# Patient Record
Sex: MALE | Race: ASIAN | NOT HISPANIC OR LATINO | Employment: OTHER | URBAN - METROPOLITAN AREA
[De-identification: names, ages, dates, MRNs, and addresses within clinical notes are randomized per-mention and may not be internally consistent; named-entity substitution may affect disease eponyms.]

---

## 2017-01-17 ENCOUNTER — COMMUNICATION - HEALTHEAST (OUTPATIENT)
Dept: LAB | Facility: CLINIC | Age: 77
End: 2017-01-17

## 2017-01-17 DIAGNOSIS — M1A.00X0 CHRONIC GOUTY ARTHROPATHY: ICD-10-CM

## 2017-02-06 ENCOUNTER — OFFICE VISIT - HEALTHEAST (OUTPATIENT)
Dept: CARDIOLOGY | Facility: CLINIC | Age: 77
End: 2017-02-06

## 2017-02-06 ENCOUNTER — AMBULATORY - HEALTHEAST (OUTPATIENT)
Dept: CARDIOLOGY | Facility: CLINIC | Age: 77
End: 2017-02-06

## 2017-02-06 DIAGNOSIS — I10 ESSENTIAL HYPERTENSION: ICD-10-CM

## 2017-02-06 DIAGNOSIS — E78.5 DYSLIPIDEMIA: ICD-10-CM

## 2017-02-06 DIAGNOSIS — I25.10 CORONARY ARTERY DISEASE INVOLVING NATIVE CORONARY ARTERY OF NATIVE HEART WITHOUT ANGINA PECTORIS: ICD-10-CM

## 2017-02-06 LAB
CHOLEST SERPL-MCNC: 245 MG/DL
FASTING STATUS PATIENT QL REPORTED: YES
HDLC SERPL-MCNC: 35 MG/DL
LDLC SERPL CALC-MCNC: ABNORMAL MG/DL
TRIGL SERPL-MCNC: 601 MG/DL

## 2017-02-06 ASSESSMENT — MIFFLIN-ST. JEOR: SCORE: 1271.24

## 2017-02-07 ENCOUNTER — COMMUNICATION - HEALTHEAST (OUTPATIENT)
Dept: LAB | Facility: CLINIC | Age: 77
End: 2017-02-07

## 2017-02-07 DIAGNOSIS — M51.379 DEGENERATION OF LUMBAR OR LUMBOSACRAL INTERVERTEBRAL DISC: ICD-10-CM

## 2017-02-07 DIAGNOSIS — M1A.00X0 CHRONIC GOUTY ARTHROPATHY: ICD-10-CM

## 2017-02-08 ENCOUNTER — COMMUNICATION - HEALTHEAST (OUTPATIENT)
Dept: CARDIOLOGY | Facility: CLINIC | Age: 77
End: 2017-02-08

## 2017-02-08 DIAGNOSIS — E78.5 DYSLIPIDEMIA: ICD-10-CM

## 2017-02-17 ENCOUNTER — AMBULATORY - HEALTHEAST (OUTPATIENT)
Dept: LAB | Facility: CLINIC | Age: 77
End: 2017-02-17

## 2017-02-17 DIAGNOSIS — M1A.00X0 CHRONIC GOUTY ARTHROPATHY: ICD-10-CM

## 2017-02-17 LAB
ALT SERPL W P-5'-P-CCNC: 54 U/L (ref 0–45)
CREAT SERPL-MCNC: 1.72 MG/DL (ref 0.7–1.3)
GFR SERPL CREATININE-BSD FRML MDRD: 39 ML/MIN/1.73M2

## 2017-02-21 ENCOUNTER — OFFICE VISIT - HEALTHEAST (OUTPATIENT)
Dept: RHEUMATOLOGY | Facility: CLINIC | Age: 77
End: 2017-02-21

## 2017-02-21 DIAGNOSIS — N18.30 CKD (CHRONIC KIDNEY DISEASE) STAGE 3, GFR 30-59 ML/MIN (H): ICD-10-CM

## 2017-02-21 DIAGNOSIS — M1A.00X0 CHRONIC GOUTY ARTHROPATHY: ICD-10-CM

## 2017-02-21 DIAGNOSIS — G89.29 CHRONIC RIGHT SHOULDER PAIN: ICD-10-CM

## 2017-02-21 DIAGNOSIS — M25.511 CHRONIC RIGHT SHOULDER PAIN: ICD-10-CM

## 2017-02-21 DIAGNOSIS — M1A.00X0 CHRONIC GOUTY ARTHROPATHY WITHOUT MENTION OF TOPHUS (TOPHI): ICD-10-CM

## 2017-02-21 ASSESSMENT — MIFFLIN-ST. JEOR: SCORE: 1288.48

## 2017-03-21 ENCOUNTER — OFFICE VISIT - HEALTHEAST (OUTPATIENT)
Dept: FAMILY MEDICINE | Facility: CLINIC | Age: 77
End: 2017-03-21

## 2017-03-21 DIAGNOSIS — L30.9 DERMATITIS: ICD-10-CM

## 2017-03-21 DIAGNOSIS — I10 ESSENTIAL HYPERTENSION WITH GOAL BLOOD PRESSURE LESS THAN 140/90: ICD-10-CM

## 2017-03-21 ASSESSMENT — MIFFLIN-ST. JEOR: SCORE: 1284.85

## 2017-04-06 ENCOUNTER — COMMUNICATION - HEALTHEAST (OUTPATIENT)
Dept: FAMILY MEDICINE | Facility: CLINIC | Age: 77
End: 2017-04-06

## 2017-04-06 DIAGNOSIS — M1A.00X0 CHRONIC GOUTY ARTHROPATHY: ICD-10-CM

## 2017-04-06 DIAGNOSIS — M51.379 DEGENERATION OF LUMBAR OR LUMBOSACRAL INTERVERTEBRAL DISC: ICD-10-CM

## 2017-04-19 ENCOUNTER — OFFICE VISIT - HEALTHEAST (OUTPATIENT)
Dept: FAMILY MEDICINE | Facility: CLINIC | Age: 77
End: 2017-04-19

## 2017-04-19 DIAGNOSIS — M51.379 DEGENERATION OF LUMBAR OR LUMBOSACRAL INTERVERTEBRAL DISC: ICD-10-CM

## 2017-04-19 DIAGNOSIS — R51.9 HEADACHE: ICD-10-CM

## 2017-04-19 DIAGNOSIS — I10 ESSENTIAL HYPERTENSION WITH GOAL BLOOD PRESSURE LESS THAN 140/90: ICD-10-CM

## 2017-04-19 ASSESSMENT — MIFFLIN-ST. JEOR: SCORE: 1287.12

## 2017-04-24 ENCOUNTER — COMMUNICATION - HEALTHEAST (OUTPATIENT)
Dept: LAB | Facility: CLINIC | Age: 77
End: 2017-04-24

## 2017-04-24 DIAGNOSIS — M1A.00X0 CHRONIC GOUTY ARTHROPATHY: ICD-10-CM

## 2017-05-19 ENCOUNTER — COMMUNICATION - HEALTHEAST (OUTPATIENT)
Dept: FAMILY MEDICINE | Facility: CLINIC | Age: 77
End: 2017-05-19

## 2017-05-19 ENCOUNTER — AMBULATORY - HEALTHEAST (OUTPATIENT)
Dept: LAB | Facility: CLINIC | Age: 77
End: 2017-05-19

## 2017-05-19 DIAGNOSIS — M1A.00X0 CHRONIC GOUTY ARTHROPATHY: ICD-10-CM

## 2017-05-19 DIAGNOSIS — M51.379 DEGENERATION OF LUMBAR OR LUMBOSACRAL INTERVERTEBRAL DISC: ICD-10-CM

## 2017-05-19 LAB
ALT SERPL W P-5'-P-CCNC: 29 U/L (ref 0–45)
CREAT SERPL-MCNC: 1.57 MG/DL (ref 0.7–1.3)
GFR SERPL CREATININE-BSD FRML MDRD: 43 ML/MIN/1.73M2

## 2017-05-23 ENCOUNTER — OFFICE VISIT - HEALTHEAST (OUTPATIENT)
Dept: RHEUMATOLOGY | Facility: CLINIC | Age: 77
End: 2017-05-23

## 2017-05-23 DIAGNOSIS — M25.562 BILATERAL CHRONIC KNEE PAIN: ICD-10-CM

## 2017-05-23 DIAGNOSIS — M25.561 BILATERAL CHRONIC KNEE PAIN: ICD-10-CM

## 2017-05-23 DIAGNOSIS — N18.30 CKD (CHRONIC KIDNEY DISEASE) STAGE 3, GFR 30-59 ML/MIN (H): ICD-10-CM

## 2017-05-23 DIAGNOSIS — G89.29 BILATERAL CHRONIC KNEE PAIN: ICD-10-CM

## 2017-05-23 DIAGNOSIS — M1A.00X0 CHRONIC GOUTY ARTHROPATHY: ICD-10-CM

## 2017-05-31 ENCOUNTER — COMMUNICATION - HEALTHEAST (OUTPATIENT)
Dept: FAMILY MEDICINE | Facility: CLINIC | Age: 77
End: 2017-05-31

## 2017-05-31 DIAGNOSIS — I10 UNSPECIFIED ESSENTIAL HYPERTENSION: ICD-10-CM

## 2017-06-14 ENCOUNTER — COMMUNICATION - HEALTHEAST (OUTPATIENT)
Dept: FAMILY MEDICINE | Facility: CLINIC | Age: 77
End: 2017-06-14

## 2017-06-21 ENCOUNTER — RECORDS - HEALTHEAST (OUTPATIENT)
Dept: ADMINISTRATIVE | Facility: OTHER | Age: 77
End: 2017-06-21

## 2017-07-09 ENCOUNTER — COMMUNICATION - HEALTHEAST (OUTPATIENT)
Dept: FAMILY MEDICINE | Facility: CLINIC | Age: 77
End: 2017-07-09

## 2017-07-09 DIAGNOSIS — M51.379 DEGENERATION OF LUMBAR OR LUMBOSACRAL INTERVERTEBRAL DISC: ICD-10-CM

## 2017-07-09 DIAGNOSIS — M1A.00X0 CHRONIC GOUTY ARTHROPATHY: ICD-10-CM

## 2017-07-11 ENCOUNTER — AMBULATORY - HEALTHEAST (OUTPATIENT)
Dept: FAMILY MEDICINE | Facility: CLINIC | Age: 77
End: 2017-07-11

## 2017-07-11 ENCOUNTER — COMMUNICATION - HEALTHEAST (OUTPATIENT)
Dept: FAMILY MEDICINE | Facility: CLINIC | Age: 77
End: 2017-07-11

## 2017-07-19 ENCOUNTER — OFFICE VISIT - HEALTHEAST (OUTPATIENT)
Dept: FAMILY MEDICINE | Facility: CLINIC | Age: 77
End: 2017-07-19

## 2017-07-19 DIAGNOSIS — Z87.438 HISTORY OF PROSTATITIS: ICD-10-CM

## 2017-07-19 DIAGNOSIS — I10 ESSENTIAL HYPERTENSION WITH GOAL BLOOD PRESSURE LESS THAN 140/90: ICD-10-CM

## 2017-07-19 DIAGNOSIS — M1A.9XX0 CHRONIC GOUT: ICD-10-CM

## 2017-07-19 DIAGNOSIS — N40.0 BPH (BENIGN PROSTATIC HYPERTROPHY): ICD-10-CM

## 2017-07-19 ASSESSMENT — MIFFLIN-ST. JEOR: SCORE: 1265.57

## 2017-08-18 ENCOUNTER — COMMUNICATION - HEALTHEAST (OUTPATIENT)
Dept: LAB | Facility: CLINIC | Age: 77
End: 2017-08-18

## 2017-08-18 DIAGNOSIS — M1A.00X0 CHRONIC GOUTY ARTHROPATHY: ICD-10-CM

## 2017-08-21 ENCOUNTER — AMBULATORY - HEALTHEAST (OUTPATIENT)
Dept: LAB | Facility: CLINIC | Age: 77
End: 2017-08-21

## 2017-08-21 DIAGNOSIS — R35.0 URINARY FREQUENCY: ICD-10-CM

## 2017-08-21 DIAGNOSIS — Z12.5 ENCOUNTER FOR SCREENING FOR MALIGNANT NEOPLASM OF PROSTATE: ICD-10-CM

## 2017-08-29 ENCOUNTER — AMBULATORY - HEALTHEAST (OUTPATIENT)
Dept: LAB | Facility: CLINIC | Age: 77
End: 2017-08-29

## 2017-08-29 DIAGNOSIS — M1A.00X0 CHRONIC GOUTY ARTHROPATHY: ICD-10-CM

## 2017-08-29 LAB
ALT SERPL W P-5'-P-CCNC: 37 U/L (ref 0–45)
CREAT SERPL-MCNC: 1.59 MG/DL (ref 0.7–1.3)
GFR SERPL CREATININE-BSD FRML MDRD: 43 ML/MIN/1.73M2

## 2017-09-01 ENCOUNTER — OFFICE VISIT - HEALTHEAST (OUTPATIENT)
Dept: RHEUMATOLOGY | Facility: CLINIC | Age: 77
End: 2017-09-01

## 2017-09-01 DIAGNOSIS — M1A.00X0 CHRONIC GOUTY ARTHROPATHY: ICD-10-CM

## 2017-09-01 DIAGNOSIS — N18.30 CKD (CHRONIC KIDNEY DISEASE) STAGE 3, GFR 30-59 ML/MIN (H): ICD-10-CM

## 2017-09-01 DIAGNOSIS — M77.8 RIGHT SHOULDER TENDINITIS: ICD-10-CM

## 2017-09-01 ASSESSMENT — MIFFLIN-ST. JEOR: SCORE: 1265.57

## 2017-09-03 ENCOUNTER — COMMUNICATION - HEALTHEAST (OUTPATIENT)
Dept: LAB | Facility: CLINIC | Age: 77
End: 2017-09-03

## 2017-09-03 DIAGNOSIS — M1A.00X0 CHRONIC GOUTY ARTHROPATHY: ICD-10-CM

## 2017-09-03 DIAGNOSIS — M51.379 DEGENERATION OF LUMBAR OR LUMBOSACRAL INTERVERTEBRAL DISC: ICD-10-CM

## 2017-09-07 ENCOUNTER — OFFICE VISIT - HEALTHEAST (OUTPATIENT)
Dept: FAMILY MEDICINE | Facility: CLINIC | Age: 77
End: 2017-09-07

## 2017-09-07 DIAGNOSIS — Z00.00 PREVENTATIVE HEALTH CARE: ICD-10-CM

## 2017-09-07 DIAGNOSIS — I10 ESSENTIAL HYPERTENSION WITH GOAL BLOOD PRESSURE LESS THAN 140/90: ICD-10-CM

## 2017-09-07 DIAGNOSIS — M1A.9XX0 CHRONIC GOUT: ICD-10-CM

## 2017-09-07 DIAGNOSIS — N18.30 CKD (CHRONIC KIDNEY DISEASE) STAGE 3, GFR 30-59 ML/MIN (H): ICD-10-CM

## 2017-09-07 DIAGNOSIS — R07.9 CHEST PAIN: ICD-10-CM

## 2017-09-07 RX ORDER — MULTIPLE VITAMINS W/ MINERALS TAB 9MG-400MCG
1 TAB ORAL DAILY
Qty: 100 TABLET | Refills: 3 | Status: SHIPPED | OUTPATIENT
Start: 2017-09-07 | End: 2021-12-06

## 2017-09-07 ASSESSMENT — MIFFLIN-ST. JEOR: SCORE: 1275.78

## 2017-09-26 ENCOUNTER — OFFICE VISIT - HEALTHEAST (OUTPATIENT)
Dept: FAMILY MEDICINE | Facility: CLINIC | Age: 77
End: 2017-09-26

## 2017-09-26 DIAGNOSIS — N18.30 CKD (CHRONIC KIDNEY DISEASE) STAGE 3, GFR 30-59 ML/MIN (H): ICD-10-CM

## 2017-09-26 DIAGNOSIS — I10 ESSENTIAL HYPERTENSION WITH GOAL BLOOD PRESSURE LESS THAN 140/90: ICD-10-CM

## 2017-09-26 ASSESSMENT — MIFFLIN-ST. JEOR: SCORE: 1282.58

## 2017-10-04 ENCOUNTER — COMMUNICATION - HEALTHEAST (OUTPATIENT)
Dept: SCHEDULING | Facility: CLINIC | Age: 77
End: 2017-10-04

## 2017-10-10 ENCOUNTER — AMBULATORY - HEALTHEAST (OUTPATIENT)
Dept: RHEUMATOLOGY | Facility: CLINIC | Age: 77
End: 2017-10-10

## 2017-10-10 DIAGNOSIS — M1A.9XX0 CHRONIC GOUT: ICD-10-CM

## 2017-10-18 ENCOUNTER — COMMUNICATION - HEALTHEAST (OUTPATIENT)
Dept: FAMILY MEDICINE | Facility: CLINIC | Age: 77
End: 2017-10-18

## 2017-10-25 ENCOUNTER — OFFICE VISIT - HEALTHEAST (OUTPATIENT)
Dept: FAMILY MEDICINE | Facility: CLINIC | Age: 77
End: 2017-10-25

## 2017-10-25 DIAGNOSIS — D64.9 ANEMIA: ICD-10-CM

## 2017-10-25 DIAGNOSIS — E11.9 TYPE 2 DIABETES MELLITUS WITHOUT COMPLICATION, WITHOUT LONG-TERM CURRENT USE OF INSULIN (H): ICD-10-CM

## 2017-10-25 DIAGNOSIS — K92.1 BLOOD IN STOOL: ICD-10-CM

## 2017-10-25 DIAGNOSIS — K59.00 CONSTIPATION: ICD-10-CM

## 2017-10-25 DIAGNOSIS — R07.9 CHEST PAIN: ICD-10-CM

## 2017-10-25 ASSESSMENT — MIFFLIN-ST. JEOR: SCORE: 1257.63

## 2017-10-29 ENCOUNTER — COMMUNICATION - HEALTHEAST (OUTPATIENT)
Dept: FAMILY MEDICINE | Facility: CLINIC | Age: 77
End: 2017-10-29

## 2017-11-17 ENCOUNTER — COMMUNICATION - HEALTHEAST (OUTPATIENT)
Dept: SCHEDULING | Facility: CLINIC | Age: 77
End: 2017-11-17

## 2017-11-19 ENCOUNTER — COMMUNICATION - HEALTHEAST (OUTPATIENT)
Dept: CARDIOLOGY | Facility: CLINIC | Age: 77
End: 2017-11-19

## 2017-11-19 DIAGNOSIS — E78.5 DYSLIPIDEMIA: ICD-10-CM

## 2017-12-01 ENCOUNTER — COMMUNICATION - HEALTHEAST (OUTPATIENT)
Dept: FAMILY MEDICINE | Facility: CLINIC | Age: 77
End: 2017-12-01

## 2017-12-05 ENCOUNTER — OFFICE VISIT - HEALTHEAST (OUTPATIENT)
Dept: FAMILY MEDICINE | Facility: CLINIC | Age: 77
End: 2017-12-05

## 2017-12-05 ENCOUNTER — COMMUNICATION - HEALTHEAST (OUTPATIENT)
Dept: FAMILY MEDICINE | Facility: CLINIC | Age: 77
End: 2017-12-05

## 2017-12-05 DIAGNOSIS — R79.89 POSITIVE D DIMER: ICD-10-CM

## 2017-12-05 DIAGNOSIS — M79.662 PAIN OF LEFT CALF: ICD-10-CM

## 2017-12-05 DIAGNOSIS — E83.42 HYPOMAGNESEMIA: ICD-10-CM

## 2017-12-05 DIAGNOSIS — R10.13 EPIGASTRIC PAIN: ICD-10-CM

## 2017-12-05 DIAGNOSIS — I95.9 HYPOTENSION: ICD-10-CM

## 2017-12-05 DIAGNOSIS — K52.9 COLITIS: ICD-10-CM

## 2017-12-05 ASSESSMENT — MIFFLIN-ST. JEOR: SCORE: 1245.17

## 2017-12-06 ENCOUNTER — AMBULATORY - HEALTHEAST (OUTPATIENT)
Dept: FAMILY MEDICINE | Facility: CLINIC | Age: 77
End: 2017-12-06

## 2017-12-06 ENCOUNTER — HOSPITAL ENCOUNTER (OUTPATIENT)
Dept: ULTRASOUND IMAGING | Facility: HOSPITAL | Age: 77
Discharge: HOME | End: 2017-12-06
Attending: FAMILY MEDICINE

## 2017-12-06 DIAGNOSIS — R79.89 POSITIVE D DIMER: ICD-10-CM

## 2017-12-06 DIAGNOSIS — M79.662 PAIN OF LEFT CALF: ICD-10-CM

## 2017-12-08 ENCOUNTER — RECORDS - HEALTHEAST (OUTPATIENT)
Dept: ADMINISTRATIVE | Facility: OTHER | Age: 77
End: 2017-12-08

## 2017-12-11 ENCOUNTER — OFFICE VISIT - HEALTHEAST (OUTPATIENT)
Dept: FAMILY MEDICINE | Facility: CLINIC | Age: 77
End: 2017-12-11

## 2017-12-11 DIAGNOSIS — M79.662 PAIN OF LEFT CALF: ICD-10-CM

## 2017-12-11 DIAGNOSIS — E83.42 HYPOMAGNESEMIA: ICD-10-CM

## 2017-12-11 DIAGNOSIS — I25.83 CORONARY ARTERY DISEASE DUE TO LIPID RICH PLAQUE: ICD-10-CM

## 2017-12-11 DIAGNOSIS — K52.9 COLITIS: ICD-10-CM

## 2017-12-11 DIAGNOSIS — I25.10 CORONARY ARTERY DISEASE DUE TO LIPID RICH PLAQUE: ICD-10-CM

## 2017-12-11 ASSESSMENT — MIFFLIN-ST. JEOR: SCORE: 1234.96

## 2017-12-19 ENCOUNTER — OFFICE VISIT - HEALTHEAST (OUTPATIENT)
Dept: FAMILY MEDICINE | Facility: CLINIC | Age: 77
End: 2017-12-19

## 2017-12-19 DIAGNOSIS — N17.9 ACUTE RENAL FAILURE SUPERIMPOSED ON STAGE 3 CHRONIC KIDNEY DISEASE (H): ICD-10-CM

## 2017-12-19 DIAGNOSIS — N18.30 ACUTE RENAL FAILURE SUPERIMPOSED ON STAGE 3 CHRONIC KIDNEY DISEASE (H): ICD-10-CM

## 2017-12-19 DIAGNOSIS — R10.9 ABDOMINAL PAIN: ICD-10-CM

## 2017-12-24 ENCOUNTER — COMMUNICATION - HEALTHEAST (OUTPATIENT)
Dept: FAMILY MEDICINE | Facility: CLINIC | Age: 77
End: 2017-12-24

## 2017-12-24 DIAGNOSIS — M1A.00X0 CHRONIC GOUTY ARTHROPATHY: ICD-10-CM

## 2017-12-28 ENCOUNTER — RECORDS - HEALTHEAST (OUTPATIENT)
Dept: ADMINISTRATIVE | Facility: OTHER | Age: 77
End: 2017-12-28

## 2017-12-28 ENCOUNTER — COMMUNICATION - HEALTHEAST (OUTPATIENT)
Dept: FAMILY MEDICINE | Facility: CLINIC | Age: 77
End: 2017-12-28

## 2017-12-29 ENCOUNTER — AMBULATORY - HEALTHEAST (OUTPATIENT)
Dept: LAB | Facility: CLINIC | Age: 77
End: 2017-12-29

## 2017-12-29 DIAGNOSIS — M1A.9XX0 CHRONIC GOUT: ICD-10-CM

## 2017-12-29 LAB
ALT SERPL W P-5'-P-CCNC: 25 U/L (ref 0–45)
CREAT SERPL-MCNC: 1.53 MG/DL (ref 0.7–1.3)
GFR SERPL CREATININE-BSD FRML MDRD: 45 ML/MIN/1.73M2

## 2018-01-03 ENCOUNTER — RECORDS - HEALTHEAST (OUTPATIENT)
Dept: ADMINISTRATIVE | Facility: OTHER | Age: 78
End: 2018-01-03

## 2018-01-11 ENCOUNTER — OFFICE VISIT - HEALTHEAST (OUTPATIENT)
Dept: RHEUMATOLOGY | Facility: CLINIC | Age: 78
End: 2018-01-11

## 2018-01-11 DIAGNOSIS — M1A.00X0 CHRONIC GOUTY ARTHROPATHY: ICD-10-CM

## 2018-01-11 DIAGNOSIS — N18.30 CKD (CHRONIC KIDNEY DISEASE) STAGE 3, GFR 30-59 ML/MIN (H): ICD-10-CM

## 2018-01-11 ASSESSMENT — MIFFLIN-ST. JEOR: SCORE: 1241.2

## 2018-01-13 ENCOUNTER — COMMUNICATION - HEALTHEAST (OUTPATIENT)
Dept: FAMILY MEDICINE | Facility: CLINIC | Age: 78
End: 2018-01-13

## 2018-01-22 ENCOUNTER — AMBULATORY - HEALTHEAST (OUTPATIENT)
Dept: FAMILY MEDICINE | Facility: CLINIC | Age: 78
End: 2018-01-22

## 2018-02-20 ENCOUNTER — COMMUNICATION - HEALTHEAST (OUTPATIENT)
Dept: FAMILY MEDICINE | Facility: CLINIC | Age: 78
End: 2018-02-20

## 2018-02-20 DIAGNOSIS — E11.9 TYPE 2 DIABETES MELLITUS WITHOUT COMPLICATION, WITHOUT LONG-TERM CURRENT USE OF INSULIN (H): ICD-10-CM

## 2018-02-26 ENCOUNTER — OFFICE VISIT - HEALTHEAST (OUTPATIENT)
Dept: FAMILY MEDICINE | Facility: CLINIC | Age: 78
End: 2018-02-26

## 2018-02-26 DIAGNOSIS — I10 ESSENTIAL HYPERTENSION WITH GOAL BLOOD PRESSURE LESS THAN 140/90: ICD-10-CM

## 2018-02-26 DIAGNOSIS — M1A.9XX0 CHRONIC GOUT: ICD-10-CM

## 2018-02-26 DIAGNOSIS — D64.9 ANEMIA: ICD-10-CM

## 2018-02-26 ASSESSMENT — MIFFLIN-ST. JEOR: SCORE: 1249.7

## 2018-03-05 ENCOUNTER — COMMUNICATION - HEALTHEAST (OUTPATIENT)
Dept: FAMILY MEDICINE | Facility: CLINIC | Age: 78
End: 2018-03-05

## 2018-04-06 ENCOUNTER — COMMUNICATION - HEALTHEAST (OUTPATIENT)
Dept: FAMILY MEDICINE | Facility: CLINIC | Age: 78
End: 2018-04-06

## 2018-04-25 ENCOUNTER — OFFICE VISIT - HEALTHEAST (OUTPATIENT)
Dept: FAMILY MEDICINE | Facility: CLINIC | Age: 78
End: 2018-04-25

## 2018-04-25 DIAGNOSIS — L08.9 SKIN INFECTION: ICD-10-CM

## 2018-04-25 ASSESSMENT — MIFFLIN-ST. JEOR: SCORE: 1290.53

## 2018-04-27 ENCOUNTER — COMMUNICATION - HEALTHEAST (OUTPATIENT)
Dept: FAMILY MEDICINE | Facility: CLINIC | Age: 78
End: 2018-04-27

## 2018-05-03 ENCOUNTER — OFFICE VISIT - HEALTHEAST (OUTPATIENT)
Dept: FAMILY MEDICINE | Facility: CLINIC | Age: 78
End: 2018-05-03

## 2018-05-03 DIAGNOSIS — N50.89 SCROTAL IRRITATION: ICD-10-CM

## 2018-05-03 DIAGNOSIS — L30.9 DERMATITIS: ICD-10-CM

## 2018-05-03 ASSESSMENT — MIFFLIN-ST. JEOR: SCORE: 1257.64

## 2018-05-08 ENCOUNTER — COMMUNICATION - HEALTHEAST (OUTPATIENT)
Dept: CARDIOLOGY | Facility: CLINIC | Age: 78
End: 2018-05-08

## 2018-05-08 DIAGNOSIS — E78.5 HYPERLIPIDEMIA: ICD-10-CM

## 2018-05-19 ENCOUNTER — COMMUNICATION - HEALTHEAST (OUTPATIENT)
Dept: FAMILY MEDICINE | Facility: CLINIC | Age: 78
End: 2018-05-19

## 2018-05-19 DIAGNOSIS — E11.9 TYPE 2 DIABETES MELLITUS WITHOUT COMPLICATION, WITHOUT LONG-TERM CURRENT USE OF INSULIN (H): ICD-10-CM

## 2018-06-20 ENCOUNTER — COMMUNICATION - HEALTHEAST (OUTPATIENT)
Dept: FAMILY MEDICINE | Facility: CLINIC | Age: 78
End: 2018-06-20

## 2018-06-22 ENCOUNTER — COMMUNICATION - HEALTHEAST (OUTPATIENT)
Dept: FAMILY MEDICINE | Facility: CLINIC | Age: 78
End: 2018-06-22

## 2018-06-22 DIAGNOSIS — E11.9 TYPE 2 DIABETES MELLITUS WITHOUT COMPLICATION, WITHOUT LONG-TERM CURRENT USE OF INSULIN (H): ICD-10-CM

## 2018-06-27 ENCOUNTER — AMBULATORY - HEALTHEAST (OUTPATIENT)
Dept: FAMILY MEDICINE | Facility: CLINIC | Age: 78
End: 2018-06-27

## 2018-06-27 ENCOUNTER — COMMUNICATION - HEALTHEAST (OUTPATIENT)
Dept: CARDIOLOGY | Facility: CLINIC | Age: 78
End: 2018-06-27

## 2018-06-27 DIAGNOSIS — E78.5 HYPERLIPIDEMIA: ICD-10-CM

## 2018-07-11 ENCOUNTER — COMMUNICATION - HEALTHEAST (OUTPATIENT)
Dept: FAMILY MEDICINE | Facility: CLINIC | Age: 78
End: 2018-07-11

## 2018-07-11 DIAGNOSIS — K21.9 GASTROESOPHAGEAL REFLUX DISEASE WITHOUT ESOPHAGITIS: ICD-10-CM

## 2018-07-22 ENCOUNTER — COMMUNICATION - HEALTHEAST (OUTPATIENT)
Dept: FAMILY MEDICINE | Facility: CLINIC | Age: 78
End: 2018-07-22

## 2018-08-05 ENCOUNTER — COMMUNICATION - HEALTHEAST (OUTPATIENT)
Dept: FAMILY MEDICINE | Facility: CLINIC | Age: 78
End: 2018-08-05

## 2018-08-07 ENCOUNTER — RECORDS - HEALTHEAST (OUTPATIENT)
Dept: ADMINISTRATIVE | Facility: OTHER | Age: 78
End: 2018-08-07

## 2018-08-13 ENCOUNTER — COMMUNICATION - HEALTHEAST (OUTPATIENT)
Dept: SCHEDULING | Facility: CLINIC | Age: 78
End: 2018-08-13

## 2018-08-21 ENCOUNTER — COMMUNICATION - HEALTHEAST (OUTPATIENT)
Dept: FAMILY MEDICINE | Facility: CLINIC | Age: 78
End: 2018-08-21

## 2018-08-28 ENCOUNTER — COMMUNICATION - HEALTHEAST (OUTPATIENT)
Dept: ADMINISTRATIVE | Facility: CLINIC | Age: 78
End: 2018-08-28

## 2018-08-28 ENCOUNTER — OFFICE VISIT - HEALTHEAST (OUTPATIENT)
Dept: FAMILY MEDICINE | Facility: CLINIC | Age: 78
End: 2018-08-28

## 2018-08-28 DIAGNOSIS — Z23 NEED FOR IMMUNIZATION AGAINST INFLUENZA: ICD-10-CM

## 2018-08-28 DIAGNOSIS — E11.9 TYPE 2 DIABETES MELLITUS WITHOUT COMPLICATION, WITHOUT LONG-TERM CURRENT USE OF INSULIN (H): ICD-10-CM

## 2018-08-28 DIAGNOSIS — L57.0 ACTINIC KERATOSIS: ICD-10-CM

## 2018-08-28 DIAGNOSIS — I10 ESSENTIAL HYPERTENSION WITH GOAL BLOOD PRESSURE LESS THAN 140/90: ICD-10-CM

## 2018-08-28 DIAGNOSIS — M1A.00X0 CHRONIC GOUTY ARTHROPATHY: ICD-10-CM

## 2018-08-28 LAB — HBA1C MFR BLD: 6.2 % (ref 3.5–6)

## 2018-08-28 ASSESSMENT — MIFFLIN-ST. JEOR: SCORE: 1267.85

## 2018-09-04 ENCOUNTER — AMBULATORY - HEALTHEAST (OUTPATIENT)
Dept: LAB | Facility: CLINIC | Age: 78
End: 2018-09-04

## 2018-09-04 DIAGNOSIS — M1A.9XX0 CHRONIC GOUT: ICD-10-CM

## 2018-09-04 LAB
ALBUMIN SERPL-MCNC: 3.8 G/DL (ref 3.5–5)
ALT SERPL W P-5'-P-CCNC: 38 U/L (ref 0–45)
CREAT SERPL-MCNC: 1.6 MG/DL (ref 0.7–1.3)
ERYTHROCYTE [DISTWIDTH] IN BLOOD BY AUTOMATED COUNT: 12.8 % (ref 11–14.5)
GFR SERPL CREATININE-BSD FRML MDRD: 42 ML/MIN/1.73M2
HCT VFR BLD AUTO: 45.3 % (ref 40–54)
HGB BLD-MCNC: 15 G/DL (ref 14–18)
MCH RBC QN AUTO: 29.7 PG (ref 27–34)
MCHC RBC AUTO-ENTMCNC: 33.2 G/DL (ref 32–36)
MCV RBC AUTO: 90 FL (ref 80–100)
PLATELET # BLD AUTO: 208 THOU/UL (ref 140–440)
PMV BLD AUTO: 7.4 FL (ref 7–10)
RBC # BLD AUTO: 5.06 MILL/UL (ref 4.4–6.2)
WBC: 8.5 THOU/UL (ref 4–11)

## 2018-09-10 ENCOUNTER — OFFICE VISIT - HEALTHEAST (OUTPATIENT)
Dept: RHEUMATOLOGY | Facility: CLINIC | Age: 78
End: 2018-09-10

## 2018-09-10 DIAGNOSIS — M1A.00X0 CHRONIC GOUTY ARTHROPATHY: ICD-10-CM

## 2018-09-10 DIAGNOSIS — M65.321 TRIGGER FINGER, RIGHT INDEX FINGER: ICD-10-CM

## 2018-09-10 DIAGNOSIS — N18.30 CKD (CHRONIC KIDNEY DISEASE) STAGE 3, GFR 30-59 ML/MIN (H): ICD-10-CM

## 2018-09-21 ENCOUNTER — COMMUNICATION - HEALTHEAST (OUTPATIENT)
Dept: FAMILY MEDICINE | Facility: CLINIC | Age: 78
End: 2018-09-21

## 2018-09-21 DIAGNOSIS — E11.9 TYPE 2 DIABETES MELLITUS WITHOUT COMPLICATION, WITHOUT LONG-TERM CURRENT USE OF INSULIN (H): ICD-10-CM

## 2018-10-25 ENCOUNTER — COMMUNICATION - HEALTHEAST (OUTPATIENT)
Dept: FAMILY MEDICINE | Facility: CLINIC | Age: 78
End: 2018-10-25

## 2018-11-29 ENCOUNTER — COMMUNICATION - HEALTHEAST (OUTPATIENT)
Dept: FAMILY MEDICINE | Facility: CLINIC | Age: 78
End: 2018-11-29

## 2018-12-04 ENCOUNTER — OFFICE VISIT - HEALTHEAST (OUTPATIENT)
Dept: FAMILY MEDICINE | Facility: CLINIC | Age: 78
End: 2018-12-04

## 2018-12-04 DIAGNOSIS — M15.9 GENERALIZED OSTEOARTHROSIS OF HAND: ICD-10-CM

## 2018-12-04 DIAGNOSIS — I10 ESSENTIAL HYPERTENSION WITH GOAL BLOOD PRESSURE LESS THAN 140/90: ICD-10-CM

## 2018-12-04 DIAGNOSIS — M1A.09X0 CHRONIC GOUT OF MULTIPLE SITES, UNSPECIFIED CAUSE: ICD-10-CM

## 2018-12-04 DIAGNOSIS — E11.9 TYPE 2 DIABETES MELLITUS WITHOUT COMPLICATION, WITHOUT LONG-TERM CURRENT USE OF INSULIN (H): ICD-10-CM

## 2018-12-04 DIAGNOSIS — L82.0 INFLAMED SEBORRHEIC KERATOSIS: ICD-10-CM

## 2018-12-04 ASSESSMENT — MIFFLIN-ST. JEOR: SCORE: 1264.45

## 2018-12-20 ENCOUNTER — OFFICE VISIT - HEALTHEAST (OUTPATIENT)
Dept: RHEUMATOLOGY | Facility: CLINIC | Age: 78
End: 2018-12-20

## 2018-12-20 DIAGNOSIS — M19.032 PRIMARY OSTEOARTHRITIS OF LEFT WRIST: ICD-10-CM

## 2018-12-20 DIAGNOSIS — M15.9 GENERALIZED OSTEOARTHROSIS OF HAND: ICD-10-CM

## 2018-12-20 DIAGNOSIS — M1A.00X0 CHRONIC GOUTY ARTHROPATHY: ICD-10-CM

## 2018-12-20 DIAGNOSIS — N18.30 CKD (CHRONIC KIDNEY DISEASE) STAGE 3, GFR 30-59 ML/MIN (H): ICD-10-CM

## 2018-12-20 LAB
ALBUMIN SERPL-MCNC: 4.1 G/DL (ref 3.5–5)
ALT SERPL W P-5'-P-CCNC: 35 U/L (ref 0–45)
CREAT SERPL-MCNC: 1.51 MG/DL (ref 0.7–1.3)
ERYTHROCYTE [DISTWIDTH] IN BLOOD BY AUTOMATED COUNT: 12.5 % (ref 11–14.5)
GFR SERPL CREATININE-BSD FRML MDRD: 45 ML/MIN/1.73M2
HCT VFR BLD AUTO: 42.7 % (ref 40–54)
HGB BLD-MCNC: 15 G/DL (ref 14–18)
MCH RBC QN AUTO: 31.7 PG (ref 27–34)
MCHC RBC AUTO-ENTMCNC: 35.2 G/DL (ref 32–36)
MCV RBC AUTO: 90 FL (ref 80–100)
PLATELET # BLD AUTO: 205 THOU/UL (ref 140–440)
PMV BLD AUTO: 8.1 FL (ref 7–10)
RBC # BLD AUTO: 4.74 MILL/UL (ref 4.4–6.2)
URATE SERPL-MCNC: 6.8 MG/DL (ref 3–8)
WBC: 7.7 THOU/UL (ref 4–11)

## 2018-12-24 ENCOUNTER — COMMUNICATION - HEALTHEAST (OUTPATIENT)
Dept: FAMILY MEDICINE | Facility: CLINIC | Age: 78
End: 2018-12-24

## 2018-12-28 ENCOUNTER — COMMUNICATION - HEALTHEAST (OUTPATIENT)
Dept: FAMILY MEDICINE | Facility: CLINIC | Age: 78
End: 2018-12-28

## 2018-12-28 ENCOUNTER — AMBULATORY - HEALTHEAST (OUTPATIENT)
Dept: FAMILY MEDICINE | Facility: CLINIC | Age: 78
End: 2018-12-28

## 2018-12-28 ENCOUNTER — COMMUNICATION - HEALTHEAST (OUTPATIENT)
Dept: SCHEDULING | Facility: CLINIC | Age: 78
End: 2018-12-28

## 2019-01-02 ENCOUNTER — OFFICE VISIT - HEALTHEAST (OUTPATIENT)
Dept: FAMILY MEDICINE | Facility: CLINIC | Age: 79
End: 2019-01-02

## 2019-01-02 DIAGNOSIS — Z28.39 IMMUNIZATION DEFICIENCY: ICD-10-CM

## 2019-01-02 DIAGNOSIS — I10 ESSENTIAL HYPERTENSION WITH GOAL BLOOD PRESSURE LESS THAN 140/90: ICD-10-CM

## 2019-01-02 DIAGNOSIS — I25.10 CORONARY ARTERY DISEASE DUE TO LIPID RICH PLAQUE: ICD-10-CM

## 2019-01-02 DIAGNOSIS — L82.0 INFLAMED SEBORRHEIC KERATOSIS: ICD-10-CM

## 2019-01-02 DIAGNOSIS — I25.83 CORONARY ARTERY DISEASE DUE TO LIPID RICH PLAQUE: ICD-10-CM

## 2019-01-02 DIAGNOSIS — R07.89 ATYPICAL CHEST PAIN: ICD-10-CM

## 2019-01-02 ASSESSMENT — MIFFLIN-ST. JEOR: SCORE: 1276.92

## 2019-01-16 ENCOUNTER — COMMUNICATION - HEALTHEAST (OUTPATIENT)
Dept: FAMILY MEDICINE | Facility: CLINIC | Age: 79
End: 2019-01-16

## 2019-01-16 ENCOUNTER — RECORDS - HEALTHEAST (OUTPATIENT)
Dept: ADMINISTRATIVE | Facility: OTHER | Age: 79
End: 2019-01-16

## 2019-01-16 DIAGNOSIS — I10 ESSENTIAL HYPERTENSION WITH GOAL BLOOD PRESSURE LESS THAN 140/90: ICD-10-CM

## 2019-01-25 ENCOUNTER — COMMUNICATION - HEALTHEAST (OUTPATIENT)
Dept: FAMILY MEDICINE | Facility: CLINIC | Age: 79
End: 2019-01-25

## 2019-01-25 DIAGNOSIS — I25.10 CORONARY ARTERY DISEASE DUE TO LIPID RICH PLAQUE: ICD-10-CM

## 2019-01-25 DIAGNOSIS — I25.83 CORONARY ARTERY DISEASE DUE TO LIPID RICH PLAQUE: ICD-10-CM

## 2019-02-23 ENCOUNTER — COMMUNICATION - HEALTHEAST (OUTPATIENT)
Dept: FAMILY MEDICINE | Facility: CLINIC | Age: 79
End: 2019-02-23

## 2019-02-23 DIAGNOSIS — M54.50 LUMBAGO: ICD-10-CM

## 2019-02-23 DIAGNOSIS — I25.83 CORONARY ARTERY DISEASE DUE TO LIPID RICH PLAQUE: ICD-10-CM

## 2019-02-23 DIAGNOSIS — I25.10 CORONARY ARTERY DISEASE DUE TO LIPID RICH PLAQUE: ICD-10-CM

## 2019-02-26 ENCOUNTER — COMMUNICATION - HEALTHEAST (OUTPATIENT)
Dept: FAMILY MEDICINE | Facility: CLINIC | Age: 79
End: 2019-02-26

## 2019-02-26 DIAGNOSIS — R07.89 ATYPICAL CHEST PAIN: ICD-10-CM

## 2019-03-04 ENCOUNTER — OFFICE VISIT - HEALTHEAST (OUTPATIENT)
Dept: FAMILY MEDICINE | Facility: CLINIC | Age: 79
End: 2019-03-04

## 2019-03-04 DIAGNOSIS — M51.379 DEGENERATION OF LUMBAR OR LUMBOSACRAL INTERVERTEBRAL DISC: ICD-10-CM

## 2019-03-04 DIAGNOSIS — I10 ESSENTIAL HYPERTENSION WITH GOAL BLOOD PRESSURE LESS THAN 140/90: ICD-10-CM

## 2019-03-04 DIAGNOSIS — I25.10 CORONARY ARTERY DISEASE DUE TO LIPID RICH PLAQUE: ICD-10-CM

## 2019-03-04 DIAGNOSIS — I25.83 CORONARY ARTERY DISEASE DUE TO LIPID RICH PLAQUE: ICD-10-CM

## 2019-03-04 ASSESSMENT — MIFFLIN-ST. JEOR: SCORE: 1295.06

## 2019-03-11 ENCOUNTER — COMMUNICATION - HEALTHEAST (OUTPATIENT)
Dept: FAMILY MEDICINE | Facility: CLINIC | Age: 79
End: 2019-03-11

## 2019-03-11 DIAGNOSIS — M1A.09X0 CHRONIC GOUT OF MULTIPLE SITES, UNSPECIFIED CAUSE: ICD-10-CM

## 2019-03-21 ENCOUNTER — COMMUNICATION - HEALTHEAST (OUTPATIENT)
Dept: RHEUMATOLOGY | Facility: CLINIC | Age: 79
End: 2019-03-21

## 2019-03-21 ENCOUNTER — OFFICE VISIT - HEALTHEAST (OUTPATIENT)
Dept: RHEUMATOLOGY | Facility: CLINIC | Age: 79
End: 2019-03-21

## 2019-03-21 DIAGNOSIS — M25.50 POLYARTHRALGIA: ICD-10-CM

## 2019-03-21 DIAGNOSIS — M15.0 PRIMARY OSTEOARTHRITIS INVOLVING MULTIPLE JOINTS: ICD-10-CM

## 2019-03-21 DIAGNOSIS — N18.30 CKD (CHRONIC KIDNEY DISEASE) STAGE 3, GFR 30-59 ML/MIN (H): ICD-10-CM

## 2019-03-27 ENCOUNTER — COMMUNICATION - HEALTHEAST (OUTPATIENT)
Dept: FAMILY MEDICINE | Facility: CLINIC | Age: 79
End: 2019-03-27

## 2019-03-27 DIAGNOSIS — R07.89 ATYPICAL CHEST PAIN: ICD-10-CM

## 2019-04-04 ENCOUNTER — OFFICE VISIT - HEALTHEAST (OUTPATIENT)
Dept: FAMILY MEDICINE | Facility: CLINIC | Age: 79
End: 2019-04-04

## 2019-04-04 DIAGNOSIS — I10 ESSENTIAL HYPERTENSION WITH GOAL BLOOD PRESSURE LESS THAN 140/90: ICD-10-CM

## 2019-04-04 DIAGNOSIS — M1A.09X0 CHRONIC GOUT OF MULTIPLE SITES, UNSPECIFIED CAUSE: ICD-10-CM

## 2019-04-04 ASSESSMENT — MIFFLIN-ST. JEOR: SCORE: 1290.53

## 2019-04-11 ENCOUNTER — OFFICE VISIT - HEALTHEAST (OUTPATIENT)
Dept: FAMILY MEDICINE | Facility: CLINIC | Age: 79
End: 2019-04-11

## 2019-04-11 ENCOUNTER — COMMUNICATION - HEALTHEAST (OUTPATIENT)
Dept: FAMILY MEDICINE | Facility: CLINIC | Age: 79
End: 2019-04-11

## 2019-04-11 DIAGNOSIS — I10 ESSENTIAL HYPERTENSION WITH GOAL BLOOD PRESSURE LESS THAN 140/90: ICD-10-CM

## 2019-04-11 DIAGNOSIS — M15.0 PRIMARY OSTEOARTHRITIS INVOLVING MULTIPLE JOINTS: ICD-10-CM

## 2019-04-11 ASSESSMENT — MIFFLIN-ST. JEOR: SCORE: 1299.6

## 2019-04-24 ENCOUNTER — COMMUNICATION - HEALTHEAST (OUTPATIENT)
Dept: FAMILY MEDICINE | Facility: CLINIC | Age: 79
End: 2019-04-24

## 2019-04-24 DIAGNOSIS — K21.9 GASTROESOPHAGEAL REFLUX DISEASE, ESOPHAGITIS PRESENCE NOT SPECIFIED: ICD-10-CM

## 2019-04-29 ENCOUNTER — COMMUNICATION - HEALTHEAST (OUTPATIENT)
Dept: FAMILY MEDICINE | Facility: CLINIC | Age: 79
End: 2019-04-29

## 2019-04-29 DIAGNOSIS — R07.89 ATYPICAL CHEST PAIN: ICD-10-CM

## 2019-05-08 ENCOUNTER — COMMUNICATION - HEALTHEAST (OUTPATIENT)
Dept: GERIATRIC MEDICINE | Facility: CLINIC | Age: 79
End: 2019-05-08

## 2019-05-10 ENCOUNTER — COMMUNICATION - HEALTHEAST (OUTPATIENT)
Dept: FAMILY MEDICINE | Facility: CLINIC | Age: 79
End: 2019-05-10

## 2019-05-10 DIAGNOSIS — R42 DIZZINESS: ICD-10-CM

## 2019-05-13 RX ORDER — MECLIZINE HCL 12.5 MG 12.5 MG/1
12.5-25 TABLET ORAL EVERY 8 HOURS PRN
Qty: 60 TABLET | Refills: 1 | Status: SHIPPED | OUTPATIENT
Start: 2019-05-13 | End: 2021-12-06

## 2019-05-14 ENCOUNTER — OFFICE VISIT - HEALTHEAST (OUTPATIENT)
Dept: FAMILY MEDICINE | Facility: CLINIC | Age: 79
End: 2019-05-14

## 2019-05-14 DIAGNOSIS — E11.9 DIABETES MELLITUS, TYPE 2 (H): ICD-10-CM

## 2019-05-14 DIAGNOSIS — I10 ESSENTIAL HYPERTENSION WITH GOAL BLOOD PRESSURE LESS THAN 140/90: ICD-10-CM

## 2019-05-14 DIAGNOSIS — M1A.09X0 CHRONIC GOUT OF MULTIPLE SITES, UNSPECIFIED CAUSE: ICD-10-CM

## 2019-05-14 LAB
CREAT UR-MCNC: 194.8 MG/DL
HBA1C MFR BLD: 6.9 % (ref 3.5–6)
MICROALBUMIN UR-MCNC: 5.33 MG/DL (ref 0–1.99)
MICROALBUMIN/CREAT UR: 27.4 MG/G

## 2019-05-14 ASSESSMENT — MIFFLIN-ST. JEOR: SCORE: 1295.06

## 2019-05-15 ENCOUNTER — COMMUNICATION - HEALTHEAST (OUTPATIENT)
Dept: GERIATRIC MEDICINE | Facility: CLINIC | Age: 79
End: 2019-05-15

## 2019-05-28 ENCOUNTER — COMMUNICATION - HEALTHEAST (OUTPATIENT)
Dept: SCHEDULING | Facility: CLINIC | Age: 79
End: 2019-05-28

## 2019-05-28 DIAGNOSIS — R07.89 ATYPICAL CHEST PAIN: ICD-10-CM

## 2019-06-06 ENCOUNTER — COMMUNICATION - HEALTHEAST (OUTPATIENT)
Dept: FAMILY MEDICINE | Facility: CLINIC | Age: 79
End: 2019-06-06

## 2019-06-19 ENCOUNTER — COMMUNICATION - HEALTHEAST (OUTPATIENT)
Dept: FAMILY MEDICINE | Facility: CLINIC | Age: 79
End: 2019-06-19

## 2019-06-19 ENCOUNTER — COMMUNICATION - HEALTHEAST (OUTPATIENT)
Dept: RHEUMATOLOGY | Facility: CLINIC | Age: 79
End: 2019-06-19

## 2019-06-19 DIAGNOSIS — E78.5 DYSLIPIDEMIA: ICD-10-CM

## 2019-06-19 DIAGNOSIS — M15.9 GENERALIZED OSTEOARTHROSIS OF HAND: ICD-10-CM

## 2019-06-25 ENCOUNTER — OFFICE VISIT - HEALTHEAST (OUTPATIENT)
Dept: RHEUMATOLOGY | Facility: CLINIC | Age: 79
End: 2019-06-25

## 2019-06-25 ENCOUNTER — COMMUNICATION - HEALTHEAST (OUTPATIENT)
Dept: RHEUMATOLOGY | Facility: CLINIC | Age: 79
End: 2019-06-25

## 2019-06-25 DIAGNOSIS — M15.9 GENERALIZED OSTEOARTHROSIS OF HAND: ICD-10-CM

## 2019-06-25 DIAGNOSIS — N18.30 CKD (CHRONIC KIDNEY DISEASE) STAGE 3, GFR 30-59 ML/MIN (H): ICD-10-CM

## 2019-06-25 DIAGNOSIS — M15.0 PRIMARY OSTEOARTHRITIS INVOLVING MULTIPLE JOINTS: ICD-10-CM

## 2019-06-25 DIAGNOSIS — M25.532 BILATERAL WRIST PAIN: ICD-10-CM

## 2019-06-25 DIAGNOSIS — M25.531 BILATERAL WRIST PAIN: ICD-10-CM

## 2019-06-28 ENCOUNTER — COMMUNICATION - HEALTHEAST (OUTPATIENT)
Dept: SCHEDULING | Facility: CLINIC | Age: 79
End: 2019-06-28

## 2019-06-28 DIAGNOSIS — R07.89 ATYPICAL CHEST PAIN: ICD-10-CM

## 2019-07-03 ENCOUNTER — HOSPITAL ENCOUNTER (OUTPATIENT)
Dept: MRI IMAGING | Facility: HOSPITAL | Age: 79
Discharge: HOME | End: 2019-07-03
Attending: INTERNAL MEDICINE

## 2019-07-03 DIAGNOSIS — M25.531 BILATERAL WRIST PAIN: ICD-10-CM

## 2019-07-03 DIAGNOSIS — M25.532 BILATERAL WRIST PAIN: ICD-10-CM

## 2019-07-03 DIAGNOSIS — M15.9 GENERALIZED OSTEOARTHROSIS OF HAND: ICD-10-CM

## 2019-07-26 ENCOUNTER — COMMUNICATION - HEALTHEAST (OUTPATIENT)
Dept: SCHEDULING | Facility: CLINIC | Age: 79
End: 2019-07-26

## 2019-07-29 ENCOUNTER — COMMUNICATION - HEALTHEAST (OUTPATIENT)
Dept: FAMILY MEDICINE | Facility: CLINIC | Age: 79
End: 2019-07-29

## 2019-07-29 DIAGNOSIS — R07.89 ATYPICAL CHEST PAIN: ICD-10-CM

## 2019-07-30 ENCOUNTER — COMMUNICATION - HEALTHEAST (OUTPATIENT)
Dept: ADMINISTRATIVE | Facility: CLINIC | Age: 79
End: 2019-07-30

## 2019-08-13 ENCOUNTER — COMMUNICATION - HEALTHEAST (OUTPATIENT)
Dept: FAMILY MEDICINE | Facility: CLINIC | Age: 79
End: 2019-08-13

## 2019-08-13 DIAGNOSIS — R07.89 ATYPICAL CHEST PAIN: ICD-10-CM

## 2019-08-13 DIAGNOSIS — E83.42 HYPOMAGNESEMIA: ICD-10-CM

## 2019-08-27 ENCOUNTER — COMMUNICATION - HEALTHEAST (OUTPATIENT)
Dept: SCHEDULING | Facility: CLINIC | Age: 79
End: 2019-08-27

## 2019-08-27 DIAGNOSIS — K59.00 CONSTIPATION: ICD-10-CM

## 2019-08-30 ENCOUNTER — COMMUNICATION - HEALTHEAST (OUTPATIENT)
Dept: GERIATRIC MEDICINE | Facility: CLINIC | Age: 79
End: 2019-08-30

## 2019-09-16 ENCOUNTER — RECORDS - HEALTHEAST (OUTPATIENT)
Dept: HEALTH INFORMATION MANAGEMENT | Facility: CLINIC | Age: 79
End: 2019-09-16

## 2019-09-16 ENCOUNTER — OFFICE VISIT - HEALTHEAST (OUTPATIENT)
Dept: FAMILY MEDICINE | Facility: CLINIC | Age: 79
End: 2019-09-16

## 2019-09-16 DIAGNOSIS — N18.30 CKD (CHRONIC KIDNEY DISEASE) STAGE 3, GFR 30-59 ML/MIN (H): ICD-10-CM

## 2019-09-16 DIAGNOSIS — E83.42 HYPOMAGNESEMIA: ICD-10-CM

## 2019-09-16 DIAGNOSIS — Z00.00 ROUTINE GENERAL MEDICAL EXAMINATION AT A HEALTH CARE FACILITY: ICD-10-CM

## 2019-09-16 DIAGNOSIS — E11.9 DIABETES MELLITUS, TYPE 2 (H): ICD-10-CM

## 2019-09-16 LAB
ALBUMIN SERPL-MCNC: 4 G/DL (ref 3.5–5)
ALP SERPL-CCNC: 122 U/L (ref 45–120)
ALT SERPL W P-5'-P-CCNC: 24 U/L (ref 0–45)
ANION GAP SERPL CALCULATED.3IONS-SCNC: 11 MMOL/L (ref 5–18)
AST SERPL W P-5'-P-CCNC: 24 U/L (ref 0–40)
BILIRUB SERPL-MCNC: 0.4 MG/DL (ref 0–1)
BUN SERPL-MCNC: 26 MG/DL (ref 8–28)
CALCIUM SERPL-MCNC: 9.8 MG/DL (ref 8.5–10.5)
CHLORIDE BLD-SCNC: 105 MMOL/L (ref 98–107)
CO2 SERPL-SCNC: 24 MMOL/L (ref 22–31)
CREAT SERPL-MCNC: 1.54 MG/DL (ref 0.7–1.3)
GFR SERPL CREATININE-BSD FRML MDRD: 44 ML/MIN/1.73M2
GLUCOSE BLD-MCNC: 123 MG/DL (ref 70–125)
HBA1C MFR BLD: 6 % (ref 3.5–6)
POTASSIUM BLD-SCNC: 4.6 MMOL/L (ref 3.5–5)
PROT SERPL-MCNC: 8 G/DL (ref 6–8)
SODIUM SERPL-SCNC: 140 MMOL/L (ref 136–145)

## 2019-09-16 ASSESSMENT — MIFFLIN-ST. JEOR: SCORE: 1279.75

## 2019-09-23 ENCOUNTER — OFFICE VISIT - HEALTHEAST (OUTPATIENT)
Dept: RHEUMATOLOGY | Facility: CLINIC | Age: 79
End: 2019-09-23

## 2019-09-23 DIAGNOSIS — M25.531 PAIN IN BOTH WRISTS: ICD-10-CM

## 2019-09-23 DIAGNOSIS — M15.0 PRIMARY OSTEOARTHRITIS INVOLVING MULTIPLE JOINTS: ICD-10-CM

## 2019-09-23 DIAGNOSIS — M25.532 PAIN IN BOTH WRISTS: ICD-10-CM

## 2019-09-23 ASSESSMENT — MIFFLIN-ST. JEOR: SCORE: 1279.75

## 2019-09-30 ENCOUNTER — COMMUNICATION - HEALTHEAST (OUTPATIENT)
Dept: SCHEDULING | Facility: CLINIC | Age: 79
End: 2019-09-30

## 2019-09-30 DIAGNOSIS — R07.89 ATYPICAL CHEST PAIN: ICD-10-CM

## 2019-10-10 ENCOUNTER — COMMUNICATION - HEALTHEAST (OUTPATIENT)
Dept: FAMILY MEDICINE | Facility: CLINIC | Age: 79
End: 2019-10-10

## 2019-10-10 DIAGNOSIS — M51.379 DEGENERATION OF LUMBAR OR LUMBOSACRAL INTERVERTEBRAL DISC: ICD-10-CM

## 2019-10-10 DIAGNOSIS — J31.0 CHRONIC RHINITIS: ICD-10-CM

## 2019-10-29 ENCOUNTER — COMMUNICATION - HEALTHEAST (OUTPATIENT)
Dept: FAMILY MEDICINE | Facility: CLINIC | Age: 79
End: 2019-10-29

## 2019-10-29 DIAGNOSIS — R07.89 ATYPICAL CHEST PAIN: ICD-10-CM

## 2019-11-21 ENCOUNTER — COMMUNICATION - HEALTHEAST (OUTPATIENT)
Dept: GERIATRIC MEDICINE | Facility: CLINIC | Age: 79
End: 2019-11-21

## 2019-11-22 ENCOUNTER — OFFICE VISIT - HEALTHEAST (OUTPATIENT)
Dept: CARDIOLOGY | Facility: CLINIC | Age: 79
End: 2019-11-22

## 2019-11-22 DIAGNOSIS — E78.5 DYSLIPIDEMIA: ICD-10-CM

## 2019-11-22 DIAGNOSIS — I10 ESSENTIAL HYPERTENSION: ICD-10-CM

## 2019-11-22 DIAGNOSIS — I25.10 CORONARY ARTERY DISEASE INVOLVING NATIVE CORONARY ARTERY OF NATIVE HEART WITHOUT ANGINA PECTORIS: ICD-10-CM

## 2019-11-22 LAB — LDLC SERPL CALC-MCNC: 84 MG/DL

## 2019-11-22 ASSESSMENT — MIFFLIN-ST. JEOR: SCORE: 1293.36

## 2019-11-25 ENCOUNTER — COMMUNICATION - HEALTHEAST (OUTPATIENT)
Dept: CARDIOLOGY | Facility: CLINIC | Age: 79
End: 2019-11-25

## 2019-11-25 DIAGNOSIS — E78.5 DYSLIPIDEMIA: ICD-10-CM

## 2019-11-27 ENCOUNTER — AMBULATORY - HEALTHEAST (OUTPATIENT)
Dept: FAMILY MEDICINE | Facility: CLINIC | Age: 79
End: 2019-11-27

## 2019-11-27 ENCOUNTER — COMMUNICATION - HEALTHEAST (OUTPATIENT)
Dept: SCHEDULING | Facility: CLINIC | Age: 79
End: 2019-11-27

## 2019-11-27 ENCOUNTER — COMMUNICATION - HEALTHEAST (OUTPATIENT)
Dept: CARDIOLOGY | Facility: CLINIC | Age: 79
End: 2019-11-27

## 2019-11-27 DIAGNOSIS — R07.89 ATYPICAL CHEST PAIN: ICD-10-CM

## 2019-11-27 DIAGNOSIS — E78.5 DYSLIPIDEMIA: ICD-10-CM

## 2019-11-29 ENCOUNTER — COMMUNICATION - HEALTHEAST (OUTPATIENT)
Dept: CARDIOLOGY | Facility: CLINIC | Age: 79
End: 2019-11-29

## 2019-11-29 DIAGNOSIS — E78.5 DYSLIPIDEMIA: ICD-10-CM

## 2019-12-02 ENCOUNTER — OFFICE VISIT - HEALTHEAST (OUTPATIENT)
Dept: FAMILY MEDICINE | Facility: CLINIC | Age: 79
End: 2019-12-02

## 2019-12-02 DIAGNOSIS — I25.10 CORONARY ARTERY DISEASE DUE TO LIPID RICH PLAQUE: ICD-10-CM

## 2019-12-02 DIAGNOSIS — I10 ESSENTIAL HYPERTENSION WITH GOAL BLOOD PRESSURE LESS THAN 140/90: ICD-10-CM

## 2019-12-02 DIAGNOSIS — I25.83 CORONARY ARTERY DISEASE DUE TO LIPID RICH PLAQUE: ICD-10-CM

## 2019-12-02 DIAGNOSIS — E78.5 HYPERLIPIDEMIA LDL GOAL <70: ICD-10-CM

## 2019-12-02 DIAGNOSIS — M1A.09X0 CHRONIC GOUT OF MULTIPLE SITES, UNSPECIFIED CAUSE: ICD-10-CM

## 2019-12-02 DIAGNOSIS — Z79.899 POLYPHARMACY: ICD-10-CM

## 2019-12-02 ASSESSMENT — MIFFLIN-ST. JEOR: SCORE: 1296.25

## 2019-12-20 ENCOUNTER — COMMUNICATION - HEALTHEAST (OUTPATIENT)
Dept: FAMILY MEDICINE | Facility: CLINIC | Age: 79
End: 2019-12-20

## 2019-12-20 DIAGNOSIS — E11.9 TYPE 2 DIABETES MELLITUS WITHOUT COMPLICATION, WITHOUT LONG-TERM CURRENT USE OF INSULIN (H): ICD-10-CM

## 2019-12-30 ENCOUNTER — COMMUNICATION - HEALTHEAST (OUTPATIENT)
Dept: SCHEDULING | Facility: CLINIC | Age: 79
End: 2019-12-30

## 2019-12-30 DIAGNOSIS — R07.89 ATYPICAL CHEST PAIN: ICD-10-CM

## 2020-01-03 ENCOUNTER — OFFICE VISIT - HEALTHEAST (OUTPATIENT)
Dept: CARDIOLOGY | Facility: CLINIC | Age: 80
End: 2020-01-03

## 2020-01-03 DIAGNOSIS — E78.5 DYSLIPIDEMIA: ICD-10-CM

## 2020-01-03 DIAGNOSIS — I25.10 CORONARY ARTERY DISEASE INVOLVING NATIVE CORONARY ARTERY OF NATIVE HEART WITHOUT ANGINA PECTORIS: ICD-10-CM

## 2020-01-03 DIAGNOSIS — I10 ESSENTIAL HYPERTENSION: ICD-10-CM

## 2020-01-03 ASSESSMENT — MIFFLIN-ST. JEOR: SCORE: 1297.57

## 2020-01-23 ENCOUNTER — OFFICE VISIT - HEALTHEAST (OUTPATIENT)
Dept: RHEUMATOLOGY | Facility: CLINIC | Age: 80
End: 2020-01-23

## 2020-01-23 DIAGNOSIS — N18.30 CKD (CHRONIC KIDNEY DISEASE) STAGE 3, GFR 30-59 ML/MIN (H): ICD-10-CM

## 2020-01-23 DIAGNOSIS — M25.50 POLYARTHRALGIA: ICD-10-CM

## 2020-01-23 DIAGNOSIS — M15.0 PRIMARY OSTEOARTHRITIS INVOLVING MULTIPLE JOINTS: ICD-10-CM

## 2020-01-23 LAB
ALBUMIN SERPL-MCNC: 4 G/DL (ref 3.5–5)
ALT SERPL W P-5'-P-CCNC: 49 U/L (ref 0–45)
CREAT SERPL-MCNC: 1.34 MG/DL (ref 0.7–1.3)
ERYTHROCYTE [DISTWIDTH] IN BLOOD BY AUTOMATED COUNT: 12.2 % (ref 11–14.5)
GFR SERPL CREATININE-BSD FRML MDRD: 52 ML/MIN/1.73M2
HCT VFR BLD AUTO: 40.5 % (ref 40–54)
HGB BLD-MCNC: 13.6 G/DL (ref 14–18)
MCH RBC QN AUTO: 31.2 PG (ref 27–34)
MCHC RBC AUTO-ENTMCNC: 33.5 G/DL (ref 32–36)
MCV RBC AUTO: 93 FL (ref 80–100)
PLATELET # BLD AUTO: 201 THOU/UL (ref 140–440)
PMV BLD AUTO: 7.2 FL (ref 7–10)
RBC # BLD AUTO: 4.34 MILL/UL (ref 4.4–6.2)
WBC: 7.5 THOU/UL (ref 4–11)

## 2020-01-23 ASSESSMENT — MIFFLIN-ST. JEOR: SCORE: 1293.04

## 2020-01-30 ENCOUNTER — COMMUNICATION - HEALTHEAST (OUTPATIENT)
Dept: FAMILY MEDICINE | Facility: CLINIC | Age: 80
End: 2020-01-30

## 2020-01-30 DIAGNOSIS — R07.89 ATYPICAL CHEST PAIN: ICD-10-CM

## 2020-02-06 ENCOUNTER — COMMUNICATION - HEALTHEAST (OUTPATIENT)
Dept: GERIATRIC MEDICINE | Facility: CLINIC | Age: 80
End: 2020-02-06

## 2020-03-01 ENCOUNTER — COMMUNICATION - HEALTHEAST (OUTPATIENT)
Dept: SCHEDULING | Facility: CLINIC | Age: 80
End: 2020-03-01

## 2020-03-01 DIAGNOSIS — R07.89 ATYPICAL CHEST PAIN: ICD-10-CM

## 2020-03-02 ENCOUNTER — COMMUNICATION - HEALTHEAST (OUTPATIENT)
Dept: FAMILY MEDICINE | Facility: CLINIC | Age: 80
End: 2020-03-02

## 2020-03-02 DIAGNOSIS — R07.89 ATYPICAL CHEST PAIN: ICD-10-CM

## 2020-03-30 ENCOUNTER — COMMUNICATION - HEALTHEAST (OUTPATIENT)
Dept: FAMILY MEDICINE | Facility: CLINIC | Age: 80
End: 2020-03-30

## 2020-03-30 DIAGNOSIS — R07.89 ATYPICAL CHEST PAIN: ICD-10-CM

## 2020-04-03 ENCOUNTER — COMMUNICATION - HEALTHEAST (OUTPATIENT)
Dept: FAMILY MEDICINE | Facility: CLINIC | Age: 80
End: 2020-04-03

## 2020-04-03 DIAGNOSIS — I25.83 CORONARY ARTERY DISEASE DUE TO LIPID RICH PLAQUE: ICD-10-CM

## 2020-04-03 DIAGNOSIS — I25.10 CORONARY ARTERY DISEASE DUE TO LIPID RICH PLAQUE: ICD-10-CM

## 2020-04-15 ENCOUNTER — COMMUNICATION - HEALTHEAST (OUTPATIENT)
Dept: GERIATRIC MEDICINE | Facility: CLINIC | Age: 80
End: 2020-04-15

## 2020-04-15 ASSESSMENT — ACTIVITIES OF DAILY LIVING (ADL)
DEPENDENT_IADLS:: CLEANING;COOKING;LAUNDRY;SHOPPING;MEAL PREPARATION;MEDICATION MANAGEMENT;MONEY MANAGEMENT;TRANSPORTATION

## 2020-04-20 ENCOUNTER — COMMUNICATION - HEALTHEAST (OUTPATIENT)
Dept: GERIATRIC MEDICINE | Facility: CLINIC | Age: 80
End: 2020-04-20

## 2020-04-23 ENCOUNTER — OFFICE VISIT - HEALTHEAST (OUTPATIENT)
Dept: RHEUMATOLOGY | Facility: CLINIC | Age: 80
End: 2020-04-23

## 2020-04-23 DIAGNOSIS — M25.50 POLYARTHRALGIA: ICD-10-CM

## 2020-04-23 DIAGNOSIS — M15.0 PRIMARY OSTEOARTHRITIS INVOLVING MULTIPLE JOINTS: ICD-10-CM

## 2020-04-28 ENCOUNTER — COMMUNICATION - HEALTHEAST (OUTPATIENT)
Dept: SCHEDULING | Facility: CLINIC | Age: 80
End: 2020-04-28

## 2020-04-28 ENCOUNTER — COMMUNICATION - HEALTHEAST (OUTPATIENT)
Dept: FAMILY MEDICINE | Facility: CLINIC | Age: 80
End: 2020-04-28

## 2020-04-28 DIAGNOSIS — M54.50 LUMBAGO: ICD-10-CM

## 2020-04-28 DIAGNOSIS — K21.9 GASTROESOPHAGEAL REFLUX DISEASE, ESOPHAGITIS PRESENCE NOT SPECIFIED: ICD-10-CM

## 2020-04-28 DIAGNOSIS — M15.0 PRIMARY OSTEOARTHRITIS INVOLVING MULTIPLE JOINTS: ICD-10-CM

## 2020-04-29 ENCOUNTER — AMBULATORY - HEALTHEAST (OUTPATIENT)
Dept: FAMILY MEDICINE | Facility: CLINIC | Age: 80
End: 2020-04-29

## 2020-04-29 DIAGNOSIS — R07.89 ATYPICAL CHEST PAIN: ICD-10-CM

## 2020-05-04 ENCOUNTER — COMMUNICATION - HEALTHEAST (OUTPATIENT)
Dept: FAMILY MEDICINE | Facility: CLINIC | Age: 80
End: 2020-05-04

## 2020-05-04 DIAGNOSIS — I10 ESSENTIAL HYPERTENSION WITH GOAL BLOOD PRESSURE LESS THAN 140/90: ICD-10-CM

## 2020-05-31 ENCOUNTER — COMMUNICATION - HEALTHEAST (OUTPATIENT)
Dept: SCHEDULING | Facility: CLINIC | Age: 80
End: 2020-05-31

## 2020-05-31 DIAGNOSIS — R07.89 ATYPICAL CHEST PAIN: ICD-10-CM

## 2020-06-01 ENCOUNTER — COMMUNICATION - HEALTHEAST (OUTPATIENT)
Dept: FAMILY MEDICINE | Facility: CLINIC | Age: 80
End: 2020-06-01

## 2020-06-01 DIAGNOSIS — R07.89 ATYPICAL CHEST PAIN: ICD-10-CM

## 2020-06-02 ENCOUNTER — AMBULATORY - HEALTHEAST (OUTPATIENT)
Dept: FAMILY MEDICINE | Facility: CLINIC | Age: 80
End: 2020-06-02

## 2020-06-02 DIAGNOSIS — R07.89 ATYPICAL CHEST PAIN: ICD-10-CM

## 2020-06-22 ENCOUNTER — COMMUNICATION - HEALTHEAST (OUTPATIENT)
Dept: SCHEDULING | Facility: CLINIC | Age: 80
End: 2020-06-22

## 2020-06-22 ENCOUNTER — COMMUNICATION - HEALTHEAST (OUTPATIENT)
Dept: FAMILY MEDICINE | Facility: CLINIC | Age: 80
End: 2020-06-22

## 2020-06-22 DIAGNOSIS — E11.9 TYPE 2 DIABETES MELLITUS WITHOUT COMPLICATION, WITHOUT LONG-TERM CURRENT USE OF INSULIN (H): ICD-10-CM

## 2020-06-24 ENCOUNTER — COMMUNICATION - HEALTHEAST (OUTPATIENT)
Dept: EMERGENCY MEDICINE | Facility: HOSPITAL | Age: 80
End: 2020-06-24

## 2020-06-30 ENCOUNTER — COMMUNICATION - HEALTHEAST (OUTPATIENT)
Dept: SCHEDULING | Facility: CLINIC | Age: 80
End: 2020-06-30

## 2020-06-30 ENCOUNTER — OFFICE VISIT - HEALTHEAST (OUTPATIENT)
Dept: RHEUMATOLOGY | Facility: CLINIC | Age: 80
End: 2020-06-30

## 2020-07-01 ENCOUNTER — COMMUNICATION - HEALTHEAST (OUTPATIENT)
Dept: FAMILY MEDICINE | Facility: CLINIC | Age: 80
End: 2020-07-01

## 2020-07-01 DIAGNOSIS — R07.89 ATYPICAL CHEST PAIN: ICD-10-CM

## 2020-07-27 ENCOUNTER — COMMUNICATION - HEALTHEAST (OUTPATIENT)
Dept: FAMILY MEDICINE | Facility: CLINIC | Age: 80
End: 2020-07-27

## 2020-07-27 DIAGNOSIS — M1A.09X0 CHRONIC GOUT OF MULTIPLE SITES, UNSPECIFIED CAUSE: ICD-10-CM

## 2020-07-27 DIAGNOSIS — I10 ESSENTIAL HYPERTENSION WITH GOAL BLOOD PRESSURE LESS THAN 140/90: ICD-10-CM

## 2020-07-29 RX ORDER — COLCHICINE 0.6 MG/1
TABLET ORAL
Qty: 45 TABLET | Refills: 1 | Status: SHIPPED | OUTPATIENT
Start: 2020-07-29 | End: 2021-07-28

## 2020-08-01 ENCOUNTER — COMMUNICATION - HEALTHEAST (OUTPATIENT)
Dept: FAMILY MEDICINE | Facility: CLINIC | Age: 80
End: 2020-08-01

## 2020-08-01 DIAGNOSIS — R07.89 ATYPICAL CHEST PAIN: ICD-10-CM

## 2020-08-13 ENCOUNTER — COMMUNICATION - HEALTHEAST (OUTPATIENT)
Dept: FAMILY MEDICINE | Facility: CLINIC | Age: 80
End: 2020-08-13

## 2020-08-13 DIAGNOSIS — M1A.09X0 CHRONIC GOUT OF MULTIPLE SITES, UNSPECIFIED CAUSE: ICD-10-CM

## 2020-08-17 ENCOUNTER — COMMUNICATION - HEALTHEAST (OUTPATIENT)
Dept: RHEUMATOLOGY | Facility: CLINIC | Age: 80
End: 2020-08-17

## 2020-08-17 DIAGNOSIS — M15.0 PRIMARY OSTEOARTHRITIS INVOLVING MULTIPLE JOINTS: ICD-10-CM

## 2020-08-17 DIAGNOSIS — M25.50 POLYARTHRALGIA: ICD-10-CM

## 2020-08-18 ENCOUNTER — OFFICE VISIT - HEALTHEAST (OUTPATIENT)
Dept: FAMILY MEDICINE | Facility: CLINIC | Age: 80
End: 2020-08-18

## 2020-08-18 DIAGNOSIS — M1A.09X0 CHRONIC GOUT OF MULTIPLE SITES, UNSPECIFIED CAUSE: ICD-10-CM

## 2020-08-18 DIAGNOSIS — F51.02 ADJUSTMENT INSOMNIA: ICD-10-CM

## 2020-08-18 DIAGNOSIS — M48.02 CERVICAL STENOSIS OF SPINAL CANAL: ICD-10-CM

## 2020-08-18 ASSESSMENT — MIFFLIN-ST. JEOR: SCORE: 1350.39

## 2020-09-01 ENCOUNTER — COMMUNICATION - HEALTHEAST (OUTPATIENT)
Dept: SCHEDULING | Facility: CLINIC | Age: 80
End: 2020-09-01

## 2020-09-01 DIAGNOSIS — K59.00 CONSTIPATION: ICD-10-CM

## 2020-09-02 ENCOUNTER — AMBULATORY - HEALTHEAST (OUTPATIENT)
Dept: FAMILY MEDICINE | Facility: CLINIC | Age: 80
End: 2020-09-02

## 2020-09-02 DIAGNOSIS — R07.89 ATYPICAL CHEST PAIN: ICD-10-CM

## 2020-09-04 RX ORDER — DOCUSATE SODIUM 100 MG/1
CAPSULE, LIQUID FILLED ORAL
Qty: 60 CAPSULE | Refills: 11 | Status: SHIPPED | OUTPATIENT
Start: 2020-09-04 | End: 2022-07-14

## 2020-09-06 ENCOUNTER — COMMUNICATION - HEALTHEAST (OUTPATIENT)
Dept: RHEUMATOLOGY | Facility: CLINIC | Age: 80
End: 2020-09-06

## 2020-09-06 DIAGNOSIS — M25.50 POLYARTHRALGIA: ICD-10-CM

## 2020-09-06 DIAGNOSIS — M15.0 PRIMARY OSTEOARTHRITIS INVOLVING MULTIPLE JOINTS: ICD-10-CM

## 2020-09-28 ENCOUNTER — OFFICE VISIT - HEALTHEAST (OUTPATIENT)
Dept: FAMILY MEDICINE | Facility: CLINIC | Age: 80
End: 2020-09-28

## 2020-09-28 DIAGNOSIS — Z23 NEED FOR IMMUNIZATION AGAINST INFLUENZA: ICD-10-CM

## 2020-09-28 DIAGNOSIS — L30.9 DERMATITIS: ICD-10-CM

## 2020-09-28 DIAGNOSIS — M25.50 POLYARTHRALGIA: ICD-10-CM

## 2020-09-28 DIAGNOSIS — E11.9 DIABETES MELLITUS, TYPE 2 (H): ICD-10-CM

## 2020-09-28 DIAGNOSIS — E11.22 TYPE 2 DIABETES MELLITUS WITH STAGE 3 CHRONIC KIDNEY DISEASE, WITHOUT LONG-TERM CURRENT USE OF INSULIN (H): ICD-10-CM

## 2020-09-28 DIAGNOSIS — N18.30 TYPE 2 DIABETES MELLITUS WITH STAGE 3 CHRONIC KIDNEY DISEASE, WITHOUT LONG-TERM CURRENT USE OF INSULIN (H): ICD-10-CM

## 2020-09-28 DIAGNOSIS — R07.89 ATYPICAL CHEST PAIN: ICD-10-CM

## 2020-09-28 LAB — HBA1C MFR BLD: 7.1 %

## 2020-09-28 RX ORDER — TRIAMCINOLONE ACETONIDE 1 MG/G
CREAM TOPICAL 2 TIMES DAILY
Qty: 80 G | Refills: 1 | Status: SHIPPED | OUTPATIENT
Start: 2020-09-28 | End: 2021-12-06

## 2020-10-05 ENCOUNTER — OFFICE VISIT - HEALTHEAST (OUTPATIENT)
Dept: RHEUMATOLOGY | Facility: CLINIC | Age: 80
End: 2020-10-05

## 2020-10-05 DIAGNOSIS — N18.31 STAGE 3A CHRONIC KIDNEY DISEASE (H): ICD-10-CM

## 2020-10-05 DIAGNOSIS — M25.50 POLYARTHRALGIA: ICD-10-CM

## 2020-10-05 DIAGNOSIS — M51.379 DEGENERATION OF LUMBAR OR LUMBOSACRAL INTERVERTEBRAL DISC: ICD-10-CM

## 2020-10-05 DIAGNOSIS — M15.0 PRIMARY OSTEOARTHRITIS INVOLVING MULTIPLE JOINTS: ICD-10-CM

## 2020-10-14 ENCOUNTER — COMMUNICATION - HEALTHEAST (OUTPATIENT)
Dept: GERIATRIC MEDICINE | Facility: CLINIC | Age: 80
End: 2020-10-14

## 2020-10-30 ENCOUNTER — COMMUNICATION - HEALTHEAST (OUTPATIENT)
Dept: SCHEDULING | Facility: CLINIC | Age: 80
End: 2020-10-30

## 2020-10-30 DIAGNOSIS — M25.50 POLYARTHRALGIA: ICD-10-CM

## 2020-11-21 ENCOUNTER — COMMUNICATION - HEALTHEAST (OUTPATIENT)
Dept: FAMILY MEDICINE | Facility: CLINIC | Age: 80
End: 2020-11-21

## 2020-11-21 DIAGNOSIS — M51.379 DEGENERATION OF LUMBAR OR LUMBOSACRAL INTERVERTEBRAL DISC: ICD-10-CM

## 2020-11-30 ENCOUNTER — COMMUNICATION - HEALTHEAST (OUTPATIENT)
Dept: SCHEDULING | Facility: CLINIC | Age: 80
End: 2020-11-30

## 2020-11-30 DIAGNOSIS — M25.50 POLYARTHRALGIA: ICD-10-CM

## 2020-12-04 ENCOUNTER — COMMUNICATION - HEALTHEAST (OUTPATIENT)
Dept: CARDIOLOGY | Facility: CLINIC | Age: 80
End: 2020-12-04

## 2020-12-04 DIAGNOSIS — I25.10 CORONARY ARTERY DISEASE INVOLVING NATIVE CORONARY ARTERY OF NATIVE HEART WITHOUT ANGINA PECTORIS: ICD-10-CM

## 2020-12-22 ENCOUNTER — COMMUNICATION - HEALTHEAST (OUTPATIENT)
Dept: FAMILY MEDICINE | Facility: CLINIC | Age: 80
End: 2020-12-22

## 2020-12-22 DIAGNOSIS — K21.9 GASTROESOPHAGEAL REFLUX DISEASE: ICD-10-CM

## 2020-12-29 ENCOUNTER — COMMUNICATION - HEALTHEAST (OUTPATIENT)
Dept: SCHEDULING | Facility: CLINIC | Age: 80
End: 2020-12-29

## 2020-12-29 DIAGNOSIS — M25.50 POLYARTHRALGIA: ICD-10-CM

## 2021-01-04 ENCOUNTER — COMMUNICATION - HEALTHEAST (OUTPATIENT)
Dept: RHEUMATOLOGY | Facility: CLINIC | Age: 81
End: 2021-01-04

## 2021-01-11 ENCOUNTER — COMMUNICATION - HEALTHEAST (OUTPATIENT)
Dept: FAMILY MEDICINE | Facility: CLINIC | Age: 81
End: 2021-01-11

## 2021-01-11 DIAGNOSIS — E83.42 HYPOMAGNESEMIA: ICD-10-CM

## 2021-01-12 ENCOUNTER — COMMUNICATION - HEALTHEAST (OUTPATIENT)
Dept: RHEUMATOLOGY | Facility: CLINIC | Age: 81
End: 2021-01-12

## 2021-01-12 DIAGNOSIS — M25.50 POLYARTHRALGIA: ICD-10-CM

## 2021-01-12 DIAGNOSIS — M15.0 PRIMARY OSTEOARTHRITIS INVOLVING MULTIPLE JOINTS: ICD-10-CM

## 2021-01-12 RX ORDER — MAGNESIUM OXIDE 400 MG/1
TABLET ORAL
Qty: 30 TABLET | Refills: 11 | Status: SHIPPED | OUTPATIENT
Start: 2021-01-12 | End: 2021-12-06

## 2021-01-23 ENCOUNTER — COMMUNICATION - HEALTHEAST (OUTPATIENT)
Dept: FAMILY MEDICINE | Facility: CLINIC | Age: 81
End: 2021-01-23

## 2021-01-23 ENCOUNTER — COMMUNICATION - HEALTHEAST (OUTPATIENT)
Dept: CARDIOLOGY | Facility: CLINIC | Age: 81
End: 2021-01-23

## 2021-01-23 DIAGNOSIS — I25.10 CORONARY ARTERY DISEASE DUE TO LIPID RICH PLAQUE: ICD-10-CM

## 2021-01-23 DIAGNOSIS — E78.5 DYSLIPIDEMIA: ICD-10-CM

## 2021-01-23 DIAGNOSIS — I25.83 CORONARY ARTERY DISEASE DUE TO LIPID RICH PLAQUE: ICD-10-CM

## 2021-01-23 RX ORDER — METOPROLOL SUCCINATE 50 MG/1
50 TABLET, EXTENDED RELEASE ORAL DAILY
Qty: 90 TABLET | Refills: 2 | Status: SHIPPED | OUTPATIENT
Start: 2021-01-23 | End: 2021-10-29

## 2021-01-28 ENCOUNTER — COMMUNICATION - HEALTHEAST (OUTPATIENT)
Dept: SCHEDULING | Facility: CLINIC | Age: 81
End: 2021-01-28

## 2021-02-01 ENCOUNTER — COMMUNICATION - HEALTHEAST (OUTPATIENT)
Dept: SCHEDULING | Facility: CLINIC | Age: 81
End: 2021-02-01

## 2021-02-01 ENCOUNTER — AMBULATORY - HEALTHEAST (OUTPATIENT)
Dept: FAMILY MEDICINE | Facility: CLINIC | Age: 81
End: 2021-02-01

## 2021-02-01 DIAGNOSIS — M25.50 POLYARTHRALGIA: ICD-10-CM

## 2021-02-04 ENCOUNTER — COMMUNICATION - HEALTHEAST (OUTPATIENT)
Dept: FAMILY MEDICINE | Facility: CLINIC | Age: 81
End: 2021-02-04

## 2021-02-04 DIAGNOSIS — M54.50 LUMBAGO: ICD-10-CM

## 2021-02-04 RX ORDER — GABAPENTIN 300 MG/1
CAPSULE ORAL
Qty: 180 CAPSULE | Refills: 2 | Status: SHIPPED | OUTPATIENT
Start: 2021-02-04 | End: 2021-10-31

## 2021-02-13 ENCOUNTER — COMMUNICATION - HEALTHEAST (OUTPATIENT)
Dept: FAMILY MEDICINE | Facility: CLINIC | Age: 81
End: 2021-02-13

## 2021-02-13 DIAGNOSIS — M1A.09X0 CHRONIC GOUT OF MULTIPLE SITES, UNSPECIFIED CAUSE: ICD-10-CM

## 2021-02-15 RX ORDER — ALLOPURINOL 100 MG/1
TABLET ORAL
Qty: 180 TABLET | Refills: 7 | Status: SHIPPED | OUTPATIENT
Start: 2021-02-15 | End: 2022-02-21

## 2021-02-16 ENCOUNTER — COMMUNICATION - HEALTHEAST (OUTPATIENT)
Dept: FAMILY MEDICINE | Facility: CLINIC | Age: 81
End: 2021-02-16

## 2021-02-16 DIAGNOSIS — J31.0 CHRONIC RHINITIS: ICD-10-CM

## 2021-02-17 RX ORDER — LORATADINE 10 MG/1
TABLET ORAL
Qty: 90 TABLET | Refills: 2 | Status: SHIPPED | OUTPATIENT
Start: 2021-02-17 | End: 2023-01-11

## 2021-03-03 ENCOUNTER — AMBULATORY - HEALTHEAST (OUTPATIENT)
Dept: FAMILY MEDICINE | Facility: CLINIC | Age: 81
End: 2021-03-03

## 2021-03-03 ENCOUNTER — COMMUNICATION - HEALTHEAST (OUTPATIENT)
Dept: SCHEDULING | Facility: CLINIC | Age: 81
End: 2021-03-03

## 2021-03-03 DIAGNOSIS — M25.50 POLYARTHRALGIA: ICD-10-CM

## 2021-03-10 ENCOUNTER — COMMUNICATION - HEALTHEAST (OUTPATIENT)
Dept: CARDIOLOGY | Facility: CLINIC | Age: 81
End: 2021-03-10

## 2021-03-10 ENCOUNTER — COMMUNICATION - HEALTHEAST (OUTPATIENT)
Dept: FAMILY MEDICINE | Facility: CLINIC | Age: 81
End: 2021-03-10

## 2021-03-10 ENCOUNTER — OFFICE VISIT - HEALTHEAST (OUTPATIENT)
Dept: RHEUMATOLOGY | Facility: CLINIC | Age: 81
End: 2021-03-10

## 2021-03-10 DIAGNOSIS — M25.50 POLYARTHRALGIA: ICD-10-CM

## 2021-03-10 DIAGNOSIS — I10 ESSENTIAL HYPERTENSION WITH GOAL BLOOD PRESSURE LESS THAN 140/90: ICD-10-CM

## 2021-03-10 DIAGNOSIS — M15.0 PRIMARY OSTEOARTHRITIS INVOLVING MULTIPLE JOINTS: ICD-10-CM

## 2021-03-10 DIAGNOSIS — M65.331 TRIGGER FINGER, RIGHT MIDDLE FINGER: ICD-10-CM

## 2021-03-10 DIAGNOSIS — I25.10 CORONARY ARTERY DISEASE INVOLVING NATIVE CORONARY ARTERY OF NATIVE HEART WITHOUT ANGINA PECTORIS: ICD-10-CM

## 2021-03-10 RX ORDER — DULOXETIN HYDROCHLORIDE 20 MG/1
20 CAPSULE, DELAYED RELEASE ORAL 2 TIMES DAILY
Qty: 180 CAPSULE | Refills: 0 | Status: SHIPPED | OUTPATIENT
Start: 2021-03-10 | End: 2021-06-16

## 2021-03-10 RX ORDER — AMLODIPINE BESYLATE 5 MG/1
TABLET ORAL
Qty: 90 TABLET | Refills: 2 | Status: SHIPPED | OUTPATIENT
Start: 2021-03-10 | End: 2021-11-29

## 2021-03-10 ASSESSMENT — MIFFLIN-ST. JEOR: SCORE: 1365.36

## 2021-03-16 ENCOUNTER — COMMUNICATION - HEALTHEAST (OUTPATIENT)
Dept: GERIATRIC MEDICINE | Facility: CLINIC | Age: 81
End: 2021-03-16

## 2021-03-16 ASSESSMENT — ACTIVITIES OF DAILY LIVING (ADL)
DEPENDENT_IADLS:: CLEANING;COOKING;LAUNDRY;SHOPPING;MEAL PREPARATION;MEDICATION MANAGEMENT;MONEY MANAGEMENT;TRANSPORTATION;INCONTINENCE

## 2021-03-22 ENCOUNTER — COMMUNICATION - HEALTHEAST (OUTPATIENT)
Dept: GERIATRIC MEDICINE | Facility: CLINIC | Age: 81
End: 2021-03-22

## 2021-03-29 ENCOUNTER — COMMUNICATION - HEALTHEAST (OUTPATIENT)
Dept: FAMILY MEDICINE | Facility: CLINIC | Age: 81
End: 2021-03-29

## 2021-03-29 ENCOUNTER — OFFICE VISIT - HEALTHEAST (OUTPATIENT)
Dept: FAMILY MEDICINE | Facility: CLINIC | Age: 81
End: 2021-03-29

## 2021-03-29 DIAGNOSIS — M25.50 POLYARTHRALGIA: ICD-10-CM

## 2021-03-29 DIAGNOSIS — M51.379 DEGENERATION OF LUMBAR OR LUMBOSACRAL INTERVERTEBRAL DISC: ICD-10-CM

## 2021-03-29 DIAGNOSIS — N18.30 TYPE 2 DIABETES MELLITUS WITH STAGE 3 CHRONIC KIDNEY DISEASE, WITHOUT LONG-TERM CURRENT USE OF INSULIN (H): ICD-10-CM

## 2021-03-29 DIAGNOSIS — R07.9 CHEST PAIN: ICD-10-CM

## 2021-03-29 DIAGNOSIS — R05.8 ACE-INHIBITOR COUGH: ICD-10-CM

## 2021-03-29 DIAGNOSIS — T46.4X5A ACE-INHIBITOR COUGH: ICD-10-CM

## 2021-03-29 DIAGNOSIS — E11.22 TYPE 2 DIABETES MELLITUS WITH STAGE 3 CHRONIC KIDNEY DISEASE, WITHOUT LONG-TERM CURRENT USE OF INSULIN (H): ICD-10-CM

## 2021-03-29 DIAGNOSIS — E11.9 TYPE 2 DIABETES MELLITUS WITHOUT COMPLICATION, WITHOUT LONG-TERM CURRENT USE OF INSULIN (H): ICD-10-CM

## 2021-03-29 LAB
CHOLEST SERPL-MCNC: 218 MG/DL
FASTING STATUS PATIENT QL REPORTED: NO
HBA1C MFR BLD: 7.8 %
HDLC SERPL-MCNC: 40 MG/DL
LDLC SERPL CALC-MCNC: 111 MG/DL
TRIGL SERPL-MCNC: 333 MG/DL

## 2021-03-29 RX ORDER — ACETAMINOPHEN 500 MG
500 TABLET ORAL EVERY 6 HOURS PRN
Qty: 120 TABLET | Refills: 6 | Status: SHIPPED | OUTPATIENT
Start: 2021-03-29 | End: 2022-04-05

## 2021-03-29 RX ORDER — LOSARTAN POTASSIUM 50 MG/1
50 TABLET ORAL DAILY
Qty: 90 TABLET | Refills: 3 | Status: SHIPPED | OUTPATIENT
Start: 2021-03-29 | End: 2022-03-16

## 2021-03-29 RX ORDER — NITROGLYCERIN 0.4 MG/1
0.4 TABLET SUBLINGUAL EVERY 5 MIN PRN
Qty: 25 TABLET | Refills: 6 | Status: SHIPPED | OUTPATIENT
Start: 2021-03-29 | End: 2021-08-02

## 2021-03-29 ASSESSMENT — MIFFLIN-ST. JEOR: SCORE: 1281.83

## 2021-03-30 ENCOUNTER — RECORDS - HEALTHEAST (OUTPATIENT)
Dept: ADMINISTRATIVE | Facility: OTHER | Age: 81
End: 2021-03-30

## 2021-03-30 LAB — RETINOPATHY: NEGATIVE

## 2021-04-02 ENCOUNTER — OFFICE VISIT - HEALTHEAST (OUTPATIENT)
Dept: CARDIOLOGY | Facility: CLINIC | Age: 81
End: 2021-04-02

## 2021-04-02 DIAGNOSIS — E78.5 DYSLIPIDEMIA: ICD-10-CM

## 2021-04-02 DIAGNOSIS — R42 LIGHTHEADEDNESS: ICD-10-CM

## 2021-04-02 DIAGNOSIS — I25.10 CORONARY ARTERY DISEASE INVOLVING NATIVE CORONARY ARTERY OF NATIVE HEART WITHOUT ANGINA PECTORIS: ICD-10-CM

## 2021-04-02 DIAGNOSIS — R07.2 PRECORDIAL PAIN: ICD-10-CM

## 2021-04-02 RX ORDER — ROSUVASTATIN CALCIUM 20 MG/1
20 TABLET, COATED ORAL AT BEDTIME
Qty: 90 TABLET | Refills: 3 | Status: SHIPPED | OUTPATIENT
Start: 2021-04-02 | End: 2022-05-27

## 2021-04-02 ASSESSMENT — MIFFLIN-ST. JEOR: SCORE: 1284.85

## 2021-04-06 ENCOUNTER — RECORDS - HEALTHEAST (OUTPATIENT)
Dept: HEALTH INFORMATION MANAGEMENT | Facility: CLINIC | Age: 81
End: 2021-04-06

## 2021-04-09 ENCOUNTER — HOSPITAL ENCOUNTER (OUTPATIENT)
Dept: NUCLEAR MEDICINE | Facility: HOSPITAL | Age: 81
Discharge: HOME | End: 2021-04-09
Attending: INTERNAL MEDICINE

## 2021-04-09 ENCOUNTER — HOSPITAL ENCOUNTER (OUTPATIENT)
Dept: CARDIOLOGY | Facility: HOSPITAL | Age: 81
Discharge: HOME | End: 2021-04-09
Attending: INTERNAL MEDICINE

## 2021-04-09 DIAGNOSIS — R07.2 PRECORDIAL PAIN: ICD-10-CM

## 2021-04-09 DIAGNOSIS — I25.10 CORONARY ARTERY DISEASE INVOLVING NATIVE CORONARY ARTERY OF NATIVE HEART WITHOUT ANGINA PECTORIS: ICD-10-CM

## 2021-04-09 DIAGNOSIS — R42 LIGHTHEADEDNESS: ICD-10-CM

## 2021-04-09 LAB
CV STRESS CURRENT BP HE: NORMAL
CV STRESS CURRENT HR HE: 110
CV STRESS CURRENT HR HE: 110
CV STRESS CURRENT HR HE: 117
CV STRESS CURRENT HR HE: 118
CV STRESS CURRENT HR HE: 119
CV STRESS CURRENT HR HE: 120
CV STRESS CURRENT HR HE: 121
CV STRESS CURRENT HR HE: 123
CV STRESS CURRENT HR HE: 123
CV STRESS CURRENT HR HE: 124
CV STRESS CURRENT HR HE: 125
CV STRESS CURRENT HR HE: 125
CV STRESS CURRENT HR HE: 128
CV STRESS CURRENT HR HE: 129
CV STRESS CURRENT HR HE: 129
CV STRESS CURRENT HR HE: 130
CV STRESS CURRENT HR HE: 135
CV STRESS DEVIATION TIME HE: NORMAL
CV STRESS ECHO PERCENT HR HE: NORMAL
CV STRESS EXERCISE STAGE HE: NORMAL
CV STRESS FINAL RESTING BP HE: NORMAL
CV STRESS FINAL RESTING HR HE: 118
CV STRESS MAX HR HE: 135
CV STRESS MAX TREADMILL GRADE HE: 0
CV STRESS MAX TREADMILL SPEED HE: 0
CV STRESS PEAK DIA BP HE: NORMAL
CV STRESS PEAK SYS BP HE: NORMAL
CV STRESS PHASE HE: NORMAL
CV STRESS PROTOCOL HE: NORMAL
CV STRESS RESTING PT POSITION HE: NORMAL
CV STRESS ST DEVIATION AMOUNT HE: NORMAL
CV STRESS ST DEVIATION ELEVATION HE: NORMAL
CV STRESS ST EVELATION AMOUNT HE: NORMAL
CV STRESS TEST TYPE HE: NORMAL
CV STRESS TOTAL STAGE TIME MIN 1 HE: NORMAL
NUC STRESS EJECTION FRACTION: 66 %
RATE PRESSURE PRODUCT: NORMAL
STRESS ECHO BASELINE DIASTOLIC HE: 100
STRESS ECHO BASELINE HR: 111 BPM
STRESS ECHO BASELINE SYSTOLIC BP: 157
STRESS ECHO CALCULATED PERCENT HR: 96 %
STRESS ECHO LAST STRESS DIASTOLIC BP: 87
STRESS ECHO LAST STRESS HR: 129
STRESS ECHO LAST STRESS SYSTOLIC BP: 129
STRESS ECHO TARGET HR: 141

## 2021-04-10 ENCOUNTER — AMBULATORY - HEALTHEAST (OUTPATIENT)
Dept: NURSING | Facility: CLINIC | Age: 81
End: 2021-04-10

## 2021-04-14 ENCOUNTER — COMMUNICATION - HEALTHEAST (OUTPATIENT)
Dept: RHEUMATOLOGY | Facility: CLINIC | Age: 81
End: 2021-04-14

## 2021-04-14 DIAGNOSIS — M15.0 PRIMARY OSTEOARTHRITIS INVOLVING MULTIPLE JOINTS: ICD-10-CM

## 2021-04-14 DIAGNOSIS — M25.50 POLYARTHRALGIA: ICD-10-CM

## 2021-04-28 ENCOUNTER — COMMUNICATION - HEALTHEAST (OUTPATIENT)
Dept: SCHEDULING | Facility: CLINIC | Age: 81
End: 2021-04-28

## 2021-04-28 ENCOUNTER — AMBULATORY - HEALTHEAST (OUTPATIENT)
Dept: FAMILY MEDICINE | Facility: CLINIC | Age: 81
End: 2021-04-28

## 2021-04-28 DIAGNOSIS — M25.50 POLYARTHRALGIA: ICD-10-CM

## 2021-04-28 DIAGNOSIS — M51.379 DEGENERATION OF LUMBAR OR LUMBOSACRAL INTERVERTEBRAL DISC: ICD-10-CM

## 2021-05-27 NOTE — TELEPHONE ENCOUNTER
Controlled Substance Refill Request  Medication:   Requested Prescriptions     Pending Prescriptions Disp Refills     HYDROcodone-acetaminophen 5-325 mg per tablet 30 tablet 0     Sig: TAKE 1-2 TABLETS BY MOUTH AT BEDTIME AS NEEDED FOR PAIN     Date Last Fill: 2/26/2019  Pharmacy: [facility] [address] and [address]  Submit electronically to pharmacy  Controlled Substance Agreement on File:   Encounter-Level CSA Scan Date:    There are no encounter-level csa scan date.       Last office visit: 3/4/2019. Last office visit pertaining to requested medication was 3/4/2019 with PCP Dr JULIEN Espino

## 2021-05-27 NOTE — PROGRESS NOTES
ASSESMENT AND PLAN:  Diagnoses and all orders for this visit:    Primary osteoarthritis involving multiple joints  Reviewed pain control options with the patient.  -     lidocaine (XYLOCAINE) 5 % ointment; Apply to painful areas three times a day as needed for pain  Dispense: 240 g; Refill: 3    Essential hypertension with goal blood pressure less than 140/90  Reviewed the recent emergency room evaluation with the patient with the help of a professional .  Previously, the patient had been on amlodipine along with his lisinopril.  However, when he had sepsis and hypotension the medication was discontinued.  His blood pressure had been well controlled thereafter and the amlodipine was never added back.  However, given the blood pressure readings in the emergency room and today in clinic, we are going to make medication changes.  Discussed options with the patient and we decided to leave his lisinopril at 10 mg twice daily as he has been taking it.  We will add amlodipine 5 mg once daily, reviewed the risks and benefits of the medication, recheck in 1 month here in the clinic, sooner if problems.  -     amLODIPine (NORVASC) 5 MG tablet; Take 1 tablet (5 mg total) by mouth daily.  Dispense: 30 tablet; Refill: 11  -     lisinopril (PRINIVIL,ZESTRIL) 10 MG tablet; Take 1 tablet (10 mg total) by mouth 2 (two) times a day.  Dispense: 60 tablet; Refill: 0          SUBJECTIVE: 79-year-old male here for follow-up on his recent emergency room visit.  He had gone in with neck pain and joint pain and high blood pressure.  His systolic blood pressure had been in the 180s.  Patient is currently taking 10 mg of lisinopril twice daily.  In the past, he had been amlodipine as well but this was discontinued after he had an acute illness with sepsis and hypotension.  His blood pressures been well controlled thereafter, including at his last clinic visit.  Since he was seen at the emergency room, his neck pain has been  improved.  He does complain of mild pain currently, its in the paraspinous and trapezius area bilaterally.  No radiation down the arms, no associated new areas of numbness or weakness.  Patient also has multi-joint arthritis and gets achy pain in the knees, hands, back, and other joints.  He is usually able to control this with acetaminophen also has a history of gout in his gout regimen recently changed, please see my previous note for details.  In the past, the patient has used a topical lidocaine ointment on some of his more painful areas with good results.  He would like to use this again.    Past Medical History:   Diagnosis Date     Acute blood loss anemia      Acute renal failure (H)      Anemia      Bacteremia      Cataract      Chronic gout      CKD (chronic kidney disease)     Stage 3     DM2 (diabetes mellitus, type 2) (H)      GERD (gastroesophageal reflux disease)      Glucose intolerance (impaired glucose tolerance)      Gout      History of nephrolithiasis      Hyperlipidemia      Hypertension      Insomnia      Latent tuberculosis      Nephrolithiasis      Neuropathy (H)      Osteoarthritis     wrist, knee, shoulder     Prostatitis      Rectal bleed      Trigger thumb      Patient Active Problem List   Diagnosis     Esophageal reflux     Dyslipidemia     Nephrolithiasis     Imaging Studies Nonspecific Abnormal Findings Skull And Head     Pseudogout     Latent Tuberculosis     Glucose Intolerance     Generalized Osteoarthritis Of The Hand     Lower Back Pain     Lumbar Disc Degeneration     Insomnia     Chronic Gout     CKD (chronic kidney disease) stage 3, GFR 30-59 ml/min (H)     Hyperglycemia     Elevated PSA     Prostate nodule     Cataracts, bilateral     Discharge planning issues     Chest pain     Constipation, unspecified constipation type     Bilateral chronic knee pain     Chronic right shoulder pain     Essential hypertension with goal blood pressure less than 140/90     History of  prostatitis     BPH (benign prostatic hyperplasia)     Chronic gout     Coronary artery disease due to lipid rich plaque     Right lower quadrant abdominal pain     Abnormal cardiovascular stress test     Type 2 diabetes mellitus without complication, without long-term current use of insulin (H)     Colitis     Primary osteoarthritis involving multiple joints     Current Outpatient Medications   Medication Sig Dispense Refill     acetaminophen (Q-PAP EXTRA STRENGTH) 500 MG tablet Take 1 tablet (500 mg total) by mouth every 6 (six) hours as needed. 120 tablet 6     allopurinol (ZYLOPRIM) 100 MG tablet Take 1 tablet (100 mg total) by mouth daily. 90 tablet 3     aspirin (ASPIR-LOW) 81 MG EC tablet Take 1 tablet (81 mg total) by mouth daily. 90 tablet 1     colchicine 0.6 mg tablet TAKE 1 TABLET BY MOUTH EVERY OTHER DAY 45 tablet 3     docusate sodium (COLACE) 100 MG capsule Take 1 capsule (100 mg total) by mouth 2 (two) times a day as needed for constipation. 60 capsule 4     EUCALYPTUS OIL/MENTHOL (MENTHOL EUCALYPTUS MM) 1 Inhalation into each nostril as needed (home medication from [facility]        gabapentin (NEURONTIN) 300 MG capsule TAKE 1 TO 2 CAPSULES BY MOUTH AT BEDTIME 180 capsule 3     HYDROcodone-acetaminophen 5-325 mg per tablet TAKE 1-2 TABLETS BY MOUTH AT BEDTIME AS NEEDED FOR PAIN 30 tablet 0     JANUVIA 25 mg tablet TAKE ONE TABLET BY MOUTH ONCE DAILY 90 tablet 1     lidocaine (XYLOCAINE) 5 % ointment Apply to painful areas three times a day as needed for pain 240 g 3     lisinopril (PRINIVIL,ZESTRIL) 10 MG tablet Take 1 tablet (10 mg total) by mouth 2 (two) times a day. 60 tablet 0     loratadine (CLARITIN) 10 mg tablet Take 10 mg by mouth daily.       magnesium oxide (MAG-OX) 400 mg (241.3 mg magnesium) tablet TAKE 1 TABLET BY MOUTH DAILY 30 tablet 6     meclizine (ANTIVERT) 12.5 mg tablet Take 12.5-25 mg by mouth every 8 (eight) hours as needed (one to two tablets).        metoprolol succinate  "(TOPROL-XL) 50 MG 24 hr tablet Take 1 tablet (50 mg total) by mouth daily. 90 tablet 3     nitroglycerin (NITROSTAT) 0.4 MG SL tablet Place 1 tablet (0.4 mg total) under the tongue every 5 (five) minutes as needed for chest pain. 90 tablet 12     omeprazole (PRILOSEC) 20 MG capsule Take 20 mg by mouth daily before breakfast.       amLODIPine (NORVASC) 5 MG tablet Take 1 tablet (5 mg total) by mouth daily. 30 tablet 11     atorvastatin (LIPITOR) 40 MG tablet Take 1 tablet (40 mg total) by mouth at bedtime. (Patient not taking: Reported on 2019      ) 90 tablet 3     multivitamin with minerals (THERA-M) 9 mg iron-400 mcg Tab tablet Take 1 tablet by mouth daily. (Patient not taking: Reported on 2019      ) 100 tablet 3     No current facility-administered medications for this visit.      Social History     Tobacco Use   Smoking Status Former Smoker     Last attempt to quit: 3/10/2000     Years since quittin.0   Smokeless Tobacco Former User       OBJECTICE: /90   Pulse 73   Temp 97.6  F (36.4  C) (Oral)   Resp 16   Ht 5' 1\" (1.549 m)   Wt 159 lb (72.1 kg)   SpO2 96%   BMI 30.04 kg/m       No results found for this or any previous visit (from the past 24 hour(s)).     GEN-alert, appropriate, in no apparent distress   Musculoskeletal-no midline spinal point tenderness, some tenderness with palpation of the trapezius muscles bilaterally, no acute joint effusion seen.   CV-regular rate and rhythm   RESP-lungs clear to auscultation   ABDOMINAL-soft and nontender to palpation   EXTREM-warm, no ankle edema   SKIN-vesicles overlying his areas of pain      Chucho Espino          "

## 2021-05-27 NOTE — TELEPHONE ENCOUNTER
Refill Request  Did you contact pharmacy: Yes  Medication name: HYDROcodone-acetaminophen 5-325 mg per tablet  Requested Prescriptions      No prescriptions requested or ordered in this encounter     Who prescribed the medication: Aranza Reynaga PA-C  Pharmacy Name and Location: [facility]  Is patient out of medication: No.  3 days left  Patient notified refills processed in 72 hours:  yes  Okay to leave a detailed message: yes

## 2021-05-27 NOTE — PROGRESS NOTES
ASSESMENT AND PLAN:  Diagnoses and all orders for this visit:    Chronic gout of multiple sites, unspecified cause  Reviewed the most recent rheumatology notes and discussed the latest black box FDA warning for uloric with the patient.  Increased risk of death for patients with cardiovascular disease.  Reviewed the recommendations from his rheumatologist.  Patient agrees with a change from the uloric, which will be discontinued and replaced with allopurinol as prescribed below.  He will continue to take the every other day colchicine as well.  He will follow-up if he gets a acute gout attack.  Patient discontinued the Cymbalta because of side effects as detailed below, he will follow-up with rheumatology to discuss other options.  -     allopurinol (ZYLOPRIM) 100 MG tablet  Dispense: 90 tablet; Refill: 3  -     colchicine 0.6 mg tablet  Dispense: 45 tablet; Refill: 3    Essential hypertension with goal blood pressure less than 140/90  Given the patient's cardiovascular risk factors, Uloric been discontinued as detailed above.  He now has excellent control of his blood pressure.  Medication review and counseling done with the patient, he will continue to take the lisinopril twice daily.  Continue metoprolol once daily.        SUBJECTIVE: 79-year-old male comes in today for follow-up on his gout.  He has not been getting any gout attacks.  However, he is concerned about his preventative gout medication because his rheumatologist told him recently that he should talk to me about discontinuing it because of new information that has come to light.  Patient also had been started on Cymbalta by his rheumatologist because of ongoing issues with control of his multiple joint pain.  Patient reports waxing and waning symptoms of achy pain in his knees bilaterally as well as his back and neck.  Patient reports that the area of the most significant pain currently is in his low back, moderate pain, did not improve with Cymbalta,  in fact he thinks it got worse.  The Cymbalta also caused some constipation and he stopped taking it.  He started using a topical over-the-counter patch on the low back instead and this has provided some relief.  The low back pain does worsen when he bends forward and makes other twisting or bending movements.    Past Medical History:   Diagnosis Date     Acute blood loss anemia      Acute renal failure (H)      Anemia      Bacteremia      Cataract      Chronic gout      CKD (chronic kidney disease)     Stage 3     DM2 (diabetes mellitus, type 2) (H)      GERD (gastroesophageal reflux disease)      Glucose intolerance (impaired glucose tolerance)      Gout      History of nephrolithiasis      Hyperlipidemia      Hypertension      Insomnia      Latent tuberculosis      Nephrolithiasis      Neuropathy (H)      Osteoarthritis     wrist, knee, shoulder     Prostatitis      Rectal bleed      Trigger thumb      Patient Active Problem List   Diagnosis     Esophageal reflux     Dyslipidemia     Nephrolithiasis     Imaging Studies Nonspecific Abnormal Findings Skull And Head     Pseudogout     Latent Tuberculosis     Glucose Intolerance     Generalized Osteoarthritis Of The Hand     Lower Back Pain     Lumbar Disc Degeneration     Insomnia     Chronic Gout     CKD (chronic kidney disease) stage 3, GFR 30-59 ml/min (H)     Hyperglycemia     Elevated PSA     Prostate nodule     Cataracts, bilateral     Discharge planning issues     Chest pain     Constipation, unspecified constipation type     Bilateral chronic knee pain     Chronic right shoulder pain     Essential hypertension with goal blood pressure less than 140/90     History of prostatitis     BPH (benign prostatic hyperplasia)     Chronic gout     Coronary artery disease due to lipid rich plaque     Right lower quadrant abdominal pain     Abnormal cardiovascular stress test     Type 2 diabetes mellitus without complication, without long-term current use of insulin (H)      Colitis     Current Outpatient Medications   Medication Sig Dispense Refill     acetaminophen (Q-PAP EXTRA STRENGTH) 500 MG tablet Take 1 tablet (500 mg total) by mouth every 6 (six) hours as needed. 120 tablet 6     aspirin (ASPIR-LOW) 81 MG EC tablet Take 1 tablet (81 mg total) by mouth daily. 90 tablet 1     atorvastatin (LIPITOR) 40 MG tablet Take 1 tablet (40 mg total) by mouth at bedtime. 90 tablet 3     colchicine 0.6 mg tablet TAKE 1 TABLET BY MOUTH EVERY OTHER DAY 45 tablet 3     docusate sodium (COLACE) 100 MG capsule Take 1 capsule (100 mg total) by mouth 2 (two) times a day as needed for constipation. 60 capsule 4     EUCALYPTUS OIL/MENTHOL (MENTHOL EUCALYPTUS MM) 1 Inhalation into each nostril as needed (home medication from [facility]        gabapentin (NEURONTIN) 300 MG capsule TAKE 1 TO 2 CAPSULES BY MOUTH AT BEDTIME 180 capsule 3     HYDROcodone-acetaminophen 5-325 mg per tablet TAKE 1-2 TABLETS BY MOUTH AT BEDTIME AS NEEDED FOR PAIN 30 tablet 0     JANUVIA 25 mg tablet TAKE ONE TABLET BY MOUTH ONCE DAILY 90 tablet 1     lidocaine (XYLOCAINE) 5 % ointment Apply to painful areas three times a day as needed for pain 240 g 3     lisinopril (PRINIVIL,ZESTRIL) 10 MG tablet Take 1 tablet (10 mg total) by mouth 2 (two) times a day. 180 tablet 3     loratadine (CLARITIN) 10 mg tablet Take 10 mg by mouth daily.       magnesium oxide (MAG-OX) 400 mg (241.3 mg magnesium) tablet TAKE 1 TABLET BY MOUTH DAILY 30 tablet 6     meclizine (ANTIVERT) 12.5 mg tablet Take 12.5-25 mg by mouth every 8 (eight) hours as needed (one to two tablets).        metoprolol succinate (TOPROL-XL) 50 MG 24 hr tablet Take 1 tablet (50 mg total) by mouth daily. 90 tablet 3     multivitamin with minerals (THERA-M) 9 mg iron-400 mcg Tab tablet Take 1 tablet by mouth daily. 100 tablet 3     nitroglycerin (NITROSTAT) 0.4 MG SL tablet Place 1 tablet (0.4 mg total) under the tongue every 5 (five) minutes as needed for chest pain. 90  "tablet 12     omeprazole (PRILOSEC) 20 MG capsule Take 20 mg by mouth daily before breakfast.       allopurinol (ZYLOPRIM) 100 MG tablet Take 1 tablet (100 mg total) by mouth daily. 90 tablet 3     No current facility-administered medications for this visit.      Social History     Tobacco Use   Smoking Status Former Smoker     Last attempt to quit: 3/10/2000     Years since quittin.0   Smokeless Tobacco Former User       OBJECTICE: /80   Pulse 86   Temp 97.6  F (36.4  C) (Oral)   Resp 16   Ht 5' 1\" (1.549 m)   Wt 157 lb (71.2 kg)   SpO2 91%   BMI 29.66 kg/m       No results found for this or any previous visit (from the past 24 hour(s)).     GEN-alert, appropriate, in no apparent distress   Musculoskeletal-no acute redness and swelling of any joints.   CV-regular rate and rhythm   RESP-lungs clear to auscultation   ABDOMINAL-soft and nontender   EXTREM-warm with trace bilateral ankle edema   SKIN-no ulcers or vesicles      Chucho Espino          "

## 2021-05-28 NOTE — PROGRESS NOTES
ASSESMENT AND PLAN:  Diagnoses and all orders for this visit:    Diabetes mellitus, type 2 (H)  -     Glycosylated Hemoglobin A1c done today shows good control.  Continue current treatment plan and recheck again in 4 to 6 months.  -     Microalbumin, Random Urine    Chronic gout of multiple sites, unspecified cause also    trigger finger  Discussed options with the patient, will increase his allopurinol up to 200 mg per dose.  Please see previous notes for details, Uloric had been recently discontinued because of new warnings about potential side effects.  Patient also has follow-up next month with his rheumatologist.  The patient is going to talk to Dr. Gold to see if he does trigger finger injections.  -     allopurinol (ZYLOPRIM) 100 MG tablet; Take 2 tablets (200 mg total) by mouth daily.  Dispense: 180 tablet; Refill: 3    Essential hypertension with goal blood pressure less than 140/90  Excellent response to the addition of amlodipine.  Now under excellent control.  Continue the 5 mg amlodipine daily along with his other medications.        SUBJECTIVE: 79-year-old male comes in for follow-up on his recent visit for hypertension where his amlodipine 5 mg was added to his regiment.  He has had a dramatic improvement in his blood pressure, now well within the normal range.  He has not noticed any side effects or problems from the amlodipine.  He is due for his A1c.  Diabetes has been under good control.  His main symptomatic concern today is joint pain.  Patient has a complex rheumatologic history including gout.  He feels like he is been getting more moderate joint pain in his right hand, wrist, and ankle.  He has not had an acute severely red swollen painful joint.  However, since going off Uloric and on allopurinol he has noticed that change in the moderate pain.  He is wondering about making another change in his medication regiment.  Patient also notes that he is getting some pain in his first finger and  he notices it locks in flexion and then he has to force it through and it clicks into extension.    Past Medical History:   Diagnosis Date     Acute blood loss anemia      Acute renal failure (H)      Anemia      Bacteremia      Cataract      Chronic gout      CKD (chronic kidney disease)     Stage 3     DM2 (diabetes mellitus, type 2) (H)      GERD (gastroesophageal reflux disease)      Glucose intolerance (impaired glucose tolerance)      Gout      History of nephrolithiasis      Hyperlipidemia      Hypertension      Insomnia      Latent tuberculosis      Nephrolithiasis      Neuropathy (H)      Osteoarthritis     wrist, knee, shoulder     Prostatitis      Rectal bleed      Trigger thumb      Patient Active Problem List   Diagnosis     Esophageal reflux     Dyslipidemia     Nephrolithiasis     Imaging Studies Nonspecific Abnormal Findings Skull And Head     Pseudogout     Latent Tuberculosis     Glucose Intolerance     Generalized Osteoarthritis Of The Hand     Lower Back Pain     Lumbar Disc Degeneration     Insomnia     Chronic Gout     CKD (chronic kidney disease) stage 3, GFR 30-59 ml/min (H)     Hyperglycemia     Elevated PSA     Prostate nodule     Cataracts, bilateral     Discharge planning issues     Chest pain     Constipation, unspecified constipation type     Bilateral chronic knee pain     Chronic right shoulder pain     Essential hypertension with goal blood pressure less than 140/90     History of prostatitis     BPH (benign prostatic hyperplasia)     Chronic gout     Coronary artery disease due to lipid rich plaque     Right lower quadrant abdominal pain     Abnormal cardiovascular stress test     Type 2 diabetes mellitus without complication, without long-term current use of insulin (H)     Colitis     Primary osteoarthritis involving multiple joints     Current Outpatient Medications   Medication Sig Dispense Refill     allopurinol (ZYLOPRIM) 100 MG tablet Take 2 tablets (200 mg total) by mouth  daily. 180 tablet 3     amLODIPine (NORVASC) 5 MG tablet TAKE 1 TABLET(5 MG) BY MOUTH DAILY 90 tablet 3     aspirin (ASPIR-LOW) 81 MG EC tablet Take 1 tablet (81 mg total) by mouth daily. 90 tablet 1     atorvastatin (LIPITOR) 40 MG tablet Take 1 tablet (40 mg total) by mouth at bedtime. 90 tablet 3     colchicine 0.6 mg tablet TAKE 1 TABLET BY MOUTH EVERY OTHER DAY 45 tablet 3     docusate sodium (COLACE) 100 MG capsule Take 1 capsule (100 mg total) by mouth 2 (two) times a day as needed for constipation. 60 capsule 4     gabapentin (NEURONTIN) 300 MG capsule TAKE 1 TO 2 CAPSULES BY MOUTH AT BEDTIME 180 capsule 3     HYDROcodone-acetaminophen 5-325 mg per tablet TAKE 1-2 TABLETS BY MOUTH AT BEDTIME AS NEEDED FOR PAIN 30 tablet 0     JANUVIA 25 mg tablet TAKE ONE TABLET BY MOUTH ONCE DAILY 90 tablet 1     lidocaine (XYLOCAINE) 5 % ointment Apply to painful areas three times a day as needed for pain 240 g 3     magnesium oxide (MAG-OX) 400 mg (241.3 mg magnesium) tablet TAKE 1 TABLET BY MOUTH DAILY 30 tablet 6     meclizine (ANTIVERT) 12.5 mg tablet Take 1-2 tablets (12.5-25 mg total) by mouth every 8 (eight) hours as needed (one to two tablets). 60 tablet 1     metoprolol succinate (TOPROL-XL) 50 MG 24 hr tablet Take 1 tablet (50 mg total) by mouth daily. 90 tablet 3     omeprazole (PRILOSEC) 20 MG capsule TAKE 1 CAPSULE BY MOUTH DAILY 90 capsule 3     acetaminophen (Q-PAP EXTRA STRENGTH) 500 MG tablet Take 1 tablet (500 mg total) by mouth every 6 (six) hours as needed. 120 tablet 6     EUCALYPTUS OIL/MENTHOL (MENTHOL EUCALYPTUS MM) 1 Inhalation into each nostril as needed (home medication from [facility]        lisinopril (PRINIVIL,ZESTRIL) 10 MG tablet Take 1 tablet (10 mg total) by mouth 2 (two) times a day. 60 tablet 0     loratadine (CLARITIN) 10 mg tablet Take 10 mg by mouth daily.       multivitamin with minerals (THERA-M) 9 mg iron-400 mcg Tab tablet Take 1 tablet by mouth daily. 100 tablet 3      "nitroglycerin (NITROSTAT) 0.4 MG SL tablet Place 1 tablet (0.4 mg total) under the tongue every 5 (five) minutes as needed for chest pain. 90 tablet 12     No current facility-administered medications for this visit.      Social History     Tobacco Use   Smoking Status Former Smoker     Last attempt to quit: 3/10/2000     Years since quittin.1   Smokeless Tobacco Former User       OBJECTICE: /62 (Patient Site: Right Arm, Patient Position: Sitting, Cuff Size: Adult Regular)   Pulse 79   Temp 98  F (36.7  C) (Oral)   Resp 20   Ht 5' 1\" (1.549 m)   Wt 158 lb (71.7 kg)   SpO2 94%   BMI 29.85 kg/m       Recent Results (from the past 24 hour(s))   Glycosylated Hemoglobin A1c    Collection Time: 19  1:33 PM   Result Value Ref Range    Hemoglobin A1c 6.9 (H) 3.5 - 6.0 %        GEN-alert, appropriate, in no apparent distress   Musculoskeletal-trigger finger of the index finger, no visible red swollen joints.  Some tenderness with palpation of his right ankle joint and his right first MP joint.   CV-regular rate and rhythm   RESP-lungs clear to auscultation   Extremities-warm, no edema.     Foot exam-normal.  No ulcers.  Palpable pedal pulses bilaterally.    Chucho Espino          "

## 2021-05-28 NOTE — TELEPHONE ENCOUNTER
Medication Request  Medication name: Meclizine  Pharmacy Name and Location: [facility]  Reason for request: New Rx request.  When did you use medication last?:  n/a  Patient offered appointment:  n/a  Okay to leave a detailed message: no

## 2021-05-28 NOTE — PROGRESS NOTES
[facility] Care Coordination Contact  [facility] Home Visit Assessment     Home visit for Health Risk Assessment with Adam Talamantes completed on 5/8/2019.    Type of residence:: Private home - stairs  Current living arrangement:: I live in a private home with my wife and 3 adult children. My son Fermin Christensen lives with me and is my formal PCA.     Assessment completed with:: Patient, Care Team Member, family    Current Care Plan  Member currently receiving the following home care services: N/A     Member currently receiving the following community resources: None      Medication Review  Medication reconciliation completed in Epic: YES  Medication set-up & administration: Family/informal caregiver sets up weekly  Family caregiver administers medications  Medication Risk Assessment Medication (1 or more, place referral to MTM):  N/A: No risk factors identified  MTM Referral Placed: No: No risk factors idenified    Mental/Behavioral Health   Depression Screening: See PHQ assessment flowsheet.   Mental health DX:: No      Mental Health Diagnosis: No  Mental Health Services: None: No further intervention needed at this time.    Falls Assessment:   Fallen 2 or more times in the past year?: No   Any fall with injury in the past year?: No    ADL/IADL Dependencies:   Dependent ADLs:: Dressing, Bathing, Grooming, Transfers, Ambulation-walker, Toileting  Dependent IADLs:: Cleaning, Cooking, Laundry, Meal Preparation, Shopping, Medication Management, Money Management, Transportation, Incontinence    [facility] sponsored benefits: Shared information re: Silver Sneakers/gym memberships, ASA, Calcium +D.    PCA Assessment completed at visit: Yes. Member will continue to receive 4 hours daily of PCA.     Elderly Waiver Eligibility: Yes, but member declines EW service; will not open to EW and No - does not meet criteria    Care Plan & Recommendations: Member reported that he has been experiencing more pain this year. He  tries to do what he can for himself, but is reliant on family for support. He will continue with PCA and informal family support.     See [facility] for detailed assessment information.    Follow-Up Plan: Member informed of future contact, plan to f/u with member with a 6 month telephone assessment.  Contact information shared with member and family, encouraged member to call with any questions or concerns at any time.    [place] care continuum providers: Please refer to Health Care Home on the Epic Problem List to view this patient's [facility] Care Plan   Summary.    Su Rodriguez, [facility]  [phone]

## 2021-05-28 NOTE — TELEPHONE ENCOUNTER
Patient is out of medication.        Controlled Substance Refill Request  Medication Name:   Requested Prescriptions     Pending Prescriptions Disp Refills     HYDROcodone-acetaminophen 5-325 mg per tablet 30 tablet 0     Sig: TAKE 1-2 TABLETS BY MOUTH AT BEDTIME AS NEEDED FOR PAIN     Date Last Fill: 3/28/19  [id number]    Submit electronically to pharmacy  Controlled Substance Agreement Date Scanned:   Encounter-Level CSA Scan Date:    There are no encounter-level csa scan date.       Last office visit with prescriber/PCP: 4/11/2019 Chucho Espino MD OR same dept: 4/11/2019 Chucho Espino MD OR same specialty: 4/11/2019 Chucho Espino MD  Last physical: 2/17/2016 Last MTM visit: Visit date not found

## 2021-05-28 NOTE — TELEPHONE ENCOUNTER
RN cannot approve Refill Request    RN can NOT refill this medication Protocol failed and NO refill given.       Yulia Coats, [facility] Triage/Med Refill 4/26/2019    Requested Prescriptions   Pending Prescriptions Disp Refills     omeprazole (PRILOSEC) 20 MG capsule [Pharmacy Med Name: OMEPRAZOLE 20MG CAPSULES] 150 capsule 0     Sig: TAKE 1 CAPSULE BY MOUTH DAILY       GI Medications Refill Protocol Passed - 4/24/2019  9:44 PM        Passed - PCP or prescribing provider visit in last 12 or next 3 months.     Last office visit with prescriber/PCP: 4/11/2019 Chucho Espino MD OR same dept: 4/11/2019 Chucho Espino MD OR same specialty: 4/11/2019 Chucho Espino MD  Last physical: 2/17/2016 Last MTM visit: Visit date not found   Next visit within 3 mo: Visit date not found  Next physical within 3 mo: Visit date not found  Prescriber OR PCP: Chucho Espino MD  Last diagnosis associated with med order: There are no diagnoses linked to this encounter.  If protocol passes may refill for 12 months if within 3 months of last provider visit (or a total of 15 months).

## 2021-05-28 NOTE — PROGRESS NOTES
[facility] Care Coordination Contact    Received after visit chart from care coordinator.  Completed following tasks: Mailed copy of care plan to client    [facility]:  Emailed completed PCA assessment to [facility].  Faxed copy of PCA assessment to PCA Agency and mailed copy to member.  Faxed MD Communication to PCP.     Dannielle Reynaga  Care Management Specialist  [facility]  [phone]

## 2021-05-29 NOTE — TELEPHONE ENCOUNTER
Refill Approved    Rx renewed per Medication Renewal Policy. Medication was last renewed on 6/27/18.    Yulia Coats, [facility] Triage/Med Refill 6/20/2019     Requested Prescriptions   Pending Prescriptions Disp Refills     atorvastatin (LIPITOR) 40 MG tablet 90 tablet 3     Sig: Take 1 tablet (40 mg total) by mouth at bedtime.       Statins Refill Protocol (Hmg CoA Reductase Inhibitors) Passed - 6/19/2019  9:48 AM        Passed - PCP or prescribing provider visit in past 12 months      Last office visit with prescriber/PCP: 5/14/2019 Chucho Espino MD OR same dept: 5/14/2019 Chucho Espino MD OR same specialty: 5/14/2019 Chucho Espino MD  Last physical: 2/17/2016 Last MTM visit: Visit date not found   Next visit within 3 mo: Visit date not found  Next physical within 3 mo: Visit date not found  Prescriber OR PCP: Chucho Espino MD  Last diagnosis associated with med order: There are no diagnoses linked to this encounter.  If protocol passes may refill for 12 months if within 3 months of last provider visit (or a total of 15 months).

## 2021-05-29 NOTE — TELEPHONE ENCOUNTER
Patient is requesting that this gets changed to [facility]         Refill Request  Did you contact pharmacy: Yes  Medication name:   Requested Prescriptions     Pending Prescriptions Disp Refills     atorvastatin (LIPITOR) 40 MG tablet 90 tablet 3     Sig: Take 1 tablet (40 mg total) by mouth at bedtime.     Who prescribed the medication: Chucho Espino MD  Pharmacy Name and Location: [facility] #[id number]  Is patient out of medication: No  Patient notified refills processed in 72 hours:  no  Okay to leave a detailed message: no

## 2021-05-29 NOTE — TELEPHONE ENCOUNTER
Controlled Substance Refill Request  Medication: HYDROcodone-acetaminophen 5-325 mg per tablet  Requested Prescriptions     Pending Prescriptions Disp Refills     HYDROcodone-acetaminophen 5-325 mg per tablet 30 tablet 0     Sig: TAKE 1-2 TABLETS BY MOUTH AT BEDTIME AS NEEDED FOR PAIN     Date Last Fill:4/29/2019  Pharmacy:[id number] - [place], [place] - [address] AT [address] OF [address]   Submit electronically to pharmacy  Controlled Substance Agreement on File:   Encounter-Level CSA Scan Date:    There are no encounter-level csa scan date.       Last office visit: Visit date 5/14/19

## 2021-05-30 VITALS — WEIGHT: 154.8 LBS | BODY MASS INDEX: 28.49 KG/M2 | HEIGHT: 62 IN

## 2021-05-30 VITALS — WEIGHT: 154.5 LBS | BODY MASS INDEX: 28.72 KG/M2

## 2021-05-30 VITALS — WEIGHT: 151 LBS | HEIGHT: 62 IN | BODY MASS INDEX: 27.79 KG/M2

## 2021-05-30 VITALS — WEIGHT: 154.5 LBS | HEIGHT: 62 IN | BODY MASS INDEX: 28.43 KG/M2

## 2021-05-30 VITALS — HEIGHT: 62 IN | WEIGHT: 154 LBS | BODY MASS INDEX: 28.34 KG/M2

## 2021-05-30 NOTE — TELEPHONE ENCOUNTER
Controlled Substance Refill Request  Medication Name:   Requested Prescriptions     Pending Prescriptions Disp Refills     HYDROcodone-acetaminophen 5-325 mg per tablet 30 tablet 0     Sig: TAKE 1-2 TABLETS BY MOUTH AT BEDTIME AS NEEDED FOR PAIN     Date Last Fill: 7/1/19  [facility]: [id number]      Submit electronically to pharmacy  Controlled Substance Agreement Date Scanned:   Encounter-Level CSA Scan Date:    There are no encounter-level csa scan date.       Last office visit with prescriber/PCP: 5/14/2019 Chucho Espino MD OR same dept: 5/14/2019 Chucho Espino MD OR same specialty: 5/14/2019 Chucho Espino MD  Last physical: 2/17/2016 Last MTM visit: Visit date not found

## 2021-05-30 NOTE — TELEPHONE ENCOUNTER
Daughter is calling to refill controlled substance. Hydrocodone.     Pat Huitron RN BSBA [facility] Triage/Med Refill 6/28/2019 5:34 PM

## 2021-05-30 NOTE — TELEPHONE ENCOUNTER
Controlled Substance Refill Request  Medication:   Requested Prescriptions     Pending Prescriptions Disp Refills     HYDROcodone-acetaminophen 5-325 mg per tablet 30 tablet 0     Sig: TAKE 1-2 TABLETS BY MOUTH AT BEDTIME AS NEEDED FOR PAIN     Date Last Fill: 5/29/19  [facility]:  [id number]  Submit electronically to pharmacy  Controlled Substance Agreement on File:   Encounter-Level CSA Scan Date:    There are no encounter-level csa scan date.       Last office visit: 5/14/19 Chucho Espino MD, RN BSBA [facility] Triage/Med Refill 6/28/2019 5:37 PM

## 2021-05-30 NOTE — TELEPHONE ENCOUNTER
Alla    Daughter yan calling in regards of two medication reactions. Please call her back @ [phone]

## 2021-05-30 NOTE — PROGRESS NOTES
ASSESSMENT AND PLAN:  Adam Talamantes 79 y.o. male is seen here on 06/25/19 for worsening pain in his wrist, the evidence so far in keeping with osteoarthritis, in the background of gout for which she follows up with the primary physician without recent flareup, unable to tolerate 30 mg of duloxetine, would like to be able to go down to 20 mg which is done today.  He would wondered about doing an injection of the right wrist given how effective it was for his left.  Given the renal impairment on a candidate to take nonsteroidals.  This was done with 40 mg of Kenalog under ultrasound guidance into the right wrist.  Will check an MRI of the left wrist without contrast.  Will meet here in 4 - 6 weeks.       Diagnoses and all orders for this visit:    Generalized Osteoarthritis Of The Hand  -     triamcinolone acetonide 40 mg/mL injection 40 mg (KENALOG-40)  -     MR Wrist Without Contrast Left; Future; Expected date: 06/25/2019  -     Discontinue: DULoxetine (CYMBALTA) 20 MG capsule; Take 1 capsule (20 mg total) by mouth daily.  Dispense: 30 capsule; Refill: 2    Primary osteoarthritis involving multiple joints  -     triamcinolone acetonide 40 mg/mL injection 40 mg (KENALOG-40)  -     Cancel: MR Wrist With Without Contrast Left; Future; Expected date: 06/25/2019    Bilateral wrist pain  -     Cancel: MR Wrist With Without Contrast Left; Future; Expected date: 06/25/2019  -     MR Wrist Without Contrast Left; Future; Expected date: 06/25/2019    CKD (chronic kidney disease) stage 3, GFR 30-59 ml/min (H)          HISTORY OF PRESENTING ILLNESS ON 06/25/19 :  Adam Talamantes 79 y.o. is here for a moderately severe flare up of pain.  Joints affected include both wrist(s). This has gone on for several weeks. Pain is described as sharp. It is when active.  His symptoms are moderate. The symptoms are gradually worsening. Symptoms include pain, tenderness to touch.  Treatment to date has been without significant relief, with the  exception of corticosteroid injection that provided him very good relief that was done nearly 6 months ago last December into the left wrist..  He has background of gout, follows up with primary physician for that.  He has renal impairment.  He is aware not to take nonsteroidals.   Further historical information, including ROS and limitation in activities as noted in the multidimensional health assessment questionnaire scanned in the EMR and in the assessment and plan section.    ALLERGIES:Patient has no known allergies.    PAST MEDICAL/ACTIVE PROBLEMS/MEDICATION/SOCIAL DATA  Past Medical History:   Diagnosis Date     Acute blood loss anemia      Acute renal failure (H)      Anemia      Bacteremia      Cataract      Chronic gout      CKD (chronic kidney disease)     Stage 3     DM2 (diabetes mellitus, type 2) (H)      GERD (gastroesophageal reflux disease)      Glucose intolerance (impaired glucose tolerance)      Gout      History of nephrolithiasis      Hyperlipidemia      Hypertension      Insomnia      Latent tuberculosis      Nephrolithiasis      Neuropathy      Osteoarthritis     wrist, knee, shoulder     Prostatitis      Rectal bleed      Trigger thumb      Social History     Tobacco Use   Smoking Status Former Smoker     Last attempt to quit: 3/10/2000     Years since quittin.3   Smokeless Tobacco Former User     Patient Active Problem List   Diagnosis     Esophageal reflux     Dyslipidemia     Nephrolithiasis     Imaging Studies Nonspecific Abnormal Findings Skull And Head     Pseudogout     Latent Tuberculosis     Glucose Intolerance     Generalized Osteoarthritis Of The Hand     Lower Back Pain     Lumbar Disc Degeneration     Insomnia     Chronic Gout     CKD (chronic kidney disease) stage 3, GFR 30-59 ml/min (H)     Hyperglycemia     Elevated PSA     Prostate nodule     Cataracts, bilateral     Discharge planning issues     Chest pain     Constipation, unspecified constipation type     Bilateral  chronic knee pain     Chronic right shoulder pain     Essential hypertension with goal blood pressure less than 140/90     History of prostatitis     BPH (benign prostatic hyperplasia)     Chronic gout     Coronary artery disease due to lipid rich plaque     Right lower quadrant abdominal pain     Abnormal cardiovascular stress test     Type 2 diabetes mellitus without complication, without long-term current use of insulin (H)     Colitis     Primary osteoarthritis involving multiple joints     Current Outpatient Medications   Medication Sig Dispense Refill     acetaminophen (Q-PAP EXTRA STRENGTH) 500 MG tablet Take 1 tablet (500 mg total) by mouth every 6 (six) hours as needed. 120 tablet 6     allopurinol (ZYLOPRIM) 100 MG tablet Take 2 tablets (200 mg total) by mouth daily. 180 tablet 3     amLODIPine (NORVASC) 5 MG tablet TAKE 1 TABLET(5 MG) BY MOUTH DAILY 90 tablet 3     aspirin (ASPIR-LOW) 81 MG EC tablet Take 1 tablet (81 mg total) by mouth daily. 90 tablet 1     atorvastatin (LIPITOR) 40 MG tablet Take 1 tablet (40 mg total) by mouth at bedtime. 90 tablet 3     colchicine 0.6 mg tablet TAKE 1 TABLET BY MOUTH EVERY OTHER DAY 45 tablet 3     docusate sodium (COLACE) 100 MG capsule Take 1 capsule (100 mg total) by mouth 2 (two) times a day as needed for constipation. 60 capsule 4     EUCALYPTUS OIL/MENTHOL (MENTHOL EUCALYPTUS MM) 1 Inhalation into each nostril as needed (home medication from [facility]        gabapentin (NEURONTIN) 300 MG capsule TAKE 1 TO 2 CAPSULES BY MOUTH AT BEDTIME 180 capsule 3     HYDROcodone-acetaminophen 5-325 mg per tablet TAKE 1-2 TABLETS BY MOUTH AT BEDTIME AS NEEDED FOR PAIN 30 tablet 0     JANUVIA 25 mg tablet TAKE ONE TABLET BY MOUTH ONCE DAILY 90 tablet 1     lidocaine (XYLOCAINE) 5 % ointment Apply to painful areas three times a day as needed for pain 240 g 3     loratadine (CLARITIN) 10 mg tablet Take 10 mg by mouth daily.       magnesium oxide (MAG-OX) 400 mg (241.3 mg  magnesium) tablet TAKE 1 TABLET BY MOUTH DAILY 30 tablet 6     meclizine (ANTIVERT) 12.5 mg tablet Take 1-2 tablets (12.5-25 mg total) by mouth every 8 (eight) hours as needed (one to two tablets). 60 tablet 1     metoprolol succinate (TOPROL-XL) 50 MG 24 hr tablet Take 1 tablet (50 mg total) by mouth daily. 90 tablet 3     multivitamin with minerals (THERA-M) 9 mg iron-400 mcg Tab tablet Take 1 tablet by mouth daily. 100 tablet 3     nitroglycerin (NITROSTAT) 0.4 MG SL tablet Place 1 tablet (0.4 mg total) under the tongue every 5 (five) minutes as needed for chest pain. 90 tablet 12     omeprazole (PRILOSEC) 20 MG capsule TAKE 1 CAPSULE BY MOUTH DAILY 90 capsule 3     DULoxetine (CYMBALTA) 30 MG capsule Take 1 capsule (30 mg total) by mouth daily. 90 capsule 0     lisinopril (PRINIVIL,ZESTRIL) 10 MG tablet Take 1 tablet (10 mg total) by mouth 2 (two) times a day. 60 tablet 0     No current facility-administered medications for this visit.      DETAILED EXAMINATION  06/25/19  :  Vitals:    06/25/19 0850   BP: 120/70   Patient Site: Right Arm   Patient Position: Sitting   Cuff Size: Adult Regular   Pulse: 72   Weight: 158 lb (71.7 kg)     Alert oriented. Head including the face is examined for malar rash, heliotropes, scarring, lupus pernio. Eyes examined for redness such as in episcleritis/scleritis, periorbital lesions.   Neck/ Face examined for parotid gland swelling, range of motion of neck.  Left upper and lower and right upper and lower extremities examined for tenderness, swelling, warmth of the appendicular joints, range of motion, edema, rash.  Some of the important findings included: He does not have synovitis in any of the joints of the upper extremities, tenderness of the first CMC's, at the wrists bilaterally, no triggering of the digits of the thumbs.   Mild JLT of the knees.  The knees are without effusion or warmth.        LAB / IMAGING DATA:  ALT   Date Value Ref Range Status   12/27/2018 32 0 - 45  U/L Final   12/20/2018 35 0 - 45 U/L Final   09/04/2018 38 0 - 45 U/L Final     Albumin   Date Value Ref Range Status   12/27/2018 4.2 3.5 - 5.0 g/dL Final   12/20/2018 4.1 3.5 - 5.0 g/dL Final   09/04/2018 3.8 3.5 - 5.0 g/dL Final     Creatinine   Date Value Ref Range Status   12/27/2018 1.52 (H) 0.70 - 1.30 mg/dL Final   12/20/2018 1.51 (H) 0.70 - 1.30 mg/dL Final   09/04/2018 1.60 (H) 0.70 - 1.30 mg/dL Final       WBC   Date Value Ref Range Status   12/27/2018 9.4 4.0 - 11.0 thou/uL Final   12/20/2018 7.7 4.0 - 11.0 thou/uL Final   08/25/2015 5.7 4.0 - 11.0 thou/uL Final   06/18/2015 5.7 4.0 - 11.0 thou/uL Final     Hemoglobin   Date Value Ref Range Status   12/27/2018 15.1 14.0 - 18.0 g/dL Final   12/20/2018 15.0 14.0 - 18.0 g/dL Final   09/04/2018 15.0 14.0 - 18.0 g/dL Final     Platelets   Date Value Ref Range Status   12/27/2018 191 140 - 440 thou/uL Final   12/20/2018 205 140 - 440 thou/uL Final   09/04/2018 208 140 - 440 thou/uL Final       Lab Results   Component Value Date    RF <10 09/30/2009    SEDRATE 18 (H) 10/28/2009

## 2021-05-30 NOTE — TELEPHONE ENCOUNTER
Daughter calling - consent on file.  Daughter states his rheumatologist prescribed DULoxetine which he started taking in June.  Daughter states father has been having difficulty urinating for the last couple of days.  He is able to urinate but feels he cannot empty his bladder fully.  States he stopped taking the medication yesterday so he is able to empty his bladder better today.    No other urinary symptoms.  No burning/pain with with urination.  No pus/drainage.  No fever. No foul smelling urine.    Triaged to disposition of See PCP Within 2 Weeks.  Also advised daughter to contact patient's rheumatologist when their office is open if patient feels his symptoms are related to the new medication.  Advised to call back if fever occurs, he is unable to urinate and bladder feels full or he becomes worse.    Teresa Rojas RN  Triage Nurse Advisor    Reason for Disposition    All other urine symptoms    Urination is difficult to start (i.e., hesitancy) or straining    Protocols used: URINARY SYMPTOMS-A-AH

## 2021-05-31 ENCOUNTER — RECORDS - HEALTHEAST (OUTPATIENT)
Dept: ADMINISTRATIVE | Facility: CLINIC | Age: 81
End: 2021-05-31

## 2021-05-31 VITALS — WEIGHT: 144.75 LBS | HEIGHT: 61 IN | BODY MASS INDEX: 27.33 KG/M2

## 2021-05-31 VITALS — WEIGHT: 149.75 LBS | BODY MASS INDEX: 27.56 KG/M2 | HEIGHT: 62 IN

## 2021-05-31 VITALS — WEIGHT: 152 LBS | HEIGHT: 62 IN | BODY MASS INDEX: 27.97 KG/M2

## 2021-05-31 VITALS — BODY MASS INDEX: 28.25 KG/M2 | WEIGHT: 153.5 LBS | HEIGHT: 62 IN

## 2021-05-31 VITALS — BODY MASS INDEX: 27.75 KG/M2 | WEIGHT: 147 LBS | HEIGHT: 61 IN

## 2021-05-31 VITALS — WEIGHT: 149.75 LBS | HEIGHT: 62 IN | BODY MASS INDEX: 27.56 KG/M2

## 2021-05-31 VITALS — HEIGHT: 62 IN | BODY MASS INDEX: 27.23 KG/M2 | WEIGHT: 148 LBS

## 2021-05-31 VITALS — BODY MASS INDEX: 27.61 KG/M2 | WEIGHT: 146.13 LBS

## 2021-05-31 VITALS — WEIGHT: 148 LBS | BODY MASS INDEX: 27.94 KG/M2 | HEIGHT: 61 IN

## 2021-05-31 VITALS — HEIGHT: 61 IN | BODY MASS INDEX: 27.59 KG/M2 | WEIGHT: 146.13 LBS

## 2021-05-31 NOTE — TELEPHONE ENCOUNTER
Spoke to Kapoh- pts daughter, she states pt said he was  Having a hard time emptying his bladder all the way and felt it was related to the duloxetine. Kapoh said pt stopped taking duloxetine last week and said symptoms have improved. I encouraged Dimitri to call pts PCP to discuss this with him or get an appt if needed as Antoniettaoh states pt did have prostate issues in the past. Dimitri agrees to do so. Pt has been taking Tylenol for joint pain, explained that there is not an alternative to duloxetine that is similar, pt should see PCP and then follow up with Dr Gold.

## 2021-05-31 NOTE — TELEPHONE ENCOUNTER
RN cannot approve Refill Request    RN can NOT refill this medication med is not covered by policy/route to provider. Last office visit: 5/14/2019 Chucho Espino MD Last Physical: 2/17/2016 Last MTM visit: Visit date not found Last visit same specialty: 5/14/2019 Chucho Espino MD.  Next visit within 3 mo: Visit date not found  Next physical within 3 mo: Visit date not found      Pat Huitron, [facility] Triage/Med Refill 8/13/2019    Requested Prescriptions   Pending Prescriptions Disp Refills     magnesium oxide (MAG-OX) 400 mg (241.3 mg magnesium) tablet [Pharmacy Med Name: MAG-OXIDE 400MG TABLETS] 30 tablet 0     Sig: TAKE 1 TABLET BY MOUTH DAILY       There is no refill protocol information for this order

## 2021-05-31 NOTE — TELEPHONE ENCOUNTER
Controlled Substance Refill Request  Medication: HYDROcodone-acetaminophen 5-325 mg per tablet  Requested Prescriptions     Pending Prescriptions Disp Refills     HYDROcodone-acetaminophen 5-325 mg per tablet 30 tablet 0     Sig: TAKE 1-2 TABLETS BY MOUTH AT BEDTIME AS NEEDED FOR PAIN     Signed Prescriptions Disp Refills     magnesium oxide (MAG-OX) 400 mg (241.3 mg magnesium) tablet 30 tablet 6     Sig: TAKE 1 TABLET BY MOUTH DAILY     Authorizing Provider: JAVAN ESPINO     Date Last Fill: 7/28/10  Pharmacy:  [id number] - [place], [place] - [address] AT [address] [facility] for patient to  at   Controlled Substance Agreement on File:   Encounter-Level CSA Scan Date:    There are no encounter-level csa scan date.       Last office visit: 5/14/2019 Javan Espino MD

## 2021-05-31 NOTE — TELEPHONE ENCOUNTER
Refill Approved    Rx renewed per Medication Renewal Policy. Medication was last renewed on 5.14.19.    Heather Rinaldi, [facility] Triage/Med Refill 8/28/2019     Requested Prescriptions   Pending Prescriptions Disp Refills     docusate sodium (COLACE) 100 MG capsule 60 capsule 4     Sig: Take 1 capsule (100 mg total) by mouth 2 (two) times a day as needed for constipation.       GI Medications Refill Protocol Passed - 8/27/2019  6:09 PM        Passed - PCP or prescribing provider visit in last 12 or next 3 months.     Last office visit with prescriber/PCP: Visit date not found OR same dept: Visit date not found OR same specialty: Visit date not found  Last physical: Visit date not found Last MTM visit: Visit date not found   Next visit within 3 mo: Visit date not found  Next physical within 3 mo: Visit date not found  Prescriber OR PCP: Mickey Nguyen RN  Last diagnosis associated with med order: 1. Constipation  - docusate sodium (COLACE) 100 MG capsule; Take 1 capsule (100 mg total) by mouth 2 (two) times a day as needed for constipation.  Dispense: 60 capsule; Refill: 4    If protocol passes may refill for 12 months if within 3 months of last provider visit (or a total of 15 months).

## 2021-05-31 NOTE — PROGRESS NOTES
[facility] Care Coordination Contact    Internal CC change effective 9/1/19  Sara Manuel  [facility]  [phone]

## 2021-06-01 ENCOUNTER — RECORDS - HEALTHEAST (OUTPATIENT)
Dept: ADMINISTRATIVE | Facility: CLINIC | Age: 81
End: 2021-06-01

## 2021-06-01 ENCOUNTER — COMMUNICATION - HEALTHEAST (OUTPATIENT)
Dept: ADMINISTRATIVE | Facility: CLINIC | Age: 81
End: 2021-06-01

## 2021-06-01 ENCOUNTER — AMBULATORY - HEALTHEAST (OUTPATIENT)
Dept: FAMILY MEDICINE | Facility: CLINIC | Age: 81
End: 2021-06-01

## 2021-06-01 VITALS — WEIGHT: 149.75 LBS | HEIGHT: 61 IN | BODY MASS INDEX: 28.27 KG/M2

## 2021-06-01 VITALS — WEIGHT: 152 LBS | BODY MASS INDEX: 28.7 KG/M2 | HEIGHT: 61 IN

## 2021-06-01 VITALS — BODY MASS INDEX: 29.64 KG/M2 | WEIGHT: 157 LBS | HEIGHT: 61 IN

## 2021-06-01 VITALS — WEIGHT: 152 LBS | BODY MASS INDEX: 28.72 KG/M2

## 2021-06-01 DIAGNOSIS — M51.379 DEGENERATION OF LUMBAR OR LUMBOSACRAL INTERVERTEBRAL DISC: ICD-10-CM

## 2021-06-01 DIAGNOSIS — M25.50 POLYARTHRALGIA: ICD-10-CM

## 2021-06-01 RX ORDER — HYDROCODONE BITARTRATE AND ACETAMINOPHEN 5; 325 MG/1; MG/1
TABLET ORAL
Qty: 30 TABLET | Refills: 0 | Status: SHIPPED | OUTPATIENT
Start: 2021-06-01 | End: 2021-08-03

## 2021-06-01 NOTE — PROGRESS NOTES
ASSESSMENT AND PLAN:  Adam Talamantes 79 y.o. male is seen here on 09/23/19 for evaluation of worsening bilateral wrist pain.  He has osteoarthritis.  Recent MRI of the left wrist data are reviewed with him through the .  He would like to discontinue duloxetine.  Other options reviewed.  He wants to proceed local injection done with Kenalog 20 mg ultrasound guidance into each of the wrists.  Follow-up.  In 4 months or sooner.  Diagnoses and all orders for this visit:    Primary osteoarthritis involving multiple joints  -     triamcinolone acetonide 40 mg/mL injection 20 mg (KENALOG-40)  -     triamcinolone acetonide 40 mg/mL injection 20 mg (KENALOG-40)    Pain in both wrists  -     triamcinolone acetonide 40 mg/mL injection 20 mg (KENALOG-40)  -     triamcinolone acetonide 40 mg/mL injection 20 mg (KENALOG-40)          HISTORY OF PRESENTING ILLNESS ON 09/23/19 :  Adam Talamantes 79 y.o. is here for a moderately severe flare up of pain. Here for a moderately severe flare up of pain.  Joints affected include both wrist(s). This has gone on for several weeks. Pain is described as sharp. It is worse with activity at times bedtime..  Her symptoms are moderately severe. The symptoms are progressive.  Associated findings do not include: swelling, rash.  There is no associated recent fall or trauma.  Over-the-counter treatment to date has been without significant relief.   MRI of the left wrist done recently in July has provided further evidence of degenerative changes in the wrist joint.  He noted via the  that the previous corticosteroid injection has provided him several months of relief.  He does not think duloxetine is helpful, he furthermore wonders if this interferes with his micturition.  And he would like to discontinue the duloxetine.  Further historical information, including ROS and limitation in activities as noted in the multidimensional health assessment questionnaire scanned in the EMR and in  the assessment and plan section.      JOINTS: Deep radioscaphoid chondrosis. Mild thumb CMC and STT joint chondrosis. Mild cartilage loss at the distal radial ulnar joint with a small joint effusion or synovitis. No significant joint fluid in the radiocarpal, intercarpal or carpometacarpal   joints. No juxta-articular erosion.       ALLERGIES:Patient has no known allergies.    PAST MEDICAL/ACTIVE PROBLEMS/MEDICATION/SOCIAL DATA  Past Medical History:   Diagnosis Date     Acute blood loss anemia      Acute renal failure (H)      Anemia      Bacteremia      Cataract      Chronic gout      CKD (chronic kidney disease)     Stage 3     DM2 (diabetes mellitus, type 2) (H)      GERD (gastroesophageal reflux disease)      Glucose intolerance (impaired glucose tolerance)      Gout      History of nephrolithiasis      Hyperlipidemia      Hypertension      Insomnia      Latent tuberculosis      Nephrolithiasis      Neuropathy      Osteoarthritis     wrist, knee, shoulder     Prostatitis      Rectal bleed      Trigger thumb      Social History     Tobacco Use   Smoking Status Former Smoker     Last attempt to quit: 3/10/2000     Years since quittin.5   Smokeless Tobacco Former User     Patient Active Problem List   Diagnosis     Esophageal reflux     Dyslipidemia     Nephrolithiasis     Imaging Studies Nonspecific Abnormal Findings Skull And Head     Pseudogout     Latent Tuberculosis     Glucose Intolerance     Generalized Osteoarthritis Of The Hand     Lower Back Pain     Lumbar Disc Degeneration     Insomnia     Chronic Gout     CKD (chronic kidney disease) stage 3, GFR 30-59 ml/min (H)     Hyperglycemia     Elevated PSA     Prostate nodule     Cataracts, bilateral     Discharge planning issues     Chest pain     Constipation, unspecified constipation type     Bilateral chronic knee pain     Chronic right shoulder pain     Essential hypertension with goal blood pressure less than 140/90     History of prostatitis      BPH (benign prostatic hyperplasia)     Chronic gout     Coronary artery disease due to lipid rich plaque     Right lower quadrant abdominal pain     Abnormal cardiovascular stress test     Type 2 diabetes mellitus without complication, without long-term current use of insulin (H)     Colitis     Primary osteoarthritis involving multiple joints     Current Outpatient Medications   Medication Sig Dispense Refill     acetaminophen (Q-PAP EXTRA STRENGTH) 500 MG tablet Take 1 tablet (500 mg total) by mouth every 6 (six) hours as needed. 120 tablet 6     allopurinol (ZYLOPRIM) 100 MG tablet Take 2 tablets (200 mg total) by mouth daily. 180 tablet 3     amLODIPine (NORVASC) 5 MG tablet TAKE 1 TABLET(5 MG) BY MOUTH DAILY 90 tablet 3     aspirin (ASPIR-LOW) 81 MG EC tablet Take 1 tablet (81 mg total) by mouth daily. 90 tablet 1     atorvastatin (LIPITOR) 40 MG tablet Take 1 tablet (40 mg total) by mouth at bedtime. 90 tablet 3     colchicine 0.6 mg tablet TAKE 1 TABLET BY MOUTH EVERY OTHER DAY 45 tablet 3     docusate sodium (COLACE) 100 MG capsule Take 1 capsule (100 mg total) by mouth 2 (two) times a day as needed for constipation. 60 capsule 8     EUCALYPTUS OIL/MENTHOL (MENTHOL EUCALYPTUS MM) 1 Inhalation into each nostril as needed (home medication from [facility]        gabapentin (NEURONTIN) 300 MG capsule TAKE 1 TO 2 CAPSULES BY MOUTH AT BEDTIME 180 capsule 3     HYDROcodone-acetaminophen 5-325 mg per tablet TAKE 1-2 TABLETS BY MOUTH AT BEDTIME AS NEEDED FOR PAIN 30 tablet 0     JANUVIA 25 mg tablet TAKE ONE TABLET BY MOUTH ONCE DAILY 90 tablet 1     lidocaine (XYLOCAINE) 5 % ointment Apply to painful areas three times a day as needed for pain 240 g 3     loratadine (CLARITIN) 10 mg tablet Take 10 mg by mouth daily.       magnesium oxide (MAG-OX) 400 mg (241.3 mg magnesium) tablet Take 1 tablet (400 mg total) by mouth daily. 30 tablet 11     meclizine (ANTIVERT) 12.5 mg tablet Take 1-2 tablets (12.5-25 mg total) by  "mouth every 8 (eight) hours as needed (one to two tablets). 60 tablet 1     metoprolol succinate (TOPROL-XL) 50 MG 24 hr tablet Take 1 tablet (50 mg total) by mouth daily. 90 tablet 3     multivitamin with minerals (THERA-M) 9 mg iron-400 mcg Tab tablet Take 1 tablet by mouth daily. 100 tablet 3     nitroglycerin (NITROSTAT) 0.4 MG SL tablet Place 1 tablet (0.4 mg total) under the tongue every 5 (five) minutes as needed for chest pain. 90 tablet 12     omeprazole (PRILOSEC) 20 MG capsule TAKE 1 CAPSULE BY MOUTH DAILY 90 capsule 3     DULoxetine (CYMBALTA) 20 MG capsule Take 20 mg by mouth daily.       lisinopril (PRINIVIL,ZESTRIL) 10 MG tablet Take 1 tablet (10 mg total) by mouth 2 (two) times a day. 60 tablet 0     Current Facility-Administered Medications   Medication Dose Route Frequency Provider Last Rate Last Dose     triamcinolone acetonide 40 mg/mL injection 20 mg (KENALOG-40)  20 mg Intra-articular Once Bella Gold MBBS             DETAILED EXAMINATION  09/23/19  :  Vitals:    09/23/19 0939   BP: 118/74   Patient Site: Right Arm   Patient Position: Sitting   Cuff Size: Adult Regular   Pulse: 72   Weight: 152 lb (68.9 kg)   Height: 5' 1.75\" (1.568 m)     Alert oriented. Head including the face is examined for malar rash, heliotropes, scarring, lupus pernio. Eyes examined for redness such as in episcleritis/scleritis, periorbital lesions.   Neck/ Face examined for parotid gland swelling, range of motion of neck.  Left upper and lower and right upper and lower extremities examined for tenderness, swelling, warmth of the appendicular joints, range of motion, edema, rash.  Some of the important findings included: Tenderness in the both wrist without synovitis clinically or ultrasonographically, slightly warmer than the right side, no synovitis of palpable joints of upper extremities otherwise, no dactylitis of digits, he is without effusion warmth or JLT.           LAB / IMAGING DATA:  ALT   Date Value Ref Range " Status   09/16/2019 24 0 - 45 U/L Final   12/27/2018 32 0 - 45 U/L Final   12/20/2018 35 0 - 45 U/L Final     Albumin   Date Value Ref Range Status   09/16/2019 4.0 3.5 - 5.0 g/dL Final   12/27/2018 4.2 3.5 - 5.0 g/dL Final   12/20/2018 4.1 3.5 - 5.0 g/dL Final     Creatinine   Date Value Ref Range Status   09/16/2019 1.54 (H) 0.70 - 1.30 mg/dL Final   12/27/2018 1.52 (H) 0.70 - 1.30 mg/dL Final   12/20/2018 1.51 (H) 0.70 - 1.30 mg/dL Final       WBC   Date Value Ref Range Status   12/27/2018 9.4 4.0 - 11.0 thou/uL Final   12/20/2018 7.7 4.0 - 11.0 thou/uL Final   08/25/2015 5.7 4.0 - 11.0 thou/uL Final   06/18/2015 5.7 4.0 - 11.0 thou/uL Final     Hemoglobin   Date Value Ref Range Status   12/27/2018 15.1 14.0 - 18.0 g/dL Final   12/20/2018 15.0 14.0 - 18.0 g/dL Final   09/04/2018 15.0 14.0 - 18.0 g/dL Final     Platelets   Date Value Ref Range Status   12/27/2018 191 140 - 440 thou/uL Final   12/20/2018 205 140 - 440 thou/uL Final   09/04/2018 208 140 - 440 thou/uL Final       Lab Results   Component Value Date    RF <10 09/30/2009    SEDRATE 18 (H) 10/28/2009

## 2021-06-01 NOTE — TELEPHONE ENCOUNTER
Controlled Substance Refill Request  Medication:   Requested Prescriptions     Pending Prescriptions Disp Refills     HYDROcodone-acetaminophen 5-325 mg per tablet 30 tablet 0     Sig: TAKE 1-2 TABLETS BY MOUTH AT BEDTIME AS NEEDED FOR PAIN     Date Last Fill: 8/28/19  Pharmacy: [id number]   Submit electronically to pharmacy  Controlled Substance Agreement on File:   Encounter-Level CSA Scan Date:    There are no encounter-level csa scan date.       Last office visit: Last office visit pertaining to requested medication was 9/16/19.

## 2021-06-01 NOTE — TELEPHONE ENCOUNTER
KEENAN reviewed chart. Last addressed 1/02/19. See PCP note below.     Atypical chest pain  Most likely musculoskeletal in etiology.  Reviewed the recent emergency room notes and related studies with the patient and his daughter with the help of a professional .  His cardiac workup was negative.  We discussed options for treating the musculoskeletal pain, medication is reviewed and refilled below, we discussed indications for follow-up.  -     HYDROcodone-acetaminophen 5-325 mg per tablet  Dispense: 60 tablet; Refill

## 2021-06-01 NOTE — TELEPHONE ENCOUNTER
Daughter is calling for her father and is requesting a refill of the Hydrocodone. Advised that this medication refill request will be handled on the next clinic day but can take up to 72 hours for processing. Daughter states that her dad is completley out of the medication. Confirmed pharmacy.     Pat Huitron RN [facility] Triage/Med Refill 9/30/2019 6:09 PM

## 2021-06-01 NOTE — PROGRESS NOTES
Assessment and Plan:       1. Routine general medical examination at a health care facility  Age-appropriate counseling and screening counseling and anticipatory guidance done today with the patient.  Reviewed his most recent colonoscopy report, he is up-to-date, will be due again in 2022.  Influenza vaccine given today in clinic, he is going to talk to his pharmacist about Shingrix vaccine.    2. Diabetes mellitus, type 2 (H)  - Glycosylated Hemoglobin A1c done today shows improvement to 6.0, congratulated on his improved control, follow-up again in 6 months, sooner if problems.  - Comprehensive Metabolic Panel    3. Hypomagnesemia  Refill  - magnesium oxide (MAG-OX) 400 mg (241.3 mg magnesium) tablet; Take 1 tablet (400 mg total) by mouth daily.  Dispense: 30 tablet; Refill: 11    4. CKD (chronic kidney disease) stage 3, GFR 30-59 ml/min (H)  Due for labs.  - Comprehensive Metabolic Panel     The patient's current medical problems were reviewed.    I have had an Advance Directives discussion with the patient.  The following health maintenance schedule was reviewed with the patient and provided in printed form in the after visit summary:   Health Maintenance   Topic Date Due     DIABETES OPHTHALMOLOGY EXAM  03/15/1950     MEDICARE ANNUAL WELLNESS VISIT  03/15/2007     ZOSTER VACCINES (2 of 3) 12/21/2007     DIABETES FOLLOW-UP  06/04/2019     INFLUENZA VACCINE RULE BASED (1) 08/01/2019     DIABETES HEMOGLOBIN A1C  11/14/2019     DIABETES FOOT EXAM  12/04/2019     FALL RISK ASSESSMENT  03/04/2020     DIABETES URINE MICROALBUMIN  05/14/2020     ADVANCE CARE PLANNING  04/19/2022     TD 18+ HE  01/02/2029     PNEUMOCOCCAL POLYSACCHARIDE VACCINE AGE 65 AND OVER  Completed     PNEUMOCOCCAL CONJUGATE VACCINE FOR ADULTS (PCV13 OR PREVNAR)  Completed        Subjective:   Chief Complaint: Adam Talamantes is an 79 y.o. male here for an Annual Wellness visit.   HPI: 79-year-old male here for his annual he is also due for follow-up  on his diabetes.  He reports his blood sugars have been under good control, no hypoglycemia.  He is taking his medications regularly.  He did stop taking duloxetine prescribed by his rheumatologist because it was making him feel like it was giving him side effects related to his urination.  Since stopping the duloxetine, the side effects have resolved.  He did not think it was doing much to help with pain.  He is going to discuss this further with his rheumatologist at his appointment next week.    Review of Systems: No exertional chest pain, no shortness of breath, no blood in the urine, he gets some occasional blood in the stool after eating certain foods, that he has had extensive work-up in the past including being up-to-date on upper endoscopy and colonoscopy.  Please see above.  The rest of the review of systems are negative for all systems.    Patient Care Team:  Chucho Espino MD as PCP - General  Chucho Espino MD as Assigned PCP  Olga Brady SW as Lead Care Coordinator     Patient Active Problem List   Diagnosis     Esophageal reflux     Dyslipidemia     Nephrolithiasis     Imaging Studies Nonspecific Abnormal Findings Skull And Head     Pseudogout     Latent Tuberculosis     Glucose Intolerance     Generalized Osteoarthritis Of The Hand     Lower Back Pain     Lumbar Disc Degeneration     Insomnia     Chronic Gout     CKD (chronic kidney disease) stage 3, GFR 30-59 ml/min (H)     Hyperglycemia     Elevated PSA     Prostate nodule     Cataracts, bilateral     Discharge planning issues     Chest pain     Constipation, unspecified constipation type     Bilateral chronic knee pain     Chronic right shoulder pain     Essential hypertension with goal blood pressure less than 140/90     History of prostatitis     BPH (benign prostatic hyperplasia)     Chronic gout     Coronary artery disease due to lipid rich plaque     Right lower quadrant abdominal pain     Abnormal cardiovascular stress test     Type 2  diabetes mellitus without complication, without long-term current use of insulin (H)     Colitis     Primary osteoarthritis involving multiple joints     Past Medical History:   Diagnosis Date     Acute blood loss anemia      Acute renal failure (H)      Anemia      Bacteremia      Cataract      Chronic gout      CKD (chronic kidney disease)     Stage 3     DM2 (diabetes mellitus, type 2) (H)      GERD (gastroesophageal reflux disease)      Glucose intolerance (impaired glucose tolerance)      Gout      History of nephrolithiasis      Hyperlipidemia      Hypertension      Insomnia      Latent tuberculosis      Nephrolithiasis      Neuropathy      Osteoarthritis     wrist, knee, shoulder     Prostatitis      Rectal bleed      Trigger thumb       Past Surgical History:   Procedure Laterality Date     KIDNEY STONE SURGERY       [facility]  3/6/2016           Family History   Problem Relation Age of Onset     Stroke Father      Stroke Daughter       Social History     Socioeconomic History     Marital status:      Spouse name: Not on file     Number of children: Not on file     Years of education: Not on file     Highest education level: Not on file   Occupational History     Not on file   Social Needs     Financial resource strain: Not on file     Food insecurity:     Worry: Not on file     Inability: Not on file     Transportation needs:     Medical: Not on file     Non-medical: Not on file   Tobacco Use     Smoking status: Former Smoker     Last attempt to quit: 3/10/2000     Years since quittin.5     Smokeless tobacco: Former User   Substance and Sexual Activity     Alcohol use: No     Drug use: No     Sexual activity: Never     Partners: Female   Lifestyle     Physical activity:     Days per week: Not on file     Minutes per session: Not on file     Stress: Not on file   Relationships     Social connections:     Talks on phone: Not on file     Gets together: Not on file     Attends Mandaen service: Not on  file     Active member of club or organization: Not on file     Attends meetings of clubs or organizations: Not on file     Relationship status: Not on file     Intimate partner violence:     Fear of current or ex partner: Not on file     Emotionally abused: Not on file     Physically abused: Not on file     Forced sexual activity: Not on file   Other Topics Concern     Not on file   Social History Narrative    Kayce speaking      Current Outpatient Medications   Medication Sig Dispense Refill     acetaminophen (Q-PAP EXTRA STRENGTH) 500 MG tablet Take 1 tablet (500 mg total) by mouth every 6 (six) hours as needed. 120 tablet 6     allopurinol (ZYLOPRIM) 100 MG tablet Take 2 tablets (200 mg total) by mouth daily. 180 tablet 3     amLODIPine (NORVASC) 5 MG tablet TAKE 1 TABLET(5 MG) BY MOUTH DAILY 90 tablet 3     aspirin (ASPIR-LOW) 81 MG EC tablet Take 1 tablet (81 mg total) by mouth daily. 90 tablet 1     atorvastatin (LIPITOR) 40 MG tablet Take 1 tablet (40 mg total) by mouth at bedtime. 90 tablet 3     colchicine 0.6 mg tablet TAKE 1 TABLET BY MOUTH EVERY OTHER DAY 45 tablet 3     docusate sodium (COLACE) 100 MG capsule Take 1 capsule (100 mg total) by mouth 2 (two) times a day as needed for constipation. 60 capsule 8     gabapentin (NEURONTIN) 300 MG capsule TAKE 1 TO 2 CAPSULES BY MOUTH AT BEDTIME 180 capsule 3     HYDROcodone-acetaminophen 5-325 mg per tablet TAKE 1-2 TABLETS BY MOUTH AT BEDTIME AS NEEDED FOR PAIN 30 tablet 0     JANUVIA 25 mg tablet TAKE ONE TABLET BY MOUTH ONCE DAILY 90 tablet 1     lidocaine (XYLOCAINE) 5 % ointment Apply to painful areas three times a day as needed for pain 240 g 3     lisinopril (PRINIVIL,ZESTRIL) 10 MG tablet Take 1 tablet (10 mg total) by mouth 2 (two) times a day. 60 tablet 0     loratadine (CLARITIN) 10 mg tablet Take 10 mg by mouth daily.       meclizine (ANTIVERT) 12.5 mg tablet Take 1-2 tablets (12.5-25 mg total) by mouth every 8 (eight) hours as needed (one to two  "tablets). 60 tablet 1     metoprolol succinate (TOPROL-XL) 50 MG 24 hr tablet Take 1 tablet (50 mg total) by mouth daily. 90 tablet 3     nitroglycerin (NITROSTAT) 0.4 MG SL tablet Place 1 tablet (0.4 mg total) under the tongue every 5 (five) minutes as needed for chest pain. 90 tablet 12     omeprazole (PRILOSEC) 20 MG capsule TAKE 1 CAPSULE BY MOUTH DAILY 90 capsule 3     EUCALYPTUS OIL/MENTHOL (MENTHOL EUCALYPTUS MM) 1 Inhalation into each nostril as needed (home medication from [facility]        magnesium oxide (MAG-OX) 400 mg (241.3 mg magnesium) tablet Take 1 tablet (400 mg total) by mouth daily. 30 tablet 11     multivitamin with minerals (THERA-M) 9 mg iron-400 mcg Tab tablet Take 1 tablet by mouth daily. 100 tablet 3     No current facility-administered medications for this visit.       Objective:   Vital Signs:   Visit Vitals  /80 (Patient Site: Right Arm)   Pulse 89   Temp 97.9  F (36.6  C) (Oral)   Resp 16   Ht 5' 1.75\" (1.568 m)   Wt 152 lb (68.9 kg)   SpO2 96%   BMI 28.03 kg/m         VisionScreening:  No exam data present     PHYSICAL EXAM  Gen - alert, orientated, NAD  Eyes - fundascopic exam limited by the undialated pupil but looks symmetric  ENT - oropharynx clear, TMs clear  Neck - supple, no palpable mass or lymphadenopathy  CV - RRR, no murmur  Resp - lungs CTA  Ab - soft, nontender, no palpable mass or organomegaly  Extrem - warm, no edema  Neuro - CN II-XII intact, some baseline weakness, otherwise strength, sensation, reflexes intact and symmetric  Skin - no rash, no atypical appearing lesions seen.       Assessment Results 9/16/2019   Activities of Daily Living 5-6 - Severe functional impairment   Instrumental Activities of Daily Living 5-6 - Severe functional impairment   Get Up and Go Score 12 seconds or more   Mini Cog Total Score 5   Some recent data might be hidden     A Mini-Cog score of 0-2 suggests the possibility of dementia, score of 3-5 suggests no dementia    Identified " Health Risks:     The patient was provided with suggestions to help him develop a healthy physical lifestyle.   The patient reports that he has difficulty with activities of daily living.  This has been assessed in the past, no change from baseline.  Continue current plan.  The patient reports that he has difficulty with instrumental activities of daily living. The patient was provided with suggestions to help him develop a healthy emotional lifestyle.   He is at risk for falling and has been provided with information to reduce the risk of falling at home.  Patient's advanced directive was discussed and I am comfortable with the patient's wishes.

## 2021-06-02 ENCOUNTER — RECORDS - HEALTHEAST (OUTPATIENT)
Dept: ADMINISTRATIVE | Facility: CLINIC | Age: 81
End: 2021-06-02

## 2021-06-02 VITALS — HEIGHT: 61 IN | BODY MASS INDEX: 29.64 KG/M2 | WEIGHT: 157 LBS

## 2021-06-02 VITALS — BODY MASS INDEX: 28.56 KG/M2 | HEIGHT: 61 IN | WEIGHT: 151.25 LBS

## 2021-06-02 VITALS — BODY MASS INDEX: 29.07 KG/M2 | HEIGHT: 61 IN | WEIGHT: 154 LBS

## 2021-06-02 VITALS — HEIGHT: 61 IN | BODY MASS INDEX: 29.83 KG/M2 | WEIGHT: 158 LBS

## 2021-06-02 VITALS — WEIGHT: 158 LBS | HEIGHT: 61 IN | BODY MASS INDEX: 29.83 KG/M2

## 2021-06-02 VITALS — BODY MASS INDEX: 29.85 KG/M2 | WEIGHT: 158 LBS

## 2021-06-02 VITALS — WEIGHT: 151 LBS | BODY MASS INDEX: 28.53 KG/M2

## 2021-06-02 VITALS — WEIGHT: 159 LBS | BODY MASS INDEX: 30.02 KG/M2 | HEIGHT: 61 IN

## 2021-06-02 NOTE — TELEPHONE ENCOUNTER
RN cannot approve Refill Request  Times 2    RN can NOT refill this medication med is not covered by policy/route to provider and historical medication requested. Last office visit: 5/14/2019 Chucho Espino MD Last Physical: 9/16/2019 Last MTM visit: Visit date not found Last visit same specialty: 5/14/2019 Chucho Espino MD.  Next visit within 3 mo: Visit date not found  Next physical within 3 mo: Visit date not found  Claritin is Historical  Acetaminophen is not on the Protocol.  Last refill 3/4/2019 for 120/6 refills  Last OV 9/16/2019 AWV    Sharon Sage, [facility] Triage/Med Refill 10/12/2019    Requested Prescriptions   Pending Prescriptions Disp Refills     loratadine (CLARITIN) 10 mg tablet [Pharmacy Med Name: LORATADINE 10MG TABLETS] 30 tablet 0     Sig: TAKE 1 TABLET BY MOUTH EVERY DAY       Antihistamine Refill Protocol Passed - 10/10/2019  7:57 PM        Passed - Patient has had office visit/physical in last year     Last office visit with prescriber/PCP: 5/14/2019 Chucho Espino MD OR same dept: 5/14/2019 Chucho Espino MD OR same specialty: 5/14/2019 Chucho Espino MD  Last physical: 9/16/2019 Last MTM visit: Visit date not found   Next visit within 3 mo: Visit date not found  Next physical within 3 mo: Visit date not found  Prescriber OR PCP: Chucho Espino MD  Last diagnosis associated with med order: 1. Lumbar Disc Degeneration  - acetaminophen (TYLENOL) 500 MG tablet [Pharmacy Med Name: ACETAMINOPHEN 500MG E/S CAPLETS]; TAKE 1 TABLET BY MOUTH EVERY 6 HOURS AS NEEDED  Dispense: 120 tablet; Refill: 0    If protocol passes may refill for 12 months if within 3 months of last provider visit (or a total of 15 months).             acetaminophen (TYLENOL) 500 MG tablet [Pharmacy Med Name: ACETAMINOPHEN 500MG E/S CAPLETS] 120 tablet 0     Sig: TAKE 1 TABLET BY MOUTH EVERY 6 HOURS AS NEEDED       There is no refill protocol information for this order

## 2021-06-02 NOTE — TELEPHONE ENCOUNTER
Controlled Substance Refill Request  Medication Name:   Requested Prescriptions     Pending Prescriptions Disp Refills     HYDROcodone-acetaminophen 5-325 mg per tablet 30 tablet 0     Sig: TAKE 1-2 TABLETS BY MOUTH AT BEDTIME AS NEEDED FOR PAIN     Date Last Fill: 10/01/19   Pharmacy:   [id number] - [place], [place] - [address] AT [address]       Submit electronically to pharmacy  Controlled Substance Agreement Date Scanned:   Encounter-Level CSA Scan Date:    There are no encounter-level csa scan date.       Last office visit with prescriber/PCP: 5/14/2019 Chucho Espino MD OR same dept: 5/14/2019 Chucho Espino MD OR same specialty: 5/14/2019 Chucho Espino MD  Last physical: 9/16/2019 Last MTM visit: Visit date not found

## 2021-06-03 VITALS
DIASTOLIC BLOOD PRESSURE: 90 MMHG | BODY MASS INDEX: 28.52 KG/M2 | HEART RATE: 82 BPM | SYSTOLIC BLOOD PRESSURE: 122 MMHG | WEIGHT: 155 LBS | HEIGHT: 62 IN | RESPIRATION RATE: 14 BRPM

## 2021-06-03 VITALS — WEIGHT: 158 LBS | BODY MASS INDEX: 29.85 KG/M2

## 2021-06-03 VITALS
DIASTOLIC BLOOD PRESSURE: 80 MMHG | HEART RATE: 72 BPM | BODY MASS INDEX: 28.65 KG/M2 | SYSTOLIC BLOOD PRESSURE: 132 MMHG | TEMPERATURE: 97.7 F | RESPIRATION RATE: 14 BRPM | WEIGHT: 155.7 LBS | OXYGEN SATURATION: 97 % | HEIGHT: 62 IN

## 2021-06-03 VITALS
HEIGHT: 62 IN | WEIGHT: 156 LBS | DIASTOLIC BLOOD PRESSURE: 60 MMHG | BODY MASS INDEX: 28.71 KG/M2 | SYSTOLIC BLOOD PRESSURE: 130 MMHG | HEART RATE: 79 BPM | RESPIRATION RATE: 16 BRPM

## 2021-06-03 VITALS
WEIGHT: 152 LBS | SYSTOLIC BLOOD PRESSURE: 118 MMHG | DIASTOLIC BLOOD PRESSURE: 74 MMHG | HEIGHT: 62 IN | BODY MASS INDEX: 27.97 KG/M2 | HEART RATE: 72 BPM

## 2021-06-03 VITALS
SYSTOLIC BLOOD PRESSURE: 132 MMHG | OXYGEN SATURATION: 96 % | HEIGHT: 62 IN | RESPIRATION RATE: 16 BRPM | BODY MASS INDEX: 27.97 KG/M2 | HEART RATE: 89 BPM | DIASTOLIC BLOOD PRESSURE: 80 MMHG | WEIGHT: 152 LBS | TEMPERATURE: 97.9 F

## 2021-06-03 NOTE — TELEPHONE ENCOUNTER
----- Message from Lynne George MD sent at 11/25/2019  9:59 AM CST -----  Lipid profile fairly reasonable.  Ideally, however, would like to see LDL less than 70 mg/dL if possible.  Patient only on 40 mg of atorvastatin and would recommend increasing this to 80 mg daily with a fasting lipid profile in 3 months post change.  Thanks.

## 2021-06-03 NOTE — TELEPHONE ENCOUNTER
RN Assessment/Reason for Call:   Okay to leave Detailed Message  Daughter in callling in,  Does not speak English; POa ended 4/25/2019  She will update.  Needs refill for oxycodone.  HYDROcodone-acetaminophen 5-325 mg per tablet 30 tablet 0 10/30/2019  No   Sig: TAKE 1-2 TABLETS BY MOUTH AT BEDTIME AS NEEDED FOR PAIN   Sent to pharmacy as: HYDROcodone 5 mg-acetaminophen 325 mg tablet   Earliest Fill Date: 10/30/2019   E-Prescribing Status: Receipt confirmed by pharmacy (10/30/2019 12:47 PM CDT)     OV 9/16/19    RN Action/Disposition:  Will send to refill team.  Agrees to plan.     Lynette Kerns RN    Care Connection Triage/med refill  11/27/2019  4:48 PM

## 2021-06-03 NOTE — TELEPHONE ENCOUNTER
Attempted calling patient via [employer] . No opportunity to leave VM and no answer. Will attempt at a later time. Rx for increased dose sent to pharmacy and message sent to schedulers to create 3 month recall for repeat labs. Order placed for fasting lipid profile.

## 2021-06-03 NOTE — PATIENT INSTRUCTIONS - HE
HTN  -   Continue current regimen but take as directed   Advised limiting sodium in diet  BP chart encourage to track and with meds changes and diet    Cholesterol -   Take atorvastatin 80mg (2 tabs daily)    Heart disease:  Follow up cardiology 1/3/19  Med regimen     daily aspirin.     metoprolol succinate 50 mg daily.     lisinopril (two times a day)    Sublingual nitroglycerin as needed.    isosorbide mononitrate 30 g daily.    Polypharmacy -   Medication reconciliation  Daughter (med assistant) helps set up meds

## 2021-06-03 NOTE — PROGRESS NOTES
[facility] Care Coordination Contact      [facility] Six-Month Telephone Assessment    6 month telephone assessment completed on 11/21/2019.    ER visits: No  Hospitalizations: No  TCU stays: No  Significant health status changes: Currently, experiencing chest pain-does have an appointment scheduled tomorrow to discuss and explore treatment options. Adam reports that his pain has better controlled and rates it at a 6/10 which was the specified goal number for pain management previously outlined. Will continue to monitor and use pain management techniques.   Denies any outstanding questions or concerns at this time.   Falls/Injuries: No  ADL/IADL changes: No  Changes in services: No- continues to receive PCA services.     Caregiver Assessment follow up:  N/A    Goals: See POC in chart for goal progress documentation.      Will see member in 6 months for an annual health risk assessment.   Encouraged member to call CC with any questions or concerns in the meantime.    RICKEY Gallegos  [facility]  [phone]

## 2021-06-03 NOTE — PROGRESS NOTES
HPI - 78 yo male who is patient of Dr Espino who is new to me.   He is here to f/u on BP and cardiology consult    HTN -   BP meds:   Lisinopril two times a day (taking 1 tab but sometimes 2 at the same time and BP drops), metorpolol, isosorbide  BP is variable at home -90    Chart Reviewed:  OA with multiple joints - s/p injections from Rheum clinic 9/23/19    Cardiology consult 11/22/19 reviewed with patient:  1. Possible coronary artery disease. Patient underwent recent nuclear perfusion study 29 July 2016 which was mildly abnormal. Specifically, this revealed a small sized mild intensity reversible defect in the mid to distal anteroseptal segment representing an area of myocardial ischemia.  Ultimately recommend:    Continue daily aspirin.    Continue metoprolol succinate 50 mg daily.    Continue lisinopril given the putative favorable endothelial effects of the therapy.    Sublingual nitroglycerin PRN.    Will add isosorbide mononitrate 30 g daily.     2. Hypertension. Blood pressure today is 122/90 mmHg. Plan to continue current management.  3. Dyslipidemia.  Lipid profile 18 October 2017 revealed  mg/dL and HDL 42 mg/dL.  --per phone message on 11/25/19:  Message from Lynne George MD sent at 11/25/2019  9:59 AM CST -----  Lipid profile fairly reasonable.  Ideally, however, would like to see LDL less than 70 mg/dL if possible.  Patient only on 40 mg of atorvastatin and would recommend increasing this to 80 mg daily with a fasting lipid profile in 3 months post change.     Today reporting chest pain, not needed nitro since last cardiology appt a few weeks ago    social  Daughter CMA at [facility] [employer]  He was a  medic in [employer]        Polypharmacy -   Med bottles, med list, cardiology list reviewed and reconciled  He had only been taking lipitor 40mg b/c did not get call from cardiology b/c phone was not working and will update new number  PCA helps with meds  Previously had home  RN    Current Outpatient Medications   Medication Sig     acetaminophen (TYLENOL) 500 MG tablet TAKE 1 TABLET BY MOUTH EVERY 6 HOURS AS NEEDED     allopurinol (ZYLOPRIM) 100 MG tablet Take 2 tablets (200 mg total) by mouth daily.     amLODIPine (NORVASC) 5 MG tablet TAKE 1 TABLET(5 MG) BY MOUTH DAILY     aspirin (ASPIR-LOW) 81 MG EC tablet Take 1 tablet (81 mg total) by mouth daily.     atorvastatin (LIPITOR) 40 MG tablet Take 2 tablets (80 mg total) by mouth at bedtime.     colchicine 0.6 mg tablet TAKE 1 TABLET BY MOUTH EVERY OTHER DAY     docusate sodium (COLACE) 100 MG capsule Take 1 capsule (100 mg total) by mouth 2 (two) times a day as needed for constipation.     gabapentin (NEURONTIN) 300 MG capsule TAKE 1 TO 2 CAPSULES BY MOUTH AT BEDTIME     HYDROcodone-acetaminophen 5-325 mg per tablet TAKE 1-2 TABLETS BY MOUTH AT BEDTIME AS NEEDED FOR chronic PAIN     isosorbide mononitrate (IMDUR) 30 MG 24 hr tablet Take 1 tablet (30 mg total) by mouth daily.     JANUVIA 25 mg tablet TAKE ONE TABLET BY MOUTH ONCE DAILY     lisinopril (PRINIVIL,ZESTRIL) 10 MG tablet Take 1 tablet (10 mg total) by mouth 2 (two) times a day.     loratadine (CLARITIN) 10 mg tablet TAKE 1 TABLET BY MOUTH EVERY DAY     magnesium oxide (MAG-OX) 400 mg (241.3 mg magnesium) tablet Take 1 tablet (400 mg total) by mouth daily.     meclizine (ANTIVERT) 12.5 mg tablet Take 1-2 tablets (12.5-25 mg total) by mouth every 8 (eight) hours as needed (one to two tablets).     metoprolol succinate (TOPROL-XL) 50 MG 24 hr tablet Take 1 tablet (50 mg total) by mouth daily.     nitroglycerin (NITROSTAT) 0.4 MG SL tablet Place 1 tablet (0.4 mg total) under the tongue every 5 (five) minutes as needed for chest pain.     omeprazole (PRILOSEC) 20 MG capsule TAKE 1 CAPSULE BY MOUTH DAILY     DULoxetine (CYMBALTA) 20 MG capsule Take 20 mg by mouth daily.     EUCALYPTUS OIL/MENTHOL (MENTHOL EUCALYPTUS MM) 1 Inhalation into each nostril as needed (home medication from  "[place].).      lidocaine (XYLOCAINE) 5 % ointment Apply to painful areas three times a day as needed for pain     multivitamin with minerals (THERA-M) 9 mg iron-400 mcg Tab tablet Take 1 tablet by mouth daily.     Vitals:    12/02/19 1157 12/02/19 1206   BP: 142/76 132/80   Pulse: 72    Resp: 14    Temp: 97.7  F (36.5  C)    TempSrc: Oral    SpO2: 97%    Weight: 155 lb 11.2 oz (70.6 kg)    Height: 5' 1.73\" (1.568 m)      PHYSICAL EXAM   General Appearance: Awake and alert, in no acute distress  HEENT: neck is supple  CV: regular rate  Resp: No respiratory distress. Breathing comfortably  Musculoskeletal: moving limbs comfortably with not deficits or deformities  Skin: no rashes noted    A/P  HTN  -   Continue current regimen but take as directed   Advised limiting sodium in diet  BP chart encourage to track and with meds changes and diet    Cholesterol -   Take atorvastatin 80mg (2 tabs daily)    Heart disease:  Follow up cardiology 1/3/19  Med regimen     daily aspirin.     metoprolol succinate 50 mg daily.     lisinopril (two times a day)    Sublingual nitroglycerin PRN.    isosorbide mononitrate 30 g daily.    Polypharmacy -   Medication reconciliation  Daughter (med assistant) helps set up meds    Spent 25 min face to face with patient with more the 50% spent in counseling, reviewing chart with patient and coordination of care, medication reconciliation and discussing problems listed above.       "

## 2021-06-04 VITALS
TEMPERATURE: 97.9 F | OXYGEN SATURATION: 97 % | RESPIRATION RATE: 16 BRPM | SYSTOLIC BLOOD PRESSURE: 120 MMHG | HEIGHT: 66 IN | WEIGHT: 152.7 LBS | DIASTOLIC BLOOD PRESSURE: 90 MMHG | BODY MASS INDEX: 24.54 KG/M2 | HEART RATE: 117 BPM

## 2021-06-04 VITALS
HEART RATE: 115 BPM | WEIGHT: 153 LBS | BODY MASS INDEX: 24.69 KG/M2 | OXYGEN SATURATION: 99 % | TEMPERATURE: 98 F | SYSTOLIC BLOOD PRESSURE: 122 MMHG | DIASTOLIC BLOOD PRESSURE: 80 MMHG | RESPIRATION RATE: 20 BRPM

## 2021-06-04 VITALS
BODY MASS INDEX: 28.52 KG/M2 | HEIGHT: 62 IN | SYSTOLIC BLOOD PRESSURE: 124 MMHG | HEART RATE: 80 BPM | DIASTOLIC BLOOD PRESSURE: 70 MMHG | WEIGHT: 155 LBS

## 2021-06-04 NOTE — TELEPHONE ENCOUNTER
Result Notes for LDL Cholesterol, Direct     Notes recorded by Sheila Macias RN on 11/27/2019 at 9:27 AM CST  Called patient via [employer] . Results and recommendations reviewed with patient. He did  new prescription for atorvastatin 80 mg daily and started taking it. Order already placed for fasting lipid panel to be drawn in 3 months and message sent to the  to create 3 month lab draw -  Recall in place.  Patient has no questions at this time.  .

## 2021-06-04 NOTE — TELEPHONE ENCOUNTER
Refill Approved    Rx renewed per Medication Renewal Policy. Medication was last renewed on 9/22/18.    Yulia Coats, [facility] Connection Triage/Med Refill 12/24/2019     Requested Prescriptions   Pending Prescriptions Disp Refills     JANUVIA 25 mg tablet [Pharmacy Med Name: JANUVIA 25MG TABLETS] 480 tablet 0     Sig: TAKE 1 TABLET BY MOUTH EVERY DAY       Oral Hypoglycemics Refill Protocol Passed - 12/20/2019  8:45 PM        Passed - Visit with PCP or prescribing provider visit in last 6 months       Last office visit with prescriber/PCP: Visit date not found OR same dept: 12/2/2019 Patrica Beard MD OR same specialty: 12/2/2019 Patrica Beard MD Last physical: 9/16/2019 Last MTM visit: Visit date not found         Next appt within 3 mo: Visit date not found  Next physical within 3 mo: Visit date not found  Prescriber OR PCP: Chucho Espino MD  Last diagnosis associated with med order: 1. Type 2 diabetes mellitus without complication, without long-term current use of insulin (H)  - JANUVIA 25 mg tablet [Pharmacy Med Name: JANUVIA 25MG TABLETS]; TAKE 1 TABLET BY MOUTH EVERY DAY  Dispense: 480 tablet; Refill: 0     If protocol passes may refill for 12 months if within 3 months of last provider visit (or a total of 15 months).           Passed - A1C in last 6 months     Hemoglobin A1c   Date Value Ref Range Status   09/16/2019 6.0 3.5 - 6.0 % Final               Passed - Microalbumin in last year      Microalbumin, Random Urine   Date Value Ref Range Status   05/14/2019 5.33 (H) 0.00 - 1.99 mg/dL Final                  Passed - Blood pressure in last year     BP Readings from Last 1 Encounters:   12/02/19 132/80             Passed - Serum creatinine in last year     Creatinine   Date Value Ref Range Status   09/16/2019 1.54 (H) 0.70 - 1.30 mg/dL Final

## 2021-06-04 NOTE — TELEPHONE ENCOUNTER
Atypical chest pain  Most likely musculoskeletal in etiology.  Reviewed the recent emergency room notes and related studies with the patient and his daughter with the help of a professional .  His cardiac workup was negative.  We discussed options for treating the musculoskeletal pain, medication is reviewed and refilled below, we discussed indications for follow-up.  -     HYDROcodone-acetaminophen 5-325 mg per tablet  Dispense: 60 tablet; Refill    Last visit with provider on 12/2/19. PCP recommended follow up with Cardiology on 1/3/2020.

## 2021-06-05 VITALS
WEIGHT: 154 LBS | HEIGHT: 62 IN | DIASTOLIC BLOOD PRESSURE: 78 MMHG | HEART RATE: 88 BPM | RESPIRATION RATE: 16 BRPM | BODY MASS INDEX: 28.34 KG/M2 | SYSTOLIC BLOOD PRESSURE: 120 MMHG

## 2021-06-05 VITALS
OXYGEN SATURATION: 94 % | WEIGHT: 156 LBS | SYSTOLIC BLOOD PRESSURE: 138 MMHG | DIASTOLIC BLOOD PRESSURE: 82 MMHG | BODY MASS INDEX: 25.07 KG/M2 | HEIGHT: 66 IN | HEART RATE: 97 BPM

## 2021-06-05 VITALS
BODY MASS INDEX: 29.13 KG/M2 | OXYGEN SATURATION: 98 % | HEART RATE: 91 BPM | WEIGHT: 154.31 LBS | HEIGHT: 61 IN | TEMPERATURE: 98 F | SYSTOLIC BLOOD PRESSURE: 128 MMHG | DIASTOLIC BLOOD PRESSURE: 76 MMHG

## 2021-06-05 NOTE — TELEPHONE ENCOUNTER
Controlled Substance Refill Request  Medication Name:   Requested Prescriptions     Pending Prescriptions Disp Refills     HYDROcodone-acetaminophen 5-325 mg per tablet 30 tablet 0     Sig: TAKE 1-2 TABLETS BY MOUTH AT BEDTIME AS NEEDED FOR chronic PAIN     Date Last Fill: 12/31/2019  Requested Pharmacy: [facility] #[id number]  Submit electronically to pharmacy  Controlled Substance Agreement on file:   Encounter-Level CSA Scan Date:    There are no encounter-level csa scan date.        Last office visit:  12/2/2019 with Dr TED Beard @ [facility]

## 2021-06-05 NOTE — PROGRESS NOTES
[facility] Care Coordination Contact    Situation: Received notification from [facility] that AVS form is due in February. Outreach to Member to discuss.      Assessment: Explained the need to complete the form and what to look for in the mail. Encouraged him to go to the county with questions or if he does not receive the form and to call with any questions or concerns.      Plan/Recommendations: Will remain available to Member to answer any questions.      RICKEY Gallegos  [facility]  [phone]

## 2021-06-05 NOTE — PROGRESS NOTES
"ASSESSMENT AND PLAN:  Adam Talamantes 79 y.o. male is seen here on 01/23/20 for follow-up.  He has polyarthralgia in association with osteoarthritis.  Background of gout for which he follows up at his primary physician's office.  He has noted pain in multiple joint areas, knees, bases of the thumbs, consistent with osteoarthritis.  We discussed duloxetine 20 mg that he is currently taking will increase to 20 mg twice daily.  Major side effects reviewed, literature provided with .  Reminded him not to take nonsteroidals in view of the renal impairment.  Check labs as noted.  Follow-up here in 3 months.      .  Diagnoses and all orders for this visit:    Primary osteoarthritis involving multiple joints  -     DULoxetine (CYMBALTA) 20 MG capsule; Take 1 capsule (20 mg total) by mouth 2 (two) times a day.  Dispense: 180 capsule; Refill: 0    Polyarthralgia  -     DULoxetine (CYMBALTA) 20 MG capsule; Take 1 capsule (20 mg total) by mouth 2 (two) times a day.  Dispense: 180 capsule; Refill: 0  -     ALT (SGPT)  -     Albumin  -     Creatinine  -     HM2(CBC w/o Differential)    CKD (chronic kidney disease) stage 3, GFR 30-59 ml/min (H)          HISTORY OF PRESENTING ILLNESS ON 01/23/20 :  Adam Talamantes 79 y.o. is here for a moderately severe flare up of pain.  Joints affected include multiple joints. This has gone on for several months.  He rated the pain between 5.0-6.0.  Worse at nighttime in the bed.  He rates the pain an individual joints as \"severe\" in every single joint with the exception of fingers and toes which he noted to be moderately severe.  He has difficulty performing many day-to-day activities.  His gout management is now at his primary physician appears he has not had acute flareup.  . Pain is described as sharp. It is when active.  His symptoms are moderate. The symptoms are gradually worsening. Symptoms include pain, tenderness to touch.  Treatment to date has been without significant relief.    He " has renal impairment.    Further historical information, including ROS and limitation in activities as noted in the multidimensional health assessment questionnaire scanned in the EMR and in the assessment and plan section.    ALLERGIES:Patient has no known allergies.    PAST MEDICAL/ACTIVE PROBLEMS/MEDICATION/SOCIAL DATA  Past Medical History:   Diagnosis Date     Acute blood loss anemia      Acute renal failure (H)      Anemia      Bacteremia      Cataract      Chronic gout      CKD (chronic kidney disease)     Stage 3     DM2 (diabetes mellitus, type 2) (H)      GERD (gastroesophageal reflux disease)      Glucose intolerance (impaired glucose tolerance)      Gout      History of nephrolithiasis      Hyperlipidemia      Hypertension      Insomnia      Latent tuberculosis      Nephrolithiasis      Neuropathy      Osteoarthritis     wrist, knee, shoulder     Prostatitis      Rectal bleed      Trigger thumb      Social History     Tobacco Use   Smoking Status Former Smoker     Last attempt to quit: 3/10/2000     Years since quittin.8   Smokeless Tobacco Former User     Patient Active Problem List   Diagnosis     Esophageal reflux     Dyslipidemia     Nephrolithiasis     Imaging Studies Nonspecific Abnormal Findings Skull And Head     Pseudogout     Latent Tuberculosis     Glucose Intolerance     Generalized Osteoarthritis Of The Hand     Lower Back Pain     Lumbar Disc Degeneration     Insomnia     Chronic Gout     CKD (chronic kidney disease) stage 3, GFR 30-59 ml/min (H)     Hyperglycemia     Elevated PSA     Prostate nodule     Cataracts, bilateral     Discharge planning issues     Chest pain     Constipation, unspecified constipation type     Bilateral chronic knee pain     Chronic right shoulder pain     Essential hypertension with goal blood pressure less than 140/90     History of prostatitis     BPH (benign prostatic hyperplasia)     Chronic gout     Coronary artery disease due to lipid rich plaque      Right lower quadrant abdominal pain     Abnormal cardiovascular stress test     Type 2 diabetes mellitus without complication, without long-term current use of insulin (H)     Colitis     Primary osteoarthritis involving multiple joints     Urinary incontinence     Current Outpatient Medications   Medication Sig Dispense Refill     acetaminophen (TYLENOL) 500 MG tablet TAKE 1 TABLET BY MOUTH EVERY 6 HOURS AS NEEDED 120 tablet 6     allopurinol (ZYLOPRIM) 100 MG tablet Take 2 tablets (200 mg total) by mouth daily. 180 tablet 3     amLODIPine (NORVASC) 5 MG tablet TAKE 1 TABLET(5 MG) BY MOUTH DAILY 90 tablet 3     aspirin (ASPIR-LOW) 81 MG EC tablet Take 1 tablet (81 mg total) by mouth daily. 90 tablet 1     atorvastatin (LIPITOR) 40 MG tablet Take 2 tablets (80 mg total) by mouth at bedtime. 180 tablet 3     colchicine 0.6 mg tablet TAKE 1 TABLET BY MOUTH EVERY OTHER DAY 45 tablet 3     docusate sodium (COLACE) 100 MG capsule Take 1 capsule (100 mg total) by mouth 2 (two) times a day as needed for constipation. 60 capsule 8     DULoxetine (CYMBALTA) 20 MG capsule Take 20 mg by mouth daily.       EUCALYPTUS OIL/MENTHOL (MENTHOL EUCALYPTUS MM) 1 Inhalation into each nostril as needed (home medication from [facility]        gabapentin (NEURONTIN) 300 MG capsule TAKE 1 TO 2 CAPSULES BY MOUTH AT BEDTIME 180 capsule 3     HYDROcodone-acetaminophen 5-325 mg per tablet TAKE 1-2 TABLETS BY MOUTH AT BEDTIME AS NEEDED FOR chronic PAIN 30 tablet 0     isosorbide mononitrate (IMDUR) 30 MG 24 hr tablet Take 1 tablet (30 mg total) by mouth daily. 90 tablet 3     JANUVIA 25 mg tablet TAKE 1 TABLET BY MOUTH EVERY DAY 90 tablet 1     lidocaine (XYLOCAINE) 5 % ointment Apply to painful areas three times a day as needed for pain 240 g 3     loratadine (CLARITIN) 10 mg tablet TAKE 1 TABLET BY MOUTH EVERY DAY 30 tablet 6     magnesium oxide (MAG-OX) 400 mg (241.3 mg magnesium) tablet Take 1 tablet (400 mg total) by mouth daily. 30 tablet  "11     meclizine (ANTIVERT) 12.5 mg tablet Take 1-2 tablets (12.5-25 mg total) by mouth every 8 (eight) hours as needed (one to two tablets). 60 tablet 1     metoprolol succinate (TOPROL-XL) 50 MG 24 hr tablet Take 1 tablet (50 mg total) by mouth daily. 90 tablet 3     multivitamin with minerals (THERA-M) 9 mg iron-400 mcg Tab tablet Take 1 tablet by mouth daily. 100 tablet 3     nitroglycerin (NITROSTAT) 0.4 MG SL tablet Place 1 tablet (0.4 mg total) under the tongue every 5 (five) minutes as needed for chest pain. 90 tablet 12     omeprazole (PRILOSEC) 20 MG capsule TAKE 1 CAPSULE BY MOUTH DAILY 90 capsule 3     lisinopril (PRINIVIL,ZESTRIL) 10 MG tablet Take 1 tablet (10 mg total) by mouth 2 (two) times a day. 60 tablet 0     No current facility-administered medications for this visit.      DETAILED EXAMINATION  01/23/20  :  Vitals:    01/23/20 0940   BP: 124/70   Patient Site: Right Arm   Patient Position: Sitting   Cuff Size: Adult Regular   Pulse: 80   Weight: 155 lb (70.3 kg)   Height: 5' 1.73\" (1.568 m)     Alert oriented. Head including the face is examined for malar rash, heliotropes, scarring, lupus pernio. Eyes examined for redness such as in episcleritis/scleritis, periorbital lesions.   Neck/ Face examined for parotid gland swelling, range of motion of neck.  Left upper and lower and right upper and lower extremities examined for tenderness, swelling, warmth of the appendicular joints, range of motion, edema, rash.  Some of the important findings included: No synovitis and palpable joints of upper extremities, tenderness along the medial epicondyles, no JLT warmth or effusion of the knees.          LAB / IMAGING DATA:  ALT   Date Value Ref Range Status   09/16/2019 24 0 - 45 U/L Final   12/27/2018 32 0 - 45 U/L Final   12/20/2018 35 0 - 45 U/L Final     Albumin   Date Value Ref Range Status   09/16/2019 4.0 3.5 - 5.0 g/dL Final   12/27/2018 4.2 3.5 - 5.0 g/dL Final   12/20/2018 4.1 3.5 - 5.0 g/dL Final "     Creatinine   Date Value Ref Range Status   09/16/2019 1.54 (H) 0.70 - 1.30 mg/dL Final   12/27/2018 1.52 (H) 0.70 - 1.30 mg/dL Final   12/20/2018 1.51 (H) 0.70 - 1.30 mg/dL Final       WBC   Date Value Ref Range Status   12/27/2018 9.4 4.0 - 11.0 thou/uL Final   12/20/2018 7.7 4.0 - 11.0 thou/uL Final   08/25/2015 5.7 4.0 - 11.0 thou/uL Final   06/18/2015 5.7 4.0 - 11.0 thou/uL Final     Hemoglobin   Date Value Ref Range Status   12/27/2018 15.1 14.0 - 18.0 g/dL Final   12/20/2018 15.0 14.0 - 18.0 g/dL Final   09/04/2018 15.0 14.0 - 18.0 g/dL Final     Platelets   Date Value Ref Range Status   12/27/2018 191 140 - 440 thou/uL Final   12/20/2018 205 140 - 440 thou/uL Final   09/04/2018 208 140 - 440 thou/uL Final       Lab Results   Component Value Date    RF <10 09/30/2009    SEDRATE 18 (H) 10/28/2009

## 2021-06-06 NOTE — TELEPHONE ENCOUNTER
Call from daughter       Refill for Hydrocodone/APAP        Will send this on to the refill group

## 2021-06-06 NOTE — TELEPHONE ENCOUNTER
Controlled Substance Refill Request  Medication Name:   Requested Prescriptions     Pending Prescriptions Disp Refills     HYDROcodone-acetaminophen 5-325 mg per tablet 30 tablet 0     Sig: TAKE 1-2 TABLETS BY MOUTH AT BEDTIME AS NEEDED FOR chronic PAIN     Date Last Fill: 1/31/2020  Requested Pharmacy: [facility] #[id number]   Submit electronically to pharmacy  Controlled Substance Agreement on file:   Encounter-Level CSA Scan Date:    There are no encounter-level csa scan date.        Last office visit:  12/2/2019 with Dr TED Beard @ [facility]

## 2021-06-07 ENCOUNTER — COMMUNICATION - HEALTHEAST (OUTPATIENT)
Dept: CARDIOLOGY | Facility: CLINIC | Age: 81
End: 2021-06-07

## 2021-06-07 DIAGNOSIS — I25.10 CORONARY ARTERY DISEASE INVOLVING NATIVE CORONARY ARTERY OF NATIVE HEART WITHOUT ANGINA PECTORIS: ICD-10-CM

## 2021-06-07 RX ORDER — ISOSORBIDE MONONITRATE 30 MG/1
30 TABLET, EXTENDED RELEASE ORAL DAILY
Qty: 90 TABLET | Refills: 2 | Status: SHIPPED | OUTPATIENT
Start: 2021-06-07 | End: 2022-02-24

## 2021-06-07 NOTE — TELEPHONE ENCOUNTER
Controlled Substance Refill Request  Medication Name:   Requested Prescriptions     Pending Prescriptions Disp Refills     HYDROcodone-acetaminophen 5-325 mg per tablet 30 tablet 0     Sig: TAKE 1-2 TABLETS BY MOUTH AT BEDTIME AS NEEDED FOR chronic PAIN     Date Last Fill: 3/2/2020  Is patient out of medication?:  Yes  Patient notified refills processed within 3 business days:  Yes  Requested Pharmacy: [facility]  Submit electronically to pharmacy  Controlled Substance Agreement on file:   Encounter-Level CSA Scan Date:    There are no encounter-level csa scan date.        Last office visit:  12/2/2020

## 2021-06-07 NOTE — TELEPHONE ENCOUNTER
Refill Approved    Rx renewed per Medication Renewal Policy. Medication was last renewed on 2/25/19.    Yulia Coats, [facility] Triage/Med Refill 4/6/2020     Requested Prescriptions   Pending Prescriptions Disp Refills     metoprolol succinate (TOPROL-XL) 50 MG 24 hr tablet [Pharmacy Med Name: METOPROLOL ER SUCCINATE 50MG TABS] 90 tablet 3     Sig: TAKE 1 TABLET(50 MG) BY MOUTH DAILY       Beta-Blockers Refill Protocol Passed - 4/3/2020  4:23 PM        Passed - PCP or prescribing provider visit in past 12 months or next 3 months     Last office visit with prescriber/PCP: 5/14/2019 Chucho Espino MD OR same dept: 12/2/2019 Patrica Beard MD OR same specialty: 12/2/2019 Patrica Beard MD  Last physical: 9/16/2019 Last MTM visit: Visit date not found   Next visit within 3 mo: Visit date not found  Next physical within 3 mo: Visit date not found  Prescriber OR PCP: Chucho Espino MD  Last diagnosis associated with med order: 1. Coronary artery disease due to lipid rich plaque  - metoprolol succinate (TOPROL-XL) 50 MG 24 hr tablet [Pharmacy Med Name: METOPROLOL ER SUCCINATE 50MG TABS]; TAKE 1 TABLET(50 MG) BY MOUTH DAILY  Dispense: 90 tablet; Refill: 3    If protocol passes may refill for 12 months if within 3 months of last provider visit (or a total of 15 months).             Passed - Blood pressure filed in past 12 months     BP Readings from Last 1 Encounters:   01/23/20 124/70

## 2021-06-07 NOTE — PROGRESS NOTES
[facility] Care Coordination Contact    Received after visit chart from care coordinator.  Completed following tasks: Mailed copy of care plan to client.    [facility]:  Emailed completed PCA assessment to [facility].  Faxed copy of PCA assessment to [facility] Agency and mailed copy to member.  Faxed MD Communication to PCP.     Mailed PCA Signature page and POC Signature page to member w/ self-stamped envelope for return.    Dannielle Reynaga  Care Management Specialist  [facility]  [phone]

## 2021-06-07 NOTE — TELEPHONE ENCOUNTER
Last office visit: 01/02/2019  Last refill: 03/30/2020  Last lab check: N/A   Next appointment: None.     Chart reviewed. Please review findings below.     Atypical chest pain  Most likely musculoskeletal in etiology.  Reviewed the recent emergency room notes and related studies with the patient and his daughter with the help of a professional .  His cardiac workup was negative.  We discussed options for treating the musculoskeletal pain, medication is reviewed and refilled below, we discussed indications for follow-up.  -     HYDROcodone-acetaminophen 5-325 mg per tablet  Dispense: 60 tablet; Refill: 0

## 2021-06-07 NOTE — TELEPHONE ENCOUNTER
Refill Approved    Rx renewed per Medication Renewal Policy. Medication was last renewed on 2/25/19.4/29/19.    Yulia Coats, [facility] Triage/Med Refill 4/30/2020     Requested Prescriptions   Pending Prescriptions Disp Refills     lidocaine (XYLOCAINE) 5 % ointment [Pharmacy Med Name: LIDOCAINE 5% TOPICAL OINTMENT 30GM] 240 g 3     Sig: APPLY TO PAINFIL AREAS THREE TIMES DAILY AS NEEDED FOR PAIN       There is no refill protocol information for this order        gabapentin (NEURONTIN) 300 MG capsule [Pharmacy Med Name: GABAPENTIN 300MG CAPSULES] 180 capsule 3     Sig: TAKE 1 TO 2 CAPSULES BY MOUTH AT BEDTIME       Gabapentin/Levetiracetam/Tiagabine Refill Protocol  Passed - 4/28/2020  6:31 PM        Passed - PCP or prescribing provider visit in past 12 months or next 3 months     Last office visit with prescriber/PCP: 5/14/2019 Chucho Espino MD OR same dept: 12/2/2019 Patrica Beard MD OR same specialty: 12/2/2019 Patrica Beard MD  Last physical: 9/16/2019 Last MTM visit: Visit date not found   Next visit within 3 mo: Visit date not found  Next physical within 3 mo: Visit date not found  Prescriber OR PCP: Chucho Espino MD  Last diagnosis associated with med order: 1. Primary osteoarthritis involving multiple joints  - lidocaine (XYLOCAINE) 5 % ointment [Pharmacy Med Name: LIDOCAINE 5% TOPICAL OINTMENT 30GM]; APPLY TO PAINFIL AREAS THREE TIMES DAILY AS NEEDED FOR PAIN  Dispense: 240 g; Refill: 3    2. Lower Back Pain  - gabapentin (NEURONTIN) 300 MG capsule [Pharmacy Med Name: GABAPENTIN 300MG CAPSULES]; TAKE 1 TO 2 CAPSULES BY MOUTH AT BEDTIME  Dispense: 180 capsule; Refill: 3    3. Gastroesophageal reflux disease, esophagitis presence not specified  - omeprazole (PRILOSEC) 20 MG capsule [Pharmacy Med Name: OMEPRAZOLE 20MG CAPSULES]; TAKE 1 CAPSULE BY MOUTH DAILY  Dispense: 90 capsule; Refill: 3    If protocol passes may refill for 12 months if within 3 months of last provider visit (or a  total of 15 months).                omeprazole (PRILOSEC) 20 MG capsule [Pharmacy Med Name: OMEPRAZOLE 20MG CAPSULES] 90 capsule 3     Sig: TAKE 1 CAPSULE BY MOUTH DAILY       GI Medications Refill Protocol Passed - 4/28/2020  6:31 PM        Passed - PCP or prescribing provider visit in last 12 or next 3 months.     Last office visit with prescriber/PCP: 5/14/2019 Chucho Espino MD OR same dept: 12/2/2019 Patrica Beard MD OR same specialty: 12/2/2019 Patrica Beard MD  Last physical: 9/16/2019 Last MTM visit: Visit date not found   Next visit within 3 mo: Visit date not found  Next physical within 3 mo: Visit date not found  Prescriber OR PCP: Chucho Espino MD  Last diagnosis associated with med order: 1. Primary osteoarthritis involving multiple joints  - lidocaine (XYLOCAINE) 5 % ointment [Pharmacy Med Name: LIDOCAINE 5% TOPICAL OINTMENT 30GM]; APPLY TO PAINFIL AREAS THREE TIMES DAILY AS NEEDED FOR PAIN  Dispense: 240 g; Refill: 3    2. Lower Back Pain  - gabapentin (NEURONTIN) 300 MG capsule [Pharmacy Med Name: GABAPENTIN 300MG CAPSULES]; TAKE 1 TO 2 CAPSULES BY MOUTH AT BEDTIME  Dispense: 180 capsule; Refill: 3    3. Gastroesophageal reflux disease, esophagitis presence not specified  - omeprazole (PRILOSEC) 20 MG capsule [Pharmacy Med Name: OMEPRAZOLE 20MG CAPSULES]; TAKE 1 CAPSULE BY MOUTH DAILY  Dispense: 90 capsule; Refill: 3    If protocol passes may refill for 12 months if within 3 months of last provider visit (or a total of 15 months).

## 2021-06-07 NOTE — PROGRESS NOTES
"Adam Talamantes is a 80 y.o. male who is being evaluated via a billable telephone visit.      The patient has been notified of following:     \"This telephone visit will be conducted via a call between you and your physician/provider. We have found that certain health care needs can be provided without the need for a physical exam.  This service lets us provide the care you need with a short phone conversation.  If a prescription is necessary we can send it directly to your pharmacy.  If lab work is needed we can place an order for that and you can then stop by our lab to have the test done at a later time.    Telephone visits are billed at different rates depending on your insurance coverage. During this emergency period, for some insurers they may be billed the same as an in-person visit.  Please reach out to your insurance provider with any questions.    If during the course of the call the physician/provider feels a telephone visit is not appropriate, you will not be charged for this service.\"    Patient has given verbal consent to a Telephone visit? Yes    Patient would like to receive their AVS by AVS Preference: [place]. declined     Additional provider notes:     Serena Bates [facility]      ASSESSMENT AND PLAN:    Diagnoses and all orders for this visit:    Polyarthralgia  -     DULoxetine (CYMBALTA) 20 MG capsule; Take 1 capsule (20 mg total) by mouth 2 (two) times a day.  Dispense: 180 capsule; Refill: 0    Primary osteoarthritis involving multiple joints  -     DULoxetine (CYMBALTA) 20 MG capsule; Take 1 capsule (20 mg total) by mouth 2 (two) times a day.  Dispense: 180 capsule; Refill: 0          HISTORY OF PRESENTING ILLNESS:  Adam Talamantes 80 y.o. is evaluated here via phone  link.  This was coordinated by his  on the other line he reports good response to duloxetine for his osteoarthritis related symptoms.  Is been no flareup.  He is tolerated duloxetine.  In the past he has had gout managed by his " primary physician.  He has renal impairment not a candidate for nonsteroidals which is reemphasized.  We will meet here in the next 2 to 3 months or sooner.  Today we also discussed the issues related to the current pandemic, the pros and cons of the current treatment plan, the CDC guidelines such as social distancing washing the hands covering the cough.  ALLERGIES:Patient has no known allergies.    PAST MEDICAL/ACTIVE PROBLEMS/MEDICATION/SOCIAL DATA  Past Medical History:   Diagnosis Date     Acute blood loss anemia      Acute renal failure (H)      Anemia      Bacteremia      Cataract      Chronic gout      CKD (chronic kidney disease)     Stage 3     DM2 (diabetes mellitus, type 2) (H)      GERD (gastroesophageal reflux disease)      Glucose intolerance (impaired glucose tolerance)      Gout      History of nephrolithiasis      Hyperlipidemia      Hypertension      Insomnia      Latent tuberculosis      Nephrolithiasis      Neuropathy      Osteoarthritis     wrist, knee, shoulder     Prostatitis      Rectal bleed      Trigger thumb      Social History     Tobacco Use   Smoking Status Former Smoker     Last attempt to quit: 3/10/2000     Years since quittin.1   Smokeless Tobacco Former User     Patient Active Problem List   Diagnosis     Esophageal reflux     Dyslipidemia     Nephrolithiasis     Imaging Studies Nonspecific Abnormal Findings Skull And Head     Pseudogout     Latent Tuberculosis     Glucose Intolerance     Generalized Osteoarthritis Of The Hand     Lower Back Pain     Lumbar Disc Degeneration     Insomnia     Chronic Gout     CKD (chronic kidney disease) stage 3, GFR 30-59 ml/min (H)     Hyperglycemia     Elevated PSA     Prostate nodule     Cataracts, bilateral     Discharge planning issues     Chest pain     Constipation, unspecified constipation type     Bilateral chronic knee pain     Chronic right shoulder pain     Essential hypertension with goal blood pressure less than 140/90      History of prostatitis     BPH (benign prostatic hyperplasia)     Chronic gout     Coronary artery disease due to lipid rich plaque     Right lower quadrant abdominal pain     Abnormal cardiovascular stress test     Type 2 diabetes mellitus without complication, without long-term current use of insulin (H)     Colitis     Primary osteoarthritis involving multiple joints     Urinary incontinence     Current Outpatient Medications   Medication Sig Dispense Refill     acetaminophen (TYLENOL) 500 MG tablet TAKE 1 TABLET BY MOUTH EVERY 6 HOURS AS NEEDED 120 tablet 6     allopurinol (ZYLOPRIM) 100 MG tablet Take 2 tablets (200 mg total) by mouth daily. 180 tablet 3     amLODIPine (NORVASC) 5 MG tablet TAKE 1 TABLET(5 MG) BY MOUTH DAILY 90 tablet 3     aspirin (ASPIR-LOW) 81 MG EC tablet Take 1 tablet (81 mg total) by mouth daily. 90 tablet 1     atorvastatin (LIPITOR) 40 MG tablet Take 2 tablets (80 mg total) by mouth at bedtime. 180 tablet 3     colchicine 0.6 mg tablet TAKE 1 TABLET BY MOUTH EVERY OTHER DAY 45 tablet 3     docusate sodium (COLACE) 100 MG capsule Take 1 capsule (100 mg total) by mouth 2 (two) times a day as needed for constipation. 60 capsule 8     EUCALYPTUS OIL/MENTHOL (MENTHOL EUCALYPTUS MM) 1 Inhalation into each nostril as needed (home medication from [facility][note 3].).        gabapentin (NEURONTIN) 300 MG capsule TAKE 1 TO 2 CAPSULES BY MOUTH AT BEDTIME 180 capsule 3     HYDROcodone-acetaminophen 5-325 mg per tablet TAKE 1-2 TABLETS BY MOUTH AT BEDTIME AS NEEDED FOR chronic PAIN 30 tablet 0     isosorbide mononitrate (IMDUR) 30 MG 24 hr tablet Take 1 tablet (30 mg total) by mouth daily. 90 tablet 3     JANUVIA 25 mg tablet TAKE 1 TABLET BY MOUTH EVERY DAY 90 tablet 1     lidocaine (XYLOCAINE) 5 % ointment Apply to painful areas three times a day as needed for pain 240 g 3     loratadine (CLARITIN) 10 mg tablet TAKE 1 TABLET BY MOUTH EVERY DAY 30 tablet 6     magnesium oxide (MAG-OX) 400 mg (241.3 mg  magnesium) tablet Take 1 tablet (400 mg total) by mouth daily. 30 tablet 11     meclizine (ANTIVERT) 12.5 mg tablet Take 1-2 tablets (12.5-25 mg total) by mouth every 8 (eight) hours as needed (one to two tablets). 60 tablet 1     metoprolol succinate (TOPROL-XL) 50 MG 24 hr tablet TAKE 1 TABLET(50 MG) BY MOUTH DAILY 90 tablet 2     multivitamin with minerals (THERA-M) 9 mg iron-400 mcg Tab tablet Take 1 tablet by mouth daily. 100 tablet 3     nitroglycerin (NITROSTAT) 0.4 MG SL tablet Place 1 tablet (0.4 mg total) under the tongue every 5 (five) minutes as needed for chest pain. 90 tablet 12     omeprazole (PRILOSEC) 20 MG capsule TAKE 1 CAPSULE BY MOUTH DAILY 90 capsule 3     DULoxetine (CYMBALTA) 20 MG capsule Take 1 capsule (20 mg total) by mouth 2 (two) times a day. 180 capsule 0     lisinopril (PRINIVIL,ZESTRIL) 10 MG tablet Take 1 tablet (10 mg total) by mouth 2 (two) times a day. 60 tablet 0     No current facility-administered medications for this visit.          EXAMINATION:  Phone visit      LAB / IMAGING DATA:  ALT   Date Value Ref Range Status   01/23/2020 49 (H) 0 - 45 U/L Final   09/16/2019 24 0 - 45 U/L Final   12/27/2018 32 0 - 45 U/L Final     Albumin   Date Value Ref Range Status   01/23/2020 4.0 3.5 - 5.0 g/dL Final   09/16/2019 4.0 3.5 - 5.0 g/dL Final   12/27/2018 4.2 3.5 - 5.0 g/dL Final     Creatinine   Date Value Ref Range Status   01/23/2020 1.34 (H) 0.70 - 1.30 mg/dL Final   09/16/2019 1.54 (H) 0.70 - 1.30 mg/dL Final   12/27/2018 1.52 (H) 0.70 - 1.30 mg/dL Final       WBC   Date Value Ref Range Status   01/23/2020 7.5 4.0 - 11.0 thou/uL Final   12/27/2018 9.4 4.0 - 11.0 thou/uL Final   08/25/2015 5.7 4.0 - 11.0 thou/uL Final   06/18/2015 5.7 4.0 - 11.0 thou/uL Final     Hemoglobin   Date Value Ref Range Status   01/23/2020 13.6 (L) 14.0 - 18.0 g/dL Final   12/27/2018 15.1 14.0 - 18.0 g/dL Final   12/20/2018 15.0 14.0 - 18.0 g/dL Final     Platelets   Date Value Ref Range Status    01/23/2020 201 140 - 440 thou/uL Final   12/27/2018 191 140 - 440 thou/uL Final   12/20/2018 205 140 - 440 thou/uL Final       Lab Results   Component Value Date    RF <10 09/30/2009    SEDRATE 18 (H) 10/28/2009     Duration of the call:7  Minutes

## 2021-06-07 NOTE — TELEPHONE ENCOUNTER
Patient's family (kapoh) calling for HYDROCODONE refill to help with Gout and Arthritis pain.  Cannot sleep through the nights rates pain as a 6/10 nothing seems to help the pain.  Message sent to PCP jayne Jones RN

## 2021-06-07 NOTE — TELEPHONE ENCOUNTER
RN cannot approve Refill Request    RN can NOT refill this medication med is not covered by policy/route to provider.       Yulia Coats, [facility] Triage/Med Refill 4/30/2020    Requested Prescriptions   Pending Prescriptions Disp Refills     lidocaine (XYLOCAINE) 5 % ointment [Pharmacy Med Name: LIDOCAINE 5% TOPICAL OINTMENT 30GM] 240 g 3     Sig: APPLY TO PAINFIL AREAS THREE TIMES DAILY AS NEEDED FOR PAIN       There is no refill protocol information for this order      Signed Prescriptions Disp Refills    gabapentin (NEURONTIN) 300 MG capsule 180 capsule 2     Sig: TAKE 1 TO 2 CAPSULES BY MOUTH AT BEDTIME       Gabapentin/Levetiracetam/Tiagabine Refill Protocol  Passed - 4/28/2020  6:31 PM        Passed - PCP or prescribing provider visit in past 12 months or next 3 months     Last office visit with prescriber/PCP: 5/14/2019 Chucho Espino MD OR same dept: 12/2/2019 Patrica Beard MD OR same specialty: 12/2/2019 Patrica Beard MD  Last physical: 9/16/2019 Last MTM visit: Visit date not found   Next visit within 3 mo: Visit date not found  Next physical within 3 mo: Visit date not found  Prescriber OR PCP: Chucho Espino MD  Last diagnosis associated with med order: 1. Primary osteoarthritis involving multiple joints  - lidocaine (XYLOCAINE) 5 % ointment [Pharmacy Med Name: LIDOCAINE 5% TOPICAL OINTMENT 30GM]; APPLY TO PAINFIL AREAS THREE TIMES DAILY AS NEEDED FOR PAIN  Dispense: 240 g; Refill: 3    2. Lower Back Pain  - gabapentin (NEURONTIN) 300 MG capsule; TAKE 1 TO 2 CAPSULES BY MOUTH AT BEDTIME  Dispense: 180 capsule; Refill: 2    3. Gastroesophageal reflux disease, esophagitis presence not specified  - omeprazole (PRILOSEC) 20 MG capsule; TAKE 1 CAPSULE BY MOUTH DAILY  Dispense: 90 capsule; Refill: 2    If protocol passes may refill for 12 months if within 3 months of last provider visit (or a total of 15 months).               omeprazole (PRILOSEC) 20 MG capsule 90 capsule 2     Sig:  TAKE 1 CAPSULE BY MOUTH DAILY       GI Medications Refill Protocol Passed - 4/28/2020  6:31 PM        Passed - PCP or prescribing provider visit in last 12 or next 3 months.     Last office visit with prescriber/PCP: 5/14/2019 Chucho Espino MD OR same dept: 12/2/2019 Patrica Beard MD OR same specialty: 12/2/2019 Patrica Beard MD  Last physical: 9/16/2019 Last MTM visit: Visit date not found   Next visit within 3 mo: Visit date not found  Next physical within 3 mo: Visit date not found  Prescriber OR PCP: Chucho Espino MD  Last diagnosis associated with med order: 1. Primary osteoarthritis involving multiple joints  - lidocaine (XYLOCAINE) 5 % ointment [Pharmacy Med Name: LIDOCAINE 5% TOPICAL OINTMENT 30GM]; APPLY TO PAINFIL AREAS THREE TIMES DAILY AS NEEDED FOR PAIN  Dispense: 240 g; Refill: 3    2. Lower Back Pain  - gabapentin (NEURONTIN) 300 MG capsule; TAKE 1 TO 2 CAPSULES BY MOUTH AT BEDTIME  Dispense: 180 capsule; Refill: 2    3. Gastroesophageal reflux disease, esophagitis presence not specified  - omeprazole (PRILOSEC) 20 MG capsule; TAKE 1 CAPSULE BY MOUTH DAILY  Dispense: 90 capsule; Refill: 2    If protocol passes may refill for 12 months if within 3 months of last provider visit (or a total of 15 months).

## 2021-06-07 NOTE — PROGRESS NOTES
[facility] Care Coordination Contact     PCA & POC signature received and filed.    PCA Signature sent to [facility].    Dannielle Reynaga  Care Management Specialist  [facility]  [phone]

## 2021-06-07 NOTE — PROGRESS NOTES
[facility] Care Coordination Contact  [facility] Home Visit Assessment     Home visit for Health Risk Assessment with Adam Talamantes completed on 4/15/2020    Type of residence:: Private home - stairs  Current living arrangement:: I live in a private home with family     Assessment completed with:: Patient, Family    Current Care Plan  Member currently receiving the following home care services:  None  Member currently receiving the following community resources: PCA    Medication Review  Medication reconciliation completed in Epic: NO and IF NO, PLEASE EXPLAIN Medications are managed by family-who was not home with access to medications during interview.   Medication set-up & administration: Family/informal caregiver sets up weekly  Family caregiver administers medications  Medication Risk Assessment Medication (1 or more, place referral to MTM):  N/A: No risk factors identified  MTM Referral Placed: No: No risk factors idenified    Mental/Behavioral Health   Depression Screening: See PHQ assessment flowsheet.   Mental health DX:: No      Mental Health Diagnosis: No  Mental Health Services: None: No further intervention needed at this time.    Falls Assessment:   Fallen 2 or more times in the past year?: No   Any fall with injury in the past year?: No    ADL/IADL Dependencies:   Dependent ADLs:: Bathing, Dressing, Grooming, Toileting, Transfers, Ambulation-walker  Dependent IADLs:: Cleaning, Cooking, Laundry, Shopping, Meal Preparation, Medication Management, Money Management, Transportation    [facility] Plan sponsored benefits: Shared information re: Silver Sneakers/gym memberships, ASA, Calcium +D.    PCA Assessment completed at visit: Yes- No Changes made to previous assessment.     Elderly Waiver Eligibility: Yes, but member declines EW service; will not open to EW    Care Plan & Recommendations: Adam Talamantes is a 80 year old male with a past medical history of Primary osteoarthritis involving multiple  joints, Type 2 diabetes mellitus, Coronary artery disease, Urinary incontinence, Chronic gout, Essential hypertension, Chronic right shoulder pain, Bilateral chronic knee pain, Generalized Osteoarthritis Of The Hand, Lower Back Pain, Lumbar Disc Degeneration, Insomnia,,  and CKD (chronic kidney disease) stage 3.    Adam Albin  lives in a single family home with his wife, Lenny, and his daughter, Dimitri and son in law, Fermin Christensen. Fermin Christensen currently provides PCA support to Adam daily. He appears well supported by family and denies the need for any additional services at this time.    See [facility] for detailed assessment information.    Follow-Up Plan: Member informed of future contact, plan to f/u with member with a 6 month telephone assessment.  Contact information shared with member and family, encouraged member to call with any questions or concerns at any time.    Olga Brady, [facility]  [phone]

## 2021-06-07 NOTE — TELEPHONE ENCOUNTER
Family notified. The patient verbalizes understanding of provider/CSS instructions for follow-up and continued care per provider message.

## 2021-06-08 NOTE — TELEPHONE ENCOUNTER
Pharm didn't get it   Too busy to give to pharm but asked if you would resend and tech will watch for it

## 2021-06-08 NOTE — TELEPHONE ENCOUNTER
Refill Request  Did you contact pharmacy: yes  Medication name:   HYDROcodone-acetaminophen 5-325 mg per tablet  Who prescribed the medication: pcp  Requested Pharmacy: [facility] [address] ( not sure if it will be open) if not open ronen in [place]   Is patient out of medication: Yes since friday  Patient notified refills processed in 3 business days:  yes  Okay to leave a detailed message: yes

## 2021-06-08 NOTE — TELEPHONE ENCOUNTER
Who is calling:  Patient's daughterDimitri  Reason for Call:    Patient's daughter is questioning status of medication being sent to [facility].  Please reach out to patient's daughterDimitri and advise. [phone]. Thank you  Date of last appointment with primary care: 4/23/2020  Okay to leave a detailed message: Yes

## 2021-06-08 NOTE — TELEPHONE ENCOUNTER
Refill Approved    Rx renewed per Medication Renewal Policy. Medication was last renewed on 4/12/19.    Yulia Coats, [facility] Connection Triage/Med Refill 5/6/2020     Requested Prescriptions   Pending Prescriptions Disp Refills     amLODIPine (NORVASC) 5 MG tablet [Pharmacy Med Name: AMLODIPINE BESYLATE 5MG TABLETS] 30 tablet 0     Sig: TAKE 1 TABLET(5 MG) BY MOUTH DAILY       Calcium-Channel Blockers Protocol Passed - 5/4/2020  6:13 PM        Passed - PCP or prescribing provider visit in past 12 months or next 3 months     Last office visit with prescriber/PCP: 5/14/2019 Chucho Espnio MD OR same dept: 12/2/2019 Patrica Beard MD OR same specialty: 12/2/2019 Patrica Beard MD  Last physical: 9/16/2019 Last MTM visit: Visit date not found   Next visit within 3 mo: Visit date not found  Next physical within 3 mo: Visit date not found  Prescriber OR PCP: Chucho Espino MD  Last diagnosis associated with med order: 1. Essential hypertension with goal blood pressure less than 140/90  - amLODIPine (NORVASC) 5 MG tablet [Pharmacy Med Name: AMLODIPINE BESYLATE 5MG TABLETS]; TAKE 1 TABLET(5 MG) BY MOUTH DAILY  Dispense: 30 tablet; Refill: 0    If protocol passes may refill for 12 months if within 3 months of last provider visit (or a total of 15 months).             Passed - Blood pressure filed in past 12 months     BP Readings from Last 1 Encounters:   01/23/20 124/70

## 2021-06-09 ENCOUNTER — COMMUNICATION - HEALTHEAST (OUTPATIENT)
Dept: FAMILY MEDICINE | Facility: CLINIC | Age: 81
End: 2021-06-09

## 2021-06-09 DIAGNOSIS — M15.0 PRIMARY OSTEOARTHRITIS INVOLVING MULTIPLE JOINTS: ICD-10-CM

## 2021-06-09 NOTE — TELEPHONE ENCOUNTER
RN cannot approve Refill Request    RN can NOT refill this medication Protocol failed and NO refill given.      Yulia Coats, [facility] Triage/Med Refill 6/23/2020    Requested Prescriptions   Pending Prescriptions Disp Refills     JANUVIA 25 mg tablet [Pharmacy Med Name: JANUVIA 25MG TABLETS] 90 tablet 3     Sig: TAKE 1 TABLET BY MOUTH EVERY DAY       Oral Hypoglycemics Refill Protocol Failed - 6/22/2020  9:56 PM        Failed - Visit with PCP or prescribing provider visit in last 6 months       Last office visit with prescriber/PCP: Visit date not found OR same dept: 12/2/2019 Patrica Beard MD OR same specialty: 12/2/2019 Patrica Beard MD Last physical: Visit date not found Last MTM visit: Visit date not found         Next appt within 3 mo: Visit date not found  Next physical within 3 mo: Visit date not found  Prescriber OR PCP: Chucho Espino MD  Last diagnosis associated with med order: 1. Type 2 diabetes mellitus without complication, without long-term current use of insulin (H)  - JANUVIA 25 mg tablet [Pharmacy Med Name: JANUVIA 25MG TABLETS]; TAKE 1 TABLET BY MOUTH EVERY DAY  Dispense: 90 tablet; Refill: 1     If protocol passes may refill for 12 months if within 3 months of last provider visit (or a total of 15 months).           Failed - A1C in last 6 months     Hemoglobin A1c   Date Value Ref Range Status   09/16/2019 6.0 3.5 - 6.0 % Final               Failed - Microalbumin in last year      Microalbumin, Random Urine   Date Value Ref Range Status   05/14/2019 5.33 (H) 0.00 - 1.99 mg/dL Final                  Passed - Blood pressure in last year     BP Readings from Last 1 Encounters:   06/22/20 147/80             Passed - Serum creatinine in last year     Creatinine   Date Value Ref Range Status   06/22/2020 1.55 (H) 0.70 - 1.30 mg/dL Final

## 2021-06-09 NOTE — PROGRESS NOTES
Assessment: /    Plan:    1. Dermatitis  loratadine (CLARITIN) 10 mg tablet    hydrocortisone 2.5 % cream    white petrolatum-mineral oil (EUCERIN) Crea   2. Essential hypertension with goal blood pressure less than 140/90  lisinopril (PRINIVIL,ZESTRIL) 20 MG tablet       Increase lisinopril to 20 mg daily.  Recheck if not improving.  Patient was seen with Kayce , Dannielle Shook.      Subjective:    HPI:  Adam Talamantes is a 77-year-old male presenting with itching.  This has been occurring for 1 month.  He notes dryness of the legs.  He states that itching is worse when he eats chicken.    ALT was 54 on 2/17/17, slightly improved from previous.      Review of Systems:  No fever or cough.  He notes soreness of the right trapezius area for the past 3 days.      Current Outpatient Prescriptions   Medication Sig Dispense Refill     acetaminophen (Q-PAP EXTRA STRENGTH) 500 MG tablet TAKE 1 TABLET BY MOUTH EVERY 4-6 HOURS AS NEEDED FOR PAIN 180 tablet 3     aspirin (ASPIR-LOW) 81 MG EC tablet TAKE ONE TABLET BY MOUTH DAILY (Patient taking differently: Take 81 mg by mouth daily. ) 90 tablet 3     atorvastatin (LIPITOR) 40 MG tablet Take 1 tablet (40 mg total) by mouth bedtime. 90 tablet 3     codeine-guaiFENesin (GUAIFENESIN AC)  mg/5 mL liquid TAKE 5MLS THREE TIMES A DAY AS NEEDED FOR COUGH 240 mL 0     colchicine (COLCRYS) 0.6 mg tablet Take 1 tablet (0.6 mg total) by mouth every other day. 45 tablet 3     febuxostat (ULORIC) 80 mg Tab TAKE ONE TABLET BY MOUTH DAILY 90 tablet 0     gabapentin (NEURONTIN) 300 MG capsule Take 300-600 mg by mouth bedtime.       HYDROcodone-acetaminophen 5-325 mg per tablet TAKE 1-2 TABLETS BY MOUTH AT BEDTIME AS NEEDED FOR PAIN 60 tablet 0     meclizine (ANTIVERT) 12.5 mg tablet Take 1-2 tablets every 8 hrs PRN       metoprolol succinate (TOPROL XL) 50 MG 24 hr tablet Take 1 tablet (50 mg total) by mouth daily. 90 tablet 3     multivitamin with minerals (THERA-M) 9 mg iron-400 mcg Tab  "tablet Take 1 tablet by mouth daily. 100 tablet 3     nitroglycerin (NITROSTAT) 0.4 MG SL tablet Place 1 tablet (0.4 mg total) under the tongue every 5 (five) minutes as needed for chest pain. 90 tablet 12     omeprazole (PRILOSEC) 20 MG capsule Take 20 mg by mouth daily.       traZODone (DESYREL) 50 MG tablet Take 50 mg by mouth bedtime.       hydrocortisone 2.5 % cream Apply to affected skin daily as needed 30 g 5     lisinopril (PRINIVIL,ZESTRIL) 20 MG tablet Take 1 tablet (20 mg total) by mouth daily. 30 tablet 11     loratadine (CLARITIN) 10 mg tablet Take 1 tablet (10 mg total) by mouth daily. 30 tablet 5     white petrolatum-mineral oil (EUCERIN) Crea Apply to affected skin daily 454 g 11     No current facility-administered medications for this visit.          Objective:    Vitals:    03/21/17 1545 03/21/17 1550 03/21/17 1609   BP: (!) 150/100 (!) 158/100 (!) 152/100   Patient Site: Right Arm Right Arm    Patient Position: Sitting Sitting    Cuff Size: Adult Regular Adult Regular    Pulse: 81     Resp: 19     Temp: 97.7  F (36.5  C)     TempSrc: Oral     SpO2: 97%     Weight: 154 lb (69.9 kg)     Height: 5' 1.5\" (1.562 m)         Gen:  NAD, VSS  Lungs:  normal  Heart:  normal  Abdomen:  No HSM, mass or tenderness  Tenderness of the right trapezius muscle, arms with full range of motion.  Skin is dry, especially on the lower legs.        ADDITIONAL HISTORY SUMMARIZED (2): None.  DECISION TO OBTAIN EXTRA INFORMATION (1): None.   RADIOLOGY TESTS (1): None.  LABS (1): Reviewed ALT.  MEDICINE TESTS (1): None.  INDEPENDENT REVIEW (2 each): None.     Total Data Points: 1    "

## 2021-06-09 NOTE — TELEPHONE ENCOUNTER
"Pt's daughter Dimitri reports pt has been more tired, increased headaches and dizziness. Headache since yesterday, worse today. Pt rates headache \"6\" and c/o neck stiffness but able to touch chin to chest. Per [facility] pt is requiring assistance to walk at this time.    Advised Dimitri to bring pt to the ER now per protocol. Call for ambulance transport if pt too dizzy to walk. Use wheelchair to bring pt into ER.    Dimitri verbalizes understanding and agrees to plan.     Reason for Disposition    Unable to walk, or can only walk with assistance (e.g., requires support)    Additional Information    Negative: Followed a head injury    Negative: Pregnant    Negative: Postpartum (from 0 to 6 weeks after delivery)    Negative: Traumatic Brain Injury (TBI) is suspected    Negative: Difficult to awaken or acting confused (e.g., disoriented, slurred speech)    Negative: [1] Weakness of the face, arm or leg on one side of the body AND [2] new onset    Negative: [1] Numbness of the face, arm or leg on one side of the body AND [2] new onset    Negative: [1] Loss of speech or garbled speech AND [2] new onset    Negative: Passed out (i.e., lost consciousness, collapsed and was not responding)    Negative: Sounds like a life-threatening emergency to the triager    Protocols used: HEADACHE-A-AH      "

## 2021-06-09 NOTE — TELEPHONE ENCOUNTER
RN Triage:    Call from daughter    Patient would like hydrocodone to be refilled, no refills left    Pharmacy:  [facility] on [facility]    Plan:  Route message to PCP for refill request    Rosalva Torre RN

## 2021-06-10 RX ORDER — LIDOCAINE 50 MG/G
OINTMENT TOPICAL
Qty: 30 G | Refills: 0 | Status: SHIPPED | OUTPATIENT
Start: 2021-06-10 | End: 2021-12-06

## 2021-06-10 NOTE — PROGRESS NOTES
ASSESSMENT  And  Plan   1. Chronic gout of multiple sites, unspecified cause  He is almost out of his allopurinol however Dr. Espino is refilled it for him he did miss a rheumatology appointment and wants to be rescheduled again with  he understands how to take his medications  - Ambulatory referral to Rheumatology  - allopurinoL (ZYLOPRIM) 100 MG tablet; Take 2 tablets (200 mg total) by mouth daily.  Dispense: 180 tablet; Refill: 7    2. Adjustment insomnia  He sleeping normally denies any side effects of his medication.    3. Cervical stenosis of spinal canal  Currently on duloxetine and gabapentin for neck pain.  Pain is reproducible when I palpate his left deltoid muscle and the left supraspinatus.  He does not want to increase his dose of duloxetine at this time refills are already present            There are no Patient Instructions on file for this visit.    Orders Placed This Encounter   Procedures     Ambulatory referral to Rheumatology     Referral Priority:   Routine     Referral Type:   Consultation     Referral Reason:   Evaluation and Treatment     Requested Specialty:   Rheumatology     Number of Visits Requested:   1     Medications Discontinued During This Encounter   Medication Reason     allopurinoL (ZYLOPRIM) 100 MG tablet Reorder       No follow-ups on file.    CHIEF COMPLAINT:  Chief Complaint   Patient presents with     Neck Pain       HISTORY OF PRESENT ILLNESS:  Adam is a 80 y.o. male   Who is accompanied here today by his grandson.  He is following up for neck pain and his gout.  He is worried that he may be out of some of his medications.  He says taking the new medication given by his rheumatologist reduces his neck pain to a scale of 4 out of 10 without this medication his pain is 8 out of 10 and he cannot sleep.  He remembers going to the ER previously.  He missed his last appointment with rheumatology.    REVIEW OF SYSTEMS:     Musculoskeletal complains of left shoulder pain  "left-sided neck pain and occasional left upper back pain  Neuro positive for numbness and tingling in his neck and headaches  12 point review of  All other systems are negative.    PFSH:    Social and family history reviewed with the help of an     TOBACCO USE:  Social History     Tobacco Use   Smoking Status Former Smoker     Last attempt to quit: 3/10/2000     Years since quittin.4   Smokeless Tobacco Former User       VITALS:  Vitals:    20 0949   BP: 120/90   Patient Site: Right Arm   Patient Position: Sitting   Cuff Size: Adult Regular   Pulse: (!) 117   Resp: 16   Temp: 97.9  F (36.6  C)   TempSrc: Oral   SpO2: 97%   Weight: 152 lb 11.2 oz (69.3 kg)   Height: 5' 6\" (1.676 m)     Wt Readings from Last 3 Encounters:   20 152 lb 11.2 oz (69.3 kg)   20 145 lb (65.8 kg)   20 155 lb (70.3 kg)       PHYSICAL EXAM:  Interactive elderly male sitting comfortably exam no acute distress  HEENT neck supple mucous members moist oral cavity shows no exudate no erythema  Musculoskeletal system tenderness elicited when I palpate C4 and C5 of his cervical spine.  He has tenderness over the left deltoid muscle he has tenderness over the left platysma.  Neuro cranial nerves II to XII intact flexion extension of his neck cause no discomfort abduction of his left shoulder causes him to have shooting pain that radiates to his neck    DATA REVIEWED:  Additional History from Old Records Summarized (2):   Reviewed last virtual visit with Dr. Juarez which was more than 2 months ago.  Reviewed ER notes from the end of .  Decision to Obtain Records (1): 0  Radiology Tests Summarized or Ordered (1): 1  CT of the neck done no evidence of pulmonary embolus noted results shared with patient today, CT scan of the neck from 2017 reviewed which shows cervical spinal stenosis  Labs Reviewed or Ordered (1): 1  Lab tests reviewed with the patient which are reviewed and unremarkable BMP.  Medicine Test " Summarized or Ordered (1): 0  Independent Review of EKG or X-RAY(2 each): 1 EKG was read as normal sinus rhythm    The visit lasted a total of 22   minutes face to face with the patient. Over 50% of the time was spent counseling and educating the patient about   Spinal stenosis, neck pain, gout.    MEDICATIONS:  Current Outpatient Medications   Medication Sig Dispense Refill     acetaminophen (TYLENOL) 500 MG tablet TAKE 1 TABLET BY MOUTH EVERY 6 HOURS AS NEEDED 120 tablet 6     allopurinoL (ZYLOPRIM) 100 MG tablet Take 2 tablets (200 mg total) by mouth daily. 180 tablet 7     atorvastatin (LIPITOR) 40 MG tablet Take 2 tablets (80 mg total) by mouth at bedtime. 180 tablet 3     colchicine 0.6 mg tablet TAKE 1 TABLET BY MOUTH EVERY OTHER DAY 45 tablet 1     docusate sodium (COLACE) 100 MG capsule Take 1 capsule (100 mg total) by mouth 2 (two) times a day as needed for constipation. 60 capsule 8     DULoxetine (CYMBALTA) 20 MG capsule TAKE 1 CAPSULE(20 MG) BY MOUTH TWICE DAILY 60 capsule 0     gabapentin (NEURONTIN) 300 MG capsule TAKE 1 TO 2 CAPSULES BY MOUTH AT BEDTIME 180 capsule 2     HYDROcodone-acetaminophen 5-325 mg per tablet TAKE 1-2 TABLETS BY MOUTH AT BEDTIME AS NEEDED FOR chronic PAIN 30 tablet 0     isosorbide mononitrate (IMDUR) 30 MG 24 hr tablet Take 1 tablet (30 mg total) by mouth daily. 90 tablet 3     JANUVIA 25 mg tablet TAKE 1 TABLET BY MOUTH EVERY DAY 90 tablet 3     lisinopriL (PRINIVIL,ZESTRIL) 10 MG tablet TAKE ONE TABLET BY MOUTH TWICE DAILY 180 tablet 3     loratadine (CLARITIN) 10 mg tablet TAKE 1 TABLET BY MOUTH EVERY DAY 30 tablet 6     magnesium oxide (MAG-OX) 400 mg (241.3 mg magnesium) tablet Take 1 tablet (400 mg total) by mouth daily. 30 tablet 11     meclizine (ANTIVERT) 12.5 mg tablet Take 1-2 tablets (12.5-25 mg total) by mouth every 8 (eight) hours as needed (one to two tablets). 60 tablet 1     metoprolol succinate (TOPROL-XL) 50 MG 24 hr tablet TAKE 1 TABLET(50 MG) BY MOUTH  DAILY 90 tablet 2     nitroglycerin (NITROSTAT) 0.4 MG SL tablet Place 1 tablet (0.4 mg total) under the tongue every 5 (five) minutes as needed for chest pain. 90 tablet 12     omeprazole (PRILOSEC) 20 MG capsule TAKE 1 CAPSULE BY MOUTH DAILY 90 capsule 2     amLODIPine (NORVASC) 5 MG tablet TAKE 1 TABLET(5 MG) BY MOUTH DAILY 90 tablet 2     aspirin (ASPIR-LOW) 81 MG EC tablet Take 1 tablet (81 mg total) by mouth daily. 90 tablet 1     EUCALYPTUS OIL/MENTHOL (MENTHOL EUCALYPTUS MM) 1 Inhalation into each nostril as needed ([facility] medication from [facility]        lidocaine (XYLOCAINE) 5 % ointment APPLY TO PAINFIL AREAS THREE TIMES DAILY AS NEEDED FOR PAIN 240 g 3     multivitamin with minerals (THERA-M) 9 mg iron-400 mcg Tab tablet Take 1 tablet by mouth daily. 100 tablet 3     No current facility-administered medications for this visit.      Татьяна KASPER

## 2021-06-10 NOTE — TELEPHONE ENCOUNTER
Refill Approved    Rx renewed per Medication Renewal Policy. Medication was last renewed on 5/14/19.    Yulia Coats, [facility] Triage/Med Refill 8/14/2020     Requested Prescriptions   Pending Prescriptions Disp Refills     allopurinoL (ZYLOPRIM) 100 MG tablet [Pharmacy Med Name: ALLOPURINOL 100MG TABLETS] 180 tablet 3     Sig: TAKE 2 TABLETS BY MOUTH EVERY DAY       Allopurinol/Febuxostat Refill Protocol  Passed - 8/13/2020  7:02 PM        Passed - LFT or AST or ALT in last 12 months     Albumin   Date Value Ref Range Status   01/23/2020 4.0 3.5 - 5.0 g/dL Final     Bilirubin, Total   Date Value Ref Range Status   09/16/2019 0.4 0.0 - 1.0 mg/dL Final     Bilirubin, Direct   Date Value Ref Range Status   10/17/2017 0.2 <=0.5 mg/dL Final     Alkaline Phosphatase   Date Value Ref Range Status   09/16/2019 122 (H) 45 - 120 U/L Final     AST   Date Value Ref Range Status   09/16/2019 24 0 - 40 U/L Final     ALT   Date Value Ref Range Status   01/23/2020 49 (H) 0 - 45 U/L Final     Protein, Total   Date Value Ref Range Status   09/16/2019 8.0 6.0 - 8.0 g/dL Final                Passed - Visit with PCP or prescribing provider visit in past 12 months     Last office visit with prescriber/PCP: 5/14/2019 Chucho Espino MD OR same dept: 12/2/2019 Patrica Beard MD OR same specialty: 12/2/2019 Patrica Beard MD  Last physical: 9/16/2019 Last MTM visit: Visit date not found   Next visit within 3 mo: Visit date not found  Next physical within 3 mo: Visit date not found  Prescriber OR PCP: Chucho Espino MD  Last diagnosis associated with med order: 1. Chronic gout of multiple sites, unspecified cause  - allopurinoL (ZYLOPRIM) 100 MG tablet [Pharmacy Med Name: ALLOPURINOL 100MG TABLETS]; TAKE 2 TABLETS BY MOUTH EVERY DAY  Dispense: 180 tablet; Refill: 3    If protocol passes may refill for 12 months if within 3 months of last provider visit (or a total of 15 months).

## 2021-06-10 NOTE — TELEPHONE ENCOUNTER
Medication Request  Medication name: Hydrocodone  Requested Pharmacy: [facility] on [address] in [place]  Reason for request: 1 pill   When did you use medication last?:  today  Patient offered appointment:  patient declined  Okay to leave a detailed message: yes

## 2021-06-10 NOTE — PROGRESS NOTES
ASSESSMENT AND PLAN  Adam Talamantes 77 y.o. male   with tophaceous chronic erosive gout is here for follow-up, he has renal impairment.  Today he was accompanied by his daughter.  He has not had a flareup of gout.  He is complaining of painful knees especially the right side, bilateral elbow area.  He has combination of osteoarthritis of the knees and lateral epicondylitis of the elbows.  We discussed management options.  He is going to take tramadol 50 mg twice daily, and would like to proceed with injection into the right knee which is done with the Kenalog 40 mg aseptic technique with the Tammy.  I have asked him to come in sooner this time in 3 months.    He is not a candidate to take nonsteroidals because of the renal impairment.     Diagnoses and all orders for this visit:    Osteoarthritis Of The Knee  -     triamcinolone acetonide 40 mg/mL injection 40 mg (KENALOG-40); Inject 1 mL (40 mg total) into the joint once.  -     traMADol (ULTRAM) 50 mg tablet; Take 1 tablet (50 mg total) by mouth 2 (two) times a day.  Dispense: 60 tablet; Refill: 1    Chronic Gout  -     febuxostat (ULORIC) 80 mg Tab; TAKE ONE TABLET BY MOUTH DAILY  Dispense: 90 tablet; Refill: 0    Bilateral chronic knee pain  -     traMADol (ULTRAM) 50 mg tablet; Take 1 tablet (50 mg total) by mouth 2 (two) times a day.  Dispense: 60 tablet; Refill: 1    CKD (chronic kidney disease) stage 3, GFR 30-59 ml/min  -     traMADol (ULTRAM) 50 mg tablet; Take 1 tablet (50 mg total) by mouth 2 (two) times a day.  Dispense: 60 tablet; Refill: 1      HISTORY OF PRESENTING ILLNESS:  Adam Talamantes 77 y.o. is here for follow up of gout.  He is complaining of pain in his knees.  Going on for the past several monthsderately severe, worse on the right .  Worse with movement.  No associated swelling.  OTC measures not helped.  No history of fall.  His symptoms presented several years. Onset was sudden. The patient reports no acute gout attacks since last clinic  visit.. His symptoms are stable.Original episode affected the left first MTP joint. his joint stiffness is stable and his joint swelling is stable.  Joints currently hurting  include None.Limitation on activities include difficulty with walking. The patient is avoiding high purine foods. Alcoholic as noted. Limitation on activities as noted in the MDHAQ scanned in the EMR.  Further historical information, including ROS as noted in the multidimensional health assessment questionnaire scanned in the EMR and in the assessment and plan section.  he has not had a flareup of gout.    ALLERGIES:Review of patient's allergies indicates no known allergies.    PAST MEDICAL/ACTIVE PROBLEMS/MEDICATION/SOCIAL DATA  Past Medical History:   Diagnosis Date     Acute blood loss anemia      Acute renal failure      Anemia      Bacteremia      Cataract      Chronic gout      CKD (chronic kidney disease)     Stage 3     GERD (gastroesophageal reflux disease)      Glucose intolerance (impaired glucose tolerance)      Gout      History of nephrolithiasis      Hyperlipidemia      Hypertension      Insomnia      Latent tuberculosis      Nephrolithiasis      Neuropathy      Osteoarthritis     wrist, knee, shoulder     Prostatitis      Rectal bleed      Trigger thumb      History   Smoking Status     Former Smoker     Quit date: 3/10/2000   Smokeless Tobacco     Former User     Patient Active Problem List   Diagnosis     Esophageal Reflux     Hyperlipidemia     Nephrolithiasis     Imaging Studies Nonspecific Abnormal Findings Skull And Head     Pseudogout     Latent Tuberculosis     Glucose Intolerance     Anemia     Joint Pain, Localized In The Wrist     Generalized Osteoarthritis Of The Hand     Lower Back Pain     Lumbar Disc Degeneration     Insomnia     Chronic Gout     CKD (chronic kidney disease) stage 3, GFR 30-59 ml/min     Osteoarthritis Of The Knee     Hyperglycemia     Localized Primary Osteoarthritis Of The Left Foot 1st MTP  Joint     Right shoulder tendonitis     Elevated PSA     Prostate nodule     Cataracts, bilateral     Rectal bleed     Acute renal failure superimposed on stage 3 chronic kidney disease     Acute blood loss anemia     Discharge planning issues     Prostatitis     Bacteremia     Acute pain of left wrist     Chest pain     Constipation, unspecified constipation type     Bilateral chronic knee pain     Chronic right shoulder pain     Essential hypertension with goal blood pressure less than 140/90     Current Outpatient Prescriptions   Medication Sig Dispense Refill     acetaminophen (Q-PAP EXTRA STRENGTH) 500 MG tablet TAKE 1 TABLET BY MOUTH EVERY 4-6 HOURS AS NEEDED FOR PAIN 180 tablet 3     aspirin (ASPIR-LOW) 81 MG EC tablet TAKE ONE TABLET BY MOUTH DAILY (Patient taking differently: Take 81 mg by mouth daily. ) 90 tablet 3     atorvastatin (LIPITOR) 40 MG tablet Take 1 tablet (40 mg total) by mouth bedtime. 90 tablet 3     codeine-guaiFENesin (GUAIFENESIN AC)  mg/5 mL liquid TAKE 5MLS THREE TIMES A DAY AS NEEDED FOR COUGH 240 mL 0     colchicine (COLCRYS) 0.6 mg tablet Take 1 tablet (0.6 mg total) by mouth every other day. 45 tablet 3     febuxostat (ULORIC) 80 mg Tab TAKE ONE TABLET BY MOUTH DAILY 90 tablet 0     gabapentin (NEURONTIN) 300 MG capsule Take 300-600 mg by mouth bedtime.       HYDROcodone-acetaminophen 5-325 mg per tablet TAKE 1-2 TABLETS BY MOUTH AT BEDTIME AS NEEDED FOR PAIN 60 tablet 0     hydrocortisone 2.5 % cream Apply to affected skin daily as needed 30 g 5     lisinopril (PRINIVIL,ZESTRIL) 10 MG tablet Take 1 tablet (10 mg total) by mouth daily. 30 tablet 11     loratadine (CLARITIN) 10 mg tablet Take 1 tablet (10 mg total) by mouth daily. 30 tablet 5     meclizine (ANTIVERT) 12.5 mg tablet Take 1-2 tablets every 8 hrs PRN       metoprolol succinate (TOPROL XL) 50 MG 24 hr tablet Take 1 tablet (50 mg total) by mouth daily. 90 tablet 3     multivitamin with minerals (THERA-M) 9 mg  iron-400 mcg Tab tablet Take 1 tablet by mouth daily. 100 tablet 3     nitroglycerin (NITROSTAT) 0.4 MG SL tablet Place 1 tablet (0.4 mg total) under the tongue every 5 (five) minutes as needed for chest pain. 90 tablet 12     omeprazole (PRILOSEC) 20 MG capsule Take 20 mg by mouth daily.       traZODone (DESYREL) 50 MG tablet Take 50 mg by mouth bedtime.       white petrolatum-mineral oil (EUCERIN) Crea Apply to affected skin daily 454 g 11     No current facility-administered medications for this visit.      DETAILED EXAMINATION  05/23/17  :  Vitals:    05/23/17 1112   BP: 118/70   Patient Site: Left Arm   Patient Position: Sitting   Cuff Size: Adult Regular   Pulse: 68   Weight: 154 lb 8 oz (70.1 kg)     Alert oriented. Head including the face is examined for malar rash, heliotropes, scarring, lupus pernio. Eyes examined for redness such as in episcleritis/scleritis, periorbital lesions.   Neck examined  for lymph nodes, range of motion Both upper and lower extremities (all four) examined for swollen, warm &/or  tender joints, range of motion, rash, muscle weakness, edema. The salient normal / abnormal findings are appended.  JLT of the knees worse on the right side.   No other acutely inflamed joints in the appendicular system.  At his elbows lateral epicondyles tender both sides.    LAB / IMAGING DATA:  ALT   Date Value Ref Range Status   05/19/2017 29 0 - 45 U/L Final   02/17/2017 54 (H) 0 - 45 U/L Final   02/06/2017 61 (H) 0 - 45 U/L Final     Albumin   Date Value Ref Range Status   05/19/2017 3.9 3.5 - 5.0 g/dL Final   02/17/2017 4.0 3.5 - 5.0 g/dL Final   02/06/2017 4.1 3.5 - 5.0 g/dL Final     Creatinine   Date Value Ref Range Status   05/19/2017 1.57 (H) 0.70 - 1.30 mg/dL Final   02/17/2017 1.72 (H) 0.70 - 1.30 mg/dL Final   07/18/2016 1.58 (H) 0.70 - 1.30 mg/dL Final       WBC   Date Value Ref Range Status   05/19/2017 7.3 4.0 - 11.0 thou/uL Final   02/17/2017 6.6 4.0 - 11.0 thou/uL Final   08/25/2015  5.7 4.0 - 11.0 thou/uL Final   06/18/2015 5.7 4.0 - 11.0 thou/uL Final     Hemoglobin   Date Value Ref Range Status   05/19/2017 14.1 14.0 - 18.0 g/dL Final   02/17/2017 14.4 14.0 - 18.0 g/dL Final   07/18/2016 13.6 (L) 14.0 - 18.0 g/dL Final     Platelets   Date Value Ref Range Status   05/19/2017 218 140 - 440 thou/uL Final   02/17/2017 199 140 - 440 thou/uL Final   07/18/2016 152 140 - 440 thou/uL Final       Lab Results   Component Value Date    RF <10 09/30/2009    SEDRATE 18 (H) 10/28/2009

## 2021-06-10 NOTE — TELEPHONE ENCOUNTER
Refill Approved    Rx renewed per Medication Renewal Policy. Medication was last renewed on 4/4/19.    Yulia Coats, [facility] Triage/Med Refill 7/29/2020     Requested Prescriptions   Pending Prescriptions Disp Refills     colchicine 0.6 mg tablet [Pharmacy Med Name: COLCHICINE 0.6MG TABLETS] 45 tablet 3     Sig: TAKE 1 TABLET BY MOUTH EVERY OTHER DAY       Colchicine Refill Protocol Passed - 7/27/2020  7:10 PM        Passed - LFT or AST or ALT in last year     Albumin   Date Value Ref Range Status   01/23/2020 4.0 3.5 - 5.0 g/dL Final     Bilirubin, Total   Date Value Ref Range Status   09/16/2019 0.4 0.0 - 1.0 mg/dL Final     Bilirubin, Direct   Date Value Ref Range Status   10/17/2017 0.2 <=0.5 mg/dL Final     Alkaline Phosphatase   Date Value Ref Range Status   09/16/2019 122 (H) 45 - 120 U/L Final     AST   Date Value Ref Range Status   09/16/2019 24 0 - 40 U/L Final     ALT   Date Value Ref Range Status   01/23/2020 49 (H) 0 - 45 U/L Final     Protein, Total   Date Value Ref Range Status   09/16/2019 8.0 6.0 - 8.0 g/dL Final                Passed - CBC (Hemogram 2) in last year      WBC   Date Value Ref Range Status   06/22/2020 7.1 4.0 - 11.0 thou/uL Final     RBC   Date Value Ref Range Status   06/22/2020 4.28 (L) 4.40 - 6.20 mill/uL Final     Hemoglobin   Date Value Ref Range Status   06/22/2020 13.0 (L) 14.0 - 18.0 g/dL Final     Hematocrit   Date Value Ref Range Status   06/22/2020 39.5 (L) 40.0 - 54.0 % Final     MCV   Date Value Ref Range Status   06/22/2020 92 80 - 100 fL Final     MCH   Date Value Ref Range Status   06/22/2020 30.4 27.0 - 34.0 pg Final     MCHC   Date Value Ref Range Status   06/22/2020 32.9 32.0 - 36.0 g/dL Final     RDW   Date Value Ref Range Status   06/22/2020 14.2 11.0 - 14.5 % Final     Platelets   Date Value Ref Range Status   06/22/2020 202 140 - 440 thou/uL Final     MPV   Date Value Ref Range Status   06/22/2020 9.8 8.5 - 12.5 fL Final                Passed - Visit  with PCP or prescribing provider visit in past 12 months     Last office visit with prescriber/PCP: 5/14/2019 Chucho Espino MD OR same dept: 12/2/2019 Patrica Beard MD OR same specialty: 12/2/2019 Patrica Beard MD  Last physical: 9/16/2019 Last MTM visit: Visit date not found   Next visit within 3 mo: Visit date not found  Next physical within 3 mo: Visit date not found  Prescriber OR PCP: Chucho Espino MD  Last diagnosis associated with med order: 1. Chronic gout of multiple sites, unspecified cause  - colchicine 0.6 mg tablet [Pharmacy Med Name: COLCHICINE 0.6MG TABLETS]; TAKE 1 TABLET BY MOUTH EVERY OTHER DAY  Dispense: 45 tablet; Refill: 3    2. Essential hypertension with goal blood pressure less than 140/90  - lisinopriL (PRINIVIL,ZESTRIL) 10 MG tablet [Pharmacy Med Name: LISINOPRIL 10MG TABLETS]; TAKE ONE TABLET BY MOUTH TWICE DAILY  Dispense: 180 tablet; Refill: 0    If protocol passes may refill for 12 months if within 3 months of last provider visit (or a total of 15 months).             Passed - Serum creatinine in last year     Creatinine   Date Value Ref Range Status   06/22/2020 1.55 (H) 0.70 - 1.30 mg/dL Final                lisinopriL (PRINIVIL,ZESTRIL) 10 MG tablet [Pharmacy Med Name: LISINOPRIL 10MG TABLETS] 180 tablet 0     Sig: TAKE ONE TABLET BY MOUTH TWICE DAILY       Ace Inhibitors Refill Protocol Passed - 7/27/2020  7:10 PM        Passed - PCP or prescribing provider visit in past 12 months       Last office visit with prescriber/PCP: 5/14/2019 Chucho Espino MD OR same dept: 12/2/2019 Patrica Beard MD OR same specialty: 12/2/2019 Patrica Beard MD  Last physical: 9/16/2019 Last MTM visit: Visit date not found   Next visit within 3 mo: Visit date not found  Next physical within 3 mo: Visit date not found  Prescriber OR PCP: Chucho Espino MD  Last diagnosis associated with med order: 1. Chronic gout of multiple sites, unspecified cause  - colchicine 0.6 mg  tablet [Pharmacy Med Name: COLCHICINE 0.6MG TABLETS]; TAKE 1 TABLET BY MOUTH EVERY OTHER DAY  Dispense: 45 tablet; Refill: 3    2. Essential hypertension with goal blood pressure less than 140/90  - lisinopriL (PRINIVIL,ZESTRIL) 10 MG tablet [Pharmacy Med Name: LISINOPRIL 10MG TABLETS]; TAKE ONE TABLET BY MOUTH TWICE DAILY  Dispense: 180 tablet; Refill: 0    If protocol passes may refill for 12 months if within 3 months of last provider visit (or a total of 15 months).             Passed - Serum Potassium in past 12 months     Lab Results   Component Value Date    Potassium 4.3 06/22/2020             Passed - Blood pressure filed in past 12 months     BP Readings from Last 1 Encounters:   06/22/20 147/80             Passed - Serum Creatinine in past 12 months     Creatinine   Date Value Ref Range Status   06/22/2020 1.55 (H) 0.70 - 1.30 mg/dL Final

## 2021-06-10 NOTE — PROGRESS NOTES
ASSESMENT AND PLAN:  Diagnoses and all orders for this visit:    Essential hypertension with goal blood pressure less than 140/90  Well-controlled with his current one half tablet of the 20 mg lisinopril.  Tablets are not scored.  With a full 20 mg, he got lightheadedness.  Will return to the 10 mg dose as below.  -     lisinopril (PRINIVIL,ZESTRIL) 10 MG tablet; Take 1 tablet (10 mg total) by mouth daily.  Dispense: 30 tablet; Refill: 11    Headache  Likely tension.  Reviewed indications for follow-up.  We'll treat his back issues as below.  This should help reduce his tension headaches.    Lumbar Disc Degeneration  Counseled on the regular use of gabapentin with the as needed use of acetaminophen for mild to moderate pain and hydrocodone for his more severe pain.        SUBJECTIVE: 77-year-old male here for follow-up.  He had been in previously with high blood pressure, lisinopril have been raised to 20 mg daily, however, he quickly cut back to 10 mg after getting lightheadedness with taking the 20 mg dose.  Patient has a history of chronic back and neck pain which has been triggering some mild to moderate headaches recently.  No new or focal weakness or numbness.  No sudden changes in vision or speech.  His pain extends from his lumbar low back more on the right side up to his neck bilaterally.    Past Medical History:   Diagnosis Date     Acute blood loss anemia      Acute renal failure      Anemia      Bacteremia      Cataract      Chronic gout      CKD (chronic kidney disease)     Stage 3     GERD (gastroesophageal reflux disease)      Glucose intolerance (impaired glucose tolerance)      Gout      History of nephrolithiasis      Hyperlipidemia      Hypertension      Insomnia      Latent tuberculosis      Nephrolithiasis      Neuropathy      Osteoarthritis     wrist, knee, shoulder     Prostatitis      Rectal bleed      Trigger thumb      Patient Active Problem List   Diagnosis     Esophageal Reflux      Hyperlipidemia     Nephrolithiasis     Imaging Studies Nonspecific Abnormal Findings Skull And Head     Pseudogout     Latent Tuberculosis     Glucose Intolerance     Anemia     Joint Pain, Localized In The Wrist     Generalized Osteoarthritis Of The Hand     Lower Back Pain     Lumbar Disc Degeneration     Insomnia     Chronic Gout     CKD (chronic kidney disease) stage 3, GFR 30-59 ml/min     Osteoarthritis Of The Knee     Hyperglycemia     Localized Primary Osteoarthritis Of The Left Foot 1st MTP Joint     Right shoulder tendonitis     Elevated PSA     Prostate nodule     Cataracts, bilateral     Rectal bleed     Acute renal failure superimposed on stage 3 chronic kidney disease     Acute blood loss anemia     Discharge planning issues     Prostatitis     Bacteremia     Acute pain of left wrist     Chest pain     Constipation, unspecified constipation type     Bilateral chronic knee pain     Chronic right shoulder pain     Essential hypertension with goal blood pressure less than 140/90     Current Outpatient Prescriptions   Medication Sig Dispense Refill     acetaminophen (Q-PAP EXTRA STRENGTH) 500 MG tablet TAKE 1 TABLET BY MOUTH EVERY 4-6 HOURS AS NEEDED FOR PAIN 180 tablet 3     aspirin (ASPIR-LOW) 81 MG EC tablet TAKE ONE TABLET BY MOUTH DAILY (Patient taking differently: Take 81 mg by mouth daily. ) 90 tablet 3     atorvastatin (LIPITOR) 40 MG tablet Take 1 tablet (40 mg total) by mouth bedtime. 90 tablet 3     codeine-guaiFENesin (GUAIFENESIN AC)  mg/5 mL liquid TAKE 5MLS THREE TIMES A DAY AS NEEDED FOR COUGH 240 mL 0     colchicine (COLCRYS) 0.6 mg tablet Take 1 tablet (0.6 mg total) by mouth every other day. 45 tablet 3     febuxostat (ULORIC) 80 mg Tab TAKE ONE TABLET BY MOUTH DAILY 90 tablet 0     gabapentin (NEURONTIN) 300 MG capsule Take 300-600 mg by mouth bedtime.       HYDROcodone-acetaminophen 5-325 mg per tablet TAKE 1-2 TABLETS BY MOUTH AT BEDTIME AS NEEDED FOR PAIN 60 tablet 0      "hydrocortisone 2.5 % cream Apply to affected skin daily as needed 30 g 5     lisinopril (PRINIVIL,ZESTRIL) 10 MG tablet Take 1 tablet (10 mg total) by mouth daily. 30 tablet 11     loratadine (CLARITIN) 10 mg tablet Take 1 tablet (10 mg total) by mouth daily. 30 tablet 5     meclizine (ANTIVERT) 12.5 mg tablet Take 1-2 tablets every 8 hrs PRN       metoprolol succinate (TOPROL XL) 50 MG 24 hr tablet Take 1 tablet (50 mg total) by mouth daily. 90 tablet 3     multivitamin with minerals (THERA-M) 9 mg iron-400 mcg Tab tablet Take 1 tablet by mouth daily. 100 tablet 3     nitroglycerin (NITROSTAT) 0.4 MG SL tablet Place 1 tablet (0.4 mg total) under the tongue every 5 (five) minutes as needed for chest pain. 90 tablet 12     omeprazole (PRILOSEC) 20 MG capsule Take 20 mg by mouth daily.       traZODone (DESYREL) 50 MG tablet Take 50 mg by mouth bedtime.       white petrolatum-mineral oil (EUCERIN) Crea Apply to affected skin daily 454 g 11     No current facility-administered medications for this visit.      History   Smoking Status     Former Smoker     Quit date: 3/10/2000   Smokeless Tobacco     Former User       OBJECTICE: /80 (Patient Site: Right Arm, Patient Position: Sitting, Cuff Size: Adult Regular)  Pulse 100  Temp 97.9  F (36.6  C) (Oral)   Resp 16  Ht 5' 1.5\" (1.562 m)  Wt 154 lb 8 oz (70.1 kg)  SpO2 95%  BMI 28.72 kg/m2     No results found for this or any previous visit (from the past 24 hour(s)).     GEN-alert, appropriate, in no apparent distress   CV-regular rate and rhythm with no murmur   RESP-lungs clear to auscultation   Musculoskeletal-no midline spinal point tenderness of the spinal column.  Some pain and tightness of the paraspinous muscles.    Chcuho Espino          "

## 2021-06-11 NOTE — TELEPHONE ENCOUNTER
Daughter Dimitri calls to request refill for:   Disp  Refills  Start  End  EMMA    HYDROcodone-acetaminophen 5-325 mg per tablet  30 tablet  0  8/3/2020   No    Sig: TAKE 1-2 TABLETS BY MOUTH AT BEDTIME AS NEEDED FOR chronic PAIN      Pharmacy:  [id number] - [place], [place] - [address] AT [facility] [address]      States that her dad Adam informed her last minute that he is out of supplies. Advised to follow up with clinic in AM.    Megan Meza RN/[place] Nurse Advisor    Reason for Disposition    Caller requesting a NON-URGENT new prescription or refill and triager unable to refill per unit policy    Protocols used: MEDICATION QUESTION CALL-A-AH

## 2021-06-11 NOTE — TELEPHONE ENCOUNTER
Refill Approved    Rx renewed per Medication Renewal Policy. Medication was last renewed on 8/28/19.    Yulia Coats, [facility] Triage/Med Refill 9/4/2020     Requested Prescriptions   Pending Prescriptions Disp Refills      mg capsule [Pharmacy Med Name: DOK (DOCUSATE SODIUM) 100MG CAPS] 60 capsule 8     Sig: TAKE 1 CAPSULE BY MOUTH TWICE DAILY AS NEEDED FOR CONSTIPATION       GI Medications Refill Protocol Passed - 9/1/2020  8:52 PM        Passed - PCP or prescribing provider visit in last 12 or next 3 months.     Last office visit with prescriber/PCP: 5/14/2019 Chucho Espino MD OR same dept: Visit date not found OR same specialty: Visit date not found  Last physical: 9/16/2019 Last MTM visit: Visit date not found   Next visit within 3 mo: Visit date not found  Next physical within 3 mo: Visit date not found  Prescriber OR PCP: Chucho Espino MD  Last diagnosis associated with med order: 1. Constipation  -  mg capsule [Pharmacy Med Name: DOK (DOCUSATE SODIUM) 100MG CAPS]; TAKE 1 CAPSULE BY MOUTH TWICE DAILY AS NEEDED FOR CONSTIPATION  Dispense: 60 capsule; Refill: 8    If protocol passes may refill for 12 months if within 3 months of last provider visit (or a total of 15 months).

## 2021-06-11 NOTE — PROGRESS NOTES
ASSESMENT AND PLAN:  Diagnoses and all orders for this visit:    Type 2 diabetes mellitus with stage 3 chronic kidney disease, without long-term current use of insulin (H)  -     Glycosylated Hemoglobin A1c done today shows good control.  Continue current treatment plan and recheck again in 6 months.    Polyarthralgia  He takes occasional hydrocodone acetaminophen for breakthrough pain.  He uses about 30 tablets/month.  He has rheumatology follow-up scheduled for next month.  Refill-     HYDROcodone-acetaminophen 5-325 mg per tablet; TAKE 1-2 TABLETS BY MOUTH AT BEDTIME AS NEEDED FOR chronic PAIN  Dispense: 30 tablet; Refill: 0    Dermatitis  -     triamcinolone (KENALOG) 0.1 % cream; Apply topically 2 (two) times a day.  Dispense: 80 g; Refill: 1    Need for immunization against influenza  -     Influenza,Quad,High Dose,PF 65 YR+        Reviewed the risks and benefits of the treatment plan with the patient and/or caregiver and we discussed indications for routine and emergent follow-up.        SUBJECTIVE: 80-year-old male due for follow-up on his diabetes, it has been under good control.  No hypoglycemia.  No issues or problems with any of his medications.  He continues to get intermittent diffuse joint pain.  His neck has been the most bothersome to him over this past few weeks.  He gets pain on the right side of the neck with some radiation down into the right shoulder but nothing down into the right hand or arm.  Pain is moderate in severity, sometimes more severe.  Worse with some positions and movements.  Improves with his medication.  Patient also reports a 3-week history of a itchy rash on the anterior chest and a small red patch.  Patient reports that he has had similar rash in the past that has improved with cream treatment.    Past Medical History:   Diagnosis Date     Acute blood loss anemia      Acute renal failure (H)      Anemia      Bacteremia      Cataract      Chronic gout      CKD (chronic kidney  disease)     Stage 3     DM2 (diabetes mellitus, type 2) (H)      GERD (gastroesophageal reflux disease)      Glucose intolerance (impaired glucose tolerance)      Gout      History of nephrolithiasis      Hyperlipidemia      Hypertension      Insomnia      Latent tuberculosis      Nephrolithiasis      Neuropathy      Osteoarthritis     wrist, knee, shoulder     Prostatitis      Rectal bleed      Trigger thumb      Patient Active Problem List   Diagnosis     Esophageal reflux     Dyslipidemia     Nephrolithiasis     Imaging Studies Nonspecific Abnormal Findings Skull And Head     Pseudogout     Latent Tuberculosis     Glucose Intolerance     Generalized Osteoarthritis Of The Hand     Lower Back Pain     Lumbar Disc Degeneration     Insomnia     Chronic Gout     CKD (chronic kidney disease) stage 3, GFR 30-59 ml/min (H)     Hyperglycemia     Elevated PSA     Prostate nodule     Cataracts, bilateral     Discharge planning issues     Chest pain     Constipation, unspecified constipation type     Bilateral chronic knee pain     Chronic right shoulder pain     Essential hypertension with goal blood pressure less than 140/90     History of prostatitis     BPH (benign prostatic hyperplasia)     Chronic gout     Coronary artery disease due to lipid rich plaque     Right lower quadrant abdominal pain     Abnormal cardiovascular stress test     Type 2 diabetes mellitus without complication, without long-term current use of insulin (H)     Colitis     Primary osteoarthritis involving multiple joints     Urinary incontinence     Type 2 diabetes mellitus with stage 3 chronic kidney disease, without long-term current use of insulin (H)     Current Outpatient Medications   Medication Sig Dispense Refill     acetaminophen (TYLENOL) 500 MG tablet TAKE 1 TABLET BY MOUTH EVERY 6 HOURS AS NEEDED 120 tablet 6     allopurinoL (ZYLOPRIM) 100 MG tablet Take 2 tablets (200 mg total) by mouth daily. 180 tablet 7     amLODIPine (NORVASC) 5 MG  tablet TAKE 1 TABLET(5 MG) BY MOUTH DAILY 90 tablet 2     aspirin (ASPIR-LOW) 81 MG EC tablet Take 1 tablet (81 mg total) by mouth daily. 90 tablet 1     atorvastatin (LIPITOR) 40 MG tablet Take 2 tablets (80 mg total) by mouth at bedtime. 180 tablet 3     colchicine 0.6 mg tablet TAKE 1 TABLET BY MOUTH EVERY OTHER DAY 45 tablet 1      mg capsule TAKE 1 CAPSULE BY MOUTH TWICE DAILY AS NEEDED FOR CONSTIPATION 60 capsule 11     DULoxetine (CYMBALTA) 20 MG capsule TAKE 1 CAPSULE BY MOUTH TWICE DAILY 60 capsule 0     gabapentin (NEURONTIN) 300 MG capsule TAKE 1 TO 2 CAPSULES BY MOUTH AT BEDTIME 180 capsule 2     HYDROcodone-acetaminophen 5-325 mg per tablet TAKE 1-2 TABLETS BY MOUTH AT BEDTIME AS NEEDED FOR chronic PAIN 30 tablet 0     isosorbide mononitrate (IMDUR) 30 MG 24 hr tablet Take 1 tablet (30 mg total) by mouth daily. 90 tablet 3     JANUVIA 25 mg tablet TAKE 1 TABLET BY MOUTH EVERY DAY 90 tablet 3     lisinopriL (PRINIVIL,ZESTRIL) 10 MG tablet TAKE ONE TABLET BY MOUTH TWICE DAILY 180 tablet 3     loratadine (CLARITIN) 10 mg tablet TAKE 1 TABLET BY MOUTH EVERY DAY 30 tablet 6     magnesium oxide (MAG-OX) 400 mg (241.3 mg magnesium) tablet Take 1 tablet (400 mg total) by mouth daily. 30 tablet 11     meclizine (ANTIVERT) 12.5 mg tablet Take 1-2 tablets (12.5-25 mg total) by mouth every 8 (eight) hours as needed (one to two tablets). 60 tablet 1     metoprolol succinate (TOPROL-XL) 50 MG 24 hr tablet TAKE 1 TABLET(50 MG) BY MOUTH DAILY 90 tablet 2     nitroglycerin (NITROSTAT) 0.4 MG SL tablet Place 1 tablet (0.4 mg total) under the tongue every 5 (five) minutes as needed for chest pain. 90 tablet 12     omeprazole (PRILOSEC) 20 MG capsule TAKE 1 CAPSULE BY MOUTH DAILY 90 capsule 2     EUCALYPTUS OIL/MENTHOL (MENTHOL EUCALYPTUS MM) 1 Inhalation into each nostril as needed (home medication from [facility]        lidocaine (XYLOCAINE) 5 % ointment APPLY TO PAINFIL AREAS THREE TIMES DAILY AS NEEDED FOR PAIN  240 g 3     multivitamin with minerals (THERA-M) 9 mg iron-400 mcg Tab tablet Take 1 tablet by mouth daily. 100 tablet 3     triamcinolone (KENALOG) 0.1 % cream Apply topically 2 (two) times a day. 80 g 1     No current facility-administered medications for this visit.      Social History     Tobacco Use   Smoking Status Former Smoker     Last attempt to quit: 3/10/2000     Years since quittin.5   Smokeless Tobacco Former User       OBJECTICE: /80 (Patient Site: Right Arm)   Pulse (!) 115   Temp 98  F (36.7  C) (Oral)   Resp 20   Wt 153 lb (69.4 kg)   SpO2 99%   BMI 24.69 kg/m       Recent Results (from the past 24 hour(s))   Glycosylated Hemoglobin A1c    Collection Time: 20  9:34 AM   Result Value Ref Range    Hemoglobin A1c 7.1 (H) <=5.6 %        GEN-alert, appropriate, in no apparent distress   CV-regular rate and rhythm with no murmur   RESP-lungs clear to auscultation   Musculoskeletal- some tenderness on palpation of the musculature of the right neck.   Foot exam-normal.  Palpable pedal pulses bilaterally.  No ulcers.   Skin- 2.5 cm diameter patch of maculopapular red rash on the chest.    Chucho Espino

## 2021-06-12 NOTE — PROGRESS NOTES
ASSESMENT AND PLAN:  Diagnoses and all orders for this visit:    Chest pain  Musculoskeletal versus cardiac in etiology.  Reviewed his cardiology consultation from February which based on the results of his nuclear medicine stress test which had shown a small reversible area of possible ischemia had recommended to the patient that he consider medical therapy versus angiography.  At that time, the patient elected for medical therapy.  There has not been any crescendoing of his symptom pattern.  He is taking his aspirin, atorvastatin, metoprolol daily and does have nitroglycerin on hand for as needed use.  Discussed options today with the patient and his daughter with the help of a professional .  We again reviewed the possibility of proceeding with angiography but the patient again elects for medical management.  We discussed indications for routine and emergent follow-up.    CKD (chronic kidney disease) stage 3, GFR 30-59 ml/min  Viewed creatinine trend today with the patient and his daughter.  We will plan to recheck again in 6 months and less it is checked again by his rheumatologist.    Essential hypertension with goal blood pressure less than 140/90  When he takes 20 mg as a single dose it is giving him some lightheadedness as described below.  However, with 10 mg his blood pressure is spiking above the desired range as detailed below.  Therefore, we decided to split a 20 mg dose and he will take 10 mg in the morning and 10 mg in the evening and will see if this can result in better control of his blood pressure without giving him the side effects.  -     lisinopril (PRINIVIL,ZESTRIL) 10 MG tablet; Take 1 tablet (10 mg total) by mouth 2 (two) times a day.  Dispense: 60 tablet; Refill: 11    Chronic gout  Reviewed his most recent rheumatology consult.  This did show a slight increase in his uric acid level and he had a good response to the joint injections that were done with good reduction in his  pain.  We will continue to follow-up with rheumatology and continue his current medications.    Preventative health care  -     Influenza High Dose, Seasonal 65+ yrs  -     multivitamin with minerals (THERA-M) 9 mg iron-400 mcg Tab tablet; Take 1 tablet by mouth daily.  Dispense: 100 tablet; Refill: 3          SUBJECTIVE: 77-year-old male comes in today with several concerns.  His gout pain has been significantly better in the joints that were injected by his rheumatologist.  He and his daughter have some questions about his lab results that were done last month at rheumatology clinic.  His main concern is that his blood pressure has been spiking high.  They have been checking at home and at times his blood pressures been as high as 180 systolic.  Because of this, the patient self titrated his lisinopril from 10 mg to 20 mg.  However, when he did this, he noticed that after taking the 20 mg of lisinopril he was feeling lightheadedness and fatigue and low energy for a few hours after taking the medication.  Patient has a history of possible coronary artery disease based on results from a nuclear medicine stress test which showed a small area of reversible possible ischemia.  He saw cardiology back in February as detailed above.  He has elected for medical management.  He continues to have intermittent chest pain.  Today he had some mild substernal chest pain without any radiation and without any associated shortness of breath.  His chest pain sometimes occurs with exertion and sometimes occurs at rest.  The chest pain he had today earlier occurred at rest.    Review of systems: No near syncope but some lightheadedness.  No fevers, no vomiting, no blood in the stool or black tarry stools, remainder of review of systems is as above or negative.    Past Medical History:   Diagnosis Date     Acute blood loss anemia      Acute renal failure      Anemia      Bacteremia      Cataract      Chronic gout      CKD (chronic  kidney disease)     Stage 3     GERD (gastroesophageal reflux disease)      Glucose intolerance (impaired glucose tolerance)      Gout      History of nephrolithiasis      Hyperlipidemia      Hypertension      Insomnia      Latent tuberculosis      Nephrolithiasis      Neuropathy      Osteoarthritis     wrist, knee, shoulder     Prostatitis      Rectal bleed      Trigger thumb      Patient Active Problem List   Diagnosis     Esophageal Reflux     Hyperlipidemia     Nephrolithiasis     Imaging Studies Nonspecific Abnormal Findings Skull And Head     Pseudogout     Latent Tuberculosis     Glucose Intolerance     Anemia     Generalized Osteoarthritis Of The Hand     Lower Back Pain     Lumbar Disc Degeneration     Insomnia     Chronic Gout     CKD (chronic kidney disease) stage 3, GFR 30-59 ml/min     Osteoarthritis Of The Knee     Hyperglycemia     Localized Primary Osteoarthritis Of The Left Foot 1st MTP Joint     Right shoulder tendonitis     Elevated PSA     Prostate nodule     Cataracts, bilateral     Rectal bleed     Acute renal failure superimposed on stage 3 chronic kidney disease     Acute blood loss anemia     Discharge planning issues     Acute pain of left wrist     Chest pain     Constipation, unspecified constipation type     Bilateral chronic knee pain     Chronic right shoulder pain     Essential hypertension with goal blood pressure less than 140/90     History of prostatitis     BPH (benign prostatic hypertrophy)     Chronic gout     Right shoulder tendinitis     Current Outpatient Prescriptions   Medication Sig Dispense Refill     acetaminophen (Q-PAP EXTRA STRENGTH) 500 MG tablet TAKE 1 TABLET BY MOUTH EVERY 4-6 HOURS AS NEEDED FOR PAIN 180 tablet 3     ASPIR-LOW 81 mg EC tablet TAKE ONE TABLET BY MOUTH DAILY 100 tablet 2     atorvastatin (LIPITOR) 40 MG tablet Take 1 tablet (40 mg total) by mouth bedtime. 90 tablet 3     colchicine (COLCRYS) 0.6 mg tablet Take 1 tablet (0.6 mg total) by mouth every  "other day. 45 tablet 3     febuxostat (ULORIC) 80 mg Tab TAKE ONE TABLET BY MOUTH DAILY 90 tablet 0     gabapentin (NEURONTIN) 300 MG capsule TAKE 1 OR 2 CAPSULES AT BEDTIME. 180 capsule 0     HYDROcodone-acetaminophen 5-325 mg per tablet TAKE 1-2 TABLETS BY MOUTH AT BEDTIME AS NEEDED FOR PAIN 60 tablet 0     lisinopril (PRINIVIL,ZESTRIL) 10 MG tablet Take 1 tablet (10 mg total) by mouth 2 (two) times a day. 60 tablet 11     loratadine (CLARITIN) 10 mg tablet Take 1 tablet (10 mg total) by mouth daily. 30 tablet 5     meclizine (ANTIVERT) 12.5 mg tablet TAKE 1 TO 2 TABLETS BY MOUTH EVERY EIGHT HOURS, AS NEEDED. 50 tablet 3     metoprolol succinate (TOPROL-XL) 50 MG 24 hr tablet Take 50 mg by mouth daily.       multivitamin with minerals (THERA-M) 9 mg iron-400 mcg Tab tablet Take 1 tablet by mouth daily. 100 tablet 3     nitroglycerin (NITROSTAT) 0.4 MG SL tablet Place 1 tablet (0.4 mg total) under the tongue every 5 (five) minutes as needed for chest pain. 90 tablet 12     omeprazole (PRILOSEC) 20 MG capsule Take 1 capsule (20 mg total) by mouth daily. 30 capsule 11     traZODone (DESYREL) 50 MG tablet Take 50 mg by mouth bedtime.       codeine-guaiFENesin (GUAIFENESIN AC)  mg/5 mL liquid TAKE 5MLS THREE TIMES A DAY AS NEEDED FOR COUGH 240 mL 0     hydrocortisone 2.5 % cream Apply to affected skin daily as needed 30 g 5     white petrolatum-mineral oil (EUCERIN) Crea Apply to affected skin daily 454 g 11     No current facility-administered medications for this visit.      History   Smoking Status     Former Smoker     Quit date: 3/10/2000   Smokeless Tobacco     Former User       OBJECTICE: /70  Pulse 82  Temp 98.3  F (36.8  C) (Oral)   Resp 22  Ht 5' 1.5\" (1.562 m)  Wt 152 lb (68.9 kg)  BMI 28.26 kg/m2     No results found for this or any previous visit (from the past 24 hour(s)).     GEN-alert, appropriate, in no apparent distress   HEENT-neck is supple with no palpable mass or lymphadenopathy, " oropharynx is clear, dentures.   CV-regular rate and rhythm with no murmur   RESP-lungs clear to auscultation   ABDOMINAL-soft and nontender with no palpable masses or organomegaly   Musculoskeletal- repetition of his chest pain is produced by direct palpation over his anterior chest.   EXTREM-warm with no edema and no calf tenderness   SKIN-no vesicular rash overlying his area of pain.  He does have some tattoos.      Chucho Espino

## 2021-06-12 NOTE — PROGRESS NOTES
ASSESMENT AND PLAN:  Diagnoses and all orders for this visit:    Chronic gout  Counseled the patient and family on pathophysiology and treatment plan, medication review and counseling done, reviewed the most recent rheumatology note with the patient and family and we reviewed indications for follow-up.  Also the patient that he should take either hydrocodone or tramadol for flareup of his joint pain but not both.  Patient reports that the hydrocodone works significantly better and therefore he is going to discontinue the tramadol.    Essential hypertension with goal blood pressure less than 140/90  Currently well controlled.  Medication review and counseling done.    History of prostatitis/BPH (benign prostatic hypertrophy)  Reviewed options for treatment and further evaluation with the patient and family.  Continue with current plan for now.      Over 15 minutes of total time, more than half in counseling and coronation of care on the above with the help of a professional .      SUBJECTIVE: 77-year-old male comes in today for follow-up on his blood pressure which has been well controlled.  He is taking all his medications but he and his family do have some questions about his medications.  He also has a diagnosis of chronic gout, most recently seen in rheumatology clinic for this, his pain has been under good control over this last few weeks and he is taking his current medications regularly.  Patient also has some difficulty passing urine at times and at other times has some urinary incontinence.  He does have a known history of prostatitis and prostatic hypertrophy and is currently taking his medication as directed.  He does not feel like there is been progressive worsening of his symptoms.    Past Medical History:   Diagnosis Date     Acute blood loss anemia      Acute renal failure      Anemia      Bacteremia      Cataract      Chronic gout      CKD (chronic kidney disease)     Stage 3     GERD  (gastroesophageal reflux disease)      Glucose intolerance (impaired glucose tolerance)      Gout      History of nephrolithiasis      Hyperlipidemia      Hypertension      Insomnia      Latent tuberculosis      Nephrolithiasis      Neuropathy      Osteoarthritis     wrist, knee, shoulder     Prostatitis      Rectal bleed      Trigger thumb      Patient Active Problem List   Diagnosis     Esophageal Reflux     Hyperlipidemia     Nephrolithiasis     Imaging Studies Nonspecific Abnormal Findings Skull And Head     Pseudogout     Latent Tuberculosis     Glucose Intolerance     Anemia     Generalized Osteoarthritis Of The Hand     Lower Back Pain     Lumbar Disc Degeneration     Insomnia     Chronic Gout     CKD (chronic kidney disease) stage 3, GFR 30-59 ml/min     Osteoarthritis Of The Knee     Hyperglycemia     Localized Primary Osteoarthritis Of The Left Foot 1st MTP Joint     Right shoulder tendonitis     Elevated PSA     Prostate nodule     Cataracts, bilateral     Rectal bleed     Acute renal failure superimposed on stage 3 chronic kidney disease     Acute blood loss anemia     Discharge planning issues     Acute pain of left wrist     Chest pain     Constipation, unspecified constipation type     Bilateral chronic knee pain     Chronic right shoulder pain     Essential hypertension with goal blood pressure less than 140/90     History of prostatitis     BPH (benign prostatic hypertrophy)     Chronic gout     Current Outpatient Prescriptions   Medication Sig Dispense Refill     acetaminophen (Q-PAP EXTRA STRENGTH) 500 MG tablet TAKE 1 TABLET BY MOUTH EVERY 4-6 HOURS AS NEEDED FOR PAIN 180 tablet 3     ASPIR-LOW 81 mg EC tablet TAKE ONE TABLET BY MOUTH DAILY 100 tablet 2     atorvastatin (LIPITOR) 40 MG tablet Take 1 tablet (40 mg total) by mouth bedtime. 90 tablet 3     colchicine (COLCRYS) 0.6 mg tablet Take 1 tablet (0.6 mg total) by mouth every other day. 45 tablet 3     febuxostat (ULORIC) 80 mg Tab TAKE ONE  "TABLET BY MOUTH DAILY 90 tablet 0     gabapentin (NEURONTIN) 300 MG capsule TAKE 1 OR 2 CAPSULES AT BEDTIME. 180 capsule 0     HYDROcodone-acetaminophen 5-325 mg per tablet TAKE 1-2 TABLETS BY MOUTH AT BEDTIME AS NEEDED FOR PAIN 60 tablet 0     hydrocortisone 2.5 % cream Apply to affected skin daily as needed 30 g 5     lisinopril (PRINIVIL,ZESTRIL) 10 MG tablet Take 1 tablet (10 mg total) by mouth daily. 30 tablet 11     loratadine (CLARITIN) 10 mg tablet Take 1 tablet (10 mg total) by mouth daily. 30 tablet 5     meclizine (ANTIVERT) 12.5 mg tablet TAKE 1 TO 2 TABLETS BY MOUTH EVERY EIGHT HOURS, AS NEEDED. 50 tablet 3     metoprolol succinate (TOPROL XL) 50 MG 24 hr tablet Take 1 tablet (50 mg total) by mouth daily. 90 tablet 3     multivitamin with minerals (THERA-M) 9 mg iron-400 mcg Tab tablet Take 1 tablet by mouth daily. 100 tablet 3     nitroglycerin (NITROSTAT) 0.4 MG SL tablet Place 1 tablet (0.4 mg total) under the tongue every 5 (five) minutes as needed for chest pain. 90 tablet 12     omeprazole (PRILOSEC) 20 MG capsule Take 1 capsule (20 mg total) by mouth daily. 30 capsule 11     traZODone (DESYREL) 50 MG tablet Take 50 mg by mouth bedtime.       white petrolatum-mineral oil (EUCERIN) Crea Apply to affected skin daily 454 g 11     codeine-guaiFENesin (GUAIFENESIN AC)  mg/5 mL liquid TAKE 5MLS THREE TIMES A DAY AS NEEDED FOR COUGH 240 mL 0     No current facility-administered medications for this visit.      History   Smoking Status     Former Smoker     Quit date: 3/10/2000   Smokeless Tobacco     Former User       OBJECTICE: /70 (Patient Site: Left Arm, Patient Position: Sitting, Cuff Size: Adult Regular)  Pulse 64  Temp 98.5  F (36.9  C) (Oral)   Resp 16  Ht 5' 1.5\" (1.562 m)  Wt 149 lb 12 oz (67.9 kg)  BMI 27.84 kg/m2     No results found for this or any previous visit (from the past 24 hour(s)).     GEN-alert, appropriate, in no apparent distress   CV-regular rate and " rhythm   RESP-lungs clear to auscultation   Muscular skeletal-currently no acute joint effusions    Chucho Espino

## 2021-06-12 NOTE — TELEPHONE ENCOUNTER
Pt's daughter AlbinDimitri is calling for a refill of HYDROcodone-acetaminophen 5-325 mg per tablet, and he is completly out of his medication. Consent on file.    Julito Wade RN  [facility]

## 2021-06-12 NOTE — TELEPHONE ENCOUNTER
Controlled Substance Refill Request  Medication Name:   Requested Prescriptions     Pending Prescriptions Disp Refills     HYDROcodone-acetaminophen 5-325 mg per tablet 30 tablet 0     Sig: TAKE 1-2 TABLETS BY MOUTH AT BEDTIME AS NEEDED FOR chronic PAIN     Date Last Fill: 9/28/2020  Requested Pharmacy: [facility], [address], [place]  Submit electronically to pharmacy  Controlled Substance Agreement on file:   Encounter-Level CSA Scan Date:    There are no encounter-level csa scan date.        Last office visit:  9/28/2020

## 2021-06-12 NOTE — PROGRESS NOTES
ASSESSMENT AND PLAN  Adam Talamantes 77 y.o. 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  He reports no history of a flareup of his gouty arthropathy.  His recent labs are reviewed.  His serum urate is 6.2 a little higher than in the past.  He maintains that he takes [place] 100% of the times.  He has noted increasing pain in his knees.  This is in association with osteoarthritis unlikely to be gout.  Various options are discussed.  Of these he wants to proceed local injection 40 mg of Kenalog injected into each knee anterolateral approach.  He also has acute onset of right shoulder pain worsening more consistent with tendinopathy rather than gout.  He had various options of which she wants to proceed with local injection 40 mg of methylprednisolone and Marcaine injected into the right knee subacromially. He is not a candidate to take nonsteroidals because of the renal impairment. Have asked him to return for follow-up in the next 3-4 months.           Diagnoses and all orders for this visit:    Chronic Gout  -     febuxostat (ULORIC) 80 mg Tab; TAKE ONE TABLET BY MOUTH DAILY  Dispense: 90 tablet; Refill: 0    CKD (chronic kidney disease) stage 3, GFR 30-59 ml/min    Osteoarthritis Of The Knee  -     triamcinolone acetonide 40 mg/mL injection 40 mg (KENALOG-40); Inject 1 mL (40 mg total) into the joint once.  -     triamcinolone acetonide 40 mg/mL injection 40 mg (KENALOG-40); Inject 1 mL (40 mg total) into the joint once.    Right shoulder tendinitis  -     methylPREDNISolone acetate injection 40 mg (DEPO-MEDROL); Inject 1 mL (40 mg total) into the joint once.      HISTORY OF PRESENTING ILLNESS:  Adam Talamantes 77 y.o. is here for follow up of gout, and the background of renal impairment.  He is on [place].  He has not had a flareup of gout to doing the interim.. As we discussed the issues around out he reported that he has been hurting more and other joints.  He reports bilateral knee  pain, right shoulder pain.  This is troubled over the past several weeks.  This is worse with activity.  This pain in the shoulder wakes him up from sleep hard for him to reach.  He has not observed any swelling of the knees.  There is no history of redness.  There is no history of trauma.  The more he walks the worse the pain in the knees gets.  He is not woken up from sleep.  Going up and down the steps is more bothersome at rest that is a little pain.  In view of his renal impairment is only tries over-the-counter is acetaminophen which has provided only limited relief.    .Original gout episode affected the left first MTP joint. his joint stiffness is stable and his joint swelling is stable.  Joints currently hurting  include None.Limitation on activities include difficulty with walking. The patient is avoiding high purine foods. Alcoholic as noted. Limitation on activities as noted in the MDHAQ scanned in the EMR.  Further historical information, including ROS as noted in the multidimensional health assessment questionnaire scanned in the EMR and in the assessment and plan section.  he has not had a flareup of gout.    ALLERGIES:Review of patient's allergies indicates no known allergies.    PAST MEDICAL/ACTIVE PROBLEMS/MEDICATION/SOCIAL DATA  Past Medical History:   Diagnosis Date     Acute blood loss anemia      Acute renal failure      Anemia      Bacteremia      Cataract      Chronic gout      CKD (chronic kidney disease)     Stage 3     GERD (gastroesophageal reflux disease)      Glucose intolerance (impaired glucose tolerance)      Gout      History of nephrolithiasis      Hyperlipidemia      Hypertension      Insomnia      Latent tuberculosis      Nephrolithiasis      Neuropathy      Osteoarthritis     wrist, knee, shoulder     Prostatitis      Rectal bleed      Trigger thumb      History   Smoking Status     Former Smoker     Quit date: 3/10/2000   Smokeless Tobacco     Former User     Patient Active  Problem List   Diagnosis     Esophageal Reflux     Hyperlipidemia     Nephrolithiasis     Imaging Studies Nonspecific Abnormal Findings Skull And Head     Pseudogout     Latent Tuberculosis     Glucose Intolerance     Anemia     Generalized Osteoarthritis Of The Hand     Lower Back Pain     Lumbar Disc Degeneration     Insomnia     Chronic Gout     CKD (chronic kidney disease) stage 3, GFR 30-59 ml/min     Osteoarthritis Of The Knee     Hyperglycemia     Localized Primary Osteoarthritis Of The Left Foot 1st MTP Joint     Right shoulder tendonitis     Elevated PSA     Prostate nodule     Cataracts, bilateral     Rectal bleed     Acute renal failure superimposed on stage 3 chronic kidney disease     Acute blood loss anemia     Discharge planning issues     Acute pain of left wrist     Chest pain     Constipation, unspecified constipation type     Bilateral chronic knee pain     Chronic right shoulder pain     Essential hypertension with goal blood pressure less than 140/90     History of prostatitis     BPH (benign prostatic hypertrophy)     Chronic gout     Current Outpatient Prescriptions   Medication Sig Dispense Refill     acetaminophen (Q-PAP EXTRA STRENGTH) 500 MG tablet TAKE 1 TABLET BY MOUTH EVERY 4-6 HOURS AS NEEDED FOR PAIN 180 tablet 3     ASPIR-LOW 81 mg EC tablet TAKE ONE TABLET BY MOUTH DAILY 100 tablet 2     atorvastatin (LIPITOR) 40 MG tablet Take 1 tablet (40 mg total) by mouth bedtime. 90 tablet 3     codeine-guaiFENesin (GUAIFENESIN AC)  mg/5 mL liquid TAKE 5MLS THREE TIMES A DAY AS NEEDED FOR COUGH 240 mL 0     colchicine (COLCRYS) 0.6 mg tablet Take 1 tablet (0.6 mg total) by mouth every other day. 45 tablet 3     febuxostat (ULORIC) 80 mg Tab TAKE ONE TABLET BY MOUTH DAILY 90 tablet 0     gabapentin (NEURONTIN) 300 MG capsule TAKE 1 OR 2 CAPSULES AT BEDTIME. 180 capsule 0     HYDROcodone-acetaminophen 5-325 mg per tablet TAKE 1-2 TABLETS BY MOUTH AT BEDTIME AS NEEDED FOR PAIN 60 tablet 0  "    hydrocortisone 2.5 % cream Apply to affected skin daily as needed 30 g 5     lisinopril (PRINIVIL,ZESTRIL) 10 MG tablet Take 1 tablet (10 mg total) by mouth daily. 30 tablet 11     loratadine (CLARITIN) 10 mg tablet Take 1 tablet (10 mg total) by mouth daily. 30 tablet 5     meclizine (ANTIVERT) 12.5 mg tablet TAKE 1 TO 2 TABLETS BY MOUTH EVERY EIGHT HOURS, AS NEEDED. 50 tablet 3     multivitamin with minerals (THERA-M) 9 mg iron-400 mcg Tab tablet Take 1 tablet by mouth daily. 100 tablet 3     nitroglycerin (NITROSTAT) 0.4 MG SL tablet Place 1 tablet (0.4 mg total) under the tongue every 5 (five) minutes as needed for chest pain. 90 tablet 12     omeprazole (PRILOSEC) 20 MG capsule Take 1 capsule (20 mg total) by mouth daily. 30 capsule 11     traZODone (DESYREL) 50 MG tablet Take 50 mg by mouth bedtime.       white petrolatum-mineral oil (EUCERIN) Crea Apply to affected skin daily 454 g 11     No current facility-administered medications for this visit.      DETAILED EXAMINATION  09/01/17  :  Vitals:    09/01/17 0835   BP: (!) 154/92   Patient Site: Left Arm   Patient Position: Sitting   Cuff Size: Adult Regular   Pulse: 84   Weight: 149 lb 12 oz (67.9 kg)   Height: 5' 1.5\" (1.562 m)     Alert oriented. Head including the face is examined for malar rash, heliotropes, scarring, lupus pernio. Eyes examined for redness such as in episcleritis/scleritis, periorbital lesions.   Neck examined  for lymph nodes, range of motion Both upper and lower extremities (all four) examined for swollen, warm &/or  tender joints, range of motion, rash, muscle weakness, edema. The salient normal / abnormal findings are appended.   Joint line tenderness, bilaterally, knees.  No other acutely inflamed joints in the appendicular system.  .  Impingement of his right shoulder with the features in keeping with tendinopathy as a cause of his pain.    LAB / IMAGING DATA:  ALT   Date Value Ref Range Status   08/29/2017 37 0 - 45 U/L Final "   05/19/2017 29 0 - 45 U/L Final   02/17/2017 54 (H) 0 - 45 U/L Final     Albumin   Date Value Ref Range Status   08/29/2017 3.8 3.5 - 5.0 g/dL Final   05/19/2017 3.9 3.5 - 5.0 g/dL Final   02/17/2017 4.0 3.5 - 5.0 g/dL Final     Creatinine   Date Value Ref Range Status   08/29/2017 1.59 (H) 0.70 - 1.30 mg/dL Final   05/19/2017 1.57 (H) 0.70 - 1.30 mg/dL Final   02/17/2017 1.72 (H) 0.70 - 1.30 mg/dL Final       WBC   Date Value Ref Range Status   08/29/2017 7.1 4.0 - 11.0 thou/uL Final   05/19/2017 7.3 4.0 - 11.0 thou/uL Final   08/25/2015 5.7 4.0 - 11.0 thou/uL Final   06/18/2015 5.7 4.0 - 11.0 thou/uL Final     Hemoglobin   Date Value Ref Range Status   08/29/2017 13.8 (L) 14.0 - 18.0 g/dL Final   05/19/2017 14.1 14.0 - 18.0 g/dL Final   02/17/2017 14.4 14.0 - 18.0 g/dL Final     Platelets   Date Value Ref Range Status   08/29/2017 206 140 - 440 thou/uL Final   05/19/2017 218 140 - 440 thou/uL Final   02/17/2017 199 140 - 440 thou/uL Final       Lab Results   Component Value Date    RF <10 09/30/2009    SEDRATE 18 (H) 10/28/2009

## 2021-06-12 NOTE — PROGRESS NOTES
[facility] Care Coordination Contact      [facility] Six-Month Telephone Assessment    6 month telephone assessment completed on 10/14/2020.    ER visits: Yes -  [facility] for Headache and Neck Pain.   Hospitalizations: No  TCU stays: No  Significant health status changes: Recently completed an office visit to discuss pain, and was prescribed pain medication to help reduce pain symptoms. Pain in neck and shoulders continues to be primary health concern-but he does report some improvement with new medication intervention.   Falls/Injuries: No  ADL/IADL changes: No  Changes in services: No-continues to receive 4 hours of PCA per day and incontinence supplies. Family continues to be very supportive.     Caregiver Assessment follow up:  N/A    Goals: See POC in chart for goal progress documentation.      Will see member in 6 months for an annual health risk assessment.   Encouraged member to call CC with any questions or concerns in the meantime.     RICKEY Gallegos  [facility]  [phone]

## 2021-06-12 NOTE — PROGRESS NOTES
"Adam Talamantes is a 80 y.o. male who is being evaluated via a billable telephone visit.      The patient has been notified of following:     \"This telephone visit will be conducted via a call between you and your physician/provider. We have found that certain health care needs can be provided without the need for a physical exam.  This service lets us provide the care you need with a short phone conversation.  If a prescription is necessary we can send it directly to your pharmacy.  If lab work is needed we can place an order for that and you can then stop by our lab to have the test done at a later time.    Telephone visits are billed at different rates depending on your insurance coverage. During this emergency period, for some insurers they may be billed the same as an in-person visit.  Please reach out to your insurance provider with any questions.    If during the course of the call the physician/provider feels a telephone visit is not appropriate, you will not be charged for this service.\"    Patient has given verbal consent to a Telephone visit? Yes    What phone number would you like to be contacted at? [phone]    Patient would like to receive their AVS by AVS Preference: [place].      ASSESSMENT AND PLAN:    Diagnoses and all orders for this visit:    Primary osteoarthritis involving multiple joints  -     DULoxetine (CYMBALTA) 20 MG capsule; Take 1 capsule (20 mg total) by mouth 2 (two) times a day.  Dispense: 180 capsule; Refill: 0    Polyarthralgia  -     DULoxetine (CYMBALTA) 20 MG capsule; Take 1 capsule (20 mg total) by mouth 2 (two) times a day.  Dispense: 180 capsule; Refill: 0    Lumbar Disc Degeneration    Stage 3a chronic kidney disease          HISTORY OF PRESENTING ILLNESS:  Adam Talamantes 80 y.o. is evaluated here via phone link.  This is for follow-up.  The  was on the third line.  This patient has widespread osteoarthritis, peripheral as well as axial.  He has renal impairment.  He " Telephone Encounter by Beatrice Sweet RN at 05/02/18 11:34 AM     Author:  Beatrice Sweet RN Service:  (none) Author Type:  Registered Nurse     Filed:  05/02/18 11:35 AM Encounter Date:  5/1/2018 Status:  Signed     :  Beatrice Sweet RN (Registered Nurse)            2nd attempt  See attached      Called patient, not in.[JB1.1M]  Left message on answering machine to call back.[JB1.1T]      Revision History        User Key Date/Time User Provider Type Action    > [email or web address] 05/02/18 11:35 AM Beatrice Sweet RN Registered Nurse Sign    M - Manual, T - Template             has a history of gout.  He has been on duloxetine helpful.  He does not take ibuprofen or Aleve as we discussed today.  He has noted pain.  This is in his left elbow.  Worse with activity does not think there is any swelling.  He has not it sounds like had a flareup of gout.  We discussed the elbow symptoms.  I have offered him to come into the clinic for further evaluation.  He is reluctant to, and in view of the pandemic.  He would to stay another on the diltiazem hello.  We will continue the current schedule.  I will meet here in 3 months.   ROS enquiry held for fever, ocular symptoms, rash, headache,  GI issues.  Today we also discussed the issues related to the current pandemic, the pros and cons of the current treatment plan, the CDC guidelines such as social distancing washing the hands covering the cough.  ALLERGIES:Patient has no known allergies.    PAST MEDICAL/ACTIVE PROBLEMS/MEDICATION/SOCIAL DATA  Past Medical History:   Diagnosis Date     Acute blood loss anemia      Acute renal failure (H)      Anemia      Bacteremia      Cataract      Chronic gout      CKD (chronic kidney disease)     Stage 3     DM2 (diabetes mellitus, type 2) (H)      GERD (gastroesophageal reflux disease)      Glucose intolerance (impaired glucose tolerance)      Gout      History of nephrolithiasis      Hyperlipidemia      Hypertension      Insomnia      Latent tuberculosis      Nephrolithiasis      Neuropathy      Osteoarthritis     wrist, knee, shoulder     Prostatitis      Rectal bleed      Trigger thumb      Social History     Tobacco Use   Smoking Status Former Smoker     Quit date: 3/10/2000     Years since quittin.5   Smokeless Tobacco Former User     Patient Active Problem List   Diagnosis     Esophageal reflux     Dyslipidemia     Nephrolithiasis     Imaging Studies Nonspecific Abnormal Findings Skull And Head     Pseudogout     Latent Tuberculosis     Glucose Intolerance     Generalized Osteoarthritis Of The Hand      Lower Back Pain     Lumbar Disc Degeneration     Insomnia     Chronic Gout     CKD (chronic kidney disease) stage 3, GFR 30-59 ml/min     Hyperglycemia     Elevated PSA     Prostate nodule     Cataracts, bilateral     Discharge planning issues     Chest pain     Constipation, unspecified constipation type     Bilateral chronic knee pain     Chronic right shoulder pain     Essential hypertension with goal blood pressure less than 140/90     History of prostatitis     BPH (benign prostatic hyperplasia)     Chronic gout     Coronary artery disease due to lipid rich plaque     Right lower quadrant abdominal pain     Abnormal cardiovascular stress test     Type 2 diabetes mellitus without complication, without long-term current use of insulin (H)     Colitis     Primary osteoarthritis involving multiple joints     Urinary incontinence     Type 2 diabetes mellitus with stage 3 chronic kidney disease, without long-term current use of insulin (H)     Current Outpatient Medications   Medication Sig Dispense Refill     acetaminophen (TYLENOL) 500 MG tablet TAKE 1 TABLET BY MOUTH EVERY 6 HOURS AS NEEDED 120 tablet 6     allopurinoL (ZYLOPRIM) 100 MG tablet Take 2 tablets (200 mg total) by mouth daily. 180 tablet 7     amLODIPine (NORVASC) 5 MG tablet TAKE 1 TABLET(5 MG) BY MOUTH DAILY 90 tablet 2     aspirin (ASPIR-LOW) 81 MG EC tablet Take 1 tablet (81 mg total) by mouth daily. 90 tablet 1     atorvastatin (LIPITOR) 40 MG tablet Take 2 tablets (80 mg total) by mouth at bedtime. 180 tablet 3     colchicine 0.6 mg tablet TAKE 1 TABLET BY MOUTH EVERY OTHER DAY 45 tablet 1      mg capsule TAKE 1 CAPSULE BY MOUTH TWICE DAILY AS NEEDED FOR CONSTIPATION 60 capsule 11     DULoxetine (CYMBALTA) 20 MG capsule TAKE 1 CAPSULE BY MOUTH TWICE DAILY 60 capsule 0     EUCALYPTUS OIL/MENTHOL (MENTHOL EUCALYPTUS MM) 1 Inhalation into each nostril as needed (home medication from [facility]        gabapentin (NEURONTIN) 300 MG capsule  TAKE 1 TO 2 CAPSULES BY MOUTH AT BEDTIME 180 capsule 2     HYDROcodone-acetaminophen 5-325 mg per tablet TAKE 1-2 TABLETS BY MOUTH AT BEDTIME AS NEEDED FOR chronic PAIN 30 tablet 0     isosorbide mononitrate (IMDUR) 30 MG 24 hr tablet Take 1 tablet (30 mg total) by mouth daily. 90 tablet 3     JANUVIA 25 mg tablet TAKE 1 TABLET BY MOUTH EVERY DAY 90 tablet 3     lidocaine (XYLOCAINE) 5 % ointment APPLY TO PAINFIL AREAS THREE TIMES DAILY AS NEEDED FOR PAIN 240 g 3     lisinopriL (PRINIVIL,ZESTRIL) 10 MG tablet TAKE ONE TABLET BY MOUTH TWICE DAILY 180 tablet 3     loratadine (CLARITIN) 10 mg tablet TAKE 1 TABLET BY MOUTH EVERY DAY 30 tablet 6     magnesium oxide (MAG-OX) 400 mg (241.3 mg magnesium) tablet Take 1 tablet (400 mg total) by mouth daily. 30 tablet 11     meclizine (ANTIVERT) 12.5 mg tablet Take 1-2 tablets (12.5-25 mg total) by mouth every 8 (eight) hours as needed (one to two tablets). 60 tablet 1     metoprolol succinate (TOPROL-XL) 50 MG 24 hr tablet TAKE 1 TABLET(50 MG) BY MOUTH DAILY 90 tablet 2     multivitamin with minerals (THERA-M) 9 mg iron-400 mcg Tab tablet Take 1 tablet by mouth daily. 100 tablet 3     nitroglycerin (NITROSTAT) 0.4 MG SL tablet Place 1 tablet (0.4 mg total) under the tongue every 5 (five) minutes as needed for chest pain. 90 tablet 12     omeprazole (PRILOSEC) 20 MG capsule TAKE 1 CAPSULE BY MOUTH DAILY 90 capsule 2     triamcinolone (KENALOG) 0.1 % cream Apply topically 2 (two) times a day. 80 g 1     No current facility-administered medications for this visit.          EXAMINATION: He sounded comfortable, fluent in speech as per vascular surgeon via the .      LAB / IMAGING DATA:  ALT   Date Value Ref Range Status   01/23/2020 49 (H) 0 - 45 U/L Final   09/16/2019 24 0 - 45 U/L Final   12/27/2018 32 0 - 45 U/L Final     Albumin   Date Value Ref Range Status   01/23/2020 4.0 3.5 - 5.0 g/dL Final   09/16/2019 4.0 3.5 - 5.0 g/dL Final   12/27/2018 4.2 3.5 - 5.0 g/dL  Final     Creatinine   Date Value Ref Range Status   06/22/2020 1.55 (H) 0.70 - 1.30 mg/dL Final   01/23/2020 1.34 (H) 0.70 - 1.30 mg/dL Final   09/16/2019 1.54 (H) 0.70 - 1.30 mg/dL Final       WBC   Date Value Ref Range Status   06/22/2020 7.1 4.0 - 11.0 thou/uL Final   01/23/2020 7.5 4.0 - 11.0 thou/uL Final   08/25/2015 5.7 4.0 - 11.0 thou/uL Final   06/18/2015 5.7 4.0 - 11.0 thou/uL Final     Hemoglobin   Date Value Ref Range Status   06/22/2020 13.0 (L) 14.0 - 18.0 g/dL Final   01/23/2020 13.6 (L) 14.0 - 18.0 g/dL Final   12/27/2018 15.1 14.0 - 18.0 g/dL Final     Platelets   Date Value Ref Range Status   06/22/2020 202 140 - 440 thou/uL Final   01/23/2020 201 140 - 440 thou/uL Final   12/27/2018 191 140 - 440 thou/uL Final       Lab Results   Component Value Date    RF <10 09/30/2009    SEDRATE 18 (H) 10/28/2009     Duration of the call:7  Minutes  Call start: 1026am  Call end:   1033am

## 2021-06-13 NOTE — TELEPHONE ENCOUNTER
Controlled Substance Refill Request  Medication Name:   Requested Prescriptions     Pending Prescriptions Disp Refills     HYDROcodone-acetaminophen 5-325 mg per tablet 30 tablet 0     Sig: TAKE 1-2 TABLETS BY MOUTH AT BEDTIME AS NEEDED FOR chronic PAIN     Date Last Fill: 11/02/2020  Requested Pharmacy: [id number] [place], [place]  Submit electronically to pharmacy  Controlled Substance Agreement on file:   Encounter-Level CSA Scan Date:    There are no encounter-level csa scan date.        Last office visit:  09/28/2020 with PCP.

## 2021-06-13 NOTE — TELEPHONE ENCOUNTER
Pt's daughter is calling.    Needing a refill on his hydrocodone.   He is out of his medication completley.   Would like refill tomorrow.    Ginger Vega RN   [facility] Nurse Advisor  11/30/20 at 7:39 PM

## 2021-06-13 NOTE — PROGRESS NOTES
ASSESMENT AND PLAN:  Diagnoses and all orders for this visit:    Chest pain  Negative cardiac workup, reviewed discharge summary and hospital data today with the patient and his family with the help of a professional .  Medication reconciliation and review and counseling done.  Possible gastro-intestinal source of his chest pain, GI workup as ordered below.  -     Ambulatory referral to Gastroenterology    Anemia  -     Ambulatory referral to Gastroenterology    Blood in stool  -     Ambulatory referral to Gastroenterology    Type 2 diabetes mellitus without complication, without long-term current use of insulin  A1c was done in the hospital and came back at 7.6.  Januvia was added and the patient has been taking it daily and tolerating it well.  Continue this medication daily and we reviewed indications for routine and urgent follow-up.    Constipation  -     docusate sodium (COLACE) 100 MG capsule; Take 1 capsule (100 mg total) by mouth 2 (two) times a day as needed for constipation.  Dispense: 60 capsule; Refill: 4          SUBJECTIVE: 77-year-old male here for hospital follow-up visit.  Since discharge, his chest pain has improved.  He continues to have a burning pain in his epigastrium that sometimes radiates up into the chest.  Those symptoms come and go.  They are moderate in severity.  He has also had blood in the stool and anemia and gastroenterology workup had been recommended upon his discharge from the hospital.  Since discharge, he feels weak, tired, this weakness is generalized.  His appetite is slowly improving.  No shortness of breath at rest.  Patient complains of some constipation.  Currently he is not using anything as a stool softener and he goes several days without a bowel movement.  Blood sugar control has improved since starting on Januvia, he has not noticed any side effects or problems from the medication.  No hypoglycemia.    Past Medical History:   Diagnosis Date     Acute  blood loss anemia      Acute renal failure      Anemia      Bacteremia      Cataract      Chronic gout      CKD (chronic kidney disease)     Stage 3     GERD (gastroesophageal reflux disease)      Glucose intolerance (impaired glucose tolerance)      Gout      History of nephrolithiasis      Hyperlipidemia      Hypertension      Insomnia      Latent tuberculosis      Nephrolithiasis      Neuropathy      Osteoarthritis     wrist, knee, shoulder     Prostatitis      Rectal bleed      Trigger thumb      Patient Active Problem List   Diagnosis     Esophageal reflux     Hyperlipidemia     Nephrolithiasis     Imaging Studies Nonspecific Abnormal Findings Skull And Head     Pseudogout     Latent Tuberculosis     Glucose Intolerance     Anemia     Generalized Osteoarthritis Of The Hand     Lower Back Pain     Lumbar Disc Degeneration     Insomnia     Chronic Gout     CKD (chronic kidney disease) stage 3, GFR 30-59 ml/min     Osteoarthritis Of The Knee     Hyperglycemia     Localized Primary Osteoarthritis Of The Left Foot 1st MTP Joint     Right shoulder tendonitis     Elevated PSA     Prostate nodule     Cataracts, bilateral     Rectal bleed     Acute renal failure superimposed on stage 3 chronic kidney disease     Acute blood loss anemia     Discharge planning issues     Acute pain of left wrist     Chest pain     Constipation, unspecified constipation type     Bilateral chronic knee pain     Chronic right shoulder pain     Essential hypertension with goal blood pressure less than 140/90     History of prostatitis     BPH (benign prostatic hypertrophy)     Chronic gout     Right shoulder tendinitis     Coronary artery disease due to lipid rich plaque     Abdominal pain     Abnormal cardiovascular stress test     Type 2 diabetes mellitus without complication, without long-term current use of insulin     Current Outpatient Prescriptions   Medication Sig Dispense Refill     acetaminophen (Q-PAP EXTRA STRENGTH) 500 MG tablet  Take 1 tablet (500 mg total) by mouth every 4 (four) hours as needed (do not exceed 4,000 mg of tylenol in 24 hours.  please note that other pains medications have tylenol in it as well.). TAKE 1 TABLET BY MOUTH EVERY 4-6 HOURS AS NEEDED FOR PAIN       aspirin 81 MG EC tablet Take 81 mg by mouth daily.       atorvastatin (LIPITOR) 40 MG tablet Take 1 tablet (40 mg total) by mouth bedtime. 90 tablet 3     colchicine (COLCRYS) 0.6 mg tablet Take 1 tablet (0.6 mg total) by mouth every other day. 45 tablet 3     EUCALYPTUS OIL/MENTHOL (MENTHOL EUCALYPTUS MM) 1 Inhalation into each nostril as needed (home medication from [facility] [place][note 2].).        febuxostat (ULORIC) 80 mg Tab TAKE ONE TABLET BY MOUTH DAILY (Patient taking differently: Take 1 tablet by mouth daily. ) 90 tablet 0     HYDROcodone-acetaminophen 5-325 mg per tablet Take 1-2 tablets by mouth at bedtime as needed for pain.       lidocaine 4 % patch Once daily to neck as needed for pain. Remove and discard patch after 12 hours of use. 30 patch 0     lidocaine 4 % patch Once daily to right chest as needed for pain. Remove and discard patch after 12 hours of use. 30 patch 0     lisinopril (PRINIVIL,ZESTRIL) 10 MG tablet Take 1 tablet (10 mg total) by mouth 2 (two) times a day. 60 tablet 11     loratadine (CLARITIN) 10 mg tablet Take 10 mg by mouth daily.       meclizine (ANTIVERT) 12.5 mg tablet Take 12.5-25 mg by mouth every 8 (eight) hours as needed (one to two tablets).        metoprolol succinate (TOPROL-XL) 50 MG 24 hr tablet Take 50 mg by mouth daily.       multivitamin with minerals (THERA-M) 9 mg iron-400 mcg Tab tablet Take 1 tablet by mouth daily. 100 tablet 3     nitroglycerin (NITROSTAT) 0.4 MG SL tablet Place 1 tablet (0.4 mg total) under the tongue every 5 (five) minutes as needed for chest pain. 90 tablet 12     omeprazole (PRILOSEC) 20 MG capsule Take 1 capsule (20 mg total) by mouth daily. 30 capsule 11     sitaGLIPtin (JANUVIA) 25 MG tablet Take 1  "tablet (25 mg total) by mouth daily. 30 tablet 3     traZODone (DESYREL) 50 MG tablet Take 50 mg by mouth at bedtime as needed.        docusate sodium (COLACE) 100 MG capsule Take 1 capsule (100 mg total) by mouth 2 (two) times a day as needed for constipation. 60 capsule 4     gabapentin (NEURONTIN) 300 MG capsule Take 1-2 capsules (300-600 mg total) by mouth at bedtime. 180 capsule 1     No current facility-administered medications for this visit.      History   Smoking Status     Former Smoker     Quit date: 3/10/2000   Smokeless Tobacco     Former User       OBJECTICE: /80 (Patient Site: Left Arm, Patient Position: Sitting, Cuff Size: Adult Regular)  Pulse (!) 109  Temp 98.6  F (37  C) (Oral)   Resp 15  Ht 5' 1.5\" (1.562 m)  Wt 148 lb (67.1 kg)  SpO2 97%  BMI 27.51 kg/m2     No results found for this or any previous visit (from the past 24 hour(s)).     GEN-alert, appropriate, in no apparent distress   HEENT-oropharynx is clear, neck is supple with no palpable mass or lymphadenopathy   CV-regular rate and rhythm with no murmur   RESP-lungs clear to auscultation   ABDOMINAL-soft, mild diffuse tenderness, no palpable mass organomegaly   EXTREM-warm with no edema   SKIN-no vesicles or ulcers      Chucho Espino          "

## 2021-06-13 NOTE — PROGRESS NOTES
ASSESMENT AND PLAN:  Diagnoses and all orders for this visit:    CKD (chronic kidney disease) stage 3, GFR 30-59 ml/min  Reviewed hospitalization data and creatinine trend today with the patient.  Reviewed his treatment in the hospital with IV fluids and the importance of ongoing aggressive oral hydration in the future with the patient and his daughter with the help of a professional .    Essential hypertension with goal blood pressure less than 140/90  Currently well controlled.  Continue current medications.        SUBJECTIVE: 77-year-old male comes in today for follow-up on his emergency room visit.  He had gone in with weakness that was generalized as well as dizziness and lightheadedness.  He was found to be dehydrated and was treated with IV fluids.  His creatinine was checked and was consistent with his past creatinine range in the range of chronic kidney disease stage III.  Since discharge, there has been no vomiting, his strength has improved some.  Last visit, I had seen him for chest pain.  His chest pain has also improved.  He did have a normal EKG and chest x-ray as part of his emergency room evaluation.    Past Medical History:   Diagnosis Date     Acute blood loss anemia      Acute renal failure      Anemia      Bacteremia      Cataract      Chronic gout      CKD (chronic kidney disease)     Stage 3     GERD (gastroesophageal reflux disease)      Glucose intolerance (impaired glucose tolerance)      Gout      History of nephrolithiasis      Hyperlipidemia      Hypertension      Insomnia      Latent tuberculosis      Nephrolithiasis      Neuropathy      Osteoarthritis     wrist, knee, shoulder     Prostatitis      Rectal bleed      Trigger thumb      Patient Active Problem List   Diagnosis     Esophageal Reflux     Hyperlipidemia     Nephrolithiasis     Imaging Studies Nonspecific Abnormal Findings Skull And Head     Pseudogout     Latent Tuberculosis     Glucose Intolerance     Anemia      Generalized Osteoarthritis Of The Hand     Lower Back Pain     Lumbar Disc Degeneration     Insomnia     Chronic Gout     CKD (chronic kidney disease) stage 3, GFR 30-59 ml/min     Osteoarthritis Of The Knee     Hyperglycemia     Localized Primary Osteoarthritis Of The Left Foot 1st MTP Joint     Right shoulder tendonitis     Elevated PSA     Prostate nodule     Cataracts, bilateral     Rectal bleed     Acute renal failure superimposed on stage 3 chronic kidney disease     Acute blood loss anemia     Discharge planning issues     Acute pain of left wrist     Chest pain     Constipation, unspecified constipation type     Bilateral chronic knee pain     Chronic right shoulder pain     Essential hypertension with goal blood pressure less than 140/90     History of prostatitis     BPH (benign prostatic hypertrophy)     Chronic gout     Right shoulder tendinitis     Current Outpatient Prescriptions   Medication Sig Dispense Refill     acetaminophen (Q-PAP EXTRA STRENGTH) 500 MG tablet TAKE 1 TABLET BY MOUTH EVERY 4-6 HOURS AS NEEDED FOR PAIN (Patient taking differently: Take 500 mg by mouth every 4 (four) hours as needed. TAKE 1 TABLET BY MOUTH EVERY 4-6 HOURS AS NEEDED FOR PAIN) 180 tablet 3     aspirin 81 MG EC tablet Take 81 mg by mouth daily.       atorvastatin (LIPITOR) 40 MG tablet Take 1 tablet (40 mg total) by mouth bedtime. 90 tablet 3     codeine-guaiFENesin (GUAIFENESIN AC)  mg/5 mL liquid Take 5 mL by mouth 3 (three) times a day as needed for cough.       colchicine (COLCRYS) 0.6 mg tablet Take 1 tablet (0.6 mg total) by mouth every other day. 45 tablet 3     EUCALYPTUS OIL/MENTHOL (MENTHOL EUCALYPTUS MM) 1 Inhalation into each nostril as needed (home medication from [facility]. Pt has it with him).       febuxostat (ULORIC) 80 mg Tab TAKE ONE TABLET BY MOUTH DAILY (Patient taking differently: Take 1 tablet by mouth daily. TAKE ONE TABLET BY MOUTH DAILY) 90 tablet 0     gabapentin (NEURONTIN) 300 MG  "capsule Take 300 mg by mouth at bedtime as needed (one or two caps).       HYDROcodone-acetaminophen 5-325 mg per tablet Take 1-2 tablets by mouth at bedtime as needed for pain.       lisinopril (PRINIVIL,ZESTRIL) 10 MG tablet Take 1 tablet (10 mg total) by mouth 2 (two) times a day. 60 tablet 11     meclizine (ANTIVERT) 12.5 mg tablet Take 12.5 mg by mouth 3 (three) times a day as needed (one to two tablets).       metoprolol succinate (TOPROL-XL) 50 MG 24 hr tablet Take 50 mg by mouth daily.       multivitamin with minerals (THERA-M) 9 mg iron-400 mcg Tab tablet Take 1 tablet by mouth daily. 100 tablet 3     nitroglycerin (NITROSTAT) 0.4 MG SL tablet Place 1 tablet (0.4 mg total) under the tongue every 5 (five) minutes as needed for chest pain. 90 tablet 12     omeprazole (PRILOSEC) 20 MG capsule Take 1 capsule (20 mg total) by mouth daily. 30 capsule 11     traZODone (DESYREL) 50 MG tablet Take 50 mg by mouth at bedtime as needed.        No current facility-administered medications for this visit.      History   Smoking Status     Former Smoker     Quit date: 3/10/2000   Smokeless Tobacco     Former User       OBJECTICE: /82 (Patient Site: Right Arm, Patient Position: Sitting, Cuff Size: Adult Regular)  Pulse 80  Temp 98.4  F (36.9  C) (Oral)   Resp 16  Ht 5' 1.5\" (1.562 m)  Wt 153 lb 8 oz (69.6 kg)  BMI 28.53 kg/m2     No results found for this or any previous visit (from the past 24 hour(s)).     GEN-alert, appropriate, in no apparent distress   HEENT-mucous membranes are moist   CV-regular rate and rhythm with no murmur   RESP-lungs clear to auscultation   EXTREM-warm with no edema       Chucho P [place]          "

## 2021-06-13 NOTE — TELEPHONE ENCOUNTER
RN cannot approve Refill Request    RN can NOT refill this medication Protocol failed and NO refill given. Last office visit: 9/28/2020 Chucho Espino MD Last Physical: 9/16/2019 Last MTM visit: Visit date not found Last visit same specialty: 9/28/2020 Chucho Espino MD.  Next visit within 3 mo: Visit date not found  Next physical within 3 mo: Visit date not found      Salome Castaneda, [facility] Triage/Med Refill 11/21/2020    Requested Prescriptions   Pending Prescriptions Disp Refills     acetaminophen (TYLENOL) 500 MG tablet [Pharmacy Med Name: ACETAMINOPHEN 500MG E/S CAPLETS] 120 tablet 6     Sig: TAKE 1 TABLET BY MOUTH EVERY 6 HOURS AS NEEDED       There is no refill protocol information for this order

## 2021-06-14 NOTE — TELEPHONE ENCOUNTER
RN cannot approve Refill Request    RN can NOT refill this medication Protocol failed and NO refill given. Last office visit: 9/28/2020 Chucho Espino MD Last Physical: 9/16/2019 Last MTM visit: Visit date not found Last visit same specialty: 9/28/2020 Chucho Espino MD.  Next visit within 3 mo: Visit date not found  Next physical within 3 mo: Visit date not found      Salome Castaneda, [facility] Triage/Med Refill 1/12/2021    Requested Prescriptions   Pending Prescriptions Disp Refills     magnesium oxide (MAG-OX) 400 mg (241.3 mg magnesium) tablet [Pharmacy Med Name: MAG-OXIDE 400MG TABLETS] 30 tablet 11     Sig: TAKE 1 TABLET(400 MG) BY MOUTH DAILY       There is no refill protocol information for this order

## 2021-06-14 NOTE — TELEPHONE ENCOUNTER
Last office visit: 09/28/2020  Last ordered: 12/30/2020 #30, R-0  Last lab check:   Next appointment: 03/29/2021    Chart reviewed. Please review findings below.     Polyarthralgia  He takes occasional hydrocodone acetaminophen for breakthrough pain.  He uses about 30 tablets/month.  He has rheumatology follow-up scheduled for next month.  Refill-     HYDROcodone-acetaminophen 5-325 mg per tablet; TAKE 1-2 TABLETS BY MOUTH AT BEDTIME AS NEEDED FOR chronic PAIN  Dispense: 30 tablet; Refill: 0

## 2021-06-14 NOTE — TELEPHONE ENCOUNTER
Refill Approved    Rx renewed per Medication Renewal Policy. Medication was last renewed on 04/30/2020.  Last office visit was 09/28/2020 with PCP.    Ginger Vega, [facility] Triage/Med Refill 12/25/2020     Requested Prescriptions   Pending Prescriptions Disp Refills     omeprazole (PRILOSEC) 20 MG capsule [Pharmacy Med Name: OMEPRAZOLE 20MG CAPSULES] 90 capsule 2     Sig: TAKE 1 CAPSULE BY MOUTH DAILY       GI Medications Refill Protocol Passed - 12/22/2020  6:27 PM        Passed - PCP or prescribing provider visit in last 12 or next 3 months.     Last office visit with prescriber/PCP: 9/28/2020 Chucho Espino MD OR same dept: 9/28/2020 Chucho Espino MD OR same specialty: 9/28/2020 Chucho Espino MD  Last physical: 9/16/2019 Last MTM visit: Visit date not found   Next visit within 3 mo: Visit date not found  Next physical within 3 mo: Visit date not found  Prescriber OR PCP: Chucho Espino MD  Last diagnosis associated with med order: 1. Gastroesophageal reflux disease  - omeprazole (PRILOSEC) 20 MG capsule [Pharmacy Med Name: OMEPRAZOLE 20MG CAPSULES]; TAKE 1 CAPSULE BY MOUTH DAILY  Dispense: 90 capsule; Refill: 2    If protocol passes may refill for 12 months if within 3 months of last provider visit (or a total of 15 months).

## 2021-06-14 NOTE — TELEPHONE ENCOUNTER
Controlled Substance Refill Request  Medication Name:   Requested Prescriptions     Pending Prescriptions Disp Refills     HYDROcodone-acetaminophen 5-325 mg per tablet 30 tablet 0     Sig: TAKE 1-2 TABLETS BY MOUTH AT BEDTIME AS NEEDED FOR chronic PAIN     Date Last Fill: 12/1/2020  [facility]ed [facility]acy: [facility]  Submit electronically to pharmacy  Controlled Substance Agreement on file:   Encounter-Level CSA Scan Date:    There are no encounter-level csa scan date.        Last office visit:  9/28/2020

## 2021-06-14 NOTE — PROGRESS NOTES
ASSESMENT AND PLAN:  Diagnoses and all orders for this visit:    Coronary artery disease due to lipid rich plaque  Extensive medication reconciliation and review and counseling done today with the patient with the help of a professional .  It appears as though his missing medication is metoprolol.  He is going to go to the pharmacy to get that to get started on it this afternoon.  Because of previous hypotension, lisinopril has been discontinued.  -     metoprolol succinate (TOPROL-XL) 50 MG 24 hr tablet; Take 1 tablet (50 mg total) by mouth daily.  Dispense: 90 tablet; Refill: 3    Colitis  Clinically improving.  He had his follow-up colonoscopy successfully completed but the colonoscopy report and biopsy results are not yet available.  He has follow-up scheduled with gastroenterology.    Pain of left calf  Improving.  Likely muscular.  Reviewed the results of his ultrasound which was negative for DVT.    Hypomagnesemia  400 mg magnesium oxide once daily.        SUBJECTIVE: 77-year-old male comes in today for follow-up.  Since his last visit, his magnesium level did come back significantly diminished although improved compared to when he was in the hospital.  Because it is still below the normal range we are starting magnesium replacement.  Patient's left calf pain has been improving.  The severe pain has resolved and now it is more of a mild to moderate crampy pain.  There is been no swelling.  No shortness of breath.  Previous clinic visit d-dimer was positive so an ultrasound had to be done and this was negative for clot.  Patient has a known history of coronary artery disease.  Patient reports that he does get occasional mild sharp brief chest pain that occurs at rest.  He has not been getting any chest pain triggered by exertion.  He believes that 1 of his cardiac medications is gone.  We reviewed all of his medications today.  He has nitroglycerin, he has not been using nitroglycerin.  He is  missing metoprolol.  He has his atorvastatin and aspirin.    Patient had a recent hospitalization for colitis.  He was able to complete his course of antibiotic therapy and had a follow-up colonoscopy last week but the results are not yet available.    Past Medical History:   Diagnosis Date     Acute blood loss anemia      Acute renal failure      Anemia      Bacteremia      Cataract      Chronic gout      CKD (chronic kidney disease)     Stage 3     DM2 (diabetes mellitus, type 2)      GERD (gastroesophageal reflux disease)      Glucose intolerance (impaired glucose tolerance)      Gout      History of nephrolithiasis      Hyperlipidemia      Hypertension      Insomnia      Latent tuberculosis      Nephrolithiasis      Neuropathy      Osteoarthritis     wrist, knee, shoulder     Prostatitis      Rectal bleed      Trigger thumb      Patient Active Problem List   Diagnosis     Esophageal reflux     Dyslipidemia     Nephrolithiasis     Imaging Studies Nonspecific Abnormal Findings Skull And Head     Pseudogout     Latent Tuberculosis     Glucose Intolerance     Anemia     Generalized Osteoarthritis Of The Hand     Lower Back Pain     Lumbar Disc Degeneration     Insomnia     Chronic Gout     CKD (chronic kidney disease) stage 3, GFR 30-59 ml/min     Osteoarthritis Of The Knee     Hyperglycemia     Localized Primary Osteoarthritis Of The Left Foot 1st MTP Joint     Right shoulder tendonitis     Elevated PSA     Prostate nodule     Cataracts, bilateral     Rectal bleed     Acute renal failure superimposed on stage 3 chronic kidney disease     Acute blood loss anemia     Discharge planning issues     Acute pain of left wrist     Chest pain     Constipation, unspecified constipation type     Bilateral chronic knee pain     Chronic right shoulder pain     Essential hypertension with goal blood pressure less than 140/90     History of prostatitis     BPH (benign prostatic hyperplasia)     Chronic gout     Right shoulder  tendinitis     Coronary artery disease due to lipid rich plaque     Right lower quadrant abdominal pain     Abnormal cardiovascular stress test     Type 2 diabetes mellitus without complication, without long-term current use of insulin     Sepsis     Colitis     Current Outpatient Prescriptions   Medication Sig Dispense Refill     acetaminophen (Q-PAP EXTRA STRENGTH) 500 MG tablet Take 1 tablet (500 mg total) by mouth every 6 (six) hours as needed. 120 tablet 6     aspirin 81 MG EC tablet Take 81 mg by mouth daily.       colchicine (COLCRYS) 0.6 mg tablet Take 1 tablet (0.6 mg total) by mouth every other day. 45 tablet 3     docusate sodium (COLACE) 100 MG capsule Take 1 capsule (100 mg total) by mouth 2 (two) times a day as needed for constipation. 60 capsule 4     EUCALYPTUS OIL/MENTHOL (MENTHOL EUCALYPTUS MM) 1 Inhalation into each nostril as needed (home medication from [facility]        febuxostat (ULORIC) 80 mg Tab Take 80 mg by mouth daily.       gabapentin (NEURONTIN) 300 MG capsule Take 1-2 capsules (300-600 mg total) by mouth at bedtime. 180 capsule 1     HYDROcodone-acetaminophen 5-325 mg per tablet Take 1-2 tablets by mouth at bedtime as needed for pain.       lidocaine 4 % patch Place 1 patch on the skin daily as needed for pain. Remove and discard patch with 12 hours or as directed by MD.       loratadine (CLARITIN) 10 mg tablet Take 10 mg by mouth daily.       magnesium oxide 400 mg cap Take 1 capsule by mouth daily. 30 each 11     meclizine (ANTIVERT) 12.5 mg tablet Take 12.5-25 mg by mouth every 8 (eight) hours as needed (one to two tablets).        metoprolol succinate (TOPROL-XL) 50 MG 24 hr tablet Take 1 tablet (50 mg total) by mouth daily. 90 tablet 3     multivitamin with minerals (THERA-M) 9 mg iron-400 mcg Tab tablet Take 1 tablet by mouth daily. 100 tablet 3     nitroglycerin (NITROSTAT) 0.4 MG SL tablet Place 1 tablet (0.4 mg total) under the tongue every 5 (five) minutes as needed for  "chest pain. 90 tablet 12     omeprazole (PRILOSEC) 20 MG capsule Take 1 capsule (20 mg total) by mouth daily. 30 capsule 11     sitaGLIPtin (JANUVIA) 25 MG tablet Take 1 tablet (25 mg total) by mouth daily. 30 tablet 3     traZODone (DESYREL) 50 MG tablet Take 25-50 mg by mouth at bedtime as needed.        atorvastatin (LIPITOR) 40 MG tablet Take 40 mg by mouth at bedtime.       No current facility-administered medications for this visit.      History   Smoking Status     Former Smoker     Quit date: 3/10/2000   Smokeless Tobacco     Former User       OBJECTICE: BP 92/64 (Patient Site: Left Arm, Patient Position: Sitting, Cuff Size: Adult Regular)  Pulse 68  Temp 98.1  F (36.7  C) (Oral)   Resp 20  Ht 5' 1\" (1.549 m)  Wt 144 lb 12 oz (65.7 kg)  BMI 27.35 kg/m2     No results found for this or any previous visit (from the past 24 hour(s)).     GEN-alert, appropriate, in no apparent distress   HEENT-mucous membranes are moist   CV-regular rate and rhythm with no murmur   RESP-lungs clear to auscultation   ABDOMINAL-soft, nontender, no palpable masses   EXTREM-warm with no edema   SKIN-no ulcers or vesicles      Chucho Espino          "

## 2021-06-14 NOTE — PROGRESS NOTES
ASSESSMENT AND PLAN:  1. Acute renal failure superimposed on stage 3 chronic kidney disease patient's creatinine was elevated and is discharged on 12/5/2017 is complaining of some back pain and rechecking a creatinine today.  Denies use of heavy doses of NSAIDs.  No hematuria noted recently admitted and treated for abdominal pain Basic Metabolic Panel   2. Abdominal pain which are related to right-sided colitis is seen his primary doctor for this, been experiencing abdominal pain and bloating and tenderness in the left lower quadrant of his abdomen for about a week exam unremarkable bowel sounds present Bentyl started as noted cholinergic side effects discussed. Basic Metabolic Panel       CHIEF COMPLAINT:  Chief Complaint   Patient presents with     Diarrhea     loose stool       HISTORY OF PRESENT ILLNESS:  Adam is a 77 y.o. male presenting with diarrhea. Adam is present with a Kayce  and niece.     Abdominal Pain: He has bowel urgency with frequency during the night. He notes that he will not have bowel movements during the day. He will sometimes have accidents if he is unable to make it to the bathroom.These symptoms have been present since his hospital discharge on 11/29/2017. Of note, an abdominal CT done on 11/24/2017 which revealed right-sided colitis. He has intermittent burning abdominal pain after eating.     Diabetes: He checks his blood sugars every 2-3 days. He states his last fasting blood sugar was in the 130s.     REVIEW OF SYSTEMS:   He endorses back pain.   All other 10 point review of systems are negative.    PFSH:  Pertinent past, family, social and medical history reviewed.     TOBACCO USE:  History   Smoking Status     Former Smoker     Quit date: 3/10/2000   Smokeless Tobacco     Former User       VITALS:  Vitals:    12/19/17 0941   BP: 120/84   Patient Site: Right Arm   Patient Position: Sitting   Cuff Size: Adult Regular   Pulse: 69   SpO2: 97%   Weight: 146 lb 2 oz (66.3 kg)      Wt Readings from Last 3 Encounters:   12/19/17 146 lb 2 oz (66.3 kg)   12/11/17 144 lb 12 oz (65.7 kg)   12/05/17 147 lb (66.7 kg)     Body mass index is 27.61 kg/(m^2).    PHYSICAL EXAM:  General: Alert, cooperative, no distress, appears stated age  Musculoskeletal: Back symmetric, no curvature, ROM normal, no CVA tenderness.  Lungs: Clear to auscultation bilaterally, respirations unlabored  Chest wall: No tenderness or deformity  Heart: Regular rate and rhythm, S1 and S2 normal, no murmur, rub, or gallop  CVS: No edema noted.   Abdomen: Bowel sounds active all four quadrants, Tenderness over left lower quadrant.   Neurologic: No tremor, no focal findings.     Psych: Oriented x3. Affect normal.     DATA REVIEWED:  Additional History from Old Records Summarized (2): Reviewed Dr. Espino' note from 12/8/2017 regarding leg pain.  Decision to Obtain Records (1): None  Radiology Tests Summarized or Ordered (1): Reviewed Abdominal CT from 11/24/2017.   Labs Reviewed or Ordered (1): None  Medicine Test Summarized or Ordered (1): Reviewed Venous US from 12/05/2017.   Independent Review of EKG or X-RAY(2 each): None    The visit lasted a total of 13 minutes face to face with the patient. Over 50% of the time was spent counseling and educating the patient about abdominal pain.     I, Laura Haddad, am scribing for and in the presence of, Dr. Kaur.    I,chirag kaur, personally performed the services described in this documentation, as scribed by Laura Haddad in my presence, and it is both accurate and complete.      MEDICATIONS:  Current Outpatient Prescriptions   Medication Sig Dispense Refill     acetaminophen (Q-PAP EXTRA STRENGTH) 500 MG tablet Take 1 tablet (500 mg total) by mouth every 6 (six) hours as needed. 120 tablet 6     aspirin 81 MG EC tablet Take 81 mg by mouth daily.       atorvastatin (LIPITOR) 40 MG tablet Take 40 mg by mouth at bedtime.       colchicine (COLCRYS) 0.6 mg tablet Take 1 tablet  (0.6 mg total) by mouth every other day. 45 tablet 3     docusate sodium (COLACE) 100 MG capsule Take 1 capsule (100 mg total) by mouth 2 (two) times a day as needed for constipation. 60 capsule 4     EUCALYPTUS OIL/MENTHOL (MENTHOL EUCALYPTUS MM) 1 Inhalation into each nostril as needed (home medication from [facility]        febuxostat (ULORIC) 80 mg Tab Take 80 mg by mouth daily.       gabapentin (NEURONTIN) 300 MG capsule Take 1-2 capsules (300-600 mg total) by mouth at bedtime. 180 capsule 1     HYDROcodone-acetaminophen 5-325 mg per tablet Take 1-2 tablets by mouth at bedtime as needed for pain.       lidocaine 4 % patch Place 1 patch on the skin daily as needed for pain. Remove and discard patch with 12 hours or as directed by MD.       loratadine (CLARITIN) 10 mg tablet Take 10 mg by mouth daily.       magnesium oxide 400 mg cap Take 1 capsule by mouth daily. 30 each 11     meclizine (ANTIVERT) 12.5 mg tablet Take 12.5-25 mg by mouth every 8 (eight) hours as needed (one to two tablets).        metoprolol succinate (TOPROL-XL) 50 MG 24 hr tablet Take 1 tablet (50 mg total) by mouth daily. 90 tablet 3     multivitamin with minerals (THERA-M) 9 mg iron-400 mcg Tab tablet Take 1 tablet by mouth daily. 100 tablet 3     nitroglycerin (NITROSTAT) 0.4 MG SL tablet Place 1 tablet (0.4 mg total) under the tongue every 5 (five) minutes as needed for chest pain. 90 tablet 12     omeprazole (PRILOSEC) 20 MG capsule Take 1 capsule (20 mg total) by mouth daily. 30 capsule 11     sitaGLIPtin (JANUVIA) 25 MG tablet Take 1 tablet (25 mg total) by mouth daily. 30 tablet 3     traZODone (DESYREL) 50 MG tablet Take 25-50 mg by mouth at bedtime as needed.        No current facility-administered medications for this visit.        Total Data Points: 4

## 2021-06-14 NOTE — PROGRESS NOTES
ASSESMENT AND PLAN:  Diagnoses and all orders for this visit:    Colitis  Reviewed hospital discharge summary and relevant studies from his recent hospitalization.  This includes a CT scan which did show colitis.  Patient was discharged on quinolone antibiotic and metronidazole.  He is taking the medications as prescribed, he has a few doses left of the antibiotics and will continue them to completion.  Medication reconciliation and review and counseling done today with the patient and his daughter with the help of a professional .  He has a colonoscopy scheduled for later this week and starts the prep tonight.    Epigastric pain  Continue omeprazole.  He has EGD scheduled for 1 month from now, please see gastroenterology consultation for details.    Hypotension  -     Basic Metabolic Panel; Future  -     Basic Metabolic Panel  Given his recent acute illness and current hypotension, we decided to discontinue his lisinopril, will decide to add it back potentially after his EGD in January.    Pain of left calf  -     D-dimer, Quantitative  -     Basic Metabolic Panel; Future  -     acetaminophen (Q-PAP EXTRA STRENGTH) 500 MG tablet; Take 1 tablet (500 mg total) by mouth every 6 (six) hours as needed.  Dispense: 120 tablet; Refill: 6  -     Basic Metabolic Panel  -     US Venous Leg Left; Future; Expected date: 12/5/17    Positive D dimer  -     US Venous Leg Left; Future; Expected date: 12/5/17  Given his left calf pain, recent hospitalization risk factor, and positive d-dimer, we are getting a ultrasound of his leg to evaluate for left DVT scheduled for tomorrow.    Hypomagnesemia  Reviewed his recent hospital data, magnesium level was low at 1.1.  He got IV replacement.  -     Magnesium          SUBJECTIVE: 77-year-old male here with his daughter and a professional  for hospital follow-up.  Several concerns.  Chiefly his left calf pain.  Patient reports that it started with some mild left calf  pain when he was still in the hospital but got progressively worse and today it has been severe.  No associated swelling.  The pain is sharp and there all of the time but does get worse when he is more active on his feet.  Patient was recently discharged after hospitalization for sepsis and colitis.  He has been taking both antibiotics, he just finished the quinolone and has a couple of days left on his metronidazole.  He has been tolerating the medications well.  His diarrhea has improved significantly.  He still getting about 3 loose stools per day but they are much less loose and liquidy than they were before.  He has had one episode where he had some blood on the tissue with wiping but no blood mixed in with the stool.  He continues to get a burning moderate epigastric abdominal pain.  This had been evaluated in the past as well and he did have gastroenterology consultation and colonoscopy was recommended for later this week to follow-up on the colitis and EGD recommended for early next month to follow-up on the epigastric pain.  Patient's been feeling some generalized weakness and lightheadedness since discharge from the hospital.  No fever or vomiting.    Review of systems: No exertional chest pain, no shortness of breath, no ankle edema, no near syncope or syncope but he does have lightheadedness.  Remainder review of systems is as above or negative.    Past Medical History:   Diagnosis Date     Acute blood loss anemia      Acute renal failure      Anemia      Bacteremia      Cataract      Chronic gout      CKD (chronic kidney disease)     Stage 3     DM2 (diabetes mellitus, type 2)      GERD (gastroesophageal reflux disease)      Glucose intolerance (impaired glucose tolerance)      Gout      History of nephrolithiasis      Hyperlipidemia      Hypertension      Insomnia      Latent tuberculosis      Nephrolithiasis      Neuropathy      Osteoarthritis     wrist, knee, shoulder     Prostatitis      Rectal  bleed      Trigger thumb      Patient Active Problem List   Diagnosis     Esophageal reflux     Dyslipidemia     Nephrolithiasis     Imaging Studies Nonspecific Abnormal Findings Skull And Head     Pseudogout     Latent Tuberculosis     Glucose Intolerance     Anemia     Generalized Osteoarthritis Of The Hand     Lower Back Pain     Lumbar Disc Degeneration     Insomnia     Chronic Gout     CKD (chronic kidney disease) stage 3, GFR 30-59 ml/min     Osteoarthritis Of The Knee     Hyperglycemia     Localized Primary Osteoarthritis Of The Left Foot 1st MTP Joint     Right shoulder tendonitis     Elevated PSA     Prostate nodule     Cataracts, bilateral     Rectal bleed     Acute renal failure superimposed on stage 3 chronic kidney disease     Acute blood loss anemia     Discharge planning issues     Acute pain of left wrist     Chest pain     Constipation, unspecified constipation type     Bilateral chronic knee pain     Chronic right shoulder pain     Essential hypertension with goal blood pressure less than 140/90     History of prostatitis     BPH (benign prostatic hyperplasia)     Chronic gout     Right shoulder tendinitis     Coronary artery disease due to lipid rich plaque     Right lower quadrant abdominal pain     Abnormal cardiovascular stress test     Type 2 diabetes mellitus without complication, without long-term current use of insulin     Sepsis     Colitis     Current Outpatient Prescriptions   Medication Sig Dispense Refill     aspirin 81 MG EC tablet Take 81 mg by mouth daily.       atorvastatin (LIPITOR) 40 MG tablet Take 40 mg by mouth at bedtime.       colchicine (COLCRYS) 0.6 mg tablet Take 1 tablet (0.6 mg total) by mouth every other day. 45 tablet 3     docusate sodium (COLACE) 100 MG capsule Take 1 capsule (100 mg total) by mouth 2 (two) times a day as needed for constipation. 60 capsule 4     EUCALYPTUS OIL/MENTHOL (MENTHOL EUCALYPTUS MM) 1 Inhalation into each nostril as needed (home medication  "from [place].).        febuxostat (ULORIC) 80 mg Tab Take 80 mg by mouth daily.       gabapentin (NEURONTIN) 300 MG capsule Take 1-2 capsules (300-600 mg total) by mouth at bedtime. 180 capsule 1     HYDROcodone-acetaminophen 5-325 mg per tablet Take 1-2 tablets by mouth at bedtime as needed for pain.       lidocaine 4 % patch Place 1 patch on the skin daily as needed for pain. Remove and discard patch with 12 hours or as directed by MD.       loratadine (CLARITIN) 10 mg tablet Take 10 mg by mouth daily.       meclizine (ANTIVERT) 12.5 mg tablet Take 12.5-25 mg by mouth every 8 (eight) hours as needed (one to two tablets).        metoprolol succinate (TOPROL-XL) 50 MG 24 hr tablet Take 50 mg by mouth daily.       metroNIDAZOLE (FLAGYL) 500 MG tablet Take 1 tablet (500 mg total) by mouth 3 (three) times a day for 7 days. 21 tablet 0     multivitamin with minerals (THERA-M) 9 mg iron-400 mcg Tab tablet Take 1 tablet by mouth daily. 100 tablet 3     nitroglycerin (NITROSTAT) 0.4 MG SL tablet Place 1 tablet (0.4 mg total) under the tongue every 5 (five) minutes as needed for chest pain. 90 tablet 12     omeprazole (PRILOSEC) 20 MG capsule Take 1 capsule (20 mg total) by mouth daily. 30 capsule 11     sitaGLIPtin (JANUVIA) 25 MG tablet Take 1 tablet (25 mg total) by mouth daily. 30 tablet 3     traZODone (DESYREL) 50 MG tablet Take 25-50 mg by mouth at bedtime as needed.        acetaminophen (Q-PAP EXTRA STRENGTH) 500 MG tablet Take 1 tablet (500 mg total) by mouth every 6 (six) hours as needed. 120 tablet 6     No current facility-administered medications for this visit.      History   Smoking Status     Former Smoker     Quit date: 3/10/2000   Smokeless Tobacco     Former User       OBJECTICE: BP (!) 84/58 (Patient Site: Right Arm, Patient Position: Sitting, Cuff Size: Adult Regular)  Pulse 94  Temp 97.9  F (36.6  C) (Oral)   Resp 16  Ht 5' 1\" (1.549 m)  Wt 147 lb (66.7 kg)  SpO2 96%  BMI 27.78 kg/m2     Recent " Results (from the past 24 hour(s))   D-dimer, Quantitative    Collection Time: 12/05/17  2:44 PM   Result Value Ref Range    D-Dimer, Quant 0.75 (H) <=0.50 FEU ug/mL        GEN-alert, appropriate, in no apparent distress   HEENT-mucous membranes are moist, sclera are clear, neck is supple   CV-regular rate and rhythm with no murmur   RESP-lungs clear to auscultation   ABDOMINAL-soft, diffuse mild tenderness to deep palpation, no palpable mass, no guarding or rebound.   EXTREM-warm with no ankle edema, he does have some moderate left calf tenderness to palpation.   SKIN-no ulcers or vesicles   Neurologic-strength and sensation are symmetric, cranial nerves II through XII are intact.      Chucho Espino

## 2021-06-14 NOTE — TELEPHONE ENCOUNTER
Refill Request  Did you contact pharmacy: No  Medication name:   Requested Prescriptions      No prescriptions requested or ordered in this encounter     Who prescribed the medication: PCP  Requested [facility]acy: [facility]  Is patient out of medication: No.  1 days left  Patient notified refills processed in 3 business days:  yes  Okay to leave a detailed message: yes

## 2021-06-14 NOTE — TELEPHONE ENCOUNTER
"Daughter Dimitri monteiro.     Stating she requested refill of Hydrocodone for patient on 1/28/21.    Patient is currently out of medication. Requesting refill for today.    HYDROcodone-acetaminophen 5-325 mg per tablet 30 tablet tablets.     Last filled 12/30/21.    Emily Logan RN  [facility]      COVID 19 Nurse Triage Plan/Patient Instructions    Please be aware that novel coronavirus (COVID-19) may be circulating in the community. If you develop symptoms such as fever, cough, or SOB or if you have concerns about the presence of another infection including coronavirus (COVID-19), please contact your health care provider or visit [email or web address].     Disposition/Instructions    Additional COVID19 information to add for patients.   How can I protect others?  If you have symptoms (fever, cough, body aches or trouble breathing): Stay home and away from others (self-isolate) until:    At least 10 days have passed since your symptoms started, And     You ve had no fever--and no medicine that reduces fever--for 1 full day (24 hours), And      Your other symptoms have resolved (gotten better).     If you don t have symptoms, but a test showed that you have COVID-19 (you tested positive):    Stay home and away from others (self-isolate). Follow the tips under \"How do I self-isolate?\" below for 10 days (20 days if you have a weak immune system).    You don't need to be retested for COVID-19 before going back to school or work. As long as you're fever-free and feeling better, you can go back to school, work and other activities after waiting the 10 or 20 days.     How do I self-isolate?    Stay in your own room, even for meals. Use your own bathroom if you can.     Stay away from others in your home. No hugging, kissing or shaking hands. No visitors.    Don t go to work, school or anywhere else.     Clean  high touch  surfaces often (doorknobs, counters, handles, etc.). Use a household cleaning spray or " wipes. You ll find a full list on the EPA website:  www.epa.gov/pesticide-registration/list-n-disinfectants-use-against-sars-cov-2.    Cover your mouth and nose with a mask, tissue or washcloth to avoid spreading germs.    Wash your hands and face often. Use soap and water.    Caregivers in these groups are at risk for severe illness due to COVID-19:  o People 65 years and older  o People who live in a nursing home or long-term care facility  o People with chronic disease (lung, heart, cancer, diabetes, kidney, liver, immunologic)  o People who have a weakened immune system, including those who:  - Are in cancer treatment  - Take medicine that weakens the immune system, such as corticosteroids  - Had a bone marrow or organ transplant  - Have an immune deficiency  - Have poorly controlled HIV or AIDS  - Are obese (body mass index of 40 or higher)  - Smoke regularly    Caregivers should wear gloves while washing dishes, handling laundry and cleaning bedrooms and bathrooms.    Use caution when washing and drying laundry: Don t shake dirty laundry, and use the warmest water setting that you can.    For more tips, go to www.cdc.gov/coronavirus/2019-ncov/downloads/10Things.pdf.    How can I take care of myself?  1. Get lots of rest. Drink extra fluids (unless a doctor has told you not to).     2. Take Tylenol (acetaminophen) for fever or pain. If you have liver or kidney problems, ask your family doctor if it s okay to take Tylenol.     Adults can take either:     650 mg (two 325 mg pills) every 4 to 6 hours, or     1,000 mg (two 500 mg pills) every 8 hours as needed.     Note: Don t take more than 3,000 mg in one day.   Acetaminophen is found in many medicines (both prescribed and over-the-counter medicines). Read all labels to be sure you don t take too much.     For children, check the Tylenol bottle for the right dose. The dose is based on the child s age or weight.    3. If you have other health problems (like  cancer, heart failure, an organ transplant or severe kidney disease): Call your specialty clinic if you don t feel better in the next 2 days.    4. Know when to call 911: Emergency warning signs include:    Trouble breathing or shortness of breath    Pain or pressure in the chest that doesn t go away    Feeling confused like you haven t felt before, or not being able to wake up    Bluish-colored lips or face    What are the symptoms of COVID-19?     The most common symptoms are cough, fever and trouble breathing.     Less common symptoms include body aches, chills, diarrhea (loose, watery poops), fatigue (feeling very tired), headache, runny nose, sore throat and loss of smell.    COVID-19 can cause severe coughing (bronchitis) and lung infection (pneumonia).    How does it spread?     The virus may spread when a person coughs or sneezes into the air. The virus can travel about 6 feet this way, and it can live on surfaces.      Common  (household disinfectants) will kill the virus.    Who is at risk?  Anyone can catch COVID-19 if they re around someone who has the virus.    How can others protect themselves?     Stay away from people who have COVID-19 (or symptoms of COVID-19).    Wash hands often with soap and water. Or, use hand  with at least 60% alcohol.    Avoid touching the eyes, nose or mouth.     Wear a face mask when you go out in public, when sick or when caring for a sick person.    Where can I get more information?    [facility]: About COVID-19: [email or web address]    CDC: What to Do If You re Sick: www.cdc.gov/coronavirus/2019-ncov/about/steps-when-sick.html    CDC: Ending Home Isolation: www.cdc.gov/coronavirus/2019-ncov/hcp/disposition-in-home-patients.html     CDC: Caring for Someone: www.cdc.gov/coronavirus/2019-ncov/if-you-are-sick/care-for-someone.html     MD: Interim Guidance for Hospital Discharge to Home:  [email or web address].[email or web address]/coronavirus/hcp/hospdischarge.pdf    [facility] clinical trials (COVID-19 research studies): [email or web address]-clinical-trials     Below are the COVID-19 hotlines at the [place] Department of [employer] ([facility]). Interpreters are available.   o For health questions: Call 324-111-3621 or 1-477.611.3507 (7 a.m. to 7 p.m.)  o For questions about schools and childcare: Call 988-825-4977 or 1-518.184.6452 (7 a.m. to 7 p.m.)              Thank you for taking steps to prevent the spread of this virus.  o Limit your contact with others.  o Wear a simple mask to cover your cough.  o Wash your hands well and often.    Resources    [facility]: About COVID-19: [email or web address]    CDC: What to Do If You're Sick: www.cdc.gov/coronavirus/2019-ncov/about/steps-when-sick.html    CDC: Ending Home Isolation: www.cdc.gov/coronavirus/2019-ncov/hcp/disposition-in-home-patients.html     CDC: Caring for Someone: www.cdc.gov/coronavirus/2019-ncov/if-you-are-sick/care-for-someone.html     [facility]: Interim Guidance for Hospital Discharge to Home: www.[email or web address]./diseases/coronavirus/hcp/hospdischarge.pdf    [facility] clinical trials (COVID-19 research studies): [email or web address].[facility]-clinical-trials     Below are the COVID-19 hotlines at the [place] [facility] ([facility]). Interpreters are available.   o For health questions: Call 524-824-8651 or 1-835.123.6048 (7 a.m. to 7 p.m.)  o For questions about schools and childcare: Call 608-295-8663 or 1-188.392.3209 (7 a.m. to 7 p.m.)     Reason for Disposition    Caller requesting a NON-URGENT new prescription or refill and triager unable to refill per department policy    Additional Information    Negative: Drug overdose and triager unable to answer question    Negative: Caller requesting information unrelated to medicine    Negative: Caller requesting a prescription for Strep throat and has a  positive culture result    Negative: Rash while taking a medication or within 3 days of stopping it    Negative: Immunization reaction suspected    Negative: Asthma and having symptoms of asthma (cough, wheezing, etc.)    Negative: Breastfeeding questions about mother's medicines and diet    Negative: MORE THAN A DOUBLE DOSE of a prescription or over-the-counter (OTC) drug    Negative: DOUBLE DOSE (an extra dose or lesser amount) of over-the-counter (OTC) drug and any symptoms (e.g., dizziness, nausea, pain, sleepiness)    Negative: DOUBLE DOSE (an extra dose or lesser amount) of prescription drug and any symptoms (e.g., dizziness, nausea, pain, sleepiness)    Negative: Took another person's prescription drug    Negative: DOUBLE DOSE (an extra dose or lesser amount) of prescription drug and NO symptoms (Exception: a double dose of antibiotics)    Negative: Diabetes drug error or overdose (e.g., took wrong type of insulin or took extra dose)    Negative: Caller has medication question about med not prescribed by PCP and triager unable to answer question (e.g., compatibility with other med, storage)    Negative: Request for URGENT new prescription or refill of 'essential' medication (i.e., likelihood of harm to patient if not taken) and triager unable to fill per department policy    Negative: Prescription not at pharmacy and was prescribed today by PCP    Negative: Pharmacy calling with prescription questions and triager unable to answer question    Negative: Caller has URGENT medication question about med that PCP prescribed and triager unable to answer question    Negative: Caller has NON-URGENT medication question about med that PCP prescribed and triager unable to answer question    Protocols used: MEDICATION QUESTION CALL-A-[place]

## 2021-06-14 NOTE — TELEPHONE ENCOUNTER
Refill Approved    Rx renewed per Medication Renewal Policy. Medication was last renewed on 4/6/20, last OV 9/28/20.    Salome Castaneda, [facility] Triage/Med Refill 1/23/2021     Requested Prescriptions   Pending Prescriptions Disp Refills     metoprolol succinate (TOPROL-XL) 50 MG 24 hr tablet [Pharmacy Med Name: METOPROLOL ER SUCCINATE 50MG TABS] 90 tablet 2     Sig: TAKE 1 TABLET(50 MG) BY MOUTH DAILY       Beta-Blockers Refill Protocol Passed - 1/23/2021  4:00 AM        Passed - PCP or prescribing provider visit in past 12 months or next 3 months     Last office visit with prescriber/PCP: 9/28/2020 Chucho Espino MD OR same dept: 9/28/2020 Chucho Espino MD OR same specialty: 9/28/2020 Chucho Espino MD  Last physical: 9/16/2019 Last MTM visit: Visit date not found   Next visit within 3 mo: Visit date not found  Next physical within 3 mo: Visit date not found  Prescriber OR PCP: Chucho Espino MD  Last diagnosis associated with med order: 1. Coronary artery disease due to lipid rich plaque  - metoprolol succinate (TOPROL-XL) 50 MG 24 hr tablet [Pharmacy Med Name: METOPROLOL ER SUCCINATE 50MG TABS]; TAKE 1 TABLET(50 MG) BY MOUTH DAILY  Dispense: 90 tablet; Refill: 2    If protocol passes may refill for 12 months if within 3 months of last provider visit (or a total of 15 months).             Passed - Blood pressure filed in past 12 months     BP Readings from Last 1 Encounters:   09/28/20 122/80                             Detail Level: Simple Counseling: I discussed the itch-scratch cycle. I explained that scratching will lead to more itching and this cycle becomes self perpetuating. I discussed methods to prevent scratching in an effort to stop the patient's itching.

## 2021-06-15 PROBLEM — M25.511 CHRONIC RIGHT SHOULDER PAIN: Status: ACTIVE | Noted: 2017-02-21

## 2021-06-15 PROBLEM — G89.29 CHRONIC RIGHT SHOULDER PAIN: Status: ACTIVE | Noted: 2017-02-21

## 2021-06-15 PROBLEM — I10 ESSENTIAL HYPERTENSION WITH GOAL BLOOD PRESSURE LESS THAN 140/90: Status: ACTIVE | Noted: 2017-03-21

## 2021-06-15 NOTE — TELEPHONE ENCOUNTER
Refill Approved    Rx renewed per Medication Renewal Policy. Medication was last renewed on 10/14/19.    Al Araiza, [facility] Triage/Med Refill 2/17/2021     Requested Prescriptions   Pending Prescriptions Disp Refills     loratadine (CLARITIN) 10 mg tablet [Pharmacy Med Name: LORATADINE 10MG TABLETS] 30 tablet 6     Sig: TAKE 1 TABLET BY MOUTH EVERY DAY       Antihistamine Refill Protocol Passed - 2/16/2021  6:05 PM        Passed - Patient has had office visit/physical in last year     Last office visit with prescriber/PCP: 9/28/2020 Chucho Espino MD OR same dept: 9/28/2020 Chucho Espino MD OR same specialty: 9/28/2020 Chucho Espino MD  Last physical: 9/16/2019 Last MTM visit: Visit date not found   Next visit within 3 mo: Visit date not found  Next physical within 3 mo: Visit date not found  Prescriber OR PCP: Chucho Espino MD  Last diagnosis associated with med order: 1. Chronic rhinitis  - loratadine (CLARITIN) 10 mg tablet [Pharmacy Med Name: LORATADINE 10MG TABLETS]; TAKE 1 TABLET BY MOUTH EVERY DAY  Dispense: 30 tablet; Refill: 6    If protocol passes may refill for 12 months if within 3 months of last provider visit (or a total of 15 months).

## 2021-06-15 NOTE — TELEPHONE ENCOUNTER
Refill Approved    Rx renewed per Medication Renewal Policy. Medication was last renewed on 4/30/20.    Al Araiza, [facility] Triage/Med Refill 2/4/2021     Requested Prescriptions   Pending Prescriptions Disp Refills     gabapentin (NEURONTIN) 300 MG capsule [Pharmacy Med Name: GABAPENTIN 300MG CAPSULES] 180 capsule 2     Sig: TAKE 1 TO 2 CAPSULES BY MOUTH AT BEDTIME       Gabapentin/Levetiracetam/Tiagabine Refill Protocol  Passed - 2/4/2021  4:01 AM        Passed - PCP or prescribing provider visit in past 12 months or next 3 months     Last office visit with prescriber/PCP: 9/28/2020 Chucho Espino MD OR same dept: 9/28/2020 Chucho Espino MD OR same specialty: 9/28/2020 Chucho Espino MD  Last physical: 9/16/2019 Last MTM visit: Visit date not found   Next visit within 3 mo: Visit date not found  Next physical within 3 mo: Visit date not found  Prescriber OR PCP: Chucho Espino MD  Last diagnosis associated with med order: 1. Lower Back Pain  - gabapentin (NEURONTIN) 300 MG capsule [Pharmacy Med Name: GABAPENTIN 300MG CAPSULES]; TAKE 1 TO 2 CAPSULES BY MOUTH AT BEDTIME  Dispense: 180 capsule; Refill: 2    If protocol passes may refill for 12 months if within 3 months of last provider visit (or a total of 15 months).

## 2021-06-15 NOTE — TELEPHONE ENCOUNTER
RN cannot approve Refill Request    RN can NOT refill this medication PCP messaged that patient is overdue for Labs. Last office visit: 9/28/2020 Chucho Espino MD Last Physical: 9/16/2019 Last MTM visit: Visit date not found Last visit same specialty: 9/28/2020 Chucho Espino MD.  Next visit within 3 mo: Visit date not found  Next physical within 3 mo: Visit date not found      Salome Castaneda, [facility] Triage/Med Refill 2/13/2021    Requested Prescriptions   Pending Prescriptions Disp Refills     allopurinoL (ZYLOPRIM) 100 MG tablet [Pharmacy Med Name: ALLOPURINOL 100MG TABLETS] 180 tablet 7     Sig: TAKE 2 TABLETS BY MOUTH EVERY DAY       Allopurinol/Febuxostat Refill Protocol  Failed - 2/13/2021  4:00 AM        Failed - LFT or AST or ALT in last 12 months     Albumin   Date Value Ref Range Status   01/23/2020 4.0 3.5 - 5.0 g/dL Final     Bilirubin, Total   Date Value Ref Range Status   09/16/2019 0.4 0.0 - 1.0 mg/dL Final     Bilirubin, Direct   Date Value Ref Range Status   10/17/2017 0.2 <=0.5 mg/dL Final     Alkaline Phosphatase   Date Value Ref Range Status   09/16/2019 122 (H) 45 - 120 U/L Final     AST   Date Value Ref Range Status   09/16/2019 24 0 - 40 U/L Final     ALT   Date Value Ref Range Status   01/23/2020 49 (H) 0 - 45 U/L Final     Protein, Total   Date Value Ref Range Status   09/16/2019 8.0 6.0 - 8.0 g/dL Final                Passed - Visit with PCP or prescribing provider visit in past 12 months     Last office visit with prescriber/PCP: 9/28/2020 Chucho Espino MD OR same dept: 9/28/2020 Chucho Espino MD OR same specialty: 9/28/2020 hCucho Espino MD  Last physical: 9/16/2019 Last MTM visit: Visit date not found   Next visit within 3 mo: Visit date not found  Next physical within 3 mo: Visit date not found  Prescriber OR PCP: Chucho Espino MD  Last diagnosis associated with med order: 1. Chronic gout of multiple sites, unspecified cause  - allopurinoL (ZYLOPRIM) 100 MG tablet  [Pharmacy Med Name: ALLOPURINOL 100MG TABLETS]; TAKE 2 TABLETS BY MOUTH EVERY DAY  Dispense: 180 tablet; Refill: 7    If protocol passes may refill for 12 months if within 3 months of last provider visit (or a total of 15 months).

## 2021-06-15 NOTE — PROGRESS NOTES
ASSESSMENT AND PLAN:  Adam Talamantes 80 y.o. male is seen here on 03/10/21 for follow-up.  He has polyarthralgia in association with osteoarthritis, triggering of the right middle finger, he has found duloxetine is helpful, he would like to proceed with local injection this is discussed via the  on phone line, 20 mg of Kenalog injected the flexor tendon sheath of the right middle finger A1 level, tolerated the procedure well.  Follow-up in 3 months.        .  Diagnoses and all orders for this visit:    Primary osteoarthritis involving multiple joints  -     DULoxetine (CYMBALTA) 20 MG capsule; Take 1 capsule (20 mg total) by mouth 2 (two) times a day.  Dispense: 180 capsule; Refill: 0    Polyarthralgia  -     DULoxetine (CYMBALTA) 20 MG capsule; Take 1 capsule (20 mg total) by mouth 2 (two) times a day.  Dispense: 180 capsule; Refill: 0    Trigger finger, right middle finger  -     triamcinolone acetonide 40 mg/mL injection 20 mg (KENALOG-40)          HISTORY OF PRESENTING ILLNESS ON 03/10/21 :  Adam Talamantes 80 y.o. is here for a moderately severe flare up of pain.  Joints affected include Predominantly in the right hand, and there to the right middle finger.. This has gone on for several weeks.  He rated the pain between 5.0-6.0.  Worse at nighttime in the bed and first thing in the morning.  Rest of his joints seem to have improved with duloxetine.  He reports no significant side effects from this.    He has renal impairment.    Further historical information, including ROS and limitation in activities as noted in the multidimensional health assessment questionnaire scanned in the EMR and in the assessment and plan section.    ALLERGIES:Patient has no known allergies.    PAST MEDICAL/ACTIVE PROBLEMS/MEDICATION/SOCIAL DATA  Past Medical History:   Diagnosis Date     Acute blood loss anemia      Acute renal failure (H)      Anemia      Bacteremia      Cataract      Chronic gout      CKD (chronic kidney disease)      Stage 3     DM2 (diabetes mellitus, type 2) (H)      GERD (gastroesophageal reflux disease)      Glucose intolerance (impaired glucose tolerance)      Gout      History of nephrolithiasis      Hyperlipidemia      Hypertension      Insomnia      Latent tuberculosis      Nephrolithiasis      Neuropathy      Osteoarthritis     wrist, knee, shoulder     Prostatitis      Rectal bleed      Trigger thumb      Social History     Tobacco Use   Smoking Status Former Smoker     Quit date: 3/10/2000     Years since quittin.0   Smokeless Tobacco Former User     Patient Active Problem List   Diagnosis     Esophageal reflux     Dyslipidemia     Nephrolithiasis     Imaging Studies Nonspecific Abnormal Findings Skull And Head     Pseudogout     Latent Tuberculosis     Glucose Intolerance     Generalized Osteoarthritis Of The Hand     Lower Back Pain     Lumbar Disc Degeneration     Insomnia     Chronic Gout     CKD (chronic kidney disease) stage 3, GFR 30-59 ml/min     Hyperglycemia     Elevated PSA     Prostate nodule     Cataracts, bilateral     Discharge planning issues     Chest pain     Constipation, unspecified constipation type     Bilateral chronic knee pain     Chronic right shoulder pain     Essential hypertension with goal blood pressure less than 140/90     History of prostatitis     BPH (benign prostatic hyperplasia)     Chronic gout     Coronary artery disease due to lipid rich plaque     Right lower quadrant abdominal pain     Abnormal cardiovascular stress test     Type 2 diabetes mellitus without complication, without long-term current use of insulin (H)     Colitis     Primary osteoarthritis involving multiple joints     Urinary incontinence     Type 2 diabetes mellitus with stage 3 chronic kidney disease, without long-term current use of insulin (H)     Current Outpatient Medications   Medication Sig Dispense Refill     acetaminophen (TYLENOL) 500 MG tablet TAKE 1 TABLET BY MOUTH EVERY 6 HOURS AS NEEDED  120 tablet 6     allopurinoL (ZYLOPRIM) 100 MG tablet TAKE 2 TABLETS BY MOUTH EVERY  tablet 7     amLODIPine (NORVASC) 5 MG tablet TAKE 1 TABLET(5 MG) BY MOUTH DAILY 90 tablet 2     aspirin (ASPIR-LOW) 81 MG EC tablet Take 1 tablet (81 mg total) by mouth daily. 90 tablet 1     atorvastatin (LIPITOR) 80 MG tablet TAKE 1 TABLET BY MOUTH EVERY NIGHT AT BEDTIME 90 tablet 0     colchicine 0.6 mg tablet TAKE 1 TABLET BY MOUTH EVERY OTHER DAY 45 tablet 1      mg capsule TAKE 1 CAPSULE BY MOUTH TWICE DAILY AS NEEDED FOR CONSTIPATION 60 capsule 11     DULoxetine (CYMBALTA) 20 MG capsule Take 1 capsule (20 mg total) by mouth 2 (two) times a day. 180 capsule 0     EUCALYPTUS OIL/MENTHOL (MENTHOL EUCALYPTUS MM) 1 Inhalation into each nostril as needed (home medication from [facility]        gabapentin (NEURONTIN) 300 MG capsule TAKE 1 TO 2 CAPSULES BY MOUTH AT BEDTIME 180 capsule 2     HYDROcodone-acetaminophen 5-325 mg per tablet TAKE 1-2 TABLETS BY MOUTH AT BEDTIME AS NEEDED FOR chronic PAIN 30 tablet 0     isosorbide mononitrate (IMDUR) 30 MG 24 hr tablet TAKE 1 TABLET(30 MG) BY MOUTH DAILY 90 tablet 0     JANUVIA 25 mg tablet TAKE 1 TABLET BY MOUTH EVERY DAY 90 tablet 3     lidocaine (XYLOCAINE) 5 % ointment APPLY TO PAINFIL AREAS THREE TIMES DAILY AS NEEDED FOR PAIN 240 g 3     lisinopriL (PRINIVIL,ZESTRIL) 10 MG tablet TAKE ONE TABLET BY MOUTH TWICE DAILY 180 tablet 3     loratadine (CLARITIN) 10 mg tablet TAKE 1 TABLET BY MOUTH EVERY DAY 90 tablet 2     magnesium oxide (MAG-OX) 400 mg (241.3 mg magnesium) tablet TAKE 1 TABLET(400 MG) BY MOUTH DAILY 30 tablet 11     meclizine (ANTIVERT) 12.5 mg tablet Take 1-2 tablets (12.5-25 mg total) by mouth every 8 (eight) hours as needed (one to two tablets). 60 tablet 1     metoprolol succinate (TOPROL-XL) 50 MG 24 hr tablet Take 1 tablet (50 mg total) by mouth daily. 90 tablet 2     multivitamin with minerals (THERA-M) 9 mg iron-400 mcg Tab tablet Take 1 tablet by  "mouth daily. 100 tablet 3     omeprazole (PRILOSEC) 20 MG capsule TAKE 1 CAPSULE BY MOUTH DAILY 90 capsule 3     triamcinolone (KENALOG) 0.1 % cream Apply topically 2 (two) times a day. 80 g 1     nitroglycerin (NITROSTAT) 0.4 MG SL tablet Place 1 tablet (0.4 mg total) under the tongue every 5 (five) minutes as needed for chest pain. 90 tablet 12     No current facility-administered medications for this visit.      DETAILED EXAMINATION  03/10/21  :  Vitals:    03/10/21 0922   BP: 138/82   Pulse: 97   SpO2: 94%   Weight: 156 lb (70.8 kg)   Height: 5' 6\" (1.676 m)     Alert oriented. Head including the face is examined for malar rash, heliotropes, scarring, lupus pernio. Eyes examined for redness such as in episcleritis/scleritis, periorbital lesions.   Neck/ Face examined for parotid gland swelling, range of motion of neck.  Left upper and lower and right upper and lower extremities examined for tenderness, swelling, warmth of the appendicular joints, range of motion, edema, rash.  Some of the important findings included: A1 nodularity of the right middle finger where he has triggering.  No synovitis and palpable joints of upper extremities, tenderness along the medial epicondyles, no JLT warmth or effusion of the knees.          LAB / IMAGING DATA:  ALT   Date Value Ref Range Status   01/23/2020 49 (H) 0 - 45 U/L Final   09/16/2019 24 0 - 45 U/L Final   12/27/2018 32 0 - 45 U/L Final     Albumin   Date Value Ref Range Status   01/23/2020 4.0 3.5 - 5.0 g/dL Final   09/16/2019 4.0 3.5 - 5.0 g/dL Final   12/27/2018 4.2 3.5 - 5.0 g/dL Final     Creatinine   Date Value Ref Range Status   06/22/2020 1.55 (H) 0.70 - 1.30 mg/dL Final   01/23/2020 1.34 (H) 0.70 - 1.30 mg/dL Final   09/16/2019 1.54 (H) 0.70 - 1.30 mg/dL Final       WBC   Date Value Ref Range Status   06/22/2020 7.1 4.0 - 11.0 thou/uL Final   01/23/2020 7.5 4.0 - 11.0 thou/uL Final   08/25/2015 5.7 4.0 - 11.0 thou/uL Final   06/18/2015 5.7 4.0 - 11.0 thou/uL " Final     Hemoglobin   Date Value Ref Range Status   06/22/2020 13.0 (L) 14.0 - 18.0 g/dL Final   01/23/2020 13.6 (L) 14.0 - 18.0 g/dL Final   12/27/2018 15.1 14.0 - 18.0 g/dL Final     Platelets   Date Value Ref Range Status   06/22/2020 202 140 - 440 thou/uL Final   01/23/2020 201 140 - 440 thou/uL Final   12/27/2018 191 140 - 440 thou/uL Final       Lab Results   Component Value Date    RF <10 09/30/2009    SEDRATE 18 (H) 10/28/2009

## 2021-06-15 NOTE — PROGRESS NOTES
ASSESSMENT AND PLAN  Adam Talamantes 77 y.o. male is a for follow-up.  He has tophaceous gout.  Renal impairment.  He has not had flareup during the past several months.  He is due for his serum urate check.  He was in the hospital recently with rectal bleed.  To continue for by Oxistat at the current dose, continue colchicine at the reduced dose in view of the renal impairment, reminded him of the contraindications to take nonsteroidals.  Management principles of osteoarthritis r discussed.  Eturn for follow-up in 6 months.          Diagnoses and all orders for this visit:    Chronic Gout  -     febuxostat (ULORIC) 80 mg Tab; Take 80 mg by mouth daily.  Dispense: 90 tablet; Refill: 1    CKD (chronic kidney disease) stage 3, GFR 30-59 ml/min    Osteoarthritis Of The Knee      HISTORY OF PRESENTING ILLNESS:  Adam Talamantes 77 y.o. is here for follow up.  He has well-controlled tophaceous gout, osteoarthritis widespread, renal impairment.  He is on febuxostat, low-dose colchicine.  He noted discomfort in his knees.  This is with ambulation.  There is no swelling.  He ambulates with the help of a walker.  Recently hospitalized.  This was associated with rectal bleed.  He is undergoing further workup.  Going up and down the steps is more bothersome at rest that is a little pain.  In view of his renal impairment is only tries over-the-counter is acetaminophen which has provided only limited relief.  .Original gout episode affected the left first MTP joint. his joint stiffness is stable and his joint swelling is stable.  Joints currently hurting  include None.Limitation on activities include difficulty with walking. The patient is avoiding high purine foods. Limitation on activities as noted in the MDHAQ scanned in the EMR.  Further historical information, including ROS as noted in the multidimensional health assessment questionnaire scanned in the EMR and in the assessment and plan section.  he has not had a flareup of gout.     ALLERGIES:Review of patient's allergies indicates no known allergies.    PAST MEDICAL/ACTIVE PROBLEMS/MEDICATION/SOCIAL DATA  Past Medical History:   Diagnosis Date     Acute blood loss anemia      Acute renal failure      Anemia      Bacteremia      Cataract      Chronic gout      CKD (chronic kidney disease)     Stage 3     DM2 (diabetes mellitus, type 2)      GERD (gastroesophageal reflux disease)      Glucose intolerance (impaired glucose tolerance)      Gout      History of nephrolithiasis      Hyperlipidemia      Hypertension      Insomnia      Latent tuberculosis      Nephrolithiasis      Neuropathy      Osteoarthritis     wrist, knee, shoulder     Prostatitis      Rectal bleed      Trigger thumb      History   Smoking Status     Former Smoker     Quit date: 3/10/2000   Smokeless Tobacco     Former User     Patient Active Problem List   Diagnosis     Esophageal reflux     Dyslipidemia     Nephrolithiasis     Imaging Studies Nonspecific Abnormal Findings Skull And Head     Pseudogout     Latent Tuberculosis     Glucose Intolerance     Anemia     Generalized Osteoarthritis Of The Hand     Lower Back Pain     Lumbar Disc Degeneration     Insomnia     Chronic Gout     CKD (chronic kidney disease) stage 3, GFR 30-59 ml/min     Osteoarthritis Of The Knee     Hyperglycemia     Localized Primary Osteoarthritis Of The Left Foot 1st MTP Joint     Right shoulder tendonitis     Elevated PSA     Prostate nodule     Cataracts, bilateral     Rectal bleed     Acute renal failure superimposed on stage 3 chronic kidney disease     Acute blood loss anemia     Discharge planning issues     Acute pain of left wrist     Chest pain     Constipation, unspecified constipation type     Bilateral chronic knee pain     Chronic right shoulder pain     Essential hypertension with goal blood pressure less than 140/90     History of prostatitis     BPH (benign prostatic hyperplasia)     Chronic gout     Right shoulder tendinitis      Coronary artery disease due to lipid rich plaque     Right lower quadrant abdominal pain     Abnormal cardiovascular stress test     Type 2 diabetes mellitus without complication, without long-term current use of insulin     Sepsis     Colitis     Current Outpatient Prescriptions   Medication Sig Dispense Refill     acetaminophen (Q-PAP EXTRA STRENGTH) 500 MG tablet Take 1 tablet (500 mg total) by mouth every 6 (six) hours as needed. 120 tablet 6     aspirin 81 MG EC tablet Take 81 mg by mouth daily.       atorvastatin (LIPITOR) 40 MG tablet Take 40 mg by mouth at bedtime.       COLCRYS 0.6 mg tablet TAKE 1 TABLET ORALLY EVERY OTHER DAY 45 tablet 0     dicyclomine (BENTYL) 20 mg tablet Take 1 tablet (20 mg total) by mouth 3 (three) times a day. 30 tablet 0     docusate sodium (COLACE) 100 MG capsule Take 1 capsule (100 mg total) by mouth 2 (two) times a day as needed for constipation. 60 capsule 4     EUCALYPTUS OIL/MENTHOL (MENTHOL EUCALYPTUS MM) 1 Inhalation into each nostril as needed (home medication from [facility]        febuxostat (ULORIC) 80 mg Tab Take 80 mg by mouth daily.       gabapentin (NEURONTIN) 300 MG capsule Take 1-2 capsules (300-600 mg total) by mouth at bedtime. 180 capsule 1     HYDROcodone-acetaminophen 5-325 mg per tablet Take 1-2 tablets by mouth at bedtime as needed for pain.       lidocaine 4 % patch Place 1 patch on the skin daily as needed for pain. Remove and discard patch with 12 hours or as directed by MD.       loratadine (CLARITIN) 10 mg tablet Take 10 mg by mouth daily.       magnesium oxide 400 mg cap Take 1 capsule by mouth daily. 30 each 11     meclizine (ANTIVERT) 12.5 mg tablet Take 12.5-25 mg by mouth every 8 (eight) hours as needed (one to two tablets).        metoprolol succinate (TOPROL-XL) 50 MG 24 hr tablet Take 1 tablet (50 mg total) by mouth daily. 90 tablet 3     multivitamin with minerals (THERA-M) 9 mg iron-400 mcg Tab tablet Take 1 tablet by mouth daily. 100  "tablet 3     nitroglycerin (NITROSTAT) 0.4 MG SL tablet Place 1 tablet (0.4 mg total) under the tongue every 5 (five) minutes as needed for chest pain. 90 tablet 12     omeprazole (PRILOSEC) 20 MG capsule Take 1 capsule (20 mg total) by mouth daily. 30 capsule 11     sitaGLIPtin (JANUVIA) 25 MG tablet Take 1 tablet (25 mg total) by mouth daily. 30 tablet 3     traZODone (DESYREL) 50 MG tablet Take 25-50 mg by mouth at bedtime as needed.        No current facility-administered medications for this visit.      DETAILED EXAMINATION  01/11/18  :  Vitals:    01/11/18 0844   BP: 140/84   Pulse: 88   Weight: 146 lb 2 oz (66.3 kg)   Height: 5' 1\" (1.549 m)     Alert oriented. Head including the face is examined for malar rash, heliotropes, scarring, lupus pernio. Eyes examined for redness such as in episcleritis/scleritis, periorbital lesions.   Neck/ Face examined for parotid gland swelling, range of motion of neck.  Left upper and lower and right upper and lower extremities examined for tenderness, swelling, warmth of the appendicular joints, range of motion, edema, rash.  Some of the important findings included: No acutely inflamed appendicular joints.  He has JLT of the knees bilaterally.    LAB / IMAGING DATA:  ALT   Date Value Ref Range Status   12/29/2017 25 0 - 45 U/L Final   11/24/2017 20 0 - 45 U/L Final   10/17/2017 49 (H) 0 - 45 U/L Final     Albumin   Date Value Ref Range Status   12/29/2017 4.0 3.5 - 5.0 g/dL Final   11/24/2017 3.7 3.5 - 5.0 g/dL Final   10/17/2017 3.8 3.5 - 5.0 g/dL Final     Creatinine   Date Value Ref Range Status   12/29/2017 1.53 (H) 0.70 - 1.30 mg/dL Final   12/19/2017 1.57 (H) 0.70 - 1.30 mg/dL Final   12/05/2017 1.88 (H) 0.70 - 1.30 mg/dL Final       WBC   Date Value Ref Range Status   12/29/2017 8.3 4.0 - 11.0 thou/uL Final   11/25/2017 9.3 4.0 - 11.0 thou/uL Final   08/25/2015 5.7 4.0 - 11.0 thou/uL Final   06/18/2015 5.7 4.0 - 11.0 thou/uL Final     Hemoglobin   Date Value Ref Range " Status   12/29/2017 14.1 14.0 - 18.0 g/dL Final   11/25/2017 12.2 (L) 14.0 - 18.0 g/dL Final   11/24/2017 14.2 14.0 - 18.0 g/dL Final     Platelets   Date Value Ref Range Status   12/29/2017 193 140 - 440 thou/uL Final   11/28/2017 171 140 - 440 thou/uL Final   11/26/2017 134 (L) 140 - 440 thou/uL Final       Lab Results   Component Value Date    RF <10 09/30/2009    SEDRATE 18 (H) 10/28/2009

## 2021-06-15 NOTE — TELEPHONE ENCOUNTER
Refill Request  Did you contact pharmacy: Yes  Medication name: Stephanie a narrcotic medication pharmacy will not discuss refill with daughter.    Who prescribed the medication: PCP  Requested [facility]acy: [facility]  Is patient out of medication: Yes  Patient notified refills processed in 3 business days:  yes  Okay to leave a detailed message: yes

## 2021-06-15 NOTE — TELEPHONE ENCOUNTER
Refill Approved    Rx renewed per Medication Renewal Policy. Medication was last renewed on 5/6/20.    Al Araiza, [facility] Triage/Med Refill 3/10/2021     Requested Prescriptions   Pending Prescriptions Disp Refills     amLODIPine (NORVASC) 5 MG tablet [Pharmacy Med Name: AMLODIPINE BESYLATE 5MG TABLETS] 90 tablet 2     Sig: TAKE 1 TABLET(5 MG) BY MOUTH DAILY       Calcium-Channel Blockers Protocol Passed - 3/10/2021  4:00 AM        Passed - PCP or prescribing provider visit in past 12 months or next 3 months     Last office visit with prescriber/PCP: 9/28/2020 Chucho Espino MD OR same dept: 9/28/2020 Chucho Espino MD OR same specialty: 9/28/2020 Chucho Espino MD  Last physical: 9/16/2019 Last MTM visit: Visit date not found   Next visit within 3 mo: Visit date not found  Next physical within 3 mo: Visit date not found  Prescriber OR PCP: Chucho Espino MD  Last diagnosis associated with med order: 1. Essential hypertension with goal blood pressure less than 140/90  - amLODIPine (NORVASC) 5 MG tablet [Pharmacy Med Name: AMLODIPINE BESYLATE 5MG TABLETS]; TAKE 1 TABLET(5 MG) BY MOUTH DAILY  Dispense: 90 tablet; Refill: 2    If protocol passes may refill for 12 months if within 3 months of last provider visit (or a total of 15 months).             Passed - Blood pressure filed in past 12 months     BP Readings from Last 1 Encounters:   03/10/21 138/82                            
take losartan am of surgery with a sip of water

## 2021-06-16 ENCOUNTER — RECORDS - HEALTHEAST (OUTPATIENT)
Dept: ADMINISTRATIVE | Facility: OTHER | Age: 81
End: 2021-06-16

## 2021-06-16 ENCOUNTER — OFFICE VISIT - HEALTHEAST (OUTPATIENT)
Dept: RHEUMATOLOGY | Facility: CLINIC | Age: 81
End: 2021-06-16

## 2021-06-16 DIAGNOSIS — M25.532 CHRONIC PAIN OF LEFT WRIST: ICD-10-CM

## 2021-06-16 DIAGNOSIS — M15.0 PRIMARY OSTEOARTHRITIS INVOLVING MULTIPLE JOINTS: ICD-10-CM

## 2021-06-16 DIAGNOSIS — G89.29 CHRONIC PAIN OF LEFT WRIST: ICD-10-CM

## 2021-06-16 DIAGNOSIS — N18.31 STAGE 3A CHRONIC KIDNEY DISEASE (H): ICD-10-CM

## 2021-06-16 PROBLEM — M1A.9XX0 CHRONIC GOUT: Status: ACTIVE | Noted: 2017-07-19

## 2021-06-16 PROBLEM — T46.4X5A ACE-INHIBITOR COUGH: Status: ACTIVE | Noted: 2021-03-29

## 2021-06-16 PROBLEM — R05.8 ACE-INHIBITOR COUGH: Status: ACTIVE | Noted: 2021-03-29

## 2021-06-16 PROBLEM — N40.0 BPH (BENIGN PROSTATIC HYPERPLASIA): Status: ACTIVE | Noted: 2017-07-19

## 2021-06-16 PROBLEM — N18.30 TYPE 2 DIABETES MELLITUS WITH STAGE 3 CHRONIC KIDNEY DISEASE, WITHOUT LONG-TERM CURRENT USE OF INSULIN (H): Status: ACTIVE | Noted: 2020-09-28

## 2021-06-16 PROBLEM — R32 URINARY INCONTINENCE: Status: ACTIVE | Noted: 2019-10-30

## 2021-06-16 PROBLEM — E11.22 TYPE 2 DIABETES MELLITUS WITH STAGE 3 CHRONIC KIDNEY DISEASE, WITHOUT LONG-TERM CURRENT USE OF INSULIN (H): Status: ACTIVE | Noted: 2020-09-28

## 2021-06-16 ASSESSMENT — MIFFLIN-ST. JEOR: SCORE: 1306.62

## 2021-06-16 NOTE — TELEPHONE ENCOUNTER
Refill request from [facility] [address] [place], [place]    Medication: Duloxetine DR 20mg  Last Refill: 1/12/21  Last OV: 3/10/21  Last lab: 1/23/20  Future OV: 6/16/21

## 2021-06-16 NOTE — PROGRESS NOTES
[facility] Care Coordination    Received after visit chart from care coordinator.  Completed following tasks: Mailed copy of care plan to client and Updated services in access.    [facility]:  Emailed completed PCA assessment to [facility].  Faxed copy of PCA assessment to PCA Agency and mailed copy to member.  Faxed MD Communication to PCP.     Mailed PCA Signature page and POC Signature page to member w/ self-stamped envelope for return.    Mailed HCD to member.    Dannielle Reynaga  Care Management Specialist  [facility]  [phone]

## 2021-06-16 NOTE — PROGRESS NOTES
[facility] Care Coordination  [facility] Home Visit Assessment     Home visit for Health Risk Assessment with Adam Talamantes completed on 3/17/2021    Type of residence:: Private home - stairs  Current living arrangement:: I live in a private home with family     Assessment completed with: Patient, Family    Current Care Plan  Member currently receiving the following home care services: None   Member currently receiving the following community resources: PCA    Medication Review  Medication reconciliation completed in Epic: No, daughter sets up medications and she was unavailable to review. Appointment coming up soon, encouraged her to bring all of Adam's medications to appointment.  Medication set-up & administration: Family/informal caregiver sets up weekly  Family caregiver administers medications  Medication Risk Assessment Medication (1 or more, place referral to MTM):  N/A: No risk factors identified  MTM Referral Placed: No: No risk factors idenified    Mental/Behavioral Health   Depression Screening: No concerns noted.   PHQ-2 Total Score: 0  Mental health DX:: No       Falls Assessment:   Fallen 2 or more times in the past year?: No   Any fall with injury in the past year?: No    ADL/IADL Dependencies:   Dependent ADLs:: Ambulation-walker, Bathing, Dressing, Grooming, Transfers, Toileting  Dependent IADLs:: Cleaning, Cooking, Laundry, Shopping, Meal Preparation, Medication Management, Money Management, Transportation, Incontinence    [facility] Plan sponsored benefits: Shared information re: Silver Sneakers/gym memberships, ASA, Calcium +D.    PCA Assessment completed at visit: Yes Annual PCA assessment indicated 16 units per day of PCA. This is the same as the previous assessment.     Elderly Waiver Eligibility: No - does not meet criteria    Care Plan & Recommendations: Adam Talamantes is a 81 year old male with a past medical history of Primary osteoarthritis involving multiple joints, Type 2  diabetes mellitus, Coronary artery disease, Urinary incontinence, Chronic gout, Essential hypertension, Chronic right shoulder pain, Bilateral chronic knee pain, Generalized Osteoarthritis Of The Hand, Lower Back Pain, Lumbar Disc Degeneration, Insomnia,,  and CKD (chronic kidney disease) stage 3.    Adam Albin  lives in a single family home with his wife, Lenny, and his daughter, Dimitri and son in law, Fermin Christensen. Fermin Christensen currently provides PCA support to Adam daily. He appears well supported by family and denies the need for any additional services at this time.    See [facility] for detailed assessment information.    Follow-Up Plan: Member informed of future contact, plan to f/u with member with a 6 month telephone assessment.  Contact information shared with member and family, encouraged member to call with any questions or concerns at any time.    RICKEY Gallegos  [facility]  [phone]

## 2021-06-16 NOTE — PROGRESS NOTES
[facility] Care Coordination    Signature pages received and filed.  Sent PCA Signature page to provider and HP.    Dannielle Reynaga  Care Management Specialist  [facility]  [phone]

## 2021-06-16 NOTE — PROGRESS NOTES
ASSESMENT AND PLAN:  Diagnoses and all orders for this visit:    Type 2 diabetes mellitus with stage 3 chronic kidney disease, without long-term current use of insulin (H)  -     Lipid Cascade RANDOM  -     SITagliptin (JANUVIA) 50 MG tablet; Take 1 tablet (50 mg total) by mouth daily.  Dispense: 90 tablet; Refill: 3  -     losartan (COZAAR) 50 MG tablet; Take 1 tablet (50 mg total) by mouth daily.  Dispense: 90 tablet; Refill: 3  -     Glycosylated Hemoglobin A1c done today comes back with a worsening trend to 7.8.  Extensive counseling with the patient and his family with the help of a professional .  We decided to increase his Januvia from his current 25 mg daily dose up to 50 mg.  Recheck in 4 months with next A1c at that time.  Counseled on diet, he does not think there is been any major change in his diet.    Patient counseled on the extreme importance of him getting the COVID-19 vaccination.  He would like to proceed.  Our staff is helping him get scheduled for the vaccine  As soon as possible.    ACE-inhibitor cough  We will discontinue lisinopril and replace with losartan as prescribed below.  -     losartan (COZAAR) 50 MG tablet; Take 1 tablet (50 mg total) by mouth daily.  Dispense: 90 tablet; Refill: 3      polyarthralgia, Lumbar Disc Degeneration  Reviewed the most recent Rheumatology consultation with the patient and his family with help of a professional .  Continue his current pain control regimen as prescribed below, continue with follow-up and as needed steroid injections with dermatology.  -     acetaminophen (TYLENOL) 500 MG tablet; Take 1 tablet (500 mg total) by mouth every 6 (six) hours as needed.  Dispense: 120 tablet; Refill: 6  -     HYDROcodone-acetaminophen 5-325 mg per tablet; TAKE 1-2 TABLETS BY MOUTH AT BEDTIME AS NEEDED FOR chronic PAIN  Dispense: 30 tablet; Refill: 0    Chest pain  Reviewed the cardiology consultation from last year, his chest pain is atypical  "and \"possibly coronary artery disease\" given the history of a mildly abnormal perfusion study.  Patient had elected for medical management at that time.  His pattern of chest pain seems not to have changed, as detailed below.  Continue with medical management.  Nitroglycerin renewed as prescribed below so that he has a up-to-date prescription.  Follow-up with cardiology as directed.  -     nitroglycerin (NITROSTAT) 0.4 MG SL tablet; Place 1 tablet (0.4 mg total) under the tongue every 5 (five) minutes as needed for chest pain.  Dispense: 25 tablet; Refill: 6          Reviewed the risks and benefits of the treatment plan with the patient and/or caregiver and we discussed indications for routine and emergent follow-up.    51 minutes spent on the date of the encounter doing chart review, patient visit and documentation and face to face counseling on above.     SUBJECTIVE: 81-year-old male comes in with multiple concerns.  He is here for follow-up on his diabetes and his A1c has trended up significantly from 7.1-7.8.  He cannot think of any major changes in his medications or diet or activities.  Patient continues to get chest pain, he uses nitroglycerin 1-2 times per month.  It works well for him.  He and his family both agree that there has been no increasing frequency or severity of his chest pain.  This has been worked up in the past, angiogram had been considered but declined and medical management was elected for, see cardiology consultation for details.  He definitely does not think there is been a worsening or change in pattern of his chest pain.  He did have some exacerbation of his back pain recently with a fall.  He was laying in bed and rolled over and fell out of bed.  He jolted his low back and had injury to the back of his head which is now healed completely.  Initially had some swelling over the posterior head but this has resolved.  He reports that this injury occurred about 2 weeks ago.  Now he feels " like his back pain is about back to baseline.  Patient has had some mild low-grade chronic cough and is wondering if it could be related to his ACE inhibitor.  Patient also has a history of a polyarthritis and is followed by rheumatology.  Patient reports he got good response to his most recent steroid injections.    Past Medical History:   Diagnosis Date     Acute blood loss anemia      Acute renal failure (H)      Anemia      Bacteremia      Cataract      Chronic gout      CKD (chronic kidney disease)     Stage 3     DM2 (diabetes mellitus, type 2) (H)      GERD (gastroesophageal reflux disease)      Glucose intolerance (impaired glucose tolerance)      Gout      History of nephrolithiasis      Hyperlipidemia      Hypertension      Insomnia      Latent tuberculosis      Nephrolithiasis      Neuropathy      Osteoarthritis     wrist, knee, shoulder     Prostatitis      Rectal bleed      Trigger thumb      Patient Active Problem List   Diagnosis     Esophageal reflux     Dyslipidemia     Nephrolithiasis     Imaging Studies Nonspecific Abnormal Findings Skull And Head     Pseudogout     Latent Tuberculosis     Glucose Intolerance     Generalized Osteoarthritis Of The Hand     Lower Back Pain     Lumbar Disc Degeneration     Insomnia     Chronic Gout     CKD (chronic kidney disease) stage 3, GFR 30-59 ml/min     Hyperglycemia     Elevated PSA     Prostate nodule     Cataracts, bilateral     Discharge planning issues     Chest pain     Constipation, unspecified constipation type     Bilateral chronic knee pain     Chronic right shoulder pain     Essential hypertension with goal blood pressure less than 140/90     History of prostatitis     BPH (benign prostatic hyperplasia)     Chronic gout     Coronary artery disease due to lipid rich plaque     Right lower quadrant abdominal pain     Abnormal cardiovascular stress test     Type 2 diabetes mellitus without complication, without long-term current use of insulin (H)      Colitis     Primary osteoarthritis involving multiple joints     Urinary incontinence     Type 2 diabetes mellitus with stage 3 chronic kidney disease, without long-term current use of insulin (H)     ACE-inhibitor cough     Current Outpatient Medications   Medication Sig Dispense Refill     acetaminophen (TYLENOL) 500 MG tablet Take 1 tablet (500 mg total) by mouth every 6 (six) hours as needed. 120 tablet 6     allopurinoL (ZYLOPRIM) 100 MG tablet TAKE 2 TABLETS BY MOUTH EVERY  tablet 7     amLODIPine (NORVASC) 5 MG tablet TAKE 1 TABLET(5 MG) BY MOUTH DAILY 90 tablet 2     aspirin (ASPIR-LOW) 81 MG EC tablet Take 1 tablet (81 mg total) by mouth daily. 90 tablet 1     atorvastatin (LIPITOR) 80 MG tablet TAKE 1 TABLET BY MOUTH EVERY NIGHT AT BEDTIME 90 tablet 0     colchicine 0.6 mg tablet TAKE 1 TABLET BY MOUTH EVERY OTHER DAY 45 tablet 1      mg capsule TAKE 1 CAPSULE BY MOUTH TWICE DAILY AS NEEDED FOR CONSTIPATION 60 capsule 11     DULoxetine (CYMBALTA) 20 MG capsule Take 1 capsule (20 mg total) by mouth 2 (two) times a day. 180 capsule 0     gabapentin (NEURONTIN) 300 MG capsule TAKE 1 TO 2 CAPSULES BY MOUTH AT BEDTIME 180 capsule 2     HYDROcodone-acetaminophen 5-325 mg per tablet TAKE 1-2 TABLETS BY MOUTH AT BEDTIME AS NEEDED FOR chronic PAIN 30 tablet 0     isosorbide mononitrate (IMDUR) 30 MG 24 hr tablet Take 1 tablet (30 mg total) by mouth daily. 90 tablet 0     loratadine (CLARITIN) 10 mg tablet TAKE 1 TABLET BY MOUTH EVERY DAY 90 tablet 2     magnesium oxide (MAG-OX) 400 mg (241.3 mg magnesium) tablet TAKE 1 TABLET(400 MG) BY MOUTH DAILY 30 tablet 11     meclizine (ANTIVERT) 12.5 mg tablet Take 1-2 tablets (12.5-25 mg total) by mouth every 8 (eight) hours as needed (one to two tablets). 60 tablet 1     metoprolol succinate (TOPROL-XL) 50 MG 24 hr tablet Take 1 tablet (50 mg total) by mouth daily. 90 tablet 2     omeprazole (PRILOSEC) 20 MG capsule TAKE 1 CAPSULE BY MOUTH DAILY 90 capsule 3  "    SITagliptin (JANUVIA) 50 MG tablet Take 1 tablet (50 mg total) by mouth daily. 90 tablet 3     EUCALYPTUS OIL/MENTHOL (MENTHOL EUCALYPTUS MM) 1 Inhalation into each nostril as needed (home medication from [facility]        lidocaine (XYLOCAINE) 5 % ointment APPLY TO PAINFIL AREAS THREE TIMES DAILY AS NEEDED FOR PAIN 240 g 3     losartan (COZAAR) 50 MG tablet Take 1 tablet (50 mg total) by mouth daily. 90 tablet 3     multivitamin with minerals (THERA-M) 9 mg iron-400 mcg Tab tablet Take 1 tablet by mouth daily. 100 tablet 3     nitroglycerin (NITROSTAT) 0.4 MG SL tablet Place 1 tablet (0.4 mg total) under the tongue every 5 (five) minutes as needed for chest pain. 25 tablet 6     triamcinolone (KENALOG) 0.1 % cream Apply topically 2 (two) times a day. 80 g 1     No current facility-administered medications for this visit.      Social History     Tobacco Use   Smoking Status Former Smoker     Quit date: 3/10/2000     Years since quittin.0   Smokeless Tobacco Former User   Tobacco Comment    no passive exposure       OBJECTICE: /76 (Patient Site: Right Arm, Patient Position: Sitting, Cuff Size: Adult Regular)   Pulse 91   Temp 98  F (36.7  C) (Oral)   Ht 5' 1.22\" (1.555 m)   Wt 154 lb 5 oz (70 kg)   SpO2 98%   BMI 28.95 kg/m       Recent Results (from the past 24 hour(s))   Glycosylated Hemoglobin A1c    Collection Time: 21 10:39 AM   Result Value Ref Range    Hemoglobin A1c 7.8 (H) <=5.6 %        GEN-alert, appropriate, in no apparent distress   CV-regular rate and rhythm with no murmur    RESP-lungs clear to auscultation   Foot exam-normal.  Palpable pedal pulses bilaterally.  No ulcers.    Chucho Espino          "

## 2021-06-16 NOTE — PROGRESS NOTES
ASSESMENT AND PLAN:  Diagnoses and all orders for this visit:    Essential hypertension with goal blood pressure less than 140/90  Medication review and counseling done, he is now back on metoprolol daily and tolerating it well.  Blood pressure is now under excellent control.  Continue current treatment plan and recheck here in the clinic in 6 months.    Chronic gout  Reviewed the most recent rheumatology consultation today with the patient and his daughter with the help of a professional , no medication changes were made at that time.  He will continue his current treatment plan.    Anemia  Viewed the endoscopy and colonoscopy reports with the patient, he had one adenomatous polyp on his colonoscopy in the upper endoscopy was normal.  His most recent hemoglobin had returned to normal range.  Colonoscopy report recommended repeat in 5 years but the patient will be 82 years old at that time so we will determine at that time whether he should have a repeat colonoscopy.        SUBJECTIVE: 77-year-old male comes in today for follow-up on his anemia and rectal bleeding thought to be secondary to acute colitis that has now resolved.  His most recent hemoglobin had returned to normal range and his symptoms have resolved completely.  He did complete his colonoscopy and upper endoscopy and is here today to review the results.  In addition, he is here to follow-up on his blood pressure, last visit he was restarted on metoprolol and has been taking it every day and tolerating it well.  He reports that he is getting some intermittent mild to moderate pain in his fingers in the joints of both hands.    Past Medical History:   Diagnosis Date     Acute blood loss anemia      Acute renal failure      Anemia      Bacteremia      Cataract      Chronic gout      CKD (chronic kidney disease)     Stage 3     DM2 (diabetes mellitus, type 2)      GERD (gastroesophageal reflux disease)      Glucose intolerance (impaired glucose  tolerance)      Gout      History of nephrolithiasis      Hyperlipidemia      Hypertension      Insomnia      Latent tuberculosis      Nephrolithiasis      Neuropathy      Osteoarthritis     wrist, knee, shoulder     Prostatitis      Rectal bleed      Trigger thumb      Patient Active Problem List   Diagnosis     Esophageal reflux     Dyslipidemia     Nephrolithiasis     Imaging Studies Nonspecific Abnormal Findings Skull And Head     Pseudogout     Latent Tuberculosis     Glucose Intolerance     Generalized Osteoarthritis Of The Hand     Lower Back Pain     Lumbar Disc Degeneration     Insomnia     Chronic Gout     CKD (chronic kidney disease) stage 3, GFR 30-59 ml/min     Osteoarthritis Of The Knee     Hyperglycemia     Localized Primary Osteoarthritis Of The Left Foot 1st MTP Joint     Right shoulder tendonitis     Elevated PSA     Prostate nodule     Cataracts, bilateral     Acute renal failure superimposed on stage 3 chronic kidney disease     Discharge planning issues     Acute pain of left wrist     Chest pain     Constipation, unspecified constipation type     Bilateral chronic knee pain     Chronic right shoulder pain     Essential hypertension with goal blood pressure less than 140/90     History of prostatitis     BPH (benign prostatic hyperplasia)     Chronic gout     Right shoulder tendinitis     Coronary artery disease due to lipid rich plaque     Right lower quadrant abdominal pain     Abnormal cardiovascular stress test     Type 2 diabetes mellitus without complication, without long-term current use of insulin     Sepsis     Colitis     Current Outpatient Prescriptions   Medication Sig Dispense Refill     acetaminophen (Q-PAP EXTRA STRENGTH) 500 MG tablet Take 1 tablet (500 mg total) by mouth every 6 (six) hours as needed. 120 tablet 6     aspirin 81 MG EC tablet Take 81 mg by mouth daily.       atorvastatin (LIPITOR) 40 MG tablet Take 40 mg by mouth at bedtime.       COLCRYS 0.6 mg tablet TAKE 1  TABLET ORALLY EVERY OTHER DAY 45 tablet 0     dicyclomine (BENTYL) 20 mg tablet Take 1 tablet (20 mg total) by mouth 3 (three) times a day. 30 tablet 0     docusate sodium (COLACE) 100 MG capsule Take 1 capsule (100 mg total) by mouth 2 (two) times a day as needed for constipation. 60 capsule 4     EUCALYPTUS OIL/MENTHOL (MENTHOL EUCALYPTUS MM) 1 Inhalation into each nostril as needed (home medication from [facility]        febuxostat (ULORIC) 80 mg Tab Take 80 mg by mouth daily. 90 tablet 1     gabapentin (NEURONTIN) 300 MG capsule Take 1-2 capsules (300-600 mg total) by mouth at bedtime. 180 capsule 1     HYDROcodone-acetaminophen 5-325 mg per tablet TAKE 1-2 TABLETS BY MOUTH AT BEDTIME AS NEEDED FOR PAIN 60 tablet 0     lidocaine 4 % patch Place 1 patch on the skin daily as needed for pain. Remove and discard patch with 12 hours or as directed by MD.       lisinopril (PRINIVIL,ZESTRIL) 5 MG tablet Take 1 tablet (5 mg total) by mouth daily. 30 tablet 11     loratadine (CLARITIN) 10 mg tablet Take 10 mg by mouth daily.       magnesium oxide 400 mg cap Take 1 capsule by mouth daily. 30 each 11     meclizine (ANTIVERT) 12.5 mg tablet Take 12.5-25 mg by mouth every 8 (eight) hours as needed (one to two tablets).        metoprolol succinate (TOPROL-XL) 50 MG 24 hr tablet Take 1 tablet (50 mg total) by mouth daily. 90 tablet 3     multivitamin with minerals (THERA-M) 9 mg iron-400 mcg Tab tablet Take 1 tablet by mouth daily. 100 tablet 3     nitroglycerin (NITROSTAT) 0.4 MG SL tablet Place 1 tablet (0.4 mg total) under the tongue every 5 (five) minutes as needed for chest pain. 90 tablet 12     omeprazole (PRILOSEC) 20 MG capsule Take 1 capsule (20 mg total) by mouth daily. 30 capsule 11     sitaGLIPtin (JANUVIA) 25 MG tablet Take 1 tablet (25 mg total) by mouth daily. 30 tablet 2     traZODone (DESYREL) 50 MG tablet Take 25-50 mg by mouth at bedtime as needed.        No current facility-administered medications for  "this visit.      History   Smoking Status     Former Smoker     Quit date: 3/10/2000   Smokeless Tobacco     Former User       OBJECTICE: /74 (Patient Site: Right Arm, Patient Position: Sitting, Cuff Size: Adult Regular)  Pulse 68  Temp 98.1  F (36.7  C) (Oral)   Resp 16  Ht 5' 1\" (1.549 m)  Wt 148 lb (67.1 kg)  BMI 27.96 kg/m2     No results found for this or any previous visit (from the past 24 hour(s)).     GEN-alert, appropriate, in no apparent distress   CV-regular rate and rhythm   EXTREM-warm with no ankle edema   Musculoskeletal-some chronic arthritic changes to the IP joints of the hands bilaterally, no acute effusions or redness.      Chucho Espino          "

## 2021-06-17 NOTE — TELEPHONE ENCOUNTER
"Clinic Action Needed: Refill request    Patient's daughter called and is requesting a refill of patient's HYDROcodone-acetaminophen 5-325 mg per tablet.    Preferred pharmacy: [facility] [place], [place] off of [facility]      Routed to: Dr Mason, and care team       Anju Jackson RN/M [facility] Nurse Advisors                  Reason for Disposition    Caller requesting a NON-URGENT new prescription or refill and triager unable to refill per unit policy    Additional Information    Negative: Drug overdose and nurse unable to answer question    Negative: Caller requesting information not related to medicine    Negative: Caller requesting a prescription for Strep throat and has a positive culture result    Negative: Rash while taking a medication or within 3 days of stopping it    Negative: Immunization reaction suspected    Negative: [1] Asthma and [2] having symptoms of asthma (cough, wheezing, etc)    Negative: MORE THAN A DOUBLE DOSE of a prescription or over-the-counter (OTC) drug    Negative: [1] DOUBLE DOSE (an extra dose or lesser amount) of over-the-counter (OTC) drug AND [2] any symptoms (e.g., dizziness, nausea, pain, sleepiness)    Negative: [1] DOUBLE DOSE (an extra dose or lesser amount) of prescription drug AND [2] any symptoms (e.g., dizziness, nausea, pain, sleepiness)    Negative: Took another person's prescription drug    Negative: [1] DOUBLE DOSE (an extra dose or lesser amount) of prescription drug AND [2] NO symptoms (Exception: a double dose of antibiotics)    Negative: Diabetes drug error or overdose (e.g., insulin or extra dose)    Negative: [1] Request for URGENT new prescription or refill of \"essential\" medication (i.e., likelihood of harm to patient if not taken) AND [2] triager unable to fill per unit policy    Negative: [1] Prescription not at pharmacy AND [2] was prescribed today by PCP    Negative: Pharmacy calling with prescription questions and triager unable to answer " question    Negative: Caller has URGENT medication question about med that PCP prescribed and triager unable to answer question    Negative: Caller has NON-URGENT medication question about med that PCP prescribed and triager unable to answer question    Protocols used: MEDICATION QUESTION CALL-A-AH

## 2021-06-17 NOTE — PROGRESS NOTES
ASSESMENT AND PLAN:  Diagnoses and all orders for this visit:    Scrotal irritation  Reviewed the notes from the emergency room evaluation with the patient.  He has started on clindamycin and is tolerating it well.  He also has been using the balm that was prescribed.  There has been no fever, no spreading redness, he feels like the area is starting to heal.  We reviewed indications for follow-up and he will continue the palm and antibiotics as prescribed.    Dermatitis of the skin of the abdomen and legs  No response to Chlortrimazole.  Will try a steroid cream, follow-up if not improving.  Reviewed the risks and benefits of the medication.  -     triamcinolone (KENALOG) 0.1 % cream; Apply topically 2 (two) times a day.  Dispense: 80 g; Refill: 1          SUBJECTIVE: 78-year-old male here for follow-up on his recent emergency room visit.  Since that time, he has started to notice some improvement in the irritated skin of the scrotum.  No fevers or spreading redness.  No diarrhea or vomiting.  He does report a separate itchy rash on his lateral abdomen and on his legs that has not responded to clotrimazole antifungal treatment.    Past Medical History:   Diagnosis Date     Acute blood loss anemia      Acute renal failure      Anemia      Bacteremia      Cataract      Chronic gout      CKD (chronic kidney disease)     Stage 3     DM2 (diabetes mellitus, type 2)      GERD (gastroesophageal reflux disease)      Glucose intolerance (impaired glucose tolerance)      Gout      History of nephrolithiasis      Hyperlipidemia      Hypertension      Insomnia      Latent tuberculosis      Nephrolithiasis      Neuropathy      Osteoarthritis     wrist, knee, shoulder     Prostatitis      Rectal bleed      Trigger thumb      Patient Active Problem List   Diagnosis     Esophageal reflux     Dyslipidemia     Nephrolithiasis     Imaging Studies Nonspecific Abnormal Findings Skull And Head     Pseudogout     Latent Tuberculosis      Glucose Intolerance     Generalized Osteoarthritis Of The Hand     Lower Back Pain     Lumbar Disc Degeneration     Insomnia     Chronic Gout     CKD (chronic kidney disease) stage 3, GFR 30-59 ml/min     Osteoarthritis Of The Knee     Hyperglycemia     Localized Primary Osteoarthritis Of The Left Foot 1st MTP Joint     Right shoulder tendonitis     Elevated PSA     Prostate nodule     Cataracts, bilateral     Acute renal failure superimposed on stage 3 chronic kidney disease     Discharge planning issues     Acute pain of left wrist     Chest pain     Constipation, unspecified constipation type     Bilateral chronic knee pain     Chronic right shoulder pain     Essential hypertension with goal blood pressure less than 140/90     History of prostatitis     BPH (benign prostatic hyperplasia)     Chronic gout     Right shoulder tendinitis     Coronary artery disease due to lipid rich plaque     Right lower quadrant abdominal pain     Abnormal cardiovascular stress test     Type 2 diabetes mellitus without complication, without long-term current use of insulin     Sepsis     Colitis     Current Outpatient Prescriptions   Medication Sig Dispense Refill     acetaminophen (Q-PAP EXTRA STRENGTH) 500 MG tablet Take 1 tablet (500 mg total) by mouth every 6 (six) hours as needed. 120 tablet 6     aspirin 81 MG EC tablet Take 81 mg by mouth daily.       atorvastatin (LIPITOR) 40 MG tablet Take 40 mg by mouth at bedtime.       clindamycin (CLEOCIN) 150 MG capsule Take 3 capsules (450 mg total) by mouth 3 (three) times a day for 7 days. 63 capsule 0     COLCRYS 0.6 mg tablet TAKE 1 TABLET ORALLY EVERY OTHER DAY 45 tablet 0     docusate sodium (COLACE) 100 MG capsule Take 1 capsule (100 mg total) by mouth 2 (two) times a day as needed for constipation. 60 capsule 4     gabapentin (NEURONTIN) 300 MG capsule Take 1-2 capsules (300-600 mg total) by mouth at bedtime. 180 capsule 1     HYDROcodone-acetaminophen 5-325 mg per tablet  TAKE 1-2 TABLETS BY MOUTH AT BEDTIME AS NEEDED FOR PAIN 60 tablet 0     lisinopril (PRINIVIL,ZESTRIL) 5 MG tablet Take 1 tablet (5 mg total) by mouth daily. 30 tablet 11     loratadine (CLARITIN) 10 mg tablet Take 10 mg by mouth daily.       magnesium oxide 400 mg cap Take 1 capsule by mouth daily. 30 each 11     meclizine (ANTIVERT) 12.5 mg tablet Take 12.5-25 mg by mouth every 8 (eight) hours as needed (one to two tablets).        metoprolol succinate (TOPROL-XL) 50 MG 24 hr tablet Take 1 tablet (50 mg total) by mouth daily. 90 tablet 3     multivitamin with minerals (THERA-M) 9 mg iron-400 mcg Tab tablet Take 1 tablet by mouth daily. 100 tablet 3     nitroglycerin (NITROSTAT) 0.4 MG SL tablet Place 1 tablet (0.4 mg total) under the tongue every 5 (five) minutes as needed for chest pain. 90 tablet 12     omeprazole (PRILOSEC) 20 MG capsule Take 1 capsule (20 mg total) by mouth daily. 30 capsule 11     sitaGLIPtin (JANUVIA) 25 MG tablet Take 1 tablet (25 mg total) by mouth daily. 30 tablet 2     ALLERGY RELIEF, LORATADINE, 10 mg tablet TAKE 1 TABLET BY MOUTH DAILY. 30 tablet 11     dicyclomine (BENTYL) 20 mg tablet Take 1 tablet (20 mg total) by mouth 3 (three) times a day. 30 tablet 0     EUCALYPTUS OIL/MENTHOL (MENTHOL EUCALYPTUS MM) 1 Inhalation into each nostril as needed (home medication from [facility]        febuxostat (ULORIC) 80 mg Tab Take 80 mg by mouth daily. (Patient taking differently: Take 80 mg by mouth daily. Taking) 90 tablet 1     lidocaine 4 % patch Place 1 patch on the skin daily as needed for pain. Remove and discard patch with 12 hours or as directed by MD.       traZODone (DESYREL) 50 MG tablet Take 25-50 mg by mouth at bedtime as needed.        triamcinolone (KENALOG) 0.1 % cream Apply topically 2 (two) times a day. 80 g 1     No current facility-administered medications for this visit.      History   Smoking Status     Former Smoker     Quit date: 3/10/2000   Smokeless Tobacco     Former  "User       OBJECTICE: /80  Pulse 76  Temp 98  F (36.7  C) (Oral)   Resp 16  Ht 5' 1\" (1.549 m)  Wt 149 lb 12 oz (67.9 kg)  SpO2 95%  BMI 28.3 kg/m2     No results found for this or any previous visit (from the past 24 hour(s)).     GEN-alert, appropriate, in no apparent distress   CV-regular rate and rhythm   RESP-lungs clear to auscultation   SKIN-compared to last exam, the scrotal irritation has improved significantly.  There is no surrounding erythema or streaking redness.  No fluctuance.  There is a maculopapular rash in a patchy distribution over the right lateral abdomen and on the posterior legs.      Chucho Espino          "

## 2021-06-17 NOTE — PROGRESS NOTES
ASSESMENT AND PLAN:  Diagnoses and all orders for this visit:    Skin infection  Patient does have a history of type 2 diabetes, last A1c was 7.6.  We discussed topical versus oral treatment and are using medication as prescribed below, reviewed the risks and benefits of the medication.  Patient should follow-up for his diabetic follow-up visit in clinic.  We discussed indications for emergent follow-up.  -     clotrimazole (LOTRIMIN) 1 % cream; Apply topically 2 (two) times a day for 14 days.  Dispense: 45 g; Refill: 1  -     cephalexin (KEFLEX) 500 MG capsule; Take 1 capsule (500 mg total) by mouth 3 (three) times a day for 7 days.  Dispense: 21 capsule; Refill: 0          SUBJECTIVE: 78-year-old male with a 3 day history of a painful red rash on the anterior scrotum.  Patient does have urinary incontinence and uses a absorbent pad that he thinks had been rubbing in this area.  No fever or chills, no spreading redness.    Past Medical History:   Diagnosis Date     Acute blood loss anemia      Acute renal failure      Anemia      Bacteremia      Cataract      Chronic gout      CKD (chronic kidney disease)     Stage 3     DM2 (diabetes mellitus, type 2)      GERD (gastroesophageal reflux disease)      Glucose intolerance (impaired glucose tolerance)      Gout      History of nephrolithiasis      Hyperlipidemia      Hypertension      Insomnia      Latent tuberculosis      Nephrolithiasis      Neuropathy      Osteoarthritis     wrist, knee, shoulder     Prostatitis      Rectal bleed      Trigger thumb      Patient Active Problem List   Diagnosis     Esophageal reflux     Dyslipidemia     Nephrolithiasis     Imaging Studies Nonspecific Abnormal Findings Skull And Head     Pseudogout     Latent Tuberculosis     Glucose Intolerance     Generalized Osteoarthritis Of The Hand     Lower Back Pain     Lumbar Disc Degeneration     Insomnia     Chronic Gout     CKD (chronic kidney disease) stage 3, GFR 30-59 ml/min      Osteoarthritis Of The Knee     Hyperglycemia     Localized Primary Osteoarthritis Of The Left Foot 1st MTP Joint     Right shoulder tendonitis     Elevated PSA     Prostate nodule     Cataracts, bilateral     Acute renal failure superimposed on stage 3 chronic kidney disease     Discharge planning issues     Acute pain of left wrist     Chest pain     Constipation, unspecified constipation type     Bilateral chronic knee pain     Chronic right shoulder pain     Essential hypertension with goal blood pressure less than 140/90     History of prostatitis     BPH (benign prostatic hyperplasia)     Chronic gout     Right shoulder tendinitis     Coronary artery disease due to lipid rich plaque     Right lower quadrant abdominal pain     Abnormal cardiovascular stress test     Type 2 diabetes mellitus without complication, without long-term current use of insulin     Sepsis     Colitis     Current Outpatient Prescriptions   Medication Sig Dispense Refill     acetaminophen (Q-PAP EXTRA STRENGTH) 500 MG tablet Take 1 tablet (500 mg total) by mouth every 6 (six) hours as needed. 120 tablet 6     aspirin 81 MG EC tablet Take 81 mg by mouth daily.       atorvastatin (LIPITOR) 40 MG tablet Take 40 mg by mouth at bedtime.       COLCRYS 0.6 mg tablet TAKE 1 TABLET ORALLY EVERY OTHER DAY 45 tablet 0     docusate sodium (COLACE) 100 MG capsule Take 1 capsule (100 mg total) by mouth 2 (two) times a day as needed for constipation. 60 capsule 4     gabapentin (NEURONTIN) 300 MG capsule Take 1-2 capsules (300-600 mg total) by mouth at bedtime. 180 capsule 1     HYDROcodone-acetaminophen 5-325 mg per tablet TAKE 1-2 TABLETS BY MOUTH AT BEDTIME AS NEEDED FOR PAIN 60 tablet 0     lisinopril (PRINIVIL,ZESTRIL) 5 MG tablet Take 1 tablet (5 mg total) by mouth daily. 30 tablet 11     loratadine (CLARITIN) 10 mg tablet Take 10 mg by mouth daily.       magnesium oxide 400 mg cap Take 1 capsule by mouth daily. 30 each 11     meclizine (ANTIVERT)  "12.5 mg tablet Take 12.5-25 mg by mouth every 8 (eight) hours as needed (one to two tablets).        metoprolol succinate (TOPROL-XL) 50 MG 24 hr tablet Take 1 tablet (50 mg total) by mouth daily. 90 tablet 3     multivitamin with minerals (THERA-M) 9 mg iron-400 mcg Tab tablet Take 1 tablet by mouth daily. 100 tablet 3     nitroglycerin (NITROSTAT) 0.4 MG SL tablet Place 1 tablet (0.4 mg total) under the tongue every 5 (five) minutes as needed for chest pain. 90 tablet 12     omeprazole (PRILOSEC) 20 MG capsule Take 1 capsule (20 mg total) by mouth daily. 30 capsule 11     sitaGLIPtin (JANUVIA) 25 MG tablet Take 1 tablet (25 mg total) by mouth daily. 30 tablet 2     cephalexin (KEFLEX) 500 MG capsule Take 1 capsule (500 mg total) by mouth 3 (three) times a day for 7 days. 21 capsule 0     clotrimazole (LOTRIMIN) 1 % cream Apply topically 2 (two) times a day for 14 days. 45 g 1     dicyclomine (BENTYL) 20 mg tablet Take 1 tablet (20 mg total) by mouth 3 (three) times a day. 30 tablet 0     EUCALYPTUS OIL/MENTHOL (MENTHOL EUCALYPTUS MM) 1 Inhalation into each nostril as needed (home medication from [facility]        febuxostat (ULORIC) 80 mg Tab Take 80 mg by mouth daily. (Patient taking differently: Take 80 mg by mouth daily. Taking) 90 tablet 1     lidocaine 4 % patch Place 1 patch on the skin daily as needed for pain. Remove and discard patch with 12 hours or as directed by MD.       traZODone (DESYREL) 50 MG tablet Take 25-50 mg by mouth at bedtime as needed.        No current facility-administered medications for this visit.      History   Smoking Status     Former Smoker     Quit date: 3/10/2000   Smokeless Tobacco     Former User       OBJECTICE: /82 (Patient Site: Right Arm)  Pulse 74  Temp 98.4  F (36.9  C) (Oral)   Resp 20  Ht 5' 1\" (1.549 m)  Wt 157 lb (71.2 kg)  SpO2 96%  BMI 29.66 kg/m2     No results found for this or any previous visit (from the past 24 hour(s)).     GEN-alert, " appropriate, in no apparent distress   CV-regular rate and rhythm   RESP-lungs clear to auscultation   Genitourinary-normal testicular exam bilaterally, no hernia, normal appearance to the external genitalia.   SKIN-red rash of the anterior scrotum without induration, no palpable or visible fluctuance or purulence.      Chucho Espino

## 2021-06-18 ENCOUNTER — COMMUNICATION - HEALTHEAST (OUTPATIENT)
Dept: FAMILY MEDICINE | Facility: CLINIC | Age: 81
End: 2021-06-18

## 2021-06-18 DIAGNOSIS — M1A.09X0 CHRONIC GOUT OF MULTIPLE SITES, UNSPECIFIED CAUSE: ICD-10-CM

## 2021-06-19 NOTE — LETTER
Letter by Olga Brady SW at      Author: Olga Brady SW Service: -- Author Type: --    Filed:  Encounter Date: 8/30/2019 Status: (Other)           August 30, 2019    ASIF VITALE  [address] [place] [address]      Dear Asif:    As a member of [facility] ([facility]) ([facility]), you are provided a care coordinator. I will be your new care coordinator as of 9/1/19. I will be calling you soon to see how you are doing and determine your needs.    If you have any questions, please feel free to call me at   [phone]. If you reach my voice mail, please leave a message and your phone number. If you are hearing impaired, please call the [place] [employer] at [phone] or [phone] (xaxdvv-os-[id number] relay service).    I look forward to speaking with you soon.    Sincerely,    RICKEY Gallegos  [phone]  [email or web address] is a health plan that contracts with both Medicare and the [place] Medical Assistance (Medicaid) program to provide benefits of both programs to enrollees. Enrollment in [facility] depends on contract renewal.      [facility]  [id number] DHS Approved ([id number])  [id number] (11/18)

## 2021-06-19 NOTE — LETTER
Letter by Su Rodriguez SW at      Author: Su Rodriguez SW Service: -- Author Type: --    Filed:  Encounter Date: 5/15/2019 Status: (Other)       May 15, 2019      ASIF VITALE  [address] [place] [address]      Dear Asif:    At [facility], we are dedicated to improving your health and well-being. Enclosed is the Comprehensive Care Plan that we developed with you on 05/08/2019. Please review the Care Plan carefully.    As a reminder, some of the things we discussed at your visit include:    Your physical and mental health    Ways to reduce falls    Health care needs you may have    Dont forget to contact your care coordinator if you:    Have been hospitalized or plan to be hospitalized     Have had a fall     Have experienced a change in physical health    Are experiencing emotional problems     If you do not agree with your Care Plan, have questions about it, or have experienced a change in your needs, please call me at [phone]. If you are hearing impaired, please call the [place] [employer] at [phone] or [phone] (kxluqy-rq-txclmz relay service).    Sincerely,      MICHAEL Perez    E-mail: [email or web address]   Phone: [phone]      [facility] ([facility]) is a health plan that contracts with both Medicare and the [place] Medical Assistance (Medicaid) program to provide benefits of both programs to enrollees. Enrollment in [facility] depends on contract renewal.    [id number])     [id number] (11/18)

## 2021-06-20 ENCOUNTER — RECORDS - HEALTHEAST (OUTPATIENT)
Dept: FAMILY MEDICINE | Facility: CLINIC | Age: 81
End: 2021-06-20

## 2021-06-20 NOTE — PROGRESS NOTES
ASSESMENT AND PLAN:  Diagnoses and all orders for this visit:    Essential hypertension with goal blood pressure less than 140/90  Not under ideal control.  We discussed options are going to increase lisinopril as detailed below.  We are going to try splitting out the doses to twice daily to reduce the risk of lightheadedness and because the patient has tolerated this bottle well in the past as detailed below.  -     lisinopril (PRINIVIL,ZESTRIL) 5 MG tablet; Take 1 tablet (5 mg total) by mouth 2 (two) times a day.  Dispense: 60 tablet; Refill: 11    Chronic Gout with current flare of the right hand  -     febuxostat (ULORIC) 80 mg Tab; Take 80 mg by mouth daily.  Dispense: 30 tablet; Refill: 11  -     predniSONE (DELTASONE) 20 MG tablet; Take 1 tablet (20 mg total) by mouth daily for 5 days.  Dispense: 5 tablet; Refill: 1    Type 2 diabetes mellitus without complication, without long-term current use of insulin (H)  -     Glycosylated Hemoglobin A1c    Actinic keratosis of left upper leg  After discussion of options with the patient we reviewed the risks and benefits of proceeding with cryotherapy and the patient decided to proceed.  Cryotherapy treatment done with the cryo gun with 2 freeze thaw cycles.  He tolerated the procedure well and we reviewed indications for follow-up and aftercare instructions.    Need for immunization against influenza  -     Influenza High Dose, Seasonal 65+ yrs          SUBJECTIVE: 78-year-old male here for follow-up on his blood pressure and diabetes.  Diabetes has been under good control with lifestyle modifications.  Blood pressure has been higher than goal.  He takes lisinopril 5 mg daily, in the past he had increased it on his own volition up to 5 mg twice daily and noticed some good improvement in how much tension he feels in his neck.  He also noticed lower blood pressures.  He is wondering whether he should again increased to twice daily and lisinopril.  Patient also has a  history of gout.  He is about to run out of his Uloric.  It is prescribed by his rheumatologist but he can get in for an appointment until next month.  Over the last few days he has noticed some increasing pain and swelling and stiffness in his right hand second finger MP joint.  It feels consistent with previous episodes of gout to him.  Pain is currently moderate in severity.  He is wondering about getting another prednisone 5 day treatment which had worked well for him in the past.    Past Medical History:   Diagnosis Date     Acute blood loss anemia      Acute renal failure (H)      Anemia      Bacteremia      Cataract      Chronic gout      CKD (chronic kidney disease)     Stage 3     DM2 (diabetes mellitus, type 2) (H)      GERD (gastroesophageal reflux disease)      Glucose intolerance (impaired glucose tolerance)      Gout      History of nephrolithiasis      Hyperlipidemia      Hypertension      Insomnia      Latent tuberculosis      Nephrolithiasis      Neuropathy (H)      Osteoarthritis     wrist, knee, shoulder     Prostatitis      Rectal bleed      Trigger thumb      Patient Active Problem List   Diagnosis     Esophageal reflux     Dyslipidemia     Nephrolithiasis     Imaging Studies Nonspecific Abnormal Findings Skull And Head     Pseudogout     Latent Tuberculosis     Glucose Intolerance     Generalized Osteoarthritis Of The Hand     Lower Back Pain     Lumbar Disc Degeneration     Insomnia     Chronic Gout     CKD (chronic kidney disease) stage 3, GFR 30-59 ml/min     Osteoarthritis Of The Knee     Hyperglycemia     Localized Primary Osteoarthritis Of The Left Foot 1st MTP Joint     Right shoulder tendonitis     Elevated PSA     Prostate nodule     Cataracts, bilateral     Acute renal failure superimposed on stage 3 chronic kidney disease (H)     Discharge planning issues     Acute pain of left wrist     Chest pain     Constipation, unspecified constipation type     Bilateral chronic knee pain      Chronic right shoulder pain     Essential hypertension with goal blood pressure less than 140/90     History of prostatitis     BPH (benign prostatic hyperplasia)     Chronic gout     Right shoulder tendinitis     Coronary artery disease due to lipid rich plaque     Right lower quadrant abdominal pain     Abnormal cardiovascular stress test     Type 2 diabetes mellitus without complication, without long-term current use of insulin (H)     Sepsis (H)     Colitis     Current Outpatient Prescriptions   Medication Sig Dispense Refill     ALLERGY RELIEF, LORATADINE, 10 mg tablet TAKE 1 TABLET BY MOUTH DAILY. 30 tablet 11     ASPIR-LOW 81 mg EC tablet TAKE ONE TABLET BY MOUTH ONCE DAILY 90 tablet 0     atorvastatin (LIPITOR) 40 MG tablet Take 1 tablet (40 mg total) by mouth at bedtime. 90 tablet 3     COLCRYS 0.6 mg tablet TAKE 1 TABLET ORALLY EVERY OTHER DAY 45 tablet 0     docusate sodium (COLACE) 100 MG capsule Take 1 capsule (100 mg total) by mouth 2 (two) times a day as needed for constipation. 60 capsule 4     EUCALYPTUS OIL/MENTHOL (MENTHOL EUCALYPTUS MM) 1 Inhalation into each nostril as needed (home medication from [facility]        gabapentin (NEURONTIN) 300 MG capsule Take 1-2 capsules at Bedtime 180 capsule 1     HYDROcodone-acetaminophen 5-325 mg per tablet TAKE 1-2 TABLETS BY MOUTH AT BEDTIME AS NEEDED FOR PAIN 60 tablet 0     lisinopril (PRINIVIL,ZESTRIL) 5 MG tablet Take 1 tablet (5 mg total) by mouth 2 (two) times a day. 60 tablet 11     loratadine (CLARITIN) 10 mg tablet Take 10 mg by mouth daily.       magnesium oxide 400 mg cap Take 1 capsule by mouth daily. 30 each 11     meclizine (ANTIVERT) 12.5 mg tablet Take 12.5-25 mg by mouth every 8 (eight) hours as needed (one to two tablets).        metoprolol succinate (TOPROL-XL) 50 MG 24 hr tablet Take 1 tablet (50 mg total) by mouth daily. 90 tablet 3     multivitamin with minerals (THERA-M) 9 mg iron-400 mcg Tab tablet Take 1 tablet by mouth daily. 100  "tablet 3     nitroglycerin (NITROSTAT) 0.4 MG SL tablet Place 1 tablet (0.4 mg total) under the tongue every 5 (five) minutes as needed for chest pain. 90 tablet 12     omeprazole (PRILOSEC) 20 MG capsule TAKE ONE CAPSULE BY MOUTH DAILY. 90 capsule 2     sitaGLIPtin (JANUVIA) 25 MG tablet Take 1 tablet (25 mg total) by mouth daily. 90 tablet 0     triamcinolone (KENALOG) 0.1 % cream Apply topically 2 (two) times a day. 80 g 1     acetaminophen (Q-PAP EXTRA STRENGTH) 500 MG tablet Take 1 tablet (500 mg total) by mouth every 6 (six) hours as needed. 120 tablet 6     dicyclomine (BENTYL) 20 mg tablet Take 1 tablet (20 mg total) by mouth 3 (three) times a day. 30 tablet 0     febuxostat (ULORIC) 80 mg Tab Take 80 mg by mouth daily. 30 tablet 11     lidocaine 4 % patch Place 1 patch on the skin daily as needed for pain. Remove and discard patch with 12 hours or as directed by MD.       predniSONE (DELTASONE) 20 MG tablet Take 1 tablet (20 mg total) by mouth daily for 5 days. 5 tablet 1     traZODone (DESYREL) 50 MG tablet Take 25-50 mg by mouth at bedtime as needed.        No current facility-administered medications for this visit.      History   Smoking Status     Former Smoker     Quit date: 3/10/2000   Smokeless Tobacco     Former User       OBJECTICE: /84  Pulse 66  Temp 98.2  F (36.8  C) (Oral)   Resp 20  Ht 5' 1\" (1.549 m)  Wt 152 lb (68.9 kg)  SpO2 95%  BMI 28.72 kg/m2     Recent Results (from the past 24 hour(s))   Glycosylated Hemoglobin A1c    Collection Time: 08/28/18 11:06 AM   Result Value Ref Range    Hemoglobin A1c 6.2 (H) 3.5 - 6.0 %        GEN-alert, appropriate, in no apparent distress   Musculoskeletal- some pain with palpation of the MP joint of his second finger of the right hand.   CV-regular rate and rhythm with no murmur   RESP-lungs clear to auscultation   ABDOMINAL-soft and nontender   EXTREM-warm with no edema   SKIN-no ulcers or vesicles.  There is a raised thickened flaky white " skin lesion, 1 cm of maximum diameter, on the anterior left upper leg.      Chucho Espino

## 2021-06-20 NOTE — LETTER
Letter by Olga Brady SW at      Author: Olga Brady SW Service: -- Author Type: --    Filed:  Encounter Date: 4/20/2020 Status: (Other)       April 20, 2020      ASIF VITALE  [address] [place] [address]       Dear Asif:    At [facility], we are dedicated to improving your health and well-being. Enclosed is the Comprehensive Care Plan that we developed with you on 04/15/2020. Please review the Care Plan carefully.    As a reminder, some of the things we discussed at your visit include:    Your physical and mental health    Ways to reduce falls    Health care needs you may have    Dont forget to contact your care coordinator if you:    Have been hospitalized or plan to be hospitalized     Have had a fall     Have experienced a change in physical health    Are experiencing emotional problems     If you do not agree with your Care Plan, have questions about it, or have experienced a change in your needs, please call me at [phone]. If you are hearing impaired, please call the [place] Relay at 649 or [phone] (nghzsu-ic-nhaiii relay service).    Sincerely,    RICKEY Gallegos  [phone]  [email or web address] ([facility]) is a health plan that contracts with both Medicare and the [place] Medical Assistance (Medicaid) program to provide benefits of both programs to enrollees. Enrollment in [facility] depends on contract renewal.    [id number]     S2972A (11/18)

## 2021-06-20 NOTE — LETTER
Letter by Gloria Yadav RN at      Author: Gloria Yadav RN Service: -- Author Type: --    Filed:  Encounter Date: 6/24/2020 Status: (Other)       6/24/2020        Adam Talamantes  [address] [place] [address]    This letter provides a written record that you were tested for COVID-19 on 6/22/20.     Your result was negative. This means that we didnt find the virus that causes COVID-19 in your sample. A test may show negative when you do actually have the virus. This can happen when the virus is in the early stages of infection, before you feel illness symptoms.    If you have symptoms   Stay home and away from others (self-isolate) until you meet ALL of the guidelines below:    Youve had no fever--and no medicine that reduces fever--for 3 full days (72 hours). And ?    Your other symptoms have gotten better. For example, your cough or breathing has improved. And?    At least 10 days have passed since your symptoms started.    During this time:    Stay home. Dont go to work, school or anywhere else.     Stay in your own room, including for meals. Use your own bathroom if you can.    Stay away from others in your home. No hugging, kissing or shaking hands. No visitors.    Clean high touch surfaces often (doorknobs, counters, handles, etc.). Use a household cleaning spray or wipes. You can find a full list on the EPA website at www.epa.gov/pesticide-registration/list-n-disinfectants-use-against-sars-cov-2.    Cover your mouth and nose with a mask, tissue or washcloth to avoid spreading germs.    Wash your hands and face often with soap and water.    Going back to work  Check with your employer for any guidelines to follow for going back to work.    Employers: This document serves as formal notice that your employee tested negative for COVID-19, as of the testing date shown above.

## 2021-06-20 NOTE — PROGRESS NOTES
ASSESSMENT AND PLAN  Adam Talamantes 78 y.o. male is a for follow-up.  He has tophaceous gout.  Renal impairment.  He has osteoarthritis of the knees.  He is complaining of right hand pain which is likely due to triggering of the right index finger.  He would like to proceed with local injections.  40 mg of Kenalog injected into each of the knee, 20 mg of methylprednisolone Marcaine into the tendon sheath of the right index finger for the A1 nodularity.  Check serum uric acid.  He has renal impairment.  He is aware that he should not take nonsteroidals.  He will continue for by febuxostat.  Will meet here in the next 3-4 months or sooner.           Diagnoses and all orders for this visit:    Chronic Gout    Osteoarthritis Of The Knee  -     triamcinolone acetonide 40 mg/mL injection 40 mg (KENALOG-40); Inject 1 mL (40 mg total) into the joint once.  -     triamcinolone acetonide 40 mg/mL injection 40 mg (KENALOG-40); Inject 1 mL (40 mg total) into the joint once.    Trigger finger, right index finger  -     methylPREDNISolone acetate injection 20 mg (DEPO-MEDROL); Inject 0.5 mL (20 mg total) into the joint once.    CKD (chronic kidney disease) stage 3, GFR 30-59 ml/min    HISTORY OF PRESENTING ILLNESS:  Adam Talamantes 78 y.o. is here for follow up.  He has well-controlled tophaceous gout, osteoarthritis widespread, renal impairment.  He is on febuxostat, low-dose colchicine.  He noted discomfort in his knees, right hand.  He points to the right index finger as a site of his maximum pain.  This is gone on for the past 4 weeks.  This is worse with activity.  This sounds like sometimes clicks and locks.  He also noted worsening of pain in his knees.  He has not had a flareup of gout sound like.  He is due for his labs which he did have done at another clinic however the uric acid had not been ordered.  This will be added today.  He is accompanied by his daughter and ..  In view of his renal impairment is only leopoldo  over-the-counter is acetaminophen which has provided only limited relief.  .Original gout episode affected the left first MTP joint. his joint stiffness is stable and his joint swelling is stable.  Joints currently hurting  include None.Limitation on activities include difficulty with walking. The patient is avoiding high purine foods. Limitation on activities as noted in the MDHAQ scanned in the EMR.  Further historical information, including ROS as noted in the multidimensional health assessment questionnaire scanned in the EMR and in the assessment and plan section.  he has not had a flareup of gout.    ALLERGIES:Review of patient's allergies indicates no known allergies.    PAST MEDICAL/ACTIVE PROBLEMS/MEDICATION/SOCIAL DATA  Past Medical History:   Diagnosis Date     Acute blood loss anemia      Acute renal failure (H)      Anemia      Bacteremia      Cataract      Chronic gout      CKD (chronic kidney disease)     Stage 3     DM2 (diabetes mellitus, type 2) (H)      GERD (gastroesophageal reflux disease)      Glucose intolerance (impaired glucose tolerance)      Gout      History of nephrolithiasis      Hyperlipidemia      Hypertension      Insomnia      Latent tuberculosis      Nephrolithiasis      Neuropathy (H)      Osteoarthritis     wrist, knee, shoulder     Prostatitis      Rectal bleed      Trigger thumb      History   Smoking Status     Former Smoker     Quit date: 3/10/2000   Smokeless Tobacco     Former User     Patient Active Problem List   Diagnosis     Esophageal reflux     Dyslipidemia     Nephrolithiasis     Imaging Studies Nonspecific Abnormal Findings Skull And Head     Pseudogout     Latent Tuberculosis     Glucose Intolerance     Generalized Osteoarthritis Of The Hand     Lower Back Pain     Lumbar Disc Degeneration     Insomnia     Chronic Gout     CKD (chronic kidney disease) stage 3, GFR 30-59 ml/min     Osteoarthritis Of The Knee     Hyperglycemia     Localized Primary Osteoarthritis Of  The Left Foot 1st MTP Joint     Right shoulder tendonitis     Elevated PSA     Prostate nodule     Cataracts, bilateral     Acute renal failure superimposed on stage 3 chronic kidney disease (H)     Discharge planning issues     Acute pain of left wrist     Chest pain     Constipation, unspecified constipation type     Bilateral chronic knee pain     Chronic right shoulder pain     Essential hypertension with goal blood pressure less than 140/90     History of prostatitis     BPH (benign prostatic hyperplasia)     Chronic gout     Right shoulder tendinitis     Coronary artery disease due to lipid rich plaque     Right lower quadrant abdominal pain     Abnormal cardiovascular stress test     Type 2 diabetes mellitus without complication, without long-term current use of insulin (H)     Sepsis (H)     Colitis     Current Outpatient Prescriptions   Medication Sig Dispense Refill     acetaminophen (Q-PAP EXTRA STRENGTH) 500 MG tablet Take 1 tablet (500 mg total) by mouth every 6 (six) hours as needed. 120 tablet 6     ALLERGY RELIEF, LORATADINE, 10 mg tablet TAKE 1 TABLET BY MOUTH DAILY. 30 tablet 11     ASPIR-LOW 81 mg EC tablet TAKE ONE TABLET BY MOUTH ONCE DAILY 90 tablet 0     atorvastatin (LIPITOR) 40 MG tablet Take 1 tablet (40 mg total) by mouth at bedtime. 90 tablet 3     COLCRYS 0.6 mg tablet TAKE 1 TABLET ORALLY EVERY OTHER DAY 45 tablet 0     dicyclomine (BENTYL) 20 mg tablet Take 1 tablet (20 mg total) by mouth 3 (three) times a day. 30 tablet 0     docusate sodium (COLACE) 100 MG capsule Take 1 capsule (100 mg total) by mouth 2 (two) times a day as needed for constipation. 60 capsule 4     EUCALYPTUS OIL/MENTHOL (MENTHOL EUCALYPTUS MM) 1 Inhalation into each nostril as needed (home medication from [facility]        febuxostat (ULORIC) 80 mg Tab Take 80 mg by mouth daily. 30 tablet 11     gabapentin (NEURONTIN) 300 MG capsule Take 1-2 capsules at Bedtime 180 capsule 1     HYDROcodone-acetaminophen 5-325 mg  per tablet TAKE 1-2 TABLETS BY MOUTH AT BEDTIME AS NEEDED FOR PAIN 60 tablet 0     lidocaine 4 % patch Place 1 patch on the skin daily as needed for pain. Remove and discard patch with 12 hours or as directed by MD.       lisinopril (PRINIVIL,ZESTRIL) 5 MG tablet Take 1 tablet (5 mg total) by mouth 2 (two) times a day. 60 tablet 11     loratadine (CLARITIN) 10 mg tablet Take 10 mg by mouth daily.       magnesium oxide 400 mg cap Take 1 capsule by mouth daily. 30 each 11     meclizine (ANTIVERT) 12.5 mg tablet Take 12.5-25 mg by mouth every 8 (eight) hours as needed (one to two tablets).        metoprolol succinate (TOPROL-XL) 50 MG 24 hr tablet Take 1 tablet (50 mg total) by mouth daily. 90 tablet 3     multivitamin with minerals (THERA-M) 9 mg iron-400 mcg Tab tablet Take 1 tablet by mouth daily. 100 tablet 3     nitroglycerin (NITROSTAT) 0.4 MG SL tablet Place 1 tablet (0.4 mg total) under the tongue every 5 (five) minutes as needed for chest pain. 90 tablet 12     omeprazole (PRILOSEC) 20 MG capsule TAKE ONE CAPSULE BY MOUTH DAILY. 90 capsule 2     sitaGLIPtin (JANUVIA) 25 MG tablet Take 1 tablet (25 mg total) by mouth daily. 90 tablet 0     traZODone (DESYREL) 50 MG tablet Take 25-50 mg by mouth at bedtime as needed.        triamcinolone (KENALOG) 0.1 % cream Apply topically 2 (two) times a day. 80 g 1     No current facility-administered medications for this visit.      DETAILED EXAMINATION  09/10/18  :  There were no vitals filed for this visit.  Alert oriented. Head including the face is examined for malar rash, heliotropes, scarring, lupus pernio. Eyes examined for redness such as in episcleritis/scleritis, periorbital lesions.   Neck/ Face examined for parotid gland swelling, range of motion of neck.  Left upper and lower and right upper and lower extremities examined for tenderness, swelling, warmth of the appendicular joints, range of motion, edema, rash.  Some of the important findings included: He has  triggering of the right index finger with a nodularity at A1 level.  This is quite tender.  And he has JLT of the knees bilaterally.  There is none of the acutely inflamed joints observed with uncontrolled acute gout.         No acutely inflamed appendicular joints.  He has JLT of the knees bilaterally.    LAB / IMAGING DATA:  ALT   Date Value Ref Range Status   09/04/2018 38 0 - 45 U/L Final   12/29/2017 25 0 - 45 U/L Final   11/24/2017 20 0 - 45 U/L Final     Albumin   Date Value Ref Range Status   09/04/2018 3.8 3.5 - 5.0 g/dL Final   12/29/2017 4.0 3.5 - 5.0 g/dL Final   11/24/2017 3.7 3.5 - 5.0 g/dL Final     Creatinine   Date Value Ref Range Status   09/04/2018 1.60 (H) 0.70 - 1.30 mg/dL Final   05/01/2018 1.61 (H) 0.70 - 1.30 mg/dL Final   12/29/2017 1.53 (H) 0.70 - 1.30 mg/dL Final       WBC   Date Value Ref Range Status   09/04/2018 8.5 4.0 - 11.0 thou/uL Final   05/01/2018 7.2 4.0 - 11.0 thou/uL Final   08/25/2015 5.7 4.0 - 11.0 thou/uL Final   06/18/2015 5.7 4.0 - 11.0 thou/uL Final     Hemoglobin   Date Value Ref Range Status   09/04/2018 15.0 14.0 - 18.0 g/dL Final   05/01/2018 13.3 (L) 14.0 - 18.0 g/dL Final   12/29/2017 14.1 14.0 - 18.0 g/dL Final     Platelets   Date Value Ref Range Status   09/04/2018 208 140 - 440 thou/uL Final   05/01/2018 163 140 - 440 thou/uL Final   12/29/2017 193 140 - 440 thou/uL Final       Lab Results   Component Value Date    RF <10 09/30/2009    SEDRATE 18 (H) 10/28/2009

## 2021-06-21 RX ORDER — COLCHICINE 0.6 MG/1
TABLET ORAL
Qty: 45 TABLET | Refills: 1 | Status: SHIPPED | OUTPATIENT
Start: 2021-06-21 | End: 2022-12-01

## 2021-06-21 NOTE — LETTER
Letter by Olga Brady SW at      Author: Olga Brady SW Service: -- Author Type: --    Filed:  Encounter Date: 3/22/2021 Status: (Other)       March 22, 2021      ASIF VITALE  [address] [place] [address]      Dear Asif:    At [facility], we are dedicated to improving your health and well-being. Enclosed is the Comprehensive Care Plan that we developed with you on 03/16/2021. Please review the Care Plan carefully.    As a reminder, some of the things we discussed at your visit include:    Your physical and mental health    Ways to reduce falls    Health care needs you may have    Dont forget to contact your care coordinator if you:    Have been hospitalized or plan to be hospitalized     Have had a fall     Have experienced a change in physical health    Are experiencing emotional problems     If you do not agree with your Care Plan, have questions about it, or have experienced a change in your needs, please call me at [phone]. If you are hearing impaired, please call the [place] Relay at 131 or [phone] (lrndxo-rh-tdhtfc relay service).    Sincerely,    RICKEY Gallegos  [phone]  [email or web address] ([facility]) is a health plan that contracts with both Medicare and the [place] Medical Assistance (Medicaid) program to provide benefits of both programs to enrollees. Enrollment in [facility] depends on contract renewal.    [id number]     [id number] (11/18)

## 2021-06-22 NOTE — PROGRESS NOTES
ASSESMENT AND PLAN:  Diagnoses and all orders for this visit:    Essential hypertension with goal blood pressure less than 140/90  Patient counseled on his improved blood pressure control compared to last visit.  Although he continues to get intermittent lightheadedness, it is stable and I would like him to continue taking his current medications and the patient and his daughter in agreement with this plan.    Type 2 diabetes mellitus without complication, without long-term current use of insulin (H)  Once the results from the medical record which was very good control at 6.2.  We will plan to repeat A1c at diabetes follow-up in the clinic here in 3 months.    Chronic gout of multiple sites, unspecified cause, Generalized Osteoarthritis Of The Hand  -     colchicine (COLCRYS) 0.6 mg tablet; TAKE 1 TABLET ORALLY EVERY OTHER DAY.  Dispense: 45 tablet; Refill: 3  Reviewed the most recent rheumatology consultation with the patient and his daughter with the help of a professional .  At that time steroid injection had been done and was effective.  He is going to continue on his current medications, colchicine refilled as above, he has follow-up scheduled with rheumatology next month.      Inflamed seborrheic keratosis  Discussed options with the patient and he would like to proceed with cryotherapy for destruction of the lesion on the forehead and the back.  Reviewed the risks and benefits of the procedure with the patient and he agrees to proceed.  The forehead lesion was treated with 1 freeze thaw cycle with cryotherapy and the lower back lesion was treated with 2 freeze thaw cycles with cryotherapy.  No comp occasions the time of procedure.  Aftercare instructions discussed with the patient.  Bacitracin given for him to use 3 times daily if he has blistering.        SUBJECTIVE: 78-year-old male here with several concerns.  He continues to have ongoing moderate to severe pain in his knees and hands  bilaterally.  Patient has a history of gout and osteoarthritis and is followed by rheumatology.  He reports that he has fair pain control with his current regiment.  He is almost out of his colchicine refills.  This past month he has not had any sudden acute gout flare symptoms in any single joint.  However, his hands have been bothering him bilaterally and worsen with repetitive or heavier activities.  Patient is also here to follow-up on his type 2 diabetes, it has been under excellent control, last A1c was 6.2.  He has been taking his medications regularly.  Since last visit, he is been tolerating his blood pressure medication regiment well.  He does get lightheadedness couple of times per day but does not really correlate that timing with taking his medication.  No near syncope, no chest pain.  Patient reports a raised skin lesion on the right forehead that has been getting irritated and bothering him, there is also one in the low back that has been itchy.  He had a similar skin spot treated with cryotherapy in the past and it went away completely, he would like to have this done for these 2 lesions.    Past Medical History:   Diagnosis Date     Acute blood loss anemia      Acute renal failure (H)      Anemia      Bacteremia      Cataract      Chronic gout      CKD (chronic kidney disease)     Stage 3     DM2 (diabetes mellitus, type 2) (H)      GERD (gastroesophageal reflux disease)      Glucose intolerance (impaired glucose tolerance)      Gout      History of nephrolithiasis      Hyperlipidemia      Hypertension      Insomnia      Latent tuberculosis      Nephrolithiasis      Neuropathy (H)      Osteoarthritis     wrist, knee, shoulder     Prostatitis      Rectal bleed      Trigger thumb      Patient Active Problem List   Diagnosis     Esophageal reflux     Dyslipidemia     Nephrolithiasis     Imaging Studies Nonspecific Abnormal Findings Skull And Head     Pseudogout     Latent Tuberculosis     Glucose  Intolerance     Generalized Osteoarthritis Of The Hand     Lower Back Pain     Lumbar Disc Degeneration     Insomnia     Chronic Gout     CKD (chronic kidney disease) stage 3, GFR 30-59 ml/min (H)     Osteoarthritis Of The Knee     Hyperglycemia     Localized Primary Osteoarthritis Of The Left Foot 1st MTP Joint     Elevated PSA     Prostate nodule     Cataracts, bilateral     Acute renal failure superimposed on stage 3 chronic kidney disease (H)     Discharge planning issues     Acute pain of left wrist     Chest pain     Constipation, unspecified constipation type     Bilateral chronic knee pain     Chronic right shoulder pain     Essential hypertension with goal blood pressure less than 140/90     History of prostatitis     BPH (benign prostatic hyperplasia)     Chronic gout     Coronary artery disease due to lipid rich plaque     Right lower quadrant abdominal pain     Abnormal cardiovascular stress test     Type 2 diabetes mellitus without complication, without long-term current use of insulin (H)     Colitis     Trigger finger, right index finger     Current Outpatient Medications   Medication Sig Dispense Refill     acetaminophen (Q-PAP EXTRA STRENGTH) 500 MG tablet Take 1 tablet (500 mg total) by mouth every 6 (six) hours as needed. 120 tablet 6     ALLERGY RELIEF, LORATADINE, 10 mg tablet TAKE 1 TABLET BY MOUTH DAILY. 30 tablet 11     ASPIR-LOW 81 mg EC tablet TAKE ONE TABLET BY MOUTH ONCE DAILY 90 tablet 0     atorvastatin (LIPITOR) 40 MG tablet Take 1 tablet (40 mg total) by mouth at bedtime. 90 tablet 3     colchicine (COLCRYS) 0.6 mg tablet TAKE 1 TABLET ORALLY EVERY OTHER DAY. 45 tablet 3     dicyclomine (BENTYL) 20 mg tablet Take 1 tablet (20 mg total) by mouth 3 (three) times a day. 30 tablet 0     docusate sodium (COLACE) 100 MG capsule Take 1 capsule (100 mg total) by mouth 2 (two) times a day as needed for constipation. 60 capsule 4     EUCALYPTUS OIL/MENTHOL (MENTHOL EUCALYPTUS MM) 1 Inhalation  into each nostril as needed (home medication from [facility].).        gabapentin (NEURONTIN) 300 MG capsule Take 1-2 capsules at Bedtime 180 capsule 1     HYDROcodone-acetaminophen 5-325 mg per tablet TAKE 1-2 TABLETS BY MOUTH AT BEDTIME AS NEEDED FOR PAIN 60 tablet 0     JANUVIA 25 mg tablet TAKE ONE TABLET BY MOUTH ONCE DAILY 90 tablet 1     lidocaine 4 % patch Place 1 patch on the skin daily as needed for pain. Remove and discard patch with 12 hours or as directed by MD.       lisinopril (PRINIVIL,ZESTRIL) 5 MG tablet Take 1 tablet (5 mg total) by mouth 2 (two) times a day. 60 tablet 11     loratadine (CLARITIN) 10 mg tablet Take 10 mg by mouth daily.       magnesium oxide 400 mg cap Take 1 capsule by mouth daily. 30 each 11     meclizine (ANTIVERT) 12.5 mg tablet Take 12.5-25 mg by mouth every 8 (eight) hours as needed (one to two tablets).        metoprolol succinate (TOPROL-XL) 50 MG 24 hr tablet Take 1 tablet (50 mg total) by mouth daily. 90 tablet 3     multivitamin with minerals (THERA-M) 9 mg iron-400 mcg Tab tablet Take 1 tablet by mouth daily. 100 tablet 3     nitroglycerin (NITROSTAT) 0.4 MG SL tablet Place 1 tablet (0.4 mg total) under the tongue every 5 (five) minutes as needed for chest pain. 90 tablet 12     omeprazole (PRILOSEC) 20 MG capsule TAKE ONE CAPSULE BY MOUTH DAILY. 90 capsule 2     traZODone (DESYREL) 50 MG tablet Take 25-50 mg by mouth at bedtime as needed.        triamcinolone (KENALOG) 0.1 % cream Apply topically 2 (two) times a day. 80 g 1     febuxostat (ULORIC) 80 mg Tab Take 80 mg by mouth daily. 30 tablet 11     No current facility-administered medications for this visit.      Social History     Tobacco Use   Smoking Status Former Smoker     Last attempt to quit: 3/10/2000     Years since quittin.7   Smokeless Tobacco Former User       OBJECTICE: /78 (Patient Site: Right Arm, Patient Position: Sitting, Cuff Size: Adult Regular)   Pulse 74   Temp 98.5  F (36.9  C)  "(Oral)   Resp 19   Ht 5' 1\" (1.549 m)   Wt 151 lb 4 oz (68.6 kg)   SpO2 96%   BMI 28.58 kg/m       No results found for this or any previous visit (from the past 24 hour(s)).     GEN-alert, appropriate, in no apparent distress   CV-regular rate and rhythm with no murmur   RESP-lungs clear to auscultation   ABDOMINAL-soft and nontender   EXTREM-warm with no ankle edema   Musculoskeletal -some mild tenderness with palpation of the joint lines of the knees bilaterally.  No effusion.  No effusion of the hand joints.   SKIN-benign-appearing seborrheic keratosis with mild inflammation of the right forehead and right low back      Chucho Espino          "

## 2021-06-22 NOTE — PROGRESS NOTES
ASSESSMENT AND PLAN  Adam Talamantes 78 y.o. male is a for follow-up.  He has tophaceous gout.  Renal impairment.  He has osteoarthritis of the knees.  He is complaining of exacerbation of pain in his left wrist.  Has inflammation of the left wrist.  He would like to proceed with local injection which is done with 20 mg of methylprednisolone under ultrasound guidance.  Today's workup as noted.He has renal impairment.  He is aware that he should not take nonsteroidals.  X-rays of the wrist done today consistent with osteoarthritis.  He will continue for by febuxostat.  Will meet here in the next 3-4 months or sooner.           Diagnoses and all orders for this visit:    Primary osteoarthritis of left wrist  -     methylPREDNISolone acetate injection 20 mg (DEPO-MEDROL)    Generalized Osteoarthritis Of The Hand  -     HM2(CBC w/o Differential)  -     ALT (SGPT)  -     Albumin  -     Creatinine  -     Uric acid  -     HM2(CBC w/o Differential)  -     ALT (SGPT)  -     Albumin  -     Creatinine  -     Uric acid  -     XR Wrist Bilateral 2 VWS  -     XR Wrist Bilateral 2 VWS    Chronic Gout  -     XR Wrist Bilateral 2 VWS  -     XR Wrist Bilateral 2 VWS    CKD (chronic kidney disease) stage 3, GFR 30-59 ml/min (H)      HISTORY OF PRESENTING ILLNESS:  Adam Talamantes 78 y.o. is here for follow up.  He has well-controlled tophaceous gout, osteoarthritis widespread, renal impairment.  He is on febuxostat, low-dose colchicine.  He noted pain.  This is in his wrist.  This is left side.  Associated with mild swelling.  His right wrist her stool but not as much as left.  He noted the left wrist pain is severe.  No history of trauma.  This is gone on for the past 2-3 weeks..  He is due for his labs which he did have done at another clinic however the uric acid had not been ordered.  This will be added today.  He is accompanied by his daughter and ..  In view of his renal impairment is only tries over-the-counter is  acetaminophen which has provided only limited relief.  .Original gout episode affected the left first MTP joint. his joint stiffness is stable and his joint swelling is stable.  Joints currently hurting  include None.Limitation on activities include difficulty with walking. The patient is avoiding high purine foods. Limitation on activities as noted in the MDHAQ scanned in the EMR.  Further historical information, including ROS as noted in the multidimensional health assessment questionnaire scanned in the EMR and in the assessment and plan section.  he has not had a flareup of gout.    ALLERGIES:Patient has no known allergies.    PAST MEDICAL/ACTIVE PROBLEMS/MEDICATION/SOCIAL DATA  Past Medical History:   Diagnosis Date     Acute blood loss anemia      Acute renal failure (H)      Anemia      Bacteremia      Cataract      Chronic gout      CKD (chronic kidney disease)     Stage 3     DM2 (diabetes mellitus, type 2) (H)      GERD (gastroesophageal reflux disease)      Glucose intolerance (impaired glucose tolerance)      Gout      History of nephrolithiasis      Hyperlipidemia      Hypertension      Insomnia      Latent tuberculosis      Nephrolithiasis      Neuropathy (H)      Osteoarthritis     wrist, knee, shoulder     Prostatitis      Rectal bleed      Trigger thumb      Social History     Tobacco Use   Smoking Status Former Smoker     Last attempt to quit: 3/10/2000     Years since quittin.7   Smokeless Tobacco Former User     Patient Active Problem List   Diagnosis     Esophageal reflux     Dyslipidemia     Nephrolithiasis     Imaging Studies Nonspecific Abnormal Findings Skull And Head     Pseudogout     Latent Tuberculosis     Glucose Intolerance     Generalized Osteoarthritis Of The Hand     Lower Back Pain     Lumbar Disc Degeneration     Insomnia     Chronic Gout     CKD (chronic kidney disease) stage 3, GFR 30-59 ml/min (H)     Osteoarthritis Of The Knee     Hyperglycemia     Localized Primary  Osteoarthritis Of The Left Foot 1st MTP Joint     Elevated PSA     Prostate nodule     Cataracts, bilateral     Acute renal failure superimposed on stage 3 chronic kidney disease (H)     Discharge planning issues     Acute pain of left wrist     Chest pain     Constipation, unspecified constipation type     Bilateral chronic knee pain     Chronic right shoulder pain     Essential hypertension with goal blood pressure less than 140/90     History of prostatitis     BPH (benign prostatic hyperplasia)     Chronic gout     Coronary artery disease due to lipid rich plaque     Right lower quadrant abdominal pain     Abnormal cardiovascular stress test     Type 2 diabetes mellitus without complication, without long-term current use of insulin (H)     Colitis     Trigger finger, right index finger     Current Outpatient Medications   Medication Sig Dispense Refill     acetaminophen (Q-PAP EXTRA STRENGTH) 500 MG tablet Take 1 tablet (500 mg total) by mouth every 6 (six) hours as needed. 120 tablet 6     ALLERGY RELIEF, LORATADINE, 10 mg tablet TAKE 1 TABLET BY MOUTH DAILY. 30 tablet 11     ASPIR-LOW 81 mg EC tablet TAKE ONE TABLET BY MOUTH ONCE DAILY 90 tablet 0     atorvastatin (LIPITOR) 40 MG tablet Take 1 tablet (40 mg total) by mouth at bedtime. 90 tablet 3     colchicine (COLCRYS) 0.6 mg tablet TAKE 1 TABLET ORALLY EVERY OTHER DAY. 45 tablet 3     dicyclomine (BENTYL) 20 mg tablet Take 1 tablet (20 mg total) by mouth 3 (three) times a day. 30 tablet 0     docusate sodium (COLACE) 100 MG capsule Take 1 capsule (100 mg total) by mouth 2 (two) times a day as needed for constipation. 60 capsule 4     EUCALYPTUS OIL/MENTHOL (MENTHOL EUCALYPTUS MM) 1 Inhalation into each nostril as needed (home medication from [facility]        gabapentin (NEURONTIN) 300 MG capsule Take 1-2 capsules at Bedtime 180 capsule 1     HYDROcodone-acetaminophen 5-325 mg per tablet TAKE 1-2 TABLETS BY MOUTH AT BEDTIME AS NEEDED FOR PAIN 60 tablet 0      JANUVIA 25 mg tablet TAKE ONE TABLET BY MOUTH ONCE DAILY 90 tablet 1     lidocaine 4 % patch Place 1 patch on the skin daily as needed for pain. Remove and discard patch with 12 hours or as directed by MD.       lisinopril (PRINIVIL,ZESTRIL) 5 MG tablet Take 1 tablet (5 mg total) by mouth 2 (two) times a day. 60 tablet 11     loratadine (CLARITIN) 10 mg tablet Take 10 mg by mouth daily.       magnesium oxide 400 mg cap Take 1 capsule by mouth daily. 30 each 11     meclizine (ANTIVERT) 12.5 mg tablet Take 12.5-25 mg by mouth every 8 (eight) hours as needed (one to two tablets).        metoprolol succinate (TOPROL-XL) 50 MG 24 hr tablet Take 1 tablet (50 mg total) by mouth daily. 90 tablet 3     multivitamin with minerals (THERA-M) 9 mg iron-400 mcg Tab tablet Take 1 tablet by mouth daily. 100 tablet 3     nitroglycerin (NITROSTAT) 0.4 MG SL tablet Place 1 tablet (0.4 mg total) under the tongue every 5 (five) minutes as needed for chest pain. 90 tablet 12     omeprazole (PRILOSEC) 20 MG capsule TAKE ONE CAPSULE BY MOUTH DAILY. 90 capsule 2     traZODone (DESYREL) 50 MG tablet Take 25-50 mg by mouth at bedtime as needed.        triamcinolone (KENALOG) 0.1 % cream Apply topically 2 (two) times a day. 80 g 1     febuxostat (ULORIC) 80 mg Tab Take 80 mg by mouth daily. 30 tablet 11     No current facility-administered medications for this visit.      DETAILED EXAMINATION  12/20/18  :  Vitals:    12/20/18 1014   BP: 138/88   Patient Site: Right Arm   Patient Position: Sitting   Cuff Size: Adult Regular   Pulse: 80   Weight: 151 lb (68.5 kg)     Alert oriented. Head including the face is examined for malar rash, heliotropes, scarring, lupus pernio. Eyes examined for redness such as in episcleritis/scleritis, periorbital lesions.   Neck/ Face examined for parotid gland swelling, range of motion of neck.  Left upper and lower and right upper and lower extremities examined for tenderness, swelling, warmth of the  appendicular joints, range of motion, edema, rash.  Some of the important findings included: He is tender in the right wrist with minimal swelling, he is markedly tender in the left wrist with swelling it is not hyperemic..  It is warm.  JLT of the knees bilaterally without effusion or warmth.           LAB / IMAGING DATA:  ALT   Date Value Ref Range Status   09/04/2018 38 0 - 45 U/L Final   12/29/2017 25 0 - 45 U/L Final   11/24/2017 20 0 - 45 U/L Final     Albumin   Date Value Ref Range Status   09/04/2018 3.8 3.5 - 5.0 g/dL Final   12/29/2017 4.0 3.5 - 5.0 g/dL Final   11/24/2017 3.7 3.5 - 5.0 g/dL Final     Creatinine   Date Value Ref Range Status   09/04/2018 1.60 (H) 0.70 - 1.30 mg/dL Final   05/01/2018 1.61 (H) 0.70 - 1.30 mg/dL Final   12/29/2017 1.53 (H) 0.70 - 1.30 mg/dL Final       WBC   Date Value Ref Range Status   09/04/2018 8.5 4.0 - 11.0 thou/uL Final   05/01/2018 7.2 4.0 - 11.0 thou/uL Final   08/25/2015 5.7 4.0 - 11.0 thou/uL Final   06/18/2015 5.7 4.0 - 11.0 thou/uL Final     Hemoglobin   Date Value Ref Range Status   09/04/2018 15.0 14.0 - 18.0 g/dL Final   05/01/2018 13.3 (L) 14.0 - 18.0 g/dL Final   12/29/2017 14.1 14.0 - 18.0 g/dL Final     Platelets   Date Value Ref Range Status   09/04/2018 208 140 - 440 thou/uL Final   05/01/2018 163 140 - 440 thou/uL Final   12/29/2017 193 140 - 440 thou/uL Final       Lab Results   Component Value Date    RF <10 09/30/2009    SEDRATE 18 (H) 10/28/2009

## 2021-06-22 NOTE — PROGRESS NOTES
ASSESMENT AND PLAN:  Diagnoses and all orders for this visit:    Atypical chest pain  Most likely musculoskeletal in etiology.  Reviewed the recent emergency room notes and related studies with the patient and his daughter with the help of a professional .  His cardiac workup was negative.  We discussed options for treating the musculoskeletal pain, medication is reviewed and refilled below, we discussed indications for follow-up.  -     HYDROcodone-acetaminophen 5-325 mg per tablet  Dispense: 60 tablet; Refill: 0    Coronary artery disease due to lipid rich plaque  Reviewed the most recent cardiology consultation, which was over 1 year ago.  Medical management had been recommended at that time.  He will continue with medical management and I did recommend they schedule a follow-up visit with cardiology clinic.    Essential hypertension with goal blood pressure less than 140/90  Well controlled.    Inflamed seborrheic keratosis  The lesion on the back has resolved completely and healed over well.  The lesion on the right forehead is persistent and we discussed options today and he did decide to go forward with 2 freeze thaw cycles with cryotherapy using the cryo gun.  He tolerated the procedure well and we discussed aftercare and indications for follow-up.    Immunization deficiency  -     Td, Adult, Adsorbed (blue label)          SUBJECTIVE: 78-year-old male comes in for follow-up on his recent emergency room visit for chest pain.  Since discharge from the hospital his chest pain has persisted but has been much less severe.  He has a bilateral upper chest pain that worsens with direct pressure over the anterior chest.  No associated shortness of breath.  Currently the pain is mild and does not radiate out of the bilateral upper chest.  He does not notice any exertional component to the chest pain.  Sometimes the pain will worsen with certain movements of the shoulders and arms.  It has reduced in  severity significantly since his evaluation at the emergency room.  A lidocaine patch had been recommended but the insurance will not pay for it.  The pain does give good relief with hydrocodone which the patient uses occasionally for his more severe breakthrough pain related to his rheumatologic disease and lumbar disc disease.  At last visit the patient had a right forehead and a back skin lesion treated with cryotherapy.  He noticed that the lesion on the back fell off and healed over completely flat whereas the lesion on the right forehead has not changed.    Past Medical History:   Diagnosis Date     Acute blood loss anemia      Acute renal failure (H)      Anemia      Bacteremia      Cataract      Chronic gout      CKD (chronic kidney disease)     Stage 3     DM2 (diabetes mellitus, type 2) (H)      GERD (gastroesophageal reflux disease)      Glucose intolerance (impaired glucose tolerance)      Gout      History of nephrolithiasis      Hyperlipidemia      Hypertension      Insomnia      Latent tuberculosis      Nephrolithiasis      Neuropathy (H)      Osteoarthritis     wrist, knee, shoulder     Prostatitis      Rectal bleed      Trigger thumb      Patient Active Problem List   Diagnosis     Esophageal reflux     Dyslipidemia     Nephrolithiasis     Imaging Studies Nonspecific Abnormal Findings Skull And Head     Pseudogout     Latent Tuberculosis     Glucose Intolerance     Generalized Osteoarthritis Of The Hand     Lower Back Pain     Lumbar Disc Degeneration     Insomnia     Chronic Gout     CKD (chronic kidney disease) stage 3, GFR 30-59 ml/min (H)     Osteoarthritis Of The Knee     Hyperglycemia     Localized Primary Osteoarthritis Of The Left Foot 1st MTP Joint     Elevated PSA     Prostate nodule     Cataracts, bilateral     Discharge planning issues     Acute pain of left wrist     Chest pain     Constipation, unspecified constipation type     Bilateral chronic knee pain     Chronic right shoulder  pain     Essential hypertension with goal blood pressure less than 140/90     History of prostatitis     BPH (benign prostatic hyperplasia)     Chronic gout     Coronary artery disease due to lipid rich plaque     Right lower quadrant abdominal pain     Abnormal cardiovascular stress test     Type 2 diabetes mellitus without complication, without long-term current use of insulin (H)     Colitis     Trigger finger, right index finger     Current Outpatient Medications   Medication Sig Dispense Refill     acetaminophen (Q-PAP EXTRA STRENGTH) 500 MG tablet Take 1 tablet (500 mg total) by mouth every 6 (six) hours as needed. 120 tablet 6     ASPIR-LOW 81 mg EC tablet TAKE ONE TABLET BY MOUTH ONCE DAILY 90 tablet 0     atorvastatin (LIPITOR) 40 MG tablet Take 1 tablet (40 mg total) by mouth at bedtime. 90 tablet 3     colchicine (COLCRYS) 0.6 mg tablet TAKE 1 TABLET ORALLY EVERY OTHER DAY. 45 tablet 3     docusate sodium (COLACE) 100 MG capsule Take 1 capsule (100 mg total) by mouth 2 (two) times a day as needed for constipation. 60 capsule 4     EUCALYPTUS OIL/MENTHOL (MENTHOL EUCALYPTUS MM) 1 Inhalation into each nostril as needed (home medication from [facility][note 3].).        febuxostat (ULORIC) 80 mg Tab Take 80 mg by mouth daily.       gabapentin (NEURONTIN) 300 MG capsule Take 1-2 capsules at Bedtime 180 capsule 1     HYDROcodone-acetaminophen 5-325 mg per tablet TAKE 1-2 TABLETS BY MOUTH AT BEDTIME AS NEEDED FOR PAIN 60 tablet 0     JANUVIA 25 mg tablet TAKE ONE TABLET BY MOUTH ONCE DAILY 90 tablet 1     lidocaine (XYLOCAINE) 5 % ointment Apply to painful areas three times a day as needed for pain 240 g 3     lisinopril (PRINIVIL,ZESTRIL) 5 MG tablet Take 1 tablet (5 mg total) by mouth 2 (two) times a day. 60 tablet 11     loratadine (CLARITIN) 10 mg tablet Take 10 mg by mouth daily.       magnesium oxide (MAG-OX) 400 mg (241.3 mg magnesium) tablet TAKE 1 TABLET BY MOUTH DAILY 30 tablet 6     meclizine (ANTIVERT) 12.5  "mg tablet Take 12.5-25 mg by mouth every 8 (eight) hours as needed (one to two tablets).        metoprolol succinate (TOPROL-XL) 50 MG 24 hr tablet Take 1 tablet (50 mg total) by mouth daily. 90 tablet 3     multivitamin with minerals (THERA-M) 9 mg iron-400 mcg Tab tablet Take 1 tablet by mouth daily. 100 tablet 3     nitroglycerin (NITROSTAT) 0.4 MG SL tablet Place 1 tablet (0.4 mg total) under the tongue every 5 (five) minutes as needed for chest pain. 90 tablet 12     No current facility-administered medications for this visit.      Social History     Tobacco Use   Smoking Status Former Smoker     Last attempt to quit: 3/10/2000     Years since quittin.8   Smokeless Tobacco Former User       OBJECTICE: BP 98/60 (Patient Site: Right Arm, Patient Position: Sitting, Cuff Size: Adult Regular)   Pulse 84   Temp 97.5  F (36.4  C) (Oral)   Resp 20   Ht 5' 1\" (1.549 m)   Wt 154 lb (69.9 kg)   SpO2 95%   BMI 29.10 kg/m       No results found for this or any previous visit (from the past 24 hour(s)).     GEN-alert, appropriate, in no apparent distress   Musculoskeletal-repetition of his pain with tenderness to palpation over the bilateral upper chest wall   CV-regular rate and rhythm with distant heart tones   RESP-lungs clear to auscultation   ABDOMINAL-soft and nontender   EXTREM-warm with no edema   SKIN-raised seborrheic keratosis of the right forehead, the area of the previous skin lesion on the has resolved completely and is now flat normal-appearing skin with slight hypopigmentation      Chucho Espino"

## 2021-06-23 NOTE — TELEPHONE ENCOUNTER
Refill Approved    Rx renewed per Medication Renewal Policy. Medication was last renewed on 8/28/2018.  Last OV 1/2/2019.    Kaylin Hardy, [facility] Triage/Med Refill 1/17/2019     Requested Prescriptions   Pending Prescriptions Disp Refills     lisinopril (PRINIVIL,ZESTRIL) 5 MG tablet [Pharmacy Med Name: LISINOPRIL 5MG TABLETS] 180 tablet 10     Sig: TAKE 1 TABLET BY MOUTH TWICE DAILY    Ace Inhibitors Refill Protocol Passed - 1/16/2019 11:22 AM       Passed - PCP or prescribing provider visit in past 12 months      Last office visit with prescriber/PCP: 1/2/2019 Chucho Espino MD OR same dept: 1/2/2019 Chucho Espino MD OR same specialty: 1/2/2019 Chucho Espino MD  Last physical: 2/17/2016 Last MTM visit: Visit date not found   Next visit within 3 mo: Visit date not found  Next physical within 3 mo: Visit date not found  Prescriber OR PCP: Chucho Espino MD  Last diagnosis associated with med order: 1. Essential hypertension with goal blood pressure less than 140/90  - lisinopril (PRINIVIL,ZESTRIL) 5 MG tablet [Pharmacy Med Name: LISINOPRIL 5MG TABLETS]; TAKE 1 TABLET BY MOUTH TWICE DAILY  Dispense: 180 tablet; Refill: 10    If protocol passes may refill for 12 months if within 3 months of last provider visit (or a total of 15 months).            Passed - Serum Potassium in past 12 months    Lab Results   Component Value Date    Potassium 3.4 (L) 12/27/2018            Passed - Blood pressure filed in past 12 months    BP Readings from Last 1 Encounters:   01/02/19 98/60            Passed - Serum Creatinine in past 12 months    Creatinine   Date Value Ref Range Status   12/27/2018 1.52 (H) 0.70 - 1.30 mg/dL Final

## 2021-06-23 NOTE — TELEPHONE ENCOUNTER
Refill Approved    Rx renewed per Medication Renewal Policy. Medication was last renewed on 7/23/2018.  Last office visit: 1/2/2019 [facility]     Requested Prescriptions   Pending Prescriptions Disp Refills     aspirin (ASPIR-LOW) 81 MG EC tablet [Pharmacy Med Name: ASPIR-LOW EC 81 MG TABLET] 90 tablet 0     Sig: Take 1 tablet (81 mg total) by mouth daily.    Aspirin/Dipyridamole Refill Protocol Passed - 1/25/2019 12:16 PM       Passed - PCP or prescribing provider visit in past 12 months      Last office visit with prescriber/PCP: Visit date not found OR same dept: 1/2/2019 Chucho Espino MD OR same specialty: 1/2/2019 Chucho Espino MD  Last physical: Visit date not found Last MTM visit: Visit date not found    Next appt within 3 mo: Visit date not found Next physical within 3 mo: Visit date not found  Prescriber OR PCP: Patrica Beard MD  Last diagnosis associated with med order: 1. Coronary artery disease due to lipid rich plaque  - ASPIR-LOW 81 mg EC tablet [Pharmacy Med Name: ASPIR-LOW EC 81 MG TABLET]; TAKE ONE TABLET BY MOUTH ONCE DAILY  Dispense: 90 tablet; Refill: 0    If protocol passes may refill for 6 months if within 3 months of last provider visit (or a total of 9 months).

## 2021-06-24 NOTE — PROGRESS NOTES
ASSESMENT AND PLAN:  Diagnoses and all orders for this visit:    Essential hypertension with goal blood pressure less than 140/90, out of control  Medication review and counseling done today with the patient and his daughter with the help of a professional .  We are going to increase lisinopril to 10 mg twice daily and have him follow-up here in the clinic for recheck in 1 month.  Sooner if problems.  -     lisinopril (PRINIVIL,ZESTRIL) 10 MG tablet  Dispense: 180 tablet; Refill: 3    Coronary artery disease due to lipid rich plaque  One episode of chest pain as detailed below but relieved with nitroglycerin.  Continue medical management with aspirin, atorvastatin, metoprolol.  We reviewed indications for routine and emergent follow-up.    Lumbar Disc Degeneration with chronic back pain  -     acetaminophen (Q-PAP EXTRA STRENGTH) 500 MG tablet  Dispense: 120 tablet; Refill: 6 will be used for his mild to moderate pain and he will reserve the hydrocodone acetaminophen for as needed use only for the most severe at nighttime pain that interrupts his sleep.  Continue gabapentin.          SUBJECTIVE: 78-year-old male here with concerns about pain in his left low back that with occasional radiation down the left leg, no change in pain pattern from previous except that it has been more bothersome at night to him over this last few weeks.  No left leg weakness or numbness.  His current pain control regimen and he is satisfied with.  He takes acetaminophen during the daytime, gabapentin at bedtime, and then his hydrocodone acetaminophen if needed if he wakes up in the middle of the night with severe pain.  Patient has a known history of coronary artery disease.  He did have one episode of chest pain a couple of weeks ago that relieved with one sub-lingual nitroglycerin.  His blood pressure is poorly controlled today.  On review of his medications and medication bottles with him it appears that he has been taking  the lisinopril 5 mg twice daily and his metoprolol once daily.    Past Medical History:   Diagnosis Date     Acute blood loss anemia      Acute renal failure (H)      Anemia      Bacteremia      Cataract      Chronic gout      CKD (chronic kidney disease)     Stage 3     DM2 (diabetes mellitus, type 2) (H)      GERD (gastroesophageal reflux disease)      Glucose intolerance (impaired glucose tolerance)      Gout      History of nephrolithiasis      Hyperlipidemia      Hypertension      Insomnia      Latent tuberculosis      Nephrolithiasis      Neuropathy (H)      Osteoarthritis     wrist, knee, shoulder     Prostatitis      Rectal bleed      Trigger thumb      Patient Active Problem List   Diagnosis     Esophageal reflux     Dyslipidemia     Nephrolithiasis     Imaging Studies Nonspecific Abnormal Findings Skull And Head     Pseudogout     Latent Tuberculosis     Glucose Intolerance     Generalized Osteoarthritis Of The Hand     Lower Back Pain     Lumbar Disc Degeneration     Insomnia     Chronic Gout     CKD (chronic kidney disease) stage 3, GFR 30-59 ml/min (H)     Osteoarthritis Of The Knee     Hyperglycemia     Localized Primary Osteoarthritis Of The Left Foot 1st MTP Joint     Elevated PSA     Prostate nodule     Cataracts, bilateral     Discharge planning issues     Acute pain of left wrist     Chest pain     Constipation, unspecified constipation type     Bilateral chronic knee pain     Chronic right shoulder pain     Essential hypertension with goal blood pressure less than 140/90     History of prostatitis     BPH (benign prostatic hyperplasia)     Chronic gout     Coronary artery disease due to lipid rich plaque     Right lower quadrant abdominal pain     Abnormal cardiovascular stress test     Type 2 diabetes mellitus without complication, without long-term current use of insulin (H)     Colitis     Trigger finger, right index finger     Osteoarthritis of left wrist     Current Outpatient Medications    Medication Sig Dispense Refill     acetaminophen (Q-PAP EXTRA STRENGTH) 500 MG tablet Take 1 tablet (500 mg total) by mouth every 6 (six) hours as needed. 120 tablet 6     aspirin (ASPIR-LOW) 81 MG EC tablet Take 1 tablet (81 mg total) by mouth daily. 90 tablet 1     atorvastatin (LIPITOR) 40 MG tablet Take 1 tablet (40 mg total) by mouth at bedtime. 90 tablet 3     colchicine (COLCRYS) 0.6 mg tablet TAKE 1 TABLET ORALLY EVERY OTHER DAY. 45 tablet 3     docusate sodium (COLACE) 100 MG capsule Take 1 capsule (100 mg total) by mouth 2 (two) times a day as needed for constipation. 60 capsule 4     EUCALYPTUS OIL/MENTHOL (MENTHOL EUCALYPTUS MM) 1 Inhalation into each nostril as needed (home medication from [facility]        febuxostat (ULORIC) 80 mg Tab Take 80 mg by mouth daily.       gabapentin (NEURONTIN) 300 MG capsule TAKE 1 TO 2 CAPSULES BY MOUTH AT BEDTIME 180 capsule 3     HYDROcodone-acetaminophen 5-325 mg per tablet TAKE 1-2 TABLETS BY MOUTH AT BEDTIME AS NEEDED FOR PAIN 30 tablet 0     JANUVIA 25 mg tablet TAKE ONE TABLET BY MOUTH ONCE DAILY 90 tablet 1     lidocaine (XYLOCAINE) 5 % ointment Apply to painful areas three times a day as needed for pain 240 g 3     lisinopril (PRINIVIL,ZESTRIL) 10 MG tablet Take 1 tablet (10 mg total) by mouth 2 (two) times a day. 180 tablet 3     loratadine (CLARITIN) 10 mg tablet Take 10 mg by mouth daily.       magnesium oxide (MAG-OX) 400 mg (241.3 mg magnesium) tablet TAKE 1 TABLET BY MOUTH DAILY 30 tablet 6     meclizine (ANTIVERT) 12.5 mg tablet Take 12.5-25 mg by mouth every 8 (eight) hours as needed (one to two tablets).        metoprolol succinate (TOPROL-XL) 50 MG 24 hr tablet Take 1 tablet (50 mg total) by mouth daily. 90 tablet 3     multivitamin with minerals (THERA-M) 9 mg iron-400 mcg Tab tablet Take 1 tablet by mouth daily. 100 tablet 3     nitroglycerin (NITROSTAT) 0.4 MG SL tablet Place 1 tablet (0.4 mg total) under the tongue every 5 (five) minutes as  "needed for chest pain. 90 tablet 12     omeprazole (PRILOSEC) 20 MG capsule Take 20 mg by mouth daily before breakfast.       No current facility-administered medications for this visit.      Social History     Tobacco Use   Smoking Status Former Smoker     Last attempt to quit: 3/10/2000     Years since quittin.9   Smokeless Tobacco Former User       OBJECTICE: BP (!) 172/96   Pulse 75   Temp 97.5  F (36.4  C) (Oral)   Resp 16   Ht 5' 1\" (1.549 m)   Wt 158 lb (71.7 kg)   SpO2 98%   BMI 29.85 kg/m       No results found for this or any previous visit (from the past 24 hour(s)).     GEN-alert, appropriate, in no apparent distress   Neurologic-cranial nerves II through XII are intact, strength and sensation are symmetric in the lower extremities bilaterally.   CV-regular rate and rhythm with no murmur   RESP-lungs clear to auscultation   ABDOMINAL-soft and nontender   EXTREM-warm with no ankle edema   SKIN-no ulcers or vesicles      Chucho Espino          "

## 2021-06-24 NOTE — TELEPHONE ENCOUNTER
CMT attempted to reach the patient x 1. CMT unable to leave voicemail, unable to reach the patient. Please review message thread below and advise the patient as indicated. Please schedule if necessary or indicated in message thread.  CMT will attempt to reach the patient x 3 per clinical protocol before sending a letter to prompt patient follow up.

## 2021-06-24 NOTE — TELEPHONE ENCOUNTER
Controlled Substance Refill Request  Medication Name:   Requested Prescriptions     Pending Prescriptions Disp Refills     HYDROcodone-acetaminophen 5-325 mg per tablet 60 tablet 0     Sig: TAKE 1-2 TABLETS BY MOUTH AT BEDTIME AS NEEDED FOR PAIN     Date Last Fill: 01/02/2019  Pharmacy: [facility] [place]      Submit electronically to pharmacy  Controlled Substance Agreement Date Scanned:   Encounter-Level CSA Scan Date:    There are no encounter-level csa scan date.       Last office visit with prescriber/PCP: 1/2/2019 Chucho Espino MD OR same dept: 1/2/2019 Chucho Espino MD OR same specialty: 1/2/2019 Chucho Espino MD  Last physical: 2/17/2016 Last MTM visit: Visit date not found

## 2021-06-24 NOTE — TELEPHONE ENCOUNTER
Refill Approved    Rx renewed per Medication Renewal Policy.     Thao Shaver, [facility] Triage/Med Refill 2/25/2019     Requested Prescriptions   Pending Prescriptions Disp Refills     gabapentin (NEURONTIN) 300 MG capsule [Pharmacy Med Name: GABAPENTIN 300MG CAPSULES] 180 capsule 0     Sig: TAKE 1 TO 2 CAPSULES BY MOUTH AT BEDTIME    Gabapentin/Levetiracetam/Tiagabine Refill Protocol  Passed - 2/23/2019 12:55 PM       Passed - PCP or prescribing provider visit in past 12 months or next 3 months    Last office visit with prescriber/PCP: 1/2/2019 Chucho Espino MD OR same dept: 1/2/2019 Chucho Espino MD OR same specialty: 1/2/2019 Chucho Espino MD  Last physical: 2/17/2016 Last MTM visit: Visit date not found   Next visit within 3 mo: Visit date not found  Next physical within 3 mo: Visit date not found  Prescriber OR PCP: Chucho Espino MD  Last diagnosis associated with med order: 1. Coronary artery disease due to lipid rich plaque  - metoprolol succinate (TOPROL-XL) 50 MG 24 hr tablet [Pharmacy Med Name: METOPROLOL ER SUCCINATE 50MG TABS]; TAKE 1 TABLET BY MOUTH EVERY DAY  Dispense: 780 tablet; Refill: 0    If protocol passes may refill for 12 months if within 3 months of last provider visit (or a total of 15 months).             metoprolol succinate (TOPROL-XL) 50 MG 24 hr tablet [Pharmacy Med Name: METOPROLOL ER SUCCINATE 50MG TABS] 780 tablet 0     Sig: TAKE 1 TABLET BY MOUTH EVERY DAY    Beta-Blockers Refill Protocol Passed - 2/23/2019 12:55 PM       Passed - PCP or prescribing provider visit in past 12 months or next 3 months    Last office visit with prescriber/PCP: 1/2/2019 Chucho Espino MD OR same dept: 1/2/2019 Chucho Espino MD OR same specialty: 1/2/2019 Chucho Espino MD  Last physical: 2/17/2016 Last MTM visit: Visit date not found   Next visit within 3 mo: Visit date not found  Next physical within 3 mo: Visit date not found  Prescriber OR PCP: Chucho Espino MD  Last diagnosis  associated with med order: 1. Coronary artery disease due to lipid rich plaque  - metoprolol succinate (TOPROL-XL) 50 MG 24 hr tablet [Pharmacy Med Name: METOPROLOL ER SUCCINATE 50MG TABS]; TAKE 1 TABLET BY MOUTH EVERY DAY  Dispense: 780 tablet; Refill: 0    If protocol passes may refill for 12 months if within 3 months of last provider visit (or a total of 15 months).            Passed - Blood pressure filed in past 12 months    BP Readings from Last 1 Encounters:   01/02/19 98/60

## 2021-06-25 NOTE — TELEPHONE ENCOUNTER
Reason for Call: medication refill    Do you use a [facility]?  No  Name of the pharmacy and phone number for the current request: [id number] - [place], [place] - [facility] AT [address]  [phone]    Name of the medication requested: HYDROcodone-acetaminophen 5-325 mg per tablet    Other request: Pt has been out of med for a few days. Dtr said she forgot to call before the holiday to request for refill.    Can we leave a detailed message on this number? No call back needed    Phone number patient can be reached at: Home number on file [phone] (home)    Best Time: anytime    Call taken on 6/1/2021 at 11:39 AM by Amy Sue

## 2021-06-25 NOTE — PROGRESS NOTES
Progress Notes by Lynne George MD at 2/6/2017  8:10 AM     Author: Lynne George MD Service: -- Author Type: Physician    Filed: 2/6/2017  8:19 AM Encounter Date: 2/6/2017 Status: Signed    : Lynne George MD (Physician)                  [email or web address]           Thank you Dr. Espino for asking the [facility] team to participate in the care of your patient, Adam Talamantes.     Impression and Plan     1. Possible coronary artery disease. Patient underwent recent nuclear perfusion study 29 July 2016 which was mildly abnormal. Specifically, this revealed a small sized mild intensity reversible defect in the mid to distal anteroseptal segment representing an area of myocardial ischemia.     At his last visit, I did discuss the various options with Adam and his family (daughter). One option would be to simply continue with medical therapy particularly given his lack of recurrent concerning symptoms and the small nature of the perfusion defect on his nuclear perfusion scan (see Cardiac Diagnostics section below). Alternatively, could pursue direct angiography. Patient would be at some increased risk of contrast nephropathy given renal insufficiency (creatinine 1.58 mg/dL and GFR of 43 mL/min/1.73m2 18 July 2016). After discussing the various options, it was jointly decided to continue with medical therapy at that time.     On interview today, Adam continues to minimize any symptoms and went to continue with medical therapy. Ultimately recommend:    Continue daily aspirin.    Continue metoprolol succinate 50 mg daily.    Continue lisinopril given the putative favorable endothelial effects of the therapy.    Sublingual nitroglycerin PRN.     2. Hypertension. Blood pressure today is 132/80 mmHg. Plan to continue current management.     3. Dyslipidemia. Most recent lipid profile on file dated 9 May 2011 revealed  mg/dL and HDL 34 mg/dL. Given possible underlying ischemic  heart disease did initiate atorvastatin 40 mg daily at last visit.    Plan to obtain fasting lipid profile today.    Plan on follow-up in one year. Patient instructed to call should he have any development of concerning symptoms or other concerns.           History of Present Illness    Once again I would like to thank you again for asking me to participate in the care of your patient, Adam Talamantes.  As you know, but to reiterate for my own records, Adam Talamantes is a 76 y.o. male who had been hospitalized in July 2016 in part for hypertension, but also for some symptoms of chest discomfort.  At the time, certain features were felt somewhat atypical for cardiac etiology. Patient subsequently underwent a regadenoson nuclear perfusion study 29 July 2016 which returned mildly abnormal in that it revealed a small sized mild intensity reversible anteroseptal defect (see Cardiac Diagnostics section below).  Given the atypical nature of the patients symptoms, no significant recurrence in chest discomfort, and in accordance with the patients wishes; medical therapy was pursued.     On interview today, Adam minimizes any recurrent chest pain symptoms.  He states his breathing is comfortable.  He denies any palpitations or lightheadedness.    Further review of systems is otherwise negative/noncontributory (medical record and 13 point review of systems reviewed as well and pertinent positives noted).         Cardiac Diagnostics      Regadenoson nuclear perfusion study 29 July 2016:   1. Small sized mild intensity reversible defect in the mid to distal anteroseptal segment representing an area of myocardial ischemia.  2. Normal left ventricular systolic performance with ejection fraction of 55%.  3. The patient is at low risk of future cardiac ischemic events based on perfusion imaging.     12-lead ECG (personally reviewed) 30 June 2016: Normal sinus rhythm. Normal ECG.            Physical Examination         Visit Vitals   ? BP  "128/88 (Patient Site: Right Arm, Patient Position: Sitting, Cuff Size: Adult Regular)   ? Pulse 60   ? Resp 18   ? Ht 5' 1.5\" (1.562 m)   ? Wt 151 lb (68.5 kg)   ? BMI 28.07 kg/m2           Wt Readings from Last 3 Encounters:   02/06/17 151 lb (68.5 kg)   10/24/16 150 lb (68 kg)   09/09/16 149 lb (67.6 kg)     The patient is alert and oriented times three. Sclerae are anicteric. Mucosal membranes are moist. Jugular venous pressure is normal. No significant adenopathy/thyromegally appreciated. Lungs are clear with good expansion. On cardiovascular exam, the patient has a regular S1 and S2. Abdomen is soft and non-tender. Extremities reveal no clubbing, cyanosis, or edema.         Family History/Social History/Risk Factors   Patient does not smoke.  Family history of hypertension.         Medications  Allergies   Current Outpatient Prescriptions   Medication Sig Dispense Refill   ? acetaminophen (Q-PAP EXTRA STRENGTH) 500 MG tablet TAKE 1 TABLET BY MOUTH EVERY 4-6 HOURS AS NEEDED FOR PAIN 180 tablet 3   ? allopurinol (ZYLOPRIM) 100 MG tablet Take 100 mg by mouth daily.     ? aspirin (ASPIR-LOW) 81 MG EC tablet TAKE ONE TABLET BY MOUTH DAILY (Patient taking differently: Take 81 mg by mouth daily. ) 90 tablet 3   ? atorvastatin (LIPITOR) 40 MG tablet Take 1 tablet (40 mg total) by mouth bedtime. 90 tablet 3   ? codeine-guaiFENesin (GUAIFENESIN AC)  mg/5 mL liquid TAKE 5MLS THREE TIMES A DAY AS NEEDED FOR COUGH 240 mL 0   ? colchicine (COLCRYS) 0.6 mg tablet Take 1 tablet (0.6 mg total) by mouth every other day. 45 tablet 3   ? gabapentin (NEURONTIN) 300 MG capsule Take 300-600 mg by mouth bedtime.     ? HYDROcodone-acetaminophen 5-325 mg per tablet TAKE 1-2 TABLETS BY MOUTH AT BEDTIME AS NEEDED FOR PAIN 60 tablet 0   ? lisinopril (PRINIVIL,ZESTRIL) 10 MG tablet Take 1 tablet (10 mg total) by mouth daily. 30 tablet 11   ? meclizine (ANTIVERT) 12.5 mg tablet Take 1-2 tablets every 8 hrs PRN     ? metoprolol " succinate (TOPROL XL) 50 MG 24 hr tablet Take 1 tablet (50 mg total) by mouth daily. 90 tablet 3   ? multivitamin with minerals (THERA-M) 9 mg iron-400 mcg Tab tablet Take 1 tablet by mouth daily. 100 tablet 3   ? nitroglycerin (NITROSTAT) 0.4 MG SL tablet Place 1 tablet (0.4 mg total) under the tongue every 5 (five) minutes as needed for chest pain. 90 tablet 12   ? omeprazole (PRILOSEC) 20 MG capsule Take 20 mg by mouth daily.     ? ondansetron (ZOFRAN ODT) 4 MG disintegrating tablet 1-2 tab dissolve on tongue 4 times daily as needed for nausea, max daily dose 4 tab 20 tablet 0   ? traZODone (DESYREL) 50 MG tablet Take 50 mg by mouth bedtime.     ? ULORIC 80 mg Tab TAKE ONE TABLET BY MOUTH DAILY 30 tablet 1   ? codeine-guaiFENesin (GUAIFENESIN AC)  mg/5 mL liquid Take 5 mL by mouth 3 (three) times a day as needed for cough. 240 mL 0   ? methylPREDNISolone (MEDROL DOSEPACK) 4 mg tablet Take 4 mg by mouth 2 (two) times a day. follow package directions       No current facility-administered medications for this visit.       No Known Allergies       Lab Results   Lab Results   Component Value Date     (L) 07/05/2016    K 4.0 07/05/2016     07/05/2016    CO2 18 (L) 07/05/2016    BUN 43 (H) 07/05/2016    CREATININE 1.58 (H) 07/18/2016    CALCIUM 9.6 07/05/2016     Lab Results   Component Value Date    WBC 8.0 07/18/2016    WBC 5.7 08/25/2015    HGB 13.6 (L) 07/18/2016    HCT 41.6 07/18/2016    MCV 86 07/18/2016     07/18/2016     Lab Results   Component Value Date    CHOL 212 (H) 05/09/2011    TRIG 216 (H) 05/09/2011    HDL 34 (L) 05/09/2011    LDLCALC 135 (H) 05/09/2011     Lab Results   Component Value Date    INR 1.04 03/02/2016     Lab Results   Component Value Date    BNP 17 06/30/2016     Lab Results   Component Value Date    CKMB <1 05/08/2011    CKMB <1 08/23/2009    TROPONINI 0.08 07/02/2016    TROPONINI 0.05 06/30/2016    TROPONINI <0.01 05/09/2011     Lab Results   Component Value  Date    TSH 0.7 07/14/2010

## 2021-06-25 NOTE — TELEPHONE ENCOUNTER
Patient daughter is in office asking about status of refill today. Please review, sign, and send. Thank you.

## 2021-06-25 NOTE — PROGRESS NOTES
ASSESSMENT AND PLAN:  Adam Talamantes 79 y.o. male is seen here on 03/21/19 for evaluation of polyarthralgia in association with osteoarthritis.  We discussed various options.  He has renal impairment in the background.  And other comorbidities which preclude the use of nonsteroidals.  I have asked him to consider taking duloxetine.  With the recent information on febuxostat he would talk with his Dr. Espino, his primary physician to consider changing to allopurinol instead.  Follow-up here in 3 months.  Diagnoses and all orders for this visit:    Primary osteoarthritis involving multiple joints  -     DULoxetine (CYMBALTA) 30 MG capsule  Dispense: 30 capsule; Refill: 2    CKD (chronic kidney disease) stage 3, GFR 30-59 ml/min (H)    Polyarthralgia  -     DULoxetine (CYMBALTA) 30 MG capsule  Dispense: 30 capsule; Refill: 2          HISTORY OF PRESENTING ILLNESS ON 03/21/19 :  Adam Talamantes 79 y.o. is here for a moderately severe flare up of pain.  Joints affected include multiple joints. This has gone on for several weeks ago. Pain is described as sharp. It is when active.  His symptoms are moderate. The symptoms are gradually worsening. Symptoms include pain, tenderness to touch.  Treatment to date has been without significant relief.  He has background of gout, follows up with primary physician for that.  He has renal impairment.  He is aware not to take nonsteroidals.   Further historical information, including ROS and limitation in activities as noted in the multidimensional health assessment questionnaire scanned in the EMR and in the assessment and plan section.    ALLERGIES:Patient has no known allergies.    PAST MEDICAL/ACTIVE PROBLEMS/MEDICATION/SOCIAL DATA  Past Medical History:   Diagnosis Date     Acute blood loss anemia      Acute renal failure (H)      Anemia      Bacteremia      Cataract      Chronic gout      CKD (chronic kidney disease)     Stage 3     DM2 (diabetes mellitus, type 2) (H)      GERD  (gastroesophageal reflux disease)      Glucose intolerance (impaired glucose tolerance)      Gout      History of nephrolithiasis      Hyperlipidemia      Hypertension      Insomnia      Latent tuberculosis      Nephrolithiasis      Neuropathy (H)      Osteoarthritis     wrist, knee, shoulder     Prostatitis      Rectal bleed      Trigger thumb      Social History     Tobacco Use   Smoking Status Former Smoker     Last attempt to quit: 3/10/2000     Years since quittin.0   Smokeless Tobacco Former User     Patient Active Problem List   Diagnosis     Esophageal reflux     Dyslipidemia     Nephrolithiasis     Imaging Studies Nonspecific Abnormal Findings Skull And Head     Pseudogout     Latent Tuberculosis     Glucose Intolerance     Generalized Osteoarthritis Of The Hand     Lower Back Pain     Lumbar Disc Degeneration     Insomnia     Chronic Gout     CKD (chronic kidney disease) stage 3, GFR 30-59 ml/min (H)     Hyperglycemia     Elevated PSA     Prostate nodule     Cataracts, bilateral     Discharge planning issues     Chest pain     Constipation, unspecified constipation type     Bilateral chronic knee pain     Chronic right shoulder pain     Essential hypertension with goal blood pressure less than 140/90     History of prostatitis     BPH (benign prostatic hyperplasia)     Chronic gout     Coronary artery disease due to lipid rich plaque     Right lower quadrant abdominal pain     Abnormal cardiovascular stress test     Type 2 diabetes mellitus without complication, without long-term current use of insulin (H)     Colitis     Current Outpatient Medications   Medication Sig Dispense Refill     acetaminophen (Q-PAP EXTRA STRENGTH) 500 MG tablet Take 1 tablet (500 mg total) by mouth every 6 (six) hours as needed. 120 tablet 6     aspirin (ASPIR-LOW) 81 MG EC tablet Take 1 tablet (81 mg total) by mouth daily. 90 tablet 1     atorvastatin (LIPITOR) 40 MG tablet Take 1 tablet (40 mg total) by mouth at bedtime.  90 tablet 3     colchicine 0.6 mg tablet TAKE 1 TABLET BY MOUTH EVERY OTHER DAY 45 tablet 0     docusate sodium (COLACE) 100 MG capsule Take 1 capsule (100 mg total) by mouth 2 (two) times a day as needed for constipation. 60 capsule 4     EUCALYPTUS OIL/MENTHOL (MENTHOL EUCALYPTUS MM) 1 Inhalation into each nostril as needed (home medication from [facility]        febuxostat (ULORIC) 80 mg Tab Take 80 mg by mouth daily.       gabapentin (NEURONTIN) 300 MG capsule TAKE 1 TO 2 CAPSULES BY MOUTH AT BEDTIME 180 capsule 3     HYDROcodone-acetaminophen 5-325 mg per tablet TAKE 1-2 TABLETS BY MOUTH AT BEDTIME AS NEEDED FOR PAIN 30 tablet 0     JANUVIA 25 mg tablet TAKE ONE TABLET BY MOUTH ONCE DAILY 90 tablet 1     lidocaine (XYLOCAINE) 5 % ointment Apply to painful areas three times a day as needed for pain 240 g 3     lisinopril (PRINIVIL,ZESTRIL) 10 MG tablet Take 1 tablet (10 mg total) by mouth 2 (two) times a day. 180 tablet 3     loratadine (CLARITIN) 10 mg tablet Take 10 mg by mouth daily.       magnesium oxide (MAG-OX) 400 mg (241.3 mg magnesium) tablet TAKE 1 TABLET BY MOUTH DAILY 30 tablet 6     meclizine (ANTIVERT) 12.5 mg tablet Take 12.5-25 mg by mouth every 8 (eight) hours as needed (one to two tablets).        metoprolol succinate (TOPROL-XL) 50 MG 24 hr tablet Take 1 tablet (50 mg total) by mouth daily. 90 tablet 3     multivitamin with minerals (THERA-M) 9 mg iron-400 mcg Tab tablet Take 1 tablet by mouth daily. 100 tablet 3     nitroglycerin (NITROSTAT) 0.4 MG SL tablet Place 1 tablet (0.4 mg total) under the tongue every 5 (five) minutes as needed for chest pain. 90 tablet 12     omeprazole (PRILOSEC) 20 MG capsule Take 20 mg by mouth daily before breakfast.       DULoxetine (CYMBALTA) 30 MG capsule Take 1 capsule (30 mg total) by mouth daily. 30 capsule 2     No current facility-administered medications for this visit.      DETAILED EXAMINATION  03/21/19  :  Vitals:    03/21/19 0936   BP: 140/90    Patient Site: Right Arm   Patient Position: Sitting   Cuff Size: Adult Regular   Pulse: 80   Weight: 158 lb (71.7 kg)     Alert oriented. Head including the face is examined for malar rash, heliotropes, scarring, lupus pernio. Eyes examined for redness such as in episcleritis/scleritis, periorbital lesions.   Neck/ Face examined for parotid gland swelling, range of motion of neck.  Left upper and lower and right upper and lower extremities examined for tenderness, swelling, warmth of the appendicular joints, range of motion, edema, rash.  Some of the important findings included: He has no synovitis of the palpable joints of upper extremities, knees, ankles.  He has tenderness such as in the medial epicondyles on the left side, scattered PIP tenderness.  Mild JLT of the knees.  The knees are without effusion or warmth.        LAB / IMAGING DATA:  ALT   Date Value Ref Range Status   12/27/2018 32 0 - 45 U/L Final   12/20/2018 35 0 - 45 U/L Final   09/04/2018 38 0 - 45 U/L Final     Albumin   Date Value Ref Range Status   12/27/2018 4.2 3.5 - 5.0 g/dL Final   12/20/2018 4.1 3.5 - 5.0 g/dL Final   09/04/2018 3.8 3.5 - 5.0 g/dL Final     Creatinine   Date Value Ref Range Status   12/27/2018 1.52 (H) 0.70 - 1.30 mg/dL Final   12/20/2018 1.51 (H) 0.70 - 1.30 mg/dL Final   09/04/2018 1.60 (H) 0.70 - 1.30 mg/dL Final       WBC   Date Value Ref Range Status   12/27/2018 9.4 4.0 - 11.0 thou/uL Final   12/20/2018 7.7 4.0 - 11.0 thou/uL Final   08/25/2015 5.7 4.0 - 11.0 thou/uL Final   06/18/2015 5.7 4.0 - 11.0 thou/uL Final     Hemoglobin   Date Value Ref Range Status   12/27/2018 15.1 14.0 - 18.0 g/dL Final   12/20/2018 15.0 14.0 - 18.0 g/dL Final   09/04/2018 15.0 14.0 - 18.0 g/dL Final     Platelets   Date Value Ref Range Status   12/27/2018 191 140 - 440 thou/uL Final   12/20/2018 205 140 - 440 thou/uL Final   09/04/2018 208 140 - 440 thou/uL Final       Lab Results   Component Value Date    RF <10 09/30/2009    SEDRATE  18 (H) 10/28/2009

## 2021-06-25 NOTE — TELEPHONE ENCOUNTER
RN cannot approve Refill Request    RN can NOT refill this medication med is not covered by policy/route to provider. Last office visit: 3/29/2021 Chucho Espino MD Last Physical: 9/16/2019 Last MTM visit: Visit date not found Last visit same specialty: 3/29/2021 Chucho Espino MD.  Next visit within 3 mo: Visit date not found  Next physical within 3 mo: Visit date not found      Salome Castaneda, [facility] Triage/Med Refill 6/9/2021    Requested Prescriptions   Pending Prescriptions Disp Refills     lidocaine (XYLOCAINE) 5 % ointment [Pharmacy Med Name: LIDOCAINE 5% TOPICAL OINTMENT 30GM] 30 g 0     Sig: APPLY TO PAINFUL AREAS THREE TIMES DAILY AS NEEDED       There is no refill protocol information for this order

## 2021-06-26 NOTE — TELEPHONE ENCOUNTER
RN cannot approve Refill Request    RN can NOT refill this medication PCP messaged that patient is overdue for Labs and Protocol failed and NO refill given. Last office visit: 3/29/2021 Chucho Espino MD Last Physical: 9/16/2019 Last MTM visit: Visit date not found Last visit same specialty: 3/29/2021 Chucho Espino MD.  Next visit within 3 mo: Visit date not found  Next physical within 3 mo: Visit date not found      Ginger Vega, [facility] Triage/Med Refill 6/18/2021    Requested Prescriptions   Pending Prescriptions Disp Refills     colchicine 0.6 mg tablet [Pharmacy Med Name: COLCHICINE 0.6MG TABLETS] 45 tablet 1     Sig: TAKE 1 TABLET BY MOUTH EVERY OTHER DAY       Colchicine Refill Protocol Failed - 6/18/2021  9:05 PM        Failed - LFT or AST or ALT in last year     Albumin   Date Value Ref Range Status   01/23/2020 4.0 3.5 - 5.0 g/dL Final     Bilirubin, Total   Date Value Ref Range Status   09/16/2019 0.4 0.0 - 1.0 mg/dL Final     Bilirubin, Direct   Date Value Ref Range Status   10/17/2017 0.2 <=0.5 mg/dL Final     Alkaline Phosphatase   Date Value Ref Range Status   09/16/2019 122 (H) 45 - 120 U/L Final     AST   Date Value Ref Range Status   09/16/2019 24 0 - 40 U/L Final     ALT   Date Value Ref Range Status   01/23/2020 49 (H) 0 - 45 U/L Final     Protein, Total   Date Value Ref Range Status   09/16/2019 8.0 6.0 - 8.0 g/dL Final                Passed - CBC (Hemogram 2) in last year      WBC   Date Value Ref Range Status   06/22/2020 7.1 4.0 - 11.0 thou/uL Final     RBC   Date Value Ref Range Status   06/22/2020 4.28 (L) 4.40 - 6.20 mill/uL Final     Hemoglobin   Date Value Ref Range Status   06/22/2020 13.0 (L) 14.0 - 18.0 g/dL Final     Hematocrit   Date Value Ref Range Status   06/22/2020 39.5 (L) 40.0 - 54.0 % Final     MCV   Date Value Ref Range Status   06/22/2020 92 80 - 100 fL Final     MCH   Date Value Ref Range Status   06/22/2020 30.4 27.0 - 34.0 pg Final     [facility]   Date Value Ref  Range Status   06/22/2020 32.9 32.0 - 36.0 g/dL Final     RDW   Date Value Ref Range Status   06/22/2020 14.2 11.0 - 14.5 % Final     Platelets   Date Value Ref Range Status   06/22/2020 202 140 - 440 thou/uL Final     MPV   Date Value Ref Range Status   06/22/2020 9.8 8.5 - 12.5 fL Final                Passed - Visit with PCP or prescribing provider visit in past 12 months     Last office visit with prescriber/PCP: 3/29/2021 Chucho Espino MD OR same dept: 3/29/2021 Chucho Espino MD OR same specialty: 3/29/2021 Chucho Espino MD  Last physical: 9/16/2019 Last MTM visit: Visit date not found   Next visit within 3 mo: Visit date not found  Next physical within 3 mo: Visit date not found  Prescriber OR PCP: Chucho Espino MD  Last diagnosis associated with med order: 1. Chronic gout of multiple sites, unspecified cause  - colchicine 0.6 mg tablet [Pharmacy Med Name: COLCHICINE 0.6MG TABLETS]; TAKE 1 TABLET BY MOUTH EVERY OTHER DAY  Dispense: 45 tablet; Refill: 1    If protocol passes may refill for 12 months if within 3 months of last provider visit (or a total of 15 months).             Passed - Serum creatinine in last year     Creatinine   Date Value Ref Range Status   06/22/2020 1.55 (H) 0.70 - 1.30 mg/dL Final

## 2021-06-26 NOTE — PROGRESS NOTES
This document was created using a software with less than 100% fidelity, at times resulting in unintended, even erroneous syntax and grammar.  The reader is advised to keep this under consideration while reviewing, interpreting this note.      ASSESSMENT AND PLAN:  Adam Talamantes 81 y.o. male is seen here on 06/16/21 for evaluation of worsening pain in the left wrist joint, he has osteoarthritis, he would like to proceed local injection, via the  pros and cons were outlined, 20 mg of Kenalog injected in the left wrist under ultrasound guidance.  Follow-up in 3 months or sooner.  He is aware to avoid nonsteroidals in view of the renal impairment.  Diagnoses and all orders for this visit:    Primary osteoarthritis involving multiple joints  -     triamcinolone acetonide 40 mg/mL injection 20 mg (KENALOG-40)    Stage 3a chronic kidney disease    Chronic pain of left wrist          HISTORY OF PRESENTING ILLNESS ON 06/16/21 :  Adam Talamantes 81 y.o. is here for a moderately severe flare up of pain. Here for a moderately severe flare up of pain.  Joints affected include the left wrist(s). This has gone on for several weeks. Pain is described as sharp. It is worse with activity at times bedtime..  Her symptoms are moderately severe. The symptoms are progressive.  Associated findings  do not include: swelling, rash.  There is no associated recent fall or trauma.  Over-the-counter treatment to date has been without significant relief.    Further historical information, including ROS and limitation in activities as noted in the multidimensional health assessment questionnaire scanned in the EMR and in the assessment and plan section.    ALLERGIES:Patient has no known allergies.    PAST MEDICAL/ACTIVE PROBLEMS/MEDICATION/SOCIAL DATA  Past Medical History:   Diagnosis Date     Acute blood loss anemia      Acute renal failure (H)      Anemia      Bacteremia      Cataract      Chronic gout      CKD (chronic kidney disease)      Stage 3     DM2 (diabetes mellitus, type 2) (H)      GERD (gastroesophageal reflux disease)      Glucose Intolerance     Created by Conversion      Glucose intolerance (impaired glucose tolerance)      Gout      History of nephrolithiasis      History of prostatitis 2017     Hyperlipidemia      Hypertension      Imaging Studies Nonspecific Abnormal Findings Skull And Head     Created by Conversion [facility] Annotation: 2013 11:23PM - FuadGiuliai/10/2011:  severe stenosis both posterior cerebral arteries seen MRI/MRA done for  vertigo. No acute changes.e*     Insomnia      Latent tuberculosis      Nephrolithiasis      Neuropathy      Osteoarthritis     wrist, knee, shoulder     Prostatitis      Rectal bleed      Trigger thumb      Social History     Tobacco Use   Smoking Status Former Smoker     Quit date: 3/10/2000     Years since quittin.2   Smokeless Tobacco Former User   Tobacco Comment    no passive exposure     Patient Active Problem List   Diagnosis     Esophageal reflux     Dyslipidemia     Nephrolithiasis     Pseudogout     Latent Tuberculosis     Generalized Osteoarthritis Of The Hand     Lumbar Disc Degeneration     Insomnia     Chronic Gout     CKD (chronic kidney disease) stage 3, GFR 30-59 ml/min     Elevated PSA     Prostate nodule     Cataracts, bilateral     Constipation, unspecified constipation type     Bilateral chronic knee pain     Chronic right shoulder pain     Essential hypertension with goal blood pressure less than 140/90     BPH (benign prostatic hyperplasia)     Chronic gout     Coronary artery disease due to lipid rich plaque     Right lower quadrant abdominal pain     Colitis     Primary osteoarthritis involving multiple joints     Urinary incontinence     Type 2 diabetes mellitus with stage 3 chronic kidney disease, without long-term current use of insulin (H)     ACE-inhibitor cough     Current Outpatient Medications   Medication Sig Dispense Refill      acetaminophen (TYLENOL) 500 MG tablet Take 1 tablet (500 mg total) by mouth every 6 (six) hours as needed. 120 tablet 6     allopurinoL (ZYLOPRIM) 100 MG tablet TAKE 2 TABLETS BY MOUTH EVERY  tablet 7     amLODIPine (NORVASC) 5 MG tablet TAKE 1 TABLET(5 MG) BY MOUTH DAILY 90 tablet 2     aspirin (ASPIR-LOW) 81 MG EC tablet Take 1 tablet (81 mg total) by mouth daily. 90 tablet 1     colchicine 0.6 mg tablet TAKE 1 TABLET BY MOUTH EVERY OTHER DAY 45 tablet 1      mg capsule TAKE 1 CAPSULE BY MOUTH TWICE DAILY AS NEEDED FOR CONSTIPATION 60 capsule 11     DULoxetine (CYMBALTA) 20 MG capsule Take 1 capsule (20 mg total) by mouth 2 (two) times a day. 180 capsule 0     EUCALYPTUS OIL/MENTHOL (MENTHOL EUCALYPTUS MM) 1 Inhalation into each nostril as needed (home medication from [facility]        gabapentin (NEURONTIN) 300 MG capsule TAKE 1 TO 2 CAPSULES BY MOUTH AT BEDTIME 180 capsule 2     HYDROcodone-acetaminophen 5-325 mg per tablet TAKE 1-2 TABLETS BY MOUTH AT BEDTIME AS NEEDED FOR chronic PAIN 30 tablet 0     isosorbide mononitrate (IMDUR) 30 MG 24 hr tablet Take 1 tablet (30 mg total) by mouth daily. 90 tablet 2     lidocaine (XYLOCAINE) 5 % ointment APPLY TO PAINFUL AREAS THREE TIMES DAILY AS NEEDED 30 g 0     loratadine (CLARITIN) 10 mg tablet TAKE 1 TABLET BY MOUTH EVERY DAY 90 tablet 2     losartan (COZAAR) 50 MG tablet Take 1 tablet (50 mg total) by mouth daily. 90 tablet 3     magnesium oxide (MAG-OX) 400 mg (241.3 mg magnesium) tablet TAKE 1 TABLET(400 MG) BY MOUTH DAILY 30 tablet 11     meclizine (ANTIVERT) 12.5 mg tablet Take 1-2 tablets (12.5-25 mg total) by mouth every 8 (eight) hours as needed (one to two tablets). 60 tablet 1     metoprolol succinate (TOPROL-XL) 50 MG 24 hr tablet Take 1 tablet (50 mg total) by mouth daily. 90 tablet 2     multivitamin with minerals (THERA-M) 9 mg iron-400 mcg Tab tablet Take 1 tablet by mouth daily. 100 tablet 3     nitroglycerin (NITROSTAT) 0.4 MG SL  "tablet Place 1 tablet (0.4 mg total) under the tongue every 5 (five) minutes as needed for chest pain. 25 tablet 6     omeprazole (PRILOSEC) 20 MG capsule TAKE 1 CAPSULE BY MOUTH DAILY 90 capsule 3     rosuvastatin (CRESTOR) 20 MG tablet Take 1 tablet (20 mg total) by mouth at bedtime. 90 tablet 3     SITagliptin (JANUVIA) 50 MG tablet Take 1 tablet (50 mg total) by mouth daily. 90 tablet 3     triamcinolone (KENALOG) 0.1 % cream Apply topically 2 (two) times a day. 80 g 1     Current Facility-Administered Medications   Medication Dose Route Frequency Provider Last Rate Last Admin     triamcinolone acetonide 40 mg/mL injection 20 mg (KENALOG-40)  20 mg Intra-articular Once Bella Gold MBBS             DETAILED EXAMINATION  06/16/21  :  Vitals:    06/16/21 0953   BP: 112/82   Pulse: 84   Weight: 158 lb 12.8 oz (72 kg)   Height: 5' 1.5\" (1.562 m)     Alert oriented. Head including the face is examined for malar rash, heliotropes, scarring, lupus pernio. Eyes examined for redness such as in episcleritis/scleritis, periorbital lesions.   Neck/ Face examined for parotid gland swelling, range of motion of neck.  Left upper and lower and right upper and lower extremities examined for tenderness, swelling, warmth of the appendicular joints, range of motion, edema, rash.  Some of the important findings included: He is tender in the left wrist joint.  There is warmth.           LAB / IMAGING DATA:  ALT   Date Value Ref Range Status   01/23/2020 49 (H) 0 - 45 U/L Final   09/16/2019 24 0 - 45 U/L Final   12/27/2018 32 0 - 45 U/L Final     Albumin   Date Value Ref Range Status   01/23/2020 4.0 3.5 - 5.0 g/dL Final   09/16/2019 4.0 3.5 - 5.0 g/dL Final   12/27/2018 4.2 3.5 - 5.0 g/dL Final     Creatinine   Date Value Ref Range Status   06/22/2020 1.55 (H) 0.70 - 1.30 mg/dL Final   01/23/2020 1.34 (H) 0.70 - 1.30 mg/dL Final   09/16/2019 1.54 (H) 0.70 - 1.30 mg/dL Final       WBC   Date Value Ref Range Status   06/22/2020 7.1 4.0 " - 11.0 thou/uL Final   01/23/2020 7.5 4.0 - 11.0 thou/uL Final   08/25/2015 5.7 4.0 - 11.0 thou/uL Final   06/18/2015 5.7 4.0 - 11.0 thou/uL Final     Hemoglobin   Date Value Ref Range Status   06/22/2020 13.0 (L) 14.0 - 18.0 g/dL Final   01/23/2020 13.6 (L) 14.0 - 18.0 g/dL Final   12/27/2018 15.1 14.0 - 18.0 g/dL Final     Platelets   Date Value Ref Range Status   06/22/2020 202 140 - 440 thou/uL Final   01/23/2020 201 140 - 440 thou/uL Final   12/27/2018 191 140 - 440 thou/uL Final       Lab Results   Component Value Date    RF <10 09/30/2009    SEDRATE 18 (H) 10/28/2009

## 2021-06-28 RX ORDER — SITAGLIPTIN 25 MG/1
TABLET, FILM COATED ORAL
Qty: 90 TABLET | Refills: 3 | Status: SHIPPED | OUTPATIENT
Start: 2021-06-28 | End: 2021-12-08 | Stop reason: DRUGHIGH

## 2021-06-28 NOTE — PROGRESS NOTES
Progress Notes by Lynne George MD at 11/22/2019  2:30 PM     Author: Lynne George MD Service: -- Author Type: Physician    Filed: 11/22/2019  3:16 PM Encounter Date: 11/22/2019 Status: Signed    : Lynne George MD (Physician)                                       Thank you Dr. Espino for asking the [facility] team to participate in the care of your patient, Adam Talamantes.     Impression and Plan     1. Possible coronary artery disease. Patient underwent recent nuclear perfusion study 29 July 2016 which was mildly abnormal. Specifically, this revealed a small sized mild intensity reversible defect in the mid to distal anteroseptal segment representing an area of myocardial ischemia.     Patient does report some intermittent chest discomfort symptoms.  Some features somewhat atypical, but cannot discount possible ischemic contribution.  I once again discussed the various options with the patient and his daughter.  We could pursue direct angiography and I discussed the risks and indications.  Could also continue with further medical therapy.  Patient reports that he would like to continue with conservative/medical management at this time.      Ultimately recommend:    Continue daily aspirin.    Continue metoprolol succinate 50 mg daily.    Continue lisinopril given the putative favorable endothelial effects of the therapy.    Sublingual nitroglycerin PRN.    Will add isosorbide mononitrate 30 g daily.     2. Hypertension. Blood pressure today is 122/90 mmHg. Plan to continue current management.     3. Dyslipidemia.  Lipid profile 18 October 2017 revealed  mg/dL and HDL 42 mg/dL.    Plan to obtain direct LDL today.     Plan on follow-up in next weeks to reassess.            History of Present Illness    Once again I would like to thank you again for asking me to participate in the care of your patient, Adam Talamantes.  As you know, but to reiterate for my own records,  Adam Talamantes is a 79 y.o. male who had been hospitalized in July 2016 in part for hypertension, but also for some symptoms of chest discomfort.  At the time, certain features were felt somewhat atypical for cardiac etiology. Patient subsequently underwent a regadenoson nuclear perfusion study 29 July 2016 which returned mildly abnormal in that it revealed a small sized mild intensity reversible anteroseptal defect (see Cardiac Diagnostics section below).  Given the atypical nature of the patients symptoms, no significant recurrence in chest discomfort, and in accordance with the patients wishes; medical therapy was pursued.     On interview today, Adam does report some intermittent chest discomfort symptoms though not necessarily provoked with exertion and in a predictable way.  His breathing has been fairly comfortable.  He reports no palpitations or lightheadedness.    Further review of systems is otherwise negative/noncontributory (medical record and 13 point review of systems reviewed as well and pertinent positives noted).         Cardiac Diagnostics      Echocardiogram 17 October 2017:  1. Normal left ventricular size and systolic performance with ejection fraction of 55 to 60%.  2. Mild concentric increased left ventricular wall thickness.  3. No significant valvular heart disease.  4. Normal right ventricular size and systolic performance.  5. Normal atrial dimensions.     Regadenoson nuclear perfusion study 29 July 2016:   1. Small sized mild intensity reversible defect in the mid to distal anteroseptal segment representing an area of myocardial ischemia.  2. Normal left ventricular systolic performance with ejection fraction of 55%.  3. The patient is at low risk of future cardiac ischemic events based on perfusion imaging.    12-lead ECG (personally reviewed) 30 June 2016: Normal sinus rhythm. Normal ECG.            Physical Examination       /90 (Patient Site: Right Arm, Patient Position: Sitting,  "Cuff Size: Adult Regular)   Pulse 82   Resp 14   Ht 5' 1.75\" (1.568 m)   Wt 155 lb (70.3 kg)   BMI 28.58 kg/m          Wt Readings from Last 3 Encounters:   11/22/19 155 lb (70.3 kg)   09/23/19 152 lb (68.9 kg)   09/16/19 152 lb (68.9 kg)     The patient is alert and oriented times three. Sclerae are anicteric. Mucosal membranes are moist. Jugular venous pressure is normal. No significant adenopathy/thyromegally appreciated. Lungs are clear with good expansion. On cardiovascular exam, the patient has a regular S1 and S2. Abdomen is soft and non-tender. Extremities reveal no clubbing, cyanosis, or edema.         Family History/Social History/Risk Factors   Patient does not smoke.  Family history reviewed, and family history includes Stroke in his daughter and father.          Medications  Allergies   Current Outpatient Medications   Medication Sig Dispense Refill   ? acetaminophen (TYLENOL) 500 MG tablet TAKE 1 TABLET BY MOUTH EVERY 6 HOURS AS NEEDED 120 tablet 6   ? allopurinol (ZYLOPRIM) 100 MG tablet Take 2 tablets (200 mg total) by mouth daily. 180 tablet 3   ? amLODIPine (NORVASC) 5 MG tablet TAKE 1 TABLET(5 MG) BY MOUTH DAILY 90 tablet 3   ? aspirin (ASPIR-LOW) 81 MG EC tablet Take 1 tablet (81 mg total) by mouth daily. 90 tablet 1   ? atorvastatin (LIPITOR) 40 MG tablet Take 1 tablet (40 mg total) by mouth at bedtime. 90 tablet 3   ? colchicine 0.6 mg tablet TAKE 1 TABLET BY MOUTH EVERY OTHER DAY 45 tablet 3   ? docusate sodium (COLACE) 100 MG capsule Take 1 capsule (100 mg total) by mouth 2 (two) times a day as needed for constipation. 60 capsule 8   ? gabapentin (NEURONTIN) 300 MG capsule TAKE 1 TO 2 CAPSULES BY MOUTH AT BEDTIME 180 capsule 3   ? HYDROcodone-acetaminophen 5-325 mg per tablet TAKE 1-2 TABLETS BY MOUTH AT BEDTIME AS NEEDED FOR PAIN 30 tablet 0   ? JANUVIA 25 mg tablet TAKE ONE TABLET BY MOUTH ONCE DAILY 90 tablet 1   ? lidocaine (XYLOCAINE) 5 % ointment Apply to painful areas three times " a day as needed for pain 240 g 3   ? loratadine (CLARITIN) 10 mg tablet TAKE 1 TABLET BY MOUTH EVERY DAY 30 tablet 6   ? magnesium oxide (MAG-OX) 400 mg (241.3 mg magnesium) tablet Take 1 tablet (400 mg total) by mouth daily. 30 tablet 11   ? meclizine (ANTIVERT) 12.5 mg tablet Take 1-2 tablets (12.5-25 mg total) by mouth every 8 (eight) hours as needed (one to two tablets). 60 tablet 1   ? metoprolol succinate (TOPROL-XL) 50 MG 24 hr tablet Take 1 tablet (50 mg total) by mouth daily. 90 tablet 3   ? nitroglycerin (NITROSTAT) 0.4 MG SL tablet Place 1 tablet (0.4 mg total) under the tongue every 5 (five) minutes as needed for chest pain. 90 tablet 12   ? omeprazole (PRILOSEC) 20 MG capsule TAKE 1 CAPSULE BY MOUTH DAILY 90 capsule 3   ? DULoxetine (CYMBALTA) 20 MG capsule Take 20 mg by mouth daily.     ? EUCALYPTUS OIL/MENTHOL (MENTHOL EUCALYPTUS MM) 1 Inhalation into each nostril as needed (home medication from [facility]      ? isosorbide mononitrate (IMDUR) 30 MG 24 hr tablet Take 1 tablet (30 mg total) by mouth daily. 90 tablet 3   ? lisinopril (PRINIVIL,ZESTRIL) 10 MG tablet Take 1 tablet (10 mg total) by mouth 2 (two) times a day. 60 tablet 0   ? multivitamin with minerals (THERA-M) 9 mg iron-400 mcg Tab tablet Take 1 tablet by mouth daily. 100 tablet 3     No current facility-administered medications for this visit.       No Known Allergies       Lab Results   Lab Results   Component Value Date     09/16/2019    K 4.6 09/16/2019     09/16/2019    CO2 24 09/16/2019    BUN 26 09/16/2019    CREATININE 1.54 (H) 09/16/2019    CALCIUM 9.8 09/16/2019     Lab Results   Component Value Date    WBC 9.4 12/27/2018    WBC 5.7 08/25/2015    HGB 15.1 12/27/2018    HCT 45.5 12/27/2018    MCV 92 12/27/2018     12/27/2018     Lab Results   Component Value Date    CHOL 201 (H) 10/18/2017    TRIG 177 (H) 10/18/2017    HDL 42 10/18/2017    LDLCALC 124 10/18/2017     Lab Results   Component Value Date    INR  1.04 11/24/2017     Lab Results   Component Value Date    BNP <10 10/17/2017     Lab Results   Component Value Date    CKTOTAL 72 09/19/2017    CKMB <1 05/08/2011    CKMB <1 08/23/2009    TROPONINI <0.01 12/27/2018    TROPONINI 0.01 11/27/2017    TROPONINI 0.02 11/24/2017     Lab Results   Component Value Date    TSH 0.65 09/19/2017

## 2021-06-28 NOTE — PROGRESS NOTES
Progress Notes by Lynne George MD at 1/3/2020  3:10 PM     Author: Lynne George MD Service: -- Author Type: Physician    Filed: 1/3/2020  3:33 PM Encounter Date: 1/3/2020 Status: Signed    : Lynne George MD (Physician)                                       Thank you Dr. Espino for asking the [facility] team to participate in the care of your patient, Adam Talamantes.     Impression and Plan     1. Possible coronary artery disease. Patient underwent recent nuclear perfusion study 29 July 2016 which was mildly abnormal. Specifically, this revealed a small sized mild intensity reversible defect in the mid to distal anteroseptal segment representing an area of myocardial ischemia.     Patient does report some intermittent chest discomfort symptoms.  Some features somewhat atypical, but cannot discount possible ischemic contribution.  I once again discussed the various options with the patient and his daughter.  We could pursue direct angiography and I discussed the risks and indications.  Could also continue with further medical therapy.  Patient reports that he would like to continue with conservative/medical management at this time.  He states that this is actually improved since last visit and he is comfortable with how he is performing      Ultimately recommend:    Continue daily aspirin.    Continue metoprolol succinate 50 mg daily.    Continue lisinopril given the putative favorable endothelial effects of the therapy.    Sublingual nitroglycerin PRN.    Continue isosorbide mononitrate 30 g daily.     2. Hypertension. Blood pressure is fairly reasonable in the office today at 130/60 mmHg. Plan to continue current management.     3. Dyslipidemia.  Direct LDL 22 November 2019 was fairly close to target with LDL 84 mg/dL.     Plan on follow-up in 1 year.  Patient instructed to call should he have worsening symptoms or concerns in which case could see sooner if needed.             History of Present Illness    Once again I would like to thank you again for asking me to participate in the care of your patient, Adam Talamantes.  As you know, but to reiterate for my own records, Adam Talamantes is a 79 y.o. male who had been hospitalized in July 2016 in part for hypertension, but also for some symptoms of chest discomfort.  At the time, certain features were felt somewhat atypical for cardiac etiology. Patient subsequently underwent a regadenoson nuclear perfusion study 29 July 2016 which returned mildly abnormal in that it revealed a small sized mild intensity reversible anteroseptal defect (see Cardiac Diagnostics section below).  Given the atypical nature of the patients symptoms, no significant recurrence in chest discomfort, and in accordance with the patients wishes; medical therapy was pursued.     On interview today, Adam does report some intermittent chest discomfort symptoms though not necessarily provoked with exertion or in a predictable way.  He feels this is about the same if not improved since my last visit with him.  He is comfortable with how he is performing in this regard.  His breathing has been fairly comfortable.  He reports no palpitations or lightheadedness.    Further review of systems is otherwise negative/noncontributory (medical record and 13 point review of systems reviewed as well and pertinent positives noted).         Cardiac Diagnostics      Echocardiogram 17 October 2017:  1. Normal left ventricular size and systolic performance with ejection fraction of 55 to 60%.  2. Mild concentric increased left ventricular wall thickness.  3. No significant valvular heart disease.  4. Normal right ventricular size and systolic performance.  5. Normal atrial dimensions.    Regadenoson nuclear perfusion study 29 July 2016:   1. Small sized mild intensity reversible defect in the mid to distal anteroseptal segment representing an area of myocardial ischemia.  2. Normal left  "ventricular systolic performance with ejection fraction of 55%.  3. The patient is at low risk of future cardiac ischemic events based on perfusion imaging.    12-lead ECG (personally reviewed) 30 June 2016: Normal sinus rhythm. Normal ECG.              Physical Examination       /60 (Patient Site: Right Arm, Patient Position: Sitting, Cuff Size: Adult Regular)   Pulse 79   Resp 16   Ht 5' 1.73\" (1.568 m)   Wt 156 lb (70.8 kg)   BMI 28.78 kg/m          Wt Readings from Last 3 Encounters:   01/03/20 156 lb (70.8 kg)   12/02/19 155 lb 11.2 oz (70.6 kg)   11/22/19 155 lb (70.3 kg)     The patient is alert and oriented times three. Sclerae are anicteric. Mucosal membranes are moist. Jugular venous pressure is normal. No significant adenopathy/thyromegally appreciated. Lungs are clear with good expansion. On cardiovascular exam, the patient has a regular S1 and S2. Abdomen is soft and non-tender. Extremities reveal no clubbing, cyanosis, or edema.         Family History/Social History/Risk Factors   Patient does not smoke.  Family history reviewed, and family history includes Stroke in his daughter and father.          Medications  Allergies   Current Outpatient Medications   Medication Sig Dispense Refill   ? acetaminophen (TYLENOL) 500 MG tablet TAKE 1 TABLET BY MOUTH EVERY 6 HOURS AS NEEDED 120 tablet 6   ? allopurinol (ZYLOPRIM) 100 MG tablet Take 2 tablets (200 mg total) by mouth daily. 180 tablet 3   ? amLODIPine (NORVASC) 5 MG tablet TAKE 1 TABLET(5 MG) BY MOUTH DAILY 90 tablet 3   ? aspirin (ASPIR-LOW) 81 MG EC tablet Take 1 tablet (81 mg total) by mouth daily. 90 tablet 1   ? atorvastatin (LIPITOR) 40 MG tablet Take 2 tablets (80 mg total) by mouth at bedtime. 180 tablet 3   ? colchicine 0.6 mg tablet TAKE 1 TABLET BY MOUTH EVERY OTHER DAY 45 tablet 3   ? docusate sodium (COLACE) 100 MG capsule Take 1 capsule (100 mg total) by mouth 2 (two) times a day as needed for constipation. 60 capsule 8   ? " gabapentin (NEURONTIN) 300 MG capsule TAKE 1 TO 2 CAPSULES BY MOUTH AT BEDTIME 180 capsule 3   ? HYDROcodone-acetaminophen 5-325 mg per tablet TAKE 1-2 TABLETS BY MOUTH AT BEDTIME AS NEEDED FOR chronic PAIN 30 tablet 0   ? isosorbide mononitrate (IMDUR) 30 MG 24 hr tablet Take 1 tablet (30 mg total) by mouth daily. 90 tablet 3   ? JANUVIA 25 mg tablet TAKE 1 TABLET BY MOUTH EVERY DAY 90 tablet 1   ? loratadine (CLARITIN) 10 mg tablet TAKE 1 TABLET BY MOUTH EVERY DAY 30 tablet 6   ? magnesium oxide (MAG-OX) 400 mg (241.3 mg magnesium) tablet Take 1 tablet (400 mg total) by mouth daily. 30 tablet 11   ? meclizine (ANTIVERT) 12.5 mg tablet Take 1-2 tablets (12.5-25 mg total) by mouth every 8 (eight) hours as needed (one to two tablets). 60 tablet 1   ? metoprolol succinate (TOPROL-XL) 50 MG 24 hr tablet Take 1 tablet (50 mg total) by mouth daily. 90 tablet 3   ? nitroglycerin (NITROSTAT) 0.4 MG SL tablet Place 1 tablet (0.4 mg total) under the tongue every 5 (five) minutes as needed for chest pain. 90 tablet 12   ? omeprazole (PRILOSEC) 20 MG capsule TAKE 1 CAPSULE BY MOUTH DAILY 90 capsule 3   ? DULoxetine (CYMBALTA) 20 MG capsule Take 20 mg by mouth daily.     ? EUCALYPTUS OIL/MENTHOL (MENTHOL EUCALYPTUS MM) 1 Inhalation into each nostril as needed (home medication from [facility] [place].).      ? lidocaine (XYLOCAINE) 5 % ointment Apply to painful areas three times a day as needed for pain 240 g 3   ? lisinopril (PRINIVIL,ZESTRIL) 10 MG tablet Take 1 tablet (10 mg total) by mouth 2 (two) times a day. 60 tablet 0   ? multivitamin with minerals (THERA-M) 9 mg iron-400 mcg Tab tablet Take 1 tablet by mouth daily. 100 tablet 3     No current facility-administered medications for this visit.       No Known Allergies       Lab Results   Lab Results   Component Value Date     09/16/2019    K 4.6 09/16/2019     09/16/2019    CO2 24 09/16/2019    BUN 26 09/16/2019    CREATININE 1.54 (H) 09/16/2019    CALCIUM 9.8  09/16/2019     Lab Results   Component Value Date    WBC 9.4 12/27/2018    WBC 5.7 08/25/2015    HGB 15.1 12/27/2018    HCT 45.5 12/27/2018    MCV 92 12/27/2018     12/27/2018     Lab Results   Component Value Date    CHOL 201 (H) 10/18/2017    TRIG 177 (H) 10/18/2017    HDL 42 10/18/2017    LDLCALC 124 10/18/2017    LDLDIRECT 84 11/22/2019     Lab Results   Component Value Date    INR 1.04 11/24/2017     Lab Results   Component Value Date    BNP <10 10/17/2017     Lab Results   Component Value Date    CKTOTAL 72 09/19/2017    CKMB <1 05/08/2011    CKMB <1 08/23/2009    TROPONINI <0.01 12/27/2018    TROPONINI 0.01 11/27/2017    TROPONINI 0.02 11/24/2017     Lab Results   Component Value Date    TSH 0.65 09/19/2017

## 2021-06-29 ENCOUNTER — COMMUNICATION - HEALTHEAST (OUTPATIENT)
Dept: SCHEDULING | Facility: CLINIC | Age: 81
End: 2021-06-29

## 2021-06-29 ENCOUNTER — COMMUNICATION - HEALTHEAST (OUTPATIENT)
Dept: FAMILY MEDICINE | Facility: CLINIC | Age: 81
End: 2021-06-29

## 2021-06-29 DIAGNOSIS — M51.379 DEGENERATION OF LUMBAR OR LUMBOSACRAL INTERVERTEBRAL DISC: ICD-10-CM

## 2021-06-29 DIAGNOSIS — E11.9 TYPE 2 DIABETES MELLITUS WITHOUT COMPLICATION, WITHOUT LONG-TERM CURRENT USE OF INSULIN (H): ICD-10-CM

## 2021-06-29 DIAGNOSIS — M25.50 POLYARTHRALGIA: ICD-10-CM

## 2021-06-30 RX ORDER — HYDROCODONE BITARTRATE AND ACETAMINOPHEN 5; 325 MG/1; MG/1
TABLET ORAL
Qty: 30 TABLET | Refills: 0 | Status: SHIPPED | OUTPATIENT
Start: 2021-06-30 | End: 2021-07-28

## 2021-06-30 NOTE — PROGRESS NOTES
Progress Notes by Lynne George MD at 4/2/2021 10:50 AM     Author: Lynne George MD Service: -- Author Type: Physician    Filed: 4/2/2021  1:00 PM Encounter Date: 4/2/2021 Status: Signed    : Lynne George MD (Physician)                                       Thank you Dr. Espino for asking the [facility] team to participate in the care of your patient, Adam Talamantes.     Impression and Plan     1. Possible coronary artery disease. Patient underwent recent nuclear perfusion study 29 July 2016 which was mildly abnormal. Specifically, this revealed a small sized mild intensity reversible defect in the mid to distal anteroseptal segment representing an area of myocardial ischemia.     Patient does report some intermittent chest discomfort symptoms.  Some features somewhat atypical, but cannot discount possible ischemic contribution.  I once again discussed the various options with the patient and his daughter.  We could pursue direct angiography and I discussed the risks and indications.  I discussed further noninvasive work-up with Adam as well.  Ultimately, it was jointly decided to repeat ischemic work-up and compared to previous evaluation.     Ultimately recommend:    Continue daily aspirin.    Continue metoprolol succinate 50 mg daily.    Sublingual nitroglycerin PRN.    Continue isosorbide mononitrate 30 g daily.    Will obtain a regadenoson nuclear perfusion study to assess for further progression of any evidence of ischemia and compared to previous study.     2. Hypertension. Blood pressure is fairly reasonable in the office today at 120/78 mmHg. Plan to continue current management.     3. Dyslipidemia.  Lipid profile 29 March 2021 revealed  mg/dL and HDL 40 mg/dL.    Will change atorvastatin to rosuvastatin 20 mg daily.    Repeat lipid profile 3 months post change.    4.  Lightheadedness.  Patient reports some intermittent lightheadedness, but fortunately no  kai spells.    Will obtain a Holter monitor to exclude any rhythm disturbance such as bradycardia which could be a contributor.    Follow-up and further recommendations pending test results.       35 minutes spent reviewing prior records (including documentation, laboratory studies, cardiac testing/imaging), interview with patient along with physical exam, planning, and subsequent documentation/crafting of note.           History of Present Illness    Once again I would like to thank you again for asking me to participate in the care of your patient, Adam Talamantes.  As you know, but to reiterate for my own records, Adam Talamantes is a 81 y.o. male who had been hospitalized in July 2016 in part for hypertension, but also for some symptoms of chest discomfort.  At the time, certain features were felt somewhat atypical for cardiac etiology. Patient subsequently underwent a regadenoson nuclear perfusion study 29 July 2016 which returned mildly abnormal in that it revealed a small sized mild intensity reversible anteroseptal defect (see Cardiac Diagnostics section below).  Given the atypical nature of the patients symptoms, no significant recurrence in chest discomfort, and in accordance with the patients wishes; medical therapy was pursued.     Patient was seen with the aid of a professional .  On interview today, Adam does report some intermittent chest discomfort.  Symptoms at times do seem to be rolled with activity though at other times he can do a fair amount of activity without provocation of symptoms.  He also reports some intermittent lightheadedness, but no kai spells in this regard.  Denies any significant palpitations.  Reports no fevers, chills, or other constitutional symptoms.    Further review of systems is otherwise negative/noncontributory (medical record and 13 point review of systems reviewed as well and pertinent positives noted).         Cardiac Diagnostics      Echocardiogram 17 October  "2017:  1. Normal left ventricular size and systolic performance with ejection fraction of 55 to 60%.  2. Mild concentric increased left ventricular wall thickness.  3. No significant valvular heart disease.  4. Normal right ventricular size and systolic performance.  5. Normal atrial dimensions.    Regadenoson nuclear perfusion study 29 July 2016:   1. Small sized mild intensity reversible defect in the mid to distal anteroseptal segment representing an area of myocardial ischemia.  2. Normal left ventricular systolic performance with ejection fraction of 55%.  3. The patient is at low risk of future cardiac ischemic events based on perfusion imaging.    12-lead ECG (personally reviewed) 30 June 2016: Normal sinus rhythm. Normal ECG.              Physical Examination       /78 (Patient Site: Right Arm, Patient Position: Sitting, Cuff Size: Adult Regular)   Pulse 88   Resp 16   Ht 5' 1.5\" (1.562 m)   Wt 154 lb (69.9 kg)   BMI 28.63 kg/m          Wt Readings from Last 3 Encounters:   04/02/21 154 lb (69.9 kg)   03/29/21 154 lb 5 oz (70 kg)   03/10/21 156 lb (70.8 kg)       The patient is alert and oriented times three. Sclerae are anicteric. Mucosal membranes are moist. Jugular venous pressure is normal. No significant adenopathy/thyromegally appreciated. Lungs are clear with good expansion. On cardiovascular exam, the patient has a regular S1 and S2. Abdomen is soft and non-tender. Extremities reveal no clubbing, cyanosis, or edema.         Family History/Social History/Risk Factors   Patient does not smoke.  Family history reviewed, and family history includes Stroke in his daughter and father.          Medications  Allergies   Current Outpatient Medications   Medication Sig Dispense Refill   ? acetaminophen (TYLENOL) 500 MG tablet Take 1 tablet (500 mg total) by mouth every 6 (six) hours as needed. 120 tablet 6   ? allopurinoL (ZYLOPRIM) 100 MG tablet TAKE 2 TABLETS BY MOUTH EVERY  tablet 7   ? " amLODIPine (NORVASC) 5 MG tablet TAKE 1 TABLET(5 MG) BY MOUTH DAILY 90 tablet 2   ? aspirin (ASPIR-LOW) 81 MG EC tablet Take 1 tablet (81 mg total) by mouth daily. 90 tablet 1   ? colchicine 0.6 mg tablet TAKE 1 TABLET BY MOUTH EVERY OTHER DAY 45 tablet 1   ?  mg capsule TAKE 1 CAPSULE BY MOUTH TWICE DAILY AS NEEDED FOR CONSTIPATION 60 capsule 11   ? DULoxetine (CYMBALTA) 20 MG capsule Take 1 capsule (20 mg total) by mouth 2 (two) times a day. 180 capsule 0   ? EUCALYPTUS OIL/MENTHOL (MENTHOL EUCALYPTUS MM) 1 Inhalation into each nostril as needed (home medication from [facility]      ? gabapentin (NEURONTIN) 300 MG capsule TAKE 1 TO 2 CAPSULES BY MOUTH AT BEDTIME 180 capsule 2   ? HYDROcodone-acetaminophen 5-325 mg per tablet TAKE 1-2 TABLETS BY MOUTH AT BEDTIME AS NEEDED FOR chronic PAIN 30 tablet 0   ? isosorbide mononitrate (IMDUR) 30 MG 24 hr tablet Take 1 tablet (30 mg total) by mouth daily. 90 tablet 0   ? lidocaine (XYLOCAINE) 5 % ointment APPLY TO PAINFIL AREAS THREE TIMES DAILY AS NEEDED FOR PAIN 240 g 3   ? loratadine (CLARITIN) 10 mg tablet TAKE 1 TABLET BY MOUTH EVERY DAY 90 tablet 2   ? losartan (COZAAR) 50 MG tablet Take 1 tablet (50 mg total) by mouth daily. 90 tablet 3   ? magnesium oxide (MAG-OX) 400 mg (241.3 mg magnesium) tablet TAKE 1 TABLET(400 MG) BY MOUTH DAILY 30 tablet 11   ? meclizine (ANTIVERT) 12.5 mg tablet Take 1-2 tablets (12.5-25 mg total) by mouth every 8 (eight) hours as needed (one to two tablets). 60 tablet 1   ? metoprolol succinate (TOPROL-XL) 50 MG 24 hr tablet Take 1 tablet (50 mg total) by mouth daily. 90 tablet 2   ? multivitamin with minerals (THERA-M) 9 mg iron-400 mcg Tab tablet Take 1 tablet by mouth daily. 100 tablet 3   ? nitroglycerin (NITROSTAT) 0.4 MG SL tablet Place 1 tablet (0.4 mg total) under the tongue every 5 (five) minutes as needed for chest pain. 25 tablet 6   ? omeprazole (PRILOSEC) 20 MG capsule TAKE 1 CAPSULE BY MOUTH DAILY 90 capsule 3   ?  SITagliptin (JANUVIA) 50 MG tablet Take 1 tablet (50 mg total) by mouth daily. 90 tablet 3   ? triamcinolone (KENALOG) 0.1 % cream Apply topically 2 (two) times a day. 80 g 1   ? rosuvastatin (CRESTOR) 20 MG tablet Take 1 tablet (20 mg total) by mouth at bedtime. 90 tablet 3     No current facility-administered medications for this visit.       No Known Allergies       Lab Results   Lab Results   Component Value Date     06/22/2020    K 4.3 06/22/2020     06/22/2020    CO2 24 06/22/2020    BUN 20 06/22/2020    CREATININE 1.55 (H) 06/22/2020    CALCIUM 9.5 06/22/2020     Lab Results   Component Value Date    WBC 7.1 06/22/2020    WBC 5.7 08/25/2015    HGB 13.0 (L) 06/22/2020    HCT 39.5 (L) 06/22/2020    MCV 92 06/22/2020     06/22/2020     Lab Results   Component Value Date    CHOL 218 (H) 03/29/2021    TRIG 333 (H) 03/29/2021    HDL 40 03/29/2021    LDLCALC 111 03/29/2021    LDLDIRECT 84 11/22/2019     Lab Results   Component Value Date    INR 1.04 11/24/2017     Lab Results   Component Value Date    BNP <10 10/17/2017     Lab Results   Component Value Date    CKTOTAL 72 09/19/2017    CKMB <1 05/08/2011    CKMB <1 08/23/2009    TROPONINI 0.02 06/22/2020    TROPONINI <0.01 12/27/2018    TROPONINI 0.01 11/27/2017     Lab Results   Component Value Date    TSH 0.65 09/19/2017

## 2021-07-04 NOTE — TELEPHONE ENCOUNTER
Telephone Encounter by Teresa Rojas RN at 6/29/2021  6:05 PM     Author: Teresa Rojas RN Service: -- Author Type: Registered Nurse    Filed: 6/29/2021  6:09 PM Encounter Date: 6/29/2021 Status: Signed    : Teresa Rojas RN (Registered Nurse)       Error.  See refill request note.    Teresa Rojas RN  Triage Nurse Advisor

## 2021-07-04 NOTE — TELEPHONE ENCOUNTER
Telephone Encounter by Priscilla Dash MA at 6/30/2021 11:54 AM     Author: Priscilla Dash MA Service: -- Author Type: Medical Assistant    Filed: 6/30/2021 11:54 AM Encounter Date: 6/26/2021 Status: Signed    : Priscilla Dash MA (Medical Assistant)       Med sent to pharmacy. Thanks

## 2021-07-04 NOTE — TELEPHONE ENCOUNTER
Telephone Encounter by Teresa Rojas RN at 6/29/2021  6:08 PM     Author: Teresa Rojas RN Service: -- Author Type: Registered Nurse    Filed: 6/29/2021  6:08 PM Encounter Date: 6/26/2021 Status: Signed    : Teresa Rojas RN (Registered Nurse)       Controlled Substance Refill Request  Medication Name:   Requested Prescriptions     Pending Prescriptions Disp Refills   ? HYDROcodone-acetaminophen 5-325 mg per tablet 30 tablet 0     Sig: TAKE 1-2 TABLETS BY MOUTH AT BEDTIME AS NEEDED FOR chronic PAIN     Signed Prescriptions Disp Refills   ? JANUVIA 25 mg tablet 90 tablet 3     Sig: TAKE 1 TABLET BY MOUTH EVERY DAY     Authorizing Provider: JAVAN DYE     Date Last Fill: 6/1/2021  Is patient out of medication?: N/A - electronic request  Patient notified refills processed within 3 business days: N/A - electronic request  Requested [facility]acy: [facility]  Submit electronically to pharmacy  Controlled Substance Agreement on file:   Encounter-Level CSA Scan Date:    There are no encounter-level csa scan date.        Last office visit:  3/29/21    Teresa Rojas RN  Triage Nurse Advisor

## 2021-07-06 VITALS
SYSTOLIC BLOOD PRESSURE: 112 MMHG | DIASTOLIC BLOOD PRESSURE: 82 MMHG | WEIGHT: 158.8 LBS | HEART RATE: 84 BPM | BODY MASS INDEX: 29.22 KG/M2 | HEIGHT: 62 IN

## 2021-07-07 NOTE — TELEPHONE ENCOUNTER
RN cannot approve Refill Request    RN can NOT refill this medication PCP messaged that patient is overdue for Labs. Last office visit: Visit date not found Last Physical: Visit date not found Last MTM visit: Visit date not found Last visit same specialty: 3/29/2021 Chucho Espino MD.  Next visit within 3 mo: Visit date not found  Next physical within 3 mo: Visit date not found      Salome Castaneda, [facility] Triage/Med Refill 6/26/2021    Requested Prescriptions   Pending Prescriptions Disp Refills     JANUVIA 25 mg tablet [Pharmacy Med Name: JANUVIA 25MG TABLETS] 90 tablet 0     Sig: TAKE 1 TABLET BY MOUTH EVERY DAY       Oral Hypoglycemics Refill Protocol Failed - 6/26/2021  8:16 AM        Failed - Microalbumin in last year      Microalbumin, Random Urine   Date Value Ref Range Status   05/14/2019 5.33 (H) 0.00 - 1.99 mg/dL Final                  Failed - Serum creatinine in last year     Creatinine   Date Value Ref Range Status   06/22/2020 1.55 (H) 0.70 - 1.30 mg/dL Final             Passed - Visit with PCP or prescribing provider visit in last 6 months       Last office visit with prescriber/PCP: Visit date not found OR same dept: 3/29/2021 Chucho Espino MD OR same specialty: 3/29/2021 Chucho Espino MD Last physical: Visit date not found Last MTM visit: Visit date not found         Next appt within 3 mo: Visit date not found  Next physical within 3 mo: Visit date not found  Prescriber OR PCP: Theodore Merida MD  Last diagnosis associated with med order: 1. Type 2 diabetes mellitus without complication, without long-term current use of insulin (H)  - JANUVIA 25 mg tablet [Pharmacy Med Name: JANUVIA 25MG TABLETS]; TAKE 1 TABLET BY MOUTH EVERY DAY  Dispense: 90 tablet; Refill: 0     If protocol passes may refill for 12 months if within 3 months of last provider visit (or a total of 15 months).           Passed - A1C in last 6 months     Hemoglobin A1c   Date Value Ref Range Status   03/29/2021 7.8  (H) <=5.6 % Final               Passed - Blood pressure in last year     BP Readings from Last 1 Encounters:   06/16/21 112/82

## 2021-07-12 ENCOUNTER — TELEPHONE (OUTPATIENT)
Dept: RHEUMATOLOGY | Facility: CLINIC | Age: 81
End: 2021-07-12

## 2021-07-12 DIAGNOSIS — M15.0 PRIMARY OSTEOARTHRITIS INVOLVING MULTIPLE JOINTS: Primary | ICD-10-CM

## 2021-07-12 RX ORDER — DULOXETIN HYDROCHLORIDE 20 MG/1
20 CAPSULE, DELAYED RELEASE ORAL 2 TIMES DAILY
Qty: 180 CAPSULE | Refills: 0 | Status: SHIPPED | OUTPATIENT
Start: 2021-07-12 | End: 2021-10-07

## 2021-07-12 NOTE — TELEPHONE ENCOUNTER
Refill request from [facility] [address]  [facility], [place]    Medication: Duloxetine DR 20mg - not on current med list.  Last Refill: 4/13/21  Last OV: 6/16/21  Last lab: 1/23/20  Future OV: 9/15/21

## 2021-07-26 ENCOUNTER — NURSE TRIAGE (OUTPATIENT)
Dept: NURSING | Facility: CLINIC | Age: 81
End: 2021-07-26

## 2021-07-26 ENCOUNTER — HOSPITAL ENCOUNTER (EMERGENCY)
Facility: HOSPITAL | Age: 81
Discharge: HOME | End: 2021-07-26
Attending: EMERGENCY MEDICINE | Admitting: EMERGENCY MEDICINE
Payer: COMMERCIAL

## 2021-07-26 VITALS
SYSTOLIC BLOOD PRESSURE: 135 MMHG | RESPIRATION RATE: 20 BRPM | TEMPERATURE: 98.3 F | DIASTOLIC BLOOD PRESSURE: 91 MMHG | WEIGHT: 150 LBS | HEART RATE: 114 BPM | BODY MASS INDEX: 27.88 KG/M2 | OXYGEN SATURATION: 96 %

## 2021-07-26 DIAGNOSIS — H10.13 ALLERGIC CONJUNCTIVITIS, BILATERAL: ICD-10-CM

## 2021-07-26 PROCEDURE — 99282 EMERGENCY DEPT VISIT SF MDM: CPT

## 2021-07-26 NOTE — DISCHARGE INSTRUCTIONS
You were seen in the emergency department today for evaluation of itchy, dry eyes.    I suspect this is from allergies.  I will prescribe you some allergic eyedrops to use 4 times per day.  Apply cool compresses to your eyes a few times per day to help with the swelling.  You may also take an over-the-counter antihistamine like Benadryl or Zyrtec.    Follow-up with your primary care provider if your symptoms are not improving in the next few days.  Return to the ER if you develop any new or worsening symptoms like severe pain, loss of vision, worsening swelling of your face, difficulty breathing, swelling or itching of your lips or tongue, or any other symptoms that concern you.

## 2021-07-26 NOTE — ED PROVIDER NOTES
EMERGENCY DEPARTMENT ENCOUNTER      NAME: Adam Talamantes  AGE: 81 year old male  YOB: 1940  MRN: [id number]  EVALUATION DATE & TIME: 7/26/2021  5:20 PM    PCP: Chucho Espino    ED PROVIDER: Kristan Mccloud PA-C      Chief Complaint   Patient presents with     Dry Eye(s) Both Eyes         FINAL IMPRESSION:  1. Allergic conjunctivitis, bilateral          ED COURSE & MEDICAL DECISION MAKING:    Pertinent Labs & Imaging studies reviewed. (See chart for details)    81 year old male presents to the Emergency Department for evaluation of eye problem.    Physical exam is remarkable for a generally well-appearing male who is in no acute distress.  He has some mild lid swelling of the bilateral upper eyelids with associated erythema.  Very mild conjunctival injection noted.  Pupils are equal and reactive, extraocular motions are intact.  No tenderness to palpation over the swollen areas.  Oropharynx is unremarkable appearing with no evidence of swelling of the lips or tongue.  Vital signs are stable and he is afebrile.    I do not think any emergent labs or imaging are indicated at this time.  The patient denies any symptoms concerning for severe allergic reaction or anaphylaxis including shortness of breath, vomiting, swelling of the tongue or lips.  He does not have any eye pain and has had no loss of vision.  No injuries or tearing suggestive of corneal abrasion.  I suspect his symptoms are secondary to allergic conjunctivitis.  Prescription for an antihistamine drop was sent to his pharmacy, advised cool compresses and oral antihistamines if needed.  Recommend follow-up with his primary care provider in the next few days for recheck.  Advised return to the ER if he develops any new or worsening symptoms like severe pain, loss of vision, persistent vomiting, other signs of reaction, or any other concerning symptoms.  Patient is amenable to this treatment plan and verbalized his understanding.    ED Course    5:31 PM Performed my initial history and physical exam. Discussed workup in the emergency department, management of symptoms, and likely disposition.   5:31 PM I discussed the plan for discharge with the patient, and patient is agreeable. We discussed supportive cares at home and reasons for return to the ER including new or worsening symptoms - all questions and concerns addressed. Patient to be discharged by RN.    At the conclusion of the encounter I discussed the results of all of the tests and the disposition. The questions were answered. The patient or family acknowledged understanding and was agreeable with the care plan.     Voice recognition software was used in the creation of this note. Any grammatical or nonsensical errors are due to inherent errors with the software and are not the intention of the writer.     MEDICATIONS GIVEN IN THE EMERGENCY:  Medications - No data to display    NEW PRESCRIPTIONS STARTED AT TODAY'S ER VISIT  New Prescriptions    NAPHAZOLINE-PHENIRAMINE (NAPHCON-A) 0.025-0.3 % OPHTHALMIC SOLUTION    Place 1-2 drops into both eyes 4 times daily as needed for allergies or dry eyes            =================================================================    HPI    Patient information was obtained from: Patient    Use of Intrepreter: Yes (In Person family member) - Language Kayce Talamantes is a 81 year old male who presents to the ED for evaluation of dry and itchy eyes.     The patient reports itchy eyes beginning on Saturday (2 days ago) with just the left eye feeling itchy. It then progressed to both eyes and he was unable to sleep last night and that is why he decided to come into the ED. He reports using an unknown eye drop and Claritin with no relief of symptoms. The patient reports drinking kianna flower tea for the first time on Saturday and believes the reaction is due to that.     He is otherwise in his usual state of health and denies any vision changes, eye pain,  runny nose, sore throat, cough, sneeze, shortness of breath, fever, swelling or itching of the lips or tongue, previous reactions, or any other concerns at this time.       REVIEW OF SYSTEMS   Constitutional:  No reported fever, chills  Eyes:  No pain, diplopia, vision loss. Positive for itchy and swollen eyes bilaterally.   HENT:  No reported sore throat, ear pain, dysphagia, lip or tongue itching or swelling  Respiratory: No reported SOB, wheeze, cough, hemoptysis  Cardiovascular:  No reported CP  GI: Denies nausea or vomiting  Musculoskeletal:  No reported new muscle/joint pain, swelling or loss of function.   Skin:  No reported rash     All other systems reviewed and are negative unless noted in HPI.     PAST MEDICAL HISTORY:  Past Medical History:   Diagnosis Date     Acute blood loss anemia      Acute renal failure (H)      Anemia      Bacteremia      Cataract      Chronic gout      CKD (chronic kidney disease)     Stage 3     DM2 (diabetes mellitus, type 2) (H)      GERD (gastroesophageal reflux disease)      Glucose intolerance (impaired glucose tolerance)      Gout      History of nephrolithiasis      History of prostatitis 2017     Hyperlipidemia      Hypertension      Insomnia      Intestinal disaccharidase deficiencies and disaccharide malabsorption     Created by Conversion      Latent tuberculosis      Nephrolithiasis      Neuropathy      Nonspecific abnormal findings on radiological and other examination of skull and head     Created by [email or web address] [facility] Annotation: 2013 11:23PM - Giulia Meridai/10/2011:  severe stenosis both posterior cerebral arteries seen MRI/MRA done for  vertigo. No acute changes.e*     Osteoarthritis     wrist, knee, shoulder     Prostatitis      Rectal bleed      Trigger thumb        PAST SURGICAL HISTORY:  Past Surgical History:   Procedure Laterality Date     IR MISCELLANEOUS PROCEDURE  2009     IR MISCELLANEOUS PROCEDURE  2009     IR  MISCELLANEOUS PROCEDURE  2009     IR MISCELLANEOUS PROCEDURE  2009     IR NEPHROURETERAL TUBE PLACEMENT BILATERAL  2009     IR URETER DILATION BILATERAL  2009     IR URETER DILATION BILATERAL  2009     KIDNEY STONE SURGERY       [facility]  3/6/2016            CURRENT MEDICATIONS:    naphazoline-pheniramine (NAPHCON-A) 0.025-0.3 % ophthalmic solution  acetaminophen (TYLENOL) 500 MG tablet  allopurinoL (ZYLOPRIM) 100 MG tablet  amLODIPine (NORVASC) 5 MG tablet  aspirin (ASPIR-LOW) 81 MG EC tablet  colchicine 0.6 mg tablet  colchicine 0.6 mg tablet   mg capsule  DULoxetine (CYMBALTA) 20 MG capsule  EUCALYPTUS OIL/MENTHOL (MENTHOL EUCALYPTUS MM)  gabapentin (NEURONTIN) 300 MG capsule  HYDROcodone-acetaminophen 5-325 mg per tablet  HYDROcodone-acetaminophen 5-325 mg per tablet  isosorbide mononitrate (IMDUR) 30 MG 24 hr tablet  JANUVIA 25 mg tablet  lidocaine (XYLOCAINE) 5 % ointment  loratadine (CLARITIN) 10 mg tablet  losartan (COZAAR) 50 MG tablet  magnesium oxide (MAG-OX) 400 mg (241.3 mg magnesium) tablet  meclizine (ANTIVERT) 12.5 mg tablet  metoprolol succinate (TOPROL-XL) 50 MG 24 hr tablet  multivitamin with minerals (THERA-M) 9 mg iron-400 mcg Tab tablet  nitroglycerin (NITROSTAT) 0.4 MG SL tablet  omeprazole (PRILOSEC) 20 MG capsule  rosuvastatin (CRESTOR) 20 MG tablet  SITagliptin (JANUVIA) 50 MG tablet  triamcinolone (KENALOG) 0.1 % cream        ALLERGIES:  No Known Allergies    FAMILY HISTORY:  Family History   Problem Relation Age of Onset     Cerebrovascular Disease Father      Cerebrovascular Disease Daughter        SOCIAL HISTORY:   Social History     Socioeconomic History     Marital status:      Spouse name: Not on file     Number of children: Not on file     Years of education: Not on file     Highest education level: Not on file   Occupational History     Not on file   Tobacco Use     Smoking status: Former Smoker     Quit date: 3/10/2000     Years since quittin.3      Smokeless tobacco: Former User     Tobacco comment: no passive exposure   Substance and Sexual Activity     Alcohol use: No     Drug use: No     Sexual activity: Never     Partners: Female   Other Topics Concern     Not on file   Social History Narrative     Not on file     Social Determinants of Health     Financial Resource Strain:      Difficulty of Paying Living Expenses:    Food Insecurity:      Worried About Running Out of Food in the Last Year:      Ran Out of Food in the Last Year:    Transportation Needs:      Lack of Transportation (Medical):      Lack of Transportation (Non-Medical):    Physical Activity:      Days of Exercise per Week:      Minutes of Exercise per Session:    Stress:      Feeling of Stress :    Social Connections:      Frequency of Communication with Friends and Family:      Frequency of Social Gatherings with Friends and Family:      Attends [employer] Services:      Active Member of Clubs or Organizations:      Attends Club or Organization Meetings:      Marital Status:    Intimate Partner Violence:      Fear of Current or Ex-Partner:      Emotionally Abused:      Physically Abused:      Sexually Abused:        VITALS:  Patient Vitals for the past 24 hrs:   BP Temp Temp src Pulse Resp SpO2 Weight   07/26/21 1648 (!) 135/91 98.3  F (36.8  C) Temporal 114 20 96 % 68 kg (150 lb)       PHYSICAL EXAM    VITAL SIGNS: BP (!) 135/91   Pulse 114   Temp 98.3  F (36.8  C) (Temporal)   Resp 20   Wt 68 kg (150 lb)   SpO2 96%   BMI 27.88 kg/m    General Appearance: Alert, cooperative, normal speech and facial symmetry, appears stated age, the patient does not appear in distress  Head:  Normocephalic, without obvious abnormality, atraumatic  Eyes: Some mild lid swelling of the bilateral upper eyelids with associated erythema.  Very mild conjunctival injection noted.  Pupils are equal and reactive, extraocular motions are intact.  No tenderness to palpation over the swollen areas.   ENT:   Lips, mucosa, and tongue normal; teeth and gums normal, no pharyngeal inflammation, no dysphonia or difficulty swallowing, membranes are moist without pallor  Cardio:  Regular rate and rhythm, S1 and S2 normal, no murmur, rub    or gallop, 2+ pulses symmetric in all extremities  Pulm:  Clear to auscultation bilaterally, respirations unlabored with no accessory muscle use  Neuro: Patient is awake, alert, and responsive to voice. No gross motor weaknesses or sensory loss; moves all extremities.     LAB:  All pertinent labs reviewed and interpreted.  Labs Ordered and Resulted from Time of ED Arrival Up to the Time of Departure from the ED - No data to display    RADIOLOGY:  Reviewed all pertinent imaging. Please see official radiology report.  No orders to display         IDipesh, am serving as a scribe to document services personally performed by Kristan Mccloud PA-C based on my observation and the provider's statements to me. IKristan PA-C attest that Dipesh Wright is acting in a scribe capacity, has observed my performance of the services and has documented them in accordance with my direction.     Kristan Mccloud PA-C  Emergency Medicine  [facility] EMERGENCY DEPARTMENT  [facility] [facility] [address] [phone]  Dept: [phone]     Kristan Mccloud PA-C  07/26/21 3897

## 2021-07-26 NOTE — ED TRIAGE NOTES
Patient here for bilateral eye dryness, irritation and redness started in one eye and has progressed to both for the last 3 days.

## 2021-07-27 ENCOUNTER — PATIENT OUTREACH (OUTPATIENT)
Dept: GERIATRIC MEDICINE | Facility: CLINIC | Age: 81
End: 2021-07-27

## 2021-07-27 NOTE — TELEPHONE ENCOUNTER
Please see below.  There are no openings today at [facility].  What does covering provider wish to advise as next steps.  He has an apt for August 3rd.  Thanks.

## 2021-07-27 NOTE — TELEPHONE ENCOUNTER
Daughter is calling and consent on file.  Patient was in the ED a few hours ago for itchy and swollen eyes.  When he got home, the ED advised he should take Benadryl, drops and cold washcloth.     Patient is reporting that he can't sleep.     Daughter is frustrated and wanting to know what else could be done.  Reviewed AVS and  Per AVS recommended she could utilize some tylenol to help decrease the discomfort and help him get some sleep.    She is also wanting to get him in for an appointment tomorrow, but there are none available.  Advised would route the note to the providers team for possible evaluation and further next steps.      Calling patient on cell phone directly with Kayce , as daughter will not be available tomorrow.      Reason for Disposition    [1] Caller requesting NON-URGENT health information AND [2] PCP's office is the best resource    Additional Information    Negative: [1] Caller is not with the adult (patient) AND [2] reporting urgent symptoms    Negative: Lab result questions    Negative: Medication questions    Negative: Caller can't be reached by phone    Negative: Caller has already spoken to PCP or another triager    Negative: RN needs further essential information from caller in order to complete triage    Negative: Requesting regular office appointment    Protocols used: INFORMATION ONLY CALL-A-

## 2021-07-28 ENCOUNTER — OFFICE VISIT (OUTPATIENT)
Dept: FAMILY MEDICINE | Facility: CLINIC | Age: 81
End: 2021-07-28
Payer: COMMERCIAL

## 2021-07-28 VITALS
BODY MASS INDEX: 28.71 KG/M2 | DIASTOLIC BLOOD PRESSURE: 80 MMHG | OXYGEN SATURATION: 98 % | RESPIRATION RATE: 20 BRPM | TEMPERATURE: 97.8 F | HEIGHT: 62 IN | WEIGHT: 156 LBS | SYSTOLIC BLOOD PRESSURE: 126 MMHG | HEART RATE: 111 BPM

## 2021-07-28 DIAGNOSIS — H10.13 ALLERGIC CONJUNCTIVITIS, BILATERAL: Primary | ICD-10-CM

## 2021-07-28 PROCEDURE — 99213 OFFICE O/P EST LOW 20 MIN: CPT | Performed by: FAMILY MEDICINE

## 2021-07-28 RX ORDER — DIPHENHYDRAMINE HCL 25 MG
25 TABLET ORAL EVERY 8 HOURS PRN
COMMUNITY
End: 2021-07-28

## 2021-07-28 RX ORDER — DIPHENHYDRAMINE HCL 25 MG
25 TABLET ORAL EVERY 6 HOURS PRN
Qty: 80 TABLET | Refills: 1 | Status: SHIPPED | OUTPATIENT
Start: 2021-07-28 | End: 2021-12-06

## 2021-07-28 ASSESSMENT — MIFFLIN-ST. JEOR: SCORE: 1293.92

## 2021-07-28 NOTE — PROGRESS NOTES
[facility] Care Coordination Contact  CC received notification of Emergency Room visit.  ER visit occurred on 7/26/2021 at [facility] with Dx of allergic conjunctivitis.    CC contacted adult daughter Dimitri and reviewed discharge summary.  Member has a follow-up appointment with PCP: Yes: scheduled on 7/28/2021  Member has had a change in condition: No  New referrals placed: No  Home Visit Needed: No  Care plan reviewed and updated.  PCP notified of ED visit via EMR.    RICKEY Gallegos  [facility]  [phone]

## 2021-07-28 NOTE — PROGRESS NOTES
OUTPATIENT VISIT NOTE                                                   Date of Visit: 7/28/2021     Chief Complaint   Patient presents with:  ER F/U: Due to eye swollen and itchy            History of Present Illness   Adam Talamantes is a 81 year old male with  by phone and PCA in for swollen, itchy eyes for the last two days  Was seen in the ER two days ago and given benadryl. Itchiness has improved.  Still having some swelling and erythema.  He is not having any problems with his vision.  He is taking Benadryl 25 mg twice a day.  He does have Claritin at home that he is taking once a day.  No other symptoms.  He was using some powder on his face but has stopped using this powder.       MEDICATIONS   Current Outpatient Medications   Medication     acetaminophen (TYLENOL) 500 MG tablet     allopurinoL (ZYLOPRIM) 100 MG tablet     amLODIPine (NORVASC) 5 MG tablet     colchicine 0.6 mg tablet     diphenhydrAMINE (BENADRYL) 25 MG tablet      mg capsule     DULoxetine (CYMBALTA) 20 MG capsule     gabapentin (NEURONTIN) 300 MG capsule     HYDROcodone-acetaminophen 5-325 mg per tablet     isosorbide mononitrate (IMDUR) 30 MG 24 hr tablet     JANUVIA 25 mg tablet     loratadine (CLARITIN) 10 mg tablet     losartan (COZAAR) 50 MG tablet     magnesium oxide (MAG-OX) 400 mg (241.3 mg magnesium) tablet     meclizine (ANTIVERT) 12.5 mg tablet     metoprolol succinate (TOPROL-XL) 50 MG 24 hr tablet     naphazoline-pheniramine (NAPHCON-A) 0.025-0.3 % ophthalmic solution     nitroglycerin (NITROSTAT) 0.4 MG SL tablet     omeprazole (PRILOSEC) 20 MG capsule     rosuvastatin (CRESTOR) 20 MG tablet     SITagliptin (JANUVIA) 50 MG tablet     aspirin (ASPIR-LOW) 81 MG EC tablet     EUCALYPTUS OIL/MENTHOL (MENTHOL EUCALYPTUS MM)     lidocaine (XYLOCAINE) 5 % ointment     multivitamin with minerals (THERA-M) 9 mg iron-400 mcg Tab tablet     triamcinolone (KENALOG) 0.1 % cream     No current facility-administered  "medications for this visit.         SOCIAL HISTORY   Social History     Tobacco Use     Smoking status: Former Smoker     Quit date: 3/10/2000     Years since quittin.3     Smokeless tobacco: Former User     Tobacco comment: no passive exposure   Substance Use Topics     Alcohol use: No           Physical Exam   Vitals:    21 1020   BP: 126/80   BP Location: Right arm   Pulse: 111   Resp: 20   Temp: 97.8  F (36.6  C)   TempSrc: Oral   SpO2: 98%   Weight: 70.8 kg (156 lb)   Height: 1.562 m (5' 1.5\")        General: No acute distress.  Eyes: Laterally sclera is clear.  Pupils are equal and round.  He has some mild swelling around the eyes and extending onto the cheeks with some mild erythema.         Assessment and Plan   Allergic conjunctivitis, bilateral  Improving on benadryl  Can increase to 25 mg every 6 hours.  Stop the claritin while taking benadryl.  Recommended cool compresses but he thinks that makes the itching worse so recommended cool compresses followed by warm compresses.  Don't use the powder any more.    Recheck if not improving or gets worse.  - diphenhydrAMINE (BENADRYL) 25 MG tablet  Dispense: 80 tablet; Refill: 1                   Discussed signs / symptoms that warrant urgent / emergent medical attention.     Recheck if worsening or not improving.       Theodore Merida MD          Pertinent History     The following portions of the patient's history were reviewed and updated as appropriate: allergies, current medications, past family history, past medical history, past social history, past surgical history and problem list.         "

## 2021-08-03 ENCOUNTER — OFFICE VISIT (OUTPATIENT)
Dept: FAMILY MEDICINE | Facility: CLINIC | Age: 81
End: 2021-08-03
Payer: COMMERCIAL

## 2021-08-03 VITALS
HEIGHT: 62 IN | OXYGEN SATURATION: 95 % | WEIGHT: 156 LBS | DIASTOLIC BLOOD PRESSURE: 70 MMHG | RESPIRATION RATE: 15 BRPM | BODY MASS INDEX: 28.71 KG/M2 | SYSTOLIC BLOOD PRESSURE: 116 MMHG | HEART RATE: 80 BPM | TEMPERATURE: 98 F

## 2021-08-03 DIAGNOSIS — N18.30 TYPE 2 DIABETES MELLITUS WITH STAGE 3 CHRONIC KIDNEY DISEASE, WITHOUT LONG-TERM CURRENT USE OF INSULIN, UNSPECIFIED WHETHER STAGE 3A OR 3B CKD (H): Primary | ICD-10-CM

## 2021-08-03 DIAGNOSIS — M51.379 DEGENERATION OF LUMBAR OR LUMBOSACRAL INTERVERTEBRAL DISC: ICD-10-CM

## 2021-08-03 DIAGNOSIS — M25.50 POLYARTHRALGIA: ICD-10-CM

## 2021-08-03 DIAGNOSIS — N18.30 STAGE 3 CHRONIC KIDNEY DISEASE, UNSPECIFIED WHETHER STAGE 3A OR 3B CKD (H): ICD-10-CM

## 2021-08-03 DIAGNOSIS — E11.22 TYPE 2 DIABETES MELLITUS WITH STAGE 3 CHRONIC KIDNEY DISEASE, WITHOUT LONG-TERM CURRENT USE OF INSULIN, UNSPECIFIED WHETHER STAGE 3A OR 3B CKD (H): Primary | ICD-10-CM

## 2021-08-03 PROBLEM — A41.9 SEPSIS (H): Status: RESOLVED | Noted: 2017-11-24 | Resolved: 2018-09-10

## 2021-08-03 LAB
HBA1C MFR BLD: 7.5 % (ref 0–5.6)
HOLD SPECIMEN: NORMAL

## 2021-08-03 PROCEDURE — 99214 OFFICE O/P EST MOD 30 MIN: CPT | Performed by: FAMILY MEDICINE

## 2021-08-03 PROCEDURE — 83036 HEMOGLOBIN GLYCOSYLATED A1C: CPT | Performed by: FAMILY MEDICINE

## 2021-08-03 PROCEDURE — 36415 COLL VENOUS BLD VENIPUNCTURE: CPT | Performed by: FAMILY MEDICINE

## 2021-08-03 RX ORDER — HYDROCODONE BITARTRATE AND ACETAMINOPHEN 5; 325 MG/1; MG/1
TABLET ORAL
Qty: 30 TABLET | Refills: 0 | Status: SHIPPED | OUTPATIENT
Start: 2021-08-03 | End: 2021-09-01

## 2021-08-03 ASSESSMENT — MIFFLIN-ST. JEOR: SCORE: 1293.92

## 2021-08-03 NOTE — LETTER
Opioid / Opioid Plus Controlled Substance Agreement    This is an agreement between you and your provider about the safe and appropriate use of controlled substance/opioids prescribed by your care team. Controlled substances are medicines that can cause physical and mental dependence (abuse).    There are strict laws about having and using these medicines. We here at [facility] are committing to working with you in your efforts to get better. To support you in this work, we ll help you schedule regular office appointments for medicine refills. If we must cancel or change your appointment for any reason, we ll make sure you have enough medicine to last until your next appointment.     As a Provider, I will:    Listen carefully to your concerns and treat you with respect.     Recommend a treatment plan that I believe is in your best interest. This plan may involve therapies other than opioid pain medication.     Talk with you often about the possible benefits, and the risk of harm of any medicine that we prescribe for you.     Provide a plan on how to taper (discontinue or go off) using this medicine if the decision is made to stop its use.    As a Patient, I understand that opioid(s):     Are a controlled substance prescribed by my care team to help me function or work and manage my condition(s).     Are strong medicines and can cause serious side effects such as:    Drowsiness, which can seriously affect my driving ability    A lower breathing rate, enough to cause death    Harm to my thinking ability     Depression     Abuse of and addiction to this medicine    Need to be taken exactly as prescribed. Combining opioids with certain medicines or chemicals (such as illegal drugs, sedatives, sleeping pills, and benzodiazepines) can be dangerous or even fatal. If I stop opioids suddenly, I may have severe withdrawal symptoms.    Do not work for all types of pain nor for all patients. If they re not helpful, I may  be asked to stop them.    {Benzo / Stimulant (Optional):081419}    The risks, benefits and side effects of these medicine(s) were explained to me. I agree that:  1. I will take part in other treatments as advised by my care team. This may be psychiatry or counseling, physical therapy, behavioral therapy, group treatment or a referral to a specialist.     2. I will keep all my appointments. I understand that this is part of the monitoring of opioids. My care team may require an office visit for EVERY opioid/controlled substance refill. If I miss appointments or don t follow instructions, my care team may stop my medicine.    3. I will take my medicines as prescribed. I will not change the dose or schedule unless my care team tells me to. There will be no refills if I run out early.     4. I may be asked to come to the clinic and complete a urine drug test or complete a pill count at any time. If I don t give a urine sample or participate in a pill count, the care team may stop my medicine.    5. I will only receive prescriptions from this clinic for chronic pain. If I am treated by another provider for acute pain issues, I will tell them that I am taking opioid pain medication for chronic pain and that I have a treatment agreement with this provider. I will inform my [facility] care team within one business day if I am given a prescription for any pain medication by another healthcare provider. My [facility] care team can contact other providers and pharmacists about my use of any medicines.    6. It is up to me to make sure that I don t run out of my medicines on weekends or holidays. If my care team is willing to refill my opioid prescription without a visit, I must request refills only during office hours. Refills may take up to 3 business days to process. I will use one pharmacy to fill all my opioid and other controlled substance prescriptions. I will notify the clinic about any changes to my  insurance or medication availability.    7. I am responsible for my prescriptions. If the medicine/prescription is lost, stolen or destroyed, it will not be replaced. I also agree not to share controlled substance medicines with anyone.    8. I am aware I should not use any illegal or recreational drugs. I agree not to drink alcohol unless my care team says I can.       9. If I enroll in the [place] [facility] program, I will tell my care team prior to my next refill.     10. I will tell my care team right away if I become pregnant, have a new medical problem treated outside of my regular clinic, or have a change in my medications.    11. I understand that this medicine can affect my thinking, judgment and reaction time. Alcohol and drugs affect the brain and body, which can affect the safety of my driving. Being under the influence of alcohol or drugs can affect my decision-making, behaviors, personal safety, and the safety of others. Driving while impaired (DWI) can occur if a person is driving, operating, or in physical control of a car, motorcycle, boat, snowmobile, ATV, motorbike, off-road vehicle, or any other motor vehicle ([place] Statute 169A.20). I understand the risk if I choose to drive or operate any vehicle or machinery.    I understand that if I do not follow any of the conditions above, my prescriptions or treatment may be stopped or changed.          Opioids  What You Need to Know    What are opioids?   Opioids are pain medicines that must be prescribed by a doctor. They are also known as narcotics.     Examples are:   1. morphine (MS Contin, Laura)  2. oxycodone (Oxycontin)  3. oxycodone and acetaminophen (Percocet)  4. hydrocodone and acetaminophen (Vicodin, Norco)   5. fentanyl patch (Duragesic)   6. hydromorphone (Dilaudid)   7. methadone  8. codeine (Tylenol #3)     What do opioids do well?   Opioids are best for severe short-term pain such as after a surgery or injury. They may work well  for cancer pain. They may help some people with long-lasting (chronic) pain.     What do opioids NOT do well?   Opioids never get rid of pain entirely, and they don t work well for most patients with chronic pain. Opioids don t reduce swelling, one of the causes of pain.                                    Other ways to manage chronic pain and improve function include:       Treat the health problem that may be causing pain    Anti-inflammation medicines, which reduce swelling and tenderness, such as ibuprofen (Advil, Motrin) or naproxen (Aleve)    Acetaminophen (Tylenol)    Antidepressants and anti-seizure medicines, especially for nerve pain    Topical treatments such as patches or creams    Injections or nerve blocks    Chiropractic or osteopathic treatment    Acupuncture, massage, deep breathing, meditation, visual imagery, aromatherapy    Use heat or ice at the pain site    Physical therapy     Exercise    Stop smoking    Take part in therapy       Risks and side effects     Talk to your doctor before you start or decide to keep taking opioids. Possible side effects include:      Lowering your breathing rate enough to cause death    Overdose, including death, especially if taking higher than prescribed doses    Worse depression symptoms; less pleasure in things you usually enjoy    Feeling tired or sluggish    Slower thoughts or cloudy thinking    Being more sensitive to pain over time; pain is harder to control    Trouble sleeping or restless sleep    Changes in hormone levels (for example, less testosterone)    Changes in sex drive or ability to have sex    Constipation    Unsafe driving    Itching and sweating    Dizziness    Nausea, throwing up and dry mouth    What else should I know about opioids?    Opioids may lead to dependence, tolerance, or addiction.      Dependence means that if you stop or reduce the medicine too quickly, you will have withdrawal symptoms. These include loose poop (diarrhea),  jitters, flu-like symptoms, nervousness and tremors. Dependence is not the same as addiction.                       Tolerance means needing higher doses over time to get the same effect. This may increase the chance of serious side effects.      Addiction is when people improperly use a substance that harms their body, their mind or their relations with others. Use of opiates can cause a relapse of addiction if you have a history of drug or alcohol abuse.      People who have used opioids for a long time may have a lower quality of life, worse depression, higher levels of pain and more visits to doctors.    You can overdose on opioids. Take these steps to lower your risk of overdose:    1. Recognize the signs:  Signs of overdose include decrease or loss of consciousness (blackout), slowed breathing, trouble waking up and blue lips. If someone is worried about overdose, they should call 911.    2. Talk to your doctor about Narcan (naloxone).   If you are at risk for overdose, you may be given a prescription for Narcan. This medicine very quickly reverses the effects of opioids.   If you overdose, a friend or family member can give you Narcan while waiting for the ambulance. They need to know the signs of overdose and how to give Narcan.     3. Don't use alcohol or street drugs.   Taking them with opioids can cause death.    4. Do not take any of these medicines unless your doctor says it s OK. Taking these with opioids can cause death:    Benzodiazepines, such as lorazepam (Ativan), alprazolam (Xanax) or diazepam (Valium)    Muscle relaxers, such as cyclobenzaprine (Flexeril)    Sleeping pills like zolpidem (Ambien)     Other opioids      How to keep you and other people safe while taking opioids:    1. Never share your opioids with others.  Opioid medicines are regulated by the Drug Enforcement Agency (GINA). Selling or sharing medications is a criminal act.    2. Be sure to store opioids in a secure place, locked up  if possible. Young children can easily swallow them and overdose.    3. When you are traveling with your medicines, keep them in the original bottles. If you use a pill box, be sure you also carry a copy of your medicine list from your clinic or pharmacy.    4. Safe disposal of opioids    Most pharmacies have places to get rid of medicine, called disposal kiosks. Medicine disposal options are also available in every [facility]. Search your county and  medication disposal  to find more options. You can find more details at:  [email or web address]://[email or web address]-[email or web address]     I agree that my provider, clinic care team, and pharmacy may work with any city, state or federal law enforcement agency that investigates the misuse, sale, or other diversion of my controlled medicine. I will allow my provider to discuss my care with, or share a copy of, this agreement with any other treating provider, pharmacy or emergency room where I receive care.    I have read this agreement and have asked questions about anything I did not understand.    _______________________________________________________  Patient Signature - Adam Talamantes _____________________                   Date     _______________________________________________________  Provider Signature - Chucho Espino MD   _____________________                   Date     _______________________________________________________  Witness Signature (required if provider not present while patient signing)   _____________________                   Date

## 2021-08-03 NOTE — PROGRESS NOTES
ASSESMENT AND PLAN:  Diagnoses and all orders for this visit:  Diabetes mellitus, type 2 (H)  -     Hemoglobin A1c; Future done today shows improving control now in the acceptable range.  Continue current treatment plan.  -     Extra Tube; Future    Polyarthralgia  Degeneration of lumbar or lumbosacral intervertebral disc  Patient does use hydrocodone intermittently for the most severe breakthrough pain.  He would like a refill of that medication.  Reviewed PDMP-no concerns or irregularities.  -     HYDROcodone-acetaminophen (NORCO) 5-325 MG tablet; [HYDROCODONE-ACETAMINOPHEN 5-325 MG PER TABLET] TAKE 1-2 TABLETS BY MOUTH AT BEDTIME AS NEEDED FOR chronic PAIN counseling done in detail with the patient on the risks and benefits of this medication and he can signed a controlled substance agreement today.  See that document for details.  He will also continue to follow-up with rheumatology.    Stage 3 chronic kidney disease, unspecified whether stage 3a or 3b CKD  -     Basic metabolic panel  (Ca, Cl, CO2, Creat, Gluc, K, Na, BUN); Future      Reviewed the risks and benefits of the treatment plan with the patient and/or caregiver and we discussed indications for routine and emergent follow-up.        SUBJECTIVE: 81-year-old male comes in today for follow-up on several issues.  He had been in recently for swelling and itching around the eyes and face.  This has now improved completely with the prescribed diphenhydramine.  Patient has a history of type 2 diabetes and is due for his A1c.  Blood sugar control has been good.  No issues or problems with any of his medications.  Patient reports ongoing chronic issues with multisite joint pain.  He gets pain in his upper extremities, back, lower extremities.  Pain has been stable.  He does occasionally have pain that is severe and requires hydrocodone for relief, he has been stable in his hydrocodone use, averaging 1 pill/day or less over this past year.    Past Medical  History:   Diagnosis Date     Acute blood loss anemia      Acute renal failure (H)      Anemia      Bacteremia      Cataract      Chronic gout      CKD (chronic kidney disease)     Stage 3     DM2 (diabetes mellitus, type 2) (H)      GERD (gastroesophageal reflux disease)      Glucose intolerance (impaired glucose tolerance)      Gout      History of nephrolithiasis      History of prostatitis 2017     Hyperlipidemia      Hypertension      Insomnia      Intestinal disaccharidase deficiencies and disaccharide malabsorption     Created by Conversion      Latent tuberculosis      Nephrolithiasis      Neuropathy      Nonspecific abnormal findings on radiological and other examination of skull and head     Created by [facility] Annotation: 2013 11:23PM - Giulia Meridai/10/2011:  severe stenosis both posterior cerebral arteries seen MRI/MRA done for  vertigo. No acute changes.e*     Osteoarthritis     wrist, knee, shoulder     Prostatitis      Rectal bleed      Trigger thumb      Patient Active Problem List   Diagnosis     Esophageal reflux     Dyslipidemia     Nephrolithiasis     Pseudogout     Latent Tuberculosis     Generalized Osteoarthritis Of The Hand     Lumbar Disc Degeneration     Insomnia     Chronic Gout     CKD (chronic kidney disease) stage 3, GFR 30-59 ml/min     Elevated PSA     Prostate nodule     Cataracts, bilateral     Constipation, unspecified constipation type     Bilateral chronic knee pain     Chronic right shoulder pain     Essential hypertension with goal blood pressure less than 140/90     BPH (benign prostatic hyperplasia)     Chronic gout     Coronary artery disease due to lipid rich plaque     Right lower quadrant abdominal pain     Colitis     Primary osteoarthritis involving multiple joints     Urinary incontinence     Type 2 diabetes mellitus with stage 3 chronic kidney disease, without long-term current use of insulin (H)     ACE-inhibitor cough     Current  Outpatient Medications   Medication Sig Dispense Refill     acetaminophen (TYLENOL) 500 MG tablet [ACETAMINOPHEN (TYLENOL) 500 MG TABLET] Take 1 tablet (500 mg total) by mouth every 6 (six) hours as needed. 120 tablet 6     allopurinoL (ZYLOPRIM) 100 MG tablet [ALLOPURINOL (ZYLOPRIM) 100 MG TABLET] TAKE 2 TABLETS BY MOUTH EVERY  tablet 7     amLODIPine (NORVASC) 5 MG tablet [AMLODIPINE (NORVASC) 5 MG TABLET] TAKE 1 TABLET(5 MG) BY MOUTH DAILY 90 tablet 2     aspirin (ASPIR-LOW) 81 MG EC tablet [ASPIRIN (ASPIR-LOW) 81 MG EC TABLET] Take 1 tablet (81 mg total) by mouth daily. 90 tablet 1     colchicine 0.6 mg tablet [COLCHICINE 0.6 MG TABLET] TAKE 1 TABLET BY MOUTH EVERY OTHER DAY 45 tablet 1     diphenhydrAMINE (BENADRYL) 25 MG tablet Take 1 tablet (25 mg) by mouth every 6 hours as needed for itching or allergies 80 tablet 1      mg capsule [ MG CAPSULE] TAKE 1 CAPSULE BY MOUTH TWICE DAILY AS NEEDED FOR CONSTIPATION 60 capsule 11     DULoxetine (CYMBALTA) 20 MG capsule Take 1 capsule (20 mg) by mouth 2 times daily 180 capsule 0     EUCALYPTUS OIL/MENTHOL (MENTHOL EUCALYPTUS MM) [EUCALYPTUS OIL/MENTHOL (MENTHOL EUCALYPTUS MM)] 1 Inhalation into each nostril as needed (home medication from [facility]        gabapentin (NEURONTIN) 300 MG capsule [GABAPENTIN (NEURONTIN) 300 MG CAPSULE] TAKE 1 TO 2 CAPSULES BY MOUTH AT BEDTIME 180 capsule 2     HYDROcodone-acetaminophen (NORCO) 5-325 MG tablet [HYDROCODONE-ACETAMINOPHEN 5-325 MG PER TABLET] TAKE 1-2 TABLETS BY MOUTH AT BEDTIME AS NEEDED FOR chronic PAIN 30 tablet 0     isosorbide mononitrate (IMDUR) 30 MG 24 hr tablet [ISOSORBIDE MONONITRATE (IMDUR) 30 MG 24 HR TABLET] Take 1 tablet (30 mg total) by mouth daily. 90 tablet 2     JANUVIA 25 mg tablet [JANUVIA 25 MG TABLET] TAKE 1 TABLET BY MOUTH EVERY DAY 90 tablet 3     lidocaine (XYLOCAINE) 5 % ointment [LIDOCAINE (XYLOCAINE) 5 % OINTMENT] APPLY TO PAINFUL AREAS THREE TIMES DAILY AS NEEDED 30 g 0      loratadine (CLARITIN) 10 mg tablet [LORATADINE (CLARITIN) 10 MG TABLET] TAKE 1 TABLET BY MOUTH EVERY DAY 90 tablet 2     losartan (COZAAR) 50 MG tablet [LOSARTAN (COZAAR) 50 MG TABLET] Take 1 tablet (50 mg total) by mouth daily. 90 tablet 3     magnesium oxide (MAG-OX) 400 mg (241.3 mg magnesium) tablet [MAGNESIUM OXIDE (MAG-OX) 400 MG (241.3 MG MAGNESIUM) TABLET] TAKE 1 TABLET(400 MG) BY MOUTH DAILY 30 tablet 11     meclizine (ANTIVERT) 12.5 mg tablet [MECLIZINE (ANTIVERT) 12.5 MG TABLET] Take 1-2 tablets (12.5-25 mg total) by mouth every 8 (eight) hours as needed (one to two tablets). 60 tablet 1     metoprolol succinate (TOPROL-XL) 50 MG 24 hr tablet [METOPROLOL SUCCINATE (TOPROL-XL) 50 MG 24 HR TABLET] Take 1 tablet (50 mg total) by mouth daily. 90 tablet 2     multivitamin with minerals (THERA-M) 9 mg iron-400 mcg Tab tablet [MULTIVITAMIN WITH MINERALS (THERA-M) 9 MG IRON-400 MCG TAB TABLET] Take 1 tablet by mouth daily. 100 tablet 3     naphazoline-pheniramine (NAPHCON-A) 0.025-0.3 % ophthalmic solution Place 1-2 drops into both eyes 4 times daily as needed for allergies or dry eyes 5 mL 0     nitroGLYcerin (NITROSTAT) 0.4 MG sublingual tablet PLACE 1 TABLET UNDER THE TONGUE EVERY 5 MINUTES AS NEEDED FOR CHEST PAIN. 25 tablet 6     omeprazole (PRILOSEC) 20 MG capsule [OMEPRAZOLE (PRILOSEC) 20 MG CAPSULE] TAKE 1 CAPSULE BY MOUTH DAILY 90 capsule 3     rosuvastatin (CRESTOR) 20 MG tablet [ROSUVASTATIN (CRESTOR) 20 MG TABLET] Take 1 tablet (20 mg total) by mouth at bedtime. 90 tablet 3     SITagliptin (JANUVIA) 50 MG tablet [SITAGLIPTIN (JANUVIA) 50 MG TABLET] Take 1 tablet (50 mg total) by mouth daily. 90 tablet 3     triamcinolone (KENALOG) 0.1 % cream [TRIAMCINOLONE (KENALOG) 0.1 % CREAM] Apply topically 2 (two) times a day. 80 g 1     History   Smoking Status     Former Smoker     Quit date: 3/10/2000   Smokeless Tobacco     Former User     Comment: no passive exposure       OBJECTICE: /70   Pulse 80   " Temp 98  F (36.7  C) (Oral)   Resp 15   Ht 1.562 m (5' 1.5\")   Wt 70.8 kg (156 lb)   SpO2 95%   BMI 29.00 kg/m       Recent Results (from the past 24 hour(s))   Hemoglobin A1c    Collection Time: 08/03/21 10:18 AM   Result Value Ref Range    Hemoglobin A1C 7.5 (H) 0.0 - 5.6 %   Extra Red Top Tube    Collection Time: 08/03/21 10:18 AM   Result Value Ref Range    Hold Specimen JIC         Eyes-normal.     CV-regular rate and rhythm   RESP-lungs clear to auscultation   Foot exam-normal.  No ulcers.  Palpable pedal pulses bilaterally.    Chucho Espino MD   "

## 2021-08-06 NOTE — PATIENT INSTRUCTIONS - HE
Patient Instructions by Chucho Espino MD at 9/16/2019 10:00 AM     Author: Chucho Espino MD Service: -- Author Type: Physician    Filed: 9/16/2019 10:37 AM Encounter Date: 9/16/2019 Status: Signed    : Chucho Espino MD (Physician)         Patient Education     Your Health Risk Assessment indicates you feel you are not in good physical health.    A healthy lifestyle helps keep the body fit and the mind alert. It helps protect you from disease, helps you fight disease, and helps prevent chronic disease (disease that doesn't go away) from getting worse. This is important as you get older and begin to notice twinges in muscles and joints and a decline in the strength and stamina you once took for granted. A healthy lifestyle includes good healthcare, good nutrition, weight control, recreation, and regular exercise. Avoid harmful substances and do what you can to keep safe. Another part of a healthy lifestyle is stay mentally active and socially involved.    Good healthcare     Have a wellness visit every year.     If you have new symptoms, let us know right away. Don't wait until the next checkup.     Take medicines exactly as prescribed and keep your medicines in a safe place. Tell us if your medicine causes problems.   Healthy diet and weight control     Eat 3 or 4 small, nutritious, low-fat, high-fiber meals a day. Include a variety of fruits, vegetables, and whole-grain foods.     Make sure you get enough calcium in your diet. Calcium, vitamin D, and exercise help prevent osteoporosis (bone thinning).     If you live alone, try eating with others when you can. That way you get a good meal and have company while you eat it.     Try to keep a healthy weight. If you eat more calories than your body uses for energy, it will be stored as fat and you will gain weight.     Recreation   Recreation is not limited to sports and team events. It includes any activity that provides relaxation, interest, enjoyment,  and exercise. Recreation provides an outlet for physical, mental, and social energy. It can give a sense of worth and achievement. It can help you stay healthy.       Patient Education   Activities of Daily Living  Your Health Risk Assessment indicates you have difficulties with activities of daily living such as eating, getting dressed, grooming, bathing, walking, or using the toilet. Please make a follow up appointment for us to address this issue in more detail.     Patient Education   Instrumental Activities of Daily Living  Your Health Risk Assessment indicates you have difficulties with instrumental activities of daily living which include laundry, housekeeping, banking, shopping, using the telephone, food preparation, transportation, or taking your own medications. Please make a follow up appointment for us to address this issue in more detail.    [employer] has resources available on the following website: https://[email or web address]. com.org/caregivers.html     Also, here is a local agency that provides help with meals and other assistance:   [facility] Line: [phone]     Patient Education   Your Health Risk Assessment indicates you feel you are not in good emotional health.    Recreation   Recreation is not limited to sports and team events. It includes any activity that provides relaxation, interest, enjoyment, and exercise. Recreation provides an outlet for physical, mental, and social energy. It can give a sense of worth and achievement. It can help you stay healthy.    Mental Exercise and Social Involvement  Mental and emotional health is as important as physical health. Keep in touch with friends and family. Stay as active as possible. Continue to learn and challenge yourself.   Things you can do to stay mentally active are:    Learn something new, like a foreign language or musical instrument.     Play SCRABBLE or do crossword puzzles. If you cannot find people to play these games with you at home, you  can play them with others on your computer through the Internet.     Join a games club--anything from card games to chess or checkers or lawn bowling.     Start a new hobby.     Go back to school.     Volunteer.     Read.     Keep up with world events.       Patient Education   Preventing Falls in the Home  As you get older, falls are more likely. Thats because your reaction time slows. Your muscles and joints may also get stiffer, making them less flexible. Illness, medications, and vision changes can also affect your balance. A fall could leave you unable to live on your own. To make your home safer, follow these tips:    Floors    Put nonskid pads under area rugs.    Remove throw rugs.    Replace worn floor coverings.    Tack carpets firmly to each step on carpeted stairs. Put nonskid strips on the edges of uncarpeted stairs.    Keep floors and stairs free of clutter and cords.    Arrange furniture so there are clear pathways.    Clean up any spills right away.    Bathrooms    Install grab bars in the tub or shower.    Apply nonskid strips or put a nonskid rubber mat in the tub or shower.    Sit on a bath chair to bathe.    Use bathmats with nonskid backing.    Lighting    Keep a flashlight in each room.    Put a nightlight along the pathway between the bedroom and the bathroom.    [id number] [employer]. [address] [place] [address] 17777. All rights reserved. This information is not intended as a substitute for professional medical care. Always follow your healthcare professional's instructions.           Advance Directive  Patients advance directive was discussed and I am comfortable with the patients wishes.  Patient Education   Personalized Prevention Plan  You are due for the preventive services outlined below.  Your care team is available to assist you in scheduling these services.  If you have already completed any of these items, please share that information with your care team to  update in your medical record.  Health Maintenance   Topic Date Due   ? DIABETES OPHTHALMOLOGY EXAM  03/15/1950   ? MEDICARE ANNUAL WELLNESS VISIT  03/15/2007   ? ZOSTER VACCINES (2 of 3) 12/21/2007   ? DIABETES FOLLOW-UP  06/04/2019   ? INFLUENZA VACCINE RULE BASED (1) 08/01/2019   ? DIABETES HEMOGLOBIN A1C  11/14/2019   ? DIABETES FOOT EXAM  12/04/2019   ? FALL RISK ASSESSMENT  03/04/2020   ? DIABETES URINE MICROALBUMIN  05/14/2020   ? ADVANCE CARE PLANNING  04/19/2022   ? TD 18+ HE  01/02/2029   ? PNEUMOCOCCAL POLYSACCHARIDE VACCINE AGE 65 AND OVER  Completed   ? PNEUMOCOCCAL CONJUGATE VACCINE FOR ADULTS (PCV13 OR PREVNAR)  Completed

## 2021-08-09 LAB
ANION GAP SERPL CALCULATED.3IONS-SCNC: 11 MMOL/L (ref 5–18)
BUN SERPL-MCNC: 23 MG/DL (ref 8–28)
CALCIUM SERPL-MCNC: 10.4 MG/DL (ref 8.5–10.5)
CHLORIDE BLD-SCNC: 100 MMOL/L (ref 98–107)
CO2 SERPL-SCNC: 26 MMOL/L (ref 22–31)
CREAT SERPL-MCNC: 1.59 MG/DL (ref 0.7–1.3)
GFR SERPL CREATININE-BSD FRML MDRD: 41 ML/MIN/1.73M2
GLUCOSE BLD-MCNC: 183 MG/DL (ref 70–125)
POTASSIUM BLD-SCNC: 5.3 MMOL/L (ref 3.5–5)
SODIUM SERPL-SCNC: 137 MMOL/L (ref 136–145)

## 2021-08-09 PROCEDURE — 80048 BASIC METABOLIC PNL TOTAL CA: CPT | Performed by: FAMILY MEDICINE

## 2021-08-09 PROCEDURE — 36415 COLL VENOUS BLD VENIPUNCTURE: CPT | Performed by: FAMILY MEDICINE

## 2021-08-31 DIAGNOSIS — M25.50 POLYARTHRALGIA: ICD-10-CM

## 2021-08-31 DIAGNOSIS — M51.379 DEGENERATION OF LUMBAR OR LUMBOSACRAL INTERVERTEBRAL DISC: ICD-10-CM

## 2021-09-01 RX ORDER — HYDROCODONE BITARTRATE AND ACETAMINOPHEN 5; 325 MG/1; MG/1
TABLET ORAL
Qty: 30 TABLET | Refills: 0 | Status: SHIPPED | OUTPATIENT
Start: 2021-09-01 | End: 2021-09-30

## 2021-09-01 NOTE — TELEPHONE ENCOUNTER
Clinic Action Needed: Yes.     Reason for Call: Patient's daughter Kapoh calling requesting refill for HYDROcodone-acetaminophen (NORCO) 5-325 MG tablet..    States patient does not have any medication left.      Routed to: [facility] [place] [[id number]]    Juan Cannon RN  [facility] Nurse Advisors

## 2021-09-01 NOTE — TELEPHONE ENCOUNTER
Routing refill request to provider for review/approval because:  Drug not on the [facility] refill protocol   Controlled substance    Last Written Prescription Date:  08/03/2021  Last Fill Quantity: 30,  # refills: 0   Last office visit provider:  Dr. Espino on 08/03/2021      Requested Prescriptions   Pending Prescriptions Disp Refills     HYDROcodone-acetaminophen (NORCO) 5-325 MG tablet 30 tablet 0     Sig: [HYDROCODONE-ACETAMINOPHEN 5-325 MG PER TABLET] TAKE 1-2 TABLETS BY MOUTH AT BEDTIME AS NEEDED FOR chronic PAIN       There is no refill protocol information for this order          Misty Hernandez RN 09/01/21 9:44 AM

## 2021-09-21 ENCOUNTER — PATIENT OUTREACH (OUTPATIENT)
Dept: GERIATRIC MEDICINE | Facility: CLINIC | Age: 81
End: 2021-09-21

## 2021-09-21 NOTE — PROGRESS NOTES
[facility] Care Coordination Contact      [facility] Six-Month Telephone Assessment    6 month telephone assessment completed on 9/21/2021.    ER visits: No  Hospitalizations: No  TCU stays: No  Significant health status changes: No significant health changes.Office visit completed in August with PCP. COVID vaccination received.   Falls/Injuries: No falls reported.   ADL/IADL changes: No  Changes in services: No changes in services. Will continue to receive 4 hours of PCA daily along with monthly delivery of pull-ups and chux.     Caregiver Assessment follow up:  N/A    Goals: See POC in chart for goal progress documentation.      Will see member in 6 months for an annual health risk assessment.   Encouraged member to call CC with any questions or concerns in the meantime.     RICKEY Gallegos  [facility]  [phone]

## 2021-09-30 DIAGNOSIS — M51.379 DEGENERATION OF LUMBAR OR LUMBOSACRAL INTERVERTEBRAL DISC: ICD-10-CM

## 2021-09-30 DIAGNOSIS — M25.50 POLYARTHRALGIA: ICD-10-CM

## 2021-09-30 NOTE — TELEPHONE ENCOUNTER
Pt daughter called in ask refill for the pain medication.      Please advise      Mickey Ashley Nurse Advisor 9/30/2021 7:02 PM

## 2021-10-01 NOTE — TELEPHONE ENCOUNTER
Routing refill request to provider for review/approval because:  Drug not on the [facility] refill protocol     Last Written Prescription Date:  9/1/21  Last Fill Quantity: 30,  # refills: 0  Last office visit provider:  8/3/21    Requested Prescriptions   Pending Prescriptions Disp Refills     HYDROcodone-acetaminophen (NORCO) 5-325 MG tablet 30 tablet 0     Sig: [HYDROCODONE-ACETAMINOPHEN 5-325 MG PER TABLET] TAKE 1-2 TABLETS BY MOUTH AT BEDTIME AS NEEDED FOR chronic PAIN       There is no refill protocol information for this order          Sandra benton RN 09/30/21 7:48 PM

## 2021-10-04 ENCOUNTER — TELEPHONE (OUTPATIENT)
Dept: FAMILY MEDICINE | Facility: CLINIC | Age: 81
End: 2021-10-04

## 2021-10-04 DIAGNOSIS — M25.50 POLYARTHRALGIA: ICD-10-CM

## 2021-10-04 DIAGNOSIS — M51.379 DEGENERATION OF LUMBAR OR LUMBOSACRAL INTERVERTEBRAL DISC: ICD-10-CM

## 2021-10-04 RX ORDER — HYDROCODONE BITARTRATE AND ACETAMINOPHEN 5; 325 MG/1; MG/1
TABLET ORAL
Qty: 30 TABLET | Refills: 0 | Status: SHIPPED | OUTPATIENT
Start: 2021-10-04 | End: 2021-10-04

## 2021-10-04 RX ORDER — HYDROCODONE BITARTRATE AND ACETAMINOPHEN 5; 325 MG/1; MG/1
TABLET ORAL
Qty: 30 TABLET | Refills: 0 | Status: SHIPPED | OUTPATIENT
Start: 2021-10-04 | End: 2021-11-02

## 2021-10-04 NOTE — TELEPHONE ENCOUNTER
Chart reviewed. Please review findings below.     Medication order for Hydrocodone already sent #30, R-0 today by PCP to [id number] - [place], [place] - [address].     He has been notified of this update with Kayce moore. Thanks.

## 2021-10-04 NOTE — TELEPHONE ENCOUNTER
Reason for Call:  Other prescription    Detailed comments: patients daughter called and would like to request that patient get medication Hydocodone today.  Patient has been out 5 days.  Would like to pickup at [facility] on file.  Please contact patient.   stefani Devries.  Thank you.    Phone Number Patient can be reached at: Cell number on file:    Telephone Information:   Mobile [phone]       Best Time: any    Can we leave a detailed message on this number? YES    Call taken on 10/4/2021 at 1:22 PM by Olivia Gonsalez

## 2021-10-07 DIAGNOSIS — M15.0 PRIMARY OSTEOARTHRITIS INVOLVING MULTIPLE JOINTS: ICD-10-CM

## 2021-10-07 RX ORDER — DULOXETIN HYDROCHLORIDE 20 MG/1
CAPSULE, DELAYED RELEASE ORAL
Qty: 60 CAPSULE | Refills: 0 | Status: SHIPPED | OUTPATIENT
Start: 2021-10-07 | End: 2021-11-09

## 2021-10-07 NOTE — CONFIDENTIAL NOTE
Please call pt and schedule f/u appointment. Pt No showed in September. Will refill once appointment is made and bridge to appointment only.

## 2021-10-29 DIAGNOSIS — I25.10 CORONARY ARTERY DISEASE DUE TO LIPID RICH PLAQUE: ICD-10-CM

## 2021-10-29 DIAGNOSIS — I25.83 CORONARY ARTERY DISEASE DUE TO LIPID RICH PLAQUE: ICD-10-CM

## 2021-10-29 DIAGNOSIS — M54.50 LUMBAGO: ICD-10-CM

## 2021-10-29 RX ORDER — METOPROLOL SUCCINATE 50 MG/1
TABLET, EXTENDED RELEASE ORAL
Qty: 90 TABLET | Refills: 2 | Status: SHIPPED | OUTPATIENT
Start: 2021-10-29 | End: 2022-02-24

## 2021-10-30 NOTE — TELEPHONE ENCOUNTER
"Routing refill request to provider for review/approval because:  Drug not on the [facility] refill protocol     Last Written Prescription Date:  2/4/21  Last Fill Quantity: 180,  # refills: 2   Last office visit provider:  8/3/21     Requested Prescriptions   Pending Prescriptions Disp Refills     gabapentin (NEURONTIN) 300 MG capsule [Pharmacy Med Name: GABAPENTIN 300MG CAPSULES] 180 capsule 2     Sig: TAKE 1 TO 2 CAPSULES BY MOUTH AT BEDTIME       There is no refill protocol information for this order          Rhonda Del Angel RN 10/29/21 9:04 PM    Last Written Prescription Date:  1/23/21  Last Fill Quantity: 90,  # refills: 2   Last office visit provider:  8/3/21     Requested Prescriptions   Pending Prescriptions Disp Refills     metoprolol succinate ER (TOPROL-XL) 50 MG 24 hr tablet [Pharmacy Med Name: METOPROLOL ER SUCCINATE 50MG TABS] 90 tablet 2     Sig: TAKE 1 TABLET(50 MG) BY MOUTH DAILY       Beta-Blockers Protocol Passed - 10/29/2021  3:59 AM        Passed - Blood pressure under 140/90 in past 12 months     BP Readings from Last 3 Encounters:   08/03/21 116/70   07/28/21 126/80   07/26/21 (!) 135/91                 Passed - Patient is age 6 or older        Passed - Recent (12 mo) or future (30 days) visit within the authorizing provider's specialty     Patient has had an office visit with the authorizing provider or a provider within the authorizing providers department within the previous 12 mos or has a future within next 30 days. See \"Patient Info\" tab in inbasket, or \"Choose Columns\" in Meds & Orders section of the refill encounter.              Passed - Medication is active on med list        Rhonda Del Angel RN 10/29/21 9:02 PM    "

## 2021-10-31 RX ORDER — GABAPENTIN 300 MG/1
CAPSULE ORAL
Qty: 180 CAPSULE | Refills: 2 | Status: SHIPPED | OUTPATIENT
Start: 2021-10-31 | End: 2022-08-05

## 2021-11-01 ENCOUNTER — TELEPHONE (OUTPATIENT)
Dept: NURSING | Facility: CLINIC | Age: 81
End: 2021-11-01

## 2021-11-01 DIAGNOSIS — M25.50 POLYARTHRALGIA: ICD-10-CM

## 2021-11-01 DIAGNOSIS — M51.379 DEGENERATION OF LUMBAR OR LUMBOSACRAL INTERVERTEBRAL DISC: ICD-10-CM

## 2021-11-01 RX ORDER — HYDROCODONE BITARTRATE AND ACETAMINOPHEN 5; 325 MG/1; MG/1
TABLET ORAL
Qty: 30 TABLET | Refills: 0 | Status: CANCELLED | OUTPATIENT
Start: 2021-11-01

## 2021-11-02 DIAGNOSIS — M51.379 DEGENERATION OF LUMBAR OR LUMBOSACRAL INTERVERTEBRAL DISC: ICD-10-CM

## 2021-11-02 DIAGNOSIS — M25.50 POLYARTHRALGIA: ICD-10-CM

## 2021-11-02 RX ORDER — HYDROCODONE BITARTRATE AND ACETAMINOPHEN 5; 325 MG/1; MG/1
TABLET ORAL
Qty: 30 TABLET | Refills: 0 | Status: SHIPPED | OUTPATIENT
Start: 2021-11-02 | End: 2021-12-01

## 2021-11-02 NOTE — TELEPHONE ENCOUNTER
Pt called, needs medication asap, since he is out, writer told caller we will try, JL is not in office the rest of the week, will have covering Provider review.

## 2021-11-02 NOTE — TELEPHONE ENCOUNTER
Routing refill request to provider for review/approval because:  Drug not on the [facility] refill protocol     Last Written Prescription Date: 10/04/2021  Last Fill Quantity: 30,  # refills: 0  Last office visit provider:  08/03/2021    Requested Prescriptions   Pending Prescriptions Disp Refills     HYDROcodone-acetaminophen (NORCO) 5-325 MG tablet 30 tablet 0     Sig: [HYDROCODONE-ACETAMINOPHEN 5-325 MG PER TABLET] TAKE 1-2 TABLETS BY MOUTH AT BEDTIME AS NEEDED FOR chronic PAIN       There is no refill protocol information for this order          Margie Gauthier RN 11/01/21 7:46 PM

## 2021-11-09 ENCOUNTER — OFFICE VISIT (OUTPATIENT)
Dept: RHEUMATOLOGY | Facility: CLINIC | Age: 81
End: 2021-11-09
Payer: COMMERCIAL

## 2021-11-09 VITALS
WEIGHT: 157.3 LBS | DIASTOLIC BLOOD PRESSURE: 80 MMHG | HEART RATE: 80 BPM | SYSTOLIC BLOOD PRESSURE: 110 MMHG | BODY MASS INDEX: 29.24 KG/M2

## 2021-11-09 DIAGNOSIS — M15.0 PRIMARY OSTEOARTHRITIS INVOLVING MULTIPLE JOINTS: ICD-10-CM

## 2021-11-09 DIAGNOSIS — M65.331 TRIGGER FINGER, RIGHT MIDDLE FINGER: Primary | ICD-10-CM

## 2021-11-09 DIAGNOSIS — N18.31 STAGE 3A CHRONIC KIDNEY DISEASE (H): ICD-10-CM

## 2021-11-09 PROCEDURE — 20550 NJX 1 TENDON SHEATH/LIGAMENT: CPT | Mod: RT | Performed by: INTERNAL MEDICINE

## 2021-11-09 PROCEDURE — 99214 OFFICE O/P EST MOD 30 MIN: CPT | Mod: 25 | Performed by: INTERNAL MEDICINE

## 2021-11-09 RX ORDER — TRIAMCINOLONE ACETONIDE 40 MG/ML
20 INJECTION, SUSPENSION INTRA-ARTICULAR; INTRAMUSCULAR ONCE
Status: COMPLETED | OUTPATIENT
Start: 2021-11-09 | End: 2021-11-09

## 2021-11-09 RX ORDER — DULOXETIN HYDROCHLORIDE 20 MG/1
CAPSULE, DELAYED RELEASE ORAL
Qty: 60 CAPSULE | Refills: 5 | Status: SHIPPED | OUTPATIENT
Start: 2021-11-09 | End: 2022-05-31

## 2021-11-09 RX ADMIN — TRIAMCINOLONE ACETONIDE 20 MG: 40 INJECTION, SUSPENSION INTRA-ARTICULAR; INTRAMUSCULAR at 10:32

## 2021-11-09 NOTE — PROGRESS NOTES
Rheumatology follow-up visit note     Adam is a 81 year old male presents today for follow-up.    Adam was seen today for recheck.    Diagnoses and all orders for this visit:    Trigger finger, right middle finger  -     triamcinolone (KENALOG-40) injection 20 mg    Primary osteoarthritis involving multiple joints  -     DULoxetine (CYMBALTA) 20 MG capsule; TAKE 1 CAPSULE(20 MG) BY MOUTH TWICE DAILY    Stage 3a chronic kidney disease (H)        This patient has exacerbation pain right hand, the background of osteoarthritis history of trigger digits, chronic renal impairment that precludes use of nonsteroidals.  He would like to proceed local injection not go for surgery, 20 mg of Kenalog injected into the flexor tendon sheath of the right middle finger.  This was done after pros and cons were reviewed via the .  Continue duloxetine.  Follow-up in 6 months.       HPI    Adam LUCAS Albin is a 81 year old male is here for  exacerbation pain right hand, the background of osteoarthritis history of trigger digits, chronic renal impairment that precludes use of nonsteroidals. This has gone on for several weeks. Pain is described as sharp. It is worse with activity.  Her symptoms are moderately severe. The symptoms are progressive.  Associated findings  do not include: swelling, rash.  There is no associated recent fall or trauma.  Over-the-counter treatment to date has been without significant relief.    Further historical information, including ROS and limitation in activities as noted in the multidimensional health assessment questionnaire scanned in the EMR and in the assessment and plan section.    DETAILED EXAMINATION  11/09/21  :    Vitals:    11/09/21 0944   BP: 110/80   Pulse: 80   Weight: 71.4 kg (157 lb 4.8 oz)     Alert oriented. Head including the face is examined for malar rash, heliotropes, scarring, lupus pernio. Eyes examined for redness such as in episcleritis/scleritis, periorbital lesions.    Neck/ Face examined for parotid gland swelling, range of motion of neck.  Left upper and lower and right upper and lower extremities examined for tenderness, swelling, warmth of the appendicular joints, range of motion, edema, rash.  Some of the important findings included: he does not have evidence of synovitis in the palpable joints of the upper extremities.  No significant deformities of the digits.  He has tender A1 nodularity on the right middle finger.  He has triggering of the digit.  None of the other digits are similarly affected.     Patient Active Problem List    Diagnosis Date Noted     ACE-inhibitor cough 2021     Priority: Medium     Type 2 diabetes mellitus with stage 3 chronic kidney disease, without long-term current use of insulin (H) 2020     Priority: Medium     Urinary incontinence 10/30/2019     Priority: Medium     Primary osteoarthritis involving multiple joints 2019     Priority: Medium     Esophageal reflux      Priority: Medium     Created by Conversion         Dyslipidemia      Priority: Medium     Created by [facility] Annotation: Dec 28 2007  3:23PM - Giulia Meridai2007:   Chol   281, HDL 39Started simvastatin 2011         CKD (chronic kidney disease) stage 3, GFR 30-59 ml/min (H)      Priority: Medium     Created by [facility] Annotation: Dec 28 2007  3:40PM - Alla Ali: Cr elevated at   1.7   since .Acute exacerbation with episode of nephrolithiasis complicated   by   urosepsis in 2009.         Constipation, unspecified constipation type      Priority: Medium     Coronary artery disease due to lipid rich plaque      Priority: Medium     Right lower quadrant abdominal pain      Priority: Medium     Colitis      Priority: Medium     Nephrolithiasis      Priority: Medium     Created by [facility] Annotation: Aug  4 2009 12:22PM - Theodore Merida:   Hospitalized   2009 with BL stent placement.  Stent removal  8/2009.Calcium Stones.CT   5/2011:Calcified plaques L Renal pelvis,L lower pole 2 nonobstructing   calculi   of 2 mm.  Calculi medial R upper pole and R lower pole.Low dense R lower   pole   cortical lesions.1/2011 seen by Urology.BL pelvocaliectasis         Chronic Gout      Priority: Medium     Created by Conversion  [facility] Annotation: Dec 28 2007  3:23PM - Bella Gold: Uric acid   elevated   to 10 since at least 2007.Multiple joints affected-phalangeal,wrists,   ankles,   knees, ? shoulders.Started allopurinol 5/2009. Stopped HCTZ for BP   5/2009Multiple tophi noted since 10/2011Started colchicine 9/2009.  Replacement Utility updated for latest IMO load         BPH (benign prostatic hyperplasia) 07/19/2017     Priority: Medium     Chronic gout 07/19/2017     Priority: Medium     Essential hypertension with goal blood pressure less than 140/90 03/21/2017     Priority: Medium     Chronic right shoulder pain 02/21/2017     Priority: Medium     Generalized Osteoarthritis Of The Hand      Priority: Medium     Created by Conversion  Replacement Utility updated for latest IMO load         Bilateral chronic knee pain 07/20/2016     Priority: Medium     Cataracts, bilateral 02/17/2016     Priority: Medium     Elevated PSA 12/21/2015     Priority: Medium     Prostate nodule 12/21/2015     Priority: Medium     Pseudogout      Priority: Medium     Created by Conversion  [facility] Annotation: Aug 14 2009  8:35PM - Chucho Espino: Ankle   aspiration   8/2009 showed calcium pyrophospate crystals but not noted if intra or   extracellular.Seen by Rhematology 9/2009         Latent Tuberculosis      Priority: Medium     Created by [facility] Annotation: Dec 28 2007  3:40PM - Bella Gold: treated 9 mo in   '06         Lumbar Disc Degeneration      Priority: Medium     Created by Conversion         Insomnia      Priority: Medium     Created by Conversion         Past Surgical History:   Procedure Laterality Date      IR MISCELLANEOUS PROCEDURE  2009     IR MISCELLANEOUS PROCEDURE  2009     IR MISCELLANEOUS PROCEDURE  2009     IR MISCELLANEOUS PROCEDURE  2009     IR NEPHROURETERAL TUBE PLACEMENT BILATERAL  2009     IR URETER DILATION BILATERAL  2009     IR URETER DILATION BILATERAL  2009     KIDNEY STONE SURGERY       PICC  3/6/2016           Past Medical History:   Diagnosis Date     Acute blood loss anemia      Acute renal failure (H)      Anemia      Bacteremia      Cataract      Chronic gout      CKD (chronic kidney disease)     Stage 3     DM2 (diabetes mellitus, type 2) (H)      GERD (gastroesophageal reflux disease)      Glucose intolerance (impaired glucose tolerance)      Gout      History of nephrolithiasis      History of prostatitis 2017     Hyperlipidemia      Hypertension      Insomnia      Intestinal disaccharidase deficiencies and disaccharide malabsorption     Created by Conversion      Latent tuberculosis      Nephrolithiasis      Neuropathy      Nonspecific abnormal findings on radiological and other examination of skull and head     Created by [facility] Annotation: 2013 11:23PM - Giulia Meridai/10/2011:  severe stenosis both posterior cerebral arteries seen MRI/MRA done for  vertigo. No acute changes.e*     Osteoarthritis     wrist, knee, shoulder     Prostatitis      Rectal bleed      Trigger thumb      No Known Allergies  Current Outpatient Medications   Medication Sig Dispense Refill     acetaminophen (TYLENOL) 500 MG tablet [ACETAMINOPHEN (TYLENOL) 500 MG TABLET] Take 1 tablet (500 mg total) by mouth every 6 (six) hours as needed. 120 tablet 6     allopurinoL (ZYLOPRIM) 100 MG tablet [ALLOPURINOL (ZYLOPRIM) 100 MG TABLET] TAKE 2 TABLETS BY MOUTH EVERY  tablet 7     amLODIPine (NORVASC) 5 MG tablet [AMLODIPINE (NORVASC) 5 MG TABLET] TAKE 1 TABLET(5 MG) BY MOUTH DAILY 90 tablet 2     aspirin (ASPIR-LOW) 81 MG EC tablet [ASPIRIN (ASPIR-LOW)  81 MG EC TABLET] Take 1 tablet (81 mg total) by mouth daily. 90 tablet 1     colchicine 0.6 mg tablet [COLCHICINE 0.6 MG TABLET] TAKE 1 TABLET BY MOUTH EVERY OTHER DAY 45 tablet 1     diphenhydrAMINE (BENADRYL) 25 MG tablet Take 1 tablet (25 mg) by mouth every 6 hours as needed for itching or allergies 80 tablet 1      mg capsule [ MG CAPSULE] TAKE 1 CAPSULE BY MOUTH TWICE DAILY AS NEEDED FOR CONSTIPATION 60 capsule 11     DULoxetine (CYMBALTA) 20 MG capsule TAKE 1 CAPSULE(20 MG) BY MOUTH TWICE DAILY 60 capsule 0     EUCALYPTUS OIL/MENTHOL (MENTHOL EUCALYPTUS MM) [EUCALYPTUS OIL/MENTHOL (MENTHOL EUCALYPTUS MM)] 1 Inhalation into each nostril as needed (home medication from [facility]        gabapentin (NEURONTIN) 300 MG capsule TAKE 1 TO 2 CAPSULES BY MOUTH AT BEDTIME 180 capsule 2     HYDROcodone-acetaminophen (NORCO) 5-325 MG tablet [HYDROCODONE-ACETAMINOPHEN 5-325 MG PER TABLET] TAKE 1-2 TABLETS BY MOUTH AT BEDTIME AS NEEDED FOR chronic PAIN 30 tablet 0     isosorbide mononitrate (IMDUR) 30 MG 24 hr tablet [ISOSORBIDE MONONITRATE (IMDUR) 30 MG 24 HR TABLET] Take 1 tablet (30 mg total) by mouth daily. 90 tablet 2     JANUVIA 25 mg tablet [JANUVIA 25 MG TABLET] TAKE 1 TABLET BY MOUTH EVERY DAY 90 tablet 3     lidocaine (XYLOCAINE) 5 % ointment [LIDOCAINE (XYLOCAINE) 5 % OINTMENT] APPLY TO PAINFUL AREAS THREE TIMES DAILY AS NEEDED 30 g 0     loratadine (CLARITIN) 10 mg tablet [LORATADINE (CLARITIN) 10 MG TABLET] TAKE 1 TABLET BY MOUTH EVERY DAY 90 tablet 2     losartan (COZAAR) 50 MG tablet [LOSARTAN (COZAAR) 50 MG TABLET] Take 1 tablet (50 mg total) by mouth daily. 90 tablet 3     magnesium oxide (MAG-OX) 400 mg (241.3 mg magnesium) tablet [MAGNESIUM OXIDE (MAG-OX) 400 MG (241.3 MG MAGNESIUM) TABLET] TAKE 1 TABLET(400 MG) BY MOUTH DAILY 30 tablet 11     meclizine (ANTIVERT) 12.5 mg tablet [MECLIZINE (ANTIVERT) 12.5 MG TABLET] Take 1-2 tablets (12.5-25 mg total) by mouth every 8 (eight) hours as  needed (one to two tablets). 60 tablet 1     metoprolol succinate ER (TOPROL-XL) 50 MG 24 hr tablet TAKE 1 TABLET(50 MG) BY MOUTH DAILY 90 tablet 2     multivitamin with minerals (THERA-M) 9 mg iron-400 mcg Tab tablet [MULTIVITAMIN WITH MINERALS (THERA-M) 9 MG IRON-400 MCG TAB TABLET] Take 1 tablet by mouth daily. 100 tablet 3     naphazoline-pheniramine (NAPHCON-A) 0.025-0.3 % ophthalmic solution Place 1-2 drops into both eyes 4 times daily as needed for allergies or dry eyes 5 mL 0     nitroGLYcerin (NITROSTAT) 0.4 MG sublingual tablet PLACE 1 TABLET UNDER THE TONGUE EVERY 5 MINUTES AS NEEDED FOR CHEST PAIN. 25 tablet 6     omeprazole (PRILOSEC) 20 MG capsule [OMEPRAZOLE (PRILOSEC) 20 MG CAPSULE] TAKE 1 CAPSULE BY MOUTH DAILY 90 capsule 3     rosuvastatin (CRESTOR) 20 MG tablet [ROSUVASTATIN (CRESTOR) 20 MG TABLET] Take 1 tablet (20 mg total) by mouth at bedtime. 90 tablet 3     SITagliptin (JANUVIA) 50 MG tablet [SITAGLIPTIN (JANUVIA) 50 MG TABLET] Take 1 tablet (50 mg total) by mouth daily. 90 tablet 3     triamcinolone (KENALOG) 0.1 % cream [TRIAMCINOLONE (KENALOG) 0.1 % CREAM] Apply topically 2 (two) times a day. 80 g 1     family history includes Cerebrovascular Disease in his daughter and father.  Social Connections: Not on file          WBC   Date Value Ref Range Status   06/22/2020 7.1 4.0 - 11.0 thou/uL Final     RBC Count   Date Value Ref Range Status   06/22/2020 4.28 (L) 4.40 - 6.20 mill/uL Final     Hemoglobin   Date Value Ref Range Status   06/22/2020 13.0 (L) 14.0 - 18.0 g/dL Final     Hematocrit   Date Value Ref Range Status   06/22/2020 39.5 (L) 40.0 - 54.0 % Final     MCV   Date Value Ref Range Status   06/22/2020 92 80 - 100 fL Final     MCH   Date Value Ref Range Status   06/22/2020 30.4 27.0 - 34.0 pg Final     Platelet Count   Date Value Ref Range Status   06/22/2020 202 140 - 440 thou/uL Final     % Lymphocytes   Date Value Ref Range Status   12/27/2018 24 20 - 40 % Final     AST   Date  Value Ref Range Status   09/16/2019 24 0 - 40 U/L Final     ALT   Date Value Ref Range Status   01/23/2020 49 (H) 0 - 45 U/L Final     Albumin   Date Value Ref Range Status   01/23/2020 4.0 3.5 - 5.0 g/dL Final     Alkaline Phosphatase   Date Value Ref Range Status   09/16/2019 122 (H) 45 - 120 U/L Final     Creatinine   Date Value Ref Range Status   08/09/2021 1.59 (H) 0.70 - 1.30 mg/dL Final     GFR Estimate   Date Value Ref Range Status   08/09/2021 41 (L) >60 mL/min/1.73m2 Final     Comment:     As of July 11, 2021, eGFR is calculated by the CKD-EPI creatinine equation, without race adjustment. eGFR can be influenced by muscle mass, exercise, and diet. The reported eGFR is an estimation only and is only applicable if the renal function is stable.   06/22/2020 44 (L) >60 mL/min/1.73m2 Final     GFR Estimate If Black   Date Value Ref Range Status   06/22/2020 53 (L) >60 mL/min/1.73m2 Final

## 2021-11-23 ENCOUNTER — IMMUNIZATION (OUTPATIENT)
Dept: FAMILY MEDICINE | Facility: CLINIC | Age: 81
End: 2021-11-23
Payer: COMMERCIAL

## 2021-11-23 PROCEDURE — G0008 ADMIN INFLUENZA VIRUS VAC: HCPCS

## 2021-11-23 PROCEDURE — 90662 IIV NO PRSV INCREASED AG IM: CPT

## 2021-11-28 DIAGNOSIS — I10 ESSENTIAL HYPERTENSION WITH GOAL BLOOD PRESSURE LESS THAN 140/90: ICD-10-CM

## 2021-11-28 DIAGNOSIS — Z76.0 ENCOUNTER FOR MEDICATION REFILL: Primary | ICD-10-CM

## 2021-11-29 RX ORDER — AMLODIPINE BESYLATE 5 MG/1
TABLET ORAL
Qty: 90 TABLET | Refills: 2 | Status: SHIPPED | OUTPATIENT
Start: 2021-11-29 | End: 2022-04-11

## 2021-11-30 DIAGNOSIS — Z76.0 ENCOUNTER FOR MEDICATION REFILL: Primary | ICD-10-CM

## 2021-11-30 DIAGNOSIS — M25.50 POLYARTHRALGIA: ICD-10-CM

## 2021-11-30 DIAGNOSIS — M51.379 DEGENERATION OF LUMBAR OR LUMBOSACRAL INTERVERTEBRAL DISC: ICD-10-CM

## 2021-12-01 RX ORDER — HYDROCODONE BITARTRATE AND ACETAMINOPHEN 5; 325 MG/1; MG/1
1 TABLET ORAL DAILY PRN
Qty: 30 TABLET | Refills: 0 | Status: SHIPPED | OUTPATIENT
Start: 2021-12-01 | End: 2022-01-03

## 2021-12-01 RX ORDER — HYDROCODONE BITARTRATE AND ACETAMINOPHEN 5; 325 MG/1; MG/1
TABLET ORAL
Qty: 30 TABLET | Refills: 0 | Status: CANCELLED | OUTPATIENT
Start: 2021-12-01

## 2021-12-01 NOTE — TELEPHONE ENCOUNTER
Reason for call:Refill on HYDROcodone-acetaminophen (NORCO) 5-325 MG tablet    Phone number to reach patient:  [phone]    Best Time:  Anytime    Can we leave a detailed message on this number?  YES

## 2021-12-06 ENCOUNTER — OFFICE VISIT (OUTPATIENT)
Dept: FAMILY MEDICINE | Facility: CLINIC | Age: 81
End: 2021-12-06
Payer: COMMERCIAL

## 2021-12-06 VITALS
SYSTOLIC BLOOD PRESSURE: 118 MMHG | DIASTOLIC BLOOD PRESSURE: 64 MMHG | HEART RATE: 92 BPM | TEMPERATURE: 98.2 F | BODY MASS INDEX: 28.52 KG/M2 | WEIGHT: 155 LBS | HEIGHT: 62 IN | RESPIRATION RATE: 16 BRPM | OXYGEN SATURATION: 98 %

## 2021-12-06 DIAGNOSIS — N18.30 TYPE 2 DIABETES MELLITUS WITH STAGE 3 CHRONIC KIDNEY DISEASE, WITHOUT LONG-TERM CURRENT USE OF INSULIN, UNSPECIFIED WHETHER STAGE 3A OR 3B CKD (H): Primary | ICD-10-CM

## 2021-12-06 DIAGNOSIS — Z23 HIGH PRIORITY FOR 2019-NCOV VACCINE: ICD-10-CM

## 2021-12-06 DIAGNOSIS — E78.5 DYSLIPIDEMIA: ICD-10-CM

## 2021-12-06 DIAGNOSIS — I10 ESSENTIAL HYPERTENSION WITH GOAL BLOOD PRESSURE LESS THAN 140/90: ICD-10-CM

## 2021-12-06 DIAGNOSIS — I25.83 CORONARY ARTERY DISEASE DUE TO LIPID RICH PLAQUE: ICD-10-CM

## 2021-12-06 DIAGNOSIS — M15.0 PRIMARY OSTEOARTHRITIS INVOLVING MULTIPLE JOINTS: ICD-10-CM

## 2021-12-06 DIAGNOSIS — E11.22 TYPE 2 DIABETES MELLITUS WITH STAGE 3 CHRONIC KIDNEY DISEASE, WITHOUT LONG-TERM CURRENT USE OF INSULIN, UNSPECIFIED WHETHER STAGE 3A OR 3B CKD (H): Primary | ICD-10-CM

## 2021-12-06 DIAGNOSIS — I25.10 CORONARY ARTERY DISEASE DUE TO LIPID RICH PLAQUE: ICD-10-CM

## 2021-12-06 LAB
CHOLEST SERPL-MCNC: 171 MG/DL
FASTING STATUS PATIENT QL REPORTED: NO
HBA1C MFR BLD: 7.7 % (ref 0–5.6)
HDLC SERPL-MCNC: 36 MG/DL
LDLC SERPL CALC-MCNC: 78 MG/DL
TRIGL SERPL-MCNC: 287 MG/DL

## 2021-12-06 PROCEDURE — 99214 OFFICE O/P EST MOD 30 MIN: CPT | Performed by: FAMILY MEDICINE

## 2021-12-06 PROCEDURE — 83036 HEMOGLOBIN GLYCOSYLATED A1C: CPT | Performed by: FAMILY MEDICINE

## 2021-12-06 PROCEDURE — 0064A COVID-19,PF,MODERNA (18+ YRS BOOSTER .25ML): CPT | Performed by: FAMILY MEDICINE

## 2021-12-06 PROCEDURE — 36415 COLL VENOUS BLD VENIPUNCTURE: CPT | Performed by: FAMILY MEDICINE

## 2021-12-06 PROCEDURE — 80061 LIPID PANEL: CPT | Performed by: FAMILY MEDICINE

## 2021-12-06 PROCEDURE — 91306 COVID-19,PF,MODERNA (18+ YRS BOOSTER .25ML): CPT | Performed by: FAMILY MEDICINE

## 2021-12-06 ASSESSMENT — MIFFLIN-ST. JEOR: SCORE: 1289.39

## 2021-12-06 NOTE — PROGRESS NOTES
ASSESMENT AND PLAN:  Diagnoses and all orders for this visit:  Type 2 diabetes mellitus with stage 3 chronic kidney disease, without long-term current use of insulin, unspecified whether stage 3a or 3b CKD (H)  A1c today comes back showing acceptable control for his age at 7.7.  Continue current treatment plan and recheck with me here in the clinic in 4 months.  Primary osteoarthritis involving multiple joints  Extensive medication review and counseling done today with the patient, reviewed his plans for maintenance pain control as well as the as needed use of the hydrocodone acetaminophen only for the most severe breakthrough pain.  Dyslipidemia  -     Lipid Profile  Coronary artery disease due to lipid rich plaque  We are checking his lipids as detailed above, continue statin and beta-blocker and nitroglycerin for medical management.  He is not on aspirin because of his history of acute blood loss anemia.  Essential hypertension with goal blood pressure less than 140/90  Stable, well controlled, continue current plan.  High priority for 2019-nCoV vaccine  Immunization review and counseling done with the patient with the help of a professional .  He is agreeable with the booster vaccine as ordered below.  -     COVID-19,PF,MODERNA (18+ Yrs BOOSTER .25mL)        Reviewed the risks and benefits of the treatment plan with the patient and/or caregiver and we discussed indications for routine and emergent follow-up.        SUBJECTIVE: 81-year-old male in for follow-up on multiple chronic conditions.  He reports that his pain has been stable.  He has multijoint pain but most recently has been most bothered by pain in the back.  He has pain in the low back but it also extends up into the upper back and into the shoulder on the right side.  Patient reports fair control of his pain with his current medications and gets excellent relief with the more severe pain if he takes hydrocodone at bedtime.  He has been on  this medication for a long time and has tolerated well.  Patient brings all of his medications in with him today.  He is taking Januvia.    Past Medical History:   Diagnosis Date     Acute blood loss anemia      Acute renal failure (H)      Anemia      Bacteremia      Cataract      Chronic gout      CKD (chronic kidney disease)     Stage 3     DM2 (diabetes mellitus, type 2) (H)      GERD (gastroesophageal reflux disease)      Glucose intolerance (impaired glucose tolerance)      Gout      History of nephrolithiasis      History of prostatitis 2017     Hyperlipidemia      Hypertension      Insomnia      Intestinal disaccharidase deficiencies and disaccharide malabsorption     Created by Conversion      Latent tuberculosis      Nephrolithiasis      Neuropathy      Nonspecific abnormal findings on radiological and other examination of skull and head     Created by [facility] Annotation: 2013 11:23PM - Giulia Meridai/10/2011:  severe stenosis both posterior cerebral arteries seen MRI/MRA done for  vertigo. No acute changes.e*     Osteoarthritis     wrist, knee, shoulder     Prostatitis      Rectal bleed      Trigger thumb      Patient Active Problem List   Diagnosis     Esophageal reflux     Dyslipidemia     Nephrolithiasis     Pseudogout     Latent Tuberculosis     Generalized Osteoarthritis Of The Hand     Lumbar Disc Degeneration     Insomnia     Chronic Gout     CKD (chronic kidney disease) stage 3, GFR 30-59 ml/min (H)     Elevated PSA     Prostate nodule     Cataracts, bilateral     Constipation, unspecified constipation type     Bilateral chronic knee pain     Chronic right shoulder pain     Essential hypertension with goal blood pressure less than 140/90     BPH (benign prostatic hyperplasia)     Chronic gout     Coronary artery disease due to lipid rich plaque     Right lower quadrant abdominal pain     Colitis     Primary osteoarthritis involving multiple joints     Urinary  incontinence     Type 2 diabetes mellitus with stage 3 chronic kidney disease, without long-term current use of insulin (H)     ACE-inhibitor cough     Current Outpatient Medications   Medication Sig Dispense Refill     acetaminophen (TYLENOL) 500 MG tablet [ACETAMINOPHEN (TYLENOL) 500 MG TABLET] Take 1 tablet (500 mg total) by mouth every 6 (six) hours as needed. 120 tablet 6     allopurinoL (ZYLOPRIM) 100 MG tablet [ALLOPURINOL (ZYLOPRIM) 100 MG TABLET] TAKE 2 TABLETS BY MOUTH EVERY  tablet 7     amLODIPine (NORVASC) 5 MG tablet TAKE 1 TABLET(5 MG) BY MOUTH DAILY 90 tablet 2     colchicine 0.6 mg tablet [COLCHICINE 0.6 MG TABLET] TAKE 1 TABLET BY MOUTH EVERY OTHER DAY 45 tablet 1      mg capsule [ MG CAPSULE] TAKE 1 CAPSULE BY MOUTH TWICE DAILY AS NEEDED FOR CONSTIPATION 60 capsule 11     DULoxetine (CYMBALTA) 20 MG capsule TAKE 1 CAPSULE(20 MG) BY MOUTH TWICE DAILY 60 capsule 5     gabapentin (NEURONTIN) 300 MG capsule TAKE 1 TO 2 CAPSULES BY MOUTH AT BEDTIME 180 capsule 2     HYDROcodone-acetaminophen (NORCO) 5-325 MG tablet Take 1 tablet by mouth daily as needed for severe pain [HYDROCODONE-ACETAMINOPHEN 5-325 MG PER TABLET] TAKE 1-2 TABLETS BY MOUTH AT BEDTIME AS NEEDED FOR chronic PAIN 30 tablet 0     isosorbide mononitrate (IMDUR) 30 MG 24 hr tablet [ISOSORBIDE MONONITRATE (IMDUR) 30 MG 24 HR TABLET] Take 1 tablet (30 mg total) by mouth daily. 90 tablet 2     JANUVIA 25 mg tablet [JANUVIA 25 MG TABLET] TAKE 1 TABLET BY MOUTH EVERY DAY 90 tablet 3     loratadine (CLARITIN) 10 mg tablet [LORATADINE (CLARITIN) 10 MG TABLET] TAKE 1 TABLET BY MOUTH EVERY DAY 90 tablet 2     losartan (COZAAR) 50 MG tablet [LOSARTAN (COZAAR) 50 MG TABLET] Take 1 tablet (50 mg total) by mouth daily. 90 tablet 3     metoprolol succinate ER (TOPROL-XL) 50 MG 24 hr tablet TAKE 1 TABLET(50 MG) BY MOUTH DAILY 90 tablet 2     nitroGLYcerin (NITROSTAT) 0.4 MG sublingual tablet PLACE 1 TABLET UNDER THE TONGUE EVERY 5  "MINUTES AS NEEDED FOR CHEST PAIN. 25 tablet 6     omeprazole (PRILOSEC) 20 MG capsule [OMEPRAZOLE (PRILOSEC) 20 MG CAPSULE] TAKE 1 CAPSULE BY MOUTH DAILY 90 capsule 3     rosuvastatin (CRESTOR) 20 MG tablet [ROSUVASTATIN (CRESTOR) 20 MG TABLET] Take 1 tablet (20 mg total) by mouth at bedtime. 90 tablet 3     aspirin (ASPIR-LOW) 81 MG EC tablet [ASPIRIN (ASPIR-LOW) 81 MG EC TABLET] Take 1 tablet (81 mg total) by mouth daily. (Patient not taking: Reported on 11/9/2021) 90 tablet 1     SITagliptin (JANUVIA) 50 MG tablet [SITAGLIPTIN (JANUVIA) 50 MG TABLET] Take 1 tablet (50 mg total) by mouth daily. (Patient not taking: Reported on 11/9/2021) 90 tablet 3     History   Smoking Status     Former Smoker     Quit date: 3/10/2000   Smokeless Tobacco     Former User     Comment: no passive exposure       OBJECTICE: /64 (BP Location: Right arm, Patient Position: Sitting)   Pulse 92   Temp 98.2  F (36.8  C) (Oral)   Resp 16   Ht 1.562 m (5' 1.5\")   Wt 70.3 kg (155 lb)   SpO2 98%   BMI 28.81 kg/m       Recent Results (from the past 24 hour(s))   Hemoglobin A1c    Collection Time: 12/06/21  9:57 AM   Result Value Ref Range    Hemoglobin A1C 7.7 (H) 0.0 - 5.6 %        GEN-alert, appropriate, in no apparent distress   Musculoskeletal-some tightness of the right trapezius muscle and some pain in that area.   CV-regular rate and rhythm with no murmur   RESP-lungs clear to auscultation   EXTREM-no pitting edema of the ankles       Chucho Espino MD   "

## 2021-12-08 ENCOUNTER — OFFICE VISIT (OUTPATIENT)
Dept: PHARMACY | Facility: CLINIC | Age: 81
End: 2021-12-08
Payer: COMMERCIAL

## 2021-12-08 VITALS
SYSTOLIC BLOOD PRESSURE: 134 MMHG | DIASTOLIC BLOOD PRESSURE: 86 MMHG | OXYGEN SATURATION: 92 % | WEIGHT: 152.8 LBS | BODY MASS INDEX: 28.4 KG/M2 | HEART RATE: 113 BPM

## 2021-12-08 DIAGNOSIS — K21.9 GASTROESOPHAGEAL REFLUX DISEASE, UNSPECIFIED WHETHER ESOPHAGITIS PRESENT: ICD-10-CM

## 2021-12-08 DIAGNOSIS — M1A.00X0 CHRONIC GOUTY ARTHROPATHY: ICD-10-CM

## 2021-12-08 DIAGNOSIS — I10 ESSENTIAL HYPERTENSION WITH GOAL BLOOD PRESSURE LESS THAN 140/90: ICD-10-CM

## 2021-12-08 DIAGNOSIS — N18.30 TYPE 2 DIABETES MELLITUS WITH STAGE 3 CHRONIC KIDNEY DISEASE, WITHOUT LONG-TERM CURRENT USE OF INSULIN, UNSPECIFIED WHETHER STAGE 3A OR 3B CKD (H): Primary | ICD-10-CM

## 2021-12-08 DIAGNOSIS — I25.83 CORONARY ARTERY DISEASE DUE TO LIPID RICH PLAQUE: ICD-10-CM

## 2021-12-08 DIAGNOSIS — I25.10 CORONARY ARTERY DISEASE DUE TO LIPID RICH PLAQUE: ICD-10-CM

## 2021-12-08 DIAGNOSIS — I25.10 CORONARY ARTERY DISEASE INVOLVING NATIVE CORONARY ARTERY OF NATIVE HEART WITHOUT ANGINA PECTORIS: Primary | ICD-10-CM

## 2021-12-08 DIAGNOSIS — E78.5 DYSLIPIDEMIA: ICD-10-CM

## 2021-12-08 DIAGNOSIS — M15.0 PRIMARY OSTEOARTHRITIS INVOLVING MULTIPLE JOINTS: ICD-10-CM

## 2021-12-08 DIAGNOSIS — E11.22 TYPE 2 DIABETES MELLITUS WITH STAGE 3 CHRONIC KIDNEY DISEASE, WITHOUT LONG-TERM CURRENT USE OF INSULIN, UNSPECIFIED WHETHER STAGE 3A OR 3B CKD (H): Primary | ICD-10-CM

## 2021-12-08 PROCEDURE — 99607 MTMS BY PHARM ADDL 15 MIN: CPT | Performed by: PHARMACIST

## 2021-12-08 PROCEDURE — 99605 MTMS BY PHARM NP 15 MIN: CPT | Performed by: PHARMACIST

## 2021-12-08 NOTE — PROGRESS NOTES
Medication Therapy Management (MTM) Encounter    ASSESSMENT:                            Medication Adherence/Access: See below for considerations    1. Type 2 diabetes mellitus with stage 3 chronic kidney disease, without long-term current use of insulin, unspecified whether stage 3a or 3b CKD (H)  A1c at goal of less than 8%. Per patient report, home blood sugars have been well controlled, MTM reviewed goal blood sugars with patient today and provided on AVS handout. No changes recommended to medications today.     2. Essential hypertension with goal blood pressure less than 140/90  BP at goal of less than 140/90, though appears that he is not consistently taking either amlodipine or metoprolol succinate, though unable to confirm as his daughter is the one who helps manage his medications. MTM pharmacist recommended consistent use of metoprolol succinate today - especially with elevated pulse today and patient history of CAD, patient agreeable to plan. If confirmed that patient not taking amlodipine and BP at goal without consistent use of amlodipine in the future would recommend discontinuing therapy.     3. Coronary artery disease due to lipid rich plaque  Stable, appropriately taking aspirin, beta blocker (as discussed above), ARB, statin, isosorbide as prescribed.     4. Gastroesophageal reflux disease, unspecified whether esophagitis present  Stable on current therapy, no changes recommended.     5. Chronic Gout  Per discussion with patient today, he is inconsistently taking allopurinol. Reviewed with patient today that he should be taking 2 tablets every day, he verbalizes understanding and will make this change starting today.     6. Primary osteoarthritis involving multiple joints  Patient has not consistently been taking his duloxetine as prescribed, has only been taking as needed, which may explain his bouts of dizziness. Reviewed with patient that this medication works best when taken twice daily as  prescribed, patient verbalized understanding. Recommended further discussion with patients rheumatologist if pain persists despite consistent use of duloxetine and other therapies.     PLAN:                            1. Medication adherence discrepancies identified today: Patient to take metoprolol succinate 50 mg daily, allopurinol 200 mg daily, and duloxetine 20 mg twice daily as prescribed (not as needed)    Follow-up: Return in about 4 months (around 4/11/2022) for PCP.  PCP 4/11, Rheum 5/10  SUBJECTIVE/OBJECTIVE:                          Adam Talamantes is a 81 year old male coming in for an initial visit. He was referred to me from Chucho Espino. Patient was accompanied by PCA.  (ID# Chucho, FV) was used during today's visit.      Reason for visit: MTM initial visit (no referral recorded).    Allergies/ADRs: Reviewed in chart  Past Medical History: Reviewed in chart  Tobacco: He reports that he quit smoking about 21 years ago. He has quit using smokeless tobacco.  Alcohol: none    Medication Adherence/Access: Patient has medications managed by his daughter. His daughter helps by giving him the medications that he needs twice daily.     Type 2 Diabetes:  Currently taking Januvia 50 mg daily. Patient is not experiencing side effects.  Blood sugar monitoring: About 1 times a week. Ranges (patient reported): 110-120. Patient does not bring his meter with him today, states that his daughter helps check his blood sugar at home.   Symptoms of low blood sugar? None - patient doesn't know what is considered too low.   Symptoms of high blood sugar? none  Aspirin: Taking 81mg daily and denies side effects  Statin: Yes: Rosuvastatin 20 mg daily  ACEi/ARB: Yes: Losartan 50 mg daily.   Hemoglobin A1C   Date Value Ref Range Status   12/06/2021 7.7 (H) 0.0 - 5.6 % Final     Comment:     Normal <5.7%   Prediabetes 5.7-6.4%    Diabetes 6.5% or higher     Note: Adopted from ADA consensus guidelines.   08/03/2021 7.5 (H) 0.0  - 5.6 % Final     Comment:     Normal <5.7%   Prediabetes 5.7-6.4%    Diabetes 6.5% or higher     Note: Adopted from ADA consensus guidelines.   03/29/2021 7.8 (H) <=5.6 % Final      Microalbumin Urine mg/dL   Date Value Ref Range Status   05/14/2019 5.33 (H) 0.00 - 1.99 mg/dL Final      No results found for: UCRR  LDL Cholesterol Calculated   Date Value Ref Range Status   12/06/2021 78 <=129 mg/dL Final     LDL Cholesterol Direct   Date Value Ref Range Status   11/22/2019 84 <=129 mg/dl Final     Creatinine   Date Value Ref Range Status   08/09/2021 1.59 (H) 0.70 - 1.30 mg/dL Final      Ref. Range 8/9/2021 18:18   GFR Estimate Latest Ref Range: >60 mL/min/1.73m2 41 (L)     Hypertension: Amlodipine 5 mg daily (patient isn't sure if he is taking this one every day), losartan 50 mg daily, metoprolol succinate 50 mg daily (though seems that there are many extra tablets today). Patient states that he has not taken his BP medications yet, takes them around 11am-noon.   Admits that he does have dizziness sometimes when his BP goes up but not sure how often that happens, not feeling dizzy today. Patients daughter checks his BP at home, reports its typically 120-125 fr systolic number.   BP Readings from Last 3 Encounters:   12/08/21 134/86   12/06/21 118/64   11/09/21 110/80      Pulse Readings from Last 3 Encounters:   12/08/21 113   12/06/21 92   11/09/21 80     Wt Readings from Last 3 Encounters:   12/08/21 152 lb 12.8 oz (69.3 kg)   12/06/21 155 lb (70.3 kg)   11/09/21 157 lb 4.8 oz (71.4 kg)     CAD/angina/hyperlipidemia: Losartan 50 mg daily, metoprolol succinate 50 mg daily, rosuvastatin 20 mg daily, aspirin 81 mg daily (left at home), isosorbide mononitrate ER 30 mg daily, and nitroglycerin sublingual tablets 0.4 mg as needed. Patient aware to take the nitroglycerin as needed for chest pain, doesn't remember when he last needed it.     GERD: Omeprazole 20 mg daily in the morning. Patient states that he was told to  take it in the morning, no issues with heartburn lately.     Gout: Taking Allopurinol 100-200 mg daily, colchicine 0.6 mg every other day. Patient states that if the pain is worse he will take 2 tablets of allopurinol instead of 1. Overall thinks gout pain is well controlled.     Pain: Taking gabapentin 300 mg 1-2 capsules at bedtime daily, acetaminophen 500 mg at least once-twice daily, hydrocodone/APAP 5/325 daily for lower back pain, duloxetine 20 mg daily as needed (prescribed twice daily). Patient thinks duloxetine is for gout so he has been only using as needed for gout. Prescribed by rheumatologist, Dr. Gold. Patient endorsing joint and muscle pain.   Constipation: docusate sodium 100 mg twice daily prn - states that this works for him.     Today's Vitals: /86   Pulse 113   Wt 152 lb 12.8 oz (69.3 kg)   SpO2 92%   BMI 28.40 kg/m    ----------------    I spent 45 minutes with this patient today. MAP completed today. All changes were made via collaborative practice agreement with Chucho Espino MD. A copy of the visit note was provided to the patient's primary care provider.    The patient was given a summary of these recommendations.     Pushpa Philip, PharmD, BCACP  Medication Therapy Management Pharmacist     Medication Therapy Recommendations  Coronary artery disease due to lipid rich plaque    Current Medication: metoprolol succinate ER (TOPROL-XL) 50 MG 24 hr tablet   Rationale: Does not understand instructions - Adherence - Adherence   Recommendation: Provide Education   Status: Patient Agreed - Adherence/Education

## 2021-12-08 NOTE — LETTER
"        Date: 2021    Adam Talamantes  [address] [place] [address]    Dear Mr. Talamantes,    Thank you for talking with me on 21 about your health and medications. Medicare s MTM (Medication Therapy Management) program helps you understand your medications and use them safely.      This letter includes an action plan (Medication Action Plan) and medication list (Personal Medication List). The action plan has steps you should take to help you get the best results from your medications. The medication list will help you keep track of your medications and how to use them the right way.       Have your action plan and medication list with you when you talk with your doctors, pharmacists, and other healthcare providers in your care team.     Ask your doctors, pharmacists, and other healthcare providers to update the action plan and medication list at every visit.     Take your medication list with you if you go to the hospital or emergency room.     Give a copy of the action plan and medication list to your family or caregivers.     If you want to talk about this letter or any of the papers with it, please call   [phone].We look forward to working with you, your doctors, and other healthcare providers to help you stay healthy through the [facility] MTM program.    Sincerely,  Pushpa Philip, PharmD    Enclosed: Medication Action Plan and Personal Medication List    MEDICATION ACTION PLAN FOR Adam Talamantes,  1940     This action plan will help you get the best results from your medications if you:   1. Read \"What we talked about.\"   2. Take the steps listed in the \"What I need to do\" boxes.   3. Fill in \"What I did and when I did it.\"   4. Fill in \"My follow-up plan\" and \"Questions I want to ask.\"     Have this action plan with you when you talk with your doctors, pharmacists, and other healthcare providers in your care team. Share this with your family or caregivers too.  DATE PREPARED: 2021  What we " talked about: The importance of taking your chronic medications consistently as prescribed.                                                   What I need to do: Please take metoprolol succinate 50 mg once daily, allopurinol 200 mg once daily, and duloxetine 20 mg twice daily         What I did and when I did it:                                              My follow-up plan:                 Questions I want to ask:              If you have any questions about your action plan, call Pushpa Philip PharmD, Phone: [phone] , Monday-Friday 8-4:30pm.           PERSONAL MEDICATION LIST FOR KEN White 1940     This medication list was made for you after we talked. We also used information from your doctor's chart.      Use blank rows to add new medications. Then fill in the dates you started using them.    Cross out medications when you no longer use them. Then write the date and why you stopped using them.    Ask your doctors, pharmacists, and other healthcare providers to update this list at every visit. Keep this list up-to-date with:       Prescription medications    Over the counter drugs     Herbals    Vitamins    Minerals      If you go to the hospital or emergency room, take this list with you. Share this with your family or caregivers too.     DATE PREPARED: 2021  Allergies or side effects: Patient has no known allergies.     Medication:  AMLODIPINE BESYLATE 5 MG PO TABS      How I use it:  TAKE 1 TABLET(5 MG) BY MOUTH DAILY      Why I use it: Blood pressure    Prescriber:  Chucho Espino MD      Date I started using it:       Date I stopped using it:         Why I stopped using it:            Medication:   mg capsule      How I use it:  [ MG CAPSULE] TAKE 1 CAPSULE BY MOUTH TWICE DAILY AS NEEDED FOR CONSTIPATION      Why I use it: Constipation    Prescriber:  Chucho Espino MD      Date I started using it:       Date I stopped using it:         Why I stopped using it:             Medication:  DULOXETINE HCL 20 MG PO CPEP      How I use it:  TAKE 1 CAPSULE(20 MG) BY MOUTH TWICE DAILY      Why I use it: Primary osteoarthritis involving multiple joints    Prescriber:  ROMI Huff      Date I started using it:       Date I stopped using it:         Why I stopped using it:            Medication:  GABAPENTIN 300 MG PO CAPS      How I use it:  TAKE 1 TO 2 CAPSULES BY MOUTH AT BEDTIME      Why I use it: Lumbago    Prescriber:  Chucho Espino MD      Date I started using it:       Date I stopped using it:         Why I stopped using it:            Medication:  HYDROCODONE-ACETAMINOPHEN 5-325 MG PO TABS      How I use it:  Take 1 tablet by mouth daily as needed for severe pain [HYDROCODONE-ACETAMINOPHEN 5-325 MG PER TABLET] TAKE 1-2 TABLETS BY MOUTH AT BEDTIME AS NEEDED FOR chronic PAIN      Why I use it: PAin    Prescriber:  Chucho Espino MD      Date I started using it:       Date I stopped using it:         Why I stopped using it:            Medication:  METOPROLOL SUCCINATE ER 50 MG PO TB24      How I use it:  TAKE 1 TABLET(50 MG) BY MOUTH DAILY      Why I use it: Coronary artery disease due to lipid rich plaque    Prescriber:  Chucho Espino MD      Date I started using it:       Date I stopped using it:         Why I stopped using it:            Medication:  NITROGLYCERIN 0.4 MG SL SUBL      How I use it:  PLACE 1 TABLET UNDER THE TONGUE EVERY 5 MINUTES AS NEEDED FOR CHEST PAIN.      Why I use it: Coronary artery disease    Prescriber:  Chucho Espino MD      Date I started using it:       Date I stopped using it:         Why I stopped using it:            Medication:  SITagliptin (JANUVIA) 50 MG tablet      How I use it:  [SITAGLIPTIN (JANUVIA) 50 MG TABLET] Take 1 tablet (50 mg total) by mouth daily.      Why I use it: Type 2 diabetes mellitus with stage 3 chronic kidney disease, without long-term current use of insulin (H)    Prescriber:  Chucho Espino MD      Date I started using  it:       Date I stopped using it:         Why I stopped using it:            Medication:  acetaminophen (TYLENOL) 500 MG tablet      How I use it:  [ACETAMINOPHEN (TYLENOL) 500 MG TABLET] Take 1 tablet (500 mg total) by mouth every 6 (six) hours as needed.      Why I use it: Degeneration of lumbar or lumbosacral intervertebral disc    Prescriber:  Chucho Espino MD      Date I started using it:       Date I stopped using it:         Why I stopped using it:            Medication:  allopurinoL (ZYLOPRIM) 100 MG tablet      How I use it:  [ALLOPURINOL (ZYLOPRIM) 100 MG TABLET] TAKE 2 TABLETS BY MOUTH EVERY DAY      Why I use it: Chronic gout of multiple sites, unspecified cause    Prescriber:  Chucho Espino MD      Date I started using it:       Date I stopped using it:         Why I stopped using it:            Medication:  aspirin (ASPIR-LOW) 81 MG EC tablet      How I use it:  [ASPIRIN (ASPIR-LOW) 81 MG EC TABLET] Take 1 tablet (81 mg total) by mouth daily.      Why I use it: Coronary artery disease due to lipid rich plaque    Prescriber:  Chucho Espino MD      Date I started using it:       Date I stopped using it:         Why I stopped using it:            Medication:  colchicine 0.6 mg tablet      How I use it:  [COLCHICINE 0.6 MG TABLET] TAKE 1 TABLET BY MOUTH EVERY OTHER DAY      Why I use it: Chronic gout of multiple sites, unspecified cause    Prescriber:  Theodore Merida MD      Date I started using it:       Date I stopped using it:         Why I stopped using it:            Medication:  isosorbide mononitrate (IMDUR) 30 MG 24 hr tablet      How I use it:  [ISOSORBIDE MONONITRATE (IMDUR) 30 MG 24 HR TABLET] Take 1 tablet (30 mg total) by mouth daily.      Why I use it: Coronary artery disease involving native coronary artery of native heart without angina pectoris    Prescriber:  yLnne George MD      Date I started using it:       Date I stopped using it:         Why I stopped using it:             Medication:  loratadine (CLARITIN) 10 mg tablet      How I use it:  [LORATADINE (CLARITIN) 10 MG TABLET] TAKE 1 TABLET BY MOUTH EVERY DAY      Why I use it: Chronic rhinitis    Prescriber:  Chucho Espino MD      Date I started using it:       Date I stopped using it:         Why I stopped using it:            Medication:  losartan (COZAAR) 50 MG tablet      How I use it:  [LOSARTAN (COZAAR) 50 MG TABLET] Take 1 tablet (50 mg total) by mouth daily.      Why I use it: Type 2 diabetes mellitus with stage 3 chronic kidney disease, without long-term current use of insulin (H); ACE-inhibitor cough    Prescriber:  Chucho Espino MD      Date I started using it:       Date I stopped using it:         Why I stopped using it:            Medication:  omeprazole (PRILOSEC) 20 MG capsule      How I use it:  [OMEPRAZOLE (PRILOSEC) 20 MG CAPSULE] TAKE 1 CAPSULE BY MOUTH DAILY      Why I use it: Gastroesophageal reflux disease    Prescriber:  Chucho Espino MD      Date I started using it:       Date I stopped using it:         Why I stopped using it:            Medication:  rosuvastatin (CRESTOR) 20 MG tablet      How I use it:  [ROSUVASTATIN (CRESTOR) 20 MG TABLET] Take 1 tablet (20 mg total) by mouth at bedtime.      Why I use it: Dyslipidemia    Prescriber:  Lynne George MD      Date I started using it:       Date I stopped using it:         Why I stopped using it:            Medication:         How I use it:         Why I use it:      Prescriber:         Date I started using it:       Date I stopped using it:         Why I stopped using it:            Medication:         How I use it:         Why I use it:      Prescriber:         Date I started using it:       Date I stopped using it:         Why I stopped using it:            Medication:         How I use it:         Why I use it:      Prescriber:         Date I started using it:       Date I stopped using it:         Why I stopped using it:               Other Information:     If you have any questions about your medication list, call Pushpa Philip PharmD, Phone: [phone] , Monday-Friday 8-4:30pm.    According to the Paperwork Reduction Act of 1995, no persons are required to respond to a collection of information unless it displays a valid OMB control number. The valid OMB number for this information collection is [id number]. The time required to complete this information collection is estimated to average 40 minutes per response, including the time to review instructions, searching existing data resources, gather the data needed, and complete and review the information collection. If you have any comments concerning the accuracy of the time estimate(s) or suggestions for improving this form, please write to: CMS, Attn: LUZ Reports Clearance Officer, [address] [place] [phone].

## 2021-12-10 DIAGNOSIS — E78.5 DYSLIPIDEMIA: Primary | ICD-10-CM

## 2021-12-17 DIAGNOSIS — K21.9 GASTROESOPHAGEAL REFLUX DISEASE: ICD-10-CM

## 2021-12-17 DIAGNOSIS — Z76.0 ENCOUNTER FOR MEDICATION REFILL: Primary | ICD-10-CM

## 2022-01-03 DIAGNOSIS — Z76.0 ENCOUNTER FOR MEDICATION REFILL: Primary | ICD-10-CM

## 2022-01-03 RX ORDER — HYDROCODONE BITARTRATE AND ACETAMINOPHEN 5; 325 MG/1; MG/1
1 TABLET ORAL DAILY PRN
Qty: 30 TABLET | Refills: 0 | Status: SHIPPED | OUTPATIENT
Start: 2022-01-03 | End: 2022-02-01

## 2022-01-03 NOTE — TELEPHONE ENCOUNTER
Reason for Call:  Medication or medication refill:    Do you use a [facility]?  Name of the pharmacy and phone number for the current request:  [id number] - [place], [place] - [facility] [address] AT [address]    Name of the medication requested: HYDROcodone-acetaminophen (NORCO) 5-325 MG tablet    Other request: patient is completely out. Would like refills today.    Can we leave a detailed message on this number? YES    Phone number patient can be reached at: Cell number on file:    Telephone Information:   Mobile [phone]       Best Time: any    Call taken on 1/3/2022 at 11:28 AM by Tate Harden

## 2022-01-04 ENCOUNTER — TELEPHONE (OUTPATIENT)
Dept: FAMILY MEDICINE | Facility: CLINIC | Age: 82
End: 2022-01-04
Payer: COMMERCIAL

## 2022-01-05 NOTE — TELEPHONE ENCOUNTER
Reason for call:  Other   Patient called regarding (reason for call): call back  Additional comments: Patient's daughter is calling to check status of patient's medication refill on HYDROcodone-acetaminophen (NORCO) 5-325 MG. Please call.     Phone number to reach patient:  Home number on file [phone] (home)    Best Time:  Any time    Can we leave a detailed message on this number?  YES    Travel screening: Not Applicable

## 2022-02-01 DIAGNOSIS — Z76.0 ENCOUNTER FOR MEDICATION REFILL: Primary | ICD-10-CM

## 2022-02-01 RX ORDER — HYDROCODONE BITARTRATE AND ACETAMINOPHEN 5; 325 MG/1; MG/1
1 TABLET ORAL DAILY PRN
Qty: 30 TABLET | Refills: 0 | Status: SHIPPED | OUTPATIENT
Start: 2022-02-01 | End: 2022-02-28

## 2022-02-01 NOTE — TELEPHONE ENCOUNTER
Reason for Call:  Medication or medication refill:    Do you use a [facility]?  Name of the pharmacy and phone number for the current request:  [facility]    Name of the medication requested: Hydrocodone    Other request:     Can we leave a detailed message on this number? YES    Phone number patient can be reached at: Home number on file [phone] (home)    Best Time: ANY    Call taken on 2/1/2022 at 9:48 AM by Alyce Little

## 2022-02-17 ENCOUNTER — TRANSFERRED RECORDS (OUTPATIENT)
Dept: HEALTH INFORMATION MANAGEMENT | Facility: CLINIC | Age: 82
End: 2022-02-17
Payer: COMMERCIAL

## 2022-02-19 DIAGNOSIS — M1A.09X0 CHRONIC GOUT OF MULTIPLE SITES, UNSPECIFIED CAUSE: ICD-10-CM

## 2022-02-21 ENCOUNTER — PATIENT OUTREACH (OUTPATIENT)
Dept: GERIATRIC MEDICINE | Facility: CLINIC | Age: 82
End: 2022-02-21
Payer: COMMERCIAL

## 2022-02-21 DIAGNOSIS — M51.379 DEGENERATION OF LUMBAR OR LUMBOSACRAL INTERVERTEBRAL DISC: Primary | ICD-10-CM

## 2022-02-21 DIAGNOSIS — M1A.9XX1 CHRONIC GOUT WITH TOPHUS, UNSPECIFIED CAUSE, UNSPECIFIED SITE: ICD-10-CM

## 2022-02-21 RX ORDER — ALLOPURINOL 100 MG/1
TABLET ORAL
Qty: 180 TABLET | Refills: 7 | Status: SHIPPED | OUTPATIENT
Start: 2022-02-21 | End: 2023-04-26

## 2022-02-21 NOTE — TELEPHONE ENCOUNTER
"Routing refill request to provider for review/approval because:  Labs out of range:  Creatinine   Labs not current:  CBC, ALT, Uric Acid    Last Written Prescription Date:  2/15/2021  Last Fill Quantity: 180,  # refills: 7   Last office visit provider:  12/06/2021         Requested Prescriptions   Pending Prescriptions Disp Refills     allopurinol (ZYLOPRIM) 100 MG tablet [Pharmacy Med Name: ALLOPURINOL 100MG TABLETS] 180 tablet 7     Sig: TAKE 2 TABLETS BY MOUTH EVERY DAY       Gout Agents Protocol Failed - 2/19/2022  3:59 AM        Failed - CBC on file in past 12 months     Recent Labs   Lab Test 06/22/20  1853   WBC 7.1   RBC 4.28*   HGB 13.0*   HCT 39.5*                    Failed - ALT on file in past 12 months     Recent Labs   Lab Test 01/23/20  1036   ALT 49*             Failed - Has Uric Acid on file in past 12 months and value is less than 6     Recent Labs   Lab Test 12/20/18  1121   URIC 6.8     If level is 6mg/dL or greater, ok to refill one time and refer to provider.           Failed - Normal serum creatinine on file in the past 12 months     Recent Labs   Lab Test 08/09/21  1818   CR 1.59*       Ok to refill medication if creatinine is low          Passed - Recent (12 mo) or future (30 days) visit within the authorizing provider's specialty     Patient has had an office visit with the authorizing provider or a provider within the authorizing providers department within the previous 12 mos or has a future within next 30 days. See \"Patient Info\" tab in inbasket, or \"Choose Columns\" in Meds & Orders section of the refill encounter.              Passed - Medication is active on med list        Passed - Patient is age 18 or older             Gia Ching RN 02/21/22 12:00 PM  "

## 2022-02-22 SDOH — HEALTH STABILITY: PHYSICAL HEALTH: ON AVERAGE, HOW MANY MINUTES DO YOU ENGAGE IN EXERCISE AT THIS LEVEL?: 0 MIN

## 2022-02-22 SDOH — ECONOMIC STABILITY: FOOD INSECURITY: WITHIN THE PAST 12 MONTHS, THE FOOD YOU BOUGHT JUST DIDN'T LAST AND YOU DIDN'T HAVE MONEY TO GET MORE.: NEVER TRUE

## 2022-02-22 SDOH — HEALTH STABILITY: PHYSICAL HEALTH: ON AVERAGE, HOW MANY DAYS PER WEEK DO YOU ENGAGE IN MODERATE TO STRENUOUS EXERCISE (LIKE A BRISK WALK)?: 0 DAYS

## 2022-02-22 SDOH — ECONOMIC STABILITY: INCOME INSECURITY: IN THE LAST 12 MONTHS, WAS THERE A TIME WHEN YOU WERE NOT ABLE TO PAY THE MORTGAGE OR RENT ON TIME?: NO

## 2022-02-22 SDOH — ECONOMIC STABILITY: FOOD INSECURITY: WITHIN THE PAST 12 MONTHS, YOU WORRIED THAT YOUR FOOD WOULD RUN OUT BEFORE YOU GOT MONEY TO BUY MORE.: NEVER TRUE

## 2022-02-22 SDOH — ECONOMIC STABILITY: TRANSPORTATION INSECURITY
IN THE PAST 12 MONTHS, HAS THE LACK OF TRANSPORTATION KEPT YOU FROM MEDICAL APPOINTMENTS OR FROM GETTING MEDICATIONS?: NO

## 2022-02-22 SDOH — ECONOMIC STABILITY: TRANSPORTATION INSECURITY
IN THE PAST 12 MONTHS, HAS LACK OF TRANSPORTATION KEPT YOU FROM MEETINGS, WORK, OR FROM GETTING THINGS NEEDED FOR DAILY LIVING?: NO

## 2022-02-22 ASSESSMENT — SOCIAL DETERMINANTS OF HEALTH (SDOH)
DO YOU BELONG TO ANY CLUBS OR ORGANIZATIONS SUCH AS CHURCH GROUPS UNIONS, FRATERNAL OR ATHLETIC GROUPS, OR SCHOOL GROUPS?: NO
HOW OFTEN DO YOU ATTENT MEETINGS OF THE CLUB OR ORGANIZATION YOU BELONG TO?: 1 TO 4 TIMES PER YEAR
HOW OFTEN DO YOU ATTEND CHURCH OR RELIGIOUS SERVICES?: 1 TO 4 TIMES PER YEAR
HOW HARD IS IT FOR YOU TO PAY FOR THE VERY BASICS LIKE FOOD, HOUSING, MEDICAL CARE, AND HEATING?: NOT HARD AT ALL
HOW OFTEN DO YOU GET TOGETHER WITH FRIENDS OR RELATIVES?: TWICE A WEEK
WITHIN THE LAST YEAR, HAVE YOU BEEN HUMILIATED OR EMOTIONALLY ABUSED IN OTHER WAYS BY YOUR PARTNER OR EX-PARTNER?: NO
WITHIN THE LAST YEAR, HAVE YOU BEEN KICKED, HIT, SLAPPED, OR OTHERWISE PHYSICALLY HURT BY YOUR PARTNER OR EX-PARTNER?: NO
IN A TYPICAL WEEK, HOW MANY TIMES DO YOU TALK ON THE PHONE WITH FAMILY, FRIENDS, OR NEIGHBORS?: ONCE A WEEK
WITHIN THE LAST YEAR, HAVE YOU BEEN AFRAID OF YOUR PARTNER OR EX-PARTNER?: NO
WITHIN THE LAST YEAR, HAVE TO BEEN RAPED OR FORCED TO HAVE ANY KIND OF SEXUAL ACTIVITY BY YOUR PARTNER OR EX-PARTNER?: NO

## 2022-02-22 ASSESSMENT — LIFESTYLE VARIABLES
HOW OFTEN DO YOU HAVE A DRINK CONTAINING ALCOHOL: NEVER
HOW OFTEN DO YOU HAVE SIX OR MORE DRINKS ON ONE OCCASION: NEVER

## 2022-02-22 NOTE — PROGRESS NOTES
[facility] Care Coordination Contact    [facility] Home Visit Assessment     Home visit for Health Risk Assessment with Adam Talamantes completed on February 21, 2022    Type of residence: Private home - stairs  Current living arrangement: I live in a private home with family     Assessment completed with: Patient, Family    Current Care Plan  Member currently receiving the following home care services: None  Member currently receiving the following community resources: PCA    Medication Review  Medication reconciliation completed in Epic: Yes  Medication set-up & administration: Family/informal caregiver sets up weekly.  Family caregiver administers medications.  Medication Risk Assessment Medication (1 or more, place referral to MTM): N/A: No risk factors identified  MTM Referral Placed: No: No risk factors idenified    Mental/Behavioral Health   Depression Screening: No concerns  PHQ-2 Total Score (Adult) - Positive if 3 or more points; Administer PHQ-9 if positive: 0  Mental health DX: No        Falls Assessment:   Fallen 2 or more times in the past year?: No   Any fall with injury in the past year?: No    ADL/IADL Dependencies:   Dependent ADLs: Ambulation-walker, Bathing, Dressing, Grooming, Transfers, Toileting  Dependent IADLs: Cleaning, Cooking, Laundry, Shopping, Meal Preparation, Medication Management, Money Management, Transportation, Incontinence    [facility] sponsored benefits: Shared information re: Silver Sneakers/gym memberships, ASA, Calcium +D.    PCA Assessment completed at visit: Yes Annual PCA assessment indicated 18 units per day of PCA. This is an increase from the previous assessment. Family endorses an increase in memory impairments. Will wander outside and is high fall risk. Needs redirection when tries to do unsafe tasks. Will adjust PCA hours to accommodate new behavioral concerns.     Elderly Waiver Eligibility: No-does not meet criteria    Care Plan & Recommendations:  Adam Albin is a 81 year old male with a past medical history of Primary osteoarthritis involving multiple joints, Type 2 diabetes mellitus, Coronary artery disease, Urinary incontinence, Chronic gout, Essential hypertension, Chronic right shoulder pain, Bilateral chronic knee pain, Generalized Osteoarthritis Of The Hand, Lower Back Pain, Lumbar Disc Degeneration, Insomnia,,  and CKD (chronic kidney disease) stage 3.    Adam Albin  lives in a single family home with his wife, Lenny, and his daughter, Dimitri and son in law, Fermin Christensen. Fermin Christensen currently provides PCA support to Adam daily.      He appears well supported by family. Requesting a new commode chair-he reports current one is falling apart. Will que up order for PCP.     See [facility] for detailed assessment information.    Follow-Up Plan: Member informed of future contact, plan to f/u with member with a 6 month telephone assessment.  Contact information shared with member and family, encouraged member to call with any questions or concerns at any time.    Olga Brady, RICKEY  [facility]  [phone]

## 2022-02-24 ENCOUNTER — OFFICE VISIT (OUTPATIENT)
Dept: FAMILY MEDICINE | Facility: CLINIC | Age: 82
End: 2022-02-24
Payer: COMMERCIAL

## 2022-02-24 VITALS
RESPIRATION RATE: 20 BRPM | DIASTOLIC BLOOD PRESSURE: 90 MMHG | SYSTOLIC BLOOD PRESSURE: 148 MMHG | WEIGHT: 159.75 LBS | BODY MASS INDEX: 29.4 KG/M2 | HEIGHT: 62 IN | OXYGEN SATURATION: 98 % | HEART RATE: 111 BPM | TEMPERATURE: 98.5 F

## 2022-02-24 DIAGNOSIS — H26.9 CATARACT OF LEFT EYE, UNSPECIFIED CATARACT TYPE: ICD-10-CM

## 2022-02-24 DIAGNOSIS — R42 DIZZINESS: ICD-10-CM

## 2022-02-24 DIAGNOSIS — N18.30 TYPE 2 DIABETES MELLITUS WITH STAGE 3 CHRONIC KIDNEY DISEASE, WITHOUT LONG-TERM CURRENT USE OF INSULIN, UNSPECIFIED WHETHER STAGE 3A OR 3B CKD (H): ICD-10-CM

## 2022-02-24 DIAGNOSIS — E11.22 TYPE 2 DIABETES MELLITUS WITH STAGE 3 CHRONIC KIDNEY DISEASE, WITHOUT LONG-TERM CURRENT USE OF INSULIN, UNSPECIFIED WHETHER STAGE 3A OR 3B CKD (H): ICD-10-CM

## 2022-02-24 DIAGNOSIS — Z01.818 PREOPERATIVE EXAMINATION: Primary | ICD-10-CM

## 2022-02-24 DIAGNOSIS — I10 ESSENTIAL HYPERTENSION WITH GOAL BLOOD PRESSURE LESS THAN 140/90: ICD-10-CM

## 2022-02-24 DIAGNOSIS — I25.10 CORONARY ARTERY DISEASE INVOLVING NATIVE CORONARY ARTERY OF NATIVE HEART WITHOUT ANGINA PECTORIS: ICD-10-CM

## 2022-02-24 LAB — HBA1C MFR BLD: 7 % (ref 0–5.6)

## 2022-02-24 PROCEDURE — 83036 HEMOGLOBIN GLYCOSYLATED A1C: CPT | Performed by: FAMILY MEDICINE

## 2022-02-24 PROCEDURE — 36415 COLL VENOUS BLD VENIPUNCTURE: CPT | Performed by: FAMILY MEDICINE

## 2022-02-24 PROCEDURE — 99214 OFFICE O/P EST MOD 30 MIN: CPT | Performed by: FAMILY MEDICINE

## 2022-02-24 RX ORDER — METOPROLOL SUCCINATE 100 MG/1
100 TABLET, EXTENDED RELEASE ORAL DAILY
Qty: 90 TABLET | Refills: 3 | Status: SHIPPED | OUTPATIENT
Start: 2022-02-24 | End: 2022-11-23

## 2022-02-24 RX ORDER — MECLIZINE HCL 12.5 MG 12.5 MG/1
12.5 TABLET ORAL 3 TIMES DAILY PRN
Qty: 60 TABLET | Refills: 6 | Status: SHIPPED | OUTPATIENT
Start: 2022-02-24 | End: 2023-01-19

## 2022-02-24 RX ORDER — METOPROLOL SUCCINATE 50 MG/1
50 TABLET, EXTENDED RELEASE ORAL DAILY
Qty: 90 TABLET | Refills: 3 | Status: SHIPPED | OUTPATIENT
Start: 2022-02-24 | End: 2022-02-24

## 2022-02-24 RX ORDER — ISOSORBIDE MONONITRATE 30 MG/1
30 TABLET, EXTENDED RELEASE ORAL DAILY
Qty: 90 TABLET | Refills: 3 | Status: ON HOLD | OUTPATIENT
Start: 2022-02-24 | End: 2023-02-06

## 2022-02-28 ENCOUNTER — PATIENT OUTREACH (OUTPATIENT)
Dept: GERIATRIC MEDICINE | Facility: CLINIC | Age: 82
End: 2022-02-28
Payer: COMMERCIAL

## 2022-02-28 DIAGNOSIS — Z76.0 ENCOUNTER FOR MEDICATION REFILL: ICD-10-CM

## 2022-02-28 RX ORDER — HYDROCODONE BITARTRATE AND ACETAMINOPHEN 5; 325 MG/1; MG/1
1 TABLET ORAL DAILY PRN
Qty: 30 TABLET | Refills: 0 | Status: SHIPPED | OUTPATIENT
Start: 2022-02-28 | End: 2022-04-05

## 2022-02-28 NOTE — TELEPHONE ENCOUNTER
Medication Question or Refill    Who is calling: pt daughter    What medication are you calling about (include dose and sig)?: Hydrocodone- acetaminophin 5-325 mg take 1-2 tabs at HS PRN for chronic pain    Controlled Substance Agreement on file: Yes:     Who prescribed the medication?: PCP    Do you need a refill? Yes: script     When did you use the medication last? yesterday    Patient offered an appointment? No just here 22    Do you have any questions or concerns?  No    Requested Pharmacy: [facility] [address]    Okay to leave a detailed message?: Yes at Home number on file [phone] (home)    Call taken on 22 at 1040 am by Priscilla Dash CMA. CMT

## 2022-02-28 NOTE — PROGRESS NOTES
[facility] Care Coordination Contact    Received after visit chart from care coordinator.  Completed following tasks: Mailed copy of care plan to client, Updated services in access and mailed PCA & POC sig sheet w/SASE.  Mailed medication disposal info.    [facility]:  Emailed completed PCA assessment to [facility].  Faxed copy of PCA assessment to PCA Agency and mailed copy to member.  Faxed MD Communication to PCP.     Sara Manuel  Case Management Specialist  [facility]  [phone]

## 2022-02-28 NOTE — LETTER
February 28, 2022      ASIF DE JESUSG  [address]  [facility] [address]      Dear Asif:    At [facility], we are dedicated to improving your health and well-being. Enclosed is the Comprehensive Care Plan that we developed with you on 2/21/2022. Please review the Care Plan carefully.    As a reminder, some of the things we discussed at your visit include:    Your physical and mental health    Ways to reduce falls    Health care needs you may have    Don t forget to contact your care coordinator if you:    Have been hospitalized or plan to be hospitalized     Have had a fall     Have experienced a change in physical health    Are experiencing emotional problems     If you do not agree with your Care Plan, have questions about it, or have experienced a change in your needs, please call me at [phone]. If you are hearing impaired, please call the [place] [employer] at 212 or [phone] (lnncvb-pe-mfvvkq relay service).    Sincerely,    RICKEY Gallegos  [phone]  [email or web address]   [facility] ([facility]) is a health plan that contracts with both Medicare and the [place] Medical Assistance (Medicaid) program to provide benefits of both programs to enrollees. Enrollment in [facility] depends on contract renewal.    [id number])     [id number] (11/18)

## 2022-03-12 DIAGNOSIS — R05.8 ACE-INHIBITOR COUGH: ICD-10-CM

## 2022-03-12 DIAGNOSIS — E11.22 TYPE 2 DIABETES MELLITUS WITH STAGE 3 CHRONIC KIDNEY DISEASE, WITHOUT LONG-TERM CURRENT USE OF INSULIN (H): ICD-10-CM

## 2022-03-12 DIAGNOSIS — T46.4X5A ACE-INHIBITOR COUGH: ICD-10-CM

## 2022-03-12 DIAGNOSIS — N18.30 TYPE 2 DIABETES MELLITUS WITH STAGE 3 CHRONIC KIDNEY DISEASE, WITHOUT LONG-TERM CURRENT USE OF INSULIN (H): ICD-10-CM

## 2022-03-13 NOTE — TELEPHONE ENCOUNTER
"Routing refill request to provider for review/approval because:  BP, creatinine, and potassium out of range.    Last Written Prescription Date:  3/29/21  Last Fill Quantity: 90,  # refills: 3    Last office visit provider:  2/24/22    Requested Prescriptions   Pending Prescriptions Disp Refills     losartan (COZAAR) 50 MG tablet [Pharmacy Med Name: LOSARTAN 50MG TABLETS] 90 tablet 3     Sig: TAKE 1 TABLET(50 MG) BY MOUTH DAILY       Angiotensin-II Receptors Failed - 3/12/2022 11:17 AM        Failed - Last blood pressure under 140/90 in past 12 months     BP Readings from Last 3 Encounters:   02/24/22 (!) 148/90   12/08/21 134/86   12/06/21 118/64                 Failed - Normal serum creatinine on file in past 12 months     Recent Labs   Lab Test 08/09/21  1818   CR 1.59*       Ok to refill medication if creatinine is low          Failed - Normal serum potassium on file in past 12 months     Recent Labs   Lab Test 08/09/21  1818   POTASSIUM 5.3*                    Passed - Recent (12 mo) or future (30 days) visit within the authorizing provider's specialty     Patient has had an office visit with the authorizing provider or a provider within the authorizing providers department within the previous 12 mos or has a future within next 30 days. See \"Patient Info\" tab in inbasket, or \"Choose Columns\" in Meds & Orders section of the refill encounter.              Passed - Medication is active on med list        Passed - Patient is age 18 or older             Lynnette Evans RN 03/13/22 12:08 AM  "

## 2022-03-16 RX ORDER — LOSARTAN POTASSIUM 50 MG/1
TABLET ORAL
Qty: 90 TABLET | Refills: 0 | Status: SHIPPED | OUTPATIENT
Start: 2022-03-16 | End: 2022-03-17

## 2022-03-16 NOTE — TELEPHONE ENCOUNTER
Caller asking that medication be refilled along with spouse (sent encounter to JL team). Pt needs this refilled today as he is out now. Per caller, this was requested for a few days ago when he still had a few left, but now pt is out, please refill ASAP. Call [phone] with a confirmation and okay to leave detailed message.

## 2022-03-16 NOTE — TELEPHONE ENCOUNTER
CMA called to advise the patient per covering provider note below. Patient is scheduled with provider next month    Next Appt  With Family Practice (Chucho Espino MD)  04/11/2022 at 10:00 AM

## 2022-03-17 DIAGNOSIS — R05.8 ACE-INHIBITOR COUGH: ICD-10-CM

## 2022-03-17 DIAGNOSIS — T46.4X5A ACE-INHIBITOR COUGH: ICD-10-CM

## 2022-03-17 DIAGNOSIS — Z76.0 ENCOUNTER FOR MEDICATION REFILL: Primary | ICD-10-CM

## 2022-03-17 DIAGNOSIS — E11.22 TYPE 2 DIABETES MELLITUS WITH STAGE 3 CHRONIC KIDNEY DISEASE, WITHOUT LONG-TERM CURRENT USE OF INSULIN (H): ICD-10-CM

## 2022-03-17 DIAGNOSIS — N18.30 TYPE 2 DIABETES MELLITUS WITH STAGE 3 CHRONIC KIDNEY DISEASE, WITHOUT LONG-TERM CURRENT USE OF INSULIN (H): ICD-10-CM

## 2022-03-18 RX ORDER — LOSARTAN POTASSIUM 50 MG/1
TABLET ORAL
Qty: 90 TABLET | Refills: 3 | Status: ON HOLD | OUTPATIENT
Start: 2022-03-18 | End: 2023-02-06

## 2022-04-01 DIAGNOSIS — Z76.0 ENCOUNTER FOR MEDICATION REFILL: Primary | ICD-10-CM

## 2022-04-02 NOTE — TELEPHONE ENCOUNTER
Reason for Call:  Medication or medication refill:HYDROcodone-acetaminophen     Do you use a [facility]?  Name of the pharmacy and phone number for the current request:  [facility] [address] [place] [phone]    Name of the medication requested: HYDROcodone-acetaminophen     Other request: N/A    Can we leave a detailed message on this number? YES    Phone number patient can be reached at: Home number on file [phone] (home)    Best Time: ANY    Call taken on 4/1/2022 at 9:01 PM by Latia Fields

## 2022-04-04 RX ORDER — HYDROCODONE BITARTRATE AND ACETAMINOPHEN 5; 325 MG/1; MG/1
1 TABLET ORAL DAILY PRN
Qty: 30 TABLET | Refills: 0 | Status: CANCELLED | OUTPATIENT
Start: 2022-04-04

## 2022-04-04 NOTE — TELEPHONE ENCOUNTER
Routing refill request to provider for review/approval because:  Controlled substance request    Last Written Prescription Date:  2/28/22  Last Fill Quantity: 30,  # refills: 0   Last office visit provider:  2/24/22     Requested Prescriptions   Pending Prescriptions Disp Refills     HYDROcodone-acetaminophen (NORCO) 5-325 MG tablet 30 tablet 0     Sig: Take 1 tablet by mouth daily as needed for severe pain [HYDROCODONE-ACETAMINOPHEN 5-325 MG PER TABLET] TAKE 1-2 TABLETS BY MOUTH AT BEDTIME AS NEEDED FOR chronic PAIN       There is no refill protocol information for this order          Al Araiza RN 04/04/22 10:18 AM

## 2022-04-05 DIAGNOSIS — Z76.0 ENCOUNTER FOR MEDICATION REFILL: Primary | ICD-10-CM

## 2022-04-05 DIAGNOSIS — M51.379 DEGENERATION OF LUMBAR OR LUMBOSACRAL INTERVERTEBRAL DISC: ICD-10-CM

## 2022-04-05 RX ORDER — PSEUDOEPHED/ACETAMINOPH/DIPHEN 30MG-500MG
TABLET ORAL
Qty: 120 TABLET | Refills: 6 | Status: ON HOLD | OUTPATIENT
Start: 2022-04-05 | End: 2023-01-14

## 2022-04-05 RX ORDER — HYDROCODONE BITARTRATE AND ACETAMINOPHEN 5; 325 MG/1; MG/1
1 TABLET ORAL DAILY PRN
Qty: 30 TABLET | Refills: 0 | Status: SHIPPED | OUTPATIENT
Start: 2022-04-05 | End: 2022-05-03

## 2022-04-05 NOTE — TELEPHONE ENCOUNTER
Pt had med last filled on 2/28/22.  Pt has upcoming apt with PCP on 4/11/22.      Med queued and pended.  Please review and sign if appropriate. Thanks

## 2022-04-07 ENCOUNTER — TELEPHONE (OUTPATIENT)
Dept: FAMILY MEDICINE | Facility: CLINIC | Age: 82
End: 2022-04-07
Payer: COMMERCIAL

## 2022-04-07 NOTE — TELEPHONE ENCOUNTER
Dr Espino- received a phone call from [facility]'s Pharmacist in regards to RX for Hydrocodone-Acetaminophen having 2 directions.     Please clarify if this medication is to be 1 tablet po daily prn or the 1-2 tablets po at HS prn.    HYDROcodone-acetaminophen (NORCO) 5-325 MG tablet 30 tablet 0 4/5/2022  No   Sig - Route: Take 1 tablet by mouth daily as needed for severe pain [HYDROCODONE-ACETAMINOPHEN 5-325 MG PER TABLET] TAKE 1-2 TABLETS BY MOUTH AT BEDTIME AS NEEDED FOR chronic PAIN - Oral   Sent to pharmacy as: HYDROcodone-Acetaminophen 5-325 MG Oral Tablet (NORCO)   Class: E-Prescribe   Earliest Fill Date: 4/5/2022   Order: [id number]   E-Prescribing Status: Receipt confirmed by pharmacy (4/5/2022  5:37 PM CDT)     Lynette SENIOR RN  [facility]

## 2022-04-07 NOTE — TELEPHONE ENCOUNTER
Writer returned call to [facility] on file.  Relayed provider message in regards to directions for Hydrocodone-Acetaminophen.  Pharmacist on staff read back provider directions to this writer. No further follow up needed.    Lynette SENIOR RN  [facility]

## 2022-04-08 ENCOUNTER — TRANSFERRED RECORDS (OUTPATIENT)
Dept: HEALTH INFORMATION MANAGEMENT | Facility: CLINIC | Age: 82
End: 2022-04-08

## 2022-04-08 LAB — RETINOPATHY: NEGATIVE

## 2022-04-11 ENCOUNTER — OFFICE VISIT (OUTPATIENT)
Dept: FAMILY MEDICINE | Facility: CLINIC | Age: 82
End: 2022-04-11
Payer: COMMERCIAL

## 2022-04-11 VITALS
SYSTOLIC BLOOD PRESSURE: 126 MMHG | HEIGHT: 62 IN | HEART RATE: 96 BPM | RESPIRATION RATE: 16 BRPM | TEMPERATURE: 97.8 F | OXYGEN SATURATION: 96 % | WEIGHT: 164 LBS | BODY MASS INDEX: 30.18 KG/M2 | DIASTOLIC BLOOD PRESSURE: 80 MMHG

## 2022-04-11 DIAGNOSIS — M51.379 DEGENERATION OF LUMBAR OR LUMBOSACRAL INTERVERTEBRAL DISC: ICD-10-CM

## 2022-04-11 DIAGNOSIS — H26.9 CATARACT OF BOTH EYES, UNSPECIFIED CATARACT TYPE: ICD-10-CM

## 2022-04-11 DIAGNOSIS — N18.30 TYPE 2 DIABETES MELLITUS WITH STAGE 3 CHRONIC KIDNEY DISEASE, WITHOUT LONG-TERM CURRENT USE OF INSULIN, UNSPECIFIED WHETHER STAGE 3A OR 3B CKD (H): ICD-10-CM

## 2022-04-11 DIAGNOSIS — I10 ESSENTIAL HYPERTENSION WITH GOAL BLOOD PRESSURE LESS THAN 140/90: Primary | ICD-10-CM

## 2022-04-11 DIAGNOSIS — G89.29 BILATERAL CHRONIC KNEE PAIN: ICD-10-CM

## 2022-04-11 DIAGNOSIS — M25.562 BILATERAL CHRONIC KNEE PAIN: ICD-10-CM

## 2022-04-11 DIAGNOSIS — M25.561 BILATERAL CHRONIC KNEE PAIN: ICD-10-CM

## 2022-04-11 DIAGNOSIS — E11.22 TYPE 2 DIABETES MELLITUS WITH STAGE 3 CHRONIC KIDNEY DISEASE, WITHOUT LONG-TERM CURRENT USE OF INSULIN, UNSPECIFIED WHETHER STAGE 3A OR 3B CKD (H): ICD-10-CM

## 2022-04-11 PROCEDURE — 99214 OFFICE O/P EST MOD 30 MIN: CPT | Performed by: FAMILY MEDICINE

## 2022-04-11 RX ORDER — AMLODIPINE BESYLATE 5 MG/1
5 TABLET ORAL DAILY
Qty: 90 TABLET | Refills: 3 | Status: SHIPPED | OUTPATIENT
Start: 2022-04-11 | End: 2022-08-24

## 2022-04-11 NOTE — LETTER
Opioid / Opioid Plus Controlled Substance Agreement    This is an agreement between you and your provider about the safe and appropriate use of controlled substance/opioids prescribed by your care team. Controlled substances are medicines that can cause physical and mental dependence (abuse).    There are strict laws about having and using these medicines. We here at [facility] are committing to working with you in your efforts to get better. To support you in this work, we ll help you schedule regular office appointments for medicine refills. If we must cancel or change your appointment for any reason, we ll make sure you have enough medicine to last until your next appointment.     As a Provider, I will:    Listen carefully to your concerns and treat you with respect.     Recommend a treatment plan that I believe is in your best interest. This plan may involve therapies other than opioid pain medication.     Talk with you often about the possible benefits, and the risk of harm of any medicine that we prescribe for you.     Provide a plan on how to taper (discontinue or go off) using this medicine if the decision is made to stop its use.    As a Patient, I understand that opioid(s):     Are a controlled substance prescribed by my care team to help me function or work and manage my condition(s).     Are strong medicines and can cause serious side effects such as:    Drowsiness, which can seriously affect my driving ability    A lower breathing rate, enough to cause death    Harm to my thinking ability     Depression     Abuse of and addiction to this medicine    Need to be taken exactly as prescribed. Combining opioids with certain medicines or chemicals (such as illegal drugs, sedatives, sleeping pills, and benzodiazepines) can be dangerous or even fatal. If I stop opioids suddenly, I may have severe withdrawal symptoms.    Do not work for all types of pain nor for all patients. If they re not helpful, I may  be asked to stop them.        The risks, benefits and side effects of these medicine(s) were explained to me. I agree that:  1. I will take part in other treatments as advised by my care team. This may be psychiatry or counseling, physical therapy, behavioral therapy, group treatment or a referral to a specialist.     2. I will keep all my appointments. I understand that this is part of the monitoring of opioids. My care team may require an office visit for EVERY opioid/controlled substance refill. If I miss appointments or don t follow instructions, my care team may stop my medicine.    3. I will take my medicines as prescribed. I will not change the dose or schedule unless my care team tells me to. There will be no refills if I run out early.     4. I may be asked to come to the clinic and complete a urine drug test or complete a pill count at any time. If I don t give a urine sample or participate in a pill count, the care team may stop my medicine.    5. I will only receive prescriptions from this clinic for chronic pain. If I am treated by another provider for acute pain issues, I will tell them that I am taking opioid pain medication for chronic pain and that I have a treatment agreement with this provider. I will inform my [facility] care team within one business day if I am given a prescription for any pain medication by another healthcare provider. My [facility] care team can contact other providers and pharmacists about my use of any medicines.    6. It is up to me to make sure that I don t run out of my medicines on weekends or holidays. If my care team is willing to refill my opioid prescription without a visit, I must request refills only during office hours. Refills may take up to 3 business days to process. I will use one pharmacy to fill all my opioid and other controlled substance prescriptions. I will notify the clinic about any changes to my insurance or medication  availability.    7. I am responsible for my prescriptions. If the medicine/prescription is lost, stolen or destroyed, it will not be replaced. I also agree not to share controlled substance medicines with anyone.    8. I am aware I should not use any illegal or recreational drugs. I agree not to drink alcohol unless my care team says I can.       9. If I enroll in the [place] Medical Cannabis program, I will tell my care team prior to my next refill.     10. I will tell my care team right away if I become pregnant, have a new medical problem treated outside of my regular clinic, or have a change in my medications.    11. I understand that this medicine can affect my thinking, judgment and reaction time. Alcohol and drugs affect the brain and body, which can affect the safety of my driving. Being under the influence of alcohol or drugs can affect my decision-making, behaviors, personal safety, and the safety of others. Driving while impaired (DWI) can occur if a person is driving, operating, or in physical control of a car, motorcycle, boat, snowmobile, ATV, motorbike, off-road vehicle, or any other motor vehicle ([place] Statute 169A.20). I understand the risk if I choose to drive or operate any vehicle or machinery.    I understand that if I do not follow any of the conditions above, my prescriptions or treatment may be stopped or changed.          Opioids  What You Need to Know    What are opioids?   Opioids are pain medicines that must be prescribed by a doctor. They are also known as narcotics.     Examples are:   1. morphine (MS Contin, Laura)  2. oxycodone (Oxycontin)  3. oxycodone and acetaminophen (Percocet)  4. hydrocodone and acetaminophen (Vicodin, Norco)   5. fentanyl patch (Duragesic)   6. hydromorphone (Dilaudid)   7. methadone  8. codeine (Tylenol #3)     What do opioids do well?   Opioids are best for severe short-term pain such as after a surgery or injury. They may work well for cancer pain. They may  help some people with long-lasting (chronic) pain.     What do opioids NOT do well?   Opioids never get rid of pain entirely, and they don t work well for most patients with chronic pain. Opioids don t reduce swelling, one of the causes of pain.                                    Other ways to manage chronic pain and improve function include:       Treat the health problem that may be causing pain    Anti-inflammation medicines, which reduce swelling and tenderness, such as ibuprofen (Advil, Motrin) or naproxen (Aleve)    Acetaminophen (Tylenol)    Antidepressants and anti-seizure medicines, especially for nerve pain    Topical treatments such as patches or creams    Injections or nerve blocks    Chiropractic or osteopathic treatment    Acupuncture, massage, deep breathing, meditation, visual imagery, aromatherapy    Use heat or ice at the pain site    Physical therapy     Exercise    Stop smoking    Take part in therapy       Risks and side effects     Talk to your doctor before you start or decide to keep taking opioids. Possible side effects include:      Lowering your breathing rate enough to cause death    Overdose, including death, especially if taking higher than prescribed doses    Worse depression symptoms; less pleasure in things you usually enjoy    Feeling tired or sluggish    Slower thoughts or cloudy thinking    Being more sensitive to pain over time; pain is harder to control    Trouble sleeping or restless sleep    Changes in hormone levels (for example, less testosterone)    Changes in sex drive or ability to have sex    Constipation    Unsafe driving    Itching and sweating    Dizziness    Nausea, throwing up and dry mouth    What else should I know about opioids?    Opioids may lead to dependence, tolerance, or addiction.      Dependence means that if you stop or reduce the medicine too quickly, you will have withdrawal symptoms. These include loose poop (diarrhea), jitters, flu-like symptoms,  nervousness and tremors. Dependence is not the same as addiction.                       Tolerance means needing higher doses over time to get the same effect. This may increase the chance of serious side effects.      Addiction is when people improperly use a substance that harms their body, their mind or their relations with others. Use of opiates can cause a relapse of addiction if you have a history of drug or alcohol abuse.      People who have used opioids for a long time may have a lower quality of life, worse depression, higher levels of pain and more visits to doctors.    You can overdose on opioids. Take these steps to lower your risk of overdose:    1. Recognize the signs:  Signs of overdose include decrease or loss of consciousness (blackout), slowed breathing, trouble waking up and blue lips. If someone is worried about overdose, they should call 911.    2. Talk to your doctor about Narcan (naloxone).   If you are at risk for overdose, you may be given a prescription for Narcan. This medicine very quickly reverses the effects of opioids.   If you overdose, a friend or family member can give you Narcan while waiting for the ambulance. They need to know the signs of overdose and how to give Narcan.     3. Don't use alcohol or street drugs.   Taking them with opioids can cause death.    4. Do not take any of these medicines unless your doctor says it s OK. Taking these with opioids can cause death:    Benzodiazepines, such as lorazepam (Ativan), alprazolam (Xanax) or diazepam (Valium)    Muscle relaxers, such as cyclobenzaprine (Flexeril)    Sleeping pills like zolpidem (Ambien)     Other opioids      How to keep you and other people safe while taking opioids:    1. Never share your opioids with others.  Opioid medicines are regulated by the Drug Enforcement Agency (GINA). Selling or sharing medications is a criminal act.    2. Be sure to store opioids in a secure place, locked up if possible. Young children  can easily swallow them and overdose.    3. When you are traveling with your medicines, keep them in the original bottles. If you use a pill box, be sure you also carry a copy of your medicine list from your clinic or pharmacy.    4. Safe disposal of opioids    Most pharmacies have places to get rid of medicine, called disposal kiosks. Medicine disposal options are also available in every [facility]. Search your county and  medication disposal  to find more options. You can find more details at:  https://[email or web address].[email or web address]./living-green/managing-unwanted-medications     I agree that my provider, clinic care team, and pharmacy may work with any city, state or federal law enforcement agency that investigates the misuse, sale, or other diversion of my controlled medicine. I will allow my provider to discuss my care with, or share a copy of, this agreement with any other treating provider, pharmacy or emergency room where I receive care.    I have read this agreement and have asked questions about anything I did not understand.    _______________________________________________________  Patient Signature - Adam Talamantes _____________________                   Date     _______________________________________________________  Provider Signature - Chucho Espino MD   _____________________                   Date     _______________________________________________________  Witness Signature (required if provider not present while patient signing)   _____________________                   Date

## 2022-04-11 NOTE — PROGRESS NOTES
ASSESMENT AND PLAN:  Diagnoses and all orders for this visit:  Essential hypertension with goal blood pressure less than 140/90  -     amLODIPine (NORVASC) 5 MG tablet; Take 1 tablet (5 mg) by mouth in the morning.  Type 2 diabetes mellitus with stage 3 chronic kidney disease, without long-term current use of insulin, unspecified whether stage 3a or 3b CKD (H)  Well-controlled.  Reviewed A1c trend with the patient, continue current plan and recheck again in 4 to 6 months.  Lumbar Disc Degeneration, Bilateral chronic knee pain, chronic pain  Patient has been on once daily hydrocodone for a long time for his chronic pain from multiple etiologies as detailed above.  Renewed controlled substance agreement reviewed with the patient with help of a professional  and signed today with the patient.  There have not been any issues or concerns.  Continue hydrocodone once daily at bedtime.  PDMP reviewed today and there are no red flags or unexpected findings.        Reviewed the risks and benefits of the treatment plan with the patient and/or caregiver and we discussed indications for routine and emergent follow-up.        SUBJECTIVE: 82-year-old male in for follow-up.  Medications have been well-tolerated.  He gets some intermittent dizziness but this is not new for him or out of the ordinary for him, usually responds to meclizine.  Patient continues to have chronic pain related to his chronic gout, scattered osteoarthritis, and lumbar disc degeneration.  He is on a plan for pain control that has been stable for several years where he uses pain control during the daytime that is nonopiate and then takes 1 hydrocodone at bedtime.  This helps him sleep better and be more functional and to have relief of the more severe pain at night.  Patient has not had an updated controlled substance agreement in quite some time.  There have never been any issues or concerns.  PDMP reviewed today and there are no red flags or  unexpected findings.    Past Medical History:   Diagnosis Date     Acute blood loss anemia      Acute renal failure (H)      Anemia      Bacteremia      Cataract      Chronic gout      CKD (chronic kidney disease)     Stage 3     DM2 (diabetes mellitus, type 2) (H)      GERD (gastroesophageal reflux disease)      Glucose intolerance (impaired glucose tolerance)      Gout      History of nephrolithiasis      History of prostatitis 2017     Hyperlipidemia      Hypertension      Insomnia      Intestinal disaccharidase deficiencies and disaccharide malabsorption     Created by Conversion      Latent tuberculosis      Nephrolithiasis      Neuropathy      Nonspecific abnormal findings on radiological and other examination of skull and head     Created by [facility] Annotation: 2013 11:23PM - Giulia Meridai/10/2011:  severe stenosis both posterior cerebral arteries seen MRI/MRA done for  vertigo. No acute changes.e*     Osteoarthritis     wrist, knee, shoulder     Prostatitis      Rectal bleed      Trigger thumb      Patient Active Problem List   Diagnosis     Esophageal reflux     Dyslipidemia     Nephrolithiasis     Pseudogout     Latent Tuberculosis     Generalized Osteoarthritis Of The Hand     Lumbar Disc Degeneration     Insomnia     Chronic Gout     CKD (chronic kidney disease) stage 3, GFR 30-59 ml/min (H)     Elevated PSA     Prostate nodule     Cataracts, bilateral     Constipation, unspecified constipation type     Bilateral chronic knee pain     Chronic right shoulder pain     Essential hypertension with goal blood pressure less than 140/90     BPH (benign prostatic hyperplasia)     Chronic gout     Coronary artery disease due to lipid rich plaque     Right lower quadrant abdominal pain     Colitis     Primary osteoarthritis involving multiple joints     Urinary incontinence     Type 2 diabetes mellitus with stage 3 chronic kidney disease, without long-term current use of insulin (H)      ACE-inhibitor cough     Current Outpatient Medications   Medication Sig Dispense Refill     ACETAMINOPHEN EXTRA STRENGTH 500 MG tablet TAKE 1 TABLET(500 MG) BY MOUTH EVERY 6 HOURS AS NEEDED 120 tablet 6     allopurinol (ZYLOPRIM) 100 MG tablet TAKE 2 TABLETS BY MOUTH EVERY  tablet 7     amLODIPine (NORVASC) 5 MG tablet Take 1 tablet (5 mg) by mouth in the morning. 90 tablet 3     aspirin (ASA) 81 MG EC tablet Take 1 tablet (81 mg) by mouth daily 90 tablet 3     colchicine 0.6 mg tablet [COLCHICINE 0.6 MG TABLET] TAKE 1 TABLET BY MOUTH EVERY OTHER DAY 45 tablet 1     DULoxetine (CYMBALTA) 20 MG capsule TAKE 1 CAPSULE(20 MG) BY MOUTH TWICE DAILY 60 capsule 5     gabapentin (NEURONTIN) 300 MG capsule TAKE 1 TO 2 CAPSULES BY MOUTH AT BEDTIME 180 capsule 2     HYDROcodone-acetaminophen (NORCO) 5-325 MG tablet Take 1 tablet by mouth daily as needed for severe pain [HYDROCODONE-ACETAMINOPHEN 5-325 MG PER TABLET] TAKE 1-2 TABLETS BY MOUTH AT BEDTIME AS NEEDED FOR chronic PAIN 30 tablet 0     isosorbide mononitrate (IMDUR) 30 MG 24 hr tablet Take 1 tablet (30 mg) by mouth daily 90 tablet 3     JANUVIA 50 MG tablet TAKE 1 TABLET(50 MG) BY MOUTH DAILY 90 tablet 3     losartan (COZAAR) 50 MG tablet TAKE 1 TABLET(50 MG) BY MOUTH DAILY 90 tablet 3     meclizine (ANTIVERT) 12.5 MG tablet Take 1 tablet (12.5 mg) by mouth 3 times daily as needed for dizziness 60 tablet 6     metoprolol succinate ER (TOPROL-XL) 100 MG 24 hr tablet Take 1 tablet (100 mg) by mouth daily 90 tablet 3     nitroGLYcerin (NITROSTAT) 0.4 MG sublingual tablet PLACE 1 TABLET UNDER THE TONGUE EVERY 5 MINUTES AS NEEDED FOR CHEST PAIN. 25 tablet 6     omeprazole (PRILOSEC) 20 MG DR capsule TAKE 1 CAPSULE BY MOUTH DAILY 90 capsule 3     rosuvastatin (CRESTOR) 20 MG tablet [ROSUVASTATIN (CRESTOR) 20 MG TABLET] Take 1 tablet (20 mg total) by mouth at bedtime. 90 tablet 3      mg capsule [ MG CAPSULE] TAKE 1 CAPSULE BY MOUTH TWICE DAILY AS  "NEEDED FOR CONSTIPATION 60 capsule 11     loratadine (CLARITIN) 10 mg tablet [LORATADINE (CLARITIN) 10 MG TABLET] TAKE 1 TABLET BY MOUTH EVERY DAY 90 tablet 2     History   Smoking Status     Former Smoker     Quit date: 3/10/2000   Smokeless Tobacco     Former User     Comment: no passive exposure       OBJECTICE: /80 (BP Location: Right arm)   Pulse 96   Temp 97.8  F (36.6  C) (Oral)   Resp 16   Ht 1.562 m (5' 1.5\")   Wt 74.4 kg (164 lb)   SpO2 96%   BMI 30.49 kg/m       No results found for this or any previous visit (from the past 24 hour(s)).     CV-regular rate and rhythm   RESP-lungs clear to auscultation       Chucho Espino MD   "

## 2022-05-03 DIAGNOSIS — Z76.0 ENCOUNTER FOR MEDICATION REFILL: Primary | ICD-10-CM

## 2022-05-03 RX ORDER — HYDROCODONE BITARTRATE AND ACETAMINOPHEN 5; 325 MG/1; MG/1
1 TABLET ORAL DAILY PRN
Qty: 30 TABLET | Refills: 0 | Status: SHIPPED | OUTPATIENT
Start: 2022-05-03 | End: 2022-06-01

## 2022-05-03 NOTE — TELEPHONE ENCOUNTER
Pt daughter calling to see if med was sent to pharmacy.  Pharmacy told daughter to call clinic.  Med is queued and pended below by Erick. Thanks

## 2022-05-03 NOTE — TELEPHONE ENCOUNTER
Reason for Call:  Medication or medication refill:    Do you use a [facility]?  Name of the pharmacy and phone number for the current request:  [id number] [address] AT [facility] [address]    Name of the medication requested: HYDROcodone-acetaminophen (NORCO) 5-325 MG tablet    Other request: patient is completely out, would like refill today    Can we leave a detailed message on this number? YES    Phone number patient can be reached at: Cell number on file:    Telephone Information:   Mobile [phone]       Best Time: any    Call taken on 5/3/2022 at 9:13 AM by Tate Harden

## 2022-05-10 ENCOUNTER — OFFICE VISIT (OUTPATIENT)
Dept: RHEUMATOLOGY | Facility: CLINIC | Age: 82
End: 2022-05-10
Payer: COMMERCIAL

## 2022-05-10 VITALS
BODY MASS INDEX: 29.95 KG/M2 | DIASTOLIC BLOOD PRESSURE: 80 MMHG | SYSTOLIC BLOOD PRESSURE: 134 MMHG | HEART RATE: 84 BPM | WEIGHT: 161.1 LBS

## 2022-05-10 DIAGNOSIS — G89.29 CHRONIC PAIN OF RIGHT WRIST: ICD-10-CM

## 2022-05-10 DIAGNOSIS — M15.0 PRIMARY OSTEOARTHRITIS INVOLVING MULTIPLE JOINTS: Primary | ICD-10-CM

## 2022-05-10 DIAGNOSIS — M25.531 CHRONIC PAIN OF RIGHT WRIST: ICD-10-CM

## 2022-05-10 PROCEDURE — 20606 DRAIN/INJ JOINT/BURSA W/US: CPT | Mod: RT | Performed by: INTERNAL MEDICINE

## 2022-05-10 PROCEDURE — 99214 OFFICE O/P EST MOD 30 MIN: CPT | Mod: 25 | Performed by: INTERNAL MEDICINE

## 2022-05-10 RX ORDER — TRIAMCINOLONE ACETONIDE 40 MG/ML
20 INJECTION, SUSPENSION INTRA-ARTICULAR; INTRAMUSCULAR ONCE
Status: COMPLETED | OUTPATIENT
Start: 2022-05-10 | End: 2022-05-10

## 2022-05-10 RX ADMIN — TRIAMCINOLONE ACETONIDE 20 MG: 40 INJECTION, SUSPENSION INTRA-ARTICULAR; INTRAMUSCULAR at 14:35

## 2022-05-10 NOTE — PROGRESS NOTES
Rheumatology follow-up visit note     Adam is a 82 year old male presents today for follow-up.    Adam was seen today for recheck.    Diagnoses and all orders for this visit:    Primary osteoarthritis involving multiple joints  -     triamcinolone (KENALOG-40) injection 20 mg  -     MO ARTHROCENTESIS ASPIR&/INJ INTERM JT/BURS W/US    Chronic pain of right wrist  -     triamcinolone (KENALOG-40) injection 20 mg  -     MO ARTHROCENTESIS ASPIR&/INJ INTERM JT/BURS W/US            After pros and cons were discussed including risk of infection, bleeding, skin changes including thinning, pigmentary alteration and scarring to name a few, right wrist injected as noted in the orders section. This was done with nontouch technique, under ultrasound guidance.  The patient tolerated the procedure well and had a brisk Marcaine effect.  The postinjection care was discussed.      Follow up in 3 months.    HPI    Adam Talamantes is a 82 year old male is here for follow-up of exacerbation pain right wrist, the background of osteoarthritis history of trigger digits, chronic renal impairment that precludes use of nonsteroidals. This has gone on for several weeks. Pain is described as sharp. It is worse with activity.  Her symptoms are moderately severe. The symptoms are progressive.  Associated findings  do not include: swelling, rash.  There is no associated recent fall or trauma.  Over-the-counter treatment to date has been without significant relief.    Further historical information, including ROS and limitation in activities as noted in the multidimensional health assessment questionnaire scanned in the EMR and in the assessment and plan section.         DETAILED EXAMINATION  05/10/22  :    Vitals:    05/10/22 0957   BP: 134/80   Pulse: 84   Weight: 73.1 kg (161 lb 1.6 oz)     Alert oriented.  Intubated on the phone line.  Head including the face is examined for malar rash, heliotropes, scarring, lupus pernio. Eyes examined for  redness such as in episcleritis/scleritis, periorbital lesions.   Neck/ Face examined for parotid gland swelling, range of motion of neck.  Left upper and lower and right upper and lower extremities examined for tenderness, swelling, warmth of the appendicular joints, range of motion, edema, rash.  Some of the important findings included: he does not have evidence of synovitis in the palpable joints of the upper extremities.  No significant deformities of the digits.  + Heberden nodes.  He is tender in the right wrist.  He does not have dactylitis of the digits.     Patient Active Problem List    Diagnosis Date Noted     ACE-inhibitor cough 2021     Priority: Medium     Type 2 diabetes mellitus with stage 3 chronic kidney disease, without long-term current use of insulin (H) 2020     Priority: Medium     Urinary incontinence 10/30/2019     Priority: Medium     Primary osteoarthritis involving multiple joints 2019     Priority: Medium     Esophageal reflux      Priority: Medium     Created by Conversion         Dyslipidemia      Priority: Medium     Created by [facility] Annotation: Dec 28 2007  3:23PM - Giulia Meridai2007:   Chol   281, HDL 39Started simvastatin 2011         CKD (chronic kidney disease) stage 3, GFR 30-59 ml/min (H)      Priority: Medium     Created by [facility] Annotation: Dec 28 2007  3:40PM - Bella Gold: Cr elevated at   1.7   since .Acute exacerbation with episode of nephrolithiasis complicated   by   urosepsis in 2009.         Constipation, unspecified constipation type      Priority: Medium     Coronary artery disease due to lipid rich plaque      Priority: Medium     Right lower quadrant abdominal pain      Priority: Medium     Colitis      Priority: Medium     Nephrolithiasis      Priority: Medium     Created by [facility] Annotation: Aug  4 2009 12:22PM - Theodore Merida:   Hospitalized   2009 with BL stent  placement.  Stent removal 8/2009.Calcium Stones.CT   5/2011:Calcified plaques L Renal pelvis,L lower pole 2 nonobstructing   calculi   of 2 mm.  Calculi medial R upper pole and R lower pole.Low dense R lower   pole   cortical lesions.1/2011 seen by Urology.BL pelvocaliectasis         Chronic Gout      Priority: Medium     Created by Conversion  [facility] Annotation: Dec 28 2007  3:23PM - Bella Gold: Uric acid   elevated   to 10 since at least 2007.Multiple joints affected-phalangeal,wrists,   ankles,   knees, ? shoulders.Started allopurinol 5/2009. Stopped HCTZ for BP   5/2009Multiple tophi noted since 10/2011Started colchicine 9/2009.  Replacement Utility updated for latest IMO load         BPH (benign prostatic hyperplasia) 07/19/2017     Priority: Medium     Chronic gout 07/19/2017     Priority: Medium     Essential hypertension with goal blood pressure less than 140/90 03/21/2017     Priority: Medium     Chronic right shoulder pain 02/21/2017     Priority: Medium     Generalized Osteoarthritis Of The Hand      Priority: Medium     Created by Conversion  Replacement Utility updated for latest IMO load         Bilateral chronic knee pain 07/20/2016     Priority: Medium     Cataracts, bilateral 02/17/2016     Priority: Medium     Elevated PSA 12/21/2015     Priority: Medium     Prostate nodule 12/21/2015     Priority: Medium     Pseudogout      Priority: Medium     Created by Conversion  [facility] Annotation: Aug 14 2009  8:35PM - Chucho Espino: Ankle   aspiration   8/2009 showed calcium pyrophospate crystals but not noted if intra or   extracellular.Seen by Rhematology 9/2009         Latent Tuberculosis      Priority: Medium     Created by [facility] Annotation: Dec 28 2007  3:40PM - Bella Gold: treated 9 mo in   '06         Lumbar Disc Degeneration      Priority: Medium     Created by Conversion         Insomnia      Priority: Medium     Created by Conversion         Past Surgical History:    Procedure Laterality Date     IR MISCELLANEOUS PROCEDURE  2009     IR MISCELLANEOUS PROCEDURE  2009     IR MISCELLANEOUS PROCEDURE  2009     IR MISCELLANEOUS PROCEDURE  2009     IR NEPHROURETERAL TUBE PLACEMENT BILATERAL  2009     IR URETER DILATION BILATERAL  2009     IR URETER DILATION BILATERAL  2009     KIDNEY STONE SURGERY       PICC  3/6/2016           Past Medical History:   Diagnosis Date     Acute blood loss anemia      Acute renal failure (H)      Anemia      Bacteremia      Cataract      Chronic gout      CKD (chronic kidney disease)     Stage 3     DM2 (diabetes mellitus, type 2) (H)      GERD (gastroesophageal reflux disease)      Glucose intolerance (impaired glucose tolerance)      Gout      History of nephrolithiasis      History of prostatitis 2017     Hyperlipidemia      Hypertension      Insomnia      Intestinal disaccharidase deficiencies and disaccharide malabsorption     Created by Conversion      Latent tuberculosis      Nephrolithiasis      Neuropathy      Nonspecific abnormal findings on radiological and other examination of skull and head     Created by [facility] Annotation: 2013 11:23PM - Giulia Meridai/10/2011:  severe stenosis both posterior cerebral arteries seen MRI/MRA done for  vertigo. No acute changes.e*     Osteoarthritis     wrist, knee, shoulder     Prostatitis      Rectal bleed      Trigger thumb      No Known Allergies  Current Outpatient Medications   Medication Sig Dispense Refill     ACETAMINOPHEN EXTRA STRENGTH 500 MG tablet TAKE 1 TABLET(500 MG) BY MOUTH EVERY 6 HOURS AS NEEDED 120 tablet 6     allopurinol (ZYLOPRIM) 100 MG tablet TAKE 2 TABLETS BY MOUTH EVERY  tablet 7     amLODIPine (NORVASC) 5 MG tablet Take 1 tablet (5 mg) by mouth in the morning. 90 tablet 3     aspirin (ASA) 81 MG EC tablet Take 1 tablet (81 mg) by mouth daily 90 tablet 3     colchicine 0.6 mg tablet [COLCHICINE 0.6 MG TABLET]  TAKE 1 TABLET BY MOUTH EVERY OTHER DAY 45 tablet 1      mg capsule [ MG CAPSULE] TAKE 1 CAPSULE BY MOUTH TWICE DAILY AS NEEDED FOR CONSTIPATION 60 capsule 11     DULoxetine (CYMBALTA) 20 MG capsule TAKE 1 CAPSULE(20 MG) BY MOUTH TWICE DAILY 60 capsule 5     gabapentin (NEURONTIN) 300 MG capsule TAKE 1 TO 2 CAPSULES BY MOUTH AT BEDTIME 180 capsule 2     HYDROcodone-acetaminophen (NORCO) 5-325 MG tablet Take 1 tablet by mouth daily as needed for severe pain [HYDROCODONE-ACETAMINOPHEN 5-325 MG PER TABLET] TAKE 1-2 TABLETS BY MOUTH AT BEDTIME AS NEEDED FOR chronic PAIN 30 tablet 0     isosorbide mononitrate (IMDUR) 30 MG 24 hr tablet Take 1 tablet (30 mg) by mouth daily 90 tablet 3     JANUVIA 50 MG tablet TAKE 1 TABLET(50 MG) BY MOUTH DAILY 90 tablet 3     loratadine (CLARITIN) 10 mg tablet [LORATADINE (CLARITIN) 10 MG TABLET] TAKE 1 TABLET BY MOUTH EVERY DAY 90 tablet 2     losartan (COZAAR) 50 MG tablet TAKE 1 TABLET(50 MG) BY MOUTH DAILY 90 tablet 3     meclizine (ANTIVERT) 12.5 MG tablet Take 1 tablet (12.5 mg) by mouth 3 times daily as needed for dizziness 60 tablet 6     metoprolol succinate ER (TOPROL-XL) 100 MG 24 hr tablet Take 1 tablet (100 mg) by mouth daily 90 tablet 3     nitroGLYcerin (NITROSTAT) 0.4 MG sublingual tablet PLACE 1 TABLET UNDER THE TONGUE EVERY 5 MINUTES AS NEEDED FOR CHEST PAIN. 25 tablet 6     omeprazole (PRILOSEC) 20 MG DR capsule TAKE 1 CAPSULE BY MOUTH DAILY 90 capsule 3     rosuvastatin (CRESTOR) 20 MG tablet [ROSUVASTATIN (CRESTOR) 20 MG TABLET] Take 1 tablet (20 mg total) by mouth at bedtime. 90 tablet 3     family history includes Cerebrovascular Disease in his daughter and father.  Social Connections: Socially Integrated     Frequency of Communication with Friends and Family: Once a week     Frequency of Social Gatherings with Friends and Family: Twice a week     Attends [employer] Services: 1 to 4 times per year     Active Member of Clubs or Organizations: No      Attends Club or Organization Meetings: 1 to 4 times per year     Marital Status:           WBC   Date Value Ref Range Status   06/22/2020 7.1 4.0 - 11.0 thou/uL Final     RBC Count   Date Value Ref Range Status   06/22/2020 4.28 (L) 4.40 - 6.20 mill/uL Final     Hemoglobin   Date Value Ref Range Status   06/22/2020 13.0 (L) 14.0 - 18.0 g/dL Final     Hematocrit   Date Value Ref Range Status   06/22/2020 39.5 (L) 40.0 - 54.0 % Final     MCV   Date Value Ref Range Status   06/22/2020 92 80 - 100 fL Final     MCH   Date Value Ref Range Status   06/22/2020 30.4 27.0 - 34.0 pg Final     Platelet Count   Date Value Ref Range Status   06/22/2020 202 140 - 440 thou/uL Final     % Lymphocytes   Date Value Ref Range Status   12/27/2018 24 20 - 40 % Final     AST   Date Value Ref Range Status   09/16/2019 24 0 - 40 U/L Final     ALT   Date Value Ref Range Status   01/23/2020 49 (H) 0 - 45 U/L Final     Albumin   Date Value Ref Range Status   01/23/2020 4.0 3.5 - 5.0 g/dL Final     Alkaline Phosphatase   Date Value Ref Range Status   09/16/2019 122 (H) 45 - 120 U/L Final     Creatinine   Date Value Ref Range Status   08/09/2021 1.59 (H) 0.70 - 1.30 mg/dL Final     GFR Estimate   Date Value Ref Range Status   08/09/2021 41 (L) >60 mL/min/1.73m2 Final     Comment:     As of July 11, 2021, eGFR is calculated by the CKD-EPI creatinine equation, without race adjustment. eGFR can be influenced by muscle mass, exercise, and diet. The reported eGFR is an estimation only and is only applicable if the renal function is stable.   06/22/2020 44 (L) >60 mL/min/1.73m2 Final     GFR Estimate If Black   Date Value Ref Range Status   06/22/2020 53 (L) >60 mL/min/1.73m2 Final

## 2022-05-31 DIAGNOSIS — M15.0 PRIMARY OSTEOARTHRITIS INVOLVING MULTIPLE JOINTS: ICD-10-CM

## 2022-05-31 RX ORDER — DULOXETIN HYDROCHLORIDE 20 MG/1
CAPSULE, DELAYED RELEASE ORAL
Qty: 60 CAPSULE | Refills: 1 | Status: SHIPPED | OUTPATIENT
Start: 2022-05-31 | End: 2022-08-08

## 2022-05-31 NOTE — TELEPHONE ENCOUNTER
Refill request from [facility] [place]    Medication: Duloxetine Dr 20mg  Last Refill: 5/4/22  Last OV: 5/10/22  Last lab: 1/23/20  Future OV: 8/16/22

## 2022-06-01 DIAGNOSIS — Z76.0 ENCOUNTER FOR MEDICATION REFILL: Primary | ICD-10-CM

## 2022-06-01 DIAGNOSIS — R52 SEVERE PAIN: ICD-10-CM

## 2022-06-01 RX ORDER — HYDROCODONE BITARTRATE AND ACETAMINOPHEN 5; 325 MG/1; MG/1
1 TABLET ORAL DAILY PRN
Qty: 30 TABLET | Refills: 0 | Status: SHIPPED | OUTPATIENT
Start: 2022-06-01 | End: 2022-07-06

## 2022-06-01 NOTE — TELEPHONE ENCOUNTER
Reason for Call:  Medication or medication refill:    Do you use a [facility]?  Name of the pharmacy and phone number for the current request[phone]    Name of the medication requested: Hydrocone    Other request: none    Can we leave a detailed message on this number? YES    Phone number patient can be reached at: Home number [phone] (home)    Best Time: anytime    Call taken on 6/1/2022 at 2:19 PM by Yulia White

## 2022-07-01 ENCOUNTER — TRANSFERRED RECORDS (OUTPATIENT)
Dept: MULTI SPECIALTY CLINIC | Facility: CLINIC | Age: 82
End: 2022-07-01

## 2022-07-01 LAB — RETINOPATHY: NORMAL

## 2022-07-05 DIAGNOSIS — R52 SEVERE PAIN: ICD-10-CM

## 2022-07-05 DIAGNOSIS — Z76.0 ENCOUNTER FOR MEDICATION REFILL: ICD-10-CM

## 2022-07-05 NOTE — TELEPHONE ENCOUNTER
Patient is requesting to schedule her procedure   Please reach out to her at # [phone] Refill Request  Did you contact pharmacy: No  Medication name: hydrocodone      Who prescribed the medication:[place]    Requested Pharmacy: [facility]  Is patient out of medication: No.  1 days left  Patient notified refills processed in 3 business days:  yes  Okay to leave a detailed message: yes

## 2022-07-05 NOTE — TELEPHONE ENCOUNTER
Reason for Call:  Medication or medication refill:    Do you use a [facility]?  Name of the pharmacy and phone number for the current request:  Pharmacy pended    Name of the medication requested: HYDROcodone-acetaminophen (NORCO) 5-325 MG tablet    Other request: Pt is completely out per daughter.  Pt last seen by PCP on 4/2022 and has upcoming appt with rheumatology on 7/19 and sees Dr. Espino in September19th.     Can we leave a detailed message on this number? YES    Phone number patient can be reached at: Cell number on file:    Telephone Information:   Mobile [phone]       Best Time: anytime    Call taken on 7/5/2022 at 10:17 AM by Evi Reynaga

## 2022-07-06 RX ORDER — HYDROCODONE BITARTRATE AND ACETAMINOPHEN 5; 325 MG/1; MG/1
1 TABLET ORAL DAILY PRN
Qty: 30 TABLET | Refills: 0 | Status: SHIPPED | OUTPATIENT
Start: 2022-07-06 | End: 2022-07-07

## 2022-07-14 ENCOUNTER — OFFICE VISIT (OUTPATIENT)
Dept: PHARMACY | Facility: CLINIC | Age: 82
End: 2022-07-14
Payer: COMMERCIAL

## 2022-07-14 VITALS
WEIGHT: 159 LBS | BODY MASS INDEX: 29.56 KG/M2 | DIASTOLIC BLOOD PRESSURE: 76 MMHG | HEART RATE: 73 BPM | OXYGEN SATURATION: 96 % | SYSTOLIC BLOOD PRESSURE: 132 MMHG

## 2022-07-14 DIAGNOSIS — K59.00 CONSTIPATION, UNSPECIFIED CONSTIPATION TYPE: ICD-10-CM

## 2022-07-14 DIAGNOSIS — K21.9 GASTROESOPHAGEAL REFLUX DISEASE, UNSPECIFIED WHETHER ESOPHAGITIS PRESENT: ICD-10-CM

## 2022-07-14 DIAGNOSIS — I25.10 CORONARY ARTERY DISEASE DUE TO LIPID RICH PLAQUE: ICD-10-CM

## 2022-07-14 DIAGNOSIS — N18.30 TYPE 2 DIABETES MELLITUS WITH STAGE 3 CHRONIC KIDNEY DISEASE, WITHOUT LONG-TERM CURRENT USE OF INSULIN, UNSPECIFIED WHETHER STAGE 3A OR 3B CKD (H): Primary | ICD-10-CM

## 2022-07-14 DIAGNOSIS — M1A.00X0 CHRONIC GOUTY ARTHROPATHY: ICD-10-CM

## 2022-07-14 DIAGNOSIS — I25.83 CORONARY ARTERY DISEASE DUE TO LIPID RICH PLAQUE: ICD-10-CM

## 2022-07-14 DIAGNOSIS — E11.22 TYPE 2 DIABETES MELLITUS WITH STAGE 3 CHRONIC KIDNEY DISEASE, WITHOUT LONG-TERM CURRENT USE OF INSULIN, UNSPECIFIED WHETHER STAGE 3A OR 3B CKD (H): Primary | ICD-10-CM

## 2022-07-14 DIAGNOSIS — I10 ESSENTIAL HYPERTENSION WITH GOAL BLOOD PRESSURE LESS THAN 140/90: ICD-10-CM

## 2022-07-14 DIAGNOSIS — Z76.0 ENCOUNTER FOR MEDICATION REFILL: Primary | ICD-10-CM

## 2022-07-14 DIAGNOSIS — M15.0 PRIMARY OSTEOARTHRITIS INVOLVING MULTIPLE JOINTS: ICD-10-CM

## 2022-07-14 PROCEDURE — 99605 MTMS BY PHARM NP 15 MIN: CPT | Performed by: PHARMACIST

## 2022-07-14 RX ORDER — DOCUSATE SODIUM 100 MG/1
100 CAPSULE, LIQUID FILLED ORAL 2 TIMES DAILY PRN
Qty: 60 CAPSULE | Refills: 11 | Status: SHIPPED | OUTPATIENT
Start: 2022-07-14 | End: 2022-07-14

## 2022-07-14 RX ORDER — DOCUSATE SODIUM 100 MG/1
CAPSULE, LIQUID FILLED ORAL
Qty: 180 CAPSULE | Refills: 3 | Status: ON HOLD | OUTPATIENT
Start: 2022-07-14 | End: 2023-02-13

## 2022-07-14 NOTE — LETTER
July 14, 2022  Adam Talamantes  [address]  [place] [address]    Dear Mr[facility]antesSHAYY [facility]        Thank you for talking with me on Jul 14, 2022 about your health and medications. As a follow-up to our conversation, I have included two documents:      1. Your Recommended To-Do List has steps you should take to get the best results from your medications.  2. Your Medication List will help you keep track of your medications and how to take them.    If you want to talk about these documents, please call Pushpa Philip PharmD at phone: [phone], Monday-Friday 8-4:30pm.    I look forward to working with you and your doctors to make sure your medications work well for you.    Sincerely,    Pushpa Philip, PharmD  [facility] Pharmacist, [facility]

## 2022-07-14 NOTE — LETTER
"Recommended To-Do List      Prepared on: 7/14/2022     You can get the best results from your medications by completing the items on this \"To-Do List.\"      Bring your To-Do List when you go to your doctor. And, share it with your family or caregivers.    My To-Do List:  What we talked about: What I should do:    Reviewed the importance of taking medications daily as they are prescribed.   Make sure that you are appropriately taking all medications, especially your blood pressure medicaitons very day.               "

## 2022-07-14 NOTE — LETTER
_  Medication List        Prepared on: 7/14/2022     Bring your Medication List when you go to the doctor, hospital, or   emergency room. And, share it with your family or caregivers.     Note any changes to how you take your medications.  Cross out medications when you no longer use them.    Medication How I take it Why I use it Prescriber   ACETAMINOPHEN EXTRA STRENGTH 500 MG tablet TAKE 1 TABLET(500 MG) BY MOUTH EVERY 6 HOURS AS NEEDED Degeneration of Lumbar or Lumbosacral Intervertebral Disc; Encounter for medication refill Chucho Espino MD   allopurinol (ZYLOPRIM) 100 MG tablet TAKE 2 TABLETS BY MOUTH EVERY DAY Chronic gout of multiple sites, unspecified cause Chucho Espino MD   amLODIPine (NORVASC) 5 MG tablet Take 1 tablet (5 mg) by mouth in the morning. Essential hypertension with goal blood pressure less than 140/90 Chucho Espino MD   aspirin (ASA) 81 MG EC tablet Take 1 tablet (81 mg) by mouth daily Coronary artery disease involving native coronary artery of native heart without angina pectoris Chucho Espino MD   colchicine 0.6 mg tablet [COLCHICINE 0.6 MG TABLET] TAKE 1 TABLET BY MOUTH EVERY OTHER DAY Chronic gout of multiple sites, unspecified cause Theodore Merida MD   docusate sodium (DOK) 100 MG capsule Take 1 capsule (100 mg) by mouth 2 times daily as needed for constipation Constipation, unspecified constipation type Chucho Espino MD   DULoxetine (CYMBALTA) 20 MG capsule TAKE 1 CAPSULE(20 MG) BY MOUTH TWICE DAILY Primary osteoarthritis involving multiple joints ROMI Huff   gabapentin (NEURONTIN) 300 MG capsule TAKE 1 TO 2 CAPSULES BY MOUTH AT BEDTIME Lumbago Chucho Espino MD   HYDROcodone-acetaminophen (NORCO) 5-325 MG tablet Take 1 tablet by mouth daily as needed for severe pain Severe pain Xu Braun MD   isosorbide mononitrate (IMDUR) 30 MG 24 hr tablet Take 1 tablet (30 mg) by mouth daily Coronary artery disease involving native coronary artery of native heart without angina  pectoris Chucho Espino MD   JANUVIA 50 MG tablet TAKE 1 TABLET(50 MG) BY MOUTH DAILY Diabetes Chucho Espino MD   loratadine (CLARITIN) 10 mg tablet [LORATADINE (CLARITIN) 10 MG TABLET] TAKE 1 TABLET BY MOUTH EVERY DAY Chronic Rhinitis Chucho Espino MD   losartan (COZAAR) 50 MG tablet TAKE 1 TABLET(50 MG) BY MOUTH DAILY Type 2 diabetes mellitus with stage 3 chronic kidney disease, without long-term current use of insulin (H); ACE-inhibitor cough; Encounter for medication refill Ute Nuñez MD   meclizine (ANTIVERT) 12.5 MG tablet Take 1 tablet (12.5 mg) by mouth 3 times daily as needed for dizziness Dizziness Chucho Espino MD   metoprolol succinate ER (TOPROL-XL) 100 MG 24 hr tablet Take 1 tablet (100 mg) by mouth daily Coronary artery disease involving native coronary artery of native heart without angina pectoris Chucho Espino MD   nitroGLYcerin (NITROSTAT) 0.4 MG sublingual tablet PLACE 1 TABLET UNDER THE TONGUE EVERY 5 MINUTES AS NEEDED FOR CHEST PAIN. Coronary artery disease involving native coronary artery of native heart without angina pectoris Chucho Espino MD   omeprazole (PRILOSEC) 20 MG DR capsule TAKE 1 CAPSULE BY MOUTH DAILY Gastroesophageal Reflux Disease Chucho Espino MD   rosuvastatin (CRESTOR) 20 MG tablet TAKE 1 TABLET(20 MG) BY MOUTH AT BEDTIME Dyslipidemia Lynne George MD         Add new medications, over-the-counter drugs, herbals, vitamins, or  minerals in the blank rows below.    Medication How I take it Why I use it Prescriber                          Allergies:      No Known Allergies        Side effects I have had:               Other Information:              My notes and questions:

## 2022-07-14 NOTE — PROGRESS NOTES
Medication Therapy Management (MTM) Encounter    ASSESSMENT:                            Medication Adherence/Access: See below for considerations    1. Type 2 diabetes mellitus with stage 3 chronic kidney disease, without long-term current use of insulin, unspecified whether stage 3a or 3b CKD (H)  Last A1c at goal of less than 7.5 % considering patient's age and comorbidities.  Recommend continuation of current medication without change.  Recommend rechecking annual labs at next PCP visit, including A1c, lipids, microalbumin, and BMP.    2. Essential hypertension with goal blood pressure less than 140/90  BP at goal of <140/90 though concerned that patient has not consistently taking blood pressure medications as prescribed.  MTM recommends consistent use of metoprolol, amlodipine, and losartan- highlighted medications on patient's AVS for patients daughter to help with medication adherence.  Could consider dose increase of amlodipine in the future if BP is elevated.    Recommended patient continue to monitor at home blood pressures and bring log to follow-up visit.    3. Coronary artery disease due to lipid rich plaque  Stable, appropriately taking aspirin, beta-blocker, ARB, statin, and isosorbide as prescribed.  Reviewed with patient again today appropriate use for nitroglycerin tablets if needed.    4. Gastroesophageal reflux disease, unspecified whether esophagitis present  Stable on current therapy, no changes recommended.    5. Chronic Gout  Patient to continue allopurinol 2 tablets daily as prescribed.  Could consider trial off of colchicine and reassess gout control at next PCP visit.    6. Primary osteoarthritis involving multiple joints  Stable on current therapy, managed by PCP and rheumatologist.    7. Constipation  Recommend patient resume stool softener, sending refill today.  Additionally, recommended patient not take prescription medications that are not prescribed for him.    PLAN:                             1.  Sent refill for patient to resume use of docusate sodium 100 mg  2.  Medication adherence discrepancies identified today: Patient to take metoprolol succinate, allopurinol, losartan, amlodipine, and duloxetine as prescribed  3.  Recommended patient only use meclizine as needed for dizziness, not to be taken on a scheduled basis    Follow-up: Return in about 2 months (around 9/12/2022) for PCP.   MTM PRN; Rheum 8/16  SUBJECTIVE/OBJECTIVE:                          Adam Talamantes is a 81 year old male coming in for an initial visit. He was referred to me from insurance recruitment. Patient was accompanied by PCA.  (ID# Chucho FV) was used during today's visit.      Reason for visit: MTM initial visit (recruitement patient per insurance)    Allergies/ADRs: Reviewed in chart  Past Medical History: Reviewed in chart  Tobacco: He reports that he quit smoking about 22 years ago. He has quit using smokeless tobacco.  Alcohol: none    Medication Adherence/Access: Patient has medications managed by his daughter. His daughter helps by setting up a pillbox for him.  - Patient takes what is given to him, he does not know how he is taking them.   - Brings in medication vials today, does not bring in the pillbox.  - Denies any missed doses.     Type 2 Diabetes:  Currently taking Januvia 50 mg daily. Patient is not experiencing side effects.  Blood sugar monitoring: Does not check blood sugar at home.  Eye Exam: Reports completed  Symptoms of low blood sugar? None   Symptoms of high blood sugar? none  Aspirin: Taking 81mg daily and denies side effects  Statin: Yes: Rosuvastatin 20 mg daily  ACEi/ARB: Yes: Losartan 50 mg daily.   Lab Results   Component Value Date    A1C 7.0 02/24/2022    A1C 7.7 12/06/2021    A1C 7.5 08/03/2021     Microalbumin Urine mg/dL   Date Value Ref Range Status   05/14/2019 5.33 (H) 0.00 - 1.99 mg/dL Final      Creatinine   Date Value Ref Range Status   08/09/2021 1.59 (H) 0.70 - 1.30  mg/dL Final      Ref. Range 8/9/2021 18:18   GFR Estimate Latest Ref Range: >60 mL/min/1.73m2 41 (L)     Hypertension: Amlodipine 5 mg daily (patient reports taking though many extra tablets present today), losartan 50 mg daily, metoprolol succinate 100 mg daily.   - Patient also has meclizine to be taken as needed (patient not sure how he is taking this). Reports symptoms of dizziness only occasionally.   - Reports that he checks at home BP - reports 140-160/100.   - Reports already took BP medications today.   - Aleksey any edema.   Potassium   Date Value Ref Range Status   08/09/2021 5.3 (H) 3.5 - 5.0 mmol/L Final     BP Readings from Last 3 Encounters:   07/14/22 132/76   05/10/22 134/80   04/11/22 126/80      Pulse Readings from Last 3 Encounters:   07/14/22 73   05/10/22 84   04/11/22 96     Wt Readings from Last 3 Encounters:   07/14/22 159 lb (72.1 kg)   05/10/22 161 lb 1.6 oz (73.1 kg)   04/11/22 164 lb (74.4 kg)     CAD/angina/hyperlipidemia: Losartan 50 mg daily, metoprolol succinate 100 mg daily, rosuvastatin 20 mg daily, aspirin 81 mg daily, isosorbide mononitrate ER 30 mg daily, and nitroglycerin sublingual tablets 0.4 mg as needed (did not bring with him today, left at home).   - States that he tries not to take the nitroglycerin but knows how to take it if needed. Been more than a month since he needed this.   - Denies any recent symptoms of chest pain or shortness of breath   Recent Labs   Lab Test 12/06/21  0957 03/29/21  1039   CHOL 171 218*   HDL 36* 40   LDL 78 111   TRIG 287* 333*     GERD: Omeprazole 20 mg daily in the morning. Patient states that he was told to take it in the morning, no issues with heartburn lately.     Gout: Taking Allopurinol 200 mg daily, colchicine 0.6 mg every other day.   - Patient wondering if he needs to continue to take colchicine, states that he is almost running out.      Pain: Taking gabapentin 300 mg 1-2 capsules at bedtime daily, acetaminophen 500 mg at least  once-twice daily, hydrocodone/APAP 5/325 daily for lower back pain before bed, duloxetine 20 mg twice daily.   - Reports that if his pain is tolerable he takes 1 gabapentin if severe he will take 2 gabapentin. Thinks that this med is helpful.  Prescribed by rheumatologist, Dr. Gold. Receiving steroid injections.     Constipation: ran out of this, not currently taking. States that he has been taking his wife medication as needed.  Pre medication list, previously taking docusate sodium 100 mg twice daily prn.    Allergies: loratidine 10 mg daily, reports this is helping him, no concerns today.     Today's Vitals: /76   Pulse 73   Wt 159 lb (72.1 kg)   SpO2 96%   BMI 29.56 kg/m    ----------------  I spent 45 minutes with this patient today. MAP completed today. All changes were made via collaborative practice agreement with Chucho Espino MD. A copy of the visit note was provided to the patient's primary care provider.    The patient was given a summary of these recommendations.     Pushpa Philip, PharmD, BCACP  Medication Therapy Management Pharmacist     Medication Therapy Recommendations  No medication therapy recommendations to display

## 2022-08-02 DIAGNOSIS — R52 SEVERE PAIN: ICD-10-CM

## 2022-08-02 DIAGNOSIS — Z76.0 ENCOUNTER FOR MEDICATION REFILL: Primary | ICD-10-CM

## 2022-08-02 RX ORDER — HYDROCODONE BITARTRATE AND ACETAMINOPHEN 5; 325 MG/1; MG/1
1 TABLET ORAL DAILY PRN
Qty: 30 TABLET | Refills: 0 | Status: SHIPPED | OUTPATIENT
Start: 2022-08-02 | End: 2022-09-07

## 2022-08-02 RX ORDER — OFLOXACIN 3 MG/ML
SOLUTION/ DROPS OPHTHALMIC
COMMUNITY
Start: 2022-03-07 | End: 2023-01-11

## 2022-08-02 RX ORDER — DULOXETIN HYDROCHLORIDE 20 MG/1
20 CAPSULE, DELAYED RELEASE ORAL
COMMUNITY
Start: 2021-03-10 | End: 2022-10-12

## 2022-08-02 RX ORDER — KETOROLAC TROMETHAMINE 5 MG/ML
SOLUTION OPHTHALMIC
COMMUNITY
Start: 2022-03-07 | End: 2023-01-11

## 2022-08-02 RX ORDER — NITROGLYCERIN 0.4 MG/1
0.4 TABLET SUBLINGUAL
COMMUNITY
Start: 2021-03-29 | End: 2022-10-12

## 2022-08-02 RX ORDER — PREDNISOLONE ACETATE 10 MG/ML
SUSPENSION/ DROPS OPHTHALMIC
COMMUNITY
Start: 2022-03-07 | End: 2023-01-11

## 2022-08-02 RX ORDER — TRIAMCINOLONE ACETONIDE 1 MG/G
CREAM TOPICAL
COMMUNITY
Start: 2020-09-28 | End: 2022-09-12

## 2022-08-02 RX ORDER — POLYETHYLENE GLYCOL 400 AND PROPYLENE GLYCOL 4; 3 MG/ML; MG/ML
SOLUTION/ DROPS OPHTHALMIC
COMMUNITY
Start: 2021-11-02 | End: 2023-01-11

## 2022-08-02 RX ORDER — DIPHENHYDRAMINE HYDROCHLORIDE 25 MG/1
CAPSULE ORAL
COMMUNITY
Start: 2021-09-09 | End: 2023-01-11

## 2022-08-02 RX ORDER — ACETAMINOPHEN 500 MG
500 TABLET ORAL
COMMUNITY
Start: 2021-03-29 | End: 2022-10-12

## 2022-08-02 NOTE — TELEPHONE ENCOUNTER
Medication Question or Refill    Contacts       Type Contact Phone/Fax    08/02/2022 10:13 AM CDT Phone (Incoming) Dimitri Talamantes (Emergency Contact) [phone]          What medication are you calling about (include dose and sig)?: HYDROcodone-acetaminophen (NORCO) 5-325 MG tablet    Controlled Substance Agreement on file:   CSA -- Patient Level:    Controlled Substance Agreement - Opioid - Scan on 4/12/2022  8:47 AM  Controlled Substance Agreement - Opioid - Scan on 8/4/2021  7:24 AM       Who prescribed the medication?: Dr. Braun    Do you need a refill? Yes: HYDROcodone-acetaminophen (NORCO) 5-325 MG tablet    When did you use the medication last? Patient is all out of medication    Patient offered an appointment? No    Do you have any questions or concerns?  No    Preferred Pharmacy:   [id number] [address] AT [facility] & [address] [address]  Phone: [phone] Fax: [phone]      Okay to leave a detailed message?: Yes at Cell number on file:    Telephone Information:   Mobile [phone]

## 2022-08-05 DIAGNOSIS — Z76.0 ENCOUNTER FOR MEDICATION REFILL: Primary | ICD-10-CM

## 2022-08-05 DIAGNOSIS — M54.50 LUMBAGO: ICD-10-CM

## 2022-08-05 RX ORDER — GABAPENTIN 300 MG/1
CAPSULE ORAL
Qty: 180 CAPSULE | Refills: 3 | Status: ON HOLD | OUTPATIENT
Start: 2022-08-05 | End: 2023-02-04

## 2022-08-07 DIAGNOSIS — M15.0 PRIMARY OSTEOARTHRITIS INVOLVING MULTIPLE JOINTS: ICD-10-CM

## 2022-08-08 RX ORDER — DULOXETIN HYDROCHLORIDE 20 MG/1
CAPSULE, DELAYED RELEASE ORAL
Qty: 60 CAPSULE | Refills: 1 | Status: SHIPPED | OUTPATIENT
Start: 2022-08-08 | End: 2022-08-16

## 2022-08-09 ENCOUNTER — TELEPHONE (OUTPATIENT)
Dept: FAMILY MEDICINE | Facility: CLINIC | Age: 82
End: 2022-08-09

## 2022-08-09 NOTE — TELEPHONE ENCOUNTER
Forms/Letter Request    Type of form/letter: Disability Parking Certificate Form   Reapply for replacement as previous has      Have you been seen for this request: N/A    Do we have the form/letter: Yes: daughter dropped off in clinic    When is form/letter needed by: when completed    How would you like the form/letter returned:     Patient Notified form requests are processed in 3-5 business days:Yes    Okay to leave a detailed message?: Yes at Cell number on file:    Telephone Information:   Mobile [phone]

## 2022-08-16 ENCOUNTER — OFFICE VISIT (OUTPATIENT)
Dept: RHEUMATOLOGY | Facility: CLINIC | Age: 82
End: 2022-08-16
Payer: COMMERCIAL

## 2022-08-16 VITALS
HEART RATE: 84 BPM | WEIGHT: 159 LBS | HEIGHT: 62 IN | DIASTOLIC BLOOD PRESSURE: 70 MMHG | SYSTOLIC BLOOD PRESSURE: 110 MMHG | BODY MASS INDEX: 29.26 KG/M2

## 2022-08-16 DIAGNOSIS — G89.29 CHRONIC PAIN OF RIGHT WRIST: ICD-10-CM

## 2022-08-16 DIAGNOSIS — M25.531 CHRONIC PAIN OF RIGHT WRIST: ICD-10-CM

## 2022-08-16 DIAGNOSIS — N18.31 STAGE 3A CHRONIC KIDNEY DISEASE (H): ICD-10-CM

## 2022-08-16 DIAGNOSIS — M15.0 PRIMARY OSTEOARTHRITIS INVOLVING MULTIPLE JOINTS: Primary | ICD-10-CM

## 2022-08-16 DIAGNOSIS — M25.50 POLYARTHRALGIA: ICD-10-CM

## 2022-08-16 DIAGNOSIS — M54.2 CERVICALGIA: ICD-10-CM

## 2022-08-16 PROCEDURE — 20605 DRAIN/INJ JOINT/BURSA W/O US: CPT | Mod: RT | Performed by: INTERNAL MEDICINE

## 2022-08-16 PROCEDURE — 99214 OFFICE O/P EST MOD 30 MIN: CPT | Mod: 25 | Performed by: INTERNAL MEDICINE

## 2022-08-16 RX ORDER — TRIAMCINOLONE ACETONIDE 40 MG/ML
20 INJECTION, SUSPENSION INTRA-ARTICULAR; INTRAMUSCULAR ONCE
Status: COMPLETED | OUTPATIENT
Start: 2022-08-16 | End: 2022-08-16

## 2022-08-16 RX ORDER — DULOXETIN HYDROCHLORIDE 20 MG/1
20 CAPSULE, DELAYED RELEASE ORAL 2 TIMES DAILY
Qty: 60 CAPSULE | Refills: 1
Start: 2022-08-16 | End: 2022-11-23

## 2022-08-16 RX ADMIN — TRIAMCINOLONE ACETONIDE 20 MG: 40 INJECTION, SUSPENSION INTRA-ARTICULAR; INTRAMUSCULAR at 14:53

## 2022-08-16 NOTE — PROGRESS NOTES
Rheumatology follow-up visit note     Adam is a 82 year old male presents today for follow-up.    Adam was seen today for recheck.    Diagnoses and all orders for this visit:    Primary osteoarthritis involving multiple joints  -     DULoxetine (CYMBALTA) 20 MG capsule; Take 1 capsule (20 mg) by mouth 2 times daily  -     ALT; Future  -     Albumin level; Future  -     CBC with platelets; Future  -     Creatinine; Future  -     triamcinolone (KENALOG-40) injection 20 mg  -     MS ARTHROCENTESIS ASPIR&/INJ INTERM JT/BURS W/US  -     Spine  Referral; Future    Stage 3a chronic kidney disease (H)    Polyarthralgia  -     ALT; Future  -     Albumin level; Future  -     CBC with platelets; Future  -     Creatinine; Future    Chronic pain of right wrist  -     triamcinolone (KENALOG-40) injection 20 mg  -     MS ARTHROCENTESIS ASPIR&/INJ INTERM JT/BURS W/US    Cervicalgia  -     Spine  Referral; Future            After pros and cons were discussed including risk of infection, bleeding, skin changes including thinning, pigmentary alteration and scarring to name a few, right wrist injected as noted in the orders section. This was done with nontouch technique.  The patient tolerated the procedure well and had a brisk Marcaine effect.  The postinjection care was discussed.      Follow up in 6 months.    HPI    Adam Talamantes is a 82 year old male is here for follow-up.  He has polyarthralgia in association with osteoarthritis.  He has advanced degenerative joint disease in the right wrist joint MRI of 2019 reviewed today.  He noted that after the previous with this injection into the wrist there was significant improvement until recently when he has had further worsening.  It appears that he is taking duloxetine 20 mg twice daily.  He has noted moderate severity pain in the right wrist.  He is also noted pain in his neck that radiates down his left.  He has not been to the spine clinic.  He has renal  Currently on Loestrin 1-20  Has gained weight since starting this medication and would like to switch to an alternative, specifically requesting a patch  Has used Depo-Provera in the past which worked well although she has concerns about decreased fertility and does not want to continue with this   "impairment not a candidate for nonsteroidals. Associated findings  do not include: swelling, rash.  There is no associated recent fall or trauma.  Over-the-counter treatment to date has been without significant relief.       DETAILED EXAMINATION  22  :    Vitals:    22 0903   BP: 110/70   BP Location: Right arm   Patient Position: Sitting   Cuff Size: Adult Regular   Pulse: 84   Weight: 72.1 kg (159 lb)   Height: 1.562 m (5' 1.5\")     Alert oriented. Head including the face is examined for malar rash, heliotropes, scarring, lupus pernio. Eyes examined for redness such as in episcleritis/scleritis, periorbital lesions.   Neck/ Face examined for parotid gland swelling, range of motion of neck.  Left upper and lower and right upper and lower extremities examined for tenderness, swelling, warmth of the appendicular joints, range of motion, edema, rash.  Some of the important findings included: he does not have evidence of synovitis in the palpable joints of the upper extremities.  He has tenderness at his right wrist, reduced range of motion there, absent on the left side.  No significant deformities of the digits.  + Heberden nodes.  Range of motion of the shoulders  show full abduction.  No JLT effusion or warmth of the knees.  he does not have dactylitis of the digits.     Patient Active Problem List    Diagnosis Date Noted     ACE-inhibitor cough 2021     Priority: Medium     Type 2 diabetes mellitus with stage 3 chronic kidney disease, without long-term current use of insulin (H) 2020     Priority: Medium     Urinary incontinence 10/30/2019     Priority: Medium     Primary osteoarthritis involving multiple joints 2019     Priority: Medium     Esophageal reflux      Priority: Medium     Created by Conversion         Dyslipidemia      Priority: Medium     Created by Conversion  [facility] Annotation: Dec 28 2007  3:23PM - Theodore Merida: 2007:   Chol   281, HDL 39Started " simvastatin 5/2011         CKD (chronic kidney disease) stage 3, GFR 30-59 ml/min (H)      Priority: Medium     Created by [facility] Annotation: Dec 28 2007  3:40PM - Bella Gold: Cr elevated at   1.7   since 2006.Acute exacerbation with episode of nephrolithiasis complicated   by   urosepsis in 8/2009.         Constipation, unspecified constipation type      Priority: Medium     Coronary artery disease due to lipid rich plaque      Priority: Medium     Right lower quadrant abdominal pain      Priority: Medium     Colitis      Priority: Medium     Nephrolithiasis      Priority: Medium     Created by [facility] Annotation: Aug  4 2009 12:22PM - Theodore Merida:   Hospitalized   7/2009 with BL stent placement.  Stent removal 8/2009.Calcium Stones.CT   5/2011:Calcified plaques L Renal pelvis,L lower pole 2 nonobstructing   calculi   of 2 mm.  Calculi medial R upper pole and R lower pole.Low dense R lower   pole   cortical lesions.1/2011 seen by Urology.BL pelvocaliectasis         Chronic Gout      Priority: Medium     Created by [facility] Annotation: Dec 28 2007  3:23PM - Bella Gold: Uric acid   elevated   to 10 since at least 2007.Multiple joints affected-phalangeal,wrists,   ankles,   knees, ? shoulders.Started allopurinol 5/2009. Stopped HCTZ for BP   5/2009Multiple tophi noted since 10/2011Started colchicine 9/2009.  Replacement Utility updated for latest IMO load         BPH (benign prostatic hyperplasia) 07/19/2017     Priority: Medium     Chronic gout 07/19/2017     Priority: Medium     Essential hypertension with goal blood pressure less than 140/90 03/21/2017     Priority: Medium     Chronic right shoulder pain 02/21/2017     Priority: Medium     Generalized Osteoarthritis Of The Hand      Priority: Medium     Created by Conversion  Replacement Utility updated for latest IMO load         Bilateral chronic knee pain 07/20/2016     Priority: Medium     Cataracts,  bilateral 02/17/2016     Priority: Medium     Elevated PSA 12/21/2015     Priority: Medium     Prostate nodule 12/21/2015     Priority: Medium     Pseudogout      Priority: Medium     Created by Conversion  [facility] Annotation: Aug 14 2009  8:35PM - Chucho Espino: Ankle   aspiration   8/2009 showed calcium pyrophospate crystals but not noted if intra or   extracellular.Seen by Rhematology 9/2009         Latent Tuberculosis      Priority: Medium     Created by Conversion  [facility] Annotation: Dec 28 2007  3:40PM - Bella Gold: treated 9 mo in   '06         Lumbar Disc Degeneration      Priority: Medium     Created by Conversion         Insomnia      Priority: Medium     Created by Conversion         Past Surgical History:   Procedure Laterality Date     IR MISCELLANEOUS PROCEDURE  8/18/2009     IR MISCELLANEOUS PROCEDURE  8/18/2009     IR MISCELLANEOUS PROCEDURE  9/1/2009     IR MISCELLANEOUS PROCEDURE  9/1/2009     IR NEPHROURETERAL TUBE PLACEMENT BILATERAL  8/18/2009     IR URETER DILATION BILATERAL  8/18/2009     IR URETER DILATION BILATERAL  9/1/2009     KIDNEY STONE SURGERY       PICC  3/6/2016           Past Medical History:   Diagnosis Date     Acute blood loss anemia      Acute renal failure (H)      Anemia      Bacteremia      Cataract      Chronic gout      CKD (chronic kidney disease)     Stage 3     DM2 (diabetes mellitus, type 2) (H)      GERD (gastroesophageal reflux disease)      Glucose intolerance (impaired glucose tolerance)      Gout      History of nephrolithiasis      History of prostatitis 7/19/2017     Hyperlipidemia      Hypertension      Insomnia      Intestinal disaccharidase deficiencies and disaccharide malabsorption     Created by Conversion      Latent tuberculosis      Nephrolithiasis      Neuropathy      Nonspecific abnormal findings on radiological and other examination of skull and head     Created by Conversion [facility] Annotation: Jun 23 2013 11:23PM - Theodore Merida:  5/10/2011:  severe stenosis both posterior cerebral arteries seen MRI/MRA done for  vertigo. No acute changes.e*     Osteoarthritis     wrist, knee, shoulder     Prostatitis      Rectal bleed      Trigger thumb      No Known Allergies  Current Outpatient Medications   Medication Sig Dispense Refill     acetaminophen (TYLENOL) 500 MG tablet Take 500 mg by mouth       ACETAMINOPHEN EXTRA STRENGTH 500 MG tablet TAKE 1 TABLET(500 MG) BY MOUTH EVERY 6 HOURS AS NEEDED 120 tablet 6     allopurinol (ZYLOPRIM) 100 MG tablet TAKE 2 TABLETS BY MOUTH EVERY  tablet 7     amLODIPine (NORVASC) 5 MG tablet Take 1 tablet (5 mg) by mouth in the morning. 90 tablet 3     aspirin (ASA) 81 MG EC tablet Take 1 tablet (81 mg) by mouth daily 90 tablet 3     BANOPHEN 25 MG capsule        colchicine 0.6 mg tablet [COLCHICINE 0.6 MG TABLET] TAKE 1 TABLET BY MOUTH EVERY OTHER DAY 45 tablet 1     docusate sodium (COLACE) 100 MG capsule TAKE 1 CAPSULE(100 MG) BY MOUTH TWICE DAILY AS NEEDED FOR CONSTIPATION 180 capsule 3     DULoxetine (CYMBALTA) 20 MG capsule TAKE 1 CAPSULE(20 MG) BY MOUTH TWICE DAILY 60 capsule 1     gabapentin (NEURONTIN) 300 MG capsule TAKE 1 TO 2 CAPSULES BY MOUTH AT BEDTIME 180 capsule 3     HYDROcodone-acetaminophen (NORCO) 5-325 MG tablet Take 1 tablet by mouth daily as needed for severe pain 30 tablet 0     isosorbide mononitrate (IMDUR) 30 MG 24 hr tablet Take 1 tablet (30 mg) by mouth daily 90 tablet 3     JANUVIA 50 MG tablet TAKE 1 TABLET(50 MG) BY MOUTH DAILY 90 tablet 3     ketorolac (ACULAR) 0.5 % ophthalmic solution INSTILL 1 DROP IN LEFT EYE FOUR TIMES DAILY STARTING 1 DAY BEFORE SURGERY. CONTINUE USE 4 TIMES DAILY UNTIL ALL TAKEN       loratadine (CLARITIN) 10 mg tablet [LORATADINE (CLARITIN) 10 MG TABLET] TAKE 1 TABLET BY MOUTH EVERY DAY 90 tablet 2     losartan (COZAAR) 50 MG tablet TAKE 1 TABLET(50 MG) BY MOUTH DAILY 90 tablet 3     meclizine (ANTIVERT) 12.5 MG tablet Take 1 tablet (12.5 mg) by mouth 3  times daily as needed for dizziness 60 tablet 6     metoprolol succinate ER (TOPROL-XL) 100 MG 24 hr tablet Take 1 tablet (100 mg) by mouth daily 90 tablet 3     nitroGLYcerin (NITROSTAT) 0.4 MG sublingual tablet Place 0.4 mg under the tongue       nitroGLYcerin (NITROSTAT) 0.4 MG sublingual tablet PLACE 1 TABLET UNDER THE TONGUE EVERY 5 MINUTES AS NEEDED FOR CHEST PAIN. 25 tablet 6     ofloxacin (OCUFLOX) 0.3 % ophthalmic solution INSTILL 1 DROP IN LEFT EYE FOUR TIMES DAILY STARTING 1 DAY BEFORE SURGERY. CONTINUE USE 4 TIMES DAILY UNTIL ALL TAKEN       omeprazole (PRILOSEC) 20 MG DR capsule TAKE 1 CAPSULE BY MOUTH DAILY 90 capsule 3     prednisoLONE acetate (PRED FORTE) 1 % ophthalmic suspension SHAKE LIQUID AND INSTILL 1 DROP IN LEFT EYE FOUR TIMES DAILY STARTING 1 DAY BEFORE SURGERY. CONTINUE USE 4 TIMES DAILY UNTIL ALL TAKEN       rosuvastatin (CRESTOR) 20 MG tablet TAKE 1 TABLET(20 MG) BY MOUTH AT BEDTIME 90 tablet 0     SYSTANE ULTRA 0.4-0.3 % SOLN ophthalmic solution INSTILL ONE DROP RAO BOTH EYES FOUR TIMES DAILY       triamcinolone (KENALOG) 0.1 % external cream        DULoxetine (CYMBALTA) 20 MG capsule Take 20 mg by mouth (Patient not taking: Reported on 8/16/2022)       family history includes Cerebrovascular Disease in his daughter and father.  Social Connections: Socially Integrated     Frequency of Communication with Friends and Family: Once a week     Frequency of Social Gatherings with Friends and Family: Twice a week     Attends Sabianist Services: 1 to 4 times per year     Active Member of Clubs or Organizations: No     Attends Club or Organization Meetings: 1 to 4 times per year     Marital Status:           WBC   Date Value Ref Range Status   06/22/2020 7.1 4.0 - 11.0 thou/uL Final     RBC Count   Date Value Ref Range Status   06/22/2020 4.28 (L) 4.40 - 6.20 mill/uL Final     Hemoglobin   Date Value Ref Range Status   06/22/2020 13.0 (L) 14.0 - 18.0 g/dL Final     Hematocrit   Date Value  Ref Range Status   06/22/2020 39.5 (L) 40.0 - 54.0 % Final     MCV   Date Value Ref Range Status   06/22/2020 92 80 - 100 fL Final     MCH   Date Value Ref Range Status   06/22/2020 30.4 27.0 - 34.0 pg Final     Platelet Count   Date Value Ref Range Status   06/22/2020 202 140 - 440 thou/uL Final     % Lymphocytes   Date Value Ref Range Status   12/27/2018 24 20 - 40 % Final     AST   Date Value Ref Range Status   09/16/2019 24 0 - 40 U/L Final     ALT   Date Value Ref Range Status   01/23/2020 49 (H) 0 - 45 U/L Final     Albumin   Date Value Ref Range Status   01/23/2020 4.0 3.5 - 5.0 g/dL Final     Alkaline Phosphatase   Date Value Ref Range Status   09/16/2019 122 (H) 45 - 120 U/L Final     Creatinine   Date Value Ref Range Status   08/09/2021 1.59 (H) 0.70 - 1.30 mg/dL Final     GFR Estimate   Date Value Ref Range Status   08/09/2021 41 (L) >60 mL/min/1.73m2 Final     Comment:     As of July 11, 2021, eGFR is calculated by the CKD-EPI creatinine equation, without race adjustment. eGFR can be influenced by muscle mass, exercise, and diet. The reported eGFR is an estimation only and is only applicable if the renal function is stable.   06/22/2020 44 (L) >60 mL/min/1.73m2 Final     GFR Estimate If Black   Date Value Ref Range Status   06/22/2020 53 (L) >60 mL/min/1.73m2 Final

## 2022-08-17 ENCOUNTER — PATIENT OUTREACH (OUTPATIENT)
Dept: GERIATRIC MEDICINE | Facility: CLINIC | Age: 82
End: 2022-08-17

## 2022-08-17 NOTE — PROGRESS NOTES
CC updated program tasks and targets for [employer] launch.    RICKEY Gallegos  [facility]  [phone]

## 2022-08-22 ENCOUNTER — PATIENT OUTREACH (OUTPATIENT)
Dept: GERIATRIC MEDICINE | Facility: CLINIC | Age: 82
End: 2022-08-22

## 2022-08-22 NOTE — PROGRESS NOTES
[facility] Care Coordination Contact      [facility] Six-Month Telephone Assessment    6 month telephone assessment completed on 8/22/2022.    ER visits: No  Hospitalizations: No  TCU stays: No  Significant health status changes: No significant health changes. Continues to have ongoing stomach issues. Takes tums as needed. Family has been encouraging him to drink lots of water and walk daily.   Falls/Injuries: No  ADL/IADL changes: No  Changes in services: No, PCA services will continue for daily support. Commode chair was obtained.     Caregiver Assessment follow up:  n/a    Goals: See POC in chart for goal progress documentation.      Will see member in 6 months for an annual health risk assessment.   Encouraged member to call CC with any questions or concerns in the meantime.     RICKEY Gallegos  [facility]  [phone]

## 2022-08-24 DIAGNOSIS — I10 ESSENTIAL HYPERTENSION WITH GOAL BLOOD PRESSURE LESS THAN 140/90: ICD-10-CM

## 2022-08-24 DIAGNOSIS — Z76.0 ENCOUNTER FOR MEDICATION REFILL: Primary | ICD-10-CM

## 2022-08-24 RX ORDER — AMLODIPINE BESYLATE 5 MG/1
TABLET ORAL
Qty: 90 TABLET | Refills: 3 | Status: SHIPPED | OUTPATIENT
Start: 2022-08-24 | End: 2022-09-12

## 2022-08-26 DIAGNOSIS — E78.5 DYSLIPIDEMIA: ICD-10-CM

## 2022-08-26 RX ORDER — ROSUVASTATIN CALCIUM 20 MG/1
TABLET, COATED ORAL
Qty: 90 TABLET | Refills: 0 | Status: SHIPPED | OUTPATIENT
Start: 2022-08-26 | End: 2023-03-16

## 2022-09-06 DIAGNOSIS — R52 SEVERE PAIN: ICD-10-CM

## 2022-09-06 DIAGNOSIS — Z76.0 ENCOUNTER FOR MEDICATION REFILL: Primary | ICD-10-CM

## 2022-09-06 NOTE — TELEPHONE ENCOUNTER
Medication Question or Refill    Contacts       Type Contact Phone/Fax    09/06/2022 02:52 PM CDT Phone (Incoming) Adam Talamantes (Self) [phone] (M)          What medication are you calling about (include dose and sig)?: HYDROcodone-acetaminophen (NORCO) 5-325 MG tablet    Controlled Substance Agreement on file:   CSA -- Patient Level:    Controlled Substance Agreement - Opioid - Scan on 4/12/2022  8:47 AM  Controlled Substance Agreement - Opioid - Scan on 8/4/2021  7:24 AM       Who prescribed the medication?: Dr. Becker    Do you need a refill? Yes: HYDROcodone-acetaminophen (NORCO) 5-325 MG tablet    When did you use the medication last? N/A    Patient offered an appointment? No    Do you have any questions or concerns?  No    Preferred Pharmacy:   [id number] [address] AT [facility] & [address] [address]  Phone: [phone] Fax: [phone]      Okay to leave a detailed message?: No at Cell number on file:    Telephone Information:   Mobile [phone]

## 2022-09-07 RX ORDER — HYDROCODONE BITARTRATE AND ACETAMINOPHEN 5; 325 MG/1; MG/1
1 TABLET ORAL DAILY PRN
Qty: 30 TABLET | Refills: 0 | Status: SHIPPED | OUTPATIENT
Start: 2022-09-07 | End: 2022-10-05

## 2022-09-12 ENCOUNTER — OFFICE VISIT (OUTPATIENT)
Dept: FAMILY MEDICINE | Facility: CLINIC | Age: 82
End: 2022-09-12
Payer: COMMERCIAL

## 2022-09-12 VITALS
OXYGEN SATURATION: 97 % | SYSTOLIC BLOOD PRESSURE: 128 MMHG | TEMPERATURE: 98.4 F | HEART RATE: 94 BPM | BODY MASS INDEX: 29.37 KG/M2 | WEIGHT: 158 LBS | DIASTOLIC BLOOD PRESSURE: 70 MMHG

## 2022-09-12 DIAGNOSIS — L30.9 DERMATITIS: ICD-10-CM

## 2022-09-12 DIAGNOSIS — N18.30 TYPE 2 DIABETES MELLITUS WITH STAGE 3 CHRONIC KIDNEY DISEASE, WITHOUT LONG-TERM CURRENT USE OF INSULIN, UNSPECIFIED WHETHER STAGE 3A OR 3B CKD (H): ICD-10-CM

## 2022-09-12 DIAGNOSIS — R55 SYNCOPE, UNSPECIFIED SYNCOPE TYPE: Primary | ICD-10-CM

## 2022-09-12 DIAGNOSIS — E11.22 TYPE 2 DIABETES MELLITUS WITH STAGE 3 CHRONIC KIDNEY DISEASE, WITHOUT LONG-TERM CURRENT USE OF INSULIN, UNSPECIFIED WHETHER STAGE 3A OR 3B CKD (H): ICD-10-CM

## 2022-09-12 DIAGNOSIS — N18.30 STAGE 3 CHRONIC KIDNEY DISEASE, UNSPECIFIED WHETHER STAGE 3A OR 3B CKD (H): ICD-10-CM

## 2022-09-12 DIAGNOSIS — Z23 IMMUNIZATION DUE: ICD-10-CM

## 2022-09-12 LAB
ANION GAP SERPL CALCULATED.3IONS-SCNC: 9 MMOL/L (ref 7–15)
BUN SERPL-MCNC: 30.3 MG/DL (ref 8–23)
CALCIUM SERPL-MCNC: 10.1 MG/DL (ref 8.8–10.2)
CHLORIDE SERPL-SCNC: 98 MMOL/L (ref 98–107)
CREAT SERPL-MCNC: 1.41 MG/DL (ref 0.67–1.17)
DEPRECATED HCO3 PLAS-SCNC: 27 MMOL/L (ref 22–29)
GFR SERPL CREATININE-BSD FRML MDRD: 50 ML/MIN/1.73M2
GLUCOSE SERPL-MCNC: 240 MG/DL (ref 70–99)
HBA1C MFR BLD: 7.9 % (ref 0–5.6)
POTASSIUM SERPL-SCNC: 5.4 MMOL/L (ref 3.4–5.3)
SODIUM SERPL-SCNC: 134 MMOL/L (ref 136–145)

## 2022-09-12 PROCEDURE — 99214 OFFICE O/P EST MOD 30 MIN: CPT | Mod: 25 | Performed by: FAMILY MEDICINE

## 2022-09-12 PROCEDURE — 83036 HEMOGLOBIN GLYCOSYLATED A1C: CPT | Performed by: FAMILY MEDICINE

## 2022-09-12 PROCEDURE — 80048 BASIC METABOLIC PNL TOTAL CA: CPT | Performed by: FAMILY MEDICINE

## 2022-09-12 PROCEDURE — 36415 COLL VENOUS BLD VENIPUNCTURE: CPT | Performed by: FAMILY MEDICINE

## 2022-09-12 PROCEDURE — G0008 ADMIN INFLUENZA VIRUS VAC: HCPCS | Performed by: FAMILY MEDICINE

## 2022-09-12 PROCEDURE — 90662 IIV NO PRSV INCREASED AG IM: CPT | Performed by: FAMILY MEDICINE

## 2022-09-12 RX ORDER — TRIAMCINOLONE ACETONIDE 1 MG/G
CREAM TOPICAL 2 TIMES DAILY PRN
Qty: 80 G | Refills: 3 | Status: SHIPPED | OUTPATIENT
Start: 2022-09-12 | End: 2023-04-27

## 2022-09-12 NOTE — PROGRESS NOTES
ASSESMENT AND PLAN:  Diagnoses and all orders for this visit:  Syncope, unspecified syncope type  Given his medications and the description detailed below this is most likely a vasovagal episode.  Discussed options today with the patient and his family with the help of a professional .  We are discontinuing amlodipine, we are contacting the pharmacy to let them know that this is been discontinued so that they are not automatic refills.  Patient counseled that if he has another episode of syncope in the future a family member needs to call 911 or take him immediately to the emergency room for further evaluation.  Patient and family in agreement with the plan.  Type 2 diabetes mellitus with stage 3 chronic kidney disease, without long-term current use of insulin, unspecified whether stage 3a or 3b CKD (H)  -     HEMOGLOBIN A1C  Stage 3 chronic kidney disease, unspecified whether stage 3a or 3b CKD (H)  -     BASIC METABOLIC PANEL; Future  Immunization due  -     INFLUENZA, QUAD, HD, PF, 65+ (FLUZONE HD)  Dermatitis  -     triamcinolone (KENALOG) 0.1 % external cream; Apply topically 2 times daily as needed for irritation      Reviewed the risks and benefits of the treatment plan with the patient and/or caregiver and we discussed indications for routine and emergent follow-up.        SUBJECTIVE: 82-year-old male in for follow-up.  2 weeks ago the patient got up from bed to go to the bathroom and then on the way back from the bathroom after standing up from the toilet he felt very lightheaded and then fell down.  It sounds like he did have an episode of syncope with loss of consciousness but history is somewhat unclear.  He has not had any recurrence of syncope since then and has not had any episodes of near syncope since then.  Otherwise, he has been feeling about usual baseline.    Patient reports a itchy rash that comes and goes on the upper extremities.    Past Medical History:   Diagnosis Date     Acute  blood loss anemia      Acute renal failure (H)      Anemia      Bacteremia      Cataract      Chronic gout      CKD (chronic kidney disease)     Stage 3     DM2 (diabetes mellitus, type 2) (H)      GERD (gastroesophageal reflux disease)      Glucose intolerance (impaired glucose tolerance)      Gout      History of nephrolithiasis      History of prostatitis 2017     Hyperlipidemia      Hypertension      Insomnia      Intestinal disaccharidase deficiencies and disaccharide malabsorption     Created by Conversion      Latent tuberculosis      Nephrolithiasis      Neuropathy      Nonspecific abnormal findings on radiological and other examination of skull and head     Created by [facility] Annotation: 2013 11:23PM - Giulia Meridai/10/2011:  severe stenosis both posterior cerebral arteries seen MRI/MRA done for  vertigo. No acute changes.e*     Osteoarthritis     wrist, knee, shoulder     Prostatitis      Rectal bleed      Trigger thumb      Patient Active Problem List   Diagnosis     Esophageal reflux     Dyslipidemia     Nephrolithiasis     Pseudogout     Latent Tuberculosis     Generalized Osteoarthritis Of The Hand     Lumbar Disc Degeneration     Insomnia     Chronic Gout     CKD (chronic kidney disease) stage 3, GFR 30-59 ml/min (H)     Elevated PSA     Prostate nodule     Cataracts, bilateral     Constipation, unspecified constipation type     Bilateral chronic knee pain     Chronic right shoulder pain     Essential hypertension with goal blood pressure less than 140/90     BPH (benign prostatic hyperplasia)     Chronic gout     Coronary artery disease due to lipid rich plaque     Right lower quadrant abdominal pain     Colitis     Primary osteoarthritis involving multiple joints     Urinary incontinence     Type 2 diabetes mellitus with stage 3 chronic kidney disease, without long-term current use of insulin (H)     ACE-inhibitor cough     Current Outpatient Medications   Medication  Sig Dispense Refill     acetaminophen (TYLENOL) 500 MG tablet Take 500 mg by mouth       ACETAMINOPHEN EXTRA STRENGTH 500 MG tablet TAKE 1 TABLET(500 MG) BY MOUTH EVERY 6 HOURS AS NEEDED 120 tablet 6     allopurinol (ZYLOPRIM) 100 MG tablet TAKE 2 TABLETS BY MOUTH EVERY  tablet 7     aspirin (ASA) 81 MG EC tablet Take 1 tablet (81 mg) by mouth daily 90 tablet 3     BANOPHEN 25 MG capsule        colchicine 0.6 mg tablet [COLCHICINE 0.6 MG TABLET] TAKE 1 TABLET BY MOUTH EVERY OTHER DAY 45 tablet 1     docusate sodium (COLACE) 100 MG capsule TAKE 1 CAPSULE(100 MG) BY MOUTH TWICE DAILY AS NEEDED FOR CONSTIPATION 180 capsule 3     DULoxetine (CYMBALTA) 20 MG capsule Take 1 capsule (20 mg) by mouth 2 times daily 60 capsule 1     DULoxetine (CYMBALTA) 20 MG capsule Take 20 mg by mouth       gabapentin (NEURONTIN) 300 MG capsule TAKE 1 TO 2 CAPSULES BY MOUTH AT BEDTIME 180 capsule 3     HYDROcodone-acetaminophen (NORCO) 5-325 MG tablet Take 1 tablet by mouth daily as needed for severe pain 30 tablet 0     isosorbide mononitrate (IMDUR) 30 MG 24 hr tablet Take 1 tablet (30 mg) by mouth daily 90 tablet 3     JANUVIA 50 MG tablet TAKE 1 TABLET(50 MG) BY MOUTH DAILY 90 tablet 3     ketorolac (ACULAR) 0.5 % ophthalmic solution INSTILL 1 DROP IN LEFT EYE FOUR TIMES DAILY STARTING 1 DAY BEFORE SURGERY. CONTINUE USE 4 TIMES DAILY UNTIL ALL TAKEN       loratadine (CLARITIN) 10 mg tablet [LORATADINE (CLARITIN) 10 MG TABLET] TAKE 1 TABLET BY MOUTH EVERY DAY 90 tablet 2     losartan (COZAAR) 50 MG tablet TAKE 1 TABLET(50 MG) BY MOUTH DAILY 90 tablet 3     meclizine (ANTIVERT) 12.5 MG tablet Take 1 tablet (12.5 mg) by mouth 3 times daily as needed for dizziness 60 tablet 6     metoprolol succinate ER (TOPROL-XL) 100 MG 24 hr tablet Take 1 tablet (100 mg) by mouth daily 90 tablet 3     nitroGLYcerin (NITROSTAT) 0.4 MG sublingual tablet Place 0.4 mg under the tongue       nitroGLYcerin (NITROSTAT) 0.4 MG sublingual tablet PLACE 1  TABLET UNDER THE TONGUE EVERY 5 MINUTES AS NEEDED FOR CHEST PAIN. 25 tablet 6     ofloxacin (OCUFLOX) 0.3 % ophthalmic solution INSTILL 1 DROP IN LEFT EYE FOUR TIMES DAILY STARTING 1 DAY BEFORE SURGERY. CONTINUE USE 4 TIMES DAILY UNTIL ALL TAKEN       omeprazole (PRILOSEC) 20 MG DR capsule TAKE 1 CAPSULE BY MOUTH DAILY 90 capsule 3     prednisoLONE acetate (PRED FORTE) 1 % ophthalmic suspension SHAKE LIQUID AND INSTILL 1 DROP IN LEFT EYE FOUR TIMES DAILY STARTING 1 DAY BEFORE SURGERY. CONTINUE USE 4 TIMES DAILY UNTIL ALL TAKEN       rosuvastatin (CRESTOR) 20 MG tablet TAKE 1 TABLET(20 MG) BY MOUTH AT BEDTIME 90 tablet 0     SYSTANE ULTRA 0.4-0.3 % SOLN ophthalmic solution INSTILL ONE DROP RAO BOTH EYES FOUR TIMES DAILY       triamcinolone (KENALOG) 0.1 % external cream Apply topically 2 times daily as needed for irritation 80 g 3     History   Smoking Status     Former Smoker     Quit date: 3/10/2000   Smokeless Tobacco     Former User     Comment: no passive exposure       OBJECTICE: /70 (Cuff Size: Adult Regular)   Pulse 94   Temp 98.4  F (36.9  C)   Wt 71.7 kg (158 lb)   SpO2 97%   BMI 29.37 kg/m       Recent Results (from the past 24 hour(s))   HEMOGLOBIN A1C    Collection Time: 09/12/22 10:19 AM   Result Value Ref Range    Hemoglobin A1C 7.9 (H) 0.0 - 5.6 %        GEN-alert, appropriate, in no apparent distress   CV-Regular rate and rhythm   RESP-lungs clear to auscultation   Extremities-warm, no pitting edema of the ankles.     Foot exam- normal.  No ulcers.  Palpable pedal pulses bilaterally.    Chucho Espino MD

## 2022-10-04 DIAGNOSIS — R52 SEVERE PAIN: ICD-10-CM

## 2022-10-04 DIAGNOSIS — Z76.0 ENCOUNTER FOR MEDICATION REFILL: Primary | ICD-10-CM

## 2022-10-05 RX ORDER — HYDROCODONE BITARTRATE AND ACETAMINOPHEN 5; 325 MG/1; MG/1
1 TABLET ORAL DAILY PRN
Qty: 30 TABLET | Refills: 0 | Status: SHIPPED | OUTPATIENT
Start: 2022-10-05 | End: 2022-11-02

## 2022-10-05 NOTE — TELEPHONE ENCOUNTER
Reason for Call:  Medication or medication refill:    Do you use a [facility]?  Name of the pharmacy and phone number for the current request: [facility] [address] [facility]   Name of the medication requested: Hydrocodone. He is out of it and needs to take it in the afternoon of 10/5 Wednesday per Daughter.     Other request: None     Can we leave a detailed message on this number? Yes, Daughter Dimitri is calling for dad who speaks Kayce     Phone number patient can be reached at: [phone]. This is the patient's daughter's number who is calling for dad. Dad's number is the one bolded in Adam Talamantes's account if you need to reach him, but would need a Kayce      Best Time: Any. Daughter is the one who called from her phone number [phone]    Call taken on 10/4/2022 at 8:07 PM by Dori Hassan

## 2022-10-05 NOTE — TELEPHONE ENCOUNTER
Dr. Espino- pt requesting refill for HYDROcodone-acetaminophen (NORCO) 5-325 MG tablet.    - pt states they are out of it and need to take it today 10/5.  - Last office visit was 9/12.  -  Upcoming appt with you on 1/12/23.    - pended rx, please review and sign if appropriate.    Agapito Shook, BSN RN  [facility]

## 2022-10-05 NOTE — TELEPHONE ENCOUNTER
Notified pt that rx was sent. Pt verbalized understanding and will pick rx up today.    Agapito Shook, BSN RN  [facility]

## 2022-10-08 DIAGNOSIS — M15.0 PRIMARY OSTEOARTHRITIS INVOLVING MULTIPLE JOINTS: ICD-10-CM

## 2022-10-10 RX ORDER — DULOXETIN HYDROCHLORIDE 20 MG/1
CAPSULE, DELAYED RELEASE ORAL
Qty: 60 CAPSULE | Refills: 1 | Status: SHIPPED | OUTPATIENT
Start: 2022-10-10 | End: 2022-10-12

## 2022-10-12 ENCOUNTER — OFFICE VISIT (OUTPATIENT)
Dept: CARDIOLOGY | Facility: CLINIC | Age: 82
End: 2022-10-12
Payer: COMMERCIAL

## 2022-10-12 VITALS
BODY MASS INDEX: 29.26 KG/M2 | RESPIRATION RATE: 20 BRPM | WEIGHT: 159 LBS | SYSTOLIC BLOOD PRESSURE: 126 MMHG | HEART RATE: 86 BPM | DIASTOLIC BLOOD PRESSURE: 80 MMHG | HEIGHT: 62 IN

## 2022-10-12 DIAGNOSIS — E78.5 DYSLIPIDEMIA: ICD-10-CM

## 2022-10-12 DIAGNOSIS — I10 HYPERTENSION, UNSPECIFIED TYPE: ICD-10-CM

## 2022-10-12 DIAGNOSIS — R42 LIGHTHEADEDNESS: ICD-10-CM

## 2022-10-12 DIAGNOSIS — I25.10 CORONARY ARTERY DISEASE INVOLVING NATIVE CORONARY ARTERY WITHOUT ANGINA PECTORIS, UNSPECIFIED WHETHER NATIVE OR TRANSPLANTED HEART: Primary | ICD-10-CM

## 2022-10-12 PROCEDURE — 99214 OFFICE O/P EST MOD 30 MIN: CPT | Performed by: INTERNAL MEDICINE

## 2022-10-12 NOTE — PROGRESS NOTES
[facility]   [address], [place] [address]   [email or web address]   OFFICE: [phone]          Thank you Chucho Pedraza for asking the [facility] Heart Care team to participate in the care of your patient, Adam Talamantes.     Impression and Plan     1. Possible coronary artery disease. Adam underwent recent nuclear perfusion study 29 July 2016 which was mildly abnormal. Specifically, this revealed a small sized mild intensity reversible defect in the mid to distal anteroseptal segment representing an area of myocardial ischemia.    Follow-up regadenoson nuclear perfusion study 9 April 2021 revealed no evidence of infarct or ischemia (ejection fraction 66% on that study).     Adam denies any chest pain or other concerning anginal type symptoms.  He is very pleased with how he is performing.    Continue daily aspirin.    Continue metoprolol succinate 50 mg daily.    Sublingual nitroglycerin PRN.    Continue isosorbide mononitrate 30 g daily.     2. Hypertension.  Blood pressure is quite reasonable in the office today at 126/80 mmHg.     3. Dyslipidemia.  Lipid profile 6 December 2021 revealed LDL 78 mg/dL and HDL 36 mg/dL    Continue rosuvastatin 20 mg daily.     4.  Lightheadedness.  On a prior visit, Adam patient reports some intermittent lightheadedness, but fortunately no kai spells.  We did perform a Holter monitor 9 April 2021 that was essentially normal with the exception of rare isolated PVCs and PACs.  He reports no recurrent symptoms of lightheadedness.     Plan on follow-up in 1 year.    35 minutes spent reviewing prior records (including documentation, laboratory studies, cardiac testing/imaging), interview with patient along with physical exam, planning, and subsequent documentation/crafting of note).           History of Present Illness    Once again I would like to thank you again for asking me to participate in the care of your patient, Adam LUCAS  Albin.  As you know, but to reiterate for my own records, Adam LUCAS Albin is a 82 year old male who had been hospitalized in July 2016 in part for hypertension, but also for some symptoms of chest discomfort.  At the time, certain features were felt somewhat atypical for cardiac etiology. Adam subsequently underwent a regadenoson nuclear perfusion study 29 July 2016 that returned mildly abnormal in that it revealed a small sized mild intensity reversible anteroseptal defect (see Cardiac Diagnostics section below).  Given the atypical nature of the patient's symptoms, no significant recurrence in chest discomfort, and in accordance with the patient's wishes; medical therapy was pursued.    Follow-up regadenoson nuclear perfusion study 9 April 2021 revealed no evidence of infarct or ischemia (see Cardiac Diagnostic section below).     Adam was seen with the aid of a professional .  On interview today, Adam states that he is doing very well from a cardiac standpoint.  He denies any recurrent chest pain symptoms.  Breathing is comfortable.  Reports no subjective palpitations or lightheadedness.    Further review of systems is otherwise negative/noncontributory (medical record and 13 point review of systems reviewed as well and pertinent positives noted).         Cardiac Diagnostics      Echocardiogram 17 October 2017:  1. Normal left ventricular size and systolic performance with ejection fraction of 55 to 60%.  2. Mild concentric increased left ventricular wall thickness.  3. No significant valvular heart disease.  4. Normal right ventricular size and systolic performance.  5. Normal atrial dimensions.    Regadenoson nuclear perfusion study 9 April 2021:  1. No evidence of infarct or ischemia.  2. Normal left ventricular systolic performance with ejection fraction of 66%.    Regadenoson nuclear perfusion study 29 July 2016:   1. Small sized mild intensity reversible defect in the mid to distal anteroseptal segment  "representing an area of myocardial ischemia.  2. Normal left ventricular systolic performance with ejection fraction of 55%.  3. The patient is at low risk of future cardiac ischemic events based on perfusion imaging.    Holter monitor 9 April 2021:  1. Except for rare isolated PVCs and PACs, no significant arrhythmia detected during this monitoring time.    12-lead ECG (personally reviewed) 30 June 2016: Normal sinus rhythm. Normal ECG.              Physical Examination       /80 (BP Location: Right arm, Patient Position: Sitting, Cuff Size: Adult Large)   Pulse 86   Resp 20   Ht 1.562 m (5' 1.5\")   Wt 72.1 kg (159 lb)   BMI 29.56 kg/m          Wt Readings from Last 3 Encounters:   10/12/22 72.1 kg (159 lb)   09/12/22 71.7 kg (158 lb)   08/16/22 72.1 kg (159 lb)     The patient is alert and oriented times three. Sclerae are anicteric. Mucosal membranes are moist. Jugular venous pressure is normal. No significant adenopathy/thyromegally appreciated. Lungs are clear with good expansion. On cardiovascular exam, the patient has a regular S1 and S2. Abdomen is soft and non-tender. Extremities reveal no clubbing, cyanosis, or edema.         Medications  Allergies   Current Outpatient Medications   Medication Sig Dispense Refill     ACETAMINOPHEN EXTRA STRENGTH 500 MG tablet TAKE 1 TABLET(500 MG) BY MOUTH EVERY 6 HOURS AS NEEDED 120 tablet 6     allopurinol (ZYLOPRIM) 100 MG tablet TAKE 2 TABLETS BY MOUTH EVERY  tablet 7     aspirin (ASA) 81 MG EC tablet Take 1 tablet (81 mg) by mouth daily 90 tablet 3     BANOPHEN 25 MG capsule        colchicine 0.6 mg tablet [COLCHICINE 0.6 MG TABLET] TAKE 1 TABLET BY MOUTH EVERY OTHER DAY 45 tablet 1     docusate sodium (COLACE) 100 MG capsule TAKE 1 CAPSULE(100 MG) BY MOUTH TWICE DAILY AS NEEDED FOR CONSTIPATION 180 capsule 3     DULoxetine (CYMBALTA) 20 MG capsule Take 1 capsule (20 mg) by mouth 2 times daily 60 capsule 1     gabapentin (NEURONTIN) 300 MG capsule " TAKE 1 TO 2 CAPSULES BY MOUTH AT BEDTIME 180 capsule 3     HYDROcodone-acetaminophen (NORCO) 5-325 MG tablet Take 1 tablet by mouth daily as needed for severe pain 30 tablet 0     isosorbide mononitrate (IMDUR) 30 MG 24 hr tablet Take 1 tablet (30 mg) by mouth daily 90 tablet 3     JANUVIA 50 MG tablet TAKE 1 TABLET(50 MG) BY MOUTH DAILY 90 tablet 3     ketorolac (ACULAR) 0.5 % ophthalmic solution INSTILL 1 DROP IN LEFT EYE FOUR TIMES DAILY STARTING 1 DAY BEFORE SURGERY. CONTINUE USE 4 TIMES DAILY UNTIL ALL TAKEN       loratadine (CLARITIN) 10 mg tablet [LORATADINE (CLARITIN) 10 MG TABLET] TAKE 1 TABLET BY MOUTH EVERY DAY 90 tablet 2     losartan (COZAAR) 50 MG tablet TAKE 1 TABLET(50 MG) BY MOUTH DAILY 90 tablet 3     meclizine (ANTIVERT) 12.5 MG tablet Take 1 tablet (12.5 mg) by mouth 3 times daily as needed for dizziness 60 tablet 6     metoprolol succinate ER (TOPROL-XL) 100 MG 24 hr tablet Take 1 tablet (100 mg) by mouth daily 90 tablet 3     nitroGLYcerin (NITROSTAT) 0.4 MG sublingual tablet PLACE 1 TABLET UNDER THE TONGUE EVERY 5 MINUTES AS NEEDED FOR CHEST PAIN. 25 tablet 6     ofloxacin (OCUFLOX) 0.3 % ophthalmic solution INSTILL 1 DROP IN LEFT EYE FOUR TIMES DAILY STARTING 1 DAY BEFORE SURGERY. CONTINUE USE 4 TIMES DAILY UNTIL ALL TAKEN       omeprazole (PRILOSEC) 20 MG DR capsule TAKE 1 CAPSULE BY MOUTH DAILY 90 capsule 3     prednisoLONE acetate (PRED FORTE) 1 % ophthalmic suspension SHAKE LIQUID AND INSTILL 1 DROP IN LEFT EYE FOUR TIMES DAILY STARTING 1 DAY BEFORE SURGERY. CONTINUE USE 4 TIMES DAILY UNTIL ALL TAKEN       rosuvastatin (CRESTOR) 20 MG tablet TAKE 1 TABLET(20 MG) BY MOUTH AT BEDTIME 90 tablet 0     SYSTANE ULTRA 0.4-0.3 % SOLN ophthalmic solution INSTILL ONE DROP RAO BOTH EYES FOUR TIMES DAILY       triamcinolone (KENALOG) 0.1 % external cream Apply topically 2 times daily as needed for irritation 80 g 3     No Known Allergies       Lab Results    Chemistry/lipid CBC Cardiac  Enzymes/BNP/TSH/INR   Recent Labs   Lab Test 21  0957   CHOL 171   HDL 36*   LDL 78   TRIG 287*     Recent Labs   Lab Test 21  0957 21  1039 19  1511   LDL 78 111 84     Recent Labs   Lab Test 22  1202   *   POTASSIUM 5.4*   CHLORIDE 98   CO2 27   *   BUN 30.3*   CR 1.41*   GFRESTIMATED 50*   GERALDINE 10.1     Recent Labs   Lab Test 22  1202 21  1818 20  1853   CR 1.41* 1.59* 1.55*     Recent Labs   Lab Test 22  1019 22  1106 21  0957   A1C 7.9* 7.0* 7.7*          Recent Labs   Lab Test 20  1853   WBC 7.1   HGB 13.0*   HCT 39.5*   MCV 92        Recent Labs   Lab Test 20  1853 20  1036 18  1252   HGB 13.0* 13.6* 15.1    Recent Labs   Lab Test 20  1853 18  1252   TROPONINI 0.02 <0.01     No results for input(s): BNP, NTBNPI, NTBNP in the last 29758 hours.  No results for input(s): TSH in the last 96300 hours.  No results for input(s): INR in the last 26726 hours.     Medical History  Surgical History Family History Social History   Past Medical History:   Diagnosis Date     Acute blood loss anemia      Acute renal failure (H)      Anemia      Bacteremia      Cataract      Chronic gout      CKD (chronic kidney disease)     Stage 3     DM2 (diabetes mellitus, type 2) (H)      GERD (gastroesophageal reflux disease)      Glucose intolerance (impaired glucose tolerance)      Gout      History of nephrolithiasis      History of prostatitis 2017     Hyperlipidemia      Hypertension      Insomnia      Intestinal disaccharidase deficiencies and disaccharide malabsorption     Created by Conversion      Latent tuberculosis      Nephrolithiasis      Neuropathy      Nonspecific abnormal findings on radiological and other examination of skull and head     Created by [employer] [facility] Annotation: 2013 11:23PM - Giulia Meridai/10/2011:  severe stenosis both posterior cerebral arteries seen MRI/MRA  done for  vertigo. No acute changes.e*     Osteoarthritis     wrist, knee, shoulder     Prostatitis      Rectal bleed      Trigger thumb      Past Surgical History:   Procedure Laterality Date     IR MISCELLANEOUS PROCEDURE  2009     IR MISCELLANEOUS PROCEDURE  2009     IR MISCELLANEOUS PROCEDURE  2009     IR MISCELLANEOUS PROCEDURE  2009     IR NEPHROURETERAL TUBE PLACEMENT BILATERAL  2009     IR URETER DILATION BILATERAL  2009     IR URETER DILATION BILATERAL  2009     KIDNEY STONE SURGERY       PICC  3/6/2016          Family History   Problem Relation Age of Onset     Cerebrovascular Disease Father      Cerebrovascular Disease Daughter         Social History     Socioeconomic History     Marital status:      Spouse name: Not on file     Number of children: Not on file     Years of education: Not on file     Highest education level: Not on file   Occupational History     Not on file   Tobacco Use     Smoking status: Former     Types: Cigarettes     Quit date: 3/10/2000     Years since quittin.6     Smokeless tobacco: Former     Tobacco comments:     no passive exposure   Substance and Sexual Activity     Alcohol use: No     Drug use: No     Sexual activity: Never     Partners: Female   Other Topics Concern     Not on file   Social History Narrative     Not on file     Social Determinants of Health     Financial Resource Strain: Low Risk      Difficulty of Paying Living Expenses: Not hard at all   Food Insecurity: No Food Insecurity     Worried About Running Out of Food in the Last Year: Never true     Ran Out of Food in the Last Year: Never true   Transportation Needs: No Transportation Needs     Lack of Transportation (Medical): No     Lack of Transportation (Non-Medical): No   Physical Activity: Inactive     Days of Exercise per Week: 0 days     Minutes of Exercise per Session: 0 min   Stress: No Stress Concern Present     Feeling of Stress : Not at all   Social  Connections: Socially Integrated     Frequency of Communication with Friends and Family: Once a week     Frequency of Social Gatherings with Friends and Family: Twice a week     Attends Zoroastrianism Services: 1 to 4 times per year     Active Member of Clubs or Organizations: No     Attends Club or Organization Meetings: 1 to 4 times per year     Marital Status:    Intimate Partner Violence: Not At Risk     Fear of Current or Ex-Partner: No     Emotionally Abused: No     Physically Abused: No     Sexually Abused: No   Housing Stability: Unknown     Unable to Pay for Housing in the Last Year: No     Number of Places Lived in the Last Year: Not on file     Unstable Housing in the Last Year: No

## 2022-10-12 NOTE — LETTER
10/12/2022    Chucho Espino MD  [address]  [place] [address]    RE: Adam Talamantes       Dear Colleague,     I had the pleasure of seeing Adam Talamantes in the [facility].         [facility]   [address], [place] [address]   [email or web address]   OFFICE: [phone]          Thank you Chucho Pedraza for asking the [facility] team to participate in the care of your patient, Adam Talamantes.     Impression and Plan     1. Possible coronary artery disease. Adam underwent recent nuclear perfusion study 29 July 2016 which was mildly abnormal. Specifically, this revealed a small sized mild intensity reversible defect in the mid to distal anteroseptal segment representing an area of myocardial ischemia.    Follow-up regadenoson nuclear perfusion study 9 April 2021 revealed no evidence of infarct or ischemia (ejection fraction 66% on that study).     Adam denies any chest pain or other concerning anginal type symptoms.  He is very pleased with how he is performing.    Continue daily aspirin.    Continue metoprolol succinate 50 mg daily.    Sublingual nitroglycerin PRN.    Continue isosorbide mononitrate 30 g daily.     2. Hypertension.  Blood pressure is quite reasonable in the office today at 126/80 mmHg.     3. Dyslipidemia.  Lipid profile 6 December 2021 revealed LDL 78 mg/dL and HDL 36 mg/dL    Continue rosuvastatin 20 mg daily.     4.  Lightheadedness.  On a prior visit, Adam patient reports some intermittent lightheadedness, but fortunately no kai spells.  We did perform a Holter monitor 9 April 2021 that was essentially normal with the exception of rare isolated PVCs and PACs.  He reports no recurrent symptoms of lightheadedness.     Plan on follow-up in 1 year.    35 minutes spent reviewing prior records (including documentation, laboratory studies, cardiac testing/imaging), interview with patient along with physical exam,  planning, and subsequent documentation/crafting of note).           History of Present Illness    Once again I would like to thank you again for asking me to participate in the care of your patient, Adam Talamantes.  As you know, but to reiterate for my own records, Adam Talamantes is a 82 year old male who had been hospitalized in July 2016 in part for hypertension, but also for some symptoms of chest discomfort.  At the time, certain features were felt somewhat atypical for cardiac etiology. Adam subsequently underwent a regadenoson nuclear perfusion study 29 July 2016 that returned mildly abnormal in that it revealed a small sized mild intensity reversible anteroseptal defect (see Cardiac Diagnostics section below).  Given the atypical nature of the patient's symptoms, no significant recurrence in chest discomfort, and in accordance with the patient's wishes; medical therapy was pursued.    Follow-up regadenoson nuclear perfusion study 9 April 2021 revealed no evidence of infarct or ischemia (see Cardiac Diagnostic section below).     Adam was seen with the aid of a professional .  On interview today, Adam states that he is doing very well from a cardiac standpoint.  He denies any recurrent chest pain symptoms.  Breathing is comfortable.  Reports no subjective palpitations or lightheadedness.    Further review of systems is otherwise negative/noncontributory (medical record and 13 point review of systems reviewed as well and pertinent positives noted).         Cardiac Diagnostics      Echocardiogram 17 October 2017:  1. Normal left ventricular size and systolic performance with ejection fraction of 55 to 60%.  2. Mild concentric increased left ventricular wall thickness.  3. No significant valvular heart disease.  4. Normal right ventricular size and systolic performance.  5. Normal atrial dimensions.    Regadenoson nuclear perfusion study 9 April 2021:  1. No evidence of infarct or ischemia.  2. Normal left  "ventricular systolic performance with ejection fraction of 66%.    Regadenoson nuclear perfusion study 29 July 2016:   1. Small sized mild intensity reversible defect in the mid to distal anteroseptal segment representing an area of myocardial ischemia.  2. Normal left ventricular systolic performance with ejection fraction of 55%.  3. The patient is at low risk of future cardiac ischemic events based on perfusion imaging.    Holter monitor 9 April 2021:  1. Except for rare isolated PVCs and PACs, no significant arrhythmia detected during this monitoring time.    12-lead ECG (personally reviewed) 30 June 2016: Normal sinus rhythm. Normal ECG.              Physical Examination       /80 (BP Location: Right arm, Patient Position: Sitting, Cuff Size: Adult Large)   Pulse 86   Resp 20   Ht 1.562 m (5' 1.5\")   Wt 72.1 kg (159 lb)   BMI 29.56 kg/m          Wt Readings from Last 3 Encounters:   10/12/22 72.1 kg (159 lb)   09/12/22 71.7 kg (158 lb)   08/16/22 72.1 kg (159 lb)     The patient is alert and oriented times three. Sclerae are anicteric. Mucosal membranes are moist. Jugular venous pressure is normal. No significant adenopathy/thyromegally appreciated. Lungs are clear with good expansion. On cardiovascular exam, the patient has a regular S1 and S2. Abdomen is soft and non-tender. Extremities reveal no clubbing, cyanosis, or edema.         Medications  Allergies   Current Outpatient Medications   Medication Sig Dispense Refill     ACETAMINOPHEN EXTRA STRENGTH 500 MG tablet TAKE 1 TABLET(500 MG) BY MOUTH EVERY 6 HOURS AS NEEDED 120 tablet 6     allopurinol (ZYLOPRIM) 100 MG tablet TAKE 2 TABLETS BY MOUTH EVERY  tablet 7     aspirin (ASA) 81 MG EC tablet Take 1 tablet (81 mg) by mouth daily 90 tablet 3     BANOPHEN 25 MG capsule        colchicine 0.6 mg tablet [COLCHICINE 0.6 MG TABLET] TAKE 1 TABLET BY MOUTH EVERY OTHER DAY 45 tablet 1     docusate sodium (COLACE) 100 MG capsule TAKE 1 CAPSULE(100 " MG) BY MOUTH TWICE DAILY AS NEEDED FOR CONSTIPATION 180 capsule 3     DULoxetine (CYMBALTA) 20 MG capsule Take 1 capsule (20 mg) by mouth 2 times daily 60 capsule 1     gabapentin (NEURONTIN) 300 MG capsule TAKE 1 TO 2 CAPSULES BY MOUTH AT BEDTIME 180 capsule 3     HYDROcodone-acetaminophen (NORCO) 5-325 MG tablet Take 1 tablet by mouth daily as needed for severe pain 30 tablet 0     isosorbide mononitrate (IMDUR) 30 MG 24 hr tablet Take 1 tablet (30 mg) by mouth daily 90 tablet 3     JANUVIA 50 MG tablet TAKE 1 TABLET(50 MG) BY MOUTH DAILY 90 tablet 3     ketorolac (ACULAR) 0.5 % ophthalmic solution INSTILL 1 DROP IN LEFT EYE FOUR TIMES DAILY STARTING 1 DAY BEFORE SURGERY. CONTINUE USE 4 TIMES DAILY UNTIL ALL TAKEN       loratadine (CLARITIN) 10 mg tablet [LORATADINE (CLARITIN) 10 MG TABLET] TAKE 1 TABLET BY MOUTH EVERY DAY 90 tablet 2     losartan (COZAAR) 50 MG tablet TAKE 1 TABLET(50 MG) BY MOUTH DAILY 90 tablet 3     meclizine (ANTIVERT) 12.5 MG tablet Take 1 tablet (12.5 mg) by mouth 3 times daily as needed for dizziness 60 tablet 6     metoprolol succinate ER (TOPROL-XL) 100 MG 24 hr tablet Take 1 tablet (100 mg) by mouth daily 90 tablet 3     nitroGLYcerin (NITROSTAT) 0.4 MG sublingual tablet PLACE 1 TABLET UNDER THE TONGUE EVERY 5 MINUTES AS NEEDED FOR CHEST PAIN. 25 tablet 6     ofloxacin (OCUFLOX) 0.3 % ophthalmic solution INSTILL 1 DROP IN LEFT EYE FOUR TIMES DAILY STARTING 1 DAY BEFORE SURGERY. CONTINUE USE 4 TIMES DAILY UNTIL ALL TAKEN       omeprazole (PRILOSEC) 20 MG DR capsule TAKE 1 CAPSULE BY MOUTH DAILY 90 capsule 3     prednisoLONE acetate (PRED FORTE) 1 % ophthalmic suspension SHAKE LIQUID AND INSTILL 1 DROP IN LEFT EYE FOUR TIMES DAILY STARTING 1 DAY BEFORE SURGERY. CONTINUE USE 4 TIMES DAILY UNTIL ALL TAKEN       rosuvastatin (CRESTOR) 20 MG tablet TAKE 1 TABLET(20 MG) BY MOUTH AT BEDTIME 90 tablet 0     SYSTANE ULTRA 0.4-0.3 % SOLN ophthalmic solution INSTILL ONE DROP RAO BOTH EYES FOUR  TIMES DAILY       triamcinolone (KENALOG) 0.1 % external cream Apply topically 2 times daily as needed for irritation 80 g 3     No Known Allergies       Lab Results    Chemistry/lipid CBC Cardiac Enzymes/BNP/TSH/INR   Recent Labs   Lab Test 12/06/21  0957   CHOL 171   HDL 36*   LDL 78   TRIG 287*     Recent Labs   Lab Test 12/06/21  0957 03/29/21  1039 11/22/19  1511   LDL 78 111 84     Recent Labs   Lab Test 09/12/22  1202   *   POTASSIUM 5.4*   CHLORIDE 98   CO2 27   *   BUN 30.3*   CR 1.41*   GFRESTIMATED 50*   GERALDINE 10.1     Recent Labs   Lab Test 09/12/22  1202 08/09/21  1818 06/22/20  1853   CR 1.41* 1.59* 1.55*     Recent Labs   Lab Test 09/12/22  1019 02/24/22  1106 12/06/21  0957   A1C 7.9* 7.0* 7.7*          Recent Labs   Lab Test 06/22/20  1853   WBC 7.1   HGB 13.0*   HCT 39.5*   MCV 92        Recent Labs   Lab Test 06/22/20  1853 01/23/20  1036 12/27/18  1252   HGB 13.0* 13.6* 15.1    Recent Labs   Lab Test 06/22/20  1853 12/27/18  1252   TROPONINI 0.02 <0.01     No results for input(s): BNP, NTBNPI, NTBNP in the last 80810 hours.  No results for input(s): TSH in the last 50331 hours.  No results for input(s): INR in the last 96561 hours.     Medical History  Surgical History Family History Social History   Past Medical History:   Diagnosis Date     Acute blood loss anemia      Acute renal failure (H)      Anemia      Bacteremia      Cataract      Chronic gout      CKD (chronic kidney disease)     Stage 3     DM2 (diabetes mellitus, type 2) (H)      GERD (gastroesophageal reflux disease)      Glucose intolerance (impaired glucose tolerance)      Gout      History of nephrolithiasis      History of prostatitis 7/19/2017     Hyperlipidemia      Hypertension      Insomnia      Intestinal disaccharidase deficiencies and disaccharide malabsorption     Created by Conversion      Latent tuberculosis      Nephrolithiasis      Neuropathy      Nonspecific abnormal findings on radiological and  other examination of skull and head     Created by [facility] Annotation: 2013 11:23PM - Giulia Meridai/10/2011:  severe stenosis both posterior cerebral arteries seen MRI/MRA done for  vertigo. No acute changes.e*     Osteoarthritis     wrist, knee, shoulder     Prostatitis      Rectal bleed      Trigger thumb      Past Surgical History:   Procedure Laterality Date     IR MISCELLANEOUS PROCEDURE  2009     IR MISCELLANEOUS PROCEDURE  2009     IR MISCELLANEOUS PROCEDURE  2009     IR MISCELLANEOUS PROCEDURE  2009     IR NEPHROURETERAL TUBE PLACEMENT BILATERAL  2009     IR URETER DILATION BILATERAL  2009     IR URETER DILATION BILATERAL  2009     KIDNEY STONE SURGERY       PICC  3/6/2016          Family History   Problem Relation Age of Onset     Cerebrovascular Disease Father      Cerebrovascular Disease Daughter         Social History     Socioeconomic History     Marital status:      Spouse name: Not on file     Number of children: Not on file     Years of education: Not on file     Highest education level: Not on file   Occupational History     Not on file   Tobacco Use     Smoking status: Former     Types: Cigarettes     Quit date: 3/10/2000     Years since quittin.6     Smokeless tobacco: Former     Tobacco comments:     no passive exposure   Substance and Sexual Activity     Alcohol use: No     Drug use: No     Sexual activity: Never     Partners: Female   Other Topics Concern     Not on file   Social History Narrative     Not on file     Social Determinants of Health     Financial Resource Strain: Low Risk      Difficulty of Paying Living Expenses: Not hard at all   Food Insecurity: No Food Insecurity     Worried About Running Out of Food in the Last Year: Never true     Ran Out of Food in the Last Year: Never true   Transportation Needs: No Transportation Needs     Lack of Transportation (Medical): No     Lack of Transportation (Non-Medical):  No   Physical Activity: Inactive     Days of Exercise per Week: 0 days     Minutes of Exercise per Session: 0 min   Stress: No Stress Concern Present     Feeling of Stress : Not at all   Social Connections: Socially Integrated     Frequency of Communication with Friends and Family: Once a week     Frequency of Social Gatherings with Friends and Family: Twice a week     Attends [employer] Services: 1 to 4 times per year     Active Member of Clubs or Organizations: No     Attends Club or Organization Meetings: 1 to 4 times per year     Marital Status:    Intimate Partner Violence: Not At Risk     Fear of Current or Ex-Partner: No     Emotionally Abused: No     Physically Abused: No     Sexually Abused: No   Housing Stability: Unknown     Unable to Pay for Housing in the Last Year: No     Number of Places Lived in the Last Year: Not on file     Unstable Housing in the Last Year: No                      Thank you for allowing me to participate in the care of your patient.      Sincerely,     Lynne George MD     [facility]  cc:   Referred Self

## 2022-11-02 DIAGNOSIS — R52 SEVERE PAIN: ICD-10-CM

## 2022-11-02 DIAGNOSIS — Z76.0 ENCOUNTER FOR MEDICATION REFILL: ICD-10-CM

## 2022-11-02 RX ORDER — HYDROCODONE BITARTRATE AND ACETAMINOPHEN 5; 325 MG/1; MG/1
1 TABLET ORAL DAILY PRN
Qty: 30 TABLET | Refills: 0 | Status: SHIPPED | OUTPATIENT
Start: 2022-11-02 | End: 2022-12-05

## 2022-11-02 NOTE — TELEPHONE ENCOUNTER
Medication Question or Refill    Contacts       Type Contact Phone/Fax    11/02/2022 11:54 AM CDT Phone (Incoming) Dimitri Talamantes (Emergency Contact) [phone]          What medication are you calling about (include dose and sig)?: HYDROcodone-acetaminophen (NORCO) 5-325 MG tablet    Controlled Substance Agreement on file:   CSA -- Patient Level:     [Media Unavailable] Controlled Substance Agreement - Opioid - Scan on 4/12/2022  8:47 AM   [Media Unavailable] Controlled Substance Agreement - Opioid - Scan on 8/4/2021  7:24 AM       Who prescribed the medication?: [place]    Do you need a refill? Yes: completely out    When did you use the medication last? 11/01/22    Patient offered an appointment? No    Do you have any questions or concerns?  No    Preferred Pharmacy:   [id number] [address] AT [facility] [address]  [address] [place] [id number]  Phone: [phone] Fax: [phone]      Okay to leave a detailed message?: Yes at Cell number on file:    Telephone Information:   Mobile [phone]

## 2022-11-14 ENCOUNTER — PATIENT OUTREACH (OUTPATIENT)
Dept: GERIATRIC MEDICINE | Facility: CLINIC | Age: 82
End: 2022-11-14

## 2022-11-14 DIAGNOSIS — M25.561 BILATERAL CHRONIC KNEE PAIN: ICD-10-CM

## 2022-11-14 DIAGNOSIS — G89.29 BILATERAL CHRONIC KNEE PAIN: ICD-10-CM

## 2022-11-14 DIAGNOSIS — M25.562 BILATERAL CHRONIC KNEE PAIN: ICD-10-CM

## 2022-11-14 DIAGNOSIS — M51.379 DEGENERATION OF LUMBAR OR LUMBOSACRAL INTERVERTEBRAL DISC: Primary | ICD-10-CM

## 2022-11-14 NOTE — PROGRESS NOTES
[facility] Care Coordination Contact    Situation: Patient's daughter calling to request a new shower chair for Member as previous one broke.    Assessment: Order pended for PCP review.     Plan/Recommendations: CC will follow up assist with sending to DME provider and assist with ensuring equipment delivery.     RICKEY Gallegos  [facility]  [phone]

## 2022-11-14 NOTE — PROGRESS NOTES
[facility] Care Coordination Contact    Situation: Patient's daughter calling to request a replacement shower chair.    Assessment: CC initiated order. Pended for PCP review.    Plan/Recommendations: CC will assist with sending order to DME provider and ensure delivery once signed.     RICKEY Gallegos  [facility]  [phone]

## 2022-11-15 NOTE — PROGRESS NOTES
Requested CMS send and track DME order.     RICKEY Gallegos  [employer] [employer]  [phone]

## 2022-11-23 DIAGNOSIS — I25.10 CORONARY ARTERY DISEASE INVOLVING NATIVE CORONARY ARTERY OF NATIVE HEART WITHOUT ANGINA PECTORIS: ICD-10-CM

## 2022-11-23 DIAGNOSIS — M15.0 PRIMARY OSTEOARTHRITIS INVOLVING MULTIPLE JOINTS: ICD-10-CM

## 2022-11-23 DIAGNOSIS — Z76.0 ENCOUNTER FOR MEDICATION REFILL: Primary | ICD-10-CM

## 2022-11-23 RX ORDER — METOPROLOL SUCCINATE 100 MG/1
TABLET, EXTENDED RELEASE ORAL
Qty: 90 TABLET | Refills: 3 | Status: ON HOLD | OUTPATIENT
Start: 2022-11-23 | End: 2023-02-06

## 2022-11-23 RX ORDER — DULOXETIN HYDROCHLORIDE 20 MG/1
CAPSULE, DELAYED RELEASE ORAL
Qty: 60 CAPSULE | Refills: 1 | Status: SHIPPED | OUTPATIENT
Start: 2022-11-23 | End: 2023-04-04

## 2022-12-01 ENCOUNTER — OFFICE VISIT (OUTPATIENT)
Dept: RHEUMATOLOGY | Facility: CLINIC | Age: 82
End: 2022-12-01
Payer: COMMERCIAL

## 2022-12-01 VITALS
SYSTOLIC BLOOD PRESSURE: 130 MMHG | DIASTOLIC BLOOD PRESSURE: 70 MMHG | BODY MASS INDEX: 30.11 KG/M2 | HEART RATE: 84 BPM | WEIGHT: 162 LBS

## 2022-12-01 DIAGNOSIS — N18.31 STAGE 3A CHRONIC KIDNEY DISEASE (H): ICD-10-CM

## 2022-12-01 DIAGNOSIS — M65.331 TRIGGER FINGER, RIGHT MIDDLE FINGER: ICD-10-CM

## 2022-12-01 DIAGNOSIS — M25.50 POLYARTHRALGIA: ICD-10-CM

## 2022-12-01 DIAGNOSIS — M15.0 PRIMARY OSTEOARTHRITIS INVOLVING MULTIPLE JOINTS: Primary | ICD-10-CM

## 2022-12-01 PROCEDURE — 20550 NJX 1 TENDON SHEATH/LIGAMENT: CPT | Mod: RT | Performed by: INTERNAL MEDICINE

## 2022-12-01 PROCEDURE — 99214 OFFICE O/P EST MOD 30 MIN: CPT | Mod: 25 | Performed by: INTERNAL MEDICINE

## 2022-12-01 RX ORDER — TRIAMCINOLONE ACETONIDE 40 MG/ML
20 INJECTION, SUSPENSION INTRA-ARTICULAR; INTRAMUSCULAR ONCE
Status: COMPLETED | OUTPATIENT
Start: 2022-12-01 | End: 2022-12-01

## 2022-12-01 RX ADMIN — TRIAMCINOLONE ACETONIDE 20 MG: 40 INJECTION, SUSPENSION INTRA-ARTICULAR; INTRAMUSCULAR at 15:17

## 2022-12-01 NOTE — PROGRESS NOTES
Rheumatology follow-up visit note     Adam is a 82 year old male presents today for follow-up.    Adam was seen today for recheck.    Diagnoses and all orders for this visit:    Primary osteoarthritis involving multiple joints    Polyarthralgia    Trigger finger, right middle finger  -     triamcinolone (KENALOG-40) injection 20 mg  -     INJECTION SINGLE TENDON SHEATH/LIGAMENT    Stage 3a chronic kidney disease (H)        This patient with arthralgia in the background of osteoarthritis for which she is on duloxetine with good benefit, has recurrence of triggering of the right middle finger this was a treated a year ago with corticosteroid injection he wonders if that can be repeated.  This was done after pros and cons were outlined via the  with 20 mg of Kenalog into the flexor tendon sheath.  He is aware of the surgical options that he was to defer for now.    Follow up in 4 months.    HPI    Adam LUCAS Albin is a 82 year old male is here for follow-up of  polyarthralgia in association with osteoarthritis.  He has noted pain and locking of his right middle finger that typically troubles him at night sometimes waking him up because of the pain.  He rates it as moderately severe.  His something that happened about a year or so ago.  He has history of gout that he followed up with his primary physician on allopurinol.  .  He has renal impairment not a candidate for nonsteroidals. Associated findings  do not include: swelling, rash.  There is no associated recent fall or trauma.  Over-the-counter treatment to date has been without significant relief.       DETAILED EXAMINATION  12/01/22  :    Vitals:    12/01/22 0850   BP: 130/70   Pulse: 84   Weight: 73.5 kg (162 lb)     Alert oriented. Head including the face is examined for malar rash, heliotropes, scarring, lupus pernio. Eyes examined for redness such as in episcleritis/scleritis, periorbital lesions.   Neck/ Face examined for parotid gland swelling,  range of motion of neck.  Left upper and lower and right upper and lower extremities examined for tenderness, swelling, warmth of the appendicular joints, range of motion, edema, rash.  Some of the important findings included: he does not have evidence of synovitis in the palpable joints of the upper extremities.  No significant deformities of the digits, he has triggering of the right middle finger with A1 nodularity which is tender.  No  Heberden nodes.  Range of motion of the shoulders  show full abduction.  No JLT effusion or warmth of the knees.  he does not have dactylitis of the digits.     Patient Active Problem List    Diagnosis Date Noted     ACE-inhibitor cough 2021     Priority: Medium     Type 2 diabetes mellitus with stage 3 chronic kidney disease, without long-term current use of insulin (H) 2020     Priority: Medium     Urinary incontinence 10/30/2019     Priority: Medium     Primary osteoarthritis involving multiple joints 2019     Priority: Medium     Esophageal reflux      Priority: Medium     Created by Conversion         Dyslipidemia      Priority: Medium     Created by [facility] Annotation: Dec 28 2007  3:23PM - Giulia Meridai2007:   Chol   281, HDL 39Started simvastatin 2011         CKD (chronic kidney disease) stage 3, GFR 30-59 ml/min (H)      Priority: Medium     Created by [facility] Annotation: Dec 28 2007  3:40PM - Bella Gold: Cr elevated at   1.7   since .Acute exacerbation with episode of nephrolithiasis complicated   by   urosepsis in 2009.         Constipation, unspecified constipation type      Priority: Medium     Coronary artery disease due to lipid rich plaque      Priority: Medium     Right lower quadrant abdominal pain      Priority: Medium     Colitis      Priority: Medium     Nephrolithiasis      Priority: Medium     Created by [facility] Annotation: Aug  4 2009 12:22PM - Theodore Merida:   Hospitalized    7/2009 with BL stent placement.  Stent removal 8/2009.Calcium Stones.CT   5/2011:Calcified plaques L Renal pelvis,L lower pole 2 nonobstructing   calculi   of 2 mm.  Calculi medial R upper pole and R lower pole.Low dense R lower   pole   cortical lesions.1/2011 seen by Urology.BL pelvocaliectasis         Chronic Gout      Priority: Medium     Created by Conversion  [facility] Annotation: Dec 28 2007  3:23PM - Bella Gold: Uric acid   elevated   to 10 since at least 2007.Multiple joints affected-phalangeal,wrists,   ankles,   knees, ? shoulders.Started allopurinol 5/2009. Stopped HCTZ for BP   5/2009Multiple tophi noted since 10/2011Started colchicine 9/2009.  Replacement Utility updated for latest IMO load         BPH (benign prostatic hyperplasia) 07/19/2017     Priority: Medium     Chronic gout 07/19/2017     Priority: Medium     Essential hypertension with goal blood pressure less than 140/90 03/21/2017     Priority: Medium     Chronic right shoulder pain 02/21/2017     Priority: Medium     Generalized Osteoarthritis Of The Hand      Priority: Medium     Created by Conversion  Replacement Utility updated for latest IMO load         Bilateral chronic knee pain 07/20/2016     Priority: Medium     Cataracts, bilateral 02/17/2016     Priority: Medium     Elevated PSA 12/21/2015     Priority: Medium     Prostate nodule 12/21/2015     Priority: Medium     Pseudogout      Priority: Medium     Created by Conversion  [facility] Annotation: Aug 14 2009  8:35PM - Chucho Espino: Ankle   aspiration   8/2009 showed calcium pyrophospate crystals but not noted if intra or   extracellular.Seen by Rhematology 9/2009         Latent Tuberculosis      Priority: Medium     Created by Conversion  [facility] Annotation: Dec 28 2007  3:40PM - Bella Gold: treated 9 mo in   '06         Lumbar Disc Degeneration      Priority: Medium     Created by Conversion         Insomnia      Priority: Medium     Created by Conversion         Past  Surgical History:   Procedure Laterality Date     IR MISCELLANEOUS PROCEDURE  2009     IR MISCELLANEOUS PROCEDURE  2009     IR MISCELLANEOUS PROCEDURE  2009     IR MISCELLANEOUS PROCEDURE  2009     IR NEPHROURETERAL TUBE PLACEMENT BILATERAL  2009     IR URETER DILATION BILATERAL  2009     IR URETER DILATION BILATERAL  2009     KIDNEY STONE SURGERY       PICC  3/6/2016           Past Medical History:   Diagnosis Date     Acute blood loss anemia      Acute renal failure (H)      Anemia      Bacteremia      Cataract      Chronic gout      CKD (chronic kidney disease)     Stage 3     DM2 (diabetes mellitus, type 2) (H)      GERD (gastroesophageal reflux disease)      Glucose intolerance (impaired glucose tolerance)      Gout      History of nephrolithiasis      History of prostatitis 2017     Hyperlipidemia      Hypertension      Insomnia      Intestinal disaccharidase deficiencies and disaccharide malabsorption     Created by Conversion      Latent tuberculosis      Nephrolithiasis      Neuropathy      Nonspecific abnormal findings on radiological and other examination of skull and head     Created by [facility] Annotation: 2013 11:23PM - Giulia Meridai/10/2011:  severe stenosis both posterior cerebral arteries seen MRI/MRA done for  vertigo. No acute changes.e*     Osteoarthritis     wrist, knee, shoulder     Prostatitis      Rectal bleed      Trigger thumb      No Known Allergies  Current Outpatient Medications   Medication Sig Dispense Refill     ACETAMINOPHEN EXTRA STRENGTH 500 MG tablet TAKE 1 TABLET(500 MG) BY MOUTH EVERY 6 HOURS AS NEEDED 120 tablet 6     allopurinol (ZYLOPRIM) 100 MG tablet TAKE 2 TABLETS BY MOUTH EVERY  tablet 7     aspirin (ASA) 81 MG EC tablet Take 1 tablet (81 mg) by mouth daily 90 tablet 3     docusate sodium (COLACE) 100 MG capsule TAKE 1 CAPSULE(100 MG) BY MOUTH TWICE DAILY AS NEEDED FOR CONSTIPATION 180 capsule 3      DULoxetine (CYMBALTA) 20 MG capsule TAKE 1 CAPSULE(20 MG) BY MOUTH TWICE DAILY 60 capsule 1     gabapentin (NEURONTIN) 300 MG capsule TAKE 1 TO 2 CAPSULES BY MOUTH AT BEDTIME 180 capsule 3     HYDROcodone-acetaminophen (NORCO) 5-325 MG tablet Take 1 tablet by mouth daily as needed for severe pain 30 tablet 0     isosorbide mononitrate (IMDUR) 30 MG 24 hr tablet Take 1 tablet (30 mg) by mouth daily 90 tablet 3     JANUVIA 50 MG tablet TAKE 1 TABLET(50 MG) BY MOUTH DAILY 90 tablet 3     losartan (COZAAR) 50 MG tablet TAKE 1 TABLET(50 MG) BY MOUTH DAILY 90 tablet 3     meclizine (ANTIVERT) 12.5 MG tablet Take 1 tablet (12.5 mg) by mouth 3 times daily as needed for dizziness 60 tablet 6     metoprolol succinate ER (TOPROL XL) 100 MG 24 hr tablet TAKE 1 TABLET(100 MG) BY MOUTH DAILY 90 tablet 3     nitroGLYcerin (NITROSTAT) 0.4 MG sublingual tablet PLACE 1 TABLET UNDER THE TONGUE EVERY 5 MINUTES AS NEEDED FOR CHEST PAIN. 25 tablet 6     rosuvastatin (CRESTOR) 20 MG tablet TAKE 1 TABLET(20 MG) BY MOUTH AT BEDTIME 90 tablet 0     BANOPHEN 25 MG capsule  (Patient not taking: Reported on 12/1/2022)       colchicine 0.6 mg tablet [COLCHICINE 0.6 MG TABLET] TAKE 1 TABLET BY MOUTH EVERY OTHER DAY (Patient not taking: Reported on 12/1/2022) 45 tablet 1     ketorolac (ACULAR) 0.5 % ophthalmic solution INSTILL 1 DROP IN LEFT EYE FOUR TIMES DAILY STARTING 1 DAY BEFORE SURGERY. CONTINUE USE 4 TIMES DAILY UNTIL ALL TAKEN (Patient not taking: Reported on 12/1/2022)       loratadine (CLARITIN) 10 mg tablet [LORATADINE (CLARITIN) 10 MG TABLET] TAKE 1 TABLET BY MOUTH EVERY DAY (Patient not taking: Reported on 12/1/2022) 90 tablet 2     ofloxacin (OCUFLOX) 0.3 % ophthalmic solution INSTILL 1 DROP IN LEFT EYE FOUR TIMES DAILY STARTING 1 DAY BEFORE SURGERY. CONTINUE USE 4 TIMES DAILY UNTIL ALL TAKEN (Patient not taking: Reported on 12/1/2022)       omeprazole (PRILOSEC) 20 MG DR capsule TAKE 1 CAPSULE BY MOUTH DAILY (Patient not taking:  Reported on 12/1/2022) 90 capsule 3     prednisoLONE acetate (PRED FORTE) 1 % ophthalmic suspension SHAKE LIQUID AND INSTILL 1 DROP IN LEFT EYE FOUR TIMES DAILY STARTING 1 DAY BEFORE SURGERY. CONTINUE USE 4 TIMES DAILY UNTIL ALL TAKEN (Patient not taking: Reported on 12/1/2022)       SYSTANE ULTRA 0.4-0.3 % SOLN ophthalmic solution INSTILL ONE DROP RAO BOTH EYES FOUR TIMES DAILY (Patient not taking: Reported on 12/1/2022)       triamcinolone (KENALOG) 0.1 % external cream Apply topically 2 times daily as needed for irritation (Patient not taking: Reported on 12/1/2022) 80 g 3     family history includes Cerebrovascular Disease in his daughter and father.  Social Connections: Socially Integrated     Frequency of Communication with Friends and Family: Once a week     Frequency of Social Gatherings with Friends and Family: Twice a week     Attends [employer] Services: 1 to 4 times per year     Active Member of Clubs or Organizations: No     Attends Club or Organization Meetings: 1 to 4 times per year     Marital Status:           WBC   Date Value Ref Range Status   06/22/2020 7.1 4.0 - 11.0 thou/uL Final     RBC Count   Date Value Ref Range Status   06/22/2020 4.28 (L) 4.40 - 6.20 mill/uL Final     Hemoglobin   Date Value Ref Range Status   06/22/2020 13.0 (L) 14.0 - 18.0 g/dL Final     Hematocrit   Date Value Ref Range Status   06/22/2020 39.5 (L) 40.0 - 54.0 % Final     MCV   Date Value Ref Range Status   06/22/2020 92 80 - 100 fL Final     MCH   Date Value Ref Range Status   06/22/2020 30.4 27.0 - 34.0 pg Final     Platelet Count   Date Value Ref Range Status   06/22/2020 202 140 - 440 thou/uL Final     % Lymphocytes   Date Value Ref Range Status   12/27/2018 24 20 - 40 % Final     AST   Date Value Ref Range Status   09/16/2019 24 0 - 40 U/L Final     ALT   Date Value Ref Range Status   01/23/2020 49 (H) 0 - 45 U/L Final     Albumin   Date Value Ref Range Status   01/23/2020 4.0 3.5 - 5.0 g/dL Final      Alkaline Phosphatase   Date Value Ref Range Status   09/16/2019 122 (H) 45 - 120 U/L Final     Creatinine   Date Value Ref Range Status   09/12/2022 1.41 (H) 0.67 - 1.17 mg/dL Final     GFR Estimate   Date Value Ref Range Status   09/12/2022 50 (L) >60 mL/min/1.73m2 Final     Comment:     Effective December 21, 2021 eGFRcr in adults is calculated using the 2021 CKD-EPI creatinine equation which includes age and gender (Stefano et al., NEJM, DOI: 10.1056/JFZSdn5101713)   06/22/2020 44 (L) >60 mL/min/1.73m2 Final     GFR Estimate If Black   Date Value Ref Range Status   06/22/2020 53 (L) >60 mL/min/1.73m2 Final

## 2022-12-05 ENCOUNTER — TELEPHONE (OUTPATIENT)
Dept: FAMILY MEDICINE | Facility: CLINIC | Age: 82
End: 2022-12-05

## 2022-12-05 DIAGNOSIS — R52 SEVERE PAIN: ICD-10-CM

## 2022-12-05 DIAGNOSIS — Z76.0 ENCOUNTER FOR MEDICATION REFILL: ICD-10-CM

## 2022-12-05 RX ORDER — HYDROCODONE BITARTRATE AND ACETAMINOPHEN 5; 325 MG/1; MG/1
1 TABLET ORAL DAILY PRN
Qty: 30 TABLET | Refills: 0 | Status: SHIPPED | OUTPATIENT
Start: 2022-12-05 | End: 2023-01-03

## 2022-12-05 NOTE — PROGRESS NOTES
[facility] Care Coordination Contact    Per [employer], the bath chair was delivered. CC notified.     Adam Talamantes 1940 Bath chair 11/15/2022 Olga Silva DELIVERED 11/21/2022     Angela Moon  Care Management Specialist  [facility]  [phone]

## 2022-12-05 NOTE — TELEPHONE ENCOUNTER
Medication Question or Refill    Contacts       Type Contact Phone/Fax    12/05/2022 11:45 AM CST Phone (Incoming) Dimitri Talamantes (Emergency Contact) [phone]          What medication are you calling about (include dose and sig)?: HYDROcodone-acetaminophen (NORCO) 5-325 MG tablet    Controlled Substance Agreement on file:   CSA -- Patient Level:     [Media Unavailable] Controlled Substance Agreement - Opioid - Scan on 4/12/2022  8:47 AM   [Media Unavailable] Controlled Substance Agreement - Opioid - Scan on 8/4/2021  7:24 AM       Who prescribed the medication?: JAVAN DYE    Do you need a refill? Yes     When did you use the medication last? 12/03/2022    Patient offered an appointment? Yes     Do you have any questions or concerns?  No    Preferred Pharmacy:   [email or web address] #[id number] [address] AT [facility] [address]  [address] [place] [address]  Phone: [phone] Fax: [phone]      Okay to leave a detailed message?: Yes at Home number on file [phone] (daughter's cell phone)

## 2022-12-19 DIAGNOSIS — Z76.0 ENCOUNTER FOR MEDICATION REFILL: ICD-10-CM

## 2022-12-19 DIAGNOSIS — K21.9 GASTROESOPHAGEAL REFLUX DISEASE: ICD-10-CM

## 2022-12-19 NOTE — TELEPHONE ENCOUNTER
"Last Written Prescription Date:  12/20/21  Last Fill Quantity: 90,  # refills: 3   Last office visit provider:  9/12/22     Requested Prescriptions   Pending Prescriptions Disp Refills     omeprazole (PRILOSEC) 20 MG DR capsule [Pharmacy Med Name: OMEPRAZOLE 20MG CAPSULES] 90 capsule 3     Sig: TAKE 1 CAPSULE BY MOUTH DAILY       PPI Protocol Passed - 12/19/2022  3:32 AM        Passed - Not on Clopidogrel (unless Pantoprazole ordered)        Passed - No diagnosis of osteoporosis on record        Passed - Recent (12 mo) or future (30 days) visit within the authorizing provider's specialty     Patient has had an office visit with the authorizing provider or a provider within the authorizing providers department within the previous 12 mos or has a future within next 30 days. See \"Patient Info\" tab in inbasket, or \"Choose Columns\" in Meds & Orders section of the refill encounter.              Passed - Medication is active on med list        Passed - Patient is age 18 or older             Francesca Skinner RN 12/19/22 2:15 PM  "

## 2022-12-20 ENCOUNTER — PATIENT OUTREACH (OUTPATIENT)
Dept: GERIATRIC MEDICINE | Facility: CLINIC | Age: 82
End: 2022-12-20

## 2022-12-20 NOTE — PROGRESS NOTES
[facility] Care Coordination Contact    Encounter opened due to Regulatory [employer] Update to open FVP Program.    Angela Moon  Care Management Specialist  [facility]  [phone]

## 2022-12-20 NOTE — PROGRESS NOTES
[facility] Care Coordination Contact    Encounter opened due to Regulatory [employer] Update to close FVP Program.    Angela Moon  Care Management Specialist  [facility]  [phone]

## 2023-01-03 ENCOUNTER — PATIENT OUTREACH (OUTPATIENT)
Dept: GERIATRIC MEDICINE | Facility: CLINIC | Age: 83
End: 2023-01-03

## 2023-01-03 DIAGNOSIS — R52 SEVERE PAIN: ICD-10-CM

## 2023-01-03 DIAGNOSIS — Z76.0 ENCOUNTER FOR MEDICATION REFILL: ICD-10-CM

## 2023-01-03 RX ORDER — HYDROCODONE BITARTRATE AND ACETAMINOPHEN 5; 325 MG/1; MG/1
1 TABLET ORAL DAILY PRN
Qty: 30 TABLET | Refills: 0 | Status: ON HOLD | OUTPATIENT
Start: 2023-01-03 | End: 2023-02-06

## 2023-01-03 NOTE — TELEPHONE ENCOUNTER
Medication Question or Refill    Contacts       Type Contact Phone/Fax    01/03/2023 08:58 AM CST Phone (Incoming) Dimitri Talamantes (Emergency Contact) [phone]          What medication are you calling about (include dose and sig)?: Hydrocodone-acetaminophen  5-325    Controlled Substance Agreement on file:   CSA -- Patient Level:     [Media Unavailable] Controlled Substance Agreement - Opioid - Scan on 4/12/2022  8:47 AM   [Media Unavailable] Controlled Substance Agreement - Opioid - Scan on 8/4/2021  7:24 AM       Who prescribed the medication?: PCP    Do you need a refill? Yes: last filled on 12/5/22    When did you use the medication last? yesterday    Patient offered an appointment No = upcoming appt on 1/12/23     Do you have any questions or concerns?  No    Preferred Pharmacy:   [id number]   [address] [facility] [address]  Phone: [phone] Fax: [phone]      Okay to leave a detailed message?: Yes at Home number on file [phone] (home)    Call taken on 1/3/23 at 9 am by Priscilla CHAMPION

## 2023-01-03 NOTE — PROGRESS NOTES
[facility] Care Coordination Contact    Called adult daughter Dimitri to schedule annual HRA home visit. HRA has been scheduled for 1/5/2023 at 1:00 pm. Pending  availability. Request submitted.     RICKEY Gallegos  [facility]  [phone]

## 2023-01-05 ENCOUNTER — PATIENT OUTREACH (OUTPATIENT)
Dept: GERIATRIC MEDICINE | Facility: CLINIC | Age: 83
End: 2023-01-05

## 2023-01-11 ENCOUNTER — APPOINTMENT (OUTPATIENT)
Dept: MRI IMAGING | Facility: HOSPITAL | Age: 83
DRG: 068 | End: 2023-01-11
Attending: PHYSICIAN ASSISTANT
Payer: COMMERCIAL

## 2023-01-11 ENCOUNTER — APPOINTMENT (OUTPATIENT)
Dept: OCCUPATIONAL THERAPY | Facility: HOSPITAL | Age: 83
DRG: 068 | End: 2023-01-11
Attending: INTERNAL MEDICINE
Payer: COMMERCIAL

## 2023-01-11 ENCOUNTER — APPOINTMENT (OUTPATIENT)
Dept: CT IMAGING | Facility: HOSPITAL | Age: 83
DRG: 068 | End: 2023-01-11
Attending: EMERGENCY MEDICINE
Payer: COMMERCIAL

## 2023-01-11 ENCOUNTER — APPOINTMENT (OUTPATIENT)
Dept: RADIOLOGY | Facility: HOSPITAL | Age: 83
DRG: 068 | End: 2023-01-11
Attending: EMERGENCY MEDICINE
Payer: COMMERCIAL

## 2023-01-11 ENCOUNTER — HOSPITAL ENCOUNTER (INPATIENT)
Facility: HOSPITAL | Age: 83
LOS: 3 days | Discharge: HOME | DRG: 068 | End: 2023-01-14
Attending: EMERGENCY MEDICINE | Admitting: INTERNAL MEDICINE
Payer: COMMERCIAL

## 2023-01-11 ENCOUNTER — APPOINTMENT (OUTPATIENT)
Dept: MRI IMAGING | Facility: HOSPITAL | Age: 83
DRG: 068 | End: 2023-01-11
Attending: EMERGENCY MEDICINE
Payer: COMMERCIAL

## 2023-01-11 DIAGNOSIS — G89.29 CHRONIC RIGHT SHOULDER PAIN: Primary | ICD-10-CM

## 2023-01-11 DIAGNOSIS — I65.02 VERTEBRAL ARTERY OCCLUSION, LEFT: ICD-10-CM

## 2023-01-11 DIAGNOSIS — M25.511 CHRONIC RIGHT SHOULDER PAIN: Primary | ICD-10-CM

## 2023-01-11 DIAGNOSIS — K21.00 GASTROESOPHAGEAL REFLUX DISEASE WITH ESOPHAGITIS, UNSPECIFIED WHETHER HEMORRHAGE: ICD-10-CM

## 2023-01-11 DIAGNOSIS — I10 ESSENTIAL HYPERTENSION: ICD-10-CM

## 2023-01-11 DIAGNOSIS — M54.2 NECK PAIN: ICD-10-CM

## 2023-01-11 DIAGNOSIS — R42 VERTIGO: ICD-10-CM

## 2023-01-11 LAB
ALBUMIN UR-MCNC: NEGATIVE MG/DL
ANION GAP SERPL CALCULATED.3IONS-SCNC: 14 MMOL/L (ref 7–15)
APPEARANCE UR: CLEAR
BILIRUB UR QL STRIP: NEGATIVE
BUN SERPL-MCNC: 30 MG/DL (ref 8–23)
CALCIUM SERPL-MCNC: 10.3 MG/DL (ref 8.8–10.2)
CHLORIDE SERPL-SCNC: 98 MMOL/L (ref 98–107)
CHOLEST SERPL-MCNC: 228 MG/DL
COLOR UR AUTO: COLORLESS
CREAT SERPL-MCNC: 1.46 MG/DL (ref 0.67–1.17)
DEPRECATED HCO3 PLAS-SCNC: 22 MMOL/L (ref 22–29)
ERYTHROCYTE [DISTWIDTH] IN BLOOD BY AUTOMATED COUNT: 13.9 % (ref 10–15)
FLUAV RNA SPEC QL NAA+PROBE: NEGATIVE
FLUBV RNA RESP QL NAA+PROBE: NEGATIVE
GFR SERPL CREATININE-BSD FRML MDRD: 48 ML/MIN/1.73M2
GLUCOSE BLDC GLUCOMTR-MCNC: 130 MG/DL (ref 70–99)
GLUCOSE BLDC GLUCOMTR-MCNC: 158 MG/DL (ref 70–99)
GLUCOSE BLDC GLUCOMTR-MCNC: 164 MG/DL (ref 70–99)
GLUCOSE SERPL-MCNC: 168 MG/DL (ref 70–99)
GLUCOSE UR STRIP-MCNC: NEGATIVE MG/DL
HBA1C MFR BLD: 7.6 %
HCT VFR BLD AUTO: 42.6 % (ref 40–53)
HDLC SERPL-MCNC: 40 MG/DL
HGB BLD-MCNC: 14 G/DL (ref 13.3–17.7)
HGB UR QL STRIP: NEGATIVE
HOLD SPECIMEN: NORMAL
KETONES UR STRIP-MCNC: NEGATIVE MG/DL
LDLC SERPL CALC-MCNC: 124 MG/DL
LEUKOCYTE ESTERASE UR QL STRIP: ABNORMAL
MAGNESIUM SERPL-MCNC: 1.8 MG/DL (ref 1.7–2.3)
MCH RBC QN AUTO: 30.7 PG (ref 26.5–33)
MCHC RBC AUTO-ENTMCNC: 32.9 G/DL (ref 31.5–36.5)
MCV RBC AUTO: 93 FL (ref 78–100)
NITRATE UR QL: NEGATIVE
NONHDLC SERPL-MCNC: 188 MG/DL
PH UR STRIP: 5.5 [PH] (ref 5–7)
PLATELET # BLD AUTO: 217 10E3/UL (ref 150–450)
POTASSIUM SERPL-SCNC: 4.8 MMOL/L (ref 3.4–5.3)
RBC # BLD AUTO: 4.56 10E6/UL (ref 4.4–5.9)
RBC URINE: 1 /HPF
RSV RNA SPEC NAA+PROBE: NEGATIVE
SARS-COV-2 RNA RESP QL NAA+PROBE: NEGATIVE
SODIUM SERPL-SCNC: 134 MMOL/L (ref 136–145)
SP GR UR STRIP: 1.01 (ref 1–1.03)
TRIGL SERPL-MCNC: 320 MG/DL
TROPONIN T SERPL HS-MCNC: 8 NG/L
TROPONIN T SERPL HS-MCNC: 9 NG/L
UROBILINOGEN UR STRIP-MCNC: <2 MG/DL
WBC # BLD AUTO: 8.7 10E3/UL (ref 4–11)
WBC URINE: 20 /HPF

## 2023-01-11 PROCEDURE — 120N000001 HC R&B MED SURG/OB

## 2023-01-11 PROCEDURE — 99223 1ST HOSP IP/OBS HIGH 75: CPT | Performed by: PSYCHIATRY & NEUROLOGY

## 2023-01-11 PROCEDURE — 71046 X-RAY EXAM CHEST 2 VIEWS: CPT

## 2023-01-11 PROCEDURE — 36415 COLL VENOUS BLD VENIPUNCTURE: CPT | Performed by: EMERGENCY MEDICINE

## 2023-01-11 PROCEDURE — 250N000013 HC RX MED GY IP 250 OP 250 PS 637: Performed by: PSYCHIATRY & NEUROLOGY

## 2023-01-11 PROCEDURE — 83036 HEMOGLOBIN GLYCOSYLATED A1C: CPT | Performed by: INTERNAL MEDICINE

## 2023-01-11 PROCEDURE — 99207 PR NO BILLABLE SERVICE THIS VISIT: CPT | Performed by: STUDENT IN AN ORGANIZED HEALTH CARE EDUCATION/TRAINING PROGRAM

## 2023-01-11 PROCEDURE — 80061 LIPID PANEL: CPT | Performed by: INTERNAL MEDICINE

## 2023-01-11 PROCEDURE — 81001 URINALYSIS AUTO W/SCOPE: CPT | Performed by: EMERGENCY MEDICINE

## 2023-01-11 PROCEDURE — 84484 ASSAY OF TROPONIN QUANT: CPT | Performed by: EMERGENCY MEDICINE

## 2023-01-11 PROCEDURE — C9803 HOPD COVID-19 SPEC COLLECT: HCPCS

## 2023-01-11 PROCEDURE — 70498 CT ANGIOGRAPHY NECK: CPT

## 2023-01-11 PROCEDURE — 70549 MR ANGIOGRAPH NECK W/O&W/DYE: CPT

## 2023-01-11 PROCEDURE — 250N000011 HC RX IP 250 OP 636: Performed by: EMERGENCY MEDICINE

## 2023-01-11 PROCEDURE — 83735 ASSAY OF MAGNESIUM: CPT | Performed by: EMERGENCY MEDICINE

## 2023-01-11 PROCEDURE — 99207 PR NO BILLABLE SERVICE THIS VISIT: CPT | Performed by: PHYSICIAN ASSISTANT

## 2023-01-11 PROCEDURE — 70553 MRI BRAIN STEM W/O & W/DYE: CPT

## 2023-01-11 PROCEDURE — 250N000013 HC RX MED GY IP 250 OP 250 PS 637: Performed by: INTERNAL MEDICINE

## 2023-01-11 PROCEDURE — 250N000012 HC RX MED GY IP 250 OP 636 PS 637: Performed by: INTERNAL MEDICINE

## 2023-01-11 PROCEDURE — 87086 URINE CULTURE/COLONY COUNT: CPT | Performed by: EMERGENCY MEDICINE

## 2023-01-11 PROCEDURE — 255N000002 HC RX 255 OP 636: Performed by: EMERGENCY MEDICINE

## 2023-01-11 PROCEDURE — 70544 MR ANGIOGRAPHY HEAD W/O DYE: CPT

## 2023-01-11 PROCEDURE — C9113 INJ PANTOPRAZOLE SODIUM, VIA: HCPCS | Performed by: INTERNAL MEDICINE

## 2023-01-11 PROCEDURE — 250N000011 HC RX IP 250 OP 636: Performed by: INTERNAL MEDICINE

## 2023-01-11 PROCEDURE — 80048 BASIC METABOLIC PNL TOTAL CA: CPT | Performed by: EMERGENCY MEDICINE

## 2023-01-11 PROCEDURE — 87637 SARSCOV2&INF A&B&RSV AMP PRB: CPT | Performed by: EMERGENCY MEDICINE

## 2023-01-11 PROCEDURE — 97166 OT EVAL MOD COMPLEX 45 MIN: CPT | Mod: GO

## 2023-01-11 PROCEDURE — 99222 1ST HOSP IP/OBS MODERATE 55: CPT | Performed by: INTERNAL MEDICINE

## 2023-01-11 PROCEDURE — 99285 EMERGENCY DEPT VISIT HI MDM: CPT | Mod: 25

## 2023-01-11 PROCEDURE — 82962 GLUCOSE BLOOD TEST: CPT

## 2023-01-11 PROCEDURE — A9585 GADOBUTROL INJECTION: HCPCS | Performed by: EMERGENCY MEDICINE

## 2023-01-11 PROCEDURE — 70551 MRI BRAIN STEM W/O DYE: CPT

## 2023-01-11 PROCEDURE — 85027 COMPLETE CBC AUTOMATED: CPT | Performed by: EMERGENCY MEDICINE

## 2023-01-11 PROCEDURE — 96365 THER/PROPH/DIAG IV INF INIT: CPT | Mod: 59

## 2023-01-11 PROCEDURE — 70547 MR ANGIOGRAPHY NECK W/O DYE: CPT

## 2023-01-11 PROCEDURE — 84484 ASSAY OF TROPONIN QUANT: CPT | Performed by: INTERNAL MEDICINE

## 2023-01-11 PROCEDURE — 93005 ELECTROCARDIOGRAM TRACING: CPT | Performed by: EMERGENCY MEDICINE

## 2023-01-11 PROCEDURE — 258N000003 HC RX IP 258 OP 636: Performed by: INTERNAL MEDICINE

## 2023-01-11 PROCEDURE — 96375 TX/PRO/DX INJ NEW DRUG ADDON: CPT

## 2023-01-11 PROCEDURE — 250N000013 HC RX MED GY IP 250 OP 250 PS 637: Performed by: EMERGENCY MEDICINE

## 2023-01-11 RX ORDER — CLOPIDOGREL BISULFATE 75 MG/1
75 TABLET ORAL DAILY
Status: DISCONTINUED | OUTPATIENT
Start: 2023-01-11 | End: 2023-01-14 | Stop reason: HOSPADM

## 2023-01-11 RX ORDER — ONDANSETRON 4 MG/1
4 TABLET, ORALLY DISINTEGRATING ORAL EVERY 6 HOURS PRN
Status: DISCONTINUED | OUTPATIENT
Start: 2023-01-11 | End: 2023-01-14 | Stop reason: HOSPADM

## 2023-01-11 RX ORDER — LIDOCAINE 40 MG/G
CREAM TOPICAL
Status: DISCONTINUED | OUTPATIENT
Start: 2023-01-11 | End: 2023-01-14 | Stop reason: HOSPADM

## 2023-01-11 RX ORDER — NALOXONE HYDROCHLORIDE 0.4 MG/ML
0.2 INJECTION, SOLUTION INTRAMUSCULAR; INTRAVENOUS; SUBCUTANEOUS
Status: DISCONTINUED | OUTPATIENT
Start: 2023-01-11 | End: 2023-01-14 | Stop reason: HOSPADM

## 2023-01-11 RX ORDER — CEFTRIAXONE 1 G/1
1 INJECTION, POWDER, FOR SOLUTION INTRAMUSCULAR; INTRAVENOUS EVERY 24 HOURS
Status: DISCONTINUED | OUTPATIENT
Start: 2023-01-12 | End: 2023-01-13

## 2023-01-11 RX ORDER — NICOTINE POLACRILEX 4 MG
15-30 LOZENGE BUCCAL
Status: DISCONTINUED | OUTPATIENT
Start: 2023-01-11 | End: 2023-01-14 | Stop reason: HOSPADM

## 2023-01-11 RX ORDER — ONDANSETRON 2 MG/ML
4 INJECTION INTRAMUSCULAR; INTRAVENOUS EVERY 6 HOURS PRN
Status: DISCONTINUED | OUTPATIENT
Start: 2023-01-11 | End: 2023-01-14 | Stop reason: HOSPADM

## 2023-01-11 RX ORDER — ROSUVASTATIN CALCIUM 10 MG/1
20 TABLET, COATED ORAL AT BEDTIME
Status: DISCONTINUED | OUTPATIENT
Start: 2023-01-11 | End: 2023-01-14 | Stop reason: HOSPADM

## 2023-01-11 RX ORDER — GADOBUTROL 604.72 MG/ML
7 INJECTION INTRAVENOUS ONCE
Status: COMPLETED | OUTPATIENT
Start: 2023-01-11 | End: 2023-01-11

## 2023-01-11 RX ORDER — SODIUM CHLORIDE 9 MG/ML
INJECTION, SOLUTION INTRAVENOUS CONTINUOUS
Status: DISCONTINUED | OUTPATIENT
Start: 2023-01-11 | End: 2023-01-12

## 2023-01-11 RX ORDER — NITROGLYCERIN 0.4 MG/1
0.4 TABLET SUBLINGUAL EVERY 5 MIN PRN
Status: DISCONTINUED | OUTPATIENT
Start: 2023-01-11 | End: 2023-01-14 | Stop reason: HOSPADM

## 2023-01-11 RX ORDER — ASPIRIN 81 MG/1
81 TABLET, CHEWABLE ORAL DAILY
Status: DISCONTINUED | OUTPATIENT
Start: 2023-01-12 | End: 2023-01-11

## 2023-01-11 RX ORDER — METOPROLOL SUCCINATE 100 MG/1
100 TABLET, EXTENDED RELEASE ORAL DAILY
Status: DISCONTINUED | OUTPATIENT
Start: 2023-01-11 | End: 2023-01-14 | Stop reason: HOSPADM

## 2023-01-11 RX ORDER — DULOXETIN HYDROCHLORIDE 20 MG/1
20 CAPSULE, DELAYED RELEASE ORAL 2 TIMES DAILY
Status: DISCONTINUED | OUTPATIENT
Start: 2023-01-11 | End: 2023-01-14 | Stop reason: HOSPADM

## 2023-01-11 RX ORDER — ACETAMINOPHEN 325 MG/1
650 TABLET ORAL EVERY 4 HOURS PRN
Status: DISCONTINUED | OUTPATIENT
Start: 2023-01-11 | End: 2023-01-14 | Stop reason: HOSPADM

## 2023-01-11 RX ORDER — ACETAMINOPHEN 325 MG/1
650 TABLET ORAL 3 TIMES DAILY
Status: DISCONTINUED | OUTPATIENT
Start: 2023-01-11 | End: 2023-01-14 | Stop reason: HOSPADM

## 2023-01-11 RX ORDER — GABAPENTIN 300 MG/1
300 CAPSULE ORAL AT BEDTIME
Status: DISCONTINUED | OUTPATIENT
Start: 2023-01-11 | End: 2023-01-14 | Stop reason: HOSPADM

## 2023-01-11 RX ORDER — MECLIZINE HCL 12.5 MG 12.5 MG/1
25 TABLET ORAL ONCE
Status: COMPLETED | OUTPATIENT
Start: 2023-01-11 | End: 2023-01-11

## 2023-01-11 RX ORDER — ISOSORBIDE MONONITRATE 30 MG/1
30 TABLET, EXTENDED RELEASE ORAL DAILY
Status: DISCONTINUED | OUTPATIENT
Start: 2023-01-11 | End: 2023-01-14 | Stop reason: HOSPADM

## 2023-01-11 RX ORDER — LOSARTAN POTASSIUM 50 MG/1
50 TABLET ORAL DAILY
Status: DISCONTINUED | OUTPATIENT
Start: 2023-01-11 | End: 2023-01-14 | Stop reason: HOSPADM

## 2023-01-11 RX ORDER — DEXTROSE MONOHYDRATE 25 G/50ML
25-50 INJECTION, SOLUTION INTRAVENOUS
Status: DISCONTINUED | OUTPATIENT
Start: 2023-01-11 | End: 2023-01-14 | Stop reason: HOSPADM

## 2023-01-11 RX ORDER — NALOXONE HYDROCHLORIDE 0.4 MG/ML
0.4 INJECTION, SOLUTION INTRAMUSCULAR; INTRAVENOUS; SUBCUTANEOUS
Status: DISCONTINUED | OUTPATIENT
Start: 2023-01-11 | End: 2023-01-14 | Stop reason: HOSPADM

## 2023-01-11 RX ORDER — ALLOPURINOL 100 MG/1
200 TABLET ORAL DAILY
Status: DISCONTINUED | OUTPATIENT
Start: 2023-01-11 | End: 2023-01-14 | Stop reason: HOSPADM

## 2023-01-11 RX ORDER — HYDROCODONE BITARTRATE AND ACETAMINOPHEN 5; 325 MG/1; MG/1
1 TABLET ORAL DAILY PRN
Status: DISCONTINUED | OUTPATIENT
Start: 2023-01-11 | End: 2023-01-14 | Stop reason: HOSPADM

## 2023-01-11 RX ORDER — MECLIZINE HCL 12.5 MG 12.5 MG/1
12.5 TABLET ORAL 3 TIMES DAILY PRN
Status: DISCONTINUED | OUTPATIENT
Start: 2023-01-11 | End: 2023-01-14 | Stop reason: HOSPADM

## 2023-01-11 RX ORDER — IOPAMIDOL 755 MG/ML
75 INJECTION, SOLUTION INTRAVASCULAR ONCE
Status: COMPLETED | OUTPATIENT
Start: 2023-01-11 | End: 2023-01-11

## 2023-01-11 RX ORDER — CEFTRIAXONE 1 G/1
1 INJECTION, POWDER, FOR SOLUTION INTRAMUSCULAR; INTRAVENOUS ONCE
Status: COMPLETED | OUTPATIENT
Start: 2023-01-11 | End: 2023-01-11

## 2023-01-11 RX ORDER — ASPIRIN 81 MG/1
81 TABLET, CHEWABLE ORAL DAILY
Status: DISCONTINUED | OUTPATIENT
Start: 2023-01-11 | End: 2023-01-14 | Stop reason: HOSPADM

## 2023-01-11 RX ADMIN — PANTOPRAZOLE SODIUM 40 MG: 40 INJECTION, POWDER, FOR SOLUTION INTRAVENOUS at 15:47

## 2023-01-11 RX ADMIN — DULOXETINE HYDROCHLORIDE 20 MG: 20 CAPSULE, DELAYED RELEASE ORAL at 20:17

## 2023-01-11 RX ADMIN — CEFTRIAXONE SODIUM 1 G: 1 INJECTION, POWDER, FOR SOLUTION INTRAMUSCULAR; INTRAVENOUS at 08:12

## 2023-01-11 RX ADMIN — MECLIZINE 25 MG: 12.5 TABLET ORAL at 04:48

## 2023-01-11 RX ADMIN — GABAPENTIN 300 MG: 300 CAPSULE ORAL at 21:51

## 2023-01-11 RX ADMIN — INSULIN ASPART 1 UNITS: 100 INJECTION, SOLUTION INTRAVENOUS; SUBCUTANEOUS at 16:56

## 2023-01-11 RX ADMIN — ACETAMINOPHEN 650 MG: 325 TABLET ORAL at 20:17

## 2023-01-11 RX ADMIN — SODIUM CHLORIDE: 9 INJECTION, SOLUTION INTRAVENOUS at 15:43

## 2023-01-11 RX ADMIN — ACETAMINOPHEN 650 MG: 325 TABLET ORAL at 15:55

## 2023-01-11 RX ADMIN — IOPAMIDOL 75 ML: 755 INJECTION, SOLUTION INTRAVENOUS at 08:01

## 2023-01-11 RX ADMIN — GADOBUTROL 7 ML: 604.72 INJECTION INTRAVENOUS at 05:49

## 2023-01-11 RX ADMIN — ROSUVASTATIN CALCIUM 20 MG: 10 TABLET, FILM COATED ORAL at 21:51

## 2023-01-11 RX ADMIN — ASPIRIN 81 MG CHEWABLE TABLET 81 MG: 81 TABLET CHEWABLE at 18:39

## 2023-01-11 RX ADMIN — CLOPIDOGREL BISULFATE 75 MG: 75 TABLET ORAL at 18:39

## 2023-01-11 ASSESSMENT — ACTIVITIES OF DAILY LIVING (ADL)
DRESS: 0-->ASSISTANCE NEEDED (DEVELOPMENTALLY APPROPRIATE)
NUMBER_OF_TIMES_PATIENT_HAS_FALLEN_WITHIN_LAST_SIX_MONTHS: 2
TRANSFERRING: 0-->ASSISTANCE NEEDED (DEVELOPMENTALLY APPROPRIATE)
DRESSING/BATHING_DIFFICULTY: YES
ADLS_ACUITY_SCORE: 38
ADLS_ACUITY_SCORE: 38
CHANGE_IN_FUNCTIONAL_STATUS_SINCE_ONSET_OF_CURRENT_ILLNESS/INJURY: YES
DIFFICULTY_EATING/SWALLOWING: NO
ADLS_ACUITY_SCORE: 35
ADLS_ACUITY_SCORE: 38
ADLS_ACUITY_SCORE: 35
ADLS_ACUITY_SCORE: 35
FALL_HISTORY_WITHIN_LAST_SIX_MONTHS: YES
CONCENTRATING,_REMEMBERING_OR_MAKING_DECISIONS_DIFFICULTY: NO
DRESSING/BATHING: BATHING DIFFICULTY, ASSISTANCE 1 PERSON
DRESS: 1-->ASSISTANCE (EQUIPMENT/PERSON) NEEDED
DRESSING/BATHING_MANAGEMENT: FAMILY
WALKING_OR_CLIMBING_STAIRS_DIFFICULTY: YES
TRANSFERRING: 1-->ASSISTANCE (EQUIPMENT/PERSON) NEEDED
EQUIPMENT_CURRENTLY_USED_AT_HOME: WALKER, ROLLING
ADLS_ACUITY_SCORE: 35
ADLS_ACUITY_SCORE: 35
BATHING: 1-->ASSISTANCE NEEDED
ADLS_ACUITY_SCORE: 31
TOILETING_ISSUES: NO
WALKING_OR_CLIMBING_STAIRS: TRANSFERRING DIFFICULTY, ASSISTANCE 1 PERSON
WEAR_GLASSES_OR_BLIND: NO
DOING_ERRANDS_INDEPENDENTLY_DIFFICULTY: YES
ADLS_ACUITY_SCORE: 35

## 2023-01-11 ASSESSMENT — ENCOUNTER SYMPTOMS
WEAKNESS: 1
NAUSEA: 1
NECK PAIN: 1
HEADACHES: 1
VOMITING: 1
DIZZINESS: 1

## 2023-01-11 NOTE — ED TRIAGE NOTES
1 week of dizziness and neck pain. He also complains of general fatigue. He uses a walker at home. Son reports patient had a HX of stroke around 2007 with left sided weakness. Patient complains of headache of 7. Speech clear, neuros at baseline according to patient and son. Main complaint is the pain at back of neck.     Triage Assessment     Row Name 01/11/23 0243       Triage Assessment (Adult)    Airway WDL WDL       Respiratory WDL    Respiratory WDL WDL       Skin Circulation/Temperature WDL    Skin Circulation/Temperature WDL WDL       Cardiac WDL    Cardiac WDL WDL       Peripheral/Neurovascular WDL    Peripheral Neurovascular WDL WDL       Cognitive/Neuro/Behavioral WDL    Cognitive/Neuro/Behavioral WDL WDL

## 2023-01-11 NOTE — PROGRESS NOTES
Occupational Therapy     01/11/23 1400   Appointment Info   Signing Clinician's Name / Credentials (OT) Lori Hampton OTR/L   Living Environment   People in Home child(chanel), adult  (someone is always home)   Current Living Arrangements house   Home Accessibility no concerns   Living Environment Comments Patient has supervision with dressing and bathing. Patient able to ambulate with walker   Self-Care   Equipment Currently Used at Home walker, rolling   Fall history within last six months yes   Number of times patient has fallen within last six months 2   General Information   Onset of Illness/Injury or Date of Surgery 01/11/23   Referring Physician Vance Luna MD   Patient/Family Therapy Goal Statement (OT) none stated   Additional Occupational Profile Info/Pertinent History of Current Problem Adam is an 82 year old male with past medical history of DM2, CKD, HTN, HLD.  He came in today for 1 week of dizziness, bitemporal headache, tinnitus. 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 Radiologist indicated concern of possible dissection to ED provider and recommended CTA head and neck. ED provider paged stroke team since [place] radiology stated may need heparin drip and biplane, they could not do it at [facility] so wanted stroke neurology input. 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   Existing Precautions/Restrictions abdominal   Cognitive Status Examination   Orientation Status person;place   Range of Motion Comprehensive   General Range of Motion no range of motion deficits identified   Strength Comprehensive (MMT)   General Manual Muscle Testing (MMT) Assessment no strength deficits identified   Bed Mobility   Bed Mobility supine-sit;sit-supine   Supine-Sit Ranson (Bed Mobility) moderate assist (50% patient effort)   Sit-Supine Ranson (Bed  Mobility) moderate assist (50% patient effort)   Comment (Bed Mobility) After sitting EOB, patient noted to have increased dizziness, reported he need to lay down, refused further activityi.   Transfers   Transfer Comments Unable due to dizziness   Clinical Impression   Criteria for Skilled Therapeutic Interventions Met (OT) Yes, treatment indicated   OT Diagnosis Decreased ADL independenc   Influenced by the following impairments dizziness   OT Problem List-Impairments impacting ADL problems related to;activity tolerance impaired;balance   Assessment of Occupational Performance 3-5 Performance Deficits   Identified Performance Deficits trsfs, dressing, bed mob   Planned Therapy Interventions (OT) ADL retraining;balance training;bed mobility training;transfer training   Clinical Decision Making Complexity (OT) moderate complexity   Risk & Benefits of therapy have been explained evaluation/treatment results reviewed;care plan/treatment goals reviewed   OT Total Evaluation Time   OT Eval, Moderate Complexity Minutes (57763) 20   OT Goals   Therapy Frequency (OT) Daily   OT Predicted Duration/Target Date for Goal Attainment 01/18/23   OT Goals Hygiene/Grooming;Transfers;Toilet Transfer/Toileting   OT: Hygiene/Grooming supervision/stand-by assist;while standing   OT: Transfer Minimal assist   OT: Toilet Transfer/Toileting Minimal assist   OT Discharge Planning   OT Plan Wait for PT to evaluate patient, concerns for vertigo- after PT try trsfs, toileting, dressing   OT Discharge Recommendation (DC Rec) home with assist   OT Rationale for DC Rec Patient has multiple family members living in same house, someone is always home with patient and able to provide physical assistance. Additionally, patient's ADLs may improve following physical therapy assessment for vertigo.   OT Brief overview of current status Mod A for bed mobility, unable to perform further ADLs due to dizziness   Total Session Time   Total Session Time (sum  of timed and untimed services) 20

## 2023-01-11 NOTE — ED PROVIDER NOTES
EMERGENCY DEPARTMENT ENCOUNTER      NAME: Adam Talamantes  AGE: 82 year old male  YOB: 1940  MRN: [id number]  EVALUATION DATE & TIME: 2023  3:50 AM    PCP: Chucho Espino    ED PROVIDER: Helena Magallanes DO      Chief Complaint   Patient presents with     dizzy and neck pain         FINAL IMPRESSION:  1. Vertigo    2. Vertebral artery occlusion, left    3. Essential hypertension          ED COURSE & MEDICAL DECISION MAKIN-year-old male with history of CVA, GERD, gout, CKD, BPH, hypertension, and coronary disease presented to the ED for evaluation of room spinning dizziness, neck pain, bitemporal headache, tinnitus and fatigue that have been ongoing for the last week.  The patient stated that the symptoms seem to be getting worse which is why he presented tonight to the ED.  Patient also reported some chest discomfort last 2 or 3 days as well.  The patient denied any numbness, tingling, or weakness in his face or extremities.  Here in the ED the patient was hypertensive upon arrival.  He was otherwise hemodynamically stable.  He did not appear to be in any obvious distress or discomfort at the time of his initial evaluation.  The patient's physical exam was unremarkable.  He did not have any focal cerebellar or neurologic deficits noted on exam.  Following his initial evaluation the patient was given a dose of oral meclizine to treat his vertigo.    The patient's EKG revealed normal sinus rhythm without any new or concerning ST or T wave changes.    CBC, BMP, magnesium, and troponin overall reassuring or unchanged from the patient's normal baseline.  COVID and influenza testing were negative.  Chest x-ray was nondiagnostic.  Urinalysis shows a questionable urinary tract infection.  Patient was given a dose of IV Rocephin to treat the possible urinary tract infection.     I received a call from the radiologist who stated that there was no infarct noted on the MRI.  However, the left vertebral artery  was occluded and there was a concern for a possible dissection of the left vertebral artery.  CTA of the head and neck was recommended for further evaluation.    The patient was reevaluated and both he and his son were informed of the lab and imaging work-up results.  Patient reports no change in his dizziness with the oral meclizine.  The patient and son were informed of the need for CTA.  The patient's care was turned over to Dr. Lubin who will follow up on the CTA results and reevaluate the patient before determining ultimate disposition.      Pertinent Labs & Imaging studies reviewed. (See chart for details)  4:12 AM I met with the patient to gather history and to perform my initial exam. We discussed plans for the ED course, including diagnostic testing and treatment.       At the conclusion of the encounter I discussed the results of all of the tests and the disposition. The questions were answered. The patient or family acknowledged understanding and was agreeable with the care plan.       PPE worn: n95 mask, goggles    Medical Decision Making    History:    Supplemental history from: Family Member/Significant Other    External Record(s) reviewed: Outpatient Record: .    Work Up:    Chart documentation includes differential considered and any EKGs or imaging independently interpreted by provider.      External consultation:    Discussion of management with another provider: See chart documentation, if applicable    Complicating factors:    Care impacted by chronic illness: Hypertension    Care affected by social determinants of health: N/A    Disposition considerations: Admission considered. Patient was signed out to the oncoming physician, disposition pending.       MEDICATIONS GIVEN IN THE EMERGENCY:  Medications   cefTRIAXone (ROCEPHIN) 1 g vial to attach to  mL bag for ADULTS or NS 50 mL bag for PEDS (has no administration in time range)   meclizine (ANTIVERT) tablet 25 mg (25 mg Oral Given 1/11/23  0448)   gadobutrol (GADAVIST) injection 7 mL (7 mLs Intravenous Given 1/11/23 0549)       NEW PRESCRIPTIONS STARTED AT TODAY'S ER VISIT  New Prescriptions    No medications on file          =================================================================    HPI    Patient information was obtained from: Patient son    Use of : N/A         Adam Talamantes is a 82 year old male with a pertinent history of stroke, stage 3 CKD, type II diabetes mellitus, GERD, hyperlipidemia, and hypertension  who presents to this ED via walk in with son for evaluation of dizziness and neck pain.     Per son, the patient presents with complaints of dizziness, neck pain, headache, and chest pain for 1 week. He notes today the dizziness is like a room spinning. Symptoms worsen today prompting to the ED. His headache is around is temporal area. He endorse nausea and vomiting in the beginning, none currently. The patient has history of stroke and notes having weakness on left extremities. He reports ear ringing for 3 days. He is not on any anticoagulant. Denies any other complaints or concerns at the moment.       REVIEW OF SYSTEMS   Review of Systems   HENT: Negative for ear discharge and ear pain.         Endorse ear ringing   Cardiovascular: Positive for chest pain.   Gastrointestinal: Positive for nausea (resolved) and vomiting (resolved).   Musculoskeletal: Positive for neck pain.   Neurological: Positive for dizziness, weakness (left side extremities) and headaches.   All other systems reviewed and are negative.       PAST MEDICAL HISTORY:  Past Medical History:   Diagnosis Date     Acute blood loss anemia      Acute renal failure (H)      Anemia      Bacteremia      Cataract      Chronic gout      CKD (chronic kidney disease)     Stage 3     DM2 (diabetes mellitus, type 2) (H)      GERD (gastroesophageal reflux disease)      Glucose intolerance (impaired glucose tolerance)      Gout      History of nephrolithiasis      History  of prostatitis 2017     Hyperlipidemia      Hypertension      Insomnia      Intestinal disaccharidase deficiencies and disaccharide malabsorption     Created by Conversion      Latent tuberculosis      Nephrolithiasis      Neuropathy      Nonspecific abnormal findings on radiological and other examination of skull and head     Created by Conversion [facility] Annotation: 2013 11:23PM - Giulia Meridai/10/2011:  severe stenosis both posterior cerebral arteries seen MRI/MRA done for  vertigo. No acute changes.e*     Osteoarthritis     wrist, knee, shoulder     Prostatitis      Rectal bleed      Trigger thumb        PAST SURGICAL HISTORY:  Past Surgical History:   Procedure Laterality Date     IR MISCELLANEOUS PROCEDURE  2009     IR MISCELLANEOUS PROCEDURE  2009     IR MISCELLANEOUS PROCEDURE  2009     IR MISCELLANEOUS PROCEDURE  2009     IR NEPHROURETERAL TUBE PLACEMENT BILATERAL  2009     IR URETER DILATION BILATERAL  2009     IR URETER DILATION BILATERAL  2009     KIDNEY STONE SURGERY       PICC  3/6/2016                CURRENT MEDICATIONS:    ACETAMINOPHEN EXTRA STRENGTH 500 MG tablet  allopurinol (ZYLOPRIM) 100 MG tablet  aspirin (ASA) 81 MG EC tablet  BANOPHEN 25 MG capsule  docusate sodium (COLACE) 100 MG capsule  DULoxetine (CYMBALTA) 20 MG capsule  gabapentin (NEURONTIN) 300 MG capsule  HYDROcodone-acetaminophen (NORCO) 5-325 MG tablet  isosorbide mononitrate (IMDUR) 30 MG 24 hr tablet  JANUVIA 50 MG tablet  ketorolac (ACULAR) 0.5 % ophthalmic solution  loratadine (CLARITIN) 10 mg tablet  losartan (COZAAR) 50 MG tablet  meclizine (ANTIVERT) 12.5 MG tablet  metoprolol succinate ER (TOPROL XL) 100 MG 24 hr tablet  nitroGLYcerin (NITROSTAT) 0.4 MG sublingual tablet  ofloxacin (OCUFLOX) 0.3 % ophthalmic solution  omeprazole (PRILOSEC) 20 MG DR capsule  prednisoLONE acetate (PRED FORTE) 1 % ophthalmic suspension  rosuvastatin (CRESTOR) 20 MG tablet  SYSTANE ULTRA  0.4-0.3 % SOLN ophthalmic solution  triamcinolone (KENALOG) 0.1 % external cream        ALLERGIES:  No Known Allergies    FAMILY HISTORY:  Family History   Problem Relation Age of Onset     Cerebrovascular Disease Father      Cerebrovascular Disease Daughter        SOCIAL HISTORY:   Social History     Socioeconomic History     Marital status:      Spouse name: None     Number of children: None     Years of education: None     Highest education level: None   Tobacco Use     Smoking status: Former     Types: Cigarettes     Quit date: 3/10/2000     Years since quittin.8     Smokeless tobacco: Former     Tobacco comments:     no passive exposure   Substance and Sexual Activity     Alcohol use: No     Drug use: No     Sexual activity: Never     Partners: Female     Social Determinants of Health     Financial Resource Strain: Low Risk      Difficulty of Paying Living Expenses: Not hard at all   Food Insecurity: No Food Insecurity     Worried About Running Out of Food in the Last Year: Never true     Ran Out of Food in the Last Year: Never true   Transportation Needs: No Transportation Needs     Lack of Transportation (Medical): No     Lack of Transportation (Non-Medical): No   Physical Activity: Inactive     Days of Exercise per Week: 0 days     Minutes of Exercise per Session: 0 min   Stress: No Stress Concern Present     Feeling of Stress : Not at all   Social Connections: Socially Integrated     Frequency of Communication with Friends and Family: Once a week     Frequency of Social Gatherings with Friends and Family: Twice a week     Attends [employer] Services: 1 to 4 times per year     Active Member of Clubs or Organizations: No     Attends Club or Organization Meetings: 1 to 4 times per year     Marital Status:    Intimate Partner Violence: Not At Risk     Fear of Current or Ex-Partner: No     Emotionally Abused: No     Physically Abused: No     Sexually Abused: No   Housing Stability: Unknown      "Unable to Pay for Housing in the Last Year: No     Unstable Housing in the Last Year: No       VITALS:  BP (!) 158/94   Pulse 68   Temp 98.7  F (37.1  C) (Temporal)   Resp 18   Ht 1.549 m (5' 1\")   Wt 69.9 kg (154 lb)   SpO2 97%   BMI 29.10 kg/m      PHYSICAL EXAM    General presentation: Alert, Vital signs reviewed. NAD. Fatigue appearing.   HENT: ENT inspection is normal. Oropharynx is moist and clear.   Eye: Pupils are equal and reactive to light. EOMI. No nystagmus.   Neck: The neck is supple, with full ROM, with no evidence of meningismus.  Pulmonary: Currently in no acute respiratory distress. Normal, non labored respirations, the lung sounds are normal with good equal air movement. Clear to auscultation bilaterally.   Circulatory: Regular rate and rhythm. Peripheral pulses are strong and equal. No murmurs, rubs, or gallops.   Abdominal: The abdomen is soft. Nontender. No rigidity, guarding, or rebound. Bowel sounds normal.   Neurologic: Alert, oriented to person, place, and time. No motor deficit. No sensory deficit. Cranial nerves II through XII are intact. Normal finger testing bilaterally.   Musculoskeletal: No extremity tenderness. Full range of motion in all extremities. No extremity edema.   Skin: Skin color is normal. No rash. Warm. Dry to touch.      LAB:  All pertinent labs reviewed and interpreted.  Results for orders placed or performed during the hospital encounter of 01/11/23   Chest XR,  PA & LAT    Impression    IMPRESSION: Negative chest.   MR Brain w/o & w Contrast    Impression    IMPRESSION:  HEAD MRI:   1.  No acute intracranial process.  2.  Generalized brain atrophy and presumed microvascular ischemic changes as detailed above.  3.  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    HEAD MRA:   1.  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  2.  " Scattered intracranial atherosclerosis of the remainder of the intracranial arterial vasculature, as described.    NECK MRA:  1.  Diminutive left vertebral artery with a diffusely irregular appearance and occlusion of the V3 and V4 segment.  2.  No flow-limiting stenosis in the remainder of the cervical arterial vasculature.    Findings were discussed with Dr. Magallanes at 6:49 AM on 01/11/2023.   MRA Neck (Carotids) wo & w Contrast    Impression    IMPRESSION:  HEAD MRI:   1.  No acute intracranial process.  2.  Generalized brain atrophy and presumed microvascular ischemic changes as detailed above.  3.  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    HEAD MRA:   1.  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  2.  Scattered intracranial atherosclerosis of the remainder of the intracranial arterial vasculature, as described.    NECK MRA:  1.  Diminutive left vertebral artery with a diffusely irregular appearance and occlusion of the V3 and V4 segment.  2.  No flow-limiting stenosis in the remainder of the cervical arterial vasculature.    Findings were discussed with Dr. Magallanes at 6:49 AM on 01/11/2023.   MRA Brain ([place] [facility] [place]) wo Contrast    Impression    IMPRESSION:  HEAD MRI:   1.  No acute intracranial process.  2.  Generalized brain atrophy and presumed microvascular ischemic changes as detailed above.  3.  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    HEAD MRA:   1.  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  2.  Scattered intracranial atherosclerosis of the remainder of the intracranial arterial vasculature, as described.    NECK MRA:  1.  Diminutive left vertebral artery with a diffusely irregular appearance and occlusion of the V3 and V4  segment.  2.  No flow-limiting stenosis in the remainder of the cervical arterial vasculature.    Findings were discussed with Dr. Magallanes at 6:49 AM on 01/11/2023.   CBC (+ platelets, no diff)   Result Value Ref Range    WBC Count 8.7 4.0 - 11.0 10e3/uL    RBC Count 4.56 4.40 - 5.90 10e6/uL    Hemoglobin 14.0 13.3 - 17.7 g/dL    Hematocrit 42.6 40.0 - 53.0 %    MCV 93 78 - 100 fL    MCH 30.7 26.5 - 33.0 pg    MCHC 32.9 31.5 - 36.5 g/dL    RDW 13.9 10.0 - 15.0 %    Platelet Count 217 150 - 450 10e3/uL   Basic metabolic panel   Result Value Ref Range    Sodium 134 (L) 136 - 145 mmol/L    Potassium 4.8 3.4 - 5.3 mmol/L    Chloride 98 98 - 107 mmol/L    Carbon Dioxide (CO2) 22 22 - 29 mmol/L    Anion Gap 14 7 - 15 mmol/L    Urea Nitrogen 30.0 (H) 8.0 - 23.0 mg/dL    Creatinine 1.46 (H) 0.67 - 1.17 mg/dL    Calcium 10.3 (H) 8.8 - 10.2 mg/dL    Glucose 168 (H) 70 - 99 mg/dL    GFR Estimate 48 (L) >60 mL/min/1.73m2   Result Value Ref Range    Magnesium 1.8 1.7 - 2.3 mg/dL   UA with Microscopic reflex to Culture    Specimen: Urine, Midstream   Result Value Ref Range    Color Urine Colorless Colorless, Straw, Light Yellow, Yellow    Appearance Urine Clear Clear    Glucose Urine Negative Negative mg/dL    Bilirubin Urine Negative Negative    Ketones Urine Negative Negative mg/dL    Specific Gravity Urine 1.009 1.001 - 1.030    Blood Urine Negative Negative    pH Urine 5.5 5.0 - 7.0    Protein Albumin Urine Negative Negative mg/dL    Urobilinogen Urine <2.0 <2.0 mg/dL    Nitrite Urine Negative Negative    Leukocyte Esterase Urine 250 Franko/uL (A) Negative    RBC Urine 1 <=2 /HPF    WBC Urine 20 (H) <=5 /HPF   Symptomatic Influenza A/B & SARS-CoV2 (COVID-19) Virus PCR Multiplex Nasopharyngeal    Specimen: Nasopharyngeal; Swab   Result Value Ref Range    Influenza A PCR Negative Negative    Influenza B PCR Negative Negative    RSV PCR Negative Negative    SARS CoV2 PCR Negative Negative   Extra Red Top Tube   Result Value Ref Range     Hold Specimen JIC    Extra Green Top (Lithium Heparin) Tube   Result Value Ref Range    Hold Specimen JIC    Extra Purple Top Tube   Result Value Ref Range    Hold Specimen JIC    Troponin T, High Sensitivity (now)   Result Value Ref Range    Troponin T, High Sensitivity 9 <=22 ng/L       RADIOLOGY:  Reviewed all pertinent imaging. Please see official radiology report.  Chest XR,  PA & LAT   Final Result   IMPRESSION: Negative chest.      MR Brain w/o & w Contrast   Final Result   IMPRESSION:   HEAD MRI:    1.  No acute intracranial process.   2.  Generalized brain atrophy and presumed microvascular ischemic changes as detailed above.   3.  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      HEAD MRA:    1.  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   2.  Scattered intracranial atherosclerosis of the remainder of the intracranial arterial vasculature, as described.      NECK MRA:   1.  Diminutive left vertebral artery with a diffusely irregular appearance and occlusion of the V3 and V4 segment.   2.  No flow-limiting stenosis in the remainder of the cervical arterial vasculature.      Findings were discussed with Dr. Magallanes at 6:49 AM on 01/11/2023.      MRA Neck (Carotids) wo & w Contrast   Final Result   IMPRESSION:   HEAD MRI:    1.  No acute intracranial process.   2.  Generalized brain atrophy and presumed microvascular ischemic changes as detailed above.   3.  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      HEAD MRA:    1.  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   2.  Scattered intracranial atherosclerosis of the remainder of the intracranial arterial vasculature, as described.      NECK MRA:   1.  Diminutive left vertebral artery with a  diffusely irregular appearance and occlusion of the V3 and V4 segment.   2.  No flow-limiting stenosis in the remainder of the cervical arterial vasculature.      Findings were discussed with Dr. Magallanes at 6:49 AM on 01/11/2023.      MRA Brain ([place]) wo Contrast   Final Result   IMPRESSION:   HEAD MRI:    1.  No acute intracranial process.   2.  Generalized brain atrophy and presumed microvascular ischemic changes as detailed above.   3.  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      HEAD MRA:    1.  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   2.  Scattered intracranial atherosclerosis of the remainder of the intracranial arterial vasculature, as described.      NECK MRA:   1.  Diminutive left vertebral artery with a diffusely irregular appearance and occlusion of the V3 and V4 segment.   2.  No flow-limiting stenosis in the remainder of the cervical arterial vasculature.      Findings were discussed with Dr. Magallanes at 6:49 AM on 01/11/2023.      CTA Head Neck with Contrast    (Results Pending)       EKG:    Normal sinus rhythm.  Rate of 67.  Normal QRS.  Normal QT.  Nonspecific T wave changes noted.  Compared to the EKG on 6/22/2020 no significant changes are noted.    I have independently reviewed and interpreted the EKG(s) documented above.        I, Bonnie Ledbetter , am serving as a scribe to document services personally performed by Helena Magallanes DO based on my observation and the provider's statements to me. I, Helena Magallanes, attest that Bonnie Ledbetter is acting in a scribe capacity, has observed my performance of the services and has documented them in accordance with my direction.    Helena Magallanes DO  Emergency Medicine  [facility] EMERGENCY DEPARTMENT  [address] [place] [id number]  [phone]        Helena Magallanes DO  01/11/23 2774

## 2023-01-11 NOTE — CONSULTS
[facility]    Stroke Telephone Note    I was called by Florencia Lubin on 01/11/23 regarding patient Adam Talamantes. The patient is a 82 year old male with past medical history of DM2, CKD, HTN, HLD.     He came in today for 1 week of dizziness, bitemporal headache, tinnitus. 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 Radiologist indicated concern of possible dissection to ED provider and recommended CTA head and neck. ED provider paged stroke team since [facility] radiology stated may need heparin drip and biplane, they could not do it at [facility] so wanted stroke neurology input. 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     I discussed case with Dr. Gray who recommended MRI brain w/wo contrast with coronal DWI and thin cuts through brain stem. Recommended MRA head and neck with T1 fat sat dissection protocol. Given vessel findings possibly acute on chronic, no need for endovascular intervention or transfer at this time.     Imaging Findings   HEAD MRI:   1.  No acute intracranial process.  2.  Generalized brain atrophy and presumed microvascular ischemic changes as detailed above.  3.  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     HEAD MRA:   1.  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  2.  Scattered intracranial atherosclerosis of the remainder of the intracranial arterial vasculature, as described.     NECK MRA:  1.  Diminutive left vertebral artery with a diffusely irregular appearance and occlusion of the V3 and V4 segment.  2.  No flow-limiting stenosis in the remainder of the cervical arterial vasculature.    HEAD CT:  1.  No intracranial hemorrhage, mass, or definite CT evidence of recent  ischemia.     HEAD CTA:  1.  The proximal intracranial segment of the left vertebral artery is occluded.  2.  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 The right posterior cerebral artery appears occluded.  3.  This all appears similar to the MRA performed earlier the same day.     NECK CTA:  1.  Diminished filling of the proximal diminutive left vertebral artery which occludes in its distal cervical segment through its proximal intracranial segment. The appearance is similar to the MRA performed earlier the same day.  2.  Patent, dominant right vertebral artery.  3.  Mild carotid artery atherosclerosis without hemodynamically significant narrowing by NASCET criteria    Impression  Vertigo and gait instability, bitemporal headache, tinnitus x 1 week, concern of possible central etiology given posterior vessel disease with severe stenosis/segmental occlusion basilar artery, occlusion R PCA, and proximal L vertebral artery occlusion with irregularity V3/4 segment, query dissection, initial MRI was negative for stroke but not done with coronal DWI or thin cuts through brainstem    Chronic infarct left caudate nucleus    Recommendations   -Discussed with Dr. Gray  -patient has acute vertigo with difficulty walking so is being admitted regardless, okay to admit at [facility], do not foresee need to transfer  -MRI brain w/wo contrast axial and coronal DWI and thin cuts through brain stem  -MRA brain and neck with T1 fat sat dissection protocol  -please page once imaging resulted  -okay to given  mg x1, will determine need for any additional antiplatelet therapy pending repeat imaging    My recommendations are based on the information provided over the phone by Adam Talamantes's in-person providers. They are not intended to replace the clinical judgment of his in-person providers. I was not requested to  "personally see or examine the patient at this time.    Mariaelena Camp PA-C  Vascular Neurology    To page me or covering stroke neurology team member, click here: AMCOM  Choose \"On Call\" tab at top, then select \"NEUROLOGY/ALL SITES\" from middle drop-down box, press Enter, then look for \"stroke\" or \"telestroke\" for your site.      "

## 2023-01-11 NOTE — ED NOTES
"[facility] ED Handoff Report    ED Chief Complaint: The pr presents with family at bedside from home. The Pt c/o dizziness and neck pain x 1 week. The pt is Kayce speaking family is translating at bedside.    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    (I10) Essential hypertension  Comment: Continue to monitor  Plan: administer anti-hypertensive       PMH:    Past Medical History:   Diagnosis Date    Acute blood loss anemia     Acute renal failure (H)     Anemia     Bacteremia     Cataract     Chronic gout     CKD (chronic kidney disease)     Stage 3    DM2 (diabetes mellitus, type 2) (H)     GERD (gastroesophageal reflux disease)     Glucose intolerance (impaired glucose tolerance)     Gout     History of nephrolithiasis     History of prostatitis 2017    Hyperlipidemia     Hypertension     Insomnia     Intestinal disaccharidase deficiencies and disaccharide malabsorption     Created by Conversion     Latent tuberculosis     Nephrolithiasis     Neuropathy     Nonspecific abnormal findings on radiological and other examination of skull and head     Created by Conversion [facility] Annotation: 2013 11:23PM - Giulia Meridai/10/2011:  severe stenosis both posterior cerebral arteries seen MRI/MRA done for  vertigo. No acute changes.e*    Osteoarthritis     wrist, knee, shoulder    Prostatitis     Rectal bleed     Trigger thumb         Code Status:  Full Code     Falls Risk: Yes Band: Applied    Current Living Situation/Residence: lives with their son or daughter     Elimination Status: Continent: Yes     Activity Level: SBA w/ walker    Patients Preferred Language:  Other: Kayce     Needed: Yes    Vital Signs:  BP (!) 159/95   Pulse 73   Temp 98.7  F (37.1  C) (Temporal)   Resp 18   Ht 1.549 m (5' 1\")   Wt 69.9 kg " (154 lb)   SpO2 99%   BMI 29.10 kg/m       Cardiac Rhythm:NSR    Pain Score: 7/10, headache- pt given 650 mg of tylenol at 1355.    Is the Patient Confused:  No    Last Food or Drink:  water  at 1600.    Focused Assessment:  The pt ambulates I the leiva, c/o doizziness upon standing. Swallow study was passed on day shift. Pt swallows medication with no problem. AM medications were autoheld by the provider. Pt BS check 164 today. Pt is alert and oriented x 3 with dizziness, left side weakness baseline since stroke 2007. The pt feels weakness on the left , no arm drift noted.     Tests Performed: Done: Labs and Imaging    Treatments Provided:  The pt receiving IV hydration, pain medication, assistance with ambulation. BS checks q 4 hours.    Family Dynamics/Concerns: No    Family Updated On Visitor Policy: Yes    Plan of Care Communicated to Family: Yes    Who Was Updated about Plan of Care: Daughter    The pt has a blue belongings bag, cell phone, boots and clothing at bedside    Medications sent with patient: YES    Covid: asymptomatic , NA    Additional Information: none    RN: Heather Greene RN 1/11/2023 3:57 PM

## 2023-01-11 NOTE — ED NOTES
"EMERGENCY DEPARTMENT SIGN OUT NOTE        ED COURSE AND MEDICAL DECISION MAKING  Patient was signed out to me by Dr Helena Magallanes at 7:22 AM  8:26 AM I rechecked the patient and updated them.   8:34 AM I spoke with neuro IR regarding patient.    8:46 AM I spoke with stroke neurology regarding patient.   ED Course as of 01/11/23 1206   Wed Jan 11, 2023   0723 Pt with vertigo one week with neck discomfort neuro intact and MRI negative, per neuroradiology recommendded CTA r/o vertebrobasilar dissection without stroke, CTA head and neck pending, patient amenable to plan   0824 CTA with similar appearance to MRA earlier without clear dissection present, interventional neuroradiology paged to discuss   0835 I spoke with neuroradiology who discussed imaging results without stroke apparent in posterior fossa on MRI but possible basilar occlusion, recommends discuss with [facility] neurology team and admit neuro critical care 24h and possible mechanical intervention basilar artery, but [facility] radiology cannot do here as they do not have biplane here for intervention and thus best served by transfer to [facility]. [facility] stroke team paged to discuss case. On reassessment of patient finger to nose and heel to shin intact, no tinnitus or hearing loss, excellent Luis Alberto speaking historian without speech changes, slight bilateral lateral nystagmus with more apparent leftward beats without vertical skew. Stroke neuro at [facility] paged to further make plan.   0846 I spoke with Mariaelena Camp from stroke neurology at [facility] who will discuss with attending and review imaging and get back to me shortly.    0958 Per Dr Gray stroke neuro they want MRA dissection protocol and MRI with coronal DWI with thin cuts through brainstem (they will place orders) to further dilineate acute vs chronic and without evidence of stroke on MRI, no intervention through [facility] would occur, their team will review images as \"a lot of it looks chronic, if it doesn't look like a " dissection and there's no stroke there, he notes patient does not need trnasfer regardless and should be admitted here with possibly acute on chronic dissection and would treat medically if acute component with heparin only if acute component likely on new MRI imaging that they are obtaining. Will d/w Chucho charge RN re: [facility] for admission to neuro capable facility and discuss with neuro at that site   1011 I spoke with charge SHELIA Rankin who will discuss with  nursing supervisor re: whether beds available for admission here, I spoke with patient and daughter in law (who is now at bedside) and they are amenable to admit for further imaging and workup vs. Transfer if needed which is reassuring. Awaiting bed avialability.   1100 I spoke with SOC who has not started looking for beds yet for patient but will seek bed availability now.   1153 Per SOC no bed available at [facility] or [facility] for neuro transfer thus will need to admit here, hospitalist here paged for admission   1204 I spoke with Dr Luna hospitalist who will follow up recommendations of stroke team and patient admitted here as boarder            FINAL IMPRESSION    1. Vertigo    2. Vertebral artery occlusion, left    3. Essential hypertension        ED MEDS  Medications   meclizine (ANTIVERT) tablet 25 mg (25 mg Oral Given 1/11/23 0448)   gadobutrol (GADAVIST) injection 7 mL (7 mLs Intravenous Given 1/11/23 0549)   cefTRIAXone (ROCEPHIN) 1 g vial to attach to  mL bag for ADULTS or NS 50 mL bag for PEDS (0 g Intravenous Stopped 1/11/23 0904)   iopamidol (ISOVUE-370) solution 75 mL (75 mLs Intravenous Given 1/11/23 0801)       LAB  Labs Ordered and Resulted from Time of ED Arrival to Time of ED Departure   BASIC METABOLIC PANEL - Abnormal       Result Value    Sodium 134 (*)     Potassium 4.8      Chloride 98      Carbon Dioxide (CO2) 22      Anion Gap 14      Urea Nitrogen 30.0 (*)     Creatinine 1.46 (*)     Calcium 10.3 (*)     Glucose 168 (*)      GFR Estimate 48 (*)    ROUTINE UA WITH MICROSCOPIC REFLEX TO CULTURE - Abnormal    Color Urine Colorless      Appearance Urine Clear      Glucose Urine Negative      Bilirubin Urine Negative      Ketones Urine Negative      Specific Gravity Urine 1.009      Blood Urine Negative      pH Urine 5.5      Protein Albumin Urine Negative      Urobilinogen Urine <2.0      Nitrite Urine Negative      Leukocyte Esterase Urine 250 Franko/uL (*)     RBC Urine 1      WBC Urine 20 (*)    CBC WITH PLATELETS - Normal    WBC Count 8.7      RBC Count 4.56      Hemoglobin 14.0      Hematocrit 42.6      MCV 93      MCH 30.7      MCHC 32.9      RDW 13.9      Platelet Count 217     MAGNESIUM - Normal    Magnesium 1.8     INFLUENZA A/B & SARS-COV2 PCR MULTIPLEX - Normal    Influenza A PCR Negative      Influenza B PCR Negative      RSV PCR Negative      SARS CoV2 PCR Negative     TROPONIN T, HIGH SENSITIVITY - Normal    Troponin T, High Sensitivity 9     URINE CULTURE         RADIOLOGY    MRA Angiogram Neck w/o Contrast   Preliminary Result   CONCLUSION:    1.  The axial T1 fat saturated images are compromised by the recent administration of intravenous contrast.      MR Brain w/o Contrast   Final Result   IMPRESSION:   No diffusion abnormality to indicate recent stroke.      CTA Head Neck with Contrast   Final Result   CONCLUSION:   HEAD CT:   1.  No intracranial hemorrhage, mass, or definite CT evidence of recent ischemia.      HEAD CTA:   1.  The proximal intracranial segment of the left vertebral artery is occluded.   2.  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 The right posterior cerebral artery appears occluded.   3.  This all appears similar to the MRA performed earlier the same day.      NECK CTA:   1.  Diminished filling of the proximal diminutive left vertebral artery which occludes in its distal cervical segment  through its proximal intracranial segment. The appearance is similar to the MRA performed earlier the same day.   2.  Patent, dominant right vertebral artery.   3.  Mild carotid artery atherosclerosis without hemodynamically significant narrowing by NASCET criteria.            Chest XR,  PA & LAT   Final Result   IMPRESSION: Negative chest.      MR Brain w/o & w Contrast   Final Result   IMPRESSION:   HEAD MRI:    1.  No acute intracranial process.   2.  Generalized brain atrophy and presumed microvascular ischemic changes as detailed above.   3.  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      HEAD MRA:    1.  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   2.  Scattered intracranial atherosclerosis of the remainder of the intracranial arterial vasculature, as described.      NECK MRA:   1.  Diminutive left vertebral artery with a diffusely irregular appearance and occlusion of the V3 and V4 segment.   2.  No flow-limiting stenosis in the remainder of the cervical arterial vasculature.      Findings were discussed with Dr. Magallanes at 6:49 AM on 01/11/2023.      MRA Neck (Carotids) wo & w Contrast   Final Result   IMPRESSION:   HEAD MRI:    1.  No acute intracranial process.   2.  Generalized brain atrophy and presumed microvascular ischemic changes as detailed above.   3.  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      HEAD MRA:    1.  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   2.  Scattered intracranial atherosclerosis of the remainder of the intracranial arterial vasculature, as described.      NECK MRA:   1.  Diminutive left vertebral artery with a diffusely irregular appearance and occlusion of the V3 and V4 segment.   2.  No flow-limiting stenosis  in the remainder of the cervical arterial vasculature.      Findings were discussed with Dr. Magallanes at 6:49 AM on 01/11/2023.      MRA Brain ([place]) wo Contrast   Final Result   IMPRESSION:   HEAD MRI:    1.  No acute intracranial process.   2.  Generalized brain atrophy and presumed microvascular ischemic changes as detailed above.   3.  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      HEAD MRA:    1.  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   2.  Scattered intracranial atherosclerosis of the remainder of the intracranial arterial vasculature, as described.      NECK MRA:   1.  Diminutive left vertebral artery with a diffusely irregular appearance and occlusion of the V3 and V4 segment.   2.  No flow-limiting stenosis in the remainder of the cervical arterial vasculature.      Findings were discussed with Dr. Magallanes at 6:49 AM on 01/11/2023.      MRA Brain ([place]) wo Contrast    (Results Pending)         Florencia Lubin MD  [facility] EMERGENCY DEPARTMENT  [address] [place] [address] [id number] [phone]     Florencia Lubin MD  01/11/23 0863

## 2023-01-11 NOTE — PHARMACY-ADMISSION MEDICATION HISTORY
Pharmacy Note - Admission Medication History    ______________________________________________________________________    Prior To Admission (PTA) med list completed and updated in EMR.       PTA Med List   Medication Sig Note Last Dose     ACETAMINOPHEN EXTRA STRENGTH 500 MG tablet TAKE 1 TABLET(500 MG) BY MOUTH EVERY 6 HOURS AS NEEDED  1/11/2023 at am     allopurinol (ZYLOPRIM) 100 MG tablet TAKE 2 TABLETS BY MOUTH EVERY DAY  1/10/2023     aspirin (ASA) 81 MG EC tablet Take 1 tablet (81 mg) by mouth daily  1/10/2023     docusate sodium (COLACE) 100 MG capsule TAKE 1 CAPSULE(100 MG) BY MOUTH TWICE DAILY AS NEEDED FOR CONSTIPATION  Unknown at prn     DULoxetine (CYMBALTA) 20 MG capsule TAKE 1 CAPSULE(20 MG) BY MOUTH TWICE DAILY  1/10/2023     gabapentin (NEURONTIN) 300 MG capsule TAKE 1 TO 2 CAPSULES BY MOUTH AT BEDTIME  1/10/2023     HYDROcodone-acetaminophen (NORCO) 5-325 MG tablet Take 1 tablet by mouth daily as needed for severe pain (7-10) 1/11/2023: Takes 1 tablet daily 1/10/2023     isosorbide mononitrate (IMDUR) 30 MG 24 hr tablet Take 1 tablet (30 mg) by mouth daily  1/10/2023     JANUVIA 50 MG tablet TAKE 1 TABLET(50 MG) BY MOUTH DAILY  1/10/2023     losartan (COZAAR) 50 MG tablet TAKE 1 TABLET(50 MG) BY MOUTH DAILY  1/10/2023     meclizine (ANTIVERT) 12.5 MG tablet Take 1 tablet (12.5 mg) by mouth 3 times daily as needed for dizziness  Unknown at prn     metoprolol succinate ER (TOPROL XL) 100 MG 24 hr tablet TAKE 1 TABLET(100 MG) BY MOUTH DAILY  1/10/2023     nitroGLYcerin (NITROSTAT) 0.4 MG sublingual tablet PLACE 1 TABLET UNDER THE TONGUE EVERY 5 MINUTES AS NEEDED FOR CHEST PAIN.  Unknown at prn     omeprazole (PRILOSEC) 20 MG DR capsule TAKE 1 CAPSULE BY MOUTH DAILY  1/10/2023     rosuvastatin (CRESTOR) 20 MG tablet TAKE 1 TABLET(20 MG) BY MOUTH AT BEDTIME  1/10/2023     triamcinolone (KENALOG) 0.1 % external cream Apply topically 2 times daily as needed for irritation  Unknown at prn        Information source(s): Patient, Family member, Prescription bottles and CareEverywhere/[facility]  Method of interview communication: in-person    Summary of Changes to PTA Med List  New: none  Discontinued: Banophen, ketorolac eye drop, loratadine, ofloxacin eye drop, prednisolone eye drop, Systane eye drop  Changed: none    Patient was asked about OTC/herbal products specifically.  PTA med list reflects this.    In the past week, patient estimated taking medication this percent of the time:  greater than 90%.    Allergies were reviewed, assessed, and updated with the patient.      Patient does not anticipate needing any multi-use medications during admission.    The information provided in this note is only as accurate as the sources available at the time of the update(s).    Thank you,  Freda Morrison, [facility]  1/11/2023 12:48 PM

## 2023-01-11 NOTE — H&P
[facility]    History and Physical - Hospitalist Service       Date of Admission:  1/11/2023    Assessment & Plan      82 year old male with a pertinent history of stroke, stage 3 CKD, type II diabetes mellitus, GERD, hyperlipidemia, and hypertension  who presents to this ED via walk in with son for evaluation of dizziness and neck pain.  Found to have left vertebral occlusion    Strokelike symptoms  - Patient presented with recurrent dizziness episodes  - MRI brain showed no acute intracranial process with generalized brain atrophy and chronic microvascular ischemic changes.  - Neurology consult, appreciate input  - Continue TIA/CVA protocol  - Check echo  - PT/OT and speech evaluation    Left vertebral artery occlusion  - MRA neck shows occlusion of the left vertebral artery with severe stenosis/segmental occlusion of the basilar artery and nonopacification of the right posterior cerebral artery  - Patient presented with recurrent dizziness episodes  - Vascular neurology consult  - Okay to keep patient at our facility and no need for patient transfer to the  or [facility] as per neurology  - Repeat MRI brain with and without contrast  - Order aspirin 1 dose and further anticoagulation recommendation depends on results    Hypertension  - Restart home medications  - Continue to monitor blood pressure and avoid hypotension    UTI  - Continue IV Rocephin  - Follow-up in urine culture    History of coronary disease  - Patient denies chest pain or shortness of breath  - Resume home medications       Diet: Moderate Consistent Carb (60 g CHO per Meal) Diet  NPO for Medical/Clinical Reasons Except for: Meds    DVT Prophylaxis: Pneumatic Compression Devices  Diego Catheter: Not present  Lines: None     Cardiac Monitoring: ACTIVE order. Indication: Stroke, acute (48 hours)  Code Status: Full Code      Clinically Significant Risk Factors Present on Admission           # Hypercalcemia: Highest Ca =  "10.3 mg/dL in last 2 days, will monitor as appropriate        # Hypertension: home medication list includes antihypertensive(s)     # DMII: A1C = 7.6 % (Ref range: <5.7 %) within past 3 months    # Overweight: Estimated body mass index is 29.1 kg/m  as calculated from the following:    Height as of this encounter: 1.549 m (5' 1\").    Weight as of this encounter: 69.9 kg (154 lb).           Disposition Plan      Expected Discharge Date: 01/13/2023                  Vance Luna MD  Hospitalist Service  [facility]  Securely message with [employer] (more info)  Text page via [facility] Paging/Directory     ______________________________________________________________________    Chief Complaint   Dizziness    History is obtained from the patient via     History of Present Illness   Adam M Albin is a 82 year old male with a pertinent history of stroke, stage 3 CKD, type II diabetes mellitus, GERD, hyperlipidemia, and hypertension  who presents to this ED via walk in with son for evaluation of dizziness and neck pain.   As Per son, the patient presents with complaints of dizziness, neck pain, headache, and chest pain for 1 to 2 weeks. He notes today the dizziness is like a room spinning. Symptoms worsen today prompting to the ED. His headache is around is temporal area. He endorse nausea and vomiting in the beginning, none currently. The patient has history of stroke and notes having weakness on left extremities. He reports ear ringing for 3 days. He is not on any anticoagulant.  Of note the patient had 2-3 falls over 2 weeks.  Secondary to dizziness.    Past Medical History    Past Medical History:   Diagnosis Date     Acute blood loss anemia      Acute renal failure (H)      Anemia      Bacteremia      Cataract      Chronic gout      CKD (chronic kidney disease)     Stage 3     DM2 (diabetes mellitus, type 2) (H)      GERD (gastroesophageal reflux disease)      Glucose intolerance (impaired " glucose tolerance)      Gout      History of nephrolithiasis      History of prostatitis 2017     Hyperlipidemia      Hypertension      Insomnia      Intestinal disaccharidase deficiencies and disaccharide malabsorption     Created by Conversion      Latent tuberculosis      Nephrolithiasis      Neuropathy      Nonspecific abnormal findings on radiological and other examination of skull and head     Created by [facility] Annotation: 2013 11:23PM - Giulia Meridai/10/2011:  severe stenosis both posterior cerebral arteries seen MRI/MRA done for  vertigo. No acute changes.e*     Osteoarthritis     wrist, knee, shoulder     Prostatitis      Rectal bleed      Trigger thumb        Past Surgical History   Past Surgical History:   Procedure Laterality Date     IR MISCELLANEOUS PROCEDURE  2009     IR MISCELLANEOUS PROCEDURE  2009     IR MISCELLANEOUS PROCEDURE  2009     IR MISCELLANEOUS PROCEDURE  2009     IR NEPHROURETERAL TUBE PLACEMENT BILATERAL  2009     IR URETER DILATION BILATERAL  2009     IR URETER DILATION BILATERAL  2009     KIDNEY STONE SURGERY       PICC  3/6/2016            Prior to Admission Medications   Prior to Admission Medications   Prescriptions Last Dose Informant Patient Reported? Taking?   ACETAMINOPHEN EXTRA STRENGTH 500 MG tablet 2023 at am  No Yes   Sig: TAKE 1 TABLET(500 MG) BY MOUTH EVERY 6 HOURS AS NEEDED   DULoxetine (CYMBALTA) 20 MG capsule 1/10/2023  No Yes   Sig: TAKE 1 CAPSULE(20 MG) BY MOUTH TWICE DAILY   HYDROcodone-acetaminophen (NORCO) 5-325 MG tablet 1/10/2023  No Yes   Sig: Take 1 tablet by mouth daily as needed for severe pain (7-10)   JANUVIA 50 MG tablet 1/10/2023  No Yes   Sig: TAKE 1 TABLET(50 MG) BY MOUTH DAILY   allopurinol (ZYLOPRIM) 100 MG tablet 1/10/2023  No Yes   Sig: TAKE 2 TABLETS BY MOUTH EVERY DAY   aspirin (ASA) 81 MG EC tablet 1/10/2023  No Yes   Sig: Take 1 tablet (81 mg) by mouth daily   docusate sodium  (COLACE) 100 MG capsule Unknown at prn  No Yes   Sig: TAKE 1 CAPSULE(100 MG) BY MOUTH TWICE DAILY AS NEEDED FOR CONSTIPATION   gabapentin (NEURONTIN) 300 MG capsule 1/10/2023  No Yes   Sig: TAKE 1 TO 2 CAPSULES BY MOUTH AT BEDTIME   isosorbide mononitrate (IMDUR) 30 MG 24 hr tablet 1/10/2023  No Yes   Sig: Take 1 tablet (30 mg) by mouth daily   losartan (COZAAR) 50 MG tablet 1/10/2023  No Yes   Sig: TAKE 1 TABLET(50 MG) BY MOUTH DAILY   meclizine (ANTIVERT) 12.5 MG tablet Unknown at prn  No Yes   Sig: Take 1 tablet (12.5 mg) by mouth 3 times daily as needed for dizziness   metoprolol succinate ER (TOPROL XL) 100 MG 24 hr tablet 1/10/2023  No Yes   Sig: TAKE 1 TABLET(100 MG) BY MOUTH DAILY   nitroGLYcerin (NITROSTAT) 0.4 MG sublingual tablet Unknown at prn  No Yes   Sig: PLACE 1 TABLET UNDER THE TONGUE EVERY 5 MINUTES AS NEEDED FOR CHEST PAIN.   omeprazole (PRILOSEC) 20 MG DR capsule 1/10/2023  No Yes   Sig: TAKE 1 CAPSULE BY MOUTH DAILY   rosuvastatin (CRESTOR) 20 MG tablet 1/10/2023  No Yes   Sig: TAKE 1 TABLET(20 MG) BY MOUTH AT BEDTIME   triamcinolone (KENALOG) 0.1 % external cream Unknown at prn  No Yes   Sig: Apply topically 2 times daily as needed for irritation      Facility-Administered Medications: None        Review of Systems    The 10 point Review of Systems is negative other than noted in the HPI or here.      Physical Exam   Vital Signs: Temp: 98.7  F (37.1  C) Temp src: Temporal BP: (!) 159/96 Pulse: 71   Resp: 18 SpO2: 95 %      Weight: 154 lbs 0 oz    General Appearance: Sleeping comfortably no acute respiratory distress  Respiratory: Decreased breath sounds  Cardiovascular: Normal heart sound, no murmur  GI: Normal bowel sounds.  No tenderness.  Skin: Dry, warm, no rash  Other: Intact sensation bilaterally with normal motor power, positive dizziness with standing    Medical Decision Making     70 MINUTES SPENT BY ME on the date of service doing chart review, history, exam, documentation & further  activities per the note.      Data     I have personally reviewed the following data over the past 24 hrs:    8.7  \   14.0   / 217     134 (L) 98 30.0 (H) /  168 (H)   4.8 22 1.46 (H) \       Trop: 9; 8 BNP: N/A       TSH: N/A T4: N/A A1C: 7.6 (H)       Imaging results reviewed over the past 24 hrs:   Recent Results (from the past 24 hour(s))   MRA Brain ([place]) wo Contrast    Narrative    EXAM: MR BRAIN W/O and W CONTRAST, MRA NECK (CAROTIDS) W/O and W CONTRAST, MRA BRAIN ([place]) W/O CONTRAST  LOCATION: [facility]  DATE/TIME: 1/11/2023 6:20 AM    INDICATION: Vertigo, ataxia, and headache for the last week.  COMPARISON: None.  CONTRAST: 7 mL Gadavist  TECHNIQUE:   1) Routine multiplanar multisequence head MRI without and with intravenous contrast.  2) 3D time-of-flight head MRA without intravenous contrast.  3) Neck MRA without and with IV contrast. Stenosis measurements made according to NASCET criteria unless otherwise specified.    FINDINGS: Motion-degraded exam.    HEAD MRI:  INTRACRANIAL CONTENTS: No acute or subacute infarct. Chronic lacunar infarct in the body of the left caudate nucleus. No mass, acute hemorrhage, or extra-axial fluid collections. Patchy nonspecific T2/FLAIR hyperintensities within the cerebral white   matter most consistent with mild to moderate chronic microvascular ischemic change. Mild to moderate generalized cerebral atrophy. No hydrocephalus. Normal position of the cerebellar tonsils. No pathologic contrast enhancement.    SELLA: No abnormality accounting for technique.    OSSEOUS STRUCTURES/SOFT TISSUES: Normal marrow signal. The major intracranial vascular flow voids are maintained.     ORBITS: Prior bilateral cataract surgery. Visualized portions of the orbits are otherwise unremarkable.     SINUSES/MASTOIDS: No paranasal sinus mucosal disease. No middle ear or mastoid effusion.     HEAD MRA:   ANTERIOR CIRCULATION: Atherosclerotic  plaque in the carotid siphons results in mild narrowing at the bilateral clinoid segments. The proximal branches of the anterior and middle cerebral arteries are grossly patent. There is moderate narrowing of the   bilateral M1 segments. The distal A1 and proximal A2 segments are obscured by a band of artifact. Fetal origin of the left posterior cerebral artery from the anterior circulation.    POSTERIOR CIRCULATION: Loss of normal flow-related enhancement in the left vertebral artery. The right vertebral artery is grossly patent. There is retrograde reconstitution of the left anterior inferior cerebellar artery. Just distal to the origin of   the anterior inferior cerebellar arteries, there is severe narrowing/occlusion of the mid to distal basilar artery. The right posterior cerebral artery is nonopacified. There is flow-related enhancement within the left PCA, which arises from the left   posterior communicating artery, with moderate stenosis of the left PCA at the P2-P3 segment.    NECK MRA:   RIGHT CAROTID: Atherosclerotic plaque results in less than 50% stenosis in the right ICA. No dissection.    LEFT CAROTID: Atherosclerotic plaque results in less than 50% stenosis in the left ICA. No dissection.    VERTEBRAL ARTERIES: The right vertebral artery is diminutive in size and diffusely irregular. There is nonopacification of the V3 and V4 segments, worrisome for occlusion. The right vertebral artery is patent.    AORTIC ARCH: Classic aortic arch anatomy with no significant stenosis at the origin of the great vessels.      Impression    IMPRESSION:  HEAD MRI:   1.  No acute intracranial process.  2.  Generalized brain atrophy and presumed microvascular ischemic changes as detailed above.  3.  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    HEAD MRA:   1.  Occlusion of the left vertebral artery, with severe stenosis/segmental occlusion of the basilar artery and nonopacification of the right posterior  cerebral artery.  2.  Scattered intracranial atherosclerosis of the remainder of the intracranial arterial vasculature, as described.    NECK MRA:  1.  Diminutive left vertebral artery with a diffusely irregular appearance and occlusion of the V3 and V4 segment.  2.  No flow-limiting stenosis in the remainder of the cervical arterial vasculature.    Findings were discussed with Dr. Magallanes at 6:49 AM on 01/11/2023.   MRA Neck (Carotids) wo & w Contrast    Narrative    EXAM: MR BRAIN W/O and W CONTRAST, MRA NECK (CAROTIDS) W/O and W CONTRAST, MRA BRAIN ([place]) W/O CONTRAST  LOCATION: [facility]  DATE/TIME: 1/11/2023 6:20 AM    INDICATION: Vertigo, ataxia, and headache for the last week.  COMPARISON: None.  CONTRAST: 7 mL Gadavist  TECHNIQUE:   1) Routine multiplanar multisequence head MRI without and with intravenous contrast.  2) 3D time-of-flight head MRA without intravenous contrast.  3) Neck MRA without and with IV contrast. Stenosis measurements made according to NASCET criteria unless otherwise specified.    FINDINGS: Motion-degraded exam.    HEAD MRI:  INTRACRANIAL CONTENTS: No acute or subacute infarct. Chronic lacunar infarct in the body of the left caudate nucleus. No mass, acute hemorrhage, or extra-axial fluid collections. Patchy nonspecific T2/FLAIR hyperintensities within the cerebral white   matter most consistent with mild to moderate chronic microvascular ischemic change. Mild to moderate generalized cerebral atrophy. No hydrocephalus. Normal position of the cerebellar tonsils. No pathologic contrast enhancement.    SELLA: No abnormality accounting for technique.    OSSEOUS STRUCTURES/SOFT TISSUES: Normal marrow signal. The major intracranial vascular flow voids are maintained.     ORBITS: Prior bilateral cataract surgery. Visualized portions of the orbits are otherwise unremarkable.     SINUSES/MASTOIDS: No paranasal sinus mucosal disease. No middle ear or mastoid  effusion.     HEAD MRA:   ANTERIOR CIRCULATION: Atherosclerotic plaque in the carotid siphons results in mild narrowing at the bilateral clinoid segments. The proximal branches of the anterior and middle cerebral arteries are grossly patent. There is moderate narrowing of the   bilateral M1 segments. The distal A1 and proximal A2 segments are obscured by a band of artifact. Fetal origin of the left posterior cerebral artery from the anterior circulation.    POSTERIOR CIRCULATION: Loss of normal flow-related enhancement in the left vertebral artery. The right vertebral artery is grossly patent. There is retrograde reconstitution of the left anterior inferior cerebellar artery. Just distal to the origin of   the anterior inferior cerebellar arteries, there is severe narrowing/occlusion of the mid to distal basilar artery. The right posterior cerebral artery is nonopacified. There is flow-related enhancement within the left PCA, which arises from the left   posterior communicating artery, with moderate stenosis of the left PCA at the P2-P3 segment.    NECK MRA:   RIGHT CAROTID: Atherosclerotic plaque results in less than 50% stenosis in the right ICA. No dissection.    LEFT CAROTID: Atherosclerotic plaque results in less than 50% stenosis in the left ICA. No dissection.    VERTEBRAL ARTERIES: The right vertebral artery is diminutive in size and diffusely irregular. There is nonopacification of the V3 and V4 segments, worrisome for occlusion. The right vertebral artery is patent.    AORTIC ARCH: Classic aortic arch anatomy with no significant stenosis at the origin of the great vessels.      Impression    IMPRESSION:  HEAD MRI:   1.  No acute intracranial process.  2.  Generalized brain atrophy and presumed microvascular ischemic changes as detailed above.  3.  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    HEAD MRA:   1.  Occlusion of the left vertebral artery, with severe stenosis/segmental occlusion of  the basilar artery and nonopacification of the right posterior cerebral artery.  2.  Scattered intracranial atherosclerosis of the remainder of the intracranial arterial vasculature, as described.    NECK MRA:  1.  Diminutive left vertebral artery with a diffusely irregular appearance and occlusion of the V3 and V4 segment.  2.  No flow-limiting stenosis in the remainder of the cervical arterial vasculature.    Findings were discussed with Dr. Magallanes at 6:49 AM on 01/11/2023.   Chest XR,  PA & LAT    Narrative    EXAM: XR CHEST 2 VIEWS  LOCATION: [facility]  DATE/TIME: 1/11/2023 6:20 AM    INDICATION: chest pain  COMPARISON: None.      Impression    IMPRESSION: Negative chest.   MR Brain w/o & w Contrast    Narrative    EXAM: MR BRAIN W/O and W CONTRAST, MRA NECK (CAROTIDS) W/O and W CONTRAST, MRA BRAIN ([place]) W/O CONTRAST  LOCATION: [facility]  DATE/TIME: 1/11/2023 6:20 AM    INDICATION: Vertigo, ataxia, and headache for the last week.  COMPARISON: None.  CONTRAST: 7 mL Gadavist  TECHNIQUE:   1) Routine multiplanar multisequence head MRI without and with intravenous contrast.  2) 3D time-of-flight head MRA without intravenous contrast.  3) Neck MRA without and with IV contrast. Stenosis measurements made according to NASCET criteria unless otherwise specified.    FINDINGS: Motion-degraded exam.    HEAD MRI:  INTRACRANIAL CONTENTS: No acute or subacute infarct. Chronic lacunar infarct in the body of the left caudate nucleus. No mass, acute hemorrhage, or extra-axial fluid collections. Patchy nonspecific T2/FLAIR hyperintensities within the cerebral white   matter most consistent with mild to moderate chronic microvascular ischemic change. Mild to moderate generalized cerebral atrophy. No hydrocephalus. Normal position of the cerebellar tonsils. No pathologic contrast enhancement.    SELLA: No abnormality accounting for technique.    OSSEOUS STRUCTURES/SOFT  TISSUES: Normal marrow signal. The major intracranial vascular flow voids are maintained.     ORBITS: Prior bilateral cataract surgery. Visualized portions of the orbits are otherwise unremarkable.     SINUSES/MASTOIDS: No paranasal sinus mucosal disease. No middle ear or mastoid effusion.     HEAD MRA:   ANTERIOR CIRCULATION: Atherosclerotic plaque in the carotid siphons results in mild narrowing at the bilateral clinoid segments. The proximal branches of the anterior and middle cerebral arteries are grossly patent. There is moderate narrowing of the   bilateral M1 segments. The distal A1 and proximal A2 segments are obscured by a band of artifact. Fetal origin of the left posterior cerebral artery from the anterior circulation.    POSTERIOR CIRCULATION: Loss of normal flow-related enhancement in the left vertebral artery. The right vertebral artery is grossly patent. There is retrograde reconstitution of the left anterior inferior cerebellar artery. Just distal to the origin of   the anterior inferior cerebellar arteries, there is severe narrowing/occlusion of the mid to distal basilar artery. The right posterior cerebral artery is nonopacified. There is flow-related enhancement within the left PCA, which arises from the left   posterior communicating artery, with moderate stenosis of the left PCA at the P2-P3 segment.    NECK MRA:   RIGHT CAROTID: Atherosclerotic plaque results in less than 50% stenosis in the right ICA. No dissection.    LEFT CAROTID: Atherosclerotic plaque results in less than 50% stenosis in the left ICA. No dissection.    VERTEBRAL ARTERIES: The right vertebral artery is diminutive in size and diffusely irregular. There is nonopacification of the V3 and V4 segments, worrisome for occlusion. The right vertebral artery is patent.    AORTIC ARCH: Classic aortic arch anatomy with no significant stenosis at the origin of the great vessels.      Impression    IMPRESSION:  HEAD MRI:   1.  No acute  intracranial process.  2.  Generalized brain atrophy and presumed microvascular ischemic changes as detailed above.  3.  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    HEAD MRA:   1.  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  2.  Scattered intracranial atherosclerosis of the remainder of the intracranial arterial vasculature, as described.    NECK MRA:  1.  Diminutive left vertebral artery with a diffusely irregular appearance and occlusion of the V3 and V4 segment.  2.  No flow-limiting stenosis in the remainder of the cervical arterial vasculature.    Findings were discussed with Dr. Magallanes at 6:49 AM on 01/11/2023.   CTA Head Neck with Contrast    Narrative    HEAD AND NECK CT ANGIOGRAM WITH IV CONTRAST  01/11/2023, 8:00 AM    INDICATION: Vertigo, headache, and neck pain for week.  Questionable left vertebral artery dissection on MRA.  TECHNIQUE: Head and neck CT angiogram with IV contrast. Noncontrast head CT followed by axial helical CT images of the head and neck vessels obtained during the arterial phase of intravenous contrast administration. Axial helical 2D reconstructed images   and multiplanar 3D MIP reconstructed images of the head and neck vessels were performed by the technologist. Dose reduction techniques were used.  CONTRAST: Isovue 370, 75 mL.  COMPARISON: Brain MRI/MRA and neck MRA 01/11/2023.     FINDINGS:  NONCONTRAST HEAD CT: No intracranial hemorrhage, extra-axial collection, mass effect or CT evidence of acute infarct.  Mild presumed chronic small vessel ischemic changes. Mild generalized volume loss. The ventricles are proportional to the sulci.   Osseous structures are intact. The visualized orbits, paranasal sinuses and mastoid air cells are free of significant disease.     HEAD CTA: Mild carotid siphon atherosclerosis. Fetal-type left posterior  communicating artery with patent left posterior cerebral artery. Mild narrowing of the M1 segment of the right middle cerebral artery. Distal cerebral artery branches are patent.   The proximal segment of the left vertebral artery is occluded. Patent right vertebral artery. The basilar artery is either severely stenotic or segmentally occluded from the vertebrobasilar junction to the level of the superior cerebellar artery origins,   as on the MRA. No convincing filling of the right posterior cerebral artery. The dural venous sinuses are not well visualized due to the arterial timing of the contrast bolus.    NECK CTA: Three vessel arch.  Carotid arteries are patent without atherosclerotic change.  No hemodynamically significant stenosis by NASCET criteria in either carotid system.  Diminutive left vertebral artery is visualized faintly beyond its origin   through approximately the C4 level before gradually losing visibility in its distal cervical segment through its proximal intracranial segment. This is similar to the neck MRA.      Impression    CONCLUSION:  HEAD CT:  1.  No intracranial hemorrhage, mass, or definite CT evidence of recent ischemia.    HEAD CTA:  1.  The proximal intracranial segment of the left vertebral artery is occluded.  2.  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 The right posterior cerebral artery appears occluded.  3.  This all appears similar to the MRA performed earlier the same day.    NECK CTA:  1.  Diminished filling of the proximal diminutive left vertebral artery which occludes in its distal cervical segment through its proximal intracranial segment. The appearance is similar to the MRA performed earlier the same day.  2.  Patent, dominant right vertebral artery.  3.  Mild carotid artery atherosclerosis without hemodynamically significant narrowing by NASCET  criteria.       MR Brain w/o Contrast    Narrative    EXAM: MR BRAIN W/O CONTRAST  LOCATION: [facility]  DATE/TIME: 1/11/2023 10:56 AM    INDICATION: Vertigo, headache and neck pain. Abnormal CTA and MRA.  COMPARISON: Head and neck CTA and brain MRI/MRA and neck MRA 1/11/2023  TECHNIQUE: Axial and coronal diffusion-weighted imaging of the brain was acquired.    FINDINGS:  There is no restricted diffusion.      Impression    IMPRESSION:  No diffusion abnormality to indicate recent stroke.   MRA Angiogram Neck w/o Contrast    Narrative    [facility]  MRA NECK (CAROTIDS) W/O CONTRAST  1/11/2023 10:58 AM    INDICATION: Neck pain; r o cervical artery dissection, with or w o trauma; no head, neck CTA result available; none of the following: facial pain, headache, Maria Del Rosario syndrome, or neurologic deficit(s) and/or stroke.  TECHNIQUE: Axial 3D time-of-flight neck MRA along with axial T1 fat saturated images were performed.  CONTRAST: None.  COMPARISON: Head and neck CTA and MRA 1/11/2023.    FINDINGS: Three-vessel arch. Antegrade flow within the carotid and right vertebral arteries. Diminished flow-related enhancement of the left vertebral artery. The axial T1 fat saturated images are compromised by the administration of contrast earlier the   same day.      Impression    CONCLUSION:   1.  The axial T1 fat saturated images are compromised by the recent administration of intravenous contrast.      Have Your Skin Lesions Been Treated?: not been treated Is This A New Presentation, Or A Follow-Up?: Skin Lesions How Severe Is Your Skin Lesion?: moderate

## 2023-01-11 NOTE — CONSULTS
This is a neurological consultation    82-year-old admitted to hospital 1/11/2023      Chief complaint  Acute vertigo  Left vertebral artery occlusion question acute versus chronic        HPI  82-year-old presented to the hospital on 1/11/2023 with new onset vertigo.  This was associated with neck pain some bitemporal headache.  Patient also had some tinnitus and fatigue  Symptoms have been going on for the last week.  But gotten worse on the day of admission.    Patient has had some difficulty with chest discomfort for 2 to 3 days as well    Denied any focal weakness or numbness on his face    Patient underwent a MRI scan head.  No evidence of an acute stroke  Did have left vertebral artery occlusion possible dissection    Patient with past history severe stenosis both posterior cerebral arteries with work-up for vertigo in the past    [facility] stroke service rankings initially in the ER  Concern for basilar artery occlusion with possible dissection  Concern though some of this may be chronic in nature  Case was reviewed with interventional list and no endovascular intervention or transfer was recommended by stroke team.    They recommended MRI head with coronal diffusion-weighted images  They also recommended MRA head and neck with T1 fat saturation dissection protocol.    Past medical history  CVA  Hypertension  Hyperlipidemia  Diabetes  Neuropathy  CAD  CKD  Gout  GERD  BPH/history of prostatitis  Cataracts  Kidney stones  Osteoarthritis    Habits  Past smoker quit 2000  Does not drink alcohol      Family history  Father CVA  Daughter CVA      Work-up  HEAD MRI: 1/11/2023 (6:49 AM)  1.  No acute intracranial process.  2.  Generalized brain atrophy and presumed microvascular ischemic changes as detailed above.  3.  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  HEAD MRA: 1/11/2023 (6:49 AM)  1.  Occlusion of the left vertebral artery, with severe stenosis/segmental occlusion of the basilar artery and  nonopacification of the right posterior cerebral artery.  2.  Scattered intracranial atherosclerosis of the remainder of the intracranial arterial vasculature, as described.  NECK MRA:1/11/2023 (6:49 AM)  1.  Diminutive left vertebral artery with a diffusely irregular appearance and occlusion of the V3 and V4 segment.  2.  No flow-limiting stenosis in the remainder of the cervical arterial vasculature.  HEAD CT: 1/11/2023 (8 AM)  1.  No intracranial hemorrhage, mass, or definite CT evidence of recent ischemia.  HEAD CTA:1/11/2023 (8 AM)  1.  The proximal intracranial segment of the left vertebral artery is occluded.  2.  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 The right posterior cerebral artery appears occluded.  3.  This all appears similar to the MRA performed earlier the same day.  NECK CTA:1/11/2023 (8 AM)  1.  Diminished filling of the proximal diminutive left vertebral artery which occludes in its distal cervical segment through its proximal intracranial segment. The appearance is similar to the MRA performed earlier the same day.  2.  Patent, dominant right vertebral artery.  3.  Mild carotid artery atherosclerosis without hemodynamically significant narrowing by NASCET criteria.  MRI head 1/11/2023, (10:56 AM repeat)  No diffusion restriction changes no acute stroke  MRA neck dissection protocol 1/11/2023, (10:58 AM)  1.  Diminished flow related enhancement left vertebral artery  2.  Axial T1 fat-saturated images are compromised by administration of contrast given earlier in the day.  Hemoglobin A1c 7.6%  Troponin high-sensitivity 8 on admission  SARS negative, RSV negative, influenza A and B negative        Labs  Sodium 134 potassium 4.8  BUN 30, creatinine 1.46  Glucose 168  White blood count 8.7 hemoglobin 14.0, platelets 217,000      Exam  Blood pressure 159/96, pulse 71, temperature  98.7  Blood pressure range 150//94  Pulse range 65-71  T-max 98.7    Review of systems  Pertinent positives and negatives    Dizziness/vertigo Main complaint  Ataxia due to the above  Increased tinnitus x3 days  Headache more in the temporal area and posterior neck region  Nausea with some vomiting  Previous left-sided weakness from past stroke    Did have a fall over 2 weeks ago    General exam per primary MD  Alert attentive  HEENT no signs of head trauma  Lungs clear with decreased breath sounds  Heart rate regular  Abdomen soft    Neurologic exam  Daughter in law helped as   Normal prosody of speech  Normal naming  Normal repetition  Normal comprehension  No aphasia  No neglect  Memory recall okay      Cranial nerves  No ophthalmoplegia  No nystagmus now  Visual fields intact  Face symmetrical  Speech clear      Upper extremities  No drift no tremor    Lower extremities  Move well in the bed  Distal proximal strength good      Gait  Deferred    Assessment/plan    1.  Vertigo with difficulty with gait        MRI head chronic left caudate nucleus infarct but no acute stroke    2.  Vascular risk factors       Hypertension/hyperlipidemia/diabetes/CAD/CKD/past CVA      3.  Posterior circulation changes see below:       MRA       Occlusion left vertebral artery, with severe stenosis/segmental occlusion basilar artery on initial MRA.       Nonopacification of the right posterior cerebral artery.       Diminutive left vertebral artery with a diffusely irregular appearance and occlusion of the V3 and V4 segment.  Neck portion       CTA        The proximal intracranial segment of the left vertebral artery is occluded.        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        The right posterior cerebral artery appears occluded.        This all appears similar to the MRA performed earlier the same  day.        Diminished filling of the proximal diminutive left vertebral artery which occludes in its distal cervical segment through its proximal intracranial segment.         The appearance is similar to the MRA performed earlier the same day.         Patent, dominant right vertebral artery.      Diagnosis  Vertigo  Basilar artery segmental occlusion  Left vertebral artery occlusion    Previously on   Aspirin 81 mg  Crestor 20 mg    Recommend  Increased to dual antiplatelet medication  Plavix 75 mg once per day  Aspirin 81 mg once per day  Continue statin    Patient's dizziness/vertigo is worse when he moves better when he stays still  No other cranial nerve changes    Discussed with nursing staff face-to-face at bedside  Reviewed complex work-up above and presentation    Total care time today 85 minutes    Could consider repeating MRA for dissection tomorrow depending on patient's course  Symptomatic treatment with meclizine for vertigo    Let blood pressures run high  Patient getting echo  PT/OT/speech eval

## 2023-01-12 ENCOUNTER — APPOINTMENT (OUTPATIENT)
Dept: MRI IMAGING | Facility: HOSPITAL | Age: 83
DRG: 068 | End: 2023-01-12
Attending: PSYCHIATRY & NEUROLOGY
Payer: COMMERCIAL

## 2023-01-12 ENCOUNTER — PATIENT OUTREACH (OUTPATIENT)
Dept: GERIATRIC MEDICINE | Facility: CLINIC | Age: 83
End: 2023-01-12

## 2023-01-12 ENCOUNTER — APPOINTMENT (OUTPATIENT)
Dept: PHYSICAL THERAPY | Facility: HOSPITAL | Age: 83
DRG: 068 | End: 2023-01-12
Attending: INTERNAL MEDICINE
Payer: COMMERCIAL

## 2023-01-12 ENCOUNTER — APPOINTMENT (OUTPATIENT)
Dept: CARDIOLOGY | Facility: HOSPITAL | Age: 83
DRG: 068 | End: 2023-01-12
Attending: INTERNAL MEDICINE
Payer: COMMERCIAL

## 2023-01-12 DIAGNOSIS — R42 DIZZINESS: ICD-10-CM

## 2023-01-12 DIAGNOSIS — I10 ESSENTIAL HYPERTENSION: Primary | ICD-10-CM

## 2023-01-12 LAB
ANION GAP SERPL CALCULATED.3IONS-SCNC: 13 MMOL/L (ref 7–15)
BACTERIA UR CULT: NO GROWTH
BUN SERPL-MCNC: 30.9 MG/DL (ref 8–23)
CALCIUM SERPL-MCNC: 9.4 MG/DL (ref 8.8–10.2)
CHLORIDE SERPL-SCNC: 100 MMOL/L (ref 98–107)
CREAT SERPL-MCNC: 1.58 MG/DL (ref 0.67–1.17)
DEPRECATED HCO3 PLAS-SCNC: 24 MMOL/L (ref 22–29)
ERYTHROCYTE [DISTWIDTH] IN BLOOD BY AUTOMATED COUNT: 14.1 % (ref 10–15)
GFR SERPL CREATININE-BSD FRML MDRD: 44 ML/MIN/1.73M2
GLUCOSE BLDC GLUCOMTR-MCNC: 127 MG/DL (ref 70–99)
GLUCOSE BLDC GLUCOMTR-MCNC: 153 MG/DL (ref 70–99)
GLUCOSE BLDC GLUCOMTR-MCNC: 165 MG/DL (ref 70–99)
GLUCOSE BLDC GLUCOMTR-MCNC: 165 MG/DL (ref 70–99)
GLUCOSE BLDC GLUCOMTR-MCNC: 168 MG/DL (ref 70–99)
GLUCOSE BLDC GLUCOMTR-MCNC: 191 MG/DL (ref 70–99)
GLUCOSE SERPL-MCNC: 130 MG/DL (ref 70–99)
HCT VFR BLD AUTO: 40.3 % (ref 40–53)
HGB BLD-MCNC: 13.2 G/DL (ref 13.3–17.7)
LVEF ECHO: NORMAL
MCH RBC QN AUTO: 31.1 PG (ref 26.5–33)
MCHC RBC AUTO-ENTMCNC: 32.8 G/DL (ref 31.5–36.5)
MCV RBC AUTO: 95 FL (ref 78–100)
PLATELET # BLD AUTO: 210 10E3/UL (ref 150–450)
POTASSIUM SERPL-SCNC: 4.2 MMOL/L (ref 3.4–5.3)
RBC # BLD AUTO: 4.24 10E6/UL (ref 4.4–5.9)
SODIUM SERPL-SCNC: 137 MMOL/L (ref 136–145)
WBC # BLD AUTO: 8.6 10E3/UL (ref 4–11)

## 2023-01-12 PROCEDURE — 97530 THERAPEUTIC ACTIVITIES: CPT | Mod: GP

## 2023-01-12 PROCEDURE — 97161 PT EVAL LOW COMPLEX 20 MIN: CPT | Mod: GP

## 2023-01-12 PROCEDURE — 255N000002 HC RX 255 OP 636: Performed by: INTERNAL MEDICINE

## 2023-01-12 PROCEDURE — 250N000013 HC RX MED GY IP 250 OP 250 PS 637: Performed by: INTERNAL MEDICINE

## 2023-01-12 PROCEDURE — 258N000003 HC RX IP 258 OP 636: Performed by: INTERNAL MEDICINE

## 2023-01-12 PROCEDURE — 99232 SBSQ HOSP IP/OBS MODERATE 35: CPT | Performed by: INTERNAL MEDICINE

## 2023-01-12 PROCEDURE — 250N000013 HC RX MED GY IP 250 OP 250 PS 637: Performed by: PSYCHIATRY & NEUROLOGY

## 2023-01-12 PROCEDURE — 99232 SBSQ HOSP IP/OBS MODERATE 35: CPT | Performed by: PSYCHIATRY & NEUROLOGY

## 2023-01-12 PROCEDURE — 80048 BASIC METABOLIC PNL TOTAL CA: CPT | Performed by: INTERNAL MEDICINE

## 2023-01-12 PROCEDURE — C9113 INJ PANTOPRAZOLE SODIUM, VIA: HCPCS | Performed by: INTERNAL MEDICINE

## 2023-01-12 PROCEDURE — 999N000208 ECHOCARDIOGRAM COMPLETE

## 2023-01-12 PROCEDURE — 120N000001 HC R&B MED SURG/OB

## 2023-01-12 PROCEDURE — 97116 GAIT TRAINING THERAPY: CPT | Mod: GP

## 2023-01-12 PROCEDURE — 85027 COMPLETE CBC AUTOMATED: CPT | Performed by: INTERNAL MEDICINE

## 2023-01-12 PROCEDURE — 93306 TTE W/DOPPLER COMPLETE: CPT | Mod: 26 | Performed by: GENERAL ACUTE CARE HOSPITAL

## 2023-01-12 PROCEDURE — 999N000226 HC STATISTIC SLP IP EVAL DEFER

## 2023-01-12 PROCEDURE — 70547 MR ANGIOGRAPHY NECK W/O DYE: CPT

## 2023-01-12 PROCEDURE — 83036 HEMOGLOBIN GLYCOSYLATED A1C: CPT | Performed by: INTERNAL MEDICINE

## 2023-01-12 PROCEDURE — 36415 COLL VENOUS BLD VENIPUNCTURE: CPT | Performed by: INTERNAL MEDICINE

## 2023-01-12 PROCEDURE — 250N000011 HC RX IP 250 OP 636: Performed by: INTERNAL MEDICINE

## 2023-01-12 RX ORDER — PANTOPRAZOLE SODIUM 40 MG/1
40 TABLET, DELAYED RELEASE ORAL
Status: DISCONTINUED | OUTPATIENT
Start: 2023-01-13 | End: 2023-01-14 | Stop reason: HOSPADM

## 2023-01-12 RX ADMIN — CLOPIDOGREL BISULFATE 75 MG: 75 TABLET ORAL at 08:11

## 2023-01-12 RX ADMIN — HYDROCODONE BITARTRATE AND ACETAMINOPHEN 1 TABLET: 5; 325 TABLET ORAL at 04:38

## 2023-01-12 RX ADMIN — ONDANSETRON 4 MG: 2 INJECTION INTRAMUSCULAR; INTRAVENOUS at 17:31

## 2023-01-12 RX ADMIN — PANTOPRAZOLE SODIUM 40 MG: 40 INJECTION, POWDER, FOR SOLUTION INTRAVENOUS at 08:09

## 2023-01-12 RX ADMIN — PERFLUTREN 2 ML: 6.52 INJECTION, SUSPENSION INTRAVENOUS at 15:09

## 2023-01-12 RX ADMIN — INSULIN ASPART 1 UNITS: 100 INJECTION, SOLUTION INTRAVENOUS; SUBCUTANEOUS at 21:20

## 2023-01-12 RX ADMIN — SODIUM CHLORIDE: 9 INJECTION, SOLUTION INTRAVENOUS at 04:32

## 2023-01-12 RX ADMIN — ASPIRIN 81 MG CHEWABLE TABLET 81 MG: 81 TABLET CHEWABLE at 08:25

## 2023-01-12 RX ADMIN — DULOXETINE HYDROCHLORIDE 20 MG: 20 CAPSULE, DELAYED RELEASE ORAL at 08:18

## 2023-01-12 RX ADMIN — CEFTRIAXONE SODIUM 1 G: 1 INJECTION, POWDER, FOR SOLUTION INTRAMUSCULAR; INTRAVENOUS at 08:10

## 2023-01-12 RX ADMIN — ISOSORBIDE MONONITRATE 30 MG: 30 TABLET, EXTENDED RELEASE ORAL at 08:18

## 2023-01-12 RX ADMIN — GABAPENTIN 300 MG: 300 CAPSULE ORAL at 21:20

## 2023-01-12 RX ADMIN — ACETAMINOPHEN 650 MG: 325 TABLET, FILM COATED ORAL at 14:33

## 2023-01-12 RX ADMIN — DULOXETINE HYDROCHLORIDE 20 MG: 20 CAPSULE, DELAYED RELEASE ORAL at 20:06

## 2023-01-12 RX ADMIN — ALLOPURINOL 200 MG: 100 TABLET ORAL at 08:11

## 2023-01-12 RX ADMIN — ACETAMINOPHEN 650 MG: 325 TABLET ORAL at 20:06

## 2023-01-12 RX ADMIN — ROSUVASTATIN CALCIUM 20 MG: 10 TABLET, FILM COATED ORAL at 21:20

## 2023-01-12 RX ADMIN — ACETAMINOPHEN 650 MG: 325 TABLET ORAL at 14:30

## 2023-01-12 RX ADMIN — INSULIN ASPART 2 UNITS: 100 INJECTION, SOLUTION INTRAVENOUS; SUBCUTANEOUS at 12:33

## 2023-01-12 RX ADMIN — INSULIN ASPART 1 UNITS: 100 INJECTION, SOLUTION INTRAVENOUS; SUBCUTANEOUS at 17:26

## 2023-01-12 RX ADMIN — LOSARTAN POTASSIUM 50 MG: 50 TABLET, FILM COATED ORAL at 08:11

## 2023-01-12 RX ADMIN — METOPROLOL SUCCINATE 100 MG: 100 TABLET, EXTENDED RELEASE ORAL at 08:12

## 2023-01-12 RX ADMIN — MECLIZINE 12.5 MG: 12.5 TABLET ORAL at 18:03

## 2023-01-12 RX ADMIN — ACETAMINOPHEN 650 MG: 325 TABLET ORAL at 08:09

## 2023-01-12 ASSESSMENT — ACTIVITIES OF DAILY LIVING (ADL)
ADLS_ACUITY_SCORE: 39
ADLS_ACUITY_SCORE: 35
ADLS_ACUITY_SCORE: 35
DEPENDENT_IADLS:: CLEANING;COOKING;LAUNDRY;SHOPPING;MEAL PREPARATION;MEDICATION MANAGEMENT;MONEY MANAGEMENT;TRANSPORTATION;INCONTINENCE
ADLS_ACUITY_SCORE: 35
ADLS_ACUITY_SCORE: 36
ADLS_ACUITY_SCORE: 35
DEPENDENT_IADLS:: CLEANING;COOKING;LAUNDRY;SHOPPING;MEAL PREPARATION;MEDICATION MANAGEMENT;MONEY MANAGEMENT;TRANSPORTATION;INCONTINENCE
ADLS_ACUITY_SCORE: 39
ADLS_ACUITY_SCORE: 36
ADLS_ACUITY_SCORE: 35
ADLS_ACUITY_SCORE: 35

## 2023-01-12 NOTE — CONSULTS
Care Management Initial Consult    General Information  Assessment completed with: Patient, Family,    Type of CM/SW Visit: Initial Assessment    Primary Care Provider verified and updated as needed: Yes   Readmission within the last 30 days: no previous admission in last 30 days      Reason for Consult: discharge planning  Advance Care Planning:            Communication Assessment  Patient's communication style: spoken language (non-English)    Hearing Difficulty or Deaf: no   Wear Glasses or Blind: no    Cognitive  Cognitive/Neuro/Behavioral: WDL  Level of Consciousness: confused  Arousal Level: opens eyes spontaneously  Orientation: oriented x 4  Mood/Behavior: calm  Best Language: 0 - No aphasia  Speech: clear    Living Environment:   People in home: other relative(s), child(chanel), adult, spouse     Current living Arrangements: house      Able to return to prior arrangements: yes       Family/Social Support:  Care provided by: homecare agency, spouse/significant other, child(chanel)  Provides care for: no one, unable/limited ability to care for self  Marital Status:   Wife, Children, PCA          Description of Support System: Supportive, Involved         Current Resources:   Patient receiving home care services: No     Community Resources: PCA  Equipment currently used at home: walker, rolling  Supplies currently used at home: Incontinence Supplies, Diabetic Supplies, Chux    Employment/Financial:  Employment Status:          Financial Concerns:             Lifestyle & Psychosocial Needs:  Social Determinants of Health     Tobacco Use: Medium Risk     Smoking Tobacco Use: Former     Smokeless Tobacco Use: Former     Passive Exposure: Not on file   Alcohol Use: Not At Risk     Frequency of Alcohol Consumption: Never     Average Number of Drinks: Not on file     Frequency of Binge Drinking: Never   Financial Resource Strain: Low Risk      Difficulty of Paying Living Expenses: Not hard at all   Food Insecurity:  No Food Insecurity     Worried About Running Out of Food in the Last Year: Never true     Ran Out of Food in the Last Year: Never true   Transportation Needs: No Transportation Needs     Lack of Transportation (Medical): No     Lack of Transportation (Non-Medical): No   Physical Activity: Inactive     Days of Exercise per Week: 0 days     Minutes of Exercise per Session: 0 min   Stress: No Stress Concern Present     Feeling of Stress : Not at all   Social Connections: Socially Integrated     Frequency of Communication with Friends and Family: Once a week     Frequency of Social Gatherings with Friends and Family: Twice a week     Attends Anabaptist Services: 1 to 4 times per year     Active Member of Clubs or Organizations: No     Attends Club or Organization Meetings: 1 to 4 times per year     Marital Status:    Intimate Partner Violence: Not At Risk     Fear of Current or Ex-Partner: No     Emotionally Abused: No     Physically Abused: No     Sexually Abused: No   Depression: Not at risk     PHQ-2 Score: 0   Housing Stability: Unknown     Unable to Pay for Housing in the Last Year: No     Number of Places Lived in the Last Year: Not on file     Unstable Housing in the Last Year: No       Functional Status:  Prior to admission patient needed assistance:   Dependent ADLs:: Ambulation-walker, Bathing, Dressing, Grooming, Transfers, Toileting  Dependent IADLs:: Cleaning, Cooking, Laundry, Shopping, Meal Preparation, Medication Management, Money Management, Transportation, Incontinence       Mental Health Status:  Mental Health Status: No Current Concerns       Chemical Dependency Status:  Chemical Dependency Status: No Current Concerns             Values/Beliefs:  Spiritual, Cultural Beliefs, Anabaptist Practices, Values that affect care: no               Additional Information:  SW met with patient and family in room to discuss discharge planning and complete initial assessment. Patient lives at home with 5  family members who provide assistance with ADL/IADLs. Patient also has a PCA family states they believe it is through [facility]. Patient uses walker for ambulation. If recommended patient is open to ARU or TCU at discharge. At this time OT is recommending home with assist. Family to transport. SW to follow for PT recommendations.     Yany Griffith

## 2023-01-12 NOTE — PROGRESS NOTES
[facility]    PROGRESS NOTE - Hospitalist Service    Assessment and Plan  82 year old male with a pertinent history of stroke, stage 3 CKD, type II diabetes mellitus, GERD, hyperlipidemia, and hypertension  who presents to this ED via walk in with son for evaluation of dizziness and neck pain.  Found to have left vertebral occlusion     Strokelike symptoms  - Patient presented with recurrent dizziness episodes  - MRI brain showed no acute intracranial process with generalized brain atrophy and chronic microvascular ischemic changes.  - Neurology consult, appreciate input  - Continue TIA/CVA protocol  - Pending echo  - PT/OT and speech evaluation  -Continue aspirin 81 mg and Plavix 75 mg as recommended by neurology  - Continue meclizine as needed     Left vertebral artery occlusion  - MRA neck shows occlusion of the left vertebral artery with severe stenosis/segmental occlusion of the basilar artery and nonopacification of the right posterior cerebral artery  - Patient presented with recurrent dizziness episodes  - Vascular neurology consult  - Okay to keep patient at our facility and no need for patient transfer to the  or [facility] as per neurology  - Repeat MRA neck with and without contrast  - Order aspirin 1 dose and further anticoagulation recommendation depends on results  -Continue aspirin and Plavix as above     Hypertension  - Restart home medications  - Continue to monitor blood pressure and avoid hypotension  - Discontinue IV fluid     UTI  - Continue IV Rocephin  - Follow-up in urine culture     History of coronary disease  - Patient denies chest pain or shortness of breath  - Resume home medications  - Continue to monitor on telemetry  - Pending echo    40 MINUTES SPENT BY ME on the date of service doing chart review, history, exam, documentation & further activities per the note      Principal Problem:    Essential hypertension  Active Problems:    Vertigo    Vertebral artery  occlusion, left      VTE prophylaxis:  Pneumatic Compression Devices  DIET: Orders Placed This Encounter      Moderate Consistent Carb (60 g CHO per Meal) Diet      NPO for Medical/Clinical Reasons Except for: Meds      Disposition/Barriers to discharge: Pending echo and MRA neck, TIA protocol with PT/OT and speech evaluation  Code Status: Full Code    Subjective:  Adam  is feeling slightly better today, still has some dizziness, no other acute neurological symptoms overnight.    PHYSICAL EXAM  Vitals:    01/11/23 0241 01/11/23 1628   Weight: 69.9 kg (154 lb) 71.2 kg (156 lb 15.5 oz)     B/P:159/92 T:97.8 P:77 R:16     Intake/Output Summary (Last 24 hours) at 1/12/2023 1058  Last data filed at 1/12/2023 0905  Gross per 24 hour   Intake 360 ml   Output --   Net 360 ml      Body mass index is 29.66 kg/m .    Constitutional: awake, alert, cooperative, no apparent distress, and appears stated age  Eyes: Lids and lashes normal, pupils equal, round and reactive to light, extra ocular muscles intact, sclera clear, conjunctiva normal  ENT: Normocephalic, without obvious abnormality, atraumatic, sinuses nontender on palpation, external ears without lesions, oral pharynx with moist mucous membranes, tonsils without erythema or exudates, gums normal and good dentition.  Respiratory: No increased work of breathing, good air exchange, clear to auscultation bilaterally, no crackles or wheezing  Cardiovascular: Normal apical impulse, regular rate and rhythm, normal S1 and S2, no S3 or S4, and no murmur noted  GI: No scars, normal bowel sounds, soft, non-distended, non-tender, no masses palpated, no hepatosplenomegally  Skin: no bruising or bleeding and normal skin color, texture, turgor  Musculoskeletal: There is no redness, warmth, or swelling of the joints.  Full range of motion noted.  no lower extremity pitting edema present  Neurologic: Awake, alert, oriented to name, place and time.  Cranial nerves II-XII are grossly intact.   Motor is 5 out of 5 bilaterally.   Sensory is intact.    Neuropsychiatric: Appropriate with examiner      PERTINENT LABS/IMAGING:    I have personally reviewed the following data over the past 24 hrs:    8.6  \   13.2 (L)   / 210     137 100 30.9 (H) /  127 (H)   4.2 24 1.58 (H) \       Trop: N/A BNP: N/A       TSH: N/A T4: N/A A1C: N/A       Imaging results reviewed over the past 24 hrs:   No results found for this or any previous visit (from the past 24 hour(s)).    Discussed with patient, family, neurology, nursing staff and discharge planner    Vance Luna MD  [facility]  [phone]

## 2023-01-12 NOTE — PLAN OF CARE
Goal Outcome Evaluation:     Alert and oriented.  Kayce speaking. Family in room. Slight confusion has cleared up.  Admitted with essential hypertension, stroke like symptoms.  Q 4 NIH and neuro. NIH scored 0 and 0. Aspirations and fall precautions. Head of bed elevated, bed alarm on.  Tele monitoring with NSR.  Continent of bowel and bladder. Prefers water at room temperature and takes pill one a time. Q 6 blood glucose checks has been 165  and 153.  SCD. Assist of 1 with gait belt. [place] given at 0438 for complain of pain and was effective.  No complain of dizziness. Receiving IV Rocephin empirically for UTI, UC pending.  Temperature 98.1 and 97.8.

## 2023-01-12 NOTE — PROGRESS NOTES
NEUROLOGY PROGRESS NOTE     ASSESSMENT & PLAN   Visit diagnosis: Vertigo    83 yo Kayce-speaking man presenting on 23 with vertigo, headache.    No acute stroke on MRI  Left vertebral/basilar occlusion, ?dissection unclear on imaging  Repeat MRA neck  Continue DAPT  Continue Crestor  Physical therapy/OT  Meclizine as needed for vertigo  UTI management per Hospitalist  Neurology will follow along    Neurology Discharge Planning:  TBD    Patient Active Problem List    Diagnosis Date Noted     Vertigo 2023     Priority: Medium     Essential hypertension 2023     Priority: Medium     Vertebral artery occlusion, left 2023     Priority: Medium     ACE-inhibitor cough 2021     Priority: Medium     Type 2 diabetes mellitus with stage 3 chronic kidney disease, without long-term current use of insulin (H) 2020     Priority: Medium     Urinary incontinence 10/30/2019     Priority: Medium     Primary osteoarthritis involving multiple joints 2019     Priority: Medium     Esophageal reflux      Priority: Medium     Created by Conversion         Dyslipidemia      Priority: Medium     Created by Conversion  [facility] Annotation: Dec 28 2007  3:23PM - Giulia Meridai2007:   Chol   281, HDL 39Started simvastatin 2011         CKD (chronic kidney disease) stage 3, GFR 30-59 ml/min (H)      Priority: Medium     Created by [facility] Annotation: Dec 28 2007  3:40PM - Alla Ali: Cr elevated at   1.7   since .Acute exacerbation with episode of nephrolithiasis complicated   by   urosepsis in 2009.         Constipation, unspecified constipation type      Priority: Medium     Coronary artery disease due to lipid rich plaque      Priority: Medium     Right lower quadrant abdominal pain      Priority: Medium     Colitis      Priority: Medium     Nephrolithiasis      Priority: Medium     Created by [facility] Annotation: Aug  4 2009 12:22PM - Giulia Meridaig:    Hospitalized   7/2009 with BL stent placement.  Stent removal 8/2009.Calcium Stones.CT   5/2011:Calcified plaques L Renal pelvis,L lower pole 2 nonobstructing   calculi   of 2 mm.  Calculi medial R upper pole and R lower pole.Low dense R lower   pole   cortical lesions.1/2011 seen by Urology.BL pelvocaliectasis         Chronic Gout      Priority: Medium     Created by [facility] Annotation: Dec 28 2007  3:23PM - Belal Gold: Uric acid   elevated   to 10 since at least 2007.Multiple joints affected-phalangeal,wrists,   ankles,   knees, ? shoulders.Started allopurinol 5/2009. Stopped HCTZ for BP   5/2009Multiple tophi noted since 10/2011Started colchicine 9/2009.  Replacement Utility updated for latest IMO load         BPH (benign prostatic hyperplasia) 07/19/2017     Priority: Medium     Chronic gout 07/19/2017     Priority: Medium     Essential hypertension with goal blood pressure less than 140/90 03/21/2017     Priority: Medium     Chronic right shoulder pain 02/21/2017     Priority: Medium     Generalized Osteoarthritis Of The Hand      Priority: Medium     Created by Conversion  Replacement Utility updated for latest IMO load         Bilateral chronic knee pain 07/20/2016     Priority: Medium     Cataracts, bilateral 02/17/2016     Priority: Medium     Elevated PSA 12/21/2015     Priority: Medium     Prostate nodule 12/21/2015     Priority: Medium     Pseudogout      Priority: Medium     Created by [facility] Annotation: Aug 14 2009  8:35PM - Chucho Espino: Ankle   aspiration   8/2009 showed calcium pyrophospate crystals but not noted if intra or   extracellular.Seen by Rhematology 9/2009         Latent Tuberculosis      Priority: Medium     Created by [facility] Annotation: Dec 28 2007  3:40PM - Bella Gold: treated 9 mo in   '06         Lumbar Disc Degeneration      Priority: Medium     Created by Conversion         Insomnia      Priority: Medium     Created by  Conversion         Medical History  Past Medical History:   Diagnosis Date     Acute blood loss anemia      Acute renal failure (H)      Anemia      Bacteremia      Cataract      Chronic gout      CKD (chronic kidney disease)     Stage 3     DM2 (diabetes mellitus, type 2) (H)      GERD (gastroesophageal reflux disease)      Glucose intolerance (impaired glucose tolerance)      Gout      History of nephrolithiasis      History of prostatitis 2017     Hyperlipidemia      Hypertension      Insomnia      Intestinal disaccharidase deficiencies and disaccharide malabsorption     Created by Conversion      Latent tuberculosis      Nephrolithiasis      Neuropathy      Nonspecific abnormal findings on radiological and other examination of skull and head     Created by Conversion [facility] Annotation: 2013 11:23PM - Giulia Meridai/10/2011:  severe stenosis both posterior cerebral arteries seen MRI/MRA done for  vertigo. No acute changes.e*     Osteoarthritis     wrist, knee, shoulder     Prostatitis      Rectal bleed      Trigger thumb         SUBJECTIVE     81 YO Kayce-speaking man presenting on 23 with vertigo, headache.    No acute events overnight. NIH zero.  Son is at bedside.  He helps me with interpretation.  Adam is that his dizziness is a little better.  No focal weakness but he does feel generally weak, has not had much of an appetite.  His right arm hurts where his IV is placed.  He because of it, he cannot bend the arm.     OBJECTIVE     Vital signs in last 24 hours  Temp:  [97.7  F (36.5  C)-98.1  F (36.7  C)] 98.1  F (36.7  C)  Pulse:  [65-86] 86  Resp:  [16-18] 16  BP: (130-173)/(81-96) 143/94  SpO2:  [95 %-100 %] 98 %    Weight:   [unfilled]    Review of Systems   Pertinent items are noted in HPI.    General Physical Exam: Patient is alert and oriented x 3. Vital signs were reviewed and are documented in EMR. Neck was supple. No Carotid bruit, thyromegaly, JVD or lymphadenopathy  noted.  Neurological Exam:  Mental status: Attentive, interactive.  Speech: Fluent per son.  Cranial nerves: 2-12 intact, though I did not visualize his palate.  Motor: Strength appears to be full, though he has some restriction of the right upper extremity due to IV placement.  Redness around catheter.  Reflexes: Babinski downgoing bilaterally.  Sensory: Intact light touch throughout.  Coordination: No dysmetria with fine motor movements of the hands.  Gait: Deferred for safety.     DIAGNOSTIC STUDIES     Pertinent Radiology   Radiology Results: Personally reviewed image/s    CT/CTA  CONCLUSION:  HEAD CT:  1.  No intracranial hemorrhage, mass, or definite CT evidence of recent ischemia.     HEAD CTA:  1.  The proximal intracranial segment of the left vertebral artery is occluded.  2.  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 The right posterior cerebral artery appears occluded.  3.  This all appears similar to the MRA performed earlier the same day.     NECK CTA:  1.  Diminished filling of the proximal diminutive left vertebral artery which occludes in its distal cervical segment through its proximal intracranial segment. The appearance is similar to the MRA performed earlier the same day.  2.  Patent, dominant right vertebral artery.  3.  Mild carotid artery atherosclerosis without hemodynamically significant narrowing by NASCET criteria.     MRI/MRA  FINDINGS:  There is no restricted diffusion.                                                             IMPRESSION:  No diffusion abnormality to indicate recent stroke.    FINDINGS: Three-vessel arch. Antegrade flow within the carotid and right vertebral arteries. Diminished flow-related enhancement of the left vertebral artery. The axial T1 fat saturated images are compromised by the administration of contrast earlier the   same day.                                                                    CONCLUSION:   1.  The axial T1 fat saturated images are compromised by the recent administration of intravenous contrast.    Pertinent Labs   Lab Results: personally reviewed.   Recent Results (from the past 24 hour(s))   Glucose by meter    Collection Time: 01/11/23  3:54 PM   Result Value Ref Range    GLUCOSE BY METER POCT 164 (H) 70 - 99 mg/dL   Glucose by meter    Collection Time: 01/11/23  4:55 PM   Result Value Ref Range    GLUCOSE BY METER POCT 158 (H) 70 - 99 mg/dL   Glucose by meter    Collection Time: 01/11/23  9:37 PM   Result Value Ref Range    GLUCOSE BY METER POCT 130 (H) 70 - 99 mg/dL   Glucose by meter    Collection Time: 01/12/23  1:14 AM   Result Value Ref Range    GLUCOSE BY METER POCT 165 (H) 70 - 99 mg/dL   Glucose by meter    Collection Time: 01/12/23  6:09 AM   Result Value Ref Range    GLUCOSE BY METER POCT 153 (H) 70 - 99 mg/dL         HOSPITAL MEDICATIONS       acetaminophen  650 mg Oral TID     allopurinol  200 mg Oral Daily     aspirin  81 mg Oral Daily     cefTRIAXone  1 g Intravenous Q24H     clopidogrel  75 mg Oral Daily     DULoxetine  20 mg Oral BID     gabapentin  300 mg Oral At Bedtime     insulin aspart  1-6 Units Subcutaneous 4x Daily AC & HS     isosorbide mononitrate  30 mg Oral Daily     losartan  50 mg Oral Daily     metoprolol succinate ER  100 mg Oral Daily     pantoprazole  40 mg Intravenous Daily with breakfast     rosuvastatin  20 mg Oral At Bedtime     sodium chloride (PF)  3 mL Intracatheter Q8H        Total time spent for face to face visit, reviewing labs/imaging studies, counseling and coordination of care was: 1 Hour. More than 50% of this time was spent on counseling and coordination of care.    Dragon software used in the dictation on this note.    Shakira Castro MD  [facility] Neurology [facility] - [address], [address]  [place] [address]

## 2023-01-12 NOTE — PROGRESS NOTES
01/12/23 1200   Appointment Info   Signing Clinician's Name / Credentials (SLP) Divina Clemons MA, CCC-SLP   Appointment Canceled Reason (SLP) Other (see Cancel Comments row)   Appointment Cancel Comments (SLP) SLP orders received. Chart reviewed and discussed with care team. Speech-Language Pathology Evaluations not indicated due to no reported or resolved deficits related to speech, language, or cognition. Patient tolerating current diet of Regular and Thin with no s/s aspiration per RN. No acute SLP needs identified. Defer discharge recommendations to treatment team. Will complete orders.

## 2023-01-12 NOTE — UTILIZATION REVIEW
Admission Status; Secondary Review Determination   Under the authority of the Utilization Management Committee, the utilization review process indicated a secondary review on Adam Talamantes. The review outcome is based on review of the medical records, discussions with staff, and applying clinical experience noted on the date of the review.   (x) Inpatient Status Appropriate - This patient's medical care is consistent with medical management for inpatient care and reasonable inpatient medical practice.     RATIONALE FOR DETERMINATION   Adam Talamantes is an 82 yr old female who presented 1/11/23 with stroke like symptoms with dizziness and neck pain.  Underlying CKD, CVA, HTN, DM, HLD.  Concern for left vertebral occlusion with possible dissection.  Ongoing evaluation with further MRI.  ASA and Plavix.  CVA eval with ECHO.  Incidental also UTI and on IV abx.  Ongoing evaluation for vertebral artery occlusion with possible dissection.    At the time of admission with the information available to the attending physician more than 2 nights Hospital complex care was anticipated, based on patient risk of adverse outcome if treated as outpatient and complex care required. Inpatient admission is appropriate based on the Medicare guidelines.   The information on this document is developed by the utilization review team in order for the business office to ensure compliance. This only denotes the appropriateness of proper admission status and does not reflect the quality of care rendered.   The definitions of Inpatient Status and Observation Status used in making the determination above are those provided in the CMS Coverage Manual, Chapter 1 and Chapter 6, section 70.4.   Sincerely,   Monika Mayen MD  Utilization Review  Physician Advisor  [facility]

## 2023-01-12 NOTE — PLAN OF CARE
"  Problem: Plan of Care - These are the overarching goals to be used throughout the patient stay.    Goal: Plan of Care Review  Description: The Plan of Care Review/Shift note should be completed every shift.  The Outcome Evaluation is a brief statement about your assessment that the patient is improving, declining, or no change.  This information will be displayed automatically on your shift note.  Outcome: Progressing  Goal: Patient-Specific Goal (Individualized)  Description: You can add care plan individualizations to a care plan. Examples of Individualization might be:  \"Parent requests to be called daily at 9am for status\", \"I have a hard time hearing out of my right ear\", or \"Do not touch me to wake me up as it startles me\".  Outcome: Progressing  Goal: Absence of Hospital-Acquired Illness or Injury  Outcome: Progressing  Intervention: Identify and Manage Fall Risk  Recent Flowsheet Documentation  Taken 1/12/2023 0826 by Richard Kinney RN  Safety Promotion/Fall Prevention: activity supervised  Intervention: Prevent Skin Injury  Recent Flowsheet Documentation  Taken 1/12/2023 1152 by Richard Kinney RN  Body Position:   position changed independently   sitting up in bed  Intervention: Prevent and Manage VTE (Venous Thromboembolism) Risk  Recent Flowsheet Documentation  Taken 1/12/2023 0826 by Richard Kinney RN  VTE Prevention/Management: SCDs (sequential compression devices) on  Goal: Optimal Comfort and Wellbeing  Outcome: Progressing   Goal Outcome Evaluation:       Pt alert/oriented x 4, denied pain, ambulating in room to the bathroom and no discomfort observed. Pt signed consent for MRI with family present at bedside and had MRI in the afternoon. VSS, lung sounds clear and sating well omn room air, see flow sheet.                  "

## 2023-01-12 NOTE — PLAN OF CARE
"  Problem: Plan of Care - These are the overarching goals to be used throughout the patient stay.    Goal: Plan of Care Review  Description: The Plan of Care Review/Shift note should be completed every shift.  The Outcome Evaluation is a brief statement about your assessment that the patient is improving, declining, or no change.  This information will be displayed automatically on your shift note.  Outcome: Progressing  Goal: Patient-Specific Goal (Individualized)  Description: You can add care plan individualizations to a care plan. Examples of Individualization might be:  \"Parent requests to be called daily at 9am for status\", \"I have a hard time hearing out of my right ear\", or \"Do not touch me to wake me up as it startles me\".  Outcome: Progressing  Goal: Absence of Hospital-Acquired Illness or Injury  Outcome: Progressing  Intervention: Identify and Manage Fall Risk  Recent Flowsheet Documentation  Taken 1/11/2023 1703 by Taty Acosta, RN  Safety Promotion/Fall Prevention: activity supervised  Goal: Optimal Comfort and Wellbeing  Outcome: Progressing  Goal: Readiness for Transition of Care  Outcome: Progressing  Intervention: Mutually Develop Transition Plan  Recent Flowsheet Documentation  Taken 1/11/2023 1600 by Taty Acosta, RN  Equipment Currently Used at Home: walker, rolling   Goal Outcome Evaluation:         Pt complains of HA and neck pain. Neurology is following. Asa 81 mg and Plavix given this shift. Neurologist would prefer if we didn't use narcotics but try Tylenol and ice instead.    NIH was 0. Order to do this q 4 hours.    Pt is Kayce speaking. Family is here and interprets. Pt is an assist of one. Comes from home and uses a walker.                 "

## 2023-01-13 ENCOUNTER — APPOINTMENT (OUTPATIENT)
Dept: PHYSICAL THERAPY | Facility: HOSPITAL | Age: 83
DRG: 068 | End: 2023-01-13
Payer: COMMERCIAL

## 2023-01-13 ENCOUNTER — PATIENT OUTREACH (OUTPATIENT)
Dept: GERIATRIC MEDICINE | Facility: CLINIC | Age: 83
End: 2023-01-13

## 2023-01-13 LAB
GLUCOSE BLDC GLUCOMTR-MCNC: 150 MG/DL (ref 70–99)
GLUCOSE BLDC GLUCOMTR-MCNC: 155 MG/DL (ref 70–99)
GLUCOSE BLDC GLUCOMTR-MCNC: 163 MG/DL (ref 70–99)
GLUCOSE BLDC GLUCOMTR-MCNC: 210 MG/DL (ref 70–99)
GLUCOSE BLDC GLUCOMTR-MCNC: 233 MG/DL (ref 70–99)
HBA1C MFR BLD: 7.6 %

## 2023-01-13 PROCEDURE — 97110 THERAPEUTIC EXERCISES: CPT | Mod: GP

## 2023-01-13 PROCEDURE — 250N000013 HC RX MED GY IP 250 OP 250 PS 637: Performed by: PSYCHIATRY & NEUROLOGY

## 2023-01-13 PROCEDURE — 99231 SBSQ HOSP IP/OBS SF/LOW 25: CPT | Performed by: INTERNAL MEDICINE

## 2023-01-13 PROCEDURE — 97116 GAIT TRAINING THERAPY: CPT | Mod: GP

## 2023-01-13 PROCEDURE — 250N000013 HC RX MED GY IP 250 OP 250 PS 637: Performed by: INTERNAL MEDICINE

## 2023-01-13 PROCEDURE — 250N000011 HC RX IP 250 OP 636: Performed by: INTERNAL MEDICINE

## 2023-01-13 PROCEDURE — 99232 SBSQ HOSP IP/OBS MODERATE 35: CPT | Performed by: PSYCHIATRY & NEUROLOGY

## 2023-01-13 PROCEDURE — 120N000001 HC R&B MED SURG/OB

## 2023-01-13 RX ORDER — LIDOCAINE 4 G/G
1 PATCH TOPICAL
Status: DISCONTINUED | OUTPATIENT
Start: 2023-01-13 | End: 2023-01-14 | Stop reason: HOSPADM

## 2023-01-13 RX ORDER — AMLODIPINE BESYLATE 2.5 MG/1
2.5 TABLET ORAL DAILY
Status: DISCONTINUED | OUTPATIENT
Start: 2023-01-13 | End: 2023-01-14 | Stop reason: HOSPADM

## 2023-01-13 RX ADMIN — GABAPENTIN 300 MG: 300 CAPSULE ORAL at 22:54

## 2023-01-13 RX ADMIN — ISOSORBIDE MONONITRATE 30 MG: 30 TABLET, EXTENDED RELEASE ORAL at 09:30

## 2023-01-13 RX ADMIN — DULOXETINE HYDROCHLORIDE 20 MG: 20 CAPSULE, DELAYED RELEASE ORAL at 20:07

## 2023-01-13 RX ADMIN — INSULIN ASPART 1 UNITS: 100 INJECTION, SOLUTION INTRAVENOUS; SUBCUTANEOUS at 22:54

## 2023-01-13 RX ADMIN — ALLOPURINOL 200 MG: 100 TABLET ORAL at 09:33

## 2023-01-13 RX ADMIN — DULOXETINE HYDROCHLORIDE 20 MG: 20 CAPSULE, DELAYED RELEASE ORAL at 09:33

## 2023-01-13 RX ADMIN — METOPROLOL SUCCINATE 100 MG: 100 TABLET, EXTENDED RELEASE ORAL at 09:31

## 2023-01-13 RX ADMIN — CEFTRIAXONE SODIUM 1 G: 1 INJECTION, POWDER, FOR SOLUTION INTRAMUSCULAR; INTRAVENOUS at 09:34

## 2023-01-13 RX ADMIN — INSULIN ASPART 2 UNITS: 100 INJECTION, SOLUTION INTRAVENOUS; SUBCUTANEOUS at 17:18

## 2023-01-13 RX ADMIN — INSULIN ASPART 2 UNITS: 100 INJECTION, SOLUTION INTRAVENOUS; SUBCUTANEOUS at 11:27

## 2023-01-13 RX ADMIN — ROSUVASTATIN CALCIUM 20 MG: 10 TABLET, FILM COATED ORAL at 22:54

## 2023-01-13 RX ADMIN — ACETAMINOPHEN 650 MG: 325 TABLET ORAL at 14:22

## 2023-01-13 RX ADMIN — INSULIN ASPART 1 UNITS: 100 INJECTION, SOLUTION INTRAVENOUS; SUBCUTANEOUS at 06:53

## 2023-01-13 RX ADMIN — CLOPIDOGREL BISULFATE 75 MG: 75 TABLET ORAL at 09:31

## 2023-01-13 RX ADMIN — AMLODIPINE BESYLATE 2.5 MG: 2.5 TABLET ORAL at 11:24

## 2023-01-13 RX ADMIN — PANTOPRAZOLE SODIUM 40 MG: 40 TABLET, DELAYED RELEASE ORAL at 06:37

## 2023-01-13 RX ADMIN — HYDROCODONE BITARTRATE AND ACETAMINOPHEN 1 TABLET: 5; 325 TABLET ORAL at 00:44

## 2023-01-13 RX ADMIN — ACETAMINOPHEN 650 MG: 325 TABLET ORAL at 09:32

## 2023-01-13 RX ADMIN — ASPIRIN 81 MG CHEWABLE TABLET 81 MG: 81 TABLET CHEWABLE at 09:34

## 2023-01-13 RX ADMIN — LOSARTAN POTASSIUM 50 MG: 50 TABLET, FILM COATED ORAL at 09:30

## 2023-01-13 RX ADMIN — ACETAMINOPHEN 650 MG: 325 TABLET ORAL at 20:07

## 2023-01-13 RX ADMIN — MECLIZINE 12.5 MG: 12.5 TABLET ORAL at 04:33

## 2023-01-13 RX ADMIN — LIDOCAINE PATCH 4% 1 PATCH: 40 PATCH TOPICAL at 11:28

## 2023-01-13 ASSESSMENT — ACTIVITIES OF DAILY LIVING (ADL)
ADLS_ACUITY_SCORE: 36
ADLS_ACUITY_SCORE: 37
ADLS_ACUITY_SCORE: 36
ADLS_ACUITY_SCORE: 37

## 2023-01-13 NOTE — PROGRESS NOTES
TRANSITIONS OF CARE (VIRGEN) LOG   VIRGEN tasks should be completed by the CC within one (1) business day of notification of each transition. Follow up contact with member is required after return to their usual care setting.  Note:  If CC finds out about the transitions fifteen (15) days or more after the member has returned to their usual care setting, no VIRGEN log is needed. However, the CC should check in with the member to discuss the transition process, any changes needed to the care plan and document it in a case note.    Member Name:  Adam Talamantes MCO Name:  [facility] MCO/Health Plan Member ID#: [id number]   Product: [facility] Care Coordinator Contact:  RICKEY Gallegos Agency/County/Care System: [facility]   Transition Communication Actions from Care Management Contact   Transition #1   Notification Date: 1/11/2023 Transition Date:   1/12/2023 Transition From: Home     Is this the member s usual care setting?               yes Transition To: [facility]   Transition Type:  Unplanned  Reason for Admission/Comments:  Adam presented to the hospital for spinning and dizziness and neck pain. Later identified as vertigo, vertebral artery occlusion, and hypertension.   Contact member/responsible party to offer assistance with transition Date completed: 1/12/2023    Notes from conversation with the member/responsible party, provider, discharging and receiving facility (as applicable): CC contacted Hospital /discharge planner via the [employer] Transitional Care Hand-In Process, with community care plan included.  CC reached out to adult daughter Dimitri regarding transition and offered support as needed.  Reviewed and update care plan as needed.  Notified community service providers and placed services PCA on hold as needed.  Transition log initiated.   PCP notified of hospitalization via EMR.       Shared CC contact info, care plan/services with receiving setting--Date completed:  1/12/2023   Name & Title of receiving setting contact: [facility]   Notified PCP of transition--Date completed:  1/12/2023     via  EMR  Name of PCP: Chucho Espino     Transition #2   Notification Date: 1/16/2023 Transition Date:   1/14/2023 Transition From: Hospital, [facility]     Is this the member s usual care setting?               no Transition To: Home   Transition Type:  Planned  Reason for Admission/Comments:  Strokelike symptoms with dizziness, Left vertebral artery occlusion, and uncontrolled hypertenstion  Contact member/responsible party to offer assistance with transition Date completed: 1/16/2023    Notes from conversation with the member/responsible party, provider, discharging and receiving facility (as applicable): CC contacted adult daughter Dimitri and reviewed discharge summary.  Member has a follow-up appointment with PCP in 7 days: Yes: scheduled on 1/24/2023   Member has had a change in condition: Yes:ongoing dizziness IP recommended physical/occupational therapy in home. Did not see any orders in the chart. Requesting from PCP.   Home visit needed: No  Care plan reviewed and updated.  The following home based services PCA were resumed.  New referrals placed: No new referrals.   Transition log completed.   PCP notified of transition back to home via EMR.       Shared CC contact info, care plan/services with receiving setting--Date completed: 1/16/2023   Name & Title of receiving setting contact: Contacted home care agency.    Notified PCP of transition--Date completed:  1/16/2023     via  EMR  Name of PCP: Chucho Espino      *RETURN TO USUAL CARE SETTING: *Complete tasks below when the member is discharging TO their usual care setting within one (1) business day of notification..      For situations where the Care Coordinator is notified of the discharge prior to the date of discharge, the Care Coordinator must follow up with the member or  designated representative to confirm that discharge actually occurred and discuss required VIRGEN tasks as outlined in the VIRGEN Instructions.  (This includes situations where it may be a  new  usual care setting for the member. (i.e., a community member who decides upon permanent nursing home placement following hospitalization and rehab).    Discuss with Member/Responsible Party:    Check  Yes  - if the member, family member and/or SNF/facility staff manages the following:    If  No  provide explanation in the comments section.          Date completed: 1/16/2023 Communicated with member or their designated representative about the following:  care transition process; about changes to the member s health status; plan of care updates; education about transitions and how to prevent unplanned transitions/readmissions    Four Pillars for Optimal Transition:    Check  Yes  - if the member, family member and/or SNF/facility staff manages the following:    If  No  provide explanation in the comments section.          [x]  Yes     []  No Does the member have a follow-up appointment scheduled with primary care or specialist? (Mental health hospitalizations--the appt. should be w/in 7 days)              For mental health hospitalizations:  []  Yes     []  No     Does the member have a follow-up appointment scheduled with a mental health practitioner within 7 days of discharge?  [x]  Yes     []  No     Has a medication review been completed with member? If no, refer to PCP, home care nurse, MTM, pharmacist  [x]  Yes     []  No     Can the member manage their medications or is there a system in place to manage medications (e.g. home care set-up)?         [x]  Yes     []  No     Can the member verbalize warning signs and symptoms to watch for and how to respond?  [x]  Yes     []  No     Does the member have a copy of and understand their discharge instructions?  If no, assist to obtain copy of discharge instructions, review  discharge instructions, and assist to contact PCP to discuss questions about their recent hospitalization.  [x]  Yes     []  No     Does the member have adequate food, housing and transportation?  If no, add goal and discuss additional supports available to the member                                                                                                                                                                                 [x]  Yes     []  No     Is the member safe in their home?  If no, document needs and support provided                                                                                                                                                                          []  Yes     [x]  No     Are there any concerns of vulnerability, abuse, or neglect?  If yes, document concerns and actions taken by Care Coordinator as a mandated                                                                                                                                                                              [x]  Yes     []  No     Does the member use a Personal Health Care Record?  Check  Yes  if visit summary, discharge summary, and/or healthcare summary are being used as a PHR.                                                                                                                                                                                  [x]  Yes     []  No     Have you reviewed the discharge summary with the member? If  No  provide explanation in comments.  [x]  Yes     []  No     Have you updated the member s care plan/support plan? Add new diagnosis, medications, treatments, goals & interventions, as applicable. If No, provide explanation in comments.    Comments:           Notes from conversation with the member/responsible party, provider, discharging and receiving facility (as applicable): n/a

## 2023-01-13 NOTE — PLAN OF CARE
Problem: Plan of Care - These are the overarching goals to be used throughout the patient stay.    Goal: Plan of Care Review  Description: The Plan of Care Review/Shift note should be completed every shift.  The Outcome Evaluation is a brief statement about your assessment that the patient is improving, declining, or no change.  This information will be displayed automatically on your shift note.  Outcome: Progressing   Goal Outcome Evaluation:       Patient is scoring 0 on the NIH. Patient does have dizziness at times but it comes and goes. He was complaining of neck and shoulder pain.I gave him a message on his shoulders and placed an ordered lidocaine patch on his right should and patient stated that helped. No nausea. Patient is a SBA to the bathroom. Drinking and eating very well. Daughter in law has been here all day to help with interpreting. Family prefers helping with that since he is hard of hearing.

## 2023-01-13 NOTE — PLAN OF CARE
"Endorses neck pain, MRI of neck completed, scheduled tylenol given. Would a pain cream be beneficial? Pt says he has some at home he uses. Alert and oriented, NIH 0, tele SR. Pt also c/o dizziness when he stands causing some nausea. PRN zofran and meclizine given. Long discussion with pt and dtr about pt's sx described above as well as similar feelings of dizziness when on toilet. Dtr said pt has \"passed out\" at home a couple of times. Educated pt and dtr on orthostatic hypotension and asked if writer could assess those Bps, pt was not wanting to at this hour (2130) so writer encouraged pt to do so in AM that if the BP does drop that could explain these sx. Pt eager to have a plan set and to be able to go home soon.   "

## 2023-01-13 NOTE — PROGRESS NOTES
NEUROLOGY PROGRESS NOTE     ASSESSMENT & PLAN     Diagnosis: Vertigo     83 yo Kayce-speaking man presenting on 23 with vertigo, headache. Suspect vestibular dysfunction, possible peripheral     No acute stroke on MRI  Left vertebral/basilar occlusion, ?dissection unclear on imaging. Repeat MRA neck negative for dissection.  Continue DAPT (patient was on daily ASA PTA) for 21 days, followed by Plavix monotherapy.  Continue Crestor  Physical therapy/OT - vestibular eval/tx please  Meclizine as needed for vertigo  UTI management per Hospitalist  Will ask for massage therapy for neck pain  Neurology will sign off. Please call if additional questions arise. Patient should follow up in Neurology clinic in 3 months.     Neurology Discharge Planning: Home with therapy, family assistance recommended by PT    Patient Active Problem List    Diagnosis Date Noted     Vertigo 2023     Priority: Medium     Essential hypertension 2023     Priority: Medium     Vertebral artery occlusion, left 2023     Priority: Medium     ACE-inhibitor cough 2021     Priority: Medium     Type 2 diabetes mellitus with stage 3 chronic kidney disease, without long-term current use of insulin (H) 2020     Priority: Medium     Urinary incontinence 10/30/2019     Priority: Medium     Primary osteoarthritis involving multiple joints 2019     Priority: Medium     Esophageal reflux      Priority: Medium     Created by Conversion         Dyslipidemia      Priority: Medium     Created by Conversion  [facility] Annotation: Dec 28 2007  3:23PM - Giulia Meridai2007:   Chol   281, HDL 39Started simvastatin 2011         CKD (chronic kidney disease) stage 3, GFR 30-59 ml/min (H)      Priority: Medium     Created by [facility] Annotation: Dec 28 2007  3:40PM - Bella Gold: Cr elevated at   1.7   since .Acute exacerbation with episode of nephrolithiasis complicated   by   urosepsis in 2009.          Constipation, unspecified constipation type      Priority: Medium     Coronary artery disease due to lipid rich plaque      Priority: Medium     Right lower quadrant abdominal pain      Priority: Medium     Colitis      Priority: Medium     Nephrolithiasis      Priority: Medium     Created by [facility] Annotation: Aug  4 2009 12:22PM - Theodore Merida:   Hospitalized   7/2009 with BL stent placement.  Stent removal 8/2009.Calcium Stones.CT   5/2011:Calcified plaques L Renal pelvis,L lower pole 2 nonobstructing   calculi   of 2 mm.  Calculi medial R upper pole and R lower pole.Low dense R lower   pole   cortical lesions.1/2011 seen by Urology.BL pelvocaliectasis         Chronic Gout      Priority: Medium     Created by [facility] Annotation: Dec 28 2007  3:23PM - Bella Gold: Uric acid   elevated   to 10 since at least 2007.Multiple joints affected-phalangeal,wrists,   ankles,   knees, ? shoulders.Started allopurinol 5/2009. Stopped HCTZ for BP   5/2009Multiple tophi noted since 10/2011Started colchicine 9/2009.  Replacement Utility updated for latest IMO load         BPH (benign prostatic hyperplasia) 07/19/2017     Priority: Medium     Chronic gout 07/19/2017     Priority: Medium     Essential hypertension with goal blood pressure less than 140/90 03/21/2017     Priority: Medium     Chronic right shoulder pain 02/21/2017     Priority: Medium     Generalized Osteoarthritis Of The Hand      Priority: Medium     Created by Conversion  Replacement Utility updated for latest IMO load         Bilateral chronic knee pain 07/20/2016     Priority: Medium     Cataracts, bilateral 02/17/2016     Priority: Medium     Elevated PSA 12/21/2015     Priority: Medium     Prostate nodule 12/21/2015     Priority: Medium     Pseudogout      Priority: Medium     Created by [facility] Annotation: Aug 14 2009  8:35PM - Chucho Espino: Ankle   aspiration   8/2009 showed calcium pyrophospate crystals  but not noted if intra or   extracellular.Seen by Rhematology 2009         Latent Tuberculosis      Priority: Medium     Created by Conversion  [facility] Annotation: Dec 28 2007  3:40PM - Bella Gold: treated 9 mo in            Lumbar Disc Degeneration      Priority: Medium     Created by Conversion         Insomnia      Priority: Medium     Created by Conversion         Medical History  Past Medical History:   Diagnosis Date     Acute blood loss anemia      Acute renal failure (H)      Anemia      Bacteremia      Cataract      Chronic gout      CKD (chronic kidney disease)     Stage 3     DM2 (diabetes mellitus, type 2) (H)      GERD (gastroesophageal reflux disease)      Glucose intolerance (impaired glucose tolerance)      Gout      History of nephrolithiasis      History of prostatitis 2017     Hyperlipidemia      Hypertension      Insomnia      Intestinal disaccharidase deficiencies and disaccharide malabsorption     Created by Conversion      Latent tuberculosis      Nephrolithiasis      Neuropathy      Nonspecific abnormal findings on radiological and other examination of skull and head     Created by [facility] Annotation: 2013 11:23PM - Giulia Meridai/10/2011:  severe stenosis both posterior cerebral arteries seen MRI/MRA done for  vertigo. No acute changes.e*     Osteoarthritis     wrist, knee, shoulder     Prostatitis      Rectal bleed      Trigger thumb         SUBJECTIVE     Some neck pain overnight.  Better today.  He still has some intermittent dizziness (he comments that this is when he moves his head side to side, okay when looking forward). He has been up walking without too much difficulty.    Adam's daughter-in-law is at bedside.  She relays that he was taking on aspirin, she is not sure what dose, daily prior to admission.  Pharmacy note indicates he was taking a baby aspirin daily.     OBJECTIVE     Vital signs in last 24 hours  Temp:  [97.2  F (36.2  C)-98.1   F (36.7  C)] 98.1  F (36.7  C)  Pulse:  [68-82] 68  Resp:  [16-18] 16  BP: (111-159)/(73-92) 139/84  SpO2:  [94 %-96 %] 95 %    Weight:   [unfilled]    Review of Systems   Pertinent items are noted in HPI.    General Physical Exam: Patient is alert and oriented x 3. Vital signs were reviewed and are documented in EMR. Neck was supple. No Carotid bruit, thyromegaly, JVD or lymphadenopathy noted.  Neurological Exam:  Mental status: Attentive, interactive.  Speech: Fluent per son.  Cranial nerves: 2-12 intact, though I did not visualize his palate.  Motor: Strength appears to be full, though he has some restriction of the right upper extremity due to IV placement.  Redness around catheter.  Reflexes: Babinski downgoing bilaterally.  Sensory: Intact light touch throughout.  Coordination: No dysmetria with fine motor movements of the hands.  Gait: Deferred for safety.     DIAGNOSTIC STUDIES     Pertinent Radiology   Radiology Results: Personally reviewed image/s    MRA (1.12.23):  FINDINGS: While there is an intrinsic T1 hyperintensity along the entire length of the left vertebral artery, this does not demonstrate the classic appearance that would typically be seen with vertebral artery dissection. This intrinsic T1 hyperintensity   may instead relate to the high-grade stenosis seen diffusely. Neck vessels elsewhere are evaluated to better effect on recent neck MRA, and overall unchanged.                                                                   IMPRESSION:  1.  No definite findings to suggest left vertebral artery dissection. While there is an intrinsic T1 hyperintensity throughout the left vertebral artery, this does not demonstrate the classic appearance or morphology seen with vertebral artery   dissection.    Pertinent Labs   Lab Results: personally reviewed.   Recent Results (from the past 24 hour(s))   CBC with platelets    Collection Time: 01/12/23  8:00 AM   Result Value Ref Range    WBC Count 8.6 4.0  - 11.0 10e3/uL    RBC Count 4.24 (L) 4.40 - 5.90 10e6/uL    Hemoglobin 13.2 (L) 13.3 - 17.7 g/dL    Hematocrit 40.3 40.0 - 53.0 %    MCV 95 78 - 100 fL    MCH 31.1 26.5 - 33.0 pg    MCHC 32.8 31.5 - 36.5 g/dL    RDW 14.1 10.0 - 15.0 %    Platelet Count 210 150 - 450 10e3/uL   Basic metabolic panel    Collection Time: 01/12/23  8:00 AM   Result Value Ref Range    Sodium 137 136 - 145 mmol/L    Potassium 4.2 3.4 - 5.3 mmol/L    Chloride 100 98 - 107 mmol/L    Carbon Dioxide (CO2) 24 22 - 29 mmol/L    Anion Gap 13 7 - 15 mmol/L    Urea Nitrogen 30.9 (H) 8.0 - 23.0 mg/dL    Creatinine 1.58 (H) 0.67 - 1.17 mg/dL    Calcium 9.4 8.8 - 10.2 mg/dL    Glucose 130 (H) 70 - 99 mg/dL    GFR Estimate 44 (L) >60 mL/min/1.73m2   Glucose by meter    Collection Time: 01/12/23  8:14 AM   Result Value Ref Range    GLUCOSE BY METER POCT 127 (H) 70 - 99 mg/dL   Glucose by meter    Collection Time: 01/12/23 12:08 PM   Result Value Ref Range    GLUCOSE BY METER POCT 191 (H) 70 - 99 mg/dL   Echocardiogram Complete - For age > 60 yrs    Collection Time: 01/12/23  2:24 PM   Result Value Ref Range    LVEF  55-60%    Glucose by meter    Collection Time: 01/12/23  4:58 PM   Result Value Ref Range    GLUCOSE BY METER POCT 165 (H) 70 - 99 mg/dL   Glucose by meter    Collection Time: 01/12/23  8:50 PM   Result Value Ref Range    GLUCOSE BY METER POCT 168 (H) 70 - 99 mg/dL   Glucose by meter    Collection Time: 01/13/23  4:47 AM   Result Value Ref Range    GLUCOSE BY METER POCT 163 (H) 70 - 99 mg/dL         HOSPITAL MEDICATIONS       acetaminophen  650 mg Oral TID     allopurinol  200 mg Oral Daily     aspirin  81 mg Oral Daily     cefTRIAXone  1 g Intravenous Q24H     clopidogrel  75 mg Oral Daily     DULoxetine  20 mg Oral BID     gabapentin  300 mg Oral At Bedtime     insulin aspart  1-6 Units Subcutaneous 4x Daily AC & HS     isosorbide mononitrate  30 mg Oral Daily     losartan  50 mg Oral Daily     metoprolol succinate ER  100 mg Oral Daily      pantoprazole  40 mg Oral QAM AC     rosuvastatin  20 mg Oral At Bedtime     sodium chloride (PF)  3 mL Intracatheter Q8H        Total time spent for face to face visit, reviewing labs/imaging studies, counseling and coordination of care was: 35 Minutes. More than 50% of this time was spent on counseling and coordination of care.    Dragon software used in the dictation on this note.    Shakira Castro MD  [facility] - [address], [address]  [place] [address]

## 2023-01-13 NOTE — PROGRESS NOTES
[facility] Care Coordination Contact    [facility]:  Emailed completed PCA assessment to [facility].  Faxed copy of PCA assessment to [facility] Agency and mailed copy to member.  Faxed MD Communication to PCP. Also sent PCA sig page to member.     Angela Moon  Care Management Specialist  [facility]  [phone]

## 2023-01-13 NOTE — PROGRESS NOTES
[facility]    PROGRESS NOTE - Hospitalist Service    Assessment and Plan  82 year old male with a pertinent history of stroke, stage 3 CKD, type II diabetes mellitus, GERD, hyperlipidemia, and hypertension  who presents to this ED via walk in with son for evaluation of dizziness and neck pain.  Found to have left vertebral occlusion     Strokelike symptoms  - Patient presented with recurrent dizziness episodes  - MRI brain showed no acute intracranial process with generalized brain atrophy and chronic microvascular ischemic changes.  - Neurology consult, appreciate input  - Continue TIA/CVA protocol  - Pending echo  - PT/OT and speech evaluation  -Continue aspirin 81 mg and Plavix 75 mg as recommended by neurology  - Continue meclizine as needed     Left vertebral artery occlusion  - MRA neck shows occlusion of the left vertebral artery with severe stenosis/segmental occlusion of the basilar artery and nonopacification of the right posterior cerebral artery  - Patient presented with recurrent dizziness episodes  - Vascular neurology consult  - Okay to keep patient at our facility and no need for patient transfer to the  or [facility] as per neurology  - Repeat MRA neck with and without contrast is unremarkable for significant acute changes  - Order aspirin 1 dose and further anticoagulation recommendation depends on results  -Continue aspirin and Plavix as above     Hypertension  - Restart home medications  - Continue to monitor blood pressure and avoid hypotension  - Blood pressure still above goal  - Start low-dose Norvasc  - Discontinue IV fluid     UTI  - Negative urine culture  - D/Continue IV Rocephin     History of coronary disease  - Patient denies chest pain or shortness of breath  - Resume home medications  - Continue to monitor on telemetry  - Echo shows mild concentric increase in his overall of left ventricle, EF of 55 to 60%.    Neck pain  -Change to another scheduled 3 times  daily  -Add lidocaine patch        25 MINUTES SPENT BY ME on the date of service doing chart review, history, exam, documentation & further activities per the note    Principal Problem:    Essential hypertension  Active Problems:    Vertigo    Vertebral artery occlusion, left      VTE prophylaxis:  Pneumatic Compression Devices  DIET: Orders Placed This Encounter      Moderate Consistent Carb (60 g CHO per Meal) Diet      NPO for Medical/Clinical Reasons Except for: Meds      Disposition/Barriers to discharge: Monitor blood pressure, control neck pain  Code Status: Full Code    Subjective:  Adam is feeling better but still has some pain in the back of his neck.  No nausea or vomiting.  No acute significant neurological changes overnight.    PHYSICAL EXAM  Vitals:    01/11/23 0241 01/11/23 1628 01/12/23 1152   Weight: 69.9 kg (154 lb) 71.2 kg (156 lb 15.5 oz) 76.6 kg (168 lb 14 oz)     B/P:115/61 T:97.5 P:75 R:18     Intake/Output Summary (Last 24 hours) at 1/13/2023 1612  Last data filed at 1/12/2023 2100  Gross per 24 hour   Intake 240 ml   Output --   Net 240 ml      Body mass index is 31.91 kg/m .    Constitutional: awake, alert, cooperative, no apparent distress, and appears stated age  Respiratory: No increased work of breathing, good air exchange, clear to auscultation bilaterally, no crackles or wheezing  Cardiovascular: Normal apical impulse, regular rate and rhythm, normal S1 and S2, no S3 or S4, and no murmur noted  GI: No scars, normal bowel sounds, soft, non-distended, non-tender, no masses palpated, no hepatosplenomegally  Skin: no bruising or bleeding and normal skin color, texture, turgor  Musculoskeletal: There is no redness, warmth, or swelling of the joints.  Full range of motion noted.  no lower extremity pitting edema present  Neurologic: Awake, alert, oriented to name, place and time.  Cranial nerves II-XII are grossly intact.  Motor is 5 out of 5 bilaterally.   Sensory is intact.     Neuropsychiatric: Appropriate with examiner      PERTINENT LABS/IMAGING:      Imaging results reviewed over the past 24 hrs:   No results found for this or any previous visit (from the past 24 hour(s)).    Discussed with patient, family, neurology, nursing staff and discharge planner    Vance Luna MD  [facility] Medicine Service  [phone]

## 2023-01-13 NOTE — PLAN OF CARE
Alert and oriented. Kayce speaking. Admitted with essential hypertension and stroke like syimptoms. Q 4 NIH 0 and 0. Tele monitoring with NSR. Continent of bowel and bladder. Prefers water at room temperature and takes pill one at a time. Assist of 1 with gait belt. SCD on. IV saline lock. Patient is diabetic. AC and HS blood glucose. Complained of dizziness and neck pain this shift, Meclizine and Norco given.

## 2023-01-13 NOTE — LETTER
[address], [address]  [place] [address]  Phone:  [phone]  Fax:  [phone]      January 13, 2023    ASIF VITALE  [address] [place] [id number]    Dear Prashanth Medina is a copy of your completed PCA Assessment and Service Plan.  This is for your records and no action is required by you.  If you have additional questions regarding your assessment please contact me at [phone]. If you feel that your needs are not being met, please contact the Clinical Supervisor at [phone].    Sincerely,    RICKEY Gallegos  [phone]  [email or web address]            Enclosure:  Completed PCA assessment

## 2023-01-13 NOTE — PROGRESS NOTES
[facility] Care Coordination Contact    [facility]  Ambulatory Care Coordination to Inpatient Care Management   Hand-In Communication    Date:  January 13, 2023  Name: Adam Talamantes is enrolled in [facility] Care Coordination program and I am the Lead Care Coordinator.  CC Contact Information:.   Payor Source: Payor: [facility] / Plan: [facility] DUAL / Product Type: HMO /   Current services in place:     Please see the CC Snaphot and Care Management Flowsheets for specific  details of this Adam Talamantes care plan.   Additional details/specific concerns r/t this admission:    No additional concerns at this time .    I will follow this admission in Epic. Please feel free to contact me with questions or for further collaboration in discharge planning.    RICKEY Gallegos  [facility]  [phone]

## 2023-01-14 ENCOUNTER — APPOINTMENT (OUTPATIENT)
Dept: PHYSICAL THERAPY | Facility: HOSPITAL | Age: 83
DRG: 068 | End: 2023-01-14
Payer: COMMERCIAL

## 2023-01-14 VITALS
TEMPERATURE: 98.1 F | SYSTOLIC BLOOD PRESSURE: 139 MMHG | HEART RATE: 78 BPM | DIASTOLIC BLOOD PRESSURE: 89 MMHG | HEIGHT: 61 IN | BODY MASS INDEX: 31.88 KG/M2 | WEIGHT: 168.87 LBS | RESPIRATION RATE: 18 BRPM | OXYGEN SATURATION: 96 %

## 2023-01-14 LAB
GLUCOSE BLDC GLUCOMTR-MCNC: 160 MG/DL (ref 70–99)
GLUCOSE BLDC GLUCOMTR-MCNC: 191 MG/DL (ref 70–99)

## 2023-01-14 PROCEDURE — 250N000013 HC RX MED GY IP 250 OP 250 PS 637: Performed by: PSYCHIATRY & NEUROLOGY

## 2023-01-14 PROCEDURE — 250N000013 HC RX MED GY IP 250 OP 250 PS 637: Performed by: INTERNAL MEDICINE

## 2023-01-14 PROCEDURE — 99239 HOSP IP/OBS DSCHRG MGMT >30: CPT | Performed by: INTERNAL MEDICINE

## 2023-01-14 PROCEDURE — 97110 THERAPEUTIC EXERCISES: CPT | Mod: GP

## 2023-01-14 PROCEDURE — 97116 GAIT TRAINING THERAPY: CPT | Mod: GP

## 2023-01-14 RX ORDER — LIDOCAINE HYDROCHLORIDE 20 MG/ML
JELLY TOPICAL 3 TIMES DAILY
Qty: 85 G | Refills: 0 | Status: SHIPPED | OUTPATIENT
Start: 2023-01-14 | End: 2023-04-27

## 2023-01-14 RX ORDER — AMLODIPINE BESYLATE 2.5 MG/1
2.5 TABLET ORAL DAILY
Qty: 90 TABLET | Refills: 0 | Status: SHIPPED | OUTPATIENT
Start: 2023-01-14 | End: 2023-01-19

## 2023-01-14 RX ORDER — PANTOPRAZOLE SODIUM 40 MG/1
40 TABLET, DELAYED RELEASE ORAL
Qty: 90 TABLET | Refills: 0 | Status: SHIPPED | OUTPATIENT
Start: 2023-01-14 | End: 2023-01-19

## 2023-01-14 RX ORDER — CLOPIDOGREL BISULFATE 75 MG/1
75 TABLET ORAL DAILY
Qty: 90 TABLET | Refills: 0 | Status: SHIPPED | OUTPATIENT
Start: 2023-01-14 | End: 2023-01-19

## 2023-01-14 RX ORDER — ACETAMINOPHEN 325 MG/1
650 TABLET ORAL 3 TIMES DAILY
Qty: 180 TABLET | Refills: 0 | Status: ON HOLD | OUTPATIENT
Start: 2023-01-14 | End: 2023-02-06

## 2023-01-14 RX ADMIN — ASPIRIN 81 MG CHEWABLE TABLET 81 MG: 81 TABLET CHEWABLE at 08:30

## 2023-01-14 RX ADMIN — PANTOPRAZOLE SODIUM 40 MG: 40 TABLET, DELAYED RELEASE ORAL at 08:28

## 2023-01-14 RX ADMIN — ISOSORBIDE MONONITRATE 30 MG: 30 TABLET, EXTENDED RELEASE ORAL at 08:28

## 2023-01-14 RX ADMIN — ACETAMINOPHEN 650 MG: 325 TABLET ORAL at 13:41

## 2023-01-14 RX ADMIN — ALLOPURINOL 200 MG: 100 TABLET ORAL at 08:30

## 2023-01-14 RX ADMIN — LOSARTAN POTASSIUM 50 MG: 50 TABLET, FILM COATED ORAL at 08:29

## 2023-01-14 RX ADMIN — METOPROLOL SUCCINATE 100 MG: 100 TABLET, EXTENDED RELEASE ORAL at 08:29

## 2023-01-14 RX ADMIN — LIDOCAINE PATCH 4% 1 PATCH: 40 PATCH TOPICAL at 08:31

## 2023-01-14 RX ADMIN — DULOXETINE HYDROCHLORIDE 20 MG: 20 CAPSULE, DELAYED RELEASE ORAL at 08:28

## 2023-01-14 RX ADMIN — ACETAMINOPHEN 650 MG: 325 TABLET, FILM COATED ORAL at 05:38

## 2023-01-14 RX ADMIN — INSULIN ASPART 2 UNITS: 100 INJECTION, SOLUTION INTRAVENOUS; SUBCUTANEOUS at 12:00

## 2023-01-14 RX ADMIN — AMLODIPINE BESYLATE 2.5 MG: 2.5 TABLET ORAL at 08:30

## 2023-01-14 RX ADMIN — CLOPIDOGREL BISULFATE 75 MG: 75 TABLET ORAL at 08:30

## 2023-01-14 RX ADMIN — INSULIN ASPART 1 UNITS: 100 INJECTION, SOLUTION INTRAVENOUS; SUBCUTANEOUS at 08:24

## 2023-01-14 RX ADMIN — ACETAMINOPHEN 650 MG: 325 TABLET ORAL at 08:26

## 2023-01-14 ASSESSMENT — ACTIVITIES OF DAILY LIVING (ADL)
ADLS_ACUITY_SCORE: 37
ADLS_ACUITY_SCORE: 39
ADLS_ACUITY_SCORE: 37
ADLS_ACUITY_SCORE: 39
ADLS_ACUITY_SCORE: 37

## 2023-01-14 NOTE — PLAN OF CARE
Shift Summary [id number]  Patient AOx4. VSS on RA. Tele NSR. NIHSS q4; score remains 0. Denies chest pain, n/v and SOB. Up SBA, steady gait. Reports neck/shoulder pain; ice applied and prn tyl given, see MAR.     No acute events this shift. Family at bedside.    Goal Outcome Evaluation:  Problem: Plan of Care - These are the overarching goals to be used throughout the patient stay.    Goal: Absence of Hospital-Acquired Illness or Injury  Intervention: Identify and Manage Fall Risk  Recent Flowsheet Documentation  Taken 1/14/2023 0005 by Birdie Morales, RN  Safety Promotion/Fall Prevention:    clutter free environment maintained    fall prevention program maintained    nonskid shoes/slippers when out of bed    lighting adjusted    safety round/check completed  Problem: Pain Acute  Goal: Optimal Pain Control and Function  Intervention: Prevent or Manage Pain  Recent Flowsheet Documentation  Taken 1/14/2023 0005 by Birdie Morales, RN  Sensory Stimulation Regulation:    lighting decreased    care clustered    quiet environment promoted  Medication Review/Management: medications reviewed

## 2023-01-14 NOTE — DISCHARGE SUMMARY
[facility]  Hospitalist Discharge Summary      Date of Admission:  1/11/2023  Date of Discharge:  1/14/2023  Discharging Provider: Vance Luna MD  Discharge Service: Hospitalist Service    Discharge Diagnoses   Strokelike symptoms with dizziness  Left vertebral artery occlusion  Uncontrolled hypertension  Possible UTI  History of coronary artery disease  Neck pain, improved  GERD      Follow-ups Needed After Discharge   Follow-up Appointments     Follow-up and recommended labs and tests       Follow up with primary care provider, Chucho Espino, within 7 days for   hospital follow- up.  No follow up labs or test are needed.    Follow up with neurosurgery and neurology             Unresulted Labs Ordered in the Past 30 Days of this Admission     No orders found from 12/12/2022 to 1/12/2023.      These results will be followed up by PCP    Discharge Disposition   Discharged to home  Condition at discharge: Stable      Hospital Course   82 year old male with a pertinent history of stroke, stage 3 CKD, type II diabetes mellitus, GERD, hyperlipidemia, and hypertension  who presents to this ED via walk in with son for evaluation of dizziness and neck pain.  Found to have left vertebral occlusion, please refer to H&P for details     Strokelike symptoms  - Patient presented with recurrent dizziness episodes  - MRI brain showed no acute intracranial process with generalized brain atrophy and chronic microvascular ischemic changes.  - Neurology consult, appreciate input  - Continue TIA/CVA protocol  -Echo shows EF of 55 to 60% with mild left ventricular wall concentric increase  - PT/OT and speech evaluation  -Continue aspirin 81 mg and Plavix 75 mg as recommended by neurology  - Continue meclizine as needed     Left vertebral artery occlusion  - MRA neck shows occlusion of the left vertebral artery with severe stenosis/segmental occlusion of the basilar artery and nonopacification of the right  posterior cerebral artery  - Patient presented with recurrent dizziness episodes  - Vascular neurology consult  - Okay to keep patient at our facility and no need for patient transfer to the  or [facility] as per neurology  - Repeat MRA neck with and without contrast is unremarkable for significant acute changes  - Order aspirin 1 dose and further anticoagulation recommendation depends on results  -Continue aspirin and Plavix as above  - Follow-up with neurosurgery as outpatient     Hypertension  - Restart home medications  - Continue to monitor blood pressure and avoid hypotension  - Blood pressure still above goal  - Start low-dose Norvasc  - Discontinue IV fluid  - Improving blood pressure, continue to monitor as outpatient     UTI  - Negative urine culture  - D/Continue IV Rocephin  - Patient received IV antibiotic during hospitalization     History of coronary disease  - Patient denies chest pain or shortness of breath  - Resume home medications  - Continue to monitor on telemetry  - Echo shows mild concentric increase in his overall of left ventricle, EF of 55 to 60%.     Neck pain  - Change Tylenol scheduled 3 times daily  - Add lidocaine patch  - Improving    GERD  -Change do not resolve to pantoprazole as patient is on Plavix       Consultations This Hospital Stay   NEUROLOGY IP STROKE CONSULT  SPEECH LANGUAGE PATH ADULT IP CONSULT  PHARMACY IP CONSULT  PHARMACY IP CONSULT  PHARMACY IP CONSULT  PHYSICAL THERAPY ADULT IP CONSULT  OCCUPATIONAL THERAPY ADULT IP CONSULT  REHAB ADMISSIONS LIAISON IP CONSULT  CARE MANAGEMENT / SOCIAL WORK IP CONSULT  SMOKING CESSATION PROGRAM IP CONSULT    Code Status   Full Code    Time Spent on this Encounter   I, Vance Luna MD, personally saw the patient today and spent greater than 30 minutes discharging this patient.       Vance Luna MD  [address] [address]  Phone: [phone]  Fax:  [phone]  ______________________________________________________________________    Physical Exam   Vital Signs: Temp: 98.1  F (36.7  C) Temp src: Oral BP: 139/89 Pulse: 78   Resp: 18 SpO2: 96 % O2 Device: None (Room air)    Weight: 168 lbs 13.96 oz  Constitutional: awake, alert, cooperative, no apparent distress, and appears stated age  Respiratory: No increased work of breathing, good air exchange, clear to auscultation bilaterally, no crackles or wheezing  Cardiovascular: Normal apical impulse, regular rate and rhythm, normal S1 and S2, no S3 or S4, and no murmur noted  GI: No scars, normal bowel sounds, soft, non-distended, non-tender, no masses palpated, no hepatosplenomegally  Skin: no bruising or bleeding and normal skin color, texture, turgor  Musculoskeletal: There is no redness, warmth, or swelling of the joints.  Full range of motion noted.  no lower extremity pitting edema present  Neurologic: Awake, alert, oriented to name, place and time.  Cranial nerves II-XII are grossly intact.  Motor is 5 out of 5 bilaterally.   Sensory is intact.    Neuropsychiatric: Appropriate with examiner       Primary Care Physician   Chucho Espino    Discharge Orders      Reason for your hospital stay    TIA, vestibular arty occlusion     Follow-up and recommended labs and tests     Follow up with primary care provider, Chucho Espino, within 7 days for hospital follow- up.  No follow up labs or test are needed.    Follow up with neurosurgery and neurology     Activity    Your activity upon discharge: activity as tolerated     Diet    Follow this diet upon discharge: Orders Placed This Encounter      Moderate Consistent Carb (60 g CHO per Meal) Diet       Significant Results and Procedures   Most Recent 3 CBC's:Recent Labs   Lab Test 01/12/23  0800 01/11/23  0421 06/22/20  1853   WBC 8.6 8.7 7.1   HGB 13.2* 14.0 13.0*   MCV 95 93 92    217 202     Most Recent 3 BMP's:Recent Labs   Lab Test 01/14/23  1152  01/14/23  0817 01/13/23  2113 01/12/23  0814 01/12/23  0800 01/11/23  1554 01/11/23  0421 09/12/22  1202   NA  --   --   --   --  137  --  134* 134*   POTASSIUM  --   --   --   --  4.2  --  4.8 5.4*   CHLORIDE  --   --   --   --  100  --  98 98   CO2  --   --   --   --  24  --  22 27   BUN  --   --   --   --  30.9*  --  30.0* 30.3*   CR  --   --   --   --  1.58*  --  1.46* 1.41*   ANIONGAP  --   --   --   --  13  --  14 9   GERALDINE  --   --   --   --  9.4  --  10.3* 10.1   * 160* 155*   < > 130*   < > 168* 240*    < > = values in this interval not displayed.   ,   Results for orders placed or performed during the hospital encounter of 01/11/23   Chest XR,  PA & LAT    Narrative    EXAM: XR CHEST 2 VIEWS  LOCATION: [facility]  DATE/TIME: 1/11/2023 6:20 AM    INDICATION: chest pain  COMPARISON: None.      Impression    IMPRESSION: Negative chest.   MR Brain w/o & w Contrast    Narrative    EXAM: MR BRAIN W/O and W CONTRAST, MRA NECK (CAROTIDS) W/O and W CONTRAST, MRA BRAIN ([place]) W/O CONTRAST  LOCATION: [facility]  DATE/TIME: 1/11/2023 6:20 AM    INDICATION: Vertigo, ataxia, and headache for the last week.  COMPARISON: None.  CONTRAST: 7 mL Gadavist  TECHNIQUE:   1) Routine multiplanar multisequence head MRI without and with intravenous contrast.  2) 3D time-of-flight head MRA without intravenous contrast.  3) Neck MRA without and with IV contrast. Stenosis measurements made according to NASCET criteria unless otherwise specified.    FINDINGS: Motion-degraded exam.    HEAD MRI:  INTRACRANIAL CONTENTS: No acute or subacute infarct. Chronic lacunar infarct in the body of the left caudate nucleus. No mass, acute hemorrhage, or extra-axial fluid collections. Patchy nonspecific T2/FLAIR hyperintensities within the cerebral white   matter most consistent with mild to moderate chronic microvascular ischemic change. Mild to moderate generalized cerebral atrophy.  No hydrocephalus. Normal position of the cerebellar tonsils. No pathologic contrast enhancement.    SELLA: No abnormality accounting for technique.    OSSEOUS STRUCTURES/SOFT TISSUES: Normal marrow signal. The major intracranial vascular flow voids are maintained.     ORBITS: Prior bilateral cataract surgery. Visualized portions of the orbits are otherwise unremarkable.     SINUSES/MASTOIDS: No paranasal sinus mucosal disease. No middle ear or mastoid effusion.     HEAD MRA:   ANTERIOR CIRCULATION: Atherosclerotic plaque in the carotid siphons results in mild narrowing at the bilateral clinoid segments. The proximal branches of the anterior and middle cerebral arteries are grossly patent. There is moderate narrowing of the   bilateral M1 segments. The distal A1 and proximal A2 segments are obscured by a band of artifact. Fetal origin of the left posterior cerebral artery from the anterior circulation.    POSTERIOR CIRCULATION: Loss of normal flow-related enhancement in the left vertebral artery. The right vertebral artery is grossly patent. There is retrograde reconstitution of the left anterior inferior cerebellar artery. Just distal to the origin of   the anterior inferior cerebellar arteries, there is severe narrowing/occlusion of the mid to distal basilar artery. The right posterior cerebral artery is nonopacified. There is flow-related enhancement within the left PCA, which arises from the left   posterior communicating artery, with moderate stenosis of the left PCA at the P2-P3 segment.    NECK MRA:   RIGHT CAROTID: Atherosclerotic plaque results in less than 50% stenosis in the right ICA. No dissection.    LEFT CAROTID: Atherosclerotic plaque results in less than 50% stenosis in the left ICA. No dissection.    VERTEBRAL ARTERIES: The right vertebral artery is diminutive in size and diffusely irregular. There is nonopacification of the V3 and V4 segments, worrisome for occlusion. The right vertebral artery is  patent.    AORTIC ARCH: Classic aortic arch anatomy with no significant stenosis at the origin of the great vessels.      Impression    IMPRESSION:  HEAD MRI:   1.  No acute intracranial process.  2.  Generalized brain atrophy and presumed microvascular ischemic changes as detailed above.  3.  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    HEAD MRA:   1.  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  2.  Scattered intracranial atherosclerosis of the remainder of the intracranial arterial vasculature, as described.    NECK MRA:  1.  Diminutive left vertebral artery with a diffusely irregular appearance and occlusion of the V3 and V4 segment.  2.  No flow-limiting stenosis in the remainder of the cervical arterial vasculature.    Findings were discussed with Dr. Magallanes at 6:49 AM on 01/11/2023.   MRA Neck (Carotids) wo & w Contrast    Narrative    EXAM: MR BRAIN W/O and W CONTRAST, MRA NECK (CAROTIDS) W/O and W CONTRAST, MRA BRAIN ([place]) W/O CONTRAST  LOCATION: [facility]  DATE/TIME: 1/11/2023 6:20 AM    INDICATION: Vertigo, ataxia, and headache for the last week.  COMPARISON: None.  CONTRAST: 7 mL Gadavist  TECHNIQUE:   1) Routine multiplanar multisequence head MRI without and with intravenous contrast.  2) 3D time-of-flight head MRA without intravenous contrast.  3) Neck MRA without and with IV contrast. Stenosis measurements made according to NASCET criteria unless otherwise specified.    FINDINGS: Motion-degraded exam.    HEAD MRI:  INTRACRANIAL CONTENTS: No acute or subacute infarct. Chronic lacunar infarct in the body of the left caudate nucleus. No mass, acute hemorrhage, or extra-axial fluid collections. Patchy nonspecific T2/FLAIR hyperintensities within the cerebral white   matter most consistent with mild to moderate chronic microvascular  ischemic change. Mild to moderate generalized cerebral atrophy. No hydrocephalus. Normal position of the cerebellar tonsils. No pathologic contrast enhancement.    SELLA: No abnormality accounting for technique.    OSSEOUS STRUCTURES/SOFT TISSUES: Normal marrow signal. The major intracranial vascular flow voids are maintained.     ORBITS: Prior bilateral cataract surgery. Visualized portions of the orbits are otherwise unremarkable.     SINUSES/MASTOIDS: No paranasal sinus mucosal disease. No middle ear or mastoid effusion.     HEAD MRA:   ANTERIOR CIRCULATION: Atherosclerotic plaque in the carotid siphons results in mild narrowing at the bilateral clinoid segments. The proximal branches of the anterior and middle cerebral arteries are grossly patent. There is moderate narrowing of the   bilateral M1 segments. The distal A1 and proximal A2 segments are obscured by a band of artifact. Fetal origin of the left posterior cerebral artery from the anterior circulation.    POSTERIOR CIRCULATION: Loss of normal flow-related enhancement in the left vertebral artery. The right vertebral artery is grossly patent. There is retrograde reconstitution of the left anterior inferior cerebellar artery. Just distal to the origin of   the anterior inferior cerebellar arteries, there is severe narrowing/occlusion of the mid to distal basilar artery. The right posterior cerebral artery is nonopacified. There is flow-related enhancement within the left PCA, which arises from the left   posterior communicating artery, with moderate stenosis of the left PCA at the P2-P3 segment.    NECK MRA:   RIGHT CAROTID: Atherosclerotic plaque results in less than 50% stenosis in the right ICA. No dissection.    LEFT CAROTID: Atherosclerotic plaque results in less than 50% stenosis in the left ICA. No dissection.    VERTEBRAL ARTERIES: The right vertebral artery is diminutive in size and diffusely irregular. There is nonopacification of the V3 and V4  segments, worrisome for occlusion. The right vertebral artery is patent.    AORTIC ARCH: Classic aortic arch anatomy with no significant stenosis at the origin of the great vessels.      Impression    IMPRESSION:  HEAD MRI:   1.  No acute intracranial process.  2.  Generalized brain atrophy and presumed microvascular ischemic changes as detailed above.  3.  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    HEAD MRA:   1.  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  2.  Scattered intracranial atherosclerosis of the remainder of the intracranial arterial vasculature, as described.    NECK MRA:  1.  Diminutive left vertebral artery with a diffusely irregular appearance and occlusion of the V3 and V4 segment.  2.  No flow-limiting stenosis in the remainder of the cervical arterial vasculature.    Findings were discussed with Dr. Magallanes at 6:49 AM on 01/11/2023.   MRA Brain ([place]) wo Contrast    Narrative    EXAM: MR BRAIN W/O and W CONTRAST, MRA NECK (CAROTIDS) W/O and W CONTRAST, MRA BRAIN ([place]) W/O CONTRAST  LOCATION: [facility]  DATE/TIME: 1/11/2023 6:20 AM    INDICATION: Vertigo, ataxia, and headache for the last week.  COMPARISON: None.  CONTRAST: 7 mL Gadavist  TECHNIQUE:   1) Routine multiplanar multisequence head MRI without and with intravenous contrast.  2) 3D time-of-flight head MRA without intravenous contrast.  3) Neck MRA without and with IV contrast. Stenosis measurements made according to NASCET criteria unless otherwise specified.    FINDINGS: Motion-degraded exam.    HEAD MRI:  INTRACRANIAL CONTENTS: No acute or subacute infarct. Chronic lacunar infarct in the body of the left caudate nucleus. No mass, acute hemorrhage, or extra-axial fluid collections. Patchy nonspecific T2/FLAIR hyperintensities within the cerebral white    matter most consistent with mild to moderate chronic microvascular ischemic change. Mild to moderate generalized cerebral atrophy. No hydrocephalus. Normal position of the cerebellar tonsils. No pathologic contrast enhancement.    SELLA: No abnormality accounting for technique.    OSSEOUS STRUCTURES/SOFT TISSUES: Normal marrow signal. The major intracranial vascular flow voids are maintained.     ORBITS: Prior bilateral cataract surgery. Visualized portions of the orbits are otherwise unremarkable.     SINUSES/MASTOIDS: No paranasal sinus mucosal disease. No middle ear or mastoid effusion.     HEAD MRA:   ANTERIOR CIRCULATION: Atherosclerotic plaque in the carotid siphons results in mild narrowing at the bilateral clinoid segments. The proximal branches of the anterior and middle cerebral arteries are grossly patent. There is moderate narrowing of the   bilateral M1 segments. The distal A1 and proximal A2 segments are obscured by a band of artifact. Fetal origin of the left posterior cerebral artery from the anterior circulation.    POSTERIOR CIRCULATION: Loss of normal flow-related enhancement in the left vertebral artery. The right vertebral artery is grossly patent. There is retrograde reconstitution of the left anterior inferior cerebellar artery. Just distal to the origin of   the anterior inferior cerebellar arteries, there is severe narrowing/occlusion of the mid to distal basilar artery. The right posterior cerebral artery is nonopacified. There is flow-related enhancement within the left PCA, which arises from the left   posterior communicating artery, with moderate stenosis of the left PCA at the P2-P3 segment.    NECK MRA:   RIGHT CAROTID: Atherosclerotic plaque results in less than 50% stenosis in the right ICA. No dissection.    LEFT CAROTID: Atherosclerotic plaque results in less than 50% stenosis in the left ICA. No dissection.    VERTEBRAL ARTERIES: The right vertebral artery is diminutive in size  and diffusely irregular. There is nonopacification of the V3 and V4 segments, worrisome for occlusion. The right vertebral artery is patent.    AORTIC ARCH: Classic aortic arch anatomy with no significant stenosis at the origin of the great vessels.      Impression    IMPRESSION:  HEAD MRI:   1.  No acute intracranial process.  2.  Generalized brain atrophy and presumed microvascular ischemic changes as detailed above.  3.  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    HEAD MRA:   1.  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  2.  Scattered intracranial atherosclerosis of the remainder of the intracranial arterial vasculature, as described.    NECK MRA:  1.  Diminutive left vertebral artery with a diffusely irregular appearance and occlusion of the V3 and V4 segment.  2.  No flow-limiting stenosis in the remainder of the cervical arterial vasculature.    Findings were discussed with Dr. Magallanes at 6:49 AM on 01/11/2023.   CTA Head Neck with Contrast    Narrative    HEAD AND NECK CT ANGIOGRAM WITH IV CONTRAST  01/11/2023, 8:00 AM    INDICATION: Vertigo, headache, and neck pain for week.  Questionable left vertebral artery dissection on MRA.  TECHNIQUE: Head and neck CT angiogram with IV contrast. Noncontrast head CT followed by axial helical CT images of the head and neck vessels obtained during the arterial phase of intravenous contrast administration. Axial helical 2D reconstructed images   and multiplanar 3D MIP reconstructed images of the head and neck vessels were performed by the technologist. Dose reduction techniques were used.  CONTRAST: Isovue 370, 75 mL.  COMPARISON: Brain MRI/MRA and neck MRA 01/11/2023.     FINDINGS:  NONCONTRAST HEAD CT: No intracranial hemorrhage, extra-axial collection, mass effect or CT evidence of acute infarct.  Mild presumed chronic small vessel  ischemic changes. Mild generalized volume loss. The ventricles are proportional to the sulci.   Osseous structures are intact. The visualized orbits, paranasal sinuses and mastoid air cells are free of significant disease.     HEAD CTA: Mild carotid siphon atherosclerosis. Fetal-type left posterior communicating artery with patent left posterior cerebral artery. Mild narrowing of the M1 segment of the right middle cerebral artery. Distal cerebral artery branches are patent.   The proximal segment of the left vertebral artery is occluded. Patent right vertebral artery. The basilar artery is either severely stenotic or segmentally occluded from the vertebrobasilar junction to the level of the superior cerebellar artery origins,   as on the MRA. No convincing filling of the right posterior cerebral artery. The dural venous sinuses are not well visualized due to the arterial timing of the contrast bolus.    NECK CTA: Three vessel arch.  Carotid arteries are patent without atherosclerotic change.  No hemodynamically significant stenosis by NASCET criteria in either carotid system.  Diminutive left vertebral artery is visualized faintly beyond its origin   through approximately the C4 level before gradually losing visibility in its distal cervical segment through its proximal intracranial segment. This is similar to the neck MRA.      Impression    CONCLUSION:  HEAD CT:  1.  No intracranial hemorrhage, mass, or definite CT evidence of recent ischemia.    HEAD CTA:  1.  The proximal intracranial segment of the left vertebral artery is occluded.  2.  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 The right posterior cerebral artery appears occluded.  3.  This all appears similar to the MRA performed earlier the same day.    NECK CTA:  1.  Diminished filling of the proximal diminutive left vertebral artery which  occludes in its distal cervical segment through its proximal intracranial segment. The appearance is similar to the MRA performed earlier the same day.  2.  Patent, dominant right vertebral artery.  3.  Mild carotid artery atherosclerosis without hemodynamically significant narrowing by NASCET criteria.       MR Brain w/o Contrast    Narrative    EXAM: MR BRAIN W/O CONTRAST  LOCATION: [facility]  DATE/TIME: 1/11/2023 10:56 AM    INDICATION: Vertigo, headache and neck pain. Abnormal CTA and MRA.  COMPARISON: Head and neck CTA and brain MRI/MRA and neck MRA 1/11/2023  TECHNIQUE: Axial and coronal diffusion-weighted imaging of the brain was acquired.    FINDINGS:  There is no restricted diffusion.      Impression    IMPRESSION:  No diffusion abnormality to indicate recent stroke.   MRA Angiogram Neck w/o Contrast    Narrative    [facility]  MRA NECK (CAROTIDS) W/O CONTRAST  1/11/2023 10:58 AM    INDICATION: Neck pain; r o cervical artery dissection, with or w o trauma; no head, neck CTA result available; none of the following: facial pain, headache, Maria Del Rosario syndrome, or neurologic deficit(s) and/or stroke.  TECHNIQUE: Axial 3D time-of-flight neck MRA along with axial T1 fat saturated images were performed.  CONTRAST: None.  COMPARISON: Head and neck CTA and MRA 1/11/2023.    FINDINGS: Three-vessel arch. Antegrade flow within the carotid and right vertebral arteries. Diminished flow-related enhancement of the left vertebral artery. The axial T1 fat saturated images are compromised by the administration of contrast earlier the   same day.      Impression    CONCLUSION:   1.  The axial T1 fat saturated images are compromised by the recent administration of intravenous contrast.   MRA Neck (Carotids) wo Contrast    Narrative    EXAM: MRA NECK (CAROTIDS) W/O CONTRAST  LOCATION: [facility]  DATE/TIME: 1/12/2023 1:23 PM    INDICATION: suboptimal prev MRA.  Please do fat sat. Neck pain with concern for dissection.  COMPARISON: 2023 neck MRA and CTA.  TECHNIQUE: Neck MRA without IV contrast. Stenosis measurements made according to NASCET criteria unless otherwise specified.    FINDINGS: While there is an intrinsic T1 hyperintensity along the entire length of the left vertebral artery, this does not demonstrate the classic appearance that would typically be seen with vertebral artery dissection. This intrinsic T1 hyperintensity   may instead relate to the high-grade stenosis seen diffusely. Neck vessels elsewhere are evaluated to better effect on recent neck MRA, and overall unchanged.      Impression    IMPRESSION:  1.  No definite findings to suggest left vertebral artery dissection. While there is an intrinsic T1 hyperintensity throughout the left vertebral artery, this does not demonstrate the classic appearance or morphology seen with vertebral artery   dissection.   Echocardiogram Complete - For age > 60 yrs     Value    LVEF  55-60%    Narrative    [id number]  [id number]  [id number]  [id number]^NATHANIEL^HERBER^[place] [address]     Name: ASIF VITALE  MRN: [id number]  : 1940  Study Date: 2023 01:57 PM  Age: 82 yrs  Gender: Male  Patient Location: [facility]  Reason For Study: Cerebrovascular Incident  Ordering Physician: HERBRE ARMSTRONG  Performed By: MB     BSA: 1.8 m2  Height: 61 in  Weight: 168 lb  HR: 85  BP: 133/79 mmHg  ______________________________________________________________________________  Procedure  Complete Echo Adult. Definity (NDC #72064-535) given intravenously.  Technically difficult study. Compared to the prior study dated 10/17/2017,  there have been no changes.  ______________________________________________________________________________  Interpretation Summary     1. Left ventricular size is normal. Mild concentric increase in wall  thickness. Systolic function is normal. The estimated left  ventricular  ejection fraction is 55-60%.  2. Right ventricular size and systolic function are normal.  3. No hemodynamically significant valvular abnormalities.  4. Compared to the prior study dated 10/17/2017, there has been no significant  change.  ______________________________________________________________________________  I      WMSI = 1.00     % Normal = 100     X - Cannot   0 -                      (2) - Mildly 2 -          Segments  Size  Interpret    Hyperkinetic 1 - Normal  Hypokinetic  Hypokinetic  1-2     small                                                     7 -          3-5      moderate  3 - Akinetic 4 -          5 -         6 - Akinetic Dyskinetic   6-14    large               Dyskinetic   Aneurysmal  w/scar       w/scar       15-16   diffuse     Left Ventricle  The left ventricle is normal in size. There is mild concentric left  ventricular hypertrophy. Left ventricular systolic function is normal. The  visual ejection fraction is 55-60%. Left ventricular diastolic function is  indeterminate. No regional wall motion abnormalities noted.     Right Ventricle  Normal right ventricle size and systolic function.     Atria  Normal left atrial size. Right atrial size is normal.     Mitral Valve  Mitral valve leaflets appear normal. There is trace mitral regurgitation.  There is no mitral valve stenosis.     Tricuspid Valve  Tricuspid valve leaflets appear normal. There is trace tricuspid  regurgitation. Right ventricular systolic pressure could not be approximated  due to inadequate tricuspid regurgitation. There is no tricuspid stenosis.     Aortic Valve  Aortic valve leaflets appear normal. The aortic valve is trileaflet. No aortic  regurgitation is present. No aortic stenosis is present.     Pulmonic Valve  The pulmonic valve is not well seen, but is grossly normal. There is mild (1+)  pulmonic valvular regurgitation. There is no pulmonic valvular stenosis.     Vessels  The aorta root is normal.  The thoracic aorta is normal. Inferior vena cava not  well visualized for estimation of right atrial pressure.     Pericardium  There is no pericardial effusion.     Rhythm  Sinus rhythm was noted.     ______________________________________________________________________________  MMode/2D Measurements & Calculations  IVSd: 1.3 cm  LVIDd: 4.7 cm  LVIDs: 2.9 cm  LVPWd: 1.0 cm  FS: 38.0 %     LV mass(C)d: 205.3 grams  LV mass(C)dI: 117.0 grams/m2  Ao root diam: 3.7 cm  asc Aorta Diam: 3.9 cm  LVOT diam: 2.3 cm  LVOT area: 4.2 cm2  LA Volume Indexed (AL/bp): 21.6 ml/m2  RWT: 0.42     Time Measurements  MM HR: 86.0 BPM     Doppler Measurements & Calculations  MV E max wero: 39.0 cm/sec  MV A max wero: 90.4 cm/sec  MV E/A: 0.43  MV max P.2 mmHg  MV mean P.6 mmHg  MV V2 VTI: 15.3 cm  MVA(VTI): 3.5 cm2  MV dec slope: 346.6 cm/sec2  MV dec time: 0.12 sec  Ao V2 max: 82.1 cm/sec  Ao max PG: 3.0 mmHg  Ao V2 mean: 57.8 cm/sec  Ao mean P.5 mmHg  Ao V2 VTI: 14.1 cm  AWILDA(I,D): 3.8 cm2  AWILDA(V,D): 3.5 cm2  LV V1 max P.9 mmHg  LV V1 max: 68.4 cm/sec  LV V1 VTI: 12.6 cm  SV(LVOT): 53.1 ml  SI(LVOT): 30.2 ml/m2  PA acc time: 0.10 sec  PI end-d wero: 141.2 cm/sec  AV Wero Ratio (DI): 0.83  AWILDA Index (cm2/m2): 2.2  E/E': 6.9  E/E' av.3  Lateral E/e': 7.6  Medial E/e': 6.9  Peak E' Wero: 5.7 cm/sec     ______________________________________________________________________________  Report approved by: Mica Enriquez 2023 03:55 PM               Discharge Medications   Current Discharge Medication List      START taking these medications    Details   amLODIPine (NORVASC) 2.5 MG tablet Take 1 tablet (2.5 mg) by mouth daily for 90 days  Qty: 90 tablet, Refills: 0    Associated Diagnoses: Essential hypertension      clopidogrel (PLAVIX) 75 MG tablet Take 1 tablet (75 mg) by mouth daily for 90 days  Qty: 90 tablet, Refills: 0    Associated Diagnoses: Vertebral artery occlusion, left      lidocaine (XYLOCAINE) 2 %  external gel Apply topically 3 times daily Apply on neck back tid  Qty: 85 g, Refills: 0    Associated Diagnoses: Neck pain      pantoprazole (PROTONIX) 40 MG EC tablet Take 1 tablet (40 mg) by mouth every morning (before breakfast) for 90 days  Qty: 90 tablet, Refills: 0    Associated Diagnoses: Gastroesophageal reflux disease with esophagitis, unspecified whether hemorrhage         CONTINUE these medications which have CHANGED    Details   acetaminophen (TYLENOL) 325 MG tablet Take 2 tablets (650 mg) by mouth 3 times daily for 30 days  Qty: 180 tablet, Refills: 0    Associated Diagnoses: Chronic right shoulder pain         CONTINUE these medications which have NOT CHANGED    Details   allopurinol (ZYLOPRIM) 100 MG tablet TAKE 2 TABLETS BY MOUTH EVERY DAY  Qty: 180 tablet, Refills: 7    Associated Diagnoses: Chronic gout of multiple sites, unspecified cause      aspirin (ASA) 81 MG EC tablet Take 1 tablet (81 mg) by mouth daily  Qty: 90 tablet, Refills: 3    Associated Diagnoses: Coronary artery disease involving native coronary artery of native heart without angina pectoris      docusate sodium (COLACE) 100 MG capsule TAKE 1 CAPSULE(100 MG) BY MOUTH TWICE DAILY AS NEEDED FOR CONSTIPATION  Qty: 180 capsule, Refills: 3    Comments: **Patient requests 90 days supply**  Associated Diagnoses: Constipation, unspecified constipation type; Encounter for medication refill      DULoxetine (CYMBALTA) 20 MG capsule TAKE 1 CAPSULE(20 MG) BY MOUTH TWICE DAILY  Qty: 60 capsule, Refills: 1    Associated Diagnoses: Primary osteoarthritis involving multiple joints      gabapentin (NEURONTIN) 300 MG capsule TAKE 1 TO 2 CAPSULES BY MOUTH AT BEDTIME  Qty: 180 capsule, Refills: 3    Associated Diagnoses: Lumbago; Encounter for medication refill      HYDROcodone-acetaminophen (NORCO) 5-325 MG tablet Take 1 tablet by mouth daily as needed for severe pain (7-10)  Qty: 30 tablet, Refills: 0    Associated Diagnoses: Encounter for medication  refill; Severe pain      isosorbide mononitrate (IMDUR) 30 MG 24 hr tablet Take 1 tablet (30 mg) by mouth daily  Qty: 90 tablet, Refills: 3    Associated Diagnoses: Coronary artery disease involving native coronary artery of native heart without angina pectoris      JANUVIA 50 MG tablet TAKE 1 TABLET(50 MG) BY MOUTH DAILY  Qty: 90 tablet, Refills: 3    Associated Diagnoses: Encounter for medication refill      losartan (COZAAR) 50 MG tablet TAKE 1 TABLET(50 MG) BY MOUTH DAILY  Qty: 90 tablet, Refills: 3    Comments: ZERO refills remain on this prescription. Your patient is requesting advance approval of refills for this medication to PREVENT ANY MISSED DOSES  Associated Diagnoses: Type 2 diabetes mellitus with stage 3 chronic kidney disease, without long-term current use of insulin (H); ACE-inhibitor cough; Encounter for medication refill      meclizine (ANTIVERT) 12.5 MG tablet Take 1 tablet (12.5 mg) by mouth 3 times daily as needed for dizziness  Qty: 60 tablet, Refills: 6    Associated Diagnoses: Dizziness      metoprolol succinate ER (TOPROL XL) 100 MG 24 hr tablet TAKE 1 TABLET(100 MG) BY MOUTH DAILY  Qty: 90 tablet, Refills: 3    Comments: ZERO refills remain on this prescription. Your patient is requesting advance approval of refills for this medication to PREVENT ANY MISSED DOSES  Associated Diagnoses: Coronary artery disease involving native coronary artery of native heart without angina pectoris; Encounter for medication refill      nitroGLYcerin (NITROSTAT) 0.4 MG sublingual tablet PLACE 1 TABLET UNDER THE TONGUE EVERY 5 MINUTES AS NEEDED FOR CHEST PAIN.  Qty: 25 tablet, Refills: 6    Associated Diagnoses: Encounter for medication refill      rosuvastatin (CRESTOR) 20 MG tablet TAKE 1 TABLET(20 MG) BY MOUTH AT BEDTIME  Qty: 90 tablet, Refills: 0    Comments: ZERO refills remain on this prescription. Your patient is requesting advance approval of refills for this medication to PREVENT ANY MISSED  DOSES  Associated Diagnoses: Dyslipidemia      triamcinolone (KENALOG) 0.1 % external cream Apply topically 2 times daily as needed for irritation  Qty: 80 g, Refills: 3    Associated Diagnoses: Dermatitis         STOP taking these medications       omeprazole (PRILOSEC) 20 MG DR capsule Comments:   Reason for Stopping:             Allergies   No Known Allergies

## 2023-01-14 NOTE — PLAN OF CARE
Occupational Therapy Discharge Summary    Reason for therapy discharge:    Discharged to home.Family assist     Progress towards therapy goal(s). See goals on Care Plan in [facility] electronic health record for goal details.  Goals partially met.  Barriers to achieving goals:   discharge from facility.    Therapy recommendation(s):    No further therapy is recommended.    Goal Outcome Evaluation:         Mary Reyes, OTR/L  1/14/2023

## 2023-01-14 NOTE — PLAN OF CARE
Physical Therapy Discharge Summary    Reason for therapy discharge:    Discharged to home.  All goals and outcomes met, no further needs identified.    Progress towards therapy goal(s). See goals on Care Plan in [facility] electronic health record for goal details.  Goals met    Therapy recommendation(s):    Continue home exercise program. Pt may have outpatient PT follow up if needed. No dizziness today.

## 2023-01-14 NOTE — PROGRESS NOTES
1900: Handoff report received from SHELIA Davey. Dual skin and safety check completed at bedside. Pt currently sitting up in bed with family present at bedside. Family remains at bedside for assistance with translation. Pt is having bilateral neck/shoulder pain and headache. Advised that scheduled tylenol will be given when available. No other acute concerns at this time.     No other acute changes. See flowsheet for full assessment. Will report to oncoming RN.

## 2023-01-15 ENCOUNTER — TELEPHONE (OUTPATIENT)
Dept: FAMILY MEDICINE | Facility: CLINIC | Age: 83
End: 2023-01-15

## 2023-01-16 ENCOUNTER — PATIENT OUTREACH (OUTPATIENT)
Dept: GERIATRIC MEDICINE | Facility: CLINIC | Age: 83
End: 2023-01-16

## 2023-01-16 NOTE — LETTER
January 16, 2023      ASIF DE JESUSG  [address]  [facility] [id number]      Dear Asif:    At [facility], we re dedicated to improving your health and wellness. Enclosed is the Care Plan developed with you on 1/5/2023. Please review the Care Plan carefully.    As a reminder, during your visit we talked about:    Ways to manage your physical and mental health    Using health care to maintain and improve your health     Your preventive care needs     Remember to contact your care coordinator if you:    Are hospitalized, or plan to be hospitalized     Have a fall      Have a change in your physical or mental health    Need help finding support or services    If you have questions, or don t agree with your Care Plan, call me at [phone]. You can also call me if your needs change. TTY users, call the [place] [employer] at (433) or 1-483.287.3656 (djcdux-bs-gqsrwz relay service).    Sincerely,    RICKEY Gallegos  [phone]  [email or web address] ([facility]) is a health plan that contracts with both Medicare and the [place] Medical Assistance (Medicaid) program to provide benefits of both programs to enrollees. Enrollment in [facility] depends on contract renewal.    [id number] accepted    [id number] (07/2022)

## 2023-01-16 NOTE — PROGRESS NOTES
[facility] Care Coordination Contact    [facility] Home Visit Assessment     Home visit for Health Risk Assessment with Adam Talamantes completed on January 16, 2023    Type of residence: Private home - stairs  Current living arrangement: I live in a private home with family     Assessment completed with: Patient, Family    Current Care Plan  Member currently receiving the following home care services: None     Member currently receiving the following community resources: PCA    Medication Review  Medication reconciliation completed in Epic: Yes  Medication set-up & administration: Family/informal caregiver sets up weekly.  Family caregiver administers medications.  Medication Risk Assessment Medication (1 or more, place referral to MTM): N/A: No risk factors identified  MTM Referral Placed: No: No risk factors idenified    Mental/Behavioral Health   Depression Screening: Denies concerns.   PHQ-2 Total Score (Adult) - Positive if 3 or more points; Administer PHQ-9 if positive: 0  Mental health DX: No        Falls Assessment:   Fallen 2 or more times in the past year?: No   Any fall with injury in the past year?: No    ADL/IADL Dependencies:   Dependent ADLs: Ambulation-walker, Bathing, Dressing, Grooming, Transfers, Toileting  Dependent IADLs: Cleaning, Cooking, Laundry, Shopping, Meal Preparation, Medication Management, Money Management, Transportation, Incontinence    [facility] Health Plan sponsored benefits: Shared information re: Silver Sneakers/gym memberships, ASA, Calcium +D.    PCA Assessment completed at visit: Yes Annual PCA assessment indicated 18 units per day of PCA. This is the same as the previous assessment.     Elderly Waiver Eligibility: No-does not meet criteria    Care Plan & Recommendations: Adam Talamantes is a 82 year old male with a past medical history of Primary osteoarthritis involving multiple joints, Type 2 diabetes mellitus, Coronary artery disease, Urinary incontinence, Chronic gout,  Essential hypertension, Chronic right shoulder pain, Bilateral chronic knee pain, Generalized Osteoarthritis Of The Hand, Lower Back Pain, Lumbar Disc Degeneration, Insomnia,,  and CKD (chronic kidney disease) stage 3.    Adam Albin  lives in a single family home with his wife, Lenny, and his daughter, Dimitri and son in law, Fermin Christensen. Fermin Christensen currently provides PCA support to Adam daily. Family endorses  memory impairments. Will wander outside and is high fall risk. Needs redirection when tries to do unsafe tasks.     He appears well supported by family. Requesting a prescription for Boost. Will request that preferred provider send needed forms to PCP to complete.  -Update: no Boost available due to manufacturing issues. Placed on a waiting list.     See [facility] for detailed assessment information.    Follow-Up Plan: Member informed of future contact, plan to f/u with member with a 6 month telephone assessment.  Contact information shared with member and family, encouraged member to call with any questions or concerns at any time.    [facility] care continuum providers: Please see Snapshot and Care Management Flowsheets for Specific details of care plan.    This CC note routed to PCP.    RICKEY Gallegos  [facility] [employer]  [phone]

## 2023-01-16 NOTE — PROGRESS NOTES
[facility] Care Coordination Contact    Received after visit chart from care coordinator.  Completed following tasks: Mailed copy of care plan to client, Updated services in Database, Mailed copy of POC signature sheet for member to sign and return in SASE  and Mailed [facility] Safe Medication Disposal   , Provider Signature - No POC Shared:  Member indicates that they do not want their POC shared with any [facility] providers.     and Order placed with [employer] (p: [phone]; f: [phone]) for 30 boost/month, [facility]..  Order placed on 1/16/23. Database updated.  As required, authorization submitted to health plan.    -PCA assessment already completed    Angela Moon  Care Management Specialist  [facility]  [phone]

## 2023-01-17 NOTE — TELEPHONE ENCOUNTER
Call and spoke to Dimitri Talamantes (C2C on file) assist to schedule a follow up appt with pcp post hospitalization.  Family reported an appointment has already been scheduled, however, if provider can see patient sooner its better.  Family would like to cancel the appt scheduled for 1/24/2023 and keep 1/19.     JONATHAN NicholsN, RN  [facility]

## 2023-01-19 ENCOUNTER — OFFICE VISIT (OUTPATIENT)
Dept: FAMILY MEDICINE | Facility: CLINIC | Age: 83
End: 2023-01-19
Payer: COMMERCIAL

## 2023-01-19 VITALS
BODY MASS INDEX: 30.66 KG/M2 | RESPIRATION RATE: 16 BRPM | WEIGHT: 162.4 LBS | OXYGEN SATURATION: 98 % | TEMPERATURE: 98.4 F | SYSTOLIC BLOOD PRESSURE: 130 MMHG | HEIGHT: 61 IN | DIASTOLIC BLOOD PRESSURE: 82 MMHG | HEART RATE: 78 BPM

## 2023-01-19 DIAGNOSIS — N18.30 TYPE 2 DIABETES MELLITUS WITH STAGE 3 CHRONIC KIDNEY DISEASE, WITHOUT LONG-TERM CURRENT USE OF INSULIN, UNSPECIFIED WHETHER STAGE 3A OR 3B CKD (H): ICD-10-CM

## 2023-01-19 DIAGNOSIS — N18.31 STAGE 3A CHRONIC KIDNEY DISEASE (H): ICD-10-CM

## 2023-01-19 DIAGNOSIS — R42 DIZZINESS: ICD-10-CM

## 2023-01-19 DIAGNOSIS — K21.9 GASTROESOPHAGEAL REFLUX DISEASE, UNSPECIFIED WHETHER ESOPHAGITIS PRESENT: ICD-10-CM

## 2023-01-19 DIAGNOSIS — E11.22 TYPE 2 DIABETES MELLITUS WITH STAGE 3 CHRONIC KIDNEY DISEASE, WITHOUT LONG-TERM CURRENT USE OF INSULIN, UNSPECIFIED WHETHER STAGE 3A OR 3B CKD (H): ICD-10-CM

## 2023-01-19 DIAGNOSIS — Z23 NEED FOR VACCINATION: ICD-10-CM

## 2023-01-19 DIAGNOSIS — I65.02 VERTEBRAL ARTERY OCCLUSION, LEFT: Primary | ICD-10-CM

## 2023-01-19 DIAGNOSIS — K21.00 GASTROESOPHAGEAL REFLUX DISEASE WITH ESOPHAGITIS, UNSPECIFIED WHETHER HEMORRHAGE: ICD-10-CM

## 2023-01-19 DIAGNOSIS — I10 ESSENTIAL HYPERTENSION: ICD-10-CM

## 2023-01-19 PROCEDURE — 99214 OFFICE O/P EST MOD 30 MIN: CPT | Performed by: FAMILY MEDICINE

## 2023-01-19 RX ORDER — ACETAMINOPHEN 325 MG/1
2 TABLET ORAL EVERY 6 HOURS PRN
Status: ON HOLD | COMMUNITY
End: 2023-02-06

## 2023-01-19 RX ORDER — MECLIZINE HCL 12.5 MG 12.5 MG/1
12.5 TABLET ORAL 3 TIMES DAILY PRN
Qty: 60 TABLET | Refills: 6 | Status: SHIPPED | OUTPATIENT
Start: 2023-01-19 | End: 2023-06-06

## 2023-01-19 RX ORDER — AMLODIPINE BESYLATE 2.5 MG/1
2.5 TABLET ORAL DAILY
Qty: 90 TABLET | Refills: 3 | Status: ON HOLD | OUTPATIENT
Start: 2023-01-19 | End: 2023-02-06

## 2023-01-19 RX ORDER — PANTOPRAZOLE SODIUM 40 MG/1
40 TABLET, DELAYED RELEASE ORAL
Qty: 90 TABLET | Refills: 3 | Status: SHIPPED | OUTPATIENT
Start: 2023-01-19 | End: 2024-01-15

## 2023-01-19 RX ORDER — CLOPIDOGREL BISULFATE 75 MG/1
75 TABLET ORAL DAILY
Qty: 90 TABLET | Refills: 3 | Status: ON HOLD | OUTPATIENT
Start: 2023-01-19 | End: 2023-02-06

## 2023-01-19 NOTE — PROGRESS NOTES
Hospital Follow-up Visit:    Hospital/Nursing Home/IP Rehab Facility: [facility]  Date of Admission: 1/11/2023  Date of Discharge: 1/14/2023  Reason(s) for Admission: stroke    Was your hospitalization related to COVID-19? No   Problems taking medications regularly:  None  Medication changes since discharge: Yes  Problems adhering to non-medication therapy:  None    Summary of hospitalization:  [facility] discharge summary reviewed  Diagnostic Tests/Treatments reviewed.  Follow up needed: Neurology.  Other Healthcare Providers Involved in Patient s Care:         Specialist appointment - Neurology referral entered  Update since discharge: stable.         Plan of care communicated with patient and family  MED REC REQUIRED  Post Medication Reconciliation Status: discharge medications reconciled and changed, per note/orders     ASSESMENT AND PLAN:  Diagnoses and all orders for this visit:  Vertebral artery occlusion, left  Medication reconciliation and review and counseling done with the patient and his family with the help of a professional .  I also wrote out instructions for the daughter who reads and speaks English well but was not able to come to the appointment today.  We will continue on Plavix as refilled below at his community pharmacy.  -     Adult Neurology  Referral; Future  -     clopidogrel (PLAVIX) 75 MG tablet; Take 1 tablet (75 mg) by mouth daily  Dizziness  Likely multifactorial.  Extensive medication review and counseling done with the patient, gabapentin may be a contributing factor so we are going to discontinue gabapentin.  -     meclizine (ANTIVERT) 12.5 MG tablet; Take 1 tablet (12.5 mg) by mouth 3 times daily as needed for dizziness  Essential hypertension  Was out of control in the hospital, now controlled with with the recent addition of amlodipine.  This will be continued with outpatient refills as below:  -     amLODIPine  (NORVASC) 2.5 MG tablet; Take 1 tablet (2.5 mg) by mouth daily  Gastroesophageal reflux disease, unspecified whether esophagitis present  Patient was discharged from the hospital on Protonix which will be continued as an outpatient.  -     pantoprazole (PROTONIX) 40 MG EC tablet; Take 1 tablet (40 mg) by mouth every morning (before breakfast)  Type 2 diabetes mellitus with stage 3 chronic kidney disease, without long-term current use of insulin, unspecified whether stage 3a or 3b CKD (H)  Stable.  A1c done in the hospital on 1/12/2023 was 7.6 which is unchanged from his previous.  Continue current plan.  Next A1c about 4 months.  Stage 3a chronic kidney disease (H)  Stable.  Reviewed creatinine trend, discharge creatinine is 1.46 which is slightly better than his creatinine trend from stable chronic kidney disease over this past year.  Need for vaccination  Declines immunization update today.        Reviewed the risks and benefits of the treatment plan with the patient and/or caregiver and we discussed indications for routine and emergent follow-up.        SUBJECTIVE: He had gone into the hospital of severe dizziness.  Had extensive work-up, CNS vascular imaging showed a occlusion of the vertebral artery on the left.  See those reports for details.  Patient was started on Plavix.  He has not had any bleeding problems since discharge.  Patient had elevated blood pressure in the hospital and was started on amlodipine 2.5 mg daily.  As an outpatient in the remote past he had been on amlodipine but had to be discontinued because of near syncope and lightheadedness.  He has not experienced any of that since discharge on the 2.5 mg dose.  He does continue to experience a intermittent mild to moderate dizziness.  Meclizine helps.    Past Medical History:   Diagnosis Date     Acute blood loss anemia      Acute renal failure (H)      Anemia      Bacteremia      Cataract      Chronic gout      CKD (chronic kidney disease)      Stage 3     DM2 (diabetes mellitus, type 2) (H)      GERD (gastroesophageal reflux disease)      Glucose intolerance (impaired glucose tolerance)      Gout      History of nephrolithiasis      History of prostatitis 2017     Hyperlipidemia      Hypertension      Insomnia      Intestinal disaccharidase deficiencies and disaccharide malabsorption     Created by Conversion      Latent tuberculosis      Nephrolithiasis      Neuropathy      Nonspecific abnormal findings on radiological and other examination of skull and head     Created by [facility] Annotation: 2013 11:23PM - Giulia Meridai/10/2011:  severe stenosis both posterior cerebral arteries seen MRI/MRA done for  vertigo. No acute changes.e*     Osteoarthritis     wrist, knee, shoulder     Prostatitis      Rectal bleed      Trigger thumb      Patient Active Problem List   Diagnosis     Esophageal reflux     Dyslipidemia     Nephrolithiasis     Pseudogout     Latent Tuberculosis     Generalized Osteoarthritis Of The Hand     Lumbar Disc Degeneration     Insomnia     Chronic Gout     CKD (chronic kidney disease) stage 3, GFR 30-59 ml/min (H)     Elevated PSA     Prostate nodule     Cataracts, bilateral     Constipation, unspecified constipation type     Bilateral chronic knee pain     Chronic right shoulder pain     Essential hypertension with goal blood pressure less than 140/90     BPH (benign prostatic hyperplasia)     Chronic gout     Coronary artery disease due to lipid rich plaque     Right lower quadrant abdominal pain     Colitis     Primary osteoarthritis involving multiple joints     Urinary incontinence     Type 2 diabetes mellitus with stage 3 chronic kidney disease, without long-term current use of insulin (H)     ACE-inhibitor cough     Vertigo     Essential hypertension     Vertebral artery occlusion, left     Current Outpatient Medications   Medication Sig Dispense Refill     acetaminophen (TYLENOL) 325 MG tablet Take 2  tablets by mouth every 6 hours as needed       acetaminophen (TYLENOL) 325 MG tablet Take 2 tablets (650 mg) by mouth 3 times daily for 30 days 180 tablet 0     allopurinol (ZYLOPRIM) 100 MG tablet TAKE 2 TABLETS BY MOUTH EVERY  tablet 7     amLODIPine (NORVASC) 2.5 MG tablet Take 1 tablet (2.5 mg) by mouth daily 90 tablet 3     aspirin (ASA) 81 MG EC tablet Take 1 tablet (81 mg) by mouth daily 90 tablet 3     clopidogrel (PLAVIX) 75 MG tablet Take 1 tablet (75 mg) by mouth daily 90 tablet 3     docusate sodium (COLACE) 100 MG capsule TAKE 1 CAPSULE(100 MG) BY MOUTH TWICE DAILY AS NEEDED FOR CONSTIPATION 180 capsule 3     DULoxetine (CYMBALTA) 20 MG capsule TAKE 1 CAPSULE(20 MG) BY MOUTH TWICE DAILY 60 capsule 1     gabapentin (NEURONTIN) 300 MG capsule TAKE 1 TO 2 CAPSULES BY MOUTH AT BEDTIME 180 capsule 3     HYDROcodone-acetaminophen (NORCO) 5-325 MG tablet Take 1 tablet by mouth daily as needed for severe pain (7-10) 30 tablet 0     isosorbide mononitrate (IMDUR) 30 MG 24 hr tablet Take 1 tablet (30 mg) by mouth daily 90 tablet 3     JANUVIA 50 MG tablet TAKE 1 TABLET(50 MG) BY MOUTH DAILY 90 tablet 3     lidocaine (XYLOCAINE) 2 % external gel Apply topically 3 times daily Apply on neck back tid 85 g 0     losartan (COZAAR) 50 MG tablet TAKE 1 TABLET(50 MG) BY MOUTH DAILY 90 tablet 3     meclizine (ANTIVERT) 12.5 MG tablet Take 1 tablet (12.5 mg) by mouth 3 times daily as needed for dizziness 60 tablet 6     metoprolol succinate ER (TOPROL XL) 100 MG 24 hr tablet TAKE 1 TABLET(100 MG) BY MOUTH DAILY 90 tablet 3     nitroGLYcerin (NITROSTAT) 0.4 MG sublingual tablet PLACE 1 TABLET UNDER THE TONGUE EVERY 5 MINUTES AS NEEDED FOR CHEST PAIN. 25 tablet 6     pantoprazole (PROTONIX) 40 MG EC tablet Take 1 tablet (40 mg) by mouth every morning (before breakfast) 90 tablet 3     rosuvastatin (CRESTOR) 20 MG tablet TAKE 1 TABLET(20 MG) BY MOUTH AT BEDTIME 90 tablet 0     triamcinolone (KENALOG) 0.1 % external  "cream Apply topically 2 times daily as needed for irritation 80 g 3     History   Smoking Status     Former     Types: Cigarettes     Quit date: 3/10/2000   Smokeless Tobacco     Former       OBJECTICE: /82   Pulse 78   Temp 98.4  F (36.9  C) (Oral)   Resp 16   Ht 1.549 m (5' 0.98\")   Wt 73.7 kg (162 lb 6.4 oz)   SpO2 98%   BMI 30.70 kg/m       No results found for this or any previous visit (from the past 24 hour(s)).     GEN-alert, appropriate, in no apparent distress   CV-regular rate and rhythm   RESP-lungs clear   Neurologic-cranial nerves II through XII are intact.  Gait is age-appropriate, strength is symmetric.       Chucho Espino MD   "

## 2023-02-01 ENCOUNTER — APPOINTMENT (OUTPATIENT)
Dept: CT IMAGING | Facility: HOSPITAL | Age: 83
DRG: 066 | End: 2023-02-01
Attending: EMERGENCY MEDICINE
Payer: COMMERCIAL

## 2023-02-01 ENCOUNTER — APPOINTMENT (OUTPATIENT)
Dept: MRI IMAGING | Facility: HOSPITAL | Age: 83
DRG: 066 | End: 2023-02-01
Attending: EMERGENCY MEDICINE
Payer: COMMERCIAL

## 2023-02-01 ENCOUNTER — HOSPITAL ENCOUNTER (INPATIENT)
Facility: HOSPITAL | Age: 83
LOS: 5 days | Discharge: ANOTHER HOSPITAL | DRG: 066 | End: 2023-02-06
Attending: EMERGENCY MEDICINE | Admitting: STUDENT IN AN ORGANIZED HEALTH CARE EDUCATION/TRAINING PROGRAM
Payer: COMMERCIAL

## 2023-02-01 DIAGNOSIS — I63.9 CEREBELLAR STROKE (H): ICD-10-CM

## 2023-02-01 DIAGNOSIS — N40.1 BENIGN PROSTATIC HYPERPLASIA WITH INCOMPLETE BLADDER EMPTYING: ICD-10-CM

## 2023-02-01 DIAGNOSIS — R42 VERTIGO: ICD-10-CM

## 2023-02-01 DIAGNOSIS — R39.14 BENIGN PROSTATIC HYPERPLASIA WITH INCOMPLETE BLADDER EMPTYING: ICD-10-CM

## 2023-02-01 DIAGNOSIS — I65.02 VERTEBRAL ARTERY OCCLUSION, LEFT: Primary | ICD-10-CM

## 2023-02-01 LAB
ALBUMIN SERPL BCG-MCNC: 4.3 G/DL (ref 3.5–5.2)
ALP SERPL-CCNC: 92 U/L (ref 40–129)
ALT SERPL W P-5'-P-CCNC: 38 U/L (ref 10–50)
ANION GAP SERPL CALCULATED.3IONS-SCNC: 11 MMOL/L (ref 7–15)
AST SERPL W P-5'-P-CCNC: 34 U/L (ref 10–50)
BASOPHILS # BLD AUTO: 0.1 10E3/UL (ref 0–0.2)
BASOPHILS NFR BLD AUTO: 1 %
BILIRUB DIRECT SERPL-MCNC: <0.2 MG/DL (ref 0–0.3)
BILIRUB SERPL-MCNC: 0.4 MG/DL
BUN SERPL-MCNC: 28.3 MG/DL (ref 8–23)
CALCIUM SERPL-MCNC: 10 MG/DL (ref 8.8–10.2)
CHLORIDE SERPL-SCNC: 96 MMOL/L (ref 98–107)
CREAT SERPL-MCNC: 1.55 MG/DL (ref 0.67–1.17)
DEPRECATED HCO3 PLAS-SCNC: 24 MMOL/L (ref 22–29)
EOSINOPHIL # BLD AUTO: 0.7 10E3/UL (ref 0–0.7)
EOSINOPHIL NFR BLD AUTO: 7 %
ERYTHROCYTE [DISTWIDTH] IN BLOOD BY AUTOMATED COUNT: 13.9 % (ref 10–15)
GFR SERPL CREATININE-BSD FRML MDRD: 45 ML/MIN/1.73M2
GLUCOSE BLDC GLUCOMTR-MCNC: 195 MG/DL (ref 70–99)
GLUCOSE SERPL-MCNC: 238 MG/DL (ref 70–99)
HCT VFR BLD AUTO: 40.8 % (ref 40–53)
HGB BLD-MCNC: 13.5 G/DL (ref 13.3–17.7)
IMM GRANULOCYTES # BLD: 0 10E3/UL
IMM GRANULOCYTES NFR BLD: 0 %
LYMPHOCYTES # BLD AUTO: 2 10E3/UL (ref 0.8–5.3)
LYMPHOCYTES NFR BLD AUTO: 21 %
MAGNESIUM SERPL-MCNC: 1.7 MG/DL (ref 1.7–2.3)
MCH RBC QN AUTO: 30.4 PG (ref 26.5–33)
MCHC RBC AUTO-ENTMCNC: 33.1 G/DL (ref 31.5–36.5)
MCV RBC AUTO: 92 FL (ref 78–100)
MONOCYTES # BLD AUTO: 0.8 10E3/UL (ref 0–1.3)
MONOCYTES NFR BLD AUTO: 9 %
NEUTROPHILS # BLD AUTO: 5.9 10E3/UL (ref 1.6–8.3)
NEUTROPHILS NFR BLD AUTO: 62 %
NRBC # BLD AUTO: 0 10E3/UL
NRBC BLD AUTO-RTO: 0 /100
PLATELET # BLD AUTO: 221 10E3/UL (ref 150–450)
POTASSIUM SERPL-SCNC: 4.2 MMOL/L (ref 3.4–5.3)
PROT SERPL-MCNC: 8.3 G/DL (ref 6.4–8.3)
RBC # BLD AUTO: 4.44 10E6/UL (ref 4.4–5.9)
SARS-COV-2 RNA RESP QL NAA+PROBE: NEGATIVE
SODIUM SERPL-SCNC: 131 MMOL/L (ref 136–145)
TROPONIN T SERPL HS-MCNC: 8 NG/L
WBC # BLD AUTO: 9.5 10E3/UL (ref 4–11)

## 2023-02-01 PROCEDURE — 82248 BILIRUBIN DIRECT: CPT | Performed by: EMERGENCY MEDICINE

## 2023-02-01 PROCEDURE — 36415 COLL VENOUS BLD VENIPUNCTURE: CPT | Performed by: EMERGENCY MEDICINE

## 2023-02-01 PROCEDURE — 120N000001 HC R&B MED SURG/OB

## 2023-02-01 PROCEDURE — 85025 COMPLETE CBC W/AUTO DIFF WBC: CPT | Performed by: EMERGENCY MEDICINE

## 2023-02-01 PROCEDURE — 83735 ASSAY OF MAGNESIUM: CPT | Performed by: EMERGENCY MEDICINE

## 2023-02-01 PROCEDURE — 82310 ASSAY OF CALCIUM: CPT | Performed by: EMERGENCY MEDICINE

## 2023-02-01 PROCEDURE — C9803 HOPD COVID-19 SPEC COLLECT: HCPCS

## 2023-02-01 PROCEDURE — 80061 LIPID PANEL: CPT | Performed by: STUDENT IN AN ORGANIZED HEALTH CARE EDUCATION/TRAINING PROGRAM

## 2023-02-01 PROCEDURE — 93005 ELECTROCARDIOGRAM TRACING: CPT | Performed by: EMERGENCY MEDICINE

## 2023-02-01 PROCEDURE — 96360 HYDRATION IV INFUSION INIT: CPT

## 2023-02-01 PROCEDURE — 255N000002 HC RX 255 OP 636: Performed by: EMERGENCY MEDICINE

## 2023-02-01 PROCEDURE — U0005 INFEC AGEN DETEC AMPLI PROBE: HCPCS | Performed by: EMERGENCY MEDICINE

## 2023-02-01 PROCEDURE — 96361 HYDRATE IV INFUSION ADD-ON: CPT

## 2023-02-01 PROCEDURE — 258N000003 HC RX IP 258 OP 636: Performed by: EMERGENCY MEDICINE

## 2023-02-01 PROCEDURE — 99285 EMERGENCY DEPT VISIT HI MDM: CPT | Mod: 25

## 2023-02-01 PROCEDURE — 250N000013 HC RX MED GY IP 250 OP 250 PS 637: Performed by: EMERGENCY MEDICINE

## 2023-02-01 PROCEDURE — A9585 GADOBUTROL INJECTION: HCPCS | Performed by: EMERGENCY MEDICINE

## 2023-02-01 PROCEDURE — 84484 ASSAY OF TROPONIN QUANT: CPT | Performed by: EMERGENCY MEDICINE

## 2023-02-01 PROCEDURE — 99223 1ST HOSP IP/OBS HIGH 75: CPT | Mod: AI | Performed by: STUDENT IN AN ORGANIZED HEALTH CARE EDUCATION/TRAINING PROGRAM

## 2023-02-01 PROCEDURE — 70553 MRI BRAIN STEM W/O & W/DYE: CPT

## 2023-02-01 PROCEDURE — 250N000011 HC RX IP 250 OP 636: Performed by: EMERGENCY MEDICINE

## 2023-02-01 PROCEDURE — 70498 CT ANGIOGRAPHY NECK: CPT

## 2023-02-01 PROCEDURE — 250N000013 HC RX MED GY IP 250 OP 250 PS 637: Performed by: STUDENT IN AN ORGANIZED HEALTH CARE EDUCATION/TRAINING PROGRAM

## 2023-02-01 PROCEDURE — 70496 CT ANGIOGRAPHY HEAD: CPT

## 2023-02-01 RX ORDER — ACETAMINOPHEN 650 MG/1
650 SUPPOSITORY RECTAL EVERY 6 HOURS PRN
Status: DISCONTINUED | OUTPATIENT
Start: 2023-02-01 | End: 2023-02-04

## 2023-02-01 RX ORDER — GADOBUTROL 604.72 MG/ML
7 INJECTION INTRAVENOUS ONCE
Status: COMPLETED | OUTPATIENT
Start: 2023-02-01 | End: 2023-02-01

## 2023-02-01 RX ORDER — ISOSORBIDE MONONITRATE 30 MG/1
30 TABLET, EXTENDED RELEASE ORAL DAILY
Status: DISCONTINUED | OUTPATIENT
Start: 2023-02-02 | End: 2023-02-04

## 2023-02-01 RX ORDER — PANTOPRAZOLE SODIUM 40 MG/1
40 TABLET, DELAYED RELEASE ORAL
Status: DISCONTINUED | OUTPATIENT
Start: 2023-02-02 | End: 2023-02-06 | Stop reason: HOSPADM

## 2023-02-01 RX ORDER — MECLIZINE HCL 12.5 MG 12.5 MG/1
25 TABLET ORAL ONCE
Status: COMPLETED | OUTPATIENT
Start: 2023-02-01 | End: 2023-02-01

## 2023-02-01 RX ORDER — IOPAMIDOL 755 MG/ML
75 INJECTION, SOLUTION INTRAVASCULAR ONCE
Status: COMPLETED | OUTPATIENT
Start: 2023-02-01 | End: 2023-02-01

## 2023-02-01 RX ORDER — MECLIZINE HCL 12.5 MG 12.5 MG/1
12.5 TABLET ORAL 3 TIMES DAILY PRN
Status: DISCONTINUED | OUTPATIENT
Start: 2023-02-01 | End: 2023-02-06 | Stop reason: HOSPADM

## 2023-02-01 RX ORDER — CLOPIDOGREL BISULFATE 75 MG/1
75 TABLET ORAL DAILY
Status: DISCONTINUED | OUTPATIENT
Start: 2023-02-02 | End: 2023-02-04

## 2023-02-01 RX ORDER — NITROGLYCERIN 0.4 MG/1
0.4 TABLET SUBLINGUAL EVERY 5 MIN PRN
Status: DISCONTINUED | OUTPATIENT
Start: 2023-02-01 | End: 2023-02-06 | Stop reason: HOSPADM

## 2023-02-01 RX ORDER — AMLODIPINE BESYLATE 2.5 MG/1
2.5 TABLET ORAL DAILY
Status: DISCONTINUED | OUTPATIENT
Start: 2023-02-02 | End: 2023-02-04

## 2023-02-01 RX ORDER — ASPIRIN 81 MG/1
81 TABLET ORAL DAILY
Status: DISCONTINUED | OUTPATIENT
Start: 2023-02-02 | End: 2023-02-06 | Stop reason: HOSPADM

## 2023-02-01 RX ORDER — LOSARTAN POTASSIUM 50 MG/1
50 TABLET ORAL DAILY
Status: DISCONTINUED | OUTPATIENT
Start: 2023-02-02 | End: 2023-02-04

## 2023-02-01 RX ORDER — ACETAMINOPHEN 325 MG/1
650 TABLET ORAL EVERY 6 HOURS PRN
Status: DISCONTINUED | OUTPATIENT
Start: 2023-02-01 | End: 2023-02-04

## 2023-02-01 RX ORDER — ROSUVASTATIN CALCIUM 10 MG/1
20 TABLET, COATED ORAL AT BEDTIME
Status: DISCONTINUED | OUTPATIENT
Start: 2023-02-01 | End: 2023-02-06 | Stop reason: HOSPADM

## 2023-02-01 RX ORDER — NALOXONE HYDROCHLORIDE 0.4 MG/ML
0.4 INJECTION, SOLUTION INTRAMUSCULAR; INTRAVENOUS; SUBCUTANEOUS
Status: DISCONTINUED | OUTPATIENT
Start: 2023-02-01 | End: 2023-02-06 | Stop reason: HOSPADM

## 2023-02-01 RX ORDER — LORAZEPAM 2 MG/ML
1 INJECTION INTRAMUSCULAR ONCE
Status: COMPLETED | OUTPATIENT
Start: 2023-02-01 | End: 2023-02-01

## 2023-02-01 RX ORDER — NALOXONE HYDROCHLORIDE 0.4 MG/ML
0.2 INJECTION, SOLUTION INTRAMUSCULAR; INTRAVENOUS; SUBCUTANEOUS
Status: DISCONTINUED | OUTPATIENT
Start: 2023-02-01 | End: 2023-02-06 | Stop reason: HOSPADM

## 2023-02-01 RX ORDER — LIDOCAINE 40 MG/G
CREAM TOPICAL
Status: DISCONTINUED | OUTPATIENT
Start: 2023-02-01 | End: 2023-02-06 | Stop reason: HOSPADM

## 2023-02-01 RX ORDER — ALLOPURINOL 100 MG/1
200 TABLET ORAL DAILY
Status: DISCONTINUED | OUTPATIENT
Start: 2023-02-02 | End: 2023-02-06 | Stop reason: HOSPADM

## 2023-02-01 RX ORDER — DULOXETIN HYDROCHLORIDE 20 MG/1
20 CAPSULE, DELAYED RELEASE ORAL 2 TIMES DAILY
Status: DISCONTINUED | OUTPATIENT
Start: 2023-02-01 | End: 2023-02-06 | Stop reason: HOSPADM

## 2023-02-01 RX ORDER — METOPROLOL SUCCINATE 100 MG/1
100 TABLET, EXTENDED RELEASE ORAL DAILY
Status: DISCONTINUED | OUTPATIENT
Start: 2023-02-02 | End: 2023-02-04

## 2023-02-01 RX ORDER — DOCUSATE SODIUM 100 MG/1
100 CAPSULE, LIQUID FILLED ORAL DAILY
Status: DISCONTINUED | OUTPATIENT
Start: 2023-02-02 | End: 2023-02-06 | Stop reason: HOSPADM

## 2023-02-01 RX ORDER — HYDROCODONE BITARTRATE AND ACETAMINOPHEN 5; 325 MG/1; MG/1
1 TABLET ORAL DAILY PRN
Status: DISCONTINUED | OUTPATIENT
Start: 2023-02-01 | End: 2023-02-04

## 2023-02-01 RX ADMIN — DULOXETINE HYDROCHLORIDE 20 MG: 20 CAPSULE, DELAYED RELEASE ORAL at 22:23

## 2023-02-01 RX ADMIN — MECLIZINE HCL 12.5 MG 25 MG: 12.5 TABLET ORAL at 12:23

## 2023-02-01 RX ADMIN — SODIUM CHLORIDE 500 ML: 9 INJECTION, SOLUTION INTRAVENOUS at 12:25

## 2023-02-01 RX ADMIN — ACETAMINOPHEN 650 MG: 325 TABLET ORAL at 22:23

## 2023-02-01 RX ADMIN — ROSUVASTATIN CALCIUM 20 MG: 10 TABLET, FILM COATED ORAL at 22:23

## 2023-02-01 RX ADMIN — IOPAMIDOL 75 ML: 755 INJECTION, SOLUTION INTRAVENOUS at 13:38

## 2023-02-01 RX ADMIN — GADOBUTROL 7 ML: 604.72 INJECTION INTRAVENOUS at 17:21

## 2023-02-01 ASSESSMENT — ACTIVITIES OF DAILY LIVING (ADL)
DOING_ERRANDS_INDEPENDENTLY_DIFFICULTY: YES
WEAR_GLASSES_OR_BLIND: NO
ADLS_ACUITY_SCORE: 35
ADLS_ACUITY_SCORE: 35
DRESSING/BATHING_DIFFICULTY: NO
ADLS_ACUITY_SCORE: 35
ADLS_ACUITY_SCORE: 35
FALL_HISTORY_WITHIN_LAST_SIX_MONTHS: NO
CHANGE_IN_FUNCTIONAL_STATUS_SINCE_ONSET_OF_CURRENT_ILLNESS/INJURY: YES
TOILETING_ISSUES: NO
DIFFICULTY_EATING/SWALLOWING: NO
CONCENTRATING,_REMEMBERING_OR_MAKING_DECISIONS_DIFFICULTY: NO
ADLS_ACUITY_SCORE: 37
WALKING_OR_CLIMBING_STAIRS_DIFFICULTY: NO

## 2023-02-01 NOTE — ED TRIAGE NOTES
Patient presents to ED for evaluation of chronic headache and dizziness. This episode began last night before he went to bed. Needs to see neurologist but has not yet.     Triage Assessment     Row Name 02/01/23 1145       Triage Assessment (Adult)    Airway WDL WDL       Respiratory WDL    Respiratory WDL WDL       Skin Circulation/Temperature WDL    Skin Circulation/Temperature WDL WDL       Cardiac WDL    Cardiac WDL WDL       Peripheral/Neurovascular WDL    Peripheral Neurovascular WDL WDL       Cognitive/Neuro/Behavioral WDL    Cognitive/Neuro/Behavioral WDL WDL

## 2023-02-01 NOTE — ED NOTES
"ED Provider In Triage Note  [facility]  Encounter Date: Feb 1, 2023    Chief Complaint   Patient presents with     Headache     Dizziness       Brief HPI:   Adam Talamantes is a 82 year old male presenting to the Emergency Department with a chief complaint of headache and dizziness since last night.  BHARDWAJ is more of a chronic thing talking with family.  HA worsened last night and felt \"dizzy\".  Pt with ongoing issues with dizziness and HA for some time, seen previously and diagnosed with vertebral artery occlusion, referred to neuro and not set to see until July.  Has meclizine, but did not take.      Brief Physical Exam:  /83   Pulse 88   Temp 98.5  F (36.9  C) (Temporal)   Resp 18   Ht 1.549 m (5' 1\")   Wt 73.6 kg (162 lb 3.2 oz)   SpO2 97%   BMI 30.65 kg/m    General: Non-toxic appearing  HEENT: Atraumatic  Resp: No respiratory distress  Abdomen: Non-peritoneal  Neuro: Alert, oriented, answers questions appropriately, normal finger to nose b/l, 5/5 strength b/l UE/LE  Psych: Behavior appropriate    Plan Initiated in Triage:  Labs, imaging, meds. Pt with ongoing issues related to this, previously seen/evaluated.  Symptoms again since last night. Not a stroke code as a result.      PIT Dispo:   Return to [facility] while awaiting workup and ED bed availability    Homer Combs MD on 2/1/2023 at 11:42 AM    Patient was evaluated by the Physician in Triage due to a limitation of available rooms in the Emergency Department. A plan of care was discussed based on the information obtained on the initial evaluation and patient was consuled to return back to the Emergency Department lobby after this initial evalutaiton until results were obtained or a room became available in the Emergency Department. Patient was counseled not to leave prior to receiving the results of their workup.     Homer Combs MD  [facility] EMERGENCY DEPARTMENT  [address] " [facility] [id number]  [phone]       Homer Combs MD  02/01/23 1207

## 2023-02-01 NOTE — ED PROVIDER NOTES
EMERGENCY DEPARTMENT ENCOUNTER      NAME: Adam Talamantes  AGE: 82 year old male  YOB: 1940  MRN: [id number]  EVALUATION DATE & TIME: 2/1/2023  2:46 PM    PCP: Chucho Espino    ED PROVIDER: Florencia Lubin M.D.      Chief Complaint   Patient presents with     Headache     Dizziness         FINAL IMPRESSION:  1. Vertigo    2. Cerebellar stroke (H)          ED COURSE & MEDICAL DECISION MAKING:    ED Course as of 02/01/23 2015 Wed Feb 01, 2023   1539 Patient with neuro intact right neck pain and worsening chronic ongoing vertigo with admission 3 weeks ago with vertigo and HA, slightly worsened last night, now 8/10, posistional without tinnitus or hearing change. CTA head/neck without change compared to 3 weeks ago with vertebral artery occlusion, MRI with and without contrast pending to assess for new infarct component and will clinically reassess, patient somewhat improved s/p meclizine here, daughter at bedside notes he lives with his OTHER daughter and prescribed meclizine which she brought along with her (pill bottle full) but they have not been giving that medication to him to use at home and she is not sure why. No recent falls / trauma, MRI pending, patient and daughter at bedside ameanable to plan.   1757 I spoke with radiology who notes multiple ischemic cerebellar infarcts and one in left thalamus as well acute appearing or early subacute   1826 I spoke [facility] stroke neurology Dr Cunningham re: patient with multiple new infarcts and on ASA and plavix with severe basilar stenosis, vertebral artery occlusion same as prior. Per Dr Cunningham, do not at this time add xarelto as he may be hypoperfusing but he will review imaging and call back to discuss heparin and admit stroke neuro, ultimately stenting could be considered but very high risk procedure overall as last resort in this situaiton   1834 Patient and family updated and amenable to plan to admit for stroke team dispo and blood thinners if recommended and  they are eager to speak with stroke neurology team while admitted re: whether interventional option sounds like a good option or bad option, aware it would be very high risk.   1841 Patient endorsed to hospitalist to neuro tele   1920 Per stroke neuro who assessed imaging, stroke isn't very large but very severe appearing stenosis overall, neuro will discuss stent vs. No stent while admitted. He will d/w family re: planning, angiogram likely first while admitted. He will talk to family now,.       Pertinent Labs & Imaging studies reviewed. (See chart for details)    N95 worn  A face shield was worn also  COVID PPE    Medical Decision Making    History:    Supplemental history from: Family Member/Significant Other    External Record(s) reviewed: Documented in chart, if applicable.    Work Up:    Chart documentation includes differential considered and any EKGs or imaging independently interpreted by provider, where specified.    In additional to work up documented, I considered the following work up: Documented in chart, if applicable.    External consultation:    Discussion of management with another provider: Documented in chart, if applicable    Complicating factors:    Care impacted by chronic illness: Anticoagulated State, Diabetes, Heart Disease, Hyperlipidemia, Hypertension and Peripheral Vascular Disease    Care affected by social determinants of health: N/A    Disposition considerations: Admit.      3:36 PM I met with the patient, obtained history, performed an initial exam, and discussed options and plan for diagnostics and treatment here in the ED.   6:06 PM I spoke with Stroke Neuro.  6:34 PM I spoke with Dr. Cunningham at Stroke Neuro again.  6:40 PM I spoke with the accepting hospitalist, Dr. Anthony Cruz.  7:20 PM I spoke with Dr. Cunningham at Stroke neurology.    At the conclusion of the encounter I discussed the results of all of the tests and the disposition. The questions were answered. The  patient or family acknowledged understanding and was agreeable with the care plan.     MEDICATIONS GIVEN IN THE EMERGENCY:  Medications   0.9% sodium chloride BOLUS (0 mLs Intravenous Stopped 2/1/23 1458)   meclizine (ANTIVERT) tablet 25 mg (25 mg Oral Given 2/1/23 1223)   iopamidol (ISOVUE-370) solution 75 mL (75 mLs Intravenous Given 2/1/23 1338)   LORazepam (ATIVAN) injection 1 mg (1 mg Intravenous Not Given 2/1/23 1600)   gadobutrol (GADAVIST) injection 7 mL (7 mLs Intravenous Given 2/1/23 1721)       NEW PRESCRIPTIONS STARTED AT TODAY'S ER VISIT  New Prescriptions    No medications on file          =================================================================    HPI      Adam Talamantes is a 82 year old male with PMHx of recurrent vertigo, hypertension, hyperlipidemia, DM2, CKD3, prior left vertebral artery occlusion on aspirin and Plavix who presents to the ED today via wheelchair with dizziness and headache.    Chart Review: (1/11/2023): The patient was seen in [facility] for evaluation of chronic right shoulder pain and dizziness. The chest x-ray was unremarkable.    IMPRESSION:  HEAD MRI:   1.  No acute intracranial process.  2.  Generalized brain atrophy and presumed microvascular ischemic changes as detailed above.  3.  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     HEAD MRA:   1.  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  2.  Scattered intracranial atherosclerosis of the remainder of the intracranial arterial vasculature, as described.     NECK MRA:  1.  Diminutive left vertebral artery with a diffusely irregular appearance and occlusion of the V3 and V4 segment.  2.  No flow-limiting stenosis in the remainder of the cervical arterial vasculature.     The patient's daughter is in the room and translating to obtain history and perform physical exam.    Three weeks  ago the patient was in the hospital for dizziness and headaches and has been prescribed Meclizine as well as aspirin and Plavix. His dizziness and a right posterior headache have persisted but last night they peaked and he did not want to eat or drink and was unable to sleep. He also had some neck pain that started on the left but is now bilateral. When the headaches get worse he has mild chest pain and shortness of breath which he has right now. He took Tylenol today and his dizziness improved from 8/10 to 7/10 but his headache is still bothering him. He has not taken Meclizine today but did take aspirin and Plavix.    He denies fever, sickness, abdominal pain, head trauma, or any other complaints at this time.       SHx: The patient has one daughter that is translating in the room but lives with a different daughter at home.    REVIEW OF SYSTEMS   All other systems reviewed and are negative except as noted above in HPI.    PAST MEDICAL HISTORY:  Past Medical History:   Diagnosis Date     Acute blood loss anemia      Acute renal failure (H)      Anemia      Bacteremia      Cataract      Chronic gout      CKD (chronic kidney disease)     Stage 3     DM2 (diabetes mellitus, type 2) (H)      GERD (gastroesophageal reflux disease)      Glucose intolerance (impaired glucose tolerance)      Gout      History of nephrolithiasis      History of prostatitis 2017     Hyperlipidemia      Hypertension      Insomnia      Intestinal disaccharidase deficiencies and disaccharide malabsorption     Created by Conversion      Latent tuberculosis      Nephrolithiasis      Neuropathy      Nonspecific abnormal findings on radiological and other examination of skull and head     Created by [facility] [facility] Annotation: 2013 11:23PM - Giulia Meridai/10/2011:  severe stenosis both posterior cerebral arteries seen MRI/MRA done for  vertigo. No acute changes.e*     Osteoarthritis     wrist, knee, shoulder     Prostatitis       Rectal bleed      Trigger thumb        PAST SURGICAL HISTORY:  Past Surgical History:   Procedure Laterality Date     IR MISCELLANEOUS PROCEDURE  2009     IR MISCELLANEOUS PROCEDURE  2009     IR MISCELLANEOUS PROCEDURE  2009     IR MISCELLANEOUS PROCEDURE  2009     IR NEPHROURETERAL TUBE PLACEMENT BILATERAL  2009     IR URETER DILATION BILATERAL  2009     IR URETER DILATION BILATERAL  2009     KIDNEY STONE SURGERY       PICC  3/6/2016            CURRENT MEDICATIONS:    acetaminophen (TYLENOL) 325 MG tablet  acetaminophen (TYLENOL) 325 MG tablet  allopurinol (ZYLOPRIM) 100 MG tablet  amLODIPine (NORVASC) 2.5 MG tablet  aspirin (ASA) 81 MG EC tablet  clopidogrel (PLAVIX) 75 MG tablet  docusate sodium (COLACE) 100 MG capsule  DULoxetine (CYMBALTA) 20 MG capsule  HYDROcodone-acetaminophen (NORCO) 5-325 MG tablet  isosorbide mononitrate (IMDUR) 30 MG 24 hr tablet  JANUVIA 50 MG tablet  lidocaine (XYLOCAINE) 2 % external gel  losartan (COZAAR) 50 MG tablet  meclizine (ANTIVERT) 12.5 MG tablet  metoprolol succinate ER (TOPROL XL) 100 MG 24 hr tablet  nitroGLYcerin (NITROSTAT) 0.4 MG sublingual tablet  pantoprazole (PROTONIX) 40 MG EC tablet  rosuvastatin (CRESTOR) 20 MG tablet  triamcinolone (KENALOG) 0.1 % external cream  gabapentin (NEURONTIN) 300 MG capsule        ALLERGIES:  No Known Allergies    FAMILY HISTORY:  Family History   Problem Relation Age of Onset     Cerebrovascular Disease Father      Cerebrovascular Disease Daughter        SOCIAL HISTORY:   Social History     Socioeconomic History     Marital status:    Tobacco Use     Smoking status: Former     Types: Cigarettes     Quit date: 3/10/2000     Years since quittin.9     Smokeless tobacco: Former     Tobacco comments:     no passive exposure   Substance and Sexual Activity     Alcohol use: No     Drug use: No     Sexual activity: Never     Partners: Female     Social Determinants of Health     Financial  Resource Strain: Low Risk      Difficulty of Paying Living Expenses: Not hard at all   Food Insecurity: No Food Insecurity     Worried About Running Out of Food in the Last Year: Never true     Ran Out of Food in the Last Year: Never true   Transportation Needs: No Transportation Needs     Lack of Transportation (Medical): No     Lack of Transportation (Non-Medical): No   Physical Activity: Inactive     Days of Exercise per Week: 0 days     Minutes of Exercise per Session: 0 min   Stress: No Stress Concern Present     Feeling of Stress : Not at all   Social Connections: Socially Integrated     Frequency of Communication with Friends and Family: Once a week     Frequency of Social Gatherings with Friends and Family: Twice a week     Attends Rastafari Services: 1 to 4 times per year     Active Member of Clubs or Organizations: No     Attends Club or Organization Meetings: 1 to 4 times per year     Marital Status:    Intimate Partner Violence: Not At Risk     Fear of Current or Ex-Partner: No     Emotionally Abused: No     Physically Abused: No     Sexually Abused: No   Housing Stability: Unknown     Unable to Pay for Housing in the Last Year: No     Unstable Housing in the Last Year: No       VITALS:  Patient Vitals for the past 24 hrs:   BP Temp Temp src Pulse Resp SpO2 Height Weight   02/01/23 2015 (!) 167/77 -- -- 78 -- 96 % -- --   02/01/23 2000 -- -- -- 78 -- 97 % -- --   02/01/23 1900 133/80 -- -- 78 -- 96 % -- --   02/01/23 1800 133/87 -- -- 78 -- 97 % -- --   02/01/23 1755 (!) 144/87 -- -- 80 -- 96 % -- --   02/01/23 1700 -- -- -- 82 29 95 % -- --   02/01/23 1645 (!) 145/78 -- -- 79 21 93 % -- --   02/01/23 1630 (!) 145/79 -- -- 80 19 94 % -- --   02/01/23 1600 -- -- -- 80 24 96 % -- --   02/01/23 1545 (!) 156/88 -- -- 76 21 96 % -- --   02/01/23 1530 (!) 156/98 -- -- 78 24 97 % -- --   02/01/23 1515 135/86 -- -- 81 19 96 % -- --   02/01/23 1500 (!) 140/86 -- -- 80 21 97 % -- --   02/01/23 1457 (!)  "140/86 -- -- 79 24 98 % -- --   02/01/23 1454 136/85 -- -- -- -- -- -- --   02/01/23 1144 119/83 98.5  F (36.9  C) Temporal 88 18 97 % 1.549 m (5' 1\") 73.6 kg (162 lb 3.2 oz)       PHYSICAL EXAM    GENERAL: Awake, alert.  In no acute distress.   HEENT: Normocephalic, atraumatic.  Pupils equal, round and reactive.  Conjunctiva normal.  EOMI.  NECK: No stridor or apparent deformity.  PULMONARY: Symmetrical breath sounds without distress.  Lungs clear to auscultation bilaterally without wheezes, rhonchi or rales.  CARDIO: Regular rate and rhythm.  No significant murmur, rub or gallop.  Radial pulses strong and symmetrical.  ABDOMINAL: Abdomen soft, non-distended and non-tender to palpation.  No CVAT, no palpable hepatosplenomegaly.  EXTREMITIES: No lower extremity swelling or edema.    NEURO: Alert and oriented to person, place and time.  Cranial nerves grossly intact.  No focal motor deficit. No nystagmus.  PSYCH: Normal mood and affect  SKIN: No rashes      LAB:  All pertinent labs reviewed and interpreted.  Results for orders placed or performed during the hospital encounter of 02/01/23   CTA Head Neck with Contrast    Impression    IMPRESSION:   HEAD CT:  1.  No acute intracranial process.  2.  Stable mild to moderate chronic small vessel ischemic disease and generalized brain parenchymal volume loss.    HEAD CTA:   1. No significant change since 01/11/2023. Occlusion of the left vertebral artery V4 segment and right posterior cerebral artery.  2. 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  3. Fetal origin left posterior cerebral artery demonstrates moderate stenosis of the left P2 segment and mild multifocal stenoses of its P2 and P3 segments  4. Mild to moderate stenosis of the cavernous segment of the right internal carotid artery.  5. Widely patent anterior and middle cerebral artery branches.    NECK CTA:  1.  No significant change since 01/11/2023. Occlusion of the left vertebral artery at the " level of the V3 segment.  2.  Mild atherosclerotic plaque at the carotid bifurcations. No significant carotid stenosis.  3.  Widely patent dominant right vertebral artery.   MR Brain w/o & w Contrast    Impression    IMPRESSION:  1.  Numerous acute-to-subacute infarctions of both cerebellar hemispheres. Left thalamic infarction of similar age.  2.  No mass effect or evidence of hemorrhagic transformation.  3.  Chronic ischemic changes and age-related changes as above.    Results discussed with Florencia Lubin on 2/1/2023 5:58 PM.   Basic metabolic panel   Result Value Ref Range    Sodium 131 (L) 136 - 145 mmol/L    Potassium 4.2 3.4 - 5.3 mmol/L    Chloride 96 (L) 98 - 107 mmol/L    Carbon Dioxide (CO2) 24 22 - 29 mmol/L    Anion Gap 11 7 - 15 mmol/L    Urea Nitrogen 28.3 (H) 8.0 - 23.0 mg/dL    Creatinine 1.55 (H) 0.67 - 1.17 mg/dL    Calcium 10.0 8.8 - 10.2 mg/dL    Glucose 238 (H) 70 - 99 mg/dL    GFR Estimate 45 (L) >60 mL/min/1.73m2   Result Value Ref Range    Magnesium 1.7 1.7 - 2.3 mg/dL   Hepatic function panel   Result Value Ref Range    Protein Total 8.3 6.4 - 8.3 g/dL    Albumin 4.3 3.5 - 5.2 g/dL    Bilirubin Total 0.4 <=1.2 mg/dL    Alkaline Phosphatase 92 40 - 129 U/L    AST 34 10 - 50 U/L    ALT 38 10 - 50 U/L    Bilirubin Direct <0.20 0.00 - 0.30 mg/dL   CBC with platelets and differential   Result Value Ref Range    WBC Count 9.5 4.0 - 11.0 10e3/uL    RBC Count 4.44 4.40 - 5.90 10e6/uL    Hemoglobin 13.5 13.3 - 17.7 g/dL    Hematocrit 40.8 40.0 - 53.0 %    MCV 92 78 - 100 fL    MCH 30.4 26.5 - 33.0 pg    MCHC 33.1 31.5 - 36.5 g/dL    RDW 13.9 10.0 - 15.0 %    Platelet Count 221 150 - 450 10e3/uL    % Neutrophils 62 %    % Lymphocytes 21 %    % Monocytes 9 %    % Eosinophils 7 %    % Basophils 1 %    % Immature Granulocytes 0 %    NRBCs per 100 WBC 0 <1 /100    Absolute Neutrophils 5.9 1.6 - 8.3 10e3/uL    Absolute Lymphocytes 2.0 0.8 - 5.3 10e3/uL    Absolute Monocytes 0.8 0.0 - 1.3 10e3/uL    Absolute  Eosinophils 0.7 0.0 - 0.7 10e3/uL    Absolute Basophils 0.1 0.0 - 0.2 10e3/uL    Absolute Immature Granulocytes 0.0 <=0.4 10e3/uL    Absolute NRBCs 0.0 10e3/uL   Result Value Ref Range    Troponin T, High Sensitivity 8 <=22 ng/L   Asymptomatic COVID-19 Virus (Coronavirus) by PCR Nasopharyngeal    Specimen: Nasopharyngeal; Swab   Result Value Ref Range    SARS CoV2 PCR Negative Negative       RADIOLOGY:  Reviewed all pertinent imaging. Please see official radiology report.  MR Brain w/o & w Contrast   Final Result   IMPRESSION:   1.  Numerous acute-to-subacute infarctions of both cerebellar hemispheres. Left thalamic infarction of similar age.   2.  No mass effect or evidence of hemorrhagic transformation.   3.  Chronic ischemic changes and age-related changes as above.      Results discussed with Florencia Lubin on 2/1/2023 5:58 PM.      CTA Head Neck with Contrast   Final Result   IMPRESSION:    HEAD CT:   1.  No acute intracranial process.   2.  Stable mild to moderate chronic small vessel ischemic disease and generalized brain parenchymal volume loss.      HEAD CTA:    1. No significant change since 01/11/2023. Occlusion of the left vertebral artery V4 segment and right posterior cerebral artery.   2. 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   3. Fetal origin left posterior cerebral artery demonstrates moderate stenosis of the left P2 segment and mild multifocal stenoses of its P2 and P3 segments   4. Mild to moderate stenosis of the cavernous segment of the right internal carotid artery.   5. Widely patent anterior and middle cerebral artery branches.      NECK CTA:   1.  No significant change since 01/11/2023. Occlusion of the left vertebral artery at the level of the V3 segment.   2.  Mild atherosclerotic plaque at the carotid bifurcations. No significant carotid stenosis.   3.  Widely patent dominant right vertebral artery.            EKG:    Reviewed and interpreted as:     February 1, 2023,  3:40 PM, [facility] EMERGENCY DEPARTMENT    80 BPM  Normal sinus rhythm  Normal ECG  No ST changes    I have independently reviewed and interpreted the EKG(s) documented above.        I, Arpan Pretty, am serving as a scribe to document services personally performed by Dr. Florencia Lubin based on my observation and the provider's statements to me. I, Florencia Lubin MD attest that Arpan Pretty is acting in a scribe capacity, has observed my performance of the services and has documented them in accordance with my direction.     Florencia Lubin MD  02/01/23 2016

## 2023-02-02 ENCOUNTER — APPOINTMENT (OUTPATIENT)
Dept: SPEECH THERAPY | Facility: HOSPITAL | Age: 83
DRG: 066 | End: 2023-02-02
Attending: STUDENT IN AN ORGANIZED HEALTH CARE EDUCATION/TRAINING PROGRAM
Payer: COMMERCIAL

## 2023-02-02 ENCOUNTER — PATIENT OUTREACH (OUTPATIENT)
Dept: GERIATRIC MEDICINE | Facility: CLINIC | Age: 83
End: 2023-02-02
Payer: COMMERCIAL

## 2023-02-02 ENCOUNTER — APPOINTMENT (OUTPATIENT)
Dept: OCCUPATIONAL THERAPY | Facility: HOSPITAL | Age: 83
DRG: 066 | End: 2023-02-02
Attending: STUDENT IN AN ORGANIZED HEALTH CARE EDUCATION/TRAINING PROGRAM
Payer: COMMERCIAL

## 2023-02-02 ENCOUNTER — APPOINTMENT (OUTPATIENT)
Dept: PHYSICAL THERAPY | Facility: HOSPITAL | Age: 83
DRG: 066 | End: 2023-02-02
Attending: STUDENT IN AN ORGANIZED HEALTH CARE EDUCATION/TRAINING PROGRAM
Payer: COMMERCIAL

## 2023-02-02 ENCOUNTER — PATIENT OUTREACH (OUTPATIENT)
Dept: GERIATRIC MEDICINE | Facility: CLINIC | Age: 83
End: 2023-02-02

## 2023-02-02 LAB
ALBUMIN SERPL BCG-MCNC: 4.2 G/DL (ref 3.5–5.2)
ALP SERPL-CCNC: 75 U/L (ref 40–129)
ALT SERPL W P-5'-P-CCNC: 37 U/L (ref 10–50)
ANION GAP SERPL CALCULATED.3IONS-SCNC: 15 MMOL/L (ref 7–15)
AST SERPL W P-5'-P-CCNC: 30 U/L (ref 10–50)
BILIRUB SERPL-MCNC: 0.4 MG/DL
BUN SERPL-MCNC: 21.9 MG/DL (ref 8–23)
CALCIUM SERPL-MCNC: 10.1 MG/DL (ref 8.8–10.2)
CHLORIDE SERPL-SCNC: 100 MMOL/L (ref 98–107)
CHOLEST SERPL-MCNC: 173 MG/DL
CREAT SERPL-MCNC: 1.23 MG/DL (ref 0.67–1.17)
DEPRECATED HCO3 PLAS-SCNC: 21 MMOL/L (ref 22–29)
ERYTHROCYTE [DISTWIDTH] IN BLOOD BY AUTOMATED COUNT: 13.9 % (ref 10–15)
GFR SERPL CREATININE-BSD FRML MDRD: 59 ML/MIN/1.73M2
GLUCOSE BLDC GLUCOMTR-MCNC: 146 MG/DL (ref 70–99)
GLUCOSE BLDC GLUCOMTR-MCNC: 147 MG/DL (ref 70–99)
GLUCOSE BLDC GLUCOMTR-MCNC: 167 MG/DL (ref 70–99)
GLUCOSE BLDC GLUCOMTR-MCNC: 182 MG/DL (ref 70–99)
GLUCOSE BLDC GLUCOMTR-MCNC: 202 MG/DL (ref 70–99)
GLUCOSE BLDC GLUCOMTR-MCNC: 223 MG/DL (ref 70–99)
GLUCOSE SERPL-MCNC: 158 MG/DL (ref 70–99)
HCT VFR BLD AUTO: 42.5 % (ref 40–53)
HDLC SERPL-MCNC: 35 MG/DL
HGB BLD-MCNC: 14.1 G/DL (ref 13.3–17.7)
LDLC SERPL CALC-MCNC: 59 MG/DL
MCH RBC QN AUTO: 30.5 PG (ref 26.5–33)
MCHC RBC AUTO-ENTMCNC: 33.2 G/DL (ref 31.5–36.5)
MCV RBC AUTO: 92 FL (ref 78–100)
NONHDLC SERPL-MCNC: 138 MG/DL
PLATELET # BLD AUTO: 215 10E3/UL (ref 150–450)
POTASSIUM SERPL-SCNC: 4 MMOL/L (ref 3.4–5.3)
PROT SERPL-MCNC: 8.3 G/DL (ref 6.4–8.3)
RBC # BLD AUTO: 4.62 10E6/UL (ref 4.4–5.9)
SODIUM SERPL-SCNC: 136 MMOL/L (ref 136–145)
TRIGL SERPL-MCNC: 394 MG/DL
WBC # BLD AUTO: 8.2 10E3/UL (ref 4–11)

## 2023-02-02 PROCEDURE — 92610 EVALUATE SWALLOWING FUNCTION: CPT | Mod: GN | Performed by: REHABILITATION PRACTITIONER

## 2023-02-02 PROCEDURE — 99232 SBSQ HOSP IP/OBS MODERATE 35: CPT | Performed by: HOSPITALIST

## 2023-02-02 PROCEDURE — 97161 PT EVAL LOW COMPLEX 20 MIN: CPT | Mod: GP

## 2023-02-02 PROCEDURE — 120N000001 HC R&B MED SURG/OB

## 2023-02-02 PROCEDURE — 92526 ORAL FUNCTION THERAPY: CPT | Mod: GN | Performed by: REHABILITATION PRACTITIONER

## 2023-02-02 PROCEDURE — 250N000011 HC RX IP 250 OP 636: Performed by: HOSPITALIST

## 2023-02-02 PROCEDURE — 97530 THERAPEUTIC ACTIVITIES: CPT | Mod: GO

## 2023-02-02 PROCEDURE — 99223 1ST HOSP IP/OBS HIGH 75: CPT | Performed by: PSYCHIATRY & NEUROLOGY

## 2023-02-02 PROCEDURE — 85027 COMPLETE CBC AUTOMATED: CPT | Performed by: STUDENT IN AN ORGANIZED HEALTH CARE EDUCATION/TRAINING PROGRAM

## 2023-02-02 PROCEDURE — 36415 COLL VENOUS BLD VENIPUNCTURE: CPT | Performed by: STUDENT IN AN ORGANIZED HEALTH CARE EDUCATION/TRAINING PROGRAM

## 2023-02-02 PROCEDURE — 97535 SELF CARE MNGMENT TRAINING: CPT | Mod: GO

## 2023-02-02 PROCEDURE — 250N000013 HC RX MED GY IP 250 OP 250 PS 637: Performed by: HOSPITALIST

## 2023-02-02 PROCEDURE — 80053 COMPREHEN METABOLIC PANEL: CPT | Performed by: STUDENT IN AN ORGANIZED HEALTH CARE EDUCATION/TRAINING PROGRAM

## 2023-02-02 PROCEDURE — 97166 OT EVAL MOD COMPLEX 45 MIN: CPT | Mod: GO

## 2023-02-02 PROCEDURE — 250N000013 HC RX MED GY IP 250 OP 250 PS 637: Performed by: STUDENT IN AN ORGANIZED HEALTH CARE EDUCATION/TRAINING PROGRAM

## 2023-02-02 PROCEDURE — 250N000012 HC RX MED GY IP 250 OP 636 PS 637: Performed by: HOSPITALIST

## 2023-02-02 RX ORDER — DEXTROSE MONOHYDRATE 25 G/50ML
25-50 INJECTION, SOLUTION INTRAVENOUS
Status: DISCONTINUED | OUTPATIENT
Start: 2023-02-02 | End: 2023-02-06 | Stop reason: HOSPADM

## 2023-02-02 RX ORDER — NICOTINE POLACRILEX 4 MG
15-30 LOZENGE BUCCAL
Status: DISCONTINUED | OUTPATIENT
Start: 2023-02-02 | End: 2023-02-06 | Stop reason: HOSPADM

## 2023-02-02 RX ORDER — ENOXAPARIN SODIUM 100 MG/ML
40 INJECTION SUBCUTANEOUS EVERY 24 HOURS
Status: DISCONTINUED | OUTPATIENT
Start: 2023-02-02 | End: 2023-02-04

## 2023-02-02 RX ADMIN — DOCUSATE SODIUM 100 MG: 100 CAPSULE, LIQUID FILLED ORAL at 08:36

## 2023-02-02 RX ADMIN — ASPIRIN 81 MG: 81 TABLET, COATED ORAL at 08:36

## 2023-02-02 RX ADMIN — ROSUVASTATIN CALCIUM 20 MG: 10 TABLET, FILM COATED ORAL at 21:22

## 2023-02-02 RX ADMIN — ISOSORBIDE MONONITRATE 30 MG: 30 TABLET, EXTENDED RELEASE ORAL at 08:36

## 2023-02-02 RX ADMIN — PANTOPRAZOLE SODIUM 40 MG: 40 TABLET, DELAYED RELEASE ORAL at 07:03

## 2023-02-02 RX ADMIN — ACETAMINOPHEN 650 MG: 325 TABLET ORAL at 21:36

## 2023-02-02 RX ADMIN — AMLODIPINE BESYLATE 2.5 MG: 2.5 TABLET ORAL at 08:36

## 2023-02-02 RX ADMIN — DULOXETINE HYDROCHLORIDE 20 MG: 20 CAPSULE, DELAYED RELEASE ORAL at 21:22

## 2023-02-02 RX ADMIN — ALLOPURINOL 200 MG: 100 TABLET ORAL at 08:36

## 2023-02-02 RX ADMIN — ENOXAPARIN SODIUM 40 MG: 40 INJECTION SUBCUTANEOUS at 16:51

## 2023-02-02 RX ADMIN — INSULIN ASPART 1 UNITS: 100 INJECTION, SOLUTION INTRAVENOUS; SUBCUTANEOUS at 14:38

## 2023-02-02 RX ADMIN — LOSARTAN POTASSIUM 50 MG: 50 TABLET, FILM COATED ORAL at 08:37

## 2023-02-02 RX ADMIN — ACETAMINOPHEN 650 MG: 325 TABLET ORAL at 14:43

## 2023-02-02 RX ADMIN — METOPROLOL SUCCINATE 100 MG: 100 TABLET, EXTENDED RELEASE ORAL at 08:37

## 2023-02-02 RX ADMIN — ACETAMINOPHEN 650 MG: 325 TABLET ORAL at 08:36

## 2023-02-02 RX ADMIN — SITAGLIPTIN 50 MG: 25 TABLET, FILM COATED ORAL at 14:43

## 2023-02-02 RX ADMIN — CLOPIDOGREL BISULFATE 75 MG: 75 TABLET ORAL at 08:36

## 2023-02-02 RX ADMIN — DULOXETINE HYDROCHLORIDE 20 MG: 20 CAPSULE, DELAYED RELEASE ORAL at 08:36

## 2023-02-02 ASSESSMENT — ACTIVITIES OF DAILY LIVING (ADL)
ADLS_ACUITY_SCORE: 22
ADLS_ACUITY_SCORE: 25
ADLS_ACUITY_SCORE: 23
ADLS_ACUITY_SCORE: 23
DEPENDENT_IADLS:: CLEANING;COOKING;LAUNDRY;SHOPPING;MEAL PREPARATION;TRANSPORTATION
ADLS_ACUITY_SCORE: 22
ADLS_ACUITY_SCORE: 23
IADL_COMMENTS: FAMILY COMPLETES
ADLS_ACUITY_SCORE: 22

## 2023-02-02 NOTE — PROGRESS NOTES
[facility] Care Coordination Contact    [facility]  Ambulatory Care Coordination to Inpatient Care Management   Hand-In Communication    Date:  February 2, 2023  Name: Aadm Talamantes is enrolled in [facility] Care Coordination program and I am the Lead Care Coordinator.  CC Contact Information:.   Payor Source: Payor: [facility] / Plan: [facility] DUAL / Product Type: HMO /   Current services in place:     Please see the CC Snaphot and Care Management Flowsheets for specific  details of this Adam Talamantes care plan.   Additional details/specific concerns r/t this admission:    No additional concerns at this time      I will follow this admission in Epic. Please feel free to contact me with questions or for further collaboration in discharge planning.    RICKEY Cottrell  (Covering for Olga Brady)  [facility]  [phone]

## 2023-02-02 NOTE — PROGRESS NOTES
02/02/23 0834   Appointment Info   Signing Clinician's Name / Credentials (PT) Alaina Bulter,PT       Present yes  (vocera interpretter unable to hear pt and pt unable to hear interpretter.  Family able to interpretter for session)   Language Kayce   Living Environment   People in Home child(chaenl), adult   Current Living Arrangements house   Home Accessibility stairs to enter home;stairs within home   Number of Stairs, Main Entrance 2   Self-Care   Usual Activity Tolerance fair   Equipment Currently Used at Home walker, rolling;commode chair;shower chair   Activity/Exercise/Self-Care Comment pt family has PCA services 4.5 hr/ 7 days/wk and family is always with Pt assisting   General Information   Onset of Illness/Injury or Date of Surgery 02/01/23   Referring Physician Dr. Ander Jaffe   Patient/Family Therapy Goals Statement (PT) none stated   Pertinent History of Current Problem (include personal factors and/or comorbidities that impact the POC) admit dizzy/ vertigo/ HA- B cerebellar stroke, pt has h/o CVA, CKD, DM, HTN, anemia, gout, OA   General Observations pt up with family OOB   Cognition   Affect/Mental Status (Cognition) unable/difficult to assess   Pain Assessment   Patient Currently in Pain Yes, see Vital Sign flowsheet  (pt reporting pain in back of head and neck)   Range of Motion (ROM)   ROM Comment BLE WFL for transfers   Strength (Manual Muscle Testing)   Strength Comments BLE WFL for transfers   Bed Mobility   Sit-Supine [place] (Bed Mobility) supervision   Impairments Contributing to Impaired Bed Mobility pain   Comment, (Bed Mobility) pt reports dizziness constant, just not change when he lays down   Transfers   Impairments Contributing to Impaired Transfers impaired balance   Comment, (Transfers) sit<> stand CGA, pt reports dizziness   Gait/Stairs (Locomotion)   [place] Level (Gait) contact guard   Assistive Device (Gait) other (see comments)  (HHA)    Distance in Feet 5', 10'   Deviations/Abnormal Patterns (Gait) stride length decreased;weight shifting decreased   Comment, (Gait/Stairs) pt did not want to go further due to dizziness and HA   Balance   Balance Comments unsteady   Clinical Impression   Criteria for Skilled Therapeutic Intervention Yes, treatment indicated   PT Diagnosis (PT)  functional mobility   Influenced by the following impairments dizziness, pain, decreased balance   Functional limitations due to impairments transfers and gait   Clinical Presentation (PT Evaluation Complexity) Evolving/Changing   Clinical Presentation Rationale pt presents as medically diagnosed   Clinical Decision Making (Complexity) low complexity   PT Total Evaluation Time   PT Eval, Low Complexity Minutes (47353) 20   Physical Therapy Goals   PT Frequency Daily   PT: Transfers Supervision/stand-by assist;Sit to/from stand;Bed to/from chair;Assistive device   PT: Gait Rolling walker;50 feet  (CGA)   PT Discharge Planning   PT Plan trasnfes, and gait w/ FWW, possibly start habituation ex for dizziness   PT Discharge Recommendation (DC Rec) home with assist   PT Rationale for DC Rec pt CGA/SBA x1 with OOB activity   PT Brief overview of current status pt SBA/CGA x1   Total Session Time   Total Session Time (sum of timed and untimed services) 20

## 2023-02-02 NOTE — PLAN OF CARE
Problem: Stroke, Ischemic (Includes Transient Ischemic Attack)  Goal: Optimal Coping  Outcome: Progressing   Goal Outcome Evaluation:       Pt alert and oriented x4. He has slight aphasia and slurred speech as per daughter. Scoring 2 on NIH scale. Daughter at bedside. Call light and items placed within reach.

## 2023-02-02 NOTE — ED NOTES
"[facility] ED Handoff Report    ED Chief Complaint: chronic headache and dizziness    ED Diagnosis:  (R42) Vertigo  Comment: started last night  Plan: Pt has vertebral artery occlusion. MRI today  showed numerous  acute to subacute infarctions of both cerebellar hemispheres and left thalamic infarct.     (I63.9) Cerebellar stroke (H)  Comment: See above comment  Plan: stroke neurologist to see pt       PMH:    Past Medical History:   Diagnosis Date     Acute blood loss anemia      Acute renal failure (H)      Anemia      Bacteremia      Cataract      Chronic gout      CKD (chronic kidney disease)     Stage 3     DM2 (diabetes mellitus, type 2) (H)      GERD (gastroesophageal reflux disease)      Glucose intolerance (impaired glucose tolerance)      Gout      History of nephrolithiasis      History of prostatitis 2017     Hyperlipidemia      Hypertension      Insomnia      Intestinal disaccharidase deficiencies and disaccharide malabsorption     Created by Conversion      Latent tuberculosis      Nephrolithiasis      Neuropathy      Nonspecific abnormal findings on radiological and other examination of skull and head     Created by [employer] [facility] Annotation: 2013 11:23PM - Giulia Meridai/10/2011:  severe stenosis both posterior cerebral arteries seen MRI/MRA done for  vertigo. No acute changes.e*     Osteoarthritis     wrist, knee, shoulder     Prostatitis      Rectal bleed      Trigger thumb         Code Status:  Prior     Falls Risk: Yes Band: Applied    Current Living Situation/Residence: lives with family     Elimination Status: Continent: Yes     Activity Level: SBA    Patients Preferred Language:  Other: Ramila     Needed: Yes    Vital Signs:  /87   Pulse 78   Temp 98.5  F (36.9  C) (Temporal)   Resp 29   Ht 1.549 m (5' 1\")   Wt 73.6 kg (162 lb 3.2 oz)   SpO2 97%   BMI 30.65 kg/m       Cardiac Rhythm: SR    Pain Score: 7/10    Is the Patient Confused:  " No    Last Food or Drink: 02/01/23 at 1500 by  family    Focused Assessment:  Pt a/ox4, equal strength to all extremities. Pt presents with ha and dizzines    Tests Performed: Done: Labs and Imaging    Treatments Provided:  See mar     Family Dynamics/Concerns: No    Family Updated On Visitor Policy: Yes    Plan of Care Communicated to Family: Yes    Who Was Updated about Plan of Care: daughter    Belongings Checklist Done and Signed by Patient: Yes    Medications sent with patient: n/a    Covid: asymptomatic , negative    Additional Information: n/a    RN: Rhonda Disla RN 2/1/2023 7:34 PM

## 2023-02-02 NOTE — PROGRESS NOTES
02/02/23 0830   Appointment Info   Signing Clinician's Name / Credentials (OT) Kathy Salas OTR/L       Present yes   Language Kayce   Living Environment   People in Home child(chanel), adult   Current Living Arrangements house   Home Accessibility stairs to enter home;stairs within home  (patient can stay on main level)   Number of Stairs, Main Entrance 2   Transportation Anticipated family or friend will provide   Living Environment Comments pt has 24 hr A b/w family members and 4.5 hrs of daily PCA services   Self-Care   Usual Activity Tolerance fair   Current Activity Tolerance poor   Equipment Currently Used at Home walker, rolling;commode chair;shower chair   Fall history within last six months no  (daughter states they always catch him)   Instrumental Activities of Daily Living (IADL)   IADL Comments family completes   General Information   Onset of Illness/Injury or Date of Surgery 02/01/23   Referring Physician Anthony Weldon MD   Patient/Family Therapy Goal Statement (OT) have less pain/ dizziness   Additional Occupational Profile Info/Pertinent History of Current Problem Adam M Albin is a 82 year old male admitted on 2/1/2023.  Past medical history significant for Hx of stroke, stage 3 CKD, type II diabetes mellitus, GERD, hyperlipidemia, and hypertension who presented to the ER for having dizziness and vertigo.  Left vertebral artery occlusion   Existing Precautions/Restrictions fall   Limitations/Impairments safety/cognitive   Heart Disease Risk Factors Medical history   General Observations and Info pt appears uncomfortable, holding back of head with LE extremity movement   Cognitive Status Examination   Orientation Status person;place;other (see comments)  (situation)   Affect/Mental Status (Cognitive) flat/blunted affect   Follows Commands WFL   Safety Deficit minimal deficit   Visual Perception   Visual Impairment/Limitations blurry vision   Pain Assessment   Patient  Currently in Pain Yes, see Vital Sign flowsheet   Range of Motion Comprehensive   General Range of Motion no range of motion deficits identified   Strength Comprehensive (MMT)   General Manual Muscle Testing (MMT) Assessment no strength deficits identified   Coordination   Upper Extremity Coordination No deficits were identified   Clinical Impression   Criteria for Skilled Therapeutic Interventions Met (OT) Yes, treatment indicated   OT Diagnosis impaired ADLs   OT Problem List-Impairments impacting ADL problems related to;activity tolerance impaired;balance;strength;pain;vision   Assessment of Occupational Performance 3-5 Performance Deficits   Identified Performance Deficits trsfs, dressing, bed mob   Planned Therapy Interventions (OT) ADL retraining;balance training;bed mobility training;transfer training   Clinical Decision Making Complexity (OT) moderate complexity   Risk & Benefits of therapy have been explained evaluation/treatment results reviewed;care plan/treatment goals reviewed   Clinical Impression Comments Pt presents with balance and strength impairments affecting I with BADLs.  Pt is currently below baseline with BADLs and functional transfers and is a high falls risk.  Pt's family plans to bring him home, they have been and can continue to provide 24 hr cares.   OT Total Evaluation Time   OT Eval, Moderate Complexity Minutes (62579) 15   OT Goals   Therapy Frequency (OT) Daily   OT Predicted Duration/Target Date for Goal Attainment 02/09/23   OT Goals Hygiene/Grooming;Upper Body Dressing;Lower Body Dressing;Toilet Transfer/Toileting   OT: Hygiene/Grooming supervision/stand-by assist;while standing   OT: Upper Body Dressing Supervision/stand-by assist   OT: Lower Body Dressing Supervision/stand-by assist   OT: Transfer Minimal assist   OT: Toilet Transfer/Toileting Minimal assist   Self-Care/Home Management   Self-Care/Home Mgmt/ADL, Compensatory, Meal Prep Minutes (34027) 9   Symptoms Noted  During/After Treatment (Meal Preparation/Planning Training) dizziness;fatigue;increased pain   Treatment Detail/Skilled Intervention Pt requires min A x 1 for ambulation to bathroom , min A for clothing mgmt (pt attempted to doff socks- pt c/o of pain in back of  head, requires total A)   Therapeutic Activities   Therapeutic Activity Minutes (05787) 10   Symptoms noted during/after treatment dizziness;fatigue   Treatment Detail/Skilled Intervention introduction to gaze stabilization during self cares/ transfers- needs ongoing training   OT Discharge Planning   OT Plan lb dressing, toileting, gaze stabilzation   OT Discharge Recommendation (DC Rec) home with assist;home with home care occupational therapy   OT Rationale for DC Rec family can provide 24/7 A   OT Brief overview of current status mostly Min A for transfers/ toileting   Total Session Time   Timed Code Treatment Minutes 19   Total Session Time (sum of timed and untimed services) 34

## 2023-02-02 NOTE — PROGRESS NOTES
TRANSITIONS OF CARE (VIRGEN) LOG   VIRGEN tasks should be completed by the CC within one (1) business day of notification of each transition. Follow up contact with member is required after return to their usual care setting.  Note:  If CC finds out about the transitions fifteen (15) days or more after the member has returned to their usual care setting, no VIRGEN log is needed. However, the CC should check in with the member to discuss the transition process, any changes needed to the care plan and document it in a case note.    Member Name:  Adam Talamantes MCO [facility] Member ID#:  [id number]   Product: [facility] Care Coordinator Contact:  RICKEY Gallegos Agency/County/Care System: [facility]   Transition Communication Actions from Care Management Contact   Transition #1   Notification Date: 2/2/23 Transition Date:   2/1/23 Transition From: Home     Is this the member s usual care setting?               yes Transition To: Hospital, [facility]   Transition Type:  Unplanned  Reason for Admission/Comments:  Headache; Dizziness  Contact member/responsible party to offer assistance with transition Date completed: 2/2/23    Notes from conversation with the member/responsible party, provider, discharging and receiving facility (as applicable):   Date 2/2/23:CC contacted Hospital /discharge planner via the [employer] Transitional Care Hand-In Process, with community care plan included.  CC reached out to adult son Navin regarding transition and left a message requesting a return call.  Reviewed and update care plan as needed.  Notified community service providers and placed services PCA on hold as needed.  Transition log initiated.   PCP, Chucho Espino, notified of hospitalization via EMR.       Shared CC contact info, care plan/services with receiving setting--Date completed: 2/2/23   Name & Title of receiving setting contact: [facility]   Notified  PCP of transition--Date completed:  2/1/23     via  EMR  Name of PCP: Chucho Espino BRIAN  [facility]  [phone]

## 2023-02-02 NOTE — CONSULTS
".[facility]    Stroke Telephone Note    I was called by Florencia Lubin on 02/01/23 regarding patient Adam Talamantes. The patient is a 82 year old male DMII, HTN, HL, CKD, severe posterior circulation stenosis (MRI/MRA 1/11--no stroke, worsening of baseline vertigo) who presents with vertigo, nausea, vomiting, found to have scattered posterior circulation stroke    Imaging Findings   CTA shows left vert occlusion, right vert/basilar severe stenosis/near occlusion  MRI with scattered stroke distal to symptomatic occlusion seen on CTA    Impression  1. Symptomatic posterior circulation stenosis    Recommendations   1. Discussed with patient's daughter: medical options including heparin gtt, DAPT, or CAPTIVA trial for intracranial atherosclerosis  --could also perform diagnostic angiography with possible stenting--daughter wanted to talk to other family members    My recommendations are based on the information provided over the phone by Adam Talamantes's in-person providers. They are not intended to replace the clinical judgment of his in-person providers. I was not requested to personally see or examine the patient at this time.    Nicolas Cunningham MD, MS  Vascular Neurology    To page me or covering stroke neurology team member, click here: [facility]COM  Choose \"On Call\" tab at top, then select \"NEUROLOGY/ALL SITES\" from middle drop-down box, press Enter, then look for \"stroke\" or \"telestroke\" for your site.           "

## 2023-02-02 NOTE — CONSULTS
This is a neurological consultation    82-year-old admitted to hospital 2/1/2023      Chief complaint  Bilateral cerebellar strokes/left thalamic stroke  Left vertebral artery occlusion  Basilar artery severe stenosis      HPI  82-year-old presented to the hospital 2/1/2023 with headache and vertigo.  Headache may be more chronic in nature but dizziness was worse.  Had previously been diagnosed with a vertebral artery occlusion./Basilar artery occlusion    MRI scan brain shows bilateral cerebellar strokes and left thalamic stroke    Critical multifocal stenosis  Left vertebral artery occluded  Basilar artery high-grade severe stenosis  Right vertebral artery though is patent per scans    Prior to January 2023 patient was on just aspirin 81 mg once per day  After her stroke symptoms and studies was placed on dual antiplatelet agents after January 11, 2023      Per chart patient is on  Aspirin 81 mg once per day  Plavix 85 mg once per day  Crestor 20 mg once per day    When patient was hospitalized in January 11, 2023 and had similar findings occlusion and stenosis Case was reviewed with interventional radiology.  At that time they did not recommend any endovascular intervention or transfer to the [facility] for further diagnostic testing.              Past medical history  CVA  Diabetes  Hypertension  Hyperlipidemia  CKD stage III  GERD  Gout/arthritis  Kidney stones  Prostatitis  Insomnia  Latent TB  Neuropathy  Intestinal disaccharide deficiencies/malabsorption      Habits  Past smoker quit in 2000  Does not drink alcohol    Family history  Daughter stroke  Father stroke      Work-up  MRI brain 2/1/2023  1.  Numerous acute-to-subacute infarctions of both cerebellar hemispheres. Left thalamic infarction of similar age.  2.  No mass effect or evidence of hemorrhagic transformation.  3.  Chronic ischemic changes and age-related changes as above.  HEAD CT: 2/1/2023  1.  No acute intracranial process.  2.  Stable mild  to moderate chronic small vessel ischemic disease and generalized brain parenchymal volume loss.  HEAD CTA: 2/1/2023  1. No significant change since 01/11/2023. Occlusion of the left vertebral artery V4 segment and right posterior cerebral artery.  2. 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  3. Fetal origin left posterior cerebral artery demonstrates moderate stenosis of the left P2 segment and mild multifocal stenoses of its P2 and P3 segments  4. Mild to moderate stenosis of the cavernous segment of the right internal carotid artery.  5. Widely patent anterior and middle cerebral artery branches.  NECK CTA: 2/1/2023  1.  No significant change since 01/11/2023. Occlusion of the left vertebral artery at the level of the V3 segment.  2.  Mild atherosclerotic plaque at the carotid bifurcations. No significant carotid stenosis.  3.  Widely patent dominant right vertebral artery.  COVID negative  HGBA1C 7.6% (1/12/2023)  HDL 40/, (1/11/2023)      Labs  Sodium 136 potassium 4.0  BUN 21.9, creatinine 1.23  Glucose 158  WBC 8.2, hemoglobin 14.1, platelets 215,000          Exam  Blood pressure 144/96, pulse 104, temperature 98.1  Blood pressure range 119//77  Pulse range 76-1 02  Tmax 98.5    Review of systems  Pertinent positives and negatives  As some headache  No significant chest pain or shortness of breath  No abdominal pain    No actual diplopia  Mild dysarthria  Weakness/clumsiness of limbs    Otherwise review of systems negative    General exam per primary MD  HEENT no signs of trauma  Lungs clear  Heart rate regular  Abdomen soft   Symmetrical pulses  No edema in the feet    Neurologic exam  Alert oriented x3  Using family member as   Normal process of speech  Normal naming  Normal comprehension  Normal repetition  No aphasia  No neglect  Memory okay per family    Cranials 2 through 12  Notes hemiplegia  No nystagmus  Visual fields intact  Face symmetrical, smile is  somewhat of a snarel  Speech seems clear in his own language    Upper extremities  Mild clumsiness of the right arm greater than the left    Lower extremities  Lift off the bed well  Can wiggle the toes    Gait  Deferred        Assessment/plan    1.  Posterior circulation acute/subacute ischemia 2/1/2023       Bilateral cerebellar strokes       Left thalamic    2.   Severe atherosclerosis with extra and intracranial occlusion/stenosis        Left vertebral artery occluded V3-V4 segment        Right PCA occluded        Basilar artery severe stenosis near occlusion        Left P2-P3 moderate stenosis        Right ICA cavernous segment mild to moderate stenosis    3.  Widely patent dominant right vertebral artery.      4.   Vascular risk factors        Hypertension/hyperlipidemia/diabetes/intra and extracranial atherosclerosis severe    Diagnosis  Bilateral cerebellar stroke  Left thalamic stroke  Basilar artery severe stenosis  Left vertebral artery occlusion  Intracranial atherosclerosis multifocal    Recommend  Dual antiplatelet medication  Statin  Avoid hypotension    Symptomatic treatment of vertigo with meclizine  PT/OT/speech    Previously when and case reviewed in January was not thought to be a good candidate for endovascular intervention  This would be high risk and even possibly life-threatening.  Unclear that this would give better prognosis.    Prolonged discussion with patient and family member at bedside  Also discussed with primary MD face-to-face    Total care time today 85 minutes discussing and evaluating the above.

## 2023-02-02 NOTE — CONSULTS
Care Management Initial Consult    General Information  Assessment completed with: Children,    Type of CM/SW Visit: Initial Assessment    Primary Care Provider verified and updated as needed: Yes   Readmission within the last 30 days:        Reason for Consult: discharge planning  Advance Care Planning:       General Information Comments: Care management following    Communication Assessment  Patient's communication style: spoken language (non-English)    Hearing Difficulty or Deaf: no   Wear Glasses or Blind: no    Cognitive  Cognitive/Neuro/Behavioral: .WDL except, orientation, speech  Level of Consciousness: alert  Arousal Level: opens eyes spontaneously  Orientation: disoriented to, situation  Mood/Behavior: calm, cooperative  Best Language: 1 - Mild to moderate  Speech: dysarthric    Living Environment:   People in home: child(chanel), adult, grandchild(chanel)     Current living Arrangements: house      Able to return to prior arrangements:       Family/Social Support:  Care provided by: self, child(chanel)  Provides care for: no one     Children          Description of Support System: Supportive         Current Resources:   Patient receiving home care services:       Community Resources:  PCA (4.5 hours per day.)  Equipment currently used at home: walker, rolling, commode chair, shower chair  Supplies currently used at home:      Employment/Financial:  Employment Status:          Financial Concerns: No concerns identified   Referral to Financial Worker: No     Lifestyle & Psychosocial Needs:  Social Determinants of Health     Tobacco Use: Medium Risk     Smoking Tobacco Use: Former     Smokeless Tobacco Use: Former     Passive Exposure: Not on file   Alcohol Use: Not At Risk     Frequency of Alcohol Consumption: Never     Average Number of Drinks: Not on file     Frequency of Binge Drinking: Never   Financial Resource Strain: Low Risk      Difficulty of Paying Living Expenses: Not hard at all   Food Insecurity: No  Food Insecurity     Worried About Running Out of Food in the Last Year: Never true     Ran Out of Food in the Last Year: Never true   Transportation Needs: No Transportation Needs     Lack of Transportation (Medical): No     Lack of Transportation (Non-Medical): No   Physical Activity: Inactive     Days of Exercise per Week: 0 days     Minutes of Exercise per Session: 0 min   Stress: No Stress Concern Present     Feeling of Stress : Not at all   Social Connections: Socially Integrated     Frequency of Communication with Friends and Family: Once a week     Frequency of Social Gatherings with Friends and Family: Twice a week     Attends Religion Services: 1 to 4 times per year     Active Member of Clubs or Organizations: No     Attends Club or Organization Meetings: 1 to 4 times per year     Marital Status:    Intimate Partner Violence: Not At Risk     Fear of Current or Ex-Partner: No     Emotionally Abused: No     Physically Abused: No     Sexually Abused: No   Depression: Not at risk     PHQ-2 Score: 0   Housing Stability: Unknown     Unable to Pay for Housing in the Last Year: No     Number of Places Lived in the Last Year: Not on file     Unstable Housing in the Last Year: No     Functional Status:  Prior to admission patient needed assistance:   Dependent ADLs:: Ambulation-walker, Bathing, Grooming  Dependent IADLs:: Cleaning, Cooking, Laundry, Shopping, Meal Preparation, Transportation     Mental Health Status:  Mental Health Status: No Current Concerns       Chemical Dependency Status:  Chemical Dependency Status: No Current Concerns           Values/Beliefs:  Spiritual, Cultural Beliefs, Religion Practices, Values that affect care: no             Additional Information:  Patient lives with family and is largely independent with activities of daily living at baseline.  He uses a walker for mobility and has been able to ambulate independently. Family assists with bathing, grooming,  laundry/housekeeping, meal preparation, transportation and patient has PCA support 4.5 hours per day.  Awaiting therapy recommendations. CM will continue to monitor progression of care, review team recommendations and provide discharge planning assist as needed.    3:11 PM:  OT agrees with a return to home with family support and resumed PCA services.     Emily Greenberg RN

## 2023-02-02 NOTE — H&P
[facility]    History and Physical - Hospitalist Service       Date of Admission:  2/1/2023    Assessment & Plan      Adam Talamantes is a 82 year old male admitted on 2/1/2023.  Past medical history significant for Hx of stroke, stage 3 CKD, type II diabetes mellitus, GERD, hyperlipidemia, and hypertension who presented to the ER for having dizziness and vertigo.    Vertigo  Acute to subacute stroke of both cerebellar hemispheres  Left vertebral artery occlusion  - Patient presented with recurrent dizziness, headache  and vertiginous episodes  - Patient was recently admitted for similar issue and discharged 3 weeks ago  -CT head is unremarkable for acute intracranial process.  Stable mild to moderate chronic small vessel ischemic disease and generalized brain parenchymal volume loss.  -Head CTA with no significant changes since 1/11/2023 with occlusion of the left vertebral artery at the level of V4 and V3.  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 Left thalamic infarction of similar age.  - Neurology consult  -Stroke admission order set placed  -Echo shows EF of 55 to 60% with mild left ventricular wall concentric increase o  - PT/OT and speech evaluation  -Continue aspirin 81 mg and Plavix 75 mg as recommended by neurology  - Continue meclizine as needed     Hypertension  - Restart home medications  - Continue to monitor blood pressure and avoid hypotension  - monitor BP      CKD stage III  -Creatinine is close to its baseline  -Monitor       Diet: NPO for Medical/Clinical Reasons Except for: Meds, Ice Chips    DVT Prophylaxis: Pneumatic Compression Devices  Diego Catheter: Not present  Lines: None     Cardiac Monitoring: ACTIVE order. Indication: Stroke, acute (48 hours)  Code Status: Full Code      Clinically Significant Risk Factors Present on Admission                #  "Drug Induced Platelet Defect: home medication list includes an antiplatelet medication   # Hypertension: home medication list includes antihypertensive(s)     # DMII: A1C = 7.6 % (Ref range: <5.7 %) within past 3 months    # Overweight: Estimated body mass index is 29.84 kg/m  as calculated from the following:    Height as of this encounter: 1.549 m (5' 0.98\").    Weight as of this encounter: 71.6 kg (157 lb 13.6 oz).           Disposition Plan      Expected Discharge Date: 02/03/2023                  Anthony Weldon MD  Hospitalist Service  [facility]  Securely message with Elite Form (more info)  Text page via [facility] Paging/Directory     ______________________________________________________________________    Chief Complaint   Worsening of vertigo of 1 day duration    History is obtained from the patient's daughters     History of Present Illness   Adam M Albin is a 82 year old male who presented for the above complaint.  Past medical history significant for hx of stroke, stage 3 CKD, type II diabetes mellitus, GERD, hyperlipidemia, and hypertension.  Patient was admitted 3 weeks ago for similar symptoms was found to have left vertebral artery stenosis.  No came back for worsening of vertigo headache and dizziness.  MRI brain reviewed numerous acute to subacute infarction of both cerebellar hemispheres.  Transfer readmitted for further management of acute on subacute stroke.      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 No acute changes.e*     Osteoarthritis     wrist, knee, shoulder     Prostatitis      Rectal bleed      Trigger thumb        Past Surgical History   Past Surgical History:   Procedure Laterality Date     IR MISCELLANEOUS PROCEDURE  2009     IR MISCELLANEOUS PROCEDURE  2009     IR MISCELLANEOUS PROCEDURE  2009     IR MISCELLANEOUS PROCEDURE  2009     IR NEPHROURETERAL TUBE PLACEMENT BILATERAL  2009     IR URETER DILATION BILATERAL  2009     IR URETER DILATION BILATERAL  2009     KIDNEY STONE SURGERY       PICC  3/6/2016            Prior to Admission Medications   Prior to Admission Medications   Prescriptions Last Dose Informant Patient Reported? Taking?   DULoxetine (CYMBALTA) 20 MG capsule 2023  No Yes   Sig: TAKE 1 CAPSULE(20 MG) BY MOUTH TWICE DAILY   HYDROcodone-acetaminophen (NORCO) 5-325 MG tablet 2023  No Yes   Sig: Take 1 tablet by mouth daily as needed for severe pain (7-10)   JANUVIA 50 MG tablet 2023  No Yes   Sig: TAKE 1 TABLET(50 MG) BY MOUTH DAILY   acetaminophen (TYLENOL) 325 MG tablet 2023  No Yes   Sig: Take 2 tablets (650 mg) by mouth 3 times daily for 30 days   acetaminophen (TYLENOL) 325 MG tablet 2023  Yes Yes   Sig: Take 2 tablets by mouth every 6 hours as needed   allopurinol (ZYLOPRIM) 100 MG tablet 2023  No Yes   Sig: TAKE 2 TABLETS BY MOUTH EVERY DAY   amLODIPine (NORVASC) 2.5 MG tablet 2023  No Yes   Sig: Take 1 tablet (2.5 mg) by mouth daily   aspirin (ASA) 81 MG EC tablet 2023  No Yes   Sig: Take 1 tablet  (81 mg) by mouth daily   clopidogrel (PLAVIX) 75 MG tablet 1/31/2023  No Yes   Sig: Take 1 tablet (75 mg) by mouth daily   docusate sodium (COLACE) 100 MG capsule 1/31/2023  No Yes   Sig: TAKE 1 CAPSULE(100 MG) BY MOUTH TWICE DAILY AS NEEDED FOR CONSTIPATION   gabapentin (NEURONTIN) 300 MG capsule Not Taking  No No   Sig: TAKE 1 TO 2 CAPSULES BY MOUTH AT BEDTIME   Patient not taking: Reported on 2/1/2023   isosorbide mononitrate (IMDUR) 30 MG 24 hr tablet 1/31/2023  No Yes   Sig: Take 1 tablet (30 mg) by mouth daily   lidocaine (XYLOCAINE) 2 % external gel 1/31/2023  No Yes   Sig: Apply topically 3 times daily Apply on neck back tid   losartan (COZAAR) 50 MG tablet 1/31/2023  No Yes   Sig: TAKE 1 TABLET(50 MG) BY MOUTH DAILY   meclizine (ANTIVERT) 12.5 MG tablet 1/31/2023  No Yes   Sig: Take 1 tablet (12.5 mg) by mouth 3 times daily as needed for dizziness   metoprolol succinate ER (TOPROL XL) 100 MG 24 hr tablet 1/31/2023  No Yes   Sig: TAKE 1 TABLET(100 MG) BY MOUTH DAILY   nitroGLYcerin (NITROSTAT) 0.4 MG sublingual tablet   No Yes   Sig: PLACE 1 TABLET UNDER THE TONGUE EVERY 5 MINUTES AS NEEDED FOR CHEST PAIN.   pantoprazole (PROTONIX) 40 MG EC tablet 1/31/2023  No Yes   Sig: Take 1 tablet (40 mg) by mouth every morning (before breakfast)   rosuvastatin (CRESTOR) 20 MG tablet 1/31/2023  No Yes   Sig: TAKE 1 TABLET(20 MG) BY MOUTH AT BEDTIME   triamcinolone (KENALOG) 0.1 % external cream   No Yes   Sig: Apply topically 2 times daily as needed for irritation      Facility-Administered Medications: None           Physical Exam   Vital Signs: Temp: 97.6  F (36.4  C) Temp src: Oral BP: (!) 159/88 Pulse: 79   Resp: 18 SpO2: 95 % O2 Device: None (Room air)    Weight: 157 lbs 13.59 oz    General Appearance: No distress noted.  Respiratory: Good air entry bilaterally  Cardiovascular: S1 and S2 are heard  GI: Soft abdomen, no tenderness, normal active bowel sounds  Skin: Intact and warm      Medical Decision Making        55 MINUTES SPENT BY ME on the date of service doing chart review, history, exam, documentation & further activities per the note.      Data

## 2023-02-02 NOTE — PLAN OF CARE
Problem: Plan of Care - These are the overarching goals to be used throughout the patient stay.    Goal: Optimal Comfort and Wellbeing  Outcome: Progressing     Problem: Stroke, Ischemic (Includes Transient Ischemic Attack)  Goal: Optimal Cognitive Function  Outcome: Progressing   Goal Outcome Evaluation:       Pt arrived from ED around 2100. VSS, on RA. C/O HA, PRN tylenol given; effectiveness pending. Daughter at bedside for assistance translating; pt speaks sherman. NIH score of 2 for slurred speech/aphasia. NPO, sips with meds OK.

## 2023-02-02 NOTE — PROGRESS NOTES
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  MRI brain with  numerous acute to subacute cerebellar infarctions.     Assessment/Plan    #Acute to sub acute cerebellar infarcts in setting of known basilar artery stenosis  #Left vertebral artery occlusion  -not a candidate for intervention on basilar artery per neuro.  -continue asa, plavix, statin.     #Hypertension - norvasc, imdur, losartan, toprol-xl     #CKD stage III - baseline    #DM2 - resume januvia, SSI    #gout - allopurinol       Checklist:  Code Status: Full Code    Diet: NPO for Medical/Clinical Reasons Except for: Meds, Ice Chips     DVT px:  Enoxaparin (Lovenox) SQ    Disposition and Discharge Planning  Auto-populated from discharge tab:     Expected Discharge Date: 02/03/2023                 System Identified Risk Variables  Auto-populated based on system request - if relevant they will be addressed above:  Clinically Significant Risk Factors Present on Admission                # Drug Induced Platelet Defect: home medication list includes an antiplatelet medication   # Hypertension: home medication list includes antihypertensive(s)     # DMII: A1C = 7.6 % (Ref range: <5.7 %) within past 3 months    # Overweight: Estimated body mass index is 29.84 kg/m  as calculated from the following:    Height as of this encounter: 1.549 m (5' 0.98\").    Weight as of this encounter: 71.6 kg (157 lb 13.6 oz).               Interval Events/Subjective/Notable results:    Seen with leonarda and [facility] interpretor services.    Discussed findings and neuro recs.  (discussed with Dr. Gilliam earlier)      Objective    Vital signs in last 24 hours  Temp:  [97.6  F (36.4  C)-98.5  F (36.9  C)] 98.1  F (36.7  C)  Pulse:  [] 104  Resp:  [18-29] 18  BP: " (119-167)/(75-98) 144/96  SpO2:  [92 %-98 %] 93 % O2 Device: None (Room air)    Weight:   157 lbs 13.59 oz  Body mass index is 29.84 kg/m .    Intake/Output last 3 shifts  I/O last 3 completed shifts:  In: 503 [I.V.:3; IV Piggyback:500]  Out: 300 [Urine:300]    Physical Exam  General:  Alert, cooperative, no distress    Neurologic:  facial symmetry preserved, defers most interaction to son  Psych: calm  HEENT:  Anicteric, MMM  CV: RRR no MRG, normal S1 and S2, no edema  Lungs: CTAB.  Easyrespirations  Abd: soft, NT  Skin: no rashes or jaundice noted on exposed skin.    Diego Catheter: Not present  Lines: None          Medical Decision Making             D/w RN, Dr. Mane Jaffe MD, [facility]  Internal Medicine Hospitalist

## 2023-02-02 NOTE — PHARMACY-ADMISSION MEDICATION HISTORY
Pharmacy Note - Admission Medication History    Pertinent Provider Information: None     ______________________________________________________________________    Prior To Admission (PTA) med list completed and updated in EMR.       PTA Med List   Medication Sig Last Dose     acetaminophen (TYLENOL) 325 MG tablet Take 2 tablets by mouth every 6 hours as needed 1/31/2023     acetaminophen (TYLENOL) 325 MG tablet Take 2 tablets (650 mg) by mouth 3 times daily for 30 days 1/31/2023     allopurinol (ZYLOPRIM) 100 MG tablet TAKE 2 TABLETS BY MOUTH EVERY DAY 1/31/2023     amLODIPine (NORVASC) 2.5 MG tablet Take 1 tablet (2.5 mg) by mouth daily 1/31/2023     aspirin (ASA) 81 MG EC tablet Take 1 tablet (81 mg) by mouth daily 1/31/2023     clopidogrel (PLAVIX) 75 MG tablet Take 1 tablet (75 mg) by mouth daily 1/31/2023     docusate sodium (COLACE) 100 MG capsule TAKE 1 CAPSULE(100 MG) BY MOUTH TWICE DAILY AS NEEDED FOR CONSTIPATION 1/31/2023     DULoxetine (CYMBALTA) 20 MG capsule TAKE 1 CAPSULE(20 MG) BY MOUTH TWICE DAILY 1/31/2023     HYDROcodone-acetaminophen (NORCO) 5-325 MG tablet Take 1 tablet by mouth daily as needed for severe pain (7-10) 1/31/2023     isosorbide mononitrate (IMDUR) 30 MG 24 hr tablet Take 1 tablet (30 mg) by mouth daily 1/31/2023     JANUVIA 50 MG tablet TAKE 1 TABLET(50 MG) BY MOUTH DAILY 1/31/2023     lidocaine (XYLOCAINE) 2 % external gel Apply topically 3 times daily Apply on neck back tid 1/31/2023     losartan (COZAAR) 50 MG tablet TAKE 1 TABLET(50 MG) BY MOUTH DAILY 1/31/2023     meclizine (ANTIVERT) 12.5 MG tablet Take 1 tablet (12.5 mg) by mouth 3 times daily as needed for dizziness 1/31/2023     metoprolol succinate ER (TOPROL XL) 100 MG 24 hr tablet TAKE 1 TABLET(100 MG) BY MOUTH DAILY 1/31/2023     nitroGLYcerin (NITROSTAT) 0.4 MG sublingual tablet PLACE 1 TABLET UNDER THE TONGUE EVERY 5 MINUTES AS NEEDED FOR CHEST PAIN.      pantoprazole (PROTONIX) 40 MG EC tablet Take 1 tablet (40 mg)  by mouth every morning (before breakfast) 1/31/2023     rosuvastatin (CRESTOR) 20 MG tablet TAKE 1 TABLET(20 MG) BY MOUTH AT BEDTIME 1/31/2023     triamcinolone (KENALOG) 0.1 % external cream Apply topically 2 times daily as needed for irritation        Information source(s): Patient, Family member and CareEverywhere/SureScripts  Method of interview communication: in-person    Summary of Changes to PTA Med List  New: None  Discontinued: Marked gabapentin as not taking  Changed: None    Patient was asked about OTC/herbal products specifically.  PTA med list reflects this.    In the past week, patient estimated taking medication this percent of the time:  greater than 90%.    Medication Affordability:  Not including over the counter (OTC) medications, was there a time in the past 12 months when you did not take your medications as prescribed because of cost?: No    Allergies were reviewed, assessed, and updated with the patient.      Patient does not anticipate needing any multi-use medications during admission.    The information provided in this note is only as accurate as the sources available at the time of the update(s).    Thank you for the opportunity to participate in the care of this patient.    Nalini Reynoso, [facility]  2/1/2023 6:23 PM

## 2023-02-02 NOTE — PROGRESS NOTES
"Speech Language Pathology- Clinical Swallow Evaluation     02/02/23 1157   Appointment Info   Signing Clinician's Name / Credentials (SLP) Nancy Chawla MS CCC-SLP   Rehab Comments (SLP) Notified by RN that family decided not to pursue angio, so okay to process with swallow evaluation.   General Information   Onset of Illness/Injury or Date of Surgery 02/01/23   Referring Physician Anthony Weldon MD   Patient/Family Therapy Goal Statement (SLP) Patient would like something to eat/drink.   Pertinent History of Current Problem Per chart review \"Pt is an 82 y.o. 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  MRI brain with  numerous acute to subacute cerebellar infarctions.\" SLP consulted per stroke protocol.   Type of Evaluation   Type of Evaluation Swallow Evaluation   Oral Motor   Oral Musculature generally intact   Dentition (Oral Motor)   Comment, Dentition (Oral Motor) Upper and lower dentures inserted prior to evaluation.   Dentition (Oral Motor) some missing teeth;dental appliance/dentures   Dental Appliance/Denture (Oral Motor) upper and lower   Facial Symmetry (Oral Motor)   Facial Symmetry (Oral Motor) WNL   Lip Function (Oral Motor)   Lip Range of Motion (Oral Motor) protrusion impairment;retraction impairment   Comment, Lip Function (Oral Motor) Mild bilateral labial weakness   Protrusion, Lip Range of Motion bilateral;minimal impairment   Tongue Function (Oral Motor)   Tongue ROM (Oral Motor) depression is impaired;elevation is impaired;protrusion is impaired;retraction is impaired;lateralization is impaired   Comment, Tongue Function (Oral Motor) mildly reduced lingual ROM   Vocal Quality/Secretion Management (Oral Motor)   Vocal Quality (Oral Motor) WFL   General Swallowing Observations   Current Diet/Method of Nutritional Intake (General Swallowing Observations, NIS) NPO "   Swallowing Evaluation Clinical swallow evaluation   Clinical Swallow Evaluation   Feeding Assistance set up only required   Clinical Swallow Evaluation Textures Trialed thin liquids;mildly thick liquids;pureed;minced & moist   Clinical Swallow Eval: Thin Liquid Texture Trial   Mode of Presentation, Thin Liquids cup;fed by clinician;straw   Volume of Liquid or Food Presented 2 oz   Oral Phase of Swallow WFL   Pharyngeal Phase of Swallow coughing/choking;repeated swallows  (suspect premature entry)   Diagnostic Statement cough response with thin liquids via straw and cup rim, even with small sips.   Clinical Swallow Eval: Mildly Thick Liquids   Mode of Presentation cup;self-fed   Volume Presented 3 oz   Oral Phase WFL   Pharyngeal Phase   (no overt s/s aspiration)   Clinical Swallow Evaluation: Puree Solid Texture Trial   Mode of Presentation, Puree spoon;self-fed   Volume of Puree Presented 3 oz   Oral Phase, Puree effortful AP movement   Pharyngeal Phase, Puree   (no overt s/s aspiration)   Clinical Swallow Eval: Minced & Moist   Mode of Presentation spoon;self-fed   Volume Presented 2 oz   Oral Phase delayed AP movement;effortful AP movement  (oral clearance adeuqate)   Oral Residue   (none)   Pharyngeal Phase   (no overt s/s aspiration)   Swallowing Recommendations   Diet Consistency Recommendations mildly thick liquids (level 2);minced & moist (level 5)   Supervision Level for Intake 1:1 supervision needed   Mode of Delivery Recommendations bolus size, small;no straws   Swallowing Maneuver Recommendations alternate food and liquid intake   Monitoring/Assistance Required (Eating/Swallowing) check mouth frequently for oral residue/pocketing;stop eating activities when fatigue is present;cue for finger/lingual sweep if oral pocketing present;monitor for cough or change in vocal quality with intake   Recommended Feeding/Eating Techniques (Swallow Eval) maintain upright sitting position for eating   Medication  Administration Recommendations, Swallowing (SLP) crushed in ryan   Instrumental Assessment Recommendations   (Consider pending clinical progress)   General Therapy Interventions   Planned Therapy Interventions Dysphagia Treatment   Dysphagia treatment Oropharyngeal exercise training;Modified diet education;Instruction of safe swallow strategies;Compensatory strategies for swallowing   Clinical Impression   Criteria for Skilled Therapeutic Interventions Met (SLP Eval) Yes, treatment indicated   SLP Diagnosis dysphagia   Risks & Benefits of therapy have been explained evaluation/treatment results reviewed;patient;son   Clinical Impression Comments Clinical Swallow Evaluation completed. Oral mech exam reveals mild oral motor weakness. Cough observed with trials of thin liquids. No overt s/s aspiration noted with mildly thick liquids by cup rim, pureed solids and minced/moist solids. Oral clearance adequate with all textures trialed. Did not trial regular solids due safety concerns. Recommend advance to Minced/moist solids and mildly thick liquids. 1:1 supervision, small bites/sips, upright positioning. SLP will follow.   SLP Total Evaluation Time   Eval: oral/pharyngeal swallow function, clinical swallow Minutes (88895) 20   SLP Goals   Therapy Frequency (SLP Eval) 6 times/wk   SLP Predicted Duration/Target Date for Goal Attainment 02/16/23   Swallowing Dysfunction &/or Oral Function for Feeding   Treatment of Swallowing Dysfunction &/or Oral Function for Feeding Minutes (65085) 10   Treatment Detail/Skilled Intervention SLP: Swallow tx initiated. Focused on pt/family education re: rationale for modified diet and recommended swallow strategies. Discussed how to modify foods family would likely to bring in from home. Pt/son verbalized understanding.   SLP Discharge Planning   SLP Plan SLP: diet tolerance, video as needed, exercises.   SLP Discharge Recommendation Transitional Care Facility   SLP Rationale for DC Rec  Below baseline swallow.   SLP Brief overview of current status  Recommend advance to Minced/moist solids and mildly thick liquids. 1:1 supervision, small bites/sips, upright positioning. SLP will follow.   Total Session Time   Total Session Time (sum of timed and untimed services) 30

## 2023-02-03 ENCOUNTER — APPOINTMENT (OUTPATIENT)
Dept: SPEECH THERAPY | Facility: HOSPITAL | Age: 83
DRG: 066 | End: 2023-02-03
Payer: COMMERCIAL

## 2023-02-03 ENCOUNTER — APPOINTMENT (OUTPATIENT)
Dept: OCCUPATIONAL THERAPY | Facility: HOSPITAL | Age: 83
DRG: 066 | End: 2023-02-03
Payer: COMMERCIAL

## 2023-02-03 LAB
ALBUMIN UR-MCNC: 30 MG/DL
APPEARANCE UR: CLEAR
BILIRUB UR QL STRIP: NEGATIVE
COLOR UR AUTO: ABNORMAL
GLUCOSE BLDC GLUCOMTR-MCNC: 136 MG/DL (ref 70–99)
GLUCOSE BLDC GLUCOMTR-MCNC: 174 MG/DL (ref 70–99)
GLUCOSE BLDC GLUCOMTR-MCNC: 178 MG/DL (ref 70–99)
GLUCOSE BLDC GLUCOMTR-MCNC: 191 MG/DL (ref 70–99)
GLUCOSE BLDC GLUCOMTR-MCNC: 194 MG/DL (ref 70–99)
GLUCOSE BLDC GLUCOMTR-MCNC: 258 MG/DL (ref 70–99)
GLUCOSE UR STRIP-MCNC: NEGATIVE MG/DL
HGB UR QL STRIP: NEGATIVE
HYALINE CASTS: 3 /LPF
KETONES UR STRIP-MCNC: NEGATIVE MG/DL
LEUKOCYTE ESTERASE UR QL STRIP: ABNORMAL
NITRATE UR QL: NEGATIVE
PH UR STRIP: 5.5 [PH] (ref 5–7)
RBC URINE: 2 /HPF
SP GR UR STRIP: 1.02 (ref 1–1.03)
SQUAMOUS EPITHELIAL: <1 /HPF
UROBILINOGEN UR STRIP-MCNC: <2 MG/DL
WBC URINE: 25 /HPF

## 2023-02-03 PROCEDURE — 97530 THERAPEUTIC ACTIVITIES: CPT | Mod: GO

## 2023-02-03 PROCEDURE — 250N000013 HC RX MED GY IP 250 OP 250 PS 637: Performed by: STUDENT IN AN ORGANIZED HEALTH CARE EDUCATION/TRAINING PROGRAM

## 2023-02-03 PROCEDURE — 120N000001 HC R&B MED SURG/OB

## 2023-02-03 PROCEDURE — 250N000013 HC RX MED GY IP 250 OP 250 PS 637: Performed by: HOSPITALIST

## 2023-02-03 PROCEDURE — 87086 URINE CULTURE/COLONY COUNT: CPT | Performed by: HOSPITALIST

## 2023-02-03 PROCEDURE — 92526 ORAL FUNCTION THERAPY: CPT | Mod: GN

## 2023-02-03 PROCEDURE — 99231 SBSQ HOSP IP/OBS SF/LOW 25: CPT | Performed by: PSYCHIATRY & NEUROLOGY

## 2023-02-03 PROCEDURE — 250N000011 HC RX IP 250 OP 636: Performed by: HOSPITALIST

## 2023-02-03 PROCEDURE — 81001 URINALYSIS AUTO W/SCOPE: CPT | Performed by: HOSPITALIST

## 2023-02-03 PROCEDURE — 97535 SELF CARE MNGMENT TRAINING: CPT | Mod: GO

## 2023-02-03 PROCEDURE — 99232 SBSQ HOSP IP/OBS MODERATE 35: CPT | Performed by: HOSPITALIST

## 2023-02-03 RX ORDER — TAMSULOSIN HYDROCHLORIDE 0.4 MG/1
0.4 CAPSULE ORAL DAILY
Status: DISCONTINUED | OUTPATIENT
Start: 2023-02-03 | End: 2023-02-06 | Stop reason: HOSPADM

## 2023-02-03 RX ADMIN — ACETAMINOPHEN 650 MG: 325 TABLET ORAL at 03:50

## 2023-02-03 RX ADMIN — ISOSORBIDE MONONITRATE 30 MG: 30 TABLET, EXTENDED RELEASE ORAL at 09:22

## 2023-02-03 RX ADMIN — ALLOPURINOL 200 MG: 100 TABLET ORAL at 09:21

## 2023-02-03 RX ADMIN — HYDROCODONE BITARTRATE AND ACETAMINOPHEN 1 TABLET: 5; 325 TABLET ORAL at 22:17

## 2023-02-03 RX ADMIN — ACETAMINOPHEN 650 MG: 325 TABLET ORAL at 17:32

## 2023-02-03 RX ADMIN — DULOXETINE HYDROCHLORIDE 20 MG: 20 CAPSULE, DELAYED RELEASE ORAL at 20:16

## 2023-02-03 RX ADMIN — AMLODIPINE BESYLATE 2.5 MG: 2.5 TABLET ORAL at 09:21

## 2023-02-03 RX ADMIN — METOPROLOL SUCCINATE 100 MG: 100 TABLET, EXTENDED RELEASE ORAL at 09:21

## 2023-02-03 RX ADMIN — ACETAMINOPHEN 650 MG: 325 TABLET ORAL at 10:38

## 2023-02-03 RX ADMIN — ENOXAPARIN SODIUM 40 MG: 40 INJECTION SUBCUTANEOUS at 17:25

## 2023-02-03 RX ADMIN — DOCUSATE SODIUM 100 MG: 100 CAPSULE, LIQUID FILLED ORAL at 09:21

## 2023-02-03 RX ADMIN — DULOXETINE HYDROCHLORIDE 20 MG: 20 CAPSULE, DELAYED RELEASE ORAL at 09:22

## 2023-02-03 RX ADMIN — MECLIZINE HCL 12.5 MG 12.5 MG: 12.5 TABLET ORAL at 20:14

## 2023-02-03 RX ADMIN — ASPIRIN 81 MG: 81 TABLET, COATED ORAL at 09:20

## 2023-02-03 RX ADMIN — SITAGLIPTIN 50 MG: 25 TABLET, FILM COATED ORAL at 09:20

## 2023-02-03 RX ADMIN — ROSUVASTATIN CALCIUM 20 MG: 10 TABLET, FILM COATED ORAL at 20:15

## 2023-02-03 RX ADMIN — PANTOPRAZOLE SODIUM 40 MG: 40 TABLET, DELAYED RELEASE ORAL at 09:21

## 2023-02-03 RX ADMIN — CLOPIDOGREL BISULFATE 75 MG: 75 TABLET ORAL at 09:21

## 2023-02-03 RX ADMIN — LOSARTAN POTASSIUM 50 MG: 50 TABLET, FILM COATED ORAL at 09:20

## 2023-02-03 RX ADMIN — TAMSULOSIN HYDROCHLORIDE 0.4 MG: 0.4 CAPSULE ORAL at 13:13

## 2023-02-03 ASSESSMENT — ACTIVITIES OF DAILY LIVING (ADL)
ADLS_ACUITY_SCORE: 25
ADLS_ACUITY_SCORE: 25
ADLS_ACUITY_SCORE: 26
ADLS_ACUITY_SCORE: 25
ADLS_ACUITY_SCORE: 26
ADLS_ACUITY_SCORE: 25
ADLS_ACUITY_SCORE: 25

## 2023-02-03 NOTE — PHARMACY-CONSULT NOTE
Pharmacy Consult to evaluate for medication related stroke core measures    Adam Talamantes, 82 year old male admitted for vertigo headache and dizziness on 2/1/2023.    Thrombolytic was not given because of Time from onset contraindications    VTE Prophylaxis Enoxaparin given on 2/2/23 as appropriate prior to end of hospital day 2.    Antithrombotic: aspirin and clopidogrel started on 2/2/23(both home meds)), as appropriate by end of hospital day 2. Continue antithrombotic therapy on discharge to meet quality measures, unless contraindicated.    Anticoagulation if history of A-fib/flutter: Patient does not have history of A-fib/flutter - anticoagulation not required for medication related stroke core measures.     LDL Cholesterol Calculated   Date Value Ref Range Status   02/01/2023 59 <=100 mg/dL Final     LDL Cholesterol Direct   Date Value Ref Range Status   11/22/2019 84 <=129 mg/dl Final       Patient's home statin, Crestor (rosuvastatin) restarted; continue statin on discharge to meet quality measures, unless contraindicated.     Recommendations: None at this time    Thank you for the consult.    Maynor Meyers RPH 2/3/2023 2:24 PM

## 2023-02-03 NOTE — PLAN OF CARE
Goal Outcome Evaluation:  Alert and oriented to self and place. Disoriented to situation.  C/O HA and dizziness this morning. PRN Tylenol given x 1. Pt states some help. Ambulated to the BR with assist of 1. Voided x 1. Will do PVR on the next voiding. Eating and drinking well. Pt said he still have some headache and dizziness but it is better than this morning. Son was in the room all day and help with interpretation.       Darlene Chapman RN

## 2023-02-03 NOTE — PLAN OF CARE
Problem: Plan of Care - These are the overarching goals to be used throughout the patient stay.    Goal: Absence of Hospital-Acquired Illness or Injury  Outcome: Progressing  Intervention: Identify and Manage Fall Risk  Recent Flowsheet Documentation  Taken 2/2/2023 1600 by Debora Villafuerte RN  Safety Promotion/Fall Prevention:   activity supervised   bed alarm on   nonskid shoes/slippers when out of bed   room organization consistent   safety round/check completed  Taken 2/2/2023 1236 by Debora Villafuerte RN  Safety Promotion/Fall Prevention:   activity supervised   bed alarm on   nonskid shoes/slippers when out of bed   room organization consistent   safety round/check completed  Taken 2/2/2023 0827 by Debora Villafuerte RN  Safety Promotion/Fall Prevention:   activity supervised   bed alarm on   nonskid shoes/slippers when out of bed   room organization consistent   safety round/check completed  Intervention: Prevent Skin Injury  Recent Flowsheet Documentation  Taken 2/2/2023 1600 by Debora Villafuerte RN  Body Position: position changed independently  Intervention: Prevent and Manage VTE (Venous Thromboembolism) Risk  Recent Flowsheet Documentation  Taken 2/2/2023 1600 by Debora Villafuerte RN  VTE Prevention/Management: compression stockings on  Taken 2/2/2023 1236 by Debora Villafuerte RN  VTE Prevention/Management: compression stockings on  Goal: Optimal Comfort and Wellbeing  Outcome: Progressing  Intervention: Monitor Pain and Promote Comfort  Recent Flowsheet Documentation  Taken 2/2/2023 0826 by Debora Villafuerte RN  Pain Management Interventions: medication (see MAR)     Problem: Plan of Care - These are the overarching goals to be used throughout the patient stay.    Goal: Optimal Comfort and Wellbeing  Outcome: Progressing  Intervention: Monitor Pain and Promote Comfort  Recent Flowsheet Documentation  Taken 2/2/2023 0826 by Debora Villafuerte RN  Pain Management Interventions: medication (see  MAR)     Problem: Risk for Delirium  Goal: Improved Behavioral Control  Intervention: Minimize Safety Risk  Recent Flowsheet Documentation  Taken 2/2/2023 1600 by Debora Villafuerte RN  Enhanced Safety Measures: bed alarm set  Taken 2/2/2023 1236 by Debora Villafuerte RN  Enhanced Safety Measures: bed alarm set  Taken 2/2/2023 0827 by Debora Villafuerte RN  Enhanced Safety Measures: bed alarm set  Goal: Improved Attention and Thought Clarity  Outcome: Progressing     Problem: Stroke, Ischemic (Includes Transient Ischemic Attack)  Goal: Optimal Functional Ability  Intervention: Optimize Functional Ability  Recent Flowsheet Documentation  Taken 2/2/2023 1600 by Debora Villafuerte RN  Activity Management: ambulated to bathroom  Goal: Effective Oxygenation and Ventilation  Intervention: Optimize Oxygenation and Ventilation  Recent Flowsheet Documentation  Taken 2/2/2023 1600 by Debora Villafuerte RN  Head of Bed (HOB) Positioning: HOB at 20 degrees     Problem: Fall Injury Risk  Goal: Absence of Fall and Fall-Related Injury  Intervention: Identify and Manage Contributors  Recent Flowsheet Documentation  Taken 2/2/2023 1600 by Debora Villafuerte RN  Medication Review/Management: medications reviewed  Taken 2/2/2023 1236 by Debora Villafuerte RN  Medication Review/Management: medications reviewed  Taken 2/2/2023 0827 by Debora Villafuerte RN  Medication Review/Management: medications reviewed  Intervention: Promote Injury-Free Environment  Recent Flowsheet Documentation  Taken 2/2/2023 1600 by Debora Villafuerte RN  Safety Promotion/Fall Prevention:   activity supervised   bed alarm on   nonskid shoes/slippers when out of bed   room organization consistent   safety round/check completed  Taken 2/2/2023 1236 by Debora Villafuerte RN  Safety Promotion/Fall Prevention:   activity supervised   bed alarm on   nonskid shoes/slippers when out of bed   room organization consistent   safety round/check completed  Taken 2/2/2023  0827 by Debora Villafuerte, RN  Safety Promotion/Fall Prevention:   activity supervised   bed alarm on   nonskid shoes/slippers when out of bed   room organization consistent   safety round/check completed   Goal Outcome Evaluation: Patient alert. Able to correctly state the month and that he's in the hospital, but disoriented to situation. Per family, he is not recognizing family members. Kayce speaking. Scored 3 on NIH for not correctly stating his age (he continues to state he's 74), dysarthria and dysphagia. Up with SBA 1. Continuing to monitor.

## 2023-02-03 NOTE — PROGRESS NOTES
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  MRI brain with  numerous acute to subacute cerebellar infarctions.     Assessment/Plan    #Acute to sub acute cerebellar infarcts in setting of known basilar artery stenosis  #Left vertebral artery occlusion  -not a candidate for intervention on basilar artery per neuro.  -continue asa, plavix, statin.     #Hypertension - norvasc, imdur, losartan, toprol-xl     #CKD stage III - baseline    #DM2 - resume januvia, SSI    #gout - allopurinol    #BPH with LUTS - check PVR, UA.  Start flomax.       Checklist:  Code Status: Full Code    Diet: Combination Diet Soft and Bite Sized Diet (level 6); Mildly Thick (level 2); Low Saturated Fat Na <2400mg Diet; Mildly Thick (level 2)     DVT px:  Enoxaparin (Lovenox) SQ    Disposition and Discharge Planning  Auto-populated from discharge tab:     Expected Discharge Date: 02/03/2023        Discharge Comments: resume PCA services at discharge  started flomax pending UA         System Identified Risk Variables  Auto-populated based on system request - if relevant they will be addressed above:  Clinically Significant Risk Factors                        # DMII: A1C = 7.6 % (Ref range: <5.7 %) within past 3 months, PRESENT ON ADMISSION  # Overweight: Estimated body mass index is 29.84 kg/m  as calculated from the following:    Height as of this encounter: 1.549 m (5' 0.98\").    Weight as of this encounter: 71.6 kg (157 lb 13.6 oz)., PRESENT ON ADMISSION             Interval Events/Subjective/Notable results:    Seen with leonarda and Kayce interpretor services.    HA and dizziness manageable  Difficulty urinating      Objective    Vital signs in last 24 hours  Temp:  [97.5  F (36.4  C)-98.8  F (37.1  C)] 97.9  F (36.6  C)  Pulse:  " [74-89] 89  Resp:  [18-22] 18  BP: (126-152)/(63-99) 132/85  SpO2:  [92 %-95 %] 94 % O2 Device: None (Room air)    Weight:   157 lbs 13.59 oz  Body mass index is 29.84 kg/m .    Intake/Output last 3 shifts  I/O last 3 completed shifts:  In: 363 [P.O.:360; I.V.:3]  Out: 675 [Urine:675]    Physical Exam  General:  Alert, cooperative, no distress    Neurologic:  facial symmetry preserved, defers most interaction to son  Psych: calm  HEENT:  Anicteric, MMM  CV:  no edema  Lungs:  Easyrespirations  Skin: no rashes or jaundice noted on exposed skin.    Diego Catheter: Not present  Lines: None          Medical Decision Making           Ander Jaffe MD, [facility], [facility]  Internal Medicine Hospitalist

## 2023-02-03 NOTE — PLAN OF CARE
Problem: Plan of Care - These are the overarching goals to be used throughout the patient stay.    Goal: Optimal Comfort and Wellbeing  Outcome: Progressing     Problem: Diabetes Comorbidity  Goal: Blood Glucose Level Within Targeted Range  Outcome: Progressing     Problem: Stroke, Ischemic (Includes Transient Ischemic Attack)  Goal: Optimal Eating and Swallowing without Aspiration  Outcome: Progressing   Goal Outcome Evaluation:    NIH score of 3 for word-finding difficulty and aphasia.   Tolerating minced/moist diet with mildly thickened liquids.   PRN tylenol given around 0330 for HA; effective.   Slept intermittently.       Denny Wheatley, at bedside overnight, assisted with translation.

## 2023-02-03 NOTE — PROGRESS NOTES
This is a neurological follow-up note    82-year-old admitted to hospital 2/1/2023      Chief complaint  Bilateral cerebellar strokes/left thalamic stroke  Left vertebral artery occlusion  Basilar artery severe stenosis      HPI  82-year-old presented to the hospital 2/1/2023 with headache and vertigo.  Headache may be more chronic in nature but dizziness was worse.  Had previously been diagnosed with a vertebral artery occlusion./Basilar artery occlusion    MRI scan brain shows bilateral cerebellar strokes and left thalamic stroke    Critical multifocal stenosis  Left vertebral artery occluded  Basilar artery high-grade severe stenosis  Right vertebral artery though is patent per scans    Prior to January 2023 patient was on just aspirin 81 mg once per day  After her stroke symptoms and studies was placed on dual antiplatelet agents after January 11, 2023      Per chart patient is on  Aspirin 81 mg once per day  Plavix 85 mg once per day  Crestor 20 mg once per day    When patient was hospitalized in January 11, 2023 and had similar findings occlusion and stenosis Case was reviewed with interventional radiology.  At that time they did not recommend any endovascular intervention or transfer to the [place] for further diagnostic testing.              Past medical history  CVA  Diabetes  Hypertension  Hyperlipidemia  CKD stage III  GERD  Gout/arthritis  Kidney stones  Prostatitis  Insomnia  Latent TB  Neuropathy  Intestinal disaccharide deficiencies/malabsorption      Habits  Past smoker quit in 2000  Does not drink alcohol    Family history  Daughter stroke  Father stroke      Work-up  MRI brain 2/1/2023  1.  Numerous acute-to-subacute infarctions of both cerebellar hemispheres. Left thalamic infarction of similar age.  2.  No mass effect or evidence of hemorrhagic transformation.  3.  Chronic ischemic changes and age-related changes as above.  HEAD CT: 2/1/2023  1.  No acute intracranial process.  2.  Stable mild  to moderate chronic small vessel ischemic disease and generalized brain parenchymal volume loss.  HEAD CTA: 2/1/2023  1. No significant change since 01/11/2023. Occlusion of the left vertebral artery V4 segment and right posterior cerebral artery.  2. 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  3. Fetal origin left posterior cerebral artery demonstrates moderate stenosis of the left P2 segment and mild multifocal stenoses of its P2 and P3 segments  4. Mild to moderate stenosis of the cavernous segment of the right internal carotid artery.  5. Widely patent anterior and middle cerebral artery branches.  NECK CTA: 2/1/2023  1.  No significant change since 01/11/2023. Occlusion of the left vertebral artery at the level of the V3 segment.  2.  Mild atherosclerotic plaque at the carotid bifurcations. No significant carotid stenosis.  3.  Widely patent dominant right vertebral artery.  Cardiac echo 1/12/2023, ejection fraction 55-60%, left atrium normal size  COVID negative  HGBA1C 7.6% (1/12/2023)  HDL 40/, (1/11/2023)  HDL 35/LDL 59, (2/1/2023)    Labs  Sodium 136 potassium 4.0  BUN 21.9, creatinine 1.23  Glucose 158- 136  WBC 8.2, hemoglobin 14.1, platelets 215,000          Exam  Blood pressure 134/87, pulse 74, temperature 98.0  Blood pressure range 126//96  Pulse range 74-1 04  Tmax 98.1        Review of systems  Pertinent positives and negatives  Has had fairly persistent headaches and strokes  Has some dizziness worse with movement  Some nausea    No significant chest pain or shortness of breath  No abdominal pain    No actual diplopia  Mild dysarthria  Weakness/clumsiness of limbs    Otherwise review of systems negative    General exam per primary MD  HEENT no signs of trauma  Lungs clear  Heart rate regular  Abdomen soft   Symmetrical pulses  No edema in the feet    Neurologic exam  Alert oriented x3  Using family member as   Normal process of speech  Normal naming  Normal  comprehension  Normal repetition  No aphasia  No neglect  Memory okay per family    Cranials 2 through 12  Notes hemiplegia  No nystagmus  Visual fields intact  Face symmetrical, smile is somewhat of a snarel  Speech seems clear in his own language    Upper extremities  Mild clumsiness of the right arm greater than the left    Lower extremities  Lift off the bed well  Can wiggle the toes    Gait  Deferred        Assessment/plan    1.  Posterior circulation acute/subacute ischemia 2/1/2023       Bilateral cerebellar strokes       Left thalamic    2.   Severe atherosclerosis with extra and intracranial occlusion/stenosis        Left vertebral artery occluded V3-V4 segment        Right PCA occluded        Basilar artery severe stenosis near occlusion        Left P2-P3 moderate stenosis        Right ICA cavernous segment mild to moderate stenosis    3.  Widely patent dominant right vertebral artery.      4.   Vascular risk factors        Hypertension/hyperlipidemia/diabetes/intra and extracranial atherosclerosis severe    Diagnosis  Bilateral cerebellar stroke  Left thalamic stroke  Basilar artery severe stenosis  Left vertebral artery occlusion  Intracranial atherosclerosis multifocal    Recommend  Dual antiplatelet medication  Statin  Avoid hypotension    Symptomatic treatment of vertigo with meclizine    PT/OT/speech      Aspirin 81 mg daily  Plavix 75 mg daily  Lovastatin 20 mg daily    Patient swallowing mildly thickened food okay.    Previously when and case reviewed in January was not thought to be a good candidate for endovascular intervention  This would be high risk and even possibly life-threatening.  Unclear that this would give better prognosis.    Prolonged discussion with patient and family member at bedside 2/2/2023  Also discussed with primary MD face-to-face 2/2/2023    Increase activity as tolerated    Total care time today 32 minutes discussing and evaluating the above.

## 2023-02-04 ENCOUNTER — APPOINTMENT (OUTPATIENT)
Dept: MRI IMAGING | Facility: HOSPITAL | Age: 83
DRG: 066 | End: 2023-02-04
Attending: HOSPITALIST
Payer: COMMERCIAL

## 2023-02-04 ENCOUNTER — APPOINTMENT (OUTPATIENT)
Dept: RADIOLOGY | Facility: HOSPITAL | Age: 83
DRG: 066 | End: 2023-02-04
Attending: HOSPITALIST
Payer: COMMERCIAL

## 2023-02-04 ENCOUNTER — APPOINTMENT (OUTPATIENT)
Dept: PHYSICAL THERAPY | Facility: HOSPITAL | Age: 83
DRG: 066 | End: 2023-02-04
Payer: COMMERCIAL

## 2023-02-04 ENCOUNTER — APPOINTMENT (OUTPATIENT)
Dept: CT IMAGING | Facility: HOSPITAL | Age: 83
DRG: 066 | End: 2023-02-04
Attending: HOSPITALIST
Payer: COMMERCIAL

## 2023-02-04 ENCOUNTER — APPOINTMENT (OUTPATIENT)
Dept: SPEECH THERAPY | Facility: HOSPITAL | Age: 83
DRG: 066 | End: 2023-02-04
Payer: COMMERCIAL

## 2023-02-04 LAB
ANION GAP SERPL CALCULATED.3IONS-SCNC: 18 MMOL/L (ref 7–15)
ATRIAL RATE - MUSE: 66 BPM
BUN SERPL-MCNC: 35.7 MG/DL (ref 8–23)
CALCIUM SERPL-MCNC: 9.9 MG/DL (ref 8.8–10.2)
CHLORIDE SERPL-SCNC: 97 MMOL/L (ref 98–107)
CREAT SERPL-MCNC: 1.69 MG/DL (ref 0.67–1.17)
DEPRECATED HCO3 PLAS-SCNC: 20 MMOL/L (ref 22–29)
DIASTOLIC BLOOD PRESSURE - MUSE: NORMAL MMHG
ERYTHROCYTE [DISTWIDTH] IN BLOOD BY AUTOMATED COUNT: 14 % (ref 10–15)
ERYTHROCYTE [DISTWIDTH] IN BLOOD BY AUTOMATED COUNT: 14 % (ref 10–15)
GFR SERPL CREATININE-BSD FRML MDRD: 41 ML/MIN/1.73M2
GLUCOSE BLDC GLUCOMTR-MCNC: 149 MG/DL (ref 70–99)
GLUCOSE BLDC GLUCOMTR-MCNC: 152 MG/DL (ref 70–99)
GLUCOSE BLDC GLUCOMTR-MCNC: 224 MG/DL (ref 70–99)
GLUCOSE BLDC GLUCOMTR-MCNC: 262 MG/DL (ref 70–99)
GLUCOSE BLDC GLUCOMTR-MCNC: 265 MG/DL (ref 70–99)
GLUCOSE SERPL-MCNC: 256 MG/DL (ref 70–99)
HCT VFR BLD AUTO: 38.8 % (ref 40–53)
HCT VFR BLD AUTO: 42.7 % (ref 40–53)
HGB BLD-MCNC: 13 G/DL (ref 13.3–17.7)
HGB BLD-MCNC: 13.9 G/DL (ref 13.3–17.7)
INTERPRETATION ECG - MUSE: NORMAL
LACTATE SERPL-SCNC: 2.5 MMOL/L (ref 0.7–2)
LACTATE SERPL-SCNC: 2.9 MMOL/L (ref 0.7–2)
MCH RBC QN AUTO: 30.4 PG (ref 26.5–33)
MCH RBC QN AUTO: 30.9 PG (ref 26.5–33)
MCHC RBC AUTO-ENTMCNC: 32.6 G/DL (ref 31.5–36.5)
MCHC RBC AUTO-ENTMCNC: 33.5 G/DL (ref 31.5–36.5)
MCV RBC AUTO: 92 FL (ref 78–100)
MCV RBC AUTO: 93 FL (ref 78–100)
P AXIS - MUSE: 57 DEGREES
PLATELET # BLD AUTO: 208 10E3/UL (ref 150–450)
PLATELET # BLD AUTO: 222 10E3/UL (ref 150–450)
POTASSIUM SERPL-SCNC: 4 MMOL/L (ref 3.4–5.3)
PR INTERVAL - MUSE: 188 MS
QRS DURATION - MUSE: 88 MS
QT - MUSE: 414 MS
QTC - MUSE: 434 MS
R AXIS - MUSE: 9 DEGREES
RBC # BLD AUTO: 4.21 10E6/UL (ref 4.4–5.9)
RBC # BLD AUTO: 4.57 10E6/UL (ref 4.4–5.9)
SODIUM SERPL-SCNC: 135 MMOL/L (ref 136–145)
SYSTOLIC BLOOD PRESSURE - MUSE: NORMAL MMHG
T AXIS - MUSE: 80 DEGREES
VENTRICULAR RATE- MUSE: 66 BPM
WBC # BLD AUTO: 10.8 10E3/UL (ref 4–11)
WBC # BLD AUTO: 12.2 10E3/UL (ref 4–11)

## 2023-02-04 PROCEDURE — 97530 THERAPEUTIC ACTIVITIES: CPT | Mod: GP

## 2023-02-04 PROCEDURE — 74230 X-RAY XM SWLNG FUNCJ C+: CPT

## 2023-02-04 PROCEDURE — 36415 COLL VENOUS BLD VENIPUNCTURE: CPT | Performed by: HOSPITALIST

## 2023-02-04 PROCEDURE — 85014 HEMATOCRIT: CPT | Performed by: HOSPITALIST

## 2023-02-04 PROCEDURE — 83605 ASSAY OF LACTIC ACID: CPT | Performed by: HOSPITALIST

## 2023-02-04 PROCEDURE — 258N000003 HC RX IP 258 OP 636: Performed by: HOSPITALIST

## 2023-02-04 PROCEDURE — 85027 COMPLETE CBC AUTOMATED: CPT | Performed by: HOSPITALIST

## 2023-02-04 PROCEDURE — 70551 MRI BRAIN STEM W/O DYE: CPT

## 2023-02-04 PROCEDURE — 93010 ELECTROCARDIOGRAM REPORT: CPT | Performed by: GENERAL ACUTE CARE HOSPITAL

## 2023-02-04 PROCEDURE — 99233 SBSQ HOSP IP/OBS HIGH 50: CPT | Performed by: PSYCHIATRY & NEUROLOGY

## 2023-02-04 PROCEDURE — 250N000011 HC RX IP 250 OP 636: Performed by: HOSPITALIST

## 2023-02-04 PROCEDURE — 70450 CT HEAD/BRAIN W/O DYE: CPT

## 2023-02-04 PROCEDURE — 120N000001 HC R&B MED SURG/OB

## 2023-02-04 PROCEDURE — 93005 ELECTROCARDIOGRAM TRACING: CPT

## 2023-02-04 PROCEDURE — 92611 MOTION FLUOROSCOPY/SWALLOW: CPT | Mod: GN | Performed by: SPEECH-LANGUAGE PATHOLOGIST

## 2023-02-04 PROCEDURE — 250N000013 HC RX MED GY IP 250 OP 250 PS 637: Performed by: STUDENT IN AN ORGANIZED HEALTH CARE EDUCATION/TRAINING PROGRAM

## 2023-02-04 PROCEDURE — 250N000012 HC RX MED GY IP 250 OP 636 PS 637: Performed by: HOSPITALIST

## 2023-02-04 PROCEDURE — 250N000013 HC RX MED GY IP 250 OP 250 PS 637: Performed by: HOSPITALIST

## 2023-02-04 PROCEDURE — 99233 SBSQ HOSP IP/OBS HIGH 50: CPT | Performed by: HOSPITALIST

## 2023-02-04 PROCEDURE — 80048 BASIC METABOLIC PNL TOTAL CA: CPT | Performed by: HOSPITALIST

## 2023-02-04 RX ORDER — OXYCODONE HYDROCHLORIDE 5 MG/1
5 TABLET ORAL EVERY 4 HOURS PRN
Status: DISCONTINUED | OUTPATIENT
Start: 2023-02-04 | End: 2023-02-04

## 2023-02-04 RX ORDER — ACETAMINOPHEN 325 MG/1
975 TABLET ORAL EVERY 8 HOURS
Status: DISCONTINUED | OUTPATIENT
Start: 2023-02-04 | End: 2023-02-06 | Stop reason: HOSPADM

## 2023-02-04 RX ORDER — TAMSULOSIN HYDROCHLORIDE 0.4 MG/1
0.4 CAPSULE ORAL DAILY
Qty: 30 CAPSULE | Refills: 3 | Status: SHIPPED | OUTPATIENT
Start: 2023-02-04 | End: 2023-06-13

## 2023-02-04 RX ORDER — BARIUM SULFATE 400 MG/ML
SUSPENSION ORAL ONCE
Status: COMPLETED | OUTPATIENT
Start: 2023-02-04 | End: 2023-02-04

## 2023-02-04 RX ORDER — OXYCODONE HYDROCHLORIDE 5 MG/1
5 TABLET ORAL EVERY 6 HOURS PRN
Status: DISCONTINUED | OUTPATIENT
Start: 2023-02-04 | End: 2023-02-06

## 2023-02-04 RX ORDER — SODIUM CHLORIDE 9 MG/ML
INJECTION, SOLUTION INTRAVENOUS CONTINUOUS
Status: ACTIVE | OUTPATIENT
Start: 2023-02-04 | End: 2023-02-05

## 2023-02-04 RX ORDER — METOPROLOL TARTRATE 25 MG/1
25 TABLET, FILM COATED ORAL 2 TIMES DAILY
Status: DISCONTINUED | OUTPATIENT
Start: 2023-02-05 | End: 2023-02-06 | Stop reason: HOSPADM

## 2023-02-04 RX ORDER — HEPARIN SODIUM 10000 [USP'U]/100ML
0-5000 INJECTION, SOLUTION INTRAVENOUS CONTINUOUS
Status: DISCONTINUED | OUTPATIENT
Start: 2023-02-04 | End: 2023-02-06 | Stop reason: HOSPADM

## 2023-02-04 RX ORDER — METOPROLOL TARTRATE 25 MG/1
25 TABLET, FILM COATED ORAL 2 TIMES DAILY
Status: DISCONTINUED | OUTPATIENT
Start: 2023-02-04 | End: 2023-02-04

## 2023-02-04 RX ADMIN — ENOXAPARIN SODIUM 40 MG: 40 INJECTION SUBCUTANEOUS at 17:14

## 2023-02-04 RX ADMIN — INSULIN GLARGINE 5 UNITS: 100 INJECTION, SOLUTION SUBCUTANEOUS at 21:43

## 2023-02-04 RX ADMIN — SODIUM CHLORIDE: 9 INJECTION, SOLUTION INTRAVENOUS at 17:15

## 2023-02-04 RX ADMIN — HEPARIN SODIUM 850 UNITS/HR: 10000 INJECTION, SOLUTION INTRAVENOUS at 19:20

## 2023-02-04 RX ADMIN — ASPIRIN 81 MG: 81 TABLET, COATED ORAL at 08:00

## 2023-02-04 RX ADMIN — ACETAMINOPHEN 975 MG: 325 TABLET ORAL at 17:10

## 2023-02-04 RX ADMIN — ALLOPURINOL 200 MG: 100 TABLET ORAL at 08:00

## 2023-02-04 RX ADMIN — CLOPIDOGREL BISULFATE 75 MG: 75 TABLET ORAL at 08:01

## 2023-02-04 RX ADMIN — SITAGLIPTIN 50 MG: 25 TABLET, FILM COATED ORAL at 08:00

## 2023-02-04 RX ADMIN — DOCUSATE SODIUM 100 MG: 100 CAPSULE, LIQUID FILLED ORAL at 08:01

## 2023-02-04 RX ADMIN — SODIUM CHLORIDE: 9 INJECTION, SOLUTION INTRAVENOUS at 22:08

## 2023-02-04 RX ADMIN — DULOXETINE HYDROCHLORIDE 20 MG: 20 CAPSULE, DELAYED RELEASE ORAL at 08:02

## 2023-02-04 RX ADMIN — LOSARTAN POTASSIUM 50 MG: 50 TABLET, FILM COATED ORAL at 08:01

## 2023-02-04 RX ADMIN — METOPROLOL SUCCINATE 100 MG: 100 TABLET, EXTENDED RELEASE ORAL at 08:00

## 2023-02-04 RX ADMIN — BARIUM SULFATE: 400 SUSPENSION ORAL at 08:37

## 2023-02-04 RX ADMIN — PANTOPRAZOLE SODIUM 40 MG: 40 TABLET, DELAYED RELEASE ORAL at 06:33

## 2023-02-04 RX ADMIN — TAMSULOSIN HYDROCHLORIDE 0.4 MG: 0.4 CAPSULE ORAL at 08:02

## 2023-02-04 RX ADMIN — ACETAMINOPHEN 650 MG: 325 TABLET ORAL at 08:09

## 2023-02-04 RX ADMIN — ROSUVASTATIN CALCIUM 20 MG: 10 TABLET, FILM COATED ORAL at 21:43

## 2023-02-04 RX ADMIN — DULOXETINE HYDROCHLORIDE 20 MG: 20 CAPSULE, DELAYED RELEASE ORAL at 21:44

## 2023-02-04 RX ADMIN — ISOSORBIDE MONONITRATE 30 MG: 30 TABLET, EXTENDED RELEASE ORAL at 08:02

## 2023-02-04 RX ADMIN — AMLODIPINE BESYLATE 2.5 MG: 2.5 TABLET ORAL at 08:00

## 2023-02-04 ASSESSMENT — ACTIVITIES OF DAILY LIVING (ADL)
ADLS_ACUITY_SCORE: 25

## 2023-02-04 NOTE — SIGNIFICANT EVENT
Significant Event Note    Time of event: 3:34 PM February 4, 2023    Description of event:  Responded to a rapid response paged overhead.  When I entered the room patient was laying in bed, nonresponsive.  Per nursing, had walked to the bathroom with standby assist.  While urinating he lost consciousness on the toilet.    Blood sugar > 200  Blood pressure somewhat soft, 110s/50s.  Other vitals WNL    Exam  GENERAL: Moaning in response to questions.  Does not open eyes  RESP:  normal work of breathing   CV: extremities well perfused  Neuro: Occasionally will squeeze hands with symmetric strength    He became slightly more alert while I was in the room.  However, still not answering questions appropriately.    EKG with sinus bradycardia.    Plan:  -Stat head CT  -If head CT is benign would get an MRI today.  -BMP, CBC    Dr. Jaffe hospitalist,  arrived and discussed with family.    Discussed with: bedside nurse and Dr. Alannah Bravo MD        Hospitalist addendum:  Agree with above.  Possible micturition syncope vs. Recurrent intracranial event.    Bolus IVF  Head CT, limited MRI.  Hold anti-HTNive for now    Ander Jaffe MD, [facility]  Internal Medicine Hospitalist  2/4/2023

## 2023-02-04 NOTE — SIGNIFICANT EVENT
Pt asked if need to go to the bath room or use a urinal. Pt said he doesn't have the urge to go multiple times during the shift. . At 1500 this writer went to his room to bladder scan pt. Pt said this time he want to go to the Bath room. Pt ambulated to the bathroom and sat on the toilet. Daughtercar was also with pt. Pt become unresponsive siting on the toilet . RRT called and pt lifted and carried by staff to bed. Pt become responsive in bed. Able to move hands and legs. BP = 101/57, HR 62. Blood sugar = 256. Pt is out of the lora for an  MRI test now.     Darlene Chapman RN

## 2023-02-04 NOTE — PLAN OF CARE
Problem: Stroke, Ischemic (Includes Transient Ischemic Attack)  Goal: Optimal Cognitive Function  Outcome: Not Progressing  Goal: Improved Communication Skills  Outcome: Not Progressing  Goal: Effective Urinary Elimination  Outcome: Progressing   Goal Outcome Evaluation:    Patient is disoriented to situation and time. Son is by the bedside, participates in cares and interpreting Kayce language. Patient has difficulty finding words, speech is slurred and dysarthric. NIH = 3. Encouraged to take thickened water but refused. Takes meds whole without difficulty. Sits upright to take meds. Cooperative with cares and treatments. C/o headache and dizziness, given prn Norco and meclizine with noted improvements thereafter. Patient is weak, unable to maintain posture and gait. Needs assist of two for ambulation and transfers. Tele is normal sinus rhythm.

## 2023-02-04 NOTE — PROGRESS NOTES
NEUROLOGY PROGRESS NOTE     ASSESSMENT & PLAN   Visit diagnosis: Multiple strokes.    83 yo man presenting with dizziness and headache.      New strokes on MRI, significant atherosclerosis on recent imaging.  Previously reviewed by interventional team, no surgical procedures recommended.    Continue dual antiplatelet treatment.    Continue statin.  LDL 59.    PT/OT/speech. Swallow study today.    Neurochecks, telemetry.      Avoid hypotension while in house, however upon discharge goal BP will be <140/90.    Symptomatic headache and vertigo management.    Glucose management per primary team.    Neurology will sign off.  No additional inpatient work-up needed.  Patient should follow-up in neurology clinic after discharge, in 6-8 weeks.    Neurology Discharge Planning: Plan is for discharge with home therapies, on hold because of home care availability.  However, there are some concerns in terms of his ability to be taken care of at home at this point.    Addendum: I reviewed the stroke neurology note with Dr. Jaffe again today.  It seems like perhaps there is still a question elyse about any intervention.  We will reach out to ask for their opinion.    Patient Active Problem List    Diagnosis Date Noted     Cerebellar stroke (H) 02/01/2023     Priority: Medium     Vertigo 01/11/2023     Priority: Medium     Essential hypertension 01/11/2023     Priority: Medium     Vertebral artery occlusion, left 01/11/2023     Priority: Medium     ACE-inhibitor cough 03/29/2021     Priority: Medium     Type 2 diabetes mellitus with stage 3 chronic kidney disease, without long-term current use of insulin (H) 09/28/2020     Priority: Medium     Urinary incontinence 10/30/2019     Priority: Medium     Primary osteoarthritis involving multiple joints 04/11/2019     Priority: Medium     Esophageal reflux      Priority: Medium     Created by Conversion         Dyslipidemia      Priority: Medium     Created by [employer]  [facility]  Annotation: Dec 28 2007  3:23PM - Fuad Solvei2007:   Chol   281, HDL 39Started simvastatin 2011         CKD (chronic kidney disease) stage 3, GFR 30-59 ml/min (H)      Priority: Medium     Created by [facility] Annotation: Dec 28 2007  3:40PM - Alla, Ali: Cr elevated at   1.7   since .Acute exacerbation with episode of nephrolithiasis complicated   by   urosepsis in 2009.         Constipation, unspecified constipation type      Priority: Medium     Coronary artery disease due to lipid rich plaque      Priority: Medium     Right lower quadrant abdominal pain      Priority: Medium     Colitis      Priority: Medium     Nephrolithiasis      Priority: Medium     Created by [facility] Annotation: Aug  4 2009 12:22PM - Giulia Meridaig:   Hospitalized   2009 with BL stent placement.  Stent removal 2009.Calcium Stones.CT   2011:Calcified plaques L Renal pelvis,L lower pole 2 nonobstructing   calculi   of 2 mm.  Calculi medial R upper pole and R lower pole.Low dense R lower   pole   cortical lesions.2011 seen by Urology.BL pelvocaliectasis         Chronic Gout      Priority: Medium     Created by [facility] Annotation: Dec 28 2007  3:23PM - Alla, Ali: Uric acid   elevated   to 10 since at least .Multiple joints affected-phalangeal,wrists,   ankles,   knees, ? shoulders.Started allopurinol 2009. Stopped HCTZ for BP   2009Multiple tophi noted since 10/2011Started colchicine 2009.  Replacement Utility updated for latest IMO load         BPH (benign prostatic hyperplasia) 2017     Priority: Medium     Chronic gout 2017     Priority: Medium     Essential hypertension with goal blood pressure less than 140/90 2017     Priority: Medium     Chronic right shoulder pain 2017     Priority: Medium     Generalized Osteoarthritis Of The Hand      Priority: Medium     Created by [employer]  [employer] Utility updated for latest IMO  load         Bilateral chronic knee pain 2016     Priority: Medium     Cataracts, bilateral 2016     Priority: Medium     Elevated PSA 2015     Priority: Medium     Prostate nodule 2015     Priority: Medium     Pseudogout      Priority: Medium     Created by [facility] Annotation: Aug 14 2009  8:35PM - Chucho Espino: Ankle   aspiration   2009 showed calcium pyrophospate crystals but not noted if intra or   extracellular.Seen by Rhematology 2009         Latent Tuberculosis      Priority: Medium     Created by [facility] Annotation: Dec 28 2007  3:40PM - Bella Gold: treated 9 mo in   '06         Lumbar Disc Degeneration      Priority: Medium     Created by [employer]version         Insomnia      Priority: Medium     Created by [employer]         Medical History  Past Medical History:   Diagnosis Date     Acute blood loss anemia      Acute renal failure (H)      Anemia      Bacteremia      Cataract      Chronic gout      CKD (chronic kidney disease)     Stage 3     DM2 (diabetes mellitus, type 2) (H)      GERD (gastroesophageal reflux disease)      Glucose intolerance (impaired glucose tolerance)      Gout      History of nephrolithiasis      History of prostatitis 2017     Hyperlipidemia      Hypertension      Insomnia      Intestinal disaccharidase deficiencies and disaccharide malabsorption     Created by Conversion      Latent tuberculosis      Nephrolithiasis      Neuropathy      Nonspecific abnormal findings on radiological and other examination of skull and head     Created by [facility] Annotation: 2013 11:23PM - Giulia Meridai/10/2011:  severe stenosis both posterior cerebral arteries seen MRI/MRA done for  vertigo. No acute changes.e*     Osteoarthritis     wrist, knee, shoulder     Prostatitis      Rectal bleed      Trigger thumb         SUBJECTIVE     83 yo man presenting on 23 with headache and vertigo.  Brain MRI revealed new  cerebellar strokes and a left thalamic stroke.  History of prior stroke and known atherosclerosis.  Interventional radiology did not recommend any interventions when the patient was hospitalized last month. Adam has history of diabetes, hypertension, hyperlipidemia, latent TB, CKD, GERD, gout, kidney stones, prostatitis, malabsorption and neuropathy.    Right-sided clumsiness on exam.  Some dysarthria also noted on nursing exam.  He continues to complain of headache and dizziness, given Norco and meclizine with some improvement of symptoms.    Adam's daughter-in-law is at bedside.  She says that he had a hard time standing up with PT today.  He feels generally weak.  He does still have some dizziness and the neck pain.  No complaints regarding headache today. Adam says the medication for dizziness does help some.  Daughter-in-law says that he did eat some food this morning and this went okay.     OBJECTIVE     Vital signs in last 24 hours  Temp:  [97.1  F (36.2  C)-98  F (36.7  C)] 97.1  F (36.2  C)  Pulse:  [76-89] 79  Resp:  [16-20] 18  BP: (108-177)/(59-98) 177/98  SpO2:  [93 %-96 %] 96 %    Weight:   [unfilled]    Review of Systems   Pertinent items are noted in HPI.    General Physical Exam: Patient is alert and oriented x 3. Vital signs were reviewed and are documented in EMR. Neck was supple.  No  thyromegaly, JVD or lymphadenopathy noted.  Neurological Exam:  Mental status: Attentive, interactive.  Speech: Fluent per family.  Cranial nerves: EOM full, face appears symmetric.  Motor/Coordination: Slight clumsiness of right upper extremity, good  strength.  Moves both legs off of the bed without difficulty.    Sensory: Appears to be intact to light touch throughout.   Gait: Deferred for safety, patient dizziness.     DIAGNOSTIC STUDIES     Pertinent Radiology   Radiology Results: Personally reviewed image/s    CT/CTA:  IMPRESSION:   HEAD CT:  1.  No acute intracranial process.  2.  Stable mild to moderate  chronic small vessel ischemic disease and generalized brain parenchymal volume loss.     HEAD CTA:   1. No significant change since 01/11/2023. Occlusion of the left vertebral artery V4 segment and right posterior cerebral artery.  2. 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  3. Fetal origin left posterior cerebral artery demonstrates moderate stenosis of the left P2 segment and mild multifocal stenoses of its P2 and P3 segments  4. Mild to moderate stenosis of the cavernous segment of the right internal carotid artery.  5. Widely patent anterior and middle cerebral artery branches.     NECK CTA:  1.  No significant change since 01/11/2023. Occlusion of the left vertebral artery at the level of the V3 segment.  2.  Mild atherosclerotic plaque at the carotid bifurcations. No significant carotid stenosis.  3.  Widely patent dominant right vertebral artery.    MRI Brain:  IMPRESSION:  1.  Numerous acute-to-subacute infarctions of both cerebellar hemispheres. Left thalamic infarction of similar age.  2.  No mass effect or evidence of hemorrhagic transformation.  3.  Chronic ischemic changes and age-related changes as above.     Pertinent Labs   Lab Results: personally reviewed.   Recent Results (from the past 24 hour(s))   Glucose by meter    Collection Time: 02/03/23 11:54 AM   Result Value Ref Range    GLUCOSE BY METER POCT 191 (H) 70 - 99 mg/dL   Glucose by meter    Collection Time: 02/03/23  1:12 PM   Result Value Ref Range    GLUCOSE BY METER POCT 194 (H) 70 - 99 mg/dL   UA with Microscopic reflex to Culture    Collection Time: 02/03/23  1:48 PM    Specimen: Urine, Midstream   Result Value Ref Range    Color Urine Light Yellow Colorless, Straw, Light Yellow, Yellow    Appearance Urine Clear Clear    Glucose Urine Negative Negative mg/dL    Bilirubin Urine Negative Negative    Ketones Urine Negative Negative mg/dL    Specific Gravity Urine 1.016 1.001 - 1.030    Blood Urine Negative Negative    pH  Urine 5.5 5.0 - 7.0    Protein Albumin Urine 30 (A) Negative mg/dL    Urobilinogen Urine <2.0 <2.0 mg/dL    Nitrite Urine Negative Negative    Leukocyte Esterase Urine 250 Franko/uL (A) Negative    RBC Urine 2 <=2 /HPF    WBC Urine 25 (H) <=5 /HPF    Squamous Epithelials Urine <1 <=1 /HPF    Hyaline Casts Urine 3 (H) <=2 /LPF   Glucose by meter    Collection Time: 02/03/23  5:20 PM   Result Value Ref Range    GLUCOSE BY METER POCT 258 (H) 70 - 99 mg/dL   Glucose by meter    Collection Time: 02/03/23  9:21 PM   Result Value Ref Range    GLUCOSE BY METER POCT 174 (H) 70 - 99 mg/dL   Glucose by meter    Collection Time: 02/04/23  7:39 AM   Result Value Ref Range    GLUCOSE BY METER POCT 149 (H) 70 - 99 mg/dL         HOSPITAL MEDICATIONS       allopurinol  200 mg Oral Daily     amLODIPine  2.5 mg Oral Daily     aspirin  81 mg Oral Daily     clopidogrel  75 mg Oral Daily     docusate sodium  100 mg Oral Daily     DULoxetine  20 mg Oral BID     enoxaparin ANTICOAGULANT  40 mg Subcutaneous Q24H     insulin aspart  1-7 Units Subcutaneous TID AC     isosorbide mononitrate  30 mg Oral Daily     losartan  50 mg Oral Daily     metoprolol succinate ER  100 mg Oral Daily     pantoprazole  40 mg Oral QAM AC     rosuvastatin  20 mg Oral At Bedtime     sitagliptin  50 mg Oral Daily     sodium chloride (PF)  3 mL Intracatheter Q8H     tamsulosin  0.4 mg Oral Daily        Total time spent for face to face visit, reviewing labs/imaging studies, counseling and coordination of care was: 1 Hour. More than 50% of this time was spent on counseling and coordination of care.    Dragon software used in the dictation on this note.    Shakira Castro MD  [facility] Neurology [facility] - [address], [address]  [place] [address]

## 2023-02-04 NOTE — PROGRESS NOTES
More awake after returning from MRI.    Discussed with radiology.  Significantly increased CVA burden in cerebellar hemispheres R>L with additional concern for pontine infarcts.  Slightly increased effacement of 4th ventricle.  No herniation.    -Discussed with family.  Now DNR/DNI.  Pall care already consulted and I think hospice would be appropriate if not a candidate for intervention on posterior circulation as he is having recurrent events despite max medical therapy.  Would like neurology to weigh in on prognosis as well.    -IVF  -Hold losartan, imdur and norvasc.  Reduce dose BB and add hold parameters.  -Page out to neurology to discuss events.    Ander Jaffe MD, [facility]  Internal Medicine Hospitalist

## 2023-02-04 NOTE — DISCHARGE INSTRUCTIONS
Home care services have been arranged for the patient.  Home Care Agency: [facility]  Home Care Phone Number: [phone]  Services: physical therapy, occupational therapy  Instructions: Home care will call to schedule first visit.

## 2023-02-04 NOTE — PROGRESS NOTES
Care Management Discharge Note    Discharge Date: 02/04/2023       Discharge Disposition:  Home    Discharge Transportation: family or friend will provide    Private pay costs discussed: Not applicable      Handoff Referral Completed: Yes    Additional Information:  SW reviewed chart and discharge orders, noted order for home care PT/OT. CANDELARIO sent referrals to [facility], [facility] and [facility].     SW received acceptance of pt to [facility]. They  Request order for RN also. CANDELARIO updated MD.      Shakira Castaneda [facility]    Addendum: SW notified by [facility] that they would not be able to start care until 2/7 or 2/8. SW also received acceptance of pt by [facility] who report being able to start within 48 hours. SW cancelled [facility] referral and faxed discharge orders to [facility] and informed them that pt would be using them for home care services.  9:41 AM    CANDELARIO updated by MD that pts discharge cancelled for today. SW updated [facility] of this change.  10:33 AM

## 2023-02-04 NOTE — CONSULTS
[facility]    Stroke Telephone Note    I was called by Ander Jaffe on 02/04/23 regarding patient Adam LUCAS Albin. The patient is a 82 year old male who presents with posterior circulation strokes. He presented originally with headache and dizziness. He previously had a vertebral artery occlusion. H has significant basilar artery stenosis. A rapid response was called on him 2/4 due to being unresponsive he lost consciousness while urinating. He had a BP 110s/50s at the time. He had a repeat MRI which showed increased stroke burden.   .He has basilar stenosis.   Vertebral artery dissection was found 1/11  Imaging Findings        HEAD CTA:   1. No significant change since 01/11/2023. Occlusion of the left vertebral artery V4 segment and right posterior cerebral artery.  2. 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  3. Fetal origin left posterior cerebral artery demonstrates moderate stenosis of the left P2 segment and mild multifocal stenoses of its P2 and P3 segments  4. Mild to moderate stenosis of the cavernous segment of the right internal carotid artery.  5. Widely patent anterior and middle cerebral artery branches.     NECK CTA:  1.  No significant change since 01/11/2023. Occlusion of the left vertebral artery at the level of the V3 segment.  2.  Mild atherosclerotic plaque at the carotid bifurcations. No significant carotid stenosis.  3.  Widely patent dominant right vertebral artery.    Impression  Acute ischemic stroke of Posterior circulation due to large-artery atherosclerosis- has been on Aspirin, Plavix and Crestor. His strokes have increase in burden size which could be due to the basilar artery stenosis. He could potentially benefit     Recommendations        [facility] neurochecks   Would recommend discussing with family about stenting  Could potentially be a candidate for stenting but would need to both transfer to [facility] and agreement form [facility]        My  "recommendations are based on the information provided over the phone by Adam Talamantes's in-person providers. They are not intended to replace the clinical judgment of his in-person providers. I was not requested to personally see or examine the patient at this time.    The Stroke Staff is Dr. Cunningham.    Mary Jordan MD  Vascular Neurology Fellow    To page me or covering stroke neurology team member, click here: [facility]  Choose \"On Call\" tab at top, then select \"NEUROLOGY/ALL SITES\" from middle drop-down box, press Enter, then look for \"stroke\" or \"telestroke\" for your site.        "

## 2023-02-04 NOTE — PROGRESS NOTES
[facility] Hospitalist Progress Note  [facility]  Admission date: 2/1/2023    Summary:    82M with hx of stroke with known basilar artery and left vertebral artery stenosis, CKD3, DM2, HLD, HTN, GERD who presents with vertigo headache and dizziness.  MRI brain with  numerous acute to subacute cerebellar infarctions.      Recommended by neurology (in prior discussion with IR) not to intervene on significant posterior circulation/basilar artery disease.  It is a little unclear to me from vascular neurology's note if they have a different take on potential intervention.  Neurology to follow-up with stroke neurology on this.  Very weak and if not a candidate for intervention and already failed max medical therapy I think palliative care consult and goals of care delineation are appropriate.    Assessment/Plan    #Acute to sub acute cerebellar infarcts in setting of known basilar artery stenosis  #Left vertebral artery occlusion  -continue asa, plavix, statin.  -pall care consult.  Neurology to discuss with vascular neuro.  -symptomatic tx - meclizine PRN dizziness, standing tylenol, low dose oxycodone PRN     #Hypertension - norvasc, imdur, losartan, toprol-xl     #CKD stage III - baseline    #DM2 - resume januvia, SSI    #gout - allopurinol    #BPH with LUTS - check PVR, UA.  Start flomax.       Checklist:  Code Status: Full Code    Diet: Diet  Combination Diet Soft and Bite Sized Diet (level 6); Thin Liquids (level 0); Low Saturated Fat Na <2400mg Diet; Thin Liquids (level 0)     DVT px:  Enoxaparin (Lovenox) SQ    Disposition and Discharge Planning  Auto-populated from discharge tab:     Expected Discharge Date: 02/06/2023        Discharge Comments: resume PCA services at discharge  started flomax pending UA         System Identified Risk Variables  Auto-populated based on system request - if relevant they will be addressed above:  Clinically Significant Risk Factors                        # DMII: A1C =  "7.6 % (Ref range: <5.7 %) within past 3 months, PRESENT ON ADMISSION  # Overweight: Estimated body mass index is 29.84 kg/m  as calculated from the following:    Height as of this encounter: 1.549 m (5' 0.98\").    Weight as of this encounter: 71.6 kg (157 lb 13.6 oz)., PRESENT ON ADMISSION             Interval Events/Subjective/Notable results:    Seen with BRANDON Devries interpretor services.    HA and dizziness quite bothersome this AM  2 person assist this AM with PT.  Too much for home right now per BRANDON  Able to urinate better with flomax      Objective    Vital signs in last 24 hours  Temp:  [97.1  F (36.2  C)-98  F (36.7  C)] 97.8  F (36.6  C)  Pulse:  [76-89] 80  Resp:  [16-18] 18  BP: ()/(57-98) 92/57  SpO2:  [93 %-96 %] 94 % O2 Device: None (Room air)    Weight:   157 lbs 13.59 oz  Body mass index is 29.84 kg/m .    Intake/Output last 3 shifts  I/O last 3 completed shifts:  In: 240 [P.O.:240]  Out: 450 [Urine:450]    Physical Exam  General:  Alert,weak, holding head because of pain  Neurologic:  facial symmetry preserved, defers most interaction to BRANDON  Psych: calm  HEENT:  Anicteric, MMM  CV:  no edema  Lungs:  Easyrespirations  Skin: no rashes or jaundice noted on exposed skin.    Diego Catheter: Not present  Lines: None         Medical Decision Making         D/w neurology    Ander Jaffe MD, [facility]  Internal Medicine Hospitalist  "

## 2023-02-04 NOTE — PROGRESS NOTES
"Speech-Language Pathology: Video Swallow Study       02/04/23 0800   Appointment Info   Signing Clinician's Name / Credentials (SLP) Savannah Shanks M.S. CCC-SLP   General Information   Onset of Illness/Injury or Date of Surgery 02/01/23   Referring Physician Anthony Weldon MD   Patient/Family Therapy Goal Statement (SLP) Upgrade to thin liquids   Pertinent History of Current Problem Per chart review \"Pt is an 82 y.o. 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  MRI brain with  numerous acute to subacute cerebellar infarctions.\" SLP consulted per stroke protocol.  Per son, patient has been tolerating sips of thin liquids and medications with thin liqiuds without overt s/s aspiration.       Present no   Language Son provided interpretation during evaluation per request   Type of Evaluation   Type of Evaluation Swallow Evaluation   Oral Motor   Oral Musculature generally intact   Dentition (Oral Motor)   Dentition (Oral Motor) some missing teeth  (Dentures at hospital, not present for evaluation)   Dental Appliance/Denture (Oral Motor) upper and lower   General Swallowing Observations   Respiratory Support (General Swallowing Observations) none   Current Diet/Method of Nutritional Intake (General Swallowing Observations, NIS) mildly thick liquids (level 2);soft & bite-sized (level 6)   Swallowing Evaluation Videofluoroscopic swallow study (VFSS)   VFSS Evaluation   Radiologist Lester Fahrner   Views Taken left lateral   VFSS Textures Trialed thin liquids;mildly thick liquids;pureed   VFSS Eval: Thin Liquid Texture Trial   Mode of Presentation, Thin Liquid spoon;straw   Preparatory Phase WFL   Oral Phase, Thin Liquid WFL   Bolus Location When Swallow Triggered pyriforms   Pharyngeal Phase, Thin Liquid WFL   Rosenbek's Penetration Aspiration Scale: Thin Liquid Trial Results 2 - " contrast enters airway, remains above the vocal cords, no residue remains (penetration)   Diagnostic Statement Swallow delayed to the level of pyriform sinuses with self-administered sips, reduced with small single sip.  Moderate transient penetration that fully cleras.   VFSS Eval: Mildly Thick Liquids   Mode of Presentation spoon;straw   Preparatory Phase WFL   Oral Phase WFL   Bolus Location When Swallow Triggered valleculae   Pharyngeal Phase WFL   Rosenbek's Penetration Aspiration Scale 1 - no aspiration, contrast does not enter airway   VFSS Evaluation: Puree Solid Texture Trial   Mode of Presentation, Puree spoon   Preparatory Phase WFL   Oral Phase, Puree WFL   Bolus Location When Swallow Triggered valleculae   Pharyngeal Phase, Puree WFL   Rosenbek's Penetration Aspiration Scale: Puree Food Trial Results 1 - no aspiration, contrast does not enter airway   Esophageal Phase of Swallow   Patient reports or presents with symptoms of esophageal dysphagia No   Esophageal comments Esophageal sweep not completed due to no significant concerns or stasis noted.   Swallowing Recommendations   Diet Consistency Recommendations thin liquids (level 0);soft & bite-sized (level 6)   Supervision Level for Intake 1:1 supervision needed   Mode of Delivery Recommendations bolus size, small   Monitoring/Assistance Required (Eating/Swallowing) check mouth frequently for oral residue/pocketing;stop eating activities when fatigue is present   Recommended Feeding/Eating Techniques (Swallow Eval) maintain upright sitting position for eating   Medication Administration Recommendations, Swallowing (SLP) per patient preference   General Therapy Interventions   Planned Therapy Interventions Dysphagia Treatment   Dysphagia treatment Modified diet education   Clinical Impression   Criteria for Skilled Therapeutic Interventions Met (SLP Eval) Yes, treatment indicated   Risks & Benefits of therapy have been explained participants voiced  agreement with care plan;current/potential barriers reviewed;care plan/treatment goals reviewed;evaluation/treatment results reviewed;risks/benefits reviewed;participants included;patient;spouse/significant other;son   Clinical Impression Comments Video Swallow Study completed on today's date.  Patient demonstrated overall swallow safety and efficiency WFL, notable for delayed swallow initiation to the level of the pyriform sinuses with thin liquids.  Moderate transient penetration that fully clears across all trials.  Patient is appropriate to upgrade liquids to thin, with ongoing soft and bite size solids and recommended strategy use.   SLP Total Evaluation Time   Evaluation, videofluoroscopic eval of swallow function Minutes (91212) 20   SLP Goals   Therapy Frequency (SLP Eval) 3 times/wk   SLP Predicted Duration/Target Date for Goal Attainment 02/11/23   SLP Goals Swallow;SLP Goal 1   SLP: Safely tolerate diet without signs/symptoms of aspiration Soft & bite sized diet;Thin liquids;With assistance/supervision   SLP: Goal 1 Patient will consume snack portion of advanced solids with timely mastication and adequate oral clearance prior to solid upgrade.   SLP Discharge Planning   SLP Plan F/u tolerance thin, advance solids as appropriate.   SLP Discharge Recommendation Transitional Care Facility   SLP Rationale for DC Rec Swallow improving, but still below baseline   SLP Brief overview of current status  Advance to thin liquids- small single sips, continue soft and bite size.   Total Session Time   Total Session Time (sum of timed and untimed services) 20

## 2023-02-05 ENCOUNTER — APPOINTMENT (OUTPATIENT)
Dept: PHYSICAL THERAPY | Facility: HOSPITAL | Age: 83
DRG: 066 | End: 2023-02-05
Payer: COMMERCIAL

## 2023-02-05 ENCOUNTER — APPOINTMENT (OUTPATIENT)
Dept: SPEECH THERAPY | Facility: HOSPITAL | Age: 83
DRG: 066 | End: 2023-02-05
Payer: COMMERCIAL

## 2023-02-05 ENCOUNTER — APPOINTMENT (OUTPATIENT)
Dept: OCCUPATIONAL THERAPY | Facility: HOSPITAL | Age: 83
DRG: 066 | End: 2023-02-05
Payer: COMMERCIAL

## 2023-02-05 LAB
BACTERIA UR CULT: NORMAL
CREAT SERPL-MCNC: 1.27 MG/DL (ref 0.67–1.17)
GFR SERPL CREATININE-BSD FRML MDRD: 57 ML/MIN/1.73M2
GLUCOSE BLDC GLUCOMTR-MCNC: 151 MG/DL (ref 70–99)
GLUCOSE BLDC GLUCOMTR-MCNC: 175 MG/DL (ref 70–99)
GLUCOSE BLDC GLUCOMTR-MCNC: 186 MG/DL (ref 70–99)
GLUCOSE BLDC GLUCOMTR-MCNC: 187 MG/DL (ref 70–99)
PLATELET # BLD AUTO: 209 10E3/UL (ref 150–450)
UFH PPP CHRO-ACNC: 0.39 IU/ML
UFH PPP CHRO-ACNC: 0.49 IU/ML
UFH PPP CHRO-ACNC: 0.95 IU/ML

## 2023-02-05 PROCEDURE — 82565 ASSAY OF CREATININE: CPT | Performed by: HOSPITALIST

## 2023-02-05 PROCEDURE — 92526 ORAL FUNCTION THERAPY: CPT | Mod: GN | Performed by: SPEECH-LANGUAGE PATHOLOGIST

## 2023-02-05 PROCEDURE — 250N000013 HC RX MED GY IP 250 OP 250 PS 637: Performed by: HOSPITALIST

## 2023-02-05 PROCEDURE — 85520 HEPARIN ASSAY: CPT | Performed by: HOSPITALIST

## 2023-02-05 PROCEDURE — 97530 THERAPEUTIC ACTIVITIES: CPT | Mod: GO

## 2023-02-05 PROCEDURE — 85049 AUTOMATED PLATELET COUNT: CPT | Performed by: HOSPITALIST

## 2023-02-05 PROCEDURE — 250N000011 HC RX IP 250 OP 636

## 2023-02-05 PROCEDURE — 250N000013 HC RX MED GY IP 250 OP 250 PS 637: Performed by: STUDENT IN AN ORGANIZED HEALTH CARE EDUCATION/TRAINING PROGRAM

## 2023-02-05 PROCEDURE — 36415 COLL VENOUS BLD VENIPUNCTURE: CPT | Performed by: HOSPITALIST

## 2023-02-05 PROCEDURE — 97110 THERAPEUTIC EXERCISES: CPT | Mod: GP

## 2023-02-05 PROCEDURE — 99232 SBSQ HOSP IP/OBS MODERATE 35: CPT | Performed by: PSYCHIATRY & NEUROLOGY

## 2023-02-05 PROCEDURE — 99233 SBSQ HOSP IP/OBS HIGH 50: CPT | Performed by: HOSPITALIST

## 2023-02-05 PROCEDURE — 120N000001 HC R&B MED SURG/OB

## 2023-02-05 RX ORDER — POLYETHYLENE GLYCOL 3350 17 G/17G
17 POWDER, FOR SOLUTION ORAL DAILY
Status: DISCONTINUED | OUTPATIENT
Start: 2023-02-05 | End: 2023-02-06 | Stop reason: HOSPADM

## 2023-02-05 RX ORDER — HYDRALAZINE HYDROCHLORIDE 20 MG/ML
5 INJECTION INTRAMUSCULAR; INTRAVENOUS ONCE
Status: COMPLETED | OUTPATIENT
Start: 2023-02-05 | End: 2023-02-05

## 2023-02-05 RX ADMIN — DULOXETINE HYDROCHLORIDE 20 MG: 20 CAPSULE, DELAYED RELEASE ORAL at 08:56

## 2023-02-05 RX ADMIN — ALLOPURINOL 200 MG: 100 TABLET ORAL at 08:55

## 2023-02-05 RX ADMIN — DOCUSATE SODIUM 100 MG: 100 CAPSULE, LIQUID FILLED ORAL at 08:55

## 2023-02-05 RX ADMIN — ACETAMINOPHEN 975 MG: 325 TABLET ORAL at 17:02

## 2023-02-05 RX ADMIN — ROSUVASTATIN CALCIUM 20 MG: 10 TABLET, FILM COATED ORAL at 21:58

## 2023-02-05 RX ADMIN — METOPROLOL TARTRATE 25 MG: 25 TABLET, FILM COATED ORAL at 08:54

## 2023-02-05 RX ADMIN — PANTOPRAZOLE SODIUM 40 MG: 40 TABLET, DELAYED RELEASE ORAL at 08:52

## 2023-02-05 RX ADMIN — SITAGLIPTIN 50 MG: 25 TABLET, FILM COATED ORAL at 08:53

## 2023-02-05 RX ADMIN — OXYCODONE HYDROCHLORIDE 5 MG: 5 TABLET ORAL at 13:06

## 2023-02-05 RX ADMIN — DULOXETINE HYDROCHLORIDE 20 MG: 20 CAPSULE, DELAYED RELEASE ORAL at 21:58

## 2023-02-05 RX ADMIN — ACETAMINOPHEN 975 MG: 325 TABLET ORAL at 08:54

## 2023-02-05 RX ADMIN — ASPIRIN 81 MG: 81 TABLET, COATED ORAL at 08:55

## 2023-02-05 RX ADMIN — OXYCODONE HYDROCHLORIDE 5 MG: 5 TABLET ORAL at 00:48

## 2023-02-05 RX ADMIN — INSULIN GLARGINE 5 UNITS: 100 INJECTION, SOLUTION SUBCUTANEOUS at 22:02

## 2023-02-05 RX ADMIN — HYDRALAZINE HYDROCHLORIDE 5 MG: 20 INJECTION INTRAMUSCULAR; INTRAVENOUS at 05:38

## 2023-02-05 RX ADMIN — POLYETHYLENE GLYCOL 3350 17 G: 17 POWDER, FOR SOLUTION ORAL at 12:43

## 2023-02-05 RX ADMIN — TAMSULOSIN HYDROCHLORIDE 0.4 MG: 0.4 CAPSULE ORAL at 08:56

## 2023-02-05 RX ADMIN — METOPROLOL TARTRATE 25 MG: 25 TABLET, FILM COATED ORAL at 21:58

## 2023-02-05 RX ADMIN — ACETAMINOPHEN 975 MG: 325 TABLET ORAL at 00:46

## 2023-02-05 ASSESSMENT — ACTIVITIES OF DAILY LIVING (ADL)
ADLS_ACUITY_SCORE: 30
ADLS_ACUITY_SCORE: 27
ADLS_ACUITY_SCORE: 25

## 2023-02-05 NOTE — PROGRESS NOTES
NEUROLOGY PROGRESS NOTE     ASSESSMENT & PLAN     Diagnosis: Multiple strokes. Basilar stenosis.     81 yo man presenting with dizziness and headache.       New strokes on MRI, significant atherosclerosis on recent imaging. Additional strokes with repeat imaging 2.4.23 despite medical management.    Plavix discontinues, replaced with low intensity heparin. Continue ASA.    Continue statin.  LDL 59.    PT/OT/speech.     Neurochecks, telemetry.      Avoid hypotension.    Symptomatic headache and vertigo management.    Glucose management per primary team.    Neurology will follow along until transfer to [facility].  Updated plan discussed with family in detail at bedside.       Neurology Discharge Planning: Possible transfer today for diagnostic angiogram, possible intervention/stenting for stenosis.    Patient Active Problem List    Diagnosis Date Noted     Cerebellar stroke (H) 2023     Priority: Medium     Vertigo 2023     Priority: Medium     Essential hypertension 2023     Priority: Medium     Vertebral artery occlusion, left 2023     Priority: Medium     ACE-inhibitor cough 2021     Priority: Medium     Type 2 diabetes mellitus with stage 3 chronic kidney disease, without long-term current use of insulin (H) 2020     Priority: Medium     Urinary incontinence 10/30/2019     Priority: Medium     Primary osteoarthritis involving multiple joints 2019     Priority: Medium     Esophageal reflux      Priority: Medium     Created by Conversion         Dyslipidemia      Priority: Medium     Created by Conversion  [facility] Annotation: Dec 28 2007  3:23PM - Giulia Meridai2007:   Chol   281, HDL 39Started simvastatin 2011         CKD (chronic kidney disease) stage 3, GFR 30-59 ml/min (H)      Priority: Medium     Created by Conversion  [facility] Annotation: Dec 28 2007  3:40PM - Bella Gold: Cr elevated at   1.7   since .Acute exacerbation with episode of nephrolithiasis  complicated   by   urosepsis in 8/2009.         Constipation, unspecified constipation type      Priority: Medium     Coronary artery disease due to lipid rich plaque      Priority: Medium     Right lower quadrant abdominal pain      Priority: Medium     Colitis      Priority: Medium     Nephrolithiasis      Priority: Medium     Created by [facility] Annotation: Aug  4 2009 12:22PM - Giulia Meridaig:   Hospitalized   7/2009 with BL stent placement.  Stent removal 8/2009.Calcium Stones.CT   5/2011:Calcified plaques L Renal pelvis,L lower pole 2 nonobstructing   calculi   of 2 mm.  Calculi medial R upper pole and R lower pole.Low dense R lower   pole   cortical lesions.1/2011 seen by Urology.BL pelvocaliectasis         Chronic Gout      Priority: Medium     Created by [facility] Annotation: Dec 28 2007  3:23PM - Bella Gold: Uric acid   elevated   to 10 since at least 2007.Multiple joints affected-phalangeal,wrists,   ankles,   knees, ? shoulders.Started allopurinol 5/2009. Stopped HCTZ for BP   5/2009Multiple tophi noted since 10/2011Started colchicine 9/2009.  Replacement Utility updated for latest IMO load         BPH (benign prostatic hyperplasia) 07/19/2017     Priority: Medium     Chronic gout 07/19/2017     Priority: Medium     Essential hypertension with goal blood pressure less than 140/90 03/21/2017     Priority: Medium     Chronic right shoulder pain 02/21/2017     Priority: Medium     Generalized Osteoarthritis Of The Hand      Priority: Medium     Created by Conversion  Replacement Utility updated for latest IMO load         Bilateral chronic knee pain 07/20/2016     Priority: Medium     Cataracts, bilateral 02/17/2016     Priority: Medium     Elevated PSA 12/21/2015     Priority: Medium     Prostate nodule 12/21/2015     Priority: Medium     Pseudogout      Priority: Medium     Created by [facility] Annotation: Aug 14 2009  8:35PM - Chucho Espino: Ankle   aspiration    2009 showed calcium pyrophospate crystals but not noted if intra or   extracellular.Seen by Rhematology 2009         Latent Tuberculosis      Priority: Medium     Created by Conversion  [facility] Annotation: Dec 28 2007  3:40PM - Bella Gold: treated 9 mo in   '         Lumbar Disc Degeneration      Priority: Medium     Created by Conversion         Insomnia      Priority: Medium     Created by Conversion         Medical History  Past Medical History:   Diagnosis Date     Acute blood loss anemia      Acute renal failure (H)      Anemia      Bacteremia      Cataract      Chronic gout      CKD (chronic kidney disease)     Stage 3     DM2 (diabetes mellitus, type 2) (H)      GERD (gastroesophageal reflux disease)      Glucose intolerance (impaired glucose tolerance)      Gout      History of nephrolithiasis      History of prostatitis 2017     Hyperlipidemia      Hypertension      Insomnia      Intestinal disaccharidase deficiencies and disaccharide malabsorption     Created by Conversion      Latent tuberculosis      Nephrolithiasis      Neuropathy      Nonspecific abnormal findings on radiological and other examination of skull and head     Created by [facility] Annotation: 2013 11:23PM - Giulia Meridai/10/2011:  severe stenosis both posterior cerebral arteries seen MRI/MRA done for  vertigo. No acute changes.e*     Osteoarthritis     wrist, knee, shoulder     Prostatitis      Rectal bleed      Trigger thumb         SUBJECTIVE     Syncopal episode yesterday afternoon, prompted updated MRI showing increased stroke burden.    Please see my note from yesterday evening with regards to updates from a neurology standpoint.    Nursing notes reports some restlessness and moderate headache overnight.    Adam's DIL and grandson are at bedside.  He was able to get up and go to the bathroom without too much dizziness today.  Adam denies current headache.  Daughter-in-law says that sometimes  what he says today makes sense and sometimes it does not.  Spoke with patient's nurse who says they have not heard anything about transfer as of yet today.     OBJECTIVE     Vital signs in last 24 hours  Temp:  [96  F (35.6  C)-98.1  F (36.7  C)] 97.1  F (36.2  C)  Pulse:  [62-89] 81  Resp:  [16-18] 18  BP: ()/(57-99) 178/99  SpO2:  [91 %-99 %] 95 %    Weight:   [unfilled]    Review of Systems   Pertinent items are noted in HPI.    General Physical Exam: Patient is alert and oriented x 2. Vital signs were reviewed and are documented in EMR. Neck was supple.  No  thyromegaly, JVD or lymphadenopathy noted.  Neurological Exam:  Mental status: Attentive, interactive.  Speech: Nonsensical at times.   Cranial nerves:  Pupils are reactive, EOM full, face appears symmetric.  Difficult to test visual fields.  Tongue midline.  Motor/Coordination: Slight slowness with finger tapping on the right, good  strength bilaterally.  Moves both legs off of the bed without difficulty.    Sensory: Appears to be intact to light touch throughout.   Gait: Deferred for safety,     DIAGNOSTIC STUDIES     Pertinent Radiology   Radiology Results: Personally reviewed image/s    MRI (2.4.23):  IMPRESSION:  1.  Significant increase in acute/subacute ischemic burden within the posterior fossa since MRI 02/01/2023 most notably involving the right greater than left cerebellar hemispheres with suspected additional right hemipontine insult. No suggestion of   acute hemorrhage.  2.  Subacute left thalamic lacunar infarct.  3.  Slight increase in inferior fourth ventricular effacement.  4.  Additional chronic intracranial findings as detailed    Pertinent Labs   Lab Results: personally reviewed.   Recent Results (from the past 24 hour(s))   Glucose by meter    Collection Time: 02/04/23  1:29 PM   Result Value Ref Range    GLUCOSE BY METER POCT 265 (H) 70 - 99 mg/dL   Glucose by meter    Collection Time: 02/04/23  3:26 PM   Result Value Ref  Range    GLUCOSE BY METER POCT 262 (H) 70 - 99 mg/dL   ECG 12-LEAD WITH MUSE (L[facility])    Collection Time: 02/04/23  3:32 PM   Result Value Ref Range    Systolic Blood Pressure  mmHg    Diastolic Blood Pressure  mmHg    Ventricular Rate 66 BPM    Atrial Rate 66 BPM    VA Interval 188 ms    QRS Duration 88 ms     ms    QTc 434 ms    P Axis 57 degrees    R AXIS 9 degrees    T Axis 80 degrees    Interpretation ECG       Sinus rhythm  Nonspecific T wave abnormality  Abnormal ECG  When compared with ECG of 01-FEB-2023 15:40,  T wave inversion now evident in Anterior leads  Confirmed by LEEROY EMMANUEL MD LOC:JN ([id number]) on 2/4/2023 4:39:59 PM     Basic metabolic panel    Collection Time: 02/04/23  3:38 PM   Result Value Ref Range    Sodium 135 (L) 136 - 145 mmol/L    Potassium 4.0 3.4 - 5.3 mmol/L    Chloride 97 (L) 98 - 107 mmol/L    Carbon Dioxide (CO2) 20 (L) 22 - 29 mmol/L    Anion Gap 18 (H) 7 - 15 mmol/L    Urea Nitrogen 35.7 (H) 8.0 - 23.0 mg/dL    Creatinine 1.69 (H) 0.67 - 1.17 mg/dL    Calcium 9.9 8.8 - 10.2 mg/dL    Glucose 256 (H) 70 - 99 mg/dL    GFR Estimate 41 (L) >60 mL/min/1.73m2   CBC with platelets    Collection Time: 02/04/23  3:38 PM   Result Value Ref Range    WBC Count 12.2 (H) 4.0 - 11.0 10e3/uL    RBC Count 4.57 4.40 - 5.90 10e6/uL    Hemoglobin 13.9 13.3 - 17.7 g/dL    Hematocrit 42.7 40.0 - 53.0 %    MCV 93 78 - 100 fL    MCH 30.4 26.5 - 33.0 pg    MCHC 32.6 31.5 - 36.5 g/dL    RDW 14.0 10.0 - 15.0 %    Platelet Count 222 150 - 450 10e3/uL   Glucose by meter    Collection Time: 02/04/23  4:40 PM   Result Value Ref Range    GLUCOSE BY METER POCT 224 (H) 70 - 99 mg/dL   Lactic Acid STAT    Collection Time: 02/04/23  4:43 PM   Result Value Ref Range    Lactic Acid 2.9 (H) 0.7 - 2.0 mmol/L   Lactic acid whole blood    Collection Time: 02/04/23  6:26 PM   Result Value Ref Range    Lactic Acid 2.5 (H) 0.7 - 2.0 mmol/L   CBC with platelets    Collection Time: 02/04/23  6:26 PM   Result Value Ref Range     WBC Count 10.8 4.0 - 11.0 10e3/uL    RBC Count 4.21 (L) 4.40 - 5.90 10e6/uL    Hemoglobin 13.0 (L) 13.3 - 17.7 g/dL    Hematocrit 38.8 (L) 40.0 - 53.0 %    MCV 92 78 - 100 fL    MCH 30.9 26.5 - 33.0 pg    MCHC 33.5 31.5 - 36.5 g/dL    RDW 14.0 10.0 - 15.0 %    Platelet Count 208 150 - 450 10e3/uL   Glucose by meter    Collection Time: 02/04/23  9:04 PM   Result Value Ref Range    GLUCOSE BY METER POCT 152 (H) 70 - 99 mg/dL   Heparin Unfractionated Anti Xa Level    Collection Time: 02/05/23  1:33 AM   Result Value Ref Range    Anti Xa Unfractionated Heparin 0.95 For Reference Range, See Comment IU/mL   Platelet count    Collection Time: 02/05/23  8:39 AM   Result Value Ref Range    Platelet Count 209 150 - 450 10e3/uL         HOSPITAL MEDICATIONS       acetaminophen  975 mg Oral Q8H     allopurinol  200 mg Oral Daily     aspirin  81 mg Oral Daily     docusate sodium  100 mg Oral Daily     DULoxetine  20 mg Oral BID     insulin aspart  1-7 Units Subcutaneous TID AC     insulin glargine  5 Units Subcutaneous At Bedtime     metoprolol tartrate  25 mg Oral BID     pantoprazole  40 mg Oral QAM AC     rosuvastatin  20 mg Oral At Bedtime     sitagliptin  50 mg Oral Daily     sodium chloride (PF)  3 mL Intracatheter Q8H     tamsulosin  0.4 mg Oral Daily        Total time spent for face to face visit, reviewing labs/imaging studies, counseling and coordination of care was: 45 Minutes. More than 50% of this time was spent on counseling and coordination of care.    Dragon software used in the dictation on this note.    Shakira Castro MD  [facility] - [address], [address]  [place] [address]         1 or 2

## 2023-02-05 NOTE — PROGRESS NOTES
"[facility]    Stroke Telephone Note    I was called by Ander Jaffe on 02/04/23 regarding patient Adam Talamantes. The patient is a 82 year old male with recurrent brainstem strokes and severe basilar stenosis. The patient had been evaluated by my colleague Dr Cunningham who proposed the idea of transferring to the  for consideration of basilar stenting pending discussion with the family. Dr Jaffe indicated that the family is interested in pursing the stenting evaluation. No available beds at the [facility] today. Will try transferring tomorrow pending bed availability.      My recommendations are based on the information provided over the phone by Adam Talamantes's in-person providers. They are not intended to replace the clinical judgment of his in-person providers. I was not requested to personally see or examine the patient at this time.        Gage Lafleur MD, Msc, FAHA, FAAN   of Neurology  [facility]     02/04/2023 6:41 PM  To page me or covering stroke neurology team member, click here: [facility]COM  Choose \"On Call\" tab at top, then search dropdown box for \"Neurology Adult\" & press Enter, look for Neuro ICU/Stroke    "

## 2023-02-05 NOTE — PROVIDER NOTIFICATION
Paged night cross cover about patient's restlessness, elevated BP, and c/o moderate headache. No medication currently available to give.     Spoke to provider on phone: said he will look into possibly getting something for pain and a PRN to bring BP down.

## 2023-02-05 NOTE — PLAN OF CARE
Goal Outcome Evaluation:  Pt was alert all day. Went for video swallow study. And came back around 0930 AM. Diet upgraded to thin liquid. NIH = 3 for slurred speech and orientation . Pt is oriented to self and place. Pt has family in the room all the time. Daughter in law help with translating. C/O had head ache tylenol given x 1. Pt states some help. Pt ate food from family. Takes sips and bites. Pt did not void. Bladder scan = 88.      Darlene Chapman RN

## 2023-02-05 NOTE — PROGRESS NOTES
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 MRI brain was notable for numerous acute to subacute cerebellar infarctions.   Recommended by neurology (in prior discussion w/ IR) not to intervene on significant posterior circulation/basilar disease. Patient is new to me today but per prior hospitalist, patient very weak & seems to have failed max medical therapy; if patient is not a candidate for intervention then palliative care consult seems appropriate.    Yesterday patient had rapid response called for period of unresponsiveness. Apparently, patient experienced LOC while urinating on toilet. Vitals were wnl.     CT head & MRI Brain done; showed significant increased CVA burden in cerebellar hemispheres R>L w/ additional concern for pontine infarcts, slightly increased effacement of 4th ventricle. No herniation.    Acute to Subacute cerebellar infarcts in setting of known basilar artery stenosis  Left Vertebral   - Following discussion w/ family, pt made DNR/DNI. Pall care consult pending.   Hospitalist/Gen neuro also consulted w/ stroke neuro by phone; patient may be candidate for stenting of basilar artery.  Family in agreement.  - Plan for expedited transfer to [facility] for angiogram & eval for  potential intervention on basilar artery.  - Continue ASA & Crestor. Plavix discontinued, started on low intensity IV heparin.   - PRN meclizine for dizziness    Constipation  - Start daily Miralax. Continue colace.    HTN  - Norvasc, Losartan & Imdur held. Avoid aggressive management of BP.    CKD stage III  - Renal function stable    Type II DM  - Continue lantus 5 units at bedtime, Januvia and ISS  - Mod Carb consistent diet    Gout  - Continue allopurinol    BPH  - Continue  flomax      DVT Prophylaxis: on hep gtt    Disposition/Barrier to discharge; On transfer list to  of       Subjective:  Interval History:   Patient seen and examined.   Son at bedside. Patient feels well. Per son, has dizziness intermittently.  Patient complaining of constipation. Unknown when last BM.    Review of Systems:   As mentioned in subjective.    Patient Active Problem List   Diagnosis     Esophageal reflux     Dyslipidemia     Nephrolithiasis     Pseudogout     Latent Tuberculosis     Generalized Osteoarthritis Of The Hand     Lumbar Disc Degeneration     Insomnia     Chronic Gout     CKD (chronic kidney disease) stage 3, GFR 30-59 ml/min (H)     Elevated PSA     Prostate nodule     Cataracts, bilateral     Constipation, unspecified constipation type     Bilateral chronic knee pain     Chronic right shoulder pain     Essential hypertension with goal blood pressure less than 140/90     BPH (benign prostatic hyperplasia)     Chronic gout     Coronary artery disease due to lipid rich plaque     Right lower quadrant abdominal pain     Colitis     Primary osteoarthritis involving multiple joints     Urinary incontinence     Type 2 diabetes mellitus with stage 3 chronic kidney disease, without long-term current use of insulin (H)     ACE-inhibitor cough     Vertigo     Essential hypertension     Vertebral artery occlusion, left     Cerebellar stroke (H)       Scheduled Meds:    acetaminophen  975 mg Oral Q8H     allopurinol  200 mg Oral Daily     aspirin  81 mg Oral Daily     docusate sodium  100 mg Oral Daily     DULoxetine  20 mg Oral BID     insulin aspart  1-7 Units Subcutaneous TID AC     insulin glargine  5 Units Subcutaneous At Bedtime     metoprolol tartrate  25 mg Oral BID     pantoprazole  40 mg Oral QAM AC     rosuvastatin  20 mg Oral At Bedtime     sitagliptin  50 mg Oral Daily     sodium chloride (PF)  3 mL Intracatheter Q8H     tamsulosin  0.4 mg Oral Daily     Continuous Infusions:    heparin  550 Units/hr (02/05/23 0304)     - MEDICATION INSTRUCTIONS -       - MEDICATION INSTRUCTIONS -       sodium chloride 50 mL/hr at 02/04/23 2208     PRN Meds:.glucose **OR** dextrose **OR** glucagon, lidocaine 4%, lidocaine (buffered or not buffered), meclizine, - MEDICATION INSTRUCTIONS -, - MEDICATION INSTRUCTIONS -, naloxone **OR** naloxone **OR** naloxone **OR** naloxone, nitroGLYcerin, oxyCODONE, sodium chloride (PF)    Objective:  Vital signs in last 24 hours:  Temp:  [96  F (35.6  C)-98.1  F (36.7  C)] 97.5  F (36.4  C)  Pulse:  [62-89] 89  Resp:  [16-18] 16  BP: ()/(57-98) 149/93  SpO2:  [91 %-99 %] 97 %      Physical Exam:  General: In NAD  HEENT: NCAT & EOMI  CV: Normal S1S2, regular rhythm, normal rate  Lungs: CTAB  Abdomen: Soft, NT, ND, +BS  Extremities: No LE edema b/l    Lab Results:    Recent Results (from the past 24 hour(s))   Glucose by meter    Collection Time: 02/04/23  7:39 AM   Result Value Ref Range    GLUCOSE BY METER POCT 149 (H) 70 - 99 mg/dL   Glucose by meter    Collection Time: 02/04/23  1:29 PM   Result Value Ref Range    GLUCOSE BY METER POCT 265 (H) 70 - 99 mg/dL   Glucose by meter    Collection Time: 02/04/23  3:26 PM   Result Value Ref Range    GLUCOSE BY METER POCT 262 (H) 70 - 99 mg/dL   ECG 12-LEAD WITH MUSE (LHE)    Collection Time: 02/04/23  3:32 PM   Result Value Ref Range    Systolic Blood Pressure  mmHg    Diastolic Blood Pressure  mmHg    Ventricular Rate 66 BPM    Atrial Rate 66 BPM    TN Interval 188 ms    QRS Duration 88 ms     ms    QTc 434 ms    P Axis 57 degrees    R AXIS 9 degrees    T Axis 80 degrees    Interpretation ECG       Sinus rhythm  Nonspecific T wave abnormality  Abnormal ECG  When compared with ECG of 01-FEB-2023 15:40,  T wave inversion now evident in Anterior leads  Confirmed by LEEROY EMMANUEL MD LOC: ([id number]) on 2/4/2023 4:39:59 PM     Basic metabolic panel    Collection Time: 02/04/23  3:38 PM   Result Value Ref Range    Sodium 135 (L) 136  - 145 mmol/L    Potassium 4.0 3.4 - 5.3 mmol/L    Chloride 97 (L) 98 - 107 mmol/L    Carbon Dioxide (CO2) 20 (L) 22 - 29 mmol/L    Anion Gap 18 (H) 7 - 15 mmol/L    Urea Nitrogen 35.7 (H) 8.0 - 23.0 mg/dL    Creatinine 1.69 (H) 0.67 - 1.17 mg/dL    Calcium 9.9 8.8 - 10.2 mg/dL    Glucose 256 (H) 70 - 99 mg/dL    GFR Estimate 41 (L) >60 mL/min/1.73m2   CBC with platelets    Collection Time: 02/04/23  3:38 PM   Result Value Ref Range    WBC Count 12.2 (H) 4.0 - 11.0 10e3/uL    RBC Count 4.57 4.40 - 5.90 10e6/uL    Hemoglobin 13.9 13.3 - 17.7 g/dL    Hematocrit 42.7 40.0 - 53.0 %    MCV 93 78 - 100 fL    MCH 30.4 26.5 - 33.0 pg    MCHC 32.6 31.5 - 36.5 g/dL    RDW 14.0 10.0 - 15.0 %    Platelet Count 222 150 - 450 10e3/uL   Glucose by meter    Collection Time: 02/04/23  4:40 PM   Result Value Ref Range    GLUCOSE BY METER POCT 224 (H) 70 - 99 mg/dL   Lactic Acid STAT    Collection Time: 02/04/23  4:43 PM   Result Value Ref Range    Lactic Acid 2.9 (H) 0.7 - 2.0 mmol/L   Lactic acid whole blood    Collection Time: 02/04/23  6:26 PM   Result Value Ref Range    Lactic Acid 2.5 (H) 0.7 - 2.0 mmol/L   CBC with platelets    Collection Time: 02/04/23  6:26 PM   Result Value Ref Range    WBC Count 10.8 4.0 - 11.0 10e3/uL    RBC Count 4.21 (L) 4.40 - 5.90 10e6/uL    Hemoglobin 13.0 (L) 13.3 - 17.7 g/dL    Hematocrit 38.8 (L) 40.0 - 53.0 %    MCV 92 78 - 100 fL    MCH 30.9 26.5 - 33.0 pg    MCHC 33.5 31.5 - 36.5 g/dL    RDW 14.0 10.0 - 15.0 %    Platelet Count 208 150 - 450 10e3/uL   Glucose by meter    Collection Time: 02/04/23  9:04 PM   Result Value Ref Range    GLUCOSE BY METER POCT 152 (H) 70 - 99 mg/dL   Heparin Unfractionated Anti Xa Level    Collection Time: 02/05/23  1:33 AM   Result Value Ref Range    Anti Xa Unfractionated Heparin 0.95 For Reference Range, See Comment IU/mL       Serum Glucose range:   Recent Labs   Lab 02/04/23  2104 02/04/23  1640 02/04/23  1538 02/04/23  1526   * 224* 256* 262*     ABG: No  lab results found in last 7 days.  CBC:   Recent Labs   Lab 02/04/23  1826 02/04/23  1538 02/02/23  0709 02/01/23  1155   WBC 10.8 12.2* 8.2 9.5   HGB 13.0* 13.9 14.1 13.5   HCT 38.8* 42.7 42.5 40.8   MCV 92 93 92 92    222 215 221   NEUTROPHIL  --   --   --  62   LYMPH  --   --   --  21   MONOCYTE  --   --   --  9   EOSINOPHIL  --   --   --  7     Chemistry:   Recent Labs   Lab 02/04/23  1538 02/02/23  0709 02/01/23  1155   * 136 131*   POTASSIUM 4.0 4.0 4.2   CHLORIDE 97* 100 96*   CO2 20* 21* 24   BUN 35.7* 21.9 28.3*   CR 1.69* 1.23* 1.55*   GFRESTIMATED 41* 59* 45*   GERALDINE 9.9 10.1 10.0   MAG  --   --  1.7   PROTTOTAL  --  8.3 8.3   ALBUMIN  --  4.2 4.3   AST  --  30 34   ALT  --  37 38   ALKPHOS  --  75 92   BILITOTAL  --  0.4 0.4     Coags:  No results for input(s): INR, PROTIME, PTT in the last 168 hours.    Invalid input(s): APTT  Cardiac Markers:  No results for input(s): CKTOTAL, TROPONINI in the last 168 hours.               02/05/2023   Uma Dale MD  Hospitalist  Pager: [phone]

## 2023-02-05 NOTE — PLAN OF CARE
"Problem: Stroke, Ischemic (Includes Transient Ischemic Attack)  Goal: Optimal Eating and Swallowing without Aspiration  Outcome: Progressing  Goal: Effective Urinary Elimination  Outcome: Progressing     Problem: Hypertension Comorbidity  Goal: Blood Pressure in Desired Range  Outcome: Progressing  Intervention: Maintain Blood Pressure Management    Patient alert and talking to family member at bedside. Had PRN oxycodone for generalized pain 8/10 on rFLACC scale. Effective 0/10 rFLACC after. Patient was having difficulty staying asleep.   Heparin order set ran: stopped for 60 minutes and resumed at lower rate (5.5 mL/hr). Was retaining urine earlier (bladder scanned 372 mL) but patient was able to void with urinal standing at bedside with a two person assist.     BP was WDL until 04:37 (164/88)    BP (!) 164/88 (BP Location: Right arm)   Pulse 70   Temp 97.5  F (36.4  C) (Oral)   Resp 16   Ht 1.549 m (5' 0.98\")   Wt 69.6 kg (153 lb 7 oz)   SpO2 97%   BMI 29.01 kg/m          "

## 2023-02-05 NOTE — PROGRESS NOTES
Brief Neurology Note:    I spoke with Dr. Cunningham, Stroke Neurology, about the patient this evening.  Updated brain MRI has been reviewed.  Diagnostic angiogram with possible stenting is still a potential option so Dr. Cunningham recommends transferring the patient to the [facility].  He does suggest that we request this be done expeditiously to avoid delay.  In the meantime, I spoke with Dr. Jaffe about starting a low intensity heparin drip, continue aspirin, stop Plavix.    Dr. Jaffe has generously worked on making the arrangements, including discussion with family who agrees.    Shakira Castro MD

## 2023-02-05 NOTE — PLAN OF CARE
Goal Outcome Evaluation:  Pt was drowsy at times. Oriented to self and place C/O on going dizziness and headache. Tylenol given this morning with minimal help. Oxycodone given in the afternoon. Pt said he is feeling better. Heparin drip going at 550 units/hr. Voided 400 ml isela color urine standing by the bed side small blood noted around the penis. Pt started on miralex for constipation. Eating and drinking fairly well. Pt eats food from family at random times. NIH = 3 for slurred speech. Family in the room all the time.     Darlene Chapman RN

## 2023-02-05 NOTE — PLAN OF CARE
Goal Outcome Evaluation:  Pt is alert. Taking to family. Took tylenol for 5/10 head ace. Pt states tylenol is helping. Had a bolus of 500 ml fluid this evening. /78. NIH = 3. Heparin drip started at 1920 at 850 units/hr. Family in the room.     Darlene Chapman RN

## 2023-02-05 NOTE — PROGRESS NOTES
D/w Dr. Aure Castro who has been in contact with stroke neurology at the [facility].    Plan for expedited transfer to [facility] for angiogram and potential intervention on basilar artery.  Stop plavix  Start low intensity IV heparin with no bolus.  D/w family who is in agreement with transfer  SOC notified of request for expedited transfer    Additionally: start low dose lantus for hyperglycemia.  Noted lactate was auto-checked and 2.9 - not septic.  Already on IVF given relative hypotension during episode of LOC.      Ander Jaffe MD, [facility]  Internal Medicine Hospitalist  2/4/2023

## 2023-02-06 ENCOUNTER — HOSPITAL ENCOUNTER (INPATIENT)
Facility: CLINIC | Age: 83
LOS: 7 days | Discharge: HOME | DRG: 065 | End: 2023-02-13
Attending: PSYCHIATRY & NEUROLOGY | Admitting: STUDENT IN AN ORGANIZED HEALTH CARE EDUCATION/TRAINING PROGRAM
Payer: COMMERCIAL

## 2023-02-06 ENCOUNTER — APPOINTMENT (OUTPATIENT)
Dept: CT IMAGING | Facility: HOSPITAL | Age: 83
DRG: 066 | End: 2023-02-06
Attending: PSYCHIATRY & NEUROLOGY
Payer: COMMERCIAL

## 2023-02-06 VITALS
HEART RATE: 99 BPM | HEIGHT: 61 IN | OXYGEN SATURATION: 93 % | DIASTOLIC BLOOD PRESSURE: 82 MMHG | TEMPERATURE: 98.6 F | SYSTOLIC BLOOD PRESSURE: 138 MMHG | RESPIRATION RATE: 20 BRPM | WEIGHT: 153.44 LBS | BODY MASS INDEX: 28.97 KG/M2

## 2023-02-06 DIAGNOSIS — I65.02 VERTEBRAL ARTERY OCCLUSION, LEFT: ICD-10-CM

## 2023-02-06 DIAGNOSIS — F51.01 PRIMARY INSOMNIA: ICD-10-CM

## 2023-02-06 DIAGNOSIS — K59.03 DRUG-INDUCED CONSTIPATION: ICD-10-CM

## 2023-02-06 DIAGNOSIS — E83.42 HYPOMAGNESEMIA: ICD-10-CM

## 2023-02-06 DIAGNOSIS — I63.9 CEREBELLAR STROKE (H): Primary | ICD-10-CM

## 2023-02-06 DIAGNOSIS — R33.9 URINARY RETENTION: ICD-10-CM

## 2023-02-06 DIAGNOSIS — I63.22 CEREBROVASCULAR ACCIDENT (CVA) DUE TO STENOSIS OF BASILAR ARTERY (H): ICD-10-CM

## 2023-02-06 LAB
GLUCOSE BLDC GLUCOMTR-MCNC: 151 MG/DL (ref 70–99)
GLUCOSE BLDC GLUCOMTR-MCNC: 168 MG/DL (ref 70–99)
GLUCOSE BLDC GLUCOMTR-MCNC: 194 MG/DL (ref 70–99)
HOLD SPECIMEN: NORMAL
HOLD SPECIMEN: NORMAL
UFH PPP CHRO-ACNC: 0.17 IU/ML
UFH PPP CHRO-ACNC: 0.21 IU/ML
UFH PPP CHRO-ACNC: 0.34 IU/ML

## 2023-02-06 PROCEDURE — 250N000013 HC RX MED GY IP 250 OP 250 PS 637: Performed by: INTERNAL MEDICINE

## 2023-02-06 PROCEDURE — 85520 HEPARIN ASSAY: CPT | Performed by: INTERNAL MEDICINE

## 2023-02-06 PROCEDURE — 99207 PR NO BILLABLE SERVICE THIS VISIT: CPT | Performed by: INTERNAL MEDICINE

## 2023-02-06 PROCEDURE — 36415 COLL VENOUS BLD VENIPUNCTURE: CPT | Performed by: INTERNAL MEDICINE

## 2023-02-06 PROCEDURE — 85520 HEPARIN ASSAY: CPT | Performed by: HOSPITALIST

## 2023-02-06 PROCEDURE — 250N000011 HC RX IP 250 OP 636: Performed by: HOSPITALIST

## 2023-02-06 PROCEDURE — 250N000013 HC RX MED GY IP 250 OP 250 PS 637: Performed by: HOSPITALIST

## 2023-02-06 PROCEDURE — 99239 HOSP IP/OBS DSCHRG MGMT >30: CPT | Performed by: INTERNAL MEDICINE

## 2023-02-06 PROCEDURE — 99232 SBSQ HOSP IP/OBS MODERATE 35: CPT | Performed by: PSYCHIATRY & NEUROLOGY

## 2023-02-06 PROCEDURE — 250N000013 HC RX MED GY IP 250 OP 250 PS 637: Performed by: STUDENT IN AN ORGANIZED HEALTH CARE EDUCATION/TRAINING PROGRAM

## 2023-02-06 PROCEDURE — 250N000011 HC RX IP 250 OP 636: Performed by: INTERNAL MEDICINE

## 2023-02-06 PROCEDURE — 36415 COLL VENOUS BLD VENIPUNCTURE: CPT | Performed by: HOSPITALIST

## 2023-02-06 PROCEDURE — 120N000002 HC R&B MED SURG/OB UMMC

## 2023-02-06 PROCEDURE — 70450 CT HEAD/BRAIN W/O DYE: CPT

## 2023-02-06 RX ORDER — NALOXONE HYDROCHLORIDE 0.4 MG/ML
0.4 INJECTION, SOLUTION INTRAMUSCULAR; INTRAVENOUS; SUBCUTANEOUS
Status: CANCELLED | OUTPATIENT
Start: 2023-02-06

## 2023-02-06 RX ORDER — MECLIZINE HCL 12.5 MG 12.5 MG/1
12.5 TABLET ORAL 3 TIMES DAILY PRN
Status: DISCONTINUED | OUTPATIENT
Start: 2023-02-06 | End: 2023-02-06

## 2023-02-06 RX ORDER — NALOXONE HYDROCHLORIDE 0.4 MG/ML
0.2 INJECTION, SOLUTION INTRAMUSCULAR; INTRAVENOUS; SUBCUTANEOUS
Status: CANCELLED | OUTPATIENT
Start: 2023-02-06

## 2023-02-06 RX ORDER — NALOXONE HYDROCHLORIDE 0.4 MG/ML
0.4 INJECTION, SOLUTION INTRAMUSCULAR; INTRAVENOUS; SUBCUTANEOUS
Status: DISCONTINUED | OUTPATIENT
Start: 2023-02-06 | End: 2023-02-06

## 2023-02-06 RX ORDER — ASPIRIN 81 MG/1
81 TABLET ORAL DAILY
Status: CANCELLED | OUTPATIENT
Start: 2023-02-07

## 2023-02-06 RX ORDER — LIDOCAINE 40 MG/G
CREAM TOPICAL
Status: DISCONTINUED | OUTPATIENT
Start: 2023-02-06 | End: 2023-02-06

## 2023-02-06 RX ORDER — NALOXONE HYDROCHLORIDE 0.4 MG/ML
0.2 INJECTION, SOLUTION INTRAMUSCULAR; INTRAVENOUS; SUBCUTANEOUS
Status: DISCONTINUED | OUTPATIENT
Start: 2023-02-06 | End: 2023-02-06

## 2023-02-06 RX ORDER — LIDOCAINE 40 MG/G
CREAM TOPICAL
Status: CANCELLED | OUTPATIENT
Start: 2023-02-06

## 2023-02-06 RX ORDER — ALLOPURINOL 100 MG/1
200 TABLET ORAL DAILY
Status: CANCELLED | OUTPATIENT
Start: 2023-02-07

## 2023-02-06 RX ORDER — DULOXETIN HYDROCHLORIDE 20 MG/1
20 CAPSULE, DELAYED RELEASE ORAL 2 TIMES DAILY
Status: DISCONTINUED | OUTPATIENT
Start: 2023-02-06 | End: 2023-02-06

## 2023-02-06 RX ORDER — ACETAMINOPHEN 325 MG/1
975 TABLET ORAL EVERY 8 HOURS
Status: DISCONTINUED | OUTPATIENT
Start: 2023-02-07 | End: 2023-02-06

## 2023-02-06 RX ORDER — ROSUVASTATIN CALCIUM 10 MG/1
20 TABLET, COATED ORAL AT BEDTIME
Status: CANCELLED | OUTPATIENT
Start: 2023-02-06

## 2023-02-06 RX ORDER — TAMSULOSIN HYDROCHLORIDE 0.4 MG/1
0.4 CAPSULE ORAL DAILY
Status: DISCONTINUED | OUTPATIENT
Start: 2023-02-07 | End: 2023-02-06

## 2023-02-06 RX ORDER — ASPIRIN 81 MG/1
81 TABLET ORAL DAILY
Status: DISCONTINUED | OUTPATIENT
Start: 2023-02-07 | End: 2023-02-06

## 2023-02-06 RX ORDER — NICOTINE POLACRILEX 4 MG
15-30 LOZENGE BUCCAL
Status: DISCONTINUED | OUTPATIENT
Start: 2023-02-06 | End: 2023-02-06

## 2023-02-06 RX ORDER — DULOXETIN HYDROCHLORIDE 20 MG/1
20 CAPSULE, DELAYED RELEASE ORAL 2 TIMES DAILY
Status: DISCONTINUED | OUTPATIENT
Start: 2023-02-07 | End: 2023-02-13 | Stop reason: HOSPADM

## 2023-02-06 RX ORDER — ROSUVASTATIN CALCIUM 10 MG/1
20 TABLET, COATED ORAL AT BEDTIME
Status: DISCONTINUED | OUTPATIENT
Start: 2023-02-06 | End: 2023-02-06

## 2023-02-06 RX ORDER — HEPARIN SODIUM 10000 [USP'U]/100ML
0-5000 INJECTION, SOLUTION INTRAVENOUS CONTINUOUS
Status: DISCONTINUED | OUTPATIENT
Start: 2023-02-06 | End: 2023-02-06

## 2023-02-06 RX ORDER — ASPIRIN 81 MG/1
81 TABLET ORAL DAILY
Status: DISCONTINUED | OUTPATIENT
Start: 2023-02-07 | End: 2023-02-13 | Stop reason: HOSPADM

## 2023-02-06 RX ORDER — MECLIZINE HCL 12.5 MG 12.5 MG/1
12.5 TABLET ORAL 3 TIMES DAILY PRN
Status: CANCELLED | OUTPATIENT
Start: 2023-02-06

## 2023-02-06 RX ORDER — NICOTINE POLACRILEX 4 MG
15-30 LOZENGE BUCCAL
Status: CANCELLED | OUTPATIENT
Start: 2023-02-06

## 2023-02-06 RX ORDER — LANOLIN ALCOHOL/MO/W.PET/CERES
3 CREAM (GRAM) TOPICAL
Status: DISCONTINUED | OUTPATIENT
Start: 2023-02-06 | End: 2023-02-06

## 2023-02-06 RX ORDER — NITROGLYCERIN 0.4 MG/1
0.4 TABLET SUBLINGUAL EVERY 5 MIN PRN
Status: DISCONTINUED | OUTPATIENT
Start: 2023-02-06 | End: 2023-02-06

## 2023-02-06 RX ORDER — NITROGLYCERIN 0.4 MG/1
0.4 TABLET SUBLINGUAL EVERY 5 MIN PRN
Status: CANCELLED | OUTPATIENT
Start: 2023-02-06

## 2023-02-06 RX ORDER — LANOLIN ALCOHOL/MO/W.PET/CERES
3 CREAM (GRAM) TOPICAL
Status: DISCONTINUED | OUTPATIENT
Start: 2023-02-06 | End: 2023-02-06 | Stop reason: HOSPADM

## 2023-02-06 RX ORDER — ONDANSETRON 2 MG/ML
4 INJECTION INTRAMUSCULAR; INTRAVENOUS EVERY 6 HOURS PRN
Status: DISCONTINUED | OUTPATIENT
Start: 2023-02-06 | End: 2023-02-13 | Stop reason: HOSPADM

## 2023-02-06 RX ORDER — DEXTROSE MONOHYDRATE 25 G/50ML
25-50 INJECTION, SOLUTION INTRAVENOUS
Status: CANCELLED | OUTPATIENT
Start: 2023-02-06

## 2023-02-06 RX ORDER — LIDOCAINE 40 MG/G
CREAM TOPICAL
Status: DISCONTINUED | OUTPATIENT
Start: 2023-02-06 | End: 2023-02-13 | Stop reason: HOSPADM

## 2023-02-06 RX ORDER — PANTOPRAZOLE SODIUM 40 MG/1
40 TABLET, DELAYED RELEASE ORAL
Status: CANCELLED | OUTPATIENT
Start: 2023-02-07

## 2023-02-06 RX ORDER — PANTOPRAZOLE SODIUM 40 MG/1
40 TABLET, DELAYED RELEASE ORAL
Status: DISCONTINUED | OUTPATIENT
Start: 2023-02-07 | End: 2023-02-13 | Stop reason: HOSPADM

## 2023-02-06 RX ORDER — ONDANSETRON 4 MG/1
4 TABLET, ORALLY DISINTEGRATING ORAL EVERY 6 HOURS PRN
Status: DISCONTINUED | OUTPATIENT
Start: 2023-02-06 | End: 2023-02-13 | Stop reason: HOSPADM

## 2023-02-06 RX ORDER — TAMSULOSIN HYDROCHLORIDE 0.4 MG/1
0.4 CAPSULE ORAL DAILY
Status: CANCELLED | OUTPATIENT
Start: 2023-02-07

## 2023-02-06 RX ORDER — TAMSULOSIN HYDROCHLORIDE 0.4 MG/1
0.4 CAPSULE ORAL DAILY
Status: DISCONTINUED | OUTPATIENT
Start: 2023-02-07 | End: 2023-02-13 | Stop reason: HOSPADM

## 2023-02-06 RX ORDER — ACETAMINOPHEN 325 MG/1
975 TABLET ORAL EVERY 8 HOURS
Status: CANCELLED | OUTPATIENT
Start: 2023-02-07

## 2023-02-06 RX ORDER — POLYETHYLENE GLYCOL 3350 17 G/17G
17 POWDER, FOR SOLUTION ORAL DAILY
Status: CANCELLED | OUTPATIENT
Start: 2023-02-07

## 2023-02-06 RX ORDER — PANTOPRAZOLE SODIUM 40 MG/1
40 TABLET, DELAYED RELEASE ORAL
Status: DISCONTINUED | OUTPATIENT
Start: 2023-02-07 | End: 2023-02-06

## 2023-02-06 RX ORDER — ALLOPURINOL 100 MG/1
200 TABLET ORAL DAILY
Status: DISCONTINUED | OUTPATIENT
Start: 2023-02-07 | End: 2023-02-06

## 2023-02-06 RX ORDER — DULOXETIN HYDROCHLORIDE 20 MG/1
20 CAPSULE, DELAYED RELEASE ORAL 2 TIMES DAILY
Status: CANCELLED | OUTPATIENT
Start: 2023-02-06

## 2023-02-06 RX ORDER — DOCUSATE SODIUM 100 MG/1
100 CAPSULE, LIQUID FILLED ORAL DAILY
Status: CANCELLED | OUTPATIENT
Start: 2023-02-07

## 2023-02-06 RX ORDER — METOPROLOL TARTRATE 25 MG/1
25 TABLET, FILM COATED ORAL 2 TIMES DAILY
Status: DISCONTINUED | OUTPATIENT
Start: 2023-02-06 | End: 2023-02-06

## 2023-02-06 RX ORDER — LANOLIN ALCOHOL/MO/W.PET/CERES
3 CREAM (GRAM) TOPICAL
Status: CANCELLED | OUTPATIENT
Start: 2023-02-06

## 2023-02-06 RX ORDER — ROSUVASTATIN CALCIUM 10 MG/1
20 TABLET, COATED ORAL AT BEDTIME
Status: DISCONTINUED | OUTPATIENT
Start: 2023-02-07 | End: 2023-02-13 | Stop reason: HOSPADM

## 2023-02-06 RX ORDER — POLYETHYLENE GLYCOL 3350 17 G/17G
17 POWDER, FOR SOLUTION ORAL DAILY
Status: DISCONTINUED | OUTPATIENT
Start: 2023-02-07 | End: 2023-02-06

## 2023-02-06 RX ORDER — DOCUSATE SODIUM 100 MG/1
100 CAPSULE, LIQUID FILLED ORAL 2 TIMES DAILY PRN
Status: DISCONTINUED | OUTPATIENT
Start: 2023-02-06 | End: 2023-02-06

## 2023-02-06 RX ORDER — DEXTROSE MONOHYDRATE 25 G/50ML
25-50 INJECTION, SOLUTION INTRAVENOUS
Status: DISCONTINUED | OUTPATIENT
Start: 2023-02-06 | End: 2023-02-06

## 2023-02-06 RX ORDER — HEPARIN SODIUM 10000 [USP'U]/100ML
0-5000 INJECTION, SOLUTION INTRAVENOUS CONTINUOUS
Status: CANCELLED | OUTPATIENT
Start: 2023-02-06

## 2023-02-06 RX ORDER — ALLOPURINOL 100 MG/1
200 TABLET ORAL DAILY
Status: DISCONTINUED | OUTPATIENT
Start: 2023-02-07 | End: 2023-02-13 | Stop reason: HOSPADM

## 2023-02-06 RX ORDER — METOPROLOL TARTRATE 25 MG/1
25 TABLET, FILM COATED ORAL 2 TIMES DAILY
Status: CANCELLED | OUTPATIENT
Start: 2023-02-06

## 2023-02-06 RX ADMIN — PANTOPRAZOLE SODIUM 40 MG: 40 TABLET, DELAYED RELEASE ORAL at 08:26

## 2023-02-06 RX ADMIN — METOPROLOL TARTRATE 25 MG: 25 TABLET, FILM COATED ORAL at 08:33

## 2023-02-06 RX ADMIN — ACETAMINOPHEN 975 MG: 325 TABLET ORAL at 00:53

## 2023-02-06 RX ADMIN — HEPARIN SODIUM 700 UNITS/HR: 10000 INJECTION, SOLUTION INTRAVENOUS at 22:39

## 2023-02-06 RX ADMIN — METOPROLOL TARTRATE 25 MG: 25 TABLET, FILM COATED ORAL at 22:41

## 2023-02-06 RX ADMIN — HEPARIN SODIUM 700 UNITS/HR: 10000 INJECTION, SOLUTION INTRAVENOUS at 14:10

## 2023-02-06 RX ADMIN — DULOXETINE HYDROCHLORIDE 20 MG: 20 CAPSULE, DELAYED RELEASE ORAL at 22:41

## 2023-02-06 RX ADMIN — ACETAMINOPHEN 975 MG: 325 TABLET ORAL at 08:19

## 2023-02-06 RX ADMIN — MECLIZINE HCL 12.5 MG 12.5 MG: 12.5 TABLET ORAL at 08:24

## 2023-02-06 RX ADMIN — POLYETHYLENE GLYCOL 3350 17 G: 17 POWDER, FOR SOLUTION ORAL at 08:48

## 2023-02-06 RX ADMIN — ASPIRIN 81 MG: 81 TABLET, COATED ORAL at 08:33

## 2023-02-06 RX ADMIN — ALLOPURINOL 200 MG: 100 TABLET ORAL at 08:22

## 2023-02-06 RX ADMIN — ROSUVASTATIN CALCIUM 20 MG: 10 TABLET, FILM COATED ORAL at 22:41

## 2023-02-06 RX ADMIN — DULOXETINE HYDROCHLORIDE 20 MG: 20 CAPSULE, DELAYED RELEASE ORAL at 08:32

## 2023-02-06 RX ADMIN — TAMSULOSIN HYDROCHLORIDE 0.4 MG: 0.4 CAPSULE ORAL at 08:31

## 2023-02-06 RX ADMIN — DOCUSATE SODIUM 100 MG: 100 CAPSULE, LIQUID FILLED ORAL at 08:33

## 2023-02-06 RX ADMIN — ACETAMINOPHEN 975 MG: 325 TABLET ORAL at 18:05

## 2023-02-06 RX ADMIN — SITAGLIPTIN 50 MG: 25 TABLET, FILM COATED ORAL at 08:24

## 2023-02-06 ASSESSMENT — ACTIVITIES OF DAILY LIVING (ADL)
ADLS_ACUITY_SCORE: 30
ADLS_ACUITY_SCORE: 38
ADLS_ACUITY_SCORE: 38
ADLS_ACUITY_SCORE: 30
ADLS_ACUITY_SCORE: 35
ADLS_ACUITY_SCORE: 38
ADLS_ACUITY_SCORE: 30
ADLS_ACUITY_SCORE: 30
ADLS_ACUITY_SCORE: 38
ADLS_ACUITY_SCORE: 38
ADLS_ACUITY_SCORE: 30
ADLS_ACUITY_SCORE: 38

## 2023-02-06 NOTE — SIGNIFICANT EVENT
Significant Event Note    Time of event: 5:25 PM February 6, 2023    Description of event:  Received call from charge nurse, reported bed has become available at [facility] and ride set up for 8 PM.  Discussed with my colleagues and reported discharge-readmit order set for discharge within system.    Angel REYNOSO MD

## 2023-02-06 NOTE — PROGRESS NOTES
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 MRI brain was notable for numerous acute to subacute cerebellar infarctions.     On 2/4, patient had rapid response called for period of unresponsiveness. Apparently, patient experienced LOC while urinating on toilet. Vitals were wnl.       Repeat CT head & MRI Brain done; showed significant increased CVA burden in cerebellar hemispheres R>L w/ additional concern for pontine infarcts, slightly increased effacement of 4th ventricle. No herniation.     Acute to Subacute cerebellar infarcts in setting of known basilar artery stenosis  Left Vertebral   --Following discussion w/ family, pt made DNR/DNI.   --Hospitalist/Gen neuro also consulted w/ stroke neuro by phone; patient may be candidate for stenting of basilar artery.  Family in agreement.  - Plan for expedited transfer to [facility] for angiogram & eval for  potential intervention on basilar artery.  - Continue ASA & Crestor. Plavix discontinued, started on low intensity IV heparin per neurology recommendation..   --PRN meclizine for dizziness.  On a scheduled Tylenol for intermittent headache     Essential HTN  - Norvasc, Losartan & Imdur held.  PTA metoprolol.  Avoid aggressive management of BP.  Monitor vitals and adjust medications accordingly.     CKD stage III  - Renal function fairly stable.  Monitor intermittently     Type II DM  - Continue lantus 5 units at bedtime, Januvia.   --Insulin sliding scale and hypoglycemic protocol     Gout  - Continue allopurinol     BPH  - Continue flomax     Constipation  --MiraLAX, Colace.          Diet: Diet  Combination Diet Soft and Bite Sized Diet (level 6); Thin Liquids (level 0); Low  "Saturated Fat Na <2400mg Diet; Thin Liquids (level 0)    DVT Prophylaxis: On heparin drip for other medical reason.  Diego Catheter: Not present  Lines: None     Cardiac Monitoring: ACTIVE order. Indication: Stroke, acute (48 hours)  Code Status: No CPR- Do NOT Intubate      Clinically Significant Risk Factors                       # DMII: A1C = 7.6 % (Ref range: <5.7 %) within past 3 months   # Overweight: Estimated body mass index is 29.01 kg/m  as calculated from the following:    Height as of this encounter: 1.549 m (5' 0.98\").    Weight as of this encounter: 69.6 kg (153 lb 7 oz).          Disposition Plan     Expected Discharge Date: 02/06/2023        Discharge Comments: resume PCA services at discharge  started flomax pending UA          Angel REYNOSO MD  Hospitalist Service  [facility]  Securely message with [employer] (more info)  Text page via [employer] Paging/Directory   ______________________________________________________________________    Interval History   Patient is new to me.  Patient seen and examined.  Notes, labs, imaging report personally reviewed.  Overnight events reviewed.  Discussed with family member at bedside to help with interpretation.  Currently patient complaining of frontal headache.  Discussed with nursing staffs reported patient having difficulty sleeping at night.   Awaiting bed at [facility]    Addendum;  Discussed with neurologist, reported she will contact neuro team at [facility] and update us.    Physical Exam   Vital Signs: Temp: 98.7  F (37.1  C) Temp src: Oral BP: 137/85 Pulse: 87   Resp: 20 SpO2: 97 % O2 Device: None (Room air)    Weight: 153 lbs 7.04 oz      General: Not in obvious distress.  HEENT: Normocephalic, supple neck  Chest: Clear to auscultation bilateral anteriorly, no wheezing  Heart: S1S2 normal, regular  Abdomen: Soft. NT, ND. Bowel sounds- active.  Extremities: No legs swelling  Neuro: alert and awake, follows simple commands, moves all extremities but " has generalized motor weakness          Medical Decision Making             Data         Imaging results reviewed over the past 24 hrs:   No results found for this or any previous visit (from the past 24 hour(s)).

## 2023-02-06 NOTE — PROVIDER NOTIFICATION
No void this shift Dr MILLER called ordered bladder protocol, bladder scan 531 straight catheter will be done

## 2023-02-06 NOTE — CONSULTS
Consult order received, discussed with Dr Angel REYNOSO.  The patient and his family are opting for transfer to [facility] for basilar artery interventions.  At this time, goals of care are life prolonging and Dr REYNOSO advised refraining from palliative consult.    This note was placed to satisfy consult; if clinical changes occur (declining clinically or family no longer wish to pursue further neurovascular interventions with transfer, or family requesting formal goals of care conversation prior to transfer) please re-enter consult order.    Molly Fenton MD  [facility] Palliative Medicine Service: [facility]  Department voice mail (checked M-F 8a-4pm approx): [phone]  [facility] Palliative Medicine pager: [phone]  (Please use [employer] for text paging if possible, use link under Palliative service)  Or may securely message me via [employer]

## 2023-02-06 NOTE — DISCHARGE SUMMARY
[facility] MEDICINE  DISCHARGE SUMMARY     Primary Care Physician: Chucho Espino  Admission Date: 2/1/2023   Discharge Provider: Angel REYNOSO MD Discharge Date: 2/6/2023   Diet:   Active Diet and Nourishment Order   Procedures     Diet     Combination Diet Soft and Bite Sized Diet (level 6); Thin Liquids (level 0); Low Saturated Fat Na <2400mg Diet; Thin Liquids (level 0)       Code Status: No CPR- Do NOT Intubate   Activity: DCACTIVITY: Activity as tolerated        Condition at Discharge: Stable     REASON FOR PRESENTATION(See Admission Note for Details)   Cerebellar stroke    PRINCIPAL & ACTIVE DISCHARGE DIAGNOSES     Principal Problem:    Cerebellar stroke (H)  Active Problems:    Vertigo      PENDING LABS     Unresulted Labs Ordered in the Past 30 Days of this Admission     No orders found from 1/2/2023 to 2/2/2023.            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 MRI brain was notable for numerous acute to subacute cerebellar infarctions.      On 2/4, patient had rapid response called for period of unresponsiveness. Apparently, patient experienced LOC while urinating on toilet. Vitals were wnl.       Repeat CT head & MRI Brain done; showed significant increased CVA burden in cerebellar hemispheres R>L w/ additional concern for pontine infarcts, slightly increased effacement of 4th ventricle. No herniation.     Acute to Subacute cerebellar infarcts in setting of known basilar artery stenosis  Left Vertebral   --Following discussion w/ family, pt made DNR/DNI.   --Hospitalist/Gen neuro also consulted w/  stroke neuro by phone; patient may be candidate for stenting of basilar artery.  Family in agreement.  - Continue ASA & Crestor. Plavix discontinued, started on low intensity IV heparin per neurology recommendation.  --PRN meclizine for dizziness.  On a scheduled Tylenol for intermittent headache  --Patient getting transferred to [facility] for higher level of care on 2/6 evening     Essential HTN  - PTA Norvasc, Losartan & Imdur held.  Metoprolol at lower dose.  Avoid aggressive management of BP.  Monitor vitals and adjust medications accordingly.     CKD stage III  - Renal function fairly stable.  Monitor intermittently     Type II DM  - Continue lantus 7 units at bedtime. PTA Januvia.   --Insulin sliding scale and hypoglycemic protocol     Gout  -PTA allopurinol     BPH  - Continue flomax.  Bladder protocol     Constipation  --MiraLAX, Colace.    ADDENDUM at 6:45PM  --Received message from RN stating passed 3 clots and blood in urine, voided 350ml. Of note, pt had urinary retention and did required straight cath earlier during the day. Hold heparin drip until pt transfer to [facility] at 8PM, requested RN to give update to accepting RN at [facility]. Further management/urology consult per accepting facility    Discharge readmit med rec    Current Facility-Administered Medications   Medication     acetaminophen (TYLENOL) tablet 975 mg     allopurinol (ZYLOPRIM) tablet 200 mg     aspirin EC tablet 81 mg     glucose gel 15-30 g    Or     dextrose 50 % injection 25-50 mL    Or     glucagon injection 1 mg     docusate sodium (COLACE) capsule 100 mg     DULoxetine (CYMBALTA) DR capsule 20 mg     heparin 25,000 units in 0.45% NaCl 250 mL ANTICOAGULANT infusion     insulin aspart (NovoLOG) injection (RAPID ACTING)     insulin aspart (NovoLOG) injection (RAPID ACTING)     insulin glargine (LANTUS PEN) injection 7 Units     lidocaine (LMX4) cream     lidocaine 1 % 0.1-1 mL     meclizine (ANTIVERT) tablet 12.5 mg     medication instruction -  No oral meds if patient didn't pass dysphagia screen     Medication Instructions - Avoid dextrose in IV solutions.     melatonin tablet 3 mg     metoprolol tartrate (LOPRESSOR) tablet 25 mg     naloxone (NARCAN) injection 0.2 mg    Or     naloxone (NARCAN) injection 0.4 mg    Or     naloxone (NARCAN) injection 0.2 mg    Or     naloxone (NARCAN) injection 0.4 mg     nitroGLYcerin (NITROSTAT) sublingual tablet 0.4 mg     oxyCODONE IR (ROXICODONE) half-tab 2.5 mg     pantoprazole (PROTONIX) EC tablet 40 mg     polyethylene glycol (MIRALAX) Packet 17 g     QUEtiapine (SEROquel) quarter-tab 6.25 mg     rosuvastatin (CRESTOR) tablet 20 mg     sitagliptin (JANUVIA) tablet 50 mg     sodium chloride (PF) 0.9% PF flush 3 mL     sodium chloride (PF) 0.9% PF flush 3 mL     tamsulosin (FLOMAX) capsule 0.4 mg           Rationale for medication changes:      Please refer to hospital course        Consults       SPEECH LANGUAGE PATH ADULT IP CONSULT  PHARMACY IP CONSULT  PHARMACY IP CONSULT  PHARMACY IP CONSULT  PHYSICAL THERAPY ADULT IP CONSULT  OCCUPATIONAL THERAPY ADULT IP CONSULT  REHAB ADMISSIONS LIAISON IP CONSULT  CARE MANAGEMENT / SOCIAL WORK IP CONSULT  NEUROLOGY IP STROKE CONSULT  PALLIATIVE CARE ADULT IP CONSULT  NEUROLOGY IP STROKE CONSULT  PHARMACY IP CONSULT  PHARMACY IP CONSULT  SMOKING CESSATION PROGRAM IP CONSULT  PHARMACY IP CONSULT  PHARMACY IP CONSULT  SMOKING CESSATION PROGRAM IP CONSULT  NEUROLOGY IP STROKE CONSULT    Immunizations given this encounter     Most Recent Immunizations   Administered Date(s) Administered     COVID-19 Vaccine (Predictivez) 04/10/2021     COVID-19,PF,Moderna Booster 12/06/2021     DT (PEDS <7y) 09/25/2006     Flu, Unspecified 10/22/2010     HepA, Unspecified 06/30/2008     HepA-Adult 06/30/2008     HepB, Unspecified 08/08/2006     HepB-Adult 08/08/2006     Influenza (H1N1) 01/26/2010     Influenza (High Dose) 3 valent vaccine 09/16/2019     Influenza (IIV3) PF 10/27/2014     Influenza  Vaccine 65+ (Fluzone HD) 09/12/2022     Influenza Vaccine, 6+MO IM (QUADRIVALENT W/PRESERVATIVES) 10/27/2014     MMR 08/08/2006     Pneumo Conj 13-V (2010&after) 02/08/2016     Pneumococcal 23 valent 05/09/2011     Td (Adult), Adsorbed 01/02/2019     Td,adult,historic,unspecified 09/01/2006     Tdap (Adacel,Boostrix) 11/16/2007     Varicella 09/25/2006     Zoster vaccine, live 10/26/2007           Anticoagulation Information          SIGNIFICANT IMAGING FINDINGS     Results for orders placed or performed during the hospital encounter of 02/01/23   CTA Head Neck with Contrast    Impression    IMPRESSION:   HEAD CT:  1.  No acute intracranial process.  2.  Stable mild to moderate chronic small vessel ischemic disease and generalized brain parenchymal volume loss.    HEAD CTA:   1. No significant change since 01/11/2023. Occlusion of the left vertebral artery V4 segment and right posterior cerebral artery.  2. 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  3. Fetal origin left posterior cerebral artery demonstrates moderate stenosis of the left P2 segment and mild multifocal stenoses of its P2 and P3 segments  4. Mild to moderate stenosis of the cavernous segment of the right internal carotid artery.  5. Widely patent anterior and middle cerebral artery branches.    NECK CTA:  1.  No significant change since 01/11/2023. Occlusion of the left vertebral artery at the level of the V3 segment.  2.  Mild atherosclerotic plaque at the carotid bifurcations. No significant carotid stenosis.  3.  Widely patent dominant right vertebral artery.   MR Brain w/o & w Contrast    Impression    IMPRESSION:  1.  Numerous acute-to-subacute infarctions of both cerebellar hemispheres. Left thalamic infarction of similar age.  2.  No mass effect or evidence of hemorrhagic transformation.  3.  Chronic ischemic changes and age-related changes as above.    Results discussed with Florencia Lubin on 2/1/2023 5:58 PM.   XR Video Swallow  with SLP or OT    Impression    IMPRESSION:  1.  Transient penetration. No aspiration.  2.  Early pour over.  3.  See the speech pathology report for details.    CT Head w/o Contrast    Impression    IMPRESSION:  1.  Increased acute/subacute ischemic burden of the cerebellum with particularly greater involvement of the right hemisphere.   2.  Subacute lacunar infarct in the left anteromedial thalamus redemonstrated.  3.  Question vague hypodensities in the right hemipons worrisome for additional acute/subacute ischemia.  4.  No acute intracranial hemorrhage or herniation.  5.  Chronic intracranial findings as detailed.       MR Brain w/o Contrast    Impression    IMPRESSION:  1.  Significant increase in acute/subacute ischemic burden within the posterior fossa since MRI 02/01/2023 most notably involving the right greater than left cerebellar hemispheres with suspected additional right hemipontine insult. No suggestion of   acute hemorrhage.  2.  Subacute left thalamic lacunar infarct.  3.  Slight increase in inferior fourth ventricular effacement.  4.  Additional chronic intracranial findings as detailed.    Jose Welch MD notified provider, ANGIE BEST, of results via telephone at 2/4/2023 4:41 PM, who acknowledge understanding.   CT Head w/o Contrast    Impression    IMPRESSION:  1.  No significant interval change and multifocal subacute infarction involving the bilateral cerebellar hemispheres and left thalamus. No hemorrhagic conversion.  2.  Probable mild to moderate sequela of chronic small vessel ischemic disease and remote lacunar infarction.  3.  Mild brain parenchymal volume loss.  4.  Unchanged partial effacement of the fourth ventricle       SIGNIFICANT LABORATORY FINDINGS     Most Recent 3 CBC's:Recent Labs   Lab Test 02/05/23  0839 02/04/23  1826 02/04/23  1538 02/02/23  0709   WBC  --  10.8 12.2* 8.2   HGB  --  13.0* 13.9 14.1   MCV  --  92 93 92    208 222 215     Most Recent 3  BMP's:Recent Labs   Lab Test 02/06/23  1703 02/06/23  1205 02/06/23  0752 02/05/23  0846 02/05/23  0839 02/04/23  1640 02/04/23  1538 02/02/23  1201 02/02/23  0709 02/01/23  2228 02/01/23  1155   NA  --   --   --   --   --   --  135*  --  136  --  131*   POTASSIUM  --   --   --   --   --   --  4.0  --  4.0  --  4.2   CHLORIDE  --   --   --   --   --   --  97*  --  100  --  96*   CO2  --   --   --   --   --   --  20*  --  21*  --  24   BUN  --   --   --   --   --   --  35.7*  --  21.9  --  28.3*   CR  --   --   --   --  1.27*  --  1.69*  --  1.23*  --  1.55*   ANIONGAP  --   --   --   --   --   --  18*  --  15  --  11   GERALDINE  --   --   --   --   --   --  9.9  --  10.1  --  10.0   * 194* 168*   < >  --    < > 256*   < > 158*   < > 238*    < > = values in this interval not displayed.     Most Recent 2 LFT's:Recent Labs   Lab Test 02/02/23  0709 02/01/23  1155   AST 30 34   ALT 37 38   ALKPHOS 75 92   BILITOTAL 0.4 0.4       Discharge Orders        Activity    Your activity upon discharge: activity as tolerated     Diet    Follow this diet upon discharge: Orders Placed This Encounter      Combination Diet Soft and Bite Sized Diet (level 6); Mildly Thick (level 2); Low Saturated Fat Na <2400mg Diet; Mildly Thick (level 2)       Examination   Physical Exam   Temp:  [98.1  F (36.7  C)-98.7  F (37.1  C)] 98.4  F (36.9  C)  Pulse:  [76-90] 85  Resp:  [18-20] 18  BP: (114-150)/(70-87) 150/87  SpO2:  [94 %-97 %] 94 %  Wt Readings from Last 1 Encounters:   02/04/23 69.6 kg (153 lb 7 oz)       Please refer to my progress note from earlier for details.  Discussed with charge nurse, reported bed available at [facility] later this evening.      Please see EMR for more detailed significant labs, imaging, consultant notes etc.    IAngel MD, personally saw the patient today and spent greater than 30 minutes discharging this patient.    Angel REYNOSO MD  [facility]    CC:Chucho Espino

## 2023-02-06 NOTE — PLAN OF CARE
"Problem: Risk for Delirium  Goal: Improved Sleep  Outcome: Not Progressing     Problem: Stroke, Ischemic (Includes Transient Ischemic Attack)  Goal: Optimal Eating and Swallowing without Aspiration  Outcome: Progressing  Goal: Effective Urinary Elimination  Outcome: Progressing     Problem: Hypertension Comorbidity  Goal: Blood Pressure in Desired Range  Outcome: Progressing  Intervention: Maintain Blood Pressure Management     Son present at bedside this shift. Patient tolerating thin liquids well. Able to swallow small pills whole (Tic Tac size and smaller), needs larger ones broken or crushed. Able to void in urinal while standing. NIH= 3 for slurred speech. Patient able to sleep part of the shift, but could not stay asleep. C/o headache around midnight, resolved with scheduled Tylenol.    /71 (BP Location: Left arm)   Pulse 84   Temp 98.3  F (36.8  C) (Oral)   Resp 18   Ht 1.549 m (5' 0.98\")   Wt 69.6 kg (153 lb 7 oz)   SpO2 95%   BMI 29.01 kg/m      "

## 2023-02-06 NOTE — PLAN OF CARE
Problem: Stroke, Ischemic (Includes Transient Ischemic Attack)  Goal: Optimal Coping  Outcome: Progressing   Goal Outcome Evaluation:     Needs to be transferred to , waiting for bed placement, continues to   Be calm and cooperative than anxious trying to get out of bed.  Dr mckenzie ordered CT not done yet, needs to be turned ever 2 hours  Unable to eat much sleeping a lot coughing with eating  Blood sugars 168 and 198   Family here used to  also helps with cares, voided 250 bladder  Scan 531 Dr called bladder protocol started,  Antixa 0.21 Heparin drip increased 700 units hour, re check ordered for 1400 today.

## 2023-02-06 NOTE — PROGRESS NOTES
NEUROLOGY PROGRESS NOTE     ASSESSMENT & PLAN     Diagnosis: Multiple strokes. Basilar stenosis.     83 yo man presenting with dizziness and headache.       New strokes on MRI, significant atherosclerosis on recent imaging. Additional strokes with repeat imaging 2.4.23 despite medical management.    Updated head CT for increased somnolence (though patient did not sleep well last night).    Plavix discontinues, replaced with low intensity heparin. Continue ASA.    Continue statin.  LDL 59.    PT/OT/speech.     Neurochecks, telemetry.      Avoid hypotension.    Symptomatic headache and vertigo management.    Glucose management per primary team.    Neurology will follow along until transfer to [facility].  Plan discussed with family in detail at bedside. Will reach out to stroke team again today.        Neurology Discharge Planning: Awaiting transfer.    Patient Active Problem List    Diagnosis Date Noted     Cerebellar stroke (H) 2023     Priority: Medium     Vertigo 2023     Priority: Medium     Essential hypertension 2023     Priority: Medium     Vertebral artery occlusion, left 2023     Priority: Medium     ACE-inhibitor cough 2021     Priority: Medium     Type 2 diabetes mellitus with stage 3 chronic kidney disease, without long-term current use of insulin (H) 2020     Priority: Medium     Urinary incontinence 10/30/2019     Priority: Medium     Primary osteoarthritis involving multiple joints 2019     Priority: Medium     Esophageal reflux      Priority: Medium     Created by Conversion         Dyslipidemia      Priority: Medium     Created by Conversion  [facility] Annotation: Dec 28 2007  3:23PM - Giulia Meridai2007:   Chol   281, HDL 39Started simvastatin 2011         CKD (chronic kidney disease) stage 3, GFR 30-59 ml/min (H)      Priority: Medium     Created by Conversion  [facility] Annotation: Dec 28 2007  3:40PM - Bella Gold: Cr elevated at   1.7   since  2006.Acute exacerbation with episode of nephrolithiasis complicated   by   urosepsis in 8/2009.         Constipation, unspecified constipation type      Priority: Medium     Coronary artery disease due to lipid rich plaque      Priority: Medium     Right lower quadrant abdominal pain      Priority: Medium     Colitis      Priority: Medium     Nephrolithiasis      Priority: Medium     Created by Teliportme  [facility] Annotation: Aug  4 2009 12:22PM - Giulia Meridaig:   Hospitalized   7/2009 with BL stent placement.  Stent removal 8/2009.Calcium Stones.CT   5/2011:Calcified plaques L Renal pelvis,L lower pole 2 nonobstructing   calculi   of 2 mm.  Calculi medial R upper pole and R lower pole.Low dense R lower   pole   cortical lesions.1/2011 seen by Urology.BL pelvocaliectasis         Chronic Gout      Priority: Medium     Created by Teliportme  [facility] Annotation: Dec 28 2007  3:23PM - Bella Gold: Uric acid   elevated   to 10 since at least 2007.Multiple joints affected-phalangeal,wrists,   ankles,   knees, ? shoulders.Started allopurinol 5/2009. Stopped HCTZ for BP   5/2009Multiple tophi noted since 10/2011Started colchicine 9/2009.  Replacement Utility updated for latest IMO load         BPH (benign prostatic hyperplasia) 07/19/2017     Priority: Medium     Chronic gout 07/19/2017     Priority: Medium     Essential hypertension with goal blood pressure less than 140/90 03/21/2017     Priority: Medium     Chronic right shoulder pain 02/21/2017     Priority: Medium     Generalized Osteoarthritis Of The Hand      Priority: Medium     Created by Conversion  Replacement Utility updated for latest IMO load         Bilateral chronic knee pain 07/20/2016     Priority: Medium     Cataracts, bilateral 02/17/2016     Priority: Medium     Elevated PSA 12/21/2015     Priority: Medium     Prostate nodule 12/21/2015     Priority: Medium     Pseudogout      Priority: Medium     Created by Teliportme  [facility] Annotation:  Aug 14 2009  8:35PM - Chucho Espino: Ankle   aspiration   2009 showed calcium pyrophospate crystals but not noted if intra or   extracellular.Seen by Rhematology 2009         Latent Tuberculosis      Priority: Medium     Created by Conversion  [facility] Annotation: Dec 28 2007  3:40PM - Bella Gold: treated 9 mo in   '06         Lumbar Disc Degeneration      Priority: Medium     Created by Conversion         Insomnia      Priority: Medium     Created by Conversion         Medical History  Past Medical History:   Diagnosis Date     Acute blood loss anemia      Acute renal failure (H)      Anemia      Bacteremia      Cataract      Chronic gout      CKD (chronic kidney disease)     Stage 3     DM2 (diabetes mellitus, type 2) (H)      GERD (gastroesophageal reflux disease)      Glucose intolerance (impaired glucose tolerance)      Gout      History of nephrolithiasis      History of prostatitis 2017     Hyperlipidemia      Hypertension      Insomnia      Intestinal disaccharidase deficiencies and disaccharide malabsorption     Created by Conversion      Latent tuberculosis      Nephrolithiasis      Neuropathy      Nonspecific abnormal findings on radiological and other examination of skull and head     Created by [facility] Annotation: 2013 11:23PM - Giulia Meridai/10/2011:  severe stenosis both posterior cerebral arteries seen MRI/MRA done for  vertigo. No acute changes.e*     Osteoarthritis     wrist, knee, shoulder     Prostatitis      Rectal bleed      Trigger thumb         SUBJECTIVE     No acute events overnight.  Some difficulties with swallowing larger pills noted by nursing staff.  Headache treated with Tylenol.    Adam's wife and daughter at bedside today.  He reportedly had a lot of problems with sleeping overnight.  He is more tired today as a result, but otherwise they have not noticed any particular changes.     OBJECTIVE     Vital signs in last 24 hours  Temp:  [96.9  F  (36.1  C)-98.7  F (37.1  C)] 98.7  F (37.1  C)  Pulse:  [76-90] 87  Resp:  [18-20] 20  BP: (114-140)/(70-85) 137/85  SpO2:  [95 %-97 %] 97 %    Weight:   [unfilled]    Review of Systems   Review of systems not obtained due to patient factors.    General Physical Exam: Patient is sleepy today, alerts to voice. Vital signs were reviewed and are documented in EMR. No  thyromegaly, JVD or lymphadenopathy noted.  Neurological Exam:  Mental status: Attentive, interactive.  Speech: Nonsensical at times.   Cranial nerves:  EOM full, face appears symmetric.  Difficult to test visual fields.    Motor/Coordination: Slight slowness with finger tapping on the right, good  strength bilaterally.  Moves both legs off of the bed without difficulty.    Sensory: Appears to be intact to light touch throughout.   Gait: Deferred for safety.     DIAGNOSTIC STUDIES     Pertinent Radiology   Radiology Results: No new imaging to review.    Pertinent Labs   Lab Results: personally reviewed.   Recent Results (from the past 24 hour(s))   Glucose by meter    Collection Time: 02/05/23 12:45 PM   Result Value Ref Range    GLUCOSE BY METER POCT 151 (H) 70 - 99 mg/dL   Glucose by meter    Collection Time: 02/05/23  4:21 PM   Result Value Ref Range    GLUCOSE BY METER POCT 187 (H) 70 - 99 mg/dL   Heparin Unfractionated Anti Xa Level    Collection Time: 02/05/23  4:33 PM   Result Value Ref Range    Anti Xa Unfractionated Heparin 0.39 For Reference Range, See Comment IU/mL   Glucose by meter    Collection Time: 02/05/23  8:45 PM   Result Value Ref Range    GLUCOSE BY METER POCT 175 (H) 70 - 99 mg/dL   Heparin Unfractionated Anti Xa Level    Collection Time: 02/06/23  7:08 AM   Result Value Ref Range    Anti Xa Unfractionated Heparin 0.21 For Reference Range, See Comment IU/mL   Extra Green Top (Lithium Heparin) Tube    Collection Time: 02/06/23  7:08 AM   Result Value Ref Range    Hold Specimen JIC    Glucose by meter    Collection Time: 02/06/23   7:52 AM   Result Value Ref Range    GLUCOSE BY METER POCT 168 (H) 70 - 99 mg/dL         HOSPITAL MEDICATIONS       acetaminophen  975 mg Oral Q8H     allopurinol  200 mg Oral Daily     aspirin  81 mg Oral Daily     docusate sodium  100 mg Oral Daily     DULoxetine  20 mg Oral BID     insulin aspart  1-7 Units Subcutaneous TID AC     insulin glargine  5 Units Subcutaneous At Bedtime     metoprolol tartrate  25 mg Oral BID     pantoprazole  40 mg Oral QAM AC     polyethylene glycol  17 g Oral Daily     rosuvastatin  20 mg Oral At Bedtime     sitagliptin  50 mg Oral Daily     sodium chloride (PF)  3 mL Intracatheter Q8H     tamsulosin  0.4 mg Oral Daily        Total time spent for face to face visit, reviewing labs/imaging studies, counseling and coordination of care was: 35 Minutes. More than 50% of this time was spent on counseling and coordination of care.    Dragon software used in the dictation on this note.    Shakira Castro MD  [facility] - [address], [address]  [place] [id number]

## 2023-02-07 ENCOUNTER — APPOINTMENT (OUTPATIENT)
Dept: SPEECH THERAPY | Facility: CLINIC | Age: 83
DRG: 065 | End: 2023-02-07
Attending: PSYCHIATRY & NEUROLOGY
Payer: COMMERCIAL

## 2023-02-07 ENCOUNTER — PATIENT OUTREACH (OUTPATIENT)
Dept: GERIATRIC MEDICINE | Facility: CLINIC | Age: 83
End: 2023-02-07
Payer: COMMERCIAL

## 2023-02-07 ENCOUNTER — PATIENT OUTREACH (OUTPATIENT)
Dept: GERIATRIC MEDICINE | Facility: CLINIC | Age: 83
End: 2023-02-07

## 2023-02-07 ENCOUNTER — APPOINTMENT (OUTPATIENT)
Dept: EDUCATION SERVICES | Facility: CLINIC | Age: 83
DRG: 065 | End: 2023-02-07
Attending: STUDENT IN AN ORGANIZED HEALTH CARE EDUCATION/TRAINING PROGRAM
Payer: COMMERCIAL

## 2023-02-07 LAB
ANION GAP SERPL CALCULATED.3IONS-SCNC: 14 MMOL/L (ref 7–15)
BUN SERPL-MCNC: 19.5 MG/DL (ref 8–23)
CALCIUM SERPL-MCNC: 10.2 MG/DL (ref 8.8–10.2)
CHLORIDE SERPL-SCNC: 101 MMOL/L (ref 98–107)
CHOLEST SERPL-MCNC: 151 MG/DL
CREAT SERPL-MCNC: 1.36 MG/DL (ref 0.67–1.17)
DEPRECATED HCO3 PLAS-SCNC: 22 MMOL/L (ref 22–29)
ERYTHROCYTE [DISTWIDTH] IN BLOOD BY AUTOMATED COUNT: 14.2 % (ref 10–15)
GFR SERPL CREATININE-BSD FRML MDRD: 53 ML/MIN/1.73M2
GLUCOSE BLDC GLUCOMTR-MCNC: 164 MG/DL (ref 70–99)
GLUCOSE BLDC GLUCOMTR-MCNC: 191 MG/DL (ref 70–99)
GLUCOSE BLDC GLUCOMTR-MCNC: 196 MG/DL (ref 70–99)
GLUCOSE BLDC GLUCOMTR-MCNC: 200 MG/DL (ref 70–99)
GLUCOSE BLDC GLUCOMTR-MCNC: 202 MG/DL (ref 70–99)
GLUCOSE SERPL-MCNC: 163 MG/DL (ref 70–99)
HCT VFR BLD AUTO: 42.1 % (ref 40–53)
HDLC SERPL-MCNC: 40 MG/DL
HGB BLD-MCNC: 13.9 G/DL (ref 13.3–17.7)
LDLC SERPL CALC-MCNC: 61 MG/DL
MAGNESIUM SERPL-MCNC: 1.7 MG/DL (ref 1.7–2.3)
MCH RBC QN AUTO: 30.3 PG (ref 26.5–33)
MCHC RBC AUTO-ENTMCNC: 33 G/DL (ref 31.5–36.5)
MCV RBC AUTO: 92 FL (ref 78–100)
NONHDLC SERPL-MCNC: 111 MG/DL
PHOSPHATE SERPL-MCNC: 2.8 MG/DL (ref 2.5–4.5)
PLATELET # BLD AUTO: 215 10E3/UL (ref 150–450)
POTASSIUM SERPL-SCNC: 4 MMOL/L (ref 3.4–5.3)
RBC # BLD AUTO: 4.58 10E6/UL (ref 4.4–5.9)
SODIUM SERPL-SCNC: 137 MMOL/L (ref 136–145)
TRIGL SERPL-MCNC: 252 MG/DL
TSH SERPL DL<=0.005 MIU/L-ACNC: 1.29 UIU/ML (ref 0.3–4.2)
UFH PPP CHRO-ACNC: 0.21 IU/ML
UFH PPP CHRO-ACNC: 0.23 IU/ML
UFH PPP CHRO-ACNC: 0.43 IU/ML
UFH PPP CHRO-ACNC: 0.45 IU/ML
WBC # BLD AUTO: 10.3 10E3/UL (ref 4–11)

## 2023-02-07 PROCEDURE — 36415 COLL VENOUS BLD VENIPUNCTURE: CPT | Performed by: STUDENT IN AN ORGANIZED HEALTH CARE EDUCATION/TRAINING PROGRAM

## 2023-02-07 PROCEDURE — 99222 1ST HOSP IP/OBS MODERATE 55: CPT | Performed by: PHYSICIAN ASSISTANT

## 2023-02-07 PROCEDURE — 99222 1ST HOSP IP/OBS MODERATE 55: CPT | Mod: GC | Performed by: STUDENT IN AN ORGANIZED HEALTH CARE EDUCATION/TRAINING PROGRAM

## 2023-02-07 PROCEDURE — 92526 ORAL FUNCTION THERAPY: CPT | Mod: GN

## 2023-02-07 PROCEDURE — 258N000003 HC RX IP 258 OP 636

## 2023-02-07 PROCEDURE — 82310 ASSAY OF CALCIUM: CPT | Performed by: STUDENT IN AN ORGANIZED HEALTH CARE EDUCATION/TRAINING PROGRAM

## 2023-02-07 PROCEDURE — 120N000002 HC R&B MED SURG/OB UMMC

## 2023-02-07 PROCEDURE — 85520 HEPARIN ASSAY: CPT | Performed by: STUDENT IN AN ORGANIZED HEALTH CARE EDUCATION/TRAINING PROGRAM

## 2023-02-07 PROCEDURE — 250N000011 HC RX IP 250 OP 636: Performed by: STUDENT IN AN ORGANIZED HEALTH CARE EDUCATION/TRAINING PROGRAM

## 2023-02-07 PROCEDURE — 258N000003 HC RX IP 258 OP 636: Performed by: STUDENT IN AN ORGANIZED HEALTH CARE EDUCATION/TRAINING PROGRAM

## 2023-02-07 PROCEDURE — 85520 HEPARIN ASSAY: CPT | Performed by: PSYCHIATRY & NEUROLOGY

## 2023-02-07 PROCEDURE — 250N000013 HC RX MED GY IP 250 OP 250 PS 637

## 2023-02-07 PROCEDURE — 250N000013 HC RX MED GY IP 250 OP 250 PS 637: Performed by: STUDENT IN AN ORGANIZED HEALTH CARE EDUCATION/TRAINING PROGRAM

## 2023-02-07 PROCEDURE — 36415 COLL VENOUS BLD VENIPUNCTURE: CPT | Performed by: PSYCHIATRY & NEUROLOGY

## 2023-02-07 PROCEDURE — 250N000012 HC RX MED GY IP 250 OP 636 PS 637: Performed by: STUDENT IN AN ORGANIZED HEALTH CARE EDUCATION/TRAINING PROGRAM

## 2023-02-07 PROCEDURE — 92610 EVALUATE SWALLOWING FUNCTION: CPT | Mod: GN

## 2023-02-07 PROCEDURE — 83735 ASSAY OF MAGNESIUM: CPT | Performed by: PSYCHIATRY & NEUROLOGY

## 2023-02-07 PROCEDURE — 84443 ASSAY THYROID STIM HORMONE: CPT | Performed by: STUDENT IN AN ORGANIZED HEALTH CARE EDUCATION/TRAINING PROGRAM

## 2023-02-07 PROCEDURE — 93010 ELECTROCARDIOGRAM REPORT: CPT | Performed by: INTERNAL MEDICINE

## 2023-02-07 PROCEDURE — 84100 ASSAY OF PHOSPHORUS: CPT | Performed by: PSYCHIATRY & NEUROLOGY

## 2023-02-07 PROCEDURE — 80061 LIPID PANEL: CPT | Performed by: STUDENT IN AN ORGANIZED HEALTH CARE EDUCATION/TRAINING PROGRAM

## 2023-02-07 PROCEDURE — 93005 ELECTROCARDIOGRAM TRACING: CPT

## 2023-02-07 PROCEDURE — 85027 COMPLETE CBC AUTOMATED: CPT | Performed by: STUDENT IN AN ORGANIZED HEALTH CARE EDUCATION/TRAINING PROGRAM

## 2023-02-07 PROCEDURE — 83036 HEMOGLOBIN GLYCOSYLATED A1C: CPT | Performed by: STUDENT IN AN ORGANIZED HEALTH CARE EDUCATION/TRAINING PROGRAM

## 2023-02-07 RX ORDER — ACETAMINOPHEN 325 MG/1
650 TABLET ORAL EVERY 6 HOURS PRN
Status: DISCONTINUED | OUTPATIENT
Start: 2023-02-07 | End: 2023-02-13 | Stop reason: HOSPADM

## 2023-02-07 RX ORDER — DEXTROSE MONOHYDRATE 25 G/50ML
25-50 INJECTION, SOLUTION INTRAVENOUS
Status: DISCONTINUED | OUTPATIENT
Start: 2023-02-07 | End: 2023-02-13 | Stop reason: HOSPADM

## 2023-02-07 RX ORDER — SODIUM CHLORIDE 9 MG/ML
INJECTION, SOLUTION INTRAVENOUS CONTINUOUS
Status: DISCONTINUED | OUTPATIENT
Start: 2023-02-07 | End: 2023-02-07

## 2023-02-07 RX ORDER — NICOTINE POLACRILEX 4 MG
15-30 LOZENGE BUCCAL
Status: DISCONTINUED | OUTPATIENT
Start: 2023-02-07 | End: 2023-02-13 | Stop reason: HOSPADM

## 2023-02-07 RX ORDER — HEPARIN SODIUM 10000 [USP'U]/100ML
0-5000 INJECTION, SOLUTION INTRAVENOUS CONTINUOUS
Status: DISCONTINUED | OUTPATIENT
Start: 2023-02-07 | End: 2023-02-12

## 2023-02-07 RX ORDER — METOPROLOL TARTRATE 25 MG/1
25 TABLET, FILM COATED ORAL 2 TIMES DAILY
Status: DISCONTINUED | OUTPATIENT
Start: 2023-02-07 | End: 2023-02-13 | Stop reason: HOSPADM

## 2023-02-07 RX ORDER — AMOXICILLIN 250 MG
1 CAPSULE ORAL 2 TIMES DAILY PRN
Status: DISCONTINUED | OUTPATIENT
Start: 2023-02-07 | End: 2023-02-13 | Stop reason: HOSPADM

## 2023-02-07 RX ADMIN — SODIUM CHLORIDE 500 ML: 9 INJECTION, SOLUTION INTRAVENOUS at 14:20

## 2023-02-07 RX ADMIN — ASPIRIN 81 MG: 81 TABLET ORAL at 08:46

## 2023-02-07 RX ADMIN — ROSUVASTATIN CALCIUM 20 MG: 10 TABLET, FILM COATED ORAL at 21:40

## 2023-02-07 RX ADMIN — PANTOPRAZOLE SODIUM 40 MG: 40 TABLET, DELAYED RELEASE ORAL at 08:45

## 2023-02-07 RX ADMIN — ACETAMINOPHEN 650 MG: 325 TABLET ORAL at 17:00

## 2023-02-07 RX ADMIN — INSULIN ASPART 2 UNITS: 100 INJECTION, SOLUTION INTRAVENOUS; SUBCUTANEOUS at 18:05

## 2023-02-07 RX ADMIN — SITAGLIPTIN 50 MG: 50 TABLET, FILM COATED ORAL at 08:46

## 2023-02-07 RX ADMIN — DULOXETINE HYDROCHLORIDE 20 MG: 20 CAPSULE, DELAYED RELEASE ORAL at 20:40

## 2023-02-07 RX ADMIN — INSULIN ASPART 2 UNITS: 100 INJECTION, SOLUTION INTRAVENOUS; SUBCUTANEOUS at 13:24

## 2023-02-07 RX ADMIN — SENNOSIDES AND DOCUSATE SODIUM 1 TABLET: 50; 8.6 TABLET ORAL at 11:05

## 2023-02-07 RX ADMIN — HEPARIN SODIUM 700 UNITS/HR: 10000 INJECTION, SOLUTION INTRAVENOUS at 01:05

## 2023-02-07 RX ADMIN — METOPROLOL TARTRATE 25 MG: 25 TABLET, FILM COATED ORAL at 20:40

## 2023-02-07 RX ADMIN — HEPARIN SODIUM 850 UNITS/HR: 10000 INJECTION, SOLUTION INTRAVENOUS at 07:23

## 2023-02-07 RX ADMIN — DULOXETINE HYDROCHLORIDE 20 MG: 20 CAPSULE, DELAYED RELEASE ORAL at 08:46

## 2023-02-07 RX ADMIN — SODIUM CHLORIDE 1000 ML: 9 INJECTION, SOLUTION INTRAVENOUS at 21:40

## 2023-02-07 RX ADMIN — TAMSULOSIN HYDROCHLORIDE 0.4 MG: 0.4 CAPSULE ORAL at 08:45

## 2023-02-07 RX ADMIN — SENNOSIDES AND DOCUSATE SODIUM 1 TABLET: 50; 8.6 TABLET ORAL at 21:40

## 2023-02-07 RX ADMIN — ACETAMINOPHEN 650 MG: 325 TABLET ORAL at 11:05

## 2023-02-07 RX ADMIN — ALLOPURINOL 200 MG: 100 TABLET ORAL at 08:46

## 2023-02-07 ASSESSMENT — ACTIVITIES OF DAILY LIVING (ADL)
ADLS_ACUITY_SCORE: 37
DOING_ERRANDS_INDEPENDENTLY_DIFFICULTY: YES
DIFFICULTY_EATING/SWALLOWING: NO
ADLS_ACUITY_SCORE: 37
ADLS_ACUITY_SCORE: 43
NUMBER_OF_TIMES_PATIENT_HAS_FALLEN_WITHIN_LAST_SIX_MONTHS: 2
WEAR_GLASSES_OR_BLIND: NO
ADLS_ACUITY_SCORE: 43
FALL_HISTORY_WITHIN_LAST_SIX_MONTHS: YES
CONCENTRATING,_REMEMBERING_OR_MAKING_DECISIONS_DIFFICULTY: YES
ADLS_ACUITY_SCORE: 37
WALKING_OR_CLIMBING_STAIRS_DIFFICULTY: NO
CHANGE_IN_FUNCTIONAL_STATUS_SINCE_ONSET_OF_CURRENT_ILLNESS/INJURY: YES
ADLS_ACUITY_SCORE: 37
ADLS_ACUITY_SCORE: 37
ADLS_ACUITY_SCORE: 43
TOILETING_ISSUES: NO
ADLS_ACUITY_SCORE: 37
DRESSING/BATHING_DIFFICULTY: NO
ADLS_ACUITY_SCORE: 37

## 2023-02-07 NOTE — PLAN OF CARE
Physical Therapy Discharge Summary    Reason for therapy discharge:    Discharged to [facility]    Progress towards therapy goal(s). See goals on Care Plan in [facility] electronic health record for goal details.  Goals partially met.  Barriers to achieving goals:   discharge from facility.    Therapy recommendation(s):    Continued therapy is recommended.  Rationale/Recommendations:  Continue PT at [facility].

## 2023-02-07 NOTE — PROGRESS NOTES
Arrived from: [facility]   Belongings/meds: Remain with patient  2 RN Skin Assessment Completed by: Eliel JEAN RN and Graciela RN    Non-intact findings documented (yes/no/NA): Generalized abdominal bruising, bruising to R elbow  L droop, R tongue deviation, L side 4/5, R side 5/5, lethargic, needs prompting to participate in assessment, garbled speech per family

## 2023-02-07 NOTE — PROGRESS NOTES
Heparin gtt on hold for 1 hour due to patient passing 3 small-moderate stringy clots (approximately 1 inch in length) in urine. Urine clear, red. Hospitalist updated, Heparin restarted at 20:07.      Report given to RN at [facility] 6A.     Patient discharged to [facility] at 2100. Patient taken via stretcher by [facility] transport. Son riding with patient, personal belongings taken with son.     [FreeTextEntry1] : recommend starting a monophasic pill to help with mood issues.

## 2023-02-07 NOTE — PLAN OF CARE
Status: Pt admitted to outside hospital for stroke work up, transferred to [facility] for basilar artery stenting  Vitals: VSS on RA  Neuros: Consistently oriented to self, intermittently oriented to place and time, confused, L side 4/5, R side 5/5, denied N/T, L droop, R tongue deviation, Kayce speaking - garbled speech per pt's son  IV: x2 PIV, one infusing Heparin gtt @ 700 units/hr, other is SL  Labs/Electrolytes: Awaiting HepXa draw  Resp/trach: Lung sounds are clear  Diet: Soft and bite sized w/ thins, takes pills whole in applesauce  Bowel status: Bowel sounds are active, no BM overnight  : Voiding spontaneously, voiding blood tinged urine w/ clots, MDs are aware, plan is to continue to monitor urine  Skin: Bruising to BUE and to abdomen  Pain: Denied  Activity: Per report, heavy assist x2 w/ GB  Social: Pt was calm and cooperative overnight, pt's son stayed overnight to help interpret and help get patient to participate in cares  Plan: Will continue to monitor Hep gtt and urine output, awaiting plan for stenting  Updates this shift: Pt transferred from [facility] around 2130 last ti. Seemed to rest well in between cares    Stroke Patients:   PLC scheduled or completed: Awaiting order  Pneumoboots in place: Ordered, have not arrived yet

## 2023-02-07 NOTE — PHARMACY-CONSULT NOTE
Pharmacy Consult to evaluate for medication related stroke core measures    Adam Talamantes, 82 year old male admitted as a transferred from OSH for initially presenting with vertigo, nausea, and vomiting on 2/6/2023.    Thrombolytic was not given because of Clinical contraindications    VTE Prophylaxis Heparin given on 2/4/2023 at OSH as appropriate prior to end of hospital day 2.    Antithrombotic: aspirin started on 2/2/2023 at OSH (home med), as appropriate by end of hospital day 2. Continue antithrombotic therapy on discharge to meet quality measures, unless contraindicated.    Anticoagulation if history of A-fib/flutter: Patient does not have history of A-fib/flutter - anticoagulation not required for medication related stroke core measures.     LDL Cholesterol Calculated   Date Value Ref Range Status   02/07/2023 61 <=100 mg/dL Final     LDL Cholesterol Direct   Date Value Ref Range Status   11/22/2019 84 <=129 mg/dl Final       Patient's home statin, Crestor (rosuvastatin) restarted; continue statin on discharge to meet quality measures, unless contraindicated.     Recommendations: None at this time    Thank you for the consult.    Jacob Simon, [facility] 2/7/2023 3:04 PM

## 2023-02-07 NOTE — PROGRESS NOTES
[facility]  Ambulatory Care Coordination to Inpatient Care Management   Hand-In Communication    Date:  February 7, 2023  Name: Adam Talamantes is enrolled in [facility] Care Coordination program and I am the Lead Care Coordinator.  CC Contact Information:.   Payor Source: Payor: [facility] / Plan: [facility] DUAL / Product Type: HMO /   Current services in place:     Please see the CC Snaphot and Care Management Flowsheets for specific  details of this Adam Talamantes care plan.   Additional details/specific concerns r/t this admission:    No additional concerns at this time NA    I will follow this admission in Epic. Please feel free to contact me with questions or for further collaboration in discharge planning.    Carola LANG (covering for Olga Brady)  [facility]  [phone]

## 2023-02-07 NOTE — PLAN OF CARE
Occupational Therapy Discharge Summary    Reason for therapy discharge:    Discharged to [facility]    Progress towards therapy goal(s). See goals on Care Plan in [facility] electronic health record for goal details.  Goals not met.  Barriers to achieving goals:   discharge from facility.    Therapy recommendation(s):    Continued therapy is recommended.  Rationale/Recommendations:  OT when pt. stable for stroke rehab.

## 2023-02-07 NOTE — PHARMACY-ADMISSION MEDICATION HISTORY
Admission medication history completed at [facility]. Please see Pharmacy - Admission Medication History note from 2/1/2023.    Jerri Silver, PharmD  Pharmacy Resident

## 2023-02-07 NOTE — CONSULTS
Urology Consult History and Physical    Name: Adam Talamantes    MRN: [id number]   YOB: 1940       We were asked to see Adam Talamantes at the request of Dr. Alvarado for evaluation and treatment of the following chief complaint:          Chief Complaint:   Gross hematuria s/p stroke in the setting of heparin gtt  History is obtained from patient and review of records and the patient's son  A virtual Kayce  was used.           History of Present Illness:   Adam Talamantes is a 82 year old male with pmh significant for HTN, HLD, DM II, CKD who had a recent admission on 1/11/23 for stroke.  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  He was transferred to [facility] for mgmt of worsening stroke burden and is currently on a low intensity heparin gtt.      Urology has consulted for gross hematuria that began yesterday.  His son saw a few black/red clots in the yellow urine.  Overnight, nurses saw a few red clots.  Just now, the nurse told me that urine was yellow without any clots.  The patient did have hematuria once several months ago, but it hasn't recurred until now.  Today has no flank pain, bladder pain or dysuria.    - Today is having difficulty emptying his bladder despite continuing home tamsulosin  - Did have a stone procedure at [facility] in 2009, postop course c/b fever/chills attributed to a UTI  - Notes suggest he was seen in urology in 2011 for BL pelviectasis but unable to find any additional information about this  - No recent urology visits    - Last imaging of the AP with IV contrast was on 5/1/18 showing stable BL renal pelvis dilation and a stable left anterior renal pelvis 5mm wall calcification.   - Labs show: sCr 1.36mg/dL (improved from months and years past). Hemoglobin is currently stsable at 13.9g/dL. Last UA on 2/3/23 showed WBC 25, RBC 2, protein 30.  The culture  showed mixed  valentina.            Past Medical History:     Past Medical History:   Diagnosis Date     Acute blood loss anemia      Acute renal failure (H)      Anemia      Bacteremia      Cataract      Chronic gout      CKD (chronic kidney disease)     Stage 3     DM2 (diabetes mellitus, type 2) (H)      GERD (gastroesophageal reflux disease)      Glucose intolerance (impaired glucose tolerance)      Gout      History of nephrolithiasis      History of prostatitis 2017     Hyperlipidemia      Hypertension      Insomnia      Intestinal disaccharidase deficiencies and disaccharide malabsorption     Created by Conversion      Latent tuberculosis      Nephrolithiasis      Neuropathy      Nonspecific abnormal findings on radiological and other examination of skull and head     Created by [facility] Annotation: 2013 11:23PM - Giulia Meridai/10/2011:  severe stenosis both posterior cerebral arteries seen MRI/MRA done for  vertigo. No acute changes.e*     Osteoarthritis     wrist, knee, shoulder     Prostatitis      Rectal bleed      Trigger thumb             Past Surgical History:     Past Surgical History:   Procedure Laterality Date     IR MISCELLANEOUS PROCEDURE  2009     IR MISCELLANEOUS PROCEDURE  2009     IR MISCELLANEOUS PROCEDURE  2009     IR MISCELLANEOUS PROCEDURE  2009     IR NEPHROURETERAL TUBE PLACEMENT BILATERAL  2009     IR URETER DILATION BILATERAL  2009     IR URETER DILATION BILATERAL  2009     KIDNEY STONE SURGERY       PICC  3/6/2016                 Social History:     Social History     Tobacco Use     Smoking status: Former     Types: Cigarettes     Quit date: 3/10/2000     Years since quittin.9     Smokeless tobacco: Former     Tobacco comments:     no passive exposure   Substance Use Topics     Alcohol use: No     In [facility] was a .  Breathed toxic fumes but unable to communicate which exposures he has had.          Family  History:     Family History   Problem Relation Age of Onset     Cerebrovascular Disease Father      Cerebrovascular Disease Daughter    Unable to complete FHX         Allergies:   No Known Allergies         Medications:     Current Facility-Administered Medications   Medication     0.9% sodium chloride BOLUS     acetaminophen (TYLENOL) tablet 650 mg     allopurinol (ZYLOPRIM) tablet 200 mg     aspirin EC tablet 81 mg     glucose gel 15-30 g    Or     dextrose 50 % injection 25-50 mL    Or     glucagon injection 1 mg     DULoxetine (CYMBALTA) DR capsule 20 mg     heparin infusion 25,000 units in D5W 250 mL ANTICOAGULANT     insulin aspart (NovoLOG) injection (RAPID ACTING)     insulin aspart (NovoLOG) injection (RAPID ACTING)     insulin glargine (LANTUS PEN) injection 7 Units     lidocaine (LMX4) cream     lidocaine 1 % 0.1-1 mL     medication instruction - No oral meds if patient didn't pass dysphagia screen     Medication Instructions - Avoid dextrose in IV solutions.     ondansetron (ZOFRAN ODT) ODT tab 4 mg    Or     ondansetron (ZOFRAN) injection 4 mg     pantoprazole (PROTONIX) EC tablet 40 mg     rosuvastatin (CRESTOR) tablet 20 mg     senna-docusate (SENOKOT-S/PERICOLACE) 8.6-50 MG per tablet 1 tablet     sodium chloride (PF) 0.9% PF flush 3 mL     sodium chloride (PF) 0.9% PF flush 3 mL     tamsulosin (FLOMAX) capsule 0.4 mg             Review of Systems:    ROS: See HPI for pertinent details.  Remainder of 10-point ROS negative.         Physical Exam:   VS:  T: 98.2    HR: 122    BP: 149/105    RR: 16   GEN:  Somnolent.  His son says he is confused or having difficulty with memory   HEENT:  Sclerae anicteric.  Conjunctivae pink.  Moist mucous membranes  LUNGS: Non-labored breathing.  BACK:  No costoverterbral tenderness.  ABD:  Soft.  NT.  ND.  No rebound or guarding.    :  Phallus uncircumcised.  Meatus patent without discharge or bleeding. Normal penile shaft without plaques.  Testicles nontedner.     JESSICA:  Deferred  EXT:  Warm, well perfused.   SKIN:  Warm.  Dry.  No rashes          Data:   All laboratory data reviewed:    Lab Results   Component Value Date    PSA 10.3 12/16/2015     Recent Labs   Lab 02/07/23  0627 02/05/23  0839 02/04/23  1826 02/04/23  1538 02/02/23  0709   WBC 10.3  --  10.8 12.2* 8.2   HGB 13.9  --  13.0* 13.9 14.1    209 208 222 215     Recent Labs   Lab 02/07/23  1253 02/07/23  0751 02/07/23  0627 02/07/23  0329 02/05/23  0846 02/05/23  0839 02/04/23  1640 02/04/23  1538 02/02/23  1201 02/02/23  0709 02/01/23  2228 02/01/23  1155   NA  --   --  137  --   --   --   --  135*  --  136  --  131*   POTASSIUM  --   --  4.0  --   --   --   --  4.0  --  4.0  --  4.2   CHLORIDE  --   --  101  --   --   --   --  97*  --  100  --  96*   CO2  --   --  22  --   --   --   --  20*  --  21*  --  24   BUN  --   --  19.5  --   --   --   --  35.7*  --  21.9  --  28.3*   CR  --   --  1.36*  --   --  1.27*  --  1.69*  --  1.23*  --  1.55*   * 202* 163* 164*   < >  --    < > 256*   < > 158*   < > 238*   GERALDINE  --   --  10.2  --   --   --   --  9.9  --  10.1  --  10.0   MAG  --   --  1.7  --   --   --   --   --   --   --   --  1.7   PHOS  --   --  2.8  --   --   --   --   --   --   --   --   --     < > = values in this interval not displayed.     Recent Labs   Lab 02/03/23  1348   COLOR Light Yellow   APPEARANCE Clear   URINEGLC Negative   URINEBILI Negative   URINEKETONE Negative   SG 1.016   URINEPH 5.5   PROTEIN 30*   NITRITE Negative   LEUKEST 250 Franko/uL*   RBCU 2   WBCU 25*     Results for orders placed or performed during the hospital encounter of 02/01/23   Urine Culture    Specimen: Urine, Midstream   Result Value Ref Range    Culture <10,000 CFU/mL Urogenital valentina    Results for orders placed or performed during the hospital encounter of 01/11/23   Urine Culture    Specimen: Urine, Midstream   Result Value Ref Range    Culture No Growth        All pertinent imaging reviewed:  CT ABDOMEN  PELVIS WO ORAL W IV CONTRAST  5/1/2018 8:39 PM        INDICATION: testicular pain and infection in diabetic, please eval for possible nec fasc.  TECHNIQUE: CT abdomen and pelvis. Multiplanar reformation images (MPR). Dose reduction techniques were used.   IV CONTRAST: Iohexol (Omni) 75mL  COMPARISON: 11/24/2017, 10/17/2017, 3/2/2016 and 5/8/2011 CT     FINDINGS:  LUNG BASES: Negative.     ABDOMEN: Small hepatic cysts and hypodense lesions appear stable and should be benign. Aortoiliac atherosclerosis with patent mesenteric vessels. Stable bilateral renal caliectasis. Stable left anterior renal pelvis 5 mm wall calcification. No ureteral   stone seen. No distal ureteral dilation.     Bilateral renal cortical thinning with small cysts.     Normal-appearing gallbladder, adrenals, spleen, and pancreas.     PELVIS: Normal-appearing perineum and scrotum. Enlarged prostate. No adenopathy. Moderately filled bladder. Colon and small bowel appear normal.     MUSCULOSKELETAL: Spinal degenerative change with grade 2 L5 on S1 anterolisthesis.     IMPRESSION:  CONCLUSION:  1.  No evidence necrotizing fasciitis.  2.  Stable bilateral renal pelvis dilation.  3.  Stable left anterior renal pelvis 5 mm wall calcification.  4.  Stable hepatic cysts and probable hemangiomas.  5.  Enlarged prostate         Impression and Plan:   Impression / Plan:   Adam LUCAS Albin is a 82 year old male with pmh significant for HTN, HLD, DM II, CKD who had a recent admission on 1/11/23 for stroke and now is readmitted with worsening stroke burden on low intensity heparin gtt.  Currently passing intermittent clots in yellow urine, per his son.  Had one previous episode of gross hematuria several months ago.  On tamsulosin for presumed BPH.  Other urologic concerns include:  - urolithiasis history  - BL pelviectasis history in the setting of CKD (although current creatinine is better than usual)  - Patient expressing difficulty emptying his bladder.   - Recent  UCx on 2/3 showed nonspecific  organisms.      RECS:  - Check bladderscan PVR to assure emptying  - Continue tamsulosin in the hospital (as you are)    - For blood clots in urine - no indication for inpatient urologic procedures.  And would not benefit from continuous bladder irrigation.    - Please check urine cytology (ordered)  - Obtain CT urogram (can be done either inpatient or outpatient)    - If urine becomes red --> increase fluid intake, trend hemoglobin and monitor for bladder emptying.  Continuous bladder irrigation would be considered for incomplete emptying due to clot in the bladder or falling hemoglobin.     - Will need outpatient urology follow up for cystoscopy to further evaluate the hematuria.  Please place a Urology referral at discharge.      Urology will sign off, but please call with questions, concerns    Thank you for the opportunity to participate in the care of Adam Talamantes.     JOSEF DraperC  Urology Physician Assistant  Personal Pager: [phone]    Please call Job Code:   [phone] to reach the Urology resident or PA on call - Weekdays  [phone] to reach the Urology resident or PA on call - Weeknights and weekends

## 2023-02-07 NOTE — CONSULTS
Stroke Education Note    The following information has been reviewed with the family:   Did stroke education with patient's son, Barbara over phone . Patient was sleeping during education.     1. Warning signs of stroke    2. Calling 911 if having warning signs of stroke    3. All modifiable risk factors: hypertension, CAD, atrial fib, diabetes, hypercholesterolemia, smoking, substance abuse, diet, physical inactivity, obesity, sleep apnea.    4. Patient's risk factors for stroke which include: HTN, HLD, T2DM,     5. Follow-up plan for after discharge    6. Discharge medications which include: ASA, insulin, rosuvastatin, metoprolol, amlodipine, losartan, isosorbide mononitrate    In addition, the above information was given to the family in writing as a part of the [facility] Stroke Class Handout.    Learner's response to risk factors / lifestyle modification education: NA - Precontemplative     Christina Rodriguez RN

## 2023-02-07 NOTE — H&P
[facility]    Stroke Admission Note    Chief Complaint  Stroke    HPI  Adam Talamantes is a 82 year old male with T2DM, HTN, HLD, CKD, and recent admission on 1/11/2023 for stroke and severe posterior circulation stenosis who initially presented to [facility] on 2/1/2023 with vertigo, nausea, and vomiting. He was found to have multiple scattered posterior circulation strokes. 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    He was admitted to [facility] for stroke evaluation/management. On 2/4/2023, rapid response was activated for unresponsiveness and loss of consciousness while urinating. Repeat brain MRI showed increased stroke burden. After discussion with family, he was transferred to [facility] for consideration of basilar stenting.    On arrival, he reports feeling tired. His son notes that he has been more tired lately and does not know if it is related to his strokes or because he has not been getting any sleep. He verbally denies having pain, but his son notices that he points to his head frequently. His son notes that he has been moving all extremities spontaneously, but maybe has some reduced movements on the right. He has not notice any new neurologic symptoms in the past couple of days.    Intravenous Thrombolysis  Not given due to: transfer with known/established strokes    Endovascular Treatment  No mechanial thrombectomy. Admitted for evaluation of stenting.    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     Due to worsening of stroke burden, he was transferred to [facility] for consideration of basilar stenting.      Stroke work-up previously obtained include: Hgb A1C (1/12/2023) 7.6, LDL (2/1/2023) 59, echocardiogram (1/11/2023) showing LVEF 55-60%, no WMA, normal LA size.     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  #Multifocal stenosis of the PCA   - Admit to Stroke neurology  - Neurochecks Q 4 hours  - Will discuss case with neuro IR in the morning  - Daily aspirin 81 mg for secondary stroke prevention  - Low intensity heparin gtt  - Statin: rosuvastatin 20mg at night  - Telemetry  - Bedside Glucose Monitoring  - PT/OT/SLP  - Stroke Education  - Depression Screen  - Apnea Screen  - Euthermia, Euglycemia    #Essential HTN  -Hold metoprolol, amlodipine, losartan, and isosorbide mononitrate for now    #Type II diabetes mellitus  - Glargine 7u at night  - Correctional dose insulin, medium (resumed from [facility])  - Continue PTA sitagliptin 50mg daily    #Gout: continue allopurinol    #BPH: continue tamsulosin      Prophylaxis            For VTE Prevention:  - pneumatic compression device  - On low-intensity heparin gtt    For Acid Suppression:  - On PTA pantoprazole    Code Status  DNR / DNI    During initial physical assessment, the plan of care was discussed and developed with patient and child.  Plan of care includes: continued stroke management, evaluation for basilar artery stent.    Patient was admitted via transfer from [facility].    The patient will be admitted to the Neuro Critical Care/Stroke team..     The patient was discussed with Stroke  Fellow, Dr. Greene.  The Stroke Staff is Dr. Gray.    Alejandrina Sue MD  Neurology Resident PGY-4   2/7/2023 03:13AM        Addendum 2/7    S:  Patient was lying in bed comfortably with son at his bedside, who acted as interprator for me. Patient complained of headache and dizziness. Also had episode of clots in urine. Per son, he had one episde at 2 am and 2 episodes while he was hospitalize at [facility]. Son endorsed the story as mentioned in the HPI.    NIHSS- (For answering both wrong and mild facial asymmetry on R)    O:   Neurologic Exam   Mental Status:   Opens his eyes to voice, States his name correctly, States his age, month, and year wrong. Knows that he is in the hospital. Follows simple commands such sticking out tongue and showing thumbs up. Couldn't follow complex commands correctly.  Naming intact. Speech sounds fluent.  Cranial Nerves:  EOMI. Visual fields intact. No nystagmus noted. Pupils are equal, round, reactive. Mild nasolabial flattening on the right. Hearing intact to conversation. Strong shoulder shrug. Facial sensation equal on both sides.    Motor:  Normal tone, No tremors noted, Full strength and moving all extremities spontaneously.  Reflexes: Brisk symmetrical reflexes,  no clonus, no Oliveira sign , no cross-abductor, Toes mute on R and upgoing on L.   Sensory:  light touch sensation intact and symmetric throughout upper and lower extremities, no extinction on double simultaneous stimulation   Coordination:  FNF intact without dysmetria  Station/Gait:  Deferred      A/P    -Neuro IR consult place to evaluate for Basilar artery stent:  -Urology consult for clots in urine  -Hold pta sitagliptine  -N/S bolus for sustained tachcardia  -SBP goal  <160      Amfrancis shaver MD  Neurology PGY1  ___________________________________________________    Nutrition:   Orders Placed This Encounter      Soft & Bite Sized Diet (level 6) Thin Liquids (level 0)    Clinically Significant Risk Factors  "Present on Admission                      # DMII: A1C = 7.6 % (Ref range: <5.7 %) within past 3 months    # Overweight: Estimated body mass index is 29.01 kg/m  as calculated from the following:    Height as of 23: 1.549 m (5' 0.98\").    Weight as of 23: 69.6 kg (153 lb 7 oz).        # CKD, Stage 3a (GFR 45-59): Will monitor and treat as appropriate  - last Cr =  1.27 mg/dL (Ref range: 0.67 - 1.17 mg/dL)  - last GFR = 57 mL/min/1.73m2 (Ref range: >60 mL/min/1.73m2)       Past Medical History   Past Medical History:   Diagnosis Date     Acute blood loss anemia      Acute renal failure (H)      Anemia      Bacteremia      Cataract      Chronic gout      CKD (chronic kidney disease)     Stage 3     DM2 (diabetes mellitus, type 2) (H)      GERD (gastroesophageal reflux disease)      Glucose intolerance (impaired glucose tolerance)      Gout      History of nephrolithiasis      History of prostatitis 2017     Hyperlipidemia      Hypertension      Insomnia      Intestinal disaccharidase deficiencies and disaccharide malabsorption     Created by Conversion      Latent tuberculosis      Nephrolithiasis      Neuropathy      Nonspecific abnormal findings on radiological and other examination of skull and head     Created by Conversion [facility] Annotation: 2013 11:23PM - Giulia Meridai/10/2011:  severe stenosis both posterior cerebral arteries seen MRI/MRA done for  vertigo. No acute changes.e*     Osteoarthritis     wrist, knee, shoulder     Prostatitis      Rectal bleed      Trigger thumb      Past Surgical History   Past Surgical History:   Procedure Laterality Date     IR MISCELLANEOUS PROCEDURE  2009     IR MISCELLANEOUS PROCEDURE  2009     IR MISCELLANEOUS PROCEDURE  2009     IR MISCELLANEOUS PROCEDURE  2009     IR NEPHROURETERAL TUBE PLACEMENT BILATERAL  2009     IR URETER DILATION BILATERAL  2009     IR URETER DILATION BILATERAL  2009     KIDNEY STONE SURGERY   "     [facility]  3/6/2016          Medications   Home Meds  Prior to Admission medications    Medication Sig Start Date End Date Taking? Authorizing Provider   allopurinol (ZYLOPRIM) 100 MG tablet TAKE 2 TABLETS BY MOUTH EVERY DAY 2/21/22   Chucho Espino MD   aspirin (ASA) 81 MG EC tablet Take 1 tablet (81 mg) by mouth daily 2/24/22   Chucho Espino MD   docusate sodium (COLACE) 100 MG capsule TAKE 1 CAPSULE(100 MG) BY MOUTH TWICE DAILY AS NEEDED FOR CONSTIPATION 7/14/22   Patrica Beard MD   DULoxetine (CYMBALTA) 20 MG capsule TAKE 1 CAPSULE(20 MG) BY MOUTH TWICE DAILY 11/23/22   Bella Gold MBBS   JANUVIA 50 MG tablet TAKE 1 TABLET(50 MG) BY MOUTH DAILY 4/4/22   Chucho Espino MD   lidocaine (XYLOCAINE) 2 % external gel Apply topically 3 times daily Apply on neck back tid 1/14/23   Vance Luna MD   meclizine (ANTIVERT) 12.5 MG tablet Take 1 tablet (12.5 mg) by mouth 3 times daily as needed for dizziness 1/19/23   Chucho Espino MD   nitroGLYcerin (NITROSTAT) 0.4 MG sublingual tablet PLACE 1 TABLET UNDER THE TONGUE EVERY 5 MINUTES AS NEEDED FOR CHEST PAIN. 8/2/21   Chucho Espino MD   pantoprazole (PROTONIX) 40 MG EC tablet Take 1 tablet (40 mg) by mouth every morning (before breakfast) 1/19/23   Chucho Espino MD   rosuvastatin (CRESTOR) 20 MG tablet TAKE 1 TABLET(20 MG) BY MOUTH AT BEDTIME 8/26/22   Lynne George MD   tamsulosin (FLOMAX) 0.4 MG capsule Take 1 capsule (0.4 mg) by mouth daily 2/4/23   Ander Jaffe MD   triamcinolone (KENALOG) 0.1 % external cream Apply topically 2 times daily as needed for irritation 9/12/22   Chucho Espino MD       Scheduled Meds    allopurinol  200 mg Oral Daily     aspirin  81 mg Oral Daily     DULoxetine  20 mg Oral BID     insulin aspart  1-7 Units Subcutaneous TID AC     insulin aspart  1-5 Units Subcutaneous At Bedtime     insulin glargine  7 Units Subcutaneous Q24H     pantoprazole  40 mg Oral QAM AC     rosuvastatin  20 mg Oral At Bedtime      sitagliptin  50 mg Oral Daily     sodium chloride (PF)  3 mL Intracatheter Q8H     tamsulosin  0.4 mg Oral Daily       Infusion Meds    heparin 700 Units/hr (23 0105)     - MEDICATION INSTRUCTIONS -       - MEDICATION INSTRUCTIONS -         PRN Meds  glucose **OR** dextrose **OR** glucagon, lidocaine 4%, lidocaine (buffered or not buffered), - MEDICATION INSTRUCTIONS -, - MEDICATION INSTRUCTIONS -, ondansetron **OR** ondansetron, sodium chloride (PF)    Allergies   No Known Allergies  Family History   Family History   Problem Relation Age of Onset     Cerebrovascular Disease Father      Cerebrovascular Disease Daughter      Social History   Social History     Tobacco Use     Smoking status: Former     Types: Cigarettes     Quit date: 3/10/2000     Years since quittin.9     Smokeless tobacco: Former     Tobacco comments:     no passive exposure   Substance Use Topics     Alcohol use: No     Drug use: No       Review of Systems   The 10 point Review of Systems is negative other than noted in the HPI or here.        PHYSICAL EXAMINATION  Temp:  [98.1  F (36.7  C)-99  F (37.2  C)] 99  F (37.2  C)  Pulse:  [72-99] 72  Resp:  [16-20] 16  BP: (123-150)/() 148/82  SpO2:  [93 %-98 %] 98 %    General:  patient lying in bed without any acute distress    HEENT:  normocephalic/atraumatic, no epistaxis   Cardio:  extremities appear appropriately perfused  Pulmonary:  no respiratory distress  Abdomen:  soft, non-distended  Extremities:  no edema, peripheral pulses palpable  Skin:  intact, warm/dry     Neurologic  Mental Status:  eyes are closed and rouses to voice. Oriented to person, month, and age (reports [place] Follows simple commands. Naming intact. Speech sounds fluent.  Cranial Nerves:  Blinks to threat bilaterally. EOMI. Pupils are equal, round, reactive. Mild nasolabial flattening on the right. Hearing intact to conversation.   Motor:  There is drift in the RUE and RLE,  but do not hit the bed. No drift in the LUE and LLE.  Reflexes:  no clonus  Sensory:  light touch sensation intact and symmetric throughout upper and lower extremities, no extinction on double simultaneous stimulation   Coordination:  FNF intact without dysmetria  Station/Gait:  deferred    Dysphagia Screen  Per Nursing    Stroke Scales    NIHSS  1a. Level of Consciousness 1-->Not alert, but arousable by minor stimulation to obey, answer, or respond   1b. LOC Questions 0-->Answers both questions correctly   1c. LOC Commands 0-->Performs both tasks correctly   2.   Best Gaze 0-->Normal   3.   Visual 0-->No visual loss   4.   Facial Palsy 1-->Minor paralysis (flattened nasolabial fold, asymmetry on smiling)   5a. Motor Arm, Left 0-->No drift, limb holds 90 (or 45) degrees for full 10 secs   5b. Motor Arm, Right 1-->Drift, limb holds 90 (or 45) degrees, but drifts down before full 10 secs, does not hit bed or other support   6a. Motor Leg, Left 0-->No drift, leg holds 30 degree position for full 5 secs   6b. Motor Leg, right 1-->Drift, leg falls by the end of the 5-sec period but does not hit bed   7.   Limb Ataxia 0-->Absent   8.   Sensory 0-->Normal, no sensory loss   9.   Best Language 0-->No aphasia, normal   10. Dysarthria 0-->Normal   11. Extinction and Inattention  0-->No abnormality   Total 4 (02/07/23 0315)       Modified [place] Score (Pre-morbid)    -      Imaging  I personally reviewed all imaging; relevant findings per the HPI.    Lab Results Data   CBC  Recent Labs   Lab 02/05/23  0839 02/04/23  1826 02/04/23  1538 02/02/23  0709   WBC  --  10.8 12.2* 8.2   RBC  --  4.21* 4.57 4.62   HGB  --  13.0* 13.9 14.1   HCT  --  38.8* 42.7 42.5    208 222 215     Basic Metabolic Panel   Recent Labs   Lab 02/06/23  1703 02/06/23  1205 02/06/23  0752 02/05/23  0846 02/05/23  0839 02/04/23  1640 02/04/23  1538 02/02/23  1201 02/02/23  0709 02/01/23  2228 02/01/23  1155   NA  --   --   --   --   --   --  135*  --   136  --  131*   POTASSIUM  --   --   --   --   --   --  4.0  --  4.0  --  4.2   CHLORIDE  --   --   --   --   --   --  97*  --  100  --  96*   CO2  --   --   --   --   --   --  20*  --  21*  --  24   BUN  --   --   --   --   --   --  35.7*  --  21.9  --  28.3*   CR  --   --   --   --  1.27*  --  1.69*  --  1.23*  --  1.55*   * 194* 168*   < >  --    < > 256*   < > 158*   < > 238*   GERALDINE  --   --   --   --   --   --  9.9  --  10.1  --  10.0    < > = values in this interval not displayed.     Liver Panel  Recent Labs   Lab 02/02/23  0709 02/01/23  1155   PROTTOTAL 8.3 8.3   ALBUMIN 4.2 4.3   BILITOTAL 0.4 0.4   ALKPHOS 75 92   AST 30 34   ALT 37 38     INR  No lab results found.   Lipid Profile    Recent Labs   Lab Test 02/01/23  1155 01/11/23 0421 12/06/21  0957   CHOL 173 228* 171   HDL 35* 40 36*   LDL 59 124* 78   TRIG 394* 320* 287*     A1C    Recent Labs   Lab Test 01/12/23 2006 01/11/23 0421 09/12/22  1019   A1C 7.6* 7.6* 7.9*     Troponin    Recent Labs   Lab 02/01/23  1155   CTROPT 8          Stroke Code / Stroke Consult Data Data    Not a stroke code

## 2023-02-07 NOTE — PROGRESS NOTES
02/07/23 1114   Appointment Info   Signing Clinician's Name / Credentials (SLP) Thao Dickerson MS CCC-sLP   General Information   Onset of Illness/Injury or Date of Surgery 02/06/23   Referring Physician Alejandrina Sue MD   Patient/Family Therapy Goal Statement (SLP) None stated   Pertinent History of Current Problem Adam Talamantes is a 82 year old male with T2DM, HTN, HLD, CKD, and recent admission on 1/11/2023 for stroke and severe posterior circulation stenosis who initially presented to [facility] on 2/1/2023 with vertigo, nausea, and vomiting. He was found to have multiple scattered posterior circulation strokes. 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     He was admitted to [facility] for stroke evaluation/management. On 2/4/2023, rapid response was activated for unresponsiveness and loss of consciousness while urinating. Repeat brain MRI showed increased stroke burden. After discussion with family, he was transferred to [facility] for consideration of basilar stenting.     On arrival, he reports feeling tired. His son notes that he has been more tired lately and does not know if it is related to his strokes or because he has not been getting any sleep. He verbally denies having pain, but his son notices that he points to his head frequently. His son notes that he has been moving all extremities spontaneously, but maybe has some reduced movements on the right. He has not notice any new neurologic symptoms in the past couple of days. Clinical swallow eval completed at 1045 per MD orders.       Present yes  (via iphone)   Language Kayce   Type of Evaluation   Type of Evaluation Swallow Evaluation   Oral Motor   Oral Musculature unable to assess due to poor participation/comprehension   Dentition (Oral Motor)   Dentition (Oral Motor) significant number of missing teeth   Facial Symmetry (Oral Motor)    Facial Symmetry (Oral Motor) unable/difficult to assess   Lip Function (Oral Motor)   Lip Range of Motion (Oral Motor) unable/difficult to assess   Tongue Function (Oral Motor)   Tongue ROM (Oral Motor) unable/difficult to assess   Jaw Function (Oral Motor)   Jaw Function (Oral Motor) WNL   Cough/Swallow/Gag Reflex (Oral Motor)   Volitional Throat Clear/Cough (Oral Motor) unable/difficult to assess   Vocal Quality/Secretion Management (Oral Motor)   Vocal Quality (Oral Motor) WFL   Secretion Management (Oral Motor) WNL   General Swallowing Observations   Current Diet/Method of Nutritional Intake (General Swallowing Observations, NIS) thin liquids (level 0);soft & bite-sized (level 6)   Respiratory Support (General Swallowing Observations) none   Swallowing Evaluation Clinical swallow evaluation   Past History of Dysphagia Pt familiar to SLP caseload at OSH. Pt completed VFSS 2/4/23 with mild penetration with thin liquids. Prior to transfer, pt recommended soft and bite size diet (6) and thin liquids (0). Per son, pt eats softer foods at home due to poor baseline dentition.   Comment, General Swallowing Observations Pt required max cues to participate in evaluation. Poor NÉSTOR as evaluation progressed.   Clinical Swallow Evaluation   Feeding Assistance dependent   Clinical Swallow Evaluation Textures Trialed thin liquids;pureed   Clinical Swallow Eval: Thin Liquid Texture Trial   Mode of Presentation, Thin Liquids straw;fed by clinician   Volume of Liquid or Food Presented 4 oz   Oral Phase of Swallow WFL   Pharyngeal Phase of Swallow intact   Diagnostic Statement No overt s/sx of aspiration   Clinical Swallow Evaluation: Puree Solid Texture Trial   Mode of Presentation, Puree spoon;fed by clinician   Volume of Puree Presented 2 tbsp   Oral Phase, Puree effortful AP movement   Diagnostic Statement No overt s/sx of aspiration. Pt required prolonged but functional time for AP transit.   Esophageal Phase of Swallow    Patient reports or presents with symptoms of esophageal dysphagia No   Swallowing Recommendations   Diet Consistency Recommendations thin liquids (level 0);soft & bite-sized (level 6)   Supervision Level for Intake 1:1 supervision needed   Mode of Delivery Recommendations bolus size, small;food moistened;slow rate of intake   Swallowing Maneuver Recommendations alternate food and liquid intake;extra swallow   Monitoring/Assistance Required (Eating/Swallowing) check mouth frequently for oral residue/pocketing;cue for finger/lingual sweep if oral pocketing present;stop eating activities when fatigue is present;optimize oral intake to minimize need for tube feeding   Recommended Feeding/Eating Techniques (Swallow Eval) maintain upright sitting position for eating;maintain upright posture during/after eating for 30 minutes;minimize distractions during oral intake;provide assist with feeding;set-up and prepare tray   Medication Administration Recommendations, Swallowing (SLP) as tolerated   Instrumental Assessment Recommendations instrumental evaluation not recommended at this time   General Therapy Interventions   Planned Therapy Interventions Dysphagia Treatment   Dysphagia treatment Compensatory strategies for swallowing;Instruction of safe swallow strategies;Modified diet education   Clinical Impression   Criteria for Skilled Therapeutic Interventions Met (SLP Eval) Yes, treatment indicated   SLP Diagnosis mild oropharyngeal dysphagia   Risks & Benefits of therapy have been explained evaluation/treatment results reviewed;care plan/treatment goals reviewed;risks/benefits reviewed;current/potential barriers reviewed;participants voiced agreement with care plan;participants included;patient;son   Clinical Impression Comments Clinical swallow eval completed per MD orders. Pt presents with mild oropharyngeal dysphagia s/p cerebellar infarcts. Pt required max cues to participate in evaluation due to fatigue. Pt with  sparse baseline dentition, however unable to fully assess oral musculature due to decreased NÉSTOR. Pt tolerated small amounts of thin liquids via straw and pureed textures with no overt s/sx of aspiration. Pt declined additional PO trials and kept eyes shut. Recommend pt cautiously continue soft and bite sized diet (6) and thin liquids (0) with 1:1 supervision. PO only when fully alert and upright. Please encourage small sips/bites and slow pacing. ST to continue to follow to assess diet tolerance and advancement as appropriate. Anticipate pt will require ST follow-up at discharge.   SLP Total Evaluation Time   Eval: oral/pharyngeal swallow function, clinical swallow Minutes (89178) 19   SLP Goals   Therapy Frequency (SLP Eval) 4 times/wk   SLP Predicted Duration/Target Date for Goal Attainment 03/24/23   SLP Goals Swallow   SLP: Safely tolerate diet without signs/symptoms of aspiration Easy to chew diet;Thin liquids;With use of swallow precautions;With assistance/supervision   Interventions   Interventions Quick Adds Swallowing Dysfunction   SLP Discharge Planning       SLP Discharge Recommendation Transitional Care Facility   SLP Rationale for DC Rec pt below baseline swallowing and speech/language skills   SLP Brief overview of current status  Recommend pt cautiously continue soft and bite sized diet (6) and thin liquids (0) with 1:1 supervision. PO only when fully alert and upright. Please encourage small sips/bites and slow pacing   Total Session Time   Total Session Time (sum of timed and untimed services) 19

## 2023-02-08 ENCOUNTER — APPOINTMENT (OUTPATIENT)
Dept: OCCUPATIONAL THERAPY | Facility: CLINIC | Age: 83
DRG: 065 | End: 2023-02-08
Attending: PSYCHIATRY & NEUROLOGY
Payer: COMMERCIAL

## 2023-02-08 ENCOUNTER — TELEPHONE (OUTPATIENT)
Dept: FAMILY MEDICINE | Facility: CLINIC | Age: 83
End: 2023-02-08

## 2023-02-08 ENCOUNTER — APPOINTMENT (OUTPATIENT)
Dept: CT IMAGING | Facility: CLINIC | Age: 83
DRG: 065 | End: 2023-02-08
Attending: PSYCHIATRY & NEUROLOGY
Payer: COMMERCIAL

## 2023-02-08 ENCOUNTER — APPOINTMENT (OUTPATIENT)
Dept: SPEECH THERAPY | Facility: CLINIC | Age: 83
DRG: 065 | End: 2023-02-08
Attending: PSYCHIATRY & NEUROLOGY
Payer: COMMERCIAL

## 2023-02-08 LAB
ANION GAP SERPL CALCULATED.3IONS-SCNC: 14 MMOL/L (ref 7–15)
ATRIAL RATE - MUSE: NORMAL BPM
BUN SERPL-MCNC: 18.2 MG/DL (ref 8–23)
CALCIUM SERPL-MCNC: 9.5 MG/DL (ref 8.8–10.2)
CHLORIDE SERPL-SCNC: 102 MMOL/L (ref 98–107)
CREAT SERPL-MCNC: 1.4 MG/DL (ref 0.67–1.17)
DEPRECATED HCO3 PLAS-SCNC: 20 MMOL/L (ref 22–29)
DIASTOLIC BLOOD PRESSURE - MUSE: NORMAL MMHG
ERYTHROCYTE [DISTWIDTH] IN BLOOD BY AUTOMATED COUNT: 14 % (ref 10–15)
GFR SERPL CREATININE-BSD FRML MDRD: 51 ML/MIN/1.73M2
GLUCOSE BLDC GLUCOMTR-MCNC: 166 MG/DL (ref 70–99)
GLUCOSE BLDC GLUCOMTR-MCNC: 195 MG/DL (ref 70–99)
GLUCOSE BLDC GLUCOMTR-MCNC: 206 MG/DL (ref 70–99)
GLUCOSE BLDC GLUCOMTR-MCNC: 253 MG/DL (ref 70–99)
GLUCOSE BLDC GLUCOMTR-MCNC: 266 MG/DL (ref 70–99)
GLUCOSE SERPL-MCNC: 188 MG/DL (ref 70–99)
HCT VFR BLD AUTO: 39.6 % (ref 40–53)
HGB BLD-MCNC: 13.2 G/DL (ref 13.3–17.7)
INTERPRETATION ECG - MUSE: NORMAL
MAGNESIUM SERPL-MCNC: 1.6 MG/DL (ref 1.7–2.3)
MCH RBC QN AUTO: 30.6 PG (ref 26.5–33)
MCHC RBC AUTO-ENTMCNC: 33.3 G/DL (ref 31.5–36.5)
MCV RBC AUTO: 92 FL (ref 78–100)
P AXIS - MUSE: NORMAL DEGREES
PHOSPHATE SERPL-MCNC: 2.4 MG/DL (ref 2.5–4.5)
PLATELET # BLD AUTO: 209 10E3/UL (ref 150–450)
POTASSIUM SERPL-SCNC: 4 MMOL/L (ref 3.4–5.3)
PR INTERVAL - MUSE: NORMAL MS
QRS DURATION - MUSE: 76 MS
QT - MUSE: 402 MS
QTC - MUSE: 593 MS
R AXIS - MUSE: -12 DEGREES
RBC # BLD AUTO: 4.32 10E6/UL (ref 4.4–5.9)
SODIUM SERPL-SCNC: 136 MMOL/L (ref 136–145)
SYSTOLIC BLOOD PRESSURE - MUSE: NORMAL MMHG
T AXIS - MUSE: 36 DEGREES
UFH PPP CHRO-ACNC: 0.42 IU/ML
VENTRICULAR RATE- MUSE: 131 BPM
WBC # BLD AUTO: 9.7 10E3/UL (ref 4–11)

## 2023-02-08 PROCEDURE — 250N000012 HC RX MED GY IP 250 OP 636 PS 637: Performed by: STUDENT IN AN ORGANIZED HEALTH CARE EDUCATION/TRAINING PROGRAM

## 2023-02-08 PROCEDURE — 120N000002 HC R&B MED SURG/OB UMMC

## 2023-02-08 PROCEDURE — 92526 ORAL FUNCTION THERAPY: CPT | Mod: GN

## 2023-02-08 PROCEDURE — 74176 CT ABD & PELVIS W/O CONTRAST: CPT

## 2023-02-08 PROCEDURE — 258N000003 HC RX IP 258 OP 636: Performed by: STUDENT IN AN ORGANIZED HEALTH CARE EDUCATION/TRAINING PROGRAM

## 2023-02-08 PROCEDURE — 99232 SBSQ HOSP IP/OBS MODERATE 35: CPT | Mod: GC | Performed by: STUDENT IN AN ORGANIZED HEALTH CARE EDUCATION/TRAINING PROGRAM

## 2023-02-08 PROCEDURE — 85027 COMPLETE CBC AUTOMATED: CPT | Performed by: STUDENT IN AN ORGANIZED HEALTH CARE EDUCATION/TRAINING PROGRAM

## 2023-02-08 PROCEDURE — 36415 COLL VENOUS BLD VENIPUNCTURE: CPT | Performed by: STUDENT IN AN ORGANIZED HEALTH CARE EDUCATION/TRAINING PROGRAM

## 2023-02-08 PROCEDURE — 80048 BASIC METABOLIC PNL TOTAL CA: CPT | Performed by: STUDENT IN AN ORGANIZED HEALTH CARE EDUCATION/TRAINING PROGRAM

## 2023-02-08 PROCEDURE — 250N000013 HC RX MED GY IP 250 OP 250 PS 637: Performed by: STUDENT IN AN ORGANIZED HEALTH CARE EDUCATION/TRAINING PROGRAM

## 2023-02-08 PROCEDURE — 74176 CT ABD & PELVIS W/O CONTRAST: CPT | Mod: 26 | Performed by: RADIOLOGY

## 2023-02-08 PROCEDURE — 85520 HEPARIN ASSAY: CPT | Performed by: STUDENT IN AN ORGANIZED HEALTH CARE EDUCATION/TRAINING PROGRAM

## 2023-02-08 PROCEDURE — 250N000013 HC RX MED GY IP 250 OP 250 PS 637

## 2023-02-08 PROCEDURE — 97530 THERAPEUTIC ACTIVITIES: CPT | Mod: GO | Performed by: OCCUPATIONAL THERAPIST

## 2023-02-08 PROCEDURE — 97165 OT EVAL LOW COMPLEX 30 MIN: CPT | Mod: GO | Performed by: OCCUPATIONAL THERAPIST

## 2023-02-08 PROCEDURE — 83735 ASSAY OF MAGNESIUM: CPT | Performed by: PSYCHIATRY & NEUROLOGY

## 2023-02-08 PROCEDURE — 97535 SELF CARE MNGMENT TRAINING: CPT | Mod: GO | Performed by: OCCUPATIONAL THERAPIST

## 2023-02-08 PROCEDURE — 250N000011 HC RX IP 250 OP 636: Performed by: STUDENT IN AN ORGANIZED HEALTH CARE EDUCATION/TRAINING PROGRAM

## 2023-02-08 PROCEDURE — 84100 ASSAY OF PHOSPHORUS: CPT | Performed by: PSYCHIATRY & NEUROLOGY

## 2023-02-08 RX ORDER — HYDRALAZINE HYDROCHLORIDE 20 MG/ML
10-20 INJECTION INTRAMUSCULAR; INTRAVENOUS
Status: DISCONTINUED | OUTPATIENT
Start: 2023-02-08 | End: 2023-02-13 | Stop reason: HOSPADM

## 2023-02-08 RX ORDER — SODIUM CHLORIDE 9 MG/ML
INJECTION, SOLUTION INTRAVENOUS CONTINUOUS
Status: ACTIVE | OUTPATIENT
Start: 2023-02-08 | End: 2023-02-10

## 2023-02-08 RX ORDER — LABETALOL HYDROCHLORIDE 5 MG/ML
20 INJECTION, SOLUTION INTRAVENOUS EVERY 6 HOURS PRN
Status: DISCONTINUED | OUTPATIENT
Start: 2023-02-08 | End: 2023-02-08

## 2023-02-08 RX ORDER — LANOLIN ALCOHOL/MO/W.PET/CERES
3 CREAM (GRAM) TOPICAL AT BEDTIME
Status: DISCONTINUED | OUTPATIENT
Start: 2023-02-09 | End: 2023-02-13 | Stop reason: HOSPADM

## 2023-02-08 RX ORDER — LABETALOL HYDROCHLORIDE 5 MG/ML
10-20 INJECTION, SOLUTION INTRAVENOUS EVERY 10 MIN PRN
Status: DISCONTINUED | OUTPATIENT
Start: 2023-02-08 | End: 2023-02-13 | Stop reason: HOSPADM

## 2023-02-08 RX ADMIN — DULOXETINE HYDROCHLORIDE 20 MG: 20 CAPSULE, DELAYED RELEASE ORAL at 08:03

## 2023-02-08 RX ADMIN — ROSUVASTATIN CALCIUM 20 MG: 10 TABLET, FILM COATED ORAL at 21:31

## 2023-02-08 RX ADMIN — METOPROLOL TARTRATE 25 MG: 25 TABLET, FILM COATED ORAL at 21:31

## 2023-02-08 RX ADMIN — INSULIN ASPART 1 UNITS: 100 INJECTION, SOLUTION INTRAVENOUS; SUBCUTANEOUS at 17:03

## 2023-02-08 RX ADMIN — Medication 3 MG: at 23:41

## 2023-02-08 RX ADMIN — HEPARIN SODIUM 850 UNITS/HR: 10000 INJECTION, SOLUTION INTRAVENOUS at 05:50

## 2023-02-08 RX ADMIN — POTASSIUM & SODIUM PHOSPHATES POWDER PACK 280-160-250 MG 1 PACKET: 280-160-250 PACK at 21:30

## 2023-02-08 RX ADMIN — DULOXETINE HYDROCHLORIDE 20 MG: 20 CAPSULE, DELAYED RELEASE ORAL at 21:31

## 2023-02-08 RX ADMIN — INSULIN ASPART 1 UNITS: 100 INJECTION, SOLUTION INTRAVENOUS; SUBCUTANEOUS at 08:07

## 2023-02-08 RX ADMIN — INSULIN GLARGINE 7 UNITS: 100 INJECTION, SOLUTION SUBCUTANEOUS at 21:32

## 2023-02-08 RX ADMIN — ASPIRIN 81 MG: 81 TABLET ORAL at 08:03

## 2023-02-08 RX ADMIN — POTASSIUM & SODIUM PHOSPHATES POWDER PACK 280-160-250 MG 1 PACKET: 280-160-250 PACK at 12:59

## 2023-02-08 RX ADMIN — SODIUM CHLORIDE: 9 INJECTION, SOLUTION INTRAVENOUS at 13:00

## 2023-02-08 RX ADMIN — POTASSIUM & SODIUM PHOSPHATES POWDER PACK 280-160-250 MG 1 PACKET: 280-160-250 PACK at 16:14

## 2023-02-08 RX ADMIN — METOPROLOL TARTRATE 25 MG: 25 TABLET, FILM COATED ORAL at 08:03

## 2023-02-08 RX ADMIN — PANTOPRAZOLE SODIUM 40 MG: 40 TABLET, DELAYED RELEASE ORAL at 08:03

## 2023-02-08 RX ADMIN — ALLOPURINOL 200 MG: 100 TABLET ORAL at 08:03

## 2023-02-08 RX ADMIN — INSULIN ASPART 2 UNITS: 100 INJECTION, SOLUTION INTRAVENOUS; SUBCUTANEOUS at 12:59

## 2023-02-08 RX ADMIN — INSULIN GLARGINE 7 UNITS: 100 INJECTION, SOLUTION SUBCUTANEOUS at 02:18

## 2023-02-08 RX ADMIN — ACETAMINOPHEN 650 MG: 325 TABLET ORAL at 21:31

## 2023-02-08 RX ADMIN — TAMSULOSIN HYDROCHLORIDE 0.4 MG: 0.4 CAPSULE ORAL at 08:03

## 2023-02-08 ASSESSMENT — ACTIVITIES OF DAILY LIVING (ADL)
ADLS_ACUITY_SCORE: 43
ADLS_ACUITY_SCORE: 34
ADLS_ACUITY_SCORE: 43

## 2023-02-08 NOTE — TELEPHONE ENCOUNTER
Forms/Letter Request    Type of form/letter: FMLA - Unknown    Have you been seen for this request: Yes 01/162023    Do we have the form/letter: Yes:     When is form/letter needed by: 02/14/23    How would you like the form/letter returned:     Patient Notified form requests are processed in 3-5 business days:Yes    Okay to leave a detailed message?: Yes at Home number on file [phone] Daughter Dimitri

## 2023-02-08 NOTE — PLAN OF CARE
Goal Outcome Evaluation:  Problem: Oral Intake Inadequate  Goal: Improved Oral Intake  Outcome: Not Progressing --> ordered supplements and will follow greg counts

## 2023-02-08 NOTE — PROGRESS NOTES
"CLINICAL NUTRITION SERVICES - ASSESSMENT NOTE     Nutrition Prescription    RECOMMENDATIONS FOR MDs/PROVIDERS TO ORDER:  - ADAT per SLP  - Encouragement of adequate PO  - Electrolyte replacement per protocol as indicated    - Bowel regimen as appro     Malnutrition Status:    - Unable to determine due to unable to complete all parameters of NFPE    Recommendations already ordered by Registered Dietitian (RD):  - Lico counts ordered by team - will follow along  - Ordered oral nutrition supplements     Future/Additional Recommendations:  - PO/supp adequacy (lico counts ordered 2/8)  - Weight trends      REASON FOR ASSESSMENT  Adam Talamantes is a/an 82 year old male assessed by the dietitian for Admission Nutrition Risk Screen for positive - weight loss doesn't meet criteria but assessed for nutrition risk/poor PO    NUTRITION HISTORY  Assess as able. Pt with nursing cares upon visit    CURRENT NUTRITION ORDERS  Diet: Level 6: Soft & Bite-Sized Dysphagia Diet   Intake/Tolerance: poor thus far     LABS  Labs reviewed  K+ 5.3 (H)  Creatinine: 1.40 (H)  Mg++ 1.6 (L)  Phos: 2.4 (L)  Glucose: 188    MEDICATIONS  Medications reviewed  Insulin   Crestor  IVF @ 75 ml/hr     ANTHROPOMETRICS  Height: 0 cm (Data Unavailable)   Ht Readings from Last 2 Encounters:   02/01/23 1.549 m (5' 0.98\")   01/19/23 1.549 m (5' 0.98\")   61\"  Most Recent Weight:   Wt Readings from Last 8 Encounters:   02/04/23 69.6 kg (153 lb 7 oz)   01/19/23 73.7 kg (162 lb 6.4 oz)   01/12/23 76.6 kg (168 lb 14 oz)   12/01/22 73.5 kg (162 lb)   10/12/22 72.1 kg (159 lb)   09/12/22 71.7 kg (158 lb)   08/16/22 72.1 kg (159 lb)   07/14/22 72.1 kg (159 lb)   6% weight loss over ~ 2 weeks --> cannot r/o fluid status changed in short-term weight decline.       IBW: 48 kg  BMI: Overweight BMI 25-29.9    Dosing Weight: 54 kg (adjusted weight using most recent weight of 70 kg and IBW of 48 kg)    ASSESSED NUTRITION NEEDS  Estimated Energy Needs: 7720-1694 kcals/day (25 - 30 " kcals/kg)  Justification: Maintenance  Estimated Protein Needs: 65+ grams protein/day (1.2 + grams of pro/kg)  Justification: Increased needs  Estimated Fluid Needs: 2714-7297 mL/day (1 mL/kcal)   Justification: Maintenance and Per provider pending fluid status    PHYSICAL FINDINGS  See malnutrition section below.     MALNUTRITION  % Intake: Unable to assess --> suspected decreased PTA but unable to quantify   % Weight Loss: 1-2% in 1 week (moderate) --> needs further assessment  Subcutaneous Fat Loss: Unable to assess  Muscle Loss: Unable to assess  Fluid Accumulation/Edema: None noted  Malnutrition Diagnosis: Unable to determine due to unable to complete all parameters of NFPE    NUTRITION DIAGNOSIS  Inadequate oral intake related to suspected decreased appetite as evidenced by reported decreased PO PTA and at present       INTERVENTIONS  Implementation  Nutrition Education: will provided prior to discharge as indicated    Medical food supplement therapy   Lico counts ordered     Goals  Patient to consume % of nutritionally adequate meal trays TID, or the equivalent with supplements/snacks.     Monitoring/Evaluation  Progress toward goals will be monitored and evaluated per protocol.    Jhonny Linn RD, MILIND, [facility]  Neuro ICU  Pager: [phone]

## 2023-02-08 NOTE — TELEPHONE ENCOUNTER
CMT collected or printed forms from . Forms pre-filled for provider review, completion, and signature. Forms placed in provider's blue folder at  today. Thanks.

## 2023-02-08 NOTE — PROGRESS NOTES
TRANSITIONS OF CARE (VIRGEN) LOG   VIRGEN tasks should be completed by the CC within one (1) business day of notification of each transition. Follow up contact with member is required after return to their usual care setting.  Note:  If CC finds out about the transitions fifteen (15) days or more after the member has returned to their usual care setting, no VIRGEN log is needed. However, the CC should check in with the member to discuss the transition process, any changes needed to the care plan and document it in a case note.    Member Name:  Adam Talamantes MCO Name:  Ricardo MCO/Health Plan Member ID#: [id number]   Product: [employer] Care Coordinator Contact:  RICKEY Gallegos Agency/County/Care System: [facility]   Transition Communication Actions from Care Management Contact   Transition #1   Notification Date: 02/07/23 Transition Date:   02/06/23 Transition From: [facility]     Is this the member s usual care setting?               no Transition To: [facility]   Transition Type:  Planned  Reason for Admission/Comments:  Member was transferred to hospital for re-current strokes  CC contacted Hospital /discharge planner via the [employer] Virtual Transitional Care Hand-In Process, with community care plan included.  CC reached out to member regarding transition and offered support as needed.  Reviewed and update care plan as needed.  Notified community service providers and placed services PCA on hold as needed.  Transition log initiated.   PCP, Chucho Espino, notified of hospitalization via EMR.      Contact member/responsible party to offer assistance with transition Date completed: 02/07/23    Notes from conversation with the member/responsible party, provider, discharging and receiving facility (as applicable):   Date :     Shared CC contact info, care plan/services with receiving setting--Date completed: 02/07/23   Name & Title of  receiving setting contact: [facility]   Notified PCP of transition--Date completed:  02/07/23     via  EMR  Name of PCP: Chucho Espino     Transition #2   Notification Date: 2/13/2023 Transition Date:   2/13/2023 Transition From: Hospital     Is this the member s usual care setting?      No              Transition To: Home   Transition Type:    Reason for Admission/Comments: CC received notification of Hospital admission.  Hospital admission occurred on 2/6/2023 at [facility] due to concerns of stroke.    Contact member/responsible party to offer assistance with transition Date completed: 2/13/2023    Notes from conversation with the member/responsible party, provider, discharging and receiving facility (as applicable): CC contacted adult daughter Dimitri and reviewed discharge summary.  Member has a follow-up appointment with PCP: Yes: scheduled on 3/6/2023   Member has had a change in condition: Yes: Member has some residual stroke effects present still. Home Care orders were placed to assist with rehabilitative services.   Home visit needed: Yes- will put a temporary increase of PCA hours in place for 45 days. Will plan to reassess need for in home assessment in 4-6 weeks following rehabilitative in home services.   Care plan reviewed and updated.  The following home based services PCA were resumed.  New referrals placed: Yes: Therapies: PT/OT  Transition log completed.   PCP, Chucho Espino, notified of transition back to home via EMR.     Shared CC contact info, care plan/services with receiving setting--Date completed:  2/13/2023  Name & Title of home care agency:   [facility] - accepted for PT/OT  Ph: [phone]   Fax: [phone]   Notified PCP of transition--Date completed: 2/13/2023      via    Name of PCP: Chucho Espino      *RETURN TO USUAL CARE SETTING: *Complete tasks below when the member is discharging TO their usual care setting within one (1)  business day of notification..      For situations where the Care Coordinator is notified of the discharge prior to the date of discharge, the Care Coordinator must follow up with the member or designated representative to confirm that discharge actually occurred and discuss required VIRGEN tasks as outlined in the VIRGEN Instructions.  (This includes situations where it may be a  new  usual care setting for the member. (i.e., a community member who decides upon permanent nursing home placement following hospitalization and rehab).    Discuss with Member/Responsible Party:    Check  Yes  - if the member, family member and/or SNF/facility staff manages the following:    If  No  provide explanation in the comments section.          Date completed:  Communicated with member or their designated representative about the following:  care transition process; about changes to the member s health status; plan of care updates; education about transitions and how to prevent unplanned transitions/readmissions    Four Pillars for Optimal Transition:    Check  Yes  - if the member, family member and/or SNF/facility staff manages the following:    If  No  provide explanation in the comments section.          [x]  Yes     []  No Does the member have a follow-up appointment scheduled with primary care or specialist? (Mental health hospitalizations--the appt. should be w/in 7 days)              For mental health hospitalizations:  []  Yes     []  No     Does the member have a follow-up appointment scheduled with a mental health practitioner within 7 days of discharge?  [x]  Yes     []  No     Has a medication review been completed with member? If no, refer to PCP, home care nurse, MTM, pharmacist  [x]  Yes     []  No     Can the member manage their medications or is there a system in place to manage medications (e.g. home care set-up)?         [x]  Yes     []  No     Can the member verbalize warning signs and symptoms to watch for and how  to respond?  [x]  Yes     []  No     Does the member have a copy of and understand their discharge instructions?  If no, assist to obtain copy of discharge instructions, review discharge instructions, and assist to contact PCP to discuss questions about their recent hospitalization.  [x]  Yes     []  No     Does the member have adequate food, housing and transportation?  If no, add goal and discuss additional supports available to the member                                                                                                                                                                                 [x]  Yes     []  No     Is the member safe in their home?  If no, document needs and support provided                                                                                                                                                                          [x]  Yes     []  No     Are there any concerns of vulnerability, abuse, or neglect?  If yes, document concerns and actions taken by Care Coordinator as a mandated                                                                                                                                                                              [x]  Yes     []  No     Does the member use a Personal Health Care Record?  Check  Yes  if visit summary, discharge summary, and/or healthcare summary are being used as a PHR.                                                                                                                                                                                  [x]  Yes     []  No     Have you reviewed the discharge summary with the member? If  No  provide explanation in comments.  [x]  Yes     []  No     Have you updated the member s care plan/support plan? Add new diagnosis, medications, treatments, goals & interventions, as applicable. If No, provide explanation in comments.    Comments:           Notes from  conversation with the member/responsible party, provider, discharging and receiving facility (as applicable):     RICKEY Gallegos  [facility]  [phone]

## 2023-02-08 NOTE — PLAN OF CARE
"Status: [facility] for consult for basilar artery stenting. Recent stroke  Vitals: Sustained sinus tach in 120s, neuro stroke aware, 500mL bolus given, No change in HR  Neuros: Consistently oriented to self and \"hospital\", Intermittently lethargic, L side 4/5, R side 5/5, L droop, Kayce speaking, garbled, intermittent rambling speech per family. Tearful intermittently  IV: PIV x2, one infusing Heparin gtt @ 850 units/hr, other PIV SL  Labs/Electrolytes: Hep 10A to be drawn at 2000  Diet: Soft and bite sized w/ thins, takes pills whole in applesauce. Poor appetite  Bowel status: Passing gas, no BM, last BM PTA, PRN senna given  : Voiding spontaneously, no blood noted in urine; after next void, bladder scan and chart!  Skin: Bruising to BUE and to abdomen  Pain: Moderate HA managed fairly w/tylenol x2  Activity: Ax2 w/gb; pivoted to chair x1  Social: Family at bedside 24/7 to assist; very supportive and helpful.   Plan: Will continue to monitor Hep gtt, heart rate, and urine output; Awaiting plan for stenting, IR consulted    Stroke PLC completed  "

## 2023-02-08 NOTE — PROGRESS NOTES
02/08/23 1500   Appointment Info   Signing Clinician's Name / Credentials (OT) lenny barber ot/l   Living Environment   People in Home child(chanel), adult   Current Living Arrangements house   Home Accessibility stairs to enter home   Number of Stairs, Main Entrance 2   Transportation Anticipated family or friend will provide   Living Environment Comments pt recieves 24 hr assist from family and also has 4.5 hrs of PCA cares daily.   Self-Care   Usual Activity Tolerance fair   Current Activity Tolerance poor   Equipment Currently Used at Home walker, rolling;shower chair   Instrumental Activities of Daily Living (IADL)   IADL Comments per son pt has been able to participate in his basic ADL wiht family/PCA assisting as needed but often.   General Information   Referring Physician Alejandrina Sue   Patient/Family Therapy Goal Statement (OT) unsatated   Additional Occupational Profile Info/Pertinent History of Current Problem Adam LUCAS Albin is a 82 year old male with T2DM, HTN, HLD, CKD, and recent admission on 1/11/2023 for stroke and severe posterior circulation stenosis who initially presented to [facility] on 2/1/2023 with vertigo, nausea, and vomiting. He was found to have multiple scattered posterior circulation strokes. 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   Existing Precautions/Restrictions fall   General Observations and Info pt appearing confused despite  present via I-pad. pt able to at times carry on simple conversations but with mobility moving aimlessly in room   Cognitive Status Examination   Orientation Status person   Cognitive Status Comments unable to fully assess. pt endorsing too tired for mobility and requiring assist for al basic ADL in room today unclear if this is form a cognitive standpoint or not. pt once presented wiht roitine task e.g., returning to supine from EOB able to complete this from internal  initiation.   Visual Perception   Impact of Vision Impairment on Function (Vision) unable to assess   Range of Motion Comprehensive   General Range of Motion no range of motion deficits identified   Strength Comprehensive (MMT)   General Manual Muscle Testing (MMT) Assessment other (see comments)   Comment, General Manual Muscle Testing (MMT) Assessment overall deconditioning   Coordination   Coordination Comments pt with slow shuffling steps and poor balance   Bed Mobility   Bed Mobility supine-sit;sit-supine   Supine-Sit [place] (Bed Mobility) minimum assist (75% patient effort)   Sit-Supine [place] (Bed Mobility) moderate assist (50% patient effort)   Transfers   Transfers toilet transfer   Toilet Transfer   Type (Toilet Transfer) sit-stand;stand-sit   [place] Level (Toilet Transfer) moderate assist (50% patient effort)   Stand-Sit Transfer   Stand-Sit [place] (Transfers) moderate assist (50% patient effort)   Balance   Balance Assessment standing balance: dynamic   Balance Comments poor   Clinical Impression   Criteria for Skilled Therapeutic Interventions Met (OT) Yes, treatment indicated   OT Diagnosis decreased ADL I   Assessment of Occupational Performance 5 or more Performance Deficits   Identified Performance Deficits dressing, bathing, toileting, G/H, leisure, bed mobility   Planned Therapy Interventions (OT) ADL retraining;IADL retraining;cognition;bed mobility training;strengthening;ROM;transfer training;home program guidelines;progressive activity/exercise;risk factor education   Risk & Benefits of therapy have been explained evaluation/treatment results reviewed;care plan/treatment goals reviewed;risks/benefits reviewed;current/potential barriers reviewed;participants voiced agreement with care plan;participants included;patient;son   Clinical Impression Comments pt presents to OT with confusion, overall deconditioning and discoordination all leading to decreased ADL I.   OT Goals    Therapy Frequency (OT) 5 times/wk   OT Predicted Duration/Target Date for Goal Attainment 02/21/23   OT: Hygiene/Grooming modified independent;while standing   OT: Upper Body Dressing Modified independent   OT: Lower Body Dressing Modified independent   OT: Transfer Supervision/stand-by assist  (shower)   OT: Toilet Transfer/Toileting Supervision/stand-by assist;toilet transfer;cleaning and garment management

## 2023-02-08 NOTE — PLAN OF CARE
Status: Pt admitted to outside hospital for stroke work up, transferred to [facility] for possible basilar artery stenting  Vitals: HTN w/in parameters, tachycardic overnight, other vitals sable on RA  Neuros: Consistently oriented to self, intermittently oriented to place and time, confused, L side 4/5, R side 5/5, denied N/T, L droop, R tongue deviation, Kayce speaking - garbled speech per pt's daughter in law, dizziness  IV: x2 PIV, one infusing Heparin gtt @ 850 units/hr, other is SL  Labs/Electrolytes: Awaiting HepXa draw - within goal x2 and is now on am HepXa checks  Resp/trach: Lung sounds are clear  Diet: Soft and bite sized w/ thins, takes pills whole in applesauce  Bowel status: Bowel sounds are active, no BM overnight, bowel meds given  : Voiding spontaneously, no blood tinge or clots noted overnight  Skin: Bruising to BUE and to abdomen  Pain: Denied  Activity: Heavy assist x2 w/ GB  Social: Pt was calm and cooperative overnight, pt's son stayed overnight to help interpret and help get patient to participate in cares  Plan: Will continue to monitor Hep gtt and urine output, awaiting plan for stenting  Updates this shift: Had some difficulty voiding overnight due to dizziness with standing, otherwise seemed to rest well in between cares. Received a 1000 mL bolus for tachycardia in case it was due to dehydration. Minimal change to heart rate. EKG was completed     Stroke Patients:   PLC scheduled or completed: Completed  Pneumoboots in place: Yes

## 2023-02-08 NOTE — PROGRESS NOTES
[facility]    Stroke Progress Note    Interval Events  -No acute events overnight. Remained tachycardiac. Night resident gave 1L Ns and restarted home metoprolol. This morning, he complains of some dizziness on nodding head, but headache or dizziness otherwise. No clots in urine seen since yesterday. Patient has poor oral intake.       Today:  -Electrolyte replacement protocol  - Maintaince  IV NS 75cc/hr  -Calorie count  -CT urogram  -SLP switched diet to Easy to chew instead of soft n bite siz    HPI Summary  Adam Talamantes is a 82 year old male with T2DM, HTN, HLD, CKD, and recent admission on 1/11/2023 for stroke and severe posterior circulation stenosis who initially presented to [facility] on 2/1/2023 with vertigo, nausea, and vomiting. He was found to have multiple scattered posterior circulation strokes. 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     He was admitted to [facility] for stroke evaluation/management. On 2/4/2023, rapid response was activated for unresponsiveness and loss of consciousness while urinating. Repeat brain MRI showed increased stroke burden. After discussion with family, he was transferred to [facility] for consideration of basilar stenting.     On arrival, he reports feeling tired. His son notes that he has been more tired lately and does not know if it is related to his strokes or because he has not been getting any sleep. He verbally denies having pain, but his son notices that he points to his head frequently. His son notes that he has been moving all extremities spontaneously, but maybe has some reduced movements on the right. He has not notice any new neurologic symptoms in the past couple of days.    TIA Evaluation Summarized    MRI and/or Head CT CTH                                                                   IMPRESSION:  1.  No significant  interval change and multifocal subacute infarction involving the bilateral cerebellar hemispheres and left thalamus. No hemorrhagic conversion.  2.  Probable mild to moderate sequela of chronic small vessel ischemic disease and remote lacunar infarction.  3.  Mild brain parenchymal volume loss.  4.  Unchanged partial effacement of the fourth ventricle    Mri BRAIN  IMPRESSION:  1.  Significant increase in acute/subacute ischemic burden within the posterior fossa since MRI 02/01/2023 most notably involving the right greater than left cerebellar hemispheres with suspected additional right hemipontine insult. No suggestion of   acute hemorrhage.  2.  Subacute left thalamic lacunar infarct.  3.  Slight increase in inferior fourth ventricular effacement.  4.  Additional chronic intracranial findings as detailed.     Intracranial Vasculature HEAD CTA:   1. No significant change since 01/11/2023. Occlusion of the left vertebral artery V4 segment and right posterior cerebral artery.  2. 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  3. Fetal origin left posterior cerebral artery demonstrates moderate stenosis of the left P2 segment and mild multifocal stenoses of its P2 and P3 segments  4. Mild to moderate stenosis of the cavernous segment of the right internal carotid artery.  5. Widely patent anterior and middle cerebral artery branches   Cervical Vasculature NECK CTA:  1.  No significant change since 01/11/2023. Occlusion of the left vertebral artery at the level of the V3 segment.  2.  Mild atherosclerotic plaque at the carotid bifurcations. No significant carotid stenosis.  3.  Widely patent dominant right vertebral artery.     Echocardiogram Interpretation Summary 1/11     1. Left ventricular size is normal. Mild concentric increase in wall  thickness. Systolic function is normal. The estimated left ventricular  ejection fraction is 55-60%.  2. Right ventricular size and systolic function are normal.  3.  No hemodynamically significant valvular abnormalities.  4. Compared to the prior study dated 10/17/2017, there has been no significant  change.   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      Due to worsening of stroke burden, he was transferred to [facility] for consideration of basilar stenting.       Stroke work-up previously obtained include: Hgb A1C (1/12/2023) 7.6, LDL (2/1/2023) 59, echocardiogram (1/11/2023) showing LVEF 55-60%, no WMA, normal LA size.      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   -S/P NS 500cc bolus and 1L NS manitance  -Restarted pta metoprolol 25mg  "BID    #Hypomagnesia  -Electrolyte replacement protocol    #Gross hematuria  Passing intermittent clots in yellow urine, per his son.  Had one previous episode of gross hematuria several months ago. Has Hx of urolithiasis, BL pelviectasis history in the setting of CKD   -Urology consulted, appreciate recs  -CT urogram  -urine cytology   -Outpatient urology follow up for cystoscopy to further evaluate the hematuria.      #Essential HTN  - amlodipine, losartan, and isosorbide mononitrate  - Continue Metoprolol 25mg BID     #Type II diabetes mellitus  - Glargine 7u at night  - Correctional dose insulin, medium (resumed from [facility])  - Hold  PTA sitagliptin 50mg daily     #Gout: continue allopurinol     #BPH: continue tamsulosin      Prophylaxis            For VTE Prevention:  - pneumatic compression device  - On low-intensity heparin gtt     For Acid Suppression:  - On PTA pantoprazole    Nutrition:   -Easy to chew diet, Thin liquids  -Calorie count     Code Status  DNR / DNI       During initial physical assessment, the plan of care was discussed and developed with patient and child.  Plan of care includes: continued stroke management, evaluation for basilar artery stent.     Patient was admitted via transfer from [facility].      The patient was discussed with SStroke Staff Dr. Isaac Alvarado MD  Neurology Resident  [id number]  ______________________________________________________    Clinically Significant Risk Factors           # Hypercalcemia: Highest Ca = 10.2 mg/dL in last 2 days, will monitor as appropriate  # Hypomagnesemia: Lowest Mg = 1.6 mg/dL in last 2 days, will replace as needed            # DMII: A1C = 9.4 % (Ref range: <5.7 %) within past 3 months, PRESENT ON ADMISSION  # Overweight: Estimated body mass index is 29.01 kg/m  as calculated from the following:    Height as of 2/1/23: 1.549 m (5' 0.98\").    Weight as of 2/4/23: 69.6 kg (153 lb 7 oz)., PRESENT ON ADMISSION         "   Medications   Scheduled Meds    allopurinol  200 mg Oral Daily     aspirin  81 mg Oral Daily     DULoxetine  20 mg Oral BID     insulin aspart  1-7 Units Subcutaneous TID AC     insulin aspart  1-5 Units Subcutaneous At Bedtime     insulin glargine  7 Units Subcutaneous Q24H     metoprolol tartrate  25 mg Oral BID     pantoprazole  40 mg Oral QAM AC     rosuvastatin  20 mg Oral At Bedtime     sodium chloride (PF)  3 mL Intracatheter Q8H     tamsulosin  0.4 mg Oral Daily       Infusion Meds    heparin 850 Units/hr (02/08/23 0550)     - MEDICATION INSTRUCTIONS -       - MEDICATION INSTRUCTIONS -         PRN Meds  acetaminophen, glucose **OR** dextrose **OR** glucagon, lidocaine 4%, lidocaine (buffered or not buffered), - MEDICATION INSTRUCTIONS -, - MEDICATION INSTRUCTIONS -, ondansetron **OR** ondansetron, senna-docusate, sodium chloride (PF)       PHYSICAL EXAMINATION  Temp:  [97.4  F (36.3  C)-98.8  F (37.1  C)] 98.8  F (37.1  C)  Pulse:  [] 121  Resp:  [16-20] 20  BP: (138-164)/() 157/96  SpO2:  [96 %-99 %] 96 %        Neurologic Exam   Mental Status:   Sitting in chair,  eyes opened to voice, States his name, age, month correctly, States year wrong, Hospital and city as  [place]. Knows that he is in the hospital. Follows simple commands such sticking out tongue and showing thumbs up. Couldn't follow complex commands correctly.  Naming intact. Speech sounds fluent.  Cranial Nerves:  EOMI. Visual fields intact. No nystagmus noted. Pupils are equal, round, reactive. Mild nasolabial flattening on the right. Hearing intact to conversation. Strong shoulder shrug. Facial sensation equal on both sides.    Motor:  Normal tone, No tremors noted, Full strength and moving all extremities spontaneously.  Reflexes: Brisk symmetrical reflexes,  no clonus, no Oliveira sign , no cross-abductor, Toes mute on R and upgoing on L.   Sensory:  light touch sensation intact and symmetric throughout upper and lower  extremities, no extinction on double simultaneous stimulation   Coordination:  FNF intact without dysmetria  Station/Gait:  Deferred    Stroke Scales  Will reassess to discharge. .     Modified Rush Score (Pre-morbid)  4 - Moderately severe disability.  Unable to attend to own bodily needs without assistance or unable to walk unassisted.    Imaging  I personally reviewed all imaging; relevant findings per HPI.     Lab Results Data   CBC  Recent Labs   Lab 02/08/23  0610 02/07/23  0627 02/05/23  0839 02/04/23  1826   WBC 9.7 10.3  --  10.8   RBC 4.32* 4.58  --  4.21*   HGB 13.2* 13.9  --  13.0*   HCT 39.6* 42.1  --  38.8*    215 209 208     Basic Metabolic Panel    Recent Labs   Lab 02/08/23  0610 02/08/23  0218 02/07/23  2142 02/07/23  0751 02/07/23  0627 02/05/23  0846 02/05/23  0839 02/04/23  1640 02/04/23  1538     --   --   --  137  --   --   --  135*   POTASSIUM 4.0  --   --   --  4.0  --   --   --  4.0   CHLORIDE 102  --   --   --  101  --   --   --  97*   CO2 20*  --   --   --  22  --   --   --  20*   BUN 18.2  --   --   --  19.5  --   --   --  35.7*   CR 1.40*  --   --   --  1.36*  --  1.27*  --  1.69*   * 195* 196*   < > 163*   < >  --    < > 256*   GERALDINE 9.5  --   --   --  10.2  --   --   --  9.9    < > = values in this interval not displayed.     Liver Panel  Recent Labs   Lab 02/02/23  0709 02/01/23  1155   PROTTOTAL 8.3 8.3   ALBUMIN 4.2 4.3   BILITOTAL 0.4 0.4   ALKPHOS 75 92   AST 30 34   ALT 37 38     INR  No lab results found.   Lipid Profile    Recent Labs   Lab Test 02/07/23  0627 02/01/23  1155 01/11/23  0421   CHOL 151 173 228*   HDL 40 35* 40   LDL 61 59 124*   TRIG 252* 394* 320*     A1C    Recent Labs   Lab Test 02/07/23  0627 01/12/23  2006 01/11/23  0421   A1C 9.4* 7.6* 7.6*     Troponin    Recent Labs   Lab 02/01/23  1155   CTROPT 8          Data

## 2023-02-09 ENCOUNTER — APPOINTMENT (OUTPATIENT)
Dept: CT IMAGING | Facility: CLINIC | Age: 83
DRG: 065 | End: 2023-02-09
Attending: PSYCHIATRY & NEUROLOGY
Payer: COMMERCIAL

## 2023-02-09 ENCOUNTER — APPOINTMENT (OUTPATIENT)
Dept: MRI IMAGING | Facility: CLINIC | Age: 83
DRG: 065 | End: 2023-02-09
Attending: PSYCHIATRY & NEUROLOGY
Payer: COMMERCIAL

## 2023-02-09 ENCOUNTER — APPOINTMENT (OUTPATIENT)
Dept: PHYSICAL THERAPY | Facility: CLINIC | Age: 83
DRG: 065 | End: 2023-02-09
Attending: PSYCHIATRY & NEUROLOGY
Payer: COMMERCIAL

## 2023-02-09 ENCOUNTER — APPOINTMENT (OUTPATIENT)
Dept: OCCUPATIONAL THERAPY | Facility: CLINIC | Age: 83
DRG: 065 | End: 2023-02-09
Attending: PSYCHIATRY & NEUROLOGY
Payer: COMMERCIAL

## 2023-02-09 LAB
ANION GAP SERPL CALCULATED.3IONS-SCNC: 12 MMOL/L (ref 7–15)
BUN SERPL-MCNC: 18.4 MG/DL (ref 8–23)
CALCIUM SERPL-MCNC: 9.3 MG/DL (ref 8.8–10.2)
CHLORIDE SERPL-SCNC: 104 MMOL/L (ref 98–107)
CREAT SERPL-MCNC: 1.42 MG/DL (ref 0.67–1.17)
DEPRECATED HCO3 PLAS-SCNC: 22 MMOL/L (ref 22–29)
ERYTHROCYTE [DISTWIDTH] IN BLOOD BY AUTOMATED COUNT: 14.1 % (ref 10–15)
GFR SERPL CREATININE-BSD FRML MDRD: 50 ML/MIN/1.73M2
GLUCOSE BLDC GLUCOMTR-MCNC: 153 MG/DL (ref 70–99)
GLUCOSE BLDC GLUCOMTR-MCNC: 169 MG/DL (ref 70–99)
GLUCOSE BLDC GLUCOMTR-MCNC: 176 MG/DL (ref 70–99)
GLUCOSE BLDC GLUCOMTR-MCNC: 181 MG/DL (ref 70–99)
GLUCOSE SERPL-MCNC: 151 MG/DL (ref 70–99)
HCT VFR BLD AUTO: 37.2 % (ref 40–53)
HGB BLD-MCNC: 12.1 G/DL (ref 13.3–17.7)
MAGNESIUM SERPL-MCNC: 1.6 MG/DL (ref 1.7–2.3)
MCH RBC QN AUTO: 30.3 PG (ref 26.5–33)
MCHC RBC AUTO-ENTMCNC: 32.5 G/DL (ref 31.5–36.5)
MCV RBC AUTO: 93 FL (ref 78–100)
PHOSPHATE SERPL-MCNC: 2.6 MG/DL (ref 2.5–4.5)
PLATELET # BLD AUTO: 208 10E3/UL (ref 150–450)
POTASSIUM SERPL-SCNC: 3.8 MMOL/L (ref 3.4–5.3)
RBC # BLD AUTO: 4 10E6/UL (ref 4.4–5.9)
SODIUM SERPL-SCNC: 138 MMOL/L (ref 136–145)
UFH PPP CHRO-ACNC: 0.36 IU/ML
WBC # BLD AUTO: 9.3 10E3/UL (ref 4–11)

## 2023-02-09 PROCEDURE — 70551 MRI BRAIN STEM W/O DYE: CPT

## 2023-02-09 PROCEDURE — 70450 CT HEAD/BRAIN W/O DYE: CPT

## 2023-02-09 PROCEDURE — 70498 CT ANGIOGRAPHY NECK: CPT

## 2023-02-09 PROCEDURE — 70496 CT ANGIOGRAPHY HEAD: CPT | Mod: 26 | Performed by: RADIOLOGY

## 2023-02-09 PROCEDURE — 97535 SELF CARE MNGMENT TRAINING: CPT | Mod: GO

## 2023-02-09 PROCEDURE — 70450 CT HEAD/BRAIN W/O DYE: CPT | Mod: 26 | Performed by: RADIOLOGY

## 2023-02-09 PROCEDURE — 36415 COLL VENOUS BLD VENIPUNCTURE: CPT | Performed by: STUDENT IN AN ORGANIZED HEALTH CARE EDUCATION/TRAINING PROGRAM

## 2023-02-09 PROCEDURE — 120N000002 HC R&B MED SURG/OB UMMC

## 2023-02-09 PROCEDURE — 70498 CT ANGIOGRAPHY NECK: CPT | Mod: 26 | Performed by: RADIOLOGY

## 2023-02-09 PROCEDURE — 250N000013 HC RX MED GY IP 250 OP 250 PS 637

## 2023-02-09 PROCEDURE — 70496 CT ANGIOGRAPHY HEAD: CPT

## 2023-02-09 PROCEDURE — 97530 THERAPEUTIC ACTIVITIES: CPT | Mod: GP

## 2023-02-09 PROCEDURE — 85520 HEPARIN ASSAY: CPT | Performed by: STUDENT IN AN ORGANIZED HEALTH CARE EDUCATION/TRAINING PROGRAM

## 2023-02-09 PROCEDURE — 999N000128 HC STATISTIC PERIPHERAL IV START W/O US GUIDANCE

## 2023-02-09 PROCEDURE — 70551 MRI BRAIN STEM W/O DYE: CPT | Mod: 26 | Performed by: RADIOLOGY

## 2023-02-09 PROCEDURE — 97161 PT EVAL LOW COMPLEX 20 MIN: CPT | Mod: GP

## 2023-02-09 PROCEDURE — 83735 ASSAY OF MAGNESIUM: CPT

## 2023-02-09 PROCEDURE — 97530 THERAPEUTIC ACTIVITIES: CPT | Mod: GO

## 2023-02-09 PROCEDURE — 80048 BASIC METABOLIC PNL TOTAL CA: CPT | Performed by: STUDENT IN AN ORGANIZED HEALTH CARE EDUCATION/TRAINING PROGRAM

## 2023-02-09 PROCEDURE — 250N000011 HC RX IP 250 OP 636: Performed by: STUDENT IN AN ORGANIZED HEALTH CARE EDUCATION/TRAINING PROGRAM

## 2023-02-09 PROCEDURE — 85027 COMPLETE CBC AUTOMATED: CPT | Performed by: STUDENT IN AN ORGANIZED HEALTH CARE EDUCATION/TRAINING PROGRAM

## 2023-02-09 PROCEDURE — 84100 ASSAY OF PHOSPHORUS: CPT | Performed by: STUDENT IN AN ORGANIZED HEALTH CARE EDUCATION/TRAINING PROGRAM

## 2023-02-09 PROCEDURE — 250N000013 HC RX MED GY IP 250 OP 250 PS 637: Performed by: STUDENT IN AN ORGANIZED HEALTH CARE EDUCATION/TRAINING PROGRAM

## 2023-02-09 RX ORDER — IOPAMIDOL 755 MG/ML
75 INJECTION, SOLUTION INTRAVASCULAR ONCE
Status: COMPLETED | OUTPATIENT
Start: 2023-02-09 | End: 2023-02-09

## 2023-02-09 RX ADMIN — INSULIN ASPART 1 UNITS: 100 INJECTION, SOLUTION INTRAVENOUS; SUBCUTANEOUS at 17:03

## 2023-02-09 RX ADMIN — INSULIN ASPART 1 UNITS: 100 INJECTION, SOLUTION INTRAVENOUS; SUBCUTANEOUS at 09:21

## 2023-02-09 RX ADMIN — METOPROLOL TARTRATE 25 MG: 25 TABLET, FILM COATED ORAL at 11:00

## 2023-02-09 RX ADMIN — Medication 12.5 MG: at 22:40

## 2023-02-09 RX ADMIN — DULOXETINE HYDROCHLORIDE 20 MG: 20 CAPSULE, DELAYED RELEASE ORAL at 19:55

## 2023-02-09 RX ADMIN — METOPROLOL TARTRATE 25 MG: 25 TABLET, FILM COATED ORAL at 19:55

## 2023-02-09 RX ADMIN — PANTOPRAZOLE SODIUM 40 MG: 40 TABLET, DELAYED RELEASE ORAL at 11:00

## 2023-02-09 RX ADMIN — Medication 3 MG: at 22:40

## 2023-02-09 RX ADMIN — INSULIN GLARGINE 7 UNITS: 100 INJECTION, SOLUTION SUBCUTANEOUS at 22:40

## 2023-02-09 RX ADMIN — ASPIRIN 81 MG: 81 TABLET ORAL at 11:00

## 2023-02-09 RX ADMIN — ALLOPURINOL 200 MG: 100 TABLET ORAL at 10:59

## 2023-02-09 RX ADMIN — DULOXETINE HYDROCHLORIDE 20 MG: 20 CAPSULE, DELAYED RELEASE ORAL at 11:00

## 2023-02-09 RX ADMIN — Medication 12.5 MG: at 03:16

## 2023-02-09 RX ADMIN — INSULIN ASPART 1 UNITS: 100 INJECTION, SOLUTION INTRAVENOUS; SUBCUTANEOUS at 12:46

## 2023-02-09 RX ADMIN — ACETAMINOPHEN 650 MG: 325 TABLET ORAL at 17:03

## 2023-02-09 RX ADMIN — TAMSULOSIN HYDROCHLORIDE 0.4 MG: 0.4 CAPSULE ORAL at 10:48

## 2023-02-09 RX ADMIN — ROSUVASTATIN CALCIUM 20 MG: 10 TABLET, FILM COATED ORAL at 22:40

## 2023-02-09 RX ADMIN — IOPAMIDOL 75 ML: 755 INJECTION, SOLUTION INTRAVENOUS at 18:09

## 2023-02-09 ASSESSMENT — ACTIVITIES OF DAILY LIVING (ADL)
ADLS_ACUITY_SCORE: 34

## 2023-02-09 NOTE — PLAN OF CARE
Status: Pt admitted to outside hospital for stroke work up, transferred to [facility] for possible basilar artery stenting  Vitals: HTN w/in parameters, tachycardic 106, other vitals sable on RA  Neuros: Consistently oriented to self, intermittently oriented to place and time, confused, L side 4/5, R side 5/5, denied N/T, Sl L droop, R tongue deviation, Kayce speaking - garbled speech per pt's son , dizziness  IV: x2 PIV, one infusing Heparin gtt @ 850 units/hr, other is SL  Labs/Electrolytes: Awaiting HepXa draw - within goal x2 and is now on am HepXa checks. Phos replaced yest. Redraw this AM  Resp/trach: Lung sounds are clear  Diet: advanced to level 7 w/ thins, takes pills whole in applesauce  Bowel status: Bowel sounds are active, no BM this shift, passing gas  : Voiding spontaneously, no blood tinge or clots noted   Skin: Bruising to BUE and to abdomen  Pain: Denied  Activity: Heavy assist x2 w/ GB  Social: Pt's son stayed overnight to help interpret and help get patient to participate in cares  Plan: Will continue to monitor Hep gtt and urine output, awaiting plan for stent.  Pt was very restless overnight pulling at lines, trying to get out of bed. Son unable to keep him safe so sitter was added at 0100. Trialed melatonin with no improvement. Seroquel was somewhat effective.

## 2023-02-09 NOTE — PROGRESS NOTES
Brief Care Coordination Note    Per chart review, patient was previously referred to [facility] for home health services. Call placed to [facility], they have an open referral/are able to accept for home health PT/OT should patient require these services at discharge. They state RN can also be added if needed.     OT currently recommends home with home health vs TCU. PT evaluation is pending, but description from yesterday indicates TCU.     Will need to keep [facility] updated on discharge plan and add home care orders once plan is determined if returning home.     [facility] - accepted for PT/OT  Ph: [phone]   Fax: [phone]    Florencia Hussein RN, BSN  6A RN Care Coordinator  Ph: [phone]   Pager: [phone]

## 2023-02-09 NOTE — PLAN OF CARE
Status: Pt admitted to outside hospital for stroke work up, transferred to [facility] for possible basilar artery stenting  Vitals: HTN w/in parameters, tachycardic , other vitals sable on RA  Neuros: Consistently oriented to self, intermittently oriented to place and time, confused, L side 4/5, R side 5/5, denied N/T, Sl L droop, R tongue deviation, Kayce speaking - garbled speech per pt's son , dizziness  IV: x1 PIV, one infusing Heparin gtt @ 850 units/hr, other is NA@ 75cc/hr.  Labs/Electrolytes: HepXa draw WNL- now on am HepXa checks  Resp/trach: Lung sounds are clear  Diet: advanced to level 7 w/ thins, takes pills whole in applesauce  Bowel status: Bowel sounds are active, BM 2/8, passing gas  : Voiding spontaneously, no blood tinge or clots noted   Skin: Bruising to BUE and to abdomen  Pain: C/O HA tylenol given x1.  Activity: Heavy assist x2 w/ GB, walker  Social: Pt was calm and cooperative , pt's son stayed overnight to help interpret and help get patient to participate in cares  Plan: Will continue to monitor Hep gtt and urine output, awaiting plan for stent, MRI complete, CT of head complete.

## 2023-02-09 NOTE — PROGRESS NOTES
"   02/09/23 1330   Appointment Info   Signing Clinician's Name / Credentials (PT) Frannie Robledo PT, DPT       Present yes  (Ipad)   Language Kayce   Living Environment   People in Home child(chanel), adult   Current Living Arrangements house   Home Accessibility stairs to enter home   Number of Stairs, Main Entrance 2   Stair Railings, Main Entrance none   Transportation Anticipated family or friend will provide   Living Environment Comments Pt lives with dtr, single story house with basement. Dtr home 24/7, has 4.5 hr PCA for dressing, bathing, and meals. Otherwise per son pt would \"take care of himself\"   Self-Care   Usual Activity Tolerance fair   Current Activity Tolerance poor   Regular Exercise No   Equipment Currently Used at Home walker, rolling;shower chair;cane, straight   Fall history within last six months no   Activity/Exercise/Self-Care Comment Per Son's report, pt was mod I with walker vs occasionally cane in the house.   General Information   Onset of Illness/Injury or Date of Surgery 02/06/23   Referring Physician Alejandrina Sue MD   Patient/Family Therapy Goals Statement (PT) none stated   Pertinent History of Current Problem (include personal factors and/or comorbidities that impact the POC) per EMR \"Adam LUCAS Albin is a 82 year old male with T2DM, HTN, HLD, CKD, and recent admission on 1/11/2023 for stroke and severe posterior circulation stenosis who initially presented to [facility] on 2/1/2023 with vertigo, nausea, and vomiting. He was found to have multiple scattered posterior circulation strokes. CTA at that time showed left vertebral artery occlusion, right vertebral/basilar stenosis-near occlusion.\"   Existing Precautions/Restrictions fall   General Observations Activity: Up with assist   Cognition   Affect/Mental Status (Cognition) unable/difficult to assess;low arousal/lethargic   Cognitive Status Comments Difficulty keeping eyes open   Pain Assessment   Patient " "Currently in Pain No   Integumentary/Edema   Integumentary/Edema Comments scattered bruising   Posture    Posture Forward head position   Range of Motion (ROM)   ROM Comment B LE WFL with mobility/screen   Strength (Manual Muscle Testing)   Strength Comments difficulty following commands for MMT, Generalized weakness all exremities, able to lift LEs against gravity.   Bed Mobility   Comment, (Bed Mobility) mod A x1 for LEs   Transfers   Comment, (Transfers) sit > stand mod A x1 from recliner   Gait/Stairs (Locomotion)   Comment, (Gait/Stairs) short distance recliner > bed with min A x1, CGA of second for wlaker management at times., slow shuffled steps   Balance   Balance Comments impaired, needs 1-2 person assist, 1 LOB sitting unsupproted.   Sensory Examination   Sensory Perception Comments difficult to assess, light touch intact B, pt states \"same\" on both sides.   Coordination   Coordination Comments slowed movements at times, difficulty to assess 2/2 pt lethargic   Clinical Impression   Criteria for Skilled Therapeutic Intervention Yes, treatment indicated   PT Diagnosis (PT) impaired functional mobility   Influenced by the following impairments impaired strength, balance, dizziness, pain, impaired enduance   Functional limitations due to impairments bed mobility, transfers, gait, stairs, fall risk   Clinical Presentation (PT Evaluation Complexity) Evolving/Changing   Clinical Presentation Rationale clinical judgement   Clinical Decision Making (Complexity) low complexity   Planned Therapy Interventions (PT) balance training;bed mobility training;gait training;home exercise program;motor coordination training;neuromuscular re-education;patient/family education;ROM (range of motion);stair training;strengthening;transfer training;wheelchair management/propulsion training;progressive activity/exercise;risk factor education;home program guidelines   Risk & Benefits of therapy have been explained " evaluation/treatment results reviewed;care plan/treatment goals reviewed;risks/benefits reviewed;current/potential barriers reviewed;participants voiced agreement with care plan;participants included;patient;son   PT Total Evaluation Time   PT Eval, Low Complexity Minutes (98600) 10   Physical Therapy Goals   PT Frequency 5x/week   PT Predicted Duration/Target Date for Goal Attainment 02/28/23   PT Goals Bed Mobility;Transfers;Gait;Stairs   PT: Bed Mobility Independent   PT: Transfers Modified independent;Sit to/from stand;Bed to/from chair;Assistive device   PT: Gait Supervision/stand-by assist;Assistive device;100 feet   PT: Stairs Minimal assist;2 stairs   PT Discharge Planning   PT Plan transfers, progress gait with wc follow, stiars when able/appropriate   PT Discharge Recommendation (DC Rec) Transitional Care Facility   PT Rationale for DC Rec Pt below baseline, currently requiring Ax1-2 for all mobility and increased risk for falls. Limited tolerance for therapy today due to dizziness/fatigue. Pt with recent admission as well and difficulty mobilizing at home following dc. Would rcurrentlyr ecommend TCU 2/2 needing increased support. pending progress with IP PT, pt may progress to dc home with 24/7 family support and continuation of PCA services. Would rec HHPT at that point.   PT Brief overview of current status Ax2 pivot transfers.   Total Session Time   Total Session Time (sum of timed and untimed services) 10

## 2023-02-09 NOTE — PROGRESS NOTES
[employer]    Stroke Progress Note    Interval Events  Overnight, he was agitated, tried to pull out lines and get out of bed. Night resident ordered melatonin, which did not help. He was given Seroquel then. This morning, he was sleepy as he was given seroquel few hours before being seen. During rounds, he was awake and cooperative with exam. No new concerns shared this morning.       Today:  -MRI brain w/o contrast to assess for new infarcts and indication for stenting  -CTA head and neck   -Urine cytology  -Seroquel prn for agitation  -delirium precautions      HPI Summary  Adam Talamantes is a 82 year old male with T2DM, HTN, HLD, CKD, and recent admission on 1/11/2023 for stroke and severe posterior circulation stenosis who initially presented to [facility] on 2/1/2023 with vertigo, nausea, and vomiting. He was found to have multiple scattered posterior circulation strokes. 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     He was admitted to [facility] for stroke evaluation/management. On 2/4/2023, rapid response was activated for unresponsiveness and loss of consciousness while urinating. Repeat brain MRI showed increased stroke burden. After discussion with family, he was transferred to [facility] for consideration of basilar stenting.     On arrival, he reports feeling tired. His son notes that he has been more tired lately and does not know if it is related to his strokes or because he has not been getting any sleep. He verbally denies having pain, but his son notices that he points to his head frequently. His son notes that he has been moving all extremities spontaneously, but maybe has some reduced movements on the right. He has not notice any new neurologic symptoms in the past couple of days.    TIA Evaluation Summarized    MRI and/or Head CT CTH                                                                    IMPRESSION:  1.  No significant interval change and multifocal subacute infarction involving the bilateral cerebellar hemispheres and left thalamus. No hemorrhagic conversion.  2.  Probable mild to moderate sequela of chronic small vessel ischemic disease and remote lacunar infarction.  3.  Mild brain parenchymal volume loss.  4.  Unchanged partial effacement of the fourth ventricle    Mri BRAIN  IMPRESSION:  1.  Significant increase in acute/subacute ischemic burden within the posterior fossa since MRI 02/01/2023 most notably involving the right greater than left cerebellar hemispheres with suspected additional right hemipontine insult. No suggestion of   acute hemorrhage.  2.  Subacute left thalamic lacunar infarct.  3.  Slight increase in inferior fourth ventricular effacement.  4.  Additional chronic intracranial findings as detailed.     Intracranial Vasculature HEAD CTA:   1. No significant change since 01/11/2023. Occlusion of the left vertebral artery V4 segment and right posterior cerebral artery.  2. 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  3. Fetal origin left posterior cerebral artery demonstrates moderate stenosis of the left P2 segment and mild multifocal stenoses of its P2 and P3 segments  4. Mild to moderate stenosis of the cavernous segment of the right internal carotid artery.  5. Widely patent anterior and middle cerebral artery branches   Cervical Vasculature NECK CTA:  1.  No significant change since 01/11/2023. Occlusion of the left vertebral artery at the level of the V3 segment.  2.  Mild atherosclerotic plaque at the carotid bifurcations. No significant carotid stenosis.  3.  Widely patent dominant right vertebral artery.     Echocardiogram Interpretation Summary 1/11     1. Left ventricular size is normal. Mild concentric increase in wall  thickness. Systolic function is normal. The estimated left ventricular  ejection fraction is  55-60%.  2. Right ventricular size and systolic function are normal.  3. No hemodynamically significant valvular abnormalities.  4. Compared to the prior study dated 10/17/2017, there has been no significant  change.   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      Due to worsening of stroke burden, he was transferred to [facility] for consideration of basilar stenting.       Stroke work-up previously obtained include: Hgb A1C (1/12/2023) 7.6, LDL (2/1/2023) 59, echocardiogram (1/11/2023) showing LVEF 55-60%, no WMA, normal LA size.      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   -S/P NS 500cc bolus and 1L NS manitance  -Restarted pta metoprolol 25mg BID    #Hypomagnesia  -Electrolyte replacement protocol    #Gross hematuria  Passing intermittent clots in yellow urine, per his son.  Had one previous episode of gross hematuria several months ago. Has Hx of urolithiasis, BL pelviectasis history in the setting of CKD   -Urology consulted, appreciate recs  -CT urogram was ordered but radiology only did CT a/P w/o contrast, Consider reimaging outpatient   -urine cytology   -Outpatient urology follow up for cystoscopy to further evaluate the hematuria.     #CKD  Continue to monitor BMP     #Essential HTN  - amlodipine, losartan, and isosorbide mononitrate  - Continue Metoprolol 25mg BID     #Type II diabetes mellitus  - Glargine 7u at night  - Correctional dose insulin, medium (resumed from [facility])  - Hold  PTA sitagliptin 50mg daily     #Gout: continue allopurinol     #BPH: continue tamsulosin      Prophylaxis            For VTE Prevention:  - pneumatic compression device  - On low-intensity heparin gtt     For Acid Suppression:  - On PTA pantoprazole    Nutrition:   -Easy to chew diet, Thin liquids  -Calorie count     Code Status  DNR / DNI       During initial physical assessment, the plan of care was discussed and developed with patient and child.  Plan of care includes: continued stroke management, evaluation for basilar artery stent.     Patient was admitted via transfer from [facility].      The patient was discussed with SStroke Staff Dr. Isaac Alvarado MD  Neurology Resident  [id number]  ______________________________________________________    Clinically Significant Risk Factors            # Hypomagnesemia: Lowest Mg = 1.6 mg/dL in last 2 days, will replace as needed            # DMII: A1C = 9.4 % (Ref range: <5.7 %) within past 3 months, PRESENT ON ADMISSION  # Overweight: Estimated body mass index is 29.01  "kg/m  as calculated from the following:    Height as of 2/1/23: 1.549 m (5' 0.98\").    Weight as of 2/4/23: 69.6 kg (153 lb 7 oz)., PRESENT ON ADMISSION           Medications   Scheduled Meds    allopurinol  200 mg Oral Daily     aspirin  81 mg Oral Daily     DULoxetine  20 mg Oral BID     insulin aspart  1-7 Units Subcutaneous TID AC     insulin aspart  1-5 Units Subcutaneous At Bedtime     insulin glargine  7 Units Subcutaneous Q24H     melatonin  3 mg Oral At Bedtime     metoprolol tartrate  25 mg Oral BID     pantoprazole  40 mg Oral QAM AC     rosuvastatin  20 mg Oral At Bedtime     sodium chloride (PF)  3 mL Intracatheter Q8H     tamsulosin  0.4 mg Oral Daily       Infusion Meds    heparin 850 Units/hr (02/09/23 1539)     - MEDICATION INSTRUCTIONS -       - MEDICATION INSTRUCTIONS -       sodium chloride 75 mL/hr at 02/09/23 1245       PRN Meds  acetaminophen, glucose **OR** dextrose **OR** glucagon, hydrALAZINE **OR** labetalol, lidocaine 4%, lidocaine (buffered or not buffered), - MEDICATION INSTRUCTIONS -, - MEDICATION INSTRUCTIONS -, ondansetron **OR** ondansetron, QUEtiapine, senna-docusate, sodium chloride (PF)       PHYSICAL EXAMINATION  Temp:  [98.2  F (36.8  C)-98.8  F (37.1  C)] 98.2  F (36.8  C)  Pulse:  [] 79  Resp:  [16-20] 16  BP: (125-149)/(74-97) 125/74  SpO2:  [96 %-98 %] 96 %        Neurologic Exam   Mental Status:   Laying in bed,  eyes opened to voice, oriented to person but not time and place. Follows simple commands such sticking out tongue and showing thumbs up. Attempts follow complex commands/cross body.  Naming intact. Speech sounds fluent.  Cranial Nerves:  EOMI. Visual fields intact. No nystagmus noted. Pupils are equal, round, reactive. Mild nasolabial flattening on the right. Hearing intact to conversation. Strong shoulder shrug. Facial sensation equal on both sides.    Motor:  Normal tone, No tremors noted, Full strength and moving all extremities spontaneously.  Reflexes: " Brisk symmetrical reflexes,  no clonus, no Oliveira sign , no cross-abductor, Toes mute on R and upgoing on L.   Sensory:  light touch sensation intact and symmetric throughout upper and lower extremities, no extinction on double simultaneous stimulation   Coordination:  FNF intact without dysmetria  Station/Gait:  Deferred    Stroke Scales  Will reassess to discharge. .     Modified Humphrey Score (Pre-morbid)  4 - Moderately severe disability.  Unable to attend to own bodily needs without assistance or unable to walk unassisted.    Imaging  I personally reviewed all imaging; relevant findings per HPI.     Lab Results Data   CBC  Recent Labs   Lab 02/09/23  0734 02/08/23  0610 02/07/23  0627   WBC 9.3 9.7 10.3   RBC 4.00* 4.32* 4.58   HGB 12.1* 13.2* 13.9   HCT 37.2* 39.6* 42.1    209 215     Basic Metabolic Panel    Recent Labs   Lab 02/09/23  1549 02/09/23  1118 02/09/23  0752 02/09/23  0734 02/08/23  1248 02/08/23  0610 02/07/23  0751 02/07/23  0627   NA  --   --   --  138  --  136  --  137   POTASSIUM  --   --   --  3.8  --  4.0  --  4.0   CHLORIDE  --   --   --  104  --  102  --  101   CO2  --   --   --  22  --  20*  --  22   BUN  --   --   --  18.4  --  18.2  --  19.5   CR  --   --   --  1.42*  --  1.40*  --  1.36*   * 169* 153* 151*   < > 188*   < > 163*   GERALDINE  --   --   --  9.3  --  9.5  --  10.2    < > = values in this interval not displayed.     Liver Panel  No results for input(s): PROTTOTAL, ALBUMIN, BILITOTAL, ALKPHOS, AST, ALT, BILIDIRECT in the last 168 hours.  INR  No lab results found.   Lipid Profile    Recent Labs   Lab Test 02/07/23  0627 02/01/23  1155 01/11/23  0421   CHOL 151 173 228*   HDL 40 35* 40   LDL 61 59 124*   TRIG 252* 394* 320*     A1C    Recent Labs   Lab Test 02/07/23  0627 01/12/23 2006 01/11/23  0421   A1C 9.4* 7.6* 7.6*     Troponin    No results for input(s): CTROPT, TROPONINIS, TROPONINI, GHTROP in the last 168 hours.       Data

## 2023-02-09 NOTE — PLAN OF CARE
Status: Pt admitted to outside hospital for stroke work up, transferred to [facility] for possible basilar artery stenting  Vitals: HTN w/in parameters, tachycardic 106, other vitals sable on RA  Neuros: Consistently oriented to self, intermittently oriented to place and time, confused, L side 4/5, R side 5/5, denied N/T, Sl L droop, R tongue deviation, Kayce speaking - garbled speech per pt's son , dizziness  IV: x2 PIV, one infusing Heparin gtt @ 850 units/hr, other is SL  Labs/Electrolytes: Awaiting HepXa draw - within goal x2 and is now on am HepXa checks  Resp/trach: Lung sounds are clear  Diet: advanced to level 7 w/ thins, takes pills whole in applesauce  Bowel status: Bowel sounds are active, no BM this shift, passing gas  : Voiding spontaneously, no blood tinge or clots noted   Skin: Bruising to BUE and to abdomen  Pain: Denied  Activity: Heavy assist x2 w/ GB  Social: Pt was calm and cooperative , pt's son stayed overnight to help interpret and help get patient to participate in cares  Plan: Will continue to monitor Hep gtt and urine output, awaiting plan for stent,

## 2023-02-10 ENCOUNTER — APPOINTMENT (OUTPATIENT)
Dept: SPEECH THERAPY | Facility: CLINIC | Age: 83
DRG: 065 | End: 2023-02-10
Attending: PSYCHIATRY & NEUROLOGY
Payer: COMMERCIAL

## 2023-02-10 ENCOUNTER — APPOINTMENT (OUTPATIENT)
Dept: PHYSICAL THERAPY | Facility: CLINIC | Age: 83
DRG: 065 | End: 2023-02-10
Attending: PSYCHIATRY & NEUROLOGY
Payer: COMMERCIAL

## 2023-02-10 ENCOUNTER — APPOINTMENT (OUTPATIENT)
Dept: OCCUPATIONAL THERAPY | Facility: CLINIC | Age: 83
DRG: 065 | End: 2023-02-10
Attending: PSYCHIATRY & NEUROLOGY
Payer: COMMERCIAL

## 2023-02-10 LAB
ANION GAP SERPL CALCULATED.3IONS-SCNC: 9 MMOL/L (ref 7–15)
BUN SERPL-MCNC: 13.5 MG/DL (ref 8–23)
CALCIUM SERPL-MCNC: 9.2 MG/DL (ref 8.8–10.2)
CHLORIDE SERPL-SCNC: 107 MMOL/L (ref 98–107)
CREAT SERPL-MCNC: 1.25 MG/DL (ref 0.67–1.17)
DEPRECATED HCO3 PLAS-SCNC: 21 MMOL/L (ref 22–29)
ERYTHROCYTE [DISTWIDTH] IN BLOOD BY AUTOMATED COUNT: 14.2 % (ref 10–15)
GFR SERPL CREATININE-BSD FRML MDRD: 58 ML/MIN/1.73M2
GLUCOSE BLDC GLUCOMTR-MCNC: 110 MG/DL (ref 70–99)
GLUCOSE BLDC GLUCOMTR-MCNC: 132 MG/DL (ref 70–99)
GLUCOSE BLDC GLUCOMTR-MCNC: 145 MG/DL (ref 70–99)
GLUCOSE BLDC GLUCOMTR-MCNC: 148 MG/DL (ref 70–99)
GLUCOSE BLDC GLUCOMTR-MCNC: 236 MG/DL (ref 70–99)
GLUCOSE SERPL-MCNC: 136 MG/DL (ref 70–99)
HCT VFR BLD AUTO: 35.8 % (ref 40–53)
HGB BLD-MCNC: 11.7 G/DL (ref 13.3–17.7)
MAGNESIUM SERPL-MCNC: 1.5 MG/DL (ref 1.7–2.3)
MAGNESIUM SERPL-MCNC: 3.4 MG/DL (ref 1.7–2.3)
MCH RBC QN AUTO: 30.3 PG (ref 26.5–33)
MCHC RBC AUTO-ENTMCNC: 32.7 G/DL (ref 31.5–36.5)
MCV RBC AUTO: 93 FL (ref 78–100)
PHOSPHATE SERPL-MCNC: 2.6 MG/DL (ref 2.5–4.5)
PLATELET # BLD AUTO: 184 10E3/UL (ref 150–450)
POTASSIUM SERPL-SCNC: 3.7 MMOL/L (ref 3.4–5.3)
RBC # BLD AUTO: 3.86 10E6/UL (ref 4.4–5.9)
SODIUM SERPL-SCNC: 137 MMOL/L (ref 136–145)
UFH PPP CHRO-ACNC: 0.22 IU/ML
UFH PPP CHRO-ACNC: 0.33 IU/ML
UFH PPP CHRO-ACNC: 0.52 IU/ML
WBC # BLD AUTO: 9.4 10E3/UL (ref 4–11)

## 2023-02-10 PROCEDURE — 250N000011 HC RX IP 250 OP 636: Performed by: STUDENT IN AN ORGANIZED HEALTH CARE EDUCATION/TRAINING PROGRAM

## 2023-02-10 PROCEDURE — 92526 ORAL FUNCTION THERAPY: CPT | Mod: GN

## 2023-02-10 PROCEDURE — 85520 HEPARIN ASSAY: CPT | Performed by: PSYCHIATRY & NEUROLOGY

## 2023-02-10 PROCEDURE — 97530 THERAPEUTIC ACTIVITIES: CPT | Mod: GP

## 2023-02-10 PROCEDURE — 258N000003 HC RX IP 258 OP 636: Performed by: STUDENT IN AN ORGANIZED HEALTH CARE EDUCATION/TRAINING PROGRAM

## 2023-02-10 PROCEDURE — 82310 ASSAY OF CALCIUM: CPT | Performed by: STUDENT IN AN ORGANIZED HEALTH CARE EDUCATION/TRAINING PROGRAM

## 2023-02-10 PROCEDURE — 97110 THERAPEUTIC EXERCISES: CPT | Mod: GO

## 2023-02-10 PROCEDURE — 85520 HEPARIN ASSAY: CPT | Performed by: STUDENT IN AN ORGANIZED HEALTH CARE EDUCATION/TRAINING PROGRAM

## 2023-02-10 PROCEDURE — 120N000002 HC R&B MED SURG/OB UMMC

## 2023-02-10 PROCEDURE — 250N000013 HC RX MED GY IP 250 OP 250 PS 637

## 2023-02-10 PROCEDURE — 36415 COLL VENOUS BLD VENIPUNCTURE: CPT | Performed by: STUDENT IN AN ORGANIZED HEALTH CARE EDUCATION/TRAINING PROGRAM

## 2023-02-10 PROCEDURE — 97116 GAIT TRAINING THERAPY: CPT | Mod: GP

## 2023-02-10 PROCEDURE — 85027 COMPLETE CBC AUTOMATED: CPT | Performed by: STUDENT IN AN ORGANIZED HEALTH CARE EDUCATION/TRAINING PROGRAM

## 2023-02-10 PROCEDURE — 250N000013 HC RX MED GY IP 250 OP 250 PS 637: Performed by: STUDENT IN AN ORGANIZED HEALTH CARE EDUCATION/TRAINING PROGRAM

## 2023-02-10 PROCEDURE — 84100 ASSAY OF PHOSPHORUS: CPT | Performed by: STUDENT IN AN ORGANIZED HEALTH CARE EDUCATION/TRAINING PROGRAM

## 2023-02-10 PROCEDURE — 36415 COLL VENOUS BLD VENIPUNCTURE: CPT | Performed by: PSYCHIATRY & NEUROLOGY

## 2023-02-10 PROCEDURE — 83735 ASSAY OF MAGNESIUM: CPT | Performed by: STUDENT IN AN ORGANIZED HEALTH CARE EDUCATION/TRAINING PROGRAM

## 2023-02-10 RX ORDER — MAGNESIUM OXIDE 400 MG/1
400 TABLET ORAL DAILY
Status: DISCONTINUED | OUTPATIENT
Start: 2023-02-10 | End: 2023-02-10

## 2023-02-10 RX ORDER — MAGNESIUM SULFATE HEPTAHYDRATE 40 MG/ML
4 INJECTION, SOLUTION INTRAVENOUS ONCE
Status: COMPLETED | OUTPATIENT
Start: 2023-02-10 | End: 2023-02-10

## 2023-02-10 RX ORDER — MAGNESIUM OXIDE 400 MG/1
400 TABLET ORAL DAILY
Status: DISCONTINUED | OUTPATIENT
Start: 2023-02-10 | End: 2023-02-13 | Stop reason: HOSPADM

## 2023-02-10 RX ADMIN — ACETAMINOPHEN 650 MG: 325 TABLET ORAL at 18:33

## 2023-02-10 RX ADMIN — ROSUVASTATIN CALCIUM 20 MG: 10 TABLET, FILM COATED ORAL at 20:39

## 2023-02-10 RX ADMIN — INSULIN ASPART 2 UNITS: 100 INJECTION, SOLUTION INTRAVENOUS; SUBCUTANEOUS at 12:25

## 2023-02-10 RX ADMIN — Medication 3 MG: at 20:39

## 2023-02-10 RX ADMIN — DULOXETINE HYDROCHLORIDE 20 MG: 20 CAPSULE, DELAYED RELEASE ORAL at 08:38

## 2023-02-10 RX ADMIN — MAGNESIUM OXIDE TAB 400 MG (240 MG ELEMENTAL MG) 400 MG: 400 (240 MG) TAB at 12:25

## 2023-02-10 RX ADMIN — INSULIN GLARGINE 7 UNITS: 100 INJECTION, SOLUTION SUBCUTANEOUS at 21:46

## 2023-02-10 RX ADMIN — PANTOPRAZOLE SODIUM 40 MG: 40 TABLET, DELAYED RELEASE ORAL at 08:38

## 2023-02-10 RX ADMIN — METOPROLOL TARTRATE 25 MG: 25 TABLET, FILM COATED ORAL at 08:38

## 2023-02-10 RX ADMIN — ALLOPURINOL 200 MG: 100 TABLET ORAL at 08:38

## 2023-02-10 RX ADMIN — INSULIN ASPART 1 UNITS: 100 INJECTION, SOLUTION INTRAVENOUS; SUBCUTANEOUS at 17:21

## 2023-02-10 RX ADMIN — HEPARIN SODIUM 800 UNITS/HR: 10000 INJECTION, SOLUTION INTRAVENOUS at 21:45

## 2023-02-10 RX ADMIN — DULOXETINE HYDROCHLORIDE 20 MG: 20 CAPSULE, DELAYED RELEASE ORAL at 20:40

## 2023-02-10 RX ADMIN — METOPROLOL TARTRATE 25 MG: 25 TABLET, FILM COATED ORAL at 20:39

## 2023-02-10 RX ADMIN — TAMSULOSIN HYDROCHLORIDE 0.4 MG: 0.4 CAPSULE ORAL at 08:38

## 2023-02-10 RX ADMIN — SODIUM CHLORIDE: 9 INJECTION, SOLUTION INTRAVENOUS at 06:26

## 2023-02-10 RX ADMIN — ASPIRIN 81 MG: 81 TABLET ORAL at 08:38

## 2023-02-10 RX ADMIN — Medication 12.5 MG: at 22:45

## 2023-02-10 RX ADMIN — MAGNESIUM SULFATE IN WATER 4 G: 40 INJECTION, SOLUTION INTRAVENOUS at 12:26

## 2023-02-10 RX ADMIN — ACETAMINOPHEN 650 MG: 325 TABLET ORAL at 12:26

## 2023-02-10 ASSESSMENT — ACTIVITIES OF DAILY LIVING (ADL)
ADLS_ACUITY_SCORE: 35
ADLS_ACUITY_SCORE: 41
ADLS_ACUITY_SCORE: 35
ADLS_ACUITY_SCORE: 34
ADLS_ACUITY_SCORE: 35
ADLS_ACUITY_SCORE: 35

## 2023-02-10 NOTE — PLAN OF CARE
Status: Pt admitted to outside hospital for stroke work up, transferred to [facility] for possible basilar artery stenting  Vitals: HTN within parameters, keep SBP <180.   Neuros: DND [id number]. Orientation waxes/wanes. DO to place and time this shift. 5/5 throughout. denied N/T, L droop, R tongue deviation, Kayce speaking - garbled speech per family.   IV: PIV x3. Hep gtt infusing at 850 units/hour. NS at 75 mL/hour.  Labs/Electrolytes: HepXa redraw with routine AM labs.   Resp/trach: Denies SOB  Diet: easy to chew level 7 with thins, takes pills whole in applesauce  Bowel status: BS+, LBM 2/8   : Voiding spontaneously, using urinal at bedside with help from staff/family  Skin: Bruising to BUE and to abdomen  Pain: Denied pain this shift   Activity: Up with 2/GB   Social: Family staying overnight to help with cares/translating.   Plan: Continue to monitor and follow POC. 1:1 sitter.

## 2023-02-10 NOTE — CONSULTS
Discharge Pharmacy Test Claim    Eliquis and xarelto are covered with monthly copays of $4.30 through patient's [employer] dual plan.    Test Claim Copay   eliquis 4.30   xarelto 4.30       Emliy Alba  [facility] Liaison  Ph: [phone] Pager: [phone]   Securely message with the Vocera Web Console (learn more here)

## 2023-02-10 NOTE — PROGRESS NOTES
[facility]    Stroke Progress Note    Interval Events  Overnight, he was little agitated. Was given melatonin and not examined for Neuro checks. He slept well and was doing good this am. C/O mild headache and dizziness. Ate 7-75% of meals yesterday. Remains vitally and stable on Neuro exam as well.       Today:  -Repeat imaging was done yesterday to reevaluate for new infarcts and indications for stenting. Will discus with Neuro IR for further discussion.   -Mg02 400mg daily  -Pharmacy laison for apixaban and Xarelto coverage        HPI Summary  Adam Talamantes is a 82 year old male with T2DM, HTN, HLD, CKD, and recent admission on 1/11/2023 for stroke and severe posterior circulation stenosis who initially presented to [facility] on 2/1/2023 with vertigo, nausea, and vomiting. He was found to have multiple scattered posterior circulation strokes. 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     He was admitted to [facility] for stroke evaluation/management. On 2/4/2023, rapid response was activated for unresponsiveness and loss of consciousness while urinating. Repeat brain MRI showed increased stroke burden. After discussion with family, he was transferred to [facility] for consideration of basilar stenting.     On arrival, he reports feeling tired. His son notes that he has been more tired lately and does not know if it is related to his strokes or because he has not been getting any sleep. He verbally denies having pain, but his son notices that he points to his head frequently. His son notes that he has been moving all extremities spontaneously, but maybe has some reduced movements on the right. He has not notice any new neurologic symptoms in the past couple of days.    TIA Evaluation Summarized    MRI and/or Head CT CTH                                                                    IMPRESSION:  1.  No significant interval change and multifocal subacute infarction involving the bilateral cerebellar hemispheres and left thalamus. No hemorrhagic conversion.  2.  Probable mild to moderate sequela of chronic small vessel ischemic disease and remote lacunar infarction.  3.  Mild brain parenchymal volume loss.  4.  Unchanged partial effacement of the fourth ventricle    Mri BRAIN  IMPRESSION:  1.  Significant increase in acute/subacute ischemic burden within the posterior fossa since MRI 02/01/2023 most notably involving the right greater than left cerebellar hemispheres with suspected additional right hemipontine insult. No suggestion of   acute hemorrhage.  2.  Subacute left thalamic lacunar infarct.  3.  Slight increase in inferior fourth ventricular effacement.  4.  Additional chronic intracranial findings as detailed.     Intracranial Vasculature HEAD CTA:   1. No significant change since 01/11/2023. Occlusion of the left vertebral artery V4 segment and right posterior cerebral artery.  2. 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  3. Fetal origin left posterior cerebral artery demonstrates moderate stenosis of the left P2 segment and mild multifocal stenoses of its P2 and P3 segments  4. Mild to moderate stenosis of the cavernous segment of the right internal carotid artery.  5. Widely patent anterior and middle cerebral artery branches   Cervical Vasculature NECK CTA:  1.  No significant change since 01/11/2023. Occlusion of the left vertebral artery at the level of the V3 segment.  2.  Mild atherosclerotic plaque at the carotid bifurcations. No significant carotid stenosis.  3.  Widely patent dominant right vertebral artery.     Echocardiogram Interpretation Summary 1/11     1. Left ventricular size is normal. Mild concentric increase in wall  thickness. Systolic function is normal. The estimated left ventricular  ejection fraction is 55-60%.  2. Right ventricular size and  systolic function are normal.  3. No hemodynamically significant valvular abnormalities.  4. Compared to the prior study dated 10/17/2017, there has been no significant  change.   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      Due to worsening of stroke burden, he was transferred to [facility] for consideration of basilar stenting.       Stroke work-up previously obtained include: Hgb A1C (1/12/2023) 7.6, LDL (2/1/2023) 59, echocardiogram (1/11/2023) showing LVEF 55-60%, no WMA, normal LA size.      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   -S/P NS 500cc bolus and 1L NS manitance  -Restarted pta metoprolol 25mg BID    #Hypomagnesia  -Electrolyte replacement protocol  -Mg02 400mg daily      #Gross hematuria  Passing intermittent clots in yellow urine, per his son.  Had one previous episode of gross hematuria several months ago. Has Hx of urolithiasis, BL pelviectasis history in the setting of CKD   -Urology consulted, appreciate recs  -CT urogram was ordered but radiology only did CT a/P w/o contrast, Consider reimaging outpatient   -urine cytology   -Outpatient urology follow up for cystoscopy to further evaluate the hematuria.     #CKD  Continue to monitor BMP     #Essential HTN  - amlodipine, losartan, and isosorbide mononitrate  - Continue Metoprolol 25mg BID     #Type II diabetes mellitus  - Glargine 7u at night  - Correctional dose insulin, medium (resumed from [facility])  - Hold  PTA sitagliptin 50mg daily     #Gout: continue allopurinol     #BPH: continue tamsulosin      Prophylaxis            For VTE Prevention:  - pneumatic compression device  - On low-intensity heparin gtt     For Acid Suppression:  - On PTA pantoprazole    Nutrition:   -Easy to chew diet, Thin liquids  -Calorie count     Code Status  DNR / DNI       During initial physical assessment, the plan of care was discussed and developed with patient and child.  Plan of care includes: continued stroke management, evaluation for basilar artery stent.     Patient was admitted via transfer from [facility].      The patient was discussed with SStroke Staff Dr. Isaac Alvarado MD  Neurology Resident  [id number]  ______________________________________________________    Clinically Significant Risk Factors            # Hypomagnesemia: Lowest Mg = 1.5 mg/dL in last 2 days, will replace as needed            # DMII: A1C = 9.4 % (Ref range: <5.7 %) within past 3 months, PRESENT ON ADMISSION  # Overweight: Estimated body mass index is 29.01 kg/m  as calculated  "from the following:    Height as of 2/1/23: 1.549 m (5' 0.98\").    Weight as of 2/4/23: 69.6 kg (153 lb 7 oz)., PRESENT ON ADMISSION           Medications   Scheduled Meds    allopurinol  200 mg Oral Daily     aspirin  81 mg Oral Daily     DULoxetine  20 mg Oral BID     insulin aspart  1-7 Units Subcutaneous TID AC     insulin aspart  1-5 Units Subcutaneous At Bedtime     insulin glargine  7 Units Subcutaneous Q24H     melatonin  3 mg Oral At Bedtime     metoprolol tartrate  25 mg Oral BID     pantoprazole  40 mg Oral QAM AC     rosuvastatin  20 mg Oral At Bedtime     sodium chloride (PF)  3 mL Intracatheter Q8H     tamsulosin  0.4 mg Oral Daily       Infusion Meds    heparin 650 Units/hr (02/10/23 0941)     - MEDICATION INSTRUCTIONS -       - MEDICATION INSTRUCTIONS -       sodium chloride 75 mL/hr at 02/10/23 0626       PRN Meds  acetaminophen, glucose **OR** dextrose **OR** glucagon, hydrALAZINE **OR** labetalol, lidocaine 4%, lidocaine (buffered or not buffered), - MEDICATION INSTRUCTIONS -, - MEDICATION INSTRUCTIONS -, ondansetron **OR** ondansetron, QUEtiapine, senna-docusate, sodium chloride (PF)       PHYSICAL EXAMINATION  Temp:  [97.8  F (36.6  C)-98.4  F (36.9  C)] 97.8  F (36.6  C)  Pulse:  [68-94] 82  Resp:  [16-18] 18  BP: (125-164)/(74-98) 164/98  SpO2:  [96 %-100 %] 99 %        Neurologic Exam   Mental Status:   Laying in bed, eyes opened , oriented to person but not time and place. Follows simple commands such sticking out tongue and showing thumbs up. Attempts follow complex commands/cross body.  Naming intact. Speech sounds fluent.  Cranial Nerves:  EOMI. Visual fields intact. No nystagmus noted. Pupils are equal, round, reactive. Mild nasolabial flattening on the right. Hearing intact to conversation. Strong shoulder shrug. Facial sensation equal on both sides.    Motor:  Normal tone, No tremors noted, Full strength and moving all extremities spontaneously.  Reflexes: Brisk symmetrical reflexes,  " no clonus, no Oliveira sign , no cross-abductor, Toes mute on R and upgoing on L.   Sensory:  light touch sensation intact and symmetric throughout upper and lower extremities, no extinction on double simultaneous stimulation   Coordination:  FNF intact without dysmetria  Station/Gait:  Deferred    Stroke Scales  Will reassess to discharge. .     Modified Drummond Score (Pre-morbid)  4 - Moderately severe disability.  Unable to attend to own bodily needs without assistance or unable to walk unassisted.    Imaging  I personally reviewed all imaging; relevant findings per HPI.     Lab Results Data   CBC  Recent Labs   Lab 02/10/23  0733 02/09/23  0734 02/08/23  0610   WBC 9.4 9.3 9.7   RBC 3.86* 4.00* 4.32*   HGB 11.7* 12.1* 13.2*   HCT 35.8* 37.2* 39.6*    208 209     Basic Metabolic Panel    Recent Labs   Lab 02/10/23  0831 02/10/23  0733 02/10/23  0248 02/09/23  0752 02/09/23  0734 02/08/23  1248 02/08/23  0610   NA  --  137  --   --  138  --  136   POTASSIUM  --  3.7  --   --  3.8  --  4.0   CHLORIDE  --  107  --   --  104  --  102   CO2  --  21*  --   --  22  --  20*   BUN  --  13.5  --   --  18.4  --  18.2   CR  --  1.25*  --   --  1.42*  --  1.40*   * 136* 110*   < > 151*   < > 188*   GERALDINE  --  9.2  --   --  9.3  --  9.5    < > = values in this interval not displayed.     Liver Panel  No results for input(s): PROTTOTAL, ALBUMIN, BILITOTAL, ALKPHOS, AST, ALT, BILIDIRECT in the last 168 hours.  INR  No lab results found.   Lipid Profile    Recent Labs   Lab Test 02/07/23 0627 02/01/23  1155 01/11/23 0421   CHOL 151 173 228*   HDL 40 35* 40   LDL 61 59 124*   TRIG 252* 394* 320*     A1C    Recent Labs   Lab Test 02/07/23 0627 01/12/23  2006 01/11/23  0421   A1C 9.4* 7.6* 7.6*     Troponin    No results for input(s): CTROPT, TROPONINIS, TROPONINI, GHTROP in the last 168 hours.       Data

## 2023-02-11 ENCOUNTER — APPOINTMENT (OUTPATIENT)
Dept: OCCUPATIONAL THERAPY | Facility: CLINIC | Age: 83
DRG: 065 | End: 2023-02-11
Attending: PSYCHIATRY & NEUROLOGY
Payer: COMMERCIAL

## 2023-02-11 LAB
ANION GAP SERPL CALCULATED.3IONS-SCNC: 12 MMOL/L (ref 7–15)
BUN SERPL-MCNC: 14.3 MG/DL (ref 8–23)
CALCIUM SERPL-MCNC: 9.1 MG/DL (ref 8.8–10.2)
CHLORIDE SERPL-SCNC: 101 MMOL/L (ref 98–107)
CREAT SERPL-MCNC: 1.25 MG/DL (ref 0.67–1.17)
DEPRECATED HCO3 PLAS-SCNC: 21 MMOL/L (ref 22–29)
ERYTHROCYTE [DISTWIDTH] IN BLOOD BY AUTOMATED COUNT: 14.3 % (ref 10–15)
GFR SERPL CREATININE-BSD FRML MDRD: 58 ML/MIN/1.73M2
GLUCOSE BLDC GLUCOMTR-MCNC: 133 MG/DL (ref 70–99)
GLUCOSE BLDC GLUCOMTR-MCNC: 165 MG/DL (ref 70–99)
GLUCOSE BLDC GLUCOMTR-MCNC: 175 MG/DL (ref 70–99)
GLUCOSE BLDC GLUCOMTR-MCNC: 228 MG/DL (ref 70–99)
GLUCOSE BLDC GLUCOMTR-MCNC: 234 MG/DL (ref 70–99)
GLUCOSE SERPL-MCNC: 269 MG/DL (ref 70–99)
HCT VFR BLD AUTO: 36.4 % (ref 40–53)
HGB BLD-MCNC: 12.1 G/DL (ref 13.3–17.7)
MAGNESIUM SERPL-MCNC: 2.1 MG/DL (ref 1.7–2.3)
MCH RBC QN AUTO: 30.9 PG (ref 26.5–33)
MCHC RBC AUTO-ENTMCNC: 33.2 G/DL (ref 31.5–36.5)
MCV RBC AUTO: 93 FL (ref 78–100)
PHOSPHATE SERPL-MCNC: 2.4 MG/DL (ref 2.5–4.5)
PLATELET # BLD AUTO: 210 10E3/UL (ref 150–450)
POTASSIUM SERPL-SCNC: 3.3 MMOL/L (ref 3.4–5.3)
POTASSIUM SERPL-SCNC: 4.4 MMOL/L (ref 3.4–5.3)
RBC # BLD AUTO: 3.92 10E6/UL (ref 4.4–5.9)
SODIUM SERPL-SCNC: 134 MMOL/L (ref 136–145)
UFH PPP CHRO-ACNC: 0.36 IU/ML
WBC # BLD AUTO: 8.9 10E3/UL (ref 4–11)

## 2023-02-11 PROCEDURE — 80048 BASIC METABOLIC PNL TOTAL CA: CPT | Performed by: STUDENT IN AN ORGANIZED HEALTH CARE EDUCATION/TRAINING PROGRAM

## 2023-02-11 PROCEDURE — 250N000013 HC RX MED GY IP 250 OP 250 PS 637: Performed by: STUDENT IN AN ORGANIZED HEALTH CARE EDUCATION/TRAINING PROGRAM

## 2023-02-11 PROCEDURE — 36415 COLL VENOUS BLD VENIPUNCTURE: CPT | Performed by: STUDENT IN AN ORGANIZED HEALTH CARE EDUCATION/TRAINING PROGRAM

## 2023-02-11 PROCEDURE — 84132 ASSAY OF SERUM POTASSIUM: CPT | Performed by: STUDENT IN AN ORGANIZED HEALTH CARE EDUCATION/TRAINING PROGRAM

## 2023-02-11 PROCEDURE — 250N000013 HC RX MED GY IP 250 OP 250 PS 637

## 2023-02-11 PROCEDURE — 83735 ASSAY OF MAGNESIUM: CPT | Performed by: STUDENT IN AN ORGANIZED HEALTH CARE EDUCATION/TRAINING PROGRAM

## 2023-02-11 PROCEDURE — 250N000009 HC RX 250: Performed by: STUDENT IN AN ORGANIZED HEALTH CARE EDUCATION/TRAINING PROGRAM

## 2023-02-11 PROCEDURE — 85520 HEPARIN ASSAY: CPT | Performed by: STUDENT IN AN ORGANIZED HEALTH CARE EDUCATION/TRAINING PROGRAM

## 2023-02-11 PROCEDURE — 120N000002 HC R&B MED SURG/OB UMMC

## 2023-02-11 PROCEDURE — 85027 COMPLETE CBC AUTOMATED: CPT | Performed by: STUDENT IN AN ORGANIZED HEALTH CARE EDUCATION/TRAINING PROGRAM

## 2023-02-11 PROCEDURE — 84100 ASSAY OF PHOSPHORUS: CPT | Performed by: STUDENT IN AN ORGANIZED HEALTH CARE EDUCATION/TRAINING PROGRAM

## 2023-02-11 PROCEDURE — 97535 SELF CARE MNGMENT TRAINING: CPT | Mod: GO

## 2023-02-11 RX ORDER — LIDOCAINE HYDROCHLORIDE 20 MG/ML
JELLY TOPICAL EVERY 4 HOURS PRN
Status: DISCONTINUED | OUTPATIENT
Start: 2023-02-11 | End: 2023-02-13 | Stop reason: HOSPADM

## 2023-02-11 RX ORDER — POTASSIUM CHLORIDE 750 MG/1
40 TABLET, EXTENDED RELEASE ORAL ONCE
Status: COMPLETED | OUTPATIENT
Start: 2023-02-11 | End: 2023-02-11

## 2023-02-11 RX ADMIN — ASPIRIN 81 MG: 81 TABLET ORAL at 07:49

## 2023-02-11 RX ADMIN — DULOXETINE HYDROCHLORIDE 20 MG: 20 CAPSULE, DELAYED RELEASE ORAL at 07:50

## 2023-02-11 RX ADMIN — ACETAMINOPHEN 650 MG: 325 TABLET ORAL at 07:49

## 2023-02-11 RX ADMIN — DULOXETINE HYDROCHLORIDE 20 MG: 20 CAPSULE, DELAYED RELEASE ORAL at 21:30

## 2023-02-11 RX ADMIN — Medication 1 PACKET: at 21:29

## 2023-02-11 RX ADMIN — ALLOPURINOL 200 MG: 100 TABLET ORAL at 07:49

## 2023-02-11 RX ADMIN — LIDOCAINE HYDROCHLORIDE: 20 JELLY TOPICAL at 02:38

## 2023-02-11 RX ADMIN — ACETAMINOPHEN 650 MG: 325 TABLET ORAL at 16:31

## 2023-02-11 RX ADMIN — POTASSIUM CHLORIDE 40 MEQ: 750 TABLET, EXTENDED RELEASE ORAL at 10:59

## 2023-02-11 RX ADMIN — Medication 3 MG: at 21:29

## 2023-02-11 RX ADMIN — ROSUVASTATIN CALCIUM 20 MG: 10 TABLET, FILM COATED ORAL at 21:30

## 2023-02-11 RX ADMIN — INSULIN GLARGINE 7 UNITS: 100 INJECTION, SOLUTION SUBCUTANEOUS at 21:28

## 2023-02-11 RX ADMIN — Medication 1 PACKET: at 10:59

## 2023-02-11 RX ADMIN — TAMSULOSIN HYDROCHLORIDE 0.4 MG: 0.4 CAPSULE ORAL at 07:49

## 2023-02-11 RX ADMIN — Medication 12.5 MG: at 22:59

## 2023-02-11 RX ADMIN — INSULIN ASPART 2 UNITS: 100 INJECTION, SOLUTION INTRAVENOUS; SUBCUTANEOUS at 10:58

## 2023-02-11 RX ADMIN — Medication 1 PACKET: at 16:31

## 2023-02-11 RX ADMIN — METOPROLOL TARTRATE 25 MG: 25 TABLET, FILM COATED ORAL at 07:50

## 2023-02-11 RX ADMIN — PANTOPRAZOLE SODIUM 40 MG: 40 TABLET, DELAYED RELEASE ORAL at 07:50

## 2023-02-11 RX ADMIN — INSULIN ASPART 1 UNITS: 100 INJECTION, SOLUTION INTRAVENOUS; SUBCUTANEOUS at 17:05

## 2023-02-11 RX ADMIN — MAGNESIUM OXIDE TAB 400 MG (240 MG ELEMENTAL MG) 400 MG: 400 (240 MG) TAB at 07:50

## 2023-02-11 RX ADMIN — METOPROLOL TARTRATE 25 MG: 25 TABLET, FILM COATED ORAL at 21:30

## 2023-02-11 ASSESSMENT — ACTIVITIES OF DAILY LIVING (ADL)
ADLS_ACUITY_SCORE: 41
ADLS_ACUITY_SCORE: 35
ADLS_ACUITY_SCORE: 41
ADLS_ACUITY_SCORE: 41
ADLS_ACUITY_SCORE: 35
ADLS_ACUITY_SCORE: 42
ADLS_ACUITY_SCORE: 35
ADLS_ACUITY_SCORE: 41
ADLS_ACUITY_SCORE: 42
ADLS_ACUITY_SCORE: 35

## 2023-02-11 NOTE — PROGRESS NOTES
[facility]    Stroke Progress Note    Interval Events  - Retaining urine last night, passed clot, bladder scan showed 600 ml, straight cathed.  - Pharmacy consulted, monthly co-pay for apixaban or rivaroxaban will be $4.30      Today:  -Patient voided spontaneously (375 mL) with post-void residual 138 mL  -Straight cath for bladder volume > 600 mL. Monitor for clots, if additional clot would initiate continuous bladder irrigation    HPI Summary  Aadm Talamantes is a 82 year old male with T2DM, HTN, HLD, CKD, and recent admission on 1/11/2023 for stroke and severe posterior circulation stenosis who initially presented to [facility] on 2/1/2023 with vertigo, nausea, and vomiting. He was found to have multiple scattered posterior circulation strokes. 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     He was admitted to [facility] for stroke evaluation/management. On 2/4/2023, rapid response was activated for unresponsiveness and loss of consciousness while urinating. Repeat brain MRI showed increased stroke burden. After discussion with family, he was transferred to [facility] for consideration of basilar stenting.     On arrival, he reports feeling tired. His son notes that he has been more tired lately and does not know if it is related to his strokes or because he has not been getting any sleep. He verbally denies having pain, but his son notices that he points to his head frequently. His son notes that he has been moving all extremities spontaneously, but maybe has some reduced movements on the right. He has not notice any new neurologic symptoms in the past couple of days.    Stroke Evaluation Summarized    MRI and/or Head CT CTH                                                                   IMPRESSION:  1.  No significant interval change and multifocal subacute infarction involving the  bilateral cerebellar hemispheres and left thalamus. No hemorrhagic conversion.  2.  Probable mild to moderate sequela of chronic small vessel ischemic disease and remote lacunar infarction.  3.  Mild brain parenchymal volume loss.  4.  Unchanged partial effacement of the fourth ventricle    Mri BRAIN  IMPRESSION:  1.  Significant increase in acute/subacute ischemic burden within the posterior fossa since MRI 02/01/2023 most notably involving the right greater than left cerebellar hemispheres with suspected additional right hemipontine insult. No suggestion of   acute hemorrhage.  2.  Subacute left thalamic lacunar infarct.  3.  Slight increase in inferior fourth ventricular effacement.  4.  Additional chronic intracranial findings as detailed.     Intracranial Vasculature HEAD CTA:   1. No significant change since 01/11/2023. Occlusion of the left vertebral artery V4 segment and right posterior cerebral artery.  2. 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  3. Fetal origin left posterior cerebral artery demonstrates moderate stenosis of the left P2 segment and mild multifocal stenoses of its P2 and P3 segments  4. Mild to moderate stenosis of the cavernous segment of the right internal carotid artery.  5. Widely patent anterior and middle cerebral artery branches   Cervical Vasculature NECK CTA:  1.  No significant change since 01/11/2023. Occlusion of the left vertebral artery at the level of the V3 segment.  2.  Mild atherosclerotic plaque at the carotid bifurcations. No significant carotid stenosis.  3.  Widely patent dominant right vertebral artery.     Echocardiogram Interpretation Summary 1/11     1. Left ventricular size is normal. Mild concentric increase in wall  thickness. Systolic function is normal. The estimated left ventricular  ejection fraction is 55-60%.  2. Right ventricular size and systolic function are normal.  3. No hemodynamically significant valvular abnormalities.  4.  Compared to the prior study dated 10/17/2017, there has been no significant  change.   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      Due to worsening of stroke burden, he was transferred to [facility] for consideration of basilar stenting.       Stroke work-up previously obtained include: Hgb A1C (1/12/2023) 7.6%, LDL (2/1/2023) 59, echocardiogram (1/11/2023) showing LVEF 55-60%, no WMA, normal LA size.      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  #Multifocal stenosis of the PCA   - Stroke neurology primary  - Neurochecks Q 4 hours  - Neuro IR following  - Daily aspirin 81 mg for secondary stroke prevention  - Low intensity heparin gtt - using heparin in event that patient requires gastrostomy tube; will eventually transition to DOAC  - Statin: rosuvastatin 20mg at night  - Telemetry  - Bedside Glucose Monitoring  - PT/OT/SLP  - Stroke Education  - Depression Screen  - Apnea Screen  - Euthermia, Euglycemia  -Blood pressure goal <160, PRNs ordered for SBP > 180    #Delirium  -Seroquel 12.5 mg prn for agitation  -delirium precautions    #Intermittent  tachycardia, resolving  -Telemetry with Sinus rhythm.   -S/P NS 500cc bolus and 1L NS manitance  -Restarted pta metoprolol 25mg BID    #Hypomagnesia  -Electrolyte replacement protocol  -Mg02 400mg daily    #Gross hematuria  #Urinary retention, intermittent  Passing intermittent clots in yellow urine, per his son.  Had one previous episode of gross hematuria several months ago. Has Hx of urolithiasis, BL pelviectasis history in the setting of CKD. Retaining urine morning of 2/11/23, subsequently passed clot and voided spontaneously  -Urology consulted, appreciate recs  -CT urogram was ordered but radiology only did CT a/P w/o contrast, Consider reimaging outpatient   -urine cytology   -if further clots, start continuous bladder irrigation  -Outpatient urology referral for cystoscopy to further evaluate the hematuria.     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)  - Hold  PTA sitagliptin 50mg daily     #Gout: continue allopurinol     #BPH: continue tamsulosin      Prophylaxis            For VTE Prevention:  - pneumatic compression device  - On low-intensity heparin gtt     For Acid Suppression:  - On PTA pantoprazole    Nutrition:   -Easy to chew diet, Thin liquids  -Calorie count     Code Status  DNR / DNI    During initial physical assessment, the plan of care was discussed and developed with patient and child.  Plan of care includes: continued stroke management, evaluation for basilar artery stent.     Patient was admitted via transfer from [facility].    The patient was discussed with Stroke Staff Dr. Isaac Jaramillo MD  Neurology Resident, PGY-2  ASCOM [id number]  ______________________________________________________    Clinically Significant Risk Factors  "       # Hypokalemia: Lowest K = 3.3 mmol/L in last 2 days, will replace as needed     # Hypomagnesemia: Lowest Mg = 1.5 mg/dL in last 2 days, will replace as needed            # DMII: A1C = 9.4 % (Ref range: <5.7 %) within past 3 months   # Overweight: Estimated body mass index is 29.7 kg/m  as calculated from the following:    Height as of 2/1/23: 1.549 m (5' 0.98\").    Weight as of this encounter: 71.3 kg (157 lb 1.6 oz).            Medications   Scheduled Meds    allopurinol  200 mg Oral Daily     aspirin  81 mg Oral Daily     DULoxetine  20 mg Oral BID     insulin aspart  1-7 Units Subcutaneous TID AC     insulin aspart  1-5 Units Subcutaneous At Bedtime     insulin glargine  7 Units Subcutaneous Q24H     magnesium oxide  400 mg Oral Daily     melatonin  3 mg Oral At Bedtime     metoprolol tartrate  25 mg Oral BID     pantoprazole  40 mg Oral QAM AC     potassium & sodium phosphates  1 packet Oral or Feeding Tube Q4H     rosuvastatin  20 mg Oral At Bedtime     sodium chloride (PF)  3 mL Intracatheter Q8H     tamsulosin  0.4 mg Oral Daily       Infusion Meds    heparin 800 Units/hr (02/11/23 2222)     - MEDICATION INSTRUCTIONS -       - MEDICATION INSTRUCTIONS -         PRN Meds  acetaminophen, glucose **OR** dextrose **OR** glucagon, hydrALAZINE **OR** labetalol, lidocaine 4%, lidocaine, lidocaine (buffered or not buffered), - MEDICATION INSTRUCTIONS -, - MEDICATION INSTRUCTIONS -, ondansetron **OR** ondansetron, QUEtiapine, senna-docusate, sodium chloride (PF)       PHYSICAL EXAMINATION  Temp:  [97.7  F (36.5  C)-98  F (36.7  C)] 98  F (36.7  C)  Pulse:  [] 105  Resp:  [16-18] 16  BP: (112-160)/() 112/77  SpO2:  [97 %-100 %] 97 %    Patient was interviewed and examined with assistance of professional Kayce  over the phone.    Neurologic Exam   Mental Status:   Laying in bed, eyes opened , oriented to person but not time and place. Follows simple commands such as sticking out tongue and " showing thumbs up. Attempts to follow complex commands/cross body.  Naming intact. Speech sounds fluent.  Cranial Nerves:  EOMI. Visual fields intact. No nystagmus noted. Pupils are equal, round, reactive. Mild nasolabial flattening on the right. Hearing intact to conversation. Strong shoulder shrug. Facial sensation equal on both sides.    Motor:  Normal tone, No tremors noted, Holds BUE and BLE antigravity without drift  Reflexes: Brisk symmetrical reflexes,  no clonus, no Oliveira sign , no cross-abductor, Toes mute on R and upgoing on L.   Sensory:  light touch sensation intact and symmetric throughout upper and lower extremities, no extinction on double simultaneous stimulation   Coordination:  FNF intact without dysmetria  Station/Gait:  Deferred    Stroke Scales  Will reassess to discharge. .     Modified Kiel Score (Pre-morbid)  4 - Moderately severe disability.  Unable to attend to own bodily needs without assistance or unable to walk unassisted.    Imaging  I personally reviewed all imaging; relevant findings per HPI.     Lab Results Data   CBC  Recent Labs   Lab 02/11/23  0633 02/10/23  0733 02/09/23  0734   WBC 8.9 9.4 9.3   RBC 3.92* 3.86* 4.00*   HGB 12.1* 11.7* 12.1*   HCT 36.4* 35.8* 37.2*    184 208     Basic Metabolic Panel    Recent Labs   Lab 02/11/23  1202 02/11/23  0758 02/11/23  0633 02/10/23  0831 02/10/23  0733 02/09/23  0752 02/09/23  0734   NA  --   --  134*  --  137  --  138   POTASSIUM  --   --  3.3*  --  3.7  --  3.8   CHLORIDE  --   --  101  --  107  --  104   CO2  --   --  21*  --  21*  --  22   BUN  --   --  14.3  --  13.5  --  18.4   CR  --   --  1.25*  --  1.25*  --  1.42*   * 228* 269*   < > 136*   < > 151*   GERALDINE  --   --  9.1  --  9.2  --  9.3    < > = values in this interval not displayed.     Liver Panel  No results for input(s): PROTTOTAL, ALBUMIN, BILITOTAL, ALKPHOS, AST, ALT, BILIDIRECT in the last 168 hours.  INR  No lab results found.   Lipid Profile     Recent Labs   Lab Test 02/07/23  0627 02/01/23  1155 01/11/23 0421   CHOL 151 173 228*   HDL 40 35* 40   LDL 61 59 124*   TRIG 252* 394* 320*     A1C    Recent Labs   Lab Test 02/07/23  0627 01/12/23  2006 01/11/23 0421   A1C 9.4* 7.6* 7.6*     Troponin    No results for input(s): CTROPT, TROPONINIS, TROPONINI, GHTROP in the last 168 hours.       Data

## 2023-02-11 NOTE — PLAN OF CARE
Admit from [place] with vertigo, headache & dizziness. MRI brain was notable for numerous acute to subacute cerebellar infarctions. Transfer to [facility] for possible stenting. PIV heparin gtt 800u/hr with NS 10ml/hr. Last Xa 0.36, was therapeutic x2 draws next recheck 2/12 in morning. Level 7 diet with thins, meds in pudding. Up 1-2gb/walker. Voids, elevated PVR, cathed x1 at 03:00am. Last BM 2/9 VSS CCM NSR. Neuros: garbled speech, NIHS 4, slight left droop, d/o time and place, oriented with choices. Kayce speaking. Family ok to stay at bedside to translate. Pending Neuro IR consult for possible stenting. Continue to monitor.

## 2023-02-11 NOTE — PLAN OF CARE
Status: Pt admitted to outside hospital for stroke work up, transferred to [facility] for possible basilar artery stenting  Vitals: VSS on RA, HTN w/n parameters   Neuros: Kayce speaking. Disoriented to time, need choices. Slight L droop. Garbled speech.   IV: Hep gtt at 800 unit(s)/hr, redraw in am  Labs/Electrolytes: Replaced Phos and K+ this shift, all redraws scheduled this AM. BG ACHS.   Resp/trach: LS clear.   Diet: Level 7 w/ thins, on greg counts.   Bowel status: BM 2/10.  : Voiding with retention, passed a silver dollar sized clot this am and was then able to void yellow urine. MD notified on am rounds. This afternoon unable to void and had small amount of blood in pull up. Scanned for 586cc. Spoke with stroke MD who contacted urology. RN straight cath x 1 as requested by MD for 700cc yellow urine. One quarter sized clot noted at tip of penis, but otherwise none noted.   Skin: Intact  Pain: PRN Tylenol given this shift.   Activity: A1-2 w/ GB and walker, Up to chair x 1. Worked w/ PT + OT.   Social: Son at bedside, helpful for  needs.   Plan: Continue to monitor and follow POC. Assess ability to void and monitor for clots, update team prn

## 2023-02-11 NOTE — PROGRESS NOTES
Calorie Count  Intake recorded for: 2/10  Total Kcals: 169 Total Protein: 7g  Kcals from Hospital Food: 169   Protein: 7g  Kcals from Outside Food (average):0 Protein: 0g  # Meals Ordered from Kitchen: 2  # Meals Recorded: 1- 100% Cream of wheat, 50% applesauce, 25% scrambled eggs  # Supplements Recorded: No intake recorded

## 2023-02-11 NOTE — PLAN OF CARE
Status: Pt admitted to outside hospital for stroke work up, transferred to [facility] for possible basilar artery stenting  Vitals: VSS on RA, HTN w/n parameters   Neuros: Kayce speaking. Disoriented to time, need choices. Slight L droop. Garbled speech.   IV: Hep gtt at 800 unit(s)/hr, redraw at 2000. PIV infusing at 75 ml/hr.  Labs/Electrolytes: Replaced Mag this shift, all redraws scheduled this AM. BG ACHS.   Resp/trach: LS clear.   Diet: Level 7 w/ thins, on greg counts.   Bowel status: BM 2/10.  : Voiding spontaneously.  Skin: Intact  Pain: PRN Tylenol given this shift.   Activity: A1 w/ GB and walker, walked the unit w/ RN. Up to chair x 1. Worked w/ PT + OT.   Social: Son at bedside, helpful for  needs.   Updates this shift: Sitter discontinued.   Plan: Continue to monitor and follow POC.

## 2023-02-11 NOTE — PROGRESS NOTES
Neuro IR Brief Note    Briefly, Mr. 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 Patient has failed DAPT in the past and has had break through strokes. He was then transitioned to Anticoagulation. After starting AC he has demonstrated stability and subsequent MRI has not shown new ischemic strokes. After extensive discussion with family, stroke team and Dr. Jackson we came to a consensus that Basilar artery stenting remains a high risk procedure at this time and medical therapy with anticoagulation should be optimized first.     At this time we recommend starting Eliquis 5mg BID + ASA 81 mg daily. We will follow-up with patient in 1 month in our clinic. If he has demonstrated continued clinical stability then he may need to be committed to anticoagulation indefinitely. If there is concern for new strokes in the interim then we may need to reconsider basilar artery stenting.     Plan  - Optimize medical management.   - Aggressive CV risk factor management  - SBP Goal 110-160  - Anticoagulation (Initially with Heparin then transition to Eliquis)  - ASA 81 daily in addition to Eliquis   - Follow-up in clinic with Dr. Jackson in 1 month.     Hang Bunch MD, MPH  Neuroendovascular Surgery Fellow  [facility]  Pager: [phone]

## 2023-02-11 NOTE — PROVIDER NOTIFICATION
Dr. Jaramillo notified that pt noted to have several bright red drops of blood in pull-up again this afternoon. No clot noted, but pt unable to void although he felt the urge to do so. Bladder scanned for 586cc.     Immediate return of page, will notify RN with plans      Addendum: MD updated RN after speaking to urology. OK for RN to attempt straight cath x1 now. MD will follow up if pt may need manual bladder irrigation or if urology may need to see pt again.

## 2023-02-11 NOTE — PROGRESS NOTES
Patient unable to void after multiple attempts. Bladder scanned for 616ml. Neurology team contacted and order for straight cath was obtained. Straight cath preformed with ordered urojet, writer noted small amount of blood at straight cath tip after pull out and small amount of urethral bleeding. Patient tolerated straight cath well. Continue to monitor.

## 2023-02-12 ENCOUNTER — APPOINTMENT (OUTPATIENT)
Dept: PHYSICAL THERAPY | Facility: CLINIC | Age: 83
DRG: 065 | End: 2023-02-12
Attending: PSYCHIATRY & NEUROLOGY
Payer: COMMERCIAL

## 2023-02-12 LAB
ANION GAP SERPL CALCULATED.3IONS-SCNC: 11 MMOL/L (ref 7–15)
BUN SERPL-MCNC: 12.2 MG/DL (ref 8–23)
CALCIUM SERPL-MCNC: 9.6 MG/DL (ref 8.8–10.2)
CHLORIDE SERPL-SCNC: 103 MMOL/L (ref 98–107)
CREAT SERPL-MCNC: 1.28 MG/DL (ref 0.67–1.17)
DEPRECATED HCO3 PLAS-SCNC: 20 MMOL/L (ref 22–29)
ERYTHROCYTE [DISTWIDTH] IN BLOOD BY AUTOMATED COUNT: 14.4 % (ref 10–15)
GFR SERPL CREATININE-BSD FRML MDRD: 57 ML/MIN/1.73M2
GLUCOSE BLDC GLUCOMTR-MCNC: 119 MG/DL (ref 70–99)
GLUCOSE BLDC GLUCOMTR-MCNC: 147 MG/DL (ref 70–99)
GLUCOSE BLDC GLUCOMTR-MCNC: 212 MG/DL (ref 70–99)
GLUCOSE BLDC GLUCOMTR-MCNC: 222 MG/DL (ref 70–99)
GLUCOSE BLDC GLUCOMTR-MCNC: 238 MG/DL (ref 70–99)
GLUCOSE SERPL-MCNC: 148 MG/DL (ref 70–99)
HCT VFR BLD AUTO: 37.1 % (ref 40–53)
HGB BLD-MCNC: 12.4 G/DL (ref 13.3–17.7)
MAGNESIUM SERPL-MCNC: 1.8 MG/DL (ref 1.7–2.3)
MCH RBC QN AUTO: 30.9 PG (ref 26.5–33)
MCHC RBC AUTO-ENTMCNC: 33.4 G/DL (ref 31.5–36.5)
MCV RBC AUTO: 93 FL (ref 78–100)
PHOSPHATE SERPL-MCNC: 2.9 MG/DL (ref 2.5–4.5)
PLATELET # BLD AUTO: 201 10E3/UL (ref 150–450)
POTASSIUM SERPL-SCNC: 3.9 MMOL/L (ref 3.4–5.3)
RBC # BLD AUTO: 4.01 10E6/UL (ref 4.4–5.9)
SODIUM SERPL-SCNC: 134 MMOL/L (ref 136–145)
UFH PPP CHRO-ACNC: 0.5 IU/ML
WBC # BLD AUTO: 8.6 10E3/UL (ref 4–11)

## 2023-02-12 PROCEDURE — 84100 ASSAY OF PHOSPHORUS: CPT | Performed by: STUDENT IN AN ORGANIZED HEALTH CARE EDUCATION/TRAINING PROGRAM

## 2023-02-12 PROCEDURE — 99232 SBSQ HOSP IP/OBS MODERATE 35: CPT | Mod: GC | Performed by: STUDENT IN AN ORGANIZED HEALTH CARE EDUCATION/TRAINING PROGRAM

## 2023-02-12 PROCEDURE — 36415 COLL VENOUS BLD VENIPUNCTURE: CPT | Performed by: STUDENT IN AN ORGANIZED HEALTH CARE EDUCATION/TRAINING PROGRAM

## 2023-02-12 PROCEDURE — 250N000013 HC RX MED GY IP 250 OP 250 PS 637

## 2023-02-12 PROCEDURE — 85520 HEPARIN ASSAY: CPT | Performed by: STUDENT IN AN ORGANIZED HEALTH CARE EDUCATION/TRAINING PROGRAM

## 2023-02-12 PROCEDURE — 85027 COMPLETE CBC AUTOMATED: CPT | Performed by: STUDENT IN AN ORGANIZED HEALTH CARE EDUCATION/TRAINING PROGRAM

## 2023-02-12 PROCEDURE — 82310 ASSAY OF CALCIUM: CPT | Performed by: STUDENT IN AN ORGANIZED HEALTH CARE EDUCATION/TRAINING PROGRAM

## 2023-02-12 PROCEDURE — 97116 GAIT TRAINING THERAPY: CPT | Mod: GP

## 2023-02-12 PROCEDURE — 97530 THERAPEUTIC ACTIVITIES: CPT | Mod: GP

## 2023-02-12 PROCEDURE — 83735 ASSAY OF MAGNESIUM: CPT | Performed by: STUDENT IN AN ORGANIZED HEALTH CARE EDUCATION/TRAINING PROGRAM

## 2023-02-12 PROCEDURE — 120N000002 HC R&B MED SURG/OB UMMC

## 2023-02-12 PROCEDURE — 250N000013 HC RX MED GY IP 250 OP 250 PS 637: Performed by: STUDENT IN AN ORGANIZED HEALTH CARE EDUCATION/TRAINING PROGRAM

## 2023-02-12 RX ADMIN — Medication 3 MG: at 20:39

## 2023-02-12 RX ADMIN — TAMSULOSIN HYDROCHLORIDE 0.4 MG: 0.4 CAPSULE ORAL at 08:32

## 2023-02-12 RX ADMIN — MAGNESIUM OXIDE TAB 400 MG (240 MG ELEMENTAL MG) 400 MG: 400 (240 MG) TAB at 08:32

## 2023-02-12 RX ADMIN — METOPROLOL TARTRATE 25 MG: 25 TABLET, FILM COATED ORAL at 08:32

## 2023-02-12 RX ADMIN — ACETAMINOPHEN 650 MG: 325 TABLET ORAL at 20:38

## 2023-02-12 RX ADMIN — DULOXETINE HYDROCHLORIDE 20 MG: 20 CAPSULE, DELAYED RELEASE ORAL at 20:38

## 2023-02-12 RX ADMIN — ALLOPURINOL 200 MG: 100 TABLET ORAL at 08:32

## 2023-02-12 RX ADMIN — DULOXETINE HYDROCHLORIDE 20 MG: 20 CAPSULE, DELAYED RELEASE ORAL at 08:32

## 2023-02-12 RX ADMIN — APIXABAN 5 MG: 5 TABLET, FILM COATED ORAL at 20:38

## 2023-02-12 RX ADMIN — ACETAMINOPHEN 650 MG: 325 TABLET ORAL at 08:32

## 2023-02-12 RX ADMIN — ASPIRIN 81 MG: 81 TABLET ORAL at 08:32

## 2023-02-12 RX ADMIN — INSULIN ASPART 2 UNITS: 100 INJECTION, SOLUTION INTRAVENOUS; SUBCUTANEOUS at 12:17

## 2023-02-12 RX ADMIN — INSULIN ASPART 2 UNITS: 100 INJECTION, SOLUTION INTRAVENOUS; SUBCUTANEOUS at 08:31

## 2023-02-12 RX ADMIN — INSULIN GLARGINE 7 UNITS: 100 INJECTION, SOLUTION SUBCUTANEOUS at 21:00

## 2023-02-12 RX ADMIN — METOPROLOL TARTRATE 25 MG: 25 TABLET, FILM COATED ORAL at 20:38

## 2023-02-12 RX ADMIN — APIXABAN 5 MG: 5 TABLET, FILM COATED ORAL at 12:17

## 2023-02-12 RX ADMIN — PANTOPRAZOLE SODIUM 40 MG: 40 TABLET, DELAYED RELEASE ORAL at 08:32

## 2023-02-12 RX ADMIN — ROSUVASTATIN CALCIUM 20 MG: 10 TABLET, FILM COATED ORAL at 20:38

## 2023-02-12 ASSESSMENT — ACTIVITIES OF DAILY LIVING (ADL)
ADLS_ACUITY_SCORE: 29
ADLS_ACUITY_SCORE: 35
ADLS_ACUITY_SCORE: 29
ADLS_ACUITY_SCORE: 35
ADLS_ACUITY_SCORE: 29
ADLS_ACUITY_SCORE: 35
ADLS_ACUITY_SCORE: 29
ADLS_ACUITY_SCORE: 29
ADLS_ACUITY_SCORE: 35
ADLS_ACUITY_SCORE: 35

## 2023-02-12 NOTE — PLAN OF CARE
Admit from [place] with vertigo, headache & dizziness. MRI brain was notable for numerous acute to subacute cerebellar infarctions. Transfer to [facility] for possible stenting. PIV heparin gtt 800u/hr with NS 10ml/hr. Last Xa 0.36, was therapeutic x2 draws next recheck this morning. Level 7 diet with thins, takes meds in pudding. Up 1-2gb/walker. Voids, elevated PVR in 300s, no cath unless over 600 per order. Small dime sized clots passed during voids with small amounts of urethral bleeding. Last BM 2/12 VSS CCM NSR. Neuros: garbled speech, NIHS 4, slight left droop, d/o time and place, oriented with choices. Kayce speaking. Family ok to stay at bedside to translate. Pending Neuro IR consult for possible stenting. Continue to monitor.

## 2023-02-12 NOTE — PROGRESS NOTES
Calorie Count  Intake recorded for: 2/11  Total Kcals: 330 Total Protein: 7g  Kcals from Hospital Food: 0   Protein: 0g  Kcals from Outside Food (average):330 Protein: 7g  # Meals Ordered from Kitchen: 1 ordered  # Meals Recorded: 1 recorded (outside food - 100% fried rice)  # Supplements Recorded: 0

## 2023-02-12 NOTE — PROGRESS NOTES
[facility]    Stroke Progress Note    Interval Events  - No acute overnight events. Per Nursing and family, patient has been eating food bought from home ~80%, calorie count is not charted accurately. He has been been voiding spontaneously with some clots. No straight cath was done overnight as he retained <600ml.   -Will monitor urine output today and will consider F[place]ys if retaining a lot.       Today:  -Reevaluated by PT: Recommended home with assist home  PT: Family wants to take him home too as he has PCA and 24/7 assist from family.  -Started Eliquis 5mg BID  -Discontinue Heparin gtt  -Straight cath for bladder volume > 600 mL. Monitor for clots, if additional clot would initiate continuous bladder irrigation    HPI Summary  Adam Talamantes is a 82 year old male with T2DM, HTN, HLD, CKD, and recent admission on 1/11/2023 for stroke and severe posterior circulation stenosis who initially presented to [facility] on 2/1/2023 with vertigo, nausea, and vomiting. He was found to have multiple scattered posterior circulation strokes. 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     He was admitted to [facility] for stroke evaluation/management. On 2/4/2023, rapid response was activated for unresponsiveness and loss of consciousness while urinating. Repeat brain MRI showed increased stroke burden. After discussion with family, he was transferred to [facility] for consideration of basilar stenting.     On arrival, he reports feeling tired. His son notes that he has been more tired lately and does not know if it is related to his strokes or because he has not been getting any sleep. He verbally denies having pain, but his son notices that he points to his head frequently. His son notes that he has been moving all extremities spontaneously, but maybe has some reduced movements on the right. He  has not notice any new neurologic symptoms in the past couple of days.    Stroke Evaluation Summarized    MRI and/or Head CT CTH                                                                   IMPRESSION:  1.  No significant interval change and multifocal subacute infarction involving the bilateral cerebellar hemispheres and left thalamus. No hemorrhagic conversion.  2.  Probable mild to moderate sequela of chronic small vessel ischemic disease and remote lacunar infarction.  3.  Mild brain parenchymal volume loss.  4.  Unchanged partial effacement of the fourth ventricle    Mri BRAIN  IMPRESSION:  1.  Significant increase in acute/subacute ischemic burden within the posterior fossa since MRI 02/01/2023 most notably involving the right greater than left cerebellar hemispheres with suspected additional right hemipontine insult. No suggestion of   acute hemorrhage.  2.  Subacute left thalamic lacunar infarct.  3.  Slight increase in inferior fourth ventricular effacement.  4.  Additional chronic intracranial findings as detailed.     Intracranial Vasculature HEAD CTA:   1. No significant change since 01/11/2023. Occlusion of the left vertebral artery V4 segment and right posterior cerebral artery.  2. 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  3. Fetal origin left posterior cerebral artery demonstrates moderate stenosis of the left P2 segment and mild multifocal stenoses of its P2 and P3 segments  4. Mild to moderate stenosis of the cavernous segment of the right internal carotid artery.  5. Widely patent anterior and middle cerebral artery branches   Cervical Vasculature NECK CTA:  1.  No significant change since 01/11/2023. Occlusion of the left vertebral artery at the level of the V3 segment.  2.  Mild atherosclerotic plaque at the carotid bifurcations. No significant carotid stenosis.  3.  Widely patent dominant right vertebral artery.     Echocardiogram Interpretation Summary 1/11     1. Left  ventricular size is normal. Mild concentric increase in wall  thickness. Systolic function is normal. The estimated left ventricular  ejection fraction is 55-60%.  2. Right ventricular size and systolic function are normal.  3. No hemodynamically significant valvular abnormalities.  4. Compared to the prior study dated 10/17/2017, there has been no significant  change.   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      Due to worsening of stroke burden, he was transferred to [facility] for consideration of basilar stenting.       Stroke work-up previously obtained include: Hgb A1C (1/12/2023) 7.6%, LDL (2/1/2023) 59, echocardiogram (1/11/2023) showing LVEF 55-60%, no WMA, normal LA size.     After extensive discussion with Neuro IR and family, decision was made not to pursue with stenting as repeat imaging did not show any new strokes and patient is dependent for his ADLSs as his baseline. So, risks weigh more than benefits in his case. And he is just started on anticoagulants during this admission (Heparin followed by apixaban). If he were to have new strokes in future, may consider Stenting then.      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  #Multifocal stenosis of the PCA   - Stroke neurology primary  - Neurochecks Q 4 hours  - Neuro IR following  - Daily aspirin 81 mg for secondary stroke prevention  -Started Eliquis 5mg BID  -Discontinue Heparin gtt  - Statin: rosuvastatin 20mg at night  - Telemetry  - Bedside Glucose Monitoring  - PT/OT/SLP  - Stroke Education  - Depression Screen  - Apnea Screen  - Euthermia, Euglycemia  -Blood pressure goal <160, PRNs ordered for SBP > 180    #Acute Delirium 2/2 Clinically Undetermined/old age, improving  -Seroquel 12.5 mg prn for agitation  -delirium precautions    #Intermittent tachycardia, resolving  -Telemetry with Sinus rhythm.   -S/P NS 500cc bolus and 1L NS manitance  -Restarted pta metoprolol 25mg BID    #Hypomagnesia, improving  -Electrolyte replacement protocol  -Mg02 400mg daily    #Gross hematuria  #Urinary retention, intermittent  Passing intermittent clots in yellow urine, per his son.  Had one previous episode of gross hematuria several months ago. Has Hx of urolithiasis, BL pelviectasis history in the setting of CKD. Retaining urine morning of 2/11/23, subsequently passed clot and voided spontaneously  -Urology consulted, appreciate recs  -CT urogram was ordered but radiology only did CT a/P w/o contrast, Consider reimaging outpatient   -urine cytology   -if further clots, start continuous bladder irrigation  -Outpatient urology referral for cystoscopy to further evaluate the hematuria.     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   -CTM CBC/platelets counts      Prophylaxis            For VTE Prevention:  - pneumatic compression device  - On low-intensity heparin gtt     For Acid Suppression:  - On PTA pantoprazole    Nutrition:   -Easy to chew diet, Thin liquids  -Calorie count     Code Status  DNR / DNI    Dispo: Home with home pt/ot    During initial physical assessment, the plan of care was discussed and developed with patient and child.  Plan of care includes: continued stroke management, evaluation for basilar artery stent.     Patient was admitted via transfer from [facility].    The patient was discussed with Stroke Staff Dr. Isaac Alvarado MD  Neurology Resident, PGY-1  ASCOM [id number]  ______________________________________________________    Clinically Significant Risk Factors        # Hypokalemia: Lowest K = 3.3 mmol/L in last 2 days, will replace as needed                # DMII: A1C = 9.4 % (Ref range: <5.7 %) within past 3 months   # Overweight: Estimated body mass index is 29.7 kg/m  as calculated from the following:    Height as of 2/1/23: 1.549 m (5' 0.98\").    Weight as of this encounter: 71.3 kg (157 lb 1.6 oz).            Medications   Scheduled Meds    allopurinol  200 mg Oral Daily     apixaban ANTICOAGULANT  5 mg Oral BID     aspirin  81 mg Oral Daily     DULoxetine  20 mg Oral BID     insulin aspart  1-7 Units Subcutaneous TID AC     insulin aspart  1-5 Units Subcutaneous At Bedtime     insulin glargine  7 Units Subcutaneous Q24H     magnesium oxide  400 mg Oral Daily     melatonin  3 mg Oral At Bedtime     metoprolol tartrate  25 mg Oral BID     pantoprazole  40 mg Oral QAM AC     rosuvastatin  20 mg Oral At Bedtime     sodium chloride (PF)  3 mL Intracatheter Q8H     tamsulosin  0.4 mg Oral Daily       Infusion Meds    heparin 800 Units/hr (02/12/23 0839)     - MEDICATION INSTRUCTIONS -       - MEDICATION INSTRUCTIONS -         PRN " Meds  acetaminophen, glucose **OR** dextrose **OR** glucagon, hydrALAZINE **OR** labetalol, lidocaine 4%, lidocaine, lidocaine (buffered or not buffered), - MEDICATION INSTRUCTIONS -, - MEDICATION INSTRUCTIONS -, ondansetron **OR** ondansetron, QUEtiapine, senna-docusate, sodium chloride (PF)       PHYSICAL EXAMINATION  Temp:  [97.4  F (36.3  C)-98.9  F (37.2  C)] 97.8  F (36.6  C)  Pulse:  [79-94] 87  Resp:  [16-19] 16  BP: (138-160)/() 150/86  SpO2:  [97 %-98 %] 98 %    Patient was interviewed and examined with assistance of professional Kayce  over the phone.    Neurologic Exam   Mental Status:   Laying in bed, eyes opened , oriented to person but not time and place. Follows simple commands such as sticking out tongue and showing thumbs up. Attempts to follow complex commands/cross body.  Naming intact. Speech sounds fluent.  Cranial Nerves:  EOMI. Visual fields intact. No nystagmus noted. Pupils are equal, round, reactive. Mild nasolabial flattening on the right. Hearing intact to conversation. Strong shoulder shrug. Facial sensation equal on both sides.    Motor:  Normal tone, No tremors noted, Holds BUE and BLE antigravity without drift  Reflexes: Brisk symmetrical reflexes,  no clonus, no Oliveira sign , no cross-abductor, Toes mute on R and upgoing on L.   Sensory:  light touch sensation intact and symmetric throughout upper and lower extremities, no extinction on double simultaneous stimulation   Coordination:  FNF intact without dysmetria  Station/Gait:  Deferred    Stroke Scales  Will reassess to discharge. .     Modified [place] Score (Pre-morbid)  4 - Moderately severe disability.  Unable to attend to own bodily needs without assistance or unable to walk unassisted.    Imaging  I personally reviewed all imaging; relevant findings per HPI.     Lab Results Data   CBC  Recent Labs   Lab 02/12/23  0612 02/11/23  0633 02/10/23  0733   WBC 8.6 8.9 9.4   RBC 4.01* 3.92* 3.86*   HGB 12.4* 12.1*  11.7*   HCT 37.1* 36.4* 35.8*    210 184     Basic Metabolic Panel    Recent Labs   Lab 02/12/23  0831 02/12/23  0612 02/12/23  0141 02/11/23  1645 02/11/23  1419 02/11/23  0758 02/11/23  0633 02/10/23  0831 02/10/23  0733   NA  --  134*  --   --   --   --  134*  --  137   POTASSIUM  --  3.9  --   --  4.4  --  3.3*  --  3.7   CHLORIDE  --  103  --   --   --   --  101  --  107   CO2  --  20*  --   --   --   --  21*  --  21*   BUN  --  12.2  --   --   --   --  14.3  --  13.5   CR  --  1.28*  --   --   --   --  1.25*  --  1.25*   * 148* 119*   < >  --    < > 269*   < > 136*   GERALDINE  --  9.6  --   --   --   --  9.1  --  9.2    < > = values in this interval not displayed.     Liver Panel  No results for input(s): PROTTOTAL, ALBUMIN, BILITOTAL, ALKPHOS, AST, ALT, BILIDIRECT in the last 168 hours.  INR  No lab results found.   Lipid Profile    Recent Labs   Lab Test 02/07/23 0627 02/01/23  1155 01/11/23 0421   CHOL 151 173 228*   HDL 40 35* 40   LDL 61 59 124*   TRIG 252* 394* 320*     A1C    Recent Labs   Lab Test 02/07/23 0627 01/12/23  2006 01/11/23  0421   A1C 9.4* 7.6* 7.6*     Troponin    No results for input(s): CTROPT, TROPONINIS, TROPONINI, GHTROP in the last 168 hours.       Data

## 2023-02-12 NOTE — PLAN OF CARE
Care of patient from 2099-1887  Status: Pt admitted to outside hospital for stroke work up, transferred to [facility] for possible basilar artery stenting  Vitals: VSS on RA, HTN w/n parameters   Neuros: Kayce speaking. Disoriented to time, need choices. Garbled speech.   IV: Hep gtt at 800 unit(s)/hr. PIV infusing at 75 ml/hr.  Labs/Electrolytes: All redraws scheduled for AM. BG ACHS. Hep Xa level to be drawn next AM.    Resp/trach: LS clear.   Diet: Level 7 w/ thins, on greg counts.   Bowel status: BM 2/10.  : Bladder scanned at 1845 for 335, last st. cathed on day shift.   Skin: Intact  Pain: PRN Tylenol given this shift.   Activity: A1 w/ GB and walker. Up to chair x 1.   Social: Daughter at bedside, helpful for  needs.   Plan: Pending neuro IR for possible stenting. Continue to monitor and follow POC.

## 2023-02-13 ENCOUNTER — APPOINTMENT (OUTPATIENT)
Dept: SPEECH THERAPY | Facility: CLINIC | Age: 83
DRG: 065 | End: 2023-02-13
Attending: PSYCHIATRY & NEUROLOGY
Payer: COMMERCIAL

## 2023-02-13 ENCOUNTER — APPOINTMENT (OUTPATIENT)
Dept: OCCUPATIONAL THERAPY | Facility: CLINIC | Age: 83
DRG: 065 | End: 2023-02-13
Attending: PSYCHIATRY & NEUROLOGY
Payer: COMMERCIAL

## 2023-02-13 ENCOUNTER — APPOINTMENT (OUTPATIENT)
Dept: PHYSICAL THERAPY | Facility: CLINIC | Age: 83
DRG: 065 | End: 2023-02-13
Attending: PSYCHIATRY & NEUROLOGY
Payer: COMMERCIAL

## 2023-02-13 VITALS
RESPIRATION RATE: 16 BRPM | TEMPERATURE: 97.8 F | OXYGEN SATURATION: 97 % | HEART RATE: 82 BPM | SYSTOLIC BLOOD PRESSURE: 139 MMHG | DIASTOLIC BLOOD PRESSURE: 100 MMHG | WEIGHT: 157.1 LBS | BODY MASS INDEX: 29.7 KG/M2

## 2023-02-13 PROBLEM — E83.42 HYPOMAGNESEMIA: Status: ACTIVE | Noted: 2023-02-13

## 2023-02-13 LAB
ANION GAP SERPL CALCULATED.3IONS-SCNC: 12 MMOL/L (ref 7–15)
BUN SERPL-MCNC: 18.4 MG/DL (ref 8–23)
CALCIUM SERPL-MCNC: 10.1 MG/DL (ref 8.8–10.2)
CHLORIDE SERPL-SCNC: 100 MMOL/L (ref 98–107)
CREAT SERPL-MCNC: 1.35 MG/DL (ref 0.67–1.17)
DEPRECATED HCO3 PLAS-SCNC: 23 MMOL/L (ref 22–29)
ERYTHROCYTE [DISTWIDTH] IN BLOOD BY AUTOMATED COUNT: 14.6 % (ref 10–15)
GFR SERPL CREATININE-BSD FRML MDRD: 53 ML/MIN/1.73M2
GLUCOSE BLDC GLUCOMTR-MCNC: 155 MG/DL (ref 70–99)
GLUCOSE BLDC GLUCOMTR-MCNC: 169 MG/DL (ref 70–99)
GLUCOSE SERPL-MCNC: 222 MG/DL (ref 70–99)
HCT VFR BLD AUTO: 38.8 % (ref 40–53)
HGB BLD-MCNC: 12.6 G/DL (ref 13.3–17.7)
MAGNESIUM SERPL-MCNC: 1.6 MG/DL (ref 1.7–2.3)
MCH RBC QN AUTO: 30.8 PG (ref 26.5–33)
MCHC RBC AUTO-ENTMCNC: 32.5 G/DL (ref 31.5–36.5)
MCV RBC AUTO: 95 FL (ref 78–100)
PHOSPHATE SERPL-MCNC: 3.2 MG/DL (ref 2.5–4.5)
PLATELET # BLD AUTO: 202 10E3/UL (ref 150–450)
POTASSIUM SERPL-SCNC: 4.3 MMOL/L (ref 3.4–5.3)
RBC # BLD AUTO: 4.09 10E6/UL (ref 4.4–5.9)
SODIUM SERPL-SCNC: 135 MMOL/L (ref 136–145)
WBC # BLD AUTO: 9.1 10E3/UL (ref 4–11)

## 2023-02-13 PROCEDURE — 83735 ASSAY OF MAGNESIUM: CPT | Performed by: PSYCHIATRY & NEUROLOGY

## 2023-02-13 PROCEDURE — 250N000013 HC RX MED GY IP 250 OP 250 PS 637

## 2023-02-13 PROCEDURE — 99239 HOSP IP/OBS DSCHRG MGMT >30: CPT | Mod: GC | Performed by: PSYCHIATRY & NEUROLOGY

## 2023-02-13 PROCEDURE — 85014 HEMATOCRIT: CPT | Performed by: STUDENT IN AN ORGANIZED HEALTH CARE EDUCATION/TRAINING PROGRAM

## 2023-02-13 PROCEDURE — 97116 GAIT TRAINING THERAPY: CPT | Mod: GP

## 2023-02-13 PROCEDURE — 80048 BASIC METABOLIC PNL TOTAL CA: CPT | Performed by: STUDENT IN AN ORGANIZED HEALTH CARE EDUCATION/TRAINING PROGRAM

## 2023-02-13 PROCEDURE — 92526 ORAL FUNCTION THERAPY: CPT | Mod: GN

## 2023-02-13 PROCEDURE — 97535 SELF CARE MNGMENT TRAINING: CPT | Mod: GO

## 2023-02-13 PROCEDURE — 84100 ASSAY OF PHOSPHORUS: CPT | Performed by: PSYCHIATRY & NEUROLOGY

## 2023-02-13 PROCEDURE — 36415 COLL VENOUS BLD VENIPUNCTURE: CPT | Performed by: STUDENT IN AN ORGANIZED HEALTH CARE EDUCATION/TRAINING PROGRAM

## 2023-02-13 PROCEDURE — 97530 THERAPEUTIC ACTIVITIES: CPT | Mod: GP

## 2023-02-13 PROCEDURE — 250N000013 HC RX MED GY IP 250 OP 250 PS 637: Performed by: STUDENT IN AN ORGANIZED HEALTH CARE EDUCATION/TRAINING PROGRAM

## 2023-02-13 RX ORDER — AMOXICILLIN 250 MG
1 CAPSULE ORAL 2 TIMES DAILY PRN
Qty: 30 TABLET | Refills: 0 | Status: SHIPPED | OUTPATIENT
Start: 2023-02-13 | End: 2023-08-24

## 2023-02-13 RX ORDER — MAGNESIUM OXIDE 400 MG/1
400 TABLET ORAL DAILY
Qty: 30 TABLET | Refills: 0 | Status: SHIPPED | OUTPATIENT
Start: 2023-02-14 | End: 2023-04-27

## 2023-02-13 RX ORDER — LANOLIN ALCOHOL/MO/W.PET/CERES
3 CREAM (GRAM) TOPICAL AT BEDTIME
Qty: 30 TABLET | Refills: 0 | Status: SHIPPED | OUTPATIENT
Start: 2023-02-13 | End: 2023-03-06

## 2023-02-13 RX ADMIN — Medication 200 MG: at 12:45

## 2023-02-13 RX ADMIN — Medication 12.5 MG: at 03:06

## 2023-02-13 RX ADMIN — DULOXETINE HYDROCHLORIDE 20 MG: 20 CAPSULE, DELAYED RELEASE ORAL at 08:20

## 2023-02-13 RX ADMIN — TAMSULOSIN HYDROCHLORIDE 0.4 MG: 0.4 CAPSULE ORAL at 08:20

## 2023-02-13 RX ADMIN — ASPIRIN 81 MG: 81 TABLET ORAL at 08:20

## 2023-02-13 RX ADMIN — METOPROLOL TARTRATE 25 MG: 25 TABLET, FILM COATED ORAL at 08:20

## 2023-02-13 RX ADMIN — APIXABAN 5 MG: 5 TABLET, FILM COATED ORAL at 08:20

## 2023-02-13 RX ADMIN — ALLOPURINOL 200 MG: 100 TABLET ORAL at 08:20

## 2023-02-13 RX ADMIN — PANTOPRAZOLE SODIUM 40 MG: 40 TABLET, DELAYED RELEASE ORAL at 08:20

## 2023-02-13 RX ADMIN — MAGNESIUM OXIDE TAB 400 MG (240 MG ELEMENTAL MG) 400 MG: 400 (240 MG) TAB at 08:20

## 2023-02-13 ASSESSMENT — PATIENT HEALTH QUESTIONNAIRE - PHQ9: SUM OF ALL RESPONSES TO PHQ QUESTIONS 1-9: 1

## 2023-02-13 ASSESSMENT — ACTIVITIES OF DAILY LIVING (ADL)
ADLS_ACUITY_SCORE: 30
ADLS_ACUITY_SCORE: 29
ADLS_ACUITY_SCORE: 30

## 2023-02-13 NOTE — PLAN OF CARE
Admit from [place] with vertigo, headache & dizziness. MRI brain was notable for numerous acute to subacute cerebellar infarctions. PIV SL. Level 7 diet with thins, takes meds in pudding. Up 1gb/walker. Voids, interment elevated PVR. Small amount of urethral bleeding d/t previous cathing. Last BM 2/12. VSS CCM NSR. Neuros: garbled speech, NIHS 4, slight left droop, d/o time and place, oriented with choices. Mild aphasia. Kayce speaking. Family ok to stay at bedside to translate. Plan discharge home with 24hour assist when ready. Continue to monitor.

## 2023-02-13 NOTE — CONSULTS
Discharge Pharmacy Test Claim    Patient has $4.30 copays for formulary medications through [employer]. Requested test claims listed below.    Test Claim Copay   farxiga not covered   invokana 4.30   jardiance 4.30   ozempic  4.30   trulicity 4.30   victoza 4.30       Emily Alba  [facility] Pharmacy Liaison  Ph: [phone] Pager: [phone]   Securely message with the Vocera Web Console (learn more here)

## 2023-02-13 NOTE — PROGRESS NOTES
Care Management Discharge Note    Discharge Date: 02/13/2023       Discharge Disposition:  Home    Discharge Services:    [facility] - accepted for PT/OT  Ph: [phone]   Fax: [phone]    Discharge DME:      Discharge Transportation: family or friend will provide    Education Provided on the Discharge Plan: yes  Persons Notified of Discharge Plans: patient/son  Patient/Family in Agreement with the Plan:  yes    Handoff Referral Completed: Yes    Additional Information:  Called pt room with Kayce , son answered with pt near him. Reviewed plan for discharge home with home care.   Son will let pt spouse and daughter, Dimitri know about the plan.     [facility] confirmed acceptance, they have epic access and will find signed orders later today.       Yadira Shah RN, [place]  Float Care Coordinator  Covering 6A [facility]   Pager: [phone]

## 2023-02-13 NOTE — PLAN OF CARE
Status: Pt admitted to outside hospital for stroke work up, transferred to [facility] for possible basilar artery stenting  Vitals: VSS on RA, HTN w/n parameters   Neuros: Kayce speaking. Disoriented to time, need choices. Garbled speech.   IV: PIV SL x2 removed prior to discharge  Labs/Electrolytes: Mag replaced per protocol and added to discharge meds by MD.  BG ACHS.   Resp/trach: LS clear.   Diet: Level 7 w/ thins, good intake, per family.  Bowel status: BM 2/10.  : Voided 600cc yellow urine via urinal. No blood clots noted today.  Skin: Intact  Pain: Denies  Activity: A1 w/ GB and walker. Up to chair x 1.   Social: Daughter and son-in-law with pt at discharge to take home. Both report pt had all belongings and understanding discharge instructions.   Plan: Pt was taken to OPP and then to lobby via wheelchair with 6A aid. Left 6A @ 1305.                 Revision History

## 2023-02-13 NOTE — PLAN OF CARE
Occupational Therapy Discharge Summary    Reason for therapy discharge:    Discharged to home with home therapy.    Progress towards therapy goal(s). See goals on Care Plan in [facility] electronic health record for goal details.  Goals partially met.  Barriers to achieving goals:   discharge from facility.    Therapy recommendation(s):    Continued therapy is recommended.  Rationale/Recommendations:  24/7 assist with ADLs/IADLs and functional mobility and HHOT to continue to maximize return to PLOF.

## 2023-02-13 NOTE — DISCHARGE SUMMARY
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  #Essential HTN  #Type II diabetes mellitus (a1c 7.6% 01/2023)  #Gout      Discharge Diagnosis:   #Multifocal bilateral cerebellar acute ischemic strokes due to large-artery atherosclerosis  #Acute ischemic stroke of the left thalamus  #Left vertebral artery occlusion  #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   Please follow up with Neuro IR at next available appointment.  Please follow up with your PCP in 1-2 weeks - for medication review and recheck magnesium at that time, patient's magnesium was 1.6 on day of discharge, patient was started on magnesium oxide 400 mg daily during this hospitalization and provided with a prescription for 30 days   Please follow up with urology in 2-3 weeks for urinary retention and intermittent clots in urine  Continue  aspirin 81 mg , Eliquis 5mg BID, rosuvastatin 20mg at night, and BP and DM2 medications.       Problem Leading to Hospitalization (from [facility]):   Adam Talamantes is a 82 year old male with T2DM, HTN, HLD, CKD, and recent admission on 1/11/2023 for stroke and severe posterior circulation stenosis who initially presented to [facility] on 2/1/2023 with vertigo, nausea, and vomiting. He was found to have multiple scattered posterior circulation strokes. 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     He was admitted to [facility] for stroke evaluation/management. On 2/4/2023, rapid response was activated for unresponsiveness and loss of consciousness while urinating. Repeat brain MRI showed increased stroke burden. After discussion with family, he was transferred to [facility] for consideration of basilar stenting.     On arrival, he reports feeling tired. His son notes that he has been more tired lately and does not know if it is related to his strokes or because he has not been getting any sleep. He verbally denies having pain, but his son notices that he points to his head frequently. His son notes that he has been moving all extremities spontaneously, but maybe has some reduced movements on the right. He has not notice any new neurologic symptoms in the past couple of days.    Please see H&P dated 2/6/2023 for further details about presentation.    Brief Hospital Course:   This is an 82-year-old male who was initially admitted to [facility] for dizziness, nausea, and vomiting and found to have acute ischemic strokes of the bilaterally cerebellum and left thalamus, etiology: large artery atherosclerosis (severe stenosis and/or occlusion of the posterior circulation).      Due to worsening of stroke burden, he was transferred to [facility] for consideration of basilar stenting. Stroke work-up previously obtained include: Hgb A1C (1/12/2023) 7.6%, LDL (2/1/2023) 59, echocardiogram (1/11/2023) showing LVEF 55-60%, no WMA, normal LA size.      After extensive discussion with Neuro IR and family, decision was made not to pursue with stenting as repeat imaging did not show any new strokes and patient is dependent for his ADLSs as his baseline. So, risks  weigh more than benefits in his case. And he is just started on Heparin gtt on 2/4 at [facility]. We continue that during this admission and ultimately switched to apixaban 5mg BID.  If he were to have new strokes in future, may consider Stenting then.        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     Smoking Cessation: patient is not a smoker    BP Long-term Goal: 140/90 or less    Antithrombotic/Anticoagulant Agent: aspirin 81 mg and apixaban (Eliquis)    Statins: Rosuvastatin 20mg daily       Hgb A1C Goal: < 7.0    Complications: None.     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    pta metoprolol 25mg BID     #Hypomagnesia, improved     #Gross hematuria, resolved  #Urinary retention, intermittent  Passing intermittent clots in yellow urine, per his son.  Had one previous episode of gross hematuria several months ago. Has Hx of urolithiasis, BL pelviectasis history in the setting of CKD. Retaining urine morning of 2/11/23, subsequently passed clot and voided spontaneously  -Urology was consulted, appreciate recs  -CT urogram was ordered but radiology only did CT a/P w/o contrast, Consider reimaging outpatient   -urine cytology   -Outpatient urology referral for cystoscopy to further evaluate the hematuria.      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 Monitored with CBC        PERTINENT INVESTIGATIONS    Labs  Lipid Panel: Recent Labs   Lab Test 02/07/23  0627   CHOL 151   HDL 40   LDL 61   TRIG 252*     A1C:   Lab Results   Component Value Date    A1C 9.4 02/07/2023     INR: No lab results found in last 7 days.   Coag Panel / Hypercoag Workup: Not indicated       Stroke Evaluation Summarized     MRI and/or Head CT CTH                                                                   IMPRESSION:  1.  No significant interval change and multifocal subacute infarction involving the bilateral cerebellar hemispheres and left thalamus. No hemorrhagic conversion.  2.  Probable mild to moderate sequela of chronic small vessel ischemic disease and remote lacunar infarction.  3.  Mild brain parenchymal volume loss.  4.  Unchanged partial effacement of the fourth ventricle     Mri BRAIN  IMPRESSION:  1.  Significant increase in acute/subacute ischemic burden within the posterior fossa since MRI 02/01/2023 most notably involving the right greater than left cerebellar hemispheres with suspected additional right hemipontine insult. No suggestion of   acute hemorrhage.  2.  Subacute left thalamic lacunar infarct.  3.  Slight increase in inferior fourth ventricular effacement.  4.  Additional chronic intracranial findings as detailed.      Intracranial Vasculature HEAD CTA:   1. No significant change since 01/11/2023. Occlusion of the left vertebral artery V4 segment and right posterior cerebral artery.  2. 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  3. Fetal origin left posterior cerebral artery demonstrates moderate stenosis of the left P2 segment and mild multifocal stenoses of its P2 and P3 segments  4. Mild to moderate stenosis of the cavernous segment of the  right internal carotid artery.  5. Widely patent anterior and middle cerebral artery branches   Cervical Vasculature NECK CTA:  1.  No significant change since 01/11/2023. Occlusion of the left vertebral artery at the level of the V3 segment.  2.  Mild atherosclerotic plaque at the carotid bifurcations. No significant carotid stenosis.  3.  Widely patent dominant right vertebral artery.      Echocardiogram Interpretation Summary 1/11     1. Left ventricular size is normal. Mild concentric increase in wall  thickness. Systolic function is normal. The estimated left ventricular  ejection fraction is 55-60%.  2. Right ventricular size and systolic function are normal.  3. No hemodynamically significant valvular abnormalities.  4. Compared to the prior study dated 10/17/2017, there has been no significant  change.   EKG/Telemetry Sinus tachycardia   Other Testing Not Applicable       LDL 2/7/2023: 61 mg/dL   A1C 1/12/2023: 7.6 %  2/7/2023: 9.4 %         Sleep Apnea Screen:   Questions/Answers      1. Prior to your stroke, have you been told that you snore? Yes.    2. Prior to your stroke, have you been told that you struggle to breath while you are sleeping? No.    3. Prior to your stroke, do you feel tired and sleepy even after getting a normal night of sleep? No.    Sleep Apnea Screen Findings: Patient has 0-1 symptoms of sleep apnea.  Further sleep study is not recommended at this time.    PHQ-9 Depression Screen Score: 1    Education discussed with: patient and family on blood pressure management, medical management and follow-up recommendations/plan.        PHYSICAL EXAMINATION  Vital Signs:  B/P: 139/100, T: 97.8, P: 82, R: 16    Neurologic Exam   Mental Status:   Laying in bed, eyes opened , oriented to person but not time and place. Follows simple commands such as sticking out tongue and showing thumbs up. Attempts to follow complex commands/cross body.  Naming intact. Speech sounds fluent.  Cranial  Nerves:  EOMI. Visual fields intact. No nystagmus noted. Pupils are equal, round, reactive. Mild nasolabial flattening on the right. Hearing intact to conversation. Strong shoulder shrug. Facial sensation equal on both sides.    Motor:  Normal tone, No tremors noted, Holds BUE and BLE antigravity without drift  Reflexes: Brisk symmetrical reflexes,  no clonus, no Oliveira sign , no cross-abductor, Toes mute on R and upgoing on L.   Sensory:  light touch sensation intact and symmetric throughout upper and lower extremities, no extinction on double simultaneous stimulation   Coordination:  FNF intact without dysmetria  Station/Gait:  Deferred    National Institutes of Health Stroke Scale (on day of discharge)  NIHSS Total Score: 1 (For answering one question right)      Medications    Current Discharge Medication List      START taking these medications    Details   apixaban ANTICOAGULANT (ELIQUIS) 5 MG tablet Take 1 tablet (5 mg) by mouth 2 times daily  Qty: 60 tablet, Refills: 1    Comments: secondary prevention of stroke  Associated Diagnoses: Cerebrovascular accident (CVA) due to stenosis of basilar artery (H)      magnesium oxide (MAG-OX) 400 MG tablet Take 1 tablet (400 mg) by mouth daily  Qty: 30 tablet, Refills: 0    Associated Diagnoses: Hypomagnesemia      melatonin 3 MG tablet Take 1 tablet (3 mg) by mouth At Bedtime  Qty: 30 tablet, Refills: 0    Comments: insomnia  Associated Diagnoses: Primary insomnia      senna-docusate (SENOKOT-S/PERICOLACE) 8.6-50 MG tablet Take 1 tablet by mouth 2 times daily as needed for constipation  Qty: 30 tablet, Refills: 0    Associated Diagnoses: Drug-induced constipation         CONTINUE these medications which have NOT CHANGED    Details   allopurinol (ZYLOPRIM) 100 MG tablet TAKE 2 TABLETS BY MOUTH EVERY DAY  Qty: 180 tablet, Refills: 7    Associated Diagnoses: Chronic gout of multiple sites, unspecified cause      aspirin (ASA) 81 MG EC tablet Take 1 tablet (81 mg) by  mouth daily  Qty: 90 tablet, Refills: 3    Associated Diagnoses: Coronary artery disease involving native coronary artery of native heart without angina pectoris      DULoxetine (CYMBALTA) 20 MG capsule TAKE 1 CAPSULE(20 MG) BY MOUTH TWICE DAILY  Qty: 60 capsule, Refills: 1    Associated Diagnoses: Primary osteoarthritis involving multiple joints      lidocaine (XYLOCAINE) 2 % external gel Apply topically 3 times daily Apply on neck back tid  Qty: 85 g, Refills: 0    Associated Diagnoses: Neck pain      meclizine (ANTIVERT) 12.5 MG tablet Take 1 tablet (12.5 mg) by mouth 3 times daily as needed for dizziness  Qty: 60 tablet, Refills: 6    Associated Diagnoses: Dizziness      pantoprazole (PROTONIX) 40 MG EC tablet Take 1 tablet (40 mg) by mouth every morning (before breakfast)  Qty: 90 tablet, Refills: 3    Associated Diagnoses: Gastroesophageal reflux disease with esophagitis, unspecified whether hemorrhage      rosuvastatin (CRESTOR) 20 MG tablet TAKE 1 TABLET(20 MG) BY MOUTH AT BEDTIME  Qty: 90 tablet, Refills: 0    Comments: ZERO refills remain on this prescription. Your patient is requesting advance approval of refills for this medication to PREVENT ANY MISSED DOSES  Associated Diagnoses: Dyslipidemia      tamsulosin (FLOMAX) 0.4 MG capsule Take 1 capsule (0.4 mg) by mouth daily  Qty: 30 capsule, Refills: 3    Associated Diagnoses: Benign prostatic hyperplasia with incomplete bladder emptying      triamcinolone (KENALOG) 0.1 % external cream Apply topically 2 times daily as needed for irritation  Qty: 80 g, Refills: 3    Associated Diagnoses: Dermatitis         STOP taking these medications       docusate sodium (COLACE) 100 MG capsule Comments:   Reason for Stopping:         JANUVIA 50 MG tablet Comments:   Reason for Stopping:         nitroGLYcerin (NITROSTAT) 0.4 MG sublingual tablet Comments:   Reason for Stopping:               Additional recommendations and follow up:       Adult Urology   Referral      Home Care Referral      Primary Care - Care Coordination Referral      Adult Neurology  Referral      Reason for your hospital stay    You were in the hospital due to stroke and need to evaluate if stent can be put in for you. After extensive discussion, the decision was made that there are more risk with stent than benefit in your case. We have started a blood thinner called eliquis for you.   Please follow up with Stroke in 6-8 weeks of discharge.   Please follow up with Neuro IR at next available appointment.  Please follow up with your PCP in 1-2 weeks.   Please follow up with urology in 2-3 weeks.     Activity    Your activity upon discharge: activity as tolerated     Adult [facility] Follow-up and recommended labs and tests    Please follow up with Stroke in 6-8 weeks of discharge.   Please follow up with Neuro IR at next available appointment.  Please follow up with your PCP in 1-2 weeks.   Please follow up with urology in 2-3 weeks.     Appointments on [place] and/or [facility] (with [facility] or [facility] provider or service). Call [phone] if you haven't heard regarding these appointments within 7 days of discharge.     Diet    Follow this diet upon discharge: Orders Placed This Encounter        Snacks/Supplements Adult: Ensure Enlive; Between Meals      Easy to Chew Diet (level 7) Thin Liquids (level 0)     Stroke Hospital Follow Up    [facility] will call you to coordinate care as prescribed by your provider. If you don t hear from a representative within 2 business days, please call [phone].         Patient was seen and discussed with the Attending, Dr. Miquel Jaramillo MD  Neurology PGY1

## 2023-02-13 NOTE — PLAN OF CARE
Physical Therapy Discharge Summary    Reason for therapy discharge:    Discharged to home with outpatient therapy.    Progress towards therapy goal(s). See goals on Care Plan in [facility] electronic health record for goal details.  Goals partially met.  Barriers to achieving goals:   discharge from facility.    Therapy recommendation(s):    Continued therapy is recommended.  Rationale/Recommendations:  oupt PT for decreasing falls risk, improving activity tolerance, gait and balance training.

## 2023-02-14 ENCOUNTER — TELEPHONE (OUTPATIENT)
Dept: FAMILY MEDICINE | Facility: CLINIC | Age: 83
End: 2023-02-14
Payer: COMMERCIAL

## 2023-02-14 ENCOUNTER — TELEPHONE (OUTPATIENT)
Dept: NEUROSURGERY | Facility: CLINIC | Age: 83
End: 2023-02-14
Payer: COMMERCIAL

## 2023-02-14 ENCOUNTER — TELEPHONE (OUTPATIENT)
Dept: NEUROLOGY | Facility: CLINIC | Age: 83
End: 2023-02-14
Payer: COMMERCIAL

## 2023-02-14 ENCOUNTER — TELEPHONE (OUTPATIENT)
Dept: UROLOGY | Facility: CLINIC | Age: 83
End: 2023-02-14

## 2023-02-14 ENCOUNTER — TELEPHONE (OUTPATIENT)
Dept: FAMILY MEDICINE | Facility: CLINIC | Age: 83
End: 2023-02-14

## 2023-02-14 NOTE — TELEPHONE ENCOUNTER
This encounter is being sent to inform the clinic that this patient has a referral from Julia Alvarado MD for the diagnoses of Cerebrovascular accident (CVA) due to stenosis of basilar artery  and has requested that this patient be seen within 6-8 weeks and/or with any [facility].  Based on the availability of our provider(s), we are unable to accommodate this request.      Were all sites offered this patient?  [facility] schedules are not available. Please review and contact the patient to discuss scheduling options.

## 2023-02-14 NOTE — TELEPHONE ENCOUNTER
Patient's daughter called regarding hospital discharge medication.    Per discharge note:     - Daughter would like to know why they stopped Januvia as that is his only diabetes medication.  - daughter would also like to know why nitroglycerin was stopped as pt takes it occasionally for chest pain.  - daughter also wanted to clarify that plavix was given to pt at [facility], however eliquis was given upon discharged from [facility]. Should pt only be taking eliquis?    Dr. Espino please advise.    Agapito Shook, BSN RN  [facility]      Daughter requesting call back at [phone]

## 2023-02-14 NOTE — TELEPHONE ENCOUNTER
Home Health Care    Reason for call:  [facility] orders    Orders are needed for this patient.  PT: eval and treat  OT: eval and treat    Pt Provider: Kenzie    Phone Number Homecare Nurse can be reached at: [phone]    Can we leave a detailed message on this number? YES    Okay to leave a detailed message?: Yes see above     Call taken on 2/14/23 at 138 pm by GAVIOTA James

## 2023-02-14 NOTE — TELEPHONE ENCOUNTER
Chucho Espino MD [facility] Care Team Pool 18 minutes ago (1:43 PM)     Ok      Writer attempt to call Nicole from [facility] back. No answer, left detailed VM with call back number.    If Nicole calls back, please relay Dr. Espino message above to her. Thanks.    Closing encounter.    JONATHAN GarrettN, RN   [facility]

## 2023-02-15 ENCOUNTER — TELEPHONE (OUTPATIENT)
Dept: FAMILY MEDICINE | Facility: CLINIC | Age: 83
End: 2023-02-15

## 2023-02-15 ENCOUNTER — PATIENT OUTREACH (OUTPATIENT)
Dept: GERIATRIC MEDICINE | Facility: CLINIC | Age: 83
End: 2023-02-15
Payer: COMMERCIAL

## 2023-02-15 NOTE — TELEPHONE ENCOUNTER
General Call      Reason for Call:  Verbal Order    What are your questions or concerns:  Edyta from [facility] called and would like a verbal order to delay pt's home care service till the 19th as pt requested. Home care service is for skilled home care PT. Please call Edyta at [phone]. Thanks!    Date of last appointment with provider: 1/19/2023    Okay to leave a detailed message?: Yes at Other phone number:  [phone]

## 2023-02-15 NOTE — TELEPHONE ENCOUNTER
I called and discussed the questions with the patient's daughter.  We reviewed the hospital discharge summary.  She will not give nitroglycerin or Plavix.  Continue with aspirin and Eliquis.  She will go ahead and restart Januvia 50 mg/day.  Follow-up is scheduled for 2 days from now with me here in the clinic and we discussed indications for emergent follow-up.

## 2023-02-15 NOTE — PROGRESS NOTES
[facility] Care Coordination Contact      Situation: Received a referral for Care Coordination services.     Background: CC has been following during transition.     Assessment: Spoke with Dimitri today who had questions about Flomax prescription. Noted that this prescription was called into the [facility] pharmacy on 2/6. Dimitri reports utilizing the [facility] in [place]. CC contacted [facility] to call in the prescription to fill.     Plan/Recommendations: CC will complete a change in condition visit on 2/21/2023, and remain available as needed in the interim.     RICKEY Gallegos  [facility]  [phone]

## 2023-02-16 ENCOUNTER — OFFICE VISIT (OUTPATIENT)
Dept: FAMILY MEDICINE | Facility: CLINIC | Age: 83
End: 2023-02-16
Payer: COMMERCIAL

## 2023-02-16 VITALS
WEIGHT: 157 LBS | HEART RATE: 83 BPM | OXYGEN SATURATION: 95 % | TEMPERATURE: 98 F | DIASTOLIC BLOOD PRESSURE: 76 MMHG | BODY MASS INDEX: 30.82 KG/M2 | SYSTOLIC BLOOD PRESSURE: 128 MMHG | RESPIRATION RATE: 20 BRPM | HEIGHT: 60 IN

## 2023-02-16 DIAGNOSIS — I63.9 CEREBELLAR STROKE (H): Primary | ICD-10-CM

## 2023-02-16 DIAGNOSIS — R33.9 URINARY RETENTION: ICD-10-CM

## 2023-02-16 DIAGNOSIS — Z23 HIGH PRIORITY FOR 2019 NOVEL CORONAVIRUS VACCINATION: ICD-10-CM

## 2023-02-16 DIAGNOSIS — K59.00 CONSTIPATION, UNSPECIFIED CONSTIPATION TYPE: ICD-10-CM

## 2023-02-16 DIAGNOSIS — E83.42 HYPOMAGNESEMIA: ICD-10-CM

## 2023-02-16 DIAGNOSIS — R26.0 ATAXIC GAIT: ICD-10-CM

## 2023-02-16 LAB
ALBUMIN SERPL BCG-MCNC: 4.5 G/DL (ref 3.5–5.2)
ALT SERPL W P-5'-P-CCNC: 54 U/L (ref 10–50)
CREAT SERPL-MCNC: 1.44 MG/DL (ref 0.67–1.17)
ERYTHROCYTE [DISTWIDTH] IN BLOOD BY AUTOMATED COUNT: 15 % (ref 10–15)
GFR SERPL CREATININE-BSD FRML MDRD: 49 ML/MIN/1.73M2
HCT VFR BLD AUTO: 39.7 % (ref 40–53)
HGB BLD-MCNC: 13.1 G/DL (ref 13.3–17.7)
MAGNESIUM SERPL-MCNC: 2 MG/DL (ref 1.7–2.3)
MCH RBC QN AUTO: 30.5 PG (ref 26.5–33)
MCHC RBC AUTO-ENTMCNC: 33 G/DL (ref 31.5–36.5)
MCV RBC AUTO: 92 FL (ref 78–100)
PLATELET # BLD AUTO: 211 10E3/UL (ref 150–450)
RBC # BLD AUTO: 4.3 10E6/UL (ref 4.4–5.9)
WBC # BLD AUTO: 9.1 10E3/UL (ref 4–11)

## 2023-02-16 PROCEDURE — 82565 ASSAY OF CREATININE: CPT | Performed by: FAMILY MEDICINE

## 2023-02-16 PROCEDURE — 85027 COMPLETE CBC AUTOMATED: CPT | Performed by: FAMILY MEDICINE

## 2023-02-16 PROCEDURE — 83735 ASSAY OF MAGNESIUM: CPT | Performed by: FAMILY MEDICINE

## 2023-02-16 PROCEDURE — 36415 COLL VENOUS BLD VENIPUNCTURE: CPT | Performed by: FAMILY MEDICINE

## 2023-02-16 PROCEDURE — 82040 ASSAY OF SERUM ALBUMIN: CPT | Performed by: FAMILY MEDICINE

## 2023-02-16 PROCEDURE — 99214 OFFICE O/P EST MOD 30 MIN: CPT | Performed by: FAMILY MEDICINE

## 2023-02-16 PROCEDURE — 84460 ALANINE AMINO (ALT) (SGPT): CPT | Performed by: FAMILY MEDICINE

## 2023-02-16 NOTE — PROGRESS NOTES
Hospital follow-up visit.  Date of admission 2/1/2023, date of discharge 2/13/2023.  He did have a hospital transfer around 2/6/2023 when he was transferred from [facility] to [facility].  He is now home with his daughter and son that are with him today providing 24-hour care for him at home.    ASSESMENT AND PLAN:  Diagnoses and all orders for this visit:  Hospital follow-up cerebellar stroke (H)  MED REC REQUIRED  Post Medication Reconciliation Status: discharge medications reconciled and changed, per note/orders   Patient will be initiating home physical therapy this weekend.  Patient and family counseled on the importance of proceeding with both physical therapy and Occupational Therapy.  Medication reconciliation and review and counseling done based on the discharge summary, this was done with the patient with the help of a professional , and also with the daughter who speaks English well.  For now, he is not going to take isosorbide, losartan, or amlodipine.  However, I am going to have him restart metoprolol at 50 mg/day of the extended release because of his cardiac history.  If blood pressure elevates significantly in the future, would consider adding back amlodipine and/or losartan.  If he starts to have problems with chest pains would consider adding back the isosorbide.    Patient will follow-up with neurology and neurosurgery as directed.      Ataxic gait  Exacerbation with his recent cerebellar stroke.  Written prescription for wheelchair given.  Physical therapy and Occupational Therapy to be continued at home.  Hypomagnesemia  Discharge summary indicates that the only labs required for his follow-up primary care visit is a recheck on his magnesium.  Continue oral magnesium replacement for now.  -     Magnesium; Future  Constipation, unspecified constipation type  Given that week her stool softeners have not been helpful, were going to use cautious use of polyethylene glycol.  Urinary retention    Reviewed inpatient urology consultation.  Patient was prescribed tamsulosin, the daughter was just able to get the prescription yesterday so he is only had 1 dose.  Counseled the importance of continuing tamsulosin daily which should help with his urinary flow and they will follow-up with urology as directed.  high priority for 2019 novel coronavirus vaccination  Strongly recommended the patient get the new coronavirus vaccine booster but patient declines that today, he will reconsider it at his next visit if he is feeling overall stronger.      Reviewed the risks and benefits of the treatment plan with the patient and/or caregiver and we discussed indications for routine and emergent follow-up.        SUBJECTIVE: 82-year-old male here for follow-up on his recent hospitalization.  He was initially hospital advised with an acute stroke at [facility].  He then had to transfer to [facility] because there was consideration of possibly doing a basilar artery stent.  That ended up not happening, see neurology and neurosurgery notes for details.  It is still a possibility in the future.  Since being home, the patient has been slowly increasing his strength.  He still requiring a wheelchair for most of his ambulation over longer distances.  His adult children are taking care of him 24 hours/day and assisting with his activities of daily living, patient and family feel safe at home and like this is the best healing environment for him.  Home physical therapy and Occupational Therapy are scheduled to start this weekend.    Since being home, the patient has not had any vomiting.  No fevers.  No new or progressively worsening stroke symptoms.  His gait is quite unsteady.  Currently the family is using a borrowed wheelchair.  Several medication questions arose upon his discharge.  Since discharge, we have restarted Januvia for his oral medication for diabetes and additional medications changes made  today as detailed above.  Since returning home, patient has been passing urine regularly although he feels like sometimes he has to strain to pass urine and sometimes has some mild lower abdominal discomfort prior to urination.    Past Medical History:   Diagnosis Date     Acute blood loss anemia      Acute renal failure (H)      Anemia      Bacteremia      Cataract      Chronic gout      CKD (chronic kidney disease)     Stage 3     DM2 (diabetes mellitus, type 2) (H)      GERD (gastroesophageal reflux disease)      Glucose intolerance (impaired glucose tolerance)      Gout      History of nephrolithiasis      History of prostatitis 2017     Hyperlipidemia      Hypertension      Insomnia      Intestinal disaccharidase deficiencies and disaccharide malabsorption     Created by Conversion      Latent tuberculosis      Nephrolithiasis      Neuropathy      Nonspecific abnormal findings on radiological and other examination of skull and head     Created by [facility] Annotation: 2013 11:23PM - Giulia Meridai/10/2011:  severe stenosis both posterior cerebral arteries seen MRI/MRA done for  vertigo. No acute changes.e*     Osteoarthritis     wrist, knee, shoulder     Prostatitis      Rectal bleed      Trigger thumb      Patient Active Problem List   Diagnosis     Esophageal reflux     Dyslipidemia     Nephrolithiasis     Pseudogout     Latent Tuberculosis     Generalized Osteoarthritis Of The Hand     Lumbar Disc Degeneration     Insomnia     Chronic Gout     CKD (chronic kidney disease) stage 3, GFR 30-59 ml/min (H)     Elevated PSA     Prostate nodule     Cataracts, bilateral     Constipation, unspecified constipation type     Bilateral chronic knee pain     Chronic right shoulder pain     Essential hypertension with goal blood pressure less than 140/90     BPH (benign prostatic hyperplasia)     Chronic gout     Coronary artery disease due to lipid rich plaque     Right lower quadrant  abdominal pain     Colitis     Primary osteoarthritis involving multiple joints     Urinary incontinence     Type 2 diabetes mellitus with stage 3 chronic kidney disease, without long-term current use of insulin (H)     ACE-inhibitor cough     Vertigo     Essential hypertension     Vertebral artery occlusion, left     Cerebellar stroke (H)     Stroke (H)     Hypomagnesemia     Ataxic gait     Current Outpatient Medications   Medication Sig Dispense Refill     allopurinol (ZYLOPRIM) 100 MG tablet TAKE 2 TABLETS BY MOUTH EVERY  tablet 7     apixaban ANTICOAGULANT (ELIQUIS) 5 MG tablet Take 1 tablet (5 mg) by mouth 2 times daily 60 tablet 1     aspirin (ASA) 81 MG EC tablet Take 1 tablet (81 mg) by mouth daily 90 tablet 3     DULoxetine (CYMBALTA) 20 MG capsule TAKE 1 CAPSULE(20 MG) BY MOUTH TWICE DAILY 60 capsule 1     lidocaine (XYLOCAINE) 2 % external gel Apply topically 3 times daily Apply on neck back tid 85 g 0     magnesium oxide (MAG-OX) 400 MG tablet Take 1 tablet (400 mg) by mouth daily 30 tablet 0     meclizine (ANTIVERT) 12.5 MG tablet Take 1 tablet (12.5 mg) by mouth 3 times daily as needed for dizziness 60 tablet 6     melatonin 3 MG tablet Take 1 tablet (3 mg) by mouth At Bedtime 30 tablet 0     pantoprazole (PROTONIX) 40 MG EC tablet Take 1 tablet (40 mg) by mouth every morning (before breakfast) 90 tablet 3     rosuvastatin (CRESTOR) 20 MG tablet TAKE 1 TABLET(20 MG) BY MOUTH AT BEDTIME 90 tablet 0     senna-docusate (SENOKOT-S/PERICOLACE) 8.6-50 MG tablet Take 1 tablet by mouth 2 times daily as needed for constipation 30 tablet 0     tamsulosin (FLOMAX) 0.4 MG capsule Take 1 capsule (0.4 mg) by mouth daily 30 capsule 3     triamcinolone (KENALOG) 0.1 % external cream Apply topically 2 times daily as needed for irritation 80 g 3     History   Smoking Status     Former     Types: Cigarettes     Quit date: 3/10/2000   Smokeless Tobacco     Former       OBJECTICE: /76 (BP Location: Left arm,  Cuff Size: Adult Regular)   Pulse 83   Temp 98  F (36.7  C) (Oral)   Resp 20   Ht 1.524 m (5')   Wt 71.2 kg (157 lb)   SpO2 95%   BMI 30.66 kg/m       Recent Results (from the past 24 hour(s))   CBC with platelets    Collection Time: 02/16/23 11:56 AM   Result Value Ref Range    WBC Count 9.1 4.0 - 11.0 10e3/uL    RBC Count 4.30 (L) 4.40 - 5.90 10e6/uL    Hemoglobin 13.1 (L) 13.3 - 17.7 g/dL    Hematocrit 39.7 (L) 40.0 - 53.0 %    MCV 92 78 - 100 fL    MCH 30.5 26.5 - 33.0 pg    MCHC 33.0 31.5 - 36.5 g/dL    RDW 15.0 10.0 - 15.0 %    Platelet Count 211 150 - 450 10e3/uL        GEN-alert, appropriate, in no acute distress   CV-regular rate and rhythm   RESP-lungs clear to auscultation   ABDOMINAL-soft, mild diffuse lower abdominal tenderness to deep palpation, no guarding or rebound, no palpable masses   EXTREM-no pitting edema of the ankles       Chucho Espino MD

## 2023-02-16 NOTE — TELEPHONE ENCOUNTER
Called [facility] and spoke with Ginger and relayed PCP gave verbal approval for delay of services for pt till the 19th.  Thanks

## 2023-02-16 NOTE — TELEPHONE ENCOUNTER
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   Date of Appt: 3/14/23   NOTES (FOR ALL VISITS) STATUS DETAILS   OFFICE NOTE from referring provider Internal SEE INPATIENT NOTES   DISCHARGE SUMMARY from hospital Internal [facility]:  2/6/23-2/13/23    [facility]:  2/1/23-2/6/23 1/11/23-1/14/23   MEDICATION LIST Internal    IMAGING  (FOR ALL VISITS)     MRI (HEAD, NECK, SPINE) [facility]:  MRI Brain 2/9/23  MRI Brain 2/4/23  MRI Brain 2/1/23  MRA Neck Carotids 1/12/23  MRA Neck Carotids 1/11/23  MRI Brain 1/11/23  MRA Neck Carotids 1/11/23  MRA Brain COW 1/11/23   CT (HEAD, NECK, SPINE) [facility]:  CTA Head Neck 2/9/23  CT Head 2/9/23  CT Head 2/6/23  CT Head 2/6/23  CTA Head Neck 2/1/23  CTA Head Neck 1/11/23

## 2023-02-19 ENCOUNTER — PATIENT OUTREACH (OUTPATIENT)
Dept: GERIATRIC MEDICINE | Facility: CLINIC | Age: 83
End: 2023-02-19
Payer: COMMERCIAL

## 2023-02-19 ENCOUNTER — MEDICAL CORRESPONDENCE (OUTPATIENT)
Dept: HEALTH INFORMATION MANAGEMENT | Facility: CLINIC | Age: 83
End: 2023-02-19

## 2023-02-20 ENCOUNTER — PATIENT OUTREACH (OUTPATIENT)
Dept: GERIATRIC MEDICINE | Facility: CLINIC | Age: 83
End: 2023-02-20
Payer: COMMERCIAL

## 2023-02-20 ENCOUNTER — TELEPHONE (OUTPATIENT)
Dept: FAMILY MEDICINE | Facility: CLINIC | Age: 83
End: 2023-02-20
Payer: COMMERCIAL

## 2023-02-20 NOTE — PROGRESS NOTES
Encounter opened due to Regulatory Compass Yessenia Update to close FVP Program.    Wang Russell  [facility]  Case Management Specialist  [phone]

## 2023-02-20 NOTE — TELEPHONE ENCOUNTER
Writer attempt to call Angelica from [facility] regarding Dr. Espino message below. NO answer, left detailed VM with call back number.    If Allison calls back, please let her know, Dr. Espino is giving the verbal OK for the requested orders.    Closing encounter.    JONATHAN GarrettN, RN   [facility]

## 2023-02-20 NOTE — PROGRESS NOTES
[facility] Care Coordination Contact    Called adult daughter Dimitri to schedule annual HRA home visit. HRA has been scheduled for 2/21/2023.     RICKEY Gallegos  [facility]  [phone]

## 2023-02-20 NOTE — PROGRESS NOTES
Encounter opened due to Regulatory Compass Yessenia Update to open FVP Program.    Wang Russell  [facility]  Case Management Specialist  [phone]

## 2023-02-20 NOTE — TELEPHONE ENCOUNTER
Order/Referral Request    Who is requesting: Angelica with [facility]    Orders being requested: verbal order: PT, OPT, , Medication clarification.    Reason service is needed/diagnosis:   * PT: 3x a week for 1 week, 2x a week foro 3 week for transfer, gait training, strength and balance.    * OPT: 1x a week for 1 week for ADL, equipment recommendations.    * : 1x a month for 1x: Health Care Directive.    * Medication clarification: Should patient stop taking Januvia, medication no longer on current medication list. Medication discontinue as of 02/07/2023?  * Patient also take Acetaminophen 500 mg over the counter medication, should patient continue to take medication?      When are orders needed by: as soon as possible    Has this been discussed with Provider: Yes    Does patient have a preference on a Group/Provider/Facility? [facility]    Does patient have an appointment scheduled?: No    Where to send orders: Please contact Angelica @ [phone] to provide verbal order.    Okay to leave a detailed message?: Yes at Other phone number:  [phone].

## 2023-02-21 ENCOUNTER — PATIENT OUTREACH (OUTPATIENT)
Dept: GERIATRIC MEDICINE | Facility: CLINIC | Age: 83
End: 2023-02-21
Payer: COMMERCIAL

## 2023-02-21 ENCOUNTER — MEDICAL CORRESPONDENCE (OUTPATIENT)
Dept: HEALTH INFORMATION MANAGEMENT | Facility: CLINIC | Age: 83
End: 2023-02-21
Payer: COMMERCIAL

## 2023-02-21 NOTE — Clinical Note
FYI- patient would benefit from a wheelchair and a hospital bed. Visit scheduled with you for follow up and to request orders on 3/6/2022. I will have the [employer] company send over the paperwork.

## 2023-02-22 ASSESSMENT — ACTIVITIES OF DAILY LIVING (ADL): DEPENDENT_IADLS:: CLEANING;COOKING;LAUNDRY;SHOPPING;MEAL PREPARATION;TRANSPORTATION

## 2023-02-22 NOTE — PROGRESS NOTES
[facility] Care Coordination Contact    [facility] Change in Condition Assessment    Home visit for Change in Condition Health Risk Assessment with Adam Talamantes completed on February 21, 2023    Reason for Early reassessment: Health Status Change  Yes, if yes explain stroke with associated hospitalization.    Type of residence: Private home - stairs  Current living arrangement: I live in a private home with family     Assessment completed with: Children, Patient, Family    Current Care Plan  Member currently receiving the following home care services: SN  Member currently receiving the following community resources: PCA (4.5 hours per day.)    Medication Review  Medication reconciliation completed in Epic: Yes  Medication set-up & administration: Family/informal caregiver sets up daily.  Family caregiver administers medications.  Medication Risk Assessment Medication (1 or more, place referral to MTM): N/A: No risk factors identified  MTM Referral Placed: No: No risk factors idenified    Mental/Behavioral Health   Depression Screening: Denies concerns.  Mental health DX: No        Falls Assessment:   Fallen 2 or more times in the past year?: No   Any fall with injury in the past year?: No    ADL/IADL Dependencies:   Dependent ADLs: Ambulation-walker, Bathing, Grooming  Dependent IADLs: Cleaning, Cooking, Laundry, Shopping, Meal Preparation, Transportation    [facility] Health Plan sponsored benefits: Shared information re: Silver Sneakers/gym memberships, ASA, Calcium +D.    PCA Assessment completed at visit: Yes Annual PCA assessment indicated 42 units per day of PCA. This is an increase from the previous assessment.  10.5 hours per day    Elderly Waiver Eligibility: No-does not meet criteria    Care Plan & Recommendations: Change in condition visit completed today due to a stroke earlier this month resulting in hospitalization and significant change in functioning.     Adam Talamantes is a 82 year old male with a  past medical history of Primary osteoarthritis involving multiple joints, Type 2 diabetes mellitus, Coronary artery disease, Urinary incontinence, Chronic gout, Essential hypertension, Chronic right shoulder pain, Bilateral chronic knee pain, Generalized Osteoarthritis Of The Hand, Lower Back Pain, Lumbar Disc Degeneration, Insomnia,,  and CKD (chronic kidney disease) stage 3.    Adam Albin  lives in a single family home with his wife, Lenny, and his daughter, Dimitri and son in law, Fermin Christensen. Fermin Christensen currently provides PCA support to Adam daily. Family endorses  memory impairments. Will wander outside and is high fall risk. Needs redirection when tries to do unsafe tasks.     He appears well supported by family. Will increase PCA hours to reflect changes.     See CC for detailed assessment information.    Follow-Up Plan: Member informed of future contact, plan to f/u with member with a 6 month telephone assessment.  Contact information shared with member and family, encouraged member to call with any questions or concerns at any time.    [place] care continuum providers: Please see Snapshot and Care Management Flowsheets for Specific details of care plan.    This CC note routed to PCP.     RICKEY Gallegos  [facility]  [phone]

## 2023-02-24 NOTE — TELEPHONE ENCOUNTER
Spoke to patients daughter Dimitri- wanted in person, scheduled and confirmed.    SANTANA HOPE, CMA

## 2023-02-27 ENCOUNTER — TELEPHONE (OUTPATIENT)
Dept: FAMILY MEDICINE | Facility: CLINIC | Age: 83
End: 2023-02-27
Payer: COMMERCIAL

## 2023-02-27 NOTE — TELEPHONE ENCOUNTER
FYI - Status Update    Who is Calling: PATIENCE Sosa with [facility]    Update: Patient high blood pressure was checked today 02/27/2023 with 156/86 with no symptoms. Patient takes all medication as prescribed.    Does caller want a call/response back: No

## 2023-02-28 NOTE — TELEPHONE ENCOUNTER
Are the blood sugars being checked at home?  I would prefer to wait to restart the Januvia until our next visit next week.  The clinic where we can discuss it in detail.  And thus the patient is having significantly elevated blood sugars we could start it sooner, let me know.  Thanks.

## 2023-02-28 NOTE — TELEPHONE ENCOUNTER
Dr. Espino- please advise. Pt requesting jenuvia    Chart reviewed but no jenuvia on current med list.    - called pt's daughter to clarify and she stated she spoke to Dr. Espino at pt's previous appt regarding JENUVIA. Per daughter, Dr. Kong told her that he will prescribe jenuvia for pt's diabetes.    Agapito Shook, BSN RN  [facility]

## 2023-02-28 NOTE — TELEPHONE ENCOUNTER
Medication Question or Refill    Contacts       Type Contact Phone/Fax    02/27/2023 07:05 PM CST Phone (Incoming) Dimitri Talamantes (Emergency Contact) [phone]          What medication are you calling about (include dose and sig)?: Januvia 50mg once a day      Preferred Pharmacy:   [id number] - [place], [place] - [address] [place] [address] [address]  [address] [facility] [address]  Phone: [phone] Fax: [phone]    [facility] [address]  [address] [place] [address] [address]  [facility] [address]  Phone: [phone] Fax: [phone]      Controlled Substance Agreement on file:   CSA -- Patient Level:     [Media Unavailable] Controlled Substance Agreement - Opioid - Scan on 4/12/2022  8:47 AM   [Media Unavailable] Controlled Substance Agreement - Opioid - Scan on 8/4/2021  7:24 AM       Who prescribed the medication?: Dr. Espino    Do you need a refill? Yes    When did you use the medication last? Today 2/27/23    Patient offered an appointment? No    Do you have any questions or concerns? Dimitri Talamantes (patient's daughter) talked with Dr. Espino regarding the refill      Okay to leave a detailed message?: Yes at Home number on file [phone]

## 2023-03-01 NOTE — TELEPHONE ENCOUNTER
"Writer called patient with the help of a Kayce  regarding provider's message below. Provider message relayed to patient's daughter \"Dimitri Albin\" (C2C on file).    Per daughter, they have not been checking blood sugars at home. Writer asked if family has the blood sugar test kits at home to check BS. Per daughter, they do. Writer recommended family to start checking pt's BS to keep track of pt's BS level and can further discussed starting \"Januvia\" in clinic as indicated by PCP.    Daughter verbalizes understanding, agrees with plan and has no further questions. Family will wait until next office visit to discuss BS and Januvia in-person.    Closing encounter.    PETTY Garrett, RN   [facility]    "

## 2023-03-02 ENCOUNTER — PATIENT OUTREACH (OUTPATIENT)
Dept: GERIATRIC MEDICINE | Facility: CLINIC | Age: 83
End: 2023-03-02
Payer: COMMERCIAL

## 2023-03-02 ENCOUNTER — MEDICAL CORRESPONDENCE (OUTPATIENT)
Dept: HEALTH INFORMATION MANAGEMENT | Facility: CLINIC | Age: 83
End: 2023-03-02
Payer: COMMERCIAL

## 2023-03-02 DIAGNOSIS — Z53.9 DIAGNOSIS NOT YET DEFINED: Primary | ICD-10-CM

## 2023-03-02 PROCEDURE — G0180 MD CERTIFICATION HHA PATIENT: HCPCS | Performed by: FAMILY MEDICINE

## 2023-03-02 NOTE — PROGRESS NOTES
[facility] Care Coordination Contact    Received after visit chart from care coordinator.  Completed following tasks: Mailed copy of care plan to client, Updated services in Database and Mailed Safe Medication Disposal     [facility]:  Emailed completed PCA assessment to [facility].  Faxed copy of PCA assessment to PCA Agency and mailed copy to member.  Faxed MD Communication to PCP.     Wang Russell  [facility]  Case Management Specialist  [phone]

## 2023-03-02 NOTE — LETTER
March 2, 2023      ASIF DE JESUSG  [address]  [facility] [address]      Dear Asif:    At [facility], we re dedicated to improving your health and wellness. Enclosed is the Care Plan developed with you on 2/21/23. Please review the Care Plan carefully.    As a reminder, during your visit we talked about:    Ways to manage your physical and mental health    Using health care to maintain and improve your health     Your preventive care needs     Remember to contact your care coordinator if you:    Are hospitalized, or plan to be hospitalized     Have a fall      Have a change in your physical or mental health    Need help finding support or services    If you have questions, or don t agree with your Care Plan, call me at [phone]. You can also call me if your needs change. TTY users, call the [place] [employer] at (337) or 1-942.747.5242 (zfndvg-yy-fgkjqz relay service).    Sincerely,    RICKEY Gallegos  [phone]  [email or web address] is a health plan that contracts with both Medicare and the [place] Medical Assistance (Medicaid) program to provide benefits of both programs to enrollees. Enrollment in [facility] depends on contract renewal.    [id number] accepted    [id number] (07/2022)

## 2023-03-06 ENCOUNTER — TELEPHONE (OUTPATIENT)
Dept: FAMILY MEDICINE | Facility: CLINIC | Age: 83
End: 2023-03-06

## 2023-03-06 ENCOUNTER — OFFICE VISIT (OUTPATIENT)
Dept: FAMILY MEDICINE | Facility: CLINIC | Age: 83
End: 2023-03-06
Payer: COMMERCIAL

## 2023-03-06 VITALS
WEIGHT: 154.25 LBS | DIASTOLIC BLOOD PRESSURE: 78 MMHG | OXYGEN SATURATION: 97 % | HEART RATE: 75 BPM | SYSTOLIC BLOOD PRESSURE: 126 MMHG | TEMPERATURE: 98.6 F | RESPIRATION RATE: 24 BRPM | HEIGHT: 60 IN | BODY MASS INDEX: 30.28 KG/M2

## 2023-03-06 DIAGNOSIS — N18.30 TYPE 2 DIABETES MELLITUS WITH STAGE 3 CHRONIC KIDNEY DISEASE, WITHOUT LONG-TERM CURRENT USE OF INSULIN, UNSPECIFIED WHETHER STAGE 3A OR 3B CKD (H): ICD-10-CM

## 2023-03-06 DIAGNOSIS — I63.9 CEREBELLAR STROKE (H): ICD-10-CM

## 2023-03-06 DIAGNOSIS — E11.22 TYPE 2 DIABETES MELLITUS WITH STAGE 3 CHRONIC KIDNEY DISEASE, WITHOUT LONG-TERM CURRENT USE OF INSULIN, UNSPECIFIED WHETHER STAGE 3A OR 3B CKD (H): ICD-10-CM

## 2023-03-06 DIAGNOSIS — I63.22 CEREBROVASCULAR ACCIDENT (CVA) DUE TO STENOSIS OF BASILAR ARTERY (H): ICD-10-CM

## 2023-03-06 DIAGNOSIS — K59.00 CONSTIPATION, UNSPECIFIED CONSTIPATION TYPE: ICD-10-CM

## 2023-03-06 DIAGNOSIS — F51.01 PRIMARY INSOMNIA: Primary | ICD-10-CM

## 2023-03-06 LAB — HBA1C MFR BLD: NORMAL %

## 2023-03-06 PROCEDURE — 99214 OFFICE O/P EST MOD 30 MIN: CPT | Performed by: FAMILY MEDICINE

## 2023-03-06 RX ORDER — TRAZODONE HYDROCHLORIDE 50 MG/1
25-50 TABLET, FILM COATED ORAL
Qty: 90 TABLET | Refills: 3 | Status: SHIPPED | OUTPATIENT
Start: 2023-03-06 | End: 2023-04-27

## 2023-03-06 RX ORDER — METOPROLOL SUCCINATE 100 MG/1
100 TABLET, EXTENDED RELEASE ORAL DAILY
COMMUNITY
End: 2023-06-13

## 2023-03-06 RX ORDER — ACETAMINOPHEN 500 MG
500 TABLET ORAL EVERY 6 HOURS PRN
COMMUNITY
End: 2023-10-03

## 2023-03-06 RX ORDER — LANOLIN ALCOHOL/MO/W.PET/CERES
3 CREAM (GRAM) TOPICAL AT BEDTIME
Qty: 30 TABLET | Refills: 0 | Status: CANCELLED | OUTPATIENT
Start: 2023-03-06

## 2023-03-06 RX ORDER — POLYETHYLENE GLYCOL 3350 17 G/17G
1 POWDER, FOR SOLUTION ORAL DAILY PRN
Qty: 578 G | Refills: 4 | Status: SHIPPED | OUTPATIENT
Start: 2023-03-06 | End: 2024-04-29

## 2023-03-06 RX ORDER — HYDROCODONE BITARTRATE AND ACETAMINOPHEN 5; 325 MG/1; MG/1
1 TABLET ORAL DAILY PRN
COMMUNITY
End: 2023-04-27

## 2023-03-06 RX ORDER — HYDROCODONE BITARTRATE AND ACETAMINOPHEN 5; 325 MG/1; MG/1
1 TABLET ORAL DAILY PRN
Status: CANCELLED | OUTPATIENT
Start: 2023-03-06

## 2023-03-06 NOTE — TELEPHONE ENCOUNTER
Order/Referral Request    Who is requesting: PATIENCE Sosa    Orders being requested: Verbal orders for speech therapy: eval and treat    Reason service is needed/diagnosis: n/a    When are orders needed by: ASAP    Has this been discussed with Provider: No    Does patient have a preference on a Group/Provider/Facility? [facility]    Does patient have an appointment scheduled?: No    Where to send orders: verbal    Okay to leave a detailed message?: Yes at Cell number on file:    Telephone Information:   Mobile [phone]

## 2023-03-06 NOTE — TELEPHONE ENCOUNTER
Called Sheila and left detailed message with verbal orders.    Agapito Martinez Cem Say, BSN RN  [facility]

## 2023-03-06 NOTE — PROGRESS NOTES
ASSESMENT AND PLAN:  Diagnoses and all orders for this visit:  Primary insomnia  Reviewed options today with the patient and his family with the help of a professional  and they would like to try trazodone.  We will start at half tablet and titrate up if necessary.  -     traZODone (DESYREL) 50 MG tablet; Take 0.5-1 tablets (25-50 mg) by mouth nightly as needed for sleep  Cerebellar stroke (H)  Given the resulting ataxia it is important that he continues to use a wheelchair or walker, continues to work with PT and OT, and I counseled the patient and his family that I do not think it is a good idea for him to take any more of the hydrocodone acetaminophen because of the significant increased fall risk and other risks of taking that medication at this age.  They are in agreement with the plan for now, hoping that if we can get better control in his sleep then he will be dealing with a nighttime pain as much.  Can use acetaminophen up to 1000 mg per dose at nighttime in place of when he had previously used the hydrocodone acetaminophen.  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  Continue with medical management for now,  -     apixaban ANTICOAGULANT (ELIQUIS) 5 MG tablet; Take 1 tablet (5 mg) by mouth 2 times daily  Type 2 diabetes mellitus with stage 3 chronic kidney disease, without long-term current use of insulin, unspecified whether stage 3a or 3b CKD (H)  Blood sugars have been rising as he has been eating better, his Januvia have been stopped in the hospital we will restart it as prescribed below:  -     sitagliptin (JANUVIA) 50 MG tablet; Take 1 tablet (50 mg) by mouth daily  Constipation, unspecified constipation type  -     polyethylene glycol (MIRALAX) 17 GM/Dose powder; Take 17 g (1 capful.) by mouth daily as needed for constipation        The patient recently suffered a stroke and had a recent  hospitalization.  He has severe gait ataxia and generalized weakness and also degenerative disc disease of the lumbar spine.  This is combining to cause him severe difficulties with ambulation.  He has medical necessity for manual wheelchair that his family will be able to provide the locomotion for.  A cane or walker is not adequate, he needs a wheelchair for crossing longer distances safely.  The family is willing and able to help him maneuver the wheelchair and his home provides adequate space to do so.  The use of the wheelchair will significantly improve his ability to participate in activities of daily living and allow for safer movements inside and outside of the home.     Patient is having significant vertigo and dizziness after his stroke, he also has generalized weakness and chronic back pain and joint pain and a history of lumbar degenerative disc disease.  This is requiring him to have specialized positioning in bed and he has medical necessity for hospital bed and mattress to allow him to get into a safe and comfortable position for sleep.  And also to allow for easier transfers in and out of the bed.  The patient requires frequent changes in body position because of his history of stroke and risk of developing bedsores and also for pain control.  His lumbar spine disease and his stroke history are medical conditions that require positioning of the body in ways that are not feasible in an ordinary bed and with pillows wedges etc.  The positioning of his body is not feasible with an ordinary bed to alleviate pain and address his lumbar spinal disease.  A hospital bed will also protect him from serious injury related to falls and repositioning because of his history of stroke and lumbar spine disease.  Face-to-face visit completed with the patient on 3/6/2023.      Reviewed the risks and benefits of the treatment plan with the patient and/or caregiver and we discussed indications for routine and emergent  follow-up.        SUBJECTIVE: 82-year-old male in for follow-up.  Patient suffered a recent cerebellar stroke and has been having ataxic gait and dizziness since then.  He is making slow progress working with physical therapy and Occupational Therapy.  He still requires a walker, the assistance of other people, or wheelchair for ambulation safely.  Patient has a history of intermittent severe joint pain in the past for which she had taken occasional acetaminophen with hydrocodone.  He is wondering about his joint pain plan, he has not been taking the hydrocodone/acetaminophen since being home from the hospital.  Currently just using 500 mg of acetaminophen and reports that its not providing adequate pain relief, especially at night.  Patient's not sleeping well most nights.  His family is struggling to help him with this, has been taking melatonin up to 10 mg at bedtime and it does not seem to be helping.  Patient is having constipation, he has not been able to get a prescription for polyethylene glycol yet which works very well for him but he has been using a family members.    Past Medical History:   Diagnosis Date     Acute blood loss anemia      Acute renal failure (H)      Anemia      Bacteremia      Cataract      Chronic gout      CKD (chronic kidney disease)     Stage 3     DM2 (diabetes mellitus, type 2) (H)      GERD (gastroesophageal reflux disease)      Glucose intolerance (impaired glucose tolerance)      Gout      History of nephrolithiasis      History of prostatitis 2017     Hyperlipidemia      Hypertension      Insomnia      Intestinal disaccharidase deficiencies and disaccharide malabsorption     Created by Conversion      Latent tuberculosis      Nephrolithiasis      Neuropathy      Nonspecific abnormal findings on radiological and other examination of skull and head     Created by [employer] [facility] Annotation: 2013 11:23PM - Giulia Meridai/10/2011:  severe stenosis both  posterior cerebral arteries seen MRI/MRA done for  vertigo. No acute changes.e*     Osteoarthritis     wrist, knee, shoulder     Prostatitis      Rectal bleed      Trigger thumb      Patient Active Problem List   Diagnosis     Esophageal reflux     Dyslipidemia     Nephrolithiasis     Pseudogout     Latent Tuberculosis     Generalized Osteoarthritis Of The Hand     Lumbar Disc Degeneration     Insomnia     Chronic Gout     CKD (chronic kidney disease) stage 3, GFR 30-59 ml/min (H)     Elevated PSA     Prostate nodule     Cataracts, bilateral     Constipation, unspecified constipation type     Bilateral chronic knee pain     Chronic right shoulder pain     Essential hypertension with goal blood pressure less than 140/90     BPH (benign prostatic hyperplasia)     Chronic gout     Coronary artery disease due to lipid rich plaque     Right lower quadrant abdominal pain     Colitis     Primary osteoarthritis involving multiple joints     Urinary incontinence     Type 2 diabetes mellitus with stage 3 chronic kidney disease, without long-term current use of insulin (H)     ACE-inhibitor cough     Vertigo     Essential hypertension     Vertebral artery occlusion, left     Cerebellar stroke (H)     Stroke (H)     Hypomagnesemia     Ataxic gait     Urinary retention     Current Outpatient Medications   Medication Sig Dispense Refill     acetaminophen (TYLENOL) 500 MG tablet Take 500 mg by mouth every 6 hours as needed for mild pain       allopurinol (ZYLOPRIM) 100 MG tablet TAKE 2 TABLETS BY MOUTH EVERY  tablet 7     apixaban ANTICOAGULANT (ELIQUIS) 5 MG tablet Take 1 tablet (5 mg) by mouth 2 times daily 60 tablet 6     aspirin (ASA) 81 MG EC tablet Take 1 tablet (81 mg) by mouth daily 90 tablet 3     DULoxetine (CYMBALTA) 20 MG capsule TAKE 1 CAPSULE(20 MG) BY MOUTH TWICE DAILY 60 capsule 1     HYDROcodone-acetaminophen (NORCO) 5-325 MG tablet Take 1 tablet by mouth daily as needed for severe pain (7-10)        lidocaine (XYLOCAINE) 2 % external gel Apply topically 3 times daily Apply on neck back tid 85 g 0     magnesium oxide (MAG-OX) 400 MG tablet Take 1 tablet (400 mg) by mouth daily 30 tablet 0     meclizine (ANTIVERT) 12.5 MG tablet Take 1 tablet (12.5 mg) by mouth 3 times daily as needed for dizziness 60 tablet 6     metoprolol succinate ER (TOPROL XL) 100 MG 24 hr tablet Take 100 mg by mouth daily Take 1/2 tablet  daily       pantoprazole (PROTONIX) 40 MG EC tablet Take 1 tablet (40 mg) by mouth every morning (before breakfast) 90 tablet 3     polyethylene glycol (MIRALAX) 17 GM/Dose powder Take 17 g (1 capful.) by mouth daily as needed for constipation 578 g 4     rosuvastatin (CRESTOR) 20 MG tablet TAKE 1 TABLET(20 MG) BY MOUTH AT BEDTIME 90 tablet 0     senna-docusate (SENOKOT-S/PERICOLACE) 8.6-50 MG tablet Take 1 tablet by mouth 2 times daily as needed for constipation 30 tablet 0     sitagliptin (JANUVIA) 50 MG tablet Take 1 tablet (50 mg) by mouth daily 90 tablet 3     tamsulosin (FLOMAX) 0.4 MG capsule Take 1 capsule (0.4 mg) by mouth daily 30 capsule 3     traZODone (DESYREL) 50 MG tablet Take 0.5-1 tablets (25-50 mg) by mouth nightly as needed for sleep 90 tablet 3     triamcinolone (KENALOG) 0.1 % external cream Apply topically 2 times daily as needed for irritation 80 g 3     History   Smoking Status     Former     Types: Cigarettes     Quit date: 3/10/2000   Smokeless Tobacco     Former       OBJECTICE: /78 (BP Location: Right arm, Cuff Size: Adult Regular)   Pulse 75   Temp 98.6  F (37  C)   Resp 24   Ht 1.524 m (5')   Wt 70 kg (154 lb 4 oz)   SpO2 97%   BMI 30.12 kg/m       No results found for this or any previous visit (from the past 24 hour(s)).     GEN-alert, appropriate, in no acute distress   CV-regular rate and rhythm   RESP-lungs clear soft and nontender    ABDOMINAL-no pitting edema of the ankles     Chucho Espino MD

## 2023-03-07 ENCOUNTER — MEDICAL CORRESPONDENCE (OUTPATIENT)
Dept: HEALTH INFORMATION MANAGEMENT | Facility: CLINIC | Age: 83
End: 2023-03-07
Payer: COMMERCIAL

## 2023-03-08 ENCOUNTER — TELEPHONE (OUTPATIENT)
Dept: FAMILY MEDICINE | Facility: CLINIC | Age: 83
End: 2023-03-08
Payer: COMMERCIAL

## 2023-03-08 NOTE — TELEPHONE ENCOUNTER
General Call      Reason for Call:  Verbal Order    What are your questions or concerns:  George speech therapist from [facility] called in to request verbal order.  2 times a week for 4 weeks and contingents speech and swallowing. George states will have to start the week of 3/20/23. Please give him a call at [phone]    Date of last appointment with provider: 3/3/23    Okay to leave a detailed message?: Yes at Other phone number:  [phone]

## 2023-03-10 ENCOUNTER — MEDICAL CORRESPONDENCE (OUTPATIENT)
Dept: HEALTH INFORMATION MANAGEMENT | Facility: CLINIC | Age: 83
End: 2023-03-10

## 2023-03-10 ENCOUNTER — TELEPHONE (OUTPATIENT)
Dept: FAMILY MEDICINE | Facility: CLINIC | Age: 83
End: 2023-03-10
Payer: COMMERCIAL

## 2023-03-10 NOTE — TELEPHONE ENCOUNTER
Order/Referral Request    Who is requesting: FRANCESCA Garcia, [facility]    Orders being requested: DME wheel chair order    Reason service is needed/diagnosis: Patient is in need of standard wheel chair with cushion.    When are orders needed by: as soon as possible    Has this been discussed with Provider: No    Does patient have a preference on a Group/Provider/Facility? [facility]     Does patient have an appointment scheduled?: No    Where to send orders: Fax [phone].    Okay to leave a detailed message?: Yes at Other phone number:  [phone].

## 2023-03-13 ENCOUNTER — DOCUMENTATION ONLY (OUTPATIENT)
Dept: OTHER | Facility: CLINIC | Age: 83
End: 2023-03-13
Payer: COMMERCIAL

## 2023-03-14 ENCOUNTER — OFFICE VISIT (OUTPATIENT)
Dept: NEUROSURGERY | Facility: CLINIC | Age: 83
End: 2023-03-14
Payer: COMMERCIAL

## 2023-03-14 ENCOUNTER — PRE VISIT (OUTPATIENT)
Dept: NEUROSURGERY | Facility: CLINIC | Age: 83
End: 2023-03-14

## 2023-03-14 VITALS
WEIGHT: 158.1 LBS | SYSTOLIC BLOOD PRESSURE: 139 MMHG | DIASTOLIC BLOOD PRESSURE: 82 MMHG | HEART RATE: 70 BPM | BODY MASS INDEX: 30.88 KG/M2 | OXYGEN SATURATION: 98 %

## 2023-03-14 DIAGNOSIS — I63.22 CEREBROVASCULAR ACCIDENT (CVA) DUE TO STENOSIS OF BASILAR ARTERY (H): ICD-10-CM

## 2023-03-14 PROCEDURE — 99203 OFFICE O/P NEW LOW 30 MIN: CPT | Performed by: RADIOLOGY

## 2023-03-14 ASSESSMENT — PAIN SCALES - GENERAL: PAINLEVEL: NO PAIN (0)

## 2023-03-14 NOTE — LETTER
3/14/2023       RE: Adam Talamantes  [address]  [facility] [address]     Dear Colleague,    Thank you for referring your patient, Adam Talamantes, to the [facility] [place] [facility]. Please see a copy of my visit note below.    Adam Leung is a 83-year-old gentleman who is accompanied on today's visit by his daughter, who speaks very good English and offered  on her behalf.  Briefly, he was recently admitted to the hospital for muiltifocal posterior circulation strokes secondary to severe vertebrobasilar stenosis.,  At that time an evaluation with a CT angiogram had showed possible occlusion or severe narrowing of the basilar artery.  He was referred to our clinic for possible interventional management of this posterior circulation stenosis.    At the time of discharge from the hospital he had been started on apixaban and aspirin 81.  Since being started on these medications, he has had no new symptoms.  He continues to have predominantly balance issues due to his prior posterior circulation stroke.  He also has new memory deficits.    I discussed in detail with him and his daughter regarding the role of any interventional procedures such as stenting of his posterior circulation vessels in this scenario.  I informed them that if he is asymptomatic from the apixaban and aspirin 81, there is no proven benefit to subjecting him to an angioplasty and stenting of his posterior circulation arteries.  However, if he were to fail apixaban and aspirin 81 and developed new symptomsoOr asymptomatic strokes,, we can definitely consider stenting.    At this time however, considering any interventional procedure is unnecessary given the high risks associated with these procedures in this clinical setting.    The patient and daughter were very understanding.  They will follow-up with us after obtaining an MRI of the head and MRA of the head  and neck.  This imaging study will be performed in [facility] given the proximity of that hospital to the patient's home.  This study from us regarding the presence of new asymptomatic posterior circulation strokes.  It will also provide us further useful information regarding the patency of the basilar artery.    I spent about 20 minutes with him.        Sincerely,    Joye Jackson MD

## 2023-03-14 NOTE — PATIENT INSTRUCTIONS
Plan for MRI and MRA brain at [facility].    Follow-up with Dr. Jackson by phone after scans completed.    If you have any questions please contact me at [phone], option 1. After business hours call the  at [phone] and have the Neuro-Interventional Fellow paged.    Almaz Burton RN, CNRN, SCRN  Stroke & Endovascular Care Coordinator    Thank you for choosing [facility] for your health care needs.

## 2023-03-15 NOTE — TELEPHONE ENCOUNTER
Chart review, no previous DME order for Wheel chair found in pt's chart.    Routing message to Dr. Espino to kindly provider guidance.      JONATHAN GarrettN, RN   [facility]

## 2023-03-15 NOTE — TELEPHONE ENCOUNTER
"Jose call back to check on status of standard wheel chair order. Per Jose, patient daughter states Dr. Espino gave patient a handwritten order for the wheel chair. Dr. Espino notes on 03/13/2023 states \"done previously\". Jose states [facility] still need order for standard wheel chair with cushion and office notes to support the order. Please fax order and notes to [phone]. Please call Jose with any further questions at [phone].     Please advise.  "

## 2023-03-16 ENCOUNTER — TRANSFERRED RECORDS (OUTPATIENT)
Dept: HEALTH INFORMATION MANAGEMENT | Facility: CLINIC | Age: 83
End: 2023-03-16
Payer: COMMERCIAL

## 2023-03-16 ENCOUNTER — MEDICAL CORRESPONDENCE (OUTPATIENT)
Dept: HEALTH INFORMATION MANAGEMENT | Facility: CLINIC | Age: 83
End: 2023-03-16
Payer: COMMERCIAL

## 2023-03-16 LAB — RETINOPATHY: NEGATIVE

## 2023-03-20 ENCOUNTER — HOSPITAL ENCOUNTER (OUTPATIENT)
Dept: MRI IMAGING | Facility: HOSPITAL | Age: 83
Discharge: HOME | End: 2023-03-20
Attending: RADIOLOGY
Payer: COMMERCIAL

## 2023-03-20 DIAGNOSIS — I63.22 CEREBROVASCULAR ACCIDENT (CVA) DUE TO STENOSIS OF BASILAR ARTERY (H): ICD-10-CM

## 2023-03-20 PROCEDURE — A9585 GADOBUTROL INJECTION: HCPCS | Performed by: RADIOLOGY

## 2023-03-20 PROCEDURE — 70544 MR ANGIOGRAPHY HEAD W/O DYE: CPT

## 2023-03-20 PROCEDURE — 70553 MRI BRAIN STEM W/O & W/DYE: CPT

## 2023-03-20 PROCEDURE — 255N000002 HC RX 255 OP 636: Performed by: RADIOLOGY

## 2023-03-20 RX ORDER — GADOBUTROL 604.72 MG/ML
0.1 INJECTION INTRAVENOUS ONCE
Status: COMPLETED | OUTPATIENT
Start: 2023-03-20 | End: 2023-03-20

## 2023-03-20 RX ADMIN — GADOBUTROL 6 ML: 604.72 INJECTION INTRAVENOUS at 11:15

## 2023-03-20 NOTE — PROGRESS NOTES
__________________________________________________________      [facility]   [phone]  __________________________________________________________    Chief Complaint: Patient presents with:  Stroke      History of Present Illness: Adam Talamantes is a 83 year old male presenting for follow-up for stroke.     He was initially hospitalized at [facility] on 1/11/23. Prior to the hospital stay, he had a past medical history of DM2, CKD, HTN, HLD. He presented to the hospital with 1 week of dizziness, bitemporal headache, tinnitus. 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 MRA without evidence of dissection. He was discharged on DAPT x 21 days.     He returned to the [facility] ED 2/1/23 for evaluation of vertigo, nausea, vomiting. CTA with ongoing L vert/basilar findings and MRI now with scattered acute to subacute infarcts of bilateral cerebellar hemispheres and L thalamus. Stroke code was called 2/4 following LOC during urination; MRI with increased stroke burden; he was initiated on heparin drip and ultimately transferred to [facility] on 2/7/23 for consideration of stenting. Repeat MRI without increasing stroke burden and given poor baseline (mRS 3) and clinical stability since initiating anticoagulation, it was ultimately opted not to pursue stenting as risk was felt to outweigh likely benefit. Hospitalization was complicated by urinary retention and intermittent hematuria/clots in urine. He was discharged on ASA 81mg + Eliquis with plan for further neuro IR and stroke follow up in clinic.     Today, he is accompanied by her son and daughter who provides much of the review of systems/history.  They also prefer to provide translation services for our visit today.  They report that overall the largest ongoing deficits following the hospitalizations for stroke are significant cognitive  "symptoms.  They feel that he perhaps had some mild cognitive symptoms prior to the stroke, but these are significantly worsened.  He is often disoriented to place, date and at times will not recognize his own children; symptoms are perhaps \"a little bit better\" overall compared to when he first discharged from the hospital but symptoms continue to wax and wane.  His mood also seems to wax and wane with periods of him being somewhat sad and tearful and other times being calm and happy; they feel that overall the mood changes are generally mild and he is not having episodes of significant agitation.  They prefer to monitor the symptoms rather than consider additional intervention (i.e. increasing Cymbalta dose).  He continues to have some diffuse weakness and significant imbalance; he is generally using a walker but does require the wheelchair for longer distances.  They deny interval development of new neurological symptoms or other significant concerns at this time.    He continues on Eliquis and aspirin for secondary stroke prevention.  He appears to be tolerating this regimen well; he has some mild bruising but no recurrent hematuria or other evidence of abnormal bleeding or extensive bruising.  He continues on Crestor without concern.  They are checking blood pressure several times per week with average readings in the 120s/70s; occasionally will get elevations greater than 130, but this generally is concurrent with some right ankle pain/gout.    He was seen by Dr. aJckson of neuro IR 3/14/23; at this time he reported ongoing stability. It was recommended that he undergo repeat MRA/MRI to assess for interval change, with no immediate plan for stenting unless there was evidence of treatment failure with Eliquis and ASA. He will meet with Dr. Jackson again 4/4/23.     Modified Kiel Scale  Score: 4-Moderately severe disability; unable to walk without assistance and unable to attend to own bodily    Stroke " Evaluation Summarized    MRI 2/9: no evidence of new infarcts   2/4: significant increase in stroke burden of R>L cerebellar hemispheres plus new infarct in R lizeth  2/1: multiple acute/subacute infarcts in bilateral cerebellar hemisphere and L thalamic infarct of similar appearing age; moderate chronic small vessel disease, no hydrocephalus, significant mass effect or hemorrhagic transformation    Intracranial/cervical Vasculature CTA 2/6: no significant interval change   CTA 2/1: occlusion of L V4 and P PCA with 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, moderate L P2 stenosis; similar to CTA dated 1/11/23     Echocardiogram SHIRLEY 1/11: LVEF 55-60%, mild concentric LV hypertrophy, no regional wall motion abnormality, atria of normal size bilaterally    EKG/Telemetry SR     LDL  61   A1C  7.6   Additional diagnostics following discharge:  MRI 3/20/23: no acute/new infarction, expected evolution of multiple posterior circulation infarcts without evidence of mass effect or hemorrhagic transformation   MRA 3/20/23: no significant interval change; 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 and critical 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     Impression:   Problem List Items Addressed This Visit        Endocrine    Type 2 diabetes mellitus with stage 3 chronic kidney disease, without long-term current use of insulin (H)       Circulatory    Essential hypertension    Vertebral artery occlusion, left   Other Visit Diagnoses     History of stroke    -  Primary    Basilar artery stenosis            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     Plan:   - Continue Eliquis 5mg BID + ASA 81mg daily for secondary stroke prevention  - Continue Crestor with ongoing titration for goal LDL 40-70  - Goal BP is <130/80 with tighter control associated with improved vascular outcomes; recommend home blood pressure monitoring and keeping a BP log for primary care follow up  - Blood glucose  "monitoring, Hgb A1c 7.6%, (goal <7% for secondary stroke prevention), follow-up with PCP  - Ongoing follow up with neuro IR regarding possible stent placement; surgical management is not anticipated if patient remains clinically and radiographically stable on current medical management   - Return in about 3 months (around 6/21/2023) for stroke, with any stroke JOCELYN, 60 minutes.     Stroke Education provided.  He will call us with any questions.  For any acute neurologic deficits he was advised to  go directly to the hospital rather than call the clinic.    AQUILES Rabago [facility]  Neurology  03/21/2023 9:33 AM  To page me or covering stroke neurology team member, click here: AMCOM  Choose \"On Call\" tab at top, then search dropdown box for \"Neurology\" & press Enter, look for Neuro ICU/Stroke    ___________________________________________________________________    Current Medications  Current Outpatient Medications   Medication Sig     acetaminophen (TYLENOL) 500 MG tablet Take 500 mg by mouth every 6 hours as needed for mild pain     allopurinol (ZYLOPRIM) 100 MG tablet TAKE 2 TABLETS BY MOUTH EVERY DAY     apixaban ANTICOAGULANT (ELIQUIS) 5 MG tablet Take 1 tablet (5 mg) by mouth 2 times daily     aspirin (ASA) 81 MG EC tablet Take 1 tablet (81 mg) by mouth daily     DULoxetine (CYMBALTA) 20 MG capsule TAKE 1 CAPSULE(20 MG) BY MOUTH TWICE DAILY     HYDROcodone-acetaminophen (NORCO) 5-325 MG tablet Take 1 tablet by mouth daily as needed for severe pain (7-10)     lidocaine (XYLOCAINE) 2 % external gel Apply topically 3 times daily Apply on neck back tid     magnesium oxide (MAG-OX) 400 MG tablet Take 1 tablet (400 mg) by mouth daily     meclizine (ANTIVERT) 12.5 MG tablet Take 1 tablet (12.5 mg) by mouth 3 times daily as needed for dizziness     metoprolol succinate ER (TOPROL XL) 100 MG 24 hr tablet Take 100 mg by mouth daily Take 1/2 tablet  daily     pantoprazole (PROTONIX) 40 MG EC tablet Take 1 tablet (40 mg) " by mouth every morning (before breakfast)     polyethylene glycol (MIRALAX) 17 GM/Dose powder Take 17 g (1 capful.) by mouth daily as needed for constipation     rosuvastatin (CRESTOR) 20 MG tablet TAKE 1 TABLET(20 MG) BY MOUTH AT BEDTIME     senna-docusate (SENOKOT-S/PERICOLACE) 8.6-50 MG tablet Take 1 tablet by mouth 2 times daily as needed for constipation     sitagliptin (JANUVIA) 50 MG tablet Take 1 tablet (50 mg) by mouth daily     tamsulosin (FLOMAX) 0.4 MG capsule Take 1 capsule (0.4 mg) by mouth daily     traZODone (DESYREL) 50 MG tablet Take 0.5-1 tablets (25-50 mg) by mouth nightly as needed for sleep     triamcinolone (KENALOG) 0.1 % external cream Apply topically 2 times daily as needed for irritation     No current facility-administered medications for this visit.       Past Medical History  Past Medical History:   Diagnosis Date     Acute blood loss anemia      Acute renal failure (H)      Anemia      Bacteremia      Cataract      Chronic gout      CKD (chronic kidney disease)     Stage 3     DM2 (diabetes mellitus, type 2) (H)      GERD (gastroesophageal reflux disease)      Glucose intolerance (impaired glucose tolerance)      Gout      History of nephrolithiasis      History of prostatitis 2017     Hyperlipidemia      Hypertension      Insomnia      Intestinal disaccharidase deficiencies and disaccharide malabsorption     Created by Conversion      Latent tuberculosis      Nephrolithiasis      Neuropathy      Nonspecific abnormal findings on radiological and other examination of skull and head     Created by [facility] Annotation: 2013 11:23PM - Giulia Meridai/10/2011:  severe stenosis both posterior cerebral arteries seen MRI/MRA done for  vertigo. No acute changes.e*     Osteoarthritis     wrist, knee, shoulder     Prostatitis      Rectal bleed      Trigger thumb        Social History  Social History     Tobacco Use     Smoking status: Former     Types: Cigarettes      Quit date: 3/10/2000     Years since quittin.0     Passive exposure: Never     Smokeless tobacco: Former     Tobacco comments:     no passive exposure   Vaping Use     Vaping Use: Never used   Substance Use Topics     Alcohol use: No     Drug use: No       Physical Exam    Estimated body mass index is 30.88 kg/m  as calculated from the following:    Height as of 3/6/23: 1.524 m (5').    Weight as of 3/14/23: 71.7 kg (158 lb 1.6 oz).    /74 (BP Location: Right arm, Patient Position: Chair, Cuff Size: Adult Large)   Pulse 76   SpO2 96%        General Exam  General: Sitting up in chair in no acute distress  HEENT:  normocephalic/atraumatic  Pulmonary:  no respiratory distress      Neurologic:  Mental Status:  alert, oriented to person, place, general situation and month (but not to date of month or year), is able to name his daughter but not his son, follows simple commands but unable to follow complex or multistep exams, naming and repetition normal, speech is clear per daughter who is providing interpretation  Cranial Nerves:  visual fields intact, PERRL, EOMI with normal smooth pursuit, facial sensation intact and symmetric, facial movements symmetric, hearing not formally tested but intact to conversation, palate elevation symmetric and uvula midline, no dysarthria, tongue protrusion midline  Motor:  normal muscle tone and bulk, no abnormal movements, able to move all limbs spontaneously, no pronator drift, strength is grossly intact and symmetric in all 4 extremities  Sensory:  light touch sensation intact and symmetric throughout upper and lower extremities, no extinction on double simultaneous stimulation   Coordination: Impaired finger-nose-finger and heel shin in all extremities (mild)  Station/Gait:  deferred    Neuroimaging: as per HPI. I personally reviewed those images    Labs:    Coagulation studies:  No lab results found.     Lipid panel:  Recent Labs   Lab Test 23  0627 23  1150    CHOL 151 173   HDL 40 35*   LDL 61 59   TRIG 252* 394*       HbA1C:  Recent Labs   Lab Test 01/12/23  2006 01/11/23  0421 09/12/22  1019   A1C 7.6* 7.6* 7.9*       Troponin:  No lab results found.  Recent Labs   Lab Test 06/22/20  1853 12/27/18  1252   TROPONINI 0.02 <0.01     No lab results found.    Questionnaires:    No flowsheet data found.      Billing:    I spent a total of 39 minutes on the day of the visit.   Time spent doing chart review, history and exam, documentation and further activities per the note

## 2023-03-20 NOTE — PROGRESS NOTES
Adam Leung is a 83-year-old gentleman who is accompanied on today's visit by his daughter, who speaks very good English and offered  on her behalf.  Briefly, he was recently admitted to the hospital for muiltifocal posterior circulation strokes secondary to severe vertebrobasilar stenosis.,  At that time an evaluation with a CT angiogram had showed possible occlusion or severe narrowing of the basilar artery.  He was referred to our clinic for possible interventional management of this posterior circulation stenosis.    At the time of discharge from the hospital he had been started on apixaban and aspirin 81.  Since being started on these medications, he has had no new symptoms.  He continues to have predominantly balance issues due to his prior posterior circulation stroke.  He also has new memory deficits.    I discussed in detail with him and his daughter regarding the role of any interventional procedures such as stenting of his posterior circulation vessels in this scenario.  I informed them that if he is asymptomatic from the apixaban and aspirin 81, there is no proven benefit to subjecting him to an angioplasty and stenting of his posterior circulation arteries.  However, if he were to fail apixaban and aspirin 81 and developed new symptomsoOr asymptomatic strokes,, we can definitely consider stenting.    At this time however, considering any interventional procedure is unnecessary given the high risks associated with these procedures in this clinical setting.    The patient and daughter were very understanding.  They will follow-up with us after obtaining an MRI of the head and MRA of the head and neck.  This imaging study will be performed in [facility] given the proximity of that hospital to the patient's home.  This study from us regarding the presence of new asymptomatic posterior circulation strokes.  It will also provide us further useful information regarding the patency of the  basilar artery.    I spent about 20 minutes with him.

## 2023-03-21 ENCOUNTER — OFFICE VISIT (OUTPATIENT)
Dept: NEUROLOGY | Facility: CLINIC | Age: 83
End: 2023-03-21
Payer: COMMERCIAL

## 2023-03-21 VITALS — OXYGEN SATURATION: 96 % | SYSTOLIC BLOOD PRESSURE: 115 MMHG | DIASTOLIC BLOOD PRESSURE: 74 MMHG | HEART RATE: 76 BPM

## 2023-03-21 DIAGNOSIS — Z86.73 HISTORY OF STROKE: Primary | ICD-10-CM

## 2023-03-21 DIAGNOSIS — E11.22 TYPE 2 DIABETES MELLITUS WITH STAGE 3 CHRONIC KIDNEY DISEASE, WITHOUT LONG-TERM CURRENT USE OF INSULIN, UNSPECIFIED WHETHER STAGE 3A OR 3B CKD (H): ICD-10-CM

## 2023-03-21 DIAGNOSIS — I65.1 BASILAR ARTERY STENOSIS: ICD-10-CM

## 2023-03-21 DIAGNOSIS — N18.30 TYPE 2 DIABETES MELLITUS WITH STAGE 3 CHRONIC KIDNEY DISEASE, WITHOUT LONG-TERM CURRENT USE OF INSULIN, UNSPECIFIED WHETHER STAGE 3A OR 3B CKD (H): ICD-10-CM

## 2023-03-21 DIAGNOSIS — I10 ESSENTIAL HYPERTENSION: ICD-10-CM

## 2023-03-21 DIAGNOSIS — I65.02 VERTEBRAL ARTERY OCCLUSION, LEFT: ICD-10-CM

## 2023-03-21 PROCEDURE — 99203 OFFICE O/P NEW LOW 30 MIN: CPT | Performed by: NURSE PRACTITIONER

## 2023-03-21 NOTE — PATIENT INSTRUCTIONS
- Continue Eliquis 5mg BID + ASA 81mg daily for secondary stroke prevention  - Continue Crestor with ongoing titration for goal LDL 40-70  - Goal BP is <130/80 with tighter control associated with improved vascular outcomes; recommend home blood pressure monitoring and keeping a BP log for primary care follow up  - Blood glucose monitoring, Hgb A1c 7.6%, (goal <7% for secondary stroke prevention), follow-up with PCP  - Ongoing follow up with neuro IR regarding possible stent placement; surgical management is not anticipated if patient remains clinically and radiographically stable on current medical management

## 2023-03-21 NOTE — LETTER
3/21/2023         RE: Adam Talamantes  [address] [place] [address]        Dear Colleague,    Thank you for referring your patient, Adam Talamantes, to the [facility]UROLOG[facility]. Please see a copy of my visit note below.    __________________________________________________________      [facility] [place]   [phone]  __________________________________________________________    Chief Complaint: Patient presents with:  Stroke      History of Present Illness: Adam Talamantes is a 83 year old male presenting for follow-up for stroke.     He was initially hospitalized at [facility] on 1/11/23. Prior to the hospital stay, he had a past medical history of DM2, CKD, HTN, HLD. He presented to the hospital with 1 week of dizziness, bitemporal headache, tinnitus. 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 MRA without evidence of dissection. He was discharged on DAPT x 21 days.     He returned to the [facility] ED 2/1/23 for evaluation of vertigo, nausea, vomiting. CTA with ongoing L vert/basilar findings and MRI now with scattered acute to subacute infarcts of bilateral cerebellar hemispheres and L thalamus. Stroke code was called 2/4 following LOC during urination; MRI with increased stroke burden; he was initiated on heparin drip and ultimately transferred to [facility] on 2/7/23 for consideration of stenting. Repeat MRI without increasing stroke burden and given poor baseline (mRS 3) and clinical stability since initiating anticoagulation, it was ultimately opted not to pursue stenting as risk was felt to outweigh likely benefit. Hospitalization was complicated by urinary retention and intermittent hematuria/clots in urine. He was discharged on ASA 81mg + Eliquis with plan for further neuro IR and stroke follow up in clinic.     Today, he is accompanied by her son and daughter  "who provides much of the review of systems/history.  They also prefer to provide translation services for our visit today.  They report that overall the largest ongoing deficits following the hospitalizations for stroke are significant cognitive symptoms.  They feel that he perhaps had some mild cognitive symptoms prior to the stroke, but these are significantly worsened.  He is often disoriented to place, date and at times will not recognize his own children; symptoms are perhaps \"a little bit better\" overall compared to when he first discharged from the hospital but symptoms continue to wax and wane.  His mood also seems to wax and wane with periods of him being somewhat sad and tearful and other times being calm and happy; they feel that overall the mood changes are generally mild and he is not having episodes of significant agitation.  They prefer to monitor the symptoms rather than consider additional intervention (i.e. increasing Cymbalta dose).  He continues to have some diffuse weakness and significant imbalance; he is generally using a walker but does require the wheelchair for longer distances.  They deny interval development of new neurological symptoms or other significant concerns at this time.    He continues on Eliquis and aspirin for secondary stroke prevention.  He appears to be tolerating this regimen well; he has some mild bruising but no recurrent hematuria or other evidence of abnormal bleeding or extensive bruising.  He continues on Crestor without concern.  They are checking blood pressure several times per week with average readings in the 120s/70s; occasionally will get elevations greater than 130, but this generally is concurrent with some right ankle pain/gout.    He was seen by Dr. Jackson of neuro IR 3/14/23; at this time he reported ongoing stability. It was recommended that he undergo repeat MRA/MRI to assess for interval change, with no immediate plan for stenting unless there was " evidence of treatment failure with Eliquis and ASA. He will meet with Dr. Jackson again 4/4/23.     Modified [place] Scale  Score: 4-Moderately severe disability; unable to walk without assistance and unable to attend to own bodily    Stroke Evaluation Summarized    MRI 2/9: no evidence of new infarcts   2/4: significant increase in stroke burden of R>L cerebellar hemispheres plus new infarct in R lizeth  2/1: multiple acute/subacute infarcts in bilateral cerebellar hemisphere and L thalamic infarct of similar appearing age; moderate chronic small vessel disease, no hydrocephalus, significant mass effect or hemorrhagic transformation    Intracranial/cervical Vasculature CTA 2/6: no significant interval change   CTA 2/1: occlusion of L V4 and P PCA with 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, moderate L P2 stenosis; similar to CTA dated 1/11/23     Echocardiogram SHIRLEY 1/11: LVEF 55-60%, mild concentric LV hypertrophy, no regional wall motion abnormality, atria of normal size bilaterally    EKG/Telemetry SR     LDL  61   A1C  7.6   Additional diagnostics following discharge:  MRI 3/20/23: no acute/new infarction, expected evolution of multiple posterior circulation infarcts without evidence of mass effect or hemorrhagic transformation   MRA 3/20/23: no significant interval change; 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 and critical 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     Impression:   Problem List Items Addressed This Visit        Endocrine    Type 2 diabetes mellitus with stage 3 chronic kidney disease, without long-term current use of insulin (H)       Circulatory    Essential hypertension    Vertebral artery occlusion, left   Other Visit Diagnoses     History of stroke    -  Primary    Basilar artery stenosis            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     Plan:   - Continue Eliquis 5mg BID + ASA 81mg daily for secondary stroke prevention  -  "Continue Crestor with ongoing titration for goal LDL 40-70  - Goal BP is <130/80 with tighter control associated with improved vascular outcomes; recommend home blood pressure monitoring and keeping a BP log for primary care follow up  - Blood glucose monitoring, Hgb A1c 7.6%, (goal <7% for secondary stroke prevention), follow-up with PCP  - Ongoing follow up with neuro IR regarding possible stent placement; surgical management is not anticipated if patient remains clinically and radiographically stable on current medical management   - Return in about 3 months (around 6/21/2023) for stroke, with any stroke JOCELYN, 60 minutes.     Stroke Education provided.  He will call us with any questions.  For any acute neurologic deficits he was advised to  go directly to the hospital rather than call the clinic.    AQUILES Rabago [facility]  Neurology  03/21/2023 9:33 AM  To page me or covering stroke neurology team member, click here: AMCOM  Choose \"On Call\" tab at top, then search dropdown box for \"Neurology\" & press Enter, look for Neuro ICU/Stroke    ___________________________________________________________________    Current Medications  Current Outpatient Medications   Medication Sig     acetaminophen (TYLENOL) 500 MG tablet Take 500 mg by mouth every 6 hours as needed for mild pain     allopurinol (ZYLOPRIM) 100 MG tablet TAKE 2 TABLETS BY MOUTH EVERY DAY     apixaban ANTICOAGULANT (ELIQUIS) 5 MG tablet Take 1 tablet (5 mg) by mouth 2 times daily     aspirin (ASA) 81 MG EC tablet Take 1 tablet (81 mg) by mouth daily     DULoxetine (CYMBALTA) 20 MG capsule TAKE 1 CAPSULE(20 MG) BY MOUTH TWICE DAILY     HYDROcodone-acetaminophen (NORCO) 5-325 MG tablet Take 1 tablet by mouth daily as needed for severe pain (7-10)     lidocaine (XYLOCAINE) 2 % external gel Apply topically 3 times daily Apply on neck back tid     magnesium oxide (MAG-OX) 400 MG tablet Take 1 tablet (400 mg) by mouth daily     meclizine (ANTIVERT) 12.5 MG " tablet Take 1 tablet (12.5 mg) by mouth 3 times daily as needed for dizziness     metoprolol succinate ER (TOPROL XL) 100 MG 24 hr tablet Take 100 mg by mouth daily Take 1/2 tablet  daily     pantoprazole (PROTONIX) 40 MG EC tablet Take 1 tablet (40 mg) by mouth every morning (before breakfast)     polyethylene glycol (MIRALAX) 17 GM/Dose powder Take 17 g (1 capful.) by mouth daily as needed for constipation     rosuvastatin (CRESTOR) 20 MG tablet TAKE 1 TABLET(20 MG) BY MOUTH AT BEDTIME     senna-docusate (SENOKOT-S/PERICOLACE) 8.6-50 MG tablet Take 1 tablet by mouth 2 times daily as needed for constipation     sitagliptin (JANUVIA) 50 MG tablet Take 1 tablet (50 mg) by mouth daily     tamsulosin (FLOMAX) 0.4 MG capsule Take 1 capsule (0.4 mg) by mouth daily     traZODone (DESYREL) 50 MG tablet Take 0.5-1 tablets (25-50 mg) by mouth nightly as needed for sleep     triamcinolone (KENALOG) 0.1 % external cream Apply topically 2 times daily as needed for irritation     No current facility-administered medications for this visit.       Past Medical History  Past Medical History:   Diagnosis Date     Acute blood loss anemia      Acute renal failure (H)      Anemia      Bacteremia      Cataract      Chronic gout      CKD (chronic kidney disease)     Stage 3     DM2 (diabetes mellitus, type 2) (H)      GERD (gastroesophageal reflux disease)      Glucose intolerance (impaired glucose tolerance)      Gout      History of nephrolithiasis      History of prostatitis 2017     Hyperlipidemia      Hypertension      Insomnia      Intestinal disaccharidase deficiencies and disaccharide malabsorption     Created by Conversion      Latent tuberculosis      Nephrolithiasis      Neuropathy      Nonspecific abnormal findings on radiological and other examination of skull and head     Created by [facility] Annotation: 2013 11:23PM - Giulia Meridai/10/2011:  severe stenosis both posterior cerebral arteries  seen MRI/MRA done for  vertigo. No acute changes.e*     Osteoarthritis     wrist, knee, shoulder     Prostatitis      Rectal bleed      Trigger thumb        Social History  Social History     Tobacco Use     Smoking status: Former     Types: Cigarettes     Quit date: 3/10/2000     Years since quittin.0     Passive exposure: Never     Smokeless tobacco: Former     Tobacco comments:     no passive exposure   Vaping Use     Vaping Use: Never used   Substance Use Topics     Alcohol use: No     Drug use: No       Physical Exam    Estimated body mass index is 30.88 kg/m  as calculated from the following:    Height as of 3/6/23: 1.524 m (5').    Weight as of 3/14/23: 71.7 kg (158 lb 1.6 oz).    /74 (BP Location: Right arm, Patient Position: Chair, Cuff Size: Adult Large)   Pulse 76   SpO2 96%        General Exam  General: Sitting up in chair in no acute distress  HEENT:  normocephalic/atraumatic  Pulmonary:  no respiratory distress      Neurologic:  Mental Status:  alert, oriented to person, place, general situation and month (but not to date of month or year), is able to name his daughter but not his son, follows simple commands but unable to follow complex or multistep exams, naming and repetition normal, speech is clear per daughter who is providing interpretation  Cranial Nerves:  visual fields intact, PERRL, EOMI with normal smooth pursuit, facial sensation intact and symmetric, facial movements symmetric, hearing not formally tested but intact to conversation, palate elevation symmetric and uvula midline, no dysarthria, tongue protrusion midline  Motor:  normal muscle tone and bulk, no abnormal movements, able to move all limbs spontaneously, no pronator drift, strength is grossly intact and symmetric in all 4 extremities  Sensory:  light touch sensation intact and symmetric throughout upper and lower extremities, no extinction on double simultaneous stimulation   Coordination: Impaired  finger-nose-finger and heel shin in all extremities (mild)  Station/Gait:  deferred    Neuroimaging: as per HPI. I personally reviewed those images    Labs:    Coagulation studies:  No lab results found.     Lipid panel:  Recent Labs   Lab Test 02/07/23  0627 02/01/23  1155   CHOL 151 173   HDL 40 35*   LDL 61 59   TRIG 252* 394*       HbA1C:  Recent Labs   Lab Test 01/12/23  2006 01/11/23  0421 09/12/22  1019   A1C 7.6* 7.6* 7.9*       Troponin:  No lab results found.  Recent Labs   Lab Test 06/22/20  1853 12/27/18  1252   TROPONINI 0.02 <0.01     No lab results found.    Questionnaires:    No flowsheet data found.      Billing:    I spent a total of 39 minutes on the day of the visit.   Time spent doing chart review, history and exam, documentation and further activities per the note                Again, thank you for allowing me to participate in the care of your patient.        Sincerely,        AQUILES Rabago CNP

## 2023-03-21 NOTE — NURSING NOTE
Symptoms or concerns today:  Pt .daughter  dimitri   concern of Adam memory hasn't been the same since the stroke he doesn't no his family    Med comments: update    Who the patient is here with today:  Oleg, daughter Dimitri Romero MA

## 2023-03-22 ENCOUNTER — MEDICAL CORRESPONDENCE (OUTPATIENT)
Dept: HEALTH INFORMATION MANAGEMENT | Facility: CLINIC | Age: 83
End: 2023-03-22

## 2023-03-24 ENCOUNTER — MEDICAL CORRESPONDENCE (OUTPATIENT)
Dept: HEALTH INFORMATION MANAGEMENT | Facility: CLINIC | Age: 83
End: 2023-03-24

## 2023-03-27 DIAGNOSIS — I25.10 CORONARY ARTERY DISEASE INVOLVING NATIVE CORONARY ARTERY OF NATIVE HEART WITHOUT ANGINA PECTORIS: ICD-10-CM

## 2023-03-28 RX ORDER — ASPIRIN 81 MG/1
TABLET, COATED ORAL
Qty: 90 TABLET | Refills: 3 | Status: SHIPPED | OUTPATIENT
Start: 2023-03-28 | End: 2023-04-27

## 2023-03-28 NOTE — TELEPHONE ENCOUNTER
"Routing refill request to provider for review/approval because:  contraindication    Last Written Prescription Date:  2/24/22  Last Fill Quantity: 90,  # refills: 3   Last office visit provider:  3/6/23, PCP     Requested Prescriptions   Pending Prescriptions Disp Refills     ASPIRIN LOW DOSE 81 MG EC tablet [Pharmacy Med Name: ASPIRIN 81MG EC LOW DOSE TABLETS] 90 tablet 3     Sig: TAKE 1 TABLET(81 MG) BY MOUTH DAILY       Analgesics (Non-Narcotic Tylenol and ASA Only) Passed - 3/27/2023  6:20 PM        Passed - Recent (12 mo) or future (30 days) visit within the authorizing provider's specialty     Patient has had an office visit with the authorizing provider or a provider within the authorizing providers department within the previous 12 mos or has a future within next 30 days. See \"Patient Info\" tab in inbasket, or \"Choose Columns\" in Meds & Orders section of the refill encounter.              Passed - Patient is age 20 years or older     If ASA is flagged for ages under 20 years old. Forward to provider for confirmation Ryes Syndrome is not a concern.              Passed - Medication is active on med list             Alejandra Killian RN 03/28/23 4:14 PM  "

## 2023-03-29 ENCOUNTER — MEDICAL CORRESPONDENCE (OUTPATIENT)
Dept: HEALTH INFORMATION MANAGEMENT | Facility: CLINIC | Age: 83
End: 2023-03-29

## 2023-04-04 ENCOUNTER — OFFICE VISIT (OUTPATIENT)
Dept: RHEUMATOLOGY | Facility: CLINIC | Age: 83
End: 2023-04-04
Payer: COMMERCIAL

## 2023-04-04 ENCOUNTER — VIRTUAL VISIT (OUTPATIENT)
Dept: NEUROSURGERY | Facility: CLINIC | Age: 83
End: 2023-04-04
Payer: COMMERCIAL

## 2023-04-04 VITALS
HEART RATE: 74 BPM | BODY MASS INDEX: 30.08 KG/M2 | SYSTOLIC BLOOD PRESSURE: 134 MMHG | DIASTOLIC BLOOD PRESSURE: 88 MMHG | OXYGEN SATURATION: 93 % | WEIGHT: 154 LBS

## 2023-04-04 DIAGNOSIS — M1A.00X0 CHRONIC GOUTY ARTHROPATHY: ICD-10-CM

## 2023-04-04 DIAGNOSIS — N18.31 STAGE 3A CHRONIC KIDNEY DISEASE (H): ICD-10-CM

## 2023-04-04 DIAGNOSIS — M15.0 PRIMARY OSTEOARTHRITIS INVOLVING MULTIPLE JOINTS: Primary | ICD-10-CM

## 2023-04-04 DIAGNOSIS — M25.50 POLYARTHRALGIA: ICD-10-CM

## 2023-04-04 DIAGNOSIS — I63.22 CEREBROVASCULAR ACCIDENT (CVA) DUE TO STENOSIS OF BASILAR ARTERY (H): Primary | ICD-10-CM

## 2023-04-04 PROCEDURE — 99214 OFFICE O/P EST MOD 30 MIN: CPT | Performed by: INTERNAL MEDICINE

## 2023-04-04 PROCEDURE — 99207 PR NO BILLABLE SERVICE THIS VISIT: CPT | Mod: 95 | Performed by: RADIOLOGY

## 2023-04-04 RX ORDER — DULOXETIN HYDROCHLORIDE 20 MG/1
20 CAPSULE, DELAYED RELEASE ORAL 2 TIMES DAILY
Qty: 180 CAPSULE | Refills: 1 | Status: SHIPPED | OUTPATIENT
Start: 2023-04-04 | End: 2023-06-06

## 2023-04-04 NOTE — NURSING NOTE
Is the patient currently in the state of [place]? YES    Visit mode:TELEPHONE    If the visit is dropped, the patient can be reconnected by: TELEPHONE VISIT: Phone number: [phone]    Will anyone else be joining the visit? NO      How would you like to obtain your AVS? Mail a copy    Are changes needed to the allergy or medication list? NO    Reason for visit: 2 week follow-up

## 2023-04-04 NOTE — LETTER
4/4/2023       RE: Adam Talamantes  [address]  [place] [place] [address]     Dear Colleague,    Thank you for referring your patient, Adam Talamantes, to the [facility]UROSURGERY [facility]INIC [place] at [facility]. Please see a copy of my visit note below.        Endovascular surgical neuroradiology note    83-year-old man with past medical history of hypertension, hyperlipidemia, stage III CKD, type 2 diabetes with multifocal intracranial atherosclerotic disease worse in the posterior circulation resulting in bilateral occipital and brainstem strokes in February 2023 who was discharged on aspirin and Eliquis as well as rosuvastatin 20 mg daily presents for a follow-up visit.  The patient was last seen in clinic on 3/14/2023 at which time he had not had any further clinical strokelike episodes.  Given his clinical stability and the risk-profile with basilar artery angioplasty/stenting, endovascular intervention was deferred.  He had a follow-up MRI/MRA completed on 3/20/2023 that does not demonstrate any new infarcts and stable intracranial atherosclerotic disease.  I spoke with the patient's daughter, Dimitri Talamantes, over the phone this morning.  She states that her father is in his usual state of health, and continues to recover from his strokes.  She notes fluctuations in his cognitive function which has overall declined over the past few years.  The patient was last seen in vascular neurology clinic on 3/21/2023 and will continue to follow with them going forward.  We discussed the findings of the recent MRI/MRA.  All of her questions were answered.    Plan:  Continue with aspirin and Eliquis along with rosuvastatin.  Maintain follow-up in vascular neurology clinic for stroke risk factor control.  Repeat a MRI brain without contrast and a MR angiogram [place] [facility] [place] in 6 months followed by clinic visit.      Discussed with Dr. Jackson    Endovascular surgical  neuroradiology fellow  Pager [phone]    Attestation signed by Joey Jackson MD at 4/7/2023  8:45 AM:  I personally spoke with the patient's daughter and I agree with the note by Dr. Woodall dated 4/4/2023      Again, thank you for allowing me to participate in the care of your patient.      Sincerely,    Joey Jackson MD

## 2023-04-04 NOTE — PATIENT INSTRUCTIONS
Continue with aspirin and Eliquis along with rosuvastatin.      Maintain follow-up in stroke neurology clinic for stroke risk factor control.     Follow-up with Dr. Jackson in 6 months with an MRI brain without contrast and a MR angiogram [place] [place] prior to appointment.     If you have any questions please contact me at [phone], option 1. After business hours call the  at [phone] and have the Neuro-Interventional Fellow paged.    Almaz Burton RN, CNRN, SCRN  Stroke & Endovascular Care Coordinator    Thank you for choosing [facility] for your health care needs.

## 2023-04-04 NOTE — PROGRESS NOTES
Endovascular surgical neuroradiology note    83-year-old man with past medical history of hypertension, hyperlipidemia, stage III CKD, type 2 diabetes with multifocal intracranial atherosclerotic disease worse in the posterior circulation resulting in bilateral occipital and brainstem strokes in February 2023 who was discharged on aspirin and Eliquis as well as rosuvastatin 20 mg daily presents for a follow-up visit.  The patient was last seen in clinic on 3/14/2023 at which time he had not had any further clinical strokelike episodes.  Given his clinical stability and the risk-profile with basilar artery angioplasty/stenting, endovascular intervention was deferred.  He had a follow-up MRI/MRA completed on 3/20/2023 that does not demonstrate any new infarcts and stable intracranial atherosclerotic disease.  I spoke with the patient's daughter, Dimitri Talamantes, over the phone this morning.  She states that her father is in his usual state of health, and continues to recover from his strokes.  She notes fluctuations in his cognitive function which has overall declined over the past few years.  The patient was last seen in vascular neurology clinic on 3/21/2023 and will continue to follow with them going forward.  We discussed the findings of the recent MRI/MRA.  All of her questions were answered.    Plan:  Continue with aspirin and Eliquis along with rosuvastatin.  Maintain follow-up in vascular neurology clinic for stroke risk factor control.  Repeat a MRI brain without contrast and a MR angiogram [place] [facility] [place] in 6 months followed by clinic visit.      Discussed with Dr. Jackson    Endovascular surgical neuroradiology fellow  Pager [phone]

## 2023-04-04 NOTE — PROGRESS NOTES
Rheumatology follow-up visit note     Adam is a 83 year old male presents today for follow-up.    Adam was seen today for recheck.    Diagnoses and all orders for this visit:    Primary osteoarthritis involving multiple joints  -     DULoxetine (CYMBALTA) 20 MG capsule; Take 1 capsule (20 mg) by mouth 2 times daily for 90 days    Polyarthralgia    Stage 3a chronic kidney disease (H)    Chronic Gout        This patient with osteoarthritis on duloxetine that unfortunately had a stroke syndrome, leading to cognitive impairment, he has difficult time recognizing many people.  His gait has been impaired.  He has not had a flareup of joint symptoms.  We discussed options.  He is accompanied by daughter and son-in-law.  We agreed that it would be logistically streamlined for him to continue to follow-up at Dr. Espino's office to continue get his allopurinol and duloxetine.  Should there be a need we will be happy to see him here in rheumatology.     HPI    Adam LUCAS Albin is a 83 year old male is here for follow-up of polyarthralgia in association with osteoarthritis.   He has not noted any significant discomfort in the joints.  His speech is quite impaired.  He did not recognize me.  Despite what interaction going back 2014.   He has loss significant cognition.  This is secondary to stroke x2 he was in the hospital.  He is accompanied by his daughter and son-in-law. He has history of gout that he followed up with his primary physician on allopurinol. f.          DETAILED EXAMINATION  04/04/23  :    Vitals:    04/04/23 0856   BP: 134/88   BP Location: Right arm   Patient Position: Sitting   Cuff Size: Adult Regular   Pulse: 74   SpO2: 93%   Weight: 69.9 kg (154 lb)     Alert oriented. Head including the face is examined for malar rash, heliotropes, scarring, lupus pernio. Eyes examined for redness such as in episcleritis/scleritis, periorbital lesions.   Neck/ Face examined for parotid gland swelling, range of motion of  neck.  Left upper and lower and right upper and lower extremities examined for tenderness, swelling, warmth of the appendicular joints, range of motion, edema, rash.  Some of the important findings included: he does not have evidence of synovitis in the palpable joints of the upper extremities.  No significant deformities of the digits.  He is in wheelchair.     Patient Active Problem List    Diagnosis Date Noted     Ataxic gait 2023     Priority: Medium     Urinary retention 2023     Priority: Medium     Hypomagnesemia 2023     Priority: Medium     Stroke (H) 2023     Priority: Medium     Cerebellar stroke (H) 2023     Priority: Medium     Vertigo 2023     Priority: Medium     Essential hypertension 2023     Priority: Medium     Vertebral artery occlusion, left 2023     Priority: Medium     ACE-inhibitor cough 2021     Priority: Medium     Type 2 diabetes mellitus with stage 3 chronic kidney disease, without long-term current use of insulin (H) 2020     Priority: Medium     Urinary incontinence 10/30/2019     Priority: Medium     Primary osteoarthritis involving multiple joints 2019     Priority: Medium     Esophageal reflux      Priority: Medium     Created by Conversion         Dyslipidemia      Priority: Medium     Created by Conversion  [facility] Annotation: Dec 28 2007  3:23PM - Giulai Meridai2007:   Chol   281, HDL 39Started simvastatin 2011         CKD (chronic kidney disease) stage 3, GFR 30-59 ml/min (H)      Priority: Medium     Created by Conversion  [facility] Annotation: Dec 28 2007  3:40PM - Alla Ali: Cr elevated at   1.7   since .Acute exacerbation with episode of nephrolithiasis complicated   by   urosepsis in 2009.         Constipation, unspecified constipation type      Priority: Medium     Coronary artery disease due to lipid rich plaque      Priority: Medium     Right lower quadrant abdominal pain       Priority: Medium     Colitis      Priority: Medium     Nephrolithiasis      Priority: Medium     Created by [facility] Annotation: Aug  4 2009 12:22PM - Giulia Meridaig:   Hospitalized   7/2009 with BL stent placement.  Stent removal 8/2009.Calcium Stones.CT   5/2011:Calcified plaques L Renal pelvis,L lower pole 2 nonobstructing   calculi   of 2 mm.  Calculi medial R upper pole and R lower pole.Low dense R lower   pole   cortical lesions.1/2011 seen by Urology.BL pelvocaliectasis         Chronic Gout      Priority: Medium     Created by [facility] Annotation: Dec 28 2007  3:23PM - Bella Gold: Uric acid   elevated   to 10 since at least 2007.Multiple joints affected-phalangeal,wrists,   ankles,   knees, ? shoulders.Started allopurinol 5/2009. Stopped HCTZ for BP   5/2009Multiple tophi noted since 10/2011Started colchicine 9/2009.  Replacement Utility updated for latest IMO load         BPH (benign prostatic hyperplasia) 07/19/2017     Priority: Medium     Chronic gout 07/19/2017     Priority: Medium     Essential hypertension with goal blood pressure less than 140/90 03/21/2017     Priority: Medium     Chronic right shoulder pain 02/21/2017     Priority: Medium     Generalized Osteoarthritis Of The Hand      Priority: Medium     Created by Conversion  Replacement Utility updated for latest IMO load         Bilateral chronic knee pain 07/20/2016     Priority: Medium     Cataracts, bilateral 02/17/2016     Priority: Medium     Elevated PSA 12/21/2015     Priority: Medium     Prostate nodule 12/21/2015     Priority: Medium     Pseudogout      Priority: Medium     Created by [facility] Annotation: Aug 14 2009  8:35PM - Chucho Espino: Ankle   aspiration   8/2009 showed calcium pyrophospate crystals but not noted if intra or   extracellular.Seen by Rhematology 9/2009         Latent Tuberculosis      Priority: Medium     Created by [facility] Annotation: Dec 28 2007   3:40PM - Bella Gold: treated 9 mo in   '06         Lumbar Disc Degeneration      Priority: Medium     Created by Conversion         Insomnia      Priority: Medium     Created by Conversion         Past Surgical History:   Procedure Laterality Date     IR MISCELLANEOUS PROCEDURE  2009     IR MISCELLANEOUS PROCEDURE  2009     IR MISCELLANEOUS PROCEDURE  2009     IR MISCELLANEOUS PROCEDURE  2009     IR NEPHROURETERAL TUBE PLACEMENT BILATERAL  2009     IR URETER DILATION BILATERAL  2009     IR URETER DILATION BILATERAL  2009     KIDNEY STONE SURGERY       PICC  3/6/2016           Past Medical History:   Diagnosis Date     Acute blood loss anemia      Acute renal failure (H)      Anemia      Bacteremia      Cataract      Chronic gout      CKD (chronic kidney disease)     Stage 3     DM2 (diabetes mellitus, type 2) (H)      GERD (gastroesophageal reflux disease)      Glucose intolerance (impaired glucose tolerance)      Gout      History of nephrolithiasis      History of prostatitis 2017     Hyperlipidemia      Hypertension      Insomnia      Intestinal disaccharidase deficiencies and disaccharide malabsorption     Created by Conversion      Latent tuberculosis      Nephrolithiasis      Neuropathy      Nonspecific abnormal findings on radiological and other examination of skull and head     Created by Conversion [facility] Annotation: 2013 11:23PM - Giulia Meridai/10/2011:  severe stenosis both posterior cerebral arteries seen MRI/MRA done for  vertigo. No acute changes.e*     Osteoarthritis     wrist, knee, shoulder     Prostatitis      Rectal bleed      Trigger thumb      No Known Allergies  Current Outpatient Medications   Medication Sig Dispense Refill     acetaminophen (TYLENOL) 500 MG tablet Take 500 mg by mouth every 6 hours as needed for mild pain       allopurinol (ZYLOPRIM) 100 MG tablet TAKE 2 TABLETS BY MOUTH EVERY  tablet 7     apixaban ANTICOAGULANT  (ELIQUIS) 5 MG tablet Take 1 tablet (5 mg) by mouth 2 times daily 60 tablet 6     ASPIRIN LOW DOSE 81 MG EC tablet TAKE 1 TABLET(81 MG) BY MOUTH DAILY 90 tablet 3     DULoxetine (CYMBALTA) 20 MG capsule TAKE 1 CAPSULE(20 MG) BY MOUTH TWICE DAILY 60 capsule 1     magnesium oxide (MAG-OX) 400 MG tablet Take 1 tablet (400 mg) by mouth daily 30 tablet 0     meclizine (ANTIVERT) 12.5 MG tablet Take 1 tablet (12.5 mg) by mouth 3 times daily as needed for dizziness 60 tablet 6     metoprolol succinate ER (TOPROL XL) 100 MG 24 hr tablet Take 100 mg by mouth daily Take 1/2 tablet  daily       pantoprazole (PROTONIX) 40 MG EC tablet Take 1 tablet (40 mg) by mouth every morning (before breakfast) 90 tablet 3     polyethylene glycol (MIRALAX) 17 GM/Dose powder Take 17 g (1 capful.) by mouth daily as needed for constipation 578 g 4     rosuvastatin (CRESTOR) 20 MG tablet TAKE 1 TABLET(20 MG) BY MOUTH AT BEDTIME 90 tablet 1     senna-docusate (SENOKOT-S/PERICOLACE) 8.6-50 MG tablet Take 1 tablet by mouth 2 times daily as needed for constipation 30 tablet 0     sitagliptin (JANUVIA) 50 MG tablet Take 1 tablet (50 mg) by mouth daily 90 tablet 3     tamsulosin (FLOMAX) 0.4 MG capsule Take 1 capsule (0.4 mg) by mouth daily 30 capsule 3     traZODone (DESYREL) 50 MG tablet Take 0.5-1 tablets (25-50 mg) by mouth nightly as needed for sleep 90 tablet 3     HYDROcodone-acetaminophen (NORCO) 5-325 MG tablet Take 1 tablet by mouth daily as needed for severe pain (7-10) (Patient not taking: Reported on 4/4/2023)       lidocaine (XYLOCAINE) 2 % external gel Apply topically 3 times daily Apply on neck back tid (Patient not taking: Reported on 4/4/2023) 85 g 0     triamcinolone (KENALOG) 0.1 % external cream Apply topically 2 times daily as needed for irritation (Patient not taking: Reported on 4/4/2023) 80 g 3     family history includes Cerebrovascular Disease in his daughter and father.  Social Connections: Socially Integrated (2/22/2022)     Social Connection and Isolation Panel [NHANES]      Frequency of Communication with Friends and Family: Once a week      Frequency of Social Gatherings with Friends and Family: Twice a week      Attends Mosque Services: 1 to 4 times per year      Active Member of Clubs or Organizations: No      Attends Club or Organization Meetings: 1 to 4 times per year      Marital Status:           WBC Count   Date Value Ref Range Status   02/16/2023 9.1 4.0 - 11.0 10e3/uL Final     RBC Count   Date Value Ref Range Status   02/16/2023 4.30 (L) 4.40 - 5.90 10e6/uL Final     Hemoglobin   Date Value Ref Range Status   02/16/2023 13.1 (L) 13.3 - 17.7 g/dL Final     Hematocrit   Date Value Ref Range Status   02/16/2023 39.7 (L) 40.0 - 53.0 % Final     MCV   Date Value Ref Range Status   02/16/2023 92 78 - 100 fL Final     MCH   Date Value Ref Range Status   02/16/2023 30.5 26.5 - 33.0 pg Final     Platelet Count   Date Value Ref Range Status   02/16/2023 211 150 - 450 10e3/uL Final     % Lymphocytes   Date Value Ref Range Status   02/01/2023 21 % Final     AST   Date Value Ref Range Status   02/02/2023 30 10 - 50 U/L Final     ALT   Date Value Ref Range Status   02/16/2023 54 (H) 10 - 50 U/L Final     Albumin   Date Value Ref Range Status   02/16/2023 4.5 3.5 - 5.2 g/dL Final   01/23/2020 4.0 3.5 - 5.0 g/dL Final     Alkaline Phosphatase   Date Value Ref Range Status   02/02/2023 75 40 - 129 U/L Final     Creatinine   Date Value Ref Range Status   02/16/2023 1.44 (H) 0.67 - 1.17 mg/dL Final     GFR Estimate   Date Value Ref Range Status   02/16/2023 49 (L) >60 mL/min/1.73m2 Final     Comment:     eGFR calculated using 2021 CKD-EPI equation.   06/22/2020 44 (L) >60 mL/min/1.73m2 Final     GFR Estimate If Black   Date Value Ref Range Status   06/22/2020 53 (L) >60 mL/min/1.73m2 Final

## 2023-04-05 ENCOUNTER — MEDICAL CORRESPONDENCE (OUTPATIENT)
Dept: HEALTH INFORMATION MANAGEMENT | Facility: CLINIC | Age: 83
End: 2023-04-05
Payer: COMMERCIAL

## 2023-04-10 ENCOUNTER — TELEPHONE (OUTPATIENT)
Dept: FAMILY MEDICINE | Facility: CLINIC | Age: 83
End: 2023-04-10
Payer: COMMERCIAL

## 2023-04-10 ENCOUNTER — NURSE TRIAGE (OUTPATIENT)
Dept: FAMILY MEDICINE | Facility: CLINIC | Age: 83
End: 2023-04-10
Payer: COMMERCIAL

## 2023-04-10 ENCOUNTER — TRANSFERRED RECORDS (OUTPATIENT)
Dept: MULTI SPECIALTY CLINIC | Facility: CLINIC | Age: 83
End: 2023-04-10

## 2023-04-10 DIAGNOSIS — Z76.0 ENCOUNTER FOR MEDICATION REFILL: Primary | ICD-10-CM

## 2023-04-10 LAB — RETINOPATHY: NORMAL

## 2023-04-10 RX ORDER — SENNA AND DOCUSATE SODIUM 50; 8.6 MG/1; MG/1
1 TABLET, FILM COATED ORAL DAILY
Qty: 90 TABLET | Refills: 3 | Status: SHIPPED | OUTPATIENT
Start: 2023-04-10 | End: 2023-08-29

## 2023-04-10 NOTE — TELEPHONE ENCOUNTER
Called pt and daughter to inform them that rx has been refilled. daughter requested appt for re-evaluation. appt scheduled 4/27.      Agapito Shook, BSN RN  [facility]

## 2023-04-10 NOTE — TELEPHONE ENCOUNTER
Called pt and relayed Dr. Mason's advise. Pt and daughter verbalized understanding.    Daughter is asking if PCP can send Senexon 8.6-50 mg, take once daily in the morning for constipation. Daughter stated pt received this after discharge from hospital and they are almost out.    Agapito Martinez Cem Say, BSN RN  [facility]

## 2023-04-10 NOTE — TELEPHONE ENCOUNTER
"Care Advice  Seen in office today or tomorrow. Family declined an appointment.   DaughterDimitri reported patient requires 1-2 assistance to transfer patient into and out of car.  Patient uses the wheelchair and can only ambulate a few feet at a time with assistance.   Family would like to know from provider's recommendation if his ASA or Eliquis can be reduced as these maybe the factor to his bleeding.  Family had been Informed by neurologist a bleeding can occur with medications.      Will route to provider for review and recommendation.    PETTY Nichols, RN  [facility]      Called from daughterDimitri with patient to report following symptom.       Reason for Disposition    All other patients with blood in urine  (Exception: Could be normal menstrual bleeding.)    Additional Information    Negative: Shock suspected (e.g., cold/pale/clammy skin, too weak to stand, low BP, rapid pulse)    Negative: Sounds like a life-threatening emergency to the triager    Negative: Urinary catheter, questions about    Negative: Recent back or abdominal injury    Negative: Recent genital injury    Negative: Unable to urinate (or only a few drops) > 4 hours and bladder feels very full (e.g., palpable bladder or strong urge to urinate)    Negative: Passing pure blood or large blood clots (i.e., larger than a dime or grape)    Negative: Known sickle cell disease    Negative: Taking Coumadin (warfarin) or other strong blood thinner, or known bleeding disorder (e.g., thrombocytopenia)    Negative: Side (flank) or back pain present    Negative: Pain or burning with passing urine (urination)    Negative: Patient wants to be seen    Answer Assessment - Initial Assessment Questions  1. COLOR of URINE: \"Describe the color of the urine.\"  (e.g., tea-colored, pink, red, blood clots, bloody)      Pinkish at first followed by just yellow urine.   2. ONSET: \"When did the bleeding start?\"       This past Saturday morning.  " "One pinkish time on Saturday and a little bit this morning.  Has history of kidney stone.    3. EPISODES: \"How many times has there been blood in the urine?\" or \"How many times today?\"  Pinkish urination occurred twice (Saturday, 4/8 and 4/10, with today being lighter pink than Saturday per family.       One time on Saturday and again this morning per family report.    4. PAIN with URINATION: \"Is there any pain with passing your urine?\" If Yes, ask: \"How bad is the pain?\"  (Scale 1-10; or mild, moderate, severe)     - MILD - complains slightly about urination hurting     - MODERATE - interferes with normal activities       - SEVERE - excruciating, unwilling or unable to urinate because of the pain       Because of stroke, patient could not express pain.   5. FEVER: \"Do you have a fever?\" If Yes, ask: \"What is your temperature, how was it measured, and when did it start?\"      Denied  6. ASSOCIATED SYMPTOMS: \"Are you passing urine more frequently than usual?\"      Patient able to initiate urine with approximately same amount each time. Fluids offered per family.   7. OTHER SYMPTOMS: \"Do you have any other symptoms?\" (e.g., back/flank pain, abdominal pain, vomiting)      Due to stroke that affects neurological, patient unable to express if having these pain symptoms. However, able to tell wife he has a lot of bloating feeling. Tums has been given with some relief.    8. PREGNANCY: \"Is there any chance you are pregnant?\" \"When was your last menstrual period?\"      n/a    Protocols used: URINE - BLOOD IN-A-OH      "

## 2023-04-24 DIAGNOSIS — M1A.09X0 CHRONIC GOUT OF MULTIPLE SITES, UNSPECIFIED CAUSE: ICD-10-CM

## 2023-04-26 RX ORDER — ALLOPURINOL 100 MG/1
200 TABLET ORAL DAILY
Qty: 180 TABLET | Refills: 0 | Status: SHIPPED | OUTPATIENT
Start: 2023-04-26 | End: 2023-07-25

## 2023-04-26 NOTE — TELEPHONE ENCOUNTER
"Routing refill request to provider for review/approval because:  Labs out of range:  Creatinine  Labs not current:  Uric acid    Last Written Prescription Date:  2/21/22  Last Fill Quantity: 180,  # refills: 7  Last office visit provider:  3/6/23     Requested Prescriptions   Pending Prescriptions Disp Refills     allopurinol (ZYLOPRIM) 100 MG tablet [Pharmacy Med Name: ALLOPURINOL 100MG TABLETS] 180 tablet 7     Sig: TAKE 2 TABLETS BY MOUTH EVERY DAY       Gout Agents Protocol Failed - 4/26/2023 11:51 AM        Failed - Has Uric Acid on file in past 12 months and value is less than 6     Recent Labs   Lab Test 12/20/18  1121   URIC 6.8     If level is 6mg/dL or greater, ok to refill one time and refer to provider.           Failed - Normal serum creatinine on file in the past 12 months     Recent Labs   Lab Test 02/16/23  1156   CR 1.44*       Ok to refill medication if creatinine is low          Passed - CBC on file in past 12 months     Recent Labs   Lab Test 02/16/23  1156   WBC 9.1   RBC 4.30*   HGB 13.1*   HCT 39.7*                    Passed - ALT on file in past 12 months     Recent Labs   Lab Test 02/16/23  1156   ALT 54*             Passed - Recent (12 mo) or future (30 days) visit within the authorizing provider's specialty     Patient has had an office visit with the authorizing provider or a provider within the authorizing providers department within the previous 12 mos or has a future within next 30 days. See \"Patient Info\" tab in inbasket, or \"Choose Columns\" in Meds & Orders section of the refill encounter.              Passed - Medication is active on med list        Passed - Patient is age 18 or older             Al Araiza RN 04/26/23 11:52 AM  "

## 2023-04-27 ENCOUNTER — OFFICE VISIT (OUTPATIENT)
Dept: FAMILY MEDICINE | Facility: CLINIC | Age: 83
End: 2023-04-27
Payer: COMMERCIAL

## 2023-04-27 VITALS
SYSTOLIC BLOOD PRESSURE: 126 MMHG | RESPIRATION RATE: 16 BRPM | BODY MASS INDEX: 30.43 KG/M2 | WEIGHT: 155 LBS | TEMPERATURE: 97.3 F | HEIGHT: 60 IN | HEART RATE: 80 BPM | OXYGEN SATURATION: 98 % | DIASTOLIC BLOOD PRESSURE: 84 MMHG

## 2023-04-27 DIAGNOSIS — M1A.00X0 CHRONIC GOUTY ARTHROPATHY: ICD-10-CM

## 2023-04-27 DIAGNOSIS — R31.0 GROSS HEMATURIA: Primary | ICD-10-CM

## 2023-04-27 DIAGNOSIS — I65.1 BASILAR ARTERY STENOSIS: ICD-10-CM

## 2023-04-27 DIAGNOSIS — N18.30 TYPE 2 DIABETES MELLITUS WITH STAGE 3 CHRONIC KIDNEY DISEASE, WITHOUT LONG-TERM CURRENT USE OF INSULIN, UNSPECIFIED WHETHER STAGE 3A OR 3B CKD (H): ICD-10-CM

## 2023-04-27 DIAGNOSIS — G47.00 INSOMNIA, UNSPECIFIED TYPE: ICD-10-CM

## 2023-04-27 DIAGNOSIS — R21 RASH: ICD-10-CM

## 2023-04-27 DIAGNOSIS — E11.22 TYPE 2 DIABETES MELLITUS WITH STAGE 3 CHRONIC KIDNEY DISEASE, WITHOUT LONG-TERM CURRENT USE OF INSULIN, UNSPECIFIED WHETHER STAGE 3A OR 3B CKD (H): ICD-10-CM

## 2023-04-27 LAB — HBA1C MFR BLD: 8 % (ref 0–5.6)

## 2023-04-27 PROCEDURE — 99214 OFFICE O/P EST MOD 30 MIN: CPT | Performed by: FAMILY MEDICINE

## 2023-04-27 PROCEDURE — 36415 COLL VENOUS BLD VENIPUNCTURE: CPT | Performed by: FAMILY MEDICINE

## 2023-04-27 PROCEDURE — 83036 HEMOGLOBIN GLYCOSYLATED A1C: CPT | Performed by: FAMILY MEDICINE

## 2023-04-27 RX ORDER — TRAZODONE HYDROCHLORIDE 50 MG/1
50-100 TABLET, FILM COATED ORAL
Qty: 180 TABLET | Refills: 3 | Status: SHIPPED | OUTPATIENT
Start: 2023-04-27 | End: 2024-01-15

## 2023-04-27 NOTE — PROGRESS NOTES
ASSESMENT AND PLAN:  Diagnoses and all orders for this visit:  Gross hematuria  Resolved since discontinuation of aspirin.  He continues to be on Eliquis.  He has a urology appointment tomorrow.  Reviewed the recent imaging studies with the patient with the help of a professional .  He did have a recent noncontrast CT of the abdomen and pelvis-see report for details- it showed some thickening in the kidney that was stable, no other source of hematuria.  He can talk with the urologist to decide whether he wants to undergo cystoscopy or have further imaging.  Basilar artery stenosis  Stable.  On medical management.  Reviewed with the neuro radiology interventional note.  They recommended Eliquis plus aspirin but we are discontinuing aspirin as detailed above because of the bleeding.  Continue Eliquis, and follow-up with them as directed.  Continue rosuvastatin for medical management.  Rash  Mild.  However the itchiness does bother the patient quite a bit.  I am reluctant to have him on an antihistamine given his urinary retention history.  Triamcinolone did not help, this will be discontinued.  We will see if we can get better control of his insomnia so that the itching is not bothering him at bedtime as detailed below.  Type 2 diabetes mellitus with stage 3 chronic kidney disease, without long-term current use of insulin, unspecified whether stage 3a or 3b CKD (H)  Recently off meds, restarted Januvia, see previous clinic note for details.  -     Hemoglobin A1c done today comes back at 8.0, this would not be fully reflective of the improvement we would expect with his recent reinitiation of Januvia.  Adequate control, will plan to recheck A1c again in 4 to 6 months.  Continue Januvia.  Insomnia, unspecified type  Discussed options with the patient and his family with the help of a professional .  Will increase dose of trazodone at bedtime.  -     traZODone (DESYREL) 50 MG tablet; Take 1-2  tablets ( mg) by mouth nightly as needed for sleep  Chronic Gout  No signs of an acute exacerbation.  Continue allopurinol.    Reviewed the risks and benefits of the treatment plan with the patient and/or caregiver and we discussed indications for routine and emergent follow-up.        SUBJECTIVE: Patient has some medication questions.  He had been getting some blood in the urine, aspirin was stopped and Eliquis was continued, the blood in the urine went away.  Patient has not been sleeping well despite trazodone 50 mg at bedtime.  Part of the reason he is not sleeping is that he is getting a lot of itching from mild intermittent rash that he has been getting around the trunk.  He he tried triamcinolone cream and loratadine which did not seem to help.  Patient has a recent history of urinary retention, he reports difficulty getting the urinary stream started there is been no recurrence of urinary retention.  He does have a urology appointment coming up tomorrow.  Since his hospitalization he restarted Januvia.  Blood sugars have improved since then.  Patient's been getting some left leg pain in the ankle up to the knee, it causes him some limping.  He uses a walker for walking at baseline.  Pain is chronic, history of gout but no recent acute flares of visible redness and swelling in the joints.    Past Medical History:   Diagnosis Date     Acute blood loss anemia      Acute renal failure (H)      Anemia      Bacteremia      Cataract      Chronic gout      CKD (chronic kidney disease)     Stage 3     DM2 (diabetes mellitus, type 2) (H)      GERD (gastroesophageal reflux disease)      Glucose intolerance (impaired glucose tolerance)      Gout      History of nephrolithiasis      History of prostatitis 7/19/2017     Hyperlipidemia      Hypertension      Insomnia      Intestinal disaccharidase deficiencies and disaccharide malabsorption     Created by Conversion      Latent tuberculosis      Nephrolithiasis       Neuropathy      Nonspecific abnormal findings on radiological and other examination of skull and head     Created by [facility] Annotation: 2013 11:23PM - Giulia Meridai/10/2011:  severe stenosis both posterior cerebral arteries seen MRI/MRA done for  vertigo. No acute changes.e*     Osteoarthritis     wrist, knee, shoulder     Prostatitis      Rectal bleed      Trigger thumb      Patient Active Problem List   Diagnosis     Esophageal reflux     Dyslipidemia     Nephrolithiasis     Pseudogout     Latent Tuberculosis     Generalized Osteoarthritis Of The Hand     Lumbar Disc Degeneration     Insomnia, unspecified type     Chronic Gout     CKD (chronic kidney disease) stage 3, GFR 30-59 ml/min (H)     Elevated PSA     Prostate nodule     Cataracts, bilateral     Constipation, unspecified constipation type     Bilateral chronic knee pain     Chronic right shoulder pain     Essential hypertension with goal blood pressure less than 140/90     BPH (benign prostatic hyperplasia)     Chronic gout     Coronary artery disease due to lipid rich plaque     Right lower quadrant abdominal pain     Colitis     Primary osteoarthritis involving multiple joints     Urinary incontinence     Type 2 diabetes mellitus with stage 3 chronic kidney disease, without long-term current use of insulin (H)     ACE-inhibitor cough     Vertigo     Essential hypertension     Vertebral artery occlusion, left     Cerebellar stroke (H)     Stroke (H)     Hypomagnesemia     Ataxic gait     Urinary retention     Basilar artery stenosis     Current Outpatient Medications   Medication Sig Dispense Refill     acetaminophen (TYLENOL) 500 MG tablet Take 500 mg by mouth every 6 hours as needed for mild pain       allopurinol (ZYLOPRIM) 100 MG tablet Take 2 tablets (200 mg) by mouth daily 180 tablet 0     apixaban ANTICOAGULANT (ELIQUIS) 5 MG tablet Take 1 tablet (5 mg) by mouth 2 times daily 60 tablet 6     DULoxetine (CYMBALTA) 20 MG  capsule Take 1 capsule (20 mg) by mouth 2 times daily for 90 days 180 capsule 1     meclizine (ANTIVERT) 12.5 MG tablet Take 1 tablet (12.5 mg) by mouth 3 times daily as needed for dizziness 60 tablet 6     metoprolol succinate ER (TOPROL XL) 100 MG 24 hr tablet Take 100 mg by mouth daily Take 1/2 tablet  daily       pantoprazole (PROTONIX) 40 MG EC tablet Take 1 tablet (40 mg) by mouth every morning (before breakfast) 90 tablet 3     polyethylene glycol (MIRALAX) 17 GM/Dose powder Take 17 g (1 capful.) by mouth daily as needed for constipation 578 g 4     rosuvastatin (CRESTOR) 20 MG tablet TAKE 1 TABLET(20 MG) BY MOUTH AT BEDTIME 90 tablet 1     senna-docusate (SENOKOT-S/PERICOLACE) 8.6-50 MG tablet Take 1 tablet by mouth 2 times daily as needed for constipation 30 tablet 0     SENNA-docusate sodium (SENNA S) 8.6-50 MG tablet Take 1 tablet by mouth daily 90 tablet 3     sitagliptin (JANUVIA) 50 MG tablet Take 1 tablet (50 mg) by mouth daily 90 tablet 3     tamsulosin (FLOMAX) 0.4 MG capsule Take 1 capsule (0.4 mg) by mouth daily 30 capsule 3     traZODone (DESYREL) 50 MG tablet Take 1-2 tablets ( mg) by mouth nightly as needed for sleep 180 tablet 3     History   Smoking Status     Former     Types: Cigarettes     Quit date: 3/10/2000   Smokeless Tobacco     Former       OBJECTICE: /84   Pulse 80   Temp 97.3  F (36.3  C) (Temporal)   Resp 16   Ht 1.524 m (5')   Wt 70.3 kg (155 lb)   SpO2 98%   BMI 30.27 kg/m       Recent Results (from the past 24 hour(s))   Hemoglobin A1c    Collection Time: 04/27/23 10:58 AM   Result Value Ref Range    Hemoglobin A1C 8.0 (H) 0.0 - 5.6 %        GEN-alert, appropriate, in no acute distress   CV-regular rate and rhythm   RESP-lungs clear to auscultation   Musculoskeletal- no acute redness and swelling of the left ankle or knee.   SKIN-very mild urticaria rash of the lateral trunk bilaterally      Chucho Espino MD

## 2023-04-28 ENCOUNTER — OFFICE VISIT (OUTPATIENT)
Dept: UROLOGY | Facility: CLINIC | Age: 83
End: 2023-04-28
Payer: COMMERCIAL

## 2023-04-28 VITALS
HEIGHT: 60 IN | BODY MASS INDEX: 30.43 KG/M2 | SYSTOLIC BLOOD PRESSURE: 120 MMHG | WEIGHT: 155 LBS | HEART RATE: 72 BPM | DIASTOLIC BLOOD PRESSURE: 80 MMHG | OXYGEN SATURATION: 94 %

## 2023-04-28 DIAGNOSIS — R33.9 URINARY RETENTION: ICD-10-CM

## 2023-04-28 DIAGNOSIS — M1A.09X0 CHRONIC GOUT OF MULTIPLE SITES, UNSPECIFIED CAUSE: ICD-10-CM

## 2023-04-28 DIAGNOSIS — R31.0 GROSS HEMATURIA: Primary | ICD-10-CM

## 2023-04-28 LAB
ALBUMIN UR-MCNC: ABNORMAL MG/DL
APPEARANCE UR: CLEAR
BILIRUB UR QL STRIP: NEGATIVE
COLOR UR AUTO: YELLOW
GLUCOSE UR STRIP-MCNC: 100 MG/DL
HGB UR QL STRIP: ABNORMAL
KETONES UR STRIP-MCNC: NEGATIVE MG/DL
LEUKOCYTE ESTERASE UR QL STRIP: ABNORMAL
NITRATE UR QL: NEGATIVE
PH UR STRIP: 6.5 [PH] (ref 5–7)
SP GR UR STRIP: 1.01 (ref 1–1.03)
UROBILINOGEN UR STRIP-ACNC: 0.2 E.U./DL

## 2023-04-28 PROCEDURE — 99204 OFFICE O/P NEW MOD 45 MIN: CPT | Performed by: PHYSICIAN ASSISTANT

## 2023-04-28 PROCEDURE — 81003 URINALYSIS AUTO W/O SCOPE: CPT | Mod: QW | Performed by: PHYSICIAN ASSISTANT

## 2023-04-28 RX ORDER — ALLOPURINOL 100 MG/1
TABLET ORAL
Qty: 180 TABLET | Refills: 0 | OUTPATIENT
Start: 2023-04-28

## 2023-04-28 ASSESSMENT — PAIN SCALES - GENERAL: PAINLEVEL: NO PAIN (0)

## 2023-04-28 NOTE — LETTER
4/28/2023       RE: Adam Talamantes  [address]  [place] [address]     Dear Colleague,    Thank you for referring your patient, Adam Talamantes, to the [facility] at [facility]. Please see a copy of my visit note below.    CC: hematuria    HPI:  Adam Talamantes is a 83 year old male who presents in HFU for evaluation of the above.     Seen in consult at [facility] with our urology colleagues on 2/7/23. Consult info as below:  Adam Talamantes is a 82 year old male with pmh significant for HTN, HLD, DM II, CKD who had a recent admission on 1/11/23 for stroke.  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  He was transferred to [facility] for mgmt of worsening stroke burden and is currently on a low intensity heparin gtt.      Urology has consulted for gross hematuria that began yesterday.  His son saw a few black/red clots in the yellow urine.  Overnight, nurses saw a few red clots.  Just now, the nurse told me that urine was yellow without any clots.  The patient did have hematuria once several months ago, but it hasn't recurred until now.  Today has no flank pain, bladder pain or dysuria.    - Today is having difficulty emptying his bladder despite continuing home tamsulosin  - Did have a stone procedure at [facility] in 2009, postop course c/b fever/chills attributed to a UTI  - Notes suggest he was seen in urology in 2011 for BL pelviectasis     - Last imaging of the AP without IV contrast was on 2/8/23 noting a stable area of thickening with calcification in the anterior left renal pelvis although limited evaluation due to noncontrast exam.     Seen by neurosurgery in stroke follow-up 4/7/23 and on Eliquis/ASA. ASA recently held due to persistent gross hematuria.     No hematuria for 1-2 days. Has been on Flomax for several years. No urgency or  frequency. Not drinking much water. Here with his son-in-law.     A phone  aided in obtaining the history and facilitating the visit today.      Past Medical History:   Diagnosis Date    Acute blood loss anemia     Acute renal failure (H)     Anemia     Bacteremia     Cataract     Chronic gout     CKD (chronic kidney disease)     Stage 3    DM2 (diabetes mellitus, type 2) (H)     GERD (gastroesophageal reflux disease)     Glucose intolerance (impaired glucose tolerance)     Gout     History of nephrolithiasis     History of prostatitis 2017    Hyperlipidemia     Hypertension     Insomnia     Intestinal disaccharidase deficiencies and disaccharide malabsorption     Created by Conversion     Latent tuberculosis     Nephrolithiasis     Neuropathy     Nonspecific abnormal findings on radiological and other examination of skull and head     Created by [facility] Annotation: 2013 11:23PM - Giulia Meridai/10/2011:  severe stenosis both posterior cerebral arteries seen MRI/MRA done for  vertigo. No acute changes.e*    Osteoarthritis     wrist, knee, shoulder    Prostatitis     Rectal bleed     Trigger thumb        Past Surgical History:   Procedure Laterality Date    IR MISCELLANEOUS PROCEDURE  2009    IR MISCELLANEOUS PROCEDURE  2009    IR MISCELLANEOUS PROCEDURE  2009    IR MISCELLANEOUS PROCEDURE  2009    IR NEPHROURETERAL TUBE PLACEMENT BILATERAL  2009    IR URETER DILATION BILATERAL  2009    IR URETER DILATION BILATERAL  2009    KIDNEY STONE SURGERY      PICC  3/6/2016            Social History     Socioeconomic History    Marital status:      Spouse name: Not on file    Number of children: Not on file    Years of education: Not on file    Highest education level: Not on file   Occupational History    Not on file   Tobacco Use    Smoking status: Former     Types: Cigarettes     Quit date: 3/10/2000     Years since quittin.1     Passive  exposure: Never    Smokeless tobacco: Former    Tobacco comments:     no passive exposure   Vaping Use    Vaping status: Never Used   Substance and Sexual Activity    Alcohol use: No    Drug use: No    Sexual activity: Never     Partners: Female   Other Topics Concern    Not on file   Social History Narrative    Not on file     Social Determinants of Health     Financial Resource Strain: Low Risk  (2/22/2022)    Overall Financial Resource Strain (CARDIA)     Difficulty of Paying Living Expenses: Not hard at all   Food Insecurity: No Food Insecurity (2/22/2022)    Hunger Vital Sign     Worried About Running Out of Food in the Last Year: Never true     Ran Out of Food in the Last Year: Never true   Transportation Needs: No Transportation Needs (2/22/2022)    PRAPARE - Transportation     Lack of Transportation (Medical): No     Lack of Transportation (Non-Medical): No   Physical Activity: Inactive (2/22/2022)    Exercise Vital Sign     Days of Exercise per Week: 0 days     Minutes of Exercise per Session: 0 min   Stress: No Stress Concern Present (2/22/2022)    Citizen of [place] of Occupational Health - Occupational Stress Questionnaire     Feeling of Stress : Not at all   Social Connections: Socially Integrated (2/22/2022)    Social Connection and Isolation Panel [NHANES]     Frequency of Communication with Friends and Family: Once a week     Frequency of Social Gatherings with Friends and Family: Twice a week     Attends Holiness Services: 1 to 4 times per year     Active Member of Clubs or Organizations: No     Attends Club or Organization Meetings: 1 to 4 times per year     Marital Status:    Intimate Partner Violence: Not At Risk (2/22/2022)    Humiliation, Afraid, Rape, and Kick questionnaire     Fear of Current or Ex-Partner: No     Emotionally Abused: No     Physically Abused: No     Sexually Abused: No   Housing Stability: Unknown (2/22/2022)    Housing Stability Vital Sign     Unable to Pay for  Housing in the Last Year: No     Number of Places Lived in the Last Year: Not on file     Unstable Housing in the Last Year: No       Family History   Problem Relation Age of Onset    Cerebrovascular Disease Father     Cerebrovascular Disease Daughter        No Known Allergies    Current Outpatient Medications   Medication    acetaminophen (TYLENOL) 500 MG tablet    allopurinol (ZYLOPRIM) 100 MG tablet    apixaban ANTICOAGULANT (ELIQUIS) 5 MG tablet    DULoxetine (CYMBALTA) 20 MG capsule    meclizine (ANTIVERT) 12.5 MG tablet    metoprolol succinate ER (TOPROL XL) 100 MG 24 hr tablet    pantoprazole (PROTONIX) 40 MG EC tablet    polyethylene glycol (MIRALAX) 17 GM/Dose powder    rosuvastatin (CRESTOR) 20 MG tablet    senna-docusate (SENOKOT-S/PERICOLACE) 8.6-50 MG tablet    SENNA-docusate sodium (SENNA S) 8.6-50 MG tablet    sitagliptin (JANUVIA) 50 MG tablet    tamsulosin (FLOMAX) 0.4 MG capsule    traZODone (DESYREL) 50 MG tablet     No current facility-administered medications for this visit.   EXAMINATION: CT ABDOMEN PELVIS W/O CONTRAST, 2/8/2023 12:50 PM     INDICATION: blood clots in urine     COMPARISON STUDY: CT abdomen and pelvis 5/1/2018     TECHNIQUE: CT scan on the lung apices to the pubic symphysis was  performed on multidetector CT scanner using volumetric acquisition  technique and images were reconstructed in multiple planes with  variable thickness and reviewed on dedicated workstations.      CT scan radiation dose is optimized to minimum requisite dose using  automated dose modulation techniques.     FINDINGS:     Abdomen and Pelvis:  Liver: 9 mm cyst in hepatic segment 2 stable from prior study.  Additional low-density lesions present on prior CT felt to be a  mixture of benign cysts and hemangiomas are not clearly identified on  this noncontrast study. No intrahepatic biliary ductal dilation.      Biliary System: Normal gallbladder. No extrahepatic biliary ductal  dilation.     Pancreas: No mass  or pancreatic ductal dilation.      Adrenal glands: Within normal limits      Spleen: Normal.      Kidneys: Bilateral renal cortical atrophy with scattered focal areas  of scarring. Mild bilateral pelvocaliectasis similar to prior CT. No  radiodense renal calculi. Unchanged focal ureteral wall calcification  along the anterior left renal pelvis. Limited evaluation for  associated soft tissue mass/enhancement in this region on noncontrast  imaging. Renal cyst present on prior study not well-visualized without  contrast.     Gastrointestinal tract: Normal stomach. No abnormally dilated loops of  bowel. Oral contrast throughout the large colon. Normal appendix.     Mesentery/peritoneum/retroperitoneum: No free fluid or free air.     Lymph nodes: No significant lymphadenopathy.     Vasculature: Mild aortobiiliac atherosclerotic disease.     Pelvis: The urinary bladder is moderately distended without wall  thickening. The prostate gland is mildly enlarged.     Osseous structures: No aggressive or acute osseous lesion.  Multilevel  degenerative changes of the spine. Bilateral pars defects at L5 with  grade 1 anterolisthesis of L5 on S1. Stable mild anterior vertebral  body wedging at L1 and L2.     Soft tissues: Within normal limits.      Lower thorax: Heart size is normal. No significant pericardial  effusion. Dependent atelectasis in the lower lobes. Mild subpleural  cystic changes along the posterior left lower lobe. Prominent  extrapleural fat tracking along the left oblique fissure.                                                                      IMPRESSION:   1. Stable area of thickening with calcification in the anterior left  renal pelvis with limited evaluation for associated soft tissue mass  on noncontrast imaging. No additional findings to explain the  patient's symptoms on this noncontrast exam. Further evaluation with a  CT urogram may be beneficial as clinically indicated.  2. Bilateral renal cortical  atrophy. Bilateral pelvocaliectasis  similar to prior CT 2018.      I have personally reviewed the examination and initial interpretation  and I agree with the findings.     KAREN TREVIZO MD            PEx:   /80   Pulse 72   Ht 1.524 m (5')   Wt 70.3 kg (155 lb)   SpO2 94%   BMI 30.27 kg/m      PSYCH: NAD  EYES: EOMI  MOUTH: MMM  NEURO: AAO x3    PVR 4cc      A/P: Adam M Albin is a 83 year old male with gross hematuria  -CT Urogram and cysto discussed. Can resume ASA from urology standpoint. Will need to hydrate well at the time of the CT to ensure good renal function.   -Prefers [facility] location. They will discuss with pt's daughter and call to schedule if willing to proceed. We discussed possibility of missing a bladder or renal tumor if not investigated further.   -Continue Flomax    RANDA Lala [facility]        30 minutes spent on the date of the encounter doing chart review, review of outside records, review of test results, interpretation of tests, patient visit and documentation

## 2023-04-28 NOTE — NURSING NOTE
Chief Complaint   Patient presents with     Urinary Retention     Patient has seen blood in urine for 2 weeks. Patient voids in small amounts.   PVR by a scanner was 4 ml today.  Rhonda Butler LPN

## 2023-04-28 NOTE — PATIENT INSTRUCTIONS
Call [phone] to schedule    Discuss with daughter the following:     - CT scan of the kidneys.   - Cystoscopy with the  urologist to evaluate the interior of the bladder. Follow up as recommended by the urologist.    CYSTOSCOPY    What is a Cystoscopy?  This is a procedure done to check for problems inside the bladder.  Problems may include polyps (growths), tumors, inflammation (swelling and redness) and other concerns.    The Urologist inserts a thin tube (called a cystoscope) into the bladder.  The tube is about the size of a pencil.  We will give you numbing medicine to reduce the pain or discomfort you may feel.    The Urologist will be able to see inside the bladder by filling the bladder with water.  The water makes it easier to see any problems that may be present. You will have a sense to need to urinate and this is normal.       How should I get ready for the exam?  Nothing to do to prepare. You may eat normally the day of the exam. There is no sedation, so you may drive yourself to and from if you can drive.       Please tell your doctor if:  You have a history of urinary tract infections.  You know that you have a tumor in your bladder.  You have bleeding problems.  You have any allergies.  You are or may be pregnant.      What happens after the exam?  You may go back to your normal diet and activity as you feel ready.    For the next two days after the exam, you may notice:  Some blood in your urine.  Some burning when you urinate (use the toilet).  An urge to urinate more often.  Bladder spasms.    These are normal after the procedure. They should go away on their own after a day or two.      You can help to relieve the above listed symptoms by:  Drinking 6 to 8 large glasses of water each day (includes drinks at meals).  This will help clear the urine.  Take warm baths to relieve pain and bladder spasms.  Do not add anything to the bath water.  You may take Tylenol (acetaminophen) per label  instructions for discomfort.

## 2023-04-28 NOTE — PROGRESS NOTES
CC: hematuria    HPI:  Adam Talamantes is a 83 year old male who presents in HFU for evaluation of the above.     Seen in consult at [facility] with our urology colleagues on 2/7/23. Consult info as below:  Adam Talamantes is a 82 year old male with pmh significant for HTN, HLD, DM II, CKD who had a recent admission on 1/11/23 for stroke.  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  He was transferred to [facility] for mgmt of worsening stroke burden and is currently on a low intensity heparin gtt.      Urology has consulted for gross hematuria that began yesterday.  His son saw a few black/red clots in the yellow urine.  Overnight, nurses saw a few red clots.  Just now, the nurse told me that urine was yellow without any clots.  The patient did have hematuria once several months ago, but it hasn't recurred until now.  Today has no flank pain, bladder pain or dysuria.    - Today is having difficulty emptying his bladder despite continuing home tamsulosin  - Did have a stone procedure at [facility] in 2009, postop course c/b fever/chills attributed to a UTI  - Notes suggest he was seen in urology in 2011 for BL pelviectasis     - Last imaging of the AP without IV contrast was on 2/8/23 noting a stable area of thickening with calcification in the anterior left renal pelvis although limited evaluation due to noncontrast exam.     Seen by neurosurgery in stroke follow-up 4/7/23 and on Eliquis/ASA. ASA recently held due to persistent gross hematuria.     No hematuria for 1-2 days. Has been on Flomax for several years. No urgency or frequency. Not drinking much water. Here with his son-in-law.     A phone  aided in obtaining the history and facilitating the visit today.      Past Medical History:   Diagnosis Date     Acute blood loss anemia      Acute renal failure (H)      Anemia      Bacteremia      Cataract       Chronic gout      CKD (chronic kidney disease)     Stage 3     DM2 (diabetes mellitus, type 2) (H)      GERD (gastroesophageal reflux disease)      Glucose intolerance (impaired glucose tolerance)      Gout      History of nephrolithiasis      History of prostatitis 2017     Hyperlipidemia      Hypertension      Insomnia      Intestinal disaccharidase deficiencies and disaccharide malabsorption     Created by Conversion      Latent tuberculosis      Nephrolithiasis      Neuropathy      Nonspecific abnormal findings on radiological and other examination of skull and head     Created by [facility] Annotation: 2013 11:23PM - Giulia Meridai/10/2011:  severe stenosis both posterior cerebral arteries seen MRI/MRA done for  vertigo. No acute changes.e*     Osteoarthritis     wrist, knee, shoulder     Prostatitis      Rectal bleed      Trigger thumb        Past Surgical History:   Procedure Laterality Date     IR MISCELLANEOUS PROCEDURE  2009     IR MISCELLANEOUS PROCEDURE  2009     IR MISCELLANEOUS PROCEDURE  2009     IR MISCELLANEOUS PROCEDURE  2009     IR NEPHROURETERAL TUBE PLACEMENT BILATERAL  2009     IR URETER DILATION BILATERAL  2009     IR URETER DILATION BILATERAL  2009     KIDNEY STONE SURGERY       PICC  3/6/2016            Social History     Socioeconomic History     Marital status:      Spouse name: Not on file     Number of children: Not on file     Years of education: Not on file     Highest education level: Not on file   Occupational History     Not on file   Tobacco Use     Smoking status: Former     Types: Cigarettes     Quit date: 3/10/2000     Years since quittin.1     Passive exposure: Never     Smokeless tobacco: Former     Tobacco comments:     no passive exposure   Vaping Use     Vaping status: Never Used   Substance and Sexual Activity     Alcohol use: No     Drug use: No     Sexual activity: Never     Partners: Female    Other Topics Concern     Not on file   Social History Narrative     Not on file     Social Determinants of Health     Financial Resource Strain: Low Risk  (2/22/2022)    Overall Financial Resource Strain (CARDIA)      Difficulty of Paying Living Expenses: Not hard at all   Food Insecurity: No Food Insecurity (2/22/2022)    Hunger Vital Sign      Worried About Running Out of Food in the Last Year: Never true      Ran Out of Food in the Last Year: Never true   Transportation Needs: No Transportation Needs (2/22/2022)    PRAPARE - Transportation      Lack of Transportation (Medical): No      Lack of Transportation (Non-Medical): No   Physical Activity: Inactive (2/22/2022)    Exercise Vital Sign      Days of Exercise per Week: 0 days      Minutes of Exercise per Session: 0 min   Stress: No Stress Concern Present (2/22/2022)    [employer] - Occupational Stress Questionnaire      Feeling of Stress : Not at all   Social Connections: Socially Integrated (2/22/2022)    Social Connection and Isolation Panel [NHANES]      Frequency of Communication with Friends and Family: Once a week      Frequency of Social Gatherings with Friends and Family: Twice a week      Attends Shinto Services: 1 to 4 times per year      Active Member of Clubs or Organizations: No      Attends Club or Organization Meetings: 1 to 4 times per year      Marital Status:    Intimate Partner Violence: Not At Risk (2/22/2022)    Humiliation, Afraid, Rape, and Kick questionnaire      Fear of Current or Ex-Partner: No      Emotionally Abused: No      Physically Abused: No      Sexually Abused: No   Housing Stability: Unknown (2/22/2022)    Housing Stability Vital Sign      Unable to Pay for Housing in the Last Year: No      Number of Places Lived in the Last Year: Not on file      Unstable Housing in the Last Year: No       Family History   Problem Relation Age of Onset     Cerebrovascular Disease Father       Cerebrovascular Disease Daughter        No Known Allergies    Current Outpatient Medications   Medication     acetaminophen (TYLENOL) 500 MG tablet     allopurinol (ZYLOPRIM) 100 MG tablet     apixaban ANTICOAGULANT (ELIQUIS) 5 MG tablet     DULoxetine (CYMBALTA) 20 MG capsule     meclizine (ANTIVERT) 12.5 MG tablet     metoprolol succinate ER (TOPROL XL) 100 MG 24 hr tablet     pantoprazole (PROTONIX) 40 MG EC tablet     polyethylene glycol (MIRALAX) 17 GM/Dose powder     rosuvastatin (CRESTOR) 20 MG tablet     senna-docusate (SENOKOT-S/PERICOLACE) 8.6-50 MG tablet     SENNA-docusate sodium (SENNA S) 8.6-50 MG tablet     sitagliptin (JANUVIA) 50 MG tablet     tamsulosin (FLOMAX) 0.4 MG capsule     traZODone (DESYREL) 50 MG tablet     No current facility-administered medications for this visit.   EXAMINATION: CT ABDOMEN PELVIS W/O CONTRAST, 2/8/2023 12:50 PM     INDICATION: blood clots in urine     COMPARISON STUDY: CT abdomen and pelvis 5/1/2018     TECHNIQUE: CT scan on the lung apices to the pubic symphysis was  performed on multidetector CT scanner using volumetric acquisition  technique and images were reconstructed in multiple planes with  variable thickness and reviewed on dedicated workstations.      CT scan radiation dose is optimized to minimum requisite dose using  automated dose modulation techniques.     FINDINGS:     Abdomen and Pelvis:  Liver: 9 mm cyst in hepatic segment 2 stable from prior study.  Additional low-density lesions present on prior CT felt to be a  mixture of benign cysts and hemangiomas are not clearly identified on  this noncontrast study. No intrahepatic biliary ductal dilation.      Biliary System: Normal gallbladder. No extrahepatic biliary ductal  dilation.     Pancreas: No mass or pancreatic ductal dilation.      Adrenal glands: Within normal limits      Spleen: Normal.      Kidneys: Bilateral renal cortical atrophy with scattered focal areas  of scarring. Mild bilateral  pelvocaliectasis similar to prior CT. No  radiodense renal calculi. Unchanged focal ureteral wall calcification  along the anterior left renal pelvis. Limited evaluation for  associated soft tissue mass/enhancement in this region on noncontrast  imaging. Renal cyst present on prior study not well-visualized without  contrast.     Gastrointestinal tract: Normal stomach. No abnormally dilated loops of  bowel. Oral contrast throughout the large colon. Normal appendix.     Mesentery/peritoneum/retroperitoneum: No free fluid or free air.     Lymph nodes: No significant lymphadenopathy.     Vasculature: Mild aortobiiliac atherosclerotic disease.     Pelvis: The urinary bladder is moderately distended without wall  thickening. The prostate gland is mildly enlarged.     Osseous structures: No aggressive or acute osseous lesion.  Multilevel  degenerative changes of the spine. Bilateral pars defects at L5 with  grade 1 anterolisthesis of L5 on S1. Stable mild anterior vertebral  body wedging at L1 and L2.     Soft tissues: Within normal limits.      Lower thorax: Heart size is normal. No significant pericardial  effusion. Dependent atelectasis in the lower lobes. Mild subpleural  cystic changes along the posterior left lower lobe. Prominent  extrapleural fat tracking along the left oblique fissure.                                                                      IMPRESSION:   1. Stable area of thickening with calcification in the anterior left  renal pelvis with limited evaluation for associated soft tissue mass  on noncontrast imaging. No additional findings to explain the  patient's symptoms on this noncontrast exam. Further evaluation with a  CT urogram may be beneficial as clinically indicated.  2. Bilateral renal cortical atrophy. Bilateral pelvocaliectasis  similar to prior CT 2018.      I have personally reviewed the examination and initial interpretation  and I agree with the findings.     KAREN TREVIZO MD             PEx:   /80   Pulse 72   Ht 1.524 m (5')   Wt 70.3 kg (155 lb)   SpO2 94%   BMI 30.27 kg/m      PSYCH: NAD  EYES: EOMI  MOUTH: MMM  NEURO: AAO x3    PVR 4cc      A/P: Adam M Albin is a 83 year old male with gross hematuria  -CT Urogram and cysto discussed. Can resume ASA from urology standpoint. Will need to hydrate well at the time of the CT to ensure good renal function.   -Prefers [facility] location. They will discuss with pt's daughter and call to schedule if willing to proceed. We discussed possibility of missing a bladder or renal tumor if not investigated further.   -Continue Flomax    Justa Falcon PA-C  [facility]        30 minutes spent on the date of the encounter doing chart review, review of outside records, review of test results, interpretation of tests, patient visit and documentation

## 2023-04-28 NOTE — TELEPHONE ENCOUNTER
Duplicate request.  Refilled 4/26/23 (48 hours ago).  No change of pharmacy.  Therefore noted as duplicate in refusal transmittal to pharmacy.

## 2023-05-11 NOTE — TELEPHONE ENCOUNTER
RECORDS RECEIVED FROM: Internal   REASON FOR VISIT: Vertebral artery occlusion, left [I65.02]   Date of Appt: 7/10/23 12:30pm   NOTES (FOR ALL VISITS) STATUS DETAILS   OFFICE NOTE from referring provider Internal 3/6/23, 2/16/23, 1/19/23, 9/12/22  Chucho Espino MD @ [facility]   OFFICE NOTE from other specialist Internal 4/4/23, 3/14/23 Joey Jackson MD @ [facility]    3/21/23 Gloria Rojas APRN CNP @ [facility]       DISCHARGE SUMMARY from hospital Internal 2/6/23-2/13/23 Gage Lafleur MD @ [facility]    2/1/23-2/6/23 Angel REYNOSO MD @ [facility]     1/11/23-1/14/23 Jeremy, Vance TOLLIVER MD  @[facility]     MEDICATION LIST Internal    IMAGING  (FOR ALL VISITS)     MRI (HEAD, NECK, SPINE) Internal [facility]  3/20/23 MRA Brain ([facility])  3/20/23 MR Brain  2/9/23 MR Brain  2/4/23 MR Brain  2/1/23 MR Brain  1/12/23 MRA Neck (Carot[facility])  1/11/23 MRA Neck (Carotids) (10:58am)  1/11/23 MR Brain (10:56am)  1/11/23 MR Brain (6:20am)  1/11/23 MRA Neck (Carotids) (6:18am)  1/11/23 MRA Brain ([facility])     CT (HEAD, NECK, SPINE) Internal [facility]  2/9/23 CTA Head Neck  2/9/23 CT Head  2/6/23 CT Head  2/4/23 CT Head  2/1/23 CTA Head Neck  1/11/23 CTA Head Neck

## 2023-05-18 ENCOUNTER — TELEPHONE (OUTPATIENT)
Dept: FAMILY MEDICINE | Facility: CLINIC | Age: 83
End: 2023-05-18
Payer: COMMERCIAL

## 2023-05-18 NOTE — TELEPHONE ENCOUNTER
General Call    Contacts       Type Contact Phone/Fax    05/18/2023 05:33 PM CDT Phone (Incoming) Dimitri Talamantes (Emergency Contact) [phone]        Reason for Call:  Pt's daughter called to say that the cream you provided for pt isn't working and he's still itching. Daughter is wondering if there's something else you can prescribe. Can you call pt and let him know? Thank you!    Okay to leave a detailed message?: Yes at Home number on file [phone] (home)

## 2023-05-19 NOTE — TELEPHONE ENCOUNTER
Writer attempted to call patient/daughter back regarding provider's message below. No answer, left detailed VM with call back number as indicated below.    If patient/daughter calls back please relay Dr. Silva's message to them. Thanks.    Closing encounter.    JONATHAN GarrettN, RN   [facility]

## 2023-05-19 NOTE — TELEPHONE ENCOUNTER
"Writer called patient with the help of a Kayce  regarding initial message below. Call reached patient's daughter, Dimitri Talamantes (C2C on file). Per Dimitri, they are talking about the  Triamcinolone cream which was prescribed by Dr. Espino. Pt has been using the cream for about 1 month now, still not helping with the itchiness. Per Dimitri, the itchiness is located widespread on body: abdomen, armpit, chest.    Per last office visit note 4/27/2023 with Dr. Espino:  \"Triamcinolone did not help, this will be discontinued.  We will see if we can get better control of his insomnia so that the itching is not bothering him at bedtime as detailed below.\"    Antoniettaoh says, pt's sleep has improved a little, since they increased trazodone to 2 tablets at night. However, the itchiness still continues.    Writer told daughter/patient Dr. Espino is not in clinic today. Dimitri requesting message to be sent to covering provider to review and advise on any cream/medication that may help with the itchiness in the mean time?    OK to leave detailed VM: [phone]    Routing message to covering provider, Dr. Silva.    JONATHAN GarrettN, RN   [facility]    "

## 2023-05-19 NOTE — TELEPHONE ENCOUNTER
Based on her response patient is to be seen again as a rash is increasing despite using the medication this could be a medication allergy as well

## 2023-06-06 ENCOUNTER — OFFICE VISIT (OUTPATIENT)
Dept: FAMILY MEDICINE | Facility: CLINIC | Age: 83
End: 2023-06-06
Payer: COMMERCIAL

## 2023-06-06 VITALS
OXYGEN SATURATION: 96 % | HEART RATE: 77 BPM | WEIGHT: 155 LBS | TEMPERATURE: 98.2 F | RESPIRATION RATE: 20 BRPM | BODY MASS INDEX: 30.27 KG/M2 | SYSTOLIC BLOOD PRESSURE: 104 MMHG | DIASTOLIC BLOOD PRESSURE: 68 MMHG

## 2023-06-06 DIAGNOSIS — N18.31 STAGE 3A CHRONIC KIDNEY DISEASE (H): ICD-10-CM

## 2023-06-06 DIAGNOSIS — L29.9 ITCHING: ICD-10-CM

## 2023-06-06 DIAGNOSIS — R42 DIZZINESS: ICD-10-CM

## 2023-06-06 DIAGNOSIS — F32.1 CURRENT MODERATE EPISODE OF MAJOR DEPRESSIVE DISORDER WITHOUT PRIOR EPISODE (H): Primary | ICD-10-CM

## 2023-06-06 DIAGNOSIS — M15.0 PRIMARY OSTEOARTHRITIS INVOLVING MULTIPLE JOINTS: ICD-10-CM

## 2023-06-06 DIAGNOSIS — G47.00 INSOMNIA, UNSPECIFIED TYPE: ICD-10-CM

## 2023-06-06 DIAGNOSIS — E11.22 TYPE 2 DIABETES MELLITUS WITH STAGE 3 CHRONIC KIDNEY DISEASE, WITHOUT LONG-TERM CURRENT USE OF INSULIN, UNSPECIFIED WHETHER STAGE 3A OR 3B CKD (H): ICD-10-CM

## 2023-06-06 DIAGNOSIS — R68.84 JAW PAIN: ICD-10-CM

## 2023-06-06 DIAGNOSIS — I67.9 CEREBRAL VASCULAR DISEASE: ICD-10-CM

## 2023-06-06 DIAGNOSIS — N18.30 TYPE 2 DIABETES MELLITUS WITH STAGE 3 CHRONIC KIDNEY DISEASE, WITHOUT LONG-TERM CURRENT USE OF INSULIN, UNSPECIFIED WHETHER STAGE 3A OR 3B CKD (H): ICD-10-CM

## 2023-06-06 PROCEDURE — 99214 OFFICE O/P EST MOD 30 MIN: CPT | Performed by: FAMILY MEDICINE

## 2023-06-06 RX ORDER — MECLIZINE HCL 12.5 MG 12.5 MG/1
12.5 TABLET ORAL 3 TIMES DAILY PRN
Qty: 60 TABLET | Refills: 6 | Status: SHIPPED | OUTPATIENT
Start: 2023-06-06

## 2023-06-06 RX ORDER — CELECOXIB 200 MG/1
200 CAPSULE ORAL DAILY PRN
Qty: 30 CAPSULE | Refills: 1 | Status: SHIPPED | OUTPATIENT
Start: 2023-06-06 | End: 2023-07-12

## 2023-06-06 RX ORDER — DULOXETIN HYDROCHLORIDE 30 MG/1
30 CAPSULE, DELAYED RELEASE ORAL 2 TIMES DAILY
Qty: 60 CAPSULE | Refills: 6 | Status: SHIPPED | OUTPATIENT
Start: 2023-06-06 | End: 2023-12-18

## 2023-06-06 RX ORDER — HYDROXYZINE HYDROCHLORIDE 25 MG/1
25 TABLET, FILM COATED ORAL EVERY 8 HOURS PRN
Qty: 60 TABLET | Refills: 1 | Status: SHIPPED | OUTPATIENT
Start: 2023-06-06 | End: 2024-08-13

## 2023-06-06 NOTE — PROGRESS NOTES
"ASSESMENT AND PLAN:  Diagnoses and all orders for this visit:  Current moderate episode of major depressive disorder without prior episode (H)  Extensive counseling with the patient with the help of a professional .  I think based on the signs reported by the family that he does have recurrent major depression despite the limited endorsement of that by the patient as detailed below.  We discussed options and he is agreeable with increasing his dose of duloxetine from current dose of 20 mg twice daily:  -     DULoxetine (CYMBALTA) 30 MG capsule; Take 1 capsule (30 mg) by mouth 2 times daily  -     PRIMARY CARE FOLLOW-UP SCHEDULING; Future  Insomnia, unspecified type  Likely multifactorial.  Continue trazodone at bedtime.  Dizziness  Stable.  Refill:  -     meclizine (ANTIVERT) 12.5 MG tablet; Take 1 tablet (12.5 mg) by mouth 3 times daily as needed for dizziness  Cerebral vascular disease with recent stroke  Treating depression as detailed above.  Reviewed the most recent neurology consultation.  Continue medical management.  Follow-up as directed.  Type 2 diabetes mellitus with stage 3 chronic kidney disease, without long-term current use of insulin (H)  Stable.  Jaw pain  I think the \"ear pain\" that the patient is complaining of is actually originating from the TMJ and jaw.  We will see how he responds to a limited course of medication as prescribed below.  -     celecoxib (CELEBREX) 200 MG capsule; Take 1 capsule (200 mg) by mouth daily as needed for pain (jaw pain, joint pain)  Primary osteoarthritis involving multiple joints  Patient has chronic pain issues, in the past had been on hydrocodone acetaminophen but this has been discontinued.  Given the chronic kidney disease we have to be careful with the use of the celecoxib but we will see how he does with that on a limited course as prescribed above.  Stage 3a chronic kidney disease (H)  Stable.  Medication considerations as detailed " "above.  Itching  Discussed options, topical treatments have not helped, will try hydroxyzine.  -     hydrOXYzine (ATARAX) 25 MG tablet; Take 1 tablet (25 mg) by mouth every 8 hours as needed for itching    Extensive medication review and counseling done with the patient and his family with the help of a professional .  We also conferenced in the patient's daughter who speaks English well as not able to be here today but often involved in care decisions.  Reviewed the risks and benefits of the treatment plan with the patient and/or caregiver and we discussed indications for routine and emergent follow-up.    minutes spent on the date of the encounter doing chart review, patient visit and documentation and face to face counseling on above.      SUBJECTIVE: 83-year-old male in with multiple concerns.  He reports itching diffusely on the trunk without any associated rash.  Its severe itching and sometimes interfering with his sleep.  It has not responded to topical treatments.  Patient also reports a 1 week history of pain in his left ear.  No otorrhea, no fevers.  Pain is moderate in severity.  Patient has known arthritis, patient concerned that his arthritis pain has not been under as good of control since his hydrocodone/acetaminophen was discontinued previously.  It is interfering with his daily comfort and daily life, and he feels like it is part of the reason why he has been feeling down lately.  Patient does not answer questions directly about depression.  His family contributes that they are worried that he is having worsening depression.  He has been more often tearful, he seems to be crying for \"no reason\" most days.  He also seems last engaged in his daily life.    Past Medical History:   Diagnosis Date     Acute blood loss anemia      Acute renal failure (H)      Anemia      Bacteremia      Cataract      Chronic gout      CKD (chronic kidney disease)     Stage 3     DM2 (diabetes mellitus, type 2) " (H)      GERD (gastroesophageal reflux disease)      Glucose intolerance (impaired glucose tolerance)      Gout      History of nephrolithiasis      History of prostatitis 2017     Hyperlipidemia      Hypertension      Insomnia      Intestinal disaccharidase deficiencies and disaccharide malabsorption     Created by Conversion      Latent tuberculosis      Nephrolithiasis      Neuropathy      Nonspecific abnormal findings on radiological and other examination of skull and head     Created by [facility] Annotation: 2013 11:23PM - Giulia Meridai/10/2011:  severe stenosis both posterior cerebral arteries seen MRI/MRA done for  vertigo. No acute changes.e*     Osteoarthritis     wrist, knee, shoulder     Prostatitis      Rectal bleed      Trigger thumb      Patient Active Problem List   Diagnosis     Esophageal reflux     Dyslipidemia     Nephrolithiasis     Pseudogout     Latent Tuberculosis     Generalized Osteoarthritis Of The Hand     Lumbar Disc Degeneration     Insomnia, unspecified type     Chronic Gout     CKD (chronic kidney disease) stage 3, GFR 30-59 ml/min (H)     Elevated PSA     Prostate nodule     Cataracts, bilateral     Constipation, unspecified constipation type     Bilateral chronic knee pain     Chronic right shoulder pain     Essential hypertension with goal blood pressure less than 140/90     BPH (benign prostatic hyperplasia)     Chronic gout     Coronary artery disease due to lipid rich plaque     Right lower quadrant abdominal pain     Colitis     Primary osteoarthritis involving multiple joints     Urinary incontinence     Type 2 diabetes mellitus with stage 3 chronic kidney disease, without long-term current use of insulin (H)     ACE-inhibitor cough     Vertigo     Essential hypertension     Vertebral artery occlusion, left     Cerebellar stroke (H)     Stroke (H)     Hypomagnesemia     Ataxic gait     Urinary retention     Basilar artery stenosis     Cerebral  vascular disease     Current Outpatient Medications   Medication Sig Dispense Refill     acetaminophen (TYLENOL) 500 MG tablet Take 500 mg by mouth every 6 hours as needed for mild pain       allopurinol (ZYLOPRIM) 100 MG tablet Take 2 tablets (200 mg) by mouth daily 180 tablet 0     apixaban ANTICOAGULANT (ELIQUIS) 5 MG tablet Take 1 tablet (5 mg) by mouth 2 times daily 60 tablet 6     celecoxib (CELEBREX) 200 MG capsule Take 1 capsule (200 mg) by mouth daily as needed for pain (jaw pain, joint pain) 30 capsule 1     DULoxetine (CYMBALTA) 30 MG capsule Take 1 capsule (30 mg) by mouth 2 times daily 60 capsule 6     hydrOXYzine (ATARAX) 25 MG tablet Take 1 tablet (25 mg) by mouth every 8 hours as needed for itching 60 tablet 1     meclizine (ANTIVERT) 12.5 MG tablet Take 1 tablet (12.5 mg) by mouth 3 times daily as needed for dizziness 60 tablet 6     metoprolol succinate ER (TOPROL XL) 100 MG 24 hr tablet Take 100 mg by mouth daily Take 1/2 tablet  daily       pantoprazole (PROTONIX) 40 MG EC tablet Take 1 tablet (40 mg) by mouth every morning (before breakfast) 90 tablet 3     polyethylene glycol (MIRALAX) 17 GM/Dose powder Take 17 g (1 capful.) by mouth daily as needed for constipation 578 g 4     rosuvastatin (CRESTOR) 20 MG tablet TAKE 1 TABLET(20 MG) BY MOUTH AT BEDTIME 90 tablet 1     SENNA-docusate sodium (SENNA S) 8.6-50 MG tablet Take 1 tablet by mouth daily 90 tablet 3     sitagliptin (JANUVIA) 50 MG tablet Take 1 tablet (50 mg) by mouth daily 90 tablet 3     tamsulosin (FLOMAX) 0.4 MG capsule Take 1 capsule (0.4 mg) by mouth daily 30 capsule 3     traZODone (DESYREL) 50 MG tablet Take 1-2 tablets ( mg) by mouth nightly as needed for sleep 180 tablet 3     senna-docusate (SENOKOT-S/PERICOLACE) 8.6-50 MG tablet Take 1 tablet by mouth 2 times daily as needed for constipation 30 tablet 0     History   Smoking Status     Former     Types: Cigarettes     Quit date: 3/10/2000   Smokeless Tobacco     Former        OBJECTICE: /68   Pulse 77   Temp 98.2  F (36.8  C) (Oral)   Resp 20   Wt 70.3 kg (155 lb)   SpO2 96%   BMI 30.27 kg/m       No results found for this or any previous visit (from the past 24 hour(s)).     Psychiatric- appearance is well-groomed, speech is slow, mood is depressed, affect is flat, thought content negative for suicidal or homicidal ideation, thought processing negative for paranoid or delusional thinking.   HEENT-both tympanic membranes appear normal, his pain is localized to the left TMJ and jaw, he is tender to palpation in this area.   CV-regular rate and rhythm   RESP-lungs clear   SKIN-no ulcers or vesicles or induration or erythema overlying his area of pain, no rash      Chucho Espino MD

## 2023-06-12 ENCOUNTER — TELEPHONE (OUTPATIENT)
Dept: FAMILY MEDICINE | Facility: CLINIC | Age: 83
End: 2023-06-12
Payer: COMMERCIAL

## 2023-06-12 DIAGNOSIS — E78.5 DYSLIPIDEMIA: ICD-10-CM

## 2023-06-12 DIAGNOSIS — Z76.0 ENCOUNTER FOR MEDICATION REFILL: Primary | ICD-10-CM

## 2023-06-12 DIAGNOSIS — R39.14 BENIGN PROSTATIC HYPERPLASIA WITH INCOMPLETE BLADDER EMPTYING: ICD-10-CM

## 2023-06-12 DIAGNOSIS — N40.1 BENIGN PROSTATIC HYPERPLASIA WITH INCOMPLETE BLADDER EMPTYING: ICD-10-CM

## 2023-06-12 NOTE — TELEPHONE ENCOUNTER
Medication Question or Refill        What medication are you calling about (include dose and sig)?: metoprolol succinate ER (TOPROL XL) 100 MG 24 hr tablet    Preferred Pharmacy:   [id number] [address] & [address]  [address] [place] [id number]  Phone: [phone] Fax: [phone]        Controlled Substance Agreement on file:   CSA -- Patient Level:     [Media Unavailable] Controlled Substance Agreement - Opioid - Scan on 4/12/2022  8:47 AM   [Media Unavailable] Controlled Substance Agreement - Opioid - Scan on 8/4/2021  7:24 AM       Who prescribed the medication?: Dr. Espino    Do you need a refill? Yes    When did you use the medication last? 6/12    Patient offered an appointment? No    Do you have any questions or concerns?  No      Okay to leave a detailed message?: Yes at Home number on file [phone] (home)

## 2023-06-13 ENCOUNTER — HOSPITAL ENCOUNTER (OUTPATIENT)
Dept: CT IMAGING | Facility: HOSPITAL | Age: 83
Discharge: HOME | End: 2023-06-13
Attending: PHYSICIAN ASSISTANT | Admitting: PHYSICIAN ASSISTANT
Payer: COMMERCIAL

## 2023-06-13 DIAGNOSIS — R31.0 GROSS HEMATURIA: ICD-10-CM

## 2023-06-13 LAB
CREAT BLD-MCNC: 1.9 MG/DL (ref 0.7–1.3)
GFR SERPL CREATININE-BSD FRML MDRD: 35 ML/MIN/1.73M2

## 2023-06-13 PROCEDURE — 82565 ASSAY OF CREATININE: CPT

## 2023-06-13 PROCEDURE — 250N000011 HC RX IP 250 OP 636: Performed by: PHYSICIAN ASSISTANT

## 2023-06-13 PROCEDURE — 74178 CT ABD&PLV WO CNTR FLWD CNTR: CPT

## 2023-06-13 RX ORDER — METOPROLOL SUCCINATE 100 MG/1
50 TABLET, EXTENDED RELEASE ORAL DAILY
Qty: 45 TABLET | Refills: 3 | Status: ON HOLD | OUTPATIENT
Start: 2023-06-13 | End: 2024-05-30

## 2023-06-13 RX ORDER — TAMSULOSIN HYDROCHLORIDE 0.4 MG/1
0.4 CAPSULE ORAL DAILY
Qty: 90 CAPSULE | Refills: 3 | Status: SHIPPED | OUTPATIENT
Start: 2023-06-13 | End: 2024-06-26

## 2023-06-13 RX ORDER — METOPROLOL SUCCINATE 100 MG/1
100 TABLET, EXTENDED RELEASE ORAL DAILY
Qty: 90 TABLET | Refills: 3 | Status: SHIPPED | OUTPATIENT
Start: 2023-06-13 | End: 2023-06-13

## 2023-06-13 RX ORDER — IOPAMIDOL 755 MG/ML
67 INJECTION, SOLUTION INTRAVASCULAR ONCE
Status: COMPLETED | OUTPATIENT
Start: 2023-06-13 | End: 2023-06-13

## 2023-06-13 RX ORDER — ROSUVASTATIN CALCIUM 20 MG/1
20 TABLET, COATED ORAL AT BEDTIME
Qty: 90 TABLET | Refills: 3 | Status: SHIPPED | OUTPATIENT
Start: 2023-06-13 | End: 2023-12-08

## 2023-06-13 RX ADMIN — IOPAMIDOL 67 ML: 755 INJECTION, SOLUTION INTRAVENOUS at 11:06

## 2023-06-13 NOTE — TELEPHONE ENCOUNTER
I sent new prescription to the pharmacy to clarify that the metoprolol dose is 1/2 tablet/day.  I also sent refills on the rosuvastatin and tamsulosin.

## 2023-06-13 NOTE — TELEPHONE ENCOUNTER
Patient should have refills at his pharmacy.  Please have the daughter requested refills through the pharmacy.  For pain the patient can use a combination of celecoxib and acetaminophen along with nonmedication measures such as warm packs, cold packs, gentle stretching.

## 2023-06-13 NOTE — TELEPHONE ENCOUNTER
Per pt's daughter the pharmacy stated that they do not have any refills and unable to get them.    Writer called pharmacy for clarification. Pharmacy stated that they still have refills for rosuvastatin (CRESTOR) 20 MG tablet and can pull that up now. However, they do not have any refills left for tamsulosin (FLOMAX) 0.4 MG capsule. pharmacy stated they would need an order if PCP wishes to continue the medication. Daughter requesting a 90 day supply for this.    - pharmacy would also like clarification regarding metoprolol succinate ER (TOPROL XL) 100 MG 24 hr tablet. Sig:Take 1 tablet (100 mg) by mouth daily Take 1/2 tablet  daily. pharmacy stated there are two different direction on how to take RX and would like clarification on this.      Agapito Martinez Cem Say, BSN RN  [facility]

## 2023-06-13 NOTE — TELEPHONE ENCOUNTER
Called pt and informed daughter (C2C on file) that rx was sent. daughter verbalized understanding.      Daughter is requesting additional refills for:    - rosuvastatin (CRESTOR) 20 MG tablet  - tamsulosin (FLOMAX) 0.4 MG capsule    Daughter stated that pt is still having ear pain even tho he is taking celecoxib (CELEBREX) 200 MG capsule. Medication is not helping and pt is wanting recommendation on what to do.      Agapito Martinez Cem Say, BSN RN  [facility]

## 2023-06-14 ENCOUNTER — TELEPHONE (OUTPATIENT)
Dept: UROLOGY | Facility: CLINIC | Age: 83
End: 2023-06-14
Payer: COMMERCIAL

## 2023-06-14 DIAGNOSIS — K63.9 COLON WALL THICKENING: Primary | ICD-10-CM

## 2023-06-14 LAB — RADIOLOGIST FLAGS: ABNORMAL

## 2023-06-14 RX ORDER — ROSUVASTATIN CALCIUM 20 MG/1
TABLET, COATED ORAL
Qty: 90 TABLET | Refills: 1 | OUTPATIENT
Start: 2023-06-14

## 2023-06-14 NOTE — TELEPHONE ENCOUNTER
UROLOGY    CT Urogram performed as part of the gross hematuria eval and noted thickening in the ascending colon/cecum which is concerning for malignancy. GI referral was placed.     Justa Falcon PA-C  [facility]  [phone]

## 2023-06-14 NOTE — TELEPHONE ENCOUNTER
"Writer called patient/family with the help of a \"Kayce \"  regarding provider's message below. Provider message relayed to daughter, Dimitri Albin (C2C on file).    Daughter verbalizes understanding, agrees with plan and has no further questions.    Closing encounter.    Tri So, JONATHANN, RN   [facility]    "

## 2023-06-16 DIAGNOSIS — N40.1 BENIGN PROSTATIC HYPERPLASIA WITH INCOMPLETE BLADDER EMPTYING: ICD-10-CM

## 2023-06-16 DIAGNOSIS — R39.14 BENIGN PROSTATIC HYPERPLASIA WITH INCOMPLETE BLADDER EMPTYING: ICD-10-CM

## 2023-06-16 NOTE — TELEPHONE ENCOUNTER
Diagnosis, Referred by & from: Thickening in the Ascending colon/cecum   Appt date: 7/6/2023   NOTES STATUS DETAILS   OFFICE NOTE from referring provider [facility]:  6/14/23 - Telephone referral from KANDI Lala  4/28/23 - URO OV with KANDI Lala   OFFICE NOTE from other specialist [facility]:  6/6/23, 4/27/23 - PCC OV with Dr. Espino   DISCHARGE SUMMARY from [facility]:  1/11/23 - Admission with Dr. Luna   DISCHARGE REPORT from the ER [facility]:  5/1/18 - ED OV with Dr. Valdez   OPERATIVE REPORT N/A    MEDICATION LIST Internal    LABS N/A    DIAGNOSTIC PROCEDURES     COLONOSCOPY Received [facility]:  12/8/17 - Colonoscopy   UPPER ENDOSCOPY (EGD) Received [facility]:  1/3/18 - EGD   IMAGING (DISC & REPORT)      CT Internal [facility]:  6/13/23 - CT Urogram  2/8/23 - CT Abd/Pelvis  5/1/18 - CT Abd/Pelvis

## 2023-06-16 NOTE — PROGRESS NOTES
"Colon and Rectal Surgery Clinic Note    RE: Adam Talamantes.  : 1940.  ANMOL: 2023.    Reason for visit: Thickening in the Ascending colon/cecum.    HPI: 83 year old male who had a cystogram for gross hematuria on , which noted a thickening in the ascending colon with possible mass. Referring provider discussed with the patient the need for a colonoscopy, they refused and wanted to discuss with a colorectal surgeon prior to moving forward with a colonoscopy. He also has a recent history of CVA in 2022 and is on Eliquis for this.  (He needs a colonoscopy but referring provider states \"they are not convinced he will proceed with a colonoscopy and I thought he should be counseled on the risks of not proceeding.\". We attempted to not make a clinic visit for this and only schedule a diagnostic colonoscopy.)      CT Urogram 23  IMPRESSION:  1.  Wall thickening of the urinary bladder suggests cystitis.  2.  Mild fullness of the right and left renal pelvis of indeterminate etiology. Ureteropelvic junction strictures are possible.  3.  Wall thickening of the ascending colon/cecum is concerning for malignancy. Recommend colonoscopy.   4.  A right liver lesion is incompletely assessed though could represent a benign hemangioma. This could be assessed with MRI.  5.  Enlarged prostate gland.  [Access Center: Recommend colonoscopy for a suspected colon mass. ]      Last colonoscopy:  with [facility] (recall 5 years, was due in )      Medical history:  Past Medical History:   Diagnosis Date     Acute blood loss anemia      Acute renal failure (H)      Anemia      Bacteremia      Cataract      Chronic gout      CKD (chronic kidney disease)     Stage 3     DM2 (diabetes mellitus, type 2) (H)      GERD (gastroesophageal reflux disease)      Glucose intolerance (impaired glucose tolerance)      Gout      History of nephrolithiasis      History of prostatitis 2017     Hyperlipidemia      Hypertension      " Insomnia      Intestinal disaccharidase deficiencies and disaccharide malabsorption     Created by Conversion      Latent tuberculosis      Nephrolithiasis      Neuropathy      Nonspecific abnormal findings on radiological and other examination of skull and head     Created by Conversion [facility] Annotation: 2013 11:23PM - Giulia Meridai/10/2011:  severe stenosis both posterior cerebral arteries seen MRI/MRA done for  vertigo. No acute changes.e*     Osteoarthritis     wrist, knee, shoulder     Prostatitis      Rectal bleed      Trigger thumb        Surgical history:  Past Surgical History:   Procedure Laterality Date     IR MISCELLANEOUS PROCEDURE  2009     IR MISCELLANEOUS PROCEDURE  2009     IR MISCELLANEOUS PROCEDURE  2009     IR MISCELLANEOUS PROCEDURE  2009     IR NEPHROURETERAL TUBE PLACEMENT BILATERAL  2009     IR URETER DILATION BILATERAL  2009     IR URETER DILATION BILATERAL  2009     KIDNEY STONE SURGERY       PICC  3/6/2016            Family history:  Family History   Problem Relation Age of Onset     Cerebrovascular Disease Father      Cerebrovascular Disease Daughter      No Hx of IBD or GI malignancy    Medications:  Current Outpatient Medications   Medication Sig Dispense Refill     acetaminophen (TYLENOL) 500 MG tablet Take 500 mg by mouth every 6 hours as needed for mild pain       allopurinol (ZYLOPRIM) 100 MG tablet Take 2 tablets (200 mg) by mouth daily 180 tablet 0     apixaban ANTICOAGULANT (ELIQUIS) 5 MG tablet Take 1 tablet (5 mg) by mouth 2 times daily 60 tablet 6     celecoxib (CELEBREX) 200 MG capsule Take 1 capsule (200 mg) by mouth daily as needed for pain (jaw pain, joint pain) 30 capsule 1     DULoxetine (CYMBALTA) 30 MG capsule Take 1 capsule (30 mg) by mouth 2 times daily 60 capsule 6     hydrOXYzine (ATARAX) 25 MG tablet Take 1 tablet (25 mg) by mouth every 8 hours as needed for itching 60 tablet 1     meclizine (ANTIVERT) 12.5 MG  tablet Take 1 tablet (12.5 mg) by mouth 3 times daily as needed for dizziness 60 tablet 6     metoprolol succinate ER (TOPROL XL) 100 MG 24 hr tablet Take 0.5 tablets (50 mg) by mouth daily Take 1/2 tablet  daily 45 tablet 3     pantoprazole (PROTONIX) 40 MG EC tablet Take 1 tablet (40 mg) by mouth every morning (before breakfast) 90 tablet 3     polyethylene glycol (MIRALAX) 17 GM/Dose powder Take 17 g (1 capful.) by mouth daily as needed for constipation 578 g 4     rosuvastatin (CRESTOR) 20 MG tablet Take 1 tablet (20 mg) by mouth At Bedtime 90 tablet 3     senna-docusate (SENOKOT-S/PERICOLACE) 8.6-50 MG tablet Take 1 tablet by mouth 2 times daily as needed for constipation 30 tablet 0     SENNA-docusate sodium (SENNA S) 8.6-50 MG tablet Take 1 tablet by mouth daily 90 tablet 3     sitagliptin (JANUVIA) 50 MG tablet Take 1 tablet (50 mg) by mouth daily 90 tablet 3     tamsulosin (FLOMAX) 0.4 MG capsule Take 1 capsule (0.4 mg) by mouth daily 90 capsule 3     traZODone (DESYREL) 50 MG tablet Take 1-2 tablets ( mg) by mouth nightly as needed for sleep 180 tablet 3       Allergies:  No Known Allergies    Social history:   Social History     Tobacco Use     Smoking status: Former     Types: Cigarettes     Quit date: 3/10/2000     Years since quittin.2     Passive exposure: Never     Smokeless tobacco: Former     Tobacco comments:     no passive exposure   Vaping Use     Vaping status: Never Used   Substance Use Topics     Alcohol use: No     Marital status: .    ROS:  A complete review of systems was performed with the patient and all systems negative except as per HPI.    Physical Examination:  Deferred at this time    Laboratory values reviewed:  Recent Labs   Lab Test 23  1100 23  1156 23  1538 23  0709   WBC  --  9.1   < > 8.2   HGB  --  13.1*   < > 14.1   PLT  --  211   < > 215   CR 1.9* 1.44*   < > 1.23*   ALBUMIN  --  4.5  --  4.2   BILITOTAL  --   --   --  0.4    ALKPHOS  --   --   --  75   ALT  --  54*  --  37   AST  --   --   --  30    < > = values in this interval not displayed.         ASSESSMENT  1. Ascending colon thickening?  2. DM2  3. Anemia  4. Hx Stroke (Feb 2023) - on Eliquis  5. Gout  6. BPH    PLAN  1. I spoke with Mr. Talamantes's daughter (POA) extensively with Mr. Talamantes presented. Her primary concern was how to proceed and if it made sense to proceed with diagnostic evaluation of the incidental finding of ascending colonic thickening. She expressed significant concern regarding his health, and his recent decline, and suggested that colonoscopy would be a significant procedure in itself as it require withholding significant medications. I agreed with her, and also thought it was reasonable to pursue this given the specific risks to Mr. Talamantes (bowel prep dehydration with further ELLA, worsening stroke with holding anticoagulation, etc.).    Risks, benefits, and alternatives of operative treatment were thoroughly discussed with the patient, he understands these well and agrees to proceed.    Time spent: 45 minutes, >50% spent in discussing, counseling and coordinating care.    Mj Barrera M.D    Division of Colon and Rectal Surgery  [facility]    Referring Provider:  Justa Falcon PA-C  [address] [address]     Primary Care Provider:  Chucho Espino

## 2023-06-17 RX ORDER — TAMSULOSIN HYDROCHLORIDE 0.4 MG/1
CAPSULE ORAL
Qty: 90 CAPSULE | Refills: 3 | OUTPATIENT
Start: 2023-06-17

## 2023-06-17 NOTE — TELEPHONE ENCOUNTER
Duplicate    Last Written Prescription Date:  6/13/23  Last Fill Quantity: 90,  # refills: 3     Taylor Ramos RN 06/17/23 12:21 AM

## 2023-06-27 ENCOUNTER — MYC MEDICAL ADVICE (OUTPATIENT)
Dept: SURGERY | Facility: CLINIC | Age: 83
End: 2023-06-27
Payer: COMMERCIAL

## 2023-06-29 ENCOUNTER — TELEPHONE (OUTPATIENT)
Dept: PHARMACY | Facility: OTHER | Age: 83
End: 2023-06-29
Payer: COMMERCIAL

## 2023-06-29 NOTE — TELEPHONE ENCOUNTER
MTM referral from: Patient's insurance     MTM referral outreach attempt #1 on June 29, 2023      Outcome: left voicemail, using Kayce  ID#: [id number]     Gillian Hernandez, PharmD   Medication Therapy Management

## 2023-07-03 ENCOUNTER — TELEPHONE (OUTPATIENT)
Dept: FAMILY MEDICINE | Facility: CLINIC | Age: 83
End: 2023-07-03
Payer: COMMERCIAL

## 2023-07-03 NOTE — TELEPHONE ENCOUNTER
S-(situation): Daughter called regarding BP. (could not triage as pt is not with daughter)    B-(background): BP (daughter will fax full BP record)  June 8: 148/84  June 9:120/85  June 29:139/85  June 30: 143/91    A-(assessment):   Daughter stated pt Has been complaining about headaches, some blurry vision mostly in his right eye. However, she is not sure if that is entirely accurate as sometimes he gets confused.     Pt used to take BP medication, but all the medications were discontinued after hospital discharge.    Daughter would like to know if he should be back on BP medications.    R-(recommendations):   appt scheduled for pt on 7/12/23.   Informed daughter to take pt to ED or urgent care if BP is over 140/80 and has symptoms such as headaches, blurry vision, lightheaded, nausea, chest pain. Daughter verbalized understanding.    Agapito Shook, BSN RN  [facility]

## 2023-07-06 ENCOUNTER — OFFICE VISIT (OUTPATIENT)
Dept: SURGERY | Facility: CLINIC | Age: 83
End: 2023-07-06
Attending: PHYSICIAN ASSISTANT
Payer: COMMERCIAL

## 2023-07-06 ENCOUNTER — PRE VISIT (OUTPATIENT)
Dept: SURGERY | Facility: CLINIC | Age: 83
End: 2023-07-06

## 2023-07-06 VITALS
HEIGHT: 60 IN | BODY MASS INDEX: 30.27 KG/M2 | SYSTOLIC BLOOD PRESSURE: 130 MMHG | HEART RATE: 75 BPM | DIASTOLIC BLOOD PRESSURE: 80 MMHG | OXYGEN SATURATION: 99 %

## 2023-07-06 DIAGNOSIS — K63.9 COLON WALL THICKENING: ICD-10-CM

## 2023-07-06 PROCEDURE — 99204 OFFICE O/P NEW MOD 45 MIN: CPT | Performed by: SURGERY

## 2023-07-06 ASSESSMENT — ENCOUNTER SYMPTOMS
HEADACHES: 1
VOMITING: 0
NUMBNESS: 0
SMELL DISTURBANCE: 0
DECREASED CONCENTRATION: 0
BLOOD IN STOOL: 0
DIZZINESS: 1
EYE WATERING: 0
HOARSE VOICE: 0
INSOMNIA: 1
TROUBLE SWALLOWING: 0
HEARTBURN: 0
DOUBLE VISION: 0
PARALYSIS: 0
ABDOMINAL PAIN: 1
EYE REDNESS: 1
NERVOUS/ANXIOUS: 0
EYE PAIN: 0
NAUSEA: 0
DEPRESSION: 1
DIARRHEA: 0
MEMORY LOSS: 0
SORE THROAT: 0
BOWEL INCONTINENCE: 0
TINGLING: 0
JAUNDICE: 0
BLOATING: 1
PANIC: 0
SPEECH CHANGE: 0
TASTE DISTURBANCE: 0
TREMORS: 0
DISTURBANCES IN COORDINATION: 1
SEIZURES: 0
NECK MASS: 0
SINUS PAIN: 0
LOSS OF CONSCIOUSNESS: 1
CONSTIPATION: 1
WEAKNESS: 0
SINUS CONGESTION: 0
RECTAL PAIN: 0

## 2023-07-06 NOTE — LETTER
"2023       RE: Adam Talamantes  [address]  [place] [id number]     Dear Colleague,    Thank you for referring your patient, Adam Talamantes, to the [facility] COLON AND RECTAL SURGERY [facility]INIC [place] [facility]. Please see a copy of my visit note below.    Colon and Rectal Surgery Clinic Note    RE: Adam Talamantes.  : 1940.  ANMOL: 2023.    Reason for visit: Thickening in the Ascending colon/cecum.    HPI: 83 year old male who had a cystogram for gross hematuria on , which noted a thickening in the ascending colon with possible mass. Referring provider discussed with the patient the need for a colonoscopy, they refused and wanted to discuss with a colorectal surgeon prior to moving forward with a colonoscopy. He also has a recent history of CVA in 2022 and is on Eliquis for this.  (He needs a colonoscopy but referring provider states \"they are not convinced he will proceed with a colonoscopy and I thought he should be counseled on the risks of not proceeding.\". We attempted to not make a clinic visit for this and only schedule a diagnostic colonoscopy.)    CT Urogram 23  IMPRESSION:  1.  Wall thickening of the urinary bladder suggests cystitis.  2.  Mild fullness of the right and left renal pelvis of indeterminate etiology. Ureteropelvic junction strictures are possible.  3.  Wall thickening of the ascending colon/cecum is concerning for malignancy. Recommend colonoscopy.   4.  A right liver lesion is incompletely assessed though could represent a benign hemangioma. This could be assessed with MRI.  5.  Enlarged prostate gland.  [Access Center: Recommend colonoscopy for a suspected colon mass. ]      Last colonoscopy:  with [facility] (recall 5 years, was due in )      Medical history:  Past Medical History:   Diagnosis Date    Acute blood loss anemia     Acute renal failure (H)     Anemia     Bacteremia     Cataract     Chronic gout  "    CKD (chronic kidney disease)     Stage 3    DM2 (diabetes mellitus, type 2) (H)     GERD (gastroesophageal reflux disease)     Glucose intolerance (impaired glucose tolerance)     Gout     History of nephrolithiasis     History of prostatitis 2017    Hyperlipidemia     Hypertension     Insomnia     Intestinal disaccharidase deficiencies and disaccharide malabsorption     Created by Conversion     Latent tuberculosis     Nephrolithiasis     Neuropathy     Nonspecific abnormal findings on radiological and other examination of skull and head     Created by [facility] Annotation: 2013 11:23PM - Giulia Meridai/10/2011:  severe stenosis both posterior cerebral arteries seen MRI/MRA done for  vertigo. No acute changes.e*    Osteoarthritis     wrist, knee, shoulder    Prostatitis     Rectal bleed     Trigger thumb        Surgical history:  Past Surgical History:   Procedure Laterality Date    IR MISCELLANEOUS PROCEDURE  2009    IR MISCELLANEOUS PROCEDURE  2009    IR MISCELLANEOUS PROCEDURE  2009    IR MISCELLANEOUS PROCEDURE  2009    IR NEPHROURETERAL TUBE PLACEMENT BILATERAL  2009    IR URETER DILATION BILATERAL  2009    IR URETER DILATION BILATERAL  2009    KIDNEY STONE SURGERY      PICC  3/6/2016            Family history:  Family History   Problem Relation Age of Onset    Cerebrovascular Disease Father     Cerebrovascular Disease Daughter      No Hx of IBD or GI malignancy    Medications:  Current Outpatient Medications   Medication Sig Dispense Refill    acetaminophen (TYLENOL) 500 MG tablet Take 500 mg by mouth every 6 hours as needed for mild pain      allopurinol (ZYLOPRIM) 100 MG tablet Take 2 tablets (200 mg) by mouth daily 180 tablet 0    apixaban ANTICOAGULANT (ELIQUIS) 5 MG tablet Take 1 tablet (5 mg) by mouth 2 times daily 60 tablet 6    celecoxib (CELEBREX) 200 MG capsule Take 1 capsule (200 mg) by mouth daily as needed for pain (jaw pain, joint  pain) 30 capsule 1    DULoxetine (CYMBALTA) 30 MG capsule Take 1 capsule (30 mg) by mouth 2 times daily 60 capsule 6    hydrOXYzine (ATARAX) 25 MG tablet Take 1 tablet (25 mg) by mouth every 8 hours as needed for itching 60 tablet 1    meclizine (ANTIVERT) 12.5 MG tablet Take 1 tablet (12.5 mg) by mouth 3 times daily as needed for dizziness 60 tablet 6    metoprolol succinate ER (TOPROL XL) 100 MG 24 hr tablet Take 0.5 tablets (50 mg) by mouth daily Take 1/2 tablet  daily 45 tablet 3    pantoprazole (PROTONIX) 40 MG EC tablet Take 1 tablet (40 mg) by mouth every morning (before breakfast) 90 tablet 3    polyethylene glycol (MIRALAX) 17 GM/Dose powder Take 17 g (1 capful.) by mouth daily as needed for constipation 578 g 4    rosuvastatin (CRESTOR) 20 MG tablet Take 1 tablet (20 mg) by mouth At Bedtime 90 tablet 3    senna-docusate (SENOKOT-S/PERICOLACE) 8.6-50 MG tablet Take 1 tablet by mouth 2 times daily as needed for constipation 30 tablet 0    SENNA-docusate sodium (SENNA S) 8.6-50 MG tablet Take 1 tablet by mouth daily 90 tablet 3    sitagliptin (JANUVIA) 50 MG tablet Take 1 tablet (50 mg) by mouth daily 90 tablet 3    tamsulosin (FLOMAX) 0.4 MG capsule Take 1 capsule (0.4 mg) by mouth daily 90 capsule 3    traZODone (DESYREL) 50 MG tablet Take 1-2 tablets ( mg) by mouth nightly as needed for sleep 180 tablet 3       Allergies:  No Known Allergies    Social history:   Social History     Tobacco Use    Smoking status: Former     Types: Cigarettes     Quit date: 3/10/2000     Years since quittin.2     Passive exposure: Never    Smokeless tobacco: Former    Tobacco comments:     no passive exposure   Vaping Use    Vaping status: Never Used   Substance Use Topics    Alcohol use: No     Marital status: .    ROS:  A complete review of systems was performed with the patient and all systems negative except as per HPI.    Physical Examination:  Deferred at this time    Laboratory values  reviewed:  Recent Labs   Lab Test 06/13/23  1100 02/16/23  1156 02/04/23  1538 02/02/23  0709   WBC  --  9.1   < > 8.2   HGB  --  13.1*   < > 14.1   PLT  --  211   < > 215   CR 1.9* 1.44*   < > 1.23*   ALBUMIN  --  4.5  --  4.2   BILITOTAL  --   --   --  0.4   ALKPHOS  --   --   --  75   ALT  --  54*  --  37   AST  --   --   --  30    < > = values in this interval not displayed.         ASSESSMENT  1. Ascending colon thickening?  2. DM2  3. Anemia  4. Hx Stroke (Feb 2023) - on Eliquis  5. Gout  6. BPH    PLAN  1. I spoke with Mr. Talamantes's daughter (POA) extensively with Mr. Talamantes presented. Her primary concern was how to proceed and if it made sense to proceed with diagnostic evaluation of the incidental finding of ascending colonic thickening. She expressed significant concern regarding his health, and his recent decline, and suggested that colonoscopy would be a significant procedure in itself as it require withholding significant medications. I agreed with her, and also thought it was reasonable to pursue this given the specific risks to Mr. Talamantes (bowel prep dehydration with further ELLA, worsening stroke with holding anticoagulation, etc.).    Risks, benefits, and alternatives of operative treatment were thoroughly discussed with the patient, he understands these well and agrees to proceed.    Time spent: 45 minutes, >50% spent in discussing, counseling and coordinating care.      Referring Provider:  Justa Falcon PA-C  [address] [facility] [address]  [place] [address]     Primary Care Provider:  Chucho Espino      Again, thank you for allowing me to participate in the care of your patient.      Sincerely,    Mj Barrera MD, MD

## 2023-07-06 NOTE — PATIENT INSTRUCTIONS
Follow up:    Please call with any questions or concerns regarding your clinic visit today.    It is a pleasure being involved in your health care.    Contacts post-consultation depending on your need:    Radiology Appointments [phone]    Schedule Clinic Appointments [phone] # 1   M-F 7:30 - 5 pm    Clinic Fax Number [phone]    Surgery Scheduling [phone]    My Chart is available 24 hours a day and is a secure way to access your records and communicate with your care team.  I strongly recommend signing up if you haven't already done so, if you are comfortable with computers.  If you would like to inquire about this or are having problems with My Chart access, you may call [phone] or go online at [email or web address].  Please allow at least 24 hours for a response and extra time on weekends and Holidays.

## 2023-07-06 NOTE — NURSING NOTE
Chief Complaint   Patient presents with     New Patient     Thickening in ascending colon/cecum       Vitals:    07/06/23 1058   BP: 130/80   BP Location: Right arm   Patient Position: Sitting   Cuff Size: Adult Regular   Pulse: 75   SpO2: 99%   Height: 5'       Body mass index is 30.27 kg/m .    Leann Yadav CMA

## 2023-07-10 ENCOUNTER — PRE VISIT (OUTPATIENT)
Dept: NEUROLOGY | Facility: CLINIC | Age: 83
End: 2023-07-10

## 2023-07-12 ENCOUNTER — OFFICE VISIT (OUTPATIENT)
Dept: FAMILY MEDICINE | Facility: CLINIC | Age: 83
End: 2023-07-12
Payer: COMMERCIAL

## 2023-07-12 VITALS
BODY MASS INDEX: 30.59 KG/M2 | DIASTOLIC BLOOD PRESSURE: 74 MMHG | TEMPERATURE: 97.7 F | SYSTOLIC BLOOD PRESSURE: 110 MMHG | RESPIRATION RATE: 20 BRPM | HEIGHT: 60 IN | HEART RATE: 82 BPM | WEIGHT: 155.8 LBS | OXYGEN SATURATION: 98 %

## 2023-07-12 DIAGNOSIS — R68.84 JAW PAIN: ICD-10-CM

## 2023-07-12 DIAGNOSIS — I67.9 CEREBRAL VASCULAR DISEASE: ICD-10-CM

## 2023-07-12 DIAGNOSIS — K63.9 COLON WALL THICKENING: ICD-10-CM

## 2023-07-12 DIAGNOSIS — I10 ESSENTIAL HYPERTENSION: Primary | ICD-10-CM

## 2023-07-12 DIAGNOSIS — F32.4 MAJOR DEPRESSIVE DISORDER WITH SINGLE EPISODE, IN PARTIAL REMISSION (H): ICD-10-CM

## 2023-07-12 PROCEDURE — 99214 OFFICE O/P EST MOD 30 MIN: CPT | Performed by: FAMILY MEDICINE

## 2023-07-12 RX ORDER — CELECOXIB 200 MG/1
200 CAPSULE ORAL DAILY PRN
Qty: 30 CAPSULE | Refills: 3 | Status: ON HOLD | OUTPATIENT
Start: 2023-07-12 | End: 2024-06-02

## 2023-07-12 ASSESSMENT — PATIENT HEALTH QUESTIONNAIRE - PHQ9
SUM OF ALL RESPONSES TO PHQ QUESTIONS 1-9: 9
SUM OF ALL RESPONSES TO PHQ QUESTIONS 1-9: 9
10. IF YOU CHECKED OFF ANY PROBLEMS, HOW DIFFICULT HAVE THESE PROBLEMS MADE IT FOR YOU TO DO YOUR WORK, TAKE CARE OF THINGS AT HOME, OR GET ALONG WITH OTHER PEOPLE: SOMEWHAT DIFFICULT

## 2023-07-12 NOTE — PROGRESS NOTES
ASSESMENT AND PLAN:  Diagnoses and all orders for this visit:  Essential hypertension  Had some significantly elevated home readings.  Blood pressure now normal.  Continue current medications.  Cerebral vascular disease with recent stroke  Patient has had a significant decline in his functionality and quality of life.  Some discussions today with the patient and his son-in-law about long-term questions about his level of care.  However, the daughter is the appropriate person to have here for these discussions and I encouraged them to have her come to the next appointment so we could talk about whether the patient would be interested in something like transitioning towards comfort care/hospice.  For now continue medical management.  Colon wall thickening  Reviewed the recent colorectal surgery consultation and recommendations with the patient and his family.  Daughter needs to be here for final decision making, she is still in his been consistently involved with his care.  It sounds like they still have not decided whether to move forward with a colonoscopy, the main reason being that if the colonoscopy were to find something that requires surgery that the patient and family likely would not elect to go through surgery.  We will discuss this further at the follow-up visit as detailed above.  Major depressive disorder with single episode, in partial remission (H)  Some improvement with increased dose of duloxetine which will be continued.  Counseling done with the patient with the help of a professional .  To address the issue with sleep schedule I wrote out some notes for the patient's daughter to make sure that the trazodone is being given at bedtime and to experiment with different time of day dosing of his duloxetine.  Also to make sure that hydroxyzine is only being given as needed at bedtime or nighttime.  Jaw pain  Some improvement with celecoxib which will be refilled as below.  We have reviewed the  risks and benefits of medication, counseled the patient and his family that I am reluctant to increase the dose because of his history of chronic kidney disease.  -     celecoxib (CELEBREX) 200 MG capsule; Take 1 capsule (200 mg) by mouth daily as needed for pain (jaw pain, joint pain)      Reviewed the risks and benefits of the treatment plan with the patient and/or caregiver and we discussed indications for routine and emergent follow-up.        SUBJECTIVE: Since last visit the patient has had some improvement in his depression.  He has had less tearfulness.  He seems to be tolerating the increased dose of duloxetine well but family has noticed that he is having more daytime sleepiness and less nighttime sleeping.  The jaw/ear pain on the left side has improved some compared to last visit but he still complains of it periodically.  Patient had some significantly elevated blood pressures at home.  He is on metoprolol and taking it daily.    Past Medical History:   Diagnosis Date     Acute blood loss anemia      Acute renal failure (H)      Anemia      Bacteremia      Cataract      Chronic gout      CKD (chronic kidney disease)     Stage 3     DM2 (diabetes mellitus, type 2) (H)      GERD (gastroesophageal reflux disease)      Glucose intolerance (impaired glucose tolerance)      Gout      History of nephrolithiasis      History of prostatitis 2017     Hyperlipidemia      Hypertension      Insomnia      Intestinal disaccharidase deficiencies and disaccharide malabsorption     Created by Conversion      Latent tuberculosis      Nephrolithiasis      Neuropathy      Nonspecific abnormal findings on radiological and other examination of skull and head     Created by [facility] Annotation: 2013 11:23PM - Giulia Meridai/10/2011:  severe stenosis both posterior cerebral arteries seen MRI/MRA done for  vertigo. No acute changes.e*     Osteoarthritis     wrist, knee, shoulder     Prostatitis       Rectal bleed      Trigger thumb      Patient Active Problem List   Diagnosis     Esophageal reflux     Dyslipidemia     Nephrolithiasis     Pseudogout     Latent Tuberculosis     Generalized Osteoarthritis Of The Hand     Lumbar Disc Degeneration     Insomnia, unspecified type     Chronic Gout     CKD (chronic kidney disease) stage 3, GFR 30-59 ml/min (H)     Elevated PSA     Prostate nodule     Cataracts, bilateral     Constipation, unspecified constipation type     Bilateral chronic knee pain     Chronic right shoulder pain     Essential hypertension with goal blood pressure less than 140/90     BPH (benign prostatic hyperplasia)     Chronic gout     Coronary artery disease due to lipid rich plaque     Right lower quadrant abdominal pain     Colitis     Primary osteoarthritis involving multiple joints     Urinary incontinence     Type 2 diabetes mellitus with stage 3 chronic kidney disease, without long-term current use of insulin (H)     ACE-inhibitor cough     Vertigo     Essential hypertension     Vertebral artery occlusion, left     Cerebellar stroke (H)     Stroke (H)     Hypomagnesemia     Ataxic gait     Urinary retention     Basilar artery stenosis     Cerebral vascular disease     Colon wall thickening     Current Outpatient Medications   Medication Sig Dispense Refill     acetaminophen (TYLENOL) 500 MG tablet Take 500 mg by mouth every 6 hours as needed for mild pain       allopurinol (ZYLOPRIM) 100 MG tablet Take 2 tablets (200 mg) by mouth daily 180 tablet 0     apixaban ANTICOAGULANT (ELIQUIS) 5 MG tablet Take 1 tablet (5 mg) by mouth 2 times daily 60 tablet 6     celecoxib (CELEBREX) 200 MG capsule Take 1 capsule (200 mg) by mouth daily as needed for pain (jaw pain, joint pain) 30 capsule 3     DULoxetine (CYMBALTA) 30 MG capsule Take 1 capsule (30 mg) by mouth 2 times daily 60 capsule 6     hydrOXYzine (ATARAX) 25 MG tablet Take 1 tablet (25 mg) by mouth every 8 hours as needed for itching 60 tablet  1     meclizine (ANTIVERT) 12.5 MG tablet Take 1 tablet (12.5 mg) by mouth 3 times daily as needed for dizziness 60 tablet 6     metoprolol succinate ER (TOPROL XL) 100 MG 24 hr tablet Take 0.5 tablets (50 mg) by mouth daily Take 1/2 tablet  daily 45 tablet 3     pantoprazole (PROTONIX) 40 MG EC tablet Take 1 tablet (40 mg) by mouth every morning (before breakfast) 90 tablet 3     polyethylene glycol (MIRALAX) 17 GM/Dose powder Take 17 g (1 capful.) by mouth daily as needed for constipation 578 g 4     rosuvastatin (CRESTOR) 20 MG tablet Take 1 tablet (20 mg) by mouth At Bedtime 90 tablet 3     senna-docusate (SENOKOT-S/PERICOLACE) 8.6-50 MG tablet Take 1 tablet by mouth 2 times daily as needed for constipation 30 tablet 0     SENNA-docusate sodium (SENNA S) 8.6-50 MG tablet Take 1 tablet by mouth daily 90 tablet 3     sitagliptin (JANUVIA) 50 MG tablet Take 1 tablet (50 mg) by mouth daily 90 tablet 3     tamsulosin (FLOMAX) 0.4 MG capsule Take 1 capsule (0.4 mg) by mouth daily 90 capsule 3     traZODone (DESYREL) 50 MG tablet Take 1-2 tablets ( mg) by mouth nightly as needed for sleep 180 tablet 3     History   Smoking Status     Former     Types: Cigarettes     Quit date: 3/10/2000   Smokeless Tobacco     Former       OBJECTICE: /74 (BP Location: Left arm, Patient Position: Sitting, Cuff Size: Adult Regular)   Pulse 82   Temp 97.7  F (36.5  C) (Oral)   Resp 20   Ht 1.524 m (5')   Wt 70.7 kg (155 lb 12.8 oz)   SpO2 98%   BMI 30.43 kg/m       No results found for this or any previous visit (from the past 24 hour(s)).     GEN-alert, in no acute distress   HEENT-tympanic membranes are normal bilaterally   CV-regular rate and rhythm   RESP-lungs clear   Psychiatric-appearance is well-groomed, speech is slow, mood is depressed, affect is somewhat withdrawn, thought content negative for suicidal or homicidal ideation, thought processing negative for paranoid or delusional thinking.    Chucho Espino MD            7/12/2023    11:09 AM   Additional Questions   Roomed by joyce york   Accompanied by Son in-law

## 2023-07-18 ENCOUNTER — TELEPHONE (OUTPATIENT)
Dept: NEUROLOGY | Facility: CLINIC | Age: 83
End: 2023-07-18
Payer: COMMERCIAL

## 2023-07-20 NOTE — TELEPHONE ENCOUNTER
[facility] Call Center    Phone Message    May a detailed message be left on voicemail: yes     Reason for Call: Other: Patients daughter Dimitri was returning Judy Fraserion's phone call on his behalf.  Please call Dimitri back at [phone]    Action Taken: Message routed to:  Other: [facility]     Travel Screening: Not Applicable

## 2023-07-21 ENCOUNTER — TELEPHONE (OUTPATIENT)
Dept: PHARMACY | Facility: OTHER | Age: 83
End: 2023-07-21
Payer: COMMERCIAL

## 2023-07-21 NOTE — TELEPHONE ENCOUNTER
MTM referral from: Patient's insurance     MTM referral outreach attempt #2 on July 21, 2023      Outcome: called patient using Kayce , left voicemail     Gillian Hernandez, PharmD   Medication Therapy Management

## 2023-07-24 DIAGNOSIS — M1A.09X0 CHRONIC GOUT OF MULTIPLE SITES, UNSPECIFIED CAUSE: ICD-10-CM

## 2023-07-25 RX ORDER — ALLOPURINOL 100 MG/1
TABLET ORAL
Qty: 180 TABLET | Refills: 0 | Status: SHIPPED | OUTPATIENT
Start: 2023-07-25 | End: 2023-10-17

## 2023-07-25 NOTE — TELEPHONE ENCOUNTER
"Routing refill request to provider for review/approval because:  Labs out of range:  CR 1.9  Labs not current:  uric acid from 2018    Last Written Prescription Date:  4/26/2023  Last Fill Quantity: 180,  # refills: 0   Last office visit provider:  7/12/2023     Requested Prescriptions   Pending Prescriptions Disp Refills    allopurinol (ZYLOPRIM) 100 MG tablet [Pharmacy Med Name: ALLOPURINOL 100MG TABLETS] 180 tablet 0     Sig: TAKE 2 TABLETS(200 MG) BY MOUTH DAILY       Gout Agents Protocol Failed - 7/25/2023  9:41 AM        Failed - Has Uric Acid on file in past 12 months and value is less than 6     Recent Labs   Lab Test 12/20/18  1121   URIC 6.8     If level is 6mg/dL or greater, ok to refill one time and refer to provider.           Failed - Normal serum creatinine on file in the past 12 months     Recent Labs   Lab Test 06/13/23  1100   CR 1.9*       Ok to refill medication if creatinine is low          Passed - CBC on file in past 12 months     Recent Labs   Lab Test 02/16/23  1156   WBC 9.1   RBC 4.30*   HGB 13.1*   HCT 39.7*                    Passed - ALT on file in past 12 months     Recent Labs   Lab Test 02/16/23  1156   ALT 54*             Passed - Recent (12 mo) or future (30 days) visit within the authorizing provider's specialty     Patient has had an office visit with the authorizing provider or a provider within the authorizing providers department within the previous 12 mos or has a future within next 30 days. See \"Patient Info\" tab in inbasket, or \"Choose Columns\" in Meds & Orders section of the refill encounter.              Passed - Medication is active on med list        Passed - Patient is age 18 or older             Blanca Kapoor RN 07/25/23 10:17 AM  "

## 2023-07-26 ENCOUNTER — VIRTUAL VISIT (OUTPATIENT)
Dept: NEUROLOGY | Facility: CLINIC | Age: 83
End: 2023-07-26
Payer: COMMERCIAL

## 2023-07-26 DIAGNOSIS — I63.9 CEREBELLAR STROKE (H): Primary | ICD-10-CM

## 2023-07-26 DIAGNOSIS — M1A.09X0 CHRONIC GOUT OF MULTIPLE SITES, UNSPECIFIED CAUSE: ICD-10-CM

## 2023-07-26 PROCEDURE — 99215 OFFICE O/P EST HI 40 MIN: CPT | Mod: VID | Performed by: NURSE PRACTITIONER

## 2023-07-26 RX ORDER — ALLOPURINOL 100 MG/1
TABLET ORAL
Qty: 180 TABLET | Refills: 0 | OUTPATIENT
Start: 2023-07-26

## 2023-07-26 NOTE — LETTER
7/26/2023         RE: Adam Talamantes  [address] [place] [address]        Dear Colleague,    Thank you for referring your patient, Adam Talamantes, to the [facility]. Please see a copy of my visit note below.        __________________________________________________________      [facility] [place]   [phone]  __________________________________________________________    Chief Complaint: Patient presents with:  Stroke      History of Present Illness: Adam Talamantes is a 83 year old male presenting for follow-up for multiple posterior circulation ischemic strokes.     He was initially hospitalized at [facility] on 1/11/23. Prior to the hospital stay, he had a past medical history of DM2, CKD, HTN, HLD. He presented to the hospital with 1 week of dizziness, bitemporal headache, tinnitus. 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 MRA without evidence of dissection. He was discharged on DAPT x 21 days.      He returned to the [facility] ED 2/1/23 for evaluation of vertigo, nausea, vomiting. CTA with ongoing L vert/basilar findings and MRI now with scattered acute to subacute infarcts of bilateral cerebellar hemispheres and L thalamus. Stroke code was called 2/4 following LOC during urination; MRI with increased stroke burden; he was initiated on heparin drip and ultimately transferred to [facility] on 2/7/23 for consideration of stenting. Repeat MRI without increasing stroke burden and given poor baseline (mRS 3) and clinical stability since initiating anticoagulation, it was ultimately decided not to pursue stenting as risk was felt to outweigh any potential benefit. Hospitalization was complicated by urinary retention and intermittent hematuria/clots in urine. He was discharged on ASA 81mg + Eliquis with plan for further neuro IR and stroke follow up in clinic.       His last stroke clinic visit was 3/21/23. At that time, his family was most concerned with cognitive deficits. They feel that he perhaps had some mild cognitive symptoms prior to the stroke, but they significantly worsened post-stroke.  He was often disoriented to place, date, and at times will not recognize his own children. Severity of cognitive symptoms were fluctuating. He was also having some mood fluctuations; family felt this was mild and did not want to make any medication changes. Since then, his duloxetine dose was increased by his PCP which did provide some improvement. He is also getting trazodone to help with sleep. He had some diffuse weakness and significant imbalance; he was generally using a walker but was requiring a wheelchair for longer distances.      He is on Eliquis and aspirin for secondary stroke prevention and is tolerating that well with only some mild bruising. Blood pressures have reportedly been normal.      He was seen by Dr. Jackson of neuro IR 3/14/23; at this time he reported ongoing stability. It was recommended that he undergo repeat MRA/MRI to assess for interval change, with no immediate plan for stenting unless there was evidence of treatment failure with Eliquis and ASA. He met with Dr. Jackson again 4/4/23. Plan is to continue with aspirin, apixaban, and rosuvastatin and repeat MRI brain and MRA COW ~ October 2023.     His family report that things have been stable. Cognitive deficits seem about the same. He sleeps quite a bit. In the last month, he started having near constant dizziness. There weren't any medication changes around that time. He has tried meclizine but doesn't find it especially helpful. He is still using a walker for ambulation and does utilize a wheelchair for long distances. His family report that he doesn't go out much. He is unable to do anything on his own. Family is providing assistance with ADLs. He gets 10 PCA hours/day. Blood pressures  "are labile and he tends to run higher in the evening. Blood sugars are also variable. The patient himself answers most questions with \"I don't know\" or will answer with 1-2 words. His son in law is present for the visit and reports that he doesn't have any questions or concerns at this time. Patient's daughter Dimitri manages his medications.       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, moderate L P2 stenosis; similar to CTA dated 1/11/23      Echocardiogram SHIRLEY 1/11: LVEF 55-60%, mild concentric LV hypertrophy, no regional wall motion abnormality, atria of normal size bilaterally    EKG/Telemetry SR      LDL  61   A1C  7.6   Additional diagnostics following discharge:  MRI 3/20/23: no acute/new infarction, expected evolution of multiple posterior circulation infarcts without evidence of mass effect or hemorrhagic transformation   MRA 3/20/23: no significant interval change; 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 and critical 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     Modified Charleston Scale  Score: 4-Moderately severe disability; unable to walk without assistance and unable to attend to own bodily    Impression/Plan:     1. 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   - Continue Eliquis 5 mg BID + ASA 81 mg daily for secondary stroke prevention  - Continue rosuvastatin 20 mg daily; goal LDL 40-70  - goal -140; I wonder if his dizziness is " due to hypoperfusion, it may be beneficial to let him sit at the higher side   - Blood glucose monitoring, Hgb A1c 7.6%, (goal <7% for secondary stroke prevention), follow-up with PCP  - Ongoing follow up with neuro IR regarding possible stent placement; surgical management is not anticipated if patient remains clinically and radiographically stable on current medical management. He will have repeat imaging and clinic follow up in October   - Return in about 6 months (around 1/26/2024) for with MARILYN Mcmanus or other Stroke JOCELYN, in person or e-visit (whichever patient prefers).      Stroke Clinic Phone Number: [phone]  Stroke Prevention Recommendations  High blood pressure, or hypertension, is a leading cause of stroke and the most significant controllable risk factor. You should aim to keep your blood pressure below 130/80.     Smoking cessation: The nicotine and carbon monoxide in cigarette smoke damage the cardiovascular system and pave the way for a stroke. If you use nicotine, please reach out to your primary care provider for assistance with quitting or consider utilizing one of [place]'s free quit support programs (https://www.health.[facility]/communities/tobacco/quitting/index.html)    If you have Type 1 or 2 diabetes, control you blood sugar. You should aim to keep your HGBA1C below 7.0.     Eat an overall health dietary pattern that emphasizes:  - a wide variety of fruits and vegetables   - whole grains and products made up of mostly whole grains   - healthy sources of protein (mostly plants such as legumes and nuts; fish and seafood; low-fat or non-fat dairy; and, if you eat meat and poultry, ensuring it is lean and unprocessed)  - liquid non-tropical vegetable oils  - minimally processed foods   - minimize intake of added sugars  - foods prepared with little or no salt  - limited or preferably no alcohol intake    Aim for being active at least 150 minutes a week, but if you don't want to sweat the  numbers, just move more and sit less.    Excess body weight and obesity are linked with an increased risk of high blood pressure, diabetes, heart disease, and stroke. Losing as little as 5 to 10 pounds can make a significant difference in your risks.  [facility] has a Comprehensive Weight Management program if you would like assistance/support: https://[email or web address]/treatments/comprehensive-weight-management    Large amounts of cholesterol in the blood can build up and cause blood clots - leading to a stroke. Aim to keep your LDL cholesterol between 40 and 70.     Recrudescence  Recrudescence of stroke symptoms refers to when a person experiences previously resolved symptoms of a stroke. These symptoms are usually mild and resolve within a few days. The condition is also sometimes referred to as ischemic stroke recrudescence. In simple terms, ischemic stroke recrudescence, or anamnestic syndrome, occurs when someone redevelops symptoms they experienced after having a stroke they had previously recovered from. This may result in the reappearance of neurological or physical symptoms associated with stroke. In most cases, people tend to experience a mild, rather than severe, worsening or return of symptoms. Recognized triggers of ischemic stroke recrudescence include:  - infections  - stress  - conditions that cause metabolic dysregulation, such as diabetes  - insomnia or lack of proper sleep  - hypertension (high blood pressure)  - low sodium levels (hyponatremia)  - use of sedating medications, such as benzodiazepines, and anesthetic drugs, such as midazolam hydrochloride and fentanyl citrate    Call 911 if these signs are present          Stroke Education provided.  He will call us with any questions.  For any acute neurologic deficits he was advised to  go directly to the hospital rather than call the clinic.      Emily Mcmanus NP  Neurology  08/03/2023   To page me or covering stroke neurology  "team member, click here: [facility]  Choose \"On Call\" tab at top, then search dropdown box for \"Neurology Adult\" & press Enter, look for Neuro ICU/Stroke    ___________________________________________________________________    Current Medications  Current Outpatient Medications   Medication Sig     acetaminophen (TYLENOL) 500 MG tablet Take 500 mg by mouth every 6 hours as needed for mild pain     allopurinol (ZYLOPRIM) 100 MG tablet TAKE 2 TABLETS(200 MG) BY MOUTH DAILY     apixaban ANTICOAGULANT (ELIQUIS) 5 MG tablet Take 1 tablet (5 mg) by mouth 2 times daily     celecoxib (CELEBREX) 200 MG capsule Take 1 capsule (200 mg) by mouth daily as needed for pain (jaw pain, joint pain)     DULoxetine (CYMBALTA) 30 MG capsule Take 1 capsule (30 mg) by mouth 2 times daily     hydrOXYzine (ATARAX) 25 MG tablet Take 1 tablet (25 mg) by mouth every 8 hours as needed for itching     meclizine (ANTIVERT) 12.5 MG tablet Take 1 tablet (12.5 mg) by mouth 3 times daily as needed for dizziness     metoprolol succinate ER (TOPROL XL) 100 MG 24 hr tablet Take 0.5 tablets (50 mg) by mouth daily Take 1/2 tablet  daily     pantoprazole (PROTONIX) 40 MG EC tablet Take 1 tablet (40 mg) by mouth every morning (before breakfast)     polyethylene glycol (MIRALAX) 17 GM/Dose powder Take 17 g (1 capful.) by mouth daily as needed for constipation     rosuvastatin (CRESTOR) 20 MG tablet Take 1 tablet (20 mg) by mouth At Bedtime     senna-docusate (SENOKOT-S/PERICOLACE) 8.6-50 MG tablet Take 1 tablet by mouth 2 times daily as needed for constipation     SENNA-docusate sodium (SENNA S) 8.6-50 MG tablet Take 1 tablet by mouth daily     sitagliptin (JANUVIA) 50 MG tablet Take 1 tablet (50 mg) by mouth daily     tamsulosin (FLOMAX) 0.4 MG capsule Take 1 capsule (0.4 mg) by mouth daily     traZODone (DESYREL) 50 MG tablet Take 1-2 tablets ( mg) by mouth nightly as needed for sleep     No current facility-administered medications for this visit. " "      Physical Exam: Completed via telemedicine        4/4/2023     9:20 AM 7/26/2023    11:47 AM   Vitals - Patient Reported   Height (Patient Reported) 5' 2\" 5' 0\"   Weight (Patient Reported) 155 lb 150 lb   BMI (Based on Pt Reported Ht/Wt) 28.35 kg/m2 29.29 kg/m2             Estimated body mass index is 30.43 kg/m  as calculated from the following:    Height as of 7/12/23: 1.524 m (5').    Weight as of 7/12/23: 70.7 kg (155 lb 12.8 oz).      General:  no acute distress  HEENT:  normocephalic/atraumatic  Pulmonary:  no respiratory distress    Neurologic  Mental Status:  alert, unable to assess orientation due to limited patient participation, speech clear per son-in-law  Cranial Nerves:  facial movements symmetric, hearing not formally tested but intact to conversation, no dysarthria, shoulder shrug equal bilaterally, tongue protrusion midline  Motor:  no abnormal movements, able to move all limbs antigravity spontaneously with no signs of hemiparesis observed, no pronator drift  Reflexes:  unable to test (telestroke)  Sensory:  unable to test (telestroke)  Coordination:  normal finger-to-nose and heel-to-shin bilaterally without dysmetria, rapid alternating movements symmetric  Station/Gait:  unable to test (telestroke)    Neuroimaging: as per HPI. I personally reviewed those images.      Billing:    I spent a total of 40 minutes on the day of the visit.   Time spent by me doing chart review, history and exam, documentation and further activities per the note        Virtual Visit Details    Type of service:  Video Visit   Video Start Time: 1157  Video End Time:12:23 PM    Originating Location (pt. Location): Home    Distant Location (provider location):  Off-site  Platform used for Video Visit: Nigel      Again, thank you for allowing me to participate in the care of your patient.        Sincerely,        Emily Mcmanus NP  "

## 2023-07-26 NOTE — PROGRESS NOTES
Virtual Visit Details    Type of service:  Video Visit   Video Start Time: 1157  Video End Time:12:23 PM    Originating Location (pt. Location): Home    Distant Location (provider location):  Off-site  Platform used for Video Visit: Nigel

## 2023-07-26 NOTE — NURSING NOTE
Is the patient currently in the state of [place]? YES    Visit mode:TELEPHONE    If the visit is dropped, the patient can be reconnected by: TELEPHONE VISIT: Phone number: [phone]    Will anyone else be joining the visit? NO      How would you like to obtain your AVS? Mail a copy    Are changes needed to the allergy or medication list? NO- unable to review medication list, as Fermin son in law is unfamiliar with medications.    Reason for visit: Stroke      ID- [id number]    SANTANA HOPE CMA

## 2023-07-26 NOTE — PROGRESS NOTES
__________________________________________________________      [facility] - [place]   [phone]  __________________________________________________________    Chief Complaint: Patient presents with:  Stroke      History of Present Illness: Adam Talamantes is a 83 year old male presenting for follow-up for multiple posterior circulation ischemic strokes.     He was initially hospitalized at [facility] on 1/11/23. Prior to the hospital stay, he had a past medical history of DM2, CKD, HTN, HLD. He presented to the hospital with 1 week of dizziness, bitemporal headache, tinnitus. 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 MRA without evidence of dissection. He was discharged on DAPT x 21 days.      He returned to the [facility] ED 2/1/23 for evaluation of vertigo, nausea, vomiting. CTA with ongoing L vert/basilar findings and MRI now with scattered acute to subacute infarcts of bilateral cerebellar hemispheres and L thalamus. Stroke code was called 2/4 following LOC during urination; MRI with increased stroke burden; he was initiated on heparin drip and ultimately transferred to [facility] on 2/7/23 for consideration of stenting. Repeat MRI without increasing stroke burden and given poor baseline (mRS 3) and clinical stability since initiating anticoagulation, it was ultimately decided not to pursue stenting as risk was felt to outweigh any potential benefit. Hospitalization was complicated by urinary retention and intermittent hematuria/clots in urine. He was discharged on ASA 81mg + Eliquis with plan for further neuro IR and stroke follow up in clinic.      His last stroke clinic visit was 3/21/23. At that time, his family was most concerned with cognitive deficits. They feel that he perhaps had some mild cognitive symptoms prior to the stroke, but they significantly worsened post-stroke.  He was  "often disoriented to place, date, and at times will not recognize his own children. Severity of cognitive symptoms were fluctuating. He was also having some mood fluctuations; family felt this was mild and did not want to make any medication changes. Since then, his duloxetine dose was increased by his PCP which did provide some improvement. He is also getting trazodone to help with sleep. He had some diffuse weakness and significant imbalance; he was generally using a walker but was requiring a wheelchair for longer distances.      He is on Eliquis and aspirin for secondary stroke prevention and is tolerating that well with only some mild bruising. Blood pressures have reportedly been normal.      He was seen by Dr. Jackson of neuro IR 3/14/23; at this time he reported ongoing stability. It was recommended that he undergo repeat MRA/MRI to assess for interval change, with no immediate plan for stenting unless there was evidence of treatment failure with Eliquis and ASA. He met with Dr. Jackson again 4/4/23. Plan is to continue with aspirin, apixaban, and rosuvastatin and repeat MRI brain and MRA COW ~ October 2023.     His family report that things have been stable. Cognitive deficits seem about the same. He sleeps quite a bit. In the last month, he started having near constant dizziness. There weren't any medication changes around that time. He has tried meclizine but doesn't find it especially helpful. He is still using a walker for ambulation and does utilize a wheelchair for long distances. His family report that he doesn't go out much. He is unable to do anything on his own. Family is providing assistance with ADLs. He gets 10 PCA hours/day. Blood pressures are labile and he tends to run higher in the evening. Blood sugars are also variable. The patient himself answers most questions with \"I don't know\" or will answer with 1-2 words. His son in law is present for the visit and reports that he doesn't " have any questions or concerns at this time. Patient's daughter Dimitri manages his medications.       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, moderate L P2 stenosis; similar to CTA dated 1/11/23      Echocardiogram SHIRLEY 1/11: LVEF 55-60%, mild concentric LV hypertrophy, no regional wall motion abnormality, atria of normal size bilaterally    EKG/Telemetry SR      LDL  61   A1C  7.6   Additional diagnostics following discharge:  MRI 3/20/23: no acute/new infarction, expected evolution of multiple posterior circulation infarcts without evidence of mass effect or hemorrhagic transformation   MRA 3/20/23: no significant interval change; 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 He will have repeat imaging and clinic follow up in October   - Return in about 6 months (around 1/26/2024) for with MARILYN Mcmanus or other Stroke JOCELYN, in person or e-visit (whichever patient prefers).      Stroke Clinic Phone Number: [phone]  Stroke Prevention Recommendations  High blood pressure, or hypertension, is a leading cause of stroke and the most significant controllable risk factor. You should aim to keep your blood pressure below 130/80.     Smoking cessation: The nicotine and carbon monoxide in cigarette smoke damage the cardiovascular system and pave the way for a stroke. If you use nicotine, please reach out to your primary care provider for assistance with quitting or consider utilizing one of [place]'s free quit support programs (https://www.[email or web address].[email or web address]/communities/tobacco/quitting/index.html)    If you have Type 1 or 2 diabetes, control you blood sugar. You should aim to keep your HGBA1C below 7.0.     Eat an overall health dietary pattern that emphasizes:  - a wide variety of fruits and vegetables   - whole grains and products made up of mostly whole grains   - healthy sources of protein (mostly plants such as legumes and nuts; fish and seafood; low-fat or non-fat dairy; and, if you eat meat and poultry, ensuring it is lean and unprocessed)  - liquid non-tropical vegetable oils  - minimally processed foods   - minimize intake of added sugars  - foods prepared with little or no salt  - limited or preferably no alcohol intake    Aim for being active at least 150 minutes a week, but if you don't want to sweat the numbers, just move more and sit less.    Excess body weight and obesity are linked with an increased risk of high blood pressure, diabetes, heart disease, and stroke. Losing as little as 5 to 10 pounds can make a significant difference in your  "risks. [facility] has a Comprehensive Weight Management program if you would like assistance/support: [email or web address]/treatments/comprehensive-weight-management    Large amounts of cholesterol in the blood can build up and cause blood clots - leading to a stroke. Aim to keep your LDL cholesterol between 40 and 70.     Recrudescence  Recrudescence of stroke symptoms refers to when a person experiences previously resolved symptoms of a stroke. These symptoms are usually mild and resolve within a few days. The condition is also sometimes referred to as ischemic stroke recrudescence. In simple terms, ischemic stroke recrudescence, or anamnestic syndrome, occurs when someone redevelops symptoms they experienced after having a stroke they had previously recovered from. This may result in the reappearance of neurological or physical symptoms associated with stroke. In most cases, people tend to experience a mild, rather than severe, worsening or return of symptoms. Recognized triggers of ischemic stroke recrudescence include:  - infections  - stress  - conditions that cause metabolic dysregulation, such as diabetes  - insomnia or lack of proper sleep  - hypertension (high blood pressure)  - low sodium levels (hyponatremia)  - use of sedating medications, such as benzodiazepines, and anesthetic drugs, such as midazolam hydrochloride and fentanyl citrate    Call 911 if these signs are present          Stroke Education provided.  He will call us with any questions.  For any acute neurologic deficits he was advised to  go directly to the hospital rather than call the clinic.      Emily Mcmanus NP  Neurology  08/03/2023   To page me or covering stroke neurology team member, click here: AMCOM  Choose \"On Call\" tab at top, then search dropdown box for \"Neurology Adult\" & press Enter, look for Neuro ICU/Stroke    ___________________________________________________________________    Current " "Medications  Current Outpatient Medications   Medication Sig    acetaminophen (TYLENOL) 500 MG tablet Take 500 mg by mouth every 6 hours as needed for mild pain    allopurinol (ZYLOPRIM) 100 MG tablet TAKE 2 TABLETS(200 MG) BY MOUTH DAILY    apixaban ANTICOAGULANT (ELIQUIS) 5 MG tablet Take 1 tablet (5 mg) by mouth 2 times daily    celecoxib (CELEBREX) 200 MG capsule Take 1 capsule (200 mg) by mouth daily as needed for pain (jaw pain, joint pain)    DULoxetine (CYMBALTA) 30 MG capsule Take 1 capsule (30 mg) by mouth 2 times daily    hydrOXYzine (ATARAX) 25 MG tablet Take 1 tablet (25 mg) by mouth every 8 hours as needed for itching    meclizine (ANTIVERT) 12.5 MG tablet Take 1 tablet (12.5 mg) by mouth 3 times daily as needed for dizziness    metoprolol succinate ER (TOPROL XL) 100 MG 24 hr tablet Take 0.5 tablets (50 mg) by mouth daily Take 1/2 tablet  daily    pantoprazole (PROTONIX) 40 MG EC tablet Take 1 tablet (40 mg) by mouth every morning (before breakfast)    polyethylene glycol (MIRALAX) 17 GM/Dose powder Take 17 g (1 capful.) by mouth daily as needed for constipation    rosuvastatin (CRESTOR) 20 MG tablet Take 1 tablet (20 mg) by mouth At Bedtime    senna-docusate (SENOKOT-S/PERICOLACE) 8.6-50 MG tablet Take 1 tablet by mouth 2 times daily as needed for constipation    SENNA-docusate sodium (SENNA S) 8.6-50 MG tablet Take 1 tablet by mouth daily    sitagliptin (JANUVIA) 50 MG tablet Take 1 tablet (50 mg) by mouth daily    tamsulosin (FLOMAX) 0.4 MG capsule Take 1 capsule (0.4 mg) by mouth daily    traZODone (DESYREL) 50 MG tablet Take 1-2 tablets ( mg) by mouth nightly as needed for sleep     No current facility-administered medications for this visit.       Physical Exam: Completed via telemedicine        4/4/2023     9:20 AM 7/26/2023    11:47 AM   Vitals - Patient Reported   Height (Patient Reported) 5' 2\" 5' 0\"   Weight (Patient Reported) 155 lb 150 lb   BMI (Based on Pt Reported Ht/Wt) 28.35 " kg/m2 29.29 kg/m2             Estimated body mass index is 30.43 kg/m  as calculated from the following:    Height as of 7/12/23: 1.524 m (5').    Weight as of 7/12/23: 70.7 kg (155 lb 12.8 oz).      General:  no acute distress  HEENT:  normocephalic/atraumatic  Pulmonary:  no respiratory distress    Neurologic  Mental Status:  alert, unable to assess orientation due to limited patient participation, speech clear per son-in-law  Cranial Nerves:  facial movements symmetric, hearing not formally tested but intact to conversation, no dysarthria, shoulder shrug equal bilaterally, tongue protrusion midline  Motor:  no abnormal movements, able to move all limbs antigravity spontaneously with no signs of hemiparesis observed, no pronator drift  Reflexes:  unable to test (telestroke)  Sensory:  unable to test (telestroke)  Coordination:  normal finger-to-nose and heel-to-shin bilaterally without dysmetria, rapid alternating movements symmetric  Station/Gait:  unable to test (telestroke)    Neuroimaging: as per HPI. I personally reviewed those images.      Billing:    I spent a total of 40 minutes on the day of the visit.   Time spent by me doing chart review, history and exam, documentation and further activities per the note

## 2023-08-02 ENCOUNTER — NURSE TRIAGE (OUTPATIENT)
Dept: NURSING | Facility: CLINIC | Age: 83
End: 2023-08-02
Payer: COMMERCIAL

## 2023-08-02 ENCOUNTER — PATIENT OUTREACH (OUTPATIENT)
Dept: GERIATRIC MEDICINE | Facility: CLINIC | Age: 83
End: 2023-08-02
Payer: COMMERCIAL

## 2023-08-02 DIAGNOSIS — R68.84 JAW PAIN: ICD-10-CM

## 2023-08-02 RX ORDER — CELECOXIB 200 MG/1
CAPSULE ORAL
Qty: 30 CAPSULE | Refills: 3 | OUTPATIENT
Start: 2023-08-02

## 2023-08-02 NOTE — TELEPHONE ENCOUNTER
"Nurse Triage SBAR    Is this a 2nd Level Triage? YES, LICENSED PRACTITIONER REVIEW IS REQUIRED    Situation:  Daughter, Dimitri, calling about patient having elevated blood sugars recently. Patient was not present during the call and could not be called since he is unable to communicate due to a past stroke, but daughter was with him an hour ago and was able to provider significant amount of information. Consent: on file in chart    Background: Patient is diabetic and takes Januvia 50mg daily at 6pm. Daughter has noticed that patient is very tired and weak in the evening, so she started to check his blood sugar levels on July 29th.   July 29 - reading said \"High\"  July 30 - reading again said \"high\" - after cutting out milk  July 31 (AM)- 372 - after cutting out sweets for a day  July 31 (PM) -435 - wife had given him milk   Aug 1 (6PM) - 500   Aug 2  (8AM)- 187 before eating   Patient has a poor appetite and prefers to drink skim milk 3-4 times per day  Daily food intake:   8am and noon - 2 T rice and soup with chicken   3pm snack - milk and asian bread  5pm - rice and soup    Assessment:   Alert and oriented this morning  BS- 187 before breakfast  No fever, but often feels cold    Protocol Recommended Disposition:   Discuss with PCP and callback by nurse within 1 hour    Recommendation: Advised daughter to wait for a call back after sending a message to provider. Daughter verbalized understanding and agreed with plan.     Routed to provider to review and advise.    Gia Ching RN [facility] 8/2/2023 8:48 AM    Reason for Disposition   Blood glucose > 300 mg/dL (16.7 mmol/L) AND two or more times in a row    Additional Information   Negative: Unconscious or difficult to awaken   Negative: Acting confused (e.g., disoriented, slurred speech)   Negative: Very weak (can't stand)   Negative: Sounds like a life-threatening emergency to the triager   Negative: Vomiting and signs of dehydration (e.g., very " dry mouth, lightheaded, dark urine)   Negative: Blood glucose > 240 mg/dL (13.3 mmol/L) and rapid breathing   Negative: Blood glucose > 500 mg/dL (27.8 mmol/L)   Negative: Blood glucose > 240 mg/dL (13.3 mmol/L) AND urine ketones moderate-large (or more than 1+)   Negative: Blood glucose > 240 mg/dL (13.3 mmol/L) and blood ketones > 1.4 mmol/L   Negative: Blood glucose > 240 mg/dL (13.3 mmol/L) AND vomiting AND unable to check for ketones (in blood or urine)   Negative: Vomiting lasting > 4 hours   Negative: Patient sounds very sick or weak to the triager   Negative: Fever > 100.4 F (38.0 C)   Negative: Caller has URGENT medication or insulin pump question and triager unable to answer question   Negative: Blood glucose > 400 mg/dL (22.2 mmol/L)    Protocols used: Diabetes - High Blood Sugar-A-OH

## 2023-08-02 NOTE — TELEPHONE ENCOUNTER
Rx filled 7/12/23 qty 30 with 3 refills    Eun Harden RN, BSN  8/2/2023 at 2:40 PM  [facility]

## 2023-08-02 NOTE — PROGRESS NOTES
[facility] Care Coordination Contact      [facility] Six-Month Telephone Assessment    6 month telephone assessment completed on 8/2/2023.    ER visits: No  Hospitalizations: No  TCU stays: No  Significant health status changes: Deandra Wolff reports a general decrease in appetite. Blood Sugars have been running high as well. She placed a call to PCP office requesting help on to proceed. Reviewed MTM services and this might be a good option. Will work on obtaining glucose controlled boost.   Falls/Injuries: No  ADL/IADL changes: No  Changes in services: No- PCA continues at 10.5 hours per week. Family is interested in Elderly Waiver services will bring appropriate paperwork during annual assessment in November to complete.     Caregiver Assessment follow up:  n/a    Goals: See POC in chart for goal progress documentation.      Will see member in 6 months for an annual health risk assessment.   Encouraged member to call CC with any questions or concerns in the meantime.     RICKEY Gallegos  [facility]  [phone]

## 2023-08-03 NOTE — TELEPHONE ENCOUNTER
Daughter called stating that she did not take his blood sugar today yet.     Last night after dinner and taking Januvia his blood sugar was at 387.    Daughter cannot take pt's blood sugar as she is at work right now but stated that pt is doing the same as yesterday and he complains of dizziness when his blood glucose is high.      Okay to leave detailed message.    Agapito Martinez Cem Say, BSN RN  [facility]

## 2023-08-03 NOTE — TELEPHONE ENCOUNTER
Called pt's daughter in an attempt to ask provider question. Left message to call back.    If daughter calls back, please ask provider question below.    Better?  How are his blood sugars today please ask the daughter and let me know thank you       Agapito Shook, BSN RN  [facility]

## 2023-08-07 ENCOUNTER — OFFICE VISIT (OUTPATIENT)
Dept: FAMILY MEDICINE | Facility: CLINIC | Age: 83
End: 2023-08-07
Payer: COMMERCIAL

## 2023-08-07 VITALS
SYSTOLIC BLOOD PRESSURE: 114 MMHG | WEIGHT: 150.4 LBS | HEART RATE: 92 BPM | TEMPERATURE: 98 F | HEIGHT: 60 IN | RESPIRATION RATE: 12 BRPM | BODY MASS INDEX: 29.53 KG/M2 | DIASTOLIC BLOOD PRESSURE: 74 MMHG | OXYGEN SATURATION: 97 %

## 2023-08-07 DIAGNOSIS — N18.9 CHRONIC KIDNEY DISEASE, UNSPECIFIED CKD STAGE: ICD-10-CM

## 2023-08-07 DIAGNOSIS — E11.22 TYPE 2 DIABETES MELLITUS WITH STAGE 3 CHRONIC KIDNEY DISEASE, WITHOUT LONG-TERM CURRENT USE OF INSULIN, UNSPECIFIED WHETHER STAGE 3A OR 3B CKD (H): ICD-10-CM

## 2023-08-07 DIAGNOSIS — E11.65 TYPE 2 DIABETES MELLITUS WITH HYPERGLYCEMIA, WITHOUT LONG-TERM CURRENT USE OF INSULIN (H): Primary | ICD-10-CM

## 2023-08-07 DIAGNOSIS — N18.30 TYPE 2 DIABETES MELLITUS WITH STAGE 3 CHRONIC KIDNEY DISEASE, WITHOUT LONG-TERM CURRENT USE OF INSULIN, UNSPECIFIED WHETHER STAGE 3A OR 3B CKD (H): ICD-10-CM

## 2023-08-07 LAB — HBA1C MFR BLD: 13.8 % (ref 0–5.6)

## 2023-08-07 PROCEDURE — 99214 OFFICE O/P EST MOD 30 MIN: CPT | Performed by: FAMILY MEDICINE

## 2023-08-07 PROCEDURE — 2894A VOIDCORRECT: CPT | Performed by: FAMILY MEDICINE

## 2023-08-07 PROCEDURE — 80048 BASIC METABOLIC PNL TOTAL CA: CPT | Performed by: FAMILY MEDICINE

## 2023-08-07 PROCEDURE — 36415 COLL VENOUS BLD VENIPUNCTURE: CPT | Performed by: FAMILY MEDICINE

## 2023-08-07 PROCEDURE — 83036 HEMOGLOBIN GLYCOSYLATED A1C: CPT | Performed by: FAMILY MEDICINE

## 2023-08-07 NOTE — PROGRESS NOTES
Assessment & Plan     Type 2 diabetes mellitus with stage 3 chronic kidney disease, without long-term current use of insulin (H)  A1c 13.8% today, indicating hyperglycemia for more than the last couple of weeks, which is when they started checked BG . Results discussed. They feel comfortable wit injections - BRANDON is a nurse, and other family member has experience giving herself injections in pregnancy. Showed video today and instructed on lantus pen use. Start tonight lantus 5 units nightly. Hold januvia for now to be conservative due to CKD, but could add back in the future. I will call family in 3 days to check on BG and adjust insulin dose.   Call RICHARD TAYLOR   - Hemoglobin A1c  - insulin glargine (LANTUS PEN) 100 UNIT/ML pen  Dispense: 15 mL; Refill: 3  - insulin pen needle (32G X 4 MM) 32G X 4 MM miscellaneous  Dispense: 100 each; Refill: 3    Chronic kidney disease, unspecified CKD stage  Will check BMP today to confirm current renal function, since last POC creatinine was decreased  - Basic metabolic panel  (Ca, Cl, CO2, Creat, Gluc, K, Na, BUN)               BMI:   Estimated body mass index is 29.37 kg/m  as calculated from the following:    Height as of this encounter: 1.524 m (5').    Weight as of this encounter: 68.2 kg (150 lb 6.4 oz).           Melissa Umanzor MD  [facility]  Hold januvia, start lantus 5 units at bedtime (conservative dosing due to CKD)    Perlita Medina is a 83 year old, presenting for the following health issues:  Diabetes        8/7/2023    12:59 PM   Additional Questions   Roomed by belgica p   Accompanied by wife     High blood sugars 200s-500. Had not been checking sugars, but started checking on 7/27 due to generalized weakness. Family also reports polyuria and polydipsia.    Med compliance - januvia 50mg daily  No change in diet to account for hyperglycemia    No fevers, chills, or signs if infection    Occas constipation    Since  hospitalization for strokes in Feb, needs more care/more confused. Constant dizziness since stroke    A1c 8% 4/27/23    Per daughter, he has only been on insulin during hospital stay in Feb. Review of chart shows he was treated w 7 units glargine and SSI -appears januvia was held while on insulin in hospital (although somewhat confusing in documentation, but daughter recalls it being held).    Patient has CKD, with decreased renal function on last POC check 6/13 in radiology                 Review of Systems         Objective    /74   Pulse 92   Temp 98  F (36.7  C) (Oral)   Resp 12   Ht 1.524 m (5')   Wt 68.2 kg (150 lb 6.4 oz)   SpO2 97%   BMI 29.37 kg/m    Body mass index is 29.37 kg/m .  Physical Exam   Gen: NAD, appears well  CV: RRR no murmur  Resp: LCTAB, breathing comfortably  Abd: soft, nontender  Extr: warm, well perfused, no edema

## 2023-08-07 NOTE — TELEPHONE ENCOUNTER
Called pt and appt scheduled today.      Agapito Shook, BSN RN  [facility]

## 2023-08-07 NOTE — TELEPHONE ENCOUNTER
If high blood sugars are persisting patient should be evaluated for hyperglycemia in clinic today or tomorrow, if no appointments available urgent care okay.

## 2023-08-08 ENCOUNTER — TELEPHONE (OUTPATIENT)
Dept: NEUROSURGERY | Facility: CLINIC | Age: 83
End: 2023-08-08
Payer: COMMERCIAL

## 2023-08-08 ENCOUNTER — TELEPHONE (OUTPATIENT)
Dept: FAMILY MEDICINE | Facility: CLINIC | Age: 83
End: 2023-08-08
Payer: COMMERCIAL

## 2023-08-08 LAB
ANION GAP SERPL CALCULATED.3IONS-SCNC: 13 MMOL/L (ref 7–15)
BUN SERPL-MCNC: 24.2 MG/DL (ref 8–23)
CALCIUM SERPL-MCNC: 10 MG/DL (ref 8.8–10.2)
CHLORIDE SERPL-SCNC: 91 MMOL/L (ref 98–107)
CREAT SERPL-MCNC: 1.77 MG/DL (ref 0.67–1.17)
DEPRECATED HCO3 PLAS-SCNC: 26 MMOL/L (ref 22–29)
GFR SERPL CREATININE-BSD FRML MDRD: 38 ML/MIN/1.73M2
GLUCOSE SERPL-MCNC: 500 MG/DL (ref 70–99)
POTASSIUM SERPL-SCNC: 5.6 MMOL/L (ref 3.4–5.3)
SODIUM SERPL-SCNC: 130 MMOL/L (ref 136–145)

## 2023-08-08 NOTE — TELEPHONE ENCOUNTER
Called daughter, Dimitri Talamantes (C2C on file) with assistance of an  to relay provider test result and recommendation below. Daughter started on medication last night.  Advised daughter to call with any further questions.    PETTY Nichols, RN  [facility] [place]      Melissa Umanzor MD  [facility] Family Medicine/Ob Support Pool  Please call family w results. Patient has some blood electrolyte abnormalities, which are likely due to his very high blood sugars, and should improve now that we are starting insulin. Did they start it last night and what is his blood sugar today?

## 2023-08-08 NOTE — TELEPHONE ENCOUNTER
----- Message from Melissa Umanzor MD sent at 8/8/2023 12:08 PM CDT -----  Please call family w results. Patient has some blood electrolyte abnormalities, which are likely due to his very high blood sugars, and should improve now that we are starting insulin. Did they start it last night and what is his blood sugar today?

## 2023-08-10 ENCOUNTER — TELEPHONE (OUTPATIENT)
Dept: FAMILY MEDICINE | Facility: CLINIC | Age: 83
End: 2023-08-10
Payer: COMMERCIAL

## 2023-08-11 NOTE — TELEPHONE ENCOUNTER
I called daughter to follow up on insulin start. At our visit 8/7, he was advised to start lantus insulin 5 units nightly. He was also advised to hold januvia to simplify regimen and to not increase risk of hypoglycemia with insulin in light of his CKD/worsening renal function.     His sugars in the evening have been >400 and fasting morning blood sugars have been >300.     Advised daughter to increase glargine insulin to 10 units starting tonight and continue checking sugars fasting and post prandial.     Daughter will call clinic Monday to report glucose readings and I will adjust dose if needed. She will call sooner if hypoglycemia.

## 2023-08-14 ENCOUNTER — TELEPHONE (OUTPATIENT)
Dept: FAMILY MEDICINE | Facility: CLINIC | Age: 83
End: 2023-08-14
Payer: COMMERCIAL

## 2023-08-14 DIAGNOSIS — E11.65 TYPE 2 DIABETES MELLITUS WITH HYPERGLYCEMIA, UNSPECIFIED WHETHER LONG TERM INSULIN USE (H): Primary | ICD-10-CM

## 2023-08-14 NOTE — TELEPHONE ENCOUNTER
Friday AM: 403  Friday PM: high    Saturday mornin  Saturday afternoon: high     AM: 437   PM: high    Daughter stated they had pt stopped snacking and drinking milk, but blood sugar is still high. She made sure to take blood glucose before eating in the morning and takes it after dinner.      Agapito Shook BSN RN  [facility]

## 2023-08-14 NOTE — TELEPHONE ENCOUNTER
Please give patient's daughter the following recommendations:    Restart januvia pill once daily  Increase insulin to 14 units at night    Call with blood sugar report in 3 days, but sooner if any problems, such as low blood sugars.     Referral placed for MTM to see our clinical pharmacist to help with ongoing diabetes management.

## 2023-08-14 NOTE — TELEPHONE ENCOUNTER
"  Patient Returning Call    Reason for call:  High Glucose readings - 400-437 Am /PM says   \"HIGH\" - Did increase insulin to 10 units since last conversation    Information relayed to patient:  Messaged team/    Patient has additional questions:  Yes    What are your questions/concerns:  What next steps    Okay to leave a detailed message?: Yes at Home number on file [phone] (home)  "

## 2023-08-14 NOTE — TELEPHONE ENCOUNTER
Called pt's daughter and relayed message. She verbalized understanding and is thankful to provider for providing help.      Agapito Shook, BSN RN  [facility]

## 2023-08-14 NOTE — TELEPHONE ENCOUNTER
Please call and confirm that these sugars reported in phone call below are over the last 3 days, since increasing insulin (I am surprised the morning blood sugars are worse after doubling the insulin). If possible, please list out the last 3 days of morning fasting and evening blood sugars.

## 2023-08-24 ENCOUNTER — LAB (OUTPATIENT)
Dept: LAB | Facility: CLINIC | Age: 83
End: 2023-08-24
Payer: COMMERCIAL

## 2023-08-24 ENCOUNTER — OFFICE VISIT (OUTPATIENT)
Dept: PHARMACY | Facility: CLINIC | Age: 83
End: 2023-08-24
Attending: FAMILY MEDICINE
Payer: COMMERCIAL

## 2023-08-24 VITALS
SYSTOLIC BLOOD PRESSURE: 92 MMHG | WEIGHT: 150.5 LBS | OXYGEN SATURATION: 94 % | BODY MASS INDEX: 29.39 KG/M2 | HEART RATE: 83 BPM | DIASTOLIC BLOOD PRESSURE: 60 MMHG

## 2023-08-24 DIAGNOSIS — L29.9 ITCHING: ICD-10-CM

## 2023-08-24 DIAGNOSIS — N18.30 TYPE 2 DIABETES MELLITUS WITH STAGE 3 CHRONIC KIDNEY DISEASE, WITHOUT LONG-TERM CURRENT USE OF INSULIN, UNSPECIFIED WHETHER STAGE 3A OR 3B CKD (H): ICD-10-CM

## 2023-08-24 DIAGNOSIS — E11.65 TYPE 2 DIABETES MELLITUS WITH HYPERGLYCEMIA, WITHOUT LONG-TERM CURRENT USE OF INSULIN (H): Primary | ICD-10-CM

## 2023-08-24 DIAGNOSIS — I10 ESSENTIAL HYPERTENSION WITH GOAL BLOOD PRESSURE LESS THAN 140/90: ICD-10-CM

## 2023-08-24 DIAGNOSIS — M1A.00X0 CHRONIC GOUTY ARTHROPATHY: ICD-10-CM

## 2023-08-24 DIAGNOSIS — K59.00 CONSTIPATION, UNSPECIFIED CONSTIPATION TYPE: ICD-10-CM

## 2023-08-24 DIAGNOSIS — K21.9 GASTROESOPHAGEAL REFLUX DISEASE, UNSPECIFIED WHETHER ESOPHAGITIS PRESENT: ICD-10-CM

## 2023-08-24 DIAGNOSIS — E11.22 TYPE 2 DIABETES MELLITUS WITH STAGE 3 CHRONIC KIDNEY DISEASE, WITHOUT LONG-TERM CURRENT USE OF INSULIN, UNSPECIFIED WHETHER STAGE 3A OR 3B CKD (H): ICD-10-CM

## 2023-08-24 DIAGNOSIS — M15.0 PRIMARY OSTEOARTHRITIS INVOLVING MULTIPLE JOINTS: ICD-10-CM

## 2023-08-24 DIAGNOSIS — G47.00 INSOMNIA, UNSPECIFIED TYPE: ICD-10-CM

## 2023-08-24 DIAGNOSIS — I63.22 CEREBROVASCULAR ACCIDENT (CVA) DUE TO STENOSIS OF BASILAR ARTERY (H): ICD-10-CM

## 2023-08-24 LAB
ANION GAP SERPL CALCULATED.3IONS-SCNC: 13 MMOL/L (ref 7–15)
BUN SERPL-MCNC: 16.1 MG/DL (ref 8–23)
CALCIUM SERPL-MCNC: 9.5 MG/DL (ref 8.8–10.2)
CHLORIDE SERPL-SCNC: 97 MMOL/L (ref 98–107)
CREAT SERPL-MCNC: 1.49 MG/DL (ref 0.67–1.17)
DEPRECATED HCO3 PLAS-SCNC: 23 MMOL/L (ref 22–29)
GFR SERPL CREATININE-BSD FRML MDRD: 47 ML/MIN/1.73M2
GLUCOSE SERPL-MCNC: 444 MG/DL (ref 70–99)
POTASSIUM SERPL-SCNC: 4.2 MMOL/L (ref 3.4–5.3)
SODIUM SERPL-SCNC: 133 MMOL/L (ref 136–145)

## 2023-08-24 PROCEDURE — 80048 BASIC METABOLIC PNL TOTAL CA: CPT

## 2023-08-24 PROCEDURE — 99607 MTMS BY PHARM ADDL 15 MIN: CPT | Performed by: PHARMACIST

## 2023-08-24 PROCEDURE — 36415 COLL VENOUS BLD VENIPUNCTURE: CPT

## 2023-08-24 PROCEDURE — 99605 MTMS BY PHARM NP 15 MIN: CPT | Performed by: PHARMACIST

## 2023-08-24 RX ORDER — INSULIN ASPART 100 [IU]/ML
INJECTION, SOLUTION INTRAVENOUS; SUBCUTANEOUS
Qty: 15 ML | Refills: 3 | Status: SHIPPED | OUTPATIENT
Start: 2023-08-24 | End: 2023-08-29

## 2023-08-24 RX ORDER — CALCIUM CARBONATE 500 MG/1
1 TABLET, CHEWABLE ORAL DAILY PRN
COMMUNITY

## 2023-08-24 NOTE — PATIENT INSTRUCTIONS
"Recommendations from today's MTM visit:                                                    Use miralax 1/2 capful every day for constipation and use senna/docusate as needed  START Novolog 4 units three times daily before meals  Continue Lantus 14 units daily  Ray 2: Bring the new meter and sensors to follow up visit to see how to use it    Follow-up: Return in about 5 days (around 8/29/2023) for With PharmD.    It was great speaking with you today.  I value your experience and would be very thankful for your time in providing feedback in our clinic survey. In the next few days, you may receive an email or text message from [employer] with a link to a survey related to your  clinical pharmacist.\"     To schedule another MTM appointment, please call the clinic directly or you may call the MTM scheduling line at [phone] or toll-free at [phone].     My Clinical Pharmacist's contact information:                                                      Please feel free to contact me with any questions or concerns you have.      Pushpa Philip, PharmD, BCACP  Medication Therapy Management Pharmacist  "

## 2023-08-24 NOTE — LETTER
_  Medication List        Prepared on: 08/24/2023     Bring your Medication List when you go to the doctor, hospital, or   emergency room. And, share it with your family or caregivers.     Note any changes to how you take your medications.  Cross out medications when you no longer use them.    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 Type 2 diabetes mellitus with hyperglycemia, without long-term current use of insulin (H) Chucho Espino MD   Continuous Blood Gluc Sensor (FREESTYLE SUSANNAH 2 SENSOR) MISC 1 each every 14 days Use 1 sensor every 14 days. Use to read blood sugars per 's instructions. Type 2 diabetes mellitus with hyperglycemia, without long-term current use of insulin (H) Chucho Espino MD   DULoxetine (CYMBALTA) 30 MG capsule Take 1 capsule (30 mg) by mouth 2 times daily Current moderate episode of major depressive disorder without prior episode (H) Chucho Espino MD   hydrOXYzine (ATARAX) 25 MG tablet Take 1 tablet (25 mg) by mouth every 8 hours as needed for itching Itching Chucho Espino MD   insulin aspart (NOVOLOG FLEXPEN) 100  UNIT/ML pen Inject 4 units under the skin 3 times daily before meals as directed. Type 2 diabetes mellitus with hyperglycemia, without long-term current use of insulin (H) Chucho Espino MD   insulin glargine (LANTUS PEN) 100 UNIT/ML pen Inject 14 Units Subcutaneous At Bedtime Type 2 diabetes mellitus with hyperglycemia, without long-term current use of insulin (H) Chucho Espino MD   insulin pen needle (32G X 4 MM) 32G X 4 MM miscellaneous Use 1 pen needles daily or as directed. Type 2 diabetes mellitus with hyperglycemia, without long-term current use of insulin (H) Melissa Umanzor MD   meclizine (ANTIVERT) 12.5 MG tablet Take 1 tablet (12.5 mg) by mouth 3 times daily as needed for dizziness Dizziness Chucho Espino MD   metoprolol succinate ER (TOPROL XL) 100 MG 24 hr tablet Take 0.5 tablets (50 mg) by mouth daily Take 1/2 tablet  daily Hypertension Chucho Espino MD   pantoprazole (PROTONIX) 40 MG EC tablet Take 1 tablet (40 mg) by mouth every morning (before breakfast) Gastroesophageal reflux disease with esophagitis, unspecified whether hemorrhage Chucho Espino MD   polyethylene glycol (MIRALAX) 17 GM/Dose powder Take 17 g (1 capful.) by mouth daily as needed for constipation Constipation, unspecified constipation type Chucho Espino MD   rosuvastatin (CRESTOR) 20 MG tablet Take 1 tablet (20 mg) by mouth At Bedtime Dyslipidemia Chucho Espino MD   SENNA-docusate sodium (SENNA S) 8.6-50 MG tablet Take 1 tablet by mouth daily Constipation Chucho Espino MD   sitagliptin (JANUVIA) 50 MG tablet Take 1 tablet (50 mg) by mouth daily Type 2 diabetes mellitus with stage 3 chronic kidney disease, without long-term current use of insulin, unspecified whether stage 3a or 3b CKD (H) Chucho Espino MD   tamsulosin (FLOMAX) 0.4 MG capsule Take 1 capsule (0.4 mg) by mouth daily Benign prostatic hyperplasia with incomplete bladder emptying Chucho Espino MD   traZODone (DESYREL) 50 MG tablet Take 1-2 tablets ( mg) by  mouth nightly as needed for sleep Insomnia, unspecified type Chucho Espino MD         Add new medications, over-the-counter drugs, herbals, vitamins, or  minerals in the blank rows below.    Medication How I take it Why I use it Prescriber                                      Allergies:      No Known Allergies        Side effects I have had:               Other Information:              My notes and questions:

## 2023-08-24 NOTE — Clinical Note
Please see note from visit today (referred from Dr. Umanzor). I did add novolog today given elevated postprandial sugars. I also am going to get him started with a CGM for closer blood sugar monitoring.   Charly

## 2023-08-24 NOTE — LETTER
August 24, 2023  Adam Talamantes  [address]  [place] [id number]    Dear Mr. [facility]amantesSHAYY [facility]     Thank you for talking with me on Aug 24, 2023 about your health and medications. As a follow-up to our conversation, I have included two documents:      Your Recommended To-Do List has steps you should take to get the best results from your medications.  Your Medication List will help you keep track of your medications and how to take them.    If you want to talk about these documents, please call Pushpa Philip PharmD at phone: [phone], Monday-Friday 8-4:30pm.    I look forward to working with you and your doctors to make sure your medications work well for you.    Sincerely,  Pushpa Philip, PharmD  [facility] Pharmacist, [facility]

## 2023-08-24 NOTE — LETTER
"Recommended To-Do List      Prepared on: 08/24/2023       You can get the best results from your medications by completing the items on this \"To-Do List.\"      Bring your To-Do List when you go to your doctor. And, share it with your family or caregivers.    My To-Do List:  What we talked about: What I should do:   Your medication dosage being too high    Decrease your dosage of polyethylene glycol (MIRALAX) to 1/2 capful daily          What we talked about: What I should do:   A new medication that may be of benefit to you    Start taking Novolog 4 units three times daily before meals          What we talked about: What I should do:                     "

## 2023-08-24 NOTE — PROGRESS NOTES
Medication Therapy Management (MTM) Encounter    ASSESSMENT:                            Medication Adherence/Access: No issues identified    1. Type 2 diabetes mellitus with hyperglycemia, without long-term current use of insulin (H)  A1c recently elevated, not meeting goal of <8%, given patient age and medical complexity.  Patient fasting blood sugars have been improving, though postprandial remains vastly elevated, therefore recommend initiating mealtime insulin today.  Continue current Lantus dosing.  Additionally recommend use of CGM for blood sugar monitoring for closer management, prescription sent to pharmacy today and patient to bring into next visit for education on use.  Provided education on signs and symptoms to monitor for hypoglycemia and contact clinic if experiencing blood sugars less than 80 mg/dL.    2. Essential hypertension with goal blood pressure less than 140/90  BP on lower end of normal today, meeting goal of <140/90.  Reasonable to continue current medications for now without change.  Rechecking BMP today, given elevated creatinine and potassium when last monitored.    3. 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    4. Constipation, unspecified constipation type  Recommend trial of MiraLAX one half capful daily and senna/docusate daily as needed for constipation.    5. Chronic gouty arthropathy  Stable.    6. Primary osteoarthritis involving multiple joints  Stable.  Recommend against chronic Celebrex use, given patient use of aspirin and Eliquis.    7. Insomnia, unspecified type  Likely to improve with diabetes improvement as noted above.  Could consider alternative medication therapy in the future.    8. Itching  Stable.    9. Gastroesophageal reflux disease, unspecified whether esophagitis present  Stable, recommend continued use of Tums as needed.    PLAN:                             Use miralax 1/2 capful every day for constipation and use senna/docusate as needed  START Novolog 4 units three times daily before meals  Continue Lantus 14 units daily  Ray 2: Bring the new meter and sensors to follow up visit to see how to use it  Labs today: BMP    Follow-up: Return in about 5 days (around 8/29/2023) for With PharmD.  PCP 9/11; Neuro 10/10    SUBJECTIVE/OBJECTIVE:                          Adam Talamantes is a 83 year old male coming in for an initial visit. He was referred to me from Dr. Umanzor, Melissa KASPER, also annual visit for med review per insurance. Patient was accompanied by son in law. Patient seen with  Kayce .     Reason for visit: MTM initial.    Allergies/ADRs: Reviewed in chart  Past Medical History: Reviewed in chart  Tobacco: He reports that he quit smoking about 23 years ago. His smoking use included cigarettes. He has never been exposed to tobacco smoke. He has quit using smokeless tobacco.  Alcohol: Unable to address today    Medication Adherence/Access: His daughter helps him with medications, set up in three daily pillbox (though left the pillbox at home today). Reports no missed medication doses. Patient and son in law are not sure how the medications are set up in the pillbox, called daughter during visit today who was able to relay correct medication administration and pillbox set up over the phone.   Brings medication vials and blood sugar/BP log with today.     Type 2 Diabetes:    Januvia 50 mg daily   Lantus 14 units daily   Daughter helps with insulin injections, patient unable to do this on his own.  Patient is not experiencing side effects.  Blood sugar monitoring: Traditional meter twice daily - they are agreeable to try CGM for blood sugar monitoring.   Date FBG 2hours post   8/23 311 473   8/22  498   8/21 288 497   8/20 278 497   8/19  448   8/18 307 High   8/17 361    Symptoms of low blood sugar? None   Symptoms of high blood sugar?  None  Urine albumin last on 5/14/2019, on losartan  Diet: Eating 3-4 very small meals per day. Eating chicken with soup and small amount of rice. Lunch mashed potato, soup. Snack of bread. Dinner of soup and rice or fried egg with veggies and vargas.   Patient prefers to drink skim milk and sweet bread, though they have been trying to decrease how much of this he is getting due to worsening blood sugar.   Lab Results   Component Value Date    A1C 13.8 08/07/2023    A1C 8.0 04/27/2023    A1C  02/07/2023      Comment:      The previously reported result may be inaccurate due to a laboratory instrument calibration issue. Providers should order a new test to be performed if clinically indicated. [employer] will issue a credit for this test.  This is a corrected result. Previous result was 9.4 % on 2/7/2023 at  7:07 AM CST    A1C 7.6 01/12/2023    A1C 7.6 01/11/2023     Creatinine   Date Value Ref Range Status   08/07/2023 1.77 (H) 0.67 - 1.17 mg/dL Final     GFR Estimate   Date Value Ref Range Status   08/07/2023 38 (L) >60 mL/min/1.73m2 Final     Hypertension/CAD:   Metoprolol succinate 100 mg - 1/2 tablet (50 mg) daily in the morning  Meclizine to be taken as needed (last dose was about 3 weeks ago)  - Patient reports no dizziness, does have pain on his head when it is pressed on, otherwise no pain  - Checking BP at home daily (per BP log):  Date FBG Lunch/2hours post   8/23 126/78 78   8/22 153/90 62   8/21 141/78 62   8/20 131/86 64   8/19 126/77 64   8/18 109/66 70     BP Readings from Last 3 Encounters:   08/24/23 92/60   08/07/23 114/74   07/12/23 110/74      Pulse Readings from Last 3 Encounters:   08/24/23 83   08/07/23 92   07/12/23 82     Hx ischemic Stroke:   Rosuvastatin 20 mg daily  Aspirin 81 mg daily  Apixaban 5mg twice daily   Does have a elyse on his arm but not sure how it got there. Reports no bleeding/bruising otherwise. No blood or black stools.   Follows with Neurology, imaging  scheduled in October  Recent Labs   Lab Test 02/07/23  0627 02/01/23  1155   CHOL 151 173   HDL 40 35*   LDL 61 59   TRIG 252* 394*     Constipation:   Senna/Docusate sodium 8.6/100 mg once daily as needed  Miralax 17 g daily as needed  Been using miralax and senna/docusate 3-4 times weekly. Stool was loose if he was taking tablet every day. Reports stools are still very hard.    Gout:   Allopurinol 200 mg daily    Pain/mood:   Acetaminophen 500 mg as needed   Duloxetine 30 mg twice daily  Celebrex 200 mg daily as needed  Has not taken celebrex for a month or more. Patient has not been complaining of pain and therefore not using any more. Reports only using the acetaminophen sporadically, last dose was yesterday and finds this effective.     Insomnia:   Trazodone 50 mg 1-2 tablets at bedtime at 8 pm  Usually taking 2 tablets at night for sleep.  Patient typically sleeps during the day and awake at night with frequent urination.     Itching:    Hydroxyzine  25 mg every 8 hours as needed at night time  Only taking as needed, has not been taking for about a month or more since not having itching anymore.    GERD:   Tums as needed  Pantoprazole 40 mg daily in the morning as needed  Has not needed this since February, no longer compalining of GERD. Instead has been taking Tums as needed for heartburn which he finds helpful.     Today's Vitals: BP 92/60   Pulse 83   Wt 150 lb 8 oz (68.3 kg)   SpO2 94%   BMI 29.39 kg/m    ----------------      I spent 60 minutes with this patient today. All changes were made via collaborative practice agreement with Chucho Espino MD. A copy of the visit note was provided to the patient's provider(s).    A summary of these recommendations was given to the patient.    Pushpa Philip, PharmD, BCACP  Medication Therapy Management Pharmacist     Medication Therapy Recommendations  Constipation, unspecified constipation type    Current Medication: polyethylene glycol (MIRALAX) 17  GM/Dose powder   Rationale: Dose too high - Dosage too high - Safety   Recommendation: Decrease Dose   Status: Accepted per CPA   Note: increase frequency and decrease dose         Type 2 diabetes mellitus with hyperglycemia, without long-term current use of insulin (H)    Current Medication: insulin aspart (NOVOLOG FLEXPEN) 100 UNIT/ML pen   Rationale: Synergistic therapy - Needs additional medication therapy - Indication   Recommendation: Start Medication   Status: Accepted per CPA

## 2023-08-29 ENCOUNTER — OFFICE VISIT (OUTPATIENT)
Dept: PHARMACY | Facility: CLINIC | Age: 83
End: 2023-08-29
Payer: COMMERCIAL

## 2023-08-29 ENCOUNTER — TELEPHONE (OUTPATIENT)
Dept: FAMILY MEDICINE | Facility: CLINIC | Age: 83
End: 2023-08-29
Payer: COMMERCIAL

## 2023-08-29 VITALS
SYSTOLIC BLOOD PRESSURE: 122 MMHG | HEART RATE: 74 BPM | DIASTOLIC BLOOD PRESSURE: 76 MMHG | WEIGHT: 145.8 LBS | BODY MASS INDEX: 28.47 KG/M2 | OXYGEN SATURATION: 97 %

## 2023-08-29 DIAGNOSIS — K59.00 CONSTIPATION, UNSPECIFIED CONSTIPATION TYPE: Primary | ICD-10-CM

## 2023-08-29 DIAGNOSIS — E11.65 TYPE 2 DIABETES MELLITUS WITH HYPERGLYCEMIA, WITHOUT LONG-TERM CURRENT USE OF INSULIN (H): ICD-10-CM

## 2023-08-29 DIAGNOSIS — E11.65 TYPE 2 DIABETES MELLITUS WITH HYPERGLYCEMIA, UNSPECIFIED WHETHER LONG TERM INSULIN USE (H): Primary | ICD-10-CM

## 2023-08-29 PROCEDURE — 99606 MTMS BY PHARM EST 15 MIN: CPT | Performed by: PHARMACIST

## 2023-08-29 PROCEDURE — 99607 MTMS BY PHARM ADDL 15 MIN: CPT | Performed by: PHARMACIST

## 2023-08-29 RX ORDER — SENNA AND DOCUSATE SODIUM 50; 8.6 MG/1; MG/1
1 TABLET, FILM COATED ORAL 2 TIMES DAILY PRN
Qty: 60 TABLET | Refills: 11 | Status: SHIPPED | OUTPATIENT
Start: 2023-08-29

## 2023-08-29 RX ORDER — INSULIN ASPART 100 [IU]/ML
INJECTION, SOLUTION INTRAVENOUS; SUBCUTANEOUS
Qty: 15 ML | Refills: 3 | Status: SHIPPED | OUTPATIENT
Start: 2023-08-29 | End: 2024-04-01

## 2023-08-29 NOTE — TELEPHONE ENCOUNTER
Prior Authorization Request      Who's requesting: Patient/pharmacy (contact pharmacy today who states PA required)  Pharmacy Name and Location: [id number] - [facility] [address] [facility] [address]   Medication Name: Continuous blood glucose sensor and  (danika 2)  Insurance Plan:   Payer Plan Sponsor Code Group Number Group Name   AMPARO CHRISTENSEN [facility] DUAL [id number]    Rationale:  Patients blood sugar require much closer monitoring. Currently using insulin:   Novolog 6 units before meals  Lantus 16 units once daily    Informed patient that prior authorizations can take up to 10 business days for response:  Yes  Okay to leave a detailed message: Yes           *Please forward response to Pushpa Philip, PharmD*

## 2023-08-29 NOTE — Clinical Note
TOMASA - I will see patient on the same day as you on 9/11 to show him how to use the Ray - I am also starting PA process for this today.   Charly

## 2023-08-29 NOTE — PROGRESS NOTES
Medication Therapy Management (MTM) Encounter    ASSESSMENT:                            Medication Adherence/Access: No issues identified    1. Type 2 diabetes mellitus with hyperglycemia, without long-term current use of insulin (H)  A1c not meeting goal of <8%, given patient age and medical complexity.  With recently adding bolus insulin, blood sugars have been improving but are still not meeting goal, therefore recommend further increasing dose of both Lantus and NovoLog today, patient agreeable to plan and prescription updated with pharmacy today.  Unclear why CGM, ray 2 was not able to be picked up, sending updated prescriptions today and will follow-up in 1 week to verify coverage and patient receiving CGM.  We will see patient at the same time as next PCP visit to provide education on use of new CGM.    2. Constipation, unspecified constipation type  Given persistent constipation, recommended increasing senna/docusate to twice daily administration and MiraLAX 17 g daily until bowel movement.  Then afterwards, directed patient to use senna/docusate and MiraLAX as needed for constipation.  Also directed patient to increase dietary fiber to improve constipation, provided patient list of foods high in fiber.    PLAN:                            Increase dose: Senna/docusate 8.6/50 mg twice daily as needed for constipation AND Miralax 1 cupful (17 g) daily as needed for constipation  Increase fiber in diet to help with constipation  Increase dose: Novolog 6 units before meals  Increase dose: Lantus 16 units once daily  MTM submitting PA today for Ray 2 CGM    Medication issues to be addressed at a future visit:   [id number]?    Follow-up: Return in about 13 days (around 9/11/2023) for With PharmD.  PCP 9/11; Neuro 10/10  SUBJECTIVE/OBJECTIVE:                          Adam Talamantes is a 83 year old male coming in for a follow-up visit from 8/24/23. Patient seen with Kayce moore. ID# [id number]. Patient comes with his  son in law today, the son in law called his wife who I spoke with during visit today.     Reason for visit: MTM follow up    Allergies/ADRs: Reviewed in chart  Past Medical History: Reviewed in chart  Tobacco: He reports that he quit smoking about 23 years ago. His smoking use included cigarettes. He has never been exposed to tobacco smoke. He has quit using smokeless tobacco.  Alcohol: unable to address    Medication Adherence/Access: His daughter, Dimitri Talamantes, helps him with medications, set up in three daily pillbox. Reports no missed medication doses. Patient and son in law are not sure how the medications are set up in the pillbox, called daughter during visit today who was able to relay correct medication administration and pillbox set up over the phone.   Brings medication vials and blood sugar/BP log with today.     Type 2 Diabetes:    Januvia 50 mg daily   Lantus 14 units daily at bedtime  Novolog 4 units three times daily before meals  Daughter helps with insulin injections, patient unable to do this on his own.  Patient is not experiencing side effects.  Blood sugar monitoring: Traditional meter twice daily.   Rx sent at last MTM visit for Ray 2 CGM, though states they never received this from the pharmacy. Called [facility] pharmacy today, CGM requires PA.   Date FBG 2hours post   8/24 HI    8/25 262 453   8/26  263   8/27 373 256   8/28 259 444   8/29 231    Symptoms of low blood sugar? None   Symptoms of high blood sugar? None  Urine albumin last on 5/14/2019, on losartan  Diet: Eating 3-4 very small meals per day. Eating chicken with soup and small amount of rice. Lunch mashed potato, soup. Snack of bread. Dinner of soup and rice or fried egg with veggies and vargas. 2 tablespoons of rice with meals. Equal size meals daily. Declining CDE at this time.   Patient prefers to drink skim milk and sweet bread, though they have been trying to decrease how much of this he is getting due to worsening blood  sugar.   Lab Results   Component Value Date    A1C 13.8 08/07/2023    A1C 8.0 04/27/2023    A1C  02/07/2023      Comment:      The previously reported result may be inaccurate due to a laboratory instrument calibration issue. Providers should order a new test to be performed if clinically indicated. [facility] [employer] will issue a credit for this test.  This is a corrected result. Previous result was 9.4 % on 2/7/2023 at  7:07 AM CST    A1C 7.6 01/12/2023    A1C 7.6 01/11/2023     Creatinine   Date Value Ref Range Status   08/24/2023 1.49 (H) 0.67 - 1.17 mg/dL Final     GFR Estimate   Date Value Ref Range Status   08/07/2023 38 (L) >60 mL/min/1.73m2 Final     Constipation:   Senna/Docusate sodium 8.6/100 mg once daily as needed  Miralax 17 g daily as needed  Did not have BM for 4-5 days including today. Took the medication but it didn't help. Patient has been drinking a lot of water, about 3 bottles of water daily. Patient not complaining of abdominal pain, but does have discomfort.     Today's Vitals: /76   Pulse 74   Wt 145 lb 12.8 oz (66.1 kg)   SpO2 97%   BMI 28.47 kg/m    ----------------      I spent 30 minutes with this patient today. All changes were made via collaborative practice agreement with Chucho Espino MD. A copy of the visit note was provided to the patient's provider(s).    A summary of these recommendations was given to the patient.    Pushpa Philip, PharmD, BCACP  Medication Therapy Management Pharmacist     Medication Therapy Recommendations  Constipation, unspecified constipation type    Current Medication: senna-docusate (SENOKOT-S/PERICOLACE) 8.6-50 MG tablet (Discontinued)   Rationale: Dose too low - Dosage too low - Effectiveness   Recommendation: Increase Frequency   Status: Accepted - no CPA Needed         Type 2 diabetes mellitus with hyperglycemia, without long-term current use of insulin (H)    Current Medication: insulin aspart (NOVOLOG FLEXPEN) 100 UNIT/ML  pen   Rationale: Dose too low - Dosage too low - Effectiveness   Recommendation: Increase Dose   Status: Accepted per CPA

## 2023-08-31 NOTE — TELEPHONE ENCOUNTER
Central Prior Authorization Team   Phone: [phone]    PA Initiation    Medication: FreeStyle Ray 2 Sensor  Insurance Company: [employer] Non-Specialty PA's - Phone [phone] Fax [phone]  Pharmacy Filling the Rx: [id number] [place], [place] - [address] [place] [facility] [address]  Filling Pharmacy Phone: [phone]  Filling Pharmacy Fax:    Start Date: 8/31/2023

## 2023-09-01 DIAGNOSIS — I63.22 CEREBROVASCULAR ACCIDENT (CVA) DUE TO STENOSIS OF BASILAR ARTERY (H): ICD-10-CM

## 2023-09-01 NOTE — TELEPHONE ENCOUNTER
Routing refill request to provider for review/approval be  Patient is 18-79 years of age    Serum creatinine less than or equal to 1.4 on file in past 12 mos   cause:      Last Written Prescription Date:  3-6-23  Last Fill Quantity: 60,  # refills: 6   Last office visit provider:  8-7-23     Requested Prescriptions   Pending Prescriptions Disp Refills    ELIQUIS ANTICOAGULANT 5 MG tablet [Pharmacy Med Name: ELIQUIS 5MG TABLETS] 60 tablet 6     Sig: TAKE 1 TABLET(5 MG) BY MOUTH TWICE DAILY       Direct Oral Anticoagulant Agents Failed - 9/1/2023  8:08 AM        Failed - Patient is 18-79 years of age        Failed - Serum creatinine less than or equal to 1.4 on file in past 12 mos     Recent Labs   Lab Test 08/24/23  1051   CR 1.49*       Ok to refill medication if creatinine is low          Passed - Normal Platelets on file in past 12 months     Recent Labs   Lab Test 02/16/23  1156                  Passed - Medication is active on med list        Passed - Weight is greater than 60 kg for the past year     Wt Readings from Last 3 Encounters:   08/29/23 66.1 kg (145 lb 12.8 oz)   08/24/23 68.3 kg (150 lb 8 oz)   08/07/23 68.2 kg (150 lb 6.4 oz)             Passed - Recent (6 mo) or future (30 days) visit within the authorizing provider's specialty           Frannie Mccurdy RN  [facility]    Frannie Mccurdy RN 09/01/23 11:27 AM

## 2023-09-01 NOTE — PROGRESS NOTES
Chocolate boost delivered per FERMÍN Das notified.      Emily Hernandez  Care Management Specialist  [facility]  [phone]

## 2023-09-01 NOTE — TELEPHONE ENCOUNTER
Prior Authorization Approval    Authorization Effective Date: 8/1/2023  Authorization Expiration Date: 8/30/2026  Medication: FreeStyle Ray 2 Sensor  Approved Dose/Quantity:    Reference #:     Insurance Company: [employer] Non-Specialty PA's - Phone [phone] Fax [phone]  Expected CoPay:       CoPay Card Available:      Foundation Assistance Needed:    Which Pharmacy is filling the prescription (Not needed for infusion/clinic administered): [id number] - [place], [place] - [address]  [address] [facility] [address]  Pharmacy Notified: Yes  Patient Notified: Pharmacy asked to notify patient when medication is ready

## 2023-09-05 RX ORDER — APIXABAN 5 MG/1
5 TABLET, FILM COATED ORAL 2 TIMES DAILY
Qty: 60 TABLET | Refills: 6 | Status: SHIPPED | OUTPATIENT
Start: 2023-09-05 | End: 2024-05-20

## 2023-09-05 NOTE — TELEPHONE ENCOUNTER
Can someone please call to let patient know that danika is ready for  at pharmacy and he can bring it with to visit on 9/11 for me to educate on how to use it?    Charly     Called pt and spoke to daughter (C2C on file). Relayed message and she verbalized understanding.      Agapito Martinez Cem Say, BSN RN  [facility]

## 2023-09-11 ENCOUNTER — OFFICE VISIT (OUTPATIENT)
Dept: FAMILY MEDICINE | Facility: CLINIC | Age: 83
End: 2023-09-11
Payer: COMMERCIAL

## 2023-09-11 ENCOUNTER — TELEPHONE (OUTPATIENT)
Dept: FAMILY MEDICINE | Facility: CLINIC | Age: 83
End: 2023-09-11

## 2023-09-11 ENCOUNTER — OFFICE VISIT (OUTPATIENT)
Dept: PHARMACY | Facility: CLINIC | Age: 83
End: 2023-09-11
Payer: COMMERCIAL

## 2023-09-11 VITALS
DIASTOLIC BLOOD PRESSURE: 87 MMHG | WEIGHT: 153 LBS | OXYGEN SATURATION: 96 % | TEMPERATURE: 97.6 F | BODY MASS INDEX: 30.04 KG/M2 | RESPIRATION RATE: 16 BRPM | SYSTOLIC BLOOD PRESSURE: 136 MMHG | HEIGHT: 60 IN | HEART RATE: 84 BPM

## 2023-09-11 DIAGNOSIS — E11.65 TYPE 2 DIABETES MELLITUS WITH HYPERGLYCEMIA, WITHOUT LONG-TERM CURRENT USE OF INSULIN (H): Primary | ICD-10-CM

## 2023-09-11 DIAGNOSIS — L30.9 DERMATITIS: ICD-10-CM

## 2023-09-11 DIAGNOSIS — F32.4 MAJOR DEPRESSIVE DISORDER WITH SINGLE EPISODE, IN PARTIAL REMISSION (H): Primary | ICD-10-CM

## 2023-09-11 DIAGNOSIS — Z23 NEED FOR VACCINATION: ICD-10-CM

## 2023-09-11 DIAGNOSIS — E11.22 TYPE 2 DIABETES MELLITUS WITH STAGE 3 CHRONIC KIDNEY DISEASE, WITHOUT LONG-TERM CURRENT USE OF INSULIN, UNSPECIFIED WHETHER STAGE 3A OR 3B CKD (H): ICD-10-CM

## 2023-09-11 DIAGNOSIS — K59.00 CONSTIPATION, UNSPECIFIED CONSTIPATION TYPE: ICD-10-CM

## 2023-09-11 DIAGNOSIS — N18.30 TYPE 2 DIABETES MELLITUS WITH STAGE 3 CHRONIC KIDNEY DISEASE, WITHOUT LONG-TERM CURRENT USE OF INSULIN, UNSPECIFIED WHETHER STAGE 3A OR 3B CKD (H): ICD-10-CM

## 2023-09-11 DIAGNOSIS — K63.9 COLON WALL THICKENING: ICD-10-CM

## 2023-09-11 PROCEDURE — 99606 MTMS BY PHARM EST 15 MIN: CPT | Performed by: PHARMACIST

## 2023-09-11 PROCEDURE — 96127 BRIEF EMOTIONAL/BEHAV ASSMT: CPT | Performed by: FAMILY MEDICINE

## 2023-09-11 PROCEDURE — 90662 IIV NO PRSV INCREASED AG IM: CPT | Performed by: FAMILY MEDICINE

## 2023-09-11 PROCEDURE — 99607 MTMS BY PHARM ADDL 15 MIN: CPT | Performed by: PHARMACIST

## 2023-09-11 PROCEDURE — 99214 OFFICE O/P EST MOD 30 MIN: CPT | Mod: 25 | Performed by: FAMILY MEDICINE

## 2023-09-11 PROCEDURE — G0008 ADMIN INFLUENZA VIRUS VAC: HCPCS | Performed by: FAMILY MEDICINE

## 2023-09-11 RX ORDER — CETIRIZINE HYDROCHLORIDE 10 MG/1
10 TABLET ORAL DAILY
Qty: 30 TABLET | Refills: 1 | Status: SHIPPED | OUTPATIENT
Start: 2023-09-11 | End: 2023-09-11

## 2023-09-11 ASSESSMENT — PATIENT HEALTH QUESTIONNAIRE - PHQ9
SUM OF ALL RESPONSES TO PHQ QUESTIONS 1-9: 4
SUM OF ALL RESPONSES TO PHQ QUESTIONS 1-9: 4
10. IF YOU CHECKED OFF ANY PROBLEMS, HOW DIFFICULT HAVE THESE PROBLEMS MADE IT FOR YOU TO DO YOUR WORK, TAKE CARE OF THINGS AT HOME, OR GET ALONG WITH OTHER PEOPLE: NOT DIFFICULT AT ALL

## 2023-09-11 NOTE — TELEPHONE ENCOUNTER
Patient Quality Outreach    Patient is due for the following:   Diabetes -  A1C  Physical Annual Wellness Visit    Next Steps:   Schedule a Annual Wellness Visit    Type of outreach:    Phone, spoke to patient/parent. Scheduled appointment    Next Steps:  Reach out within 90 days via Phone.    Max number of attempts reached: No. Will try again in 90 days if patient still on fail list.    Questions for provider review:    None           Mikel Mckinney  Chart routed to Care Team.

## 2023-09-11 NOTE — PROGRESS NOTES
ASSESMENT AND PLAN:  Diagnoses and all orders for this visit:  Major depressive disorder with single episode, in partial remission (H)  Good response to his increased dose of medication.  See PHQ-9 details below.  Continue current plan, counseling done with the patient and family with the help of a professional .  Type 2 diabetes mellitus with stage 3 chronic kidney disease, without long-term current use of insulin, unspecified whether stage 3a or 3b CKD (H)  Discussed options with the patient and family with the help of a professional .  We are going to eliminate one of the 4 daily dosings of the short acting insulin.  Will increase the long-acting insulin to 20 units at bedtime.  Dermatitis  Mild, likely just secondary to dry skin.  Recommended twice a day skin moisturizer, follow-up if not improving.  Colon wall thickening  Please see previous clinic note from May for further details.  There has been ongoing family discussion as to whether they want to proceed with further work-up of this.  Unfortunately, the relevant daughter who is most involved in his care and most aware of all of the background is not at his appointment today, she recently gave birth and will not be available for this next few days.  We will plan to discuss this further at his follow-up visit when she is available.  Need for vaccination  -     INFLUENZA VACCINE 65+ (FLUZONE HD)      Reviewed the risks and benefits of the treatment plan with the patient and/or caregiver and we discussed indications for routine and emergent follow-up.        SUBJECTIVE: 83-year-old male in for follow-up.  Patient and family members report that his mood has been better and he seems more back to his usual baseline.  On review of medications with the family, family reports that they have been giving the short acting insulin 4 times per day-6 units each time.  They bring in a log of the blood sugar readings, blood sugar readings have improved  significantly over the last few weeks and there have been no episodes of hypoglycemia.  Family reports that the patient's been having a mild intermittent rash, itchy, on the trunk.    Past Medical History:   Diagnosis Date    Acute blood loss anemia     Acute renal failure (H)     Anemia     Bacteremia     Cataract     Chronic gout     CKD (chronic kidney disease)     Stage 3    DM2 (diabetes mellitus, type 2) (H)     GERD (gastroesophageal reflux disease)     Glucose intolerance (impaired glucose tolerance)     Gout     History of nephrolithiasis     History of prostatitis 2017    Hyperlipidemia     Hypertension     Insomnia     Intestinal disaccharidase deficiencies and disaccharide malabsorption     Created by Conversion     Latent tuberculosis     Nephrolithiasis     Neuropathy     Nonspecific abnormal findings on radiological and other examination of skull and head     Created by [employer] [facility] Annotation: 2013 11:23PM - Giulia Meridai/10/2011:  severe stenosis both posterior cerebral arteries seen MRI/MRA done for  vertigo. No acute changes.e*    Osteoarthritis     wrist, knee, shoulder    Prostatitis     Rectal bleed     Trigger thumb      Patient Active Problem List   Diagnosis    Esophageal reflux    Dyslipidemia    Nephrolithiasis    Pseudogout    Latent Tuberculosis    Generalized Osteoarthritis Of The Hand    Lumbar Disc Degeneration    Insomnia, unspecified type    Chronic Gout    CKD (chronic kidney disease) stage 3, GFR 30-59 ml/min (H)    Elevated PSA    Prostate nodule    Cataracts, bilateral    Constipation, unspecified constipation type    Bilateral chronic knee pain    Chronic right shoulder pain    Essential hypertension with goal blood pressure less than 140/90    BPH (benign prostatic hyperplasia)    Chronic gout    Coronary artery disease due to lipid rich plaque    Right lower quadrant abdominal pain    Colitis    Primary osteoarthritis involving multiple joints     Urinary incontinence    Type 2 diabetes mellitus with stage 3 chronic kidney disease, without long-term current use of insulin (H)    ACE-inhibitor cough    Vertigo    Essential hypertension    Vertebral artery occlusion, left    Cerebellar stroke (H)    Stroke (H)    Hypomagnesemia    Ataxic gait    Urinary retention    Basilar artery stenosis    Cerebral vascular disease    Colon wall thickening    Major depressive disorder with single episode, in partial remission (H)     Current Outpatient Medications   Medication Sig Dispense Refill    acetaminophen (TYLENOL) 500 MG tablet Take 500 mg by mouth every 6 hours as needed for mild pain      allopurinol (ZYLOPRIM) 100 MG tablet TAKE 2 TABLETS(200 MG) BY MOUTH DAILY 180 tablet 0    calcium carbonate (TUMS) 500 MG chewable tablet Take 1 chew tab by mouth daily as needed for heartburn      celecoxib (CELEBREX) 200 MG capsule Take 1 capsule (200 mg) by mouth daily as needed for pain (jaw pain, joint pain) 30 capsule 3    Continuous Blood Gluc Sensor (FREESTYLE SUSANNAH 2 SENSOR) MISC 1 each every 14 days Use 1 sensor every 14 days. Use to read blood sugars per 's instructions. 2 each 5    DULoxetine (CYMBALTA) 30 MG capsule Take 1 capsule (30 mg) by mouth 2 times daily 60 capsule 6    ELIQUIS ANTICOAGULANT 5 MG tablet TAKE 1 TABLET(5 MG) BY MOUTH TWICE DAILY 60 tablet 6    hydrOXYzine (ATARAX) 25 MG tablet Take 1 tablet (25 mg) by mouth every 8 hours as needed for itching 60 tablet 1    insulin aspart (NOVOLOG FLEXPEN) 100 UNIT/ML pen Inject 6 units under the skin 3 times daily before meals as directed. 15 mL 3    insulin pen needle (32G X 4 MM) 32G X 4 MM miscellaneous Use 1 pen needles daily or as directed. 100 each 3    meclizine (ANTIVERT) 12.5 MG tablet Take 1 tablet (12.5 mg) by mouth 3 times daily as needed for dizziness 60 tablet 6    metoprolol succinate ER (TOPROL XL) 100 MG 24 hr tablet Take 0.5 tablets (50 mg) by mouth daily Take 1/2 tablet  daily 45  tablet 3    pantoprazole (PROTONIX) 40 MG EC tablet Take 1 tablet (40 mg) by mouth every morning (before breakfast) 90 tablet 3    polyethylene glycol (MIRALAX) 17 GM/Dose powder Take 17 g (1 capful.) by mouth daily as needed for constipation 578 g 4    rosuvastatin (CRESTOR) 20 MG tablet Take 1 tablet (20 mg) by mouth At Bedtime 90 tablet 3    SENNA-docusate sodium (SENNA S) 8.6-50 MG tablet Take 1 tablet by mouth 2 times daily as needed (for constipation) 60 tablet 11    sitagliptin (JANUVIA) 50 MG tablet Take 1 tablet (50 mg) by mouth daily 90 tablet 3    tamsulosin (FLOMAX) 0.4 MG capsule Take 1 capsule (0.4 mg) by mouth daily 90 capsule 3    traZODone (DESYREL) 50 MG tablet Take 1-2 tablets ( mg) by mouth nightly as needed for sleep 180 tablet 3    insulin glargine (LANTUS PEN) 100 UNIT/ML pen Inject 20 Units Subcutaneous At Bedtime 30 mL 3     History   Smoking Status    Former    Types: Cigarettes    Quit date: 3/10/2000   Smokeless Tobacco    Former       OBJECTICE: /87   Pulse 84   Temp 97.6  F (36.4  C) (Oral)   Resp 16   Ht 1.524 m (5')   Wt 69.4 kg (153 lb)   SpO2 96%   BMI 29.88 kg/m       No results found for this or any previous visit (from the past 24 hour(s)).     CV-regular rate and rhythm   RESP-lungs clear   SKIN-currently no rash but there is some dry skin in the area of concern    PATIENT HEALTH QUESTIONNAIRE-9 (PHQ - 9)    Over the last 2 weeks, how often have you been bothered by any of the following problems?    1. Little interest or pleasure in doing things -  Not at all   2. Feeling down, depressed, or hopeless -  Not at all   3. Trouble falling or staying asleep, or sleeping too much - Several days   4. Feeling tired or having little energy -  More than half the days   5. Poor appetite or overeating -  Several days   6. Feeling bad about yourself - or that you are a failure or have let yourself or your family down -  Not at all   7. Trouble concentrating on things, such  as reading the newspaper or watching television - Not at all   8. Moving or speaking so slowly that other people could have noticed? Or the opposite - being so fidgety or restless that you have been moving around a lot more than usual Not at all   9. Thoughts that you would be better off dead or of hurting  yourself in some way Not at all   Total Score: 4     If you checked off any problems, how difficult have these problems made it for you to do your work, take care of things at home, or get along with other people?      Developed by Delmy Acosta, Mara Andrea, Juanito Dominguez and colleagues, with an educational reginald from [employer] No permission required to reproduce, translate, display or distribute. permission required to reproduce, translate, display or distribute.       Chucho Espino MD

## 2023-09-11 NOTE — PATIENT INSTRUCTIONS
"Recommendations from today's MTM visit:                                                      Lantus 20 units once daily  Novolog 6 units three times daily before meals  Use Senna/docusate 8.6/50 mg twice daily as needed for constipation AND Miralax 1 cupful (17 g) daily as needed for constipation  Check the blood sugar with the new meter AT LEAST 3-4 times daily   refills from the pharmacy: metoprolol succinate, tamsulosin, and rosuvastatin    Follow-up: Return in about 22 days (around 10/3/2023) for With PharmD.    It was great speaking with you today.  I value your experience and would be very thankful for your time in providing feedback in our clinic survey. In the next few days, you may receive an email or text message from [employer] with a link to a survey related to your  clinical pharmacist.\"     To schedule another MTM appointment, please call the clinic directly or you may call the MTM scheduling line at [phone] or toll-free at [phone].     My Clinical Pharmacist's contact information:                                                      Please feel free to contact me with any questions or concerns you have.      Pushpa Philip, PharmD, BCACP  Medication Therapy Management Pharmacist  "

## 2023-09-11 NOTE — PROGRESS NOTES
Medication Therapy Management (MTM) Encounter    ASSESSMENT:                            Medication Adherence/Access:  MTM assisted with pillbox set up today given patients daughter who normally helps with this is out due to childbirth. Though if further assistance needed in the future would recommend help from home care or community paramedic.     1. Type 2 diabetes mellitus with hyperglycemia, without long-term current use of insulin (H)  A1c not meeting goal of <8%, given patient age and medical complexity.  With recently adding bolus insulin, blood sugars have been improving but are still not meeting goal.  Per discussion with PCP, Lantus dose was increased today to 20 units.  Provided education to patient and family member today about appropriate use of Ray 2 CGM and sensor placed appropriately on his left arm.  Patient to monitor blood sugars at least every 8 hours and bring meter to follow-up visit for further assessment.    2. Constipation, unspecified constipation type  Given persistent constipation, recommended increasing senna/docusate to twice daily administration and MiraLAX 17 g daily until bowel movement.  Then afterwards, directed patient to use senna/docusate and MiraLAX as needed for constipation.      PLAN:                            Per discussion with PCP: Lantus 20 units daily  Continue Novolog 6 units three times daily before meals  Provided Freestyle Ray 2 education during visit today; sensor placed on the back of left arm  Use Senna/docusate 8.6/50 mg twice daily as needed for constipation AND Miralax 1 cupful (17 g) daily as needed for constipation  MTM assisted with pillbox set up today    Medication issues to be addressed at a future visit:   SGLT2i?    Follow-up: Return in about 22 days (around 10/3/2023) for With PharmD.  Neuro 10/10    SUBJECTIVE/OBJECTIVE:                          Adam Albin is a 83 year old male coming in for a follow-up visit from 8/29/23. Patient was accompanied  by daughter and PCA and a co-visit with Dr. Espino. Patient seen with Kayce moore.  ID #[id number].     Reason for visit: MTM follow up    Allergies/ADRs: Reviewed in chart  Past Medical History: Reviewed in chart  Tobacco: He reports that he quit smoking about 23 years ago. His smoking use included cigarettes. He has never been exposed to tobacco smoke. He has quit using smokeless tobacco.  Alcohol: None    Medication Adherence/Access: His daughter, Dimitri Talamantes, helps him with medications, set up in twice daily pillbox, though she just had a baby and is in the hospital, therefore they are requesting help with medication setup for the next week.  Reports no missed medication doses.   Brings medication vials and EMPTY pillboxes today.     Diabetes   Type 2 Diabetes:    Januvia 50 mg daily   Lantus 16 units daily at bedtime  Novolog 6 units three times daily before meals  Patient is not experiencing side effects.  Daughter helps with insulin injections, patient unable to do this on his own.  Blood sugar monitoring: Traditional meter twice daily. Brings with new Ray 2 system and looking for education on how to use this today. Education provided and patients daughter successfully placed sensor on patients left arm today.   Current diabetes symptoms: polyuria and polydipsia  Diet/Exercise: Eating 3-4 very small meals per day. Eating chicken with soup and small amount of rice. Lunch mashed potato, soup. Snack of bread. Dinner of soup and rice or fried egg with veggies and vragas. 2 tablespoons of rice with meals. Equal size meals daily. Declining CDE at this time. Patient prefers to drink skim milk and sweet bread, though they have been trying to decrease how much of this he is getting due to worsening blood sugar.      Eye exam is up to date  Foot exam: due  Urine Albumin: No results found for: UMALCR   Lab Results   Component Value Date    A1C 13.8 (H) 08/07/2023     Date FBG 2hours post   9/11 171    9/10 203    9/9  206 217   9/6 197 294   9/5 238 199   9/4 198 190     Creatinine   Date Value Ref Range Status   08/24/2023 1.49 (H) 0.67 - 1.17 mg/dL Final     GFR Estimate   Date Value Ref Range Status   08/07/2023 38 (L) >60 mL/min/1.73m2 Final     Constipation:   Senna/Docusate sodium 8.6/100 mg once daily as needed  Miralax 17 g daily as needed  Reports still struggling with constipation and they give him 1 tablet and miralax daily when needed. Last BM was 2 days ago.     Today's Vitals:  See vitals from so visit today:   BP Readings from Last 1 Encounters:   09/11/23 136/87     Pulse Readings from Last 1 Encounters:   09/11/23 84     Wt Readings from Last 1 Encounters:   09/11/23 153 lb (69.4 kg)     Ht Readings from Last 1 Encounters:   09/11/23 5' (1.524 m)     Estimated body mass index is 29.88 kg/m  as calculated from the following:    Height as of an earlier encounter on 9/11/23: 5' (1.524 m).    Weight as of an earlier encounter on 9/11/23: 153 lb (69.4 kg).    Temp Readings from Last 1 Encounters:   09/11/23 97.6  F (36.4  C) (Oral)     ----------------      I spent 30 minutes with this patient today. All changes were made via collaborative practice agreement with Chucho Espino MD. A copy of the visit note was provided to the patient's provider(s).    A summary of these recommendations was given to the patient.    Pushpa Philip, PharmD, BCACP  Medication Therapy Management Pharmacist     Medication Therapy Recommendations  Type 2 diabetes mellitus with hyperglycemia, without long-term current use of insulin (H)    Current Medication: Continuous Blood Gluc Sensor [facility]ST[facility]NAH 2 SENSOR) [facility]   Rationale: Does not understand instructions - Adherence - Adherence   Recommendation: Provide Education   Status: Patient Agreed - Adherence/Education

## 2023-09-26 ENCOUNTER — PATIENT OUTREACH (OUTPATIENT)
Dept: GERIATRIC MEDICINE | Facility: CLINIC | Age: 83
End: 2023-09-26
Payer: COMMERCIAL

## 2023-09-26 NOTE — LETTER
September 26, 2023    ASIF LUCAS WINIFRED  [address]  [facility] [address]      Dear Asif:    As a member of [facility] ([facility]) ([facility]), you are provided a care coordinator. I will be your new care coordinator as of 10/01/2023. I will be calling you soon to see how you are doing and determine your needs.    If you have any questions, please feel free to call me at [phone]. If you reach my voice mail, please leave a message and your phone number. If you are hearing impaired, please call the [place] Relay at 259 or [phone] ([email or web address]-ryaajl relay service).    I look forward to speaking with you soon.    Sincerely,    Kristi Calzada RN, BSN, PHN  [phone]  [email or web address] is a health plan that contracts with both Medicare and the [place] Medical Assistance (Medicaid) program to provide benefits of both programs to enrollees. Enrollment in [facility] depends on contract renewal.      [facility]  [id number] DHS Approved ([id number])  [id number] (11/18)

## 2023-09-26 NOTE — PROGRESS NOTES
[facility] Care Coordination Contact    Internal CC change effective 10/01/2023.  Mailed member CC Change letter.  Additional tasks to be completed by CMS include: update database & EPIC, enter CC Change in MMIS, and move member file.

## 2023-10-02 ENCOUNTER — HOSPITAL ENCOUNTER (OUTPATIENT)
Dept: MRI IMAGING | Facility: HOSPITAL | Age: 83
Discharge: HOME | End: 2023-10-02
Attending: RADIOLOGY
Payer: COMMERCIAL

## 2023-10-02 DIAGNOSIS — I63.22 CEREBROVASCULAR ACCIDENT (CVA) DUE TO STENOSIS OF BASILAR ARTERY (H): ICD-10-CM

## 2023-10-02 PROCEDURE — 70544 MR ANGIOGRAPHY HEAD W/O DYE: CPT | Mod: XU

## 2023-10-02 PROCEDURE — 70551 MRI BRAIN STEM W/O DYE: CPT

## 2023-10-03 ENCOUNTER — OFFICE VISIT (OUTPATIENT)
Dept: PHARMACY | Facility: CLINIC | Age: 83
End: 2023-10-03
Payer: COMMERCIAL

## 2023-10-03 ENCOUNTER — PATIENT OUTREACH (OUTPATIENT)
Dept: GERIATRIC MEDICINE | Facility: CLINIC | Age: 83
End: 2023-10-03
Payer: COMMERCIAL

## 2023-10-03 VITALS
OXYGEN SATURATION: 96 % | WEIGHT: 154.5 LBS | BODY MASS INDEX: 30.17 KG/M2 | SYSTOLIC BLOOD PRESSURE: 122 MMHG | HEART RATE: 75 BPM | DIASTOLIC BLOOD PRESSURE: 80 MMHG

## 2023-10-03 DIAGNOSIS — G47.00 INSOMNIA, UNSPECIFIED TYPE: ICD-10-CM

## 2023-10-03 DIAGNOSIS — K59.00 CONSTIPATION, UNSPECIFIED CONSTIPATION TYPE: ICD-10-CM

## 2023-10-03 DIAGNOSIS — R52 PAIN: Primary | ICD-10-CM

## 2023-10-03 DIAGNOSIS — E11.65 TYPE 2 DIABETES MELLITUS WITH HYPERGLYCEMIA, WITHOUT LONG-TERM CURRENT USE OF INSULIN (H): ICD-10-CM

## 2023-10-03 PROCEDURE — 99606 MTMS BY PHARM EST 15 MIN: CPT | Performed by: PHARMACIST

## 2023-10-03 RX ORDER — ACETAMINOPHEN 500 MG
500 TABLET ORAL EVERY 4 HOURS PRN
Qty: 100 TABLET | Refills: 3 | Status: SHIPPED | OUTPATIENT
Start: 2023-10-03

## 2023-10-03 NOTE — TELEPHONE ENCOUNTER
Medication refill queued and pended. Prescriber please review, sign, and send if appropriate. Thank you.      Complete

## 2023-10-03 NOTE — PROGRESS NOTES
[facility] Care Coordination Contact    Unable to reach in order to introduce self as new care coordinator. Left voicemail.    Kristi Calzada RN  [facility]  Cell Phone [phone]

## 2023-10-03 NOTE — PATIENT INSTRUCTIONS
"Recommendations from today's MTM visit:                                                      Patient to  refills of eliquis and acetaminophen from retail pharmacy  No medication changes today, please bring your glucometer to next follow up visit    Follow-up: Return in about 8 weeks (around 11/27/2023) for With PharmD.    It was great speaking with you today.  I value your experience and would be very thankful for your time in providing feedback in our clinic survey. In the next few days, you may receive an email or text message from [employer] with a link to a survey related to your  clinical pharmacist.\"     To schedule another MTM appointment, please call the clinic directly or you may call the MTM scheduling line at [phone] or toll-free at [phone].     My Clinical Pharmacist's contact information:                                                      Please feel free to contact me with any questions or concerns you have.      Pushpa Philip, PharmD, BCACP  Medication Therapy Management Pharmacist  "

## 2023-10-10 ENCOUNTER — OFFICE VISIT (OUTPATIENT)
Dept: NEUROSURGERY | Facility: CLINIC | Age: 83
End: 2023-10-10
Payer: COMMERCIAL

## 2023-10-10 VITALS
OXYGEN SATURATION: 97 % | SYSTOLIC BLOOD PRESSURE: 135 MMHG | HEART RATE: 86 BPM | RESPIRATION RATE: 16 BRPM | DIASTOLIC BLOOD PRESSURE: 84 MMHG

## 2023-10-10 DIAGNOSIS — I63.22 CEREBROVASCULAR ACCIDENT (CVA) DUE TO STENOSIS OF BASILAR ARTERY (H): Primary | ICD-10-CM

## 2023-10-10 PROCEDURE — 99214 OFFICE O/P EST MOD 30 MIN: CPT | Mod: GC | Performed by: RADIOLOGY

## 2023-10-10 ASSESSMENT — PAIN SCALES - GENERAL: PAINLEVEL: NO PAIN (0)

## 2023-10-10 NOTE — NURSING NOTE
Chief Complaint   Patient presents with    RECHECK     /84 (BP Location: Right arm, Patient Position: Sitting, Cuff Size: Adult Regular)   Pulse 86   Resp 16   SpO2 97%     KAILA IVETH

## 2023-10-10 NOTE — PROGRESS NOTES
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 He was placed on aspirin and Eliquis, Crestor. Endovascular therapy was deferred at the time due to risk of angioplasty/stenting and no evidence of recurrent or refractory strokes.        He is still having symptoms of generalized weakness with trouble walking and communicating these episodes occur after he has been asleep. The symptoms will last for 1-2 minutes. There are no other associated symptoms. The symptoms are noted to be similar to prior episodes.     Past medical history:   Past Medical History:   Diagnosis Date    Acute blood loss anemia     Acute renal failure (H24)     Anemia     Bacteremia     Cataract     Chronic gout     CKD (chronic kidney disease)     Stage 3    DM2 (diabetes mellitus, type 2) (H)     GERD (gastroesophageal reflux disease)     Glucose intolerance (impaired glucose tolerance)     Gout     History of nephrolithiasis     History of prostatitis 2017    Hyperlipidemia     Hypertension     Insomnia     Intestinal disaccharidase deficiencies and disaccharide malabsorption     Created by Conversion     Latent tuberculosis     Nephrolithiasis     Neuropathy     Nonspecific abnormal findings on radiological and other examination of skull and head     Created by [facility] Annotation: 2013 11:23PM - Giulia Meridai/10/2011:  severe stenosis both posterior cerebral arteries seen MRI/MRA done for  vertigo. No acute changes.e*    Osteoarthritis     wrist, knee, shoulder    Prostatitis     Rectal bleed     Trigger thumb         Current outpatient Medication list:   Current Outpatient Medications:     acetaminophen (TYLENOL) 500 MG tablet, Take 1 tablet (500 mg) by mouth every 4 hours as needed for mild pain,  Disp: 100 tablet, Rfl: 3    allopurinol (ZYLOPRIM) 100 MG tablet, TAKE 2 TABLETS(200 MG) BY MOUTH DAILY, Disp: 180 tablet, Rfl: 0    calcium carbonate (TUMS) 500 MG chewable tablet, Take 1 chew tab by mouth daily as needed for heartburn, Disp: , Rfl:     celecoxib (CELEBREX) 200 MG capsule, Take 1 capsule (200 mg) by mouth daily as needed for pain (jaw pain, joint pain), Disp: 30 capsule, Rfl: 3    Continuous Blood Gluc Sensor (FREESTYLE SUSANNAH 2 SENSOR) [facility], 1 each every 14 days Use 1 sensor every 14 days. Use to read blood sugars per 's instructions., Disp: 2 each, Rfl: 5    DULoxetine (CYMBALTA) 30 MG capsule, Take 1 capsule (30 mg) by mouth 2 times daily, Disp: 60 capsule, Rfl: 6    ELIQUIS ANTICOAGULANT 5 MG tablet, TAKE 1 TABLET(5 MG) BY MOUTH TWICE DAILY, Disp: 60 tablet, Rfl: 6    hydrOXYzine (ATARAX) 25 MG tablet, Take 1 tablet (25 mg) by mouth every 8 hours as needed for itching, Disp: 60 tablet, Rfl: 1    insulin aspart (NOVOLOG FLEXPEN) 100 UNIT/ML pen, Inject 6 units under the skin 3 times daily before meals as directed., Disp: 15 mL, Rfl: 3    insulin glargine (LANTUS PEN) 100 UNIT/ML pen, Inject 20 Units Subcutaneous At Bedtime, Disp: 30 mL, Rfl: 3    insulin pen needle (32G X 4 MM) 32G X 4 MM miscellaneous, Use 1 pen needles daily or as directed., Disp: 100 each, Rfl: 3    meclizine (ANTIVERT) 12.5 MG tablet, Take 1 tablet (12.5 mg) by mouth 3 times daily as needed for dizziness, Disp: 60 tablet, Rfl: 6    metoprolol succinate ER (TOPROL XL) 100 MG 24 hr tablet, Take 0.5 tablets (50 mg) by mouth daily Take 1/2 tablet  daily, Disp: 45 tablet, Rfl: 3    pantoprazole (PROTONIX) 40 MG EC tablet, Take 1 tablet (40 mg) by mouth every morning (before breakfast), Disp: 90 tablet, Rfl: 3    polyethylene glycol (MIRALAX) 17 GM/Dose powder, Take 17 g (1 capful.) by mouth daily as needed for constipation, Disp: 578 g, Rfl: 4    rosuvastatin (CRESTOR) 20 MG tablet, Take 1 tablet (20 mg) by mouth At  Bedtime, Disp: 90 tablet, Rfl: 3    SENNA-docusate sodium (SENNA S) 8.6-50 MG tablet, Take 1 tablet by mouth 2 times daily as needed (for constipation), Disp: 60 tablet, Rfl: 11    sitagliptin (JANUVIA) 50 MG tablet, Take 1 tablet (50 mg) by mouth daily, Disp: 90 tablet, Rfl: 3    tamsulosin (FLOMAX) 0.4 MG capsule, Take 1 capsule (0.4 mg) by mouth daily, Disp: 90 capsule, Rfl: 3    traZODone (DESYREL) 50 MG tablet, Take 1-2 tablets ( mg) by mouth nightly as needed for sleep, Disp: 180 tablet, Rfl: 3      Family history:   Family History   Problem Relation Age of Onset    Cerebrovascular Disease Father     Cerebrovascular Disease Daughter        Social history:   Social History     Tobacco Use    Smoking status: Former     Types: Cigarettes     Quit date: 3/10/2000     Years since quittin.6     Passive exposure: Never    Smokeless tobacco: Former    Tobacco comments:     no passive exposure   Vaping Use    Vaping Use: Never used   Substance Use Topics    Alcohol use: No    Drug use: No       Allergies:  No Known Allergies    Review of Systems: 5 point ROS performed and negative except for detailed above    Objective:    Physical exam:  There were no vitals taken for this visit.  Constitutional: Awake, no acute distress  HENT: normcephalic,   Respiratory: normal rate, no acute distress  Neuro:  Mental status: awake, oriented   CN: EOMI, face movements symmetric, no dysarthria, equal shoulder shrug, tongue midline  Motor: 5/5 strength in upper and lower extremities bilaterally  Sensory: equal to light touch bilaterally in upper and lower extremities  Coordination: Normal finger to nose in upper extremities bilaterally,   Imaging Reviewed:  MRI/10/2023:  No acute infarct, mass, hemorrhage, or herniation.  2.  Chronic infarcts in the lizeth and cerebellum.  3.  Mild diffuse parenchymal volume loss and white matter changes most likely due to chronic microvascular ischemic disease.     HEAD MRA:   1.  Overall, no  significant change since prior MRA 03/20/2023.  2.  Severe stenosis/occlusion of the basilar artery with lack of flow-related enhancement of the right PCA vessels.  3.  No flow visualized in the intracranial left vertebral artery.  4.  Left PCA vessels are supplied by the posterior communicating artery. Moderate stenosis at the proximal left P2 segment.  5.  No new vascular cutoff of the proximal ACAs or MCAs.  6.  Moderate stenosis of the right paraclinoid ICA      Assessment:  Adam Talamantes  is a 83 year old male who presents for posterior circulation strokes secondary to atherosclerotic disease, he was referred for possible endovascular treatment. He has not had any recurrent strokes and is being maintained on Eliquis and aspirin therapy. He continues to have uncontrolled diabetes with most recent A1c 2 months ago being 13%. He continues to have episodes of dizziness and trouble with walking after waking. Due to the language barrier it is hard to fully describe the symptoms and determine if they are truly neurologic in nature or related to orthostatic hypotension. However if these are small TIAs related to the posterior circulation then medical management would be recommended at this time due to uncontrolled risk factors.  Would recommend continued optimization of medical management with blood pressure and blood glucose control.     Thank you for this referral, for any questions or concerns please don't hesitate to contact us.     Plan:  - Continue Eliquis and aspirin therapy  - Return to clinic in 1 year.   - repeat MRA head and neck in 1 year  -Long term goals, BP <130/80, LDL <70, A1c <7    [id number]: Kayce  ID       Mary Jordan MD  Endovascular Surgical Neuroradiology Fellow  [facility]  [phone]    Endovascular Surgical Neuroradiology staff is Dr. Jackson

## 2023-10-10 NOTE — LETTER
10/10/2023       RE: Adam Talamantes  [address]  [facility] [address]     Dear Colleague,    Thank you for referring your patient, Adam Talamantes, to the [facility]UROS[facility] [place] at [facility]. Please see a copy of my visit note below.    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 He was placed on aspirin and Eliquis, Crestor. Endovascular therapy was deferred at the time due to risk of angioplasty/stenting and no evidence of recurrent or refractory strokes.        He is still having symptoms of generalized weakness with trouble walking and communicating these episodes occur after he has been asleep. The symptoms will last for 1-2 minutes. There are no other associated symptoms. The symptoms are noted to be similar to prior episodes.     Past medical history:   Past Medical History:   Diagnosis Date    Acute blood loss anemia     Acute renal failure (H24)     Anemia     Bacteremia     Cataract     Chronic gout     CKD (chronic kidney disease)     Stage 3    DM2 (diabetes mellitus, type 2) (H)     GERD (gastroesophageal reflux disease)     Glucose intolerance (impaired glucose tolerance)     Gout     History of nephrolithiasis     History of prostatitis 2017    Hyperlipidemia     Hypertension     Insomnia     Intestinal disaccharidase deficiencies and disaccharide malabsorption     Created by Conversion     Latent tuberculosis     Nephrolithiasis     Neuropathy     Nonspecific abnormal findings on radiological and other examination of skull and head     Created by [facility] Annotation: 2013 11:23PM - Giulia Meridai/10/2011:  severe stenosis both posterior cerebral arteries seen MRI/MRA done for  vertigo. No  acute changes.e*    Osteoarthritis     wrist, knee, shoulder    Prostatitis     Rectal bleed     Trigger thumb         Current outpatient Medication list:   Current Outpatient Medications:     acetaminophen (TYLENOL) 500 MG tablet, Take 1 tablet (500 mg) by mouth every 4 hours as needed for mild pain, Disp: 100 tablet, Rfl: 3    allopurinol (ZYLOPRIM) 100 MG tablet, TAKE 2 TABLETS(200 MG) BY MOUTH DAILY, Disp: 180 tablet, Rfl: 0    calcium carbonate (TUMS) 500 MG chewable tablet, Take 1 chew tab by mouth daily as needed for heartburn, Disp: , Rfl:     celecoxib (CELEBREX) 200 MG capsule, Take 1 capsule (200 mg) by mouth daily as needed for pain (jaw pain, joint pain), Disp: 30 capsule, Rfl: 3    Continuous Blood Gluc Sensor (FREESTYLE SUSANNAH 2 SENSOR) MISC, 1 each every 14 days Use 1 sensor every 14 days. Use to read blood sugars per 's instructions., Disp: 2 each, Rfl: 5    DULoxetine (CYMBALTA) 30 MG capsule, Take 1 capsule (30 mg) by mouth 2 times daily, Disp: 60 capsule, Rfl: 6    ELIQUIS ANTICOAGULANT 5 MG tablet, TAKE 1 TABLET(5 MG) BY MOUTH TWICE DAILY, Disp: 60 tablet, Rfl: 6    hydrOXYzine (ATARAX) 25 MG tablet, Take 1 tablet (25 mg) by mouth every 8 hours as needed for itching, Disp: 60 tablet, Rfl: 1    insulin aspart (NOVOLOG FLEXPEN) 100 UNIT/ML pen, Inject 6 units under the skin 3 times daily before meals as directed., Disp: 15 mL, Rfl: 3    insulin glargine (LANTUS PEN) 100 UNIT/ML pen, Inject 20 Units Subcutaneous At Bedtime, Disp: 30 mL, Rfl: 3    insulin pen needle (32G X 4 MM) 32G X 4 MM miscellaneous, Use 1 pen needles daily or as directed., Disp: 100 each, Rfl: 3    meclizine (ANTIVERT) 12.5 MG tablet, Take 1 tablet (12.5 mg) by mouth 3 times daily as needed for dizziness, Disp: 60 tablet, Rfl: 6    metoprolol succinate ER (TOPROL XL) 100 MG 24 hr tablet, Take 0.5 tablets (50 mg) by mouth daily Take 1/2 tablet  daily, Disp: 45 tablet, Rfl: 3    pantoprazole (PROTONIX) 40 MG EC tablet,  Take 1 tablet (40 mg) by mouth every morning (before breakfast), Disp: 90 tablet, Rfl: 3    polyethylene glycol (MIRALAX) 17 GM/Dose powder, Take 17 g (1 capful.) by mouth daily as needed for constipation, Disp: 578 g, Rfl: 4    rosuvastatin (CRESTOR) 20 MG tablet, Take 1 tablet (20 mg) by mouth At Bedtime, Disp: 90 tablet, Rfl: 3    SENNA-docusate sodium (SENNA S) 8.6-50 MG tablet, Take 1 tablet by mouth 2 times daily as needed (for constipation), Disp: 60 tablet, Rfl: 11    sitagliptin (JANUVIA) 50 MG tablet, Take 1 tablet (50 mg) by mouth daily, Disp: 90 tablet, Rfl: 3    tamsulosin (FLOMAX) 0.4 MG capsule, Take 1 capsule (0.4 mg) by mouth daily, Disp: 90 capsule, Rfl: 3    traZODone (DESYREL) 50 MG tablet, Take 1-2 tablets ( mg) by mouth nightly as needed for sleep, Disp: 180 tablet, Rfl: 3      Family history:   Family History   Problem Relation Age of Onset    Cerebrovascular Disease Father     Cerebrovascular Disease Daughter        Social history:   Social History     Tobacco Use    Smoking status: Former     Types: Cigarettes     Quit date: 3/10/2000     Years since quittin.6     Passive exposure: Never    Smokeless tobacco: Former    Tobacco comments:     no passive exposure   Vaping Use    Vaping Use: Never used   Substance Use Topics    Alcohol use: No    Drug use: No       Allergies:  No Known Allergies    Review of Systems: 5 point ROS performed and negative except for detailed above    Objective:    Physical exam:  There were no vitals taken for this visit.  Constitutional: Awake, no acute distress  HENT: normcephalic,   Respiratory: normal rate, no acute distress  Neuro:  Mental status: awake, oriented   CN: EOMI, face movements symmetric, no dysarthria, equal shoulder shrug, tongue midline  Motor: 5/5 strength in upper and lower extremities bilaterally  Sensory: equal to light touch bilaterally in upper and lower extremities  Coordination: Normal finger to nose in upper extremities  bilaterally,   Imaging Reviewed:  MRI/10/2023:  No acute infarct, mass, hemorrhage, or herniation.  2.  Chronic infarcts in the lizeth and cerebellum.  3.  Mild diffuse parenchymal volume loss and white matter changes most likely due to chronic microvascular ischemic disease.     HEAD MRA:   1.  Overall, no significant change since prior MRA 03/20/2023.  2.  Severe stenosis/occlusion of the basilar artery with lack of flow-related enhancement of the right PCA vessels.  3.  No flow visualized in the intracranial left vertebral artery.  4.  Left PCA vessels are supplied by the posterior communicating artery. Moderate stenosis at the proximal left P2 segment.  5.  No new vascular cutoff of the proximal ACAs or MCAs.  6.  Moderate stenosis of the right paraclinoid ICA      Assessment:  Adam Talamantes  is a 83 year old male who presents for posterior circulation strokes secondary to atherosclerotic disease, he was referred for possible endovascular treatment. He has not had any recurrent strokes and is being maintained on Eliquis and aspirin therapy. He continues to have uncontrolled diabetes with most recent A1c 2 months ago being 13%. He continues to have episodes of dizziness and trouble with walking after waking. Due to the language barrier it is hard to fully describe the symptoms and determine if they are truly neurologic in nature or related to orthostatic hypotension. However if these are small TIAs related to the posterior circulation then medical management would be recommended at this time due to uncontrolled risk factors.  Would recommend continued optimization of medical management with blood pressure and blood glucose control.     Thank you for this referral, for any questions or concerns please don't hesitate to contact us.     Plan:  - Continue Eliquis and aspirin therapy  - Return to clinic in 1 year.   - repeat MRA head and neck in 1 year  -Long term goals, BP <130/80, LDL <70, A1c <7    [id number]: Kayce   ID       Mary Jordan MD  Endovascular Surgical Neuroradiology Fellow  [facility]  [phone]    Endovascular Surgical Neuroradiology staff is Dr. Jackson                Attestation signed by Joey Jackson MD at 10/16/2023 11:30 AM:  I have personally seen and evaluated the patient on October 10, 2023  and I agree with the note by Dr. Jordan           On October 16, 2023          Again, thank you for allowing me to participate in the care of your patient.      Sincerely,    Joey Jackson MD

## 2023-10-10 NOTE — PATIENT INSTRUCTIONS
Please follow up with Dr. Jackson in 1 year with MRA prior.     Stroke & Endovascular RN Care Coordinators:    Hiral Gomez, RN, BSN  Amlaz Burton, RN, CNRN, SCRN    If you have any questions please contact the RN Care Coordinators at [phone], option 1.     After business hours call the  at [phone] and have the Neuro-Interventional Fellow paged.    Thank you for choosing [facility] for your health care needs.

## 2023-10-16 ENCOUNTER — TELEPHONE (OUTPATIENT)
Dept: FAMILY MEDICINE | Facility: CLINIC | Age: 83
End: 2023-10-16
Payer: COMMERCIAL

## 2023-10-16 DIAGNOSIS — M1A.09X0 CHRONIC GOUT OF MULTIPLE SITES, UNSPECIFIED CAUSE: ICD-10-CM

## 2023-10-16 NOTE — TELEPHONE ENCOUNTER
Patient Quality Outreach    Patient is due for the following:   Physical Annual Wellness Visit    Next Steps:   Patient has upcoming appointment, these items will be addressed at that time.    Type of outreach:    Chart review performed, no outreach needed.    Next Steps:  Reach out within 90 days via Phone.    Max number of attempts reached: No. Will try again in 90 days if patient still on fail list.    Questions for provider review:    None           Charley Joseph MA  Chart routed to Care Team.

## 2023-10-16 NOTE — TELEPHONE ENCOUNTER
Let pt  know due to her abnormal TSH results I am going to reduce her thyroid dose to 112 mcg and repeat labs non fasting, please mail to her also her B vitamin levels other than the B 12 have been normal, so not the cause of her tremors. , f/u with Neurology for the tremors. [facility]    Phone Message    May a detailed message be left on voicemail: no     Reason for Call: Other: Patient is being referred to Urology for urinary retention, please review and call patient to schedule. Thank you      Action Taken: Message routed to:  [facility] Urology     Travel Screening: Not Applicable

## 2023-10-17 RX ORDER — ALLOPURINOL 100 MG/1
TABLET ORAL
Qty: 180 TABLET | Refills: 0 | Status: SHIPPED | OUTPATIENT
Start: 2023-10-17 | End: 2024-01-15

## 2023-10-26 ENCOUNTER — TELEPHONE (OUTPATIENT)
Dept: FAMILY MEDICINE | Facility: CLINIC | Age: 83
End: 2023-10-26
Payer: COMMERCIAL

## 2023-10-26 DIAGNOSIS — L30.9 DERMATITIS: Primary | ICD-10-CM

## 2023-10-26 NOTE — TELEPHONE ENCOUNTER
New Medication Request    Contacts         Type Contact Phone/Fax    10/26/2023 03:18 PM CDT Phone (Incoming) Dimitri Talamantes (Emergency Contact) [phone]            What medication are you requesting?: Triamcinolone  1% cream    Reason for medication request: Patient started itching again. Patient does not have itching during the summer time, itching only return when weather is a bit cold. Caller state patient used grand child Triamcinolone  cream and has help with itching.    Have you taken this medication before?: Yes: Triamcinolone  (Kenolog) 0.1 % cream.    Controlled Substance Agreement on file:   CSA -- Patient Level:     [Media Unavailable] Controlled Substance Agreement - Opioid - Scan on 4/12/2022  8:47 AM   [Media Unavailable] Controlled Substance Agreement - Opioid - Scan on 8/4/2021  7:24 AM         Patient offered an appointment? No    [facility] Pharmacy[facility]   [id number] [address]  [address] [place] [id number]  Phone: [phone] Fax: [phone]      Okay to leave a detailed message?: Yes at Cell number on file:    Telephone Information:   Mobile [phone]

## 2023-10-26 NOTE — TELEPHONE ENCOUNTER
After chart review, no active/current medication on file. Routing to provider for review and advise.         Baldev Lennon, MSN, RN   [facility]

## 2023-10-27 RX ORDER — TRIAMCINOLONE ACETONIDE 1 MG/G
CREAM TOPICAL 2 TIMES DAILY
Qty: 80 G | Refills: 3 | Status: SHIPPED | OUTPATIENT
Start: 2023-10-27

## 2023-11-07 DIAGNOSIS — E11.65 TYPE 2 DIABETES MELLITUS WITH HYPERGLYCEMIA, WITHOUT LONG-TERM CURRENT USE OF INSULIN (H): ICD-10-CM

## 2023-11-07 NOTE — TELEPHONE ENCOUNTER
Medication Question or Refill    Contacts         Type Contact Phone/Fax    11/07/2023 12:50 PM CST Phone (Incoming) Adam Talamantes (Self) [phone] (M)            What medication are you calling about (include dose and sig)?:     insulin pen needle (32G X 4 MM) 32G X 4 MM miscellaneous       Preferred Pharmacy:   [facility] #[id number] [address] & [address] [address]  Phone: [phone] Fax: [phone]    Controlled Substance Agreement on file:   CSA -- Patient Level:     [Media Unavailable] Controlled Substance Agreement - Opioid - Scan on 4/12/2022  8:47 AM   [Media Unavailable] Controlled Substance Agreement - Opioid - Scan on 8/4/2021  7:24 AM       Who prescribed the medication?: PCP    Do you need a refill? Yes    When did you use the medication last?     Patient offered an appointment? No    Do you have any questions or concerns?  No      Okay to leave a detailed message?: Yes at Home number on file [phone] (home)

## 2023-11-08 DIAGNOSIS — E11.65 TYPE 2 DIABETES MELLITUS WITH HYPERGLYCEMIA, WITHOUT LONG-TERM CURRENT USE OF INSULIN (H): ICD-10-CM

## 2023-11-08 NOTE — TELEPHONE ENCOUNTER
Medication Question or Refill    Contacts         Type Contact Phone/Fax    11/08/2023 11:54 AM CST Phone (Incoming) Dimitri Talamantes (Emergency Contact) [phone]            What medication are you calling about (include dose and sig)?: insulin pen needle (32G X 4 MM) 32G X 4 MM miscellaneous     Preferred Pharmacy:   [id number] [address] & [address] [address]  Phone: [phone] Fax: [phone]        Controlled Substance Agreement on file:   CSA -- Patient Level:     [Media Unavailable] Controlled Substance Agreement - Opioid - Scan on 4/12/2022  8:47 AM   [Media Unavailable] Controlled Substance Agreement - Opioid - Scan on 8/4/2021  7:24 AM       Who prescribed the medication?: [facility]    Do you need a refill? Yes    When did you use the medication last? 11/08/2023    Patient offered an appointment? Yes: Please update Rx to use 4 times a day instead of 1 daily    Do you have any questions or concerns?  Yes: Patient is out of the needles      Okay to leave a detailed message?: Yes at Cell number on file:    Telephone Information:   Mobile [phone]

## 2023-11-27 ENCOUNTER — OFFICE VISIT (OUTPATIENT)
Dept: PHARMACY | Facility: CLINIC | Age: 83
End: 2023-11-27
Payer: COMMERCIAL

## 2023-11-27 ENCOUNTER — LAB (OUTPATIENT)
Dept: LAB | Facility: CLINIC | Age: 83
End: 2023-11-27
Payer: COMMERCIAL

## 2023-11-27 VITALS — BODY MASS INDEX: 30.78 KG/M2 | WEIGHT: 157.6 LBS

## 2023-11-27 DIAGNOSIS — E11.65 TYPE 2 DIABETES MELLITUS WITH HYPERGLYCEMIA, WITHOUT LONG-TERM CURRENT USE OF INSULIN (H): Primary | ICD-10-CM

## 2023-11-27 DIAGNOSIS — G47.00 INSOMNIA, UNSPECIFIED TYPE: ICD-10-CM

## 2023-11-27 DIAGNOSIS — E11.65 TYPE 2 DIABETES MELLITUS WITH HYPERGLYCEMIA, WITHOUT LONG-TERM CURRENT USE OF INSULIN (H): ICD-10-CM

## 2023-11-27 LAB — HBA1C MFR BLD: 8.6 % (ref 0–5.6)

## 2023-11-27 PROCEDURE — 36415 COLL VENOUS BLD VENIPUNCTURE: CPT

## 2023-11-27 PROCEDURE — 83036 HEMOGLOBIN GLYCOSYLATED A1C: CPT

## 2023-11-27 PROCEDURE — 99606 MTMS BY PHARM EST 15 MIN: CPT | Performed by: PHARMACIST

## 2023-11-27 NOTE — PROGRESS NOTES
Medication Therapy Management (MTM) Encounter    ASSESSMENT:                            Medication Adherence/Access: No issues identified    1. Type 2 diabetes mellitus with hyperglycemia, without long-term current use of insulin (H)  A1c improving, not quite at goal <8% but much better than 3 months ago.  Per review of CGM data with patient during visit today, the morning/midday as when his blood sugar rises the most.  Reviewed with patient today suggestions for dietary changes for blood sugar improvement, we will avoid changing insulin doses at this time given high risk for hypoglycemia.  Pending blood sugars at next visit, could consider slight dose increase of bolus insulin in the morning/midday.  Basal insulin dose appropriate.  Could also consider adding an SGLT2 inhibitor at follow-up visit for blood sugar and added renal protection.    2. Insomnia  Recommended continuation of current trazodone for now in addition to changing fluid intake more often in the morning rather that in the afternoon/evening to avoid nighttime urination. Could also consider medication adjustment in the future as well.     PLAN:                            Continue current medications without change  Labs today: A1c    Medication issues to be addressed at a future visit:   SGLT2i?    Follow-up: Return in about 13 weeks (around 2/26/2024).  PCP 1/15  SUBJECTIVE/OBJECTIVE:                          Adam Talamantes is a 83 year old male coming in for a follow-up visit from 10/3/23. Patient seen with Kayce moore.   ID# [id number].     Reason for visit: MTM follow up.     Allergies/ADRs: Reviewed in chart  Past Medical History: Reviewed in chart  Tobacco: He reports that he quit smoking about 23 years ago. His smoking use included cigarettes. He has never been exposed to tobacco smoke. He has quit using smokeless tobacco.  Alcohol: None    Medication Adherence/Access: His daughter, Dimitri Talamantes, helps him with medications, set up in twice daily  "pillbox.  Reports no missed medication doses.     Diabetes   Januvia 50 mg daily   Lantus 20 units daily at bedtime  Novolog 6 units three times daily before meals    Patient is not experiencing side effects.  Daughter helps with insulin injections, patient unable to do this on his own, reports no missed doses.   Blood sugar monitoring: Continuous Glucose Monitoring with Ray 2; see below. States that the alarm on meter is going off too often, adjusted high alarm to go off at 400 today instead of 250.   Current diabetes symptoms: Still polyuria at night.   Diet/Exercise: Eating 3 very meals per day. States that he eats rice with soup and 2 pieces of bread in the morning and at night but the mid-day meal is small.  Declining CDE at this time.      Eye exam is up to date  Foot exam: due  Urine Albumin: No results found for: \"UMALCR\"   Lab Results   Component Value Date    A1C 8.6 (H) 11/27/2023     Creatinine   Date Value Ref Range Status   08/24/2023 1.49 (H) 0.67 - 1.17 mg/dL Final   GFR 47        Insomnia:   Trazodone 50 mg - 2 tablets daily at bedtime  Patient continues to struggle with insomnia and still waking frequently at night, especially with nighttime urination. Admits that he drinking most of his fluids in the afternoon/evening because he usually naps throughout the day.     Today's Vitals: Wt 157 lb 9.6 oz (71.5 kg)   BMI 30.78 kg/m    ----------------      I spent 30 minutes with this patient today. All changes were made via collaborative practice agreement with Chucho Espino MD. A copy of the visit note was provided to the patient's provider(s).    A summary of these recommendations was declined by the patient.    Pushpa Philip, PharmD, BCACP  Medication Therapy Management Pharmacist       Medication Therapy Recommendations  No medication therapy recommendations to display   "

## 2023-12-08 DIAGNOSIS — E78.5 DYSLIPIDEMIA: ICD-10-CM

## 2023-12-08 RX ORDER — ROSUVASTATIN CALCIUM 20 MG/1
20 TABLET, COATED ORAL AT BEDTIME
Qty: 90 TABLET | Refills: 0 | Status: SHIPPED | OUTPATIENT
Start: 2023-12-08 | End: 2024-08-25

## 2023-12-13 ENCOUNTER — TELEPHONE (OUTPATIENT)
Dept: NEUROLOGY | Facility: CLINIC | Age: 83
End: 2023-12-13
Payer: COMMERCIAL

## 2023-12-13 NOTE — TELEPHONE ENCOUNTER
"Called pt to schedule stroke return visit, left message to call back. Patient was seen by the stroke team about 6 months ago, at that time it was recommended patient follow up with the stroke team to check in, review plan and optimize overall stroke risk management and we would like to offer an appointment.     When patient calls back: please schedule Stroke Return with Emily Mcmanus CNP however if no availability with this provider, ok to schedule with a different stroke JOCELYN, any visit type as per patient preference, appointment notes \"6 month follow up for stroke\".    "

## 2023-12-17 DIAGNOSIS — F32.1 CURRENT MODERATE EPISODE OF MAJOR DEPRESSIVE DISORDER WITHOUT PRIOR EPISODE (H): ICD-10-CM

## 2023-12-18 RX ORDER — DULOXETIN HYDROCHLORIDE 30 MG/1
30 CAPSULE, DELAYED RELEASE ORAL 2 TIMES DAILY
Qty: 60 CAPSULE | Refills: 6 | Status: SHIPPED | OUTPATIENT
Start: 2023-12-18 | End: 2024-06-17

## 2023-12-27 ENCOUNTER — PATIENT OUTREACH (OUTPATIENT)
Dept: GERIATRIC MEDICINE | Facility: CLINIC | Age: 83
End: 2023-12-27
Payer: COMMERCIAL

## 2023-12-27 NOTE — PROGRESS NOTES
[facility] Care Coordination Contact    Called family (daughter)  to schedule annual HRA home visit. HRA has been scheduled for 01/24/24 @ 10 am.    Kristi Calzada RN  [facility]  Cell Phone [phone]

## 2024-01-14 DIAGNOSIS — M1A.09X0 CHRONIC GOUT OF MULTIPLE SITES, UNSPECIFIED CAUSE: ICD-10-CM

## 2024-01-15 ENCOUNTER — OFFICE VISIT (OUTPATIENT)
Dept: FAMILY MEDICINE | Facility: CLINIC | Age: 84
End: 2024-01-15
Payer: COMMERCIAL

## 2024-01-15 VITALS
DIASTOLIC BLOOD PRESSURE: 79 MMHG | SYSTOLIC BLOOD PRESSURE: 116 MMHG | OXYGEN SATURATION: 97 % | RESPIRATION RATE: 18 BRPM | WEIGHT: 162.5 LBS | TEMPERATURE: 97.6 F | HEART RATE: 85 BPM | BODY MASS INDEX: 31.9 KG/M2 | HEIGHT: 60 IN

## 2024-01-15 DIAGNOSIS — N18.30 STAGE 3 CHRONIC KIDNEY DISEASE, UNSPECIFIED WHETHER STAGE 3A OR 3B CKD (H): ICD-10-CM

## 2024-01-15 DIAGNOSIS — K21.00 GASTROESOPHAGEAL REFLUX DISEASE WITH ESOPHAGITIS, UNSPECIFIED WHETHER HEMORRHAGE: ICD-10-CM

## 2024-01-15 DIAGNOSIS — E11.65 TYPE 2 DIABETES MELLITUS WITH HYPERGLYCEMIA, WITHOUT LONG-TERM CURRENT USE OF INSULIN (H): ICD-10-CM

## 2024-01-15 DIAGNOSIS — R41.3 MEMORY LOSS: ICD-10-CM

## 2024-01-15 DIAGNOSIS — I25.10 CORONARY ARTERY DISEASE DUE TO LIPID RICH PLAQUE: ICD-10-CM

## 2024-01-15 DIAGNOSIS — F32.1 CURRENT MODERATE EPISODE OF MAJOR DEPRESSIVE DISORDER WITHOUT PRIOR EPISODE (H): ICD-10-CM

## 2024-01-15 DIAGNOSIS — I25.83 CORONARY ARTERY DISEASE DUE TO LIPID RICH PLAQUE: ICD-10-CM

## 2024-01-15 DIAGNOSIS — Z00.00 ENCOUNTER FOR MEDICARE ANNUAL WELLNESS EXAM: Primary | ICD-10-CM

## 2024-01-15 PROBLEM — E11.22 TYPE 2 DIABETES MELLITUS WITH STAGE 3 CHRONIC KIDNEY DISEASE, WITHOUT LONG-TERM CURRENT USE OF INSULIN (H): Status: RESOLVED | Noted: 2020-09-28 | Resolved: 2024-01-15

## 2024-01-15 PROBLEM — F32.4 MAJOR DEPRESSIVE DISORDER WITH SINGLE EPISODE, IN PARTIAL REMISSION (H): Status: RESOLVED | Noted: 2023-09-11 | Resolved: 2024-01-15

## 2024-01-15 PROCEDURE — 99214 OFFICE O/P EST MOD 30 MIN: CPT | Mod: 25 | Performed by: FAMILY MEDICINE

## 2024-01-15 PROCEDURE — 99397 PER PM REEVAL EST PAT 65+ YR: CPT | Performed by: FAMILY MEDICINE

## 2024-01-15 RX ORDER — RESPIRATORY SYNCYTIAL VIRUS VACCINE 120MCG/0.5
0.5 KIT INTRAMUSCULAR ONCE
Qty: 1 EACH | Refills: 0 | Status: CANCELLED | OUTPATIENT
Start: 2024-01-15 | End: 2024-01-15

## 2024-01-15 RX ORDER — PANTOPRAZOLE SODIUM 40 MG/1
40 TABLET, DELAYED RELEASE ORAL
Qty: 90 TABLET | Refills: 3 | Status: SHIPPED | OUTPATIENT
Start: 2024-01-15

## 2024-01-15 RX ORDER — ALLOPURINOL 100 MG/1
TABLET ORAL
Qty: 180 TABLET | Refills: 3 | Status: SHIPPED | OUTPATIENT
Start: 2024-01-15

## 2024-01-15 ASSESSMENT — ACTIVITIES OF DAILY LIVING (ADL): CURRENT_FUNCTION: NO ASSISTANCE NEEDED

## 2024-01-15 ASSESSMENT — ENCOUNTER SYMPTOMS
DIZZINESS: 0
DYSURIA: 0
MYALGIAS: 0
WEAKNESS: 0
PALPITATIONS: 0
PARESTHESIAS: 0
ARTHRALGIAS: 1
CONSTIPATION: 0
ABDOMINAL PAIN: 0
JOINT SWELLING: 0
NERVOUS/ANXIOUS: 0
HEADACHES: 0
FEVER: 0
SHORTNESS OF BREATH: 0
FREQUENCY: 0
NAUSEA: 0
DIARRHEA: 0
HEARTBURN: 0
CHILLS: 0
COUGH: 0
HEMATURIA: 0
HEMATOCHEZIA: 0
EYE PAIN: 0
SORE THROAT: 0

## 2024-01-15 ASSESSMENT — PATIENT HEALTH QUESTIONNAIRE - PHQ9
SUM OF ALL RESPONSES TO PHQ QUESTIONS 1-9: 4
10. IF YOU CHECKED OFF ANY PROBLEMS, HOW DIFFICULT HAVE THESE PROBLEMS MADE IT FOR YOU TO DO YOUR WORK, TAKE CARE OF THINGS AT HOME, OR GET ALONG WITH OTHER PEOPLE: NOT DIFFICULT AT ALL
SUM OF ALL RESPONSES TO PHQ QUESTIONS 1-9: 4

## 2024-01-15 NOTE — PROGRESS NOTES
"SUBJECTIVE:   Adam is a 83 year old, presenting for the following:  Physical (RN medicare annual wellness)        1/15/2024    11:33 AM   Additional Questions   Roomed by Baldev Lennon RN   Accompanied by son in [place]         1/15/2024    11:33 AM   Patient Reported Additional Medications   Patient reports taking the following new medications none       Healthy Habits:     In general, how would you rate your overall health?  Fair    Frequency of exercise:  None    Do you usually eat at least 4 servings of fruit and vegetables a day, include whole grains    & fiber and avoid regularly eating high fat or \"junk\" foods?  Yes    Taking medications regularly:  Yes    Medication side effects:  Not applicable    Ability to successfully perform activities of daily living:  No assistance needed    Home Safety:  No safety concerns identified    Hearing Impairment:  No hearing concerns    In the past 6 months, have you been bothered by leaking of urine? Yes    In general, how would you rate your overall mental or emotional health?  Fair    Additional concerns today:  Yes      Today's PHQ-9 Score:       1/15/2024    11:54 AM   PHQ-9 SCORE   PHQ-9 Total Score MyChart 4 (Minimal depression)   PHQ-9 Total Score 4           Have you ever done Advance Care Planning? (For example, a Health Directive, POLST, or a discussion with a medical provider or your loved ones about your wishes): Yes, advance care planning is on file.    Fall risk  Fallen 2 or more times in the past year?: No  Any fall with injury in the past year?: No    Cognitive Screening   1) Repeat 3 items (Rain, Dog, Table)   2) Clock draw: Unable to draw clock   3) 3 item recall: Recalls NO objects   Results: 0 items recalled: PROBABLE COGNITIVE IMPAIRMENT, **INFORM PROVIDER**    Mini-CogTM Copyright S Danna. Licensed by the author for use in [facility]; reprinted with permission ([email or web address]). All rights reserved.      Do you have sleep apnea, excessive " snoring or daytime drowsiness? : no    Reviewed and updated as needed this visit by clinical staff   Tobacco  Allergies  Meds              Reviewed and updated as needed this visit by Provider                 Social History     Tobacco Use    Smoking status: Former     Types: Cigarettes     Quit date: 3/10/2000     Years since quittin.8     Passive exposure: Never    Smokeless tobacco: Former    Tobacco comments:     no passive exposure   Substance Use Topics    Alcohol use: No             1/15/2024    12:01 PM   Alcohol Use   Prescreen: >3 drinks/day or >7 drinks/week? Not Applicable     Do you have a current opioid prescription? No  Do you use any other controlled substances or medications that are not prescribed by a provider? None      Current providers sharing in care for this patient include:   Patient Care Team:  Chucho Espino MD as PCP - General (Family Medicine)  Chucho Espino MD as Assigned PCP  Bella Gold MBBS as Assigned Rheumatology Provider  Pushpa Philip, PharmD as Pharmacist (Pharmacist)  Pushpa Philip, PharmD as Assigned [facility] Pharmacist  Lynne George MD as Assigned Heart and Vascular Provider  Joey Jackson MD as Assigned Surgical Provider  Justa Falcon PA-C as Assigned OBGYN Provider  No Ref-Primary, Physician  Kristi Calzada RN as Lead Care Coordinator (Primary Care - CC)    The following health maintenance items are reviewed in Epic and correct as of today:  Health Maintenance   Topic Date Due    URINE DRUG SCREEN  Never done    ANNUAL REVIEW OF  ORDERS  Never done    DEPRESSION ACTION PLAN  Never done    RSV VACCINE (Pregnancy & 60+) (1 - 1-dose 60+ series) Never done    ZOSTER IMMUNIZATION (3 of 3) 2019    MICROALBUMIN  2020    MEDICARE ANNUAL WELLNESS VISIT  2020    DIABETIC FOOT EXAM  2022    COVID-19 Vaccine (3 - 2023-24 season) 2023    LIPID  2024    HEMOGLOBIN  2024    EYE EXAM  2024     A1C  05/27/2024    PHQ-9  07/15/2024    BMP  08/24/2024    FALL RISK ASSESSMENT  01/15/2025    ADVANCE CARE PLANNING  03/13/2028    DTAP/TDAP/TD IMMUNIZATION (3 - Td or Tdap) 01/02/2029    INFLUENZA VACCINE  Completed    Pneumococcal Vaccine: 65+ Years  Completed    URINALYSIS  Completed    IPV IMMUNIZATION  Aged Out    HPV IMMUNIZATION  Aged Out    MENINGITIS IMMUNIZATION  Aged Out    RSV MONOCLONAL ANTIBODY  Aged Out     Labs reviewed in EPIC          Review of Systems   Constitutional:  Negative for chills and fever.   HENT:  Negative for congestion, ear pain, hearing loss and sore throat.    Eyes:  Negative for pain and visual disturbance.   Respiratory:  Negative for cough and shortness of breath.    Cardiovascular:  Negative for chest pain, palpitations and peripheral edema.   Gastrointestinal:  Negative for abdominal pain, constipation, diarrhea, heartburn, hematochezia and nausea.   Genitourinary:  Negative for dysuria, frequency, genital sores, hematuria, impotence, penile discharge and urgency.   Musculoskeletal:  Positive for arthralgias. Negative for joint swelling and myalgias.   Skin:  Negative for rash.   Neurological:  Negative for dizziness, weakness, headaches and paresthesias.   Psychiatric/Behavioral:  Negative for mood changes. The patient is not nervous/anxious.          OBJECTIVE:   /79 (BP Location: Right arm, Patient Position: Sitting, Cuff Size: Adult Large)   Pulse 85   Temp 97.6  F (36.4  C) (Temporal)   Resp 18   Ht 1.524 m (5')   Wt 73.7 kg (162 lb 8 oz)   SpO2 97%   BMI 31.74 kg/m   Estimated body mass index is 31.74 kg/m  as calculated from the following:    Height as of this encounter: 1.524 m (5').    Weight as of this encounter: 73.7 kg (162 lb 8 oz).  Physical Exam  Cardiac-regular rate and rhythm with no murmur  Respiratory-lungs clear to auscultation  Extremities-no pitting edema of the ankles        ASSESSMENT / PLAN:   Adam was seen today for  physical.    Diagnoses and all orders for this visit:    Medicare annual wellness visit      See separate clinical note for other diagnoses and plan.      Patient has been advised of split billing requirements and indicates understanding: Yes      COUNSELING:  Reviewed preventive health counseling, as reflected in patient instructions       Immunizations  Declined: Covid-19 due to Other; patient declines don't want vaccine   Patient will think about getting RSV and Shingles vaccine at the pharmacy; RN suggested getting it at the pharmacy due to medicare cost & coverage           BMI:   Estimated body mass index is 31.74 kg/m  as calculated from the following:    Height as of this encounter: 1.524 m (5').    Weight as of this encounter: 73.7 kg (162 lb 8 oz).   Weight management plan: Patient was referred to their PCP to discuss a diet and exercise plan.      He reports that he quit smoking about 23 years ago. His smoking use included cigarettes. He has never been exposed to tobacco smoke. He has quit using smokeless tobacco.      Appropriate preventive services were discussed with this patient, including applicable screening as appropriate for fall prevention, nutrition, physical activity, Tobacco-use cessation, weight loss and cognition.  Checklist reviewing preventive services available has been given to the patient.    Reviewed patients plan of care and provided an AVS. The Basic Care Plan (routine screening as documented in Health Maintenance) for Adam meets the Care Plan requirement. This Care Plan has been established and reviewed with the patient and son in law Christensen.           Chucho Espino MD  [facility]    Identified Health Risks:  I have reviewed Opioid Use Disorder and Substance Use Disorder risk factors and made any needed referrals.     Answers submitted by the patient for this visit:  Patient Health Questionnaire (Submitted on 1/15/2024)  If you checked off any problems, how  difficult have these problems made it for you to do your work, take care of things at home, or get along with other people?: Not difficult at all  PHQ9 TOTAL SCORE: 4

## 2024-01-15 NOTE — PROGRESS NOTES
ASSESMENT AND PLAN:  Diagnoses and all orders for this visit:  Type 2 diabetes mellitus with hyperglycemia, without long-term current use of insulin (H)  I did talk with our PharmD about the concern about the glucose monitor to her not adhering and she recommended an over-the-counter adhesive wipe.  Past along this information to the patient and caregiver.  The home blood sugar monitor-     Continuous Blood Gluc Sensor (FREESTYLE SUSANNAH 2 SENSOR) MISC; 1 each every 14 days Use 1 sensor every 14 days. Use to read blood sugars per 's instructions.  Gastroesophageal reflux disease with esophagitis, unspecified whether hemorrhage  -     pantoprazole (PROTONIX) 40 MG EC tablet; Take 1 tablet (40 mg) by mouth every morning (before breakfast)  Coronary artery disease due to lipid rich plaque  Stable.  Continue medical management.  Current moderate episode of major depressive disorder without prior episode (H)  Possibly worsening.  There is a discrepancy between his PHQ-9 and the letter that I received detailed below from the patient's daughter.  Counseling done with the patient today and the caregiver that is with him today and we decided at this time to continue the duloxetine but I did write in the letter to the daughter and also discussed with the patient and the caregiver with him today that we could consider a change in antidepressant medication going forward if things do not improve.  Stage 3 chronic kidney disease, unspecified whether stage 3a or 3b CKD (H)  Stable.  Patient declines labs today, will plan to do labs as detailed below at his follow-up visit.  Memory loss  Reviewed chart, patient does have recent MRI of the brain which did not show any reversible causes of memory loss/dementia but does make the situation suspicious for possible multi-infarct dementia.  Counseling done with the patient and caregiver and I did write in the letter the plan to recheck labs at his next blood draw, patient  prefers not to do blood draw today given that he just had one for his recent A1c and we will consolidate blood work to his follow-up visit.  -     TSH with free T4 reflex; Future  -     Vitamin B12; Future  -     Treponema Abs w Reflex to RPR and Titer; Future  -     Comprehensive metabolic panel (BMP + Alb, Alk Phos, ALT, AST, Total. Bili, TP); Future  Encounter for Medicare annual wellness exam  See separate note.        Reviewed the risks and benefits of the treatment plan with the patient and/or caregiver and we discussed indications for routine and emergent follow-up.        SUBJECTIVE: 83-year-old male in with several concerns.  Home blood sugar monitor is not adhering well to the skin, it is falling off periodically.  The patient brings with him today a letter from his daughter who is his primary caregiver but was unable to come to the appointment today.  It lays out several concerns, chiefly around his mood.  She is concerned that he is having worsening depression with more times of sadness and tearfulness.  She is also concerned about his memory with a slow continued decline in his memory, including sometimes not recognizing people that he formerly would have been very familiar with.  Patient has a known history of coronary artery disease, has been taking his medications as prescribed, no chest pains.  Also known history of depression, he is currently sleeping well in fact people in his family feel that he sleeps too much, he is no longer taking trazodone because of this.  He is taking his duloxetine twice daily.    Past Medical History:   Diagnosis Date    Acute blood loss anemia     Acute renal failure (H24)     Anemia     Bacteremia     Cataract     Chronic gout     CKD (chronic kidney disease)     Stage 3    DM2 (diabetes mellitus, type 2) (H)     GERD (gastroesophageal reflux disease)     Glucose intolerance (impaired glucose tolerance)     Gout     History of nephrolithiasis     History of prostatitis  2017    Hyperlipidemia     Hypertension     Insomnia     Intestinal disaccharidase deficiencies and disaccharide malabsorption     Created by Conversion     Latent tuberculosis     Nephrolithiasis     Neuropathy     Nonspecific abnormal findings on radiological and other examination of skull and head     Created by Conversion [facility] Annotation: 2013 11:23PM - Giulia Meridai/10/2011:  severe stenosis both posterior cerebral arteries seen MRI/MRA done for  vertigo. No acute changes.e*    Osteoarthritis     wrist, knee, shoulder    Prostatitis     Rectal bleed     Trigger thumb      Patient Active Problem List   Diagnosis    Esophageal reflux    Dyslipidemia    Nephrolithiasis    Pseudogout    Latent Tuberculosis    Generalized Osteoarthritis Of The Hand    Lumbar Disc Degeneration    Insomnia, unspecified type    Chronic Gout    CKD (chronic kidney disease) stage 3, GFR 30-59 ml/min (H)    Elevated PSA    Prostate nodule    Cataracts, bilateral    Constipation, unspecified constipation type    Bilateral chronic knee pain    Chronic right shoulder pain    Essential hypertension with goal blood pressure less than 140/90    BPH (benign prostatic hyperplasia)    Chronic gout    Coronary artery disease due to lipid rich plaque    Right lower quadrant abdominal pain    Colitis    Primary osteoarthritis involving multiple joints    Urinary incontinence    ACE-inhibitor cough    Vertigo    Essential hypertension    Vertebral artery occlusion, left    Cerebellar stroke (H)    Stroke (H)    Hypomagnesemia    Ataxic gait    Urinary retention    Basilar artery stenosis    Cerebral vascular disease    Colon wall thickening    Current moderate episode of major depressive disorder without prior episode (H)     Current Outpatient Medications   Medication Sig Dispense Refill    acetaminophen (TYLENOL) 500 MG tablet Take 1 tablet (500 mg) by mouth every 4 hours as needed for mild pain 100 tablet 3    allopurinol  (ZYLOPRIM) 100 MG tablet TAKE 2 TABLETS(200 MG) BY MOUTH DAILY 180 tablet 0    calcium carbonate (TUMS) 500 MG chewable tablet Take 1 chew tab by mouth daily as needed for heartburn      celecoxib (CELEBREX) 200 MG capsule Take 1 capsule (200 mg) by mouth daily as needed for pain (jaw pain, joint pain) 30 capsule 3    Continuous Blood Gluc Sensor (FREESTYLE SUSANNAH 2 SENSOR) [facility] [place] 1 each every 14 days Use 1 sensor every 14 days. Use to read blood sugars per 's instructions. 2 each 11    DULoxetine (CYMBALTA) 30 MG capsule TAKE 1 CAPSULE(30 MG) BY MOUTH TWICE DAILY 60 capsule 6    ELIQUIS ANTICOAGULANT 5 MG tablet TAKE 1 TABLET(5 MG) BY MOUTH TWICE DAILY 60 tablet 6    hydrOXYzine (ATARAX) 25 MG tablet Take 1 tablet (25 mg) by mouth every 8 hours as needed for itching 60 tablet 1    insulin aspart (NOVOLOG FLEXPEN) 100 UNIT/ML pen Inject 6 units under the skin 3 times daily before meals as directed. 15 mL 3    insulin glargine (LANTUS PEN) 100 UNIT/ML pen Inject 20 Units Subcutaneous At Bedtime 30 mL 3    insulin pen needle (32G X 4 MM) 32G X 4 MM miscellaneous Use 1 pen needles  4 times daily or as directed. 100 each 3    meclizine (ANTIVERT) 12.5 MG tablet Take 1 tablet (12.5 mg) by mouth 3 times daily as needed for dizziness 60 tablet 6    metoprolol succinate ER (TOPROL XL) 100 MG 24 hr tablet Take 0.5 tablets (50 mg) by mouth daily Take 1/2 tablet  daily 45 tablet 3    pantoprazole (PROTONIX) 40 MG EC tablet Take 1 tablet (40 mg) by mouth every morning (before breakfast) 90 tablet 3    polyethylene glycol (MIRALAX) 17 GM/Dose powder Take 17 g (1 capful.) by mouth daily as needed for constipation 578 g 4    rosuvastatin (CRESTOR) 20 MG tablet TAKE 1 TABLET(20 MG) BY MOUTH AT BEDTIME 90 tablet 0    SENNA-docusate sodium (SENNA S) 8.6-50 MG tablet Take 1 tablet by mouth 2 times daily as needed (for constipation) 60 tablet 11    sitagliptin (JANUVIA) 50 MG tablet Take 1 tablet (50 mg) by mouth daily 90  tablet 3    tamsulosin (FLOMAX) 0.4 MG capsule Take 1 capsule (0.4 mg) by mouth daily 90 capsule 3    triamcinolone (KENALOG) 0.1 % external cream Apply topically 2 times daily 80 g 3     History   Smoking Status    Former    Types: Cigarettes    Quit date: 3/10/2000   Smokeless Tobacco    Former       OBJECTICE: /79 (BP Location: Right arm, Patient Position: Sitting, Cuff Size: Adult Large)   Pulse 85   Temp 97.6  F (36.4  C) (Temporal)   Resp 18   Ht 1.524 m (5')   Wt 73.7 kg (162 lb 8 oz)   SpO2 97%   BMI 31.74 kg/m       No results found for this or any previous visit (from the past 24 hour(s)).     Regular rate and rhythmCV-   RESP-lungs clear to auscultation   ABDOMINAL-soft and nontender   EXTREM-no pitting edema of the ankles    PATIENT HEALTH QUESTIONNAIRE-9 (PHQ - 9)    Over the last 2 weeks, how often have you been bothered by any of the following problems?    1. Little interest or pleasure in doing things -  Not at all   2. Feeling down, depressed, or hopeless -  Not at all   3. Trouble falling or staying asleep, or sleeping too much - Nearly every day   4. Feeling tired or having little energy -  Several days   5. Poor appetite or overeating -  Not at all   6. Feeling bad about yourself - or that you are a failure or have let yourself or your family down -  Not at all   7. Trouble concentrating on things, such as reading the newspaper or watching television - Not at all   8. Moving or speaking so slowly that other people could have noticed? Or the opposite - being so fidgety or restless that you have been moving around a lot more than usual Not at all   9. Thoughts that you would be better off dead or of hurting  yourself in some way Not at all   Total Score: 4     If you checked off any problems, how difficult have these problems made it for you to do your work, take care of things at home, or get along with other people?      Developed by Delmy Acosta, Juanito Orta  "Angelica and colleagues, with an educational reginald from [employer] No permission required to reproduce, translate, display or distribute. permission required to reproduce, translate, display or distribute.      Chucho Espino MD   Answers submitted by the patient for this visit:  Patient Health Questionnaire (Submitted on 1/15/2024)  If you checked off any problems, how difficult have these problems made it for you to do your work, take care of things at home, or get along with other people?: Not difficult at all  PHQ9 TOTAL SCORE: 4  Annual Preventive Visit (Submitted on 1/15/2024)  Chief Complaint: Annual Exam:  In general, how would you rate your overall physical health?: fair  Frequency of exercise:: None  Do you usually eat at least 4 servings of fruit and vegetables a day, include whole grains & fiber, and avoid regularly eating high fat or \"junk\" foods? : Yes  Taking medications regularly:: Yes  Medication side effects:: Not applicable  Activities of Daily Living: no assistance needed  Home safety: no safety concerns identified  Hearing Impairment:: no hearing concerns  In the past 6 months, have you been bothered by leaking of urine?: Yes  abdominal pain: No  Blood in stool: No  Blood in urine: No  chest pain: No  chills: No  congestion: No  constipation: No  cough: No  diarrhea: No  dizziness: No  ear pain: No  eye pain: No  nervous/anxious: No  fever: No  frequency: No  genital sores: No  headaches: No  hearing loss: No  heartburn: No  arthralgias: Yes  joint swelling: No  peripheral edema: No  mood changes: No  myalgias: No  nausea: No  dysuria: No  palpitations: No  Skin sensation changes: No  sore throat: No  urgency: No  rash: No  shortness of breath: No  visual disturbance: No  weakness: No  impotence: No  penile discharge: No  In general, how would you rate your overall mental or emotional health?: fair  Additional concerns today:: Yes    "

## 2024-01-15 NOTE — PATIENT INSTRUCTIONS
Patient Education   Personalized Prevention Plan  You are due for the preventive services outlined below.  Your care team is available to assist you in scheduling these services.  If you have already completed any of these items, please share that information with your care team to update in your medical record.  Health Maintenance Due   Topic Date Due     URINE DRUG SCREEN  Never done     ANNUAL REVIEW OF HM ORDERS  Never done     Depression Action Plan  Never done     RSV VACCINE (Pregnancy & 60+) (1 - 1-dose 60+ series) Never done     Zoster (Shingles) Vaccine (3 of 3) 12/06/2019     Kidney Microalbumin Urine Test  05/14/2020     Diabetic Foot Exam  08/03/2022     COVID-19 Vaccine (3 - 2023-24 season) 09/01/2023     Cholesterol Lab  02/07/2024     Hemoglobin  02/16/2024     Your Health Risk Assessment indicates you feel you are not in good health    A healthy lifestyle helps keep the body fit and the mind alert. It helps protect you from disease, helps you fight disease, and helps prevent chronic disease (disease that doesn't go away) from getting worse. This is important as you get older and begin to notice twinges in muscles and joints and a decline in the strength and stamina you once took for granted. A healthy lifestyle includes good healthcare, good nutrition, weight control, recreation, and regular exercise. Avoid harmful substances and do what you can to keep safe. Another part of a healthy lifestyle is stay mentally active and socially involved.    Good healthcare   Have a wellness visit every year.   If you have new symptoms, let us know right away. Don't wait until the next checkup.   Take medicines exactly as prescribed and keep your medicines in a safe place. Tell us if your medicine causes problems.   Healthy diet and weight control   Eat 3 or 4 small, nutritious, low-fat, high-fiber meals a day. Include a variety of fruits, vegetables, and whole-grain foods.   Make sure you get enough calcium in  "your diet. Calcium, vitamin D, and exercise help prevent osteoporosis (bone thinning).   If you live alone, try eating with others when you can. That way you get a good meal and have company while you eat it.   Try to keep a healthy weight. If you eat more calories than your body uses for energy, it will be stored as fat and you will gain weight.     Recreation   Recreation is not limited to sports and team events. It includes any activity that provides relaxation, interest, enjoyment, and exercise. Recreation provides an outlet for physical, mental, and social energy. It can give a sense of worth and achievement. It can help you stay healthy.    Mental Exercise and Social Involvement  Mental and emotional health is as important as physical health. Keep in touch with friends and family. Stay as active as possible. Continue to learn and challenge yourself.   Things you can do to stay mentally active are:  Learn something new, like a foreign language or musical instrument.   Play SCRABBLE or do crossword puzzles. If you cannot find people to play these games with you at home, you can play them with others on your computer through the Internet.   Join a games club--anything from card games to chess or checkers or lawn bowling.   Start a new hobby.   Go back to school.   Volunteer.   Read.   Keep up with world events.  Learning About Being Physically Active  What is physical activity?     Being physically active means doing any kind of activity that gets your body moving.  The types of physical activity that can help you get fit and stay healthy include:  Aerobic or \"cardio\" activities. These make your heart beat faster and make you breathe harder, such as brisk walking, riding a bike, or running. They strengthen your heart and lungs and build up your endurance.  Strength training activities. These make your muscles work against, or \"resist,\" something. Examples include lifting weights or doing push-ups. These " activities help tone and strengthen your muscles and bones.  Stretches. These let you move your joints and muscles through their full range of motion. Stretching helps you be more flexible.  Reaching a balance between these three types of physical activity is important because each one contributes to your overall fitness.  What are the benefits of being active?  Being active is one of the best things you can do for your health. It helps you to:  Feel stronger and have more energy to do all the things you like to do.  Focus better at school or work.  Feel, think, and sleep better.  Reach and stay at a healthy weight.  Lose fat and build lean muscle.  Lower your risk for serious health problems, including diabetes, heart attack, high blood pressure, and some cancers.  Keep your heart, lungs, bones, muscles, and joints strong and healthy.  How can you make being active part of your life?  Start slowly. Make it your long-term goal to get at least 30 minutes of exercise on most days of the week. Walking is a good choice. You also may want to do other activities, such as running, swimming, cycling, or playing tennis or team sports.  Pick activities that you like--ones that make your heart beat faster, your muscles stronger, and your muscles and joints more flexible. If you find more than one thing you like doing, do them all. You don't have to do the same thing every day.  Get your heart pumping every day. Any activity that makes your heart beat faster and keeps it at that rate for a while counts.  Here are some great ways to get your heart beating faster:  Go for a brisk walk, run, or hike.  Go for a swim or bike ride.  Take an online exercise class or dance.  Play a game of touch football, basketball, or soccer.  Play tennis, pickleball, or racquetball.  Climb stairs.  Even some household chores can be aerobic. Just do them at a faster pace. Raking or mowing the lawn, sweeping the garage, and vacuuming and cleaning your  "home all can help get your heart rate up.  Strengthen your muscles during the week. You don't have to lift heavy weights or grow big, bulky muscles to get stronger. Doing a few simple activities that make your muscles work against, or \"resist,\" something can help you get stronger. Aim for at least twice a week.  For example, you can:  Do push-ups or sit-ups, which use your own body weight as resistance.  Lift weights or dumbbells or use stretch bands at home or in a gym or community center.  Stretch your muscles often. Stretching will help you as you become more active. It can help you stay flexible and loosen tight muscles. It can also help improve your balance and posture and can be a great way to relax.  Be sure to stretch the muscles you'll be using when you work out. It's best to warm your muscles slightly before you stretch them. Walk or do some other light aerobic activity for a few minutes. Then start stretching.  When you stretch your muscles:  Do it slowly. Stretching is not about going fast or making sudden movements.  Don't push or bounce during a stretch.  Hold each stretch for at least 15 to 30 seconds, if you can. You should feel a stretch in the muscle, but not pain.  Breathe out as you do the stretch. Then breathe in as you hold the stretch. Don't hold your breath.  If you're worried about how more activity might affect your health, have a checkup before you start. Follow any special advice your doctor gives you for getting a smart start.  Where can you learn more?  Go to https://www.[employer].net/patiented  Enter W332 in the search box to learn more about \"Learning About Being Physically Active.\"  Current as of: June 6, 2023               Content Version: 13.8    8620-7960 [employer].   Care instructions adapted under license by your healthcare professional. If you have questions about a medical condition or this instruction, always ask your healthcare professional. [employer] disclaims any warranty or liability for your use of this information.      Bladder Training: Care Instructions  Your Care Instructions     Bladder training is used to treat urge incontinence and stress incontinence. Urge incontinence means that the need to urinate comes on so fast that you can't get to a toilet in time. Stress incontinence means that you leak urine because of pressure on your bladder. For example, it may happen when you laugh, cough, or lift something heavy.  Bladder training can increase how long you can wait before you have to urinate. It can also help your bladder hold more urine. And it can give you better control over the urge to urinate.  It is important to remember that bladder training takes a few weeks to a few months to make a difference. You may not see results right away, but don't give up.  Follow-up care is a key part of your treatment and safety. Be sure to make and go to all appointments, and call your doctor if you are having problems. It's also a good idea to know your test results and keep a list of the medicines you take.  How can you care for yourself at home?  Work with your doctor to come up with a bladder training program that is right for you. You may use one or more of the following methods.  Delayed urination  In the beginning, try to keep from urinating for 5 minutes after you first feel the need to go.  While you wait, take deep, slow breaths to relax. Kegel exercises can also help you delay the need to go to the bathroom.  After some practice, when you can easily wait 5 minutes to urinate, try to wait 10 minutes before you urinate.  Slowly increase the waiting period until you are able to control when you have to urinate.  Scheduled urination  Empty your bladder when you first wake up in the morning.  Schedule times throughout the day when you will urinate.  Start by going to the bathroom every hour, even if you don't need to go.  Slowly increase the time  "between trips to the bathroom.  When you have found a schedule that works well for you, keep doing it.  If you wake up during the night and have to urinate, do it. Apply your schedule to waking hours only.  Kegel exercises  These tighten and strengthen pelvic muscles, which can help you control the flow of urine. (If doing these exercises causes pain, stop doing them and talk with your doctor.) To do Kegel exercises:  Squeeze your muscles as if you were trying not to pass gas. Or squeeze your muscles as if you were stopping the flow of urine. Your belly, legs, and buttocks shouldn't move.  Hold the squeeze for 3 seconds, then relax for 5 to 10 seconds.  Start with 3 seconds, then add 1 second each week until you are able to squeeze for 10 seconds.  Repeat the exercise 10 times a session. Do 3 to 8 sessions a day.  When should you call for help?  Watch closely for changes in your health, and be sure to contact your doctor if:    Your incontinence is getting worse.     You do not get better as expected.   Where can you learn more?  Go to https://www.[employer].net/patiented  Enter V684 in the search box to learn more about \"Bladder Training: Care Instructions.\"  Current as of: February 28, 2023               Content Version: 13.8    5273-3364 [employer].   Care instructions adapted under license by your healthcare professional. If you have questions about a medical condition or this instruction, always ask your healthcare professional. [employer] disclaims any warranty or liability for your use of this information.      Your Health Risk Assessment indicates you feel you are not in good emotional health.    Recreation   Recreation is not limited to sports and team events. It includes any activity that provides relaxation, interest, enjoyment, and exercise. Recreation provides an outlet for physical, mental, and social energy. It can give a sense of worth and achievement. It can help you stay " healthy.    Mental Exercise and Social Involvement  Mental and emotional health is as important as physical health. Keep in touch with friends and family. Stay as active as possible. Continue to learn and challenge yourself.   Things you can do to stay mentally active are:  Learn something new, like a foreign language or musical instrument.   Play SCRABBLE or do crossword puzzles. If you cannot find people to play these games with you at home, you can play them with others on your computer through the Internet.   Join a games club--anything from card games to chess or checkers or lawn bowling.   Start a new hobby.   Go back to school.   Volunteer.   Read.   Keep up with world events.

## 2024-01-18 ENCOUNTER — TELEPHONE (OUTPATIENT)
Dept: FAMILY MEDICINE | Facility: CLINIC | Age: 84
End: 2024-01-18
Payer: COMMERCIAL

## 2024-01-18 DIAGNOSIS — G47.00 INSOMNIA, UNSPECIFIED TYPE: Primary | ICD-10-CM

## 2024-01-18 RX ORDER — TRAZODONE HYDROCHLORIDE 50 MG/1
25-50 TABLET, FILM COATED ORAL
Qty: 90 TABLET | Refills: 3 | Status: SHIPPED | OUTPATIENT
Start: 2024-01-18 | End: 2024-04-01 | Stop reason: DRUGHIGH

## 2024-01-18 NOTE — TELEPHONE ENCOUNTER
General Call    Contacts         Type Contact Phone/Fax    01/18/2024 10:35 AM CST Phone (Incoming) Dimitri Talamantes (Emergency Contact) [phone]     [facility] on file'  OK to leave detailed VM          Reason for Call: Medication request    What are your questions or concerns:    Patient's daughterDimitri ([facility] on file) calling clinic because patient has not sleep for 3 days now, after Dr. Espino had discontinued his Trazodone. Daughter states the doctor wanted to stop Trazodone to see if it will help with pt's depression. However, when patient stopped the Trazodone, he was unable to sleep (3 days now).    Daughter would like to ask if Dr. Espino is able to prescribe him something else to help him sleep or has any other recommendation for the family? The past 3 days have been really difficult for family and patient as patient has not gotten any sleep.    Date of last appointment with provider: 1/15/2024    Okay to leave a detailed message?: Yes at Other phone number: Dimitri [phone].    Routing message to Dr. Espino to review and advise.      JONATHAN GarrettN, RN   [facility]

## 2024-01-18 NOTE — TELEPHONE ENCOUNTER
Called emergency contact, Albin Dimitri with no answer.  DM left on voice mail with provider recommendation below provided.    Okay to leave a detailed message?: Yes at Other phone number: Dimitri [phone].     Restart trazodone for now to get him sleeping again.  As long as there are no issues or problems then we can discuss at the next visit alternatives.  I did send a prescription for the trazodone to his pharmacy.       JONATHAN NicholsN, RN  [facility]

## 2024-01-18 NOTE — TELEPHONE ENCOUNTER
Restart trazodone for now to get him sleeping again.  As long as there are no issues or problems then we can discuss at the next visit alternatives.  I did send a prescription for the trazodone to his pharmacy.

## 2024-01-22 NOTE — PROGRESS NOTES
Virtual Visit Details    Type of service:  Video Visit   Video Start Time: 9:04 AM  Video End Time:9:29 AM    Originating Location (pt. Location): Home    Distant Location (provider location):  On-site  Platform used for Video Visit: Nigel    __________________________________________________________      [facility] - [place]   [phone]  __________________________________________________________           History of Present Illness   Chief Complaint: Patient presents with:  Stroke      Adam Talamantes is a 83 year old male presenting for follow-up for multiple posterior circulation ischemic strokes. His daughter Dimitri was also present for the visit. Kayce  via phone.     He was initially hospitalized at [facility] on 1/11/23. Prior to the hospital stay, he had a past medical history of DM2, CKD, HTN, HLD. He presented to the hospital with 1 week of dizziness, bitemporal headache, tinnitus. 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 MRA without evidence of dissection. He was discharged on DAPT x 21 days.      He returned to the [facility] ED 2/1/23 for evaluation of vertigo, nausea, vomiting. CTA with ongoing L vert/basilar findings and MRI now with scattered acute to subacute infarcts of bilateral cerebellar hemispheres and L thalamus. Stroke code was called 2/4/23 following LOC during urination; MRI with increased stroke burden; he was initiated on heparin drip and ultimately transferred to [facility] on 2/7/23 for consideration of stenting. Repeat MRI without increasing stroke burden and given poor baseline (mRS 3) and clinical stability since initiating anticoagulation, it was ultimately decided not to pursue stenting as risk was felt to outweigh any potential benefit. Hospitalization was complicated by urinary retention and intermittent hematuria/clots in urine. He was discharged on  "ASA 81mg + Eliquis with plan for further neuro IR and stroke follow up in clinic.      He was seen by Dr. [place] of neuro IR 3/14/23; at this time he reported ongoing stability. It was recommended that he undergo repeat MRA/MRI to assess for interval change, with no immediate plan for stenting unless there was evidence of treatment failure with Eliquis and ASA. He met with Dr. [place] again 4/4/23; plan is to continue with aspirin, apixaban, and rosuvastatin. He had a repeat MRI/MRA in 10/2023 that appears stable overall. He will follow up with IR again in one year with MRA prior.      His family report that things have been stable. He sleeps quite a bit.  He continues to have \"dizziness\"; when asked to describe the symptoms he does not have any room spinning, it is described more as lightheadedness.  He has tried meclizine but doesn't find it especially helpful. He is still using a walker for ambulation but requires standby assistance and does utilize a wheelchair for long distances. His family report that he doesn't go out much in the winter.  He spends most of his time in his chair.  He is no longer getting therapies; and he is not motivated to do his home exercise program despite reminders to do so.  He does not get much physical activity.  He naps a lot.  He does not sleep well at night even if he gets trazodone.  They took him off the trazodone but that made things worse.  They have tried melatonin in the past, that does not work.  In the warmer months, they take him outside fairly frequently.  He is unable to do anything on his own. Family is providing assistance with ADLs. He gets ~10 PCA hours/day.  Kapoh reports that his memory is getting worse.  Last summer, he had hematuria and nosebleeds; his baby aspirin was stopped and he has not had any further issues.  His daughter reports that his blood sugars have been okay but they have been having issues with his CGM sensor staying on.  When asked " questions he defers to his daughter most of the time.  She says this is very common; he will defer to his daughter or wife when asked any question.  His mood has been especially poor in the last month.  He was crying all the time; his duloxetine dose was increased which seems to have helped.  However, he is still lacking in motivation, seems depressed and still cries quite a bit.  I question if he has pseudobulbar affect.    Stroke Evaluation Summarized:    (Studies personally re-reviewed by me today or new results resulted and reviewed by me today are in BOLD below)    MRI 2/9: no evidence of new infarcts   2/4: significant increase in stroke burden of R>L cerebellar hemispheres plus new infarct in R lizeth  2/1: multiple acute/subacute infarcts in bilateral cerebellar hemisphere and L thalamic infarct of similar appearing age; moderate chronic small vessel disease, no hydrocephalus, significant mass effect or hemorrhagic transformation    Intracranial/cervical Vasculature CTA 2/6: no significant interval change   CTA 2/1: occlusion of L V4 and P PCA with 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, moderate L P2 stenosis; similar to CTA dated 1/11/23      Echocardiogram SHIRLEY 1/11: LVEF 55-60%, mild concentric LV hypertrophy, no regional wall motion abnormality, atria of normal size bilaterally    EKG/Telemetry SR      LDL  61   A1C  7.6   Additional diagnostics following discharge:  MRI 3/20/23: no acute/new infarction, expected evolution of multiple posterior circulation infarcts without evidence of mass effect or hemorrhagic transformation   MRA 3/20/23: no significant interval change; 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 and critical 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   MRI/MRA 10/2/23: No acute infarct, chronic lizeth and cerebellar infarcts, mild diffuse parenchymal volume loss and white matter changes due to chronic small vessel ischemic disease, stable severe stenosis/occlusion of the basilar  artery, no flow visualized in the left intracranial vertebral artery, left PCA vessels are supplied by the pcomm, moderate left P2 stenosis, no new vascular cut off of the proximal ACAs or MCAs, moderate right paraclinoid ICA stenosis    Modified [place] Scale  Score: 4-Moderately severe disability; unable to walk without assistance and unable to attend to own bodily           Home Medications     Outpatient Medications Marked as Taking for the 1/24/24 encounter (Virtual Visit) with Emily Mcmanus NP   Medication Sig    acetaminophen (TYLENOL) 500 MG tablet Take 1 tablet (500 mg) by mouth every 4 hours as needed for mild pain    allopurinol (ZYLOPRIM) 100 MG tablet TAKE 2 TABLETS(200 MG) BY MOUTH DAILY    calcium carbonate (TUMS) 500 MG chewable tablet Take 1 chew tab by mouth daily as needed for heartburn    celecoxib (CELEBREX) 200 MG capsule Take 1 capsule (200 mg) by mouth daily as needed for pain (jaw pain, joint pain)    Continuous Blood Gluc Sensor (FREESTYLE SUSANNAH 2 SENSOR) MISC 1 each every 14 days Use 1 sensor every 14 days. Use to read blood sugars per 's instructions.    dextromethorphan-quiNIDine (NUEDEXTA) 20-10 MG capsule Take 1 capsule by mouth daily for 7 days, THEN 1 capsule every 12 hours for 23 days.    DULoxetine (CYMBALTA) 30 MG capsule TAKE 1 CAPSULE(30 MG) BY MOUTH TWICE DAILY    ELIQUIS ANTICOAGULANT 5 MG tablet TAKE 1 TABLET(5 MG) BY MOUTH TWICE DAILY    hydrOXYzine (ATARAX) 25 MG tablet Take 1 tablet (25 mg) by mouth every 8 hours as needed for itching    insulin aspart (NOVOLOG FLEXPEN) 100 UNIT/ML pen Inject 6 units under the skin 3 times daily before meals as directed.    insulin glargine (LANTUS PEN) 100 UNIT/ML pen Inject 20 Units Subcutaneous At Bedtime    insulin pen needle (32G X 4 MM) 32G X 4 MM miscellaneous Use 1 pen needles  4 times daily or as directed.    meclizine (ANTIVERT) 12.5 MG tablet Take 1 tablet (12.5 mg) by mouth 3 times daily as needed for dizziness     metoprolol succinate ER (TOPROL XL) 100 MG 24 hr tablet Take 0.5 tablets (50 mg) by mouth daily Take 1/2 tablet  daily    pantoprazole (PROTONIX) 40 MG EC tablet Take 1 tablet (40 mg) by mouth every morning (before breakfast)    polyethylene glycol (MIRALAX) 17 GM/Dose powder Take 17 g (1 capful.) by mouth daily as needed for constipation    rosuvastatin (CRESTOR) 20 MG tablet TAKE 1 TABLET(20 MG) BY MOUTH AT BEDTIME    SENNA-docusate sodium (SENNA S) 8.6-50 MG tablet Take 1 tablet by mouth 2 times daily as needed (for constipation)    sitagliptin (JANUVIA) 50 MG tablet Take 1 tablet (50 mg) by mouth daily    tamsulosin (FLOMAX) 0.4 MG capsule Take 1 capsule (0.4 mg) by mouth daily    traZODone (DESYREL) 50 MG tablet Take 0.5-1 tablets (25-50 mg) by mouth nightly as needed for sleep    triamcinolone (KENALOG) 0.1 % external cream Apply topically 2 times daily            Physical Examination     Vitals:            BMI Readings from Last 1 Encounters:   01/24/24 28.90 kg/m        Neurologic: Completed via telemedicine video call  Mental Status:  alert, oriented to person and place, speech clear and fluent; he is sitting in a recliner covered in blankets  Cranial Nerves:  facial movements appear symmetric, hearing not formally tested but intact to conversation, no dysarthria  Motor:  no abnormal movements        Assessment and Plan       1. 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  -Ongoing follow up with neuro IR regarding possible stent  placement; surgical management is not anticipated if patient remains clinically and radiographically stable on current medical management. He will have repeat imaging and clinic follow up in Oct 2024  -For his possible pseudobulbar affect, nuedexta 1 capsule daily for 7 days followed by 1 capsule twice daily; I will follow-up with his daughter in a month to see if she notices any difference  -Follow-up in stroke clinic on an as-needed basis    Stroke Education provided.  He will call us with any questions.  For any acute neurologic deficits he was advised to  go directly to the hospital rather than call the clinic.      Emily Mcmanus NP  Neurology  01/24/2024       Billing:    I spent a total of 40 minutes on the day of the visit.   Time spent by me doing chart review, history and exam, documentation and further activities per the note

## 2024-01-24 ENCOUNTER — VIRTUAL VISIT (OUTPATIENT)
Dept: NEUROLOGY | Facility: CLINIC | Age: 84
End: 2024-01-24
Payer: COMMERCIAL

## 2024-01-24 VITALS — HEIGHT: 62 IN | BODY MASS INDEX: 29.08 KG/M2 | WEIGHT: 158 LBS

## 2024-01-24 DIAGNOSIS — I63.22 CEREBROVASCULAR ACCIDENT (CVA) DUE TO STENOSIS OF BASILAR ARTERY (H): Primary | ICD-10-CM

## 2024-01-24 DIAGNOSIS — F48.2 PSEUDOBULBAR AFFECT: ICD-10-CM

## 2024-01-24 PROCEDURE — 99215 OFFICE O/P EST HI 40 MIN: CPT | Mod: 95 | Performed by: NURSE PRACTITIONER

## 2024-01-24 NOTE — NURSING NOTE
Is the patient currently in the state of [place]? YES    Visit mode:VIDEO    If the visit is dropped, the patient can be reconnected by: VIDEO VISIT: Text to cell phone:   Telephone Information:   Mobile [phone]       Will anyone else be joining the visit? NO  (If patient encounters technical issues they should call [phone]    How would you like to obtain your AVS? Mail a copy    Are changes needed to the allergy or medication list? No    Reason for visit: Stroke    Judy Up MA

## 2024-01-24 NOTE — PATIENT INSTRUCTIONS
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  -Ongoing follow up with neuro IR regarding possible stent placement; surgical management is not anticipated if patient remains clinically and radiographically stable on current medical management. He will have repeat imaging and clinic follow up in Oct 2024  -For his possible pseudobulbar affect, nuedexta 1 capsule daily for 7 days followed by 1 capsule twice daily; I will follow-up with Dimitri in ~ 1 month to see if it has been helpful  -Follow-up in stroke clinic on an as-needed basis

## 2024-01-24 NOTE — LETTER
1/24/2024         RE: Adam Talamantes  [address]  [place] [address]        Dear Colleague,    Thank you for referring your patient, Adam Talamantes, to the [facility] NEUROLOGY [facility]. Please see a copy of my visit note below.    Virtual Visit Details    Type of service:  Video Visit   Video Start Time: 9:04 AM  Video End Time:9:29 AM    Originating Location (pt. Location): Home    Distant Location (provider location):  On-site  Platform used for Video Visit: Kontest    __________________________________________________________      [facility] Neurology [facility]   [phone]  __________________________________________________________           History of Present Illness   Chief Complaint: Patient presents with:  Stroke      Adam Talamantes is a 83 year old male presenting for follow-up for multiple posterior circulation ischemic strokes. His daughter Dimitri was also present for the visit. Kayce  via phone.     He was initially hospitalized at [facility] on 1/11/23. Prior to the hospital stay, he had a past medical history of DM2, CKD, HTN, HLD. He presented to the hospital with 1 week of dizziness, bitemporal headache, tinnitus. 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 MRA without evidence of dissection. He was discharged on DAPT x 21 days.      He returned to the [facility] ED 2/1/23 for evaluation of vertigo, nausea, vomiting. CTA with ongoing L vert/basilar findings and MRI now with scattered acute to subacute infarcts of bilateral cerebellar hemispheres and L thalamus. Stroke code was called 2/4/23 following LOC during urination; MRI with increased stroke burden; he was initiated on heparin drip and ultimately transferred to [facility] on 2/7/23 for consideration of stenting. Repeat MRI without increasing stroke burden and given poor baseline (mRS 3) and clinical stability  "since initiating anticoagulation, it was ultimately decided not to pursue stenting as risk was felt to outweigh any potential benefit. Hospitalization was complicated by urinary retention and intermittent hematuria/clots in urine. He was discharged on ASA 81mg + Eliquis with plan for further neuro IR and stroke follow up in clinic.      He was seen by Dr. [place] of neuro IR 3/14/23; at this time he reported ongoing stability. It was recommended that he undergo repeat MRA/MRI to assess for interval change, with no immediate plan for stenting unless there was evidence of treatment failure with Eliquis and ASA. He met with Dr. [place] again 4/4/23; plan is to continue with aspirin, apixaban, and rosuvastatin. He had a repeat MRI/MRA in 10/2023 that appears stable overall. He will follow up with IR again in one year with MRA prior.      His family report that things have been stable. He sleeps quite a bit.  He continues to have \"dizziness\"; when asked to describe the symptoms he does not have any room spinning, it is described more as lightheadedness.  He has tried meclizine but doesn't find it especially helpful. He is still using a walker for ambulation but requires standby assistance and does utilize a wheelchair for long distances. His family report that he doesn't go out much in the winter.  He spends most of his time in his chair.  He is no longer getting therapies; and he is not motivated to do his home exercise program despite reminders to do so.  He does not get much physical activity.  He naps a lot.  He does not sleep well at night even if he gets trazodone.  They took him off the trazodone but that made things worse.  They have tried melatonin in the past, that does not work.  In the warmer months, they take him outside fairly frequently.  He is unable to do anything on his own. Family is providing assistance with ADLs. He gets ~10 PCA hours/day.  Kapoh reports that his memory is getting worse.  Last " summer, he had hematuria and nosebleeds; his baby aspirin was stopped and he has not had any further issues.  His daughter reports that his blood sugars have been okay but they have been having issues with his CGM sensor staying on.  When asked questions he defers to his daughter most of the time.  She says this is very common; he will defer to his daughter or wife when asked any question.  His mood has been especially poor in the last month.  He was crying all the time; his duloxetine dose was increased which seems to have helped.  However, he is still lacking in motivation, seems depressed and still cries quite a bit.  I question if he has pseudobulbar affect.    Stroke Evaluation Summarized:    (Studies personally re-reviewed by me today or new results resulted and reviewed by me today are in BOLD below)    MRI 2/9: no evidence of new infarcts   2/4: significant increase in stroke burden of R>L cerebellar hemispheres plus new infarct in R lizeth  2/1: multiple acute/subacute infarcts in bilateral cerebellar hemisphere and L thalamic infarct of similar appearing age; moderate chronic small vessel disease, no hydrocephalus, significant mass effect or hemorrhagic transformation    Intracranial/cervical Vasculature CTA 2/6: no significant interval change   CTA 2/1: occlusion of L V4 and P PCA with 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, moderate L P2 stenosis; similar to CTA dated 1/11/23      Echocardiogram SHIRLEY 1/11: LVEF 55-60%, mild concentric LV hypertrophy, no regional wall motion abnormality, atria of normal size bilaterally    EKG/Telemetry SR      LDL  61   A1C  7.6   Additional diagnostics following discharge:  MRI 3/20/23: no acute/new infarction, expected evolution of multiple posterior circulation infarcts without evidence of mass effect or hemorrhagic transformation   MRA 3/20/23: no significant interval change; 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 and critical stenosis/near occlusion of basilar  artery   MRI/MRA 10/2/23: No acute infarct, chronic lizeth and cerebellar infarcts, mild diffuse parenchymal volume loss and white matter changes due to chronic small vessel ischemic disease, stable severe stenosis/occlusion of the basilar artery, no flow visualized in the left intracranial vertebral artery, left PCA vessels are supplied by the pcomm, moderate left P2 stenosis, no new vascular cut off of the proximal ACAs or MCAs, moderate right paraclinoid ICA stenosis    Modified [place] Scale  Score: 4-Moderately severe disability; unable to walk without assistance and unable to attend to own bodily           Home Medications     Outpatient Medications Marked as Taking for the 1/24/24 encounter (Virtual Visit) with Emily Mcmanus NP   Medication Sig     acetaminophen (TYLENOL) 500 MG tablet Take 1 tablet (500 mg) by mouth every 4 hours as needed for mild pain     allopurinol (ZYLOPRIM) 100 MG tablet TAKE 2 TABLETS(200 MG) BY MOUTH DAILY     calcium carbonate (TUMS) 500 MG chewable tablet Take 1 chew tab by mouth daily as needed for heartburn     celecoxib (CELEBREX) 200 MG capsule Take 1 capsule (200 mg) by mouth daily as needed for pain (jaw pain, joint pain)     Continuous Blood Gluc Sensor (FREESTYLE SUSANNAH 2 SENSOR) MISC 1 each every 14 days Use 1 sensor every 14 days. Use to read blood sugars per 's instructions.     dextromethorphan-quiNIDine (NUEDEXTA) 20-10 MG capsule Take 1 capsule by mouth daily for 7 days, THEN 1 capsule every 12 hours for 23 days.     DULoxetine (CYMBALTA) 30 MG capsule TAKE 1 CAPSULE(30 MG) BY MOUTH TWICE DAILY     ELIQUIS ANTICOAGULANT 5 MG tablet TAKE 1 TABLET(5 MG) BY MOUTH TWICE DAILY     hydrOXYzine (ATARAX) 25 MG tablet Take 1 tablet (25 mg) by mouth every 8 hours as needed for itching     insulin aspart (NOVOLOG FLEXPEN) 100 UNIT/ML pen Inject 6 units under the skin 3 times daily before meals as directed.     insulin glargine (LANTUS PEN) 100 UNIT/ML pen Inject 20  Units Subcutaneous At Bedtime     insulin pen needle (32G X 4 MM) 32G X 4 MM miscellaneous Use 1 pen needles  4 times daily or as directed.     meclizine (ANTIVERT) 12.5 MG tablet Take 1 tablet (12.5 mg) by mouth 3 times daily as needed for dizziness     metoprolol succinate ER (TOPROL XL) 100 MG 24 hr tablet Take 0.5 tablets (50 mg) by mouth daily Take 1/2 tablet  daily     pantoprazole (PROTONIX) 40 MG EC tablet Take 1 tablet (40 mg) by mouth every morning (before breakfast)     polyethylene glycol (MIRALAX) 17 GM/Dose powder Take 17 g (1 capful.) by mouth daily as needed for constipation     rosuvastatin (CRESTOR) 20 MG tablet TAKE 1 TABLET(20 MG) BY MOUTH AT BEDTIME     SENNA-docusate sodium (SENNA S) 8.6-50 MG tablet Take 1 tablet by mouth 2 times daily as needed (for constipation)     sitagliptin (JANUVIA) 50 MG tablet Take 1 tablet (50 mg) by mouth daily     tamsulosin (FLOMAX) 0.4 MG capsule Take 1 capsule (0.4 mg) by mouth daily     traZODone (DESYREL) 50 MG tablet Take 0.5-1 tablets (25-50 mg) by mouth nightly as needed for sleep     triamcinolone (KENALOG) 0.1 % external cream Apply topically 2 times daily            Physical Examination     Vitals:            BMI Readings from Last 1 Encounters:   01/24/24 28.90 kg/m        Neurologic: Completed via telemedicine video call  Mental Status:  alert, oriented to person and place, speech clear and fluent; he is sitting in a recliner covered in blankets  Cranial Nerves:  facial movements appear symmetric, hearing not formally tested but intact to conversation, no dysarthria  Motor:  no abnormal movements        Assessment and Plan       1. 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  -Ongoing follow up with neuro IR regarding possible stent placement; surgical management is not anticipated if patient remains clinically and radiographically stable on current medical management. He will have repeat imaging and clinic follow up in Oct 2024  -For his possible pseudobulbar affect, nuedexta 1 capsule daily for 7 days followed by 1 capsule twice daily; I will follow-up with his daughter in a month to see if she notices any difference  -Follow-up in stroke clinic on an as-needed basis    Stroke Education provided.  He will call us with any questions.  For any acute neurologic deficits he was advised to  go directly to the hospital rather than call the clinic.      Emily Mcmanus NP  Neurology  01/24/2024       Billing:    I spent a total of 40 minutes on the day of the visit.   Time spent by me doing chart review, history and exam, documentation and further activities per the note        Again, thank you for allowing me to participate in the care of your patient.        Sincerely,        Emily Mcmanus NP

## 2024-01-25 ENCOUNTER — PATIENT OUTREACH (OUTPATIENT)
Dept: GERIATRIC MEDICINE | Facility: CLINIC | Age: 84
End: 2024-01-25
Payer: COMMERCIAL

## 2024-01-25 ASSESSMENT — ACTIVITIES OF DAILY LIVING (ADL): DEPENDENT_IADLS:: CLEANING;COOKING;LAUNDRY;SHOPPING;MEAL PREPARATION;TRANSPORTATION

## 2024-01-25 NOTE — Clinical Note
Hey! Just a FYI Adam was seen yesterday there were no major changes. PCA hours will stay the same.  Thanks, Kristi  show

## 2024-01-31 DIAGNOSIS — I63.22 CEREBROVASCULAR ACCIDENT (CVA) DUE TO STENOSIS OF BASILAR ARTERY (H): ICD-10-CM

## 2024-01-31 RX ORDER — APIXABAN 5 MG/1
5 TABLET, FILM COATED ORAL 2 TIMES DAILY
Qty: 60 TABLET | Refills: 6 | OUTPATIENT
Start: 2024-01-31

## 2024-02-02 ENCOUNTER — PATIENT OUTREACH (OUTPATIENT)
Dept: GERIATRIC MEDICINE | Facility: CLINIC | Age: 84
End: 2024-02-02
Payer: COMMERCIAL

## 2024-02-02 NOTE — LETTER
February 2, 2024      ASIF LUCAS WINIFRED  [address]  [facility] [address]      Dear Asif:    At [facility], we re dedicated to improving your health and wellness. Enclosed is the Care Plan developed with you on 1/24/24. Please review the Care Plan carefully.    As a reminder, during your visit we talked about:  Ways to manage your physical and mental health  Using health care to maintain and improve your health   Your preventive care needs     Remember to contact your care coordinator if you:  Are hospitalized, or plan to be hospitalized   Have a fall    Have a change in your physical or mental health  Need help finding support or services    If you have questions, or don t agree with your Care Plan, call me at [phone]. You can also call me if your needs change. TTY users, call the [place] [employer] at (757) or 1-368.577.8232 (smzoku-wp-vlqdac relay service).    Sincerely,    Kristi Calzada RN, BSN, PHN  [phone]  [email or web address] ([facility]) is a health plan that contracts with both Medicare and the [place] Medical Assistance (Medicaid) program to provide benefits of both programs to enrollees. Enrollment in [facility] depends on contract renewal.    [id number] accepted    [id number] (07/2022)

## 2024-02-02 NOTE — PROGRESS NOTES
[facility] Care Coordination Contact    Received after visit chart from care coordinator.  Completed following tasks: Mailed copy of care plan/support plan to member, Mailed Safe Medication Disposal , and Updated services in Database    [facility]:  Emailed required PCA documents to [facility].  Faxed copy of PCA assessment to PCA Agency and mailed copy to member.  Faxed MD Communication to PCP.     Wang Russell  [facility]  Case Management Specialist  [phone]

## 2024-02-10 DIAGNOSIS — E11.65 TYPE 2 DIABETES MELLITUS WITH HYPERGLYCEMIA, WITHOUT LONG-TERM CURRENT USE OF INSULIN (H): ICD-10-CM

## 2024-02-12 RX ORDER — PEN NEEDLE, DIABETIC 32GX 5/32"
NEEDLE, DISPOSABLE MISCELLANEOUS
Qty: 400 EACH | Refills: 0 | Status: SHIPPED | OUTPATIENT
Start: 2024-02-12 | End: 2024-06-06

## 2024-02-20 DIAGNOSIS — F48.2 PSEUDOBULBAR AFFECT: ICD-10-CM

## 2024-02-20 DIAGNOSIS — I63.22 CEREBROVASCULAR ACCIDENT (CVA) DUE TO STENOSIS OF BASILAR ARTERY (H): ICD-10-CM

## 2024-02-20 NOTE — TELEPHONE ENCOUNTER
Received fax from [facility] for refill request.       Pending Prescriptions:                       Disp   Refills    dextromethorphan-quiNIDine (NUEDEXTA) 20-*53 cap*0            Sig: Take 1 capsule by mouth daily for 7 days, THEN 1           capsule every 12 hours for 23 days.    Per 1/24 OV Note:  -For his possible pseudobulbar affect, nuedexta 1 capsule daily for 7 days followed by 1 capsule twice daily; I will follow-up with his daughter in a month to see if she notices any difference     Please advise on refill.     Kusum LAUREN RN, BSN  [facility] Neurology

## 2024-02-22 DIAGNOSIS — E11.22 TYPE 2 DIABETES MELLITUS WITH STAGE 3 CHRONIC KIDNEY DISEASE, WITHOUT LONG-TERM CURRENT USE OF INSULIN, UNSPECIFIED WHETHER STAGE 3A OR 3B CKD (H): ICD-10-CM

## 2024-02-22 DIAGNOSIS — N18.30 TYPE 2 DIABETES MELLITUS WITH STAGE 3 CHRONIC KIDNEY DISEASE, WITHOUT LONG-TERM CURRENT USE OF INSULIN, UNSPECIFIED WHETHER STAGE 3A OR 3B CKD (H): ICD-10-CM

## 2024-02-22 RX ORDER — SITAGLIPTIN 50 MG/1
50 TABLET, FILM COATED ORAL DAILY
Qty: 90 TABLET | Refills: 3 | Status: SHIPPED | OUTPATIENT
Start: 2024-02-22

## 2024-02-26 ENCOUNTER — LAB (OUTPATIENT)
Dept: LAB | Facility: CLINIC | Age: 84
End: 2024-02-26
Payer: COMMERCIAL

## 2024-02-26 ENCOUNTER — OFFICE VISIT (OUTPATIENT)
Dept: PHARMACY | Facility: CLINIC | Age: 84
End: 2024-02-26
Payer: COMMERCIAL

## 2024-02-26 VITALS
HEART RATE: 88 BPM | OXYGEN SATURATION: 96 % | DIASTOLIC BLOOD PRESSURE: 78 MMHG | WEIGHT: 161.5 LBS | SYSTOLIC BLOOD PRESSURE: 106 MMHG | BODY MASS INDEX: 29.54 KG/M2

## 2024-02-26 DIAGNOSIS — R41.3 MEMORY LOSS: ICD-10-CM

## 2024-02-26 DIAGNOSIS — I63.22 CEREBROVASCULAR ACCIDENT (CVA) DUE TO STENOSIS OF BASILAR ARTERY (H): ICD-10-CM

## 2024-02-26 DIAGNOSIS — I10 ESSENTIAL HYPERTENSION WITH GOAL BLOOD PRESSURE LESS THAN 140/90: ICD-10-CM

## 2024-02-26 DIAGNOSIS — L29.9 ITCHING: ICD-10-CM

## 2024-02-26 DIAGNOSIS — G47.00 INSOMNIA, UNSPECIFIED TYPE: Primary | ICD-10-CM

## 2024-02-26 DIAGNOSIS — E11.65 TYPE 2 DIABETES MELLITUS WITH HYPERGLYCEMIA, WITHOUT LONG-TERM CURRENT USE OF INSULIN (H): ICD-10-CM

## 2024-02-26 DIAGNOSIS — K21.9 GASTROESOPHAGEAL REFLUX DISEASE, UNSPECIFIED WHETHER ESOPHAGITIS PRESENT: ICD-10-CM

## 2024-02-26 DIAGNOSIS — M1A.00X0 CHRONIC GOUTY ARTHROPATHY: ICD-10-CM

## 2024-02-26 DIAGNOSIS — K59.00 CONSTIPATION, UNSPECIFIED CONSTIPATION TYPE: ICD-10-CM

## 2024-02-26 LAB
ALBUMIN SERPL BCG-MCNC: 4.2 G/DL (ref 3.5–5.2)
ALP SERPL-CCNC: 96 U/L (ref 40–150)
ALT SERPL W P-5'-P-CCNC: 18 U/L (ref 0–70)
ANION GAP SERPL CALCULATED.3IONS-SCNC: 13 MMOL/L (ref 7–15)
AST SERPL W P-5'-P-CCNC: 21 U/L (ref 0–45)
BILIRUB SERPL-MCNC: 0.3 MG/DL
BUN SERPL-MCNC: 24.8 MG/DL (ref 8–23)
CALCIUM SERPL-MCNC: 10 MG/DL (ref 8.8–10.2)
CHLORIDE SERPL-SCNC: 100 MMOL/L (ref 98–107)
CREAT SERPL-MCNC: 1.85 MG/DL (ref 0.67–1.17)
DEPRECATED HCO3 PLAS-SCNC: 25 MMOL/L (ref 22–29)
EGFRCR SERPLBLD CKD-EPI 2021: 36 ML/MIN/1.73M2
GLUCOSE SERPL-MCNC: 256 MG/DL (ref 70–99)
HBA1C MFR BLD: 8.8 % (ref 0–5.6)
POTASSIUM SERPL-SCNC: 4.2 MMOL/L (ref 3.4–5.3)
PROT SERPL-MCNC: 8.4 G/DL (ref 6.4–8.3)
SODIUM SERPL-SCNC: 138 MMOL/L (ref 135–145)
T PALLIDUM AB SER QL: NONREACTIVE
TSH SERPL DL<=0.005 MIU/L-ACNC: 1.29 UIU/ML (ref 0.3–4.2)
VIT B12 SERPL-MCNC: 811 PG/ML (ref 232–1245)

## 2024-02-26 PROCEDURE — 99607 MTMS BY PHARM ADDL 15 MIN: CPT | Performed by: PHARMACIST

## 2024-02-26 PROCEDURE — 82607 VITAMIN B-12: CPT

## 2024-02-26 PROCEDURE — 86780 TREPONEMA PALLIDUM: CPT

## 2024-02-26 PROCEDURE — 99605 MTMS BY PHARM NP 15 MIN: CPT | Performed by: PHARMACIST

## 2024-02-26 PROCEDURE — 83036 HEMOGLOBIN GLYCOSYLATED A1C: CPT

## 2024-02-26 PROCEDURE — 80053 COMPREHEN METABOLIC PANEL: CPT

## 2024-02-26 PROCEDURE — 36415 COLL VENOUS BLD VENIPUNCTURE: CPT

## 2024-02-26 PROCEDURE — 84443 ASSAY THYROID STIM HORMONE: CPT

## 2024-02-26 NOTE — LETTER
February 27, 2024  Adam Talamantes  [address]  [facility] [address]    Dear Mr. TalamantesSHAYY [facility]     Thank you for talking with me on Feb 26, 2024 about your health and medications. As a follow-up to our conversation, I have included two documents:      Your Recommended To-Do List has steps you should take to get the best results from your medications.  Your Medication List will help you keep track of your medications and how to take them.    If you want to talk about these documents, please call Pushpa Philip PharmD at phone: [phone], Monday-Friday 8-4:30pm.    I look forward to working with you and your doctors to make sure your medications work well for you.    Sincerely,  Pushpa Philip, PharmD  [facility] Pharmacist, [facility]

## 2024-02-26 NOTE — LETTER
_  Medication List        Prepared on: 02/27/2024     Bring your Medication List when you go to the doctor, hospital, or   emergency room. And, share it with your family or caregivers.     Note any changes to how you take your medications.  Cross out medications when you no longer use them.    Medication How I take it Why I use it Prescriber   acetaminophen (TYLENOL) 500 MG tablet Take 1 tablet (500 mg) by mouth every 4 hours as needed for mild pain Pain Chucho Espino MD   allopurinol (ZYLOPRIM) 100 MG tablet TAKE 2 TABLETS(200 MG) BY MOUTH DAILY Chronic gout of multiple sites, unspecified cause Chucho Espino MD   calcium carbonate (TUMS) 500 MG chewable tablet Take 1 chew tab by mouth daily as needed for heartburn Heartburn Patient Reported   celecoxib (CELEBREX) 200 MG capsule Take 1 capsule (200 mg) by mouth daily as needed for pain (jaw pain, joint pain) Jaw pain Chucho Espino MD   Continuous Blood Gluc Sensor (FREESTYLE SUSANNAH 2 SENSOR) MISC 1 each every 14 days Use 1 sensor every 14 days. Use to read blood sugars per 's instructions. 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 Type 2 diabetes mellitus with hyperglycemia, without long-term current use of insulin (H) Chucho Espino MD   insulin glargine (LANTUS PEN) 100 UNIT/ML pen Inject 24 Units Subcutaneous at bedtime Type 2 diabetes mellitus with hyperglycemia, without long-term current use of insulin (H) Chucho Espino MD   insulin pen needle (BD PEN NEEDLE SALVATORE 2ND GEN) 32G X 4 MM miscellaneous USE 1 PEN NEEDLE FOUR TIMES DAILY OR AS DIRECTED Type 2 diabetes mellitus with hyperglycemia, without long-term current use of insulin (H) Chucho Espino MD   JANUVIA 50 MG tablet TAKE 1 TABLET(50 MG) BY MOUTH DAILY Type 2 diabetes mellitus with stage 3 chronic kidney disease, without long-term current use of insulin, unspecified whether stage 3a or 3b CKD (H) Chucho Espino MD   meclizine (ANTIVERT) 12.5 MG tablet Take 1 tablet (12.5 mg) by mouth 3 times daily as needed for dizziness Dizziness Chucho Espino MD   metoprolol succinate ER (TOPROL XL) 100 MG 24 hr tablet Take 0.5 tablets (50 mg) by mouth daily Take 1/2 tablet  daily Encounter for medication refill Chucho Espino MD   pantoprazole (PROTONIX) 40 MG EC tablet Take 1 tablet (40 mg) by mouth every morning (before breakfast) Gastroesophageal reflux disease with esophagitis, unspecified whether hemorrhage Chucho Espino MD   polyethylene glycol (MIRALAX) 17 GM/Dose powder Take 17 g (1 capful.) by mouth daily as needed for constipation Constipation, unspecified constipation type Chucho Espino MD   rosuvastatin (CRESTOR) 20 MG tablet TAKE 1 TABLET(20 MG) BY MOUTH AT BEDTIME Dyslipidemia Lynne George MD   SENNA-docusate sodium (SENNA S) 8.6-50 MG tablet Take 1 tablet by mouth 2 times daily as needed (for constipation) Constipation, unspecified constipation type Chucho Espino MD   tamsulosin (FLOMAX) 0.4 MG capsule Take 1 capsule (0.4 mg) by mouth daily Benign prostatic hyperplasia with incomplete bladder emptying Chucho Espino MD   traZODone  (DESYREL) 50 MG tablet Take 0.5-1 tablets (25-50 mg) by mouth nightly as needed for sleep Insomnia, unspecified type Chucho Espino MD   triamcinolone (KENALOG) 0.1 % external cream Apply topically 2 times daily Dermatitis Anthony Silva MD         Add new medications, over-the-counter drugs, herbals, vitamins, or  minerals in the blank rows below.    Medication How I take it Why I use it Prescriber                                      Allergies:      No Known Allergies        Side effects I have had:               Other Information:              My notes and questions:

## 2024-02-26 NOTE — LETTER
"Recommended To-Do List      Prepared on: 02/27/2024       You can get the best results from your medications by completing the items on this \"To-Do List.\"      Bring your To-Do List when you go to your doctor. And, share it with your family or caregivers.    My To-Do List:  What we talked about: What I should do:   Your medication dosage being too low    Increase your dosage of insulin glargine (LANTUS PEN) to 24 units daily          What we talked about: What I should do:                       "

## 2024-02-26 NOTE — PROGRESS NOTES
Medication Therapy Management (MTM) Encounter    ASSESSMENT:                            Medication Adherence/Access: No issues identified. Though recommended patient bring with pillbox to ext visit to further assess medication adherence.     1. Type 2 diabetes mellitus with hyperglycemia, without long-term current use of insulin (H)  A1c not at goal <8%.  Reviewed CGM data with patient today, given variable portion sizes of meals, hesitant to increase bolus insulin by too much, rather will slightly increase basal insulin today, patient agreeable to plan and prescription updated.  If postprandials remain elevated at next visit, would recommend titrating NovoLog dose. Could also consider adding an SGLT2 inhibitor at follow-up visit for blood sugar and added renal protection.    2. Insomnia, unspecified type  Reviewed with patient appropriate sleep hygiene techniques.  If needed, could consider referral to sleep specialist in the future.    3. Gastroesophageal reflux disease, unspecified whether esophagitis present  Reports current medication effective.    4. Essential hypertension with goal blood pressure less than 140/90  BP at goal, continue current medications without change.    5. 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  Will order for recheck at next lab visit.  Recommend monitoring when being seen by cardiology in April. Continue to follow with neurology as scheduled.     6. Constipation, unspecified constipation type  Appears patient not currently taking medications as prescribed and symptoms persist, recommend using both senna/docusate and MiraLAX as needed.    7. Chronic gouty arthropathy  Stable.    8. Itching  Reasonable to continue hydroxyzine as needed.     PLAN:                            Increase dose: Lantus 24 units once daily  Encouraged patient to bring pillbox to next visit  Patient to resume use of  both senna/docusate and miralax as needed for constipation  Labs today: A1c, and labs per PCP    Medication issues to be addressed at a future visit:   Fasting lipid with cardiology (ordered today)  SGLT2i?    Follow-up: Return in about 5 weeks (around 4/1/2024) for With PharmD.  Card 4/15, PCP 5/20    SUBJECTIVE/OBJECTIVE:                          Adam Talamantes is a 83 year old male coming in for a follow-up visit from 11/27/23. Patient was accompanied by son in law. Patient seen with ID#[id number] .      Reason for visit: MTM follow up.     Allergies/ADRs: Reviewed in chart  Past Medical History: Reviewed in chart  Tobacco: He reports that he quit smoking about 23 years ago. His smoking use included cigarettes. He has never been exposed to tobacco smoke. He has quit using smokeless tobacco.  Alcohol: None    Medication Adherence/Access: His daughter, Dimitri Talamantes, helps him with medications, set up in twice daily pillbox.  Reports no missed medication doses.   Brings with medication vials today, without pillboxes. Not exactly sure how he is taking each medication.     Diabetes   Januvia 50 mg daily   Lantus 20 units daily at bedtime  Novolog 6 units three times daily before meals (8 am, noon, 5 pm)    Patient is not experiencing side effects.  Daughter helps with insulin injections, patient unable to do this on his own, reports no missed doses.   Blood sugar monitoring: Continuous Glucose Monitoring with Ray 2; see below. States that the alarm on meter is going off too often, adjusted high alarm to go off at 400 today instead of 250.   Current diabetes symptoms: Still polyuria at night, see below. Blurry vision sometimes, been there for a while,   Diet/Exercise: Eating 3 very meals per day. States that he eats rice with soup and 2 pieces of bread in the morning and at night but the mid-day meal is small.  Declining CDE at this time. Portion of food varies.      Eye exam is up to date, encouraged scheduling  "follow up as coming due again in march.   Foot exam: due  Urine Albumin: No results found for: \"UMALCR\"   Lab Results   Component Value Date    A1C 8.8 (H) 02/26/2024     Creatinine   Date Value Ref Range Status   08/24/2023 1.49 (H) 0.67 - 1.17 mg/dL Final   GFR 47          Hypertension   Metoprolol succinate 100 mg - 1/2 tablet (50 mg) daily  Meclizine 12.5 mg 3 times daily as needed - does not bring this with today, reports no recent dizziness.  Patient reports no current medication side effects  Patient does not self-monitor blood pressure.       BP Readings from Last 3 Encounters:   02/26/24 106/78   01/15/24 116/79   10/10/23 135/84     Pulse Readings from Last 3 Encounters:   02/26/24 88   01/15/24 85   10/10/23 86     Hyperlipidemia /CVA  Rosuvastatin 20 mg daily  Eliquis 5 mg twice daily  Nuedexta (dextromethorphan/quinidine) 20/10 mg daily for 7 days then twice daily - brings with empty vial today, last filled 1/26. (per neurology)  Patient non-fasting today.   Patient reports no significant myalgias or other side effects.  The ASCVD Risk score (Mazama DK, et al., 2019) failed to calculate for the following reasons:    The 2019 ASCVD risk score is only valid for ages 40 to 79    The patient has a prior MI or stroke diagnosis     Recent Labs   Lab Test 02/07/23  0627 02/01/23  1155   CHOL 151 173   HDL 40 35*   LDL 61 59   TRIG 252* 394*     Constipation:   Senna/Docusate sodium 8.6/100 mg once daily as needed (brings with full vial, filled 8/29/23)  Miralax 17 g daily as needed (reports they have this at home, using only as needed)  Reports still constipated. Patient and son in law are not sure if he has been taking these medications much recently. Currently having BM 3-4 times weekly.     Insomnia/Depression:  Duloxetine 30 mg twice daily    Trazodone 50 mg daily at bedtime  Patient continues to struggle with insomnia and still waking frequently at night, especially with nighttime urination. Patient likes " it to be this way, though son in law is ocncerned that he doesn't want to do much during the day except sleep.     GERD    Pantoprazole 40 mg mg once daily  Tums 500 mg mg once daily as needed  Patient reports no current symptoms.      Gout:   Allopurinol 200 mg daily   Uric Acid   Date Value Ref Range Status   12/20/2018 6.8 3.0 - 8.0 mg/dL Final     Pain/mood:   Acetaminophen 500 mg as needed   Duloxetine 30 mg twice daily  Celebrex 200 mg daily as needed  Unclear how often patient taking tylenol or celebrex, reports pain has been well controlled.     Itching:    Hydroxyzine  25 mg every 8 hours as needed at night time (last filled June 2023)  Patient and son in law are not sure if he has been taking/needing this lately.       Today's Vitals: /78   Pulse 88   Wt 161 lb 8 oz (73.3 kg)   SpO2 96%   BMI 29.54 kg/m    ----------------      I spent 60 minutes with this patient today. All changes were made via collaborative practice agreement with Chucho Espino MD. A copy of the visit note was provided to the patient's provider(s).    A summary of these recommendations was given to the patient.    Pushpa Philip, PharmD, BCACP  Medication Therapy Management Pharmacist     Medication Therapy Recommendations  Type 2 diabetes mellitus with hyperglycemia, without long-term current use of insulin (H)    Current Medication: insulin glargine (LANTUS PEN) 100 UNIT/ML pen   Rationale: Dose too low - Dosage too low - Effectiveness   Recommendation: Increase Dose   Status: Accepted per CPA

## 2024-02-26 NOTE — Clinical Note
FYI - only slightly increased his lantus today. Otherwise doing well. He did agree to labs today so those are pending.   Charly

## 2024-02-29 ENCOUNTER — TELEPHONE (OUTPATIENT)
Dept: NURSING | Facility: CLINIC | Age: 84
End: 2024-02-29
Payer: COMMERCIAL

## 2024-02-29 DIAGNOSIS — G47.00 INSOMNIA, UNSPECIFIED TYPE: ICD-10-CM

## 2024-02-29 NOTE — TELEPHONE ENCOUNTER
Telephone call  Daughter calling  asking for the patients trazodone to be increased  back to the 1-2 tablets  from the .5 to 1 tablet.  The daughter states that he is not sleeping at all since the reduction in the med. It was decreased on 1/18/24. She would like a new prescription be sent to the [facility] pharmacy  in [place].  Routing to PCP    Puja Kapoor RN   [facility] Nurse Advisor  12:50 PM 2/29/2024

## 2024-03-01 RX ORDER — TRAZODONE HYDROCHLORIDE 50 MG/1
50-100 TABLET, FILM COATED ORAL
Qty: 180 TABLET | Refills: 3 | Status: SHIPPED | OUTPATIENT
Start: 2024-03-01 | End: 2024-03-04

## 2024-03-01 NOTE — TELEPHONE ENCOUNTER
I don't see a RX having been sent in for the trazodone.  Pended this from .    Sending back to Dr. Espino.  Thanks!  Todd Wilson, RN-[facility]

## 2024-03-04 RX ORDER — TRAZODONE HYDROCHLORIDE 50 MG/1
50-100 TABLET, FILM COATED ORAL
Qty: 180 TABLET | Refills: 3 | Status: SHIPPED | OUTPATIENT
Start: 2024-03-04

## 2024-04-01 ENCOUNTER — OFFICE VISIT (OUTPATIENT)
Dept: PHARMACY | Facility: CLINIC | Age: 84
End: 2024-04-01
Payer: COMMERCIAL

## 2024-04-01 VITALS
SYSTOLIC BLOOD PRESSURE: 128 MMHG | BODY MASS INDEX: 28.66 KG/M2 | DIASTOLIC BLOOD PRESSURE: 78 MMHG | OXYGEN SATURATION: 95 % | WEIGHT: 156.7 LBS | HEART RATE: 89 BPM

## 2024-04-01 DIAGNOSIS — R52 PAIN: ICD-10-CM

## 2024-04-01 DIAGNOSIS — K21.9 GASTROESOPHAGEAL REFLUX DISEASE, UNSPECIFIED WHETHER ESOPHAGITIS PRESENT: ICD-10-CM

## 2024-04-01 DIAGNOSIS — I10 ESSENTIAL HYPERTENSION WITH GOAL BLOOD PRESSURE LESS THAN 140/90: Primary | ICD-10-CM

## 2024-04-01 DIAGNOSIS — K59.00 CONSTIPATION, UNSPECIFIED CONSTIPATION TYPE: ICD-10-CM

## 2024-04-01 DIAGNOSIS — M1A.00X0 CHRONIC GOUTY ARTHROPATHY: ICD-10-CM

## 2024-04-01 DIAGNOSIS — L29.9 ITCHING: ICD-10-CM

## 2024-04-01 DIAGNOSIS — G47.00 INSOMNIA, UNSPECIFIED TYPE: ICD-10-CM

## 2024-04-01 DIAGNOSIS — E11.65 TYPE 2 DIABETES MELLITUS WITH HYPERGLYCEMIA, WITHOUT LONG-TERM CURRENT USE OF INSULIN (H): ICD-10-CM

## 2024-04-01 DIAGNOSIS — I63.22 CEREBROVASCULAR ACCIDENT (CVA) DUE TO STENOSIS OF BASILAR ARTERY (H): ICD-10-CM

## 2024-04-01 PROCEDURE — 99606 MTMS BY PHARM EST 15 MIN: CPT | Performed by: PHARMACIST

## 2024-04-01 PROCEDURE — 99607 MTMS BY PHARM ADDL 15 MIN: CPT | Performed by: PHARMACIST

## 2024-04-01 RX ORDER — INSULIN ASPART 100 [IU]/ML
INJECTION, SOLUTION INTRAVENOUS; SUBCUTANEOUS
Qty: 15 ML | Refills: 3 | Status: SHIPPED | OUTPATIENT
Start: 2024-04-01 | End: 2024-06-17

## 2024-04-01 NOTE — PROGRESS NOTES
Medication Therapy Management (MTM) Encounter    ASSESSMENT:                            Medication Adherence/Access: No issues identified    1. Type 2 diabetes mellitus with hyperglycemia, without long-term current use of insulin (H)  A1c elevated, not yet at goal <8%.  Reviewed CGM data with patient today, patient's greatest postprandial sugars are directly after morning meal, recommended slightly increasing bolus dose of insulin before breakfast given will keep each other mealtime insulin the same.  For renal protection, recommended adding Jardiance starting dose today, patient agreeable and education provided.    2. Essential hypertension with goal blood pressure less than 140/90  BP at goal, continue current medication without change.    3. 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  Will order for recheck at next lab visit.  Recommend monitoring when being seen by cardiology in April. Continue to follow with neurology as scheduled.     4. Constipation, unspecified constipation type  Stable.    5. Insomnia, unspecified type  Stable.     6. Gastroesophageal reflux disease, unspecified whether esophagitis present  Stable, though could consider reducing to as needed H2 blocker in the future, rather than continuing as needed PPI.     7. Chronic gouty arthropathy  Stable.     8. Pain  Stable.     9. Itching  Stable.      PLAN:                            Increase dose: Novolog 8 units before breakfast, 6 units before lunch and dinner  Start Jardiance 10 mg daily in the morning    Medication issues to be addressed at a future visit:   Annual urine albumin at next lab visit  Fasting lipid at next cardiology appt    Follow-up: Return in about 11 weeks (around 6/17/2024) for With PharmD.  Cards 4/15, PCP 5/20    SUBJECTIVE/OBJECTIVE:                          Adam Talamantes is a 84 year old male coming in for a follow-up visit.  "Patient was accompanied by Son in law. Patient seen with ID# [id number] .  Then  transferred to alternative ID# [id number] due to issues with hearing.     Reason for visit: MTM follow up.     Allergies/ADRs: Reviewed in chart  Past Medical History: Reviewed in chart  Tobacco: He reports that he quit smoking about 24 years ago. His smoking use included cigarettes. He has never been exposed to tobacco smoke. He has quit using smokeless tobacco.  Alcohol: None    Medication Adherence/Access: His daughter, Dimitri Talamantes, helps him with medications, set up in twice daily pillbox.  Reports no missed medication doses.   Brings with medication vials today, AM And PM pillboxes which are set up appropriately, and meter.     Diabetes   Januvia 50 mg daily   Lantus 24 units daily at bedtime  Novolog 6 units three times daily before meals (8 am, noon, 5 pm)    Patient is not experiencing side effects.  Daughter helps with insulin injections, patient unable to do this on his own, reports no missed doses.   Blood sugar monitoring: Continuous Glucose Monitoring with Ray 2; see below.   Current diabetes symptoms: Still polyuria at night, see below. Hypoglycemia on 3/23, reports the sensor wasn't working properly, the traditional meter reported normal blood sugar (false low).    Diet/Exercise: Eating 3 very meals per day, never skipping meals. States that he eats rice with soup and 2 pieces of bread in the morning and at night but the mid-day meal is small.  Declining CDE at this time. Portion of food varies.      Eye exam is up to date, encouraged scheduling follow up as coming due again in march.   Foot exam: due  Urine Albumin: No results found for: \"UMALCR\"   Lab Results   Component Value Date    A1C 8.8 (H) 02/26/2024           Hypertension   Metoprolol succinate 100 mg - 1/2 tablet (50 mg) daily  Meclizine 12.5 mg 3 times daily as needed - using about 3 times weekly, also drinking more fluid when feeling this " way  Patient reports no current medication side effects  Patient does not self-monitor blood pressure sometimes, reports      BP Readings from Last 3 Encounters:   04/01/24 128/78   02/26/24 106/78   01/15/24 116/79     Pulse Readings from Last 3 Encounters:   04/01/24 89   02/26/24 88   01/15/24 85     Hyperlipidemia /CVA  Rosuvastatin 20 mg daily  Eliquis 5 mg twice daily  Nuedexta (dextromethorphan/quinidine) 20/10 mg twice daily (per neurology)  Patient non-fasting today.   Patient reports no significant myalgias or other side effects.  The ASCVD Risk score (Cat DK, et al., 2019) failed to calculate for the following reasons:    The 2019 ASCVD risk score is only valid for ages 40 to 79    The patient has a prior MI or stroke diagnosis     Recent Labs   Lab Test 02/07/23  0627 02/01/23  1155   CHOL 151 173   HDL 40 35*   LDL 61 59   TRIG 252* 394*     Constipation:   Senna/Docusate sodium 8.6/100 mg once daily as needed (brings with full vial, filled 8/29/23)  Miralax 17 g daily as needed - reports giving almost every day  Reports constipation has been well controlled with current therapy.      Insomnia/Depression:  Duloxetine 30 mg twice daily    Trazodone 50 mg 1-2 tab daily at bedtime   His sleep has been the same. Dose of trazodone increased within the last month, though sleep continues to vary, still sleeping during the day and sometimes as night, still having nighttime urination.     GERD  as needed  Pantoprazole 40 mg mg once daily, as needed  Tums 500 mg mg once daily, as needed  Patient reports no current symptoms. No complaints of symptoms for a couple months.      Gout:   Allopurinol 200 mg daily   Uric Acid   Date Value Ref Range Status   12/20/2018 6.8 3.0 - 8.0 mg/dL Final     Pain/mood:   Acetaminophen 500 mg as needed   Duloxetine 30 mg twice daily  Celebrex 200 mg daily as needed  Unclear how often patient taking tylenol, reports pain has been the same.     Itching:    Hydroxyzine  25 mg every  8 hours as needed at night time - only taking as needed, finds effective, no concerns.     Today's Vitals: /78   Pulse 89   Wt 156 lb 11.2 oz (71.1 kg)   SpO2 95%   BMI 28.66 kg/m    ----------------      I spent 30 minutes with this patient today. All changes were made via collaborative practice agreement with Chucho Espino MD. A copy of the visit note was provided to the patient's provider(s).    A summary of these recommendations was given to the patient.    Pushpa Philip, PharmD, BCACP  Medication Therapy Management Pharmacist     Medication Therapy Recommendations  Type 2 diabetes mellitus with hyperglycemia, without long-term current use of insulin (H)    Current Medication: empagliflozin (JARDIANCE) 10 MG TABS tablet   Rationale: Synergistic therapy - Needs additional medication therapy - Indication   Recommendation: Start Medication   Status: Accepted per CPA          Current Medication: insulin aspart (NOVOLOG FLEXPEN) 100 UNIT/ML pen   Rationale: Dose too low - Dosage too low - Effectiveness   Recommendation: Increase Dose   Status: Accepted per CPA

## 2024-04-01 NOTE — PATIENT INSTRUCTIONS
"Recommendations from today's MTM visit:                                                    MTM (medication therapy management) is a service provided by a clinical pharmacist designed to help you get the most of out of your medicines.      Increase dose: Novolog 8 units before breakfast, 6 units before lunch and dinner  Start Jardiance 10 mg daily in the morning    Follow-up: Return in about 11 weeks (around 6/17/2024) for With PharmD.    It was great speaking with you today.  I value your experience and would be very thankful for your time in providing feedback in our clinic survey. In the next few days, you may receive an email or text message from [employer] with a link to a survey related to your  clinical pharmacist.\"     To schedule another MTM appointment, please call the clinic directly or you may call the MTM scheduling line at [phone].    My Clinical Pharmacist's contact information:                                                      Please feel free to contact me with any questions or concerns you have.      Pushpa Philip, PharmD, BCACP  Medication Therapy Management Pharmacist    "

## 2024-04-15 ENCOUNTER — OFFICE VISIT (OUTPATIENT)
Dept: CARDIOLOGY | Facility: CLINIC | Age: 84
End: 2024-04-15
Payer: COMMERCIAL

## 2024-04-15 VITALS
BODY MASS INDEX: 28.17 KG/M2 | SYSTOLIC BLOOD PRESSURE: 124 MMHG | RESPIRATION RATE: 16 BRPM | HEART RATE: 86 BPM | DIASTOLIC BLOOD PRESSURE: 82 MMHG | WEIGHT: 154 LBS

## 2024-04-15 DIAGNOSIS — I25.10 CORONARY ARTERY DISEASE INVOLVING NATIVE CORONARY ARTERY OF NATIVE HEART WITHOUT ANGINA PECTORIS: Primary | ICD-10-CM

## 2024-04-15 DIAGNOSIS — I10 HYPERTENSION, UNSPECIFIED TYPE: ICD-10-CM

## 2024-04-15 DIAGNOSIS — E78.5 DYSLIPIDEMIA: ICD-10-CM

## 2024-04-15 PROCEDURE — 99214 OFFICE O/P EST MOD 30 MIN: CPT | Performed by: INTERNAL MEDICINE

## 2024-04-15 NOTE — LETTER
4/15/2024    Chucho Espino MD  [address]  [place] [address]    RE: Adam Talamantes       Dear Colleague,     I had the pleasure of seeing Adam Talamantes in the [facility].         [facility]   [address], [place] [address]   [email or web address]   OFFICE: [phone]            Impression and Plan     1. Possible coronary artery disease. Adam underwent recent nuclear perfusion study 29 July 2016 which was mildly abnormal. Specifically, this revealed a small sized mild intensity reversible defect in the mid to distal anteroseptal segment representing an area of myocardial ischemia.     Follow-up regadenoson nuclear perfusion study 9 April 2021 revealed no evidence of infarct or ischemia (ejection fraction 66% on that study).     Adam denies any chest pain or other concerning anginal type symptoms.  He is very pleased with how he is performing.  Continue metoprolol succinate 100 mg daily.     2. Hypertension.  Blood pressure is quite reasonable in the office today at 124/82 mmHg.     3. Dyslipidemia.  Lipid profile 7 February 2023 revealed LDL 61 mg/dL and HDL 40 mg/dL.  Continue rosuvastatin 20 mg daily.    4.  Cerebrovascular disease.  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     Plan on follow-up in 1 year.    35 minutes spent reviewing prior records (including documentation, laboratory studies, cardiac testing/imaging), interview with patient along with physical exam, planning, and subsequent documentation/crafting of note).           History of Present Illness    Once again I would like to thank you again for asking me to participate in the care of your patient, Adam Talamantes.  As you know, but to reiterate for my own records, Adam Talamantes is a 84 year old male with who had been hospitalized in July 2016 in part for hypertension, but also for some symptoms  of chest discomfort.  At the time, certain features were felt somewhat atypical for cardiac etiology. Adam subsequently underwent a regadenoson nuclear perfusion study 29 July 2016 that returned mildly abnormal in that it revealed a small sized mild intensity reversible anteroseptal defect (see Cardiac Diagnostics section below).  Given the atypical nature of the patient's symptoms, no significant recurrence in chest discomfort, and in accordance with the patient's wishes; medical therapy was pursued.     Follow-up regadenoson nuclear perfusion study 9 April 2021 revealed no evidence of infarct or ischemia (see Cardiac Diagnostic section below).     Adam was seen with the aid of a professional .  On interview today, Adam states that he is doing very well from a cardiac standpoint.  He denies any recurrent chest pain symptoms.  Breathing is comfortable.  Reports no subjective palpitations or lightheadedness.    Further review of systems is otherwise negative/noncontributory (medical record and 13 point review of systems reviewed as well and pertinent positives noted).         Cardiac Diagnostics      Echocardiogram 12 January 2023:  Normal left ventricular size and systolic performance with ejection fraction of 55-60%.  Mild increase in left ventricular wall thickness.  No significant valvular heart disease.  Normal right ventricular size and systolic performance.  Normal atrial dimensions.     Echocardiogram 17 October 2017:  Normal left ventricular size and systolic performance with ejection fraction of 55 to 60%.  Mild concentric increased left ventricular wall thickness.  No significant valvular heart disease.  Normal right ventricular size and systolic performance.  Normal atrial dimensions.    Regadenoson nuclear perfusion study 9 April 2021:  No evidence of infarct or ischemia.  Normal left ventricular systolic performance with ejection fraction of 66%.    Regadenoson nuclear perfusion study 29 July  2016:   Small sized mild intensity reversible defect in the mid to distal anteroseptal segment representing an area of myocardial ischemia.  Normal left ventricular systolic performance with ejection fraction of 55%.  The patient is at low risk of future cardiac ischemic events based on perfusion imaging.    Holter monitor 9 April 2021:  Except for rare isolated PVCs and PACs, no significant arrhythmia detected during this monitoring time.     12-lead ECG (personally reviewed) 30 June 2016: Normal sinus rhythm. Normal ECG.         Physical Examination       /82 (BP Location: Right arm, Patient Position: Sitting, Cuff Size: Adult Regular)   Pulse 86   Resp 16   Wt 69.9 kg (154 lb)   BMI 28.17 kg/m          Wt Readings from Last 3 Encounters:   04/15/24 69.9 kg (154 lb)   04/01/24 71.1 kg (156 lb 11.2 oz)   02/26/24 73.3 kg (161 lb 8 oz)       The patient is alert and oriented times three. Sclerae are anicteric. Mucosal membranes are moist. Jugular venous pressure is normal. No significant adenopathy/thyromegally appreciated. Lungs are clear with good expansion. On cardiovascular exam, the patient has a regular S1 and S2. Abdomen is soft and non-tender. Extremities reveal no clubbing, cyanosis, or edema.       Patient does not smoke.  Family history reviewed, and family history includes Cerebrovascular Disease in his daughter and father.          Medical History  Surgical History Family History Social History   Past Medical History:   Diagnosis Date    Acute blood loss anemia     Acute renal failure (H24)     Anemia     Bacteremia     Cataract     Chronic gout     CKD (chronic kidney disease)     Stage 3    DM2 (diabetes mellitus, type 2) (H)     GERD (gastroesophageal reflux disease)     Glucose intolerance (impaired glucose tolerance)     Gout     History of nephrolithiasis     History of prostatitis 7/19/2017    Hyperlipidemia     Hypertension     Insomnia     Intestinal disaccharidase deficiencies and  disaccharide malabsorption     Created by Conversion     Latent tuberculosis     Nephrolithiasis     Neuropathy     Nonspecific abnormal findings on radiological and other examination of skull and head     Created by Conversion [facility] Annotation: 2013 11:23PM - Giulia Meridai/10/2011:  severe stenosis both posterior cerebral arteries seen MRI/MRA done for  vertigo. No acute changes.e*    Osteoarthritis     wrist, knee, shoulder    Prostatitis     Rectal bleed     Trigger thumb      Past Surgical History:   Procedure Laterality Date    IR MISCELLANEOUS PROCEDURE  2009    IR MISCELLANEOUS PROCEDURE  2009    IR MISCELLANEOUS PROCEDURE  2009    IR MISCELLANEOUS PROCEDURE  2009    IR NEPHROURETERAL TUBE PLACEMENT BILATERAL  2009    IR URETER DILATION BILATERAL  2009    IR URETER DILATION BILATERAL  2009    KIDNEY STONE SURGERY      PICC  3/6/2016          Family History   Problem Relation Age of Onset    Cerebrovascular Disease Father     Cerebrovascular Disease Daughter         Social History     Socioeconomic History    Marital status:      Spouse name: Not on file    Number of children: Not on file    Years of education: Not on file    Highest education level: Not on file   Occupational History    Not on file   Tobacco Use    Smoking status: Former     Current packs/day: 0.00     Types: Cigarettes     Quit date: 3/10/2000     Years since quittin.1     Passive exposure: Never    Smokeless tobacco: Former    Tobacco comments:     no passive exposure   Vaping Use    Vaping status: Never Used   Substance and Sexual Activity    Alcohol use: No    Drug use: No    Sexual activity: Never     Partners: Female   Other Topics Concern    Not on file   Social History Narrative    Not on file     Social Determinants of Health     Financial Resource Strain: Low Risk  (1/15/2024)    Financial Resource Strain     Within the past 12 months, have you or your family members you  live with been unable to get utilities (heat, electricity) when it was really needed?: No   Food Insecurity: Low Risk  (1/15/2024)    Food Insecurity     Within the past 12 months, did you worry that your food would run out before you got money to buy more?: No     Within the past 12 months, did the food you bought just not last and you didn t have money to get more?: No   Transportation Needs: Low Risk  (1/15/2024)    Transportation Needs     Within the past 12 months, has lack of transportation kept you from medical appointments, getting your medicines, non-medical meetings or appointments, work, or from getting things that you need?: No   Physical Activity: Inactive (2/22/2022)    Exercise Vital Sign     Days of Exercise per Week: 0 days     Minutes of Exercise per Session: 0 min   Stress: No Stress Concern Present (2/22/2022)    [employer] - Occupational Stress Questionnaire     Feeling of Stress : Not at all   Social Connections: Socially Integrated (2/22/2022)    Social Connection and Isolation Panel [NHANES]     Frequency of Communication with Friends and Family: Once a week     Frequency of Social Gatherings with Friends and Family: Twice a week     Attends Zoroastrianism Services: 1 to 4 times per year     Active Member of Clubs or Organizations: No     Attends Club or Organization Meetings: 1 to 4 times per year     Marital Status:    Interpersonal Safety: Not At Risk (2/22/2022)    Humiliation, Afraid, Rape, and Kick questionnaire     Fear of Current or Ex-Partner: No     Emotionally Abused: No     Physically Abused: No     Sexually Abused: No   Housing Stability: Low Risk  (1/15/2024)    Housing Stability     Do you have housing? : Yes     Are you worried about losing your housing?: No           Medications  Allergies   Current Outpatient Medications   Medication Sig Dispense Refill    acetaminophen (TYLENOL) 500 MG tablet Take 1 tablet (500 mg) by mouth every 4 hours as  needed for mild pain 100 tablet 3    allopurinol (ZYLOPRIM) 100 MG tablet TAKE 2 TABLETS(200 MG) BY MOUTH DAILY 180 tablet 3    calcium carbonate (TUMS) 500 MG chewable tablet Take 1 chew tab by mouth daily as needed for heartburn      celecoxib (CELEBREX) 200 MG capsule Take 1 capsule (200 mg) by mouth daily as needed for pain (jaw pain, joint pain) 30 capsule 3    Continuous Blood Gluc Sensor (FREESTYLE SUSANNAH 2 SENSOR) MISC 1 each every 14 days Use 1 sensor every 14 days. Use to read blood sugars per 's instructions. 2 each 11    dextromethorphan-quiNIDine (NUEDEXTA) 20-10 MG capsule Take 1 capsule by mouth every 12 hours 60 capsule 11    DULoxetine (CYMBALTA) 30 MG capsule TAKE 1 CAPSULE(30 MG) BY MOUTH TWICE DAILY 60 capsule 6    ELIQUIS ANTICOAGULANT 5 MG tablet TAKE 1 TABLET(5 MG) BY MOUTH TWICE DAILY 60 tablet 6    empagliflozin (JARDIANCE) 10 MG TABS tablet Take 1 tablet (10 mg) by mouth daily 90 tablet 1    hydrOXYzine (ATARAX) 25 MG tablet Take 1 tablet (25 mg) by mouth every 8 hours as needed for itching 60 tablet 1    insulin aspart (NOVOLOG FLEXPEN) 100 UNIT/ML pen Inject 8 units before breakfast, and 6 units under the skin before lunch and dinner 15 mL 3    insulin glargine (LANTUS PEN) 100 UNIT/ML pen Inject 24 Units Subcutaneous at bedtime 30 mL 3    insulin pen needle (BD PEN NEEDLE SALVATORE 2ND GEN) 32G X 4 MM miscellaneous USE 1 PEN NEEDLE FOUR TIMES DAILY OR AS DIRECTED 400 each 0    JANUVIA 50 MG tablet TAKE 1 TABLET(50 MG) BY MOUTH DAILY 90 tablet 3    meclizine (ANTIVERT) 12.5 MG tablet Take 1 tablet (12.5 mg) by mouth 3 times daily as needed for dizziness 60 tablet 6    metoprolol succinate ER (TOPROL XL) 100 MG 24 hr tablet Take 0.5 tablets (50 mg) by mouth daily Take 1/2 tablet  daily 45 tablet 3    pantoprazole (PROTONIX) 40 MG EC tablet Take 1 tablet (40 mg) by mouth every morning (before breakfast) 90 tablet 3    polyethylene glycol (MIRALAX) 17 GM/Dose powder Take 17 g (1  "capful.) by mouth daily as needed for constipation 578 g 4    rosuvastatin (CRESTOR) 20 MG tablet TAKE 1 TABLET(20 MG) BY MOUTH AT BEDTIME 90 tablet 0    SENNA-docusate sodium (SENNA S) 8.6-50 MG tablet Take 1 tablet by mouth 2 times daily as needed (for constipation) 60 tablet 11    tamsulosin (FLOMAX) 0.4 MG capsule Take 1 capsule (0.4 mg) by mouth daily 90 capsule 3    traZODone (DESYREL) 50 MG tablet Take 1-2 tablets ( mg) by mouth nightly as needed for sleep 180 tablet 3    triamcinolone (KENALOG) 0.1 % external cream Apply topically 2 times daily 80 g 3     No Known Allergies       Lab Results    Chemistry/lipid CBC Cardiac Enzymes/BNP/TSH/INR   Recent Labs   Lab Test 02/07/23  0627   CHOL 151   HDL 40   LDL 61   TRIG 252*     Recent Labs   Lab Test 02/07/23  0627 02/01/23  1155 01/11/23  0421   LDL 61 59 124*     Recent Labs   Lab Test 02/26/24  1150      POTASSIUM 4.2   CHLORIDE 100   CO2 25   *   BUN 24.8*   CR 1.85*   GFRESTIMATED 36*   GERALDINE 10.0     Recent Labs   Lab Test 02/26/24  1150 08/24/23  1051 08/07/23  1408   CR 1.85* 1.49* 1.77*     Recent Labs   Lab Test 02/26/24  1150 11/27/23  1057 08/07/23  1310   A1C 8.8* 8.6* 13.8*          Recent Labs   Lab Test 02/16/23  1156   WBC 9.1   HGB 13.1*   HCT 39.7*   MCV 92        Recent Labs   Lab Test 02/16/23  1156 02/13/23  0900 02/12/23  0612   HGB 13.1* 12.6* 12.4*    Recent Labs   Lab Test 06/22/20  1853 12/27/18  1252   TROPONINI 0.02 <0.01     No results for input(s): \"BNP\", \"NTBNPI\", \"NTBNP\" in the last 25626 hours.  Recent Labs   Lab Test 02/26/24  1150   TSH 1.29     No results for input(s): \"INR\" in the last 35755 hours.       Medications  Allergies   Current Outpatient Medications   Medication Sig Dispense Refill    acetaminophen (TYLENOL) 500 MG tablet Take 1 tablet (500 mg) by mouth every 4 hours as needed for mild pain 100 tablet 3    allopurinol (ZYLOPRIM) 100 MG tablet TAKE 2 TABLETS(200 MG) BY MOUTH DAILY 180 tablet " 3    calcium carbonate (TUMS) 500 MG chewable tablet Take 1 chew tab by mouth daily as needed for heartburn      celecoxib (CELEBREX) 200 MG capsule Take 1 capsule (200 mg) by mouth daily as needed for pain (jaw pain, joint pain) 30 capsule 3    Continuous Blood Gluc Sensor (FREESTYLE SUSANNAH 2 SENSOR) [facility] 1 each every 14 days Use 1 sensor every 14 days. Use to read blood sugars per 's instructions. 2 each 11    dextromethorphan-quiNIDine (NUEDEXTA) 20-10 MG capsule Take 1 capsule by mouth every 12 hours 60 capsule 11    DULoxetine (CYMBALTA) 30 MG capsule TAKE 1 CAPSULE(30 MG) BY MOUTH TWICE DAILY 60 capsule 6    ELIQUIS ANTICOAGULANT 5 MG tablet TAKE 1 TABLET(5 MG) BY MOUTH TWICE DAILY 60 tablet 6    empagliflozin (JARDIANCE) 10 MG TABS tablet Take 1 tablet (10 mg) by mouth daily 90 tablet 1    hydrOXYzine (ATARAX) 25 MG tablet Take 1 tablet (25 mg) by mouth every 8 hours as needed for itching 60 tablet 1    insulin aspart (NOVOLOG FLEXPEN) 100 UNIT/ML pen Inject 8 units before breakfast, and 6 units under the skin before lunch and dinner 15 mL 3    insulin glargine (LANTUS PEN) 100 UNIT/ML pen Inject 24 Units Subcutaneous at bedtime 30 mL 3    insulin pen needle (BD PEN NEEDLE SALVATORE 2ND GEN) 32G X 4 MM miscellaneous USE 1 PEN NEEDLE FOUR TIMES DAILY OR AS DIRECTED 400 each 0    JANUVIA 50 MG tablet TAKE 1 TABLET(50 MG) BY MOUTH DAILY 90 tablet 3    meclizine (ANTIVERT) 12.5 MG tablet Take 1 tablet (12.5 mg) by mouth 3 times daily as needed for dizziness 60 tablet 6    metoprolol succinate ER (TOPROL XL) 100 MG 24 hr tablet Take 0.5 tablets (50 mg) by mouth daily Take 1/2 tablet  daily 45 tablet 3    pantoprazole (PROTONIX) 40 MG EC tablet Take 1 tablet (40 mg) by mouth every morning (before breakfast) 90 tablet 3    polyethylene glycol (MIRALAX) 17 GM/Dose powder Take 17 g (1 capful.) by mouth daily as needed for constipation 578 g 4    rosuvastatin (CRESTOR) 20 MG tablet TAKE 1 TABLET(20 MG) BY MOUTH  "AT BEDTIME 90 tablet 0    SENNA-docusate sodium (SENNA S) 8.6-50 MG tablet Take 1 tablet by mouth 2 times daily as needed (for constipation) 60 tablet 11    tamsulosin (FLOMAX) 0.4 MG capsule Take 1 capsule (0.4 mg) by mouth daily 90 capsule 3    traZODone (DESYREL) 50 MG tablet Take 1-2 tablets ( mg) by mouth nightly as needed for sleep 180 tablet 3    triamcinolone (KENALOG) 0.1 % external cream Apply topically 2 times daily 80 g 3      No Known Allergies       Lab Results   Lab Results   Component Value Date     02/26/2024    CO2 25 02/26/2024    CO2 26 08/09/2021    BUN 24.8 02/26/2024    BUN 23 08/09/2021     Lab Results   Component Value Date    WBC 9.1 02/16/2023    HGB 13.1 02/16/2023    HCT 39.7 02/16/2023    MCV 92 02/16/2023     02/16/2023     Lab Results   Component Value Date    CHOL 151 02/07/2023    TRIG 252 02/07/2023    HDL 40 02/07/2023     No results found for: \"INR\"  No results found for: \"BNP\"  Lab Results   Component Value Date    TROPONINI 0.02 06/22/2020    TROPONINI <0.01 12/27/2018     Lab Results   Component Value Date    TSH 1.29 02/26/2024                      Thank you for allowing me to participate in the care of your patient.      Sincerely,     Lynne George MD     [facility]  cc:   Lynne George MD  [address]  [place] [address]      "

## 2024-04-15 NOTE — PROGRESS NOTES
[facility] [address], [place] [address]   [email or web address]   OFFICE: [phone]            Impression and Plan     1. Possible coronary artery disease. Adam underwent recent nuclear perfusion study 29 July 2016 which was mildly abnormal. Specifically, this revealed a small sized mild intensity reversible defect in the mid to distal anteroseptal segment representing an area of myocardial ischemia.     Follow-up regadenoson nuclear perfusion study 9 April 2021 revealed no evidence of infarct or ischemia (ejection fraction 66% on that study).     Adam denies any chest pain or other concerning anginal type symptoms.  He is very pleased with how he is performing.  Continue metoprolol succinate 100 mg daily.     2. Hypertension.  Blood pressure is quite reasonable in the office today at 124/82 mmHg.     3. Dyslipidemia.  Lipid profile 7 February 2023 revealed LDL 61 mg/dL and HDL 40 mg/dL.  Continue rosuvastatin 20 mg daily.    4.  Cerebrovascular disease.  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     Plan on follow-up in 1 year.    35 minutes spent reviewing prior records (including documentation, laboratory studies, cardiac testing/imaging), interview with patient along with physical exam, planning, and subsequent documentation/crafting of note).           History of Present Illness    Once again I would like to thank you again for asking me to participate in the care of your patient, Adam Talamantes.  As you know, but to reiterate for my own records, Adam Talamantes is a 84 year old male with who had been hospitalized in July 2016 in part for hypertension, but also for some symptoms of chest discomfort.  At the time, certain features were felt somewhat atypical for cardiac etiology. Adam subsequently underwent a regadenoson nuclear perfusion study 29 July 2016 that returned  mildly abnormal in that it revealed a small sized mild intensity reversible anteroseptal defect (see Cardiac Diagnostics section below).  Given the atypical nature of the patient's symptoms, no significant recurrence in chest discomfort, and in accordance with the patient's wishes; medical therapy was pursued.     Follow-up regadenoson nuclear perfusion study 9 April 2021 revealed no evidence of infarct or ischemia (see Cardiac Diagnostic section below).     Adam was seen with the aid of a professional .  On interview today, Adam states that he is doing very well from a cardiac standpoint.  He denies any recurrent chest pain symptoms.  Breathing is comfortable.  Reports no subjective palpitations or lightheadedness.    Further review of systems is otherwise negative/noncontributory (medical record and 13 point review of systems reviewed as well and pertinent positives noted).         Cardiac Diagnostics      Echocardiogram 12 January 2023:  Normal left ventricular size and systolic performance with ejection fraction of 55-60%.  Mild increase in left ventricular wall thickness.  No significant valvular heart disease.  Normal right ventricular size and systolic performance.  Normal atrial dimensions.     Echocardiogram 17 October 2017:  Normal left ventricular size and systolic performance with ejection fraction of 55 to 60%.  Mild concentric increased left ventricular wall thickness.  No significant valvular heart disease.  Normal right ventricular size and systolic performance.  Normal atrial dimensions.    Regadenoson nuclear perfusion study 9 April 2021:  No evidence of infarct or ischemia.  Normal left ventricular systolic performance with ejection fraction of 66%.    Regadenoson nuclear perfusion study 29 July 2016:   Small sized mild intensity reversible defect in the mid to distal anteroseptal segment representing an area of myocardial ischemia.  Normal left ventricular systolic performance with  ejection fraction of 55%.  The patient is at low risk of future cardiac ischemic events based on perfusion imaging.    Holter monitor 9 April 2021:  Except for rare isolated PVCs and PACs, no significant arrhythmia detected during this monitoring time.     12-lead ECG (personally reviewed) 30 June 2016: Normal sinus rhythm. Normal ECG.         Physical Examination       /82 (BP Location: Right arm, Patient Position: Sitting, Cuff Size: Adult Regular)   Pulse 86   Resp 16   Wt 69.9 kg (154 lb)   BMI 28.17 kg/m          Wt Readings from Last 3 Encounters:   04/15/24 69.9 kg (154 lb)   04/01/24 71.1 kg (156 lb 11.2 oz)   02/26/24 73.3 kg (161 lb 8 oz)       The patient is alert and oriented times three. Sclerae are anicteric. Mucosal membranes are moist. Jugular venous pressure is normal. No significant adenopathy/thyromegally appreciated. Lungs are clear with good expansion. On cardiovascular exam, the patient has a regular S1 and S2. Abdomen is soft and non-tender. Extremities reveal no clubbing, cyanosis, or edema.       Patient does not smoke.  Family history reviewed, and family history includes Cerebrovascular Disease in his daughter and father.          Medical History  Surgical History Family History Social History   Past Medical History:   Diagnosis Date    Acute blood loss anemia     Acute renal failure (H24)     Anemia     Bacteremia     Cataract     Chronic gout     CKD (chronic kidney disease)     Stage 3    DM2 (diabetes mellitus, type 2) (H)     GERD (gastroesophageal reflux disease)     Glucose intolerance (impaired glucose tolerance)     Gout     History of nephrolithiasis     History of prostatitis 7/19/2017    Hyperlipidemia     Hypertension     Insomnia     Intestinal disaccharidase deficiencies and disaccharide malabsorption     Created by Conversion     Latent tuberculosis     Nephrolithiasis     Neuropathy     Nonspecific abnormal findings on radiological and other examination of skull  and head     Created by [facility] Annotation: 2013 11:23PM - Giulia Meridai/10/2011:  severe stenosis both posterior cerebral arteries seen MRI/MRA done for  vertigo. No acute changes.e*    Osteoarthritis     wrist, knee, shoulder    Prostatitis     Rectal bleed     Trigger thumb      Past Surgical History:   Procedure Laterality Date    IR MISCELLANEOUS PROCEDURE  2009    IR MISCELLANEOUS PROCEDURE  2009    IR MISCELLANEOUS PROCEDURE  2009    IR MISCELLANEOUS PROCEDURE  2009    IR NEPHROURETERAL TUBE PLACEMENT BILATERAL  2009    IR URETER DILATION BILATERAL  2009    IR URETER DILATION BILATERAL  2009    KIDNEY STONE SURGERY      PICC  3/6/2016          Family History   Problem Relation Age of Onset    Cerebrovascular Disease Father     Cerebrovascular Disease Daughter         Social History     Socioeconomic History    Marital status:      Spouse name: Not on file    Number of children: Not on file    Years of education: Not on file    Highest education level: Not on file   Occupational History    Not on file   Tobacco Use    Smoking status: Former     Current packs/day: 0.00     Types: Cigarettes     Quit date: 3/10/2000     Years since quittin.1     Passive exposure: Never    Smokeless tobacco: Former    Tobacco comments:     no passive exposure   Vaping Use    Vaping status: Never Used   Substance and Sexual Activity    Alcohol use: No    Drug use: No    Sexual activity: Never     Partners: Female   Other Topics Concern    Not on file   Social History Narrative    Not on file     Social Determinants of Health     Financial Resource Strain: Low Risk  (1/15/2024)    Financial Resource Strain     Within the past 12 months, have you or your family members you live with been unable to get utilities (heat, electricity) when it was really needed?: No   Food Insecurity: Low Risk  (1/15/2024)    Food Insecurity     Within the past 12 months, did you  worry that your food would run out before you got money to buy more?: No     Within the past 12 months, did the food you bought just not last and you didn t have money to get more?: No   Transportation Needs: Low Risk  (1/15/2024)    Transportation Needs     Within the past 12 months, has lack of transportation kept you from medical appointments, getting your medicines, non-medical meetings or appointments, work, or from getting things that you need?: No   Physical Activity: Inactive (2/22/2022)    Exercise Vital Sign     Days of Exercise per Week: 0 days     Minutes of Exercise per Session: 0 min   Stress: No Stress Concern Present (2/22/2022)    Citizen of [place] of Occupational Health - Occupational Stress Questionnaire     Feeling of Stress : Not at all   Social Connections: Socially Integrated (2/22/2022)    Social Connection and Isolation Panel [NHANES]     Frequency of Communication with Friends and Family: Once a week     Frequency of Social Gatherings with Friends and Family: Twice a week     Attends Yarsani Services: 1 to 4 times per year     Active Member of Clubs or Organizations: No     Attends Club or Organization Meetings: 1 to 4 times per year     Marital Status:    Interpersonal Safety: Not At Risk (2/22/2022)    Humiliation, Afraid, Rape, and Kick questionnaire     Fear of Current or Ex-Partner: No     Emotionally Abused: No     Physically Abused: No     Sexually Abused: No   Housing Stability: Low Risk  (1/15/2024)    Housing Stability     Do you have housing? : Yes     Are you worried about losing your housing?: No           Medications  Allergies   Current Outpatient Medications   Medication Sig Dispense Refill    acetaminophen (TYLENOL) 500 MG tablet Take 1 tablet (500 mg) by mouth every 4 hours as needed for mild pain 100 tablet 3    allopurinol (ZYLOPRIM) 100 MG tablet TAKE 2 TABLETS(200 MG) BY MOUTH DAILY 180 tablet 3    calcium carbonate (TUMS) 500 MG chewable tablet Take 1 chew  tab by mouth daily as needed for heartburn      celecoxib (CELEBREX) 200 MG capsule Take 1 capsule (200 mg) by mouth daily as needed for pain (jaw pain, joint pain) 30 capsule 3    Continuous Blood Gluc Sensor (FREESTYLE SUSANNAH 2 SENSOR) [facility] 1 each every 14 days Use 1 sensor every 14 days. Use to read blood sugars per 's instructions. 2 each 11    dextromethorphan-quiNIDine (NUEDEXTA) 20-10 MG capsule Take 1 capsule by mouth every 12 hours 60 capsule 11    DULoxetine (CYMBALTA) 30 MG capsule TAKE 1 CAPSULE(30 MG) BY MOUTH TWICE DAILY 60 capsule 6    ELIQUIS ANTICOAGULANT 5 MG tablet TAKE 1 TABLET(5 MG) BY MOUTH TWICE DAILY 60 tablet 6    empagliflozin (JARDIANCE) 10 MG TABS tablet Take 1 tablet (10 mg) by mouth daily 90 tablet 1    hydrOXYzine (ATARAX) 25 MG tablet Take 1 tablet (25 mg) by mouth every 8 hours as needed for itching 60 tablet 1    insulin aspart (NOVOLOG FLEXPEN) 100 UNIT/ML pen Inject 8 units before breakfast, and 6 units under the skin before lunch and dinner 15 mL 3    insulin glargine (LANTUS PEN) 100 UNIT/ML pen Inject 24 Units Subcutaneous at bedtime 30 mL 3    insulin pen needle (BD PEN NEEDLE SALVATORE 2ND GEN) 32G X 4 MM miscellaneous USE 1 PEN NEEDLE FOUR TIMES DAILY OR AS DIRECTED 400 each 0    JANUVIA 50 MG tablet TAKE 1 TABLET(50 MG) BY MOUTH DAILY 90 tablet 3    meclizine (ANTIVERT) 12.5 MG tablet Take 1 tablet (12.5 mg) by mouth 3 times daily as needed for dizziness 60 tablet 6    metoprolol succinate ER (TOPROL XL) 100 MG 24 hr tablet Take 0.5 tablets (50 mg) by mouth daily Take 1/2 tablet  daily 45 tablet 3    pantoprazole (PROTONIX) 40 MG EC tablet Take 1 tablet (40 mg) by mouth every morning (before breakfast) 90 tablet 3    polyethylene glycol (MIRALAX) 17 GM/Dose powder Take 17 g (1 capful.) by mouth daily as needed for constipation 578 g 4    rosuvastatin (CRESTOR) 20 MG tablet TAKE 1 TABLET(20 MG) BY MOUTH AT BEDTIME 90 tablet 0    SENNA-docusate sodium (SENNA S) 8.6-50  "MG tablet Take 1 tablet by mouth 2 times daily as needed (for constipation) 60 tablet 11    tamsulosin (FLOMAX) 0.4 MG capsule Take 1 capsule (0.4 mg) by mouth daily 90 capsule 3    traZODone (DESYREL) 50 MG tablet Take 1-2 tablets ( mg) by mouth nightly as needed for sleep 180 tablet 3    triamcinolone (KENALOG) 0.1 % external cream Apply topically 2 times daily 80 g 3     No Known Allergies       Lab Results    Chemistry/lipid CBC Cardiac Enzymes/BNP/TSH/INR   Recent Labs   Lab Test 02/07/23  0627   CHOL 151   HDL 40   LDL 61   TRIG 252*     Recent Labs   Lab Test 02/07/23  0627 02/01/23  1155 01/11/23  0421   LDL 61 59 124*     Recent Labs   Lab Test 02/26/24  1150      POTASSIUM 4.2   CHLORIDE 100   CO2 25   *   BUN 24.8*   CR 1.85*   GFRESTIMATED 36*   GERALDINE 10.0     Recent Labs   Lab Test 02/26/24  1150 08/24/23  1051 08/07/23  1408   CR 1.85* 1.49* 1.77*     Recent Labs   Lab Test 02/26/24  1150 11/27/23  1057 08/07/23  1310   A1C 8.8* 8.6* 13.8*          Recent Labs   Lab Test 02/16/23  1156   WBC 9.1   HGB 13.1*   HCT 39.7*   MCV 92        Recent Labs   Lab Test 02/16/23  1156 02/13/23  0900 02/12/23  0612   HGB 13.1* 12.6* 12.4*    Recent Labs   Lab Test 06/22/20  1853 12/27/18  1252   TROPONINI 0.02 <0.01     No results for input(s): \"BNP\", \"NTBNPI\", \"NTBNP\" in the last 05602 hours.  Recent Labs   Lab Test 02/26/24  1150   TSH 1.29     No results for input(s): \"INR\" in the last 36867 hours.       Medications  Allergies   Current Outpatient Medications   Medication Sig Dispense Refill    acetaminophen (TYLENOL) 500 MG tablet Take 1 tablet (500 mg) by mouth every 4 hours as needed for mild pain 100 tablet 3    allopurinol (ZYLOPRIM) 100 MG tablet TAKE 2 TABLETS(200 MG) BY MOUTH DAILY 180 tablet 3    calcium carbonate (TUMS) 500 MG chewable tablet Take 1 chew tab by mouth daily as needed for heartburn      celecoxib (CELEBREX) 200 MG capsule Take 1 capsule (200 mg) by mouth daily as " needed for pain (jaw pain, joint pain) 30 capsule 3    Continuous Blood Gluc Sensor (FREESTYLE SUSANNAH 2 SENSOR) [facility] 1 each every 14 days Use 1 sensor every 14 days. Use to read blood sugars per 's instructions. 2 each 11    dextromethorphan-quiNIDine (NUEDEXTA) 20-10 MG capsule Take 1 capsule by mouth every 12 hours 60 capsule 11    DULoxetine (CYMBALTA) 30 MG capsule TAKE 1 CAPSULE(30 MG) BY MOUTH TWICE DAILY 60 capsule 6    ELIQUIS ANTICOAGULANT 5 MG tablet TAKE 1 TABLET(5 MG) BY MOUTH TWICE DAILY 60 tablet 6    empagliflozin (JARDIANCE) 10 MG TABS tablet Take 1 tablet (10 mg) by mouth daily 90 tablet 1    hydrOXYzine (ATARAX) 25 MG tablet Take 1 tablet (25 mg) by mouth every 8 hours as needed for itching 60 tablet 1    insulin aspart (NOVOLOG FLEXPEN) 100 UNIT/ML pen Inject 8 units before breakfast, and 6 units under the skin before lunch and dinner 15 mL 3    insulin glargine (LANTUS PEN) 100 UNIT/ML pen Inject 24 Units Subcutaneous at bedtime 30 mL 3    insulin pen needle (BD PEN NEEDLE SALVATORE 2ND GEN) 32G X 4 MM miscellaneous USE 1 PEN NEEDLE FOUR TIMES DAILY OR AS DIRECTED 400 each 0    JANUVIA 50 MG tablet TAKE 1 TABLET(50 MG) BY MOUTH DAILY 90 tablet 3    meclizine (ANTIVERT) 12.5 MG tablet Take 1 tablet (12.5 mg) by mouth 3 times daily as needed for dizziness 60 tablet 6    metoprolol succinate ER (TOPROL XL) 100 MG 24 hr tablet Take 0.5 tablets (50 mg) by mouth daily Take 1/2 tablet  daily 45 tablet 3    pantoprazole (PROTONIX) 40 MG EC tablet Take 1 tablet (40 mg) by mouth every morning (before breakfast) 90 tablet 3    polyethylene glycol (MIRALAX) 17 GM/Dose powder Take 17 g (1 capful.) by mouth daily as needed for constipation 578 g 4    rosuvastatin (CRESTOR) 20 MG tablet TAKE 1 TABLET(20 MG) BY MOUTH AT BEDTIME 90 tablet 0    SENNA-docusate sodium (SENNA S) 8.6-50 MG tablet Take 1 tablet by mouth 2 times daily as needed (for constipation) 60 tablet 11    tamsulosin (FLOMAX) 0.4 MG capsule  "Take 1 capsule (0.4 mg) by mouth daily 90 capsule 3    traZODone (DESYREL) 50 MG tablet Take 1-2 tablets ( mg) by mouth nightly as needed for sleep 180 tablet 3    triamcinolone (KENALOG) 0.1 % external cream Apply topically 2 times daily 80 g 3      No Known Allergies       Lab Results   Lab Results   Component Value Date     02/26/2024    CO2 25 02/26/2024    CO2 26 08/09/2021    BUN 24.8 02/26/2024    BUN 23 08/09/2021     Lab Results   Component Value Date    WBC 9.1 02/16/2023    HGB 13.1 02/16/2023    HCT 39.7 02/16/2023    MCV 92 02/16/2023     02/16/2023     Lab Results   Component Value Date    CHOL 151 02/07/2023    TRIG 252 02/07/2023    HDL 40 02/07/2023     No results found for: \"INR\"  No results found for: \"BNP\"  Lab Results   Component Value Date    TROPONINI 0.02 06/22/2020    TROPONINI <0.01 12/27/2018     Lab Results   Component Value Date    TSH 1.29 02/26/2024                  "

## 2024-04-27 DIAGNOSIS — K59.00 CONSTIPATION, UNSPECIFIED CONSTIPATION TYPE: ICD-10-CM

## 2024-04-29 RX ORDER — POLYETHYLENE GLYCOL 3350 17 G/17G
POWDER, FOR SOLUTION ORAL
Qty: 510 G | Refills: 6 | Status: SHIPPED | OUTPATIENT
Start: 2024-04-29

## 2024-05-01 ENCOUNTER — TELEPHONE (OUTPATIENT)
Dept: FAMILY MEDICINE | Facility: CLINIC | Age: 84
End: 2024-05-01
Payer: COMMERCIAL

## 2024-05-01 NOTE — TELEPHONE ENCOUNTER
Reason for Call:  Appointment Request    Patient requesting this type of appt:  Same day or asap for frequency of urination and cloudy urine. Does not want to go to urgent care, did not want triage or to go to any other clinic besides [facility]. Can a provider fit in patient to be seen on 5/2 or asap? Thank you    Requested provider:  Any     Reason patient unable to be scheduled: Not within requested timeframe    When does patient want to be seen/preferred time:  Any     Comments: N/A    Okay to leave a detailed message?: Yes at Other phone number:  Goyo hayward, [phone]    Call taken on 5/1/2024 at 6:45 PM by Dori Hassan

## 2024-05-02 ENCOUNTER — OFFICE VISIT (OUTPATIENT)
Dept: FAMILY MEDICINE | Facility: CLINIC | Age: 84
End: 2024-05-02
Payer: COMMERCIAL

## 2024-05-02 VITALS
DIASTOLIC BLOOD PRESSURE: 83 MMHG | OXYGEN SATURATION: 95 % | HEART RATE: 98 BPM | TEMPERATURE: 97.6 F | RESPIRATION RATE: 16 BRPM | HEIGHT: 62 IN | BODY MASS INDEX: 28.91 KG/M2 | WEIGHT: 157.12 LBS | SYSTOLIC BLOOD PRESSURE: 118 MMHG

## 2024-05-02 DIAGNOSIS — E11.65 TYPE 2 DIABETES MELLITUS WITH HYPERGLYCEMIA, WITHOUT LONG-TERM CURRENT USE OF INSULIN (H): ICD-10-CM

## 2024-05-02 DIAGNOSIS — N30.00 ACUTE CYSTITIS WITHOUT HEMATURIA: Primary | ICD-10-CM

## 2024-05-02 DIAGNOSIS — R35.0 URINE FREQUENCY: ICD-10-CM

## 2024-05-02 LAB
ALBUMIN UR-MCNC: 100 MG/DL
APPEARANCE UR: ABNORMAL
BACTERIA #/AREA URNS HPF: ABNORMAL /HPF
BILIRUB UR QL STRIP: NEGATIVE
COLOR UR AUTO: YELLOW
GLUCOSE UR STRIP-MCNC: 500 MG/DL
HGB UR QL STRIP: ABNORMAL
KETONES UR STRIP-MCNC: NEGATIVE MG/DL
LEUKOCYTE ESTERASE UR QL STRIP: ABNORMAL
NITRATE UR QL: NEGATIVE
PH UR STRIP: 5.5 [PH] (ref 5–7)
RBC #/AREA URNS AUTO: ABNORMAL /HPF
SP GR UR STRIP: 1.01 (ref 1–1.03)
SQUAMOUS #/AREA URNS AUTO: ABNORMAL /LPF
UROBILINOGEN UR STRIP-ACNC: 0.2 E.U./DL
WBC #/AREA URNS AUTO: >100 /HPF
WBC CLUMPS #/AREA URNS HPF: PRESENT /HPF

## 2024-05-02 PROCEDURE — 82570 ASSAY OF URINE CREATININE: CPT | Performed by: FAMILY MEDICINE

## 2024-05-02 PROCEDURE — 99214 OFFICE O/P EST MOD 30 MIN: CPT | Performed by: FAMILY MEDICINE

## 2024-05-02 PROCEDURE — 87086 URINE CULTURE/COLONY COUNT: CPT | Performed by: FAMILY MEDICINE

## 2024-05-02 PROCEDURE — 81001 URINALYSIS AUTO W/SCOPE: CPT | Performed by: FAMILY MEDICINE

## 2024-05-02 PROCEDURE — 82043 UR ALBUMIN QUANTITATIVE: CPT | Performed by: FAMILY MEDICINE

## 2024-05-02 RX ORDER — RESPIRATORY SYNCYTIAL VIRUS VACCINE 120MCG/0.5
0.5 KIT INTRAMUSCULAR ONCE
Qty: 1 EACH | Refills: 0 | Status: CANCELLED | OUTPATIENT
Start: 2024-05-02 | End: 2024-05-02

## 2024-05-02 RX ORDER — SULFAMETHOXAZOLE/TRIMETHOPRIM 800-160 MG
1 TABLET ORAL 2 TIMES DAILY
Qty: 20 TABLET | Refills: 0 | Status: ON HOLD | OUTPATIENT
Start: 2024-05-02 | End: 2024-05-14

## 2024-05-02 NOTE — TELEPHONE ENCOUNTER
Spoke to daughter and scheduled appt today at 1020 with PCP. Ok'd per Faiza.  No further action needed.

## 2024-05-02 NOTE — PROGRESS NOTES
ASSESMENT AND PLAN:  Diagnoses and all orders for this visit:  Acute cystitis without hematuria  Discussed results with the patient's daughter, his daughter acting as  as well because the patient cannot hear the telephone .  -     sulfamethoxazole-trimethoprim (BACTRIM DS) 800-160 MG tablet; Take 1 tablet by mouth 2 times daily for 10 days  Urine frequency  -     UA Macroscopic with reflex to Microscopic and Culture - Lab Collect  -     UA Microscopic with Reflex to Culture  -     Urine Culture  Initial results very concerning for an acute urinary tract infection.  Started on antibiotics as detailed above.  Type 2 diabetes mellitus with hyperglycemia, without long-term current use of insulin (H)  Given the blood sugar readings and the glucose urea we need to get better control of the diabetes.  Counseling done on options and were going to increase insulin:  -     Albumin Random Urine Quantitative with Creat Ratio; Future  -     insulin glargine (LANTUS PEN) 100 UNIT/ML pen; Inject 30 Units Subcutaneous at bedtime      Reviewed the risks and benefits of the treatment plan with the patient and/or caregiver and we discussed indications for routine and emergent follow-up.        SUBJECTIVE: Patient with a 4-day history of increased frequency of urination.  He reports that he is having to urinate about every hour.  He is not having pain with urination and has not seen any blood in the urine.  No fevers.  No vomiting.  Blood sugars have been running higher.  His lowest reading over the last few weeks has been 140.  Most readings have been in the 200s.  No vomiting.    Past Medical History:   Diagnosis Date    Acute blood loss anemia     Acute renal failure (H24)     Anemia     Bacteremia     Cataract     Chronic gout     CKD (chronic kidney disease)     Stage 3    DM2 (diabetes mellitus, type 2) (H)     GERD (gastroesophageal reflux disease)     Glucose intolerance (impaired glucose tolerance)      Gout     History of nephrolithiasis     History of prostatitis 2017    Hyperlipidemia     Hypertension     Insomnia     Intestinal disaccharidase deficiencies and disaccharide malabsorption     Created by Conversion     Latent tuberculosis     Nephrolithiasis     Neuropathy     Nonspecific abnormal findings on radiological and other examination of skull and head     Created by Conversion [facility] Annotation: 2013 11:23PM - Giulia Meridai/10/2011:  severe stenosis both posterior cerebral arteries seen MRI/MRA done for  vertigo. No acute changes.e*    Osteoarthritis     wrist, knee, shoulder    Prostatitis     Rectal bleed     Trigger thumb      Patient Active Problem List   Diagnosis    Esophageal reflux    Dyslipidemia    Nephrolithiasis    Pseudogout    Latent Tuberculosis    Generalized Osteoarthritis Of The Hand    Lumbar Disc Degeneration    Insomnia, unspecified type    Chronic Gout    CKD (chronic kidney disease) stage 3, GFR 30-59 ml/min (H)    Elevated PSA    Prostate nodule    Cataracts, bilateral    Constipation, unspecified constipation type    Bilateral chronic knee pain    Chronic right shoulder pain    Essential hypertension with goal blood pressure less than 140/90    BPH (benign prostatic hyperplasia)    Chronic gout    Coronary artery disease due to lipid rich plaque    Right lower quadrant abdominal pain    Colitis    Primary osteoarthritis involving multiple joints    Urinary incontinence    ACE-inhibitor cough    Vertigo    Essential hypertension    Vertebral artery occlusion, left    Cerebellar stroke (H)    Stroke (H)    Hypomagnesemia    Ataxic gait    Urinary retention    Basilar artery stenosis    Cerebral vascular disease    Colon wall thickening    Current moderate episode of major depressive disorder without prior episode (H)     Current Outpatient Medications   Medication Sig Dispense Refill    acetaminophen (TYLENOL) 500 MG tablet Take 1 tablet (500 mg) by mouth every  4 hours as needed for mild pain 100 tablet 3    allopurinol (ZYLOPRIM) 100 MG tablet TAKE 2 TABLETS(200 MG) BY MOUTH DAILY 180 tablet 3    calcium carbonate (TUMS) 500 MG chewable tablet Take 1 chew tab by mouth daily as needed for heartburn      celecoxib (CELEBREX) 200 MG capsule Take 1 capsule (200 mg) by mouth daily as needed for pain (jaw pain, joint pain) 30 capsule 3    Continuous Blood Gluc Sensor (FREESTYLE SUSANNAH 2 SENSOR) [facility] 1 each every 14 days Use 1 sensor every 14 days. Use to read blood sugars per 's instructions. 2 each 11    dextromethorphan-quiNIDine (NUEDEXTA) 20-10 MG capsule Take 1 capsule by mouth every 12 hours 60 capsule 11    DULoxetine (CYMBALTA) 30 MG capsule TAKE 1 CAPSULE(30 MG) BY MOUTH TWICE DAILY 60 capsule 6    ELIQUIS ANTICOAGULANT 5 MG tablet TAKE 1 TABLET(5 MG) BY MOUTH TWICE DAILY 60 tablet 6    empagliflozin (JARDIANCE) 10 MG TABS tablet Take 1 tablet (10 mg) by mouth daily 90 tablet 1    hydrOXYzine (ATARAX) 25 MG tablet Take 1 tablet (25 mg) by mouth every 8 hours as needed for itching 60 tablet 1    insulin aspart (NOVOLOG FLEXPEN) 100 UNIT/ML pen Inject 8 units before breakfast, and 6 units under the skin before lunch and dinner 15 mL 3    insulin glargine (LANTUS PEN) 100 UNIT/ML pen Inject 30 Units Subcutaneous at bedtime 30 mL 3    insulin pen needle (BD PEN NEEDLE SALVATORE 2ND GEN) 32G X 4 MM miscellaneous USE 1 PEN NEEDLE FOUR TIMES DAILY OR AS DIRECTED 400 each 0    JANUVIA 50 MG tablet TAKE 1 TABLET(50 MG) BY MOUTH DAILY 90 tablet 3    meclizine (ANTIVERT) 12.5 MG tablet Take 1 tablet (12.5 mg) by mouth 3 times daily as needed for dizziness 60 tablet 6    metoprolol succinate ER (TOPROL XL) 100 MG 24 hr tablet Take 0.5 tablets (50 mg) by mouth daily Take 1/2 tablet  daily 45 tablet 3    pantoprazole (PROTONIX) 40 MG EC tablet Take 1 tablet (40 mg) by mouth every morning (before breakfast) 90 tablet 3    polyethylene glycol (MIRALAX) 17 GM/Dose powder MIX 17  "GRAMS IN LIQUID AND TAKE BY MOUTH ONCE DAILY AS NEEDED FOR CONSTIPATION 510 g 6    rosuvastatin (CRESTOR) 20 MG tablet TAKE 1 TABLET(20 MG) BY MOUTH AT BEDTIME 90 tablet 0    SENNA-docusate sodium (SENNA S) 8.6-50 MG tablet Take 1 tablet by mouth 2 times daily as needed (for constipation) 60 tablet 11    sulfamethoxazole-trimethoprim (BACTRIM DS) 800-160 MG tablet Take 1 tablet by mouth 2 times daily for 10 days 20 tablet 0    tamsulosin (FLOMAX) 0.4 MG capsule Take 1 capsule (0.4 mg) by mouth daily 90 capsule 3    traZODone (DESYREL) 50 MG tablet Take 1-2 tablets ( mg) by mouth nightly as needed for sleep 180 tablet 3    triamcinolone (KENALOG) 0.1 % external cream Apply topically 2 times daily 80 g 3     History   Smoking Status    Former    Types: Cigarettes   Smokeless Tobacco    Former       OBJECTICE: /83 (BP Location: Left arm, Patient Position: Sitting, Cuff Size: Adult Regular)   Pulse 98   Temp 97.6  F (36.4  C) (Oral)   Resp 16   Ht 1.575 m (5' 2\")   Wt 71.3 kg (157 lb 1.9 oz)   SpO2 95%   BMI 28.74 kg/m       Recent Results (from the past 24 hour(s))   UA Macroscopic with reflex to Microscopic and Culture - Lab Collect    Collection Time: 05/02/24 10:35 AM    Specimen: Urine, NOS   Result Value Ref Range    Color Urine Yellow Colorless, Straw, Light Yellow, Yellow    Appearance Urine Cloudy (A) Clear    Glucose Urine 500 (A) Negative mg/dL    Bilirubin Urine Negative Negative    Ketones Urine Negative Negative mg/dL    Specific Gravity Urine 1.010 1.005 - 1.030    Blood Urine Moderate (A) Negative    pH Urine 5.5 5.0 - 7.0    Protein Albumin Urine 100 (A) Negative mg/dL    Urobilinogen Urine 0.2 0.2, 1.0 E.U./dL    Nitrite Urine Negative Negative    Leukocyte Esterase Urine Small (A) Negative   UA Microscopic with Reflex to Culture    Collection Time: 05/02/24 10:35 AM   Result Value Ref Range    Bacteria Urine Few (A) None Seen /HPF    RBC Urine 2-5 (A) 0-2 /HPF /HPF    WBC Urine >100 " (A) 0-5 /HPF /HPF    Squamous Epithelials Urine None Seen None Seen /LPF    WBC Clumps Urine Present (A) None Seen /HPF        GEN-alert, appropriate, in no acute distress   HEENT-mucous membranes are moist   CV-regular rate and rhythm   RESP-lungs clear   ABDOMINAL-soft and nontender to palpation with no palpable mass     Chucho Espino MD     Signed Electronically by: Chucho Espino MD

## 2024-05-03 LAB
BACTERIA UR CULT: NO GROWTH
CREAT UR-MCNC: 69.7 MG/DL
MICROALBUMIN UR-MCNC: 298 MG/L
MICROALBUMIN/CREAT UR: 427.55 MG/G CR (ref 0–17)

## 2024-05-11 ENCOUNTER — APPOINTMENT (OUTPATIENT)
Dept: ULTRASOUND IMAGING | Facility: CLINIC | Age: 84
DRG: 713 | End: 2024-05-11
Attending: EMERGENCY MEDICINE
Payer: COMMERCIAL

## 2024-05-11 ENCOUNTER — APPOINTMENT (OUTPATIENT)
Dept: GENERAL RADIOLOGY | Facility: CLINIC | Age: 84
DRG: 713 | End: 2024-05-11
Attending: EMERGENCY MEDICINE
Payer: COMMERCIAL

## 2024-05-11 ENCOUNTER — HOSPITAL ENCOUNTER (INPATIENT)
Facility: CLINIC | Age: 84
LOS: 8 days | Discharge: HOME | DRG: 713 | End: 2024-05-19
Attending: EMERGENCY MEDICINE | Admitting: INTERNAL MEDICINE
Payer: COMMERCIAL

## 2024-05-11 DIAGNOSIS — R55 NEAR SYNCOPE: ICD-10-CM

## 2024-05-11 DIAGNOSIS — R14.0 DISTENDED ABDOMEN: ICD-10-CM

## 2024-05-11 DIAGNOSIS — N39.0 URINARY TRACT INFECTION WITHOUT HEMATURIA, SITE UNSPECIFIED: ICD-10-CM

## 2024-05-11 DIAGNOSIS — R30.0 DYSURIA: ICD-10-CM

## 2024-05-11 DIAGNOSIS — N17.9 AKI (ACUTE KIDNEY INJURY) (H): ICD-10-CM

## 2024-05-11 DIAGNOSIS — R53.1 GENERALIZED WEAKNESS: ICD-10-CM

## 2024-05-11 DIAGNOSIS — N41.2 PROSTATE ABSCESS: ICD-10-CM

## 2024-05-11 DIAGNOSIS — E11.65 TYPE 2 DIABETES MELLITUS WITH HYPERGLYCEMIA, WITHOUT LONG-TERM CURRENT USE OF INSULIN (H): ICD-10-CM

## 2024-05-11 DIAGNOSIS — K59.00 CONSTIPATION, UNSPECIFIED CONSTIPATION TYPE: Primary | ICD-10-CM

## 2024-05-11 LAB
ALBUMIN MFR UR ELPH: 74.8 MG/DL
ALBUMIN SERPL BCG-MCNC: 4.2 G/DL (ref 3.5–5.2)
ALBUMIN UR-MCNC: 30 MG/DL
ALP SERPL-CCNC: 122 U/L (ref 40–150)
ALT SERPL W P-5'-P-CCNC: 17 U/L (ref 0–70)
ANION GAP SERPL CALCULATED.3IONS-SCNC: 12 MMOL/L (ref 7–15)
APPEARANCE UR: ABNORMAL
AST SERPL W P-5'-P-CCNC: 21 U/L (ref 0–45)
BASOPHILS # BLD AUTO: 0.1 10E3/UL (ref 0–0.2)
BASOPHILS NFR BLD AUTO: 1 %
BILIRUB SERPL-MCNC: 0.3 MG/DL
BILIRUB UR QL STRIP: NEGATIVE
BUN SERPL-MCNC: 41.3 MG/DL (ref 8–23)
CALCIUM SERPL-MCNC: 9.8 MG/DL (ref 8.8–10.2)
CHLORIDE SERPL-SCNC: 93 MMOL/L (ref 98–107)
COLOR UR AUTO: ABNORMAL
CREAT SERPL-MCNC: 3.21 MG/DL (ref 0.67–1.17)
CREAT UR-MCNC: 38.2 MG/DL
DEPRECATED HCO3 PLAS-SCNC: 21 MMOL/L (ref 22–29)
EGFRCR SERPLBLD CKD-EPI 2021: 19 ML/MIN/1.73M2
EOSINOPHIL # BLD AUTO: 0.5 10E3/UL (ref 0–0.7)
EOSINOPHIL NFR BLD AUTO: 4 %
ERYTHROCYTE [DISTWIDTH] IN BLOOD BY AUTOMATED COUNT: 15.6 % (ref 10–15)
GLUCOSE SERPL-MCNC: 194 MG/DL (ref 70–99)
GLUCOSE UR STRIP-MCNC: 1000 MG/DL
HCT VFR BLD AUTO: 41.5 % (ref 40–53)
HGB BLD-MCNC: 13.8 G/DL (ref 13.3–17.7)
HGB UR QL STRIP: ABNORMAL
IMM GRANULOCYTES # BLD: 0.1 10E3/UL
IMM GRANULOCYTES NFR BLD: 1 %
KETONES UR STRIP-MCNC: NEGATIVE MG/DL
LACTATE SERPL-SCNC: 1.8 MMOL/L (ref 0.7–2)
LEUKOCYTE ESTERASE UR QL STRIP: ABNORMAL
LYMPHOCYTES # BLD AUTO: 2.4 10E3/UL (ref 0.8–5.3)
LYMPHOCYTES NFR BLD AUTO: 19 %
MCH RBC QN AUTO: 30.1 PG (ref 26.5–33)
MCHC RBC AUTO-ENTMCNC: 33.3 G/DL (ref 31.5–36.5)
MCV RBC AUTO: 91 FL (ref 78–100)
MONOCYTES # BLD AUTO: 1.4 10E3/UL (ref 0–1.3)
MONOCYTES NFR BLD AUTO: 11 %
NEUTROPHILS # BLD AUTO: 8.3 10E3/UL (ref 1.6–8.3)
NEUTROPHILS NFR BLD AUTO: 64 %
NITRATE UR QL: NEGATIVE
NRBC # BLD AUTO: 0 10E3/UL
NRBC BLD AUTO-RTO: 0 /100
NT-PROBNP SERPL-MCNC: 58 PG/ML (ref 0–1800)
OSMOLALITY UR: 262 MMOL/KG (ref 100–1200)
PH UR STRIP: 6 [PH] (ref 5–7)
PLATELET # BLD AUTO: 213 10E3/UL (ref 150–450)
POTASSIUM SERPL-SCNC: 5.3 MMOL/L (ref 3.4–5.3)
PROCALCITONIN SERPL IA-MCNC: 0.11 NG/ML
PROT SERPL-MCNC: 8.9 G/DL (ref 6.4–8.3)
PROT/CREAT 24H UR: 1.96 MG/MG CR (ref 0–0.2)
RBC # BLD AUTO: 4.58 10E6/UL (ref 4.4–5.9)
RBC URINE: 11 /HPF
SODIUM SERPL-SCNC: 126 MMOL/L (ref 135–145)
SODIUM UR-SCNC: 35 MMOL/L
SP GR UR STRIP: 1.01 (ref 1–1.03)
TROPONIN T SERPL HS-MCNC: 13 NG/L
UROBILINOGEN UR STRIP-MCNC: NORMAL MG/DL
WBC # BLD AUTO: 12.7 10E3/UL (ref 4–11)
WBC CLUMPS #/AREA URNS HPF: PRESENT /HPF
WBC URINE: >182 /HPF

## 2024-05-11 PROCEDURE — 83880 ASSAY OF NATRIURETIC PEPTIDE: CPT | Performed by: EMERGENCY MEDICINE

## 2024-05-11 PROCEDURE — 120N000002 HC R&B MED SURG/OB UMMC

## 2024-05-11 PROCEDURE — 84156 ASSAY OF PROTEIN URINE: CPT

## 2024-05-11 PROCEDURE — 76770 US EXAM ABDO BACK WALL COMP: CPT

## 2024-05-11 PROCEDURE — 258N000003 HC RX IP 258 OP 636: Performed by: EMERGENCY MEDICINE

## 2024-05-11 PROCEDURE — 99285 EMERGENCY DEPT VISIT HI MDM: CPT | Mod: 25 | Performed by: EMERGENCY MEDICINE

## 2024-05-11 PROCEDURE — 93005 ELECTROCARDIOGRAM TRACING: CPT | Performed by: EMERGENCY MEDICINE

## 2024-05-11 PROCEDURE — 83935 ASSAY OF URINE OSMOLALITY: CPT | Performed by: EMERGENCY MEDICINE

## 2024-05-11 PROCEDURE — 93010 ELECTROCARDIOGRAM REPORT: CPT | Performed by: EMERGENCY MEDICINE

## 2024-05-11 PROCEDURE — 36415 COLL VENOUS BLD VENIPUNCTURE: CPT | Performed by: EMERGENCY MEDICINE

## 2024-05-11 PROCEDURE — 71046 X-RAY EXAM CHEST 2 VIEWS: CPT

## 2024-05-11 PROCEDURE — 84300 ASSAY OF URINE SODIUM: CPT

## 2024-05-11 PROCEDURE — 81001 URINALYSIS AUTO W/SCOPE: CPT | Performed by: EMERGENCY MEDICINE

## 2024-05-11 PROCEDURE — 84145 PROCALCITONIN (PCT): CPT | Performed by: EMERGENCY MEDICINE

## 2024-05-11 PROCEDURE — 250N000011 HC RX IP 250 OP 636: Performed by: EMERGENCY MEDICINE

## 2024-05-11 PROCEDURE — 85025 COMPLETE CBC W/AUTO DIFF WBC: CPT | Performed by: EMERGENCY MEDICINE

## 2024-05-11 PROCEDURE — 87086 URINE CULTURE/COLONY COUNT: CPT | Performed by: EMERGENCY MEDICINE

## 2024-05-11 PROCEDURE — 83930 ASSAY OF BLOOD OSMOLALITY: CPT | Performed by: EMERGENCY MEDICINE

## 2024-05-11 PROCEDURE — 84300 ASSAY OF URINE SODIUM: CPT | Performed by: EMERGENCY MEDICINE

## 2024-05-11 PROCEDURE — 82550 ASSAY OF CK (CPK): CPT | Performed by: STUDENT IN AN ORGANIZED HEALTH CARE EDUCATION/TRAINING PROGRAM

## 2024-05-11 PROCEDURE — 84484 ASSAY OF TROPONIN QUANT: CPT | Performed by: EMERGENCY MEDICINE

## 2024-05-11 PROCEDURE — 76770 US EXAM ABDO BACK WALL COMP: CPT | Mod: 26 | Performed by: RADIOLOGY

## 2024-05-11 PROCEDURE — 99285 EMERGENCY DEPT VISIT HI MDM: CPT | Performed by: EMERGENCY MEDICINE

## 2024-05-11 PROCEDURE — 84540 ASSAY OF URINE/UREA-N: CPT

## 2024-05-11 PROCEDURE — 83605 ASSAY OF LACTIC ACID: CPT | Performed by: EMERGENCY MEDICINE

## 2024-05-11 PROCEDURE — 87491 CHLMYD TRACH DNA AMP PROBE: CPT | Performed by: EMERGENCY MEDICINE

## 2024-05-11 PROCEDURE — 80053 COMPREHEN METABOLIC PANEL: CPT | Performed by: EMERGENCY MEDICINE

## 2024-05-11 PROCEDURE — 71046 X-RAY EXAM CHEST 2 VIEWS: CPT | Mod: 26 | Performed by: RADIOLOGY

## 2024-05-11 RX ORDER — TAMSULOSIN HYDROCHLORIDE 0.4 MG/1
0.4 CAPSULE ORAL DAILY
Status: DISCONTINUED | OUTPATIENT
Start: 2024-05-12 | End: 2024-05-19 | Stop reason: HOSPADM

## 2024-05-11 RX ORDER — METOPROLOL SUCCINATE 50 MG/1
50 TABLET, EXTENDED RELEASE ORAL DAILY
Status: DISCONTINUED | OUTPATIENT
Start: 2024-05-12 | End: 2024-05-19 | Stop reason: HOSPADM

## 2024-05-11 RX ORDER — ROSUVASTATIN CALCIUM 20 MG/1
20 TABLET, COATED ORAL AT BEDTIME
Status: DISCONTINUED | OUTPATIENT
Start: 2024-05-12 | End: 2024-05-19 | Stop reason: HOSPADM

## 2024-05-11 RX ORDER — DEXTROSE MONOHYDRATE 25 G/50ML
25-50 INJECTION, SOLUTION INTRAVENOUS
Status: DISCONTINUED | OUTPATIENT
Start: 2024-05-11 | End: 2024-05-19 | Stop reason: HOSPADM

## 2024-05-11 RX ORDER — PANTOPRAZOLE SODIUM 40 MG/1
40 TABLET, DELAYED RELEASE ORAL
Status: DISCONTINUED | OUTPATIENT
Start: 2024-05-12 | End: 2024-05-19 | Stop reason: HOSPADM

## 2024-05-11 RX ORDER — ALLOPURINOL 100 MG/1
200 TABLET ORAL DAILY
Status: DISCONTINUED | OUTPATIENT
Start: 2024-05-12 | End: 2024-05-19 | Stop reason: HOSPADM

## 2024-05-11 RX ORDER — NICOTINE POLACRILEX 4 MG
15-30 LOZENGE BUCCAL
Status: DISCONTINUED | OUTPATIENT
Start: 2024-05-11 | End: 2024-05-19 | Stop reason: HOSPADM

## 2024-05-11 RX ORDER — AMOXICILLIN 250 MG
2 CAPSULE ORAL 2 TIMES DAILY PRN
Status: DISCONTINUED | OUTPATIENT
Start: 2024-05-11 | End: 2024-05-19 | Stop reason: HOSPADM

## 2024-05-11 RX ORDER — LIDOCAINE 40 MG/G
CREAM TOPICAL
Status: DISCONTINUED | OUTPATIENT
Start: 2024-05-11 | End: 2024-05-19 | Stop reason: HOSPADM

## 2024-05-11 RX ORDER — CALCIUM CARBONATE 500 MG/1
1000 TABLET, CHEWABLE ORAL 4 TIMES DAILY PRN
Status: DISCONTINUED | OUTPATIENT
Start: 2024-05-11 | End: 2024-05-19 | Stop reason: HOSPADM

## 2024-05-11 RX ORDER — HYDROXYZINE HYDROCHLORIDE 25 MG/1
25 TABLET, FILM COATED ORAL EVERY 8 HOURS PRN
Status: DISCONTINUED | OUTPATIENT
Start: 2024-05-11 | End: 2024-05-19 | Stop reason: HOSPADM

## 2024-05-11 RX ORDER — HEPARIN SODIUM 5000 [USP'U]/.5ML
5000 INJECTION, SOLUTION INTRAVENOUS; SUBCUTANEOUS EVERY 8 HOURS
Status: DISCONTINUED | OUTPATIENT
Start: 2024-05-11 | End: 2024-05-11

## 2024-05-11 RX ORDER — AMOXICILLIN 250 MG
1 CAPSULE ORAL 2 TIMES DAILY PRN
Status: DISCONTINUED | OUTPATIENT
Start: 2024-05-11 | End: 2024-05-19 | Stop reason: HOSPADM

## 2024-05-11 RX ORDER — CIPROFLOXACIN 2 MG/ML
400 INJECTION, SOLUTION INTRAVENOUS ONCE
Qty: 200 ML | Refills: 0 | Status: COMPLETED | OUTPATIENT
Start: 2024-05-11 | End: 2024-05-11

## 2024-05-11 RX ORDER — POLYETHYLENE GLYCOL 3350 17 G/17G
17 POWDER, FOR SOLUTION ORAL 2 TIMES DAILY PRN
Status: DISCONTINUED | OUTPATIENT
Start: 2024-05-11 | End: 2024-05-19 | Stop reason: HOSPADM

## 2024-05-11 RX ORDER — TRAZODONE HYDROCHLORIDE 50 MG/1
50-100 TABLET, FILM COATED ORAL
Status: DISCONTINUED | OUTPATIENT
Start: 2024-05-11 | End: 2024-05-19 | Stop reason: HOSPADM

## 2024-05-11 RX ORDER — DULOXETIN HYDROCHLORIDE 30 MG/1
30 CAPSULE, DELAYED RELEASE ORAL 2 TIMES DAILY
Status: DISCONTINUED | OUTPATIENT
Start: 2024-05-11 | End: 2024-05-19 | Stop reason: HOSPADM

## 2024-05-11 RX ADMIN — SODIUM CHLORIDE 1000 ML: 9 INJECTION, SOLUTION INTRAVENOUS at 21:08

## 2024-05-11 RX ADMIN — CIPROFLOXACIN 400 MG: 400 INJECTION, SOLUTION INTRAVENOUS at 22:11

## 2024-05-11 ASSESSMENT — ACTIVITIES OF DAILY LIVING (ADL)
ADLS_ACUITY_SCORE: 38
ADLS_ACUITY_SCORE: 38
ADLS_ACUITY_SCORE: 36
ADLS_ACUITY_SCORE: 38

## 2024-05-11 ASSESSMENT — COLUMBIA-SUICIDE SEVERITY RATING SCALE - C-SSRS
2. HAVE YOU ACTUALLY HAD ANY THOUGHTS OF KILLING YOURSELF IN THE PAST MONTH?: NO
6. HAVE YOU EVER DONE ANYTHING, STARTED TO DO ANYTHING, OR PREPARED TO DO ANYTHING TO END YOUR LIFE?: NO
1. IN THE PAST MONTH, HAVE YOU WISHED YOU WERE DEAD OR WISHED YOU COULD GO TO SLEEP AND NOT WAKE UP?: NO

## 2024-05-12 ENCOUNTER — APPOINTMENT (OUTPATIENT)
Dept: CT IMAGING | Facility: CLINIC | Age: 84
DRG: 713 | End: 2024-05-12
Payer: COMMERCIAL

## 2024-05-12 LAB
ANION GAP SERPL CALCULATED.3IONS-SCNC: 11 MMOL/L (ref 7–15)
ANION GAP SERPL CALCULATED.3IONS-SCNC: 11 MMOL/L (ref 7–15)
ATRIAL RATE - MUSE: 90 BPM
BUN SERPL-MCNC: 36.7 MG/DL (ref 8–23)
BUN SERPL-MCNC: 37.5 MG/DL (ref 8–23)
C TRACH DNA SPEC QL PROBE+SIG AMP: NEGATIVE
CALCIUM SERPL-MCNC: 9.1 MG/DL (ref 8.8–10.2)
CALCIUM SERPL-MCNC: 9.2 MG/DL (ref 8.8–10.2)
CHLORIDE SERPL-SCNC: 101 MMOL/L (ref 98–107)
CHLORIDE SERPL-SCNC: 101 MMOL/L (ref 98–107)
CK SERPL-CCNC: 104 U/L (ref 39–308)
CREAT SERPL-MCNC: 2.82 MG/DL (ref 0.67–1.17)
CREAT SERPL-MCNC: 2.89 MG/DL (ref 0.67–1.17)
CREAT UR-MCNC: 12.8 MG/DL
DEPRECATED HCO3 PLAS-SCNC: 18 MMOL/L (ref 22–29)
DEPRECATED HCO3 PLAS-SCNC: 18 MMOL/L (ref 22–29)
DIASTOLIC BLOOD PRESSURE - MUSE: NORMAL MMHG
EGFRCR SERPLBLD CKD-EPI 2021: 21 ML/MIN/1.73M2
EGFRCR SERPLBLD CKD-EPI 2021: 22 ML/MIN/1.73M2
ERYTHROCYTE [DISTWIDTH] IN BLOOD BY AUTOMATED COUNT: 15.5 % (ref 10–15)
FRACT EXCRET NA UR+SERPL-RTO: 6.6 %
GLUCOSE BLDC GLUCOMTR-MCNC: 110 MG/DL (ref 70–99)
GLUCOSE BLDC GLUCOMTR-MCNC: 123 MG/DL (ref 70–99)
GLUCOSE BLDC GLUCOMTR-MCNC: 139 MG/DL (ref 70–99)
GLUCOSE BLDC GLUCOMTR-MCNC: 145 MG/DL (ref 70–99)
GLUCOSE BLDC GLUCOMTR-MCNC: 148 MG/DL (ref 70–99)
GLUCOSE BLDC GLUCOMTR-MCNC: 148 MG/DL (ref 70–99)
GLUCOSE BLDC GLUCOMTR-MCNC: 184 MG/DL (ref 70–99)
GLUCOSE SERPL-MCNC: 122 MG/DL (ref 70–99)
GLUCOSE SERPL-MCNC: 127 MG/DL (ref 70–99)
HCT VFR BLD AUTO: 37 % (ref 40–53)
HGB BLD-MCNC: 12.4 G/DL (ref 13.3–17.7)
HOLD SPECIMEN: NORMAL
INTERPRETATION ECG - MUSE: NORMAL
LACTATE SERPL-SCNC: 1.7 MMOL/L (ref 0.7–2)
LACTATE SERPL-SCNC: 2 MMOL/L (ref 0.7–2)
MCH RBC QN AUTO: 30.5 PG (ref 26.5–33)
MCHC RBC AUTO-ENTMCNC: 33.5 G/DL (ref 31.5–36.5)
MCV RBC AUTO: 91 FL (ref 78–100)
N GONORRHOEA DNA SPEC QL NAA+PROBE: NEGATIVE
OSMOLALITY SERPL: 299 MMOL/KG (ref 280–301)
P AXIS - MUSE: 36 DEGREES
PLATELET # BLD AUTO: 179 10E3/UL (ref 150–450)
POTASSIUM SERPL-SCNC: 5 MMOL/L (ref 3.4–5.3)
POTASSIUM SERPL-SCNC: 5.1 MMOL/L (ref 3.4–5.3)
PR INTERVAL - MUSE: 180 MS
QRS DURATION - MUSE: 82 MS
QT - MUSE: 374 MS
QTC - MUSE: 457 MS
R AXIS - MUSE: -13 DEGREES
RBC # BLD AUTO: 4.07 10E6/UL (ref 4.4–5.9)
SODIUM SERPL-SCNC: 130 MMOL/L (ref 135–145)
SODIUM SERPL-SCNC: 130 MMOL/L (ref 135–145)
SODIUM UR-SCNC: 33 MMOL/L
SYSTOLIC BLOOD PRESSURE - MUSE: NORMAL MMHG
T AXIS - MUSE: 54 DEGREES
TRIGL SERPL-MCNC: 190 MG/DL
UUN UR-MCNC: 123 MG/DL (ref 801–1666)
VENTRICULAR RATE- MUSE: 90 BPM
WBC # BLD AUTO: 12.1 10E3/UL (ref 4–11)

## 2024-05-12 PROCEDURE — 250N000013 HC RX MED GY IP 250 OP 250 PS 637

## 2024-05-12 PROCEDURE — 87040 BLOOD CULTURE FOR BACTERIA: CPT

## 2024-05-12 PROCEDURE — 84478 ASSAY OF TRIGLYCERIDES: CPT

## 2024-05-12 PROCEDURE — 82962 GLUCOSE BLOOD TEST: CPT

## 2024-05-12 PROCEDURE — 70450 CT HEAD/BRAIN W/O DYE: CPT

## 2024-05-12 PROCEDURE — 99207 PR APP CREDIT; MD BILLING SHARED VISIT: CPT | Performed by: PHYSICIAN ASSISTANT

## 2024-05-12 PROCEDURE — 250N000012 HC RX MED GY IP 250 OP 636 PS 637

## 2024-05-12 PROCEDURE — 85027 COMPLETE CBC AUTOMATED: CPT

## 2024-05-12 PROCEDURE — 999N000147 HC STATISTIC PT IP EVAL DEFER

## 2024-05-12 PROCEDURE — 70450 CT HEAD/BRAIN W/O DYE: CPT | Mod: 26 | Performed by: RADIOLOGY

## 2024-05-12 PROCEDURE — 250N000013 HC RX MED GY IP 250 OP 250 PS 637: Performed by: PHYSICIAN ASSISTANT

## 2024-05-12 PROCEDURE — 83605 ASSAY OF LACTIC ACID: CPT | Performed by: INTERNAL MEDICINE

## 2024-05-12 PROCEDURE — 36415 COLL VENOUS BLD VENIPUNCTURE: CPT

## 2024-05-12 PROCEDURE — 99223 1ST HOSP IP/OBS HIGH 75: CPT | Mod: GC | Performed by: INTERNAL MEDICINE

## 2024-05-12 PROCEDURE — 250N000011 HC RX IP 250 OP 636

## 2024-05-12 PROCEDURE — 258N000003 HC RX IP 258 OP 636

## 2024-05-12 PROCEDURE — 999N000111 HC STATISTIC OT IP EVAL DEFER

## 2024-05-12 PROCEDURE — 83605 ASSAY OF LACTIC ACID: CPT

## 2024-05-12 PROCEDURE — 80048 BASIC METABOLIC PNL TOTAL CA: CPT

## 2024-05-12 PROCEDURE — 120N000002 HC R&B MED SURG/OB UMMC

## 2024-05-12 PROCEDURE — 36415 COLL VENOUS BLD VENIPUNCTURE: CPT | Performed by: INTERNAL MEDICINE

## 2024-05-12 RX ORDER — ACETAMINOPHEN 325 MG/1
975 TABLET ORAL EVERY 8 HOURS PRN
Status: DISCONTINUED | OUTPATIENT
Start: 2024-05-12 | End: 2024-05-19 | Stop reason: HOSPADM

## 2024-05-12 RX ORDER — CEFEPIME HYDROCHLORIDE 2 G/1
2 INJECTION, POWDER, FOR SOLUTION INTRAVENOUS EVERY 24 HOURS
Status: DISCONTINUED | OUTPATIENT
Start: 2024-05-12 | End: 2024-05-13

## 2024-05-12 RX ORDER — SODIUM CHLORIDE, SODIUM LACTATE, POTASSIUM CHLORIDE, CALCIUM CHLORIDE 600; 310; 30; 20 MG/100ML; MG/100ML; MG/100ML; MG/100ML
INJECTION, SOLUTION INTRAVENOUS CONTINUOUS
Status: DISCONTINUED | OUTPATIENT
Start: 2024-05-12 | End: 2024-05-13

## 2024-05-12 RX ORDER — LOPERAMIDE HCL 1 MG/7.5ML
2 SOLUTION ORAL 4 TIMES DAILY PRN
Status: DISCONTINUED | OUTPATIENT
Start: 2024-05-12 | End: 2024-05-19 | Stop reason: HOSPADM

## 2024-05-12 RX ADMIN — PANTOPRAZOLE SODIUM 40 MG: 40 TABLET, DELAYED RELEASE ORAL at 09:06

## 2024-05-12 RX ADMIN — INSULIN ASPART 1 UNITS: 100 INJECTION, SOLUTION INTRAVENOUS; SUBCUTANEOUS at 12:14

## 2024-05-12 RX ADMIN — SODIUM CHLORIDE, POTASSIUM CHLORIDE, SODIUM LACTATE AND CALCIUM CHLORIDE: 600; 310; 30; 20 INJECTION, SOLUTION INTRAVENOUS at 03:06

## 2024-05-12 RX ADMIN — ROSUVASTATIN CALCIUM 20 MG: 20 TABLET, FILM COATED ORAL at 00:16

## 2024-05-12 RX ADMIN — ACETAMINOPHEN 975 MG: 325 TABLET, FILM COATED ORAL at 11:36

## 2024-05-12 RX ADMIN — POLYETHYLENE GLYCOL 3350 17 G: 17 POWDER, FOR SOLUTION ORAL at 09:06

## 2024-05-12 RX ADMIN — TRAZODONE HYDROCHLORIDE 50 MG: 50 TABLET ORAL at 21:06

## 2024-05-12 RX ADMIN — DOCUSATE SODIUM 50 MG AND SENNOSIDES 8.6 MG 2 TABLET: 8.6; 5 TABLET, FILM COATED ORAL at 09:06

## 2024-05-12 RX ADMIN — DULOXETINE HYDROCHLORIDE 30 MG: 30 CAPSULE, DELAYED RELEASE ORAL at 21:18

## 2024-05-12 RX ADMIN — CEFEPIME HYDROCHLORIDE 2 G: 2 INJECTION, POWDER, FOR SOLUTION INTRAVENOUS at 00:48

## 2024-05-12 RX ADMIN — APIXABAN 5 MG: 5 TABLET, FILM COATED ORAL at 21:00

## 2024-05-12 RX ADMIN — ACETAMINOPHEN 975 MG: 325 TABLET, FILM COATED ORAL at 21:06

## 2024-05-12 RX ADMIN — ALLOPURINOL 200 MG: 100 TABLET ORAL at 09:06

## 2024-05-12 RX ADMIN — DEXTROMETHORPHAN HYDROBROMIDE AND QUINIDINE SULFATE 1 CAPSULE: 20; 10 CAPSULE, GELATIN COATED ORAL at 23:59

## 2024-05-12 RX ADMIN — ACETAMINOPHEN 975 MG: 325 TABLET, FILM COATED ORAL at 05:11

## 2024-05-12 RX ADMIN — SODIUM CHLORIDE, POTASSIUM CHLORIDE, SODIUM LACTATE AND CALCIUM CHLORIDE: 600; 310; 30; 20 INJECTION, SOLUTION INTRAVENOUS at 13:06

## 2024-05-12 RX ADMIN — ROSUVASTATIN CALCIUM 20 MG: 20 TABLET, FILM COATED ORAL at 21:00

## 2024-05-12 RX ADMIN — INSULIN ASPART 1 UNITS: 100 INJECTION, SOLUTION INTRAVENOUS; SUBCUTANEOUS at 16:44

## 2024-05-12 ASSESSMENT — ACTIVITIES OF DAILY LIVING (ADL)
ADLS_ACUITY_SCORE: 38
ADLS_ACUITY_SCORE: 39
ADLS_ACUITY_SCORE: 40
ADLS_ACUITY_SCORE: 40
ADLS_ACUITY_SCORE: 38
ADLS_ACUITY_SCORE: 40
ADLS_ACUITY_SCORE: 38
ADLS_ACUITY_SCORE: 40
ADLS_ACUITY_SCORE: 38
ADLS_ACUITY_SCORE: 40
ADLS_ACUITY_SCORE: 38
ADLS_ACUITY_SCORE: 40
ADLS_ACUITY_SCORE: 38
ADLS_ACUITY_SCORE: 40
ADLS_ACUITY_SCORE: 30
ADLS_ACUITY_SCORE: 40
ADLS_ACUITY_SCORE: 38
ADLS_ACUITY_SCORE: 38
ADLS_ACUITY_SCORE: 40
ADLS_ACUITY_SCORE: 40

## 2024-05-12 NOTE — PHARMACY-ADMISSION MEDICATION HISTORY
Pharmacist Admission Medication History    Admission medication history is complete. The information provided in this note is only as accurate as the sources available at the time of the update.    Information Source(s): Family member via phone (talked to his daughter - Deandra Shook over the phone)    Pertinent Information: Deandra Shook knew Adam's medications completely and she helps manage them at home.    Changes made to PTA medication list:  Added: None  Deleted: None  Changed: None    Allergies reviewed with patient and updates made in EHR: yes    Medication History Completed By: Abdulkadir Irvin [facility] 5/12/2024 3:40 PM    PTA Med List   Medication Sig Last Dose    acetaminophen (TYLENOL) 500 MG tablet Take 1 tablet (500 mg) by mouth every 4 hours as needed for mild pain  at prn    allopurinol (ZYLOPRIM) 100 MG tablet TAKE 2 TABLETS(200 MG) BY MOUTH DAILY (Patient taking differently: Take 200 mg by mouth at bedtime) 5/10/2024 at HS    calcium carbonate (TUMS) 500 MG chewable tablet Take 1 chew tab by mouth daily as needed for heartburn  at prn    celecoxib (CELEBREX) 200 MG capsule Take 1 capsule (200 mg) by mouth daily as needed for pain (jaw pain, joint pain)  at prn infrequent    dextromethorphan-quiNIDine (NUEDEXTA) 20-10 MG capsule Take 1 capsule by mouth every 12 hours 5/11/2024    DULoxetine (CYMBALTA) 30 MG capsule TAKE 1 CAPSULE(30 MG) BY MOUTH TWICE DAILY 5/11/2024    ELIQUIS ANTICOAGULANT 5 MG tablet TAKE 1 TABLET(5 MG) BY MOUTH TWICE DAILY 5/11/2024    empagliflozin (JARDIANCE) 10 MG TABS tablet Take 1 tablet (10 mg) by mouth daily 5/11/2024    hydrOXYzine (ATARAX) 25 MG tablet Take 1 tablet (25 mg) by mouth every 8 hours as needed for itching  at prn    insulin aspart (NOVOLOG FLEXPEN) 100 UNIT/ML pen Inject 8 units before breakfast, and 6 units under the skin before lunch and dinner 5/11/2024    insulin glargine (LANTUS PEN) 100 UNIT/ML pen Inject 30 Units Subcutaneous at bedtime 5/10/2024 at HS    [facility]  [facility] MG tablet TAKE 1 TABLET(50 MG) BY MOUTH DAILY 5/11/2024    meclizine (ANTIVERT) 12.5 MG tablet Take 1 tablet (12.5 mg) by mouth 3 times daily as needed for dizziness  at prn    metoprolol succinate ER (TOPROL XL) 100 MG 24 hr tablet Take 0.5 tablets (50 mg) by mouth daily Take 1/2 tablet  daily 5/11/2024    pantoprazole (PROTONIX) 40 MG EC tablet Take 1 tablet (40 mg) by mouth every morning (before breakfast) (Patient taking differently: Take 40 mg by mouth daily as needed for heartburn)  at prn    polyethylene glycol (MIRALAX) 17 GM/Dose powder MIX 17 GRAMS IN LIQUID AND TAKE BY MOUTH ONCE DAILY AS NEEDED FOR CONSTIPATION  at prn    rosuvastatin (CRESTOR) 20 MG tablet TAKE 1 TABLET(20 MG) BY MOUTH AT BEDTIME 5/10/2024 at     SENNA-docusate sodium (SENNA S) 8.6-50 MG tablet Take 1 tablet by mouth 2 times daily as needed (for constipation)  at prn    tamsulosin (FLOMAX) 0.4 MG capsule Take 1 capsule (0.4 mg) by mouth daily 5/11/2024    traZODone (DESYREL) 50 MG tablet Take 1-2 tablets ( mg) by mouth nightly as needed for sleep  at prn

## 2024-05-12 NOTE — PROGRESS NOTES
[facility]    Medicine Progress Note - Hospitalist Service, GOLD TEAM 4    Date of Admission:  5/11/2024    Assessment & Plan    Adam Talamantes is a 84 year old Kayce-speaking male with PMH signicant for HTN, HLD, CVA, HLD, prostatitis, GERD who was admitted on 5/11/2024 for UTI and AMS.     Complicated Urinary Tract Infection  Concern for Prostatitis  Moderate Left and Mild R Hydronephrosis  Presented with approx 5 days of polyuria, dysuria (pt unable to report but appeared as such at bedside when talking to patient). Recently prescribed Bactrim DS on 5/2 for 10 day course for presumed cystitis. UA at that time concerning for UTI however UC without growth. Has a charted history of prostatitis in 2016 complicated by flouroquinolone and Bactrim resistant e coli bacteremia. Presented afebrile, without tachycardia and leukocytosis to 12.7. lactate wnl. UA with +LE >182 WBC, moderate blood and 11 RBC. Was given dose of ciprofloxacin in ED. Renal US with unchanged moderate hydronephrosis when compared to imaging from 02/2023. Stared on cefepime on admission. Cultures pending.   -Continue IV cefepime for now, pending cultures could consider discharging on ciprofloxacin   -CBC in AM  -Follow blood culture and urine culture   -If develops abdominal pain/pelvic pain could consider CT Ab/Pelvis     ELLA on CKD III, improving   Baseline of 1.3-1.7 per chart review dating back to 2016, presenting with creatinine of 3.21. Renal US moderate left and mild right hydronephrosis, similar to findings on prior CT 2/8/2023. FeNa 6.6%, BUN/Cr ratio 12.8, urine osms 262, suggestive of ATN in the setting of recent bactrim rx and poor oral intake. Bladder scan in ED not showing retention.   - Continue IVF LR @100mL/hr overnight   - BMP in AM    Hyponatremia, moderate, improved   Corrected to 128 on presentation with mild hyperglycemia. Serum osm 299, urine sodium 33, urine clara 262. Elevated serum  osms indicative of possible hypovolemia, acute renal failure also likely contributing to hyponatremia. S/p 1L in ED, mIVF on admission. Improved with fluids on admission.   - AM BMP    Insulin-Dependent T2DM, A1c 8.8% 02/26/2024  Last A1C of 8.8 on 2/26/2024. PTA Jardiance, januvia, glargine 30U at bedtime and aspart 8U before breakfast, 6U with lunch and dinner. Held PTA glargine on admission and blood sugars have been stable without good appetite.   - Hold PTA glargine 30U at bedtime   - MDSSI  - Hypoglycemia protocol  - Holding PTA Jardiance and januvia     Colonic Wall Thickening  R Liver Lesion  Noted on CT Urogram 6/2023, wall thickening concerning for malignancy. Prior colonoscopy on file from 2017, showing tubular adenoma without high grade dysplasia. No reported weight loss per family, chart review reveals stable weight over past 2-3 years. No abdominal pain. LFTs wnl. Normal brown bowel movements. Ongoing discussions to pursue work up as OP with PCP, who is waiting to discuss with patient's daughter if should pursue colonoscopy. Previously noted that patient would not be interested in undergoing a surgery. No acute concerns.   - Continue working with PCP as OP for continued discussion     Insomnia/depression: continue PTA duloxetine 30mg daily and Nuedexta 20-10mg q12h   BPH: resume PTA tamsulosin   Gout: Continue PTA Allopurinol  HTN: resume PTA metoprolol succinate 50mg daily   HLD: PTA rosuvastatin 20mg qHS  GERD: PTA pantopraozle 40mg daily   Hx of CVA 2/2 Basilar Artery Stenosis: PTA apixaban 5mg daily     Resolved:   Acute encephalopathy, likely metabolic, resolved    Per family on admission past few days PTA showed more confusion, including not being able to state names of family members. CT Head without acute findings. Improved 05/12 and per family patient is reportedly at baseline, suspect encephalopathy to be metabolic in nature with ELLA.   -Delirium precautions           Diet: Combination Diet  "Regular Diet Adult    DVT Prophylaxis: DOAC  Diego Catheter: Not present  Lines: None     Cardiac Monitoring: None  Code Status: No CPR- Do NOT Intubate      Clinically Significant Risk Factors Present on Admission         # Hyponatremia: Lowest Na = 126 mmol/L in last 2 days, will monitor as appropriate       # Drug Induced Coagulation Defect: home medication list includes an anticoagulant medication    # Hypertension: Noted on problem list     # DMII: A1C = 8.8 % (Ref range: 0.0 - 5.6 %) within past 6 months    # Overweight: Estimated body mass index is 28.74 kg/m  as calculated from the following:    Height as of 5/2/24: 1.575 m (5' 2\").    Weight as of 5/2/24: 71.3 kg (157 lb 1.9 oz).              Disposition Plan     Medically Ready for Discharge: Anticipated Tomorrow    Lives at home with family.        The patient's care was discussed with the Attending Physician, Dr. Odell, Bedside Nurse, Patient, and Patient's Family.    Mary Campos PA-C  Hospitalist Service, [address]  Securely message with [employer] (more info)  Text page via [facility] Paging/Directory   See signed in provider for up to date coverage information  ______________________________________________________________________    Interval History   Feeling better. No abdominal pain, nausea, vomiting, urinary sxs. No flank pain. Able to tell me where he is. No chest pain, dyspnea, cough. No diarrhea. Normal bowel movements. No hematuria. No fever or chills.    Patient was seen with grandson who helped with interpreting.     Physical Exam   Vital Signs: Temp: 98.7  F (37.1  C) Temp src: Oral BP: 113/71 Pulse: 80   Resp: 17 SpO2: 92 % O2 Device: None (Room air)    Weight: 0 lbs 0 oz    General: laying in bed, alert, cooperative, awake, in no acute distress  HEENT: normocephalic, atraumatic, anicteric sclera, moist mucus membranes  Respiratory: breathing comfortably on room air, clear to " auscultation bilaterally without wheezing, crackles, or rhonchi appreciated  Cardiac: regular rate and rhythm with normal S1/S2 without murmurs, clicks, rubs or gallops, 2+ radial pulse on LUE, no signs of peripheral edema bilaterally  GI: soft, non-distended, normoactive bowel sounds, non-tender per palpation  : no CVA tenderness bilaterally   Neuro: grossly non-focal, alert and oriented, normal speech  MSK: no bony deformities, moving all extremities independently  Skin: no rashes or lesions on uncovered surfaces, no jaundice      Medical Decision Making       60 MINUTES SPENT BY ME on the date of service doing chart review, history, exam, documentation & further activities per the note.      Data   NOTE: Data reviewed over the past 24 hrs contributes toward MDM complexity

## 2024-05-12 NOTE — PHARMACY-CONSULT NOTE
Pharmacy Delirium Chart Review    Upon chart review, the following medications may contribute to possible patient delirium:      Metoprolol: Beta Blockers are associated with delirium. Seems unlikely here as this is not a new medication or dosage.    Duloxetine: Given the renal injury, AUC of duloxetine is expected to be higher than prior, and duloxetine can cause CNS side effects.     Hydroxyzine: Very anticholinergic medication, which may cause delirium. It doesn't seem Adam was using this very often at all, however, during medication history interview with his daughter. Unlikely cause in this case.    Trazodone: May be associated with CNS side effects. He does fill this medication regularly, so it seems likely he was using at home regularly.     Recommendations: If deemed the delirium (or confusion) may be not entirely from his UTI, could consider holding or reducing the dose of duloxetine. The trazodone and hydroxyzine have already been held.        Please consult unit pharmacist with further questions.    Abdulkadir HudsonD, [facility]    [phone]  Pager [phone]

## 2024-05-12 NOTE — PLAN OF CARE
7C OT: DEFER    Occupational Therapy: Orders received. Chart reviewed and discussed with care team.? Occupational Therapy not indicated due to no acute IP OT needs at this time. Per discussion with PT, pt has 4 adults in home and is set up A. Most ADLs, per grandson pt is very near baseline. Anticipate discharge home and would benefit from HH therapies to complete home safety evaluation.? Defer discharge recommendations to PT and medical team.? Will complete orders.

## 2024-05-12 NOTE — PLAN OF CARE
"Goal Outcome Evaluation:      Plan of Care Reviewed With: patient, family    Overall Patient Progress: no changeOverall Patient Progress: no change      Alert but disoriented to time.   Family at bedside all of shift, assisting with cares and translating for patient.   Patient resting comfortably in bed all of shift.   Tylenol given x1 per patient request, stated he was feeling \"feverish\".  Temperature taken at the time was 97.6.  Relief reported from patient after tylenol.   Miralax and senna given this morning for constipation, no BM still this shift.  Using urinal to void with good OP.   Fair appetite.   Takes pills in applesauce.   LR running at 100ml/hr.   Continue to monitor and follow plan of care.          "

## 2024-05-12 NOTE — PLAN OF CARE
PT 7C: cancel/defer    PT order received. Met with pt and grandson at bedside, grandson reports pt is mobilizing at or very near functional baseline and denies needs for IP therapy services. May benefit from HH PT/OT for safety evaluation. Per discussion, pt has adequate assist available from family 24/7. At baseline pt receives assist or set up assist for all ADLs and mobility. PT to complete orders and sign off.

## 2024-05-12 NOTE — ED TRIAGE NOTES
Pt has had UTI w recent abx finished. Family said still having frequency, burning, pain. Hx stroke and diabetes.

## 2024-05-12 NOTE — SUMMARY OF CARE
Reason for admission: UTI  Admitted from:  ED  Report received from:  Elle ALLEN RN          2 RN skin assessment completed by:  & Michael LUCAS RN       - Findings (add LDA if needed):   Bed algorithm reevaluated: no  Was airflow pump ordered?: no  Suction set up in room? yes  Care plan (primary problem) and education initiated: yes  MDRO education done if applicable:  Pt informed about policy regarding no IV pumps off unit: yes  Flu shot ordered? (October-April only):  Detailed Belongings: shoes, T-shirt, sweat pants, flannel shirt     Pt arrived on unit at 0350. Kayce speaking, son is at bedside and translates for pt. Alert, oriented but unable to state b-day. C/o back pain, tylenol given. LR @100ml/hr. Frequently voiding small amts via urinal.

## 2024-05-12 NOTE — ED PROVIDER NOTES
[place] EMERGENCY DEPARTMENT ([facility])    5/11/24       ED PROVIDER NOTE       History     Chief Complaint   Patient presents with    Urinary Frequency     The history is provided by the patient, a relative and medical records.     Adam LUCAS Albin is a 84 year old male with a past medical history of prostatitis, acute renal failure, anemia, bacteremia, CKD, type 2 DM, nephrolithiasis, hyperlipidemia, hypertension, stroke, and CVD who presents to the ED for evaluation of urinary frequency.     Per Patient's Family,  Patient is currently taking Bactrim for a UTI, last dose was today and he is still experiencing symptoms. Patient reports pain with urination, burning sensation, urinary frequency, and cloudy urine. Patient is also experiencing shortness of breath and generalized weakness. His family reports he almost loses consciousness while urinating and then regains full consciousness after urination. He has poor appetite. Patient had diarrhea a few days ago, but is now having constipation. His family notes he has a history of prostate issues and is taking Flomax. Denies fevers. Denies abdominal or back pain.     Past Medical History  Past Medical History:   Diagnosis Date    Acute blood loss anemia     Acute renal failure (H24)     Anemia     Bacteremia     Cataract     Chronic gout     CKD (chronic kidney disease)     Stage 3    DM2 (diabetes mellitus, type 2) (H)     GERD (gastroesophageal reflux disease)     Glucose intolerance (impaired glucose tolerance)     Gout     History of nephrolithiasis     History of prostatitis 7/19/2017    Hyperlipidemia     Hypertension     Insomnia     Intestinal disaccharidase deficiencies and disaccharide malabsorption     Created by Conversion     Latent tuberculosis     Nephrolithiasis     Neuropathy     Nonspecific abnormal findings on radiological and other examination of skull and head     Created by [facility] Annotation: Jun 23 2013 11:23PM - Fuad  Solvei/10/2011:  severe stenosis both posterior cerebral arteries seen MRI/MRA done for  vertigo. No acute changes.e*    Osteoarthritis     wrist, knee, shoulder    Prostatitis     Rectal bleed     Trigger thumb      Past Surgical History:   Procedure Laterality Date    IR MISCELLANEOUS PROCEDURE  2009    IR MISCELLANEOUS PROCEDURE  2009    IR MISCELLANEOUS PROCEDURE  2009    IR MISCELLANEOUS PROCEDURE  2009    IR NEPHROURETERAL TUBE PLACEMENT BILATERAL  2009    IR URETER DILATION BILATERAL  2009    IR URETER DILATION BILATERAL  2009    KIDNEY STONE SURGERY      PICC  3/6/2016          acetaminophen (TYLENOL) 500 MG tablet  allopurinol (ZYLOPRIM) 100 MG tablet  calcium carbonate (TUMS) 500 MG chewable tablet  celecoxib (CELEBREX) 200 MG capsule  Continuous Blood Gluc Sensor (FREESTYLE SUSANNAH 2 SENSOR) [facility] [place]  dextromethorphan-quiNIDine (NUEDEXTA) 20-10 MG capsule  DULoxetine (CYMBALTA) 30 MG capsule  ELIQUIS ANTICOAGULANT 5 MG tablet  empagliflozin (JARDIANCE) 10 MG TABS tablet  hydrOXYzine (ATARAX) 25 MG tablet  insulin aspart (NOVOLOG FLEXPEN) 100 UNIT/ML pen  insulin glargine (LANTUS PEN) 100 UNIT/ML pen  insulin pen needle (BD PEN NEEDLE SALVATORE 2ND GEN) 32G X 4 MM miscellaneous  JANUVIA 50 MG tablet  meclizine (ANTIVERT) 12.5 MG tablet  metoprolol succinate ER (TOPROL XL) 100 MG 24 hr tablet  pantoprazole (PROTONIX) 40 MG EC tablet  polyethylene glycol (MIRALAX) 17 GM/Dose powder  rosuvastatin (CRESTOR) 20 MG tablet  SENNA-docusate sodium (SENNA S) 8.6-50 MG tablet  sulfamethoxazole-trimethoprim (BACTRIM DS) 800-160 MG tablet  tamsulosin (FLOMAX) 0.4 MG capsule  traZODone (DESYREL) 50 MG tablet  triamcinolone (KENALOG) 0.1 % external cream      Allergies   Allergen Reactions    Lisinopril Cough     Family History  Family History   Problem Relation Age of Onset    Cerebrovascular Disease Father     Cerebrovascular Disease Daughter      Social History   Social History     Tobacco Use     Smoking status: Former     Current packs/day: 0.00     Types: Cigarettes     Quit date: 3/10/2000     Years since quittin.1     Passive exposure: Never    Smokeless tobacco: Former    Tobacco comments:     no passive exposure   Vaping Use    Vaping status: Never Used   Substance Use Topics    Alcohol use: No    Drug use: No         A medically appropriate review of systems was performed with pertinent positives and negatives noted in the HPI, and all other systems negative.    Physical Exam   BP: 114/79  Pulse: 96  Temp: 98.1  F (36.7  C)  Resp: 18  SpO2: 92 %  Physical Exam  Vitals and nursing note reviewed.   Constitutional:       General: He is not in acute distress.     Appearance: He is well-developed. He is not ill-appearing or diaphoretic.   HENT:      Head: Normocephalic and atraumatic.      Nose: Nose normal.   Eyes:      General: No scleral icterus.     Conjunctiva/sclera: Conjunctivae normal.   Cardiovascular:      Rate and Rhythm: Normal rate.   Pulmonary:      Effort: Pulmonary effort is normal. No respiratory distress.      Breath sounds: No stridor.   Abdominal:      General: There is distension.      Palpations: Abdomen is soft.      Tenderness: There is no abdominal tenderness. There is no right CVA tenderness, left CVA tenderness, guarding or rebound.   Musculoskeletal:         General: No deformity or signs of injury. Normal range of motion.      Cervical back: Normal range of motion and neck supple. No rigidity.   Skin:     General: Skin is warm and dry.      Coloration: Skin is not jaundiced or pale.      Findings: No rash.   Neurological:      General: No focal deficit present.      Mental Status: He is alert and oriented to person, place, and time.   Psychiatric:         Behavior: Behavior normal.         Thought Content: Thought content normal.           ED Course, Procedures, & Data      Procedures            EKG Interpretation:      Interpreted by Tersea Shannon MD  Time reviewed:  2021  Symptoms at time of EKG: sob   Rhythm: normal sinus   Rate: normal  Axis: normal  Ectopy: none  Conduction: normal  ST Segments/ T Waves: No ST-T wave changes  Q Waves: none    Clinical Impression: normal EKG          Results for orders placed or performed during the hospital encounter of 05/11/24   XR Chest 2 Views     Status: None    Narrative    EXAM: XR CHEST 2 VIEWS  LOCATION: [facility]  DATE: 5/11/2024    INDICATION: sob  COMPARISON: 1/11/2023      Impression    IMPRESSION: Low lung volumes. Cardiomegaly. Pulmonary vascularity normal. Interstitial prominence both lungs may represent edema. Subsegmental atelectasis in the bases. No effusions. Follow-up chest x-ray with deeper respiration recommended.   US Renal Complete Non-Vascular     Status: None (Preliminary result)    Impression    RESIDENT PRELIMINARY INTERPRETATION  IMPRESSION:  Moderate left and mild right hydronephrosis, similar to findings on  prior CT 2/8/2023.   Comprehensive metabolic panel     Status: Abnormal   Result Value Ref Range    Sodium 126 (L) 135 - 145 mmol/L    Potassium 5.3 3.4 - 5.3 mmol/L    Carbon Dioxide (CO2) 21 (L) 22 - 29 mmol/L    Anion Gap 12 7 - 15 mmol/L    Urea Nitrogen 41.3 (H) 8.0 - 23.0 mg/dL    Creatinine 3.21 (H) 0.67 - 1.17 mg/dL    GFR Estimate 19 (L) >60 mL/min/1.73m2    Calcium 9.8 8.8 - 10.2 mg/dL    Chloride 93 (L) 98 - 107 mmol/L    Glucose 194 (H) 70 - 99 mg/dL    Alkaline Phosphatase 122 40 - 150 U/L    AST 21 0 - 45 U/L    ALT 17 0 - 70 U/L    Protein Total 8.9 (H) 6.4 - 8.3 g/dL    Albumin 4.2 3.5 - 5.2 g/dL    Bilirubin Total 0.3 <=1.2 mg/dL   UA with Microscopic reflex to Culture     Status: Abnormal    Specimen: Urine, Midstream   Result Value Ref Range    Color Urine Orange (A) Colorless, Straw, Light Yellow, Yellow    Appearance Urine Cloudy (A) Clear    Glucose Urine 1000 (A) Negative mg/dL    Bilirubin Urine Negative Negative    Ketones Urine  Negative Negative mg/dL    Specific Gravity Urine 1.008 1.003 - 1.035    Blood Urine Moderate (A) Negative    pH Urine 6.0 5.0 - 7.0    Protein Albumin Urine 30 (A) Negative mg/dL    Urobilinogen Urine Normal Normal, 2.0 mg/dL    Nitrite Urine Negative Negative    Leukocyte Esterase Urine Large (A) Negative    WBC Clumps Urine Present (A) None Seen /HPF    RBC Urine 11 (H) <=2 /HPF    WBC Urine >182 (H) <=5 /HPF    Narrative    Urine Culture ordered based on laboratory criteria   Troponin T, High Sensitivity     Status: Normal   Result Value Ref Range    Troponin T, High Sensitivity 13 <=22 ng/L   Lactic Acid STAT     Status: Normal   Result Value Ref Range    Lactic Acid 1.8 0.7 - 2.0 mmol/L   CBC with platelets and differential     Status: Abnormal   Result Value Ref Range    WBC Count 12.7 (H) 4.0 - 11.0 10e3/uL    RBC Count 4.58 4.40 - 5.90 10e6/uL    Hemoglobin 13.8 13.3 - 17.7 g/dL    Hematocrit 41.5 40.0 - 53.0 %    MCV 91 78 - 100 fL    MCH 30.1 26.5 - 33.0 pg    MCHC 33.3 31.5 - 36.5 g/dL    RDW 15.6 (H) 10.0 - 15.0 %    Platelet Count 213 150 - 450 10e3/uL    % Neutrophils 64 %    % Lymphocytes 19 %    % Monocytes 11 %    % Eosinophils 4 %    % Basophils 1 %    % Immature Granulocytes 1 %    NRBCs per 100 WBC 0 <1 /100    Absolute Neutrophils 8.3 1.6 - 8.3 10e3/uL    Absolute Lymphocytes 2.4 0.8 - 5.3 10e3/uL    Absolute Monocytes 1.4 (H) 0.0 - 1.3 10e3/uL    Absolute Eosinophils 0.5 0.0 - 0.7 10e3/uL    Absolute Basophils 0.1 0.0 - 0.2 10e3/uL    Absolute Immature Granulocytes 0.1 <=0.4 10e3/uL    Absolute NRBCs 0.0 10e3/uL   Procalcitonin     Status: Normal   Result Value Ref Range    Procalcitonin 0.11 <0.50 ng/mL   Nt probnp inpatient (BNP)     Status: Normal   Result Value Ref Range    N terminal Pro BNP Inpatient 58 0 - 1,800 pg/mL   Osmolality, random urine     Status: Normal   Result Value Ref Range    Osmolality Urine 262 100 - 1,200 mmol/kg    Narrative    Reference Ranges depend on patient's  hydration status and renal function.   Neonates:  mmol/kg   2 years and older, random specimens: 100-1200 mmol/kg; Greater than 850 mmol/kg after 12 hour fluid restriction  Urine/serum osmolality ratio: 2 years and older: 1.0-3.0; 3.0-4.7 after 12 hour fluid restriction   Sodium random urine     Status: None   Result Value Ref Range    Sodium Urine mmol/L 35 mmol/L   Protein  random urine     Status: Abnormal   Result Value Ref Range    Total Protein Urine mg/dL 74.8   mg/dL    Total Protein Urine mg/mg Creat 1.96 (H) 0.00 - 0.20 mg/mg Cr    Creatinine Urine mg/dL 38.2 mg/dL   Fractional Excretion of Sodium     Status: None   Result Value Ref Range    Creatinine Urine mg/dL 12.8 mg/dL    Sodium Urine mmol/L 33 mmol/L    %FENA 6.6 %   EKG 12-lead, tracing only     Status: None (Preliminary result)   Result Value Ref Range    Systolic Blood Pressure  mmHg    Diastolic Blood Pressure  mmHg    Ventricular Rate 90 BPM    Atrial Rate 90 BPM    AL Interval 180 ms    QRS Duration 82 ms     ms    QTc 457 ms    P Axis 36 degrees    R AXIS -13 degrees    T Axis 54 degrees    Interpretation ECG Sinus rhythm  Normal ECG      CBC with platelets differential     Status: Abnormal    Narrative    The following orders were created for panel order CBC with platelets differential.  Procedure                               Abnormality         Status                     ---------                               -----------         ------                     CBC with platelets and d...[[id number]]  Abnormal            Final result                 Please view results for these tests on the individual orders.   Fractional excretion of sodium     Status: None    Narrative    The following orders were created for panel order Fractional excretion of sodium.  Procedure                               Abnormality         Status                     ---------                               -----------         ------                      Fractional Excretion of ...[[id number]]                      Final result                 Please view results for these tests on the individual orders.     Medications   lidocaine 1 % 0.1-1 mL (has no administration in time range)   lidocaine (LMX4) cream (has no administration in time range)   sodium chloride (PF) 0.9% PF flush 3 mL (3 mLs Intracatheter Not Given 5/11/24 2303)   sodium chloride (PF) 0.9% PF flush 3 mL (has no administration in time range)   senna-docusate (SENOKOT-S/PERICOLACE) 8.6-50 MG per tablet 1 tablet (has no administration in time range)     Or   senna-docusate (SENOKOT-S/PERICOLACE) 8.6-50 MG per tablet 2 tablet (has no administration in time range)   calcium carbonate (TUMS) chewable tablet 1,000 mg (has no administration in time range)   polyethylene glycol (MIRALAX) Packet 17 g (has no administration in time range)   apixaban ANTICOAGULANT (ELIQUIS) tablet 5 mg ( Oral Automatically Held 5/15/24 2000)   allopurinol (ZYLOPRIM) tablet 200 mg (has no administration in time range)   dextromethorphan-quiNIDine (NUEDEXTA) 20-10 MG per capsule 1 capsule ( Oral Automatically Held 5/17/24 2345)   DULoxetine (CYMBALTA) DR capsule 30 mg ( Oral Automatically Held 5/17/24 2000)   empagliflozin (JARDIANCE) tablet 10 mg ( Oral Automatically Held 5/15/24 0800)   hydrOXYzine HCl (ATARAX) tablet 25 mg ( Oral Held by provider 5/11/24 2341)   insulin glargine (LANTUS PEN) injection 30 Units ( Subcutaneous Automatically Held 5/17/24 2200)   sitagliptin (JANUVIA) tablet 50 mg ( Oral Automatically Held 5/15/24 0800)   metoprolol succinate ER (TOPROL XL) 24 hr tablet 50 mg ( Oral Automatically Held 5/15/24 0800)   pantoprazole (PROTONIX) EC tablet 40 mg (has no administration in time range)   rosuvastatin (CRESTOR) tablet 20 mg (20 mg Oral $Given 5/12/24 0016)   traZODone (DESYREL) tablet  mg ( Oral Held by provider 5/11/24 2341)   tamsulosin (FLOMAX) capsule 0.4 mg ( Oral Automatically Held 5/15/24 0800)    glucose gel 15-30 g (has no administration in time range)     Or   dextrose 50 % injection 25-50 mL (has no administration in time range)     Or   glucagon injection 1 mg (has no administration in time range)   ceFEPIme (MAXIPIME) 2 g vial to attach to  mL bag for ADULTS or 50 mL bag for PEDS (has no administration in time range)   sodium chloride 0.9% BOLUS 1,000 mL (0 mLs Intravenous Stopped 5/11/24 2214)   ciprofloxacin (CIPRO) infusion 400 mg (400 mg Intravenous $New Bag 5/11/24 2211)     Labs Ordered and Resulted from Time of ED Arrival to Time of ED Departure   COMPREHENSIVE METABOLIC PANEL - Abnormal       Result Value    Sodium 126 (*)     Potassium 5.3      Carbon Dioxide (CO2) 21 (*)     Anion Gap 12      Urea Nitrogen 41.3 (*)     Creatinine 3.21 (*)     GFR Estimate 19 (*)     Calcium 9.8      Chloride 93 (*)     Glucose 194 (*)     Alkaline Phosphatase 122      AST 21      ALT 17      Protein Total 8.9 (*)     Albumin 4.2      Bilirubin Total 0.3     ROUTINE UA WITH MICROSCOPIC REFLEX TO CULTURE - Abnormal    Color Urine Orange (*)     Appearance Urine Cloudy (*)     Glucose Urine 1000 (*)     Bilirubin Urine Negative      Ketones Urine Negative      Specific Gravity Urine 1.008      Blood Urine Moderate (*)     pH Urine 6.0      Protein Albumin Urine 30 (*)     Urobilinogen Urine Normal      Nitrite Urine Negative      Leukocyte Esterase Urine Large (*)     WBC Clumps Urine Present (*)     RBC Urine 11 (*)     WBC Urine >182 (*)    CBC WITH PLATELETS AND DIFFERENTIAL - Abnormal    WBC Count 12.7 (*)     RBC Count 4.58      Hemoglobin 13.8      Hematocrit 41.5      MCV 91      MCH 30.1      MCHC 33.3      RDW 15.6 (*)     Platelet Count 213      % Neutrophils 64      % Lymphocytes 19      % Monocytes 11      % Eosinophils 4      % Basophils 1      % Immature Granulocytes 1      NRBCs per 100 WBC 0      Absolute Neutrophils 8.3      Absolute Lymphocytes 2.4      Absolute Monocytes 1.4 (*)      Absolute Eosinophils 0.5      Absolute Basophils 0.1      Absolute Immature Granulocytes 0.1      Absolute NRBCs 0.0     PROTEIN RANDOM URINE - Abnormal    Total Protein Urine mg/dL 74.8      Total Protein Urine mg/mg Creat 1.96 (*)     Creatinine Urine mg/dL 38.2     TROPONIN T, HIGH SENSITIVITY - Normal    Troponin T, High Sensitivity 13     LACTIC ACID WHOLE BLOOD - Normal    Lactic Acid 1.8     PROCALCITONIN - Normal    Procalcitonin 0.11     NT PROBNP INPATIENT - Normal    N terminal Pro BNP Inpatient 58     OSMOLALITY, RANDOM URINE - Normal    Osmolality Urine 262     SODIUM RANDOM URINE    Sodium Urine mmol/L 35     FRACTIONAL EXCRETION OF SODIUM    Creatinine Urine mg/dL 12.8      Sodium Urine mmol/L 33      %FENA 6.6     OSMOLALITY   URINE CULTURE   CHLAMYDIA TRACHOMATIS/NEISSERIA GONORRHOEAE BY PCR   BLOOD CULTURE   BLOOD CULTURE   FRACTIONAL EXCRETION OF SODIUM     US Renal Complete Non-Vascular   Preliminary Result   RESIDENT PRELIMINARY INTERPRETATION   IMPRESSION:   Moderate left and mild right hydronephrosis, similar to findings on   prior CT 2/8/2023.      XR Chest 2 Views   Final Result   IMPRESSION: Low lung volumes. Cardiomegaly. Pulmonary vascularity normal. Interstitial prominence both lungs may represent edema. Subsegmental atelectasis in the bases. No effusions. Follow-up chest x-ray with deeper respiration recommended.      CT Head w/o Contrast    (Results Pending)          Critical care was not performed.     Medical Decision Making  The patient's presentation was of high complexity (an acute health issue posing potential threat to life or bodily function).    The patient's evaluation involved:  an assessment requiring an independent historian (see separate area of note for details)  review of external note(s) from 3+ sources (see separate area of note for details)  review of 3+ test result(s) ordered prior to this encounter (see separate area of note for details)  ordering and/or review of  3+ test(s) in this encounter (see separate area of note for details)  discussion of management or test interpretation with another health professional (see separate area of note for details)    The patient's management necessitated moderate risk (prescription drug management including medications given in the ED), moderate risk (limitations due to social determinants of health (see separate area of note for details)), high risk (a parenteral controlled substance), and high risk (a decision regarding hospitalization).    Assessment & Plan    Adam Talamantes is a 84 year old male with a past medical history of prostatitis, acute renal failure, anemia, bacteremia, CKD, type 2 DM, nephrolithiasis, hyperlipidemia, hypertension, stroke, and CVD who presents to the ED for evaluation of urinary frequency.     Ddx: UTI, acute vs chronic prostatitis, prostatic abscess, pyelonephritis, sepsis, bacteremia, failure to thrive, hypokalemia, ELLA, near syncope, ACS, pneumonia, micturition syncope, urinary retention    Patient with hyponatremia sodium 126.  Creatinine 3.21 from 1.85 in February.  This is an ELLA.  Patient's postvoid residual is under 200 cc.  Given IV fluids. Changed CT to renal US due to ELLA.  White blood cell count 12.7.  Hemoglobin normal.  Procalcitonin 0.11.  BNP normal.  Urinalysis looks infected however per review of recent urine culture did not grow anything.  Will cover empirically for UTI versus bacterial prostatitis.  Ordered IV ciprofloxacin.  Cultures sent.  Troponin 13.  EKG without acute ischemic changes.  Normal sinus rhythm.  Chest x-ray shows cardiomegaly and pulmonary interstitial prominence which could represent edema.  No effusions.  Patient admitted to medicine for ongoing management.      I have reviewed the nursing notes. I have reviewed the findings, diagnosis, plan and need for follow up with the patient.    New Prescriptions    No medications on file       Final diagnoses:   Dysuria   Near  syncope   Generalized weakness   Urinary tract infection without hematuria, site unspecified   ELLA (acute kidney injury) (H24)   I, Paula Watts, am serving as a trained medical scribe to document services personally performed by Teresa Shannon MD, based on the provider's statements to me.     Teresa FLAHERTY MD, was physically present and have reviewed and verified the accuracy of this note documented by Paula Watts.     Teresa Shannon MD  [facility] EMERGENCY DEPARTMENT  5/11/2024     Teresa Shannon MD  05/12/24 0026

## 2024-05-12 NOTE — H&P
[facility]    History and Physical - Medicine Service, [facility] TEAM        Date of Admission:  5/11/2024    Assessment & Plan      Adam Talamantes is a 84 year old Kayce-speaking male admitted on 5/11/2024. He has a history of T2DM, CVA, HTN, HLD, stroke, prostatitis, GERD and is admitted for UTI and AMS.     Sepsis  Metabolic Acidosis  Complicated Urinary Tract Infection  Concern for Prostatitis  Moderate Left and Mild R Hydronephrosis  Presenting with approx 5 days of polyuria, dysuria (pt unable to report but appeared as such at bedside when talking to patient). Recently prescribed Bactrim DS on 5/2 for 10 day course for presumed cystitis. UA at that time concerning for UTI however UC without growth. Has a charted history of prostatitis in 2016 complicated by flouroquinolone and Bactrim resistant e coli bacteremia. Leukocytosis in ED to 12.7, UA in the ED with significant glucosuria, moderate blood, large LE, >182 WBC, 11 RBC. Given one dose of ciprofloxacin and bolus prior to admission. Mild metabolic acidosis without anion gap, borderline normal lactate, could be secondary to decreased clearance with ELLA.   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 Per chart review, intracranial atherosclerotic disease of the posterior circulation with bilateral occipital and brainstem strokes in Feb 2023. He was placed on aspirin and Eliquis, Crestor at that time. Endovascular therapy was deferred at the time due to risk of  angioplasty/stenting and no evidence of recurrent or refractory strokes. Per med review and discussion with son at bedside, appears to not currently be on aspirin. CT head ordered in setting of unwitnessed fall(s?) in setting of Apixaban use. Encephalopathy could be secondary to sepsis, mild hyponatremia, medication interactions.   - Delirium, fall precautions  - CT head negative for bleed  - Pharmacy consult for assistance with home medication recommendations   - Hold PTA dextromethorphan-quinidine, duloxetine, PRN hydroxyzine, meclizine, trazodone    ELLA on CKD III  Baseline of 1.3-1.7 per chart review dating back to 2016, presenting with creatinine of 3.21. Renal US moderate left and mild right hydronephrosis, similar to findings on prior CT 2/8/2023. FeNa 6.6%, BUN/Cr ratio 12.8, urine osms 262, suggestive of ATN picture in setting of sepsis and decreased oral intake, did get 1 L NS bolus in ED but appears to have been started after obtaining UA. Bladder scan in ED not showing retention.   - Add on FeUrea  - Consider Nephrology consult  - Repeat AM BMP      Colonic Wall Thickening  R Liver Lesion  Noted on CT Urogram 6/2023, wall thickening concerning for malignancy. Prior colonoscopy on file from 2017, showing tubular adenoma without high grade dysplasia. No reported weight loss per family, chart review reveals stable weight over past 2-3 years.   - Consider CT w/ contrast when renal function improved      Hyponatremia, moderate  Corrected to 128 on presentation with mild hyperglycemia. Serum osm 299, urine sodium 33, urine clara 262. Elevated serum osms indicative of possible hypovolemia, acute renal failure also likely contributing to hyponatremia. S/p 1L in ED, mIVF on admission.   - Add on triglycerides  - AM BMP    Insulin-Dependent T2DM  Last A1C of 8.8 on 2/26/2024.   - Hold PTA   - MDSSI, consider re-adding basal insulin if having good PO intake  - Hypoglycemia protocol    Gout  Continue PTA  "Allopurinol      HTN  HLD  GERD  - Hold PTA metoprolol succinate in setting of sepsis  - Continue PTA statin, pantoprazole            Diet: Combination Diet Regular Diet Adult    DVT Prophylaxis: PTA apixaban  Diego Catheter: Not present  Fluids: LR @ 100 mL/hr  Lines: None     Cardiac Monitoring: None  Code Status: No CPR- Do NOT Intubate      Clinically Significant Risk Factors Present on Admission         # Hyponatremia: Lowest Na = 126 mmol/L in last 2 days, will monitor as appropriate       # Drug Induced Coagulation Defect: home medication list includes an anticoagulant medication   # Acute Kidney Injury, unspecified: based on a >150% or 0.3 mg/dL increase in last creatinine compared to past 90 day average, will monitor renal function  # Hypertension: Noted on problem list     # DMII: A1C = 8.8 % (Ref range: 0.0 - 5.6 %) within past 6 months    # Overweight: Estimated body mass index is 28.74 kg/m  as calculated from the following:    Height as of 5/2/24: 1.575 m (5' 2\").    Weight as of 5/2/24: 71.3 kg (157 lb 1.9 oz).              Disposition Plan      Expected Discharge Date: 05/13/2024                To be formally staffed in the morning.       YADI LERNER MD  Medicine Service, [facility]  Securely message with [employer] (more info)  Text page via [facility] Paging/Directory   See signed in provider for up to date coverage information  ______________________________________________________________________    Chief Complaint   Confusion, urinary frequency    History is obtained from the patient's son    History of Present Illness   Adam SHAYY Talamantes is a 84 year old male who has a history of T2DM, CVA, HTN, HLD, stroke, prostatitis, GERD and is admitted for worsening UTI. Ms. Talamantes is confused at bedside is only oriented to self, so his son at bedside provides the history. Over the past few days, they say that Mr. Talamantes has had difficulties with urination. He " has been going much more frequently than normal, and although he says he is not in pain for family members he appears to be in pain during and right after urination. His son states that he has also seemed more weak and confused than his baseline - noting that he does have some cognitive impairment due to strokes but that this seems worse than usual. A few days ago he went to the clinic where he was given an antibiotic but they came to the ED tonight because they felt he was not getting better.     Mr. Talamantes's son states that he has had a couple of unwitnessed falls in the past few days, and a couple of the instances they thought he was going to pass out. One time he fell where nobody saw him, he did not appear hurt but nobody witnessed the fall. At home he has been a little more unsteady on his feet over the last year or so, but that the short change from that is noticeable. Currently he is living with one of his daughters. Per report he takes his medications regularly. Mr. Talamantes is unable to respond to any questions with the use of an  or interpretation from his son, but he does not appear to be in any pain or distress outside of when he was urinating into his urinal.       Past Medical History    Past Medical History:   Diagnosis Date    Acute blood loss anemia     Acute renal failure (H24)     Anemia     Bacteremia     Cataract     Chronic gout     CKD (chronic kidney disease)     Stage 3    DM2 (diabetes mellitus, type 2) (H)     GERD (gastroesophageal reflux disease)     Glucose intolerance (impaired glucose tolerance)     Gout     History of nephrolithiasis     History of prostatitis 7/19/2017    Hyperlipidemia     Hypertension     Insomnia     Intestinal disaccharidase deficiencies and disaccharide malabsorption     Created by Conversion     Latent tuberculosis     Nephrolithiasis     Neuropathy     Nonspecific abnormal findings on radiological and other examination of skull and head     Created by  [facility] Annotation: 2013 11:23PM - Fuad Giuliai/10/2011:  severe stenosis both posterior cerebral arteries seen MRI/MRA done for  vertigo. No acute changes.e*    Osteoarthritis     wrist, knee, shoulder    Prostatitis     Rectal bleed     Trigger thumb        Past Surgical History   Past Surgical History:   Procedure Laterality Date    IR MISCELLANEOUS PROCEDURE  2009    IR MISCELLANEOUS PROCEDURE  2009    IR MISCELLANEOUS PROCEDURE  2009    IR MISCELLANEOUS PROCEDURE  2009    IR NEPHROURETERAL TUBE PLACEMENT BILATERAL  2009    IR URETER DILATION BILATERAL  2009    IR URETER DILATION BILATERAL  2009    KIDNEY STONE SURGERY      PICC  3/6/2016            Prior to Admission Medications   Prior to Admission Medications   Prescriptions Last Dose Informant Patient Reported? Taking?   Continuous Blood Gluc Sensor (FREESTYLE SUSANNAH 2 SENSOR) [facility]   No No   Si each every 14 days Use 1 sensor every 14 days. Use to read blood sugars per 's instructions.   DULoxetine (CYMBALTA) 30 MG capsule   No No   Sig: TAKE 1 CAPSULE(30 MG) BY MOUTH TWICE DAILY   ELIQUIS ANTICOAGULANT 5 MG tablet   No No   Sig: TAKE 1 TABLET(5 MG) BY MOUTH TWICE DAILY   JANUVIA 50 MG tablet   No No   Sig: TAKE 1 TABLET(50 MG) BY MOUTH DAILY   SENNA-docusate sodium (SENNA S) 8.6-50 MG tablet   No No   Sig: Take 1 tablet by mouth 2 times daily as needed (for constipation)   acetaminophen (TYLENOL) 500 MG tablet   No No   Sig: Take 1 tablet (500 mg) by mouth every 4 hours as needed for mild pain   allopurinol (ZYLOPRIM) 100 MG tablet   No No   Sig: TAKE 2 TABLETS(200 MG) BY MOUTH DAILY   calcium carbonate (TUMS) 500 MG chewable tablet   Yes No   Sig: Take 1 chew tab by mouth daily as needed for heartburn   celecoxib (CELEBREX) 200 MG capsule   No No   Sig: Take 1 capsule (200 mg) by mouth daily as needed for pain (jaw pain, joint pain)   dextromethorphan-quiNIDine (NUEDEXTA) 20-10 MG  capsule   No No   Sig: Take 1 capsule by mouth every 12 hours   empagliflozin (JARDIANCE) 10 MG TABS tablet   No No   Sig: Take 1 tablet (10 mg) by mouth daily   hydrOXYzine (ATARAX) 25 MG tablet   No No   Sig: Take 1 tablet (25 mg) by mouth every 8 hours as needed for itching   insulin aspart (NOVOLOG FLEXPEN) 100 UNIT/ML pen   No No   Sig: Inject 8 units before breakfast, and 6 units under the skin before lunch and dinner   insulin glargine (LANTUS PEN) 100 UNIT/ML pen   No No   Sig: Inject 30 Units Subcutaneous at bedtime   insulin pen needle (BD PEN NEEDLE SALVATORE 2ND GEN) 32G X 4 MM miscellaneous   No No   Sig: USE 1 PEN NEEDLE FOUR TIMES DAILY OR AS DIRECTED   meclizine (ANTIVERT) 12.5 MG tablet   No No   Sig: Take 1 tablet (12.5 mg) by mouth 3 times daily as needed for dizziness   metoprolol succinate ER (TOPROL XL) 100 MG 24 hr tablet   No No   Sig: Take 0.5 tablets (50 mg) by mouth daily Take 1/2 tablet  daily   pantoprazole (PROTONIX) 40 MG EC tablet   No No   Sig: Take 1 tablet (40 mg) by mouth every morning (before breakfast)   polyethylene glycol (MIRALAX) 17 GM/Dose powder   No No   Sig: MIX 17 GRAMS IN LIQUID AND TAKE BY MOUTH ONCE DAILY AS NEEDED FOR CONSTIPATION   rosuvastatin (CRESTOR) 20 MG tablet   No No   Sig: TAKE 1 TABLET(20 MG) BY MOUTH AT BEDTIME   sulfamethoxazole-trimethoprim (BACTRIM DS) 800-160 MG tablet   No No   Sig: Take 1 tablet by mouth 2 times daily for 10 days   tamsulosin (FLOMAX) 0.4 MG capsule   No No   Sig: Take 1 capsule (0.4 mg) by mouth daily   traZODone (DESYREL) 50 MG tablet   No No   Sig: Take 1-2 tablets ( mg) by mouth nightly as needed for sleep   triamcinolone (KENALOG) 0.1 % external cream   No No   Sig: Apply topically 2 times daily      Facility-Administered Medications: None        Review of Systems    The 10 point Review of Systems is negative other than noted in the HPI or here.      Physical Exam   Vital Signs: Temp: 98.5  F (36.9  C) Temp src: Oral BP:  111/76 Pulse: 90   Resp: 18 SpO2: 93 % O2 Device: None (Room air)    Weight: 0 lbs 0 oz    General Appearance: Tired appearing, nontoxic, NAD. LLE mildy cool to touch  Respiratory: Normal rate and effort, CTAB on RA.   Cardiovascular: RRR, normal S1 and S2 without m/r/g  GI: Full but soft, nontender to palpation. BS present  Skin: No rashes or lesions on exposed skin.   Neuro: Alert and oriented to person, not place, time. Unable to fully assess patient's strength in all extremities due to confusion, but appearing to have full ROM of bilateral upper and lower extremities. Decreased strength in lower extremities (patient's son needing to stabilize pt when standing to use bedside urinal)     Medical Decision Making       Please see A&P for additional details of medical decision making.      Data     I have personally reviewed the following data over the past 24 hrs:    12.7 (H)  \   13.8   / 213     126 (L) 93 (L) 41.3 (H) /  148 (H)   5.3 21 (L) 3.21 (H) \     ALT: 17 AST: 21 AP: 122 TBILI: 0.3   ALB: 4.2 TOT PROTEIN: 8.9 (H) LIPASE: N/A     Trop: 13 BNP: 58     Procal: 0.11 CRP: N/A Lactic Acid: 1.8         Imaging results reviewed over the past 24 hrs:   Recent Results (from the past 24 hour(s))   XR Chest 2 Views    Narrative    EXAM: XR CHEST 2 VIEWS  LOCATION: [facility]  DATE: 5/11/2024    INDICATION: sob  COMPARISON: 1/11/2023      Impression    IMPRESSION: Low lung volumes. Cardiomegaly. Pulmonary vascularity normal. Interstitial prominence both lungs may represent edema. Subsegmental atelectasis in the bases. No effusions. Follow-up chest x-ray with deeper respiration recommended.   US Renal Complete Non-Vascular    Impression    RESIDENT PRELIMINARY INTERPRETATION  IMPRESSION:  Moderate left and mild right hydronephrosis, similar to findings on  prior CT 2/8/2023.   CT Head w/o Contrast    Narrative    EXAM: CT HEAD W/O CONTRAST  LOCATION: [facility]  DATE: 5/12/2024    INDICATION: 83 yo with unwitnessed falls at home, on Eliquis, AMS. Confusion.  COMPARISON: MRI brain dated 10/20/2023.  TECHNIQUE: Routine CT Head without IV contrast. Multiplanar reformats. Dose reduction techniques were used.    FINDINGS:  INTRACRANIAL CONTENTS: No intracranial hemorrhage, extraaxial collection, or mass effect.  Chronic bilateral cerebellar infarcts. Chronic right posterior subinsular lacunar type infarction. Chronic left thalamic lacunar type infarction. Chronic left   basal ganglia lacunar type infarction. Chronic right pontine infarct. No acute large territory infarction. Mild to moderate presumed chronic small vessel ischemic changes. Mild to moderate generalized volume loss. No hydrocephalus.     VISUALIZED ORBITS/SINUSES/MASTOIDS: Prior bilateral cataract surgery. Visualized portions of the orbits are otherwise unremarkable. No paranasal sinus mucosal disease. No middle ear or mastoid effusion.    BONES/SOFT TISSUES: No acute abnormality.      Impression    IMPRESSION:  1.  No CT evidence for acute intracranial process.  2.  Brain atrophy and chronic ischemic changes as above.

## 2024-05-12 NOTE — PLAN OF CARE
Patient transferred to 7C room 403, the writer gave report to SHELIA Salcido.

## 2024-05-12 NOTE — DISCHARGE SUMMARY
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    Weight as of this encounter: 69.9 kg (154 lb 1.6 oz).       Follow-ups Needed After Discharge   Follow-up Appointments     Follow Up ([facility])      Follow up with primary care provider, Chucho Espino, within 7 days for   hospital follow- up.  The following labs/tests are recommended: CBC, BMP.        Appointments on [place] and/or [facility] (with [facility] or [facility]   provider or service). Call [phone] if you haven't heard regarding   these appointments within 7 days of discharge.        [ ] Follow up with PCP as OP for hospital follow up and recheck BMP and CBC as OP in ~ 1 week - held empagliflozin on discharge given UTI, reassess with PCP whether this should be continued     Unresulted Labs Ordered in the Past 30 Days of this Admission       Date and Time Order Name Status Description    5/11/2024 11:43 PM Blood Culture Hand, Right Preliminary     5/11/2024 11:43 PM Blood Culture Hand, Left Preliminary         These results will be followed up by SOC provider and PCP.     Discharge Disposition   Discharged to " home  Condition at discharge: Stable    Hospital Course   Adam Talamantes is a 84 year old Kayce-speaking male with PMH signicant for HTN, HLD, CVA, HLD, prostatitis, GERD who was admitted on 5/11/2024 for UTI and AMS. Work up for AMS significant for likely metabolic cause with ELLA. As renal function improved, mental status improved as well. ELLA likely in the setting of poor oral intake and recent bactrim use. UTI also likely contributing. Recheck Cr improved on recheck with fluids. UTI treated with IV cefepime, culture grew <10K mixed urogenital valentina, and transitioned to oral cefdinir on discharge. Further details of hospitalization as outlined below.     Complicated Urinary Tract Infection  Concern for Prostatitis  Moderate Left and Mild R Hydronephrosis  Presented with approx 5 days of polyuria, dysuria (pt unable to report but appeared as such at bedside when talking to patient). Recently prescribed Bactrim DS on 5/2 for 10 day course for presumed cystitis. UA at that time concerning for UTI however UC without growth. Has a charted history of prostatitis in 2016 complicated by flouroquinolone and Bactrim resistant e coli bacteremia. Presented afebrile, without tachycardia and leukocytosis to 12.7. lactate wnl. UA with +LE >182 WBC, moderate blood and 11 RBC. Was given dose of ciprofloxacin in ED. Renal US with unchanged moderate hydronephrosis when compared to imaging from 02/2023. Patient without any abdominal pain, no CVA tenderness and rapidly improved on abx so abdominal imaging was deferred. Started on cefepime on admission which was subsequently narrowed to CTX and discharged on cefdinir for total 7d course.  Urine culture with <10K mixed urogenital valentina, suspect d/t incomplete outpatient treatment prior to admission. Blood cultures without growth.   [ ] Complete cefdinir for total for 7d course   [ ] hold SGLT2i until PCP follow-up given UTI    ELLA on CKD III (improving)   Baseline of 1.3-1.7 per chart  review dating back to 2016, presenting with creatinine of 3.21. Renal US moderate left and mild right hydronephrosis, similar to findings on prior CT 2/8/2023. FeNa 6.6%, BUN/Cr ratio 12.8, urine osms 262, suggestive of ATN in the setting of recent bactrim rx and poor oral intake. Bladder scan in ED not showing retention.   [ ] Recheck at PCP appt     Leukocytosis (improved)  WBC 12.7, 11.3 on day of discharge. No new localizing infectious symptoms. Patient AF, HDS. Discussed RTC precations with patient and family who are understanding.   [ ] Recheck CBC at PCP appt     Hyponatremia (resolved)  Corrected to 128 on presentation with mild hyperglycemia. Serum osm 299, urine sodium 33, urine clara 262. Elevated serum osms indicative of possible hypovolemia, acute renal failure also likely contributing to hyponatremia. S/p 1L in ED, mIVF on admission. Improved with fluids on admission.     Insulin-Dependent T2DM, A1c 8.8% 02/26/2024  Last A1C of 8.8 on 2/26/2024. PTA Jardiance, januvia, glargine 30U daily and aspart 8U before breakfast, 6U with lunch and dinner. Held PTA glargine on admission and blood sugars have been stable without good appetite initially but then as patients appetite increased his home glargine was restarted and he was discharged on home 30 U of glargine daily with home aspart. Holding jardiance as above and restarted januvia on discharge.     Colonic Wall Thickening  R Liver Lesion  Noted on CT Urogram 6/2023, wall thickening concerning for malignancy. Prior colonoscopy on file from 2017, showing tubular adenoma without high grade dysplasia. No reported weight loss per family, chart review reveals stable weight over past 2-3 years. No abdominal pain. LFTs wnl. Normal brown bowel movements. Ongoing discussions to pursue work up as OP with PCP, who is waiting to discuss with patient's daughter if should pursue colonoscopy. Previously noted that patient would not be interested in undergoing a surgery. No  acute concerns.   [ ] Continue working with PCP as OP for continued discussion     Insomnia/depression: PTA duloxetine 30mg daily and Nuedexta 20-10mg q12h   BPH: Continued on PTA tamsulosin   Gout: Continued on PTA Allopurinol  HTN: Continued on PTA metoprolol succinate 50mg daily   HLD: Continued on PTA rosuvastatin 20mg qHS  GERD: Continued on PTA pantopraozle 40mg daily   Hx of CVA 2/2 Basilar Artery Stenosis: Continued onPTA apixaban 5mg daily     Acute encephalopathy, likely metabolic  (resolved)    Per family on admission past few days PTA showed more confusion, including not being able to state names of family members. CT Head without acute findings. Improved 05/12 and per family patient is reportedly at baseline on day of discharge, suspect encephalopathy to be metabolic in nature with ELLA. UTI could have been contributing as well.     Consultations This Hospital Stay   PHARMACY IP CONSULT  PHYSICAL THERAPY ADULT IP CONSULT  OCCUPATIONAL THERAPY ADULT IP CONSULT  NURSING TO CONSULT FOR VASCULAR ACCESS CARE IP CONSULT    Code Status   No CPR- Do NOT Intubate    Time Spent on this Encounter   I, Cammie Castro PA-C, personally saw the patient today and spent greater than 30 minutes discharging this patient.       Cammie Castro PA-C  [facility] 7C MED SURG  [address] [address]  Phone: [phone]  ______________________________________________________________________    Physical Exam   Vital Signs: Temp: 97.6  F (36.4  C) Temp src: Oral BP: (!) 124/93 Pulse: 106   Resp: 16 SpO2: 99 % O2 Device: None (Room air)    Weight: 154 lbs 1.62 oz  Constitutional: sitting up in bed, appears comfortable, no apparent distress. Family at bedside   HEENT: Mucous membranes moist, no scleral icterus   Respiratory: No increased work of breathing, breathing comfortably on RA   GI: abdomen soft, non tender, no CVA tenderness   Skin: normal skin color, texture, and no jaundice  Musculoskeletal: moving  all extremities voluntarily during exam, no LE edema   Neuro: awake, alert, answering all questions appropriately during exam    Primary Care Physician   Chucho Espino    Discharge Orders      Activity    Your activity upon discharge: activity as tolerated     Follow Up ([facility])    Follow up with primary care provider, Chucho Espino, within 7 days for hospital follow- up.  The following labs/tests are recommended: CBC, BMP.      Appointments on [place] and/or [facility] (with [facility] or [facility] provider or service). Call [phone] if you haven't heard regarding these appointments within 7 days of discharge.     Reason for your hospital stay    You were admitted for confusion and found to have a likely urinary tract infection. You were doing much better with antibiotics but if you start having fevers, chills, difficulty urinating or abdominal pain please return to the ER so we can further evaluate you.     You should take the full course of antibiotics (cefdinir) prescribed to you, these will start on 5/15 as you received IV antibiotics prior to being discharged.    You started getting your long acting insulin (lantus) at noon while you were in the hospital, you should keep taking it at this time until you talk to your primary care provider.      You not take your empagliflozon until you discuss with your primary care provider.     Diet    Follow this diet upon discharge: Orders Placed This Encounter      Snacks/Supplements Adult: Glucerna; With Meals      Combination Diet Regular Diet Adult       Significant Results and Procedures   Most Recent 3 CBC's:  Recent Labs   Lab Test 05/14/24  0532 05/13/24  0441 05/12/24  0558   WBC 11.3* 10.9 12.1*   HGB 12.8* 12.2* 12.4*   MCV 92 91 91    193 179     Most Recent 3 Creatinines:  Recent Labs   Lab Test 05/14/24  0532 05/13/24  0441 05/12/24  0558   CR 2.14* 2.25* 2.82*       Discharge Medications   Current Discharge Medication List        START taking these  medications    Details   cefdinir (OMNICEF) 300 MG capsule Take 1 capsule (300 mg) by mouth 2 times daily for 3 days  Qty: 6 capsule, Refills: 0    Associated Diagnoses: Dysuria           CONTINUE these medications which have CHANGED    Details   insulin glargine (LANTUS PEN) 100 UNIT/ML pen Inject 30 Units Subcutaneous every 24 hours  Qty: 30 mL, Refills: 3    Comments: If Lantus is not covered by insurance, may substitute Basaglar or Semglee or other insulin glargine product per insurance preference at same dose and frequency.    Associated Diagnoses: Type 2 diabetes mellitus with hyperglycemia, without long-term current use of insulin (H)           CONTINUE these medications which have NOT CHANGED    Details   acetaminophen (TYLENOL) 500 MG tablet Take 1 tablet (500 mg) by mouth every 4 hours as needed for mild pain  Qty: 100 tablet, Refills: 3    Associated Diagnoses: Pain      allopurinol (ZYLOPRIM) 100 MG tablet TAKE 2 TABLETS(200 MG) BY MOUTH DAILY  Qty: 180 tablet, Refills: 3    Associated Diagnoses: Chronic gout of multiple sites, unspecified cause      calcium carbonate (TUMS) 500 MG chewable tablet Take 1 chew tab by mouth daily as needed for heartburn      celecoxib (CELEBREX) 200 MG capsule Take 1 capsule (200 mg) by mouth daily as needed for pain (jaw pain, joint pain)  Qty: 30 capsule, Refills: 3    Associated Diagnoses: Jaw pain      dextromethorphan-quiNIDine (NUEDEXTA) 20-10 MG capsule Take 1 capsule by mouth every 12 hours  Qty: 60 capsule, Refills: 11    Associated Diagnoses: Cerebrovascular accident (CVA) due to stenosis of basilar artery (H); Pseudobulbar affect      DULoxetine (CYMBALTA) 30 MG capsule TAKE 1 CAPSULE(30 MG) BY MOUTH TWICE DAILY  Qty: 60 capsule, Refills: 6    Associated Diagnoses: Current moderate episode of major depressive disorder without prior episode (H)      ELIQUIS ANTICOAGULANT 5 MG tablet TAKE 1 TABLET(5 MG) BY MOUTH TWICE DAILY  Qty: 60 tablet, Refills: 6    Comments:  ZERO refills remain on this prescription. Your patient is requesting advance approval of refills for this medication to PREVENT ANY MISSED DOSES  Associated Diagnoses: Cerebrovascular accident (CVA) due to stenosis of basilar artery (H)      hydrOXYzine (ATARAX) 25 MG tablet Take 1 tablet (25 mg) by mouth every 8 hours as needed for itching  Qty: 60 tablet, Refills: 1    Associated Diagnoses: Itching      insulin aspart (NOVOLOG FLEXPEN) 100 UNIT/ML pen Inject 8 units before breakfast, and 6 units under the skin before lunch and dinner  Qty: 15 mL, Refills: 3    Associated Diagnoses: Type 2 diabetes mellitus with hyperglycemia, without long-term current use of insulin (H)      JANUVIA 50 MG tablet TAKE 1 TABLET(50 MG) BY MOUTH DAILY  Qty: 90 tablet, Refills: 3    Associated Diagnoses: Type 2 diabetes mellitus with stage 3 chronic kidney disease, without long-term current use of insulin, unspecified whether stage 3a or 3b CKD (H)      meclizine (ANTIVERT) 12.5 MG tablet Take 1 tablet (12.5 mg) by mouth 3 times daily as needed for dizziness  Qty: 60 tablet, Refills: 6    Associated Diagnoses: Dizziness      metoprolol succinate ER (TOPROL XL) 100 MG 24 hr tablet Take 0.5 tablets (50 mg) by mouth daily Take 1/2 tablet  daily  Qty: 45 tablet, Refills: 3    Associated Diagnoses: Encounter for medication refill      pantoprazole (PROTONIX) 40 MG EC tablet Take 1 tablet (40 mg) by mouth every morning (before breakfast)  Qty: 90 tablet, Refills: 3    Associated Diagnoses: Gastroesophageal reflux disease with esophagitis, unspecified whether hemorrhage      polyethylene glycol (MIRALAX) 17 GM/Dose powder MIX 17 GRAMS IN LIQUID AND TAKE BY MOUTH ONCE DAILY AS NEEDED FOR CONSTIPATION  Qty: 510 g, Refills: 6    Associated Diagnoses: Constipation, unspecified constipation type      rosuvastatin (CRESTOR) 20 MG tablet TAKE 1 TABLET(20 MG) BY MOUTH AT BEDTIME  Qty: 90 tablet, Refills: 0    Comments: No more refills until follow up  is scheduled.  Associated Diagnoses: Dyslipidemia      SENNA-docusate sodium (SENNA S) 8.6-50 MG tablet Take 1 tablet by mouth 2 times daily as needed (for constipation)  Qty: 60 tablet, Refills: 11    Associated Diagnoses: Constipation, unspecified constipation type      tamsulosin (FLOMAX) 0.4 MG capsule Take 1 capsule (0.4 mg) by mouth daily  Qty: 90 capsule, Refills: 3    Associated Diagnoses: Benign prostatic hyperplasia with incomplete bladder emptying      traZODone (DESYREL) 50 MG tablet Take 1-2 tablets ( mg) by mouth nightly as needed for sleep  Qty: 180 tablet, Refills: 3    Associated Diagnoses: Insomnia, unspecified type      Continuous Blood Gluc Sensor (FREESTYLE SUSANNAH 2 SENSOR) MISC 1 each every 14 days Use 1 sensor every 14 days. Use to read blood sugars per 's instructions.  Qty: 2 each, Refills: 11    Associated Diagnoses: Type 2 diabetes mellitus with hyperglycemia, without long-term current use of insulin (H)      insulin pen needle (BD PEN NEEDLE SALVATORE 2ND GEN) 32G X 4 MM miscellaneous USE 1 PEN NEEDLE FOUR TIMES DAILY OR AS DIRECTED  Qty: 400 each, Refills: 0    Associated Diagnoses: Type 2 diabetes mellitus with hyperglycemia, without long-term current use of insulin (H)      triamcinolone (KENALOG) 0.1 % external cream Apply topically 2 times daily  Qty: 80 g, Refills: 3    Associated Diagnoses: Dermatitis           STOP taking these medications       empagliflozin (JARDIANCE) 10 MG TABS tablet Comments:   Reason for Stopping:         sulfamethoxazole-trimethoprim (BACTRIM DS) 800-160 MG tablet Comments:   Reason for Stopping:             Allergies   Allergies   Allergen Reactions    Lisinopril Cough

## 2024-05-13 ENCOUNTER — PATIENT OUTREACH (OUTPATIENT)
Dept: GERIATRIC MEDICINE | Facility: CLINIC | Age: 84
End: 2024-05-13
Payer: COMMERCIAL

## 2024-05-13 LAB
ANION GAP SERPL CALCULATED.3IONS-SCNC: 13 MMOL/L (ref 7–15)
BACTERIA UR CULT: NORMAL
BUN SERPL-MCNC: 26.7 MG/DL (ref 8–23)
CALCIUM SERPL-MCNC: 9.3 MG/DL (ref 8.8–10.2)
CHLORIDE SERPL-SCNC: 102 MMOL/L (ref 98–107)
CREAT SERPL-MCNC: 2.25 MG/DL (ref 0.67–1.17)
DEPRECATED HCO3 PLAS-SCNC: 19 MMOL/L (ref 22–29)
EGFRCR SERPLBLD CKD-EPI 2021: 28 ML/MIN/1.73M2
ERYTHROCYTE [DISTWIDTH] IN BLOOD BY AUTOMATED COUNT: 15.4 % (ref 10–15)
GLUCOSE BLDC GLUCOMTR-MCNC: 150 MG/DL (ref 70–99)
GLUCOSE BLDC GLUCOMTR-MCNC: 175 MG/DL (ref 70–99)
GLUCOSE BLDC GLUCOMTR-MCNC: 249 MG/DL (ref 70–99)
GLUCOSE BLDC GLUCOMTR-MCNC: 255 MG/DL (ref 70–99)
GLUCOSE BLDC GLUCOMTR-MCNC: 269 MG/DL (ref 70–99)
GLUCOSE SERPL-MCNC: 159 MG/DL (ref 70–99)
HCT VFR BLD AUTO: 36.6 % (ref 40–53)
HGB BLD-MCNC: 12.2 G/DL (ref 13.3–17.7)
LACTATE SERPL-SCNC: 2 MMOL/L (ref 0.7–2)
MCH RBC QN AUTO: 30.3 PG (ref 26.5–33)
MCHC RBC AUTO-ENTMCNC: 33.3 G/DL (ref 31.5–36.5)
MCV RBC AUTO: 91 FL (ref 78–100)
PLATELET # BLD AUTO: 193 10E3/UL (ref 150–450)
POTASSIUM SERPL-SCNC: 4.6 MMOL/L (ref 3.4–5.3)
RBC # BLD AUTO: 4.02 10E6/UL (ref 4.4–5.9)
SODIUM SERPL-SCNC: 134 MMOL/L (ref 135–145)
WBC # BLD AUTO: 10.9 10E3/UL (ref 4–11)

## 2024-05-13 PROCEDURE — 250N000013 HC RX MED GY IP 250 OP 250 PS 637: Performed by: PHYSICIAN ASSISTANT

## 2024-05-13 PROCEDURE — 99223 1ST HOSP IP/OBS HIGH 75: CPT | Mod: FS | Performed by: INTERNAL MEDICINE

## 2024-05-13 PROCEDURE — 99418 PROLNG IP/OBS E/M EA 15 MIN: CPT | Mod: FS | Performed by: INTERNAL MEDICINE

## 2024-05-13 PROCEDURE — 36415 COLL VENOUS BLD VENIPUNCTURE: CPT | Performed by: PHYSICIAN ASSISTANT

## 2024-05-13 PROCEDURE — 99207 PR APP CREDIT; MD BILLING SHARED VISIT: CPT | Mod: FS

## 2024-05-13 PROCEDURE — 80048 BASIC METABOLIC PNL TOTAL CA: CPT | Performed by: PHYSICIAN ASSISTANT

## 2024-05-13 PROCEDURE — 85027 COMPLETE CBC AUTOMATED: CPT | Performed by: PHYSICIAN ASSISTANT

## 2024-05-13 PROCEDURE — 250N000012 HC RX MED GY IP 250 OP 636 PS 637

## 2024-05-13 PROCEDURE — 250N000011 HC RX IP 250 OP 636

## 2024-05-13 PROCEDURE — 120N000002 HC R&B MED SURG/OB UMMC

## 2024-05-13 PROCEDURE — 258N000003 HC RX IP 258 OP 636

## 2024-05-13 PROCEDURE — 83605 ASSAY OF LACTIC ACID: CPT | Performed by: INTERNAL MEDICINE

## 2024-05-13 PROCEDURE — 999N000128 HC STATISTIC PERIPHERAL IV START W/O US GUIDANCE

## 2024-05-13 PROCEDURE — 250N000013 HC RX MED GY IP 250 OP 250 PS 637

## 2024-05-13 PROCEDURE — 36415 COLL VENOUS BLD VENIPUNCTURE: CPT | Performed by: INTERNAL MEDICINE

## 2024-05-13 RX ORDER — LANOLIN ALCOHOL/MO/W.PET/CERES
3 CREAM (GRAM) TOPICAL
Status: DISCONTINUED | OUTPATIENT
Start: 2024-05-13 | End: 2024-05-19 | Stop reason: HOSPADM

## 2024-05-13 RX ORDER — CEFTRIAXONE 2 G/1
2 INJECTION, POWDER, FOR SOLUTION INTRAMUSCULAR; INTRAVENOUS EVERY 24 HOURS
Status: DISCONTINUED | OUTPATIENT
Start: 2024-05-13 | End: 2024-05-19 | Stop reason: HOSPADM

## 2024-05-13 RX ADMIN — DEXTROMETHORPHAN HYDROBROMIDE AND QUINIDINE SULFATE 1 CAPSULE: 20; 10 CAPSULE, GELATIN COATED ORAL at 22:45

## 2024-05-13 RX ADMIN — INSULIN GLARGINE 15 UNITS: 100 INJECTION, SOLUTION SUBCUTANEOUS at 16:23

## 2024-05-13 RX ADMIN — INSULIN ASPART 3 UNITS: 100 INJECTION, SOLUTION INTRAVENOUS; SUBCUTANEOUS at 09:02

## 2024-05-13 RX ADMIN — APIXABAN 5 MG: 5 TABLET, FILM COATED ORAL at 09:02

## 2024-05-13 RX ADMIN — CEFEPIME HYDROCHLORIDE 2 G: 2 INJECTION, POWDER, FOR SOLUTION INTRAVENOUS at 00:07

## 2024-05-13 RX ADMIN — INSULIN ASPART 1 UNITS: 100 INJECTION, SOLUTION INTRAVENOUS; SUBCUTANEOUS at 11:50

## 2024-05-13 RX ADMIN — CEFTRIAXONE SODIUM 2 G: 2 INJECTION, POWDER, FOR SOLUTION INTRAMUSCULAR; INTRAVENOUS at 10:43

## 2024-05-13 RX ADMIN — DULOXETINE HYDROCHLORIDE 30 MG: 30 CAPSULE, DELAYED RELEASE ORAL at 09:02

## 2024-05-13 RX ADMIN — ROSUVASTATIN CALCIUM 20 MG: 20 TABLET, FILM COATED ORAL at 21:00

## 2024-05-13 RX ADMIN — DULOXETINE HYDROCHLORIDE 30 MG: 30 CAPSULE, DELAYED RELEASE ORAL at 20:56

## 2024-05-13 RX ADMIN — METOPROLOL SUCCINATE 50 MG: 50 TABLET, EXTENDED RELEASE ORAL at 09:02

## 2024-05-13 RX ADMIN — TAMSULOSIN HYDROCHLORIDE 0.4 MG: 0.4 CAPSULE ORAL at 09:02

## 2024-05-13 RX ADMIN — ALLOPURINOL 200 MG: 100 TABLET ORAL at 09:02

## 2024-05-13 RX ADMIN — ACETAMINOPHEN 975 MG: 325 TABLET, FILM COATED ORAL at 20:56

## 2024-05-13 RX ADMIN — PANTOPRAZOLE SODIUM 40 MG: 40 TABLET, DELAYED RELEASE ORAL at 09:02

## 2024-05-13 RX ADMIN — LOPERAMIDE HCL 2 MG: 1 SOLUTION ORAL at 03:46

## 2024-05-13 RX ADMIN — SODIUM CHLORIDE, POTASSIUM CHLORIDE, SODIUM LACTATE AND CALCIUM CHLORIDE: 600; 310; 30; 20 INJECTION, SOLUTION INTRAVENOUS at 00:37

## 2024-05-13 RX ADMIN — DEXTROMETHORPHAN HYDROBROMIDE AND QUINIDINE SULFATE 1 CAPSULE: 20; 10 CAPSULE, GELATIN COATED ORAL at 11:50

## 2024-05-13 RX ADMIN — APIXABAN 5 MG: 5 TABLET, FILM COATED ORAL at 20:56

## 2024-05-13 RX ADMIN — INSULIN ASPART 3 UNITS: 100 INJECTION, SOLUTION INTRAVENOUS; SUBCUTANEOUS at 16:22

## 2024-05-13 RX ADMIN — CALCIUM CARBONATE (ANTACID) CHEW TAB 500 MG 1000 MG: 500 CHEW TAB at 22:00

## 2024-05-13 RX ADMIN — TRAZODONE HYDROCHLORIDE 50 MG: 50 TABLET ORAL at 22:37

## 2024-05-13 ASSESSMENT — ACTIVITIES OF DAILY LIVING (ADL)
ADLS_ACUITY_SCORE: 40
ADLS_ACUITY_SCORE: 43
ADLS_ACUITY_SCORE: 40
ADLS_ACUITY_SCORE: 40
ADLS_ACUITY_SCORE: 43
ADLS_ACUITY_SCORE: 40

## 2024-05-13 NOTE — PROVIDER NOTIFICATION
Provider notified that pt is having consistent diarrhea and would like imodium ordered. Imodium ordered waiting on pharmacy to bring up

## 2024-05-13 NOTE — PLAN OF CARE
Goal Outcome Evaluation:      Plan of Care Reviewed With: family    Overall Patient Progress: no changeOverall Patient Progress: no change    Outcome Evaluation: see RD note 5/13    Elicia Will MS, RD, LD, [facility]    6C (beds 3188-6007) + 7C (beds 5963-1026) + ED   Available in Vocera by name or unit dietitian  No longer available via pager

## 2024-05-13 NOTE — PROGRESS NOTES
TRANSITIONS OF CARE (VIRGEN) LOG    VIRGEN tasks should be completed by the CC within one (1) business day of notification of each transition. Follow up contact with member is required after return to their usual care setting.  Note:  If CC finds out about the transitions fifteen (15) days or more after the member has returned to their usual care setting, no VIRGEN log is needed. However, the CC should check in with the member to discuss the transition process, any changes needed to the care plan and document it in a case note.     Member Name:  Adam Talamantes MCO [facility] Member ID[facility] [id number]   Product: [facility] Care Coordinator Contact:  Kristi Calzada, RN, BSN, PHN Agency/County/Care System: [facility]   Transition Communication Actions from Care Management Contact   Transition #1   Notification Date: 05/13/24 Transition Date:   05/11/24 Transition From: Home     Is this the member s usual care setting?               Yes Transition To: [facility]   Transition Type:  Unplanned    Documentation from conversation with the member/responsible party, provider, discharging and receiving facility:   Date: 05/13/2024: Received notification of admission to hospital with dx of Constipation, Weakness, UTI, hematuria   CC contacted Hospital /discharge planner via the ipvive Transitional Care Hand-In Process, with community care plan included.  CC reached out to adult daughter Dimitri Talamantes (daughter [phone] Speaks English)  regarding transition and offered support as needed.  Reviewed and update care plan as needed.  Notified community service providers and placed services None on hold as needed.  Transition log initiated.     PCP, Chucho Espino, notified of hospitalization via EMR.     Transition #2   Notification Date: 05/20/2024 Transition Date:   05/19/2024 Transition From: [facility]     Is this the member s usual care  setting?               YES Transition To: Home   Transition Type:  Planned    Documentation from conversation with the member/responsible party, provider, discharging and receiving facility:   Date: 05/0/2024: Received notification of transition to home.  CC contacted adult daughter (Dimitri Talamantes [phone] Speaks English - she is in charge of his medications)  and reviewed discharge summary.  Member has a follow-up appointment with PCP in 7 days: Yes: scheduled on 05/20/2024    Member has had a change in condition: No  Home visit needed: No  Care plan reviewed and updated.  The following home based services None were resumed.  New referrals placed: No : Supplies gloves/wipes    Transition log completed.     PCP, Chucho Espino, notified of transition back to home via EMR.    Kristi Calzada RN  [facility]  Cell Phone [phone]

## 2024-05-13 NOTE — PLAN OF CARE
"Goal Outcome Evaluation:      Plan of Care Reviewed With: patient, family    Overall Patient Progress: no changeOverall Patient Progress: no change    Outcome Evaluation: [id number].  Alert, disoriented to place. Daughter at bedside all of shift, helping w translating and very attentive. Denies pain this shift. Frequent loose stools and \"stomach discomfort\" per pt&daughter, immodium given and effective. LR @100ml/hr. Voiding via urinal while standing at bedside. Pt triggered sepsis on previous shift, LA 1.7      "

## 2024-05-13 NOTE — PROGRESS NOTES
[facility]    Medicine Progress Note - Hospitalist Service, GOLD TEAM 4    Date of Admission:  5/11/2024    Assessment & Plan   Adam Talamantes is an 83 yo M with pmhx of HTN, HLD, CVA, prostatitis, GERD who was admitted on 5/11/2024 for UTI and AMS.     Complicated UTI  Concern for possibly prostatitis  Moderate L and mild R hydronephrosis  P/w 5d of polyuria, recently prescribed bactrim on 5/2 for 10d course for presumed cystitis. UA at that time c/f UTI but UC without growth. Has a history of prostatitis in 2016 complicated by fluoroquinolone and bactrim resistant E. Coli Bacteremia. Presented afebrile, without tachycardia and leukocytosis to 12.7. lactate wnl. UA with +LE >182 WBC, moderate blood and 11 RBC. Was given dose of ciprofloxacin in ED. Renal US with unchanged moderate hydronephrosis when compared to imaging from 02/2023. Stared on cefepime on admission, narrowed to CTX on 5/13. Urine Cx with < 10K mixture of urogenital valentina.    - Cefepime narrowed to CTX on 5/13, as long as patient continues to clinically improve will plan to discharge with orals (likely cipro) for planned 7-10d course  - Blood cx with NGTD - ctm  - If patient develops abdominal pain or clinically decompensates, consider CT A/P    ELLA on CKD III (improving)  Scr baseline appears to be ~1.3-1.7. Scr on admission 3.21, has continued to downtrend. Renal US moderate left and mild right hydronephrosis, similar to findings on prior CT 2/8/2023. FeNa 6.6%, BUN/Cr ratio 12.8, urine osms 262, suggestive of ATN in the setting of recent bactrim rx and poor oral intake. Bladder scan in ED not showing retention.   - Stopped IVF 5/13 given increased PO intake  - Cont to trend      Insulin dependented T2DM  Last A1c 8.8% 2/26/2024. PTA reg: jardiance, januvia, glargine 30 U at bedtime, aspart 8 U before breakfast, 6 U with lunch and dinner. Pta glargine held on admission as patients appetite has been poor,  still decreased but is improving.   - Cont medium sliding scale insulin  - Hypoglycemia protocol ordered  - hold home orals (jardiance and januvia)  - restart half dose home lantus given decreased appetite - 15 U, will monitor and adjust as needed     Colonic Wall Thickening   R Liver Lesion  Noted on CT Urogram 6/2023, wall thickening concerning for malignancy. Prior colonoscopy on file from 2017, showing tubular adenoma without high grade dysplasia. No reported weight loss per family, chart review reveals stable weight over past 2-3 years. No abdominal pain. LFTs wnl. Normal brown bowel movements. Ongoing discussions to pursue work up as OP with PCP, who is waiting to discuss with patient's daughter if should pursue colonoscopy. Previously noted that patient would not be interested in undergoing a surgery. No acute concerns.   - Continue working with PCP as OP for continued discussion     Acute encephalopathy, likely metabolic, resolved    Per family on admission past few days PTA showed more confusion, including not being able to state names of family members. CT Head without acute findings. Improved 05/12 and per family patient is reportedly at baseline, suspect encephalopathy to be metabolic in nature with ELLA.   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 Cont to trend and encourage PO intake.         Diet: Combination Diet Regular Diet Adult  Snacks/Supplements Adult: Glucerna; With Meals    DVT Prophylaxis: DOAC  Diego Catheter: Not present  Lines: None     Cardiac Monitoring: None  Code Status: No CPR- Do NOT Intubate      Clinically Significant  "Risk Factors         # Hyponatremia: Lowest Na = 126 mmol/L in last 2 days, will monitor as appropriate          # Hypertension: Noted on problem list       # DMII: A1C = 8.8 % (Ref range: 0.0 - 5.6 %) within past 6 months, PRESENT ON ADMISSION  # Overweight: Estimated body mass index is 28.74 kg/m  as calculated from the following:    Height as of 5/2/24: 1.575 m (5' 2\").    Weight as of 5/2/24: 71.3 kg (157 lb 1.9 oz)., PRESENT ON ADMISSION            Disposition Plan     Medically Ready for Discharge: Anticipated Tomorrow       The patient's care was discussed with the Attending Physician, Dr. Odell, Bedside Nurse, Patient, and Patient's Family.    Cammie Castro PA-C  Hospitalist Service, GOLD TEAM [address] [place] [address]  Securely message with [employer] (more info)  Text page via [facility] Paging/Directory   See signed in provider for up to date coverage information  ______________________________________________________________________    Interval History   Pt reports feeling much improved this morning, has no new symptoms, not in any pain. Daughters report he is back to his normal mentation, appetite has improved. They are in agreement with plan.     Discussed with bedside nurse who had no acute concerns.    Physical Exam   Vital Signs: Temp: 97.5  F (36.4  C) Temp src: Oral BP: (!) 145/91 Pulse: 104   Resp: 16 SpO2: 92 % O2 Device: None (Room air)    Weight: 0 lbs 0 oz  Constitutional: sitting up in bed, appears comfortable, no apparent distress. Daughter at bedside   HEENT: Mucous membranes moist, no scleral icterus   Respiratory: No increased work of breathing, breathing comfortably on RA  Skin: normal skin color, texture, and no jaundice  Musculoskeletal: moving all extremities voluntarily during exam  Neuro: awake, alert, answering all questions appropriately during exam    Medical Decision Making       50 MINUTES SPENT BY ME on the date of service doing chart review, " history, exam, documentation & further activities per the note.      Data     I have personally reviewed the following data over the past 24 hrs:    10.9  \   12.2 (L)   / 193     134 (L) 102 26.7 (H) /  269 (H)   4.6 19 (L) 2.25 (H) \     Procal: N/A CRP: N/A Lactic Acid: 1.7         Imaging results reviewed over the past 24 hrs:   No results found for this or any previous visit (from the past 24 hour(s)).

## 2024-05-13 NOTE — PROGRESS NOTES
CLINICAL NUTRITION SERVICES - ASSESSMENT NOTE     Nutrition Prescription    RECOMMENDATIONS FOR MDs/PROVIDERS TO ORDER:  None currently    Malnutrition Status:    Patient does not meet two of the established criteria necessary for diagnosing malnutrition    Recommendations already ordered by Registered Dietitian (RD):  Glucerna Therapeutic Nutrition Shake contains 220 kcal, 10 grams protein, 26 grams carbohydrate, 9 grams fat, 4 grams fiber per 8 fl oz.   Encourage 3 meals/day with protein sources  Outside food PRN to promote adequate intake    Future/Additional Recommendations:  Monitor nutrition related findings and follow up per protocol     REASON FOR ASSESSMENT  Adam Talamantes is a/an 84 year old male assessed by the dietitian for Positive Admission Nutrition Risk Screen     Patient admitted for UTI and AMS.     MEDICAL HISTORY  T2DM, CVA, HTN, HLD, stroke, prostatitis, GERD     NUTRITION HISTORY  Room visit. Family asked that patient's daughter be called rather than using an . Daughter provided most of history for patient. Per daughter, pt has chronically poor appetite. Intermittent nausea without emesis. Fluctuates between diarrhea/constipation based on how much miralax he receives. Family denies issues chewing/swallowing. However, per family they typically feed him and ensure he is taking small bites and eating slowly. Typical intake includes small amounts of soup, stir cortez, meat, rice and veggies. Drinks ~ 1 strawberry Ensure a day. They try to avoid sweets for patient d/t his history of DM II. Does not take any supplements. Denies noticeable weight changes recently. Family reports UBW of ~154-157lbs. Reviewed nutrition needs with family. Family has been assisting patient with ordering. Assisted with ordering lunch. Denied further nutrition related questions/concerns at this time. Patient declined NFPE at this time. Family reports they have been bringing in some outside food for patient.     CURRENT  "NUTRITION ORDERS  Diet: Regular    Intake/Tolerance: Patient consuming 25-75 % of 2 meal(s) daily.    LABS  Na 134  CO2 19  BUN 26.7  Cre 2.25  GFR 28  Glu 159  Hgb 12.2  Hematocrit 36.6    MEDICATIONS  Insulin  Protonix  Crestor    ANTHROPOMETRICS  Height: 5'2\"  Most Recent Weight:      IBW: 53.6 kg  There is no height or weight on file to calculate BMI. BMI Category: No recent weight history to assess BMI  Weight History: No significant weight loss noted. Need updated weight.   Wt Readings from Last 20 Encounters:   05/02/24 71.3 kg (157 lb 1.9 oz)   04/15/24 69.9 kg (154 lb)   04/01/24 71.1 kg (156 lb 11.2 oz)   02/26/24 73.3 kg (161 lb 8 oz)   01/24/24 71.7 kg (158 lb)   01/15/24 73.7 kg (162 lb 8 oz)   11/27/23 71.5 kg (157 lb 9.6 oz)   10/03/23 70.1 kg (154 lb 8 oz)   09/11/23 69.4 kg (153 lb)   08/29/23 66.1 kg (145 lb 12.8 oz)   08/24/23 68.3 kg (150 lb 8 oz)   08/07/23 68.2 kg (150 lb 6.4 oz)   07/12/23 70.7 kg (155 lb 12.8 oz)   06/06/23 70.3 kg (155 lb)   04/28/23 70.3 kg (155 lb)   04/27/23 70.3 kg (155 lb)   04/04/23 69.9 kg (154 lb)   03/14/23 71.7 kg (158 lb 1.6 oz)   03/06/23 70 kg (154 lb 4 oz)   02/16/23 71.2 kg (157 lb)         ASSESSED NUTRITION NEEDS  Dosing Weight: 71.3 kg ( most recent weight from 5/2 )   Estimated Energy Needs: 4059-9985 kcals/day (25 - 30 kcals/kg)  Justification: Maintenance  Estimated Protein Needs: 71-86 grams protein/day (1 - 1.2 grams of pro/kg)  Justification: Maintenance  Estimated Fluid Needs:  (1 mL/kcal)   Justification: Maintenance    PHYSICAL FINDINGS  See malnutrition section below.    Saúl Score: 18  Per EMR: Skin  Skin WDL: WDL  Skin Color: without discoloration  Skin Temperature: warm  Skin Moisture: dry  Skin Elasticity: quick return to original state  Skin Integrity: rub/scrape, bruised (ecchymotic), peeling/scaling  Abrasion / Excoriation: Right, Ankle  Bruised (ecchymotic) location: Left, Knee  Peeling: Back  Device Skin Interventions Taken: No " adjustments needed    MALNUTRITION  % Intake: < 75% for > 7 days (moderate)  % Weight Loss: None noted  Subcutaneous Fat Loss: Unable to assess   Muscle Loss: Unable to assess  Fluid Accumulation/Edema: None noted  Malnutrition Diagnosis: Patient does not meet two of the established criteria necessary for diagnosing malnutrition    NUTRITION DIAGNOSIS  Inadequate oral intake related to poor appetite as evidenced by family report      INTERVENTIONS  Implementation  Nutrition Education: will be provided if nutrition education needs arise.  and Nutrition Education: Introduced role of RD   Medical food supplement therapy  Multivitamin/mineral supplement therapy     Goals  Patient to consume % of nutritionally adequate meal trays TID, or the equivalent with supplements/snacks.        Monitoring/Evaluation  Progress toward goals will be monitored and evaluated per protocol.  Elicia Will MS, RD, LD, CNSC    6C (beds 0209-5705) + 7C (beds 7163-0331) + ED   Available in Vocera by name or unit dietitian  No longer available via pager

## 2024-05-13 NOTE — PLAN OF CARE
Assumed cares 5543-3510. Alert. Intermittently disoriented to time and situation. VSS on RA. Denied pain. SBA w/ walker. Voiding frequently. Good appetite, likes strawberry ensures. L PIV saline locked. Family at bedside and attentive to pt/translating. Trigger sepsis protocol w/ vitals - lactic acid pending. Transitioned to ceftriaxone. Plan to discharge home on PO ABX tomorrow w/ family.     Goal Outcome Evaluation:      Plan of Care Reviewed With: patient, family    Overall Patient Progress: improving

## 2024-05-13 NOTE — Clinical Note
TOMASA was admitted to [facility] 05/11 with dx of constipation, UTI, weakness.  Kristi Calzada RN [facility] Cell Phone [phone]

## 2024-05-13 NOTE — Clinical Note
TOMASA I just talked with Dimitri (adult daughter) she states he is up all night with irritability. She said she is giving him oxycodone PRN - I suggested to follow-up with you seeing this can cause more irritation and it is not on his med list.    Thanks, Kristi Calzada RN [employer] Cell Phone [phone]

## 2024-05-14 LAB
ALBUMIN UR-MCNC: 100 MG/DL
ANION GAP SERPL CALCULATED.3IONS-SCNC: 11 MMOL/L (ref 7–15)
APPEARANCE UR: ABNORMAL
BILIRUB UR QL STRIP: NEGATIVE
BUN SERPL-MCNC: 34.6 MG/DL (ref 8–23)
CALCIUM SERPL-MCNC: 10.1 MG/DL (ref 8.8–10.2)
CHLORIDE SERPL-SCNC: 101 MMOL/L (ref 98–107)
COLOR UR AUTO: ABNORMAL
CREAT SERPL-MCNC: 2.14 MG/DL (ref 0.67–1.17)
DEPRECATED HCO3 PLAS-SCNC: 25 MMOL/L (ref 22–29)
EGFRCR SERPLBLD CKD-EPI 2021: 30 ML/MIN/1.73M2
ERYTHROCYTE [DISTWIDTH] IN BLOOD BY AUTOMATED COUNT: 15.8 % (ref 10–15)
GLUCOSE BLDC GLUCOMTR-MCNC: 161 MG/DL (ref 70–99)
GLUCOSE BLDC GLUCOMTR-MCNC: 234 MG/DL (ref 70–99)
GLUCOSE BLDC GLUCOMTR-MCNC: 235 MG/DL (ref 70–99)
GLUCOSE BLDC GLUCOMTR-MCNC: 239 MG/DL (ref 70–99)
GLUCOSE BLDC GLUCOMTR-MCNC: 249 MG/DL (ref 70–99)
GLUCOSE BLDC GLUCOMTR-MCNC: 259 MG/DL (ref 70–99)
GLUCOSE BLDC GLUCOMTR-MCNC: 264 MG/DL (ref 70–99)
GLUCOSE BLDC GLUCOMTR-MCNC: 298 MG/DL (ref 70–99)
GLUCOSE SERPL-MCNC: 182 MG/DL (ref 70–99)
GLUCOSE UR STRIP-MCNC: >=1000 MG/DL
HCT VFR BLD AUTO: 39.4 % (ref 40–53)
HGB BLD-MCNC: 12.8 G/DL (ref 13.3–17.7)
HGB UR QL STRIP: ABNORMAL
KETONES UR STRIP-MCNC: NEGATIVE MG/DL
LACTATE SERPL-SCNC: 2 MMOL/L (ref 0.7–2)
LACTATE SERPL-SCNC: 2.1 MMOL/L (ref 0.7–2)
LEUKOCYTE ESTERASE UR QL STRIP: ABNORMAL
MCH RBC QN AUTO: 30 PG (ref 26.5–33)
MCHC RBC AUTO-ENTMCNC: 32.5 G/DL (ref 31.5–36.5)
MCV RBC AUTO: 92 FL (ref 78–100)
NITRATE UR QL: NEGATIVE
PH UR STRIP: 6 [PH] (ref 5–7)
PLATELET # BLD AUTO: 205 10E3/UL (ref 150–450)
POTASSIUM SERPL-SCNC: 4.7 MMOL/L (ref 3.4–5.3)
RBC # BLD AUTO: 4.27 10E6/UL (ref 4.4–5.9)
RBC URINE: 23 /HPF
SODIUM SERPL-SCNC: 137 MMOL/L (ref 135–145)
SP GR UR STRIP: 1.02 (ref 1–1.03)
UROBILINOGEN UR STRIP-MCNC: NORMAL MG/DL
WBC # BLD AUTO: 11.3 10E3/UL (ref 4–11)
WBC CLUMPS #/AREA URNS HPF: PRESENT /HPF
WBC URINE: >182 /HPF

## 2024-05-14 PROCEDURE — 81001 URINALYSIS AUTO W/SCOPE: CPT | Performed by: PHYSICIAN ASSISTANT

## 2024-05-14 PROCEDURE — 83605 ASSAY OF LACTIC ACID: CPT

## 2024-05-14 PROCEDURE — 87086 URINE CULTURE/COLONY COUNT: CPT | Performed by: PHYSICIAN ASSISTANT

## 2024-05-14 PROCEDURE — 80048 BASIC METABOLIC PNL TOTAL CA: CPT

## 2024-05-14 PROCEDURE — 36415 COLL VENOUS BLD VENIPUNCTURE: CPT

## 2024-05-14 PROCEDURE — 250N000013 HC RX MED GY IP 250 OP 250 PS 637: Performed by: PHYSICIAN ASSISTANT

## 2024-05-14 PROCEDURE — 250N000011 HC RX IP 250 OP 636

## 2024-05-14 PROCEDURE — 258N000003 HC RX IP 258 OP 636

## 2024-05-14 PROCEDURE — 99207 PR APP CREDIT; MD BILLING SHARED VISIT: CPT | Mod: FS

## 2024-05-14 PROCEDURE — 120N000002 HC R&B MED SURG/OB UMMC

## 2024-05-14 PROCEDURE — 250N000013 HC RX MED GY IP 250 OP 250 PS 637

## 2024-05-14 PROCEDURE — 99207 PR APP CREDIT; MD BILLING SHARED VISIT: CPT | Performed by: PHYSICIAN ASSISTANT

## 2024-05-14 PROCEDURE — 99232 SBSQ HOSP IP/OBS MODERATE 35: CPT | Mod: FS | Performed by: STUDENT IN AN ORGANIZED HEALTH CARE EDUCATION/TRAINING PROGRAM

## 2024-05-14 PROCEDURE — 85014 HEMATOCRIT: CPT

## 2024-05-14 RX ORDER — CEFDINIR 300 MG/1
300 CAPSULE ORAL 2 TIMES DAILY
Qty: 6 CAPSULE | Refills: 0 | Status: SHIPPED | OUTPATIENT
Start: 2024-05-15 | End: 2024-05-19

## 2024-05-14 RX ADMIN — DEXTROMETHORPHAN HYDROBROMIDE AND QUINIDINE SULFATE 1 CAPSULE: 20; 10 CAPSULE, GELATIN COATED ORAL at 11:44

## 2024-05-14 RX ADMIN — CEFTRIAXONE SODIUM 2 G: 2 INJECTION, POWDER, FOR SOLUTION INTRAMUSCULAR; INTRAVENOUS at 10:00

## 2024-05-14 RX ADMIN — ACETAMINOPHEN 975 MG: 325 TABLET, FILM COATED ORAL at 08:29

## 2024-05-14 RX ADMIN — ROSUVASTATIN CALCIUM 20 MG: 20 TABLET, FILM COATED ORAL at 21:07

## 2024-05-14 RX ADMIN — Medication 3 MG: at 03:35

## 2024-05-14 RX ADMIN — SODIUM CHLORIDE, POTASSIUM CHLORIDE, SODIUM LACTATE AND CALCIUM CHLORIDE 1000 ML: 600; 310; 30; 20 INJECTION, SOLUTION INTRAVENOUS at 15:52

## 2024-05-14 RX ADMIN — ALLOPURINOL 200 MG: 100 TABLET ORAL at 08:28

## 2024-05-14 RX ADMIN — INSULIN ASPART 3 UNITS: 100 INJECTION, SOLUTION INTRAVENOUS; SUBCUTANEOUS at 12:56

## 2024-05-14 RX ADMIN — TRAZODONE HYDROCHLORIDE 50 MG: 50 TABLET ORAL at 21:07

## 2024-05-14 RX ADMIN — INSULIN ASPART 1 UNITS: 100 INJECTION, SOLUTION INTRAVENOUS; SUBCUTANEOUS at 17:16

## 2024-05-14 RX ADMIN — APIXABAN 5 MG: 5 TABLET, FILM COATED ORAL at 20:26

## 2024-05-14 RX ADMIN — DULOXETINE HYDROCHLORIDE 30 MG: 30 CAPSULE, DELAYED RELEASE ORAL at 08:22

## 2024-05-14 RX ADMIN — INSULIN ASPART 2 UNITS: 100 INJECTION, SOLUTION INTRAVENOUS; SUBCUTANEOUS at 08:16

## 2024-05-14 RX ADMIN — METOPROLOL SUCCINATE 50 MG: 50 TABLET, EXTENDED RELEASE ORAL at 08:22

## 2024-05-14 RX ADMIN — PANTOPRAZOLE SODIUM 40 MG: 40 TABLET, DELAYED RELEASE ORAL at 08:22

## 2024-05-14 RX ADMIN — DULOXETINE HYDROCHLORIDE 30 MG: 30 CAPSULE, DELAYED RELEASE ORAL at 20:26

## 2024-05-14 RX ADMIN — CALCIUM CARBONATE (ANTACID) CHEW TAB 500 MG 1000 MG: 500 CHEW TAB at 08:29

## 2024-05-14 RX ADMIN — TAMSULOSIN HYDROCHLORIDE 0.4 MG: 0.4 CAPSULE ORAL at 08:22

## 2024-05-14 RX ADMIN — DEXTROMETHORPHAN HYDROBROMIDE AND QUINIDINE SULFATE 1 CAPSULE: 20; 10 CAPSULE, GELATIN COATED ORAL at 21:07

## 2024-05-14 RX ADMIN — APIXABAN 5 MG: 5 TABLET, FILM COATED ORAL at 08:22

## 2024-05-14 ASSESSMENT — ACTIVITIES OF DAILY LIVING (ADL)
ADLS_ACUITY_SCORE: 40

## 2024-05-14 NOTE — PLAN OF CARE
Goal Outcome Evaluation:      Plan of Care Reviewed With: patient, child    Overall Patient Progress: improving    Outcome Evaluation: Patient rested well tonight. PRN trazadone requested and given for sleep. Few hours later, melatonin requested and given for sleep. PRN tums given as well for upset stomach, resolved. VSS, voided adequately with the urinal. Glucose 255 at bedtime, given sliding scale per order. Family preferred to translate and very attentive to patient needs.

## 2024-05-14 NOTE — CODE/RAPID RESPONSE
Rapid Response Team Note    Assessment   In assessment a rapid response was called on Adam Talamantes due to SIRS/Sepsis trigger and lactic acidosis. This presentation is likely due to UTI, poor renal clearance of lactic acid. Primary team already ordered 1L IVF bolus and repeat lactic. Will recheck UA/UC given complaints of changes in urine.     Plan   -  1L IVF bolus  -  Repeat lactic at 1700  -  UA/UC   -  The Internal Medicine primary team was able to be reached and they are in agreement with the above plan.  -  Disposition: The patient will remain on the current unit. We will continue to monitor this patient closely.  -  Reassessment and plan follow-up will be performed by the primary team    Anju Araiza PA-C  [facility] RRT [facility] Job Code Contact #[phone]  [facility] Paging/Directory    Hospital Course   Brief Summary of events leading to rapid response:   Planning to discharge, triggered sepsis with lactic 2.1. Feeling well. Only new thing is his urine is now more cloudy. Still hopeful to discharge.     Admission Diagnosis:   Dysuria [R30.0]  Generalized weakness [R53.1]  ELLA (acute kidney injury) (H24) [N17.9]  Near syncope [R55]  Urinary tract infection without hematuria, site unspecified [N39.0]    Physical Exam   Temp: 98  F (36.7  C) Temp  Min: 97.2  F (36.2  C)  Max: 98.3  F (36.8  C)  Resp: 16 Resp  Min: 14  Max: 18  SpO2: 97 % SpO2  Min: 94 %  Max: 99 %  Pulse: 107 Pulse  Min: 93  Max: 107    No data recorded  BP: 123/78 Systolic (24hrs), Av , Min:123 , Max:154   Diastolic (24hrs), Av, Min:78, Max:99     I/Os: I/O last 3 completed shifts:  In: 240 [P.O.:240]  Out: 350 [Urine:350]     Exam:   General: No acute distress. Sitting on edge of bed. Non-toxic appearing.   CV: RRR.   Pulm: Normal work of breathing on room air. Lungs overall clear with some faint crackles in right lung.     Significant Results and Procedures   Lactic Acid:   Recent Labs   Lab Test 24  1515 24  1836  05/12/24  2228   LACT 2.1* 2.0 1.7     CBC:   Recent Labs   Lab Test 05/14/24  0532 05/13/24  0441 05/12/24  0558   WBC 11.3* 10.9 12.1*   HGB 12.8* 12.2* 12.4*   HCT 39.4* 36.6* 37.0*    193 179        Sepsis Evaluation   The patient is known to have an infection.  NO EVIDENCE OF SEPSIS at this time.  Vital sign, physical exam, and lab findings are due to poor renal clearance of lactic.

## 2024-05-14 NOTE — PROGRESS NOTES
Brief Medicine Note    Notified by nursing that patient had triggered sepsis protocol d/t being tachycardic and with white count from this am. A lactic was ordered and resulted at 2.1. Pt no longer tachycardic on repeat vitals, AF, HDS and without new localizing infectious symptoms. Currently being treated for UTI. Suspect slight elevation could be 2/2 poor clearance iso ELLA. Will give fluid bolus and recheck lactate.     Cammie Castro PA-C

## 2024-05-14 NOTE — PROVIDER NOTIFICATION
05/14/24 1540   Call Information   Date of Call 05/14/24   Time of Call 1540   Name of person requesting the team Otdante MORALES   Title of person requesting team RN   RRT Arrival time 1543   Time RRT ended 1550   Reason for call   Type of RRT Adult   Primary reason for call Sepsis suspected   Sepsis Suspected Elevated Lactate level   Was patient transferred from the ED, ICU, or PACU within last 24 hours prior to RRT call? No   SBAR   Situation Lactic 2.1   Background Per Provider note: pmhx of HTN, HLD, CVA, prostatitis, GERD who was admitted on 5/11/2024 for UTI and AMS.   Notable History/Conditions Neurological;Hypertension   Assessment Kayce speaking, family at bedside translating. Sitting up in bed, alert, answering questions. VS WDL. Reports urine cloudy, frequency with urinaiton.   Interventions Fluid bolus;Labs   Patient Outcome   Patient Outcome Stabilized on unit   RRT Team   Attending/Primary/Covering Physician Bernabe Gonzalez MD/ Gold 4   Date Attending Physician notified 05/14/24   Time Attending Physician notified 1540   Physician(s) Anju Araiza, RANDA   Lead RN Lynette Delatorre G   RT NA   Post RRT Intervention Assessment   Post RRT Assessment Stable/Improved   Date Follow Up Done 05/14/24   Time Follow Up Done 1744   Comments Lactic 2.0  (UC pending)

## 2024-05-14 NOTE — PROGRESS NOTES
Care Management Discharge Note    Discharge Date: 05/14/2024     Discharge Disposition:  home    Discharge Services:  none    Discharge DME:  none    Discharge Transportation:      Private pay costs discussed: Not applicable    Does the patient's insurance plan have a 3 day qualifying hospital stay waiver?  Yes     Which insurance plan 3 day waiver is available? Alternative insurance waiver    Will the waiver be used for post-acute placement? Undetermined at this time    PAS Confirmation Code:  NA  Patient/family educated on Medicare website which has current facility and service quality ratings:  NA    Education Provided on the Discharge Plan:  yes  Persons Notified of Discharge Plans: pt  Patient/Family in Agreement with the Plan:  yes    Handoff Referral Completed: Yes, IHO    Additional Information:    Pt is medically ready for discharge. Pt will go home with family assist. No new services. Family to transport at discharge.    ADD 5/15/24. Pt triggered sepsis yesterday and can't be discharged. Pt has CT Abd/pelvis this morning. Discharge pending CT results. Continue to monitor.     Chasidy Yadav RN  7C RN Coordinator  Phone: [phone]  SEARCHABLE in [facility] - search CARE COORDINATOR   Units: 7C Med Surg 7401 thru 5995 RNCC

## 2024-05-15 ENCOUNTER — APPOINTMENT (OUTPATIENT)
Dept: CT IMAGING | Facility: CLINIC | Age: 84
DRG: 713 | End: 2024-05-15
Payer: COMMERCIAL

## 2024-05-15 ENCOUNTER — ANESTHESIA EVENT (OUTPATIENT)
Dept: SURGERY | Facility: CLINIC | Age: 84
DRG: 713 | End: 2024-05-15
Payer: COMMERCIAL

## 2024-05-15 DIAGNOSIS — N41.2 PROSTATE ABSCESS: Primary | ICD-10-CM

## 2024-05-15 LAB
ANION GAP SERPL CALCULATED.3IONS-SCNC: 10 MMOL/L (ref 7–15)
BACTERIA UR CULT: NO GROWTH
BASOPHILS # BLD AUTO: 0.1 10E3/UL (ref 0–0.2)
BASOPHILS NFR BLD AUTO: 0 %
BUN SERPL-MCNC: 28.6 MG/DL (ref 8–23)
CALCIUM SERPL-MCNC: 9.5 MG/DL (ref 8.8–10.2)
CHLORIDE SERPL-SCNC: 104 MMOL/L (ref 98–107)
CREAT SERPL-MCNC: 1.76 MG/DL (ref 0.67–1.17)
DEPRECATED HCO3 PLAS-SCNC: 23 MMOL/L (ref 22–29)
EGFRCR SERPLBLD CKD-EPI 2021: 38 ML/MIN/1.73M2
EOSINOPHIL # BLD AUTO: 0.8 10E3/UL (ref 0–0.7)
EOSINOPHIL NFR BLD AUTO: 7 %
ERYTHROCYTE [DISTWIDTH] IN BLOOD BY AUTOMATED COUNT: 15.8 % (ref 10–15)
FLUAV RNA SPEC QL NAA+PROBE: NEGATIVE
FLUBV RNA RESP QL NAA+PROBE: NEGATIVE
GLUCOSE BLDC GLUCOMTR-MCNC: 162 MG/DL (ref 70–99)
GLUCOSE BLDC GLUCOMTR-MCNC: 165 MG/DL (ref 70–99)
GLUCOSE BLDC GLUCOMTR-MCNC: 188 MG/DL (ref 70–99)
GLUCOSE BLDC GLUCOMTR-MCNC: 192 MG/DL (ref 70–99)
GLUCOSE BLDC GLUCOMTR-MCNC: 218 MG/DL (ref 70–99)
GLUCOSE BLDC GLUCOMTR-MCNC: 218 MG/DL (ref 70–99)
GLUCOSE BLDC GLUCOMTR-MCNC: 225 MG/DL (ref 70–99)
GLUCOSE BLDC GLUCOMTR-MCNC: 290 MG/DL (ref 70–99)
GLUCOSE SERPL-MCNC: 171 MG/DL (ref 70–99)
HCT VFR BLD AUTO: 37.8 % (ref 40–53)
HGB BLD-MCNC: 12.1 G/DL (ref 13.3–17.7)
HOLD SPECIMEN: NORMAL
IMM GRANULOCYTES # BLD: 0.1 10E3/UL
IMM GRANULOCYTES NFR BLD: 1 %
LYMPHOCYTES # BLD AUTO: 2.2 10E3/UL (ref 0.8–5.3)
LYMPHOCYTES NFR BLD AUTO: 17 %
MCH RBC QN AUTO: 29.5 PG (ref 26.5–33)
MCHC RBC AUTO-ENTMCNC: 32 G/DL (ref 31.5–36.5)
MCV RBC AUTO: 92 FL (ref 78–100)
MONOCYTES # BLD AUTO: 1.4 10E3/UL (ref 0–1.3)
MONOCYTES NFR BLD AUTO: 11 %
NEUTROPHILS # BLD AUTO: 8 10E3/UL (ref 1.6–8.3)
NEUTROPHILS NFR BLD AUTO: 64 %
NRBC # BLD AUTO: 0 10E3/UL
NRBC BLD AUTO-RTO: 0 /100
PLATELET # BLD AUTO: 201 10E3/UL (ref 150–450)
POTASSIUM SERPL-SCNC: 4.4 MMOL/L (ref 3.4–5.3)
RBC # BLD AUTO: 4.1 10E6/UL (ref 4.4–5.9)
RSV RNA SPEC NAA+PROBE: NEGATIVE
SARS-COV-2 RNA RESP QL NAA+PROBE: NEGATIVE
SODIUM SERPL-SCNC: 137 MMOL/L (ref 135–145)
WBC # BLD AUTO: 12.5 10E3/UL (ref 4–11)

## 2024-05-15 PROCEDURE — 87637 SARSCOV2&INF A&B&RSV AMP PRB: CPT

## 2024-05-15 PROCEDURE — 250N000013 HC RX MED GY IP 250 OP 250 PS 637

## 2024-05-15 PROCEDURE — 74177 CT ABD & PELVIS W/CONTRAST: CPT | Mod: 26 | Performed by: RADIOLOGY

## 2024-05-15 PROCEDURE — 250N000009 HC RX 250: Performed by: STUDENT IN AN ORGANIZED HEALTH CARE EDUCATION/TRAINING PROGRAM

## 2024-05-15 PROCEDURE — 250N000013 HC RX MED GY IP 250 OP 250 PS 637: Performed by: PHYSICIAN ASSISTANT

## 2024-05-15 PROCEDURE — 250N000011 HC RX IP 250 OP 636: Performed by: STUDENT IN AN ORGANIZED HEALTH CARE EDUCATION/TRAINING PROGRAM

## 2024-05-15 PROCEDURE — 85025 COMPLETE CBC W/AUTO DIFF WBC: CPT

## 2024-05-15 PROCEDURE — 258N000003 HC RX IP 258 OP 636

## 2024-05-15 PROCEDURE — 120N000002 HC R&B MED SURG/OB UMMC

## 2024-05-15 PROCEDURE — 99233 SBSQ HOSP IP/OBS HIGH 50: CPT | Mod: FS | Performed by: STUDENT IN AN ORGANIZED HEALTH CARE EDUCATION/TRAINING PROGRAM

## 2024-05-15 PROCEDURE — 80048 BASIC METABOLIC PNL TOTAL CA: CPT

## 2024-05-15 PROCEDURE — 99207 PR APP CREDIT; MD BILLING SHARED VISIT: CPT | Mod: FS

## 2024-05-15 PROCEDURE — 999N000128 HC STATISTIC PERIPHERAL IV START W/O US GUIDANCE

## 2024-05-15 PROCEDURE — 36415 COLL VENOUS BLD VENIPUNCTURE: CPT

## 2024-05-15 PROCEDURE — 99255 IP/OBS CONSLTJ NEW/EST HI 80: CPT | Mod: 57 | Performed by: UROLOGY

## 2024-05-15 PROCEDURE — 74177 CT ABD & PELVIS W/CONTRAST: CPT

## 2024-05-15 PROCEDURE — 250N000011 HC RX IP 250 OP 636

## 2024-05-15 RX ORDER — SODIUM CHLORIDE, SODIUM LACTATE, POTASSIUM CHLORIDE, CALCIUM CHLORIDE 600; 310; 30; 20 MG/100ML; MG/100ML; MG/100ML; MG/100ML
INJECTION, SOLUTION INTRAVENOUS CONTINUOUS
Status: DISCONTINUED | OUTPATIENT
Start: 2024-05-15 | End: 2024-05-17

## 2024-05-15 RX ORDER — IOPAMIDOL 755 MG/ML
95 INJECTION, SOLUTION INTRAVASCULAR ONCE
Status: COMPLETED | OUTPATIENT
Start: 2024-05-15 | End: 2024-05-15

## 2024-05-15 RX ADMIN — INSULIN ASPART 2 UNITS: 100 INJECTION, SOLUTION INTRAVENOUS; SUBCUTANEOUS at 18:15

## 2024-05-15 RX ADMIN — DEXTROMETHORPHAN HYDROBROMIDE AND QUINIDINE SULFATE 1 CAPSULE: 20; 10 CAPSULE, GELATIN COATED ORAL at 12:04

## 2024-05-15 RX ADMIN — ROSUVASTATIN CALCIUM 20 MG: 20 TABLET, FILM COATED ORAL at 20:19

## 2024-05-15 RX ADMIN — DULOXETINE HYDROCHLORIDE 30 MG: 30 CAPSULE, DELAYED RELEASE ORAL at 20:19

## 2024-05-15 RX ADMIN — INSULIN ASPART 1 UNITS: 100 INJECTION, SOLUTION INTRAVENOUS; SUBCUTANEOUS at 08:36

## 2024-05-15 RX ADMIN — CEFTRIAXONE SODIUM 2 G: 2 INJECTION, POWDER, FOR SOLUTION INTRAMUSCULAR; INTRAVENOUS at 10:30

## 2024-05-15 RX ADMIN — DULOXETINE HYDROCHLORIDE 30 MG: 30 CAPSULE, DELAYED RELEASE ORAL at 08:52

## 2024-05-15 RX ADMIN — ALLOPURINOL 200 MG: 100 TABLET ORAL at 08:51

## 2024-05-15 RX ADMIN — SODIUM CHLORIDE, PRESERVATIVE FREE 74 ML: 5 INJECTION INTRAVENOUS at 09:36

## 2024-05-15 RX ADMIN — APIXABAN 5 MG: 5 TABLET, FILM COATED ORAL at 08:53

## 2024-05-15 RX ADMIN — SODIUM CHLORIDE, POTASSIUM CHLORIDE, SODIUM LACTATE AND CALCIUM CHLORIDE: 600; 310; 30; 20 INJECTION, SOLUTION INTRAVENOUS at 16:26

## 2024-05-15 RX ADMIN — METOPROLOL SUCCINATE 50 MG: 50 TABLET, EXTENDED RELEASE ORAL at 08:52

## 2024-05-15 RX ADMIN — INSULIN ASPART 4 UNITS: 100 INJECTION, SOLUTION INTRAVENOUS; SUBCUTANEOUS at 12:39

## 2024-05-15 RX ADMIN — PANTOPRAZOLE SODIUM 40 MG: 40 TABLET, DELAYED RELEASE ORAL at 08:53

## 2024-05-15 RX ADMIN — TAMSULOSIN HYDROCHLORIDE 0.4 MG: 0.4 CAPSULE ORAL at 08:52

## 2024-05-15 RX ADMIN — IOPAMIDOL 95 ML: 755 INJECTION, SOLUTION INTRAVENOUS at 09:36

## 2024-05-15 ASSESSMENT — ACTIVITIES OF DAILY LIVING (ADL)
ADLS_ACUITY_SCORE: 40

## 2024-05-15 NOTE — PLAN OF CARE
Goal Outcome Evaluation:      Plan of Care Reviewed With: patient    Overall Patient Progress: improving    Outcome Evaluation: Patient mentation at baseline. Family in room and assisted with translation. Patient slept well overnight. Turned self in bed. Blood sugars 239, 188, 165 overnight. Bedtime covered with sliding scale as ordered. Urine continues to be cloudy, but voided adequately. Uses bathroom and urinal for voiding.

## 2024-05-15 NOTE — PROGRESS NOTES
[facility]    Medicine Progress Note - Hospitalist Service, GOLD TEAM 4    Date of Admission:  5/11/2024    Assessment & Plan   Adam Talamantes is an 85 yo M with pmhx of HTN, HLD, CVA, prostatitis, GERD who was admitted on 5/11/2024 for UTI and AMS.      Complicated UTI  Concern for possibly prostatitis  Leukocytosis   Moderate L and mild R hydronephrosis  P/w 5d of polyuria, recently prescribed bactrim on 5/2 for 10d course for presumed cystitis. UA at that time c/f UTI but UC without growth. Has a history of prostatitis in 2016 complicated by fluoroquinolone and bactrim resistant E. Coli Bacteremia. Presented afebrile, without tachycardia and leukocytosis to 12.7. lactate wnl. UA with +LE >182 WBC, moderate blood and 11 RBC. Was given dose of ciprofloxacin in ED. Renal US with unchanged moderate hydronephrosis when compared to imaging from 02/2023. Stared on cefepime on admission, narrowed to CTX on 5/13. Urine Cx with < 10K mixture of urogenital valentina.  Plan originally to discharge on 5/14 but then patient triggered code sepsis for vitals and had elevated lactate so discharge was canceled for further monitoring and workup.   - Cefepime narrowed to CTX on 5/13, cont CTX  - Blood cx with NGTD - ctm  - If patient develops abdominal pain or clinically decompensates, consider CT A/P     ELLA on CKD III (improving)  Scr baseline appears to be ~1.3-1.7. Scr on admission 3.21, has continued to downtrend. Renal US moderate left and mild right hydronephrosis, similar to findings on prior CT 2/8/2023. FeNa 6.6%, BUN/Cr ratio 12.8, urine osms 262, suggestive of ATN in the setting of recent bactrim rx and poor oral intake. Bladder scan in ED not showing retention. Kidney function continues to improve.  - Cont to trend and encourage PO intake      Insulin dependented T2DM  Last A1c 8.8% 2/26/2024. PTA reg: jardiance, januvia, glargine 30 U at bedtime, aspart 8 U before breakfast, 6 U with  lunch and dinner. Pta glargine held on admission as patients appetite has been poor, still decreased but is improving.   - Cont medium sliding scale insulin  - Hypoglycemia protocol ordered  - hold home orals (jardiance and januvia  - increase lantus to 25 U daily     Colonic Wall Thickening   R Liver Lesion  Noted on CT Urogram 6/2023, wall thickening concerning for malignancy. Prior colonoscopy on file from 2017, showing tubular adenoma without high grade dysplasia. No reported weight loss per family, chart review reveals stable weight over past 2-3 years. No abdominal pain. LFTs wnl. Normal brown bowel movements. Ongoing discussions to pursue work up as OP with PCP, who is waiting to discuss with patient's daughter if should pursue colonoscopy. Previously noted that patient would not be interested in undergoing a surgery. No acute concerns.   - Continue working with PCP as OP for continued discussion      Acute encephalopathy, likely metabolic, resolved    Per family on admission past few days PTA showed more confusion, including not being able to state names of family members. CT Head without acute findings. Improved 05/12 and per family patient is reportedly at baseline, suspect encephalopathy to be metabolic in nature with ELLA.   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 Cont to trend and encourage PO intake.           Diet: Combination Diet Regular Diet Adult  Snacks/Supplements Adult: Glucerna; With Meals  Diet    DVT Prophylaxis: Pneumatic Compression Devices  Diego Catheter: Not present  Lines: None     Cardiac Monitoring: None  Code Status:  "No CPR- Do NOT Intubate      Clinically Significant Risk Factors                  # Hypertension: Noted on problem list       # DMII: A1C = 8.8 % (Ref range: 0.0 - 5.6 %) within past 6 months, PRESENT ON ADMISSION  # Obesity: Estimated body mass index is 31.91 kg/m  as calculated from the following:    Height as of this encounter: 1.48 m (4' 10.27\").    Weight as of this encounter: 69.9 kg (154 lb 1.6 oz)., PRESENT ON ADMISSION            Disposition Plan            The patient's care was discussed with the Attending Physician, Dr. Gonzalez, Bedside Nurse, Patient, and Patient's Family.    Cammie Castro PA-C  Hospitalist Service, GOLD TEAM [address] [place] [address]  Securely message with [employer] info)  Text page via [facility] Paging/Directory   See signed in provider for up to date coverage information  ______________________________________________________________________    Interval History   Pt reports feeling well, hoping to discharge home. No new symptoms. Eating and drinking well. Family report he is back at baseline.     Discussed with bedside nurse who had no acute concerns.     Physical Exam   Vital Signs: Temp: 98.8  F (37.1  C) Temp src: Oral BP: 99/64 Pulse: 84   Resp: 18 SpO2: 95 % O2 Device: None (Room air)    Weight: 154 lbs 1.62 oz  Constitutional: sitting up in bed, appears comfortable, no apparent distress. Family at bedside   HEENT: Mucous membranes moist, no scleral icterus   Respiratory: No increased work of breathing, breathing comfortably on RA   GI: abdomen soft, non tender, no CVA tenderness   Skin: normal skin color, texture, and no jaundice  Musculoskeletal: moving all extremities voluntarily during exam, no LE edema   Neuro: awake, alert, answering all questions appropriately during exam  Medical Decision Making       45 MINUTES SPENT BY ME on the date of service doing chart review, history, exam, documentation & further activities per the note.      Data "     I have personally reviewed the following data over the past 24 hrs:

## 2024-05-15 NOTE — DISCHARGE SUMMARY
"[facility]  Hospitalist Discharge Summary      Date of Admission:  5/11/2024  Date of Discharge:  5/19/2024  Discharging Provider: Cammie Castro PA-C  Discharge Service: Hospitalist Service, GOLD TEAM 4    Discharge Diagnoses   See below     Clinically Significant Risk Factors     # DMII: A1C = 8.8 % (Ref range: 0.0 - 5.6 %) within past 6 months    # Obesity: Estimated body mass index is 31.91 kg/m  as calculated from the following:    Height as of this encounter: 1.48 m (4' 10.27\").    Weight as of this encounter: 69.9 kg (154 lb 1.6 oz).       Follow-ups Needed After Discharge   Follow-up Appointments     Follow Up ([facility])      Follow up with primary care provider, Chucho Espino, within 7 days for   hospital follow- up.  The following labs/tests are recommended: CBC, BMP.        Appointments on [place] and/or [facility] (with [facility] or [facility]   provider or service). Call [phone] if you haven't heard regarding   these appointments within 7 days of discharge.          [ ] Monitor kidney function while on bactrim  [ ] Holding SGLT2-I on discharge until PCP follow-up in case this was contributing to urinary infection  [ ] Consider non-emergent MRI for hepatic lobe lesion and suspected kidney cyst as below   [ ] Follow-up pathology from TURP    Unresulted Labs Ordered in the Past 30 Days of this Admission       Date and Time Order Name Status Description    5/16/2024  3:44 PM Tissue Aerobic Bacterial Culture Routine Preliminary     5/16/2024  3:44 PM Fungal or Yeast Culture Routine Preliminary     5/16/2024  3:44 PM Anaerobic Bacterial Culture Routine Preliminary     5/16/2024  3:30 PM Surgical Pathology Exam In process         These results will be followed up by Soc provider and PCP.    Discharge Disposition   Discharged to home. Condition at discharge: Stable.    Hospital Course   Adam Talamantes is a 84 year old Kayce-speaking male with PMH signicant for HTN, " HLD, CVA, HLD, prostatitis, GERD who was admitted on 5/11/2024 for UTI and AMS. Work up for AMS significant for likely metabolic cause with ELLA. As renal function improved, mental status improved as well. ELLA likely in the setting of poor oral intake and recent bactrim use. UTI also likely contributing. Recheck Cr improved on recheck with fluids. UTI treated with IV cefepime, culture grew <10K mixed urogenital valentina, CT scan showing possible abscess of prostate so urology was consulted and performed TURP on 5/16. They did not note a clear prostate abscess though there was abnormal appearing prostate tissue and this was resected. Patient feeling much improved on day of discharge. Further details of hospitalization as outlined below.      Concern for Prostate Abscess s/p TURP (5/16) with no clear abscess identified   Complicated Urinary Tract Infection  Lactic acidosis (resolved)   Presented with approx 5 days of polyuria, dysuria (pt unable to report but appeared as such at bedside when talking to patient). Recently prescribed Bactrim DS on 5/2 for 10 day course for presumed cystitis. UA at that time concerning for UTI however UC without growth. Has a charted history of prostatitis in 2016 complicated by flouroquinolone and Bactrim resistant e coli bacteremia. Presented afebrile, without tachycardia and leukocytosis to 12.7. lactate wnl. UA with +LE >182 WBC, moderate blood and 11 RBC. Was given dose of ciprofloxacin in ED. Renal US with unchanged moderate hydronephrosis when compared to imaging from 02/2023. Patient without any abdominal pain, no CVA tenderness and rapidly improved on abx so abdominal imaging initially was deferred. Started on cefepime on admission which was subsequently narrowed to CTX. Urine culture with <10K mixed urogenital valentina. Blood cx without growth.  Given white count started to increase pursued imaging. CT AP on 5/15 with  3.1 x 3.0 x 3.7 cm rim-enhancing lesion involving the base-mid  right prostate gland with abutment of the inferior aspect of the urinary bladder, findings are concerning for a prostatic abscess. Also with circumferential urinary bladder wall thickening with mild adjacent fat stranding, suggestive of cystitis. S/p TURP with urology on 5/16 where no clear prostate abscess identified though abnormal appearing prostate nodule/ tissue was noted and resected . D/w urology on day of discharge and they were in agreement with plan.   [ ] Discharged on bactrim 1 DS tab BID for total 6 week course per up to date (EOT 6/23)   - Will need to monitor kidney function while on bactrim, has PCP follow-up scheduled for 5/20   [ ] hold SGLT2i until PCP follow-up given UTI     ELLA on CKD III (resolved)   Moderate Left and Mild R Hydronephrosis  Baseline of 1.3-1.7 per chart review dating back to 2016, presented with creatinine of 3.21. Renal US moderate left and mild right hydronephrosis, similar to findings on prior CT 2/8/2023. FeNa 6.6%, BUN/Cr ratio 12.8, urine osms 262, suggestive of ATN in the setting of recent bactrim rx and poor oral intake. Bladder scan in ED not showing retention. Scr 1.63 on day of discharge.   [ ] Recheck at PCP appt      Insulin-Dependent T2DM, A1c 8.8% 02/26/2024  Last A1C of 8.8 on 2/26/2024. PTA Jardiance, januvia, glargine 30U daily and aspart 8U before breakfast, 6U with lunch and dinner. Held PTA glargine on admission and blood sugars have been stable without good appetite initially but then as patients appetite increased his home glargine was restarted and he was discharged on home 30 U of glargine daily with home aspart. Holding jardiance as above and restarted januvia on discharge. Discussed plan with daughter who manages patients home insulin and she was in agreement, has follow-up with PCP on 5/20.      Colonic Wall Thickening  R Liver Lesion  Noted on CT Urogram 6/2023, wall thickening concerning for malignancy. Prior colonoscopy on file from 2017, showing  tubular adenoma without high grade dysplasia. No reported weight loss per family, chart review reveals stable weight over past 2-3 years. No abdominal pain. LFTs wnl. Normal brown bowel movements. Ongoing discussions to pursue work up as OP with PCP, who is waiting to discuss with patient's daughter if should pursue colonoscopy. Previously noted that patient would not be interested in undergoing a surgery. No acute concerns.   [ ] Continue working with PCP as OP for continued discussion     Leukocytosis (resolved): WBC 11.0 on day of discharge. No new localizing infectious symptoms. Patient AF, HDS. Discussed RTC precations with patient and family who are understanding.   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 Serum osm 299, urine sodium 33, urine clara 262. Elevated serum osms indicative of possible hypovolemia, acute renal failure also likely contributing to hyponatremia. S/p 1L in ED, mIVF on admission. Improved with fluids on admission. Na 133 on day of discharge but was hyperglycemia so corrects to wnl. Tolerating PO well.  \  Lesion in hepatic lobe:  Stable indeterminate 1.3 cm hypoattenuating lesion in the right hepatic lobe, which demonstrated peripheral enhancement on delayed post contrast imaging on CT dated 6/13/2023. Consider further evaluation with MRI on a non-emergent basis.   Suspected kidney cyst: 2.0 cm dense cystic lesion in the inferior pole of the left kidney, likely representing a hemorrhagic/proteinaceous cyst. This  can be further characterized on future MRI.     Acute encephalopathy, likely metabolic  (resolved)    Per family on admission past few days PTA showed more confusion, including not being able to state names of family members. CT Head without acute findings. Improved 05/12 and per family patient is reportedly at baseline on day of discharge, suspect encephalopathy to be metabolic in nature with ELLA. UTI could have been contributing as well.      Consultations This Hospital Stay   PHARMACY IP CONSULT  PHYSICAL THERAPY ADULT IP CONSULT  OCCUPATIONAL THERAPY ADULT IP CONSULT  NURSING TO CONSULT FOR VASCULAR ACCESS CARE IP CONSULT  UROLOGY IP CONSULT  NURSING TO CONSULT FOR VASCULAR ACCESS CARE IP CONSULT  NURSING TO CONSULT FOR VASCULAR ACCESS CARE IP CONSULT  CARE MANAGEMENT / SOCIAL WORK IP CONSULT  NURSING TO CONSULT FOR VASCULAR ACCESS CARE IP CONSULT    Code Status   No CPR- Do NOT Intubate    Time Spent on this Encounter   I, Cammie Castro PA-C, personally saw the patient today and spent greater than 30 minutes discharging this patient.       Cammie Castro PA-C  [facility] 7C MED SURG  [address] [place] [address]  Phone: [phone]  ______________________________________________________________________    Physical Exam   Vital Signs: Temp: 98.4  F (36.9  C) Temp src: Oral BP: (!) 142/83 Pulse: 92   Resp: 18 SpO2: 95 % O2 Device: None (Room air)    Weight: 154 lbs 1.62 oz  Constitutional: sitting up in bed, appears comfortable, no apparent distress. Son at bedside   HEENT: Mucous membranes moist, no scleral icterus   Respiratory: No increased work of breathing, breathing comfortably on RA  Skin: normal skin color, texture, and no jaundice  Musculoskeletal: moving all extremities voluntarily during exam, no LE edema   Neuro: awake, alert, answering all questions appropriately during exam       Primary Care Physician   Chucho Espino    Discharge Orders      Primary Care - Care Coordination  Referral      Activity    Your activity upon discharge: activity as tolerated     Follow Up ([facility])    Follow up with primary care provider, Chucho Espino, within 7 days for hospital follow- up.  The following labs/tests are recommended: CBC, BMP.      Appointments on [place] and/or [facility] (with [facility] or [facility] provider or service). Call [phone] if you haven't heard regarding these appointments within 7 days of discharge.     Reason for your hospital stay    You were admitted for confusion and found to have a likely urinary tract infection with a possible pocket of pus in your prostate. You were doing much better with antibiotics and after the urology procedure but if you start having fevers, chills, blood in your urine, difficulty urinating or abdominal pain please return to the ER so we can further evaluate you.     You should take the full course of antibiotics (bactrim) prescribed to you, the antibiotics will finish on June 23rd. While you are taking this antibiotics you will need to have your primary care doctor monitor your kidney function.     You started getting your long acting insulin (lantus) at noon while you were in the hospital, you should keep taking it at this time until you talk to your primary care provider.     Do not take your empagliflozon until you discuss with your primary care provider.     Diet    Follow this diet upon discharge: Orders Placed This Encounter      Snacks/Supplements Adult: Glucerna; With Meals      Combination Diet Regular Diet Adult     Significant Results and Procedures   Results for orders placed or performed during the hospital encounter of 05/11/24   XR Chest 2 Views    Narrative    EXAM: XR CHEST 2 VIEWS  LOCATION: [facility]  DATE: 5/11/2024    INDICATION: sob  COMPARISON: 1/11/2023      Impression    IMPRESSION: Low lung volumes. Cardiomegaly. Pulmonary vascularity normal. Interstitial prominence both  lungs may represent edema. Subsegmental atelectasis in the bases. No effusions. Follow-up chest x-ray with deeper respiration recommended.   US Renal Complete Non-Vascular    Narrative    EXAMINATION: US RENAL COMPLETE NON-VASCULAR, 5/11/2024 11:02 PM     COMPARISON: CT urogram 6/13/2023 and 2/8/2023    HISTORY: angel, prostatitis, uti    TECHNIQUE: The kidneys and bladder were scanned in the standard  fashion with specialized ultrasound transducer(s) using both gray  scale and limited color/spectral Doppler techniques.    FINDINGS:    Right kidney: Measures 12.1 cm in length. Parenchyma appears increased  in echogenicity. Small cysts are not well evaluated due to rib  shadowing. Mild hydronephrosis.    Left kidney: Measures 12.9 cm in length. Parenchyma appears increased  in echogenicity. Small cysts are not well evaluated due to rib  shadowing. Moderate hydronephrosis.    Bladder: Unremarkable.      Impression    IMPRESSION:  1.  Moderate left and mild right hydronephrosis, similar to findings  on prior CT 2/8/2023.  2.  Increased echogenicity is nonspecific but consistent with medical  renal disease.    I have personally reviewed the examination and initial interpretation  and I agree with the findings.    KAREN TREVIZO MD         SYSTEM ID:  [id number]   CT Head w/o Contrast    Narrative    EXAM: CT HEAD W/O CONTRAST  LOCATION: [facility]  DATE: 5/12/2024    INDICATION: 83 yo with unwitnessed falls at home, on Eliquis, AMS. Confusion.  COMPARISON: MRI brain dated 10/20/2023.  TECHNIQUE: Routine CT Head without IV contrast. Multiplanar reformats. Dose reduction techniques were used.    FINDINGS:  INTRACRANIAL CONTENTS: No intracranial hemorrhage, extraaxial collection, or mass effect.  Chronic bilateral cerebellar infarcts. Chronic right posterior subinsular lacunar type infarction. Chronic left thalamic lacunar type infarction. Chronic left   basal ganglia lacunar  type infarction. Chronic right pontine infarct. No acute large territory infarction. Mild to moderate presumed chronic small vessel ischemic changes. Mild to moderate generalized volume loss. No hydrocephalus.     VISUALIZED ORBITS/SINUSES/MASTOIDS: Prior bilateral cataract surgery. Visualized portions of the orbits are otherwise unremarkable. No paranasal sinus mucosal disease. No middle ear or mastoid effusion.    BONES/SOFT TISSUES: No acute abnormality.      Impression    IMPRESSION:  1.  No CT evidence for acute intracranial process.  2.  Brain atrophy and chronic ischemic changes as above.   CT Abdomen Pelvis w Contrast    Narrative    Examination: CT ABDOMEN PELVIS W CONTRAST, 5/15/2024 10:26 AM    Technique:  Helical CT images from the lung bases through the  symphysis pubis were obtained with contrast.  Coronal reformatted  images were generated at a workstation for further assessment.    Contrast:  95 mL [id number]    Comparison: Ultrasound 5/11/2024, CT 6/13/2023, 2/8/2023    History: Recurrent UTIs, eval for abscess or other source    Findings:    Abdomen and pelvis:   Liver: Stable indeterminate 1.3 cm hypoattenuating lesion in the right  hepatic lobe, previously demonstrating peripheral enhancement on  delayed excretory phase on CT dated 6/13/2023. Stable hypodense lesion  in the left hepatic lobe, likely representing hepatic cysts.  Gallbladder: No gallstones. No evidence of acute cholecystitis.  Spleen: Normal size.  Pancreas: No suspicious pancreatic lesions. The pancreatic duct is not  dilated.  Adrenal glands: No adrenal nodules.  Urinary system: Cortical atrophy of the kidneys. 2.0 cm indeterminant  dense cystic lesion in the inferior pole of the left kidney, likely  represents a hemorrhagic/proteinaceous cyst. Additional cortical  hypodensities are too small to fully characterize but likely represent  simple cysts. Prominent renal pelvis bilaterally. Unchanged  anti-dependent focal calcification  along the anterior left renal  pelvic wall. Mild diffuse left urothelial thickening and enhancement.  No hydronephrosis, hydroureter, or obstructing renal stones.  Circumferential urinary bladder wall thickening with mild adjacent fat  stranding.  Reproductive organs: 3.1 x 3.0 x 3.7 cm rim-enhancing lesion involving  the base-mid right prostate gland. This abuts the inferior aspect of  the urinary bladder without definite communication. There appears to  be a hypoattenuating region in similar location in the right prostate  gland measuring 2.2 x 2.2 cm on CT dated 6/13/2023 without definite  rim enhancement demonstrated.  Bowel: No abnormally dilated loops of large or small bowel. Mild  colonic diverticulosis without evidence of acute diverticulitis. No  abnormal bowel wall thickening or enhancement. The appendix is  unremarkable.  Lymph nodes: No retroperitoneal, mesenteric, or pelvic  lymphadenopathy.  Fluid: No free fluid within the abdomen.  Vessels: No infrarenal aortic aneurysm. The portal, superior  mesenteric, and splenic veins are patent. Calcified and noncalcified  atherosclerotic plaque throughout the abdominal aorta and iliac  arteries.    Lung bases: Bibasilar atelectasis. Prominent mediastinal and  extrapleural fat. No pleural effusion or focal consolidation.    Bones and soft tissues: No suspicious osseous lesions. Multilevel  degenerative changes throughout the visualized spine. 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 Stable mild  anterior wedge deformity of L2.       Impression    Impression:   1. 3.1 x 3.0 x 3.7 cm rim-enhancing lesion involving the base-mid  right prostate gland with abutment of the inferior aspect of the  urinary bladder. Findings are concerning for a prostatic abscess.  2. Circumferential urinary bladder wall thickening with mild adjacent  fat stranding, suggestive of cystitis.  3. Stable indeterminate 1.3 cm hypoattenuating lesion in the  right  hepatic lobe, which demonstrated peripheral enhancement on delayed  postcontrast imaging on CT dated 6/13/2023. Consider further  evaluation with MRI on a non-emergent basis.   4. 2.0 cm dense cystic lesion in the inferior pole of the left kidney,  likely representing a hemorrhagic/proteinaceous cyst. This can be  further characterized on future MRI.  5. Bilateral renal cortical atrophy.    I have personally reviewed the examination and initial interpretation  and I agree with the findings.    DUTCH VALENZUELA MD         SYSTEM ID:  [id number]       Discharge Medications   Current Discharge Medication List        CONTINUE these medications which have CHANGED    Details   insulin glargine (LANTUS PEN) 100 UNIT/ML pen Inject 30 Units Subcutaneous every 24 hours  Qty: 30 mL, Refills: 3    Comments: If Lantus is not covered by insurance, may substitute Basaglar or Semglee or other insulin glargine product per insurance preference at same dose and frequency.    Associated Diagnoses: Type 2 diabetes mellitus with hyperglycemia, without long-term current use of insulin (H)      sulfamethoxazole-trimethoprim (BACTRIM DS) 800-160 MG tablet Take 1 tablet by mouth 2 times daily for 35 days  Qty: 70 tablet, Refills: 0    Associated Diagnoses: Prostate abscess           CONTINUE these medications which have NOT CHANGED    Details   acetaminophen (TYLENOL) 500 MG tablet Take 1 tablet (500 mg) by mouth every 4 hours as needed for mild pain  Qty: 100 tablet, Refills: 3    Associated Diagnoses: Pain      allopurinol (ZYLOPRIM) 100 MG tablet TAKE 2 TABLETS(200 MG) BY MOUTH DAILY  Qty: 180 tablet, Refills: 3    Associated Diagnoses: Chronic gout of multiple sites, unspecified cause      calcium carbonate (TUMS) 500 MG chewable tablet Take 1 chew tab by mouth daily as needed for heartburn      celecoxib (CELEBREX) 200 MG capsule Take 1 capsule (200 mg) by mouth daily as needed for pain (jaw pain, joint pain)  Qty: 30 capsule,  Refills: 3    Associated Diagnoses: Jaw pain      dextromethorphan-quiNIDine (NUEDEXTA) 20-10 MG capsule Take 1 capsule by mouth every 12 hours  Qty: 60 capsule, Refills: 11    Associated Diagnoses: Cerebrovascular accident (CVA) due to stenosis of basilar artery (H); Pseudobulbar affect      DULoxetine (CYMBALTA) 30 MG capsule TAKE 1 CAPSULE(30 MG) BY MOUTH TWICE DAILY  Qty: 60 capsule, Refills: 6    Associated Diagnoses: Current moderate episode of major depressive disorder without prior episode (H)      ELIQUIS ANTICOAGULANT 5 MG tablet TAKE 1 TABLET(5 MG) BY MOUTH TWICE DAILY  Qty: 60 tablet, Refills: 6    Comments: ZERO refills remain on this prescription. Your patient is requesting advance approval of refills for this medication to PREVENT ANY MISSED DOSES  Associated Diagnoses: Cerebrovascular accident (CVA) due to stenosis of basilar artery (H)      hydrOXYzine (ATARAX) 25 MG tablet Take 1 tablet (25 mg) by mouth every 8 hours as needed for itching  Qty: 60 tablet, Refills: 1    Associated Diagnoses: Itching      insulin aspart (NOVOLOG FLEXPEN) 100 UNIT/ML pen Inject 8 units before breakfast, and 6 units under the skin before lunch and dinner  Qty: 15 mL, Refills: 3    Associated Diagnoses: Type 2 diabetes mellitus with hyperglycemia, without long-term current use of insulin (H)      JANUVIA 50 MG tablet TAKE 1 TABLET(50 MG) BY MOUTH DAILY  Qty: 90 tablet, Refills: 3    Associated Diagnoses: Type 2 diabetes mellitus with stage 3 chronic kidney disease, without long-term current use of insulin, unspecified whether stage 3a or 3b CKD (H)      meclizine (ANTIVERT) 12.5 MG tablet Take 1 tablet (12.5 mg) by mouth 3 times daily as needed for dizziness  Qty: 60 tablet, Refills: 6    Associated Diagnoses: Dizziness      metoprolol succinate ER (TOPROL XL) 100 MG 24 hr tablet Take 0.5 tablets (50 mg) by mouth daily Take 1/2 tablet  daily  Qty: 45 tablet, Refills: 3    Associated Diagnoses: Encounter for medication  refill      pantoprazole (PROTONIX) 40 MG EC tablet Take 1 tablet (40 mg) by mouth every morning (before breakfast)  Qty: 90 tablet, Refills: 3    Associated Diagnoses: Gastroesophageal reflux disease with esophagitis, unspecified whether hemorrhage      polyethylene glycol (MIRALAX) 17 GM/Dose powder MIX 17 GRAMS IN LIQUID AND TAKE BY MOUTH ONCE DAILY AS NEEDED FOR CONSTIPATION  Qty: 510 g, Refills: 6    Associated Diagnoses: Constipation, unspecified constipation type      rosuvastatin (CRESTOR) 20 MG tablet TAKE 1 TABLET(20 MG) BY MOUTH AT BEDTIME  Qty: 90 tablet, Refills: 0    Comments: No more refills until follow up is scheduled.  Associated Diagnoses: Dyslipidemia      SENNA-docusate sodium (SENNA S) 8.6-50 MG tablet Take 1 tablet by mouth 2 times daily as needed (for constipation)  Qty: 60 tablet, Refills: 11    Associated Diagnoses: Constipation, unspecified constipation type      tamsulosin (FLOMAX) 0.4 MG capsule Take 1 capsule (0.4 mg) by mouth daily  Qty: 90 capsule, Refills: 3    Associated Diagnoses: Benign prostatic hyperplasia with incomplete bladder emptying      traZODone (DESYREL) 50 MG tablet Take 1-2 tablets ( mg) by mouth nightly as needed for sleep  Qty: 180 tablet, Refills: 3    Associated Diagnoses: Insomnia, unspecified type      Continuous Blood Gluc Sensor (FREESTYLE SUSANNAH 2 SENSOR) MISC 1 each every 14 days Use 1 sensor every 14 days. Use to read blood sugars per 's instructions.  Qty: 2 each, Refills: 11    Associated Diagnoses: Type 2 diabetes mellitus with hyperglycemia, without long-term current use of insulin (H)      insulin pen needle (BD PEN NEEDLE SALVATORE 2ND GEN) 32G X 4 MM miscellaneous USE 1 PEN NEEDLE FOUR TIMES DAILY OR AS DIRECTED  Qty: 400 each, Refills: 0    Associated Diagnoses: Type 2 diabetes mellitus with hyperglycemia, without long-term current use of insulin (H)      triamcinolone (KENALOG) 0.1 % external cream Apply topically 2 times daily  Qty: 80  g, Refills: 3    Associated Diagnoses: Dermatitis           STOP taking these medications       empagliflozin (JARDIANCE) 10 MG TABS tablet Comments:   Reason for Stopping:             Allergies   Allergies   Allergen Reactions    Lisinopril Cough

## 2024-05-15 NOTE — CONSULTS
Urology Consult    Name: Adam Talamantes    MRN: [id number]   YOB: 1940               Chief Complaint:   Dysuria, urgency, and frequency of urination    History is obtained from the patient and chart review          History of Present Illness:   Adam Talamantes is an 85 yo M with pmhx of HTN, HLD, CVA, prostatitis, GERD who was admitted on 5/11/2024 for LUTs and AMS found to have prostate abscess on recent CT following increasing leukocytosis while on IV antibiotics.    Per chart review, he initially presented with LUTs in setting of a mild leukocytosis to 12.7 and a UA with +LE >182 WBC, moderate blood and 11 RBC. He had been recently prescribed bactrim on 5/2 for presumed cystitis. UA at that time c/f UTI but UC without growth.  While in the ED, he was given a dose of ciprofloxacin and was soon started on Rocephin on 5/12. His first urine cx grew < 10K mixture of urogenital valentina and his repeat urine cx without growth. CTAP was ordered due to rising leukocytosis despite IV abx; CT AP on 5/15 demonstrated a  3.1 x 3.0 x 3.7 cm rim-enhancing lesion involving the base-mid right prostate gland with abutment of the inferior aspect of the urinary bladder, findings are concerning for a prostatic abscess. Also with circumferential urinary bladder wall thickening with mild adjacent fat stranding, suggestive of cystitis.    Today, patient is resting comfortably in bed with his son at bedside. He requires a . Doing well. Does not endorse any urinary symptoms at this time.          Past Medical History:     Past Medical History:   Diagnosis Date    Acute blood loss anemia     Acute renal failure (H24)     Anemia     Bacteremia     Cataract     Chronic gout     CKD (chronic kidney disease)     Stage 3    DM2 (diabetes mellitus, type 2) (H)     GERD (gastroesophageal reflux disease)     Glucose intolerance (impaired glucose tolerance)     Gout     History of nephrolithiasis     History of prostatitis 7/19/2017     Hyperlipidemia     Hypertension     Insomnia     Intestinal disaccharidase deficiencies and disaccharide malabsorption     Created by Conversion     Latent tuberculosis     Nephrolithiasis     Neuropathy     Nonspecific abnormal findings on radiological and other examination of skull and head     Created by Conversion [facility] Annotation: 2013 11:23PM - Giulia Meridai/10/2011:  severe stenosis both posterior cerebral arteries seen MRI/MRA done for  vertigo. No acute changes.e*    Osteoarthritis     wrist, knee, shoulder    Prostatitis     Rectal bleed     Trigger thumb             Past Surgical History:     Past Surgical History:   Procedure Laterality Date    IR MISCELLANEOUS PROCEDURE  2009    IR MISCELLANEOUS PROCEDURE  2009    IR MISCELLANEOUS PROCEDURE  2009    IR MISCELLANEOUS PROCEDURE  2009    IR NEPHROURETERAL TUBE PLACEMENT BILATERAL  2009    IR URETER DILATION BILATERAL  2009    IR URETER DILATION BILATERAL  2009    KIDNEY STONE SURGERY      PICC  3/6/2016                 Social History:     Social History     Tobacco Use    Smoking status: Former     Current packs/day: 0.00     Types: Cigarettes     Quit date: 3/10/2000     Years since quittin.1     Passive exposure: Never    Smokeless tobacco: Former    Tobacco comments:     no passive exposure   Substance Use Topics    Alcohol use: No            Family History:     Family History   Problem Relation Age of Onset    Cerebrovascular Disease Father     Cerebrovascular Disease Daughter             Allergies:     Allergies   Allergen Reactions    Lisinopril Cough            Medications:     Current Facility-Administered Medications   Medication Dose Route Frequency Provider Last Rate Last Admin    acetaminophen (TYLENOL) tablet 975 mg  975 mg Oral Q8H PRN Fazal Serrato MD   975 mg at 24 0829    allopurinol (ZYLOPRIM) tablet 200 mg  200 mg Oral Daily Fazal Serrato MD   200 mg at  05/15/24 0851    apixaban ANTICOAGULANT (ELIQUIS) tablet 5 mg  5 mg Oral BID Mary Trotter PA-C   5 mg at 05/15/24 0853    calcium carbonate (TUMS) chewable tablet 1,000 mg  1,000 mg Oral 4x Daily PRN Fazal Serrato MD   1,000 mg at 05/14/24 0829    cefTRIAXone (ROCEPHIN) 2 g vial to attach to  ml bag for ADULTS or NS 50 ml bag for PEDS  2 g Intravenous Q24H Cammie Castro PA-C 200 mL/hr at 05/15/24 1030 2 g at 05/15/24 1030    dextromethorphan-quiNIDine (NUEDEXTA) 20-10 MG per capsule 1 capsule  1 capsule Oral Q12H Mary Trotter PA-C   1 capsule at 05/15/24 1204    glucose gel 15-30 g  15-30 g Oral Q15 Min PRN Fazal Serrato MD        Or    dextrose 50 % injection 25-50 mL  25-50 mL Intravenous Q15 Min PRN Fazal Serrato MD        Or    glucagon injection 1 mg  1 mg Subcutaneous Q15 Min PRN Fazal Serrato MD        DULoxetine (CYMBALTA) DR capsule 30 mg  30 mg Oral BID Mary Trotter PA-C   30 mg at 05/15/24 0852    [Held by provider] empagliflozin (JARDIANCE) tablet 10 mg  10 mg Oral Daily Fazal Serrato MD        hydrOXYzine HCl (ATARAX) tablet 25 mg  25 mg Oral Q8H PRN Mary Trotter PA-C        insulin aspart (NovoLOG) injection (RAPID ACTING)  1-7 Units Subcutaneous TID AC Fazal Serrato MD   4 Units at 05/15/24 1239    insulin aspart (NovoLOG) injection (RAPID ACTING)  1-5 Units Subcutaneous At Bedtime Fazal Serrato MD   1 Units at 05/14/24 2121    [START ON 5/16/2024] insulin glargine (LANTUS PEN) injection 30 Units  30 Units Subcutaneous Q24H Cammie Castro PA-C        lidocaine (LMX4) cream   Topical Q1H PRN Fazal Serrato MD        lidocaine 1 % 0.1-1 mL  0.1-1 mL Other Q1H PRN Fazal Serrato MD        loperamide (IMODIUM) liquid 2 mg  2 mg Oral 4x Daily PRN Justa Yo PA-C   2 mg at 05/13/24 0346    melatonin tablet 3 mg  3 mg Oral At Bedtime PRN Cammie Castro PA-C   3 mg at 05/14/24 0338     metoprolol succinate ER (TOPROL XL) 24 hr tablet 50 mg  50 mg Oral Daily Mary Trotter PA-C   50 mg at 05/15/24 0852    pantoprazole (PROTONIX) EC tablet 40 mg  40 mg Oral QAM AC Fazal Serrato MD   40 mg at 05/15/24 0853    polyethylene glycol (MIRALAX) Packet 17 g  17 g Oral BID PRN Fazal Serrato MD   17 g at 05/12/24 0906    rosuvastatin (CRESTOR) tablet 20 mg  20 mg Oral At Bedtime Fazal Serrato MD   20 mg at 05/14/24 2107    senna-docusate (SENOKOT-S/PERICOLACE) 8.6-50 MG per tablet 1 tablet  1 tablet Oral BID PRN Fazal Serrato MD        Or    senna-docusate (SENOKOT-S/PERICOLACE) 8.6-50 MG per tablet 2 tablet  2 tablet Oral BID PRN Fazal Serrtao MD   2 tablet at 05/12/24 0906    [Held by provider] sitagliptin (JANUVIA) tablet 50 mg  50 mg Oral Daily Fazal Serrato MD        sodium chloride (PF) 0.9% PF flush 3 mL  3 mL Intracatheter Q8H Fazal Serrato MD   3 mL at 05/14/24 2109    sodium chloride (PF) 0.9% PF flush 3 mL  3 mL Intracatheter q1 min prn Fazal Serrato MD        tamsulosin (FLOMAX) capsule 0.4 mg  0.4 mg Oral Daily Mary Trotter PA-C   0.4 mg at 05/15/24 0852    traZODone (DESYREL) tablet  mg   mg Oral At Bedtime PRN Mary Trotter PA-C   50 mg at 05/14/24 2107             Review of Systems:    ROS: 10 point ROS neg other than the symptoms noted above in the HPI           Physical Exam:   VS:  T: 98.8    HR: 84    BP: 99/64    RR: 18   GEN:  AOx3.  NAD.    LUNGS: Non-labored breathing.   BACK:  No midline or CVA tenderness.  NEURO:  CN grossly intact.            Data:   All laboratory data reviewed:    Recent Labs   Lab 05/15/24  0544 05/14/24  0532 05/13/24  0441 05/12/24  0558   WBC 12.5* 11.3* 10.9 12.1*   HGB 12.1* 12.8* 12.2* 12.4*    205 193 179       Recent Labs   Lab 05/15/24  1147 05/15/24  0807 05/15/24  0544 05/15/24  0509 05/14/24  0753 05/14/24  0532 05/13/24  0805 05/13/24  0441  05/12/24  0829 05/12/24  0558   NA  --   --  137  --   --  137  --  134*  --  130*   POTASSIUM  --   --  4.4  --   --  4.7  --  4.6  --  5.0   CHLORIDE  --   --  104  --   --  101  --  102  --  101   CO2  --   --  23  --   --  25  --  19*  --  18*   BUN  --   --  28.6*  --   --  34.6*  --  26.7*  --  36.7*   CR  --   --  1.76*  --   --  2.14*  --  2.25*  --  2.82*   * 162* 171* 165*   < > 182*   < > 159*   < > 127*   GERALDINE  --   --  9.5  --   --  10.1  --  9.3  --  9.1    < > = values in this interval not displayed.       Recent Labs   Lab 05/14/24  1636   COLOR Orange*   APPEARANCE Cloudy*   URINEGLC >=1000*   URINEBILI Negative   URINEKETONE Negative   SG 1.016   URINEPH 6.0   PROTEIN 100*   NITRITE Negative   LEUKEST Large*   RBCU 23*   WBCU >182*       All pertinent imaging reviewed:    CT scan of the abdomen:   CT Abdomen Pelvis w Contrast   Final Result   Impression:    1. 3.1 x 3.0 x 3.7 cm rim-enhancing lesion involving the base-mid   right prostate gland with abutment of the inferior aspect of the   urinary bladder. Findings are concerning for a prostatic abscess.   2. Circumferential urinary bladder wall thickening with mild adjacent   fat stranding, suggestive of cystitis.   3. Stable indeterminate 1.3 cm hypoattenuating lesion in the right   hepatic lobe, which demonstrated peripheral enhancement on delayed   postcontrast imaging on CT dated 6/13/2023. Consider further   evaluation with MRI on a non-emergent basis.    4. 2.0 cm dense cystic lesion in the inferior pole of the left kidney,   likely representing a hemorrhagic/proteinaceous cyst. This can be   further characterized on future MRI.   5. Bilateral renal cortical atrophy.      I have personally reviewed the examination and initial interpretation   and I agree with the findings.      DUTCH VALENZUELA MD            SYSTEM ID:  [id number]      CT Head w/o Contrast   Final Result   IMPRESSION:   1.  No CT evidence for acute intracranial process.    2.  Brain atrophy and chronic ischemic changes as above.      US Renal Complete Non-Vascular   Final Result   IMPRESSION:   1.  Moderate left and mild right hydronephrosis, similar to findings   on prior CT 2/8/2023.   2.  Increased echogenicity is nonspecific but consistent with medical   renal disease.      I have personally reviewed the examination and initial interpretation   and I agree with the findings.      KAREN TREVIZO MD            SYSTEM ID:  [id number]      XR Chest 2 Views   Final Result   IMPRESSION: Low lung volumes. Cardiomegaly. Pulmonary vascularity normal. Interstitial prominence both lungs may represent edema. Subsegmental atelectasis in the bases. No effusions. Follow-up chest x-ray with deeper respiration recommended.                      Impression and Plan:   Impression:   Adam Talamantes is an 83 yo M with pmhx of HTN, HLD, CVA, prostatitis, GERD who was admitted on 5/11/2024 for LUTs and AMS found to have a 3.1 x 3.0 x 3.7 cm prostate abscess in setting of a rising leukocytosis while on IV abx.      Plan:     - NPO at midnight, mIVF    - OR tomorrow for TURP/prostate abscess drainage    - Continue abx per primary team  - Please hold anti-coagulation for one day for OR tomorrow    - Urology will continue to follow. Please contact resident/PA on call with any questions or concerns.     This patient's exam findings, labs, and imaging discussed with urology staff surgeon, Dr. Tidwell, who developed the treatment plan.        Physician Attestation   I personally examined and evaluated this patient.  I discussed the patient with the resident/fellow and care team, and agree with the assessment and plan of care as documented in the note.     Key findings: I personally reviewed CT which shows a suspicion for prostate abscess.    Prostate Abscess  Will plan TUR bladder abscess roof 5/16/2024.  Risks, benefits, alternatives discussed with patient and his daughter.  OR today.    Bernabe Fletcher,  MD  Date of Service (when I saw the patient): 05/16/24

## 2024-05-15 NOTE — PLAN OF CARE
Goal Outcome Evaluation:      Plan of Care Reviewed With: patient, child    Overall Patient Progress: improvingOverall Patient Progress: improving    Outcome Evaluation: Patient mentation is at baseline. VSS on RA, no nuasea, takes medication with applesuace. NPO at midnight for a procedure tommorrow. Pt is Kayce speaking, family in the room with him and family interpretes for pt. Large urine output this shift and continues to be cloudy. Continue with POC.

## 2024-05-15 NOTE — PROGRESS NOTES
[facility]    Medicine Progress Note - Hospitalist Service, GOLD TEAM 4    Date of Admission:  5/11/2024    Assessment & Plan   Adam Talamantes is an 83 yo M with pmhx of HTN, HLD, CVA, prostatitis, GERD who was admitted on 5/11/2024 for UTI and AMS found to have prostate abscess.    Prostate Abscess    Complicated UTI  Leukocytosis   Lactic acidosis (resolved)   P/w 5d of polyuria, recently prescribed bactrim on 5/2 for 10d course for presumed cystitis. UA at that time c/f UTI but UC without growth. Has a history of prostatitis in 2016 complicated by fluoroquinolone and bactrim resistant E. Coli Bacteremia. Presented afebrile, without tachycardia and leukocytosis to 12.7. lactate wnl. UA with +LE >182 WBC, moderate blood and 11 RBC. Was given dose of ciprofloxacin in ED.  Stared on cefepime on admission, narrowed to CTX on 5/13. Urine Cx with < 10K mixture of urogenital valentina, repeat urine cx without growth.  Given white count started to increase pursued imaging. CT AP on 5/15 with  3.1 x 3.0 x 3.7 cm rim-enhancing lesion involving the base-mid right prostate gland with abutment of the inferior aspect of the urinary bladder, findings are concerning for a prostatic abscess. Also with circumferential urinary bladder wall thickening with mild adjacent fat stranding, suggestive of cystitis. Given this involving urology for further assistance.  - Cont CTX 2 g q24h  - Blood cx 5/15 with NGTD - ctm  - Fluvid to complete infectious workup   - Urology consulted, appreciate recs- query if this needs to be drained, pending urology recs will discuss with IR if needed     ELLA on CKD III (resolved)  Moderate L and mild R hydronephrosis  Scr baseline appears to be ~1.3-1.7. Scr on admission 3.21, has continued to downtrend. Renal US moderate left and mild right hydronephrosis, similar to findings on prior CT 2/8/2023. FeNa 6.6%, BUN/Cr ratio 12.8, urine osms 262, suggestive of ATN in the  setting of recent bactrim rx and poor oral intake. Bladder scan in ED not showing retention.   - Cont to trend      Insulin dependented T2DM  Last A1c 8.8% 2/26/2024. PTA reg: jardiance, januvia, glargine 30 U at bedtime, aspart 8 U before breakfast, 6 U with lunch and dinner.   - Cont medium sliding scale insulin  - Hypoglycemia protocol ordered  - hold home orals (jardiance and januvia)  - home 30 U lantus   - hold home sglt2i inhibitor on discharge.      Colonic Wall Thickening   R Liver Lesion  Noted on CT Urogram 6/2023, wall thickening concerning for malignancy. Prior colonoscopy on file from 2017, showing tubular adenoma without high grade dysplasia. No reported weight loss per family, chart review reveals stable weight over past 2-3 years. No abdominal pain. LFTs wnl. Normal brown bowel movements. Ongoing discussions to pursue work up as OP with PCP, who is waiting to discuss with patient's daughter if should pursue colonoscopy. Previously noted that patient would not be interested in undergoing a surgery. No acute concerns.   - Continue working with PCP as OP for continued discussion      Acute encephalopathy, likely metabolic, resolved    Per family on admission past few days PTA showed more confusion, including not being able to state names of family members. CT Head without acute findings. Improved 05/12 and per family patient is reportedly at baseline, suspect encephalopathy to be metabolic in nature with ELLA.   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 Cont to trend and encourage PO intake.  Lesion in hepatic lobe:  Stable indeterminate 1.3  "cm hypoattenuating lesion in the right hepatic lobe, which demonstrated peripheral enhancement on delayed post contrast imaging on CT dated 6/13/2023. Consider further evaluation with MRI on a non-emergent basis.   Suspected kidney cyst: 2.0 cm dense cystic lesion in the inferior pole of the left kidney, likely representing a hemorrhagic/proteinaceous cyst. This can be further characterized on future MRI.          Diet: Combination Diet Regular Diet Adult  Snacks/Supplements Adult: Glucerna; With Meals  Diet    DVT Prophylaxis: Pneumatic Compression Devices  Diego Catheter: Not present  Lines: None     Cardiac Monitoring: None  Code Status: No CPR- Do NOT Intubate      Clinically Significant Risk Factors                  # Hypertension: Noted on problem list       # DMII: A1C = 8.8 % (Ref range: 0.0 - 5.6 %) within past 6 months, PRESENT ON ADMISSION  # Obesity: Estimated body mass index is 31.91 kg/m  as calculated from the following:    Height as of this encounter: 1.48 m (4' 10.27\").    Weight as of this encounter: 69.9 kg (154 lb 1.6 oz)., PRESENT ON ADMISSION            Disposition Plan     Medically Ready for Discharge: Anticipated in 2-4 Days       The patient's care was discussed with the Attending Physician, Dr. Gonzalez, Bedside Nurse, Patient, and Patient's Family.    Cammie Castro PA-C  Hospitalist Service, GOLD TEAM [address] [place] [address]  Securely message with [employer] (more info)  Text page via [facility] Paging/Directory   See signed in provider for up to date coverage information  ______________________________________________________________________    Interval History   Pt reports feeling well. He has no new symptoms. Discussed new findings on CT and he is agreeable with staying and talking to urology team. Son at bedside also in agreement with plan, he plans to update sister.     Physical Exam   Vital Signs: Temp: 98.8  F (37.1  C) Temp src: Oral BP: 99/64 Pulse: 84   " Resp: 18 SpO2: 95 % O2 Device: None (Room air)    Weight: 154 lbs 1.62 oz  Constitutional: sitting up in bed, appears comfortable, no apparent distress. Son at bedside.   HEENT: Mucous membranes moist, no scleral icterus   Respiratory: No increased work of breathing, scant bibasilar crackles otherwise CTAN  GI: abdomen soft, non tender, non-distended.  Skin: normal skin color, texture, and no jaundice  Musculoskeletal: moving all extremities voluntarily during exam   Neuro: awake, alert, answering all questions appropriately during exam    Medical Decision Making       50 MINUTES SPENT BY ME on the date of service doing chart review, history, exam, documentation & further activities per the note.      Data     I have personally reviewed the following data over the past 24 hrs:    12.5 (H)  \   12.1 (L)   / 201     137 104 28.6 (H) /  290 (H)   4.4 23 1.76 (H) \     Procal: N/A CRP: N/A Lactic Acid: 2.0         Imaging results reviewed over the past 24 hrs:   Recent Results (from the past 24 hour(s))   CT Abdomen Pelvis w Contrast    Narrative    Examination: CT ABDOMEN PELVIS W CONTRAST, 5/15/2024 10:26 AM    Technique:  Helical CT images from the lung bases through the  symphysis pubis were obtained with contrast.  Coronal reformatted  images were generated at a workstation for further assessment.    Contrast:  95 mL [id number]    Comparison: Ultrasound 5/11/2024, CT 6/13/2023, 2/8/2023    History: Recurrent UTIs, eval for abscess or other source    Findings:    Abdomen and pelvis:   Liver: Stable indeterminate 1.3 cm hypoattenuating lesion in the right  hepatic lobe, previously demonstrating peripheral enhancement on  delayed excretory phase on CT dated 6/13/2023. Stable hypodense lesion  in the left hepatic lobe, likely representing hepatic cysts.  Gallbladder: No gallstones. No evidence of acute cholecystitis.  Spleen: Normal size.  Pancreas: No suspicious pancreatic lesions. The pancreatic duct is  not  dilated.  Adrenal glands: No adrenal nodules.  Urinary system: Cortical atrophy of the kidneys. 2.0 cm indeterminant  dense cystic lesion in the inferior pole of the left kidney, likely  represents a hemorrhagic/proteinaceous cyst. Additional cortical  hypodensities are too small to fully characterize but likely represent  simple cysts. Prominent renal pelvis bilaterally. Unchanged  anti-dependent focal calcification along the anterior left renal  pelvic wall. Mild diffuse left urothelial thickening and enhancement.  No hydronephrosis, hydroureter, or obstructing renal stones.  Circumferential urinary bladder wall thickening with mild adjacent fat  stranding.  Reproductive organs: 3.1 x 3.0 x 3.7 cm rim-enhancing lesion involving  the base-mid right prostate gland. This abuts the inferior aspect of  the urinary bladder without definite communication. There appears to  be a hypoattenuating region in similar location in the right prostate  gland measuring 2.2 x 2.2 cm on CT dated 6/13/2023 without definite  rim enhancement demonstrated.  Bowel: No abnormally dilated loops of large or small bowel. Mild  colonic diverticulosis without evidence of acute diverticulitis. No  abnormal bowel wall thickening or enhancement. The appendix is  unremarkable.  Lymph nodes: No retroperitoneal, mesenteric, or pelvic  lymphadenopathy.  Fluid: No free fluid within the abdomen.  Vessels: No infrarenal aortic aneurysm. The portal, superior  mesenteric, and splenic veins are patent. Calcified and noncalcified  atherosclerotic plaque throughout the abdominal aorta and iliac  arteries.    Lung bases: Bibasilar atelectasis. Prominent mediastinal and  extrapleural fat. No pleural effusion or focal consolidation.    Bones and soft tissues: No suspicious osseous lesions. Multilevel  degenerative changes throughout the visualized spine. 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 Stable mild  anterior wedge  deformity of L2.       Impression    Impression:   1. 3.1 x 3.0 x 3.7 cm rim-enhancing lesion involving the base-mid  right prostate gland with abutment of the inferior aspect of the  urinary bladder. Findings are concerning for a prostatic abscess.  2. Circumferential urinary bladder wall thickening with mild adjacent  fat stranding, suggestive of cystitis.  3. Stable indeterminate 1.3 cm hypoattenuating lesion in the right  hepatic lobe, which demonstrated peripheral enhancement on delayed  postcontrast imaging on CT dated 6/13/2023. Consider further  evaluation with MRI on a non-emergent basis.   4. 2.0 cm dense cystic lesion in the inferior pole of the left kidney,  likely representing a hemorrhagic/proteinaceous cyst. This can be  further characterized on future MRI.  5. Bilateral renal cortical atrophy.    I have personally reviewed the examination and initial interpretation  and I agree with the findings.    DUTCH VALENZUELA MD         SYSTEM ID:  [id number]

## 2024-05-15 NOTE — PROGRESS NOTES
Brief Internal Medicine Note     Following up regarding UA results and LA. This afternoon, appears that LA was drawn and came back at 2.1. Repeat 2.0. Given initial LA, day team checked a UA which was c/w infection. However, this not unexpected as he is currently undergoing tx w/ abx for an already-known UTI. Other than this, he does appear to have spiked a mild leukocytosis to 11.3 this AM. Spoke w/ pt via Kayce-speaking  (daughter) who notes that, as the patient's primary caregiver at home, she would prefer he stay one more night to check another CBC in AM to ensure no worsening of WBC. Pt is agreeable to this, and notes no new sx (however, per daughter, she notes that he cannot answer questions reliably). Thus, plan to stay through tomorrow for CBC recheck, and if stable or resolved WBC, family and pt agreeable for discharge to home w/ PO abx.    Richard Araiza PA-C  Internal Medicine [facility]  Pager [phone]

## 2024-05-15 NOTE — PLAN OF CARE
Goal Outcome Evaluation:      Plan of Care Reviewed With: patient, child    Overall Patient Progress: improvingOverall Patient Progress: improving    Outcome Evaluation: Patient is alert and oriented x4, reported pain and tylenol was given. Tums was given for stomach upset, resolved. Pt trigger sepsis. lactic was 2.1, MD was notified and orders were obtained. LR was given (bolus). Vitals signs are stable at this time and patient is resting. UA sample was collected and sent to lab. Pt had a good appetite, freqent urination and one small BM this shift. Family continues to translate for patient and attentive at bed side. Latest lactic result is 2.0. Continue with POC.

## 2024-05-16 ENCOUNTER — ANESTHESIA (OUTPATIENT)
Dept: SURGERY | Facility: CLINIC | Age: 84
DRG: 713 | End: 2024-05-16
Payer: COMMERCIAL

## 2024-05-16 LAB
ANION GAP SERPL CALCULATED.3IONS-SCNC: 11 MMOL/L (ref 7–15)
BACTERIA TISS BX CULT: NORMAL
BUN SERPL-MCNC: 32.6 MG/DL (ref 8–23)
CALCIUM SERPL-MCNC: 9.5 MG/DL (ref 8.8–10.2)
CHLORIDE SERPL-SCNC: 103 MMOL/L (ref 98–107)
CREAT SERPL-MCNC: 1.68 MG/DL (ref 0.67–1.17)
DEPRECATED HCO3 PLAS-SCNC: 22 MMOL/L (ref 22–29)
EGFRCR SERPLBLD CKD-EPI 2021: 40 ML/MIN/1.73M2
ERYTHROCYTE [DISTWIDTH] IN BLOOD BY AUTOMATED COUNT: 15.7 % (ref 10–15)
GLUCOSE BLDC GLUCOMTR-MCNC: 132 MG/DL (ref 70–99)
GLUCOSE BLDC GLUCOMTR-MCNC: 147 MG/DL (ref 70–99)
GLUCOSE BLDC GLUCOMTR-MCNC: 160 MG/DL (ref 70–99)
GLUCOSE BLDC GLUCOMTR-MCNC: 184 MG/DL (ref 70–99)
GLUCOSE BLDC GLUCOMTR-MCNC: 207 MG/DL (ref 70–99)
GLUCOSE BLDC GLUCOMTR-MCNC: 248 MG/DL (ref 70–99)
GLUCOSE SERPL-MCNC: 135 MG/DL (ref 70–99)
GRAM STAIN RESULT: NORMAL
GRAM STAIN RESULT: NORMAL
HCT VFR BLD AUTO: 35.1 % (ref 40–53)
HGB BLD-MCNC: 11.4 G/DL (ref 13.3–17.7)
MCH RBC QN AUTO: 30.2 PG (ref 26.5–33)
MCHC RBC AUTO-ENTMCNC: 32.5 G/DL (ref 31.5–36.5)
MCV RBC AUTO: 93 FL (ref 78–100)
PLATELET # BLD AUTO: 214 10E3/UL (ref 150–450)
POTASSIUM SERPL-SCNC: 4.3 MMOL/L (ref 3.4–5.3)
RBC # BLD AUTO: 3.78 10E6/UL (ref 4.4–5.9)
SODIUM SERPL-SCNC: 136 MMOL/L (ref 135–145)
WBC # BLD AUTO: 10.9 10E3/UL (ref 4–11)

## 2024-05-16 PROCEDURE — 36415 COLL VENOUS BLD VENIPUNCTURE: CPT

## 2024-05-16 PROCEDURE — 88305 TISSUE EXAM BY PATHOLOGIST: CPT | Mod: 26 | Performed by: PATHOLOGY

## 2024-05-16 PROCEDURE — 0VB08ZZ EXCISION OF PROSTATE, VIA NATURAL OR ARTIFICIAL OPENING ENDOSCOPIC: ICD-10-PCS | Performed by: UROLOGY

## 2024-05-16 PROCEDURE — 258N000003 HC RX IP 258 OP 636

## 2024-05-16 PROCEDURE — 370N000017 HC ANESTHESIA TECHNICAL FEE, PER MIN: Performed by: UROLOGY

## 2024-05-16 PROCEDURE — 250N000011 HC RX IP 250 OP 636: Performed by: ANESTHESIOLOGY

## 2024-05-16 PROCEDURE — 82374 ASSAY BLOOD CARBON DIOXIDE: CPT

## 2024-05-16 PROCEDURE — 250N000009 HC RX 250: Performed by: ANESTHESIOLOGY

## 2024-05-16 PROCEDURE — 999N000141 HC STATISTIC PRE-PROCEDURE NURSING ASSESSMENT: Performed by: UROLOGY

## 2024-05-16 PROCEDURE — 120N000002 HC R&B MED SURG/OB UMMC

## 2024-05-16 PROCEDURE — 85041 AUTOMATED RBC COUNT: CPT

## 2024-05-16 PROCEDURE — 87106 FUNGI IDENTIFICATION YEAST: CPT | Performed by: UROLOGY

## 2024-05-16 PROCEDURE — 87205 SMEAR GRAM STAIN: CPT | Performed by: UROLOGY

## 2024-05-16 PROCEDURE — 87075 CULTR BACTERIA EXCEPT BLOOD: CPT | Performed by: UROLOGY

## 2024-05-16 PROCEDURE — 82565 ASSAY OF CREATININE: CPT

## 2024-05-16 PROCEDURE — 250N000013 HC RX MED GY IP 250 OP 250 PS 637: Performed by: PHYSICIAN ASSISTANT

## 2024-05-16 PROCEDURE — 258N000003 HC RX IP 258 OP 636: Performed by: ANESTHESIOLOGY

## 2024-05-16 PROCEDURE — 99233 SBSQ HOSP IP/OBS HIGH 50: CPT | Mod: FS

## 2024-05-16 PROCEDURE — 88305 TISSUE EXAM BY PATHOLOGIST: CPT | Mod: TC | Performed by: UROLOGY

## 2024-05-16 PROCEDURE — 52601 PROSTATECTOMY (TURP): CPT | Performed by: ANESTHESIOLOGY

## 2024-05-16 PROCEDURE — 250N000013 HC RX MED GY IP 250 OP 250 PS 637

## 2024-05-16 PROCEDURE — 272N000001 HC OR GENERAL SUPPLY STERILE: Performed by: UROLOGY

## 2024-05-16 PROCEDURE — 250N000025 HC SEVOFLURANE, PER MIN: Performed by: UROLOGY

## 2024-05-16 PROCEDURE — 250N000011 HC RX IP 250 OP 636

## 2024-05-16 PROCEDURE — 52601 PROSTATECTOMY (TURP): CPT | Performed by: UROLOGY

## 2024-05-16 PROCEDURE — 360N000076 HC SURGERY LEVEL 3, PER MIN: Performed by: UROLOGY

## 2024-05-16 PROCEDURE — 258N000001 HC RX 258: Performed by: STUDENT IN AN ORGANIZED HEALTH CARE EDUCATION/TRAINING PROGRAM

## 2024-05-16 PROCEDURE — 99207 PR APP CREDIT; MD BILLING SHARED VISIT: CPT | Mod: FS | Performed by: STUDENT IN AN ORGANIZED HEALTH CARE EDUCATION/TRAINING PROGRAM

## 2024-05-16 PROCEDURE — 99100 ANES PT EXTEME AGE<1 YR&>70: CPT | Performed by: ANESTHESIOLOGY

## 2024-05-16 PROCEDURE — 710N000010 HC RECOVERY PHASE 1, LEVEL 2, PER MIN: Performed by: UROLOGY

## 2024-05-16 RX ORDER — FENTANYL CITRATE 50 UG/ML
50 INJECTION, SOLUTION INTRAMUSCULAR; INTRAVENOUS EVERY 5 MIN PRN
Status: DISCONTINUED | OUTPATIENT
Start: 2024-05-16 | End: 2024-05-16 | Stop reason: HOSPADM

## 2024-05-16 RX ORDER — LIDOCAINE HYDROCHLORIDE 20 MG/ML
INJECTION, SOLUTION INFILTRATION; PERINEURAL PRN
Status: DISCONTINUED | OUTPATIENT
Start: 2024-05-16 | End: 2024-05-16

## 2024-05-16 RX ORDER — OXYCODONE HYDROCHLORIDE 10 MG/1
10 TABLET ORAL
Status: CANCELLED | OUTPATIENT
Start: 2024-05-16

## 2024-05-16 RX ORDER — HYDROMORPHONE HCL IN WATER/PF 6 MG/30 ML
0.2 PATIENT CONTROLLED ANALGESIA SYRINGE INTRAVENOUS EVERY 5 MIN PRN
Status: DISCONTINUED | OUTPATIENT
Start: 2024-05-16 | End: 2024-05-16 | Stop reason: HOSPADM

## 2024-05-16 RX ORDER — SODIUM CHLORIDE, SODIUM LACTATE, POTASSIUM CHLORIDE, CALCIUM CHLORIDE 600; 310; 30; 20 MG/100ML; MG/100ML; MG/100ML; MG/100ML
INJECTION, SOLUTION INTRAVENOUS CONTINUOUS
Status: DISCONTINUED | OUTPATIENT
Start: 2024-05-16 | End: 2024-05-16 | Stop reason: HOSPADM

## 2024-05-16 RX ORDER — ALBUTEROL SULFATE 0.83 MG/ML
2.5 SOLUTION RESPIRATORY (INHALATION) EVERY 4 HOURS PRN
Status: DISCONTINUED | OUTPATIENT
Start: 2024-05-16 | End: 2024-05-16 | Stop reason: HOSPADM

## 2024-05-16 RX ORDER — FENTANYL CITRATE 50 UG/ML
25 INJECTION, SOLUTION INTRAMUSCULAR; INTRAVENOUS EVERY 5 MIN PRN
Status: DISCONTINUED | OUTPATIENT
Start: 2024-05-16 | End: 2024-05-16 | Stop reason: HOSPADM

## 2024-05-16 RX ORDER — ONDANSETRON 4 MG/1
4 TABLET, ORALLY DISINTEGRATING ORAL EVERY 30 MIN PRN
Status: CANCELLED | OUTPATIENT
Start: 2024-05-16

## 2024-05-16 RX ORDER — ACETAMINOPHEN 325 MG/1
975 TABLET ORAL
Status: DISCONTINUED | OUTPATIENT
Start: 2024-05-16 | End: 2024-05-16 | Stop reason: HOSPADM

## 2024-05-16 RX ORDER — ONDANSETRON 4 MG/1
4 TABLET, ORALLY DISINTEGRATING ORAL EVERY 30 MIN PRN
Status: DISCONTINUED | OUTPATIENT
Start: 2024-05-16 | End: 2024-05-16 | Stop reason: HOSPADM

## 2024-05-16 RX ORDER — NICOTINE POLACRILEX 4 MG
15-30 LOZENGE BUCCAL
Status: DISCONTINUED | OUTPATIENT
Start: 2024-05-16 | End: 2024-05-16

## 2024-05-16 RX ORDER — HYDROMORPHONE HCL IN WATER/PF 6 MG/30 ML
0.4 PATIENT CONTROLLED ANALGESIA SYRINGE INTRAVENOUS EVERY 5 MIN PRN
Status: DISCONTINUED | OUTPATIENT
Start: 2024-05-16 | End: 2024-05-16 | Stop reason: HOSPADM

## 2024-05-16 RX ORDER — NALOXONE HYDROCHLORIDE 0.4 MG/ML
0.1 INJECTION, SOLUTION INTRAMUSCULAR; INTRAVENOUS; SUBCUTANEOUS
Status: DISCONTINUED | OUTPATIENT
Start: 2024-05-16 | End: 2024-05-16 | Stop reason: HOSPADM

## 2024-05-16 RX ORDER — ONDANSETRON 2 MG/ML
4 INJECTION INTRAMUSCULAR; INTRAVENOUS EVERY 30 MIN PRN
Status: DISCONTINUED | OUTPATIENT
Start: 2024-05-16 | End: 2024-05-16 | Stop reason: HOSPADM

## 2024-05-16 RX ORDER — PROPOFOL 10 MG/ML
INJECTION, EMULSION INTRAVENOUS PRN
Status: DISCONTINUED | OUTPATIENT
Start: 2024-05-16 | End: 2024-05-16

## 2024-05-16 RX ORDER — DEXAMETHASONE SODIUM PHOSPHATE 4 MG/ML
4 INJECTION, SOLUTION INTRA-ARTICULAR; INTRALESIONAL; INTRAMUSCULAR; INTRAVENOUS; SOFT TISSUE
Status: DISCONTINUED | OUTPATIENT
Start: 2024-05-16 | End: 2024-05-16 | Stop reason: HOSPADM

## 2024-05-16 RX ORDER — DEXTROSE MONOHYDRATE 25 G/50ML
25-50 INJECTION, SOLUTION INTRAVENOUS
Status: DISCONTINUED | OUTPATIENT
Start: 2024-05-16 | End: 2024-05-16

## 2024-05-16 RX ORDER — FENTANYL CITRATE 50 UG/ML
INJECTION, SOLUTION INTRAMUSCULAR; INTRAVENOUS PRN
Status: DISCONTINUED | OUTPATIENT
Start: 2024-05-16 | End: 2024-05-16

## 2024-05-16 RX ORDER — OXYCODONE HYDROCHLORIDE 5 MG/1
5 TABLET ORAL
Status: CANCELLED | OUTPATIENT
Start: 2024-05-16

## 2024-05-16 RX ORDER — SODIUM CHLORIDE, SODIUM LACTATE, POTASSIUM CHLORIDE, CALCIUM CHLORIDE 600; 310; 30; 20 MG/100ML; MG/100ML; MG/100ML; MG/100ML
INJECTION, SOLUTION INTRAVENOUS CONTINUOUS PRN
Status: DISCONTINUED | OUTPATIENT
Start: 2024-05-16 | End: 2024-05-16

## 2024-05-16 RX ORDER — HYDROXYZINE HYDROCHLORIDE 10 MG/1
10 TABLET, FILM COATED ORAL EVERY 6 HOURS PRN
Status: DISCONTINUED | OUTPATIENT
Start: 2024-05-16 | End: 2024-05-16 | Stop reason: HOSPADM

## 2024-05-16 RX ORDER — ONDANSETRON 2 MG/ML
4 INJECTION INTRAMUSCULAR; INTRAVENOUS EVERY 30 MIN PRN
Status: CANCELLED | OUTPATIENT
Start: 2024-05-16

## 2024-05-16 RX ORDER — MEPERIDINE HYDROCHLORIDE 25 MG/ML
12.5 INJECTION INTRAMUSCULAR; INTRAVENOUS; SUBCUTANEOUS EVERY 5 MIN PRN
Status: DISCONTINUED | OUTPATIENT
Start: 2024-05-16 | End: 2024-05-16 | Stop reason: HOSPADM

## 2024-05-16 RX ORDER — ONDANSETRON 2 MG/ML
INJECTION INTRAMUSCULAR; INTRAVENOUS PRN
Status: DISCONTINUED | OUTPATIENT
Start: 2024-05-16 | End: 2024-05-16

## 2024-05-16 RX ORDER — LORAZEPAM 2 MG/ML
.5-1 INJECTION INTRAMUSCULAR
Status: DISCONTINUED | OUTPATIENT
Start: 2024-05-16 | End: 2024-05-16 | Stop reason: HOSPADM

## 2024-05-16 RX ORDER — NALOXONE HYDROCHLORIDE 0.4 MG/ML
0.1 INJECTION, SOLUTION INTRAMUSCULAR; INTRAVENOUS; SUBCUTANEOUS
Status: CANCELLED | OUTPATIENT
Start: 2024-05-16

## 2024-05-16 RX ORDER — DEXAMETHASONE SODIUM PHOSPHATE 4 MG/ML
4 INJECTION, SOLUTION INTRA-ARTICULAR; INTRALESIONAL; INTRAMUSCULAR; INTRAVENOUS; SOFT TISSUE
Status: CANCELLED | OUTPATIENT
Start: 2024-05-16

## 2024-05-16 RX ADMIN — SUGAMMADEX 200 MG: 100 INJECTION, SOLUTION INTRAVENOUS at 15:55

## 2024-05-16 RX ADMIN — ONDANSETRON 4 MG: 2 INJECTION INTRAMUSCULAR; INTRAVENOUS at 15:14

## 2024-05-16 RX ADMIN — DEXTROMETHORPHAN HYDROBROMIDE AND QUINIDINE SULFATE 1 CAPSULE: 20; 10 CAPSULE, GELATIN COATED ORAL at 11:07

## 2024-05-16 RX ADMIN — PHENYLEPHRINE HYDROCHLORIDE 100 MCG: 10 INJECTION INTRAVENOUS at 15:47

## 2024-05-16 RX ADMIN — SODIUM CHLORIDE 3000 ML: 900 IRRIGANT IRRIGATION at 23:39

## 2024-05-16 RX ADMIN — Medication 10 MG: at 15:13

## 2024-05-16 RX ADMIN — SODIUM CHLORIDE 3000 ML: 900 IRRIGANT IRRIGATION at 21:26

## 2024-05-16 RX ADMIN — ALLOPURINOL 200 MG: 100 TABLET ORAL at 09:15

## 2024-05-16 RX ADMIN — SODIUM CHLORIDE 3000 ML: 900 IRRIGANT IRRIGATION at 21:27

## 2024-05-16 RX ADMIN — DEXTROMETHORPHAN HYDROBROMIDE AND QUINIDINE SULFATE 1 CAPSULE: 20; 10 CAPSULE, GELATIN COATED ORAL at 00:02

## 2024-05-16 RX ADMIN — FENTANYL CITRATE 100 MCG: 50 INJECTION INTRAMUSCULAR; INTRAVENOUS at 14:58

## 2024-05-16 RX ADMIN — PHENYLEPHRINE HYDROCHLORIDE 100 MCG: 10 INJECTION INTRAVENOUS at 15:21

## 2024-05-16 RX ADMIN — LIDOCAINE HYDROCHLORIDE 100 MG: 20 INJECTION, SOLUTION INFILTRATION; PERINEURAL at 14:58

## 2024-05-16 RX ADMIN — Medication 40 MG: at 14:59

## 2024-05-16 RX ADMIN — DULOXETINE HYDROCHLORIDE 30 MG: 30 CAPSULE, DELAYED RELEASE ORAL at 22:15

## 2024-05-16 RX ADMIN — CEFTRIAXONE SODIUM 2 G: 2 INJECTION, POWDER, FOR SOLUTION INTRAMUSCULAR; INTRAVENOUS at 09:11

## 2024-05-16 RX ADMIN — SODIUM CHLORIDE, POTASSIUM CHLORIDE, SODIUM LACTATE AND CALCIUM CHLORIDE: 600; 310; 30; 20 INJECTION, SOLUTION INTRAVENOUS at 03:51

## 2024-05-16 RX ADMIN — PROPOFOL 120 MG: 10 INJECTION, EMULSION INTRAVENOUS at 14:59

## 2024-05-16 RX ADMIN — SODIUM CHLORIDE 3000 ML: 900 IRRIGANT IRRIGATION at 18:06

## 2024-05-16 RX ADMIN — SODIUM CHLORIDE, POTASSIUM CHLORIDE, SODIUM LACTATE AND CALCIUM CHLORIDE: 600; 310; 30; 20 INJECTION, SOLUTION INTRAVENOUS at 14:50

## 2024-05-16 RX ADMIN — ROSUVASTATIN CALCIUM 20 MG: 20 TABLET, FILM COATED ORAL at 22:15

## 2024-05-16 RX ADMIN — PANTOPRAZOLE SODIUM 40 MG: 40 TABLET, DELAYED RELEASE ORAL at 09:15

## 2024-05-16 RX ADMIN — METOPROLOL SUCCINATE 50 MG: 50 TABLET, EXTENDED RELEASE ORAL at 09:15

## 2024-05-16 RX ADMIN — Medication 10 MG: at 15:47

## 2024-05-16 RX ADMIN — TAMSULOSIN HYDROCHLORIDE 0.4 MG: 0.4 CAPSULE ORAL at 09:15

## 2024-05-16 RX ADMIN — DULOXETINE HYDROCHLORIDE 30 MG: 30 CAPSULE, DELAYED RELEASE ORAL at 09:15

## 2024-05-16 RX ADMIN — SODIUM CHLORIDE 3000 ML: 900 IRRIGANT IRRIGATION at 23:37

## 2024-05-16 RX ADMIN — ACETAMINOPHEN 975 MG: 325 TABLET, FILM COATED ORAL at 22:15

## 2024-05-16 ASSESSMENT — ACTIVITIES OF DAILY LIVING (ADL)
ADLS_ACUITY_SCORE: 37
ADLS_ACUITY_SCORE: 40
ADLS_ACUITY_SCORE: 37
ADLS_ACUITY_SCORE: 40
ADLS_ACUITY_SCORE: 37
ADLS_ACUITY_SCORE: 40

## 2024-05-16 NOTE — PLAN OF CARE
Goal Outcome Evaluation: [id number]      Plan of Care Reviewed With: patient    Overall Patient Progress: improving      Neuros:  A&Ox4, denies dizziness, n/v, tingling or numbness.    Vitals: Vitally stable, Afebrile   Pain: denies pain  Resp: crackles on vj lower lungs. No SOB, stable on room air.   GI: last BM 05/15  : Voiding spontaneously  Cardiovascular: WNL  Lines/Daines: 1 PIV infusing LR at 100cc/hr.   Skin: WNL  Diet: NPO after midnight   Activity: up with walker. SBA.   Recommendations/Plan: Possible TURP procedure in am.

## 2024-05-16 NOTE — ANESTHESIA POSTPROCEDURE EVALUATION
Patient: Adam Talamantes    Procedure: Procedure(s):  Transurethral Resection of Prostate Abscess       Anesthesia Type:  General    Note:  Disposition: Admission   Postop Pain Control: Uneventful            Sign Out: Well controlled pain   PONV: No   Neuro/Psych: Uneventful            Sign Out: Acceptable/Baseline neuro status   Airway/Respiratory: Uneventful            Sign Out: Acceptable/Baseline resp. status   CV/Hemodynamics: Uneventful            Sign Out: Acceptable CV status; No obvious hypovolemia; No obvious fluid overload   Other NRE: NONE   DID A NON-ROUTINE EVENT OCCUR? No    Event details/Postop Comments:  No complications.           Last vitals:  Vitals Value Taken Time   /79 05/16/24 1607   Temp     Pulse 85 05/16/24 1615   Resp 19 05/16/24 1615   SpO2 99 % 05/16/24 1615   Vitals shown include unfiled device data.    Electronically Signed By: Steve Hannah MD  May 16, 2024  4:15 PM

## 2024-05-16 NOTE — ANESTHESIA PROCEDURE NOTES
Airway       Patient location during procedure: OR       Procedure Start/Stop Times: 5/16/2024 3:01 PM  Staff -        CRNA: Kathy Billy APRN CRNA       Performed By: CRNA  Consent for Airway        Urgency: elective  Indications and Patient Condition       Indications for airway management: maribel-procedural       Induction type:intravenous       Mask difficulty assessment: 1 - vent by mask    Final Airway Details       Final airway type: endotracheal airway       Successful airway: ETT - single  Endotracheal Airway Details        ETT size (mm): 7.0       Cuffed: yes       Successful intubation technique: direct laryngoscopy       DL Blade Type: MAC 3       Grade View of Cords: 1       Adjucts: stylet       Position: Right       Measured from: lips       Secured at (cm): 20       Bite block used: None    Post intubation assessment        Placement verified by: capnometry, equal breath sounds and chest rise        Number of attempts at approach: 1       Number of other approaches attempted: 0       Secured with: pink tape       Ease of procedure: easy       Dentition: Intact and Unchanged    Medication(s) Administered   Medication Administration Time: 5/16/2024 3:01 PM

## 2024-05-16 NOTE — ANESTHESIA POSTPROCEDURE EVALUATION
Patient: Adam Talamantes    Procedure: Procedure(s):  Transurethral Resection of Prostate Abscess       Anesthesia Type:  General    Note:  Disposition: Outpatient   Postop Pain Control: Uneventful            Sign Out: Well controlled pain   PONV: No   Neuro/Psych: Uneventful            Sign Out: Acceptable/Baseline neuro status   Airway/Respiratory: Uneventful            Sign Out: Acceptable/Baseline resp. status   CV/Hemodynamics: Uneventful            Sign Out: Acceptable CV status; No obvious hypovolemia; No obvious fluid overload   Other NRE: NONE   DID A NON-ROUTINE EVENT OCCUR? No    Event details/Postop Comments:  No complications.           Last vitals:  Vitals Value Taken Time   /79 05/16/24 1607   Temp     Pulse 78 05/16/24 1610   Resp 17 05/16/24 1610   SpO2 100 % 05/16/24 1610   Vitals shown include unfiled device data.    Electronically Signed By: Steve Hannah MD  May 16, 2024  4:10 PM

## 2024-05-16 NOTE — ANESTHESIA PREPROCEDURE EVALUATION
Anesthesia Pre-Procedure Evaluation    Patient: Adam Talamantes   MRN: [id number] : 1940        Procedure : Procedure(s):  Transurethral Resection of Prostate Abscess          Adam Talamantes is a 84 year old Kayce-speaking male admitted on 2024. He has a history of T2DM, CVA, HTN, HLD, stroke, prostatitis, GERD and is admitted for UTI and AMS.     Past Medical History:   Diagnosis Date    Acute blood loss anemia     Acute renal failure (H24)     Anemia     Bacteremia     Cataract     Chronic gout     CKD (chronic kidney disease)     Stage 3    DM2 (diabetes mellitus, type 2) (H)     GERD (gastroesophageal reflux disease)     Glucose intolerance (impaired glucose tolerance)     Gout     History of nephrolithiasis     History of prostatitis 2017    Hyperlipidemia     Hypertension     Insomnia     Intestinal disaccharidase deficiencies and disaccharide malabsorption     Created by Conversion     Latent tuberculosis     Nephrolithiasis     Neuropathy     Nonspecific abnormal findings on radiological and other examination of skull and head     Created by [facility] Annotation: 2013 11:23PM - Giulia Meridai/10/2011:  severe stenosis both posterior cerebral arteries seen MRI/MRA done for  vertigo. No acute changes.e*    Osteoarthritis     wrist, knee, shoulder    Prostatitis     Rectal bleed     Trigger thumb       Past Surgical History:   Procedure Laterality Date    IR MISCELLANEOUS PROCEDURE  2009    IR MISCELLANEOUS PROCEDURE  2009    IR MISCELLANEOUS PROCEDURE  2009    IR MISCELLANEOUS PROCEDURE  2009    IR NEPHROURETERAL TUBE PLACEMENT BILATERAL  2009    IR URETER DILATION BILATERAL  2009    IR URETER DILATION BILATERAL  2009    KIDNEY STONE SURGERY      PICC  3/6/2016           Allergies   Allergen Reactions    Lisinopril Cough      Social History     Tobacco Use    Smoking status: Former     Current packs/day: 0.00     Types: Cigarettes     Quit date:  3/10/2000     Years since quittin.2     Passive exposure: Never    Smokeless tobacco: Former    Tobacco comments:     no passive exposure   Substance Use Topics    Alcohol use: No      Wt Readings from Last 1 Encounters:   24 69.9 kg (154 lb 1.6 oz)        Anesthesia Evaluation            ROS/MED HX  ENT/Pulmonary:       Neurologic:     (+)          CVA,    TIA,                  Cardiovascular: Comment: Echo 23  Interpretation Summary     1. Left ventricular size is normal. Mild concentric increase in wall  thickness. Systolic function is normal. The estimated left ventricular  ejection fraction is 55-60%.  2. Right ventricular size and systolic function are normal.  3. No hemodynamically significant valvular abnormalities.  4. Compared to the prior study dated 10/17/2017, there has been no significant  change.      (+) Dyslipidemia hypertension- -  CAD -  - -                                   (-) murmur and wheezes   METS/Exercise Tolerance:     Hematologic:       Musculoskeletal:       GI/Hepatic:     (+) GERD,                   Renal/Genitourinary:     (+) renal disease, type: CRI,            Endo:     (+)  type II DM,                    Psychiatric/Substance Use:       Infectious Disease:       Malignancy:       Other:            Physical Exam    Airway        Mallampati: II   TM distance: > 3 FB   Neck ROM: full   Mouth opening: > 3 cm    Respiratory Devices and Support         Dental  no notable dental history         Cardiovascular          Rhythm and rate: regular and normal (-) no systolic click and no murmur    Pulmonary   pulmonary exam normal        breath sounds clear to auscultation   (-) no wheezes        OUTSIDE LABS:  CBC:   Lab Results   Component Value Date    WBC 10.9 2024    WBC 12.5 (H) 05/15/2024    HGB 11.4 (L) 2024    HGB 12.1 (L) 05/15/2024    HCT 35.1 (L) 2024    HCT 37.8 (L) 05/15/2024     2024     05/15/2024     BMP:   Lab Results  "  Component Value Date     05/15/2024     05/14/2024    POTASSIUM 4.4 05/15/2024    POTASSIUM 4.7 05/14/2024    CHLORIDE 104 05/15/2024    CHLORIDE 101 05/14/2024    CO2 23 05/15/2024    CO2 25 05/14/2024    BUN 28.6 (H) 05/15/2024    BUN 34.6 (H) 05/14/2024    CR 1.76 (H) 05/15/2024    CR 2.14 (H) 05/14/2024     (H) 05/16/2024     (H) 05/16/2024     COAGS: No results found for: \"PTT\", \"INR\", \"FIBR\"  POC: No results found for: \"BGM\", \"HCG\", \"HCGS\"  HEPATIC:   Lab Results   Component Value Date    ALBUMIN 4.2 05/11/2024    PROTTOTAL 8.9 (H) 05/11/2024    ALT 17 05/11/2024    AST 21 05/11/2024    ALKPHOS 122 05/11/2024    BILITOTAL 0.3 05/11/2024     OTHER:   Lab Results   Component Value Date    LACT 2.0 05/14/2024    A1C 8.8 (H) 02/26/2024    GERALDINE 9.5 05/15/2024    PHOS 3.2 02/13/2023    MAG 2.0 02/16/2023    TSH 1.29 02/26/2024       Anesthesia Plan    ASA Status:  3    NPO Status:  NPO Appropriate    Anesthesia Type: General.     - Airway: ETT   Induction: Intravenous.   Maintenance: Inhalation.        Consents    Anesthesia Plan(s) and associated risks, benefits, and realistic alternatives discussed. Questions answered and patient/representative(s) expressed understanding.     - Discussed: Risks, Benefits and Alternatives for BOTH SEDATION and the PROCEDURE were discussed     - Discussed with:  Patient      - Extended Intubation/Ventilatory Support Discussed: Yes.      - Patient is DNR/DNI Status: Yes             Suspend during perioperative period? Yes.             Agree to: chemical resuscitation, chest compression/defibrillation, Other.   Use of blood products discussed: Yes.     - Discussed with: Patient.     Postoperative Care    Pain management: IV analgesics.   PONV prophylaxis: Ondansetron (or other 5HT-3), Dexamethasone or Solumedrol     Comments:    Other Comments: Patient power of , Antoniettao, agreed to full resuscitation during the perioperative period.            " Christopher J. Behrens, MD    I have reviewed the pertinent notes and labs in the chart from the past 30 days and (re)examined the patient.  Any updates or changes from those notes are reflected in this note.

## 2024-05-16 NOTE — ANESTHESIA CARE TRANSFER NOTE
Patient: Adam Talamantes    Procedure: Procedure(s):  Transurethral Resection of Prostate Abscess       Diagnosis: Prostate abscess [N41.2]  Diagnosis Additional Information: No value filed.    Anesthesia Type:   General     Note:    Oropharynx: oropharynx clear of all foreign objects and spontaneously breathing  Level of Consciousness: awake  Oxygen Supplementation: face mask  Level of Supplemental Oxygen (L/min / FiO2): 4  Independent Airway: airway patency satisfactory and stable  Dentition: dentition unchanged  Vital Signs Stable: post-procedure vital signs reviewed and stable  Report to RN Given: handoff report given  Patient transferred to: PACU    Handoff Report: Identifed the Patient, Identified the Reponsible Provider, Reviewed the pertinent medical history, Discussed the surgical course, Reviewed Intra-OP anesthesia mangement and issues during anesthesia, Set expectations for post-procedure period and Allowed opportunity for questions and acknowledgement of understanding      Vitals:  Vitals Value Taken Time   /79 05/16/24 1607   Temp     Pulse 78 05/16/24 1612   Resp 14 05/16/24 1612   SpO2 100 % 05/16/24 1612   Vitals shown include unfiled device data.    Electronically Signed By: AQUILES Lopez CRNA  May 16, 2024  4:13 PM

## 2024-05-16 NOTE — OP NOTE
OPERATIVE REPORT    PREOPERATIVE DIAGNOSIS: Suspicion of Prostatic abscess  POSTOPERATIVE DIAGNOSIS:   No prostate abscess identified.  BPH with asymmetric nodule of prostate    PROCEDURE PERFORMED: Transurethral resection of prostate.  ATTENDING SURGEON: Bernabe Fletcher MD  RESIDENT SURGEON: Aleyda Hunt MD    FINDINGS: No clear abscess collection noted at right base of prostate corresponding to CT imaging, though an abnormal appearing prostate nodule/tissue in this location was resected.    ANESTHESIA: General.   INTRAVENOUS FLUIDS: See anesthesia records  ESTIMATED BLOOD LOSS: 25 mL  SPECIMENS: Prostate chips for pathology and culture.   DRAINS: 22-Argentine 3-way catheter on CBI.     INDICATIONS FOR PROCEDURE: 84M with abnormal appearing rim enhancing lesion on CT concerning for prostate abscess (for which he is admitted to the hospital). After discussion of the risks, benefits and alternatives of the procedure, the patient agreed to proceed with transurethral drainage of prostate abscess.     DESCRIPTION OF PROCEDURE: After verifying informed consent, the patient was taken to the operating room. Adequate anesthesia was induced and he was placed in dorsal lithotomy position; he was prepped and draped in standard sterile fashion. A timeout was performed to verify correct patient and procedure. Pneumo boots and perioperative antibiotics were in place before the procedure commenced. We began the procedure by inserting a 26-Argentine bipolar Gyrus resectoscope with the visual obturator. Upon gaining entrance into the bladder, complete survey was performed. This was significant for moderate trabeculation with scattered cellules, and very cloudy urine.  We were able to see both ureteral orifices. There was a no significant median lobe.     We resected the right lobe of the prostate given CT findings. It appeared asymmetric/enlarged on the right compared to the left. However we did complete a TURP. Once we had completed  our dissection, we removed prostate chips and sent them for pathology and culture. We then reintroduced the resectoscope to ensure meticulous hemostasis of the prostatic fossa.     A 22-Estonian 3-way catheter was placed, and 30 mL was instilled into the balloon. Catheter was hooked up to continuous irrigation which was clear;  the patient was awoken from anesthesia. He was then transferred to the recovery room in stable condition.     POSTOPERATIVE PLAN:   - Continuous bladder irrigation and antibiotics, follow cultures, await pathology -  urology will follow.    Aleyda Hunt MD  PGY-5 Urology Resident      As attending surgeon, I, Bernabe Fletcher MD, was scrubbed and present for the entire procedure.

## 2024-05-16 NOTE — PLAN OF CARE
Goal Outcome Evaluation:    LR running continuously at 100 ml/hr via PIV.  Family in room with pt, stays the night.  Urinal at bedside. Urinates frequently.  Disoriented to time.  Take meds whole with water ok.  , 132.  Up with SBA and walker.  TRP scheduled for today. Transport came for pt around 1345.

## 2024-05-16 NOTE — PROGRESS NOTES
[facility]    Medicine Progress Note - Hospitalist Service, GOLD TEAM 4    Date of Admission:  5/11/2024    Assessment & Plan   Adam Talamantes is an 83 yo M with pmhx of HTN, HLD, CVA, prostatitis, GERD who was admitted on 5/11/2024 for UTI and AMS found to have prostate abscess.     Prostate Abscess    Complicated UTI  Leukocytosis (resolved)  Lactic acidosis (resolved)   P/w 5d of polyuria, recently prescribed bactrim on 5/2 for 10d course for presumed cystitis. UA at that time c/f UTI but UC without growth. Has a history of prostatitis in 2016 complicated by fluoroquinolone and bactrim resistant E. Coli Bacteremia. Presented afebrile, without tachycardia and leukocytosis to 12.7. lactate wnl. UA with +LE >182 WBC, moderate blood and 11 RBC. Was given dose of ciprofloxacin in ED.  Stared on cefepime on admission, narrowed to CTX on 5/13. Urine Cx with < 10K mixture of urogenital valentina, repeat urine cx without growth.  Given white count started to increase pursued imaging. CT AP on 5/15 with  3.1 x 3.0 x 3.7 cm rim-enhancing lesion involving the base-mid right prostate gland with abutment of the inferior aspect of the urinary bladder, findings are concerning for a prostatic abscess. Also with circumferential urinary bladder wall thickening with mild adjacent fat stranding, suggestive of cystitis. Given this involving urology for further assistance.  - Cont CTX 2 g q24h  - Blood cx 5/15 with NGTD - ctm  - Fluvid to complete infectious workup   - Urology consulted, appreciate recs   - Plan for drainage 5/16, cultures ordered     ELLA on CKD III (resolved)  Moderate L and mild R hydronephrosis  Scr baseline appears to be ~1.3-1.7. Scr on admission 3.21, has continued to downtrend. Renal US moderate left and mild right hydronephrosis, similar to findings on prior CT 2/8/2023. FeNa 6.6%, BUN/Cr ratio 12.8, urine osms 262, suggestive of ATN in the setting of recent bactrim rx and  poor oral intake. Bladder scan in ED not showing retention.   - Cont to trend      Insulin dependented T2DM  Last A1c 8.8% 2/26/2024. PTA reg: jardiance, januvia, glargine 30 U at bedtime, aspart 8 U before breakfast, 6 U with lunch and dinner.   - Cont medium sliding scale insulin Q4h while NPO  - Hypoglycemia protocol ordered, patient did received 25 U of lantus 5/15 so will add mIVF with D5 if needed while NPO based on BG trend   - hold home orals (jardiance and januvia)  - home 30 U lantus reduced to 20 U while pt NPO  - hold home sglt2i inhibitor on discharge.      Colonic Wall Thickening   R Liver Lesion  Noted on CT Urogram 6/2023, wall thickening concerning for malignancy. Prior colonoscopy on file from 2017, showing tubular adenoma without high grade dysplasia. No reported weight loss per family, chart review reveals stable weight over past 2-3 years. No abdominal pain. LFTs wnl. Normal brown bowel movements. Ongoing discussions to pursue work up as OP with PCP, who is waiting to discuss with patient's daughter if should pursue colonoscopy. Previously noted that patient would not be interested in undergoing a surgery. No acute concerns.   - Continue working with PCP as OP for continued discussion      Acute encephalopathy, likely metabolic, resolved    Per family on admission past few days PTA showed more confusion, including not being able to state names of family members. CT Head without acute findings. Improved 05/12 and per family patient is reportedly at baseline, suspect encephalopathy to be metabolic in nature with ELLA.   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 Cont to trend and encourage PO intake.  Lesion in hepatic lobe:  Stable indeterminate 1.3 cm hypoattenuating lesion in the right hepatic lobe, which demonstrated peripheral enhancement on delayed post contrast imaging on CT dated 6/13/2023. Consider further evaluation with MRI on a non-emergent basis.   Suspected kidney cyst: 2.0 cm dense cystic lesion in the inferior pole of the left kidney, likely representing a hemorrhagic/proteinaceous cyst. This can be further characterized on future MRI.           Diet: Snacks/Supplements Adult: Glucerna; With Meals  Diet  NPO per Anesthesia Guidelines for Procedure/Surgery Except for: Meds    DVT Prophylaxis: DOAC  Diego Catheter: Not present  Lines: None     Cardiac Monitoring: None  Code Status: No CPR- Do NOT Intubate      Clinically Significant Risk Factors                  # Hypertension: Noted on problem list       # DMII: A1C = 8.8 % (Ref range: 0.0 - 5.6 %) within past 6 months   # Obesity: Estimated body mass index is 31.91 kg/m  as calculated from the following:    Height as of this encounter: 1.48 m (4' 10.27\").    Weight as of this encounter: 69.9 kg (154 lb 1.6 oz).             Disposition Plan     Medically Ready for Discharge: Anticipated in 2-4 Days       The patient's care was discussed with the Attending Physician, Dr. Gonzalez, Bedside Nurse, Patient, Patient's Family, and Urology Consultant(s).    Cammie Castro PA-C  Hospitalist Service, GOLD TEAM [address] [place] [address]  Securely message with [employer] (more info)  Text page via [facility] Paging/Directory   See signed in provider for up to date coverage information  ______________________________________________________________________    Interval History   Pt reports feeling well this morning, no new symptoms. Excited to have procedure today. Feeling well. Agreeable with plan.     Discussed with bedside nurse " and family who have no other questions,    Physical Exam   Vital Signs: Temp: 98.1  F (36.7  C) Temp src: Oral BP: 129/84 Pulse: 88   Resp: 16 SpO2: 97 % O2 Device: None (Room air)    Weight: 154 lbs 1.62 oz  Constitutional: sitting up in bed, appears comfortable, no apparent distress.  HEENT: Mucous membranes moist, no scleral icterus   Respiratory: No increased work of breathing, breathing comfortably on RA   Skin: normal skin color, texture, and no jaundice  Musculoskeletal: moving all extremities voluntarily during exam   Neuro: awake, alert, answering all questions appropriately during exam    Medical Decision Making       50 MINUTES SPENT BY ME on the date of service doing chart review, history, exam, documentation & further activities per the note.      Data     I have personally reviewed the following data over the past 24 hrs:    N/A  \   N/A   / N/A     N/A N/A N/A /  207 (H)   N/A N/A N/A \       Imaging results reviewed over the past 24 hrs:   Recent Results (from the past 24 hour(s))   CT Abdomen Pelvis w Contrast    Narrative    Examination: CT ABDOMEN PELVIS W CONTRAST, 5/15/2024 10:26 AM    Technique:  Helical CT images from the lung bases through the  symphysis pubis were obtained with contrast.  Coronal reformatted  images were generated at a workstation for further assessment.    Contrast:  95 mL [id number]    Comparison: Ultrasound 5/11/2024, CT 6/13/2023, 2/8/2023    History: Recurrent UTIs, eval for abscess or other source    Findings:    Abdomen and pelvis:   Liver: Stable indeterminate 1.3 cm hypoattenuating lesion in the right  hepatic lobe, previously demonstrating peripheral enhancement on  delayed excretory phase on CT dated 6/13/2023. Stable hypodense lesion  in the left hepatic lobe, likely representing hepatic cysts.  Gallbladder: No gallstones. No evidence of acute cholecystitis.  Spleen: Normal size.  Pancreas: No suspicious pancreatic lesions. The pancreatic duct is  not  dilated.  Adrenal glands: No adrenal nodules.  Urinary system: Cortical atrophy of the kidneys. 2.0 cm indeterminant  dense cystic lesion in the inferior pole of the left kidney, likely  represents a hemorrhagic/proteinaceous cyst. Additional cortical  hypodensities are too small to fully characterize but likely represent  simple cysts. Prominent renal pelvis bilaterally. Unchanged  anti-dependent focal calcification along the anterior left renal  pelvic wall. Mild diffuse left urothelial thickening and enhancement.  No hydronephrosis, hydroureter, or obstructing renal stones.  Circumferential urinary bladder wall thickening with mild adjacent fat  stranding.  Reproductive organs: 3.1 x 3.0 x 3.7 cm rim-enhancing lesion involving  the base-mid right prostate gland. This abuts the inferior aspect of  the urinary bladder without definite communication. There appears to  be a hypoattenuating region in similar location in the right prostate  gland measuring 2.2 x 2.2 cm on CT dated 6/13/2023 without definite  rim enhancement demonstrated.  Bowel: No abnormally dilated loops of large or small bowel. Mild  colonic diverticulosis without evidence of acute diverticulitis. No  abnormal bowel wall thickening or enhancement. The appendix is  unremarkable.  Lymph nodes: No retroperitoneal, mesenteric, or pelvic  lymphadenopathy.  Fluid: No free fluid within the abdomen.  Vessels: No infrarenal aortic aneurysm. The portal, superior  mesenteric, and splenic veins are patent. Calcified and noncalcified  atherosclerotic plaque throughout the abdominal aorta and iliac  arteries.    Lung bases: Bibasilar atelectasis. Prominent mediastinal and  extrapleural fat. No pleural effusion or focal consolidation.    Bones and soft tissues: No suspicious osseous lesions. Multilevel  degenerative changes throughout the visualized spine. 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 Stable mild  anterior wedge  deformity of L2.       Impression    Impression:   1. 3.1 x 3.0 x 3.7 cm rim-enhancing lesion involving the base-mid  right prostate gland with abutment of the inferior aspect of the  urinary bladder. Findings are concerning for a prostatic abscess.  2. Circumferential urinary bladder wall thickening with mild adjacent  fat stranding, suggestive of cystitis.  3. Stable indeterminate 1.3 cm hypoattenuating lesion in the right  hepatic lobe, which demonstrated peripheral enhancement on delayed  postcontrast imaging on CT dated 6/13/2023. Consider further  evaluation with MRI on a non-emergent basis.   4. 2.0 cm dense cystic lesion in the inferior pole of the left kidney,  likely representing a hemorrhagic/proteinaceous cyst. This can be  further characterized on future MRI.  5. Bilateral renal cortical atrophy.    I have personally reviewed the examination and initial interpretation  and I agree with the findings.    DUTCH VALENZUELA MD         SYSTEM ID:  [id number]

## 2024-05-17 LAB
ANION GAP SERPL CALCULATED.3IONS-SCNC: 13 MMOL/L (ref 7–15)
BACTERIA BLD CULT: NO GROWTH
BACTERIA BLD CULT: NO GROWTH
BUN SERPL-MCNC: 27.4 MG/DL (ref 8–23)
CALCIUM SERPL-MCNC: 9.2 MG/DL (ref 8.8–10.2)
CHLORIDE SERPL-SCNC: 101 MMOL/L (ref 98–107)
CREAT SERPL-MCNC: 1.44 MG/DL (ref 0.67–1.17)
DEPRECATED HCO3 PLAS-SCNC: 21 MMOL/L (ref 22–29)
EGFRCR SERPLBLD CKD-EPI 2021: 49 ML/MIN/1.73M2
ERYTHROCYTE [DISTWIDTH] IN BLOOD BY AUTOMATED COUNT: 15.6 % (ref 10–15)
GLUCOSE BLDC GLUCOMTR-MCNC: 178 MG/DL (ref 70–99)
GLUCOSE BLDC GLUCOMTR-MCNC: 182 MG/DL (ref 70–99)
GLUCOSE BLDC GLUCOMTR-MCNC: 189 MG/DL (ref 70–99)
GLUCOSE BLDC GLUCOMTR-MCNC: 205 MG/DL (ref 70–99)
GLUCOSE BLDC GLUCOMTR-MCNC: 237 MG/DL (ref 70–99)
GLUCOSE BLDC GLUCOMTR-MCNC: 274 MG/DL (ref 70–99)
GLUCOSE SERPL-MCNC: 156 MG/DL (ref 70–99)
HCT VFR BLD AUTO: 34.6 % (ref 40–53)
HGB BLD-MCNC: 11.2 G/DL (ref 13.3–17.7)
MCH RBC QN AUTO: 30.1 PG (ref 26.5–33)
MCHC RBC AUTO-ENTMCNC: 32.4 G/DL (ref 31.5–36.5)
MCV RBC AUTO: 93 FL (ref 78–100)
PLATELET # BLD AUTO: 204 10E3/UL (ref 150–450)
POTASSIUM SERPL-SCNC: 4.3 MMOL/L (ref 3.4–5.3)
RBC # BLD AUTO: 3.72 10E6/UL (ref 4.4–5.9)
SODIUM SERPL-SCNC: 135 MMOL/L (ref 135–145)
WBC # BLD AUTO: 10.4 10E3/UL (ref 4–11)

## 2024-05-17 PROCEDURE — 250N000011 HC RX IP 250 OP 636

## 2024-05-17 PROCEDURE — 258N000003 HC RX IP 258 OP 636

## 2024-05-17 PROCEDURE — 250N000013 HC RX MED GY IP 250 OP 250 PS 637

## 2024-05-17 PROCEDURE — 99024 POSTOP FOLLOW-UP VISIT: CPT | Performed by: PHYSICIAN ASSISTANT

## 2024-05-17 PROCEDURE — 120N000002 HC R&B MED SURG/OB UMMC

## 2024-05-17 PROCEDURE — 99232 SBSQ HOSP IP/OBS MODERATE 35: CPT | Mod: FS | Performed by: STUDENT IN AN ORGANIZED HEALTH CARE EDUCATION/TRAINING PROGRAM

## 2024-05-17 PROCEDURE — 999N000248 HC STATISTIC IV INSERT WITH US BY RN

## 2024-05-17 PROCEDURE — 99207 PR APP CREDIT; MD BILLING SHARED VISIT: CPT | Mod: FS

## 2024-05-17 PROCEDURE — 250N000013 HC RX MED GY IP 250 OP 250 PS 637: Performed by: PHYSICIAN ASSISTANT

## 2024-05-17 PROCEDURE — 36415 COLL VENOUS BLD VENIPUNCTURE: CPT

## 2024-05-17 PROCEDURE — 80048 BASIC METABOLIC PNL TOTAL CA: CPT

## 2024-05-17 PROCEDURE — 258N000001 HC RX 258: Performed by: STUDENT IN AN ORGANIZED HEALTH CARE EDUCATION/TRAINING PROGRAM

## 2024-05-17 PROCEDURE — 85027 COMPLETE CBC AUTOMATED: CPT

## 2024-05-17 RX ORDER — NALOXONE HYDROCHLORIDE 0.4 MG/ML
0.2 INJECTION, SOLUTION INTRAMUSCULAR; INTRAVENOUS; SUBCUTANEOUS
Status: DISCONTINUED | OUTPATIENT
Start: 2024-05-17 | End: 2024-05-19 | Stop reason: HOSPADM

## 2024-05-17 RX ORDER — NALOXONE HYDROCHLORIDE 0.4 MG/ML
0.4 INJECTION, SOLUTION INTRAMUSCULAR; INTRAVENOUS; SUBCUTANEOUS
Status: DISCONTINUED | OUTPATIENT
Start: 2024-05-17 | End: 2024-05-19 | Stop reason: HOSPADM

## 2024-05-17 RX ADMIN — TAMSULOSIN HYDROCHLORIDE 0.4 MG: 0.4 CAPSULE ORAL at 09:14

## 2024-05-17 RX ADMIN — DEXTROMETHORPHAN HYDROBROMIDE AND QUINIDINE SULFATE 1 CAPSULE: 20; 10 CAPSULE, GELATIN COATED ORAL at 12:32

## 2024-05-17 RX ADMIN — OXYCODONE HYDROCHLORIDE 2.5 MG: 5 TABLET ORAL at 18:43

## 2024-05-17 RX ADMIN — ACETAMINOPHEN 975 MG: 325 TABLET, FILM COATED ORAL at 12:51

## 2024-05-17 RX ADMIN — CEFTRIAXONE SODIUM 2 G: 2 INJECTION, POWDER, FOR SOLUTION INTRAMUSCULAR; INTRAVENOUS at 09:14

## 2024-05-17 RX ADMIN — OXYCODONE HYDROCHLORIDE 2.5 MG: 5 TABLET ORAL at 13:22

## 2024-05-17 RX ADMIN — SODIUM CHLORIDE 3000 ML: 900 IRRIGANT IRRIGATION at 04:10

## 2024-05-17 RX ADMIN — SODIUM CHLORIDE 3000 ML: 900 IRRIGANT IRRIGATION at 04:11

## 2024-05-17 RX ADMIN — ROSUVASTATIN CALCIUM 20 MG: 20 TABLET, FILM COATED ORAL at 20:49

## 2024-05-17 RX ADMIN — SODIUM CHLORIDE, POTASSIUM CHLORIDE, SODIUM LACTATE AND CALCIUM CHLORIDE: 600; 310; 30; 20 INJECTION, SOLUTION INTRAVENOUS at 01:01

## 2024-05-17 RX ADMIN — DULOXETINE HYDROCHLORIDE 30 MG: 30 CAPSULE, DELAYED RELEASE ORAL at 09:14

## 2024-05-17 RX ADMIN — PANTOPRAZOLE SODIUM 40 MG: 40 TABLET, DELAYED RELEASE ORAL at 09:14

## 2024-05-17 RX ADMIN — POLYETHYLENE GLYCOL 3350 17 G: 17 POWDER, FOR SOLUTION ORAL at 14:21

## 2024-05-17 RX ADMIN — DEXTROMETHORPHAN HYDROBROMIDE AND QUINIDINE SULFATE 1 CAPSULE: 20; 10 CAPSULE, GELATIN COATED ORAL at 00:53

## 2024-05-17 RX ADMIN — ALLOPURINOL 200 MG: 100 TABLET ORAL at 09:14

## 2024-05-17 RX ADMIN — METOPROLOL SUCCINATE 50 MG: 50 TABLET, EXTENDED RELEASE ORAL at 09:14

## 2024-05-17 RX ADMIN — DULOXETINE HYDROCHLORIDE 30 MG: 30 CAPSULE, DELAYED RELEASE ORAL at 20:49

## 2024-05-17 ASSESSMENT — ACTIVITIES OF DAILY LIVING (ADL)
ADLS_ACUITY_SCORE: 39
ADLS_ACUITY_SCORE: 37
ADLS_ACUITY_SCORE: 39
ADLS_ACUITY_SCORE: 37
ADLS_ACUITY_SCORE: 39
ADLS_ACUITY_SCORE: 37
ADLS_ACUITY_SCORE: 39

## 2024-05-17 NOTE — PROGRESS NOTES
"Urology Progress Note    Interval History:  CBI Clamped this am, patient ambulated, and urine remains yellow    Long telephone discussion (20 minutes) with his daughter, who is English speaking and also is Adam's POA.  Adam's grandson was also present.  She tells me that her father voided without a Diego prior to admission, but he had tremendous urinary frequency despite being on tamsulosin.  No previous documented PVRs, so unclear whether or not he was emptying his bladder properly.  She is hoping that the patient will have less frequency following the transurethral resection of prostate procedure yesterday.  We discussed that path and cultures are pending from that procedure.      We also discussed his stroke risk.  She states that Adam had multiple CVA last year and now has a \"dementia-like\" picture, which is why she helps him with his medical cares.  Adam has had no strokes since starting apixaban and she is hoping we can restart asap.      Physical Exam  Temp:  [97  F (36.1  C)-98.5  F (36.9  C)] 98.5  F (36.9  C)  Pulse:  [] 80  Resp:  [14-20] 16  BP: (120-151)/() 120/62  SpO2:  [90 %-100 %] 93 %    I/O last 3 completed shifts:  In: 2180 [P.O.:480; I.V.:1700]  Out: 35003 [Urine:62694; Blood:25]    Gen: NAD  Abd: Soft.  NT.  ND.  Phallus circumcised with Diego in place draining clear yellow urine     Labs reviewed/pending  Heme:  Recent Labs   Lab 05/17/24  0542 05/16/24  0845 05/15/24  0544 05/14/24  0532   WBC 10.4 10.9 12.5* 11.3*   HGB 11.2* 11.4* 12.1* 12.8*    214 201 205     Chem:  Recent Labs   Lab 05/17/24  0542 05/16/24  0845 05/15/24  0544 05/14/24  0532   POTASSIUM 4.3 4.3 4.4 4.7   CR 1.44* 1.68* 1.76* 2.14*       Assessment/Plan  84 year old male POD#1 s/p prostate TUR for suspected abscess seen on CT, although no abscess was found intraoperatively.  At baseline the patient also has urinary frequency which has not been fully evaluated.     Today:  Culture and path pending.    Urine " has remained yellow after clamping CBI this am.  Will disconnect the Diego from CBI.   Please continue tamsulosin.    Tentative plan will be to remove the Diego tomorrow morning.  Once Diego is removed, please monitor postvoid residuals to assure bladder emptying.    Today I discussed stroke risk with the primary team (RANDA Castro) who agrees that it is high and AC should be restarted at the earliest time when safe from a surgical standpoint. Will discuss AC plan with Dr. Fletcher and notify the primary team.   If patient continues to have urinary frequency at discharge recommend followup outpatient in Urology clinic.  Adam may potentially need urodynamics.     Patient seen on rounds.  Will discuss with Dr. Chance Batista PA-C  Urology Physician Assistant  Personal Pager: [phone]    Please call Job Code:   [phone] to reach the Urology resident or PA on call - Weekdays  [phone] to reach the Urology resident or PA on call - Weeknights and weekends

## 2024-05-17 NOTE — PLAN OF CARE
Goal Outcome Evaluation:      Plan of Care Reviewed With: patient, child    Overall Patient Progress: no changeOverall Patient Progress: no change    Outcome Evaluation: Pt returned from TURP @ 1800. On continuous bladder irrigation, goal is for OP to be light-pink. Frequent bag replacement and parrish bag emptying. Son at bedside/overnight for interpretation (Kayce-speaking). New L PIV running LR @ 100ml/hr. Reports feeling the need to pee, to the point of pain. Tylenol given with no effect. Provider paged, no reply as of change of shift. NPO [phone].

## 2024-05-17 NOTE — PROGRESS NOTES
"Status: admitted on 5/11/2024 for UTI and AMS found to have prostate abscess. Pt went for ZEE yesterday, and has continuous bladder irrigation with pinkish color (recommended pink color to parrish bag)    Vitals: /87 (BP Location: Left arm)   Pulse 79   Temp 98.3  F (36.8  C) (Oral)   Resp 16   Ht 1.48 m (4' 10.27\")   Wt 69.9 kg (154 lb 1.6 oz)   SpO2 93%   BMI 31.91 kg/m    Continuous pulse ox  Cardiac monitoring  Strict I&O  Neuros: A&O with baseline confused  IV: PIV infusing LR @ 100  Labs/Electrolytes: reviewed  Resp/trach: wnl RA  Diet: NPO, BS checks Q4h  Bowel status: NO BM this shift  : continuous bladder irrigation with pinkish color (recommeded pink color) with parrish to be emptied q1hr  Skin: intact  Pain: denies  Activity: SBA to bathroom with walker  Social: pt's son is in the room and helps with Dad's cares.  Plan: continue with POC  Updates this shift: extra two irrigation bags in med room    "

## 2024-05-17 NOTE — PLAN OF CARE
Goal Outcome Evaluation:    Pain in parrish cath site. Tylenol given. Pt visitor requests for stronger pain. MD paged, oxy added and given.  Parrish in place, pink UOP, at one point OP was yellow and clear. Currently on clamp trial.  PIV SL. LR discontinued.  Regular diet, poor-fair appetite. Family help feed pt per writer observation.  Disoriented to time and situation.  , 205.  BM small, brown per pt report. Miralax given, pt reported some constipation.  Family at bedside, stays the night.    Small leakage at parrish site. MD paged and is aware. Will continue to monitor.

## 2024-05-17 NOTE — PROGRESS NOTES
[facility]    Medicine Progress Note - Hospitalist Service, GOLD TEAM 4    Date of Admission:  5/11/2024    Assessment & Plan   Adam Talamantes is an 83 yo M with pmhx of HTN, HLD, CVA, prostatitis, GERD who was admitted on 5/11/2024 for UTI and AMS found to have prostate abscess now s/p transurethral resection of prostate abscess with Urology 5/16.      Prostate Abscess s/p TURP 5/16 (without clear abscess identified)  Complicated UTI  Leukocytosis (resolved)  Lactic acidosis (resolved)   P/w 5d of polyuria, recently prescribed bactrim on 5/2 for 10d course for presumed cystitis. UA at that time c/f UTI but UC without growth. Has a history of prostatitis in 2016 complicated by fluoroquinolone and bactrim resistant E. Coli Bacteremia. Presented afebrile, without tachycardia and leukocytosis to 12.7. lactate wnl. UA with +LE >182 WBC, moderate blood and 11 RBC. Was given dose of ciprofloxacin in ED.  Stared on cefepime on admission, narrowed to CTX on 5/13. Urine Cx with < 10K mixture of urogenital valentina, repeat urine cx without growth.  Given white count started to increase pursued imaging. CT AP on 5/15 with  3.1 x 3.0 x 3.7 cm rim-enhancing lesion involving the base-mid right prostate gland with abutment of the inferior aspect of the urinary bladder, findings are concerning for a prostatic abscess. Also with circumferential urinary bladder wall thickening with mild adjacent fat stranding, suggestive of cystitis. S/p TURP with urology on 5/16 where no clear prostate abscess identified though abnormal appearing prostate nodule/ tissue was noted and resected.  - Cont CTX 2 g q24h  - Blood cx 5/15 with NGTD - ctm  - Fluvid to complete infectious workup   - Urology consulted, appreciate recs              - S/p transurethral resection of prostate 5/16    - Continuous bladder irrigation - clamp trial this AM    - Cont to hold AC per urology    - addendum- discussed with urology in  afternoon, okay to start prophylactic dosing of apixiban as long as urine remains clear/ yellow - ordered apix 2.5 mg BID and put in patient care order to assess urine color prior to giving   - Cultures from OR in process - continue to monitor for growth   - Monitor surgical pathology from OR  - Plan to tentatively remove catheter 5/18     ELLA on CKD III (resolved)  Moderate L and mild R hydronephrosis  Scr baseline appears to be ~1.3-1.7. Scr on admission 3.21, has continued to downtrend. Renal US moderate left and mild right hydronephrosis, similar to findings on prior CT 2/8/2023. FeNa 6.6%, BUN/Cr ratio 12.8, urine osms 262, suggestive of ATN in the setting of recent bactrim rx and poor oral intake. Bladder scan in ED not showing retention.   - Cont to trend      Insulin dependented T2DM  Last A1c 8.8% 2/26/2024. PTA reg: jardiance, januvia, glargine 30 U at bedtime, aspart 8 U before breakfast, 6 U with lunch and dinner.   - Cont medium sliding scale insulin Q4h while NPO  - Hypoglycemia protocol ordered   - hold home orals (jardiance and januvia)  - increased to rob lantus 30 U daily   - hold home sglt2i inhibitor on discharge.      Colonic Wall Thickening   R Liver Lesion  Noted on CT Urogram 6/2023, wall thickening concerning for malignancy. Prior colonoscopy on file from 2017, showing tubular adenoma without high grade dysplasia. No reported weight loss per family, chart review reveals stable weight over past 2-3 years. No abdominal pain. LFTs wnl. Normal brown bowel movements. Ongoing discussions to pursue work up as OP with PCP, who is waiting to discuss with patient's daughter if should pursue colonoscopy. Previously noted that patient would not be interested in undergoing a surgery. No acute concerns.   - Continue working with PCP as OP for continued discussion      Acute encephalopathy, likely metabolic, resolved    Per family on admission past few days PTA showed more confusion, including not being  "able to state names of family members. CT Head without acute findings. Improved 05/12 and per family patient is reportedly at baseline, suspect encephalopathy to be metabolic in nature with ELLA.   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 Cont to trend and encourage PO intake.  Lesion in hepatic lobe:  Stable indeterminate 1.3 cm hypoattenuating lesion in the right hepatic lobe, which demonstrated peripheral enhancement on delayed post contrast imaging on CT dated 6/13/2023. Consider further evaluation with MRI on a non-emergent basis.   Suspected kidney cyst: 2.0 cm dense cystic lesion in the inferior pole of the left kidney, likely representing a hemorrhagic/proteinaceous cyst. This can be further characterized on future MRI.             Diet: Snacks/Supplements Adult: Glucerna; With Meals  Diet  Regular Diet Adult    DVT Prophylaxis: Pneumatic Compression Devices  Diego Catheter: PRESENT, indication:    Lines: None     Cardiac Monitoring: None  Code Status: No CPR- Do NOT Intubate      Clinically Significant Risk Factors                  # Hypertension: Noted on problem list       # DMII: A1C = 8.8 % (Ref range: 0.0 - 5.6 %) within past 6 months   # Obesity: Estimated body mass index is 31.91 kg/m  as calculated from the following:    Height as of this encounter: 1.48 m (4' 10.27\").    Weight as of this encounter: 69.9 kg (154 lb 1.6 oz).             Disposition Plan     Medically Ready for Discharge: Anticipated in 2-4 Days     The patient's care was discussed with the Attending Physician, Dr. Gonzalez, Bedside " Nurse, and Patient.    Cammie Castro PA-C  Hospitalist Service, GOLD TEAM [address] [place] [address]  Securely message with [email or web address] (more info)  Text page via [employer] Paging/Directory   See signed in provider for up to date coverage information  ______________________________________________________________________    Interval History   PT reports feeling well this morning, no new symptoms, no new concerns.     Physical Exam   Vital Signs: Temp: 97.9  F (36.6  C) Temp src: Oral BP: (!) 151/100 Pulse: 79   Resp: 16 SpO2: 93 % O2 Device: None (Room air) Oxygen Delivery: 2 LPM  Weight: 154 lbs 1.62 oz  Constitutional: sitting up in bed, appears comfortable, no apparent distress. Grandson at bedside   HEENT: Mucous membranes moist, no scleral icterus   Respiratory: No increased work of breathing, breathing comfortably on RA   Skin: normal skin color, texture, and no jaundice  Musculoskeletal: moving all extremities voluntarily during exam  Neuro: awake, alert, answering all questions appropriately during exam    Medical Decision Making       45 MINUTES SPENT BY ME on the date of service doing chart review, history, exam, documentation & further activities per the note.      Data     I have personally reviewed the following data over the past 24 hrs:    10.4  \   11.2 (L)   / 204     135 101 27.4 (H) /  156 (H)   4.3 21 (L) 1.44 (H) \       Imaging results reviewed over the past 24 hrs:   No results found for this or any previous visit (from the past 24 hour(s)).

## 2024-05-17 NOTE — PROGRESS NOTES
"Urology  Progress Note    NAEO  Pain well controlled  NPO - no n/v  Not passing gas  Ambulating  Diego in place on CBI    Exam  /87 (BP Location: Left arm)   Pulse 79   Temp 98.3  F (36.8  C) (Oral)   Resp 16   Ht 1.48 m (4' 10.27\")   Wt 69.9 kg (154 lb 1.6 oz)   SpO2 93%   BMI 31.91 kg/m    No acute distress  Unlabored breathing  Abdomen soft,  appropriately tender, nondistended.   Diego with faint pink urine in tubing on slow drip cbi      UOP cbi       Labs  Recent Labs   Lab 05/17/24  0417 05/16/24  2330 05/16/24  1906 05/16/24  0857 05/16/24  0845 05/15/24  0807 05/15/24  0544 05/14/24  0753 05/14/24  0532 05/12/24  0131 05/11/24  2108   WBC  --   --   --   --  10.9  --  12.5*  --  11.3*   < > 12.7*   HGB  --   --   --   --  11.4*  --  12.1*  --  12.8*   < > 13.8   MCV  --   --   --   --  93  --  92  --  92   < > 91   PLT  --   --   --   --  214  --  201  --  205   < > 213   NA  --   --   --   --  136  --  137  --  137   < > 126*   POTASSIUM  --   --   --   --  4.3  --  4.4  --  4.7   < > 5.3   CHLORIDE  --   --   --   --  103  --  104  --  101   < > 93*   CO2  --   --   --   --  22  --  23  --  25   < > 21*   BUN  --   --   --   --  32.6*  --  28.6*  --  34.6*   < > 41.3*   CR  --   --   --   --  1.68*  --  1.76*  --  2.14*   < > 3.21*   ANIONGAP  --   --   --   --  11  --  10  --  11   < > 12   GERALDINE  --   --   --   --  9.5  --  9.5  --  10.1   < > 9.8   * 248* 184*   < > 135*   < > 171*   < > 182*   < > 194*   ALBUMIN  --   --   --   --   --   --   --   --   --   --  4.2   PROTTOTAL  --   --   --   --   --   --   --   --   --   --  8.9*   BILITOTAL  --   --   --   --   --   --   --   --   --   --  0.3   ALKPHOS  --   --   --   --   --   --   --   --   --   --  122   ALT  --   --   --   --   --   --   --   --   --   --  17   AST  --   --   --   --   --   --   --   --   --   --  21    < > = values in this interval not displayed.       AM labs pending    Assessment/Plan  84 year old male " POD#1 s/p prostate TUR for suspected abscess seen on CT.     Plan:  - Clamp trial this AM for CBI  \- Please continue to hold AC for now. Urology will notify when okay to resume.  - Urology will follow  - Okay for diet from urology perspective  - Appreciate cares of primary team    Seen and examined with the chief resident. Will discuss with Dr. Fletcher.    Emi Patterson MD, MPH  Urology Resident, PGY-1       Contacting the Urology Team     Please use the following job codes to reach the Urology Team. Note that you must use an in house phone and that job codes cannot receive text pages.   On weekdays, dial [phone] (or star-star-star 777 on the new [employer] telephones) then [phone] to reach the Adult Urology resident or PA on call  On weekdays, dial [phone] (or star-star-star 777 on the new [employer] telephones) then [phone] to reach the Pediatric Urology resident  On weeknights and weekends, dial [phone] (or star-star-star 777 on the new [employer] telephones) then [phone] to reach the Urology resident on call (for both Adult and Pediatrics)

## 2024-05-18 LAB
ANION GAP SERPL CALCULATED.3IONS-SCNC: 11 MMOL/L (ref 7–15)
BUN SERPL-MCNC: 25.1 MG/DL (ref 8–23)
CALCIUM SERPL-MCNC: 9.2 MG/DL (ref 8.8–10.2)
CHLORIDE SERPL-SCNC: 99 MMOL/L (ref 98–107)
CREAT SERPL-MCNC: 1.42 MG/DL (ref 0.67–1.17)
DEPRECATED HCO3 PLAS-SCNC: 23 MMOL/L (ref 22–29)
EGFRCR SERPLBLD CKD-EPI 2021: 49 ML/MIN/1.73M2
ERYTHROCYTE [DISTWIDTH] IN BLOOD BY AUTOMATED COUNT: 15.4 % (ref 10–15)
GLUCOSE BLDC GLUCOMTR-MCNC: 182 MG/DL (ref 70–99)
GLUCOSE BLDC GLUCOMTR-MCNC: 187 MG/DL (ref 70–99)
GLUCOSE BLDC GLUCOMTR-MCNC: 195 MG/DL (ref 70–99)
GLUCOSE BLDC GLUCOMTR-MCNC: 231 MG/DL (ref 70–99)
GLUCOSE BLDC GLUCOMTR-MCNC: 232 MG/DL (ref 70–99)
GLUCOSE SERPL-MCNC: 300 MG/DL (ref 70–99)
HCT VFR BLD AUTO: 32.4 % (ref 40–53)
HGB BLD-MCNC: 10.6 G/DL (ref 13.3–17.7)
LACTATE SERPL-SCNC: 1.6 MMOL/L (ref 0.7–2)
MCH RBC QN AUTO: 29.9 PG (ref 26.5–33)
MCHC RBC AUTO-ENTMCNC: 32.7 G/DL (ref 31.5–36.5)
MCV RBC AUTO: 92 FL (ref 78–100)
PLATELET # BLD AUTO: 201 10E3/UL (ref 150–450)
POTASSIUM SERPL-SCNC: 4.2 MMOL/L (ref 3.4–5.3)
RBC # BLD AUTO: 3.54 10E6/UL (ref 4.4–5.9)
SODIUM SERPL-SCNC: 133 MMOL/L (ref 135–145)
WBC # BLD AUTO: 11.1 10E3/UL (ref 4–11)

## 2024-05-18 PROCEDURE — 999N000128 HC STATISTIC PERIPHERAL IV START W/O US GUIDANCE

## 2024-05-18 PROCEDURE — 250N000013 HC RX MED GY IP 250 OP 250 PS 637

## 2024-05-18 PROCEDURE — 250N000013 HC RX MED GY IP 250 OP 250 PS 637: Performed by: PHYSICIAN ASSISTANT

## 2024-05-18 PROCEDURE — 36415 COLL VENOUS BLD VENIPUNCTURE: CPT | Performed by: STUDENT IN AN ORGANIZED HEALTH CARE EDUCATION/TRAINING PROGRAM

## 2024-05-18 PROCEDURE — 85014 HEMATOCRIT: CPT

## 2024-05-18 PROCEDURE — 80048 BASIC METABOLIC PNL TOTAL CA: CPT

## 2024-05-18 PROCEDURE — 99232 SBSQ HOSP IP/OBS MODERATE 35: CPT | Mod: FS | Performed by: STUDENT IN AN ORGANIZED HEALTH CARE EDUCATION/TRAINING PROGRAM

## 2024-05-18 PROCEDURE — 250N000011 HC RX IP 250 OP 636

## 2024-05-18 PROCEDURE — 120N000002 HC R&B MED SURG/OB UMMC

## 2024-05-18 PROCEDURE — 36415 COLL VENOUS BLD VENIPUNCTURE: CPT

## 2024-05-18 PROCEDURE — 83605 ASSAY OF LACTIC ACID: CPT | Performed by: STUDENT IN AN ORGANIZED HEALTH CARE EDUCATION/TRAINING PROGRAM

## 2024-05-18 PROCEDURE — 99207 PR APP CREDIT; MD BILLING SHARED VISIT: CPT | Mod: FS

## 2024-05-18 RX ADMIN — DEXTROMETHORPHAN HYDROBROMIDE AND QUINIDINE SULFATE 1 CAPSULE: 20; 10 CAPSULE, GELATIN COATED ORAL at 00:18

## 2024-05-18 RX ADMIN — METOPROLOL SUCCINATE 50 MG: 50 TABLET, EXTENDED RELEASE ORAL at 08:39

## 2024-05-18 RX ADMIN — TAMSULOSIN HYDROCHLORIDE 0.4 MG: 0.4 CAPSULE ORAL at 08:39

## 2024-05-18 RX ADMIN — ROSUVASTATIN CALCIUM 20 MG: 20 TABLET, FILM COATED ORAL at 23:12

## 2024-05-18 RX ADMIN — OXYCODONE HYDROCHLORIDE 2.5 MG: 5 TABLET ORAL at 18:32

## 2024-05-18 RX ADMIN — DULOXETINE HYDROCHLORIDE 30 MG: 30 CAPSULE, DELAYED RELEASE ORAL at 08:39

## 2024-05-18 RX ADMIN — CEFTRIAXONE SODIUM 2 G: 2 INJECTION, POWDER, FOR SOLUTION INTRAMUSCULAR; INTRAVENOUS at 08:38

## 2024-05-18 RX ADMIN — PANTOPRAZOLE SODIUM 40 MG: 40 TABLET, DELAYED RELEASE ORAL at 08:38

## 2024-05-18 RX ADMIN — APIXABAN 2.5 MG: 2.5 TABLET, FILM COATED ORAL at 08:39

## 2024-05-18 RX ADMIN — OXYCODONE HYDROCHLORIDE 2.5 MG: 5 TABLET ORAL at 10:43

## 2024-05-18 RX ADMIN — DEXTROMETHORPHAN HYDROBROMIDE AND QUINIDINE SULFATE 1 CAPSULE: 20; 10 CAPSULE, GELATIN COATED ORAL at 23:12

## 2024-05-18 RX ADMIN — ALLOPURINOL 200 MG: 100 TABLET ORAL at 08:38

## 2024-05-18 RX ADMIN — ACETAMINOPHEN 975 MG: 325 TABLET, FILM COATED ORAL at 18:32

## 2024-05-18 RX ADMIN — DULOXETINE HYDROCHLORIDE 30 MG: 30 CAPSULE, DELAYED RELEASE ORAL at 20:32

## 2024-05-18 RX ADMIN — DEXTROMETHORPHAN HYDROBROMIDE AND QUINIDINE SULFATE 1 CAPSULE: 20; 10 CAPSULE, GELATIN COATED ORAL at 12:07

## 2024-05-18 ASSESSMENT — ACTIVITIES OF DAILY LIVING (ADL)
ADLS_ACUITY_SCORE: 39
ADLS_ACUITY_SCORE: 37
ADLS_ACUITY_SCORE: 39
ADLS_ACUITY_SCORE: 37
ADLS_ACUITY_SCORE: 39

## 2024-05-18 NOTE — PROGRESS NOTES
"Urology  Progress Note    NAEO  Pain well controlled  Looks well but would like catheter removed    Exam  BP (!) 147/80 (BP Location: Right arm, Cuff Size: Adult Regular)   Pulse 75   Temp 98.5  F (36.9  C) (Oral)   Resp 18   Ht 1.48 m (4' 10.27\")   Wt 69.9 kg (154 lb 1.6 oz)   SpO2 94%   BMI 31.91 kg/m    No acute distress  Unlabored breathing  Abdomen soft,  appropriately tender, nondistended.   Diego with clear yellow urine, capped    UOP CBI/1000       Labs  Recent Labs   Lab 05/18/24  0632 05/18/24  0201 05/17/24  2215 05/17/24  1805 05/17/24  0743 05/17/24  0542 05/16/24  0857 05/16/24  0845 05/15/24  0807 05/15/24  0544 05/12/24  0131 05/11/24  2108   WBC 11.1*  --   --   --   --  10.4  --  10.9  --  12.5*   < > 12.7*   HGB 10.6*  --   --   --   --  11.2*  --  11.4*  --  12.1*   < > 13.8   MCV 92  --   --   --   --  93  --  93  --  92   < > 91     --   --   --   --  204  --  214  --  201   < > 213   NA  --   --   --   --   --  135  --  136  --  137   < > 126*   POTASSIUM  --   --   --   --   --  4.3  --  4.3  --  4.4   < > 5.3   CHLORIDE  --   --   --   --   --  101  --  103  --  104   < > 93*   CO2  --   --   --   --   --  21*  --  22  --  23   < > 21*   BUN  --   --   --   --   --  27.4*  --  32.6*  --  28.6*   < > 41.3*   CR  --   --   --   --   --  1.44*  --  1.68*  --  1.76*   < > 3.21*   ANIONGAP  --   --   --   --   --  13  --  11  --  10   < > 12   GERALDINE  --   --   --   --   --  9.2  --  9.5  --  9.5   < > 9.8   GLC  --  182* 189* 274*   < > 156*   < > 135*   < > 171*   < > 194*   ALBUMIN  --   --   --   --   --   --   --   --   --   --   --  4.2   PROTTOTAL  --   --   --   --   --   --   --   --   --   --   --  8.9*   BILITOTAL  --   --   --   --   --   --   --   --   --   --   --  0.3   ALKPHOS  --   --   --   --   --   --   --   --   --   --   --  122   ALT  --   --   --   --   --   --   --   --   --   --   --  17   AST  --   --   --   --   --   --   --   --   --   --   --  21    < > = " values in this interval not displayed.       AM labs pending    Assessment/Plan  84 year old male POD#2 s/p prostate TUR for suspected abscess seen on CT.     Plan:  - TOV today, please obtain PVR to ensure patient emptying well  - Okay to start full dose anticoagulation tomorrow if all goes well today  - Urology will follow  - Okay for diet from urology perspective  - Appreciate cares of primary team    Seen and examined with the chief resident. Will discuss with Dr. Fletcher.    Emile Saleem  PGY-4  [phone]     Contacting the Urology Team     Please use the following job codes to reach the Urology Team. Note that you must use an in house phone and that job codes cannot receive text pages.   On weekdays, dial [phone] (or star-star-star 777 on the new [employer] telephones) then 0817 to reach the Adult Urology resident or PA on call  On weekdays, dial [phone] (or star-star-star 777 on the new [employer] telephones) then 0818 to reach the Pediatric Urology resident  On weeknights and weekends, dial [phone] (or star-star-star 777 on the new [employer] telephones) then [phone] to reach the Urology resident on call (for both Adult and Pediatrics)

## 2024-05-18 NOTE — PROGRESS NOTES
[facility]    Medicine Progress Note - Hospitalist Service, GOLD TEAM 4    Date of Admission:  5/11/2024    Assessment & Plan   Adam Talamantes is an 83 yo M with pmhx of HTN, HLD, CVA, prostatitis, GERD who was admitted on 5/11/2024 for UTI and AMS found to have prostate abscess now s/p transurethral resection of prostate abscess with Urology 5/16.      Prostate Abscess s/p TURP 5/16 (without clear abscess identified)  Complicated UTI  Leukocytosis   Lactic acidosis (resolved)   P/w 5d of polyuria, recently prescribed bactrim on 5/2 for 10d course for presumed cystitis. UA at that time c/f UTI but UC without growth. Has a history of prostatitis in 2016 complicated by fluoroquinolone and bactrim resistant E. Coli Bacteremia. Presented afebrile, without tachycardia and leukocytosis to 12.7. lactate wnl. UA with +LE >182 WBC, moderate blood and 11 RBC. Was given dose of ciprofloxacin in ED.  Stared on cefepime on admission, narrowed to CTX on 5/13. Urine Cx with < 10K mixture of urogenital valentina, repeat urine cx without growth.  Given white count started to increase pursued imaging. CT AP on 5/15 with  3.1 x 3.0 x 3.7 cm rim-enhancing lesion involving the base-mid right prostate gland with abutment of the inferior aspect of the urinary bladder, findings are concerning for a prostatic abscess. Also with circumferential urinary bladder wall thickening with mild adjacent fat stranding, suggestive of cystitis. S/p TURP with urology on 5/16 where no clear prostate abscess identified though abnormal appearing prostate nodule/ tissue was noted and resected.  - Cont CTX 2 g q24h   - Plan to discharge with 6 week total course to cover for acute prostatitis, per up to date either bactrim or levaquin   - Blood cx 5/15 with NGTD - CTM  - Urology consulted, appreciate recs              - S/p transurethral resection of prostate 5/16   - Remove parrish for trial of void 5/18   - Started on 2.5 mg  BID apixiban 5/17, can potentially increase to full dose 5/19 - appreciate urology continued recs               - Cultures from OR in process - continue to monitor for growth   - Monitor surgical pathology from OR     ELLA on CKD III (resolved)  Moderate L and mild R hydronephrosis  Scr baseline appears to be ~1.3-1.7. Scr on admission 3.21. Renal US moderate left and mild right hydronephrosis, similar to findings on prior CT 2/8/2023. FeNa 6.6%, BUN/Cr ratio 12.8, urine osms 262, suggestive of ATN in the setting of recent bactrim rx and poor oral intake. Bladder scan in ED not showing retention.   - Cont to trend      Insulin dependented T2DM  Last A1c 8.8% 2/26/2024. PTA reg: jardiance, januvia, glargine 30 U at bedtime, aspart 8 U before breakfast, 6 U with lunch and dinner.   - Cont medium sliding scale insulin   - Hypoglycemia protocol ordered   - hold home orals (jardiance and januvia)  - cont pta lantus 30 U daily   - hold home sglt2i inhibitor on discharge.      Colonic Wall Thickening   R Liver Lesion  Noted on CT Urogram 6/2023, wall thickening concerning for malignancy. Prior colonoscopy on file from 2017, showing tubular adenoma without high grade dysplasia. No reported weight loss per family, chart review reveals stable weight over past 2-3 years. No abdominal pain. LFTs wnl. Normal brown bowel movements. Ongoing discussions to pursue work up as OP with PCP, who is waiting to discuss with patient's daughter if should pursue colonoscopy. Previously noted that patient would not be interested in undergoing a surgery. No acute concerns.   - Continue working with PCP as OP for continued discussion      Acute encephalopathy, likely metabolic, resolved    Per family on admission past few days PTA showed more confusion, including not being able to state names of family members. CT Head without acute findings. Improved 05/12 and per family patient is reportedly at baseline, suspect encephalopathy to be metabolic  "in nature with ELLA.   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 Cont to trend and encourage PO intake.  Lesion in hepatic lobe:  Stable indeterminate 1.3 cm hypoattenuating lesion in the right hepatic lobe, which demonstrated peripheral enhancement on delayed post contrast imaging on CT dated 6/13/2023. Consider further evaluation with MRI on a non-emergent basis.   Suspected kidney cyst: 2.0 cm dense cystic lesion in the inferior pole of the left kidney, likely representing a hemorrhagic/proteinaceous cyst. This can be further characterized on future MRI.          Diet: Snacks/Supplements Adult: Glucerna; With Meals  Diet  Regular Diet Adult    DVT Prophylaxis: DOAC as above   Diego Catheter: PRESENT, indication: Surgical procedure  Lines: None     Cardiac Monitoring: None  Code Status: No CPR- Do NOT Intubate      Clinically Significant Risk Factors                  # Hypertension: Noted on problem list       # DMII: A1C = 8.8 % (Ref range: 0.0 - 5.6 %) within past 6 months   # Obesity: Estimated body mass index is 31.91 kg/m  as calculated from the following:    Height as of this encounter: 1.48 m (4' 10.27\").    Weight as of this encounter: 69.9 kg (154 lb 1.6 oz).             Disposition Plan     Medically Ready for Discharge: Anticipated Tomorrow       The patient's care was discussed with the Attending Physician, Dr. Gonzalez, Bedside Nurse, Patient, and Patient's Family.    Cammie Castro PA-C  Hospitalist Service, GOLD TEAM [address] [place] [address]  Securely message with " Ray (more info)  Text page via [facility] Paging/Directory   See signed in provider for up to date coverage information  ______________________________________________________________________    Interval History   Pt reports feeling a lot better this morning, he has no new symptoms. Daughter said he is back to his baseline. Only pain he is having is around his left IV.    Discussed with bedside RN who had no acute concerns.      Discussed with daughter who has no concerns, is understanding of plan. Called and updated other daughter who is also in agreement with plan.    Physical Exam   Vital Signs: Temp: 98.5  F (36.9  C) Temp src: Oral BP: (!) 147/80 Pulse: 75   Resp: 18 SpO2: 94 % O2 Device: None (Room air)    Weight: 154 lbs 1.62 oz  Constitutional: sitting up in bed, appears comfortable, no apparent distress. Breakfast set out in front of him.   HEENT: Mucous membranes moist, no scleral icterus   Respiratory: No increased work of breathing, breathing comfortably on RA   Skin: normal skin color, texture, and no jaundice  Musculoskeletal: moving all extremities voluntarily during exam  Neuro: awake, alert, answering all questions appropriately during exam    Medical Decision Making       40 MINUTES SPENT BY ME on the date of service doing chart review, history, exam, documentation & further activities per the note.      Data     I have personally reviewed the following data over the past 24 hrs:    11.1 (H)  \   10.6 (L)   / 201     133 (L) 99 25.1 (H) /  195 (H)   4.2 23 1.42 (H) \       Imaging results reviewed over the past 24 hrs:   No results found for this or any previous visit (from the past 24 hour(s)).

## 2024-05-18 NOTE — PLAN OF CARE
Goal Outcome Evaluation:    Status: s/p TURP w/ urology on 5/16  Vitals: VSS on RA, on continuous pulse ox  Neuros: Oriented to self and place, per family pt is baseline confused.   IV: PIV SL  Labs/Electrolytes: ACHS, 0200 BG  Diet: Regular diet  GI/: Small BM 5/17, parrish in place w/ pink tinged OP, pt still having some leaking at insertion site, MD aware.   Pain: Denied  Activity: SBA w/ walker  Updates this shift: Pt is Kayce speaking, pt's son is at the bedside and helps with translating and taking pt to the bathroom. Pt takes pills whole with applesauce. Pt slept most of the night. Continue with current plan of care.

## 2024-05-18 NOTE — PLAN OF CARE
Goal Outcome Evaluation:      Plan of Care Reviewed With: patient, child    Overall Patient Progress: no changeOverall Patient Progress: no change    Outcome Evaluation: Continuous irrigation discontinued. Pt still has parrish, OP yellow/slightly pink. Provider advised, holding apixaban. L PIV SL. BG ACHS, currently in range. Small amount of leakage continues from parrish insertion site. Per provider, continue to monitor.  Also feeling the need to urinate/pain, which is worse with activity. PRN oxy 2.5mg Q4 available, given 1x this shift. Pt son is spending the night with him; is concerned his father's pain will get worse, has been advised to notify staff is current pain regemin is no longer working.

## 2024-05-18 NOTE — PLAN OF CARE
Goal Outcome Evaluation:    Parrish pulled this shift for void trial. Small bloody urine OP post pull. MD notified. Will continue to monitor.  Bladder scanned after first void. 75 ml yellow OP, 73 ml PVR.  Visitor forgot to call after pt voided the next 2-3x. 450 ml pink/yellow OP, bladder scanner unavailable at the moment.  , 231.  VSS. RA. Up with SBA.  Disoriented to time and situation.  PIV removed d/t infiltration. New PIV placed, SL.  Poor-Fair appetite.  Family at bedside, stays the night.  Takes pills whole with applesauce or water, no ice.  Oxy given 1x this shift for parrish site pain.  Linens changed 1x this shift.

## 2024-05-19 VITALS
HEART RATE: 83 BPM | HEIGHT: 60 IN | WEIGHT: 154.1 LBS | RESPIRATION RATE: 18 BRPM | BODY MASS INDEX: 30.25 KG/M2 | DIASTOLIC BLOOD PRESSURE: 82 MMHG | OXYGEN SATURATION: 96 % | TEMPERATURE: 98.3 F | SYSTOLIC BLOOD PRESSURE: 139 MMHG

## 2024-05-19 LAB
ALBUMIN SERPL BCG-MCNC: 3.3 G/DL (ref 3.5–5.2)
ALP SERPL-CCNC: 124 U/L (ref 40–150)
ALT SERPL W P-5'-P-CCNC: 18 U/L (ref 0–70)
ANION GAP SERPL CALCULATED.3IONS-SCNC: 11 MMOL/L (ref 7–15)
AST SERPL W P-5'-P-CCNC: 25 U/L (ref 0–45)
BILIRUB SERPL-MCNC: 0.3 MG/DL
BUN SERPL-MCNC: 23.1 MG/DL (ref 8–23)
CALCIUM SERPL-MCNC: 9.4 MG/DL (ref 8.8–10.2)
CHLORIDE SERPL-SCNC: 98 MMOL/L (ref 98–107)
CREAT SERPL-MCNC: 1.63 MG/DL (ref 0.67–1.17)
DEPRECATED HCO3 PLAS-SCNC: 24 MMOL/L (ref 22–29)
EGFRCR SERPLBLD CKD-EPI 2021: 42 ML/MIN/1.73M2
ERYTHROCYTE [DISTWIDTH] IN BLOOD BY AUTOMATED COUNT: 15.5 % (ref 10–15)
GLUCOSE BLDC GLUCOMTR-MCNC: 200 MG/DL (ref 70–99)
GLUCOSE BLDC GLUCOMTR-MCNC: 224 MG/DL (ref 70–99)
GLUCOSE BLDC GLUCOMTR-MCNC: 271 MG/DL (ref 70–99)
GLUCOSE SERPL-MCNC: 276 MG/DL (ref 70–99)
HCT VFR BLD AUTO: 33.4 % (ref 40–53)
HGB BLD-MCNC: 11 G/DL (ref 13.3–17.7)
MCH RBC QN AUTO: 30.6 PG (ref 26.5–33)
MCHC RBC AUTO-ENTMCNC: 32.9 G/DL (ref 31.5–36.5)
MCV RBC AUTO: 93 FL (ref 78–100)
PLATELET # BLD AUTO: 198 10E3/UL (ref 150–450)
POTASSIUM SERPL-SCNC: 4.3 MMOL/L (ref 3.4–5.3)
PROT SERPL-MCNC: 7.3 G/DL (ref 6.4–8.3)
RBC # BLD AUTO: 3.59 10E6/UL (ref 4.4–5.9)
SODIUM SERPL-SCNC: 133 MMOL/L (ref 135–145)
WBC # BLD AUTO: 11 10E3/UL (ref 4–11)

## 2024-05-19 PROCEDURE — 82247 BILIRUBIN TOTAL: CPT

## 2024-05-19 PROCEDURE — 36415 COLL VENOUS BLD VENIPUNCTURE: CPT

## 2024-05-19 PROCEDURE — 99207 PR APP CREDIT; MD BILLING SHARED VISIT: CPT | Mod: FS | Performed by: STUDENT IN AN ORGANIZED HEALTH CARE EDUCATION/TRAINING PROGRAM

## 2024-05-19 PROCEDURE — 85014 HEMATOCRIT: CPT

## 2024-05-19 PROCEDURE — 99239 HOSP IP/OBS DSCHRG MGMT >30: CPT | Mod: FS

## 2024-05-19 PROCEDURE — 250N000013 HC RX MED GY IP 250 OP 250 PS 637

## 2024-05-19 PROCEDURE — 250N000013 HC RX MED GY IP 250 OP 250 PS 637: Performed by: PHYSICIAN ASSISTANT

## 2024-05-19 PROCEDURE — 250N000011 HC RX IP 250 OP 636

## 2024-05-19 RX ORDER — SULFAMETHOXAZOLE/TRIMETHOPRIM 800-160 MG
1 TABLET ORAL 2 TIMES DAILY
Qty: 70 TABLET | Refills: 0 | Status: CANCELLED | OUTPATIENT
Start: 2024-05-19 | End: 2024-06-23

## 2024-05-19 RX ORDER — SULFAMETHOXAZOLE/TRIMETHOPRIM 800-160 MG
1 TABLET ORAL 2 TIMES DAILY
Qty: 70 TABLET | Refills: 0 | Status: ON HOLD | OUTPATIENT
Start: 2024-05-19 | End: 2024-06-02

## 2024-05-19 RX ADMIN — APIXABAN 5 MG: 5 TABLET, FILM COATED ORAL at 11:45

## 2024-05-19 RX ADMIN — DULOXETINE HYDROCHLORIDE 30 MG: 30 CAPSULE, DELAYED RELEASE ORAL at 08:14

## 2024-05-19 RX ADMIN — PANTOPRAZOLE SODIUM 40 MG: 40 TABLET, DELAYED RELEASE ORAL at 08:15

## 2024-05-19 RX ADMIN — METOPROLOL SUCCINATE 50 MG: 50 TABLET, EXTENDED RELEASE ORAL at 08:14

## 2024-05-19 RX ADMIN — DEXTROMETHORPHAN HYDROBROMIDE AND QUINIDINE SULFATE 1 CAPSULE: 20; 10 CAPSULE, GELATIN COATED ORAL at 11:45

## 2024-05-19 RX ADMIN — TAMSULOSIN HYDROCHLORIDE 0.4 MG: 0.4 CAPSULE ORAL at 08:14

## 2024-05-19 RX ADMIN — ALLOPURINOL 200 MG: 100 TABLET ORAL at 08:15

## 2024-05-19 RX ADMIN — CEFTRIAXONE SODIUM 2 G: 2 INJECTION, POWDER, FOR SOLUTION INTRAMUSCULAR; INTRAVENOUS at 08:13

## 2024-05-19 ASSESSMENT — ACTIVITIES OF DAILY LIVING (ADL)
ADLS_ACUITY_SCORE: 37
DEPENDENT_IADLS:: CLEANING;COOKING;LAUNDRY;SHOPPING;MEAL PREPARATION;TRANSPORTATION;MEDICATION MANAGEMENT
ADLS_ACUITY_SCORE: 37

## 2024-05-19 NOTE — CONSULTS
Care Management Initial Consult    General Information  Assessment completed with: Patient, Children, VM-chart review, Pt Adam and son Navin  Type of CM/SW Visit: Initial Assessment    Primary Care Provider verified and updated as needed: Yes (Chucho Espino [phone] [address], [place] [address])   Readmission within the last 30 days: no previous admission in last 30 days      Reason for Consult: length of stay  Advance Care Planning: Advance Care Planning Reviewed: present on chart        Communication Assessment  Patient's communication style: spoken language (non-English)    Hearing Difficulty or Deaf: yes   Wear Glasses or Blind: no    Cognitive  Cognitive/Neuro/Behavioral: .WDL except, orientation  Level of Consciousness: alert  Arousal Level: opens eyes spontaneously  Orientation: disoriented to, time  Mood/Behavior: calm, cooperative  Best Language: 0 - No aphasia  Speech: spontaneous    Living Environment:   People in home: child(chanel), adult, spouse, other relative(s)  6 people lives in the house: wife, daugher, CRISTY, and 2 cousins  Current living Arrangements: house      Able to return to prior arrangements: yes     Family/Social Support:  Care provided by: self, spouse/significant other, child(chanel)  Provides care for: no one, unable/limited ability to care for self  Marital Status:   Wife, Children, PCA          Description of Support System: Supportive, Involved       Current Resources:   Patient receiving home care services: No     Community Resources: PCA  Equipment currently used at home: walker, rolling  Supplies currently used at home: Incontinence Supplies, Diabetic Supplies, Chux    Employment/Financial:  Employment Status: disabled        Financial Concerns: none      Does the patient's insurance plan have a 3 day qualifying hospital stay waiver?  Yes     Which insurance plan 3 day waiver is available? Alternative insurance waiver    Will the waiver be used for post-acute  placement? No    Lifestyle & Psychosocial Needs:  Social Determinants of Health     Food Insecurity: Low Risk  (1/15/2024)    Food Insecurity     Within the past 12 months, did you worry that your food would run out before you got money to buy more?: No     Within the past 12 months, did the food you bought just not last and you didn t have money to get more?: No   Depression: Not at risk (1/15/2024)    PHQ-2     PHQ-2 Score: 0   Housing Stability: Low Risk  (1/15/2024)    Housing Stability     Do you have housing? : Yes     Are you worried about losing your housing?: No   Tobacco Use: Medium Risk (5/2/2024)    Patient History     Smoking Tobacco Use: Former     Smokeless Tobacco Use: Former     Passive Exposure: Never   Financial Resource Strain: Low Risk  (1/15/2024)    Financial Resource Strain     Within the past 12 months, have you or your family members you live with been unable to get utilities (heat, electricity) when it was really needed?: No   Alcohol Use: Not At Risk (2/22/2022)    AUDIT-C     Frequency of Alcohol Consumption: Never     Average Number of Drinks: Not on file     Frequency of Binge Drinking: Never   Transportation Needs: Low Risk  (1/15/2024)    Transportation Needs     Within the past 12 months, has lack of transportation kept you from medical appointments, getting your medicines, non-medical meetings or appointments, work, or from getting things that you need?: No   Physical Activity: Inactive (2/22/2022)    Exercise Vital Sign     Days of Exercise per Week: 0 days     Minutes of Exercise per Session: 0 min   Interpersonal Safety: Not At Risk (2/22/2022)    Humiliation, Afraid, Rape, and Kick questionnaire     Fear of Current or Ex-Partner: No     Emotionally Abused: No     Physically Abused: No     Sexually Abused: No   Stress: No Stress Concern Present (2/22/2022)    [employer] Occupational [employer] - Occupational Stress Questionnaire     Feeling of Stress : Not at all   Social  Connections: Socially Integrated (2/22/2022)    Social Connection and Isolation Panel [NHANES]     Frequency of Communication with Friends and Family: Once a week     Frequency of Social Gatherings with Friends and Family: Twice a week     Attends Evangelical Services: 1 to 4 times per year     Active Member of Clubs or Organizations: No     Attends Club or Organization Meetings: 1 to 4 times per year     Marital Status:    Health Literacy: Not on file     Functional Status:  Prior to admission patient needed assistance:   Dependent ADLs:: Ambulation-walker, Bathing, Grooming  Dependent IADLs:: Cleaning, Cooking, Laundry, Shopping, Meal Preparation, Transportation, Medication Management     Mental Health Status:  Mental Health Status: No Current Concerns       Chemical Dependency Status:  Chemical Dependency Status: No Current Concerns           Values/Beliefs:  Spiritual, Cultural Beliefs, Evangelical Practices, Values that affect care: no             Additional Information:    Background: Adam Talamantes is a 84 year old Kayce-speaking male admitted on 5/11/2024. He has a history of T2DM, CVA, HTN, HLD, stroke, prostatitis, GERD and is admitted for UTI and AMS (Fazal Serrato MD).     Patient's status reviewed by chart. A CMA is needed d/t LOS. Writer met with the patient, Adam and his son Navin at the bedside to complete a care management assessment. Writer introduced self, the role in care, and the nature of the care management assessment. The patient's PCP, address, and health insurance are all correct.    Pt lives with wife, daughter, son in law, and 2 cousins in the house. He uses a walker at home and needs assistance with some of ADLs and IADLs. His daughter and son in law are his PCA, around 10.5 hours a day. The pt and son denied any needs. The pt has a lot of support at home from the family. Son to transport at discharge.     Care Management Discharge Note    Discharge Date: 05/19/2024     Discharge  Disposition: Home    Discharge Services: PCA    Discharge DME: None    Discharge Transportation: family or friend will provide    Private pay costs discussed: Not applicable    Does the patient's insurance plan have a 3 day qualifying hospital stay waiver?  Yes     Which insurance plan 3 day waiver is available? Alternative insurance waiver    Will the waiver be used for post-acute placement? No    PAS Confirmation Code: NA  Patient/family educated on Medicare website which has current facility and service quality ratings: no    Education Provided on the Discharge Plan: Yes  Persons Notified of Discharge Plans: Yes  Patient/Family in Agreement with the Plan: yes    Handoff Referral Completed: Yes, PHIL Yadav RN    5/19/2024  Nurse Coordinator      Social Work and Care Management Department     SEARCHABLE in [facility] - search CARE COORDINATOR     [place] ([phone]) Saturday & Sunday; ([phone]) [facility] Recognized Holidays     Units: 5A Onc 5201 - 5219 RNCC,  5A Onc 5220 thru 5240 RNCC, 5C OFFSERVICE [phone] RNCC & 5C OFF SERVICE [phone] RNCC     Units: 6B Vocera, 6C Card 6401 thru 6420 RNCC, 6C Card 6502 thru 6514 RNCC & 6C Card 6515 thru 6519 RNCC      Units: 7A SOT RNCC Vocera, 7B Med Surg Vocera, 7C Med Surg 7401 thru 7418 RNCC & 7C Med Surg 7502 thru 7521 RNCC     Units: 6A Vocera & 4A CVICU Vocera, 4C MICU Vocera, and 4E SICU Vocera       Units: 5 Ortho Vocera & 5 Med Surg Vocera      Units: 6 Med Surg Vocera & 8 Med Surg Vocera

## 2024-05-19 NOTE — PROGRESS NOTES
Pt triggered sepsis. All protocol are followed awaiting lab result. Tylenol 975 mg PO given for temp: 99.0. please continue to monitor.

## 2024-05-19 NOTE — PLAN OF CARE
Goal Outcome Evaluation:    Status: s/p TURP w/ urology on 5/16  Vitals: VSS on RA, on continuous pulse ox  Neuros: Oriented to self and place, per family pt is baseline confused.   IV: PIV SL  Labs/Electrolytes: BG ordered q4 but insulin ordered ACHS.   Diet: Regular diet  GI/: L BM 5/17, pt voiding via urinal at bedside, OP still slightly pink tinged at beginning of shift, but yellow by this morning.  Pain: Denied  Activity: SBA w/ walker  Updates this shift: Pt is Kayce speaking, pt's daughter is at the bedside and helps with translating and taking pt to the bathroom. Pt takes pills whole with applesauce or water. Pt slept most of the night. Continue with current plan of care.

## 2024-05-19 NOTE — PLAN OF CARE
Discharge to: home  Transportation: family to provide ride   Time: 1400  Prescriptions: sent to discharge pharmacy and given to son at bedside  Belongings: all belongings accounted for and sent w/ patient   Access: PIV removed   Care plan and education discontinued: yes  Paperwork: AVS printed and gone though w/ patient and family at bedside. Education provided and all questions answered

## 2024-05-20 ENCOUNTER — PATIENT OUTREACH (OUTPATIENT)
Dept: GERIATRIC MEDICINE | Facility: CLINIC | Age: 84
End: 2024-05-20

## 2024-05-20 ENCOUNTER — OFFICE VISIT (OUTPATIENT)
Dept: FAMILY MEDICINE | Facility: CLINIC | Age: 84
End: 2024-05-20
Payer: COMMERCIAL

## 2024-05-20 VITALS
OXYGEN SATURATION: 90 % | WEIGHT: 158.2 LBS | TEMPERATURE: 98.6 F | RESPIRATION RATE: 16 BRPM | HEIGHT: 60 IN | BODY MASS INDEX: 31.06 KG/M2 | HEART RATE: 109 BPM | DIASTOLIC BLOOD PRESSURE: 69 MMHG | SYSTOLIC BLOOD PRESSURE: 101 MMHG

## 2024-05-20 DIAGNOSIS — N39.0 URINARY TRACT INFECTION WITHOUT HEMATURIA, SITE UNSPECIFIED: Primary | ICD-10-CM

## 2024-05-20 DIAGNOSIS — B37.9 CANDIDA GLABRATA INFECTION: ICD-10-CM

## 2024-05-20 DIAGNOSIS — E11.22 TYPE 2 DIABETES MELLITUS WITH CHRONIC KIDNEY DISEASE, WITH LONG-TERM CURRENT USE OF INSULIN, UNSPECIFIED CKD STAGE (H): ICD-10-CM

## 2024-05-20 DIAGNOSIS — N18.31 STAGE 3A CHRONIC KIDNEY DISEASE (H): ICD-10-CM

## 2024-05-20 DIAGNOSIS — R35.0 BENIGN PROSTATIC HYPERPLASIA WITH URINARY FREQUENCY: ICD-10-CM

## 2024-05-20 DIAGNOSIS — G89.18 ACUTE POST-OPERATIVE PAIN: ICD-10-CM

## 2024-05-20 DIAGNOSIS — Z79.4 TYPE 2 DIABETES MELLITUS WITH CHRONIC KIDNEY DISEASE, WITH LONG-TERM CURRENT USE OF INSULIN, UNSPECIFIED CKD STAGE (H): ICD-10-CM

## 2024-05-20 DIAGNOSIS — E11.65 TYPE 2 DIABETES MELLITUS WITH HYPERGLYCEMIA, WITHOUT LONG-TERM CURRENT USE OF INSULIN (H): ICD-10-CM

## 2024-05-20 DIAGNOSIS — R32 URINARY INCONTINENCE, UNSPECIFIED TYPE: Primary | ICD-10-CM

## 2024-05-20 DIAGNOSIS — N40.1 BENIGN PROSTATIC HYPERPLASIA WITH URINARY FREQUENCY: ICD-10-CM

## 2024-05-20 DIAGNOSIS — I63.22 CEREBROVASCULAR ACCIDENT (CVA) DUE TO STENOSIS OF BASILAR ARTERY (H): ICD-10-CM

## 2024-05-20 LAB
ANION GAP SERPL CALCULATED.3IONS-SCNC: 13 MMOL/L (ref 7–15)
BUN SERPL-MCNC: 29.9 MG/DL (ref 8–23)
CALCIUM SERPL-MCNC: 9.8 MG/DL (ref 8.8–10.2)
CHLORIDE SERPL-SCNC: 99 MMOL/L (ref 98–107)
CHOLEST SERPL-MCNC: 122 MG/DL
CREAT SERPL-MCNC: 1.95 MG/DL (ref 0.67–1.17)
DEPRECATED HCO3 PLAS-SCNC: 23 MMOL/L (ref 22–29)
EGFRCR SERPLBLD CKD-EPI 2021: 34 ML/MIN/1.73M2
ERYTHROCYTE [DISTWIDTH] IN BLOOD BY AUTOMATED COUNT: 15.4 % (ref 10–15)
FASTING STATUS PATIENT QL REPORTED: ABNORMAL
FASTING STATUS PATIENT QL REPORTED: ABNORMAL
GLUCOSE SERPL-MCNC: 260 MG/DL (ref 70–99)
HCT VFR BLD AUTO: 34.4 % (ref 40–53)
HDLC SERPL-MCNC: 29 MG/DL
HGB BLD-MCNC: 11.5 G/DL (ref 13.3–17.7)
LDLC SERPL CALC-MCNC: 45 MG/DL
MCH RBC QN AUTO: 30.6 PG (ref 26.5–33)
MCHC RBC AUTO-ENTMCNC: 33.4 G/DL (ref 31.5–36.5)
MCV RBC AUTO: 92 FL (ref 78–100)
NONHDLC SERPL-MCNC: 93 MG/DL
PLATELET # BLD AUTO: 210 10E3/UL (ref 150–450)
POTASSIUM SERPL-SCNC: 4.5 MMOL/L (ref 3.4–5.3)
RBC # BLD AUTO: 3.76 10E6/UL (ref 4.4–5.9)
SODIUM SERPL-SCNC: 135 MMOL/L (ref 135–145)
TRIGL SERPL-MCNC: 238 MG/DL
WBC # BLD AUTO: 10 10E3/UL (ref 4–11)

## 2024-05-20 PROCEDURE — 80061 LIPID PANEL: CPT | Performed by: FAMILY MEDICINE

## 2024-05-20 PROCEDURE — 36415 COLL VENOUS BLD VENIPUNCTURE: CPT | Performed by: FAMILY MEDICINE

## 2024-05-20 PROCEDURE — 80048 BASIC METABOLIC PNL TOTAL CA: CPT | Performed by: FAMILY MEDICINE

## 2024-05-20 PROCEDURE — 85027 COMPLETE CBC AUTOMATED: CPT | Performed by: FAMILY MEDICINE

## 2024-05-20 PROCEDURE — 99495 TRANSJ CARE MGMT MOD F2F 14D: CPT | Performed by: FAMILY MEDICINE

## 2024-05-20 RX ORDER — HYDROCODONE BITARTRATE AND ACETAMINOPHEN 5; 325 MG/1; MG/1
1 TABLET ORAL EVERY 6 HOURS PRN
Qty: 20 TABLET | Refills: 0 | Status: ON HOLD | OUTPATIENT
Start: 2024-05-20 | End: 2024-06-02

## 2024-05-20 NOTE — PROGRESS NOTES
Hospital follow-up  Hospital: [facility]  Date of admission: 5/11/2024  Date of discharge: 5/19/2024    ASSESMENT AND PLAN:  Diagnoses and all orders for this visit:  Complicated urinary tract infection without hematuria, site unspecified  Patient successfully had his Diego catheter removed and has been urinating successfully since then.  He will follow-up with urology as directed.  Reviewed hospital discharge summary with the patient and family, will do the recommended follow-up labs today:  -     CBC with platelets; Future  -     Basic metabolic panel  (Ca, Cl, CO2, Creat, Gluc, K, Na, BUN); Future    Medication reconciliation and review and counseling done.  Discussion with the patient and his daughters today and we decided to permanently discontinue the Jardiance given the complicated urinary tract infection.    MED REC REQUIRED  Post Medication Reconciliation Status:  Discharge medications reconciled and changed, see notes/orders     Addendum-5/21/2024.  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  I sent a message to the urologist from his hospitalization and received the following response:    Bernabe Fletcher MD Letts, James P, MD; Devaughn Rizo MD  Well  His prostate looked like an abscess so we did a TURP but it wasn't an abscess. No pus came out.  He does have recurrent UTI  This was an actual piece of prostate tissue which was resected.  So I would recommend treating it.  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  -Patrice    On initial review of UpToDate treatment of this type of infection with Candida glabrata is not simple and it appears that IV antifungal agents that I am not familiar with are recommended.  Therefore, we are going to get an urgent infectious disease consultation.  Patient and daughter being notified of this update and plan.  Order for urgent  infectious disease consultation entered.      Benign prostatic hyperplasia with urinary frequency  Status post TURP procedure, follow-up with urology as directed.  Continue current medications.    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    Reviewed the risks and benefits of the treatment plan with the patient and/or caregiver and we discussed indications for routine and emergent follow-up.        SUBJECTIVE: Patient was hospitalized with a complicated urinary tract infection, possibility of prostate abscess, underwent TURP procedure during the hospitalization.  Urinary catheter was removed prior to discharge yesterday.  Since he has been home he has been passing urine without difficulty, urinary frequency is a persistent problem.  He is taking his antibiotics and tolerating them well.  Since discharge there has been no fever or vomiting or diarrhea.  His main concern is his pain.  Patient has chronic back pain and chronic right shoulder pain and since his surgery has noticed exacerbation of both.  The pain is bothersome enough  to him that it is preventing him from getting a good night sleep, his daughters report that he was on stronger pain medications in the hospital and was sleeping better in the hospital but last night was difficult at home, he hardly slept at all.    Past Medical History:   Diagnosis Date    Acute blood loss anemia     Acute renal failure (H24)     Anemia     Bacteremia     Cataract     Chronic gout     CKD (chronic kidney disease)     Stage 3    DM2 (diabetes mellitus, type 2) (H)     GERD (gastroesophageal reflux disease)     Glucose intolerance (impaired glucose tolerance)     Gout     History of nephrolithiasis     History of prostatitis 2017    Hyperlipidemia     Hypertension     Insomnia     Intestinal disaccharidase deficiencies and disaccharide malabsorption     Created by Conversion     Latent tuberculosis     Nephrolithiasis     Neuropathy     Nonspecific abnormal findings on radiological and other examination of skull and head     Created by [facility] Annotation: 2013 11:23PM - Giulia Meridai/10/2011:  severe stenosis both posterior cerebral arteries seen MRI/MRA done for  vertigo. No acute changes.e*    Osteoarthritis     wrist, knee, shoulder    Prostatitis     Rectal bleed     Trigger thumb      Patient Active Problem List   Diagnosis    Esophageal reflux    Dyslipidemia    Nephrolithiasis    Pseudogout    Latent Tuberculosis    Generalized Osteoarthritis Of The Hand    Lumbar Disc Degeneration    Insomnia, unspecified type    Chronic Gout    CKD (chronic kidney disease) stage 3, GFR 30-59 ml/min (H)    Elevated PSA    Prostate nodule    Cataracts, bilateral    Constipation, unspecified constipation type    Bilateral chronic knee pain    Chronic right shoulder pain    Essential hypertension with goal blood pressure less than 140/90    BPH (benign prostatic hyperplasia)    Chronic gout    Coronary artery disease due to lipid rich plaque    Right lower quadrant abdominal pain     Colitis    Primary osteoarthritis involving multiple joints    Urinary incontinence    ACE-inhibitor cough    Vertigo    Essential hypertension    Vertebral artery occlusion, left    Cerebellar stroke (H)    Stroke (H)    Hypomagnesemia    Ataxic gait    Urinary retention    Basilar artery stenosis    Cerebral vascular disease    Colon wall thickening    Current moderate episode of major depressive disorder without prior episode (H)    Dysuria    Generalized weakness    Near syncope    Urinary tract infection without hematuria, site unspecified    Type 2 diabetes mellitus with chronic kidney disease, with long-term current use of insulin, unspecified CKD stage (H)     Current Outpatient Medications   Medication Sig Dispense Refill    acetaminophen (TYLENOL) 500 MG tablet Take 1 tablet (500 mg) by mouth every 4 hours as needed for mild pain 100 tablet 3    apixaban ANTICOAGULANT (ELIQUIS ANTICOAGULANT) 5 MG tablet Take 1 tablet (5 mg) by mouth 2 times daily 60 tablet 6    calcium carbonate (TUMS) 500 MG chewable tablet Take 1 chew tab by mouth daily as needed for heartburn      celecoxib (CELEBREX) 200 MG capsule Take 1 capsule (200 mg) by mouth daily as needed for pain (jaw pain, joint pain) 30 capsule 3    Continuous Blood Gluc Sensor (FREESTYLE SUSANNAH 2 SENSOR) [facility] [place] 1 each every 14 days Use 1 sensor every 14 days. Use to read blood sugars per 's instructions. 2 each 11    dextromethorphan-quiNIDine (NUEDEXTA) 20-10 MG capsule Take 1 capsule by mouth every 12 hours 60 capsule 11    DULoxetine (CYMBALTA) 30 MG capsule TAKE 1 CAPSULE(30 MG) BY MOUTH TWICE DAILY 60 capsule 6    HYDROcodone-acetaminophen (NORCO) 5-325 MG tablet Take 1 tablet by mouth every 6 hours as needed for severe pain 20 tablet 0    hydrOXYzine (ATARAX) 25 MG tablet Take 1 tablet (25 mg) by mouth every 8 hours as needed for itching 60 tablet 1    insulin aspart (NOVOLOG FLEXPEN) 100 UNIT/ML pen Inject 8 units before breakfast, and 6  "units under the skin before lunch and dinner 15 mL 3    insulin glargine (LANTUS PEN) 100 UNIT/ML pen Inject 30 Units Subcutaneous every 24 hours 30 mL 3    insulin pen needle (BD PEN NEEDLE SALVATORE 2ND GEN) 32G X 4 MM miscellaneous USE 1 PEN NEEDLE FOUR TIMES DAILY OR AS DIRECTED 400 each 0    JANUVIA 50 MG tablet TAKE 1 TABLET(50 MG) BY MOUTH DAILY 90 tablet 3    meclizine (ANTIVERT) 12.5 MG tablet Take 1 tablet (12.5 mg) by mouth 3 times daily as needed for dizziness 60 tablet 6    metoprolol succinate ER (TOPROL XL) 100 MG 24 hr tablet Take 0.5 tablets (50 mg) by mouth daily Take 1/2 tablet  daily 45 tablet 3    pantoprazole (PROTONIX) 40 MG EC tablet Take 1 tablet (40 mg) by mouth every morning (before breakfast) 90 tablet 3    polyethylene glycol (MIRALAX) 17 GM/Dose powder MIX 17 GRAMS IN LIQUID AND TAKE BY MOUTH ONCE DAILY AS NEEDED FOR CONSTIPATION 510 g 6    rosuvastatin (CRESTOR) 20 MG tablet TAKE 1 TABLET(20 MG) BY MOUTH AT BEDTIME 90 tablet 0    SENNA-docusate sodium (SENNA S) 8.6-50 MG tablet Take 1 tablet by mouth 2 times daily as needed (for constipation) 60 tablet 11    sulfamethoxazole-trimethoprim (BACTRIM DS) 800-160 MG tablet Take 1 tablet by mouth 2 times daily for 35 days 70 tablet 0    tamsulosin (FLOMAX) 0.4 MG capsule Take 1 capsule (0.4 mg) by mouth daily 90 capsule 3    traZODone (DESYREL) 50 MG tablet Take 1-2 tablets ( mg) by mouth nightly as needed for sleep 180 tablet 3    triamcinolone (KENALOG) 0.1 % external cream Apply topically 2 times daily (Patient taking differently: Apply topically 2 times daily as needed for irritation) 80 g 3    allopurinol (ZYLOPRIM) 100 MG tablet TAKE 2 TABLETS(200 MG) BY MOUTH DAILY (Patient not taking: Reported on 5/20/2024) 180 tablet 3     History   Smoking Status    Former    Types: Cigarettes   Smokeless Tobacco    Former       OBJECTICE: /69   Pulse 109   Temp 98.6  F (37  C) (Oral)   Resp 16   Ht 1.48 m (4' 10.27\")   Wt 71.8 kg (158 " lb 3.2 oz)   SpO2 90%   BMI 32.76 kg/m       Recent Results (from the past 24 hour(s))   CBC with platelets    Collection Time: 05/20/24 12:10 PM   Result Value Ref Range    WBC Count 10.0 4.0 - 11.0 10e3/uL    RBC Count 3.76 (L) 4.40 - 5.90 10e6/uL    Hemoglobin 11.5 (L) 13.3 - 17.7 g/dL    Hematocrit 34.4 (L) 40.0 - 53.0 %    MCV 92 78 - 100 fL    MCH 30.6 26.5 - 33.0 pg    MCHC 33.4 31.5 - 36.5 g/dL    RDW 15.4 (H) 10.0 - 15.0 %    Platelet Count 210 150 - 450 10e3/uL        CV-regular rate and rhythm with no murmur    RESP-lungs clear to auscultation   ABDOMINAL-soft, nontender, no palpable masses or organomegaly        Chucho Espino MD         Signed Electronically by: Chucho Espino MD

## 2024-05-20 NOTE — PROGRESS NOTES
[facility] Care Coordination Contact    CC reached out to [facility] for a quote on wipes and gloves.    06/03/2024: Per [facility] wipes are not covered but getting quote on gloves.    06/03/2024: Daughter requested a month supplies of briefs - CC suggested buying at store seeing medical supplies often may decline and supplies may not get to [facility] in time. In the mean time CC will reach out to MD for order and [facility] and get quote.       06/04/2024: From [facility] regarding next two months of incontinent supplies:     From: Marco Neves <[email or web address]>   Sent: Tuesday, June 4, 2024 12:07 PM  To: Kristi Calzada <[email or web address]>  Subject: RE: incontinent supplies SO#[id number]       Kristi,  We will get the order sent out soon. We will run thru ANTONIO gonzalez.    THANK YOU AND HAVE A GREAT DAY   MARCO Calzada RN  [facility]  Cell Phone [phone]

## 2024-05-21 ENCOUNTER — TELEPHONE (OUTPATIENT)
Dept: UROLOGY | Facility: CLINIC | Age: 84
End: 2024-05-21
Payer: COMMERCIAL

## 2024-05-21 LAB
PATH REPORT.COMMENTS IMP SPEC: NORMAL
PATH REPORT.COMMENTS IMP SPEC: NORMAL
PATH REPORT.FINAL DX SPEC: NORMAL
PATH REPORT.GROSS SPEC: NORMAL
PATH REPORT.MICROSCOPIC SPEC OTHER STN: NORMAL
PATH REPORT.RELEVANT HX SPEC: NORMAL
PHOTO IMAGE: NORMAL

## 2024-05-21 NOTE — TELEPHONE ENCOUNTER
Patient's daughter confirmed scheduled appointment:  Date: July 22nd   Time: 12:30pm   Visit type: Return   Provider: Justa Falcon   Location: [place]   Testing/imaging: N/A  Additional notes: follow-up in 6 weeks with PVR, Uroflow, and path review with a PA - per check out notes. Offered appointments within 6 weeks, but days/times didn't work for the patient

## 2024-05-22 LAB — BACTERIA TISS BX CULT: ABNORMAL

## 2024-05-22 NOTE — CONFIDENTIAL NOTE
DIAGNOSIS:  Candida glabrata infection    DATE RECEIVED:  05.23.2024    NOTES (Gather within 2 years) STATUS DETAILS   OFFICE NOTE from referring provider   Internal 05.20.2024 Chucho Espino MD    OFFICE NOTE from other specialist     DISCHARGE SUMMARY from hospital     DISCHARGE REPORT from the ER     LABS (any labs) Internal    MEDICATION LIST Internal    IMAGING  (NEED IMAGES AND REPORTS)     Osteomyelitis: Foot imaging      Liver Abscess: Abdominal imaging     Other (anything related to diagnoses

## 2024-05-23 ENCOUNTER — NURSE TRIAGE (OUTPATIENT)
Dept: NURSING | Facility: CLINIC | Age: 84
End: 2024-05-23
Payer: COMMERCIAL

## 2024-05-23 ENCOUNTER — PRE VISIT (OUTPATIENT)
Dept: INFECTIOUS DISEASES | Facility: CLINIC | Age: 84
End: 2024-05-23
Payer: COMMERCIAL

## 2024-05-23 ENCOUNTER — OFFICE VISIT (OUTPATIENT)
Dept: INFECTIOUS DISEASES | Facility: CLINIC | Age: 84
End: 2024-05-23
Attending: FAMILY MEDICINE
Payer: COMMERCIAL

## 2024-05-23 VITALS
DIASTOLIC BLOOD PRESSURE: 85 MMHG | BODY MASS INDEX: 31.02 KG/M2 | TEMPERATURE: 98.1 F | WEIGHT: 158 LBS | SYSTOLIC BLOOD PRESSURE: 131 MMHG | OXYGEN SATURATION: 94 % | HEART RATE: 83 BPM | HEIGHT: 60 IN

## 2024-05-23 DIAGNOSIS — Z79.2 ENCOUNTER FOR LONG-TERM (CURRENT) USE OF ANTIBIOTICS: ICD-10-CM

## 2024-05-23 DIAGNOSIS — B37.9 CANDIDA GLABRATA INFECTION: Primary | ICD-10-CM

## 2024-05-23 DIAGNOSIS — K59.00 CONSTIPATION, UNSPECIFIED CONSTIPATION TYPE: ICD-10-CM

## 2024-05-23 DIAGNOSIS — B37.9 CANDIDA GLABRATA INFECTION: ICD-10-CM

## 2024-05-23 LAB — BACTERIA TISS BX CULT: NORMAL

## 2024-05-23 PROCEDURE — 99205 OFFICE O/P NEW HI 60 MIN: CPT | Mod: 24 | Performed by: INTERNAL MEDICINE

## 2024-05-23 PROCEDURE — G0463 HOSPITAL OUTPT CLINIC VISIT: HCPCS | Performed by: INTERNAL MEDICINE

## 2024-05-23 RX ORDER — ALBUTEROL SULFATE 90 UG/1
1-2 AEROSOL, METERED RESPIRATORY (INHALATION)
Start: 2024-05-28

## 2024-05-23 RX ORDER — METHYLPREDNISOLONE SODIUM SUCCINATE 125 MG/2ML
125 INJECTION, POWDER, LYOPHILIZED, FOR SOLUTION INTRAMUSCULAR; INTRAVENOUS
Start: 2024-05-28

## 2024-05-23 RX ORDER — ALBUTEROL SULFATE 0.83 MG/ML
2.5 SOLUTION RESPIRATORY (INHALATION)
OUTPATIENT
Start: 2024-05-28

## 2024-05-23 RX ORDER — HEPARIN SODIUM (PORCINE) LOCK FLUSH IV SOLN 100 UNIT/ML 100 UNIT/ML
5 SOLUTION INTRAVENOUS
OUTPATIENT
Start: 2024-05-28

## 2024-05-23 RX ORDER — HEPARIN SODIUM,PORCINE 10 UNIT/ML
5-20 VIAL (ML) INTRAVENOUS DAILY PRN
OUTPATIENT
Start: 2024-05-28

## 2024-05-23 RX ORDER — DIPHENHYDRAMINE HYDROCHLORIDE 50 MG/ML
50 INJECTION INTRAMUSCULAR; INTRAVENOUS
Start: 2024-05-28

## 2024-05-23 RX ORDER — EPINEPHRINE 1 MG/ML
0.3 INJECTION, SOLUTION, CONCENTRATE INTRAVENOUS EVERY 5 MIN PRN
OUTPATIENT
Start: 2024-05-28

## 2024-05-23 RX ORDER — MEPERIDINE HYDROCHLORIDE 25 MG/ML
25 INJECTION INTRAMUSCULAR; INTRAVENOUS; SUBCUTANEOUS EVERY 30 MIN PRN
OUTPATIENT
Start: 2024-05-28

## 2024-05-23 ASSESSMENT — PAIN SCALES - GENERAL: PAINLEVEL: MODERATE PAIN (5)

## 2024-05-23 NOTE — TELEPHONE ENCOUNTER
Dimitri Irvin,  thania. Had procedure last Thursday. Saw Chucho Espino MD Monday. Still urinating blood. They went in and removed some tissue on Prostate gland.  He sees disease MD today so will also have his son talk to that MD about it. Please call Dimitri at:  [phone].  May leave a detailed message.  Nurse Triage SBAR    Is this a 2nd Level Triage? YES, LICENSED PRACTITIONER REVIEW IS REQUIRED    Situation: Dimitri Irvin,  calling. Had procedure last Thursday. Saw Chucho Espino MD Monday. Still urinating blood. They went in and removed some tissue on Prostate gland.  He sees disease MD today so will also have his son talk to that MD about it. Please call Dimitri at:  [phone].  May leave a detailed message.    Background: prostate surgery. Still putting out blood in urine.    Assessment: hematuria still occurring post prostate surgery.    Protocol Recommended Disposition:   Call ADS/Go to ED/UCC Now (Or To Office with PCP Approval)    Recommendation: 2nd level triage to direct in level of care. Advised to increase fluid intake in the mean time.     Routed to provider    Does the patient meet one of the following criteria for ADS visit consideration? 16+ years old, with an [facility] PCP     TIP  Providers, please consider if this condition is appropriate for management at one of our Acute and Diagnostic Services sites.     If patient is a good candidate, please use dotphrase <dot>triageresponse and select Refer to ADS to document.  Frannie Mccurdy RN  [facility]    Reason for Disposition   Patient sounds very sick or weak to the triager    Additional Information   Negative: Shock suspected (e.g., cold/pale/clammy skin, too weak to stand, low BP, rapid pulse)   Negative: Sounds like a life-threatening emergency to the triager   Negative: Urinary catheter, questions about   Negative: Recent back or abdominal injury   Negative: Recent genital injury   Negative: Unable to urinate (or only a few  drops) > 4 hours and bladder feels very full (e.g., palpable bladder or strong urge to urinate)   Negative: Diffuse (all over) muscle pains in the shoulders, arms, legs, and back and dark (cola or tea-colored) or red-colored urine   Negative: Passing pure blood or large blood clots (i.e., larger than a dime or grape)   Negative: Fever > 100.4 F (38.0 C)    Protocols used: Urine - Blood In-A-OH

## 2024-05-23 NOTE — NURSING NOTE
"Chief Complaint   Patient presents with    New Patient     /85   Pulse 83   Temp 98.1  F (36.7  C) (Oral)   Ht 1.473 m (4' 10\")   Wt 71.7 kg (158 lb)   SpO2 94%   BMI 33.02 kg/m    Veronica Cummings MA on 5/23/2024 at 2:05 PM    "

## 2024-05-23 NOTE — LETTER
5/23/2024       RE: Adam Talamantes  [address]  [place], [place] [address]     Dear Colleague,    Thank you for referring your patient, Adam Talamantes, to the [facility] [place] at [facility]. Please see a copy of my visit note below.      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  - CT abd/pelvis 5/15 with 3.1 x 3 x 3.7 cm rim-enhancing lesion of right prostate gland concerning for prostatic abscess, s/p TURP on 5/16 with urology with benign histopathology report and prostatic tissue cultures +Candida glabrata (resistant to fluconazole); was discharged 5/19 on PO Bactrim, and then was subsequently referred to ID clinic due to finding of fluconazole-resistant C.glabrata  CKD  BPH  T2DM    Discussion:   Discussed with patient and his son that findings are concerning for possible Candida glabrata prostatitis which is resistant to oral fluconazole but susceptible to IV micafungin per culture data report. Overall Candida prostatitis c/b prostatic abscess is highly unusual infection, but a possible complication of candiduria and/or complication of indwelling parrish or urinary tract manipulation procedures. Candida can alternatively cause of asymptomatic candiduria due to  colonization, but in his case he did appear to have urinary symptoms and findings concerning for prostatic abscess so antifungal treatment does seem warranted in his case. Otherwise, he did undergo TURP by urology for some component of source control, though he did not receive any antifungal therapy for  prostatitis so concurrent treatment with antifungal course would be reasonable for prostatitis to help prevent prostatic abscess recurrence. Unfortunately the C.glabrata that grew was resistant to fluconazole which would have been our antifungal of choice for its high oral bioavailability, tolerability, and urinary concentrations. Voriconazole (DAVE 8) has unclear role in treatment of this given limited experience for this use. That leaves the IV options -- Micafungin was susceptible in vitro so may be best alternative due to its favorable tolerability profile and given that there may be some level of concentrations achieved in the prostate in setting of inflammation in setting of prostatitis. IV ampho would also be an option and could achieve high concentrations for this but not ideal in his case given his underlying CKD it would be more likely to cause worsening renal/electrolyte adverse effects so risks of IV ampho would likely outweigh benefits and thus would be best avoided in his case unless he were to decompensate.      Recs:  - Start IV micafungin 100 mg q24hr with PICC line placement and lab monitoring as noted below; plan for tentative 6-week course with final course to be determined at ID clinic follow up visit    - continue to follow with urology and primary care provider for remaining management  - follow up in ID clinic in 1 month    Outpatient Parenteral Antimicrobial Therapy/ID Follow up    Infectious Diseases Indication: Candida glabrata prostatitis    Antibiotic Information  Name of Antibiotic Dose of Antibiotic1 Anticipated duration   Micafungin  100mg q24hr  4-6 weeks (TBD at ID clinic follow up appointment)             1.Dose of antibiotic will need to be renally adjusted if creatinine clearance changes    Method of antibiotic delivery:To be determined.Will the line be used for another indication besides antimicrobials? No At the end of therapy should the line used for antimicrobials be removed  "or de-accessed? Yes. Selecting \"yes\" will function as written order to remove PICC line or de-access the indwelling line at the end of therapy.    Weekly labs required: CBC with diff and AST, ALT, creatinine    Imaging for ID follow up: ID Imaging: No.     We will attempt to make OPAT appointments within 2 weeks of initiation of antimicrobials based on clinic availability.  Provider: Trever, timing of visit  within 4-6 weeks , and the type of clinic appointment visit In-Person visit.     ID provider route note: OPAT RN Care Coordinator [facility] ID Clinic Information:  Phone: [phone]  Fax: [phone] (Attention ID Clinic Nurses)        I communicated with the patient and son (Navin) and ID clinic nurse at this visit.      Patient was seen on 05/23/2024 in ID clinic.    Medical Decision Making   60 minutes spent by me on the date of the encounter doing chart review, history and exam, documentation and further activities per the note    Dk Perera MD   Infectious Diseases              Subjective       HPI:  Adam is a 84 year old male with PMH including CVA, T2DM, CKD, BPH, prostatitis who presents to ID clinic due to concern for Candida glabrata prostatitis.     Per review of records, patient was recently [facility] to [facility] 5/11-5/19 for urinary infection and was found to have hydronephrosis on ultrasound and on 5/15 CT had a 3.1 x 3 x 3.7 cm rim-enhancing lesion of right prostate gland concerning for prostatic abscess, s/p TURP on 5/16 with urology with benign histopathology and prostatic tissue cultures +Candida glabrata (resistant to fluconazole). Patient was discharged on PO Bactrim and then was seen by his primary care provider Dr. Espino on 5/20 who referred patient to ID clinic due to the finding of C.glabrata in the prostate tissue cultures that was resistant to fluconazole.     Patient with son who interpreted and declined phone Kayce  (because " patient speaks a sub-dialect of Kayce called echo-Kayce). No urinary pain, +hematuria (tea colored) which has decreased somewhat since his hospitalization. Still also with some mild suprapubic pain since discharge. No fevers/chills at home. 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 Lives with his wife and with daughter in [place], [place] (daughter is a nurse who could help with home infusions if needed).  No parrish currently - was removed just prior to his discharge from the hospital). No known antimicrobial allergies. No prior history of TB. Moved from [facility] in , no recent travel back (he was hoping to travel back but then had a stroke a few years ago). Family did provide our clinic nurse with contact info for patient's daughter and son to help coordinate PICC placement and IV antifungal initiation.       PAST MEDICAL HISTORY  Past Medical History:   Diagnosis Date    Acute blood loss anemia     Acute renal failure (H24)     Anemia     Bacteremia     Cataract     Chronic gout     CKD (chronic kidney disease)     Stage 3    DM2 (diabetes mellitus, type 2) (H)     GERD (gastroesophageal reflux disease)     Glucose intolerance (impaired glucose tolerance)     Gout     History of nephrolithiasis     History of prostatitis 2017    Hyperlipidemia     Hypertension     Insomnia     Intestinal disaccharidase deficiencies and disaccharide malabsorption     Created by Conversion     Latent tuberculosis     Nephrolithiasis     Neuropathy     Nonspecific abnormal findings on radiological and other examination of skull and head     Created by [facility] Annotation: 2013 11:23PM - Giulia Meridai/10/2011:  severe stenosis both posterior cerebral arteries seen MRI/MRA done for  vertigo. No acute changes.e*    Osteoarthritis     wrist, knee, shoulder    Prostatitis     Rectal bleed     Trigger thumb      Otherwise as per HPI    PAST SURGICAL HISTORY  Past Surgical History:   Procedure Laterality  "Date    CYSTOSCOPY, TRANSURETHRAL RESECTION (TUR) PROSTATE, COMBINED N/A 2024    Procedure: Transurethral Resection of Prostate Abscess;  Surgeon: Bernaeb Fletcher MD;  Location: [facility] OR    IR MISCELLANEOUS PROCEDURE  2009    IR MISCELLANEOUS PROCEDURE  2009    IR MISCELLANEOUS PROCEDURE  2009    IR MISCELLANEOUS PROCEDURE  2009    IR NEPHROURETERAL TUBE PLACEMENT BILATERAL  2009    IR URETER DILATION BILATERAL  2009    IR URETER DILATION BILATERAL  2009    KIDNEY STONE SURGERY      PICC  3/6/2016          Otherwise as per HPI    ALLERGIES AND DRUG REACTIONS  Allergies   Allergen Reactions    Lisinopril Cough       FAMILY HISTORY  No known immunocompromising conditions or infections unless listed below  family history includes Cerebrovascular Disease in his daughter and father.    SOCIAL AND FUNCTIONAL HISTORY  Social History     Tobacco Use    Smoking status: Former     Current packs/day: 0.00     Types: Cigarettes     Quit date: 3/10/2000     Years since quittin.2     Passive exposure: Never    Smokeless tobacco: Former    Tobacco comments:     no passive exposure   Vaping Use    Vaping status: Never Used   Substance Use Topics    Alcohol use: No    Drug use: No   Moved from [facility] in 2006  Lives with wife and daughter  Otherwise as per HPI    CURRENT ANTIMICROBIALS  Other medications reviewed in EPIC  Bactrim    REVIEW OF SYSTEMS  ROS as above.      Objective   PHYSICAL EXAMINATION     height is 1.473 m (4' 10\") and weight is 71.7 kg (158 lb). His oral temperature is 98.1  F (36.7  C). His blood pressure is 131/85 and his pulse is 83. His oxygen saturation is 94%.     Constitutional:  elderly male sitting in wheelchair, not in acute distress  Pulmonary: unlabored breathing on room air  Gastrointestinal: abdomen non-distended   Genitourinary: mild suprapubic tenderness, no CVA tenderness, no indwelling parrish  Skin: no acute rashes visible on exposed skin  Neurologic: awake, " alert and interactive      Other Significant Information (Labs, cultures, radiology, etc)      Recent micro reviewed:   5/16/24 prostate tissue cultures:   Susceptibility     Candida glabrata complex     DAVE     Amphotericin B 1 ug/mL No interpretation available     Fluconazole >128 ug/mL Resistant     Micafungin 0.06 ug/mL Susceptible     Voriconazole 8 ug/mL No interpretation available                         Recent radiology reviewed:      5/11/24 ultrasound report:   IMPRESSION:  1.  Moderate left and mild right hydronephrosis, similar to findings  on prior CT 2/8/2023.  2.  Increased echogenicity is nonspecific but consistent with medical  renal disease.    5/15/24 CT abd/pelvis report:  Impression:   1. 3.1 x 3.0 x 3.7 cm rim-enhancing lesion involving the base-mid  right prostate gland with abutment of the inferior aspect of the  urinary bladder. Findings are concerning for a prostatic abscess.  2. Circumferential urinary bladder wall thickening with mild adjacent  fat stranding, suggestive of cystitis.  3. Stable indeterminate 1.3 cm hypoattenuating lesion in the right  hepatic lobe, which demonstrated peripheral enhancement on delayed  postcontrast imaging on CT dated 6/13/2023. Consider further  evaluation with MRI on a non-emergent basis.   4. 2.0 cm dense cystic lesion in the inferior pole of the left kidney,  likely representing a hemorrhagic/proteinaceous cyst. This can be  further characterized on future MRI.  5. Bilateral renal cortical atrophy.    Dk Perera MD

## 2024-05-23 NOTE — TELEPHONE ENCOUNTER
Chucho Espino MD Physician Signed3:04 PM        Please inform the daughter that I can refill the pain medication but not now since the last prescription was just done 3 days ago.  She should call or request it from the pharmacy when they are down to just the last few pills.  Thanks.        Returned call to dgtr to relay provider recommendation above. Dgtr verbalized understood.    Rey Goel RN  [facility]

## 2024-05-23 NOTE — PROGRESS NOTES
"Outpatient Parenteral Antimicrobial Therapy/ID Follow up     Infectious Diseases Indication: Candida glabrata prostatitis     Antibiotic Information  Name of Antibiotic Dose of Antibiotic1 Anticipated duration   Micafungin  100mg q24hr  4-6 weeks (TBD at ID clinic follow up appointment)                   1.Dose of antibiotic will need to be renally adjusted if creatinine clearance changes     Method of antibiotic delivery:To be determined.Will the line be used for another indication besides antimicrobials? No At the end of therapy should the line used for antimicrobials be removed or de-accessed? Yes. Selecting \"yes\" will function as written order to remove PICC line or de-access the indwelling line at the end of therapy.     Weekly labs required: CBC with diff, SCr and LFTs     Imaging for ID follow up: ID Imaging: No.      We will attempt to make OPAT appointments within 2 weeks of initiation of antimicrobials based on clinic availability.  Provider: Trever, timing of visit  within 4-6 weeks , and the type of clinic appointment visit In-Person visit.      ID provider route note: OPAT RN Care Coordinator [facility] ID Clinic Information:  Phone: [phone]  Fax: [phone] (Attention ID Clinic Nurses)           "

## 2024-05-23 NOTE — TELEPHONE ENCOUNTER
It is normal to see blood in the urine intermittently after the TURP procedure that he had in the hospital.  Even though sometimes it looks like a large amount of blood, it is not a dangerous amount of blood.  It should slowly improve, and if it seems to be getting progressively worse over the next few days then the daughter should call the urology office and let them know about it to get any further instructions.  Thanks.

## 2024-05-23 NOTE — TELEPHONE ENCOUNTER
Please inform the daughter that I can refill the pain medication but not now since the last prescription was just done 3 days ago.  She should call or request it from the pharmacy when they are down to just the last few pills.  Thanks.

## 2024-05-23 NOTE — TELEPHONE ENCOUNTER
Provider Recommendation Follow Up:   Reached patient/caregiver. Informed of provider's recommendations. Patient verbalized understanding and agrees with the plan.       Per daughter, they would like to ask Dr. Espino if he is able to refill the NORCO pain medication for patient. Daughter has been giving it twice a day, as it appears to help with pain better than giving it once a day. They have about 1 week of the pain medication. Daughter would like to ask if Dr. Espino able to give more refills for NORCO for patient to last 2-3 more weeks?      JONATHAN GarrettN, RN   [facility]

## 2024-05-23 NOTE — PROGRESS NOTES
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  - CT abd/pelvis 5/15 with 3.1 x 3 x 3.7 cm rim-enhancing lesion of right prostate gland concerning for prostatic abscess, s/p TURP on 5/16 with urology with benign histopathology report and prostatic tissue cultures +Candida glabrata (resistant to fluconazole); was discharged 5/19 on PO Bactrim, and then was subsequently referred to ID clinic due to finding of fluconazole-resistant C.glabrata  CKD  BPH  T2DM    Discussion:   Discussed with patient and his son that findings are concerning for possible Candida glabrata prostatitis which is resistant to oral fluconazole but susceptible to IV micafungin per culture data report. Overall Candida prostatitis c/b prostatic abscess is highly unusual infection, but a possible complication of candiduria and/or complication of indwelling parrish or urinary tract manipulation procedures. Candida can alternatively cause of asymptomatic candiduria due to  colonization, but in his case he did appear to have urinary symptoms and findings concerning for prostatic abscess so antifungal treatment does seem warranted in his case. Otherwise, he did undergo TURP by urology for some component of source control, though he did not receive any antifungal therapy for prostatitis so concurrent treatment with antifungal course would be reasonable for prostatitis to help prevent prostatic abscess recurrence. Unfortunately the C.glabrata that grew was resistant to fluconazole which would have been our antifungal of choice for its high oral bioavailability, tolerability, and urinary  "concentrations. Voriconazole (DAVE 8) has unclear role in treatment of this given limited experience for this use. That leaves the IV options -- Micafungin was susceptible in vitro so may be best alternative due to its favorable tolerability profile and given that there may be some level of concentrations achieved in the prostate in setting of inflammation in setting of prostatitis. IV ampho would also be an option and could achieve high concentrations for this but not ideal in his case given his underlying CKD it would be more likely to cause worsening renal/electrolyte adverse effects so risks of IV ampho would likely outweigh benefits and thus would be best avoided in his case unless he were to decompensate.      Recs:  - Start IV micafungin 100 mg q24hr with PICC line placement and lab monitoring as noted below; plan for tentative 6-week course with final course to be determined at ID clinic follow up visit    - continue to follow with urology and primary care provider for remaining management  - follow up in ID clinic in 1 month    Outpatient Parenteral Antimicrobial Therapy/ID Follow up    Infectious Diseases Indication: Candida glabrata prostatitis    Antibiotic Information  Name of Antibiotic Dose of Antibiotic1 Anticipated duration   Micafungin  100mg q24hr  4-6 weeks (TBD at ID clinic follow up appointment)             1.Dose of antibiotic will need to be renally adjusted if creatinine clearance changes    Method of antibiotic delivery:To be determined.Will the line be used for another indication besides antimicrobials? No At the end of therapy should the line used for antimicrobials be removed or de-accessed? Yes. Selecting \"yes\" will function as written order to remove PICC line or de-access the indwelling line at the end of therapy.    Weekly labs required: CBC with diff and AST, ALT, creatinine    Imaging for ID follow up: ID Imaging: No.     We will attempt to make OPAT appointments within 2 weeks of " initiation of antimicrobials based on clinic availability.  Provider: Trever, timing of visit  within 4-6 weeks , and the type of clinic appointment visit In-Person visit.     ID provider route note: JORDAN RN Care Coordinator [facility] ID Clinic Information:  Phone: [phone]  Fax: [phone] (Attention ID Clinic Nurses)        I communicated with the patient and son (Navin) and ID clinic nurse at this visit.      Patient was seen on 05/23/2024 in ID clinic.    Medical Decision Making    60 minutes spent by me on the date of the encounter doing chart review, history and exam, documentation and further activities per the note    Dk Perera MD   Infectious Diseases              Subjective       HPI:  Adam is a 84 year old male with PMH including CVA, T2DM, CKD, BPH, prostatitis who presents to ID clinic due to concern for Candida glabrata prostatitis.     Per review of records, patient was recently admitted to [facility] 5/11-5/19 for urinary infection and was found to have hydronephrosis on ultrasound and on 5/15 CT had a 3.1 x 3 x 3.7 cm rim-enhancing lesion of right prostate gland concerning for prostatic abscess, s/p TURP on 5/16 with urology with benign histopathology and prostatic tissue cultures +Candida glabrata (resistant to fluconazole). Patient was discharged on PO Bactrim and then was seen by his primary care provider Dr. Espino on 5/20 who referred patient to ID clinic due to the finding of C.glabrata in the prostate tissue cultures that was resistant to fluconazole.     Patient with son who interpreted and declined phone Kayce  (because patient speaks a sub-dialect of Kayce called Lina). No urinary pain, +hematuria (tea colored) which has decreased somewhat since his hospitalization. Still also with some mild suprapubic pain since discharge. No fevers/chills at home. 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 Lives with his wife and with  daughter in [place], [place] (daughter is a nurse who could help with home infusions if needed).  No parrish currently - was removed just prior to his discharge from the hospital). No known antimicrobial allergies. No prior history of TB. Moved from [facility] in , no recent travel back (he was hoping to travel back but then had a stroke a few years ago). Family did provide our clinic nurse with contact info for patient's daughter and son to help coordinate PICC placement and IV antifungal initiation.       PAST MEDICAL HISTORY  Past Medical History:   Diagnosis Date    Acute blood loss anemia     Acute renal failure (H24)     Anemia     Bacteremia     Cataract     Chronic gout     CKD (chronic kidney disease)     Stage 3    DM2 (diabetes mellitus, type 2) (H)     GERD (gastroesophageal reflux disease)     Glucose intolerance (impaired glucose tolerance)     Gout     History of nephrolithiasis     History of prostatitis 2017    Hyperlipidemia     Hypertension     Insomnia     Intestinal disaccharidase deficiencies and disaccharide malabsorption     Created by Conversion     Latent tuberculosis     Nephrolithiasis     Neuropathy     Nonspecific abnormal findings on radiological and other examination of skull and head     Created by [facility] Annotation: 2013 11:23PM - Giulia Meridai/10/2011:  severe stenosis both posterior cerebral arteries seen MRI/MRA done for  vertigo. No acute changes.e*    Osteoarthritis     wrist, knee, shoulder    Prostatitis     Rectal bleed     Trigger thumb      Otherwise as per HPI    PAST SURGICAL HISTORY  Past Surgical History:   Procedure Laterality Date    CYSTOSCOPY, TRANSURETHRAL RESECTION (TUR) PROSTATE, COMBINED N/A 2024    Procedure: Transurethral Resection of Prostate Abscess;  Surgeon: Bernabe Fletcher MD;  Location: [facility] OR    IR MISCELLANEOUS PROCEDURE  2009    IR MISCELLANEOUS PROCEDURE  2009    IR MISCELLANEOUS PROCEDURE  2009     "IR MISCELLANEOUS PROCEDURE  2009    IR NEPHROURETERAL TUBE PLACEMENT BILATERAL  2009    IR URETER DILATION BILATERAL  2009    IR URETER DILATION BILATERAL  2009    KIDNEY STONE SURGERY      PICC  3/6/2016          Otherwise as per HPI    ALLERGIES AND DRUG REACTIONS  Allergies   Allergen Reactions    Lisinopril Cough       FAMILY HISTORY  No known immunocompromising conditions or infections unless listed below  family history includes Cerebrovascular Disease in his daughter and father.    SOCIAL AND FUNCTIONAL HISTORY  Social History     Tobacco Use    Smoking status: Former     Current packs/day: 0.00     Types: Cigarettes     Quit date: 3/10/2000     Years since quittin.2     Passive exposure: Never    Smokeless tobacco: Former    Tobacco comments:     no passive exposure   Vaping Use    Vaping status: Never Used   Substance Use Topics    Alcohol use: No    Drug use: No   Moved from [facility] in 2006  Lives with wife and daughter  Otherwise as per HPI    CURRENT ANTIMICROBIALS  Other medications reviewed in EPIC  Bactri    REVIEW OF SYSTEMS  ROS as above.      Objective   PHYSICAL EXAMINATION     height is 1.473 m (4' 10\") and weight is 71.7 kg (158 lb). His oral temperature is 98.1  F (36.7  C). His blood pressure is 131/85 and his pulse is 83. His oxygen saturation is 94%.     Constitutional:  elderly male sitting in wheelchair, not in acute distress  Pulmonary: unlabored breathing on room air  Gastrointestinal: abdomen non-distended   Genitourinary: mild suprapubic tenderness, no CVA tenderness, no indwelling parrish  Skin: no acute rashes visible on exposed skin  Neurologic: awake, alert and interactive      Other Significant Information (Labs, cultures, radiology, etc)      Recent micro reviewed:   24 prostate tissue cultures:   Susceptibility     Candida glabrata complex     DAVE     Amphotericin B 1 ug/mL No interpretation available     Fluconazole >128 ug/mL Resistant     Micafungin " 0.06 ug/mL Susceptible     Voriconazole 8 ug/mL No interpretation available                         Recent radiology reviewed:      5/11/24 ultrasound report:   IMPRESSION:  1.  Moderate left and mild right hydronephrosis, similar to findings  on prior CT 2/8/2023.  2.  Increased echogenicity is nonspecific but consistent with medical  renal disease.    5/15/24 CT abd/pelvis report:  Impression:   1. 3.1 x 3.0 x 3.7 cm rim-enhancing lesion involving the base-mid  right prostate gland with abutment of the inferior aspect of the  urinary bladder. Findings are concerning for a prostatic abscess.  2. Circumferential urinary bladder wall thickening with mild adjacent  fat stranding, suggestive of cystitis.  3. Stable indeterminate 1.3 cm hypoattenuating lesion in the right  hepatic lobe, which demonstrated peripheral enhancement on delayed  postcontrast imaging on CT dated 6/13/2023. Consider further  evaluation with MRI on a non-emergent basis.   4. 2.0 cm dense cystic lesion in the inferior pole of the left kidney,  likely representing a hemorrhagic/proteinaceous cyst. This can be  further characterized on future MRI.  5. Bilateral renal cortical atrophy.

## 2024-05-28 ENCOUNTER — APPOINTMENT (OUTPATIENT)
Dept: CT IMAGING | Facility: CLINIC | Age: 84
DRG: 868 | End: 2024-05-28
Attending: EMERGENCY MEDICINE
Payer: COMMERCIAL

## 2024-05-28 ENCOUNTER — HOSPITAL ENCOUNTER (INPATIENT)
Facility: CLINIC | Age: 84
LOS: 5 days | Discharge: HOME HEALTH | DRG: 868 | End: 2024-06-02
Attending: EMERGENCY MEDICINE | Admitting: INTERNAL MEDICINE
Payer: COMMERCIAL

## 2024-05-28 DIAGNOSIS — N18.9 ACUTE ON CHRONIC RENAL INSUFFICIENCY: ICD-10-CM

## 2024-05-28 DIAGNOSIS — R41.0 CONFUSION: ICD-10-CM

## 2024-05-28 DIAGNOSIS — N48.89 PENIS PAIN: ICD-10-CM

## 2024-05-28 DIAGNOSIS — R42 DIZZINESSES: ICD-10-CM

## 2024-05-28 DIAGNOSIS — G89.18 ACUTE POST-OPERATIVE PAIN: ICD-10-CM

## 2024-05-28 DIAGNOSIS — R33.8 ACUTE URINARY RETENTION: ICD-10-CM

## 2024-05-28 DIAGNOSIS — R31.9 HEMATURIA, UNSPECIFIED TYPE: ICD-10-CM

## 2024-05-28 DIAGNOSIS — N28.9 ACUTE ON CHRONIC RENAL INSUFFICIENCY: ICD-10-CM

## 2024-05-28 DIAGNOSIS — R33.9 URINARY RETENTION: ICD-10-CM

## 2024-05-28 DIAGNOSIS — N41.0 ACUTE PROSTATITIS: ICD-10-CM

## 2024-05-28 DIAGNOSIS — M79.661 PAIN OF RIGHT LOWER LEG: ICD-10-CM

## 2024-05-28 DIAGNOSIS — M54.2 CERVICALGIA: ICD-10-CM

## 2024-05-28 DIAGNOSIS — N39.0 COMPLICATED UTI (URINARY TRACT INFECTION): Primary | ICD-10-CM

## 2024-05-28 DIAGNOSIS — E87.5 HYPERKALEMIA: ICD-10-CM

## 2024-05-28 DIAGNOSIS — M25.551 RIGHT HIP PAIN: ICD-10-CM

## 2024-05-28 LAB
ALBUMIN SERPL BCG-MCNC: 4.2 G/DL (ref 3.5–5.2)
ALBUMIN UR-MCNC: 70 MG/DL
ALP SERPL-CCNC: 110 U/L (ref 40–150)
ALT SERPL W P-5'-P-CCNC: 20 U/L (ref 0–70)
ANION GAP SERPL CALCULATED.3IONS-SCNC: 14 MMOL/L (ref 7–15)
APPEARANCE UR: ABNORMAL
APTT PPP: 41 SECONDS (ref 22–38)
AST SERPL W P-5'-P-CCNC: 23 U/L (ref 0–45)
BASOPHILS # BLD AUTO: 0.1 10E3/UL (ref 0–0.2)
BASOPHILS NFR BLD AUTO: 0 %
BILIRUB SERPL-MCNC: 0.2 MG/DL
BILIRUB UR QL STRIP: NEGATIVE
BUN SERPL-MCNC: 41.2 MG/DL (ref 8–23)
CALCIUM SERPL-MCNC: 10.3 MG/DL (ref 8.8–10.2)
CHLORIDE SERPL-SCNC: 98 MMOL/L (ref 98–107)
COLOR UR AUTO: ABNORMAL
CREAT SERPL-MCNC: 2.69 MG/DL (ref 0.67–1.17)
CRP SERPL-MCNC: 13.4 MG/L
DEPRECATED HCO3 PLAS-SCNC: 19 MMOL/L (ref 22–29)
EGFRCR SERPLBLD CKD-EPI 2021: 23 ML/MIN/1.73M2
EOSINOPHIL # BLD AUTO: 0.4 10E3/UL (ref 0–0.7)
EOSINOPHIL NFR BLD AUTO: 4 %
ERYTHROCYTE [DISTWIDTH] IN BLOOD BY AUTOMATED COUNT: 15.9 % (ref 10–15)
GLUCOSE SERPL-MCNC: 178 MG/DL (ref 70–99)
GLUCOSE UR STRIP-MCNC: NEGATIVE MG/DL
HCT VFR BLD AUTO: 38.8 % (ref 40–53)
HGB BLD-MCNC: 12.6 G/DL (ref 13.3–17.7)
HGB UR QL STRIP: ABNORMAL
IMM GRANULOCYTES # BLD: 0.1 10E3/UL
IMM GRANULOCYTES NFR BLD: 1 %
INR PPP: 1.56 (ref 0.85–1.15)
KETONES UR STRIP-MCNC: NEGATIVE MG/DL
LACTATE SERPL-SCNC: 2.4 MMOL/L (ref 0.7–2)
LEUKOCYTE ESTERASE UR QL STRIP: ABNORMAL
LYMPHOCYTES # BLD AUTO: 2.5 10E3/UL (ref 0.8–5.3)
LYMPHOCYTES NFR BLD AUTO: 22 %
MCH RBC QN AUTO: 30.2 PG (ref 26.5–33)
MCHC RBC AUTO-ENTMCNC: 32.5 G/DL (ref 31.5–36.5)
MCV RBC AUTO: 93 FL (ref 78–100)
MONOCYTES # BLD AUTO: 0.9 10E3/UL (ref 0–1.3)
MONOCYTES NFR BLD AUTO: 8 %
NEUTROPHILS # BLD AUTO: 7.3 10E3/UL (ref 1.6–8.3)
NEUTROPHILS NFR BLD AUTO: 65 %
NITRATE UR QL: NEGATIVE
NRBC # BLD AUTO: 0 10E3/UL
NRBC BLD AUTO-RTO: 0 /100
PH UR STRIP: 6 [PH] (ref 5–7)
PLATELET # BLD AUTO: 288 10E3/UL (ref 150–450)
POTASSIUM SERPL-SCNC: 5.9 MMOL/L (ref 3.4–5.3)
PROCALCITONIN SERPL IA-MCNC: 0.07 NG/ML
PROT SERPL-MCNC: 9.1 G/DL (ref 6.4–8.3)
RBC # BLD AUTO: 4.17 10E6/UL (ref 4.4–5.9)
RBC URINE: >182 /HPF
SODIUM SERPL-SCNC: 131 MMOL/L (ref 135–145)
SP GR UR STRIP: 1.02 (ref 1–1.03)
TROPONIN T SERPL HS-MCNC: 13 NG/L
UROBILINOGEN UR STRIP-MCNC: NORMAL MG/DL
WBC # BLD AUTO: 11.3 10E3/UL (ref 4–11)
WBC URINE: >182 /HPF

## 2024-05-28 PROCEDURE — 80053 COMPREHEN METABOLIC PANEL: CPT | Performed by: EMERGENCY MEDICINE

## 2024-05-28 PROCEDURE — 250N000012 HC RX MED GY IP 250 OP 636 PS 637: Performed by: EMERGENCY MEDICINE

## 2024-05-28 PROCEDURE — 85610 PROTHROMBIN TIME: CPT | Performed by: EMERGENCY MEDICINE

## 2024-05-28 PROCEDURE — 85025 COMPLETE CBC W/AUTO DIFF WBC: CPT | Performed by: EMERGENCY MEDICINE

## 2024-05-28 PROCEDURE — 70450 CT HEAD/BRAIN W/O DYE: CPT | Mod: 26 | Performed by: RADIOLOGY

## 2024-05-28 PROCEDURE — 84145 PROCALCITONIN (PCT): CPT | Performed by: EMERGENCY MEDICINE

## 2024-05-28 PROCEDURE — 72125 CT NECK SPINE W/O DYE: CPT

## 2024-05-28 PROCEDURE — 84484 ASSAY OF TROPONIN QUANT: CPT | Performed by: EMERGENCY MEDICINE

## 2024-05-28 PROCEDURE — 93010 ELECTROCARDIOGRAM REPORT: CPT | Performed by: EMERGENCY MEDICINE

## 2024-05-28 PROCEDURE — 70450 CT HEAD/BRAIN W/O DYE: CPT

## 2024-05-28 PROCEDURE — 81001 URINALYSIS AUTO W/SCOPE: CPT | Performed by: EMERGENCY MEDICINE

## 2024-05-28 PROCEDURE — 85730 THROMBOPLASTIN TIME PARTIAL: CPT | Performed by: EMERGENCY MEDICINE

## 2024-05-28 PROCEDURE — 258N000003 HC RX IP 258 OP 636: Performed by: EMERGENCY MEDICINE

## 2024-05-28 PROCEDURE — 87086 URINE CULTURE/COLONY COUNT: CPT | Performed by: EMERGENCY MEDICINE

## 2024-05-28 PROCEDURE — 99291 CRITICAL CARE FIRST HOUR: CPT | Mod: 25 | Performed by: EMERGENCY MEDICINE

## 2024-05-28 PROCEDURE — 86140 C-REACTIVE PROTEIN: CPT | Performed by: EMERGENCY MEDICINE

## 2024-05-28 PROCEDURE — 83605 ASSAY OF LACTIC ACID: CPT | Performed by: EMERGENCY MEDICINE

## 2024-05-28 PROCEDURE — 93005 ELECTROCARDIOGRAM TRACING: CPT | Performed by: EMERGENCY MEDICINE

## 2024-05-28 PROCEDURE — 36415 COLL VENOUS BLD VENIPUNCTURE: CPT | Performed by: EMERGENCY MEDICINE

## 2024-05-28 PROCEDURE — 72125 CT NECK SPINE W/O DYE: CPT | Mod: 26 | Performed by: RADIOLOGY

## 2024-05-28 PROCEDURE — 96360 HYDRATION IV INFUSION INIT: CPT | Performed by: EMERGENCY MEDICINE

## 2024-05-28 PROCEDURE — 99291 CRITICAL CARE FIRST HOUR: CPT | Performed by: EMERGENCY MEDICINE

## 2024-05-28 PROCEDURE — 120N000002 HC R&B MED SURG/OB UMMC

## 2024-05-28 PROCEDURE — 250N000011 HC RX IP 250 OP 636: Performed by: EMERGENCY MEDICINE

## 2024-05-28 RX ORDER — DEXTROSE MONOHYDRATE 25 G/50ML
25-50 INJECTION, SOLUTION INTRAVENOUS
Status: DISCONTINUED | OUTPATIENT
Start: 2024-05-28 | End: 2024-06-02 | Stop reason: HOSPADM

## 2024-05-28 RX ORDER — ALBUTEROL SULFATE 5 MG/ML
10 SOLUTION RESPIRATORY (INHALATION) ONCE
Status: COMPLETED | OUTPATIENT
Start: 2024-05-28 | End: 2024-05-29

## 2024-05-28 RX ORDER — CEFTRIAXONE 1 G/1
1 INJECTION, POWDER, FOR SOLUTION INTRAMUSCULAR; INTRAVENOUS ONCE
Qty: 10 ML | Refills: 0 | Status: COMPLETED | OUTPATIENT
Start: 2024-05-29 | End: 2024-05-29

## 2024-05-28 RX ORDER — CALCIUM GLUCONATE 20 MG/ML
1 INJECTION, SOLUTION INTRAVENOUS ONCE
Status: COMPLETED | OUTPATIENT
Start: 2024-05-28 | End: 2024-05-29

## 2024-05-28 RX ORDER — FUROSEMIDE 10 MG/ML
40 INJECTION INTRAMUSCULAR; INTRAVENOUS ONCE
Status: COMPLETED | OUTPATIENT
Start: 2024-05-28 | End: 2024-05-29

## 2024-05-28 RX ORDER — DEXTROSE MONOHYDRATE 25 G/50ML
25 INJECTION, SOLUTION INTRAVENOUS ONCE
Status: COMPLETED | OUTPATIENT
Start: 2024-05-28 | End: 2024-05-29

## 2024-05-28 RX ORDER — NICOTINE POLACRILEX 4 MG
15-30 LOZENGE BUCCAL
Status: DISCONTINUED | OUTPATIENT
Start: 2024-05-28 | End: 2024-06-02 | Stop reason: HOSPADM

## 2024-05-28 RX ADMIN — CEFTRIAXONE SODIUM 1 G: 1 INJECTION, POWDER, FOR SOLUTION INTRAMUSCULAR; INTRAVENOUS at 23:50

## 2024-05-28 RX ADMIN — SODIUM CHLORIDE 7 UNITS: 9 INJECTION, SOLUTION INTRAVENOUS at 23:56

## 2024-05-28 RX ADMIN — CALCIUM GLUCONATE 1 G: 20 INJECTION, SOLUTION INTRAVENOUS at 23:54

## 2024-05-28 RX ADMIN — SODIUM CHLORIDE 1000 ML: 9 INJECTION, SOLUTION INTRAVENOUS at 22:17

## 2024-05-28 ASSESSMENT — ACTIVITIES OF DAILY LIVING (ADL)
ADLS_ACUITY_SCORE: 38
ADLS_ACUITY_SCORE: 36
ADLS_ACUITY_SCORE: 38

## 2024-05-28 NOTE — LETTER
Transition Communication Hand-off for Care Transitions to Next Level of Care Provider    Name: Adam Talamantes  : 1940  MRN #: [id number]  Primary Care Provider: Chucho Espino     Primary Clinic: [address] [place] [address]     Reason for Hospitalization:  Hyperkalemia [E87.5]  Acute on chronic renal insufficiency [N28.9, N18.9]  Complicated UTI (urinary tract infection) [N39.0]  Admit Date/Time: 2024  8:51 PM  Discharge Date: 2024  Payor Source: Payor: [facility] / Plan: [facility] DUAL / Product Type: HMO /     Reason for Communication Hand-off Referral: Multiple providers/specialties    Discharge Plan: Discharge to home with IV micafungin x 4-6 weeks and [facility] for RN/PT/OT needs. Has nava and PICC line in place.       Concern for non-adherence with plan of care:   Y/N no  Discharge Needs Assessment:  Needs      Flowsheet Row Most Recent Value   Equipment Currently Used at Home --  [Walker, WC and shower chair..]          Follow-up specialty is recommended: Yes    Follow-up plan:    Future Appointments   Date Time Provider Department Center   2024 11:00 AM [facility]   2024  3:30 PM Dk Perera MD [facility]   2024 12:30 PM Justa Falcon PA-C [facility]   2024 11:20 AM [facility]       Any outstanding tests or procedures:    Procedures       Future Labs/Procedures    INSERT BLADDER CATH, TEMP INDWELL SIMPLE (NAVA)     Comments:    Nava cath inserted during this hospitalization for acute urinary retention.  Follow up at urology clinic for void trial and nava removal            Referrals       Future Labs/Procedures    Adult Urology  Referral     Process Instructions:    Consider Adult E-Consult to Urology for the following conditions: asymptomatic hematuria, BPH, erectile dysfunction, recurrent UTI.   E-Consult Cost: 0-$125.     Comments:    Please be aware that  coverage of these services is subject to the terms and limitations of your health insurance plan.  Call member services at your health plan with any benefit or coverage questions.  [facility] will call you to coordinate care as prescribed your provider. If you don t hear from a representative within 2 business days, please call [phone].      Home Care Referral     Comments:    [facility]  [address]  [facility] [id number] [phone]     Your provider has ordered home health services. If you have not been contacted within 2 days of your discharge please call the selected Home Care agency listed on your Discharge document.  If a Home Care agency is NOT listed, please call [phone].    Home Infusion Referral     Comments:    _______________________________  [facility]  Phone  [phone]  Fax  [phone]    Antibiotic Information  Name of Antibiotic Dose of Antibiotic1 Anticipated duration Effective start date2           End date  micafungin           150mg IV q24hrs             4-6 weeks           5/29/24                      TBD on ID follow up    OPAT enrollment and ID Clinic Referral     Comments:    You are being prescribed a strong antibiotic treatment for an infection. This treatment requires close monitoring, and you have been recommended to follow up with a specialist in the Infectious Diseases Clinic within 2 weeks of your hospital discharge.   Our team of Infectious Diseases specialists looks forward to seeing you in clinic. A representative will call you within 3 business days to help schedule your appointment. If you do not receive a call to schedule, or if you have any questions about or problems with your clinical care, please contact us by phone at [phone].                Key Recommendations:  Discharge to home with IV micafungin x 4-6 weeks and [facility] for RN/PT/OT needs. Has francois and  PICC line in place.    Donald Mejia RNCC    AVS/Discharge Summary is the source of truth; this is a helpful guide for improved communication of patient story

## 2024-05-29 ENCOUNTER — APPOINTMENT (OUTPATIENT)
Dept: CT IMAGING | Facility: CLINIC | Age: 84
DRG: 868 | End: 2024-05-29
Attending: EMERGENCY MEDICINE
Payer: COMMERCIAL

## 2024-05-29 ENCOUNTER — PATIENT OUTREACH (OUTPATIENT)
Dept: GERIATRIC MEDICINE | Facility: CLINIC | Age: 84
End: 2024-05-29

## 2024-05-29 ENCOUNTER — APPOINTMENT (OUTPATIENT)
Dept: PHYSICAL THERAPY | Facility: CLINIC | Age: 84
DRG: 868 | End: 2024-05-29
Attending: INTERNAL MEDICINE
Payer: COMMERCIAL

## 2024-05-29 LAB
ANION GAP SERPL CALCULATED.3IONS-SCNC: 14 MMOL/L (ref 7–15)
ATRIAL RATE - MUSE: 90 BPM
BUN SERPL-MCNC: 37.7 MG/DL (ref 8–23)
CALCIUM SERPL-MCNC: 9.8 MG/DL (ref 8.8–10.2)
CHLORIDE SERPL-SCNC: 98 MMOL/L (ref 98–107)
CREAT SERPL-MCNC: 2.48 MG/DL (ref 0.67–1.17)
DEPRECATED HCO3 PLAS-SCNC: 21 MMOL/L (ref 22–29)
DIASTOLIC BLOOD PRESSURE - MUSE: NORMAL MMHG
EGFRCR SERPLBLD CKD-EPI 2021: 25 ML/MIN/1.73M2
ERYTHROCYTE [DISTWIDTH] IN BLOOD BY AUTOMATED COUNT: 15.4 % (ref 10–15)
GLUCOSE BLDC GLUCOMTR-MCNC: 109 MG/DL (ref 70–99)
GLUCOSE BLDC GLUCOMTR-MCNC: 125 MG/DL (ref 70–99)
GLUCOSE BLDC GLUCOMTR-MCNC: 132 MG/DL (ref 70–99)
GLUCOSE BLDC GLUCOMTR-MCNC: 142 MG/DL (ref 70–99)
GLUCOSE BLDC GLUCOMTR-MCNC: 150 MG/DL (ref 70–99)
GLUCOSE BLDC GLUCOMTR-MCNC: 160 MG/DL (ref 70–99)
GLUCOSE BLDC GLUCOMTR-MCNC: 179 MG/DL (ref 70–99)
GLUCOSE BLDC GLUCOMTR-MCNC: 217 MG/DL (ref 70–99)
GLUCOSE SERPL-MCNC: 139 MG/DL (ref 70–99)
HCT VFR BLD AUTO: 35.9 % (ref 40–53)
HGB BLD-MCNC: 11.8 G/DL (ref 13.3–17.7)
INTERPRETATION ECG - MUSE: NORMAL
LACTATE SERPL-SCNC: 2 MMOL/L (ref 0.7–2)
LACTATE SERPL-SCNC: 2.6 MMOL/L (ref 0.7–2)
LACTATE SERPL-SCNC: 2.6 MMOL/L (ref 0.7–2)
MCH RBC QN AUTO: 29.7 PG (ref 26.5–33)
MCHC RBC AUTO-ENTMCNC: 32.9 G/DL (ref 31.5–36.5)
MCV RBC AUTO: 90 FL (ref 78–100)
P AXIS - MUSE: 30 DEGREES
PLATELET # BLD AUTO: 254 10E3/UL (ref 150–450)
POTASSIUM SERPL-SCNC: 4.6 MMOL/L (ref 3.4–5.3)
POTASSIUM SERPL-SCNC: 4.7 MMOL/L (ref 3.4–5.3)
POTASSIUM SERPL-SCNC: 4.8 MMOL/L (ref 3.4–5.3)
PR INTERVAL - MUSE: 172 MS
QRS DURATION - MUSE: 82 MS
QT - MUSE: 386 MS
QTC - MUSE: 472 MS
R AXIS - MUSE: -15 DEGREES
RBC # BLD AUTO: 3.97 10E6/UL (ref 4.4–5.9)
SODIUM SERPL-SCNC: 133 MMOL/L (ref 135–145)
SYSTOLIC BLOOD PRESSURE - MUSE: NORMAL MMHG
T AXIS - MUSE: 53 DEGREES
VENTRICULAR RATE- MUSE: 90 BPM
WBC # BLD AUTO: 10.8 10E3/UL (ref 4–11)

## 2024-05-29 PROCEDURE — 250N000011 HC RX IP 250 OP 636: Mod: JZ | Performed by: EMERGENCY MEDICINE

## 2024-05-29 PROCEDURE — 99223 1ST HOSP IP/OBS HIGH 75: CPT | Performed by: INTERNAL MEDICINE

## 2024-05-29 PROCEDURE — 99254 IP/OBS CNSLTJ NEW/EST MOD 60: CPT | Mod: 24 | Performed by: PHYSICIAN ASSISTANT

## 2024-05-29 PROCEDURE — 99207 PR SERVICE NOT STAFFED W/SUPERV PROV: CPT | Performed by: STUDENT IN AN ORGANIZED HEALTH CARE EDUCATION/TRAINING PROGRAM

## 2024-05-29 PROCEDURE — 87040 BLOOD CULTURE FOR BACTERIA: CPT | Performed by: EMERGENCY MEDICINE

## 2024-05-29 PROCEDURE — 36415 COLL VENOUS BLD VENIPUNCTURE: CPT | Performed by: EMERGENCY MEDICINE

## 2024-05-29 PROCEDURE — 258N000001 HC RX 258: Performed by: EMERGENCY MEDICINE

## 2024-05-29 PROCEDURE — 258N000003 HC RX IP 258 OP 636: Performed by: INTERNAL MEDICINE

## 2024-05-29 PROCEDURE — 36415 COLL VENOUS BLD VENIPUNCTURE: CPT | Performed by: INTERNAL MEDICINE

## 2024-05-29 PROCEDURE — 83605 ASSAY OF LACTIC ACID: CPT | Performed by: EMERGENCY MEDICINE

## 2024-05-29 PROCEDURE — 74176 CT ABD & PELVIS W/O CONTRAST: CPT | Mod: 26 | Performed by: RADIOLOGY

## 2024-05-29 PROCEDURE — 87040 BLOOD CULTURE FOR BACTERIA: CPT | Performed by: INTERNAL MEDICINE

## 2024-05-29 PROCEDURE — 250N000013 HC RX MED GY IP 250 OP 250 PS 637: Performed by: INTERNAL MEDICINE

## 2024-05-29 PROCEDURE — 120N000002 HC R&B MED SURG/OB UMMC

## 2024-05-29 PROCEDURE — 84132 ASSAY OF SERUM POTASSIUM: CPT | Performed by: EMERGENCY MEDICINE

## 2024-05-29 PROCEDURE — 99207 PR APP CREDIT; MD BILLING SHARED VISIT: CPT | Performed by: INTERNAL MEDICINE

## 2024-05-29 PROCEDURE — 85041 AUTOMATED RBC COUNT: CPT | Performed by: INTERNAL MEDICINE

## 2024-05-29 PROCEDURE — 84132 ASSAY OF SERUM POTASSIUM: CPT | Performed by: INTERNAL MEDICINE

## 2024-05-29 PROCEDURE — 258N000003 HC RX IP 258 OP 636: Performed by: EMERGENCY MEDICINE

## 2024-05-29 PROCEDURE — 250N000012 HC RX MED GY IP 250 OP 636 PS 637: Performed by: INTERNAL MEDICINE

## 2024-05-29 PROCEDURE — 250N000009 HC RX 250: Performed by: EMERGENCY MEDICINE

## 2024-05-29 PROCEDURE — 80048 BASIC METABOLIC PNL TOTAL CA: CPT | Performed by: EMERGENCY MEDICINE

## 2024-05-29 PROCEDURE — 97530 THERAPEUTIC ACTIVITIES: CPT | Mod: GP

## 2024-05-29 PROCEDURE — 97161 PT EVAL LOW COMPLEX 20 MIN: CPT | Mod: GP

## 2024-05-29 PROCEDURE — 250N000011 HC RX IP 250 OP 636: Performed by: INTERNAL MEDICINE

## 2024-05-29 PROCEDURE — 82962 GLUCOSE BLOOD TEST: CPT

## 2024-05-29 PROCEDURE — 74176 CT ABD & PELVIS W/O CONTRAST: CPT

## 2024-05-29 PROCEDURE — 999N000111 HC STATISTIC OT IP EVAL DEFER

## 2024-05-29 RX ORDER — SODIUM CHLORIDE 9 MG/ML
INJECTION, SOLUTION INTRAVENOUS CONTINUOUS
Status: DISCONTINUED | OUTPATIENT
Start: 2024-05-29 | End: 2024-06-02 | Stop reason: HOSPADM

## 2024-05-29 RX ORDER — CALCIUM CARBONATE 500 MG/1
500 TABLET, CHEWABLE ORAL DAILY PRN
Status: CANCELLED | OUTPATIENT
Start: 2024-05-29

## 2024-05-29 RX ORDER — NICOTINE POLACRILEX 4 MG
15-30 LOZENGE BUCCAL
Status: DISCONTINUED | OUTPATIENT
Start: 2024-05-29 | End: 2024-05-29

## 2024-05-29 RX ORDER — CEFTRIAXONE 1 G/1
1 INJECTION, POWDER, FOR SOLUTION INTRAMUSCULAR; INTRAVENOUS EVERY 24 HOURS
Status: DISCONTINUED | OUTPATIENT
Start: 2024-05-29 | End: 2024-05-29

## 2024-05-29 RX ORDER — DULOXETIN HYDROCHLORIDE 30 MG/1
30 CAPSULE, DELAYED RELEASE ORAL 2 TIMES DAILY
Status: DISCONTINUED | OUTPATIENT
Start: 2024-05-29 | End: 2024-06-02 | Stop reason: HOSPADM

## 2024-05-29 RX ORDER — HYDROXYZINE HYDROCHLORIDE 25 MG/1
25 TABLET, FILM COATED ORAL EVERY 8 HOURS PRN
Status: DISCONTINUED | OUTPATIENT
Start: 2024-05-29 | End: 2024-06-02 | Stop reason: HOSPADM

## 2024-05-29 RX ORDER — METOPROLOL SUCCINATE 50 MG/1
50 TABLET, EXTENDED RELEASE ORAL DAILY
Status: CANCELLED | OUTPATIENT
Start: 2024-05-29

## 2024-05-29 RX ORDER — SENNA AND DOCUSATE SODIUM 50; 8.6 MG/1; MG/1
1 TABLET, FILM COATED ORAL 2 TIMES DAILY PRN
Status: CANCELLED | OUTPATIENT
Start: 2024-05-29

## 2024-05-29 RX ORDER — ACETAMINOPHEN 500 MG
500 TABLET ORAL EVERY 4 HOURS PRN
Status: CANCELLED | OUTPATIENT
Start: 2024-05-29

## 2024-05-29 RX ORDER — TRAZODONE HYDROCHLORIDE 50 MG/1
50-100 TABLET, FILM COATED ORAL
Status: DISCONTINUED | OUTPATIENT
Start: 2024-05-29 | End: 2024-06-02 | Stop reason: HOSPADM

## 2024-05-29 RX ORDER — ONDANSETRON 4 MG/1
4 TABLET, ORALLY DISINTEGRATING ORAL EVERY 6 HOURS PRN
Status: DISCONTINUED | OUTPATIENT
Start: 2024-05-29 | End: 2024-06-02 | Stop reason: HOSPADM

## 2024-05-29 RX ORDER — CEFTRIAXONE 2 G/1
2 INJECTION, POWDER, FOR SOLUTION INTRAMUSCULAR; INTRAVENOUS EVERY 24 HOURS
Status: DISCONTINUED | OUTPATIENT
Start: 2024-05-29 | End: 2024-06-01

## 2024-05-29 RX ORDER — TAMSULOSIN HYDROCHLORIDE 0.4 MG/1
0.4 CAPSULE ORAL DAILY
Status: DISCONTINUED | OUTPATIENT
Start: 2024-05-29 | End: 2024-06-02 | Stop reason: HOSPADM

## 2024-05-29 RX ORDER — POLYETHYLENE GLYCOL 3350 17 G/17G
17 POWDER, FOR SOLUTION ORAL DAILY
Status: DISCONTINUED | OUTPATIENT
Start: 2024-05-29 | End: 2024-06-02 | Stop reason: HOSPADM

## 2024-05-29 RX ORDER — MECLIZINE HCL 12.5 MG 12.5 MG/1
12.5 TABLET ORAL 3 TIMES DAILY PRN
Status: DISCONTINUED | OUTPATIENT
Start: 2024-05-29 | End: 2024-06-02 | Stop reason: HOSPADM

## 2024-05-29 RX ORDER — MECLIZINE HCL 12.5 MG 12.5 MG/1
12.5 TABLET ORAL 3 TIMES DAILY PRN
Status: CANCELLED | OUTPATIENT
Start: 2024-05-29

## 2024-05-29 RX ORDER — DULOXETIN HYDROCHLORIDE 30 MG/1
30 CAPSULE, DELAYED RELEASE ORAL 2 TIMES DAILY
Status: CANCELLED | OUTPATIENT
Start: 2024-05-29

## 2024-05-29 RX ORDER — AMOXICILLIN 250 MG
1 CAPSULE ORAL 2 TIMES DAILY PRN
Status: DISCONTINUED | OUTPATIENT
Start: 2024-05-29 | End: 2024-06-02 | Stop reason: HOSPADM

## 2024-05-29 RX ORDER — PANTOPRAZOLE SODIUM 40 MG/1
40 TABLET, DELAYED RELEASE ORAL
Status: DISCONTINUED | OUTPATIENT
Start: 2024-05-29 | End: 2024-06-02 | Stop reason: HOSPADM

## 2024-05-29 RX ORDER — DEXTROSE MONOHYDRATE 25 G/50ML
25-50 INJECTION, SOLUTION INTRAVENOUS
Status: DISCONTINUED | OUTPATIENT
Start: 2024-05-29 | End: 2024-05-29

## 2024-05-29 RX ORDER — LIDOCAINE 40 MG/G
CREAM TOPICAL
Status: DISCONTINUED | OUTPATIENT
Start: 2024-05-29 | End: 2024-06-02 | Stop reason: HOSPADM

## 2024-05-29 RX ORDER — PANTOPRAZOLE SODIUM 40 MG/1
40 TABLET, DELAYED RELEASE ORAL
Status: CANCELLED | OUTPATIENT
Start: 2024-05-29

## 2024-05-29 RX ORDER — CALCIUM CARBONATE 500 MG/1
500 TABLET, CHEWABLE ORAL DAILY PRN
Status: DISCONTINUED | OUTPATIENT
Start: 2024-05-29 | End: 2024-06-02 | Stop reason: HOSPADM

## 2024-05-29 RX ORDER — ONDANSETRON 2 MG/ML
4 INJECTION INTRAMUSCULAR; INTRAVENOUS EVERY 6 HOURS PRN
Status: DISCONTINUED | OUTPATIENT
Start: 2024-05-29 | End: 2024-06-02 | Stop reason: HOSPADM

## 2024-05-29 RX ORDER — TRAZODONE HYDROCHLORIDE 50 MG/1
50 TABLET, FILM COATED ORAL
Status: CANCELLED | OUTPATIENT
Start: 2024-05-29

## 2024-05-29 RX ORDER — ROSUVASTATIN CALCIUM 10 MG/1
20 TABLET, COATED ORAL AT BEDTIME
Status: DISCONTINUED | OUTPATIENT
Start: 2024-05-29 | End: 2024-06-02 | Stop reason: HOSPADM

## 2024-05-29 RX ORDER — ACETAMINOPHEN 500 MG
500 TABLET ORAL EVERY 4 HOURS PRN
Status: DISCONTINUED | OUTPATIENT
Start: 2024-05-29 | End: 2024-06-02 | Stop reason: HOSPADM

## 2024-05-29 RX ORDER — ALLOPURINOL 100 MG/1
200 TABLET ORAL DAILY
Status: DISCONTINUED | OUTPATIENT
Start: 2024-05-29 | End: 2024-06-02 | Stop reason: HOSPADM

## 2024-05-29 RX ORDER — ALLOPURINOL 100 MG/1
200 TABLET ORAL DAILY
Status: CANCELLED | OUTPATIENT
Start: 2024-05-29

## 2024-05-29 RX ORDER — POLYETHYLENE GLYCOL 3350 17 G/17G
17 POWDER, FOR SOLUTION ORAL DAILY PRN
Status: CANCELLED | OUTPATIENT
Start: 2024-05-29

## 2024-05-29 RX ORDER — TAMSULOSIN HYDROCHLORIDE 0.4 MG/1
0.4 CAPSULE ORAL DAILY
Status: CANCELLED | OUTPATIENT
Start: 2024-05-29

## 2024-05-29 RX ORDER — METOPROLOL SUCCINATE 25 MG/1
50 TABLET, EXTENDED RELEASE ORAL DAILY
Status: DISCONTINUED | OUTPATIENT
Start: 2024-05-29 | End: 2024-06-02 | Stop reason: HOSPADM

## 2024-05-29 RX ORDER — ROSUVASTATIN CALCIUM 10 MG/1
20 TABLET, COATED ORAL AT BEDTIME
Status: CANCELLED | OUTPATIENT
Start: 2024-05-29

## 2024-05-29 RX ORDER — HYDROXYZINE HYDROCHLORIDE 25 MG/1
25 TABLET, FILM COATED ORAL EVERY 8 HOURS PRN
Status: CANCELLED | OUTPATIENT
Start: 2024-05-29

## 2024-05-29 RX ADMIN — DEXTROSE MONOHYDRATE 25 G: 25 INJECTION, SOLUTION INTRAVENOUS at 00:00

## 2024-05-29 RX ADMIN — APIXABAN 2.5 MG: 2.5 TABLET, FILM COATED ORAL at 12:04

## 2024-05-29 RX ADMIN — INSULIN ASPART 6 UNITS: 100 INJECTION, SOLUTION INTRAVENOUS; SUBCUTANEOUS at 13:18

## 2024-05-29 RX ADMIN — ROSUVASTATIN 20 MG: 10 TABLET, FILM COATED ORAL at 21:34

## 2024-05-29 RX ADMIN — ALBUTEROL SULFATE 10 MG: 2.5 SOLUTION RESPIRATORY (INHALATION) at 00:41

## 2024-05-29 RX ADMIN — DEXTROSE MONOHYDRATE 300 ML: 100 INJECTION, SOLUTION INTRAVENOUS at 00:17

## 2024-05-29 RX ADMIN — FUROSEMIDE 40 MG: 10 INJECTION, SOLUTION INTRAVENOUS at 00:01

## 2024-05-29 RX ADMIN — ALLOPURINOL 200 MG: 100 TABLET ORAL at 13:12

## 2024-05-29 RX ADMIN — INSULIN ASPART 1 UNITS: 100 INJECTION, SOLUTION INTRAVENOUS; SUBCUTANEOUS at 18:19

## 2024-05-29 RX ADMIN — INSULIN GLARGINE 20 UNITS: 100 INJECTION, SOLUTION SUBCUTANEOUS at 13:12

## 2024-05-29 RX ADMIN — SODIUM CHLORIDE, PRESERVATIVE FREE: 5 INJECTION INTRAVENOUS at 16:26

## 2024-05-29 RX ADMIN — METOPROLOL SUCCINATE 50 MG: 25 TABLET, EXTENDED RELEASE ORAL at 12:05

## 2024-05-29 RX ADMIN — PANTOPRAZOLE SODIUM 40 MG: 40 TABLET, DELAYED RELEASE ORAL at 12:05

## 2024-05-29 RX ADMIN — DULOXETINE HYDROCHLORIDE 30 MG: 30 CAPSULE, DELAYED RELEASE ORAL at 20:36

## 2024-05-29 RX ADMIN — ACETAMINOPHEN 500 MG: 500 TABLET ORAL at 04:14

## 2024-05-29 RX ADMIN — MICAFUNGIN SODIUM 150 MG: 50 INJECTION, POWDER, LYOPHILIZED, FOR SOLUTION INTRAVENOUS at 23:39

## 2024-05-29 RX ADMIN — CEFTRIAXONE SODIUM 2 G: 2 INJECTION, POWDER, FOR SOLUTION INTRAMUSCULAR; INTRAVENOUS at 21:34

## 2024-05-29 RX ADMIN — TAMSULOSIN HYDROCHLORIDE 0.4 MG: 0.4 CAPSULE ORAL at 12:09

## 2024-05-29 RX ADMIN — POLYETHYLENE GLYCOL 3350 17 G: 17 POWDER, FOR SOLUTION ORAL at 12:05

## 2024-05-29 RX ADMIN — INSULIN ASPART 6 UNITS: 100 INJECTION, SOLUTION INTRAVENOUS; SUBCUTANEOUS at 18:19

## 2024-05-29 RX ADMIN — SODIUM CHLORIDE, POTASSIUM CHLORIDE, SODIUM LACTATE AND CALCIUM CHLORIDE 1000 ML: 600; 310; 30; 20 INJECTION, SOLUTION INTRAVENOUS at 06:33

## 2024-05-29 RX ADMIN — MICAFUNGIN SODIUM 100 MG: 50 INJECTION, POWDER, LYOPHILIZED, FOR SOLUTION INTRAVENOUS at 02:28

## 2024-05-29 RX ADMIN — SODIUM BICARBONATE 50 MEQ: 84 INJECTION, SOLUTION INTRAVENOUS at 00:01

## 2024-05-29 RX ADMIN — DULOXETINE HYDROCHLORIDE 30 MG: 30 CAPSULE, DELAYED RELEASE ORAL at 12:06

## 2024-05-29 RX ADMIN — APIXABAN 2.5 MG: 2.5 TABLET, FILM COATED ORAL at 20:36

## 2024-05-29 RX ADMIN — INSULIN ASPART 1 UNITS: 100 INJECTION, SOLUTION INTRAVENOUS; SUBCUTANEOUS at 13:17

## 2024-05-29 ASSESSMENT — ACTIVITIES OF DAILY LIVING (ADL)
ADLS_ACUITY_SCORE: 40
ADLS_ACUITY_SCORE: 40
ADLS_ACUITY_SCORE: 42
ADLS_ACUITY_SCORE: 38
ADLS_ACUITY_SCORE: 40
ADLS_ACUITY_SCORE: 38
ADLS_ACUITY_SCORE: 40
ADLS_ACUITY_SCORE: 40
ADLS_ACUITY_SCORE: 36
ADLS_ACUITY_SCORE: 36
ADLS_ACUITY_SCORE: 38
ADLS_ACUITY_SCORE: 40
ADLS_ACUITY_SCORE: 42
ADLS_ACUITY_SCORE: 40
ADLS_ACUITY_SCORE: 38
ADLS_ACUITY_SCORE: 40
ADLS_ACUITY_SCORE: 38

## 2024-05-29 NOTE — CONSULTS
Urology Consult    Name: Adam Talamantes    MRN: [id number]   YOB: 1940               Chief Complaint:   Bilateral hydronephrosis     History is obtained from the patient and chart review          History of Present Illness:   Adam Talamantes is a 84 year old male with PMHx of HTN, HLD, CVA, prostatitis, GERD and previously admitted for likely prostatitis who is now admitted with an ELLA and bilateral hydronephrosis. The patient was previously admitted for concern for prostatic abscess for which urology performed a TURP. No abscess was found at that time, but some irritated and abnormal prostatic tissue was taken and a parrish catheter was subsequently placed. Specimen from the resection demonstrated inflamed tissue that contained Candida glabrata complex. He was subsequently treated with antifungals. His catheter was then removed (do not see in the notes if a true TOV was performed) and he was discharged home.    Since the procedure the patient has been having hematuria on and off, but has been able to urinate okay most of the time. Yesterday the patient went to the bathroom and had to strain to urinate which sounds like it precipitated a vasovagal event. He subsequently presented to the ED. He has been AFVSS. A UA was collected that demonstrated RBCs and WBCs, but no nitrites, Ucx in progress. His creatinine was 2.69 (baseline around 1.5), WBC 11.3, hg 12.6. A CT was obtained that demonstrates a somewhat distended bladder with bilateral hydro down to the bladder. There is note of a soft tissue density within the bladder that is most likely a small clot given his last cysto was negative for malignancy.           Past Medical History:     Past Medical History:   Diagnosis Date    Acute blood loss anemia     Acute renal failure (H24)     Anemia     Bacteremia     Cataract     Chronic gout     CKD (chronic kidney disease)     Stage 3    DM2 (diabetes mellitus, type 2) (H)     GERD (gastroesophageal reflux disease)      Glucose intolerance (impaired glucose tolerance)     Gout     History of nephrolithiasis     History of prostatitis 2017    Hyperlipidemia     Hypertension     Insomnia     Intestinal disaccharidase deficiencies and disaccharide malabsorption     Created by Conversion     Latent tuberculosis     Nephrolithiasis     Neuropathy     Nonspecific abnormal findings on radiological and other examination of skull and head     Created by [facility] Annotation: 2013 11:23PM - FuadGiuliai/10/2011:  severe stenosis both posterior cerebral arteries seen MRI/MRA done for  vertigo. No acute changes.e*    Osteoarthritis     wrist, knee, shoulder    Prostatitis     Rectal bleed     Trigger thumb             Past Surgical History:     Past Surgical History:   Procedure Laterality Date    CYSTOSCOPY, TRANSURETHRAL RESECTION (TUR) PROSTATE, COMBINED N/A 2024    Procedure: Transurethral Resection of Prostate Abscess;  Surgeon: Bernabe Fletcher MD;  Location: [facility] OR    IR MISCELLANEOUS PROCEDURE  2009    IR MISCELLANEOUS PROCEDURE  2009    IR MISCELLANEOUS PROCEDURE  2009    IR MISCELLANEOUS PROCEDURE  2009    IR NEPHROURETERAL TUBE PLACEMENT BILATERAL  2009    IR URETER DILATION BILATERAL  2009    IR URETER DILATION BILATERAL  2009    KIDNEY STONE SURGERY      PICC  3/6/2016                 Social History:     Social History     Tobacco Use    Smoking status: Former     Current packs/day: 0.00     Types: Cigarettes     Quit date: 3/10/2000     Years since quittin.2     Passive exposure: Never    Smokeless tobacco: Former    Tobacco comments:     no passive exposure   Substance Use Topics    Alcohol use: No            Family History:     Family History   Problem Relation Age of Onset    Cerebrovascular Disease Father     Cerebrovascular Disease Daughter             Allergies:     Allergies   Allergen Reactions    Lisinopril Cough            Medications:      Current Facility-Administered Medications   Medication Dose Route Frequency Provider Last Rate Last Admin    acetaminophen (TYLENOL) tablet 500 mg  500 mg Oral Q4H PRN Lynette Baron MD   500 mg at 05/29/24 0414    allopurinol (ZYLOPRIM) tablet 200 mg  200 mg Oral Daily Lynette Baron MD        apixaban ANTICOAGULANT (ELIQUIS) tablet 5 mg  5 mg Oral BID Lynette Baron MD        calcium carbonate (TUMS) chewable tablet 500 mg  500 mg Oral Daily PRN Lynette Baron MD        cefTRIAXone (ROCEPHIN) 1 g vial to attach to  mL bag for ADULTS or NS 50 mL bag for PEDS  1 g Intravenous Q24H Lynette Baron MD        dextromethorphan-quiNIDine (NUEDEXTA) 20-10 MG per capsule 1 capsule  1 capsule Oral Q12H Golz, Kuldip Brian, DO        dextromethorphan-quiNIDine (NUEDEXTA) 20-10 MG per capsule 1 capsule  1 capsule Oral Q12H Golz, Kuldip Brian, DO        glucose gel 15-30 g  15-30 g Oral Q15 Min PRN Golz, Kuldip Brian, DO        Or    dextrose 50 % injection 25-50 mL  25-50 mL Intravenous Q15 Min PRN Golz, Kuldip Brian, DO        Or    glucagon injection 1 mg  1 mg Subcutaneous Q15 Min PRN Golz, Kuldip Brian, DO        DULoxetine (CYMBALTA) DR capsule 30 mg  30 mg Oral BID Lynette Baron MD        hydrOXYzine HCl (ATARAX) tablet 25 mg  25 mg Oral Q8H PRN Lynette Baron MD        insulin aspart (NovoLOG) injection (RAPID ACTING)  1-3 Units Subcutaneous TID AC Lynette Baron MD        insulin aspart (NovoLOG) injection (RAPID ACTING)  1-3 Units Subcutaneous At Bedtime Lynette Baron MD        insulin aspart (NovoLOG) injection (RAPID ACTING)  6 Units Subcutaneous TID w/meals Lynette Baron MD        insulin glargine (LANTUS PEN) injection 20 Units  20 Units Subcutaneous Q24H Lynette Baron MD        lactated ringers BOLUS 1,000 mL  1,000 mL Intravenous Once Lynette Baron  mL/hr at  05/29/24 0633 1,000 mL at 05/29/24 0633    lidocaine (LMX4) cream   Topical Q1H PRN Lynette Baron MD        lidocaine 1 % 0.1-1 mL  0.1-1 mL Other Q1H PRN Lynette Baron MD        meclizine (ANTIVERT) tablet 12.5 mg  12.5 mg Oral TID PRN Lynette Baron MD        metoprolol succinate ER (TOPROL XL) 24 hr tablet 50 mg  50 mg Oral Daily Lynette Baron MD        ondansetron (ZOFRAN ODT) ODT tab 4 mg  4 mg Oral Q6H PRN Lynette Baron MD        Or    ondansetron (ZOFRAN) injection 4 mg  4 mg Intravenous Q6H PRN Lynette Baron MD        oxyCODONE IR (ROXICODONE) half-tab 2.5-5 mg  2.5-5 mg Oral Q4H PRN Lynette Baron MD        pantoprazole (PROTONIX) EC tablet 40 mg  40 mg Oral QAM AC Lynette Baron MD        polyethylene glycol (MIRALAX) Packet 17 g  17 g Oral Daily Lynette Baron MD        rosuvastatin (CRESTOR) tablet 20 mg  20 mg Oral At Bedtime Lynette Baron MD        senna-docusate (SENOKOT-S/PERICOLACE) 8.6-50 MG per tablet 1 tablet  1 tablet Oral BID PRN Lynette Baron MD        sodium chloride (PF) 0.9% PF flush 3 mL  3 mL Intracatheter Q8H Lynette Baron MD        sodium chloride (PF) 0.9% PF flush 3 mL  3 mL Intracatheter q1 min prn Lynette Baron MD        tamsulosin (FLOMAX) capsule 0.4 mg  0.4 mg Oral Daily Lynette Baron MD        traZODone (DESYREL) tablet  mg   mg Oral At Bedtime PRN Lynette Baron MD                 Review of Systems:    ROS: 10 point ROS neg other than the symptoms noted above in the HPI           Physical Exam:   VS:  T: 97.4    HR: 89    BP: 148/89    RR: 18   GEN:  AOx3.  NAD.    CV:  RRR  LUNGS: Non-labored breathing.   BACK:  No midline or CVA tenderness.  ABD:  Soft.  NT.  ND.  No rebound or guarding.  No masses.  SKIN:  Warm.  Dry.  No rashes.  NEURO:  CN grossly intact.      Urine: racked urine is brown, but can bee  seen through, no clots           Data:   All laboratory data reviewed:    Recent Labs   Lab 05/28/24 2134   WBC 11.3*   HGB 12.6*          Recent Labs   Lab 05/29/24  0521 05/29/24  0427 05/29/24  0406 05/29/24  0250 05/29/24  0055 05/28/24 2134   NA  --   --   --   --   --  131*   POTASSIUM  --  4.7  --   --  4.6 5.9*   CHLORIDE  --   --   --   --   --  98   CO2  --   --   --   --   --  19*   BUN  --   --   --   --   --  41.2*   CR  --   --   --   --   --  2.69*   *  --  125* 109*  --  178*   GERALDINE  --   --   --   --   --  10.3*       Recent Labs   Lab 05/28/24 2146   COLOR Dark Brown*   APPEARANCE Cloudy*   URINEGLC Negative   URINEBILI Negative   URINEKETONE Negative   SG 1.017   URINEPH 6.0   PROTEIN 70*   NITRITE Negative   LEUKEST Large*   RBCU >182*   WBCU >182*       All pertinent imaging reviewed:    CT scan of the abdomen:   IMPRESSION:      1.  Mild wall thickening of the urinary bladder has improved from recent study on 5/15/2024.     2.  New mild bilateral hydroureteronephrosis to the level of the urinary bladder, possibly due to increased volume of the urinary bladder, which remains somewhat small, indicating decreased bladder compliance.     3.  8 mm soft tissue density on the bladder wall at the insertion site of the right ureter, new from prior study. Although this is unlikely to be a urothelial neoplasm given acute development, consider follow-up contrast enhanced CT of the abdomen and   pelvis for further evaluation.     4.  Punctate calcifications in the left renal pelvis, unchanged from 6/13/2023, nonspecific but compatible with benign calcifications.     5.  Stable mild prostate enlargement.                Impression and Plan:   Impression:   Adam Talamantes is a 84 year old male with  PMHx of HTN, HLD, CVA, prostatitis, GERD s/p TURP earlier this month demonstrating fungal prostatitis, now with persistent hematuria, bilateral hydro and a somewhat distended bladder. CT demonstrated  bilateral hydro with his ELLA without significant clot burden within the bladder. For now would recommend parrish catheter be placed and trend creatinine. It appears that he may have fungal prostatitis and has been on micafungin. Given possible persistent fungal prostatitis agree with continuing this and agree with ID consult. Given there is no obvious clot burden within his bladder and because he has a high risk of clots would continue his apixaban.       Plan:     - recommend parrish catheter for now, may require catheter at discharge     - recommend trending creatinine     - recommend RBUS in 2 days to reassess hydro  - agree with antimicrobial tx per primary team and agree with ID recs (no abscess on prostate TUR)     - Urology will continue to follow peripherally. Please contact resident/PA on call with any questions or concerns.         This patient's exam findings, labs, and imaging discussed with urology staff surgeon Dr. Reyes, who developed the treatment plan.    Omar Portillo MD  Urology Resident PGY3

## 2024-05-29 NOTE — H&P
[facility]    History and Physical - Hospitalist Service       Date of Admission:  5/28/2024    Assessment & Plan      Adam Talamantes is a 84 year old male admitted on 5/28/2024. He has hx of DM, hypertension, hyperlipidemia, multiple CVAs on apixaban and recent admission to [facility] 5/11-5/19 for AMS due to UTI/prostatitis s/p TURP with ongoing hematuria and urinary frequencies on bactrim who presented to ED after a syncopal event/fall that occurred as he was straining to urinate found to have ELLA on CKD with ongoing UTI admitted for further management.    Fall  Syncope, most likely vasovagal  He has been frequently using bathroom to urinate with significant dysuria. This time, his wife heard a noise from the bathroom shortly after he went to use the bathroom and found him on the floor. Per his son, patient had to strain to urinate, felt dizzy and fainted. it took several minutes before he became fully alert again. Sustained abrasions over R forearm and R lower leg. Denied other pain. EKG NSR HR90. Most likely vasovagal event.  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 urine culture grew Candida glabrata resistent to fluconazole and mixed urogenital valentina. He was discharged on 6 week course bactrim and ID follow up. Per ID clinic note on 5/23, the plan is to place PICC line and start IV micafungin for fluconazole-resistant C Glabrata.   - ID consult requested  - start IV micafungin  - switch bactrim to IV ceftriaxone  for now  - follow urine culture and blood cultures    Lactic acidosis  LA 2.3 on admission in the setting of ongoing UTI. S/p 1L IVF  - repeat LA in am    ELLA on CKD  New mild hydroureteronephrosis   Baseline Creatinine seems to be around 1.3-1.7. Cre 2.69 on admission. Likely due to some dehydration, possible effect from bactrim, but also given small amount of frequent urinations, concern for possible obstruction and urinary retention. CT a/p showed 'New mild bilateral hydroureteronephrosis to the level of the urinary bladder, possibly due to increased volume of the urinary bladder, which remains somewhat small, indicating decreased bladder compliance, and an 8 mm soft tissue density on the bladder wall at the insertion site of the right ureter, new from prior study.'   pelvis for further evaluation.  - bladder scan  - consider switching condom cath to parrish if not able to appropriately void  - urology consulted    Hyponatremia  Na 131 on admission, glucose 178. S/p 1L IVF  - monitor Na    DM2 A1c 8.8% 2/26/2024  PTA Jardiance, januvia, glargine 30U daily and aspart 8U before breakfast, 6U with lunch and dinner.   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 Consider further evaluation with MRI on a non-emergent basis.   Suspected kidney cyst: 2.0 cm dense  "cystic lesion in the inferior pole of the left kidney, likely representing a hemorrhagic/proteinaceous cyst. This can be further characterized on future MRI.  Colonic wall thickening   Noted on CT Urogram 6/2023, wall thickening concerning for malignancy. Prior colonoscopy on file from 2017, showing tubular adenoma without high grade dysplasia. Ongoing discussions to pursue work up as OP with PCP, who is waiting to discuss with patient's daughter if should pursue colonoscopy. Previously noted that patient would not be interested in undergoing a surgery. No acute concerns.   - outpatient follow up        Diet:  regular  DVT Prophylaxis: DOAC  Diego Catheter: Not present  Lines: None     Cardiac Monitoring: None  Code Status:  full    Clinically Significant Risk Factors Present on Admission        # Hyperkalemia: Highest K = 5.9 mmol/L in last 2 days, will monitor as appropriate    # Hypercalcemia: Highest Ca = 10.3 mg/dL in last 2 days, will monitor as appropriate     # Drug Induced Coagulation Defect: home medication list includes an anticoagulant medication   # Acute Kidney Injury, unspecified: based on a >150% or 0.3 mg/dL increase in last creatinine compared to past 90 day average, will monitor renal function  # Hypertension: Noted on problem list     # DMII: A1C = N/A within past 6 months    # Overweight: Estimated body mass index is 27.44 kg/m  as calculated from the following:    Height as of this encounter: 1.575 m (5' 2\").    Weight as of this encounter: 68 kg (150 lb).       # Financial/Environmental Concerns:           Disposition Plan     Medically Ready for Discharge: Anticipated in 2-4 Days           Lynette Baron MD  Hospitalist Service  [facility]  Securely message with [employer] (more info)  Text page via [facility] Paging/Directory     ______________________________________________________________________    Chief Complaint   full    History is obtained from " the patient and patient's son    History of Present Illness   Adam LUCAS Albin is a 84 year old male who has hx of DM, HTN, CVA on DOAC, and recent hospitalization with ELLA and prostatatitis and prostate abscess. Since his discharge on , he has been having ongoing hematuria, dysuria and frequent urinations and not been able to sleep well at night. He lives with his daughter and they noted weakness. He has been taking po and taking meds. Today, he went to use the bathroom and as he was straining to start urine stream, he felt dizzy and fainted. He sustained abrasion to RUE and RLE but otherwise no new pain. Of note, he has been using bathroom very frequently, every 5-10 minutes. No fever or chills, no chest pain. No ehadache, no SOB. Has constipation.      Past Medical History    Past Medical History:   Diagnosis Date    Acute blood loss anemia     Acute renal failure (H24)     Anemia     Bacteremia     Cataract     Chronic gout     CKD (chronic kidney disease)     Stage 3    DM2 (diabetes mellitus, type 2) (H)     GERD (gastroesophageal reflux disease)     Glucose intolerance (impaired glucose tolerance)     Gout     History of nephrolithiasis     History of prostatitis 2017    Hyperlipidemia     Hypertension     Insomnia     Intestinal disaccharidase deficiencies and disaccharide malabsorption     Created by Conversion     Latent tuberculosis     Nephrolithiasis     Neuropathy     Nonspecific abnormal findings on radiological and other examination of skull and head     Created by [employer] [facility] Annotation: 2013 11:23PM - Giulia Meridai/10/2011:  severe stenosis both posterior cerebral arteries seen MRI/MRA done for  vertigo. No acute changes.e*    Osteoarthritis     wrist, knee, shoulder    Prostatitis     Rectal bleed     Trigger thumb        Past Surgical History   Past Surgical History:   Procedure Laterality Date    CYSTOSCOPY, TRANSURETHRAL RESECTION (TUR) PROSTATE, COMBINED N/A 2024     Procedure: Transurethral Resection of Prostate Abscess;  Surgeon: Bernabe Fletcher MD;  Location: [facility] OR    IR MISCELLANEOUS PROCEDURE  2009    IR MISCELLANEOUS PROCEDURE  2009    IR MISCELLANEOUS PROCEDURE  2009    IR MISCELLANEOUS PROCEDURE  2009    IR NEPHROURETERAL TUBE PLACEMENT BILATERAL  2009    IR URETER DILATION BILATERAL  2009    IR URETER DILATION BILATERAL  2009    KIDNEY STONE SURGERY      PICC  3/6/2016            Prior to Admission Medications   Prior to Admission Medications   Prescriptions Last Dose Informant Patient Reported? Taking?   Continuous Blood Gluc Sensor (FREESTYLE SUSANNAH 2 SENSOR) [facility]   No No   Si each every 14 days Use 1 sensor every 14 days. Use to read blood sugars per 's instructions.   Continuous Glucose  (FREESTYLE SUSANNAH 2 READER) [facility]IE   No No   Sig: USE TO READ BLOOD SUGARS PER MANUFACTRUERS INSTRUCTIONS   DULoxetine (CYMBALTA) 30 MG capsule   No No   Sig: TAKE 1 CAPSULE(30 MG) BY MOUTH TWICE DAILY   HYDROcodone-acetaminophen (NORCO) 5-325 MG tablet   No No   Sig: Take 1 tablet by mouth every 6 hours as needed for severe pain   JANUVIA 50 MG tablet   No No   Sig: TAKE 1 TABLET(50 MG) BY MOUTH DAILY   SENNA-docusate sodium (SENNA S) 8.6-50 MG tablet   No No   Sig: Take 1 tablet by mouth 2 times daily as needed (for constipation)   acetaminophen (TYLENOL) 500 MG tablet   No No   Sig: Take 1 tablet (500 mg) by mouth every 4 hours as needed for mild pain   allopurinol (ZYLOPRIM) 100 MG tablet   No No   Sig: TAKE 2 TABLETS(200 MG) BY MOUTH DAILY   apixaban ANTICOAGULANT (ELIQUIS ANTICOAGULANT) 5 MG tablet   No No   Sig: Take 1 tablet (5 mg) by mouth 2 times daily   calcium carbonate (TUMS) 500 MG chewable tablet   Yes No   Sig: Take 1 chew tab by mouth daily as needed for heartburn   celecoxib (CELEBREX) 200 MG capsule   No No   Sig: Take 1 capsule (200 mg) by mouth daily as needed for pain (jaw pain, joint pain)    dextromethorphan-quiNIDine (NUEDEXTA) 20-10 MG capsule   No No   Sig: Take 1 capsule by mouth every 12 hours   hydrOXYzine (ATARAX) 25 MG tablet   No No   Sig: Take 1 tablet (25 mg) by mouth every 8 hours as needed for itching   insulin aspart (NOVOLOG FLEXPEN) 100 UNIT/ML pen   No No   Sig: Inject 8 units before breakfast, and 6 units under the skin before lunch and dinner   insulin glargine (LANTUS PEN) 100 UNIT/ML pen   No No   Sig: Inject 30 Units Subcutaneous every 24 hours   insulin pen needle (BD PEN NEEDLE SALVATORE 2ND GEN) 32G X 4 MM miscellaneous   No No   Sig: USE 1 PEN NEEDLE FOUR TIMES DAILY OR AS DIRECTED   meclizine (ANTIVERT) 12.5 MG tablet   No No   Sig: Take 1 tablet (12.5 mg) by mouth 3 times daily as needed for dizziness   metoprolol succinate ER (TOPROL XL) 100 MG 24 hr tablet   No No   Sig: Take 0.5 tablets (50 mg) by mouth daily Take 1/2 tablet  daily   pantoprazole (PROTONIX) 40 MG EC tablet   No No   Sig: Take 1 tablet (40 mg) by mouth every morning (before breakfast)   polyethylene glycol (MIRALAX) 17 GM/Dose powder   No No   Sig: MIX 17 GRAMS IN LIQUID AND TAKE BY MOUTH ONCE DAILY AS NEEDED FOR CONSTIPATION   rosuvastatin (CRESTOR) 20 MG tablet   No No   Sig: TAKE 1 TABLET(20 MG) BY MOUTH AT BEDTIME   sulfamethoxazole-trimethoprim (BACTRIM DS) 800-160 MG tablet   No No   Sig: Take 1 tablet by mouth 2 times daily for 35 days   tamsulosin (FLOMAX) 0.4 MG capsule   No No   Sig: Take 1 capsule (0.4 mg) by mouth daily   traZODone (DESYREL) 50 MG tablet   No No   Sig: Take 1-2 tablets ( mg) by mouth nightly as needed for sleep   triamcinolone (KENALOG) 0.1 % external cream   No No   Sig: Apply topically 2 times daily   Patient taking differently: Apply topically 2 times daily as needed for irritation      Facility-Administered Medications: None        Review of Systems    The 10 point Review of Systems is negative other than noted in the HPI or here.     Social History   I have reviewed this  patient's social history and updated it with pertinent information if needed.  Social History     Tobacco Use    Smoking status: Former     Current packs/day: 0.00     Types: Cigarettes     Quit date: 3/10/2000     Years since quittin.2     Passive exposure: Never    Smokeless tobacco: Former    Tobacco comments:     no passive exposure   Vaping Use    Vaping status: Never Used   Substance Use Topics    Alcohol use: No    Drug use: No         Family History   I have reviewed this patient's family history and updated it with pertinent information if needed.  Family History   Problem Relation Age of Onset    Cerebrovascular Disease Father     Cerebrovascular Disease Daughter          Allergies   Allergies   Allergen Reactions    Lisinopril Cough        Physical Exam   Vital Signs: Temp: 98.5  F (36.9  C) Temp src: Oral BP: 126/85 Pulse: 77   Resp: 18 SpO2: (!) 88 % O2 Device: None (Room air)    Weight: 150 lbs 0 oz    General Appearance: Lying in bed, appears comfortable, but frequently gets up requesting to urinate  Respiratory: clear bilaterally  Cardiovascular: RRR no murmur  GI: soft non tender, no distended bladder palpable  Skin: small abrasion distal to R elbow and very small abrasion over R lower leg  Other: Awake, calm able to answer appropriately to questions.     Medical Decision Making       75 MINUTES SPENT BY ME on the date of service doing chart review, history, exam, documentation & further activities per the note.      Data     I have personally reviewed the following data over the past 24 hrs:    11.3 (H)  \   12.6 (L)   / 288     131 (L) 98 41.2 (H) /  178 (H)   4.6 19 (L) 2.69 (H) \     ALT: 20 AST: 23 AP: 110 TBILI: 0.2   ALB: 4.2 TOT PROTEIN: 9.1 (H) LIPASE: N/A     Trop: 13 BNP: N/A     Procal: 0.07 CRP: 13.40 (H) Lactic Acid: 2.6 (H)       INR:  1.56 (H) PTT:  41 (H)   D-dimer:  N/A Fibrinogen:  N/A       Imaging results reviewed over the past 24 hrs:   Recent Results (from the past 24  hour(s))   CT Head w/o Contrast    Narrative    EXAM: CT HEAD W/O CONTRAST, CT CERVICAL SPINE W/O CONTRAST  LOCATION: [facility]  DATE: 5/28/2024    INDICATION: syncope, fall  COMPARISON: 5/12/2024  TECHNIQUE:   1) Routine CT Head without IV contrast. Multiplanar reformats. Dose reduction techniques were used.  2) Routine CT Cervical Spine without IV contrast. Multiplanar reformats. Dose reduction techniques were used.    FINDINGS:   HEAD CT:   INTRACRANIAL CONTENTS: No intracranial hemorrhage, extraaxial collection, or mass effect.  No CT evidence of acute infarct. Mild presumed chronic small vessel ischemic changes. There are chronic infarcts including the left corona radiata, right lizeth,   left thalamus and bilateral cerebellar hemisphere. Mild generalized volume loss. No hydrocephalus.     VISUALIZED ORBITS/SINUSES/MASTOIDS: Prior bilateral cataract surgery. Visualized portions of the orbits are otherwise unremarkable. No paranasal sinus mucosal disease. No middle ear or mastoid effusion.    BONES/SOFT TISSUES: No scalp hematoma. No skull fracture.    CERVICAL SPINE CT:   VERTEBRA: There is straightening the normal cervical curvature with grade 1 anterolisthesis of C4 on C5 and grade 1 retrolisthesis of C5 on C6 and grade 1 anterolisthesis of C7 on T1. Normal vertebral body heights. No fracture or posttraumatic   subluxation.     CANAL/FORAMINA: There is multilevel disc height loss and facet hypertrophy. No high-grade spinal canal stenosis. Moderate neural foraminal stenoses from C4-5 through C6-7.    PARASPINAL: No extraspinal abnormality. Visualized lung fields are clear.      Impression    IMPRESSION:  HEAD CT:  No CT evidence for acute intracranial process. Brain atrophy and presumed chronic microvascular ischemic changes as above.    CERVICAL SPINE CT:  No CT evidence for acute fracture or post traumatic subluxation.     CT Cervical Spine w/o Contrast     Narrative    EXAM: CT HEAD W/O CONTRAST, CT CERVICAL SPINE W/O CONTRAST  LOCATION: [facility]  DATE: 5/28/2024    INDICATION: syncope, fall  COMPARISON: 5/12/2024  TECHNIQUE:   1) Routine CT Head without IV contrast. Multiplanar reformats. Dose reduction techniques were used.  2) Routine CT Cervical Spine without IV contrast. Multiplanar reformats. Dose reduction techniques were used.    FINDINGS:   HEAD CT:   INTRACRANIAL CONTENTS: No intracranial hemorrhage, extraaxial collection, or mass effect.  No CT evidence of acute infarct. Mild presumed chronic small vessel ischemic changes. There are chronic infarcts including the left corona radiata, right lizeth,   left thalamus and bilateral cerebellar hemisphere. Mild generalized volume loss. No hydrocephalus.     VISUALIZED ORBITS/SINUSES/MASTOIDS: Prior bilateral cataract surgery. Visualized portions of the orbits are otherwise unremarkable. No paranasal sinus mucosal disease. No middle ear or mastoid effusion.    BONES/SOFT TISSUES: No scalp hematoma. No skull fracture.    CERVICAL SPINE CT:   VERTEBRA: There is straightening the normal cervical curvature with grade 1 anterolisthesis of C4 on C5 and grade 1 retrolisthesis of C5 on C6 and grade 1 anterolisthesis of C7 on T1. Normal vertebral body heights. No fracture or posttraumatic   subluxation.     CANAL/FORAMINA: There is multilevel disc height loss and facet hypertrophy. No high-grade spinal canal stenosis. Moderate neural foraminal stenoses from C4-5 through C6-7.    PARASPINAL: No extraspinal abnormality. Visualized lung fields are clear.      Impression    IMPRESSION:  HEAD CT:  No CT evidence for acute intracranial process. Brain atrophy and presumed chronic microvascular ischemic changes as above.    CERVICAL SPINE CT:  No CT evidence for acute fracture or post traumatic subluxation.     CT Abdomen Pelvis w/o Contrast    Narrative    EXAM: CT ABDOMEN PELVIS W/O  CONTRAST  LOCATION: [facility]  DATE: 5/29/2024    INDICATION: Acute kidney injury, hematuria, lower abdominal pain, concern for obstructive uropathy  COMPARISON: 5/15/2024; 6/13/2023  TECHNIQUE: CT scan of the abdomen and pelvis was performed without IV contrast. Multiplanar reformats were obtained. Dose reduction techniques were used.  CONTRAST: None.    FINDINGS: Study is motion limited.    LOWER CHEST: Mild atelectasis in the lung bases without confluent consolidation or pleural effusion.    HEPATOBILIARY: Multiple stones again seen in the gallbladder without acute cholecystitis. Liver is normal in size without surface nodularity. A small cyst is again seen in the left hepatic lobe. Another hypoattenuating lesion in segment 8 measuring 1.7   cm is better demonstrated to represent a hemangioma on 6/13/2023.    PANCREAS: Normal.    SPLEEN: Normal.    ADRENAL GLANDS: Normal.    KIDNEYS/BLADDER: Diffuse wall thickening of the urinary bladder, improved from prior study. Bladder is increased in volume compared to prior study. Mild bilateral hydroureteronephrosis to the level of the urinary bladder. Insertion sites of bilateral   ureters appear somewhat ectopic, located on the bilateral bladder walls. A nodular soft tissue is seen in the right bladder wall at the insertion site of the right ureter, measuring 8 mm on series 3 image 62, not seen on prior study. Bilateral renal   atrophy is unchanged. Punctate calcification again seen in the left renal pelvis, unchanged dating back to urogram on 6/13/2023.    BOWEL: No bowel thickening or obstruction. Appendix is normal.    LYMPH NODES: Normal.    VASCULATURE: Mild aortoiliac atherosclerotic calcifications. No abdominal aortic aneurysm.    PELVIC ORGANS: Mild prostate enlargement is stable.    MUSCULOSKELETAL: Bilateral L5 pars indicators defects with grade 1 anterolisthesis of L5 on S1 by 8 mm. Multilevel mild and moderate  degenerative disc space narrowing with vacuum disc in the lumbar spine. No suspicious osseous lesions or acute fractures.        Impression    IMPRESSION:     1.  Mild wall thickening of the urinary bladder has improved from recent study on 5/15/2024.    2.  New mild bilateral hydroureteronephrosis to the level of the urinary bladder, possibly due to increased volume of the urinary bladder, which remains somewhat small, indicating decreased bladder compliance.    3.  8 mm soft tissue density on the bladder wall at the insertion site of the right ureter, new from prior study. Although this is unlikely to be a urothelial neoplasm given acute development, consider follow-up contrast enhanced CT of the abdomen and   pelvis for further evaluation.    4.  Punctate calcifications in the left renal pelvis, unchanged from 6/13/2023, nonspecific but compatible with benign calcifications.    5.  Stable mild prostate enlargement.

## 2024-05-29 NOTE — MEDICATION SCRIBE - ADMISSION MEDICATION HISTORY
Medication Scribe Admission Medication History    Admission medication history is complete. The information provided in this note is only as accurate as the sources available at the time of the update.    Information Source(s): Patient via in-person    Pertinent Information: Per pt son, pt is taking medications on PTA medication list as directed.     Changes made to PTA medication list:  Added: None  Deleted: None  Changed: None    Allergies reviewed with patient and updates made in EHR: yes    Medication History Completed By: Heather Elizabeth 5/29/2024 1:02 AM    PTA Med List   Medication Sig Last Dose    acetaminophen (TYLENOL) 500 MG tablet Take 1 tablet (500 mg) by mouth every 4 hours as needed for mild pain 5/28/2024    allopurinol (ZYLOPRIM) 100 MG tablet TAKE 2 TABLETS(200 MG) BY MOUTH DAILY 5/28/2024 at am    apixaban ANTICOAGULANT (ELIQUIS ANTICOAGULANT) 5 MG tablet Take 1 tablet (5 mg) by mouth 2 times daily 5/28/2024 at ti    calcium carbonate (TUMS) 500 MG chewable tablet Take 1 chew tab by mouth daily as needed for heartburn  at as IP    celecoxib (CELEBREX) 200 MG capsule Take 1 capsule (200 mg) by mouth daily as needed for pain (jaw pain, joint pain) 5/28/2024 at am    Continuous Blood Gluc Sensor (FREESTYLE SUSANNAH 2 SENSOR) MISC 1 each every 14 days Use 1 sensor every 14 days. Use to read blood sugars per 's instructions.     Continuous Glucose  (FREESTYLE SUSANNAH 2 READER) TONIE USE TO READ BLOOD SUGARS PER MANUFACTRUERS INSTRUCTIONS     dextromethorphan-quiNIDine (NUEDEXTA) 20-10 MG capsule Take 1 capsule by mouth every 12 hours 5/28/2024    DULoxetine (CYMBALTA) 30 MG capsule TAKE 1 CAPSULE(30 MG) BY MOUTH TWICE DAILY 5/28/2024 at ti    HYDROcodone-acetaminophen (NORCO) 5-325 MG tablet Take 1 tablet by mouth every 6 hours as needed for severe pain  at as IP    hydrOXYzine (ATARAX) 25 MG tablet Take 1 tablet (25 mg) by mouth every 8 hours as needed for itching 5/28/2024 at AM     insulin aspart (NOVOLOG FLEXPEN) 100 UNIT/ML pen Inject 8 units before breakfast, and 6 units under the skin before lunch and dinner 5/28/2024 at 5pm    insulin glargine (LANTUS PEN) 100 UNIT/ML pen Inject 30 Units Subcutaneous every 24 hours 5/28/2024 at 6pm    insulin pen needle (BD PEN NEEDLE SALVATORE 2ND GEN) 32G X 4 MM miscellaneous USE 1 PEN NEEDLE FOUR TIMES DAILY OR AS DIRECTED     JANUVIA 50 MG tablet TAKE 1 TABLET(50 MG) BY MOUTH DAILY 5/28/2024    meclizine (ANTIVERT) 12.5 MG tablet Take 1 tablet (12.5 mg) by mouth 3 times daily as needed for dizziness 5/28/2024 at ti    metoprolol succinate ER (TOPROL XL) 100 MG 24 hr tablet Take 0.5 tablets (50 mg) by mouth daily Take 1/2 tablet  daily 5/28/2024 at am    pantoprazole (PROTONIX) 40 MG EC tablet Take 1 tablet (40 mg) by mouth every morning (before breakfast) 5/28/2024 at am    polyethylene glycol (MIRALAX) 17 GM/Dose powder MIX 17 GRAMS IN LIQUID AND TAKE BY MOUTH ONCE DAILY AS NEEDED FOR CONSTIPATION 5/28/2024 at am    rosuvastatin (CRESTOR) 20 MG tablet TAKE 1 TABLET(20 MG) BY MOUTH AT BEDTIME 5/28/2024 at ti    SENNA-docusate sodium (SENNA S) 8.6-50 MG tablet Take 1 tablet by mouth 2 times daily as needed (for constipation) 5/28/2024 at am    sulfamethoxazole-trimethoprim (BACTRIM DS) 800-160 MG tablet Take 1 tablet by mouth 2 times daily for 35 days 5/28/2024 at ti    tamsulosin (FLOMAX) 0.4 MG capsule Take 1 capsule (0.4 mg) by mouth daily 5/28/2024 at am    traZODone (DESYREL) 50 MG tablet Take 1-2 tablets ( mg) by mouth nightly as needed for sleep 5/27/2024 at hs    triamcinolone (KENALOG) 0.1 % external cream Apply topically 2 times daily (Patient taking differently: Apply topically 2 times daily as needed for irritation) 5/27/2024 at hs

## 2024-05-29 NOTE — PROGRESS NOTES
Shift [id number]    6MS ADMISSION    D: Patient admitted/transferred from [facility] via EMS for complicated UTI.     I: Upon arrival to the unit patient was oriented to room, unit, and call light. Patient s height, weight, and vital signs were obtained. Allergies reviewed and allergy band applied. Provider notified of patient s arrival on the unit. Head to toe assessment completed. Education assessment completed. Care plan initiated.    A: Vital signs stable upon admission. Patient rates pain at 7. Two RN skin assessment completed Yes. Second RN was Al NEGRON. Significant Skin Findings include abrasions over R forearm and R lower leg.    P: Continue to monitor patient s  and intervene as needed. Continue with plan of care. Notify provider with any concerns or changes in patient status.

## 2024-05-29 NOTE — PROGRESS NOTES
"Shift [id number]    VS: BP (!) 148/89 (BP Location: Right arm, Patient Position: Supine, Cuff Size: Adult Regular)   Pulse 89   Temp 97.4  F (36.3  C) (Oral)   Resp 18   Ht 1.575 m (5' 2\")   Wt 70.7 kg (155 lb 13.8 oz)   SpO2 96%   BMI 28.51 kg/m     O2: Stable on RA. Denies chest pain and SOB.    Output: Uses urinal @ bedside. Pt refuses primo fit(condom cath) over discomfort. Pt and son explained risks but pt continues to refuse.    Last BM: Denies abd. discomfort.   Activity: SBA in room. Uses walker and wheelchair @ home.   Skin: WDL except abrasion on R forearm and R leg.    Pain: Pain managed with PRN tylenol.    CMS: Intact, AOx4. Denies numbness and tingling.   Dressing: CDI on R forearm and R leg.    Diet: Regular diet. Denies nausea/vomiting.    Equipment: IV pole, personal belongings,    Plan: Continue with plan of care. Call light within reach, pt able to make needs known.    Additional Info: Gets dizzy with urination.        "

## 2024-05-29 NOTE — PLAN OF CARE
Occupational Therapy: Orders received. Chart reviewed and discussed with care team.? Occupational Therapy not indicated due to pt mobilizing well and near baseline. Per PT, pt has supportive family who assist with ADL/IADL prn.? Defer discharge recommendations to physical therapy.? Will complete orders.

## 2024-05-29 NOTE — ED PROVIDER NOTES
[place] EMERGENCY DEPARTMENT ([facility])    5/28/24       ED PROVIDER NOTE    History     Chief Complaint   Patient presents with    Loss of Consciousness    Cystitis    Hematuria    Fall     HPI  Adam M Albin is a 84 year old male with history of recent candida glabrata prostatitis c/b abscess, CKD, insulin dependent diabetes, BPH, anticoagulated on Eliquis d/t hx of CVA, HTN and HLD who presents with hematuria, LOC, confusion. His son acts as  and provides history along with patient. He reports 1 week of hematuria, increasing today. He was diagnosed with UTI on 5/15 with positive cultures later found to be resistant to fluconazole. Started on PO Bactrim 5/19 then was seen by ID on 5/23 where he was prescribed IV Micafungin. He has appointment in 2 days to get PICC line placed and start this. Today he was feeling dizzy as he was urinating. Then as he stood up he became lightheaded and lost consciousness. He fell onto his right side and was found unconscious by family member. He endorses pain in his neck, right arm, right hip, and right leg. Most pain in his penis. His son brings him into the ED because he has been increasingly confused since his fall earlier today. He does not know where he is or what year it is. No recent fevers.     Past Medical History  Past Medical History:   Diagnosis Date    Acute blood loss anemia     Acute renal failure (H24)     Anemia     Bacteremia     Cataract     Chronic gout     CKD (chronic kidney disease)     Stage 3    DM2 (diabetes mellitus, type 2) (H)     GERD (gastroesophageal reflux disease)     Glucose intolerance (impaired glucose tolerance)     Gout     History of nephrolithiasis     History of prostatitis 7/19/2017    Hyperlipidemia     Hypertension     Insomnia     Intestinal disaccharidase deficiencies and disaccharide malabsorption     Created by Conversion     Latent tuberculosis     Nephrolithiasis     Neuropathy     Nonspecific abnormal findings  "on radiological and other examination of skull and head     Created by [facility] Annotation: 2013 11:23PM - Giulia Meridai/10/2011:  severe stenosis both posterior cerebral arteries seen MRI/MRA done for  vertigo. No acute changes.e*    Osteoarthritis     wrist, knee, shoulder    Prostatitis     Rectal bleed     Trigger thumb      Past Surgical History:   Procedure Laterality Date    CYSTOSCOPY, TRANSURETHRAL RESECTION (TUR) PROSTATE, COMBINED N/A 2024    Procedure: Transurethral Resection of Prostate Abscess;  Surgeon: Bernabe Fletcher MD;  Location: [facility] OR    IR MISCELLANEOUS PROCEDURE  2009    IR MISCELLANEOUS PROCEDURE  2009    IR MISCELLANEOUS PROCEDURE  2009    IR MISCELLANEOUS PROCEDURE  2009    IR NEPHROURETERAL TUBE PLACEMENT BILATERAL  2009    IR URETER DILATION BILATERAL  2009    IR URETER DILATION BILATERAL  2009    KIDNEY STONE SURGERY      PICC  3/6/2016          metoprolol succinate ER (TOPROL XL) 100 MG 24 hr tablet      Allergies   Allergen Reactions    Lisinopril Cough     Family History  Family History   Problem Relation Age of Onset    Cerebrovascular Disease Father     Cerebrovascular Disease Daughter      Social History   Social History     Tobacco Use    Smoking status: Former     Current packs/day: 0.00     Types: Cigarettes     Quit date: 3/10/2000     Years since quittin.2     Passive exposure: Never    Smokeless tobacco: Former    Tobacco comments:     no passive exposure   Vaping Use    Vaping status: Never Used   Substance Use Topics    Alcohol use: No    Drug use: No         A medically appropriate review of systems was performed with pertinent positives and negatives noted in the HPI, and all other systems negative.     Physical Exam   BP: 118/77  Pulse: 84  Temp: 97.6  F (36.4  C)  Resp: 18  Height: 157.5 cm (5' 2\")  Weight: 68 kg (150 lb)  SpO2: 95 %      Physical Exam  Vitals and nursing note reviewed. "   Constitutional:       General: He is not in acute distress.     Appearance: Normal appearance.   HENT:      Head: Normocephalic.      Nose: Nose normal.   Eyes:      Pupils: Pupils are equal, round, and reactive to light.   Cardiovascular:      Rate and Rhythm: Normal rate and regular rhythm.   Pulmonary:      Effort: Pulmonary effort is normal.   Abdominal:      General: There is no distension.   Musculoskeletal:         General: No deformity. Normal range of motion.      Cervical back: Normal range of motion.   Skin:     General: Skin is warm.   Neurological:      Mental Status: He is alert and oriented to person, place, and time.   Psychiatric:         Mood and Affect: Mood normal.         ED Results and Procedures     Results for orders placed or performed during the hospital encounter of 05/28/24 (from the past 24 hour(s))   Glucose by meter   Result Value Ref Range    GLUCOSE BY METER POCT 156 (H) 70 - 99 mg/dL   Glucose by meter   Result Value Ref Range    GLUCOSE BY METER POCT 201 (H) 70 - 99 mg/dL   Glucose by meter   Result Value Ref Range    GLUCOSE BY METER POCT 179 (H) 70 - 99 mg/dL   CBC with platelets   Result Value Ref Range    WBC Count 10.7 4.0 - 11.0 10e3/uL    RBC Count 3.42 (L) 4.40 - 5.90 10e6/uL    Hemoglobin 10.2 (L) 13.3 - 17.7 g/dL    Hematocrit 31.7 (L) 40.0 - 53.0 %    MCV 93 78 - 100 fL    MCH 29.8 26.5 - 33.0 pg    MCHC 32.2 31.5 - 36.5 g/dL    RDW 15.7 (H) 10.0 - 15.0 %    Platelet Count 224 150 - 450 10e3/uL   Glucose by meter   Result Value Ref Range    GLUCOSE BY METER POCT 201 (H) 70 - 99 mg/dL   Single Lumen PICC Placement    Narrative    Shea Horne RN     6/1/2024  9:03 AM  [facility]    Single Lumen PICC Placement    Date/Time: 6/1/2024 8:46 AM    Performed by: Alice Jama RN  Authorized by: Lynette Baron MD  Indications: vascular access      UNIVERSAL PROTOCOL   Site Marked: Yes  Prior Images Obtained and  Reviewed:  Yes  Required items: Required blood products, implants, devices and special   equipment available    Patient identity confirmed:  Verbally with patient, arm band and   hospital-assigned identification number  NA - No sedation, light sedation, or local anesthesia  Confirmation Checklist:  Patient's identity using two indicators, relevant   allergies, procedure was appropriate and matched the consent or emergent   situation and correct equipment/implants were available  Time out: Immediately prior to the procedure a time out was called    Universal Protocol: the Joint Commission Universal Protocol was followed    Preparation: Patient was prepped and draped in usual sterile fashion    ESBL (mL):  1     ANESTHESIA    Anesthesia:  Local infiltration  Local Anesthetic:  Lidocaine 1% without epinephrine  Anesthetic Total (mL):  2      SEDATION    Patient Sedated: No        Preparation: skin prepped with ChloraPrep  Skin prep agent: skin prep agent completely dried prior to procedure  Sterile barriers: maximum sterile barriers were used: cap, mask, sterile   gown, sterile gloves, and large sterile sheet  Hand hygiene: hand hygiene performed prior to central venous catheter   insertion  Type of line used: PICC  Catheter type: single lumen  Lumen type: power PICC and non-valved  Catheter size: 3 Fr  Brand: Bard  Lot number: [id number]  Placement method: venipuncture, MST, ultrasound and tip navigation system  Number of attempts: 1  Difficulty threading catheter: no  Successful placement: yes  Orientation: right  Catheter to Vein (%): 30  Location: basilic vein  Tip Location: SVC/RA Junction  Arm circumference: adults 10 cm (8 cm)  Extremity circumference: 28  Visible catheter length: 3  Total catheter length: 44  Dressing and securement: alcohol impregnated caps, blood cleaned with CHG,   blood removed, chlorhexidine disc applied, dressing applied, gloves   changed prior to final dressing, line secured, occlusive  "dressing applied,   securement device, site cleansed, sterile dressing applied, subcutaneous   anchor securement system, transparent securement dressing and adhesive   securement device  Post procedure assessment: blood return through all ports, free fluid flow   and placement verified by 3CG technology  PROCEDURE   Patient Tolerance:  Patient tolerated the procedure well with no immediate   complicationsDescribe Procedure: PICC placed in right upper extremity for   anticipated 4-6 weeks of antibiotic therapy. Patient tolerated well and   denies pain. Tip location verified at the cavoatrial junction (CAJ) using   ECG technology. PICC is ready to use. To contact the [facility]   Access team enter a \"nursing to consult for vascular access\" DOS375 order   in EPIC.     Disposal: sharps and needle count correct at the end of procedure, needles   and guidewire disposed in sharps container   XR Chest 1 View    Narrative    Chest one view portable    HISTORY: PICC    COMPARISON STUDY: 5/11/2024    FINDINGS: Right PICC with tip in the high right atrium. Left basilar  atelectasis. Cardiac silhouette is nonenlarged.      Impression    IMPRESSION: Right PICC with tip in the high right atrium.    ROGERIO CLINTON MD         SYSTEM ID:  [id number]   Glucose by meter   Result Value Ref Range    GLUCOSE BY METER POCT 181 (H) 70 - 99 mg/dL     Medications   glucose gel 15-30 g (has no administration in time range)     Or   dextrose 50 % injection 25-50 mL (has no administration in time range)     Or   glucagon injection 1 mg (has no administration in time range)   lidocaine 1 % 0.1-1 mL (has no administration in time range)   lidocaine (LMX4) cream (has no administration in time range)   sodium chloride (PF) 0.9% PF flush 3 mL (3 mLs Intracatheter Not Given 6/1/24 0939)   sodium chloride (PF) 0.9% PF flush 3 mL (has no administration in time range)   oxyCODONE IR (ROXICODONE) half-tab 2.5-5 mg (has no administration in time " range)   ondansetron (ZOFRAN ODT) ODT tab 4 mg (has no administration in time range)     Or   ondansetron (ZOFRAN) injection 4 mg (has no administration in time range)   insulin aspart (NovoLOG) injection (RAPID ACTING) (1 Units Subcutaneous $Given 6/1/24 1247)   insulin aspart (NovoLOG) injection (RAPID ACTING) (1 Units Subcutaneous $Given 5/31/24 2135)   acetaminophen (TYLENOL) tablet 500 mg (500 mg Oral $Given 5/29/24 0414)   allopurinol (ZYLOPRIM) tablet 200 mg (200 mg Oral $Given 6/1/24 0755)   calcium carbonate (TUMS) chewable tablet 500 mg (has no administration in time range)   DULoxetine (CYMBALTA) DR capsule 30 mg (30 mg Oral $Given 6/1/24 0755)   hydrOXYzine HCl (ATARAX) tablet 25 mg (has no administration in time range)   insulin aspart (NovoLOG) injection (RAPID ACTING) (6 Units Subcutaneous $Given 6/1/24 1247)   insulin glargine (LANTUS PEN) injection 20 Units (20 Units Subcutaneous $Given 6/1/24 0805)   meclizine (ANTIVERT) tablet 12.5 mg (has no administration in time range)   metoprolol succinate ER (TOPROL XL) 24 hr tablet 50 mg (50 mg Oral $Given 6/1/24 0756)   pantoprazole (PROTONIX) EC tablet 40 mg (40 mg Oral $Given 6/1/24 0755)   polyethylene glycol (MIRALAX) Packet 17 g (17 g Oral Not Given 6/1/24 0800)   rosuvastatin (CRESTOR) tablet 20 mg (20 mg Oral $Given 5/31/24 2124)   senna-docusate (SENOKOT-S/PERICOLACE) 8.6-50 MG per tablet 1 tablet (has no administration in time range)   tamsulosin (FLOMAX) capsule 0.4 mg (0.4 mg Oral $Given 6/1/24 0755)   traZODone (DESYREL) tablet  mg (50 mg Oral $Given 5/31/24 2124)   dextromethorphan-quiNIDine (NUEDEXTA) 20-10 MG per capsule 1 capsule (1 capsule Oral $Given 6/1/24 0756)   micafungin (MYCAMINE) 150 mg in sodium chloride 0.9 % 100 mL intermittent infusion (150 mg Intravenous $New Bag 5/31/24 2300)   sodium chloride 0.9 % infusion ( Intravenous $New Bag 6/1/24 1125)   apixaban ANTICOAGULANT (ELIQUIS) tablet 5 mg (5 mg Oral $Given 6/1/24 2933)    lidocaine 1 % 0.1-5 mL (2 mLs Other $Given 6/1/24 0903)   lidocaine (LMX4) cream (has no administration in time range)   sodium chloride (PF) 0.9% PF flush 10-40 mL (has no administration in time range)   sodium chloride (PF) 0.9% PF flush 10-40 mL (10 mLs Intracatheter $Given 6/1/24 1128)   sodium chloride (PF) 0.9% PF flush 10-20 mL (has no administration in time range)   heparin lock flush 10 unit/mL injection 5-20 mL (0 mLs Intracatheter Hold 6/1/24 1137)   heparin lock flush 10 unit/mL injection 5-20 mL (has no administration in time range)   levofloxacin (LEVAQUIN) tablet 750 mg (has no administration in time range)   cefTRIAXone (ROCEPHIN) 2 g vial to attach to  ml bag for ADULTS or NS 50 ml bag for PEDS (has no administration in time range)   magnesium oxide (MAG-OX) tablet 800 mg (has no administration in time range)   sodium chloride 0.9% BOLUS 1,000 mL (0 mLs Intravenous Stopped 5/29/24 0022)   calcium gluconate 1 g in 50 mL in sodium chloride intermittent infusion (1 g Intravenous $Given 5/28/24 2354)   sodium bicarbonate 8.4 % injection 50 mEq (50 mEq Intravenous $Given 5/29/24 0001)   dextrose 10% BOLUS 300 mL (300 mLs Intravenous $New Bag 5/29/24 0017)   dextrose 50 % injection 25 g (25 g Intravenous $Given 5/29/24 0000)   insulin regular 1 unit/mL injection 7 Units (7 Units Intravenous $Given 5/28/24 2356)   furosemide (LASIX) injection 40 mg (40 mg Intravenous $Given 5/29/24 0001)   albuterol (PROVENTIL) neb solution 10 mg (10 mg Nebulization $Given 5/29/24 0041)   micafungin (MYCAMINE) 100 mg in sodium chloride 0.9 % 100 mL intermittent infusion (100 mg Intravenous $New Bag 5/29/24 0228)   cefTRIAXone (ROCEPHIN) 1 g vial to attach to  mL bag for ADULTS or NS 50 mL bag for PEDS (0 g Intravenous Stopped 5/29/24 0220)   lactated ringers BOLUS 1,000 mL (1,000 mLs Intravenous $New Bag 5/29/24 0633)         ED Course as of 06/01/24 1509   Tue May 28, 2024   9559      EKG Interpretation:      Interpreted by Kuldip Angelo DO  Time reviewed:2137   Symptoms at time of EKG: fall   Rhythm: normal sinus   Rate: normal  Axis: NORMAL  Ectopy: none  Conduction: normal  ST Segments/ T Waves: No ST-T wave changes  Q Waves: none  Comparison to prior: No old EKG available    Clinical Impression: normal EKG        ED Course/Assessments & Plan (with Medical Decision Making)   Patient history of type 2 diabetes mellitus, hypertension, hyperlipidemia, prior CVAs on apixaban, presents the ED for evaluation of dysuria hematuria and recent fall.    Reviewed outpatient primary care, urology, and infectious disease notes.  Patient is a history of prostatitis status post TURP on Bactrim.  He had associated fluid collections in this area and recent urine culture on 516 grew Candida glabrata resistant to fluconazole.  He was scheduled to have an outpatient PICC line placed so that he can start IV micafungin.    Arrival to the ED, patient is slightly hypertensive, otherwise has normal vital signs.  He has tenderness in the suprapubic area, and without signs of peritonitis.    Labs notable for acute on chronic kidney injury with creatinine of 2.69, baseline is approximately 1.5.  Recent creatinines were 1.95 on 5/20/2024, 1.63 on 5/19/2024, and 1.42 on 5/18/2024.  Patient started on IV fluids.  Calcium is 5.9, shifted in the ED.  EKG without signs of acute ischemia or cardiac dysrhythmia.  No other dangerous electrolyte abnormalities.  Lactic acid is slightly elevated at 2.4.  Urinalysis is suggestive of infection with large leuk esterase, greater than 180 white blood cells and greater than 182 red blood cells.  Antibiotics initiated in the ED.  Also start IV micafungin given recent cultures.    Overall clinical picture consistent with acute on chronic renal insufficiency and complicated UTI.  Case discussed with the medicine team for admission.            I have reviewed the nursing notes.    I have reviewed the findings,  diagnosis, plan and need for follow up with the patient.    Critical Care Addendum  My initial assessment, based on my review of nursing observations, review of vital signs, focused history, physical exam, review of cardiac rhythm monitor, and 12 lead ECG analysis, established a high suspicion that Adam Talamantes has a high risk electrolyte abnormality, which requires immediate intervention, and therefore he is critically ill.     After the initial assessment, the care team initiated multiple lab tests, initiated IV fluid administration, and initiated medication therapy with IV micafungin, IV calcium, lasix, bicarb  to provide stabilization care. Due to the critical nature of this patient, I reassessed nursing observations, vital signs, physical exam, review of cardiac rhythm monitor, mental status, neurologic status, and respiratory status multiple times prior to his disposition.     Time also spent performing documentation, reviewing test results, discussion with consultants, and coordination of care.     Critical care time (excluding teaching time and procedures): 35 minutes.       Current Discharge Medication List          Final diagnoses:   Acute on chronic renal insufficiency   Complicated UTI (urinary tract infection)   Hyperkalemia   IManpreet, am serving as a trained medical scribe to document services personally performed by Kuldip Angelo DO based on the provider's statements to me on May 28, 2024.  This document has been checked and approved by the attending provider.    I, Kuldip Angelo DO, was physically present and have reviewed and verified the accuracy of this note documented by Manpreet Villafuerte medical scribe.      Kuldip Angelo DO  [facility] EMERGENCY DEPARTMENT  5/28/2024      Kuldip Angelo DO  06/01/24 1522

## 2024-05-29 NOTE — UTILIZATION REVIEW
Admission Status; Secondary Review Determination     Admission Date: 5/28/2024  8:51 PM       Under the authority of the Utilization Management Committee, the utilization review process indicated a secondary review on the above patient.  The review outcome is based on review of the medical records, discussions with staff, and applying clinical experience noted on the date of the review.        (x)      Inpatient Status Appropriate - This patient's medical care is consistent with medical management for inpatient care and reasonable inpatient medical practice.       RATIONALE FOR DETERMINATION      Brief clinical presentation, information copied from the chart, abbreviated and edited for relevant content:     Adam Talamantes is an 84 year old male with history of HTN, DM, CKD, multiple CVAs on apixaban, UTI/prostatitus s/p TURP (5/16) with C.glabrata on prostate tissue culture who presented to ED on 5/28/24 after syncope at home as he was straining to urinate, found to have ELLA. He was seen in ID clinic on 5/23/24 for C.gabrata isolated from prostate tissue.   VSS on arrival with WBC 11.3-->10.8 and Hgb 11.8. Imaging with improved bladder wall thickening, new bilateral hydroureteronephrosis, 8mm soft tissue density at right ureterovesical junction. With report of rigors, concern for possible blood stream infection; new UTI is also possible with the hydronephrosis, ongoing retention and recent procedure. unfortunately there is not a good PO option for treatment due to resistance to fluconazole (DAVE too high to be overcome by high dose) and voriconazole. Amphotericin B would be preferred, however with baseline CKD and current ELLA - risk outweighs benefit. Micafungin is not preferred due to limited urinary concentration, however, it has the least amount of risk of the available options. Will start micafungin with dose increased to 150mg daily per ID consult. Following  blood and urine cultures from time of arrival. Plan to  Avoid PICC line placement until blood cultures are no growth for 48-72 hrs.       At the time of admission with the information available to the attending physician, more than 2 nights hospital complex care was anticipated. Also, there was a risk of adverse outcome if patient was treated outpatient or observation. High intensity of services anticipated. Inpatient admission appropriate based on Medicare guidelines.       The information on this document is developed by the utilization review team in order for the business office to ensure compliance.  This only denotes the appropriateness of proper admission status and does not reflect the quality of care rendered.         The definitions of Inpatient Status and Observation Status used in making the determination above are those provided in the CMS Coverage Manual, Chapter 1 and Chapter 6, section 70.4.      Sincerely,      Jennifer Robin MD   Utilization Review/ Case Management  [facility].

## 2024-05-29 NOTE — PROGRESS NOTES
05/29/24 1430   Appointment Info   Signing Clinician's Name / Credentials (PT) Jamel Reid DPT       Present yes   Language Kayce via phone with daughter per family request   Living Environment   People in Home child(chanel), adult;spouse   Current Living Arrangements house   Home Accessibility stairs to enter home   Number of Stairs, Main Entrance 2   Stair Railings, Main Entrance none   Transportation Anticipated family or friend will provide   Living Environment Comments Pt lives with daughter, CRISTY, son, and spouse in house with 2 ANIVAL and then pt's bedroom, bathroom, and kitchen all on main level   Self-Care   Usual Activity Tolerance fair   Current Activity Tolerance fair   Equipment Currently Used at Home walker, rolling;wheelchair, manual;shower chair   Fall history within last six months yes   Number of times patient has fallen within last six months 1   Activity/Exercise/Self-Care Comment Pt has assist from family at home with dressing, showering, cooking, eating, dressing, toileting, and all mobility. Pt typically uses FWW at home and manual w/c for outside the home. Pt had one fall yesterday where family assisting was unable to catch him, otherwise family always with him.   General Information   Onset of Illness/Injury or Date of Surgery 05/28/24   Referring Physician Lynette Baron MD   Patient/Family Therapy Goals Statement (PT) get pt back home safely   Pertinent History of Current Problem (include personal factors and/or comorbidities that impact the POC) Per EMR: Adam LUACS Albin is an 85 yo male admitted on 5/28/2024.  He has a h/o T2DM, HTN, HLD, multiple CVAs on apixaban and recent admission to [facility] 5/11 - 5/19 for AMS due to UTI/prostatitis s/p TURP with ongoing hematuria and urinary frequencies on bactrim who presented to ED after a syncopal event/fall that occurred as he was straining to urinate.  W/u in the ED revealed ELLA on CKD with ongoing UTI.  Admitted for  further evaluation and treatment   Existing Precautions/Restrictions fall   Weight-Bearing Status - LUE full weight-bearing   Weight-Bearing Status - RUE full weight-bearing   Weight-Bearing Status - LLE full weight-bearing   Weight-Bearing Status - RLE full weight-bearing   General Observations Activity Up with assist   Cognition   Affect/Mental Status (Cognition) low arousal/lethargic   Follows Commands (Cognition) follows one-step commands;over 90% accuracy   Pain Assessment   Patient Currently in Pain Yes, see Vital Sign flowsheet   Integumentary/Edema   Integumentary/Edema no deficits were identifed   Posture    Posture Forward head position;Protracted shoulders   Range of Motion (ROM)   Range of Motion ROM is WFL   Strength (Manual Muscle Testing)   Strength (Manual Muscle Testing) Deficits observed during functional mobility   Bed Mobility   Comment, (Bed Mobility) Family tending to assist, typically CGA with supine <> sit   Transfers   Comment, (Transfers) CGA with FWW   Gait/Stairs (Locomotion)   Comment, (Gait/Stairs) amb short distances with CGA and FWW, slow gait speed, increased UE reliance   Balance   Balance Comments Sitting EOB with SBA, CGA for standing with FWW   Sensory Examination   Sensory Perception patient reports no sensory changes   Clinical Impression   Criteria for Skilled Therapeutic Intervention Yes, treatment indicated   PT Diagnosis (PT) impaired functional mobility   Influenced by the following impairments generalized weakness, deconditioning, fatigue, pain   Functional limitations due to impairments bed mobility, transfers, gait, stairs, activity tolerance   Clinical Presentation (PT Evaluation Complexity) stable   Clinical Presentation Rationale stable, clinical reasoning   Clinical Decision Making (Complexity) low complexity   Planned Therapy Interventions (PT) balance training;bed mobility training;gait training;home exercise program;neuromuscular re-education;patient/family  education;ROM (range of motion);stair training;strengthening;transfer training;progressive activity/exercise;risk factor education;home program guidelines   Risk & Benefits of therapy have been explained evaluation/treatment results reviewed;care plan/treatment goals reviewed;risks/benefits reviewed;current/potential barriers reviewed;participants voiced agreement with care plan;participants included;patient;daughter;son   Clinical Impression Comments pt presents to PT with generalized weakness and deconditioning in setting of fall while attempting to urinate, likely having fainted. Skilled PT services necessary to improve pt's level of IND to decrease caregiver burden and improve safety for safe d/c home.   PT Total Evaluation Time   PT Eval, Low Complexity Minutes (36598) 12   Physical Therapy Goals   PT Frequency 5x/week   PT Predicted Duration/Target Date for Goal Attainment 06/14/24   PT Goals Bed Mobility;Transfers;Gait;Stairs;PT Goal 1   PT: Bed Mobility Supervision/stand-by assist;Supine to/from sit;Rolling;Bridging   PT: Transfers Supervision/stand-by assist;Sit to/from stand;Bed to/from chair;Assistive device   PT: Gait Supervision/stand-by assist;100 feet;Rolling walker   PT: Stairs Minimal assist;2 stairs   PT: Goal 1 Pt will perform HEP with IND in order to progress LE strength and overall function.   Interventions   Interventions Quick Adds Therapeutic Activity   Therapeutic Activity   Therapeutic Activities: dynamic activities to improve functional performance Minutes (31520) 23   Symptoms Noted During/After Treatment Fatigue;Increased pain   Treatment Detail/Skilled Intervention Post eval, continued with functional mobility training to improve IND. With initial supine > sit, pt's family assisting, encouraged pt doing as much on his own as able. Pt performing multiple STS with FWW and CGA, amb x10 and x25' with FWW and CGA, short step length and slow speed, but overall stable. Pain with sitting  related to catheter, pt wanting to return to lying down. Edu with pt and family on d/c recs and PT POC during IP stay. Pt ends supine in bed, all needs met within reach .   PT Discharge Planning   PT Plan monitor BPs, short distance gait, bring in w/c to stairs   PT Discharge Recommendation (DC Rec) home with assist;home with home care physical therapy   PT Rationale for DC Rec Pt likely near baseline mobility after discussion with daughter, has 24/7 support between daughter, CRISTY, son, receives assistance with all ADLs and mobility at baseline. Recommend returning home with previous level of assistance and  PT to progress IND.   PT Brief overview of current status CGA with FWW   Total Session Time   Timed Code Treatment Minutes 23   Total Session Time (sum of timed and untimed services) 35

## 2024-05-29 NOTE — PROGRESS NOTES
TRANSITIONS OF CARE (VIRGEN) LOG    VIRGEN tasks should be completed by the CC within one (1) business day of notification of each transition. Follow up contact with member is required after return to their usual care setting.  Note:  If CC finds out about the transitions fifteen (15) days or more after the member has returned to their usual care setting, no VIRGEN log is needed. However, the CC should check in with the member to discuss the transition process, any changes needed to the care plan and document it in a case note.     Member Name:  Adam Talamantes MCO Name:  [facility] MCO/Health Plan Member ID#: [id number]   Product: [facility] Care Coordinator Contact:  Kristi Calzada, RN, BSN, PHN Agency/County/Care System: [employer]   Transition Communication Actions from Care Management Contact   Transition #1   Notification Date: 05/29/2024 Transition Date:   05/28/2024 Transition From: Home     Is this the member s usual care setting?               yes Transition To: [facility]    Transition Type:  Unplanned    Documentation from conversation with the member/responsible party, provider, discharging and receiving facility:   Date: 05/29/2024: Received notification of admission to hospital with dx of Complicated UTI.  CC reached out to adult daughter Dimitri Talamantes (speaks English [phone])  regarding transition and offered support as needed. Daughter is concerned about Adam's confusion and anger. Daughter and Son are problem solving who can stay with Adam in the hospital 24/7 in order to provide Adam safety from falls and any possible anger outbursts. Dimitri is interested in a 1:1 for Adam - CC is unsure if he actually qualifies but recommended that Dimitri talks to Adam's hospitalist.     Reviewed and update care plan as needed.      Adam receives PCA services.     Transition log initiated.     PCP, Chucho Espino, notified of hospitalization via EMR.     Transition #2   Notification Date: 06/03/2024 Transition Date:    06/02/2024 Transition From: [facility]     Is this the member s usual care setting?               no Transition To: Home   Transition Type:  Planned    Documentation from conversation with the member/responsible party, provider, discharging and receiving facility:   Date: 06/03/2024: Received notification of transition to home.  CC contacted adult daughter Dimitri  and reviewed discharge summary.  Member has a follow-up appointment with PCP in 7 days: Has follow up in a few weeks after ID, urology, and pharmacist consults.    Member has had a change in condition: Yes: Discharged home with a catheter and new antibiotics.   Home visit needed: No  Care plan reviewed and updated.  New referrals placed: No.  Dimitri requested two packs of incontinent briefs. CC messaged and set up order for PCP if applicable.   Transition log completed.     PCP, Chucho Espino, notified of transition back to home via EMR.    Kristi Calzada RN  [employer]  Cell Phone [phone]         *RETURN TO USUAL CARE SETTING: *Complete tasks below when the member is discharging TO their usual care setting within one (1) business day of notification..      For situations where the Care Coordinator is notified of the discharge prior to the date of discharge, the Care Coordinator must follow up with the member or designated representative to confirm that discharge actually occurred and discuss required VIRGEN tasks as outlined in the VIRGEN Instructions.  (This includes situations where it may be a  new  usual care setting for the member. (i.e., a community member who decides upon permanent nursing home placement following hospitalization and rehab).    Discuss with Member/Responsible Party:    Check  Yes  - if the member, family member and/or SNF/facility staff manages the following:    If  No  provide explanation in the comments section.          Date completed: 06/03/2024 Communicated with  member or their designated representative about the following:  care transition process; about changes to the member s health status; plan of care updates; education about transitions and how to prevent unplanned transitions/readmissions    Four Pillars for Optimal Transition:    Check  Yes  - if the member, family member and/or SNF/facility staff manages the following:    If  No  provide explanation in the comments section.          []  Yes     [x]  No Does the member have a follow-up appointment scheduled with primary care or specialist? (Mental health hospitalizations--the appt. should be w/in 7 days)              For mental health hospitalizations:  []  Yes     [x]  No     Does the member have a follow-up appointment scheduled with a mental health practitioner within 7 days of discharge?  [x]  Yes     []  No     Has a medication review been completed with member? If no, refer to PCP, home care nurse, MTM, pharmacist  [x]  Yes     []  No     Can the member manage their medications or is there a system in place to manage medications (e.g. home care set-up)?         [x]  Yes     []  No     Can the member verbalize warning signs and symptoms to watch for and how to respond?  [x]  Yes     []  No     Does the member have a copy of and understand their discharge instructions?  If no, assist to obtain copy of discharge instructions, review discharge instructions, and assist to contact PCP to discuss questions about their recent hospitalization.  [x]  Yes     []  No     Does the member have adequate food, housing and transportation?  If no, add goal and discuss additional supports available to the member                                                                                                                                                                                 [x]  Yes     []  No     Is the member safe in their home?  If no, document needs and support provided                                                                                                                                                                           [x]  Yes     []  No     Are there any concerns of vulnerability, abuse, or neglect?  If yes, document concerns and actions taken by Care Coordinator as a mandated                                                                                                                                                                              [x]  Yes     []  No     Does the member use a Personal Health Care Record?  Check  Yes  if visit summary, discharge summary, and/or healthcare summary are being used as a PHR.                                                                                                                                                                                  [x]  Yes     []  No     Have you reviewed the discharge summary with the member? If  No  provide explanation in comments.  [x]  Yes     []  No     Have you updated the member s care plan/support plan? Add new diagnosis, medications, treatments, goals & interventions, as applicable. If No, provide explanation in comments.    Comments:       Member has a follow-up appointment with PCP in 7 days: Has follow up in a few weeks after ID, urology, and pharmacist consults.      Notes from conversation with the member/responsible party, provider, discharging and receiving facility (as applicable): Dimitri requested two packs of incontient supplies. RN will be visiting family in order to provide education of Adam's PICC line and further medication administration. Dimitri is aware of all Adam's upcoming medical appointments.           3

## 2024-05-29 NOTE — Clinical Note
RAMINI Admitted to [facility] 05/28/2024 with complicated UTI.  Thanks, Kristi Calzada, SHELIA/[facility] [employer] Cell Phone [phone]

## 2024-05-29 NOTE — CONSULTS
[facility] - [place], [place] GENERAL INFECTIOUS DISEASES CONSULTATION     Patient:  Adam Talamantes   Date of birth 1940, Medical record number [id number]  Date of Visit:  05/29/2024  Date of Admission: 5/28/2024  Consult Requester:Lynette Baron,*          Assessment and Recommendations:   ASSESSMENT:  Prostatitis - C.glabrata on OR cultures (5/16/24)  S/p TURP 5/16/24- initial concern for prostatic abscess- no abscess found but did have abnormal tissue  Possible UTI, ongoing dysuria  Chills/rigors  Syncope, suspected vasovagal    DISCUSSION:   Adam Talamantes is an 84 year old male with history of HTN, DM, CKD, multiple CVAs on apixaban, UTI/prostatitus s/p TURP (5/16) with C.glabrata on prostate tissue culture who presented to ED on 5/28/24 after syncope at home as he was straining to urinate, found to have ELLA. He was seen in ID clinic on 5/23/24 for C.gabrata isolated from prostate tissue.     Afebrile, VSS on arrival with WBC 11.3-->10.8 and Hgb 11.8. Imaging with improved bladder wall thickening, new bilateral hydroureteronephrosis, 8mm soft tissue density at right ureterovesical junction. With report of rigors, concern for possible blood stream infection; new UTI is also possible with the hydronephrosis, ongoing retention and recent procedure. Following blood and urine cultures collected on arrival.     As previously outlined by Dr. Perera- unfortunately there is not a good PO option for treatment due to resistance to fluconazole (DAVE too high to be overcome by high dose) and voriconazole. Amphotericin B would be preferred, however with baseline CKD and current ELLA - risk outweighs benefit. Micafungin is not preferred due to limited urinary concentration, however, it has the least amount of risk of the available options. Will start micafungin with dose increased to 150mg daily.       RECOMMENDATION:  Agree with micafungin - increase to 150mg IV daily  Agree with ceftriaxone while Ucx pending - increase to 2g IV  daily  Following blood and urine cultures from time of arrival  Avoid PICC line placement until blood cultures are no growth for 48-72 hrs      Thank you for this consult. ID will continue to follow.     June Stiles PA-C  Pronouns: she/her/hers  Infectious Diseases  Contact via [employer] or [facility] Paging/Directory    ________________________________________________________________    Consult Question: followed by mirella ALVAREZ, UTI with resistent fungus, here after a fall, ongoing hematuria/frequent urination. Now on ceftriaxone and micafungin, pls advise management  Admission Diagnosis: Hyperkalemia [E87.5]  Acute on chronic renal insufficiency [N28.9, N18.9]  Complicated UTI (urinary tract infection) [N39.0]         History of Present Illness:   Adam Talamantes is an 84 year old male with history of HTN, DM, CKD, multiple CVAs on apixaban, UTI/prostatitus s/p TURP (5/16) with C.glabrata on prostate tissue culture who presented to ED on 5/28/24 after syncope at home as he was straining to urinate, found to have ELLA.    History provided by patient, daughter (Kapoh, via phone), and son (in room). Recent imaging with 3.1 x 3 3 3.7 rim-enhancing fluid collection concerning for prostate abscess. Underwent TURP (5/16) with no abscess identified during procedure. Tissue cultures x2 (bacterial and fungal) both grew C.glabrata complex. He was referred to ID and had planned to get a PICC line placed and start micafungin this week, had not started yet.    Mr. Talamantes has had ongoing difficulty urinating with dysuria, difficulty initiating stream, and sometimes only urinating a few drops of liquid at a time. Urine has been brown/frankly bloody at home. Daughter reports that he has not felt hot to the touch but she did observe him shivering the day they brought him in. He fell and was caught by family a couple of times, did have one unwitnessed fall before where he fainted.    Professional Kayce  via phone.          Past  Medical History:     Past Medical History:   Diagnosis Date    Acute blood loss anemia     Acute renal failure (H24)     Anemia     Bacteremia     Cataract     Chronic gout     CKD (chronic kidney disease)     Stage 3    DM2 (diabetes mellitus, type 2) (H)     GERD (gastroesophageal reflux disease)     Glucose intolerance (impaired glucose tolerance)     Gout     History of nephrolithiasis     History of prostatitis 2017    Hyperlipidemia     Hypertension     Insomnia     Intestinal disaccharidase deficiencies and disaccharide malabsorption     Created by Conversion     Latent tuberculosis     Nephrolithiasis     Neuropathy     Nonspecific abnormal findings on radiological and other examination of skull and head     Created by [facility] Annotation: 2013 11:23PM - Giulia Meridai/10/2011:  severe stenosis both posterior cerebral arteries seen MRI/MRA done for  vertigo. No acute changes.e*    Osteoarthritis     wrist, knee, shoulder    Prostatitis     Rectal bleed     Trigger thumb             Past Surgical History:     Past Surgical History:   Procedure Laterality Date    CYSTOSCOPY, TRANSURETHRAL RESECTION (TUR) PROSTATE, COMBINED N/A 2024    Procedure: Transurethral Resection of Prostate Abscess;  Surgeon: Bernabe Fletcher MD;  Location: [facility] OR    IR MISCELLANEOUS PROCEDURE  2009    IR MISCELLANEOUS PROCEDURE  2009    IR MISCELLANEOUS PROCEDURE  2009    IR MISCELLANEOUS PROCEDURE  2009    IR NEPHROURETERAL TUBE PLACEMENT BILATERAL  2009    IR URETER DILATION BILATERAL  2009    IR URETER DILATION BILATERAL  2009    KIDNEY STONE SURGERY      PICC  3/6/2016                 Family History:   Reviewed and non-contributory.   Family History   Problem Relation Age of Onset    Cerebrovascular Disease Father     Cerebrovascular Disease Daughter             Social History:     Social History     Tobacco Use    Smoking status: Former     Current packs/day: 0.00      Types: Cigarettes     Quit date: 3/10/2000     Years since quittin.2     Passive exposure: Never    Smokeless tobacco: Former    Tobacco comments:     no passive exposure   Substance Use Topics    Alcohol use: No     History   Sexual Activity    Sexual activity: Never            Current Medications:     Current Facility-Administered Medications   Medication Dose Route Frequency Provider Last Rate Last Admin    allopurinol (ZYLOPRIM) tablet 200 mg  200 mg Oral Daily Lynette Baron MD        apixaban ANTICOAGULANT (ELIQUIS) tablet 2.5 mg  2.5 mg Oral BID Lynette Baron MD   2.5 mg at 24 1204    cefTRIAXone (ROCEPHIN) 1 g vial to attach to  mL bag for ADULTS or NS 50 mL bag for PEDS  1 g Intravenous Q24H Lynette Baron MD        dextromethorphan-quiNIDine (NUEDEXTA) 20-10 MG per capsule 1 capsule  1 capsule Oral Q12H Kuldip Angelo,         dextromethorphan-quiNIDine (NUEDEXTA) 20-10 MG per capsule 1 capsule  1 capsule Oral Q12H Kuldip Angelo DO        DULoxetine (CYMBALTA) DR capsule 30 mg  30 mg Oral BID Lynette Baron MD   30 mg at 24 1206    insulin aspart (NovoLOG) injection (RAPID ACTING)  1-3 Units Subcutaneous TID AC Lynette Baron MD        insulin aspart (NovoLOG) injection (RAPID ACTING)  1-3 Units Subcutaneous At Bedtime Lynette Baron MD        insulin aspart (NovoLOG) injection (RAPID ACTING)  6 Units Subcutaneous TID w/meals Lynette Baron MD        insulin glargine (LANTUS PEN) injection 20 Units  20 Units Subcutaneous Q24H Lynette Baron MD        lactated ringers BOLUS 1,000 mL  1,000 mL Intravenous Once Lynette Baron  mL/hr at 24 0633 1,000 mL at 24 0633    metoprolol succinate ER (TOPROL XL) 24 hr tablet 50 mg  50 mg Oral Daily Lynette Baron MD   50 mg at 24 1205    micafungin (MYCAMINE) 100 mg in sodium chloride 0.9 % 100 mL  "intermittent infusion  100 mg Intravenous Q24H Alondra John MD        pantoprazole (PROTONIX) EC tablet 40 mg  40 mg Oral QAM AC Lynette Baron MD   40 mg at 05/29/24 1205    polyethylene glycol (MIRALAX) Packet 17 g  17 g Oral Daily Lynette Baron MD   17 g at 05/29/24 1205    rosuvastatin (CRESTOR) tablet 20 mg  20 mg Oral At Bedtime Lynette Baron MD        sodium chloride (PF) 0.9% PF flush 3 mL  3 mL Intracatheter Q8H Lynette Baron MD        tamsulosin (FLOMAX) capsule 0.4 mg  0.4 mg Oral Daily Lynette Baron MD   0.4 mg at 05/29/24 1209            Allergies:     Allergies   Allergen Reactions    Lisinopril Cough            Physical Exam:   Vitals were reviewed  Patient Vitals for the past 24 hrs:   BP Temp Temp src Pulse Resp SpO2 Height Weight   05/29/24 0827 125/80 98.8  F (37.1  C) Oral -- -- 96 % -- --   05/29/24 0330 -- -- -- -- -- -- 1.575 m (5' 2\") 70.7 kg (155 lb 13.8 oz)   05/29/24 0329 (!) 148/89 97.4  F (36.3  C) Oral 89 18 96 % -- --   05/28/24 2320 126/85 -- -- 77 -- (!) 88 % -- --   05/28/24 2300 139/77 -- -- 79 -- 99 % -- --   05/28/24 2250 132/77 -- -- 76 -- 98 % -- --   05/28/24 2230 129/77 -- -- 77 -- 98 % -- --   05/28/24 2220 125/81 -- -- 79 -- 96 % -- --   05/28/24 2201 126/82 98.5  F (36.9  C) Oral 81 18 95 % 1.575 m (5' 2\") 68 kg (150 lb)   05/28/24 2030 118/77 97.6  F (36.4  C) Oral 84 18 95 % 1.575 m (5' 2\") --       Physical Examination:  GENERAL:  Awake, alert and in NAD. Supine in bed.   HEENT:  Head is normocephalic, atraumatic   EYES:  Eyes have anicteric sclerae without conjunctival injection or discharge.    ENT:  Oropharynx is moist, no lesions. Poor dentition.   NECK:  Supple. No cervical lymphadenopathy  LUNGS:  Clear to auscultation bilateral.   CARDIOVASCULAR:  Regular rate and rhythm with no murmurs, gallops or rubs.  : +suprapubic tenderness. No CVA tenderness. Urine in urinal is brown/red in color.  ABDOMEN:  Soft, " nondistended, + bowel sounds. +low abd tenderness.   SKIN:  No acute rashes.  Line(s) are in place without any surrounding erythema or exudate.  NEUROLOGIC:  Grossly nonfocal. Active x4 extremities         Laboratory Data:     Inflammatory Markers    Recent Labs   Lab Test 05/28/24 2134   CRPI 13.40*       Hematology Studies    Recent Labs   Lab Test 05/29/24 0659 05/28/24 2134 05/20/24  1210 05/19/24  0729 05/18/24  0632 05/17/24  0542   WBC 10.8 11.3* 10.0 11.0 11.1* 10.4   HGB 11.8* 12.6* 11.5* 11.0* 10.6* 11.2*   MCV 90 93 92 93 92 93    288 210 198 201 204       Metabolic Studies     Recent Labs   Lab Test 05/29/24 0659 05/29/24  0427 05/29/24  0055 05/28/24 2134 05/20/24 1210 05/19/24  0729 05/18/24  0632   *  --   --  131* 135 133* 133*   POTASSIUM 4.8 4.7 4.6 5.9* 4.5 4.3 4.2   CHLORIDE 98  --   --  98 99 98 99   CO2 21*  --   --  19* 23 24 23   BUN 37.7*  --   --  41.2* 29.9* 23.1* 25.1*   CR 2.48*  --   --  2.69* 1.95* 1.63* 1.42*   GFRESTIMATED 25*  --   --  23* 34* 42* 49*       Hepatic Studies    Recent Labs   Lab Test 05/28/24 2134 05/19/24 0729 05/11/24 2108 02/26/24  1150 02/16/23  1156 02/02/23  0709 02/01/23  1155   BILITOTAL 0.2 0.3 0.3 0.3  --  0.4 0.4   ALKPHOS 110 124 122 96  --  75 92   ALBUMIN 4.2 3.3* 4.2 4.2 4.5 4.2 4.3   AST 23 25 21 21  --  30 34   ALT 20 18 17 18 54* 37 38       Microbiology:  Culture   Date Value Ref Range Status   05/16/2024 No anaerobic organisms isolated  Final   05/16/2024 See corresponding culture for results  Final   05/16/2024 Candida glabrata complex (A)  Preliminary   05/16/2024 Isolated in broth only Candida glabrata complex (A)  Final     Comment:     On day 3 of incubation   05/14/2024 No Growth  Final   05/12/2024 No Growth  Final   05/12/2024 No Growth  Final   05/11/2024 <10,000 CFU/mL Mixture of Urogenital Valentina  Final   05/02/2024 No Growth  Final   02/03/2023 <10,000 CFU/mL Urogenital valentina  Final   01/11/2023 No Growth  Final  "      Urine Studies    Recent Labs   Lab Test 05/28/24  2146 05/14/24  1636 05/11/24  2057 05/02/24  1035 04/28/23  1310 02/03/23  1348   LEUKEST Large* Large* Large* Small* Small* 250 Franko/uL*   WBCU >182* >182* >182* >100*  --  25*       Vancomycin Levels  No lab results found.    Invalid input(s): \"VANCO\"    Hepatitis B Testing No lab results found.  Hepatitis C Testing     Hepatitis C Antibody   Date Value Ref Range Status   09/30/2009 Negative (Negative) Final   07/31/2009 Negative (Negative) Final     Respiratory Virus Testing    No results found for: \"RS\", \"FLUAG\"          Imaging:     CT abd/pelvis 5/29/2024   IMPRESSION:      1.  Mild wall thickening of the urinary bladder has improved from recent study on 5/15/2024.     2.  New mild bilateral hydroureteronephrosis to the level of the urinary bladder, possibly due to increased volume of the urinary bladder, which remains somewhat small, indicating decreased bladder compliance.     3.  8 mm soft tissue density on the bladder wall at the insertion site of the right ureter, new from prior study. Although this is unlikely to be a urothelial neoplasm given acute development, consider follow-up contrast enhanced CT of the abdomen and   pelvis for further evaluation.     4.  Punctate calcifications in the left renal pelvis, unchanged from 6/13/2023, nonspecific but compatible with benign calcifications.     5.  Stable mild prostate enlargement.  "

## 2024-05-29 NOTE — ED TRIAGE NOTES
Per pt's son, Pt has been having hematuria and pain with urination.  Per family pt had a small fall in the morning and a worse fall this evening.  Today he lost conciousness when urinating and fell towards the right side.  Unwitnessed so unsure if he hit his head.  He is on blood thinners.  Pt's son reports he is confused since second fall.

## 2024-05-29 NOTE — PROGRESS NOTES
[facility]    Medicine Progress Note - Hospitalist Service, GOLD TEAM 17    Date of Admission:  5/28/2024    Assessment & Plan   Adam Talamantes is an 83 yo male admitted on 5/28/2024.  He has a h/o T2DM, HTN, HLD, multiple CVAs on apixaban and recent admission to [facility] 5/11 - 5/19 for AMS due to UTI/prostatitis s/p TURP with ongoing hematuria and urinary frequencies on bactrim who presented to ED after a syncopal event/fall that occurred as he was straining to urinate.  W/u in the ED revealed ELLA on CKD with ongoing UTI.  Admitted for further evaluation and treatment.     Fall  Syncope, micturition vs vasovagal  ---   He endorses increased urinary frequency and dysuria   ---   He had a syncopal episode in the bathroom on 5/28  ---   Wife heard a noise from the bathroom shortly after she went to use the bathroom and found him on the floor.   ---   Per his son, pt had to strain to urinate, felt dizzy and fainted. it took several minutes before he became fully alert again.  Sustained abrasions over R forearm and R lower leg.  Denied other pain.   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   ---   Urine culture 5/16 grew Candida glabrata resistent to fluconazole  ---   He was discharged on 6 week course bactrim and ID follow up.    ---   Per ID clinic note on 5/23, the plan is to place PICC line and start IV micafungin for fluconazole-resistant C Glabrata.   ---   ID consult requested  ---   Start IV micafungin  ---   Switched bactrim to IV ceftriaxone due to ELLA  ---   Follow UCx and BCx x 2     Lactic acidosis  ---   LA 2.4 on admission in the setting of ongoing UTI.   ---   S/p 1L IVF  ---   LA back to normal range of 2.0     ELLA on CKD  New mild hydroureteronephrosis   ---   Baseline Cr ~ 1.3-1.7.   ---   Admit Cr 2.69  ---   ELLA is likely due to some dehydration, possible effect from bactrim, but also given small amount of frequent urinations, concern for possible obstruction and urinary retention.   ---   CT a/p showed 'New mild bilateral hydroureteronephrosis to the level of the urinary bladder, possibly due to increased volume of the urinary bladder, which remains somewhat small, indicating decreased bladder compliance, and an 8 mm soft tissue density on the bladder wall at the insertion site of the right ureter, new from prior study.'  ---   Will closely monitor PVR  ---   He was seen by urology consult service who recommended Diego catheter placement.  Have also recommended RBUS in 2 days to reassess hydro and antibiotics as recommended by ID     Hyponatremia  ---   Na 131 on admission  ---   Likely hypovolemic hyponatremia  ---   S/p 1L IVF  ---   Na level is 133 this am  ---   Continue gentle IVF hydration     T2DM  ---   Hgba1c was 8.8% 2/26/2024  ---   PTA Jardiance, januvia, glargine 30U daily and aspart 8U before breakfast, 6U with lunch and dinner.   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 Consider further evaluation with MRI on a non-emergent basis.   Suspected kidney cyst: 2.0 cm dense cystic lesion in the inferior pole of the left kidney, likely representing a hemorrhagic/proteinaceous cyst. This can be further characterized on future MRI.  Colonic wall thickening   Noted on CT Urogram 6/2023, wall thickening concerning for malignancy. Prior colonoscopy on file from 2017, showing tubular adenoma without high grade dysplasia. Ongoing discussions to pursue work up as OP with PCP, who is waiting to discuss with patient's daughter if should pursue colonoscopy. Previously noted that patient would not be interested in undergoing a surgery. No acute concerns.   - outpatient follow up        Diet:  regular  DVT Prophylaxis: DOAC  Diego Catheter: Not present  Lines: None     Cardiac Monitoring: None  Code Status:  full  Disposition Plan   Anticipate a minimum of 3 to 4 days of hospitalization for IV micafungin and further recommendations from ID and neurology consult service            Alondra John MD  Hospitalist Service, [address]  Securely message with [employer] (more info)  Text page via [facility] Paging/Directory   See signed in provider for up to date coverage information  ______________________________________________________________________    Interval History   No complaints  Uneventful night  Patient's son at bedside who helps interpret  On RA  Voiding without any difficulties but maroon-colored blood noted in urinal    Physical Exam   Vital Signs: Temp: 98.8  F (37.1  C) Temp src: Oral  BP: 125/80 Pulse: 89   Resp: 18 SpO2: 96 % O2 Device: None (Room air)    Weight: 155 lbs 13.84 oz  General: aao x 3, NAD.  HEENT:  NC/AT, PERRL, EOMI, neck supple, no thyromegaly, op clear, mmm.  CVS:  NL s 1 and s2, no m/r/g.  Lungs:  CTA B/L.   Abd:  Soft, + bs, NT, no rebound or gaurding, no fluid shift.  Ext:  No c/c.  Lymph:  No edema.  Neuro:  Nonfocal.  Musculoskeletal: No calf tenderness to palpation.    Skin:  No rash.  Psychiatry:  Mood and affect appropriate.      Data     I have personally reviewed the following data over the past 24 hrs:    10.8  \   11.8 (L)   / 254     133 (L) 98 37.7 (H) /  150 (H)   4.8 21 (L) 2.48 (H) \     ALT: 20 AST: 23 AP: 110 TBILI: 0.2   ALB: 4.2 TOT PROTEIN: 9.1 (H) LIPASE: N/A     Trop: 13 BNP: N/A     Procal: 0.07 CRP: 13.40 (H) Lactic Acid: 2.0       INR:  1.56 (H) PTT:  41 (H)   D-dimer:  N/A Fibrinogen:  N/A       Imaging results reviewed over the past 24 hrs:   Recent Results (from the past 24 hour(s))   CT Head w/o Contrast    Narrative    EXAM: CT HEAD W/O CONTRAST, CT CERVICAL SPINE W/O CONTRAST  LOCATION: [facility]  DATE: 5/28/2024    INDICATION: syncope, fall  COMPARISON: 5/12/2024  TECHNIQUE:   1) Routine CT Head without IV contrast. Multiplanar reformats. Dose reduction techniques were used.  2) Routine CT Cervical Spine without IV contrast. Multiplanar reformats. Dose reduction techniques were used.    FINDINGS:   HEAD CT:   INTRACRANIAL CONTENTS: No intracranial hemorrhage, extraaxial collection, or mass effect.  No CT evidence of acute infarct. Mild presumed chronic small vessel ischemic changes. There are chronic infarcts including the left corona radiata, right lizeth,   left thalamus and bilateral cerebellar hemisphere. Mild generalized volume loss. No hydrocephalus.     VISUALIZED ORBITS/SINUSES/MASTOIDS: Prior bilateral cataract surgery. Visualized portions of the orbits are otherwise unremarkable. No  paranasal sinus mucosal disease. No middle ear or mastoid effusion.    BONES/SOFT TISSUES: No scalp hematoma. No skull fracture.    CERVICAL SPINE CT:   VERTEBRA: There is straightening the normal cervical curvature with grade 1 anterolisthesis of C4 on C5 and grade 1 retrolisthesis of C5 on C6 and grade 1 anterolisthesis of C7 on T1. Normal vertebral body heights. No fracture or posttraumatic   subluxation.     CANAL/FORAMINA: There is multilevel disc height loss and facet hypertrophy. No high-grade spinal canal stenosis. Moderate neural foraminal stenoses from C4-5 through C6-7.    PARASPINAL: No extraspinal abnormality. Visualized lung fields are clear.      Impression    IMPRESSION:  HEAD CT:  No CT evidence for acute intracranial process. Brain atrophy and presumed chronic microvascular ischemic changes as above.    CERVICAL SPINE CT:  No CT evidence for acute fracture or post traumatic subluxation.     CT Cervical Spine w/o Contrast    Narrative    EXAM: CT HEAD W/O CONTRAST, CT CERVICAL SPINE W/O CONTRAST  LOCATION: [facility]  DATE: 5/28/2024    INDICATION: syncope, fall  COMPARISON: 5/12/2024  TECHNIQUE:   1) Routine CT Head without IV contrast. Multiplanar reformats. Dose reduction techniques were used.  2) Routine CT Cervical Spine without IV contrast. Multiplanar reformats. Dose reduction techniques were used.    FINDINGS:   HEAD CT:   INTRACRANIAL CONTENTS: No intracranial hemorrhage, extraaxial collection, or mass effect.  No CT evidence of acute infarct. Mild presumed chronic small vessel ischemic changes. There are chronic infarcts including the left corona radiata, right lizeth,   left thalamus and bilateral cerebellar hemisphere. Mild generalized volume loss. No hydrocephalus.     VISUALIZED ORBITS/SINUSES/MASTOIDS: Prior bilateral cataract surgery. Visualized portions of the orbits are otherwise unremarkable. No paranasal sinus mucosal disease. No middle  ear or mastoid effusion.    BONES/SOFT TISSUES: No scalp hematoma. No skull fracture.    CERVICAL SPINE CT:   VERTEBRA: There is straightening the normal cervical curvature with grade 1 anterolisthesis of C4 on C5 and grade 1 retrolisthesis of C5 on C6 and grade 1 anterolisthesis of C7 on T1. Normal vertebral body heights. No fracture or posttraumatic   subluxation.     CANAL/FORAMINA: There is multilevel disc height loss and facet hypertrophy. No high-grade spinal canal stenosis. Moderate neural foraminal stenoses from C4-5 through C6-7.    PARASPINAL: No extraspinal abnormality. Visualized lung fields are clear.      Impression    IMPRESSION:  HEAD CT:  No CT evidence for acute intracranial process. Brain atrophy and presumed chronic microvascular ischemic changes as above.    CERVICAL SPINE CT:  No CT evidence for acute fracture or post traumatic subluxation.     CT Abdomen Pelvis w/o Contrast    Narrative    EXAM: CT ABDOMEN PELVIS W/O CONTRAST  LOCATION: [facility]  DATE: 5/29/2024    INDICATION: Acute kidney injury, hematuria, lower abdominal pain, concern for obstructive uropathy  COMPARISON: 5/15/2024; 6/13/2023  TECHNIQUE: CT scan of the abdomen and pelvis was performed without IV contrast. Multiplanar reformats were obtained. Dose reduction techniques were used.  CONTRAST: None.    FINDINGS: Study is motion limited.    LOWER CHEST: Mild atelectasis in the lung bases without confluent consolidation or pleural effusion.    HEPATOBILIARY: Multiple stones again seen in the gallbladder without acute cholecystitis. Liver is normal in size without surface nodularity. A small cyst is again seen in the left hepatic lobe. Another hypoattenuating lesion in segment 8 measuring 1.7   cm is better demonstrated to represent a hemangioma on 6/13/2023.    PANCREAS: Normal.    SPLEEN: Normal.    ADRENAL GLANDS: Normal.    KIDNEYS/BLADDER: Diffuse wall thickening of the urinary  bladder, improved from prior study. Bladder is increased in volume compared to prior study. Mild bilateral hydroureteronephrosis to the level of the urinary bladder. Insertion sites of bilateral   ureters appear somewhat ectopic, located on the bilateral bladder walls. A nodular soft tissue is seen in the right bladder wall at the insertion site of the right ureter, measuring 8 mm on series 3 image 62, not seen on prior study. Bilateral renal   atrophy is unchanged. Punctate calcification again seen in the left renal pelvis, unchanged dating back to urogram on 6/13/2023.    BOWEL: No bowel thickening or obstruction. Appendix is normal.    LYMPH NODES: Normal.    VASCULATURE: Mild aortoiliac atherosclerotic calcifications. No abdominal aortic aneurysm.    PELVIC ORGANS: Mild prostate enlargement is stable.    MUSCULOSKELETAL: Bilateral L5 pars indicators defects with grade 1 anterolisthesis of L5 on S1 by 8 mm. Multilevel mild and moderate degenerative disc space narrowing with vacuum disc in the lumbar spine. No suspicious osseous lesions or acute fractures.        Impression    IMPRESSION:     1.  Mild wall thickening of the urinary bladder has improved from recent study on 5/15/2024.    2.  New mild bilateral hydroureteronephrosis to the level of the urinary bladder, possibly due to increased volume of the urinary bladder, which remains somewhat small, indicating decreased bladder compliance.    3.  8 mm soft tissue density on the bladder wall at the insertion site of the right ureter, new from prior study. Although this is unlikely to be a urothelial neoplasm given acute development, consider follow-up contrast enhanced CT of the abdomen and   pelvis for further evaluation.    4.  Punctate calcifications in the left renal pelvis, unchanged from 6/13/2023, nonspecific but compatible with benign calcifications.    5.  Stable mild prostate enlargement.

## 2024-05-30 DIAGNOSIS — Z76.0 ENCOUNTER FOR MEDICATION REFILL: ICD-10-CM

## 2024-05-30 LAB
ANION GAP SERPL CALCULATED.3IONS-SCNC: 11 MMOL/L (ref 7–15)
BACTERIA UR CULT: NORMAL
BUN SERPL-MCNC: 31.8 MG/DL (ref 8–23)
CALCIUM SERPL-MCNC: 8.8 MG/DL (ref 8.8–10.2)
CHLORIDE SERPL-SCNC: 104 MMOL/L (ref 98–107)
CREAT SERPL-MCNC: 1.84 MG/DL (ref 0.67–1.17)
DEPRECATED HCO3 PLAS-SCNC: 21 MMOL/L (ref 22–29)
EGFRCR SERPLBLD CKD-EPI 2021: 36 ML/MIN/1.73M2
ERYTHROCYTE [DISTWIDTH] IN BLOOD BY AUTOMATED COUNT: 15.7 % (ref 10–15)
GLUCOSE BLDC GLUCOMTR-MCNC: 134 MG/DL (ref 70–99)
GLUCOSE BLDC GLUCOMTR-MCNC: 143 MG/DL (ref 70–99)
GLUCOSE BLDC GLUCOMTR-MCNC: 145 MG/DL (ref 70–99)
GLUCOSE BLDC GLUCOMTR-MCNC: 175 MG/DL (ref 70–99)
GLUCOSE SERPL-MCNC: 138 MG/DL (ref 70–99)
HCT VFR BLD AUTO: 31.2 % (ref 40–53)
HGB BLD-MCNC: 10.1 G/DL (ref 13.3–17.7)
MAGNESIUM SERPL-MCNC: 1.8 MG/DL (ref 1.7–2.3)
MCH RBC QN AUTO: 30.3 PG (ref 26.5–33)
MCHC RBC AUTO-ENTMCNC: 32.4 G/DL (ref 31.5–36.5)
MCV RBC AUTO: 94 FL (ref 78–100)
PLATELET # BLD AUTO: 228 10E3/UL (ref 150–450)
POTASSIUM SERPL-SCNC: 4.6 MMOL/L (ref 3.4–5.3)
RBC # BLD AUTO: 3.33 10E6/UL (ref 4.4–5.9)
SODIUM SERPL-SCNC: 136 MMOL/L (ref 135–145)
WBC # BLD AUTO: 10.5 10E3/UL (ref 4–11)

## 2024-05-30 PROCEDURE — 120N000002 HC R&B MED SURG/OB UMMC

## 2024-05-30 PROCEDURE — 85027 COMPLETE CBC AUTOMATED: CPT | Performed by: INTERNAL MEDICINE

## 2024-05-30 PROCEDURE — 80048 BASIC METABOLIC PNL TOTAL CA: CPT | Performed by: INTERNAL MEDICINE

## 2024-05-30 PROCEDURE — 36415 COLL VENOUS BLD VENIPUNCTURE: CPT | Performed by: INTERNAL MEDICINE

## 2024-05-30 PROCEDURE — 250N000013 HC RX MED GY IP 250 OP 250 PS 637: Performed by: INTERNAL MEDICINE

## 2024-05-30 PROCEDURE — 258N000003 HC RX IP 258 OP 636: Performed by: INTERNAL MEDICINE

## 2024-05-30 PROCEDURE — 250N000011 HC RX IP 250 OP 636: Mod: JZ | Performed by: INTERNAL MEDICINE

## 2024-05-30 PROCEDURE — 99233 SBSQ HOSP IP/OBS HIGH 50: CPT | Mod: 24 | Performed by: PHYSICIAN ASSISTANT

## 2024-05-30 PROCEDURE — 99233 SBSQ HOSP IP/OBS HIGH 50: CPT | Performed by: INTERNAL MEDICINE

## 2024-05-30 PROCEDURE — 83735 ASSAY OF MAGNESIUM: CPT | Performed by: INTERNAL MEDICINE

## 2024-05-30 RX ORDER — METOPROLOL SUCCINATE 100 MG/1
50 TABLET, EXTENDED RELEASE ORAL DAILY
Qty: 45 TABLET | Refills: 3 | Status: SHIPPED | OUTPATIENT
Start: 2024-05-30

## 2024-05-30 RX ADMIN — ALLOPURINOL 200 MG: 100 TABLET ORAL at 09:20

## 2024-05-30 RX ADMIN — INSULIN ASPART 1 UNITS: 100 INJECTION, SOLUTION INTRAVENOUS; SUBCUTANEOUS at 09:28

## 2024-05-30 RX ADMIN — INSULIN ASPART 6 UNITS: 100 INJECTION, SOLUTION INTRAVENOUS; SUBCUTANEOUS at 14:19

## 2024-05-30 RX ADMIN — APIXABAN 2.5 MG: 2.5 TABLET, FILM COATED ORAL at 20:40

## 2024-05-30 RX ADMIN — CEFTRIAXONE SODIUM 2 G: 2 INJECTION, POWDER, FOR SOLUTION INTRAMUSCULAR; INTRAVENOUS at 22:48

## 2024-05-30 RX ADMIN — DULOXETINE HYDROCHLORIDE 30 MG: 30 CAPSULE, DELAYED RELEASE ORAL at 09:20

## 2024-05-30 RX ADMIN — SODIUM CHLORIDE, PRESERVATIVE FREE: 5 INJECTION INTRAVENOUS at 14:19

## 2024-05-30 RX ADMIN — INSULIN GLARGINE 20 UNITS: 100 INJECTION, SOLUTION SUBCUTANEOUS at 09:30

## 2024-05-30 RX ADMIN — METOPROLOL SUCCINATE 50 MG: 25 TABLET, EXTENDED RELEASE ORAL at 09:20

## 2024-05-30 RX ADMIN — INSULIN ASPART 6 UNITS: 100 INJECTION, SOLUTION INTRAVENOUS; SUBCUTANEOUS at 17:14

## 2024-05-30 RX ADMIN — PANTOPRAZOLE SODIUM 40 MG: 40 TABLET, DELAYED RELEASE ORAL at 09:20

## 2024-05-30 RX ADMIN — SODIUM CHLORIDE, PRESERVATIVE FREE: 5 INJECTION INTRAVENOUS at 03:44

## 2024-05-30 RX ADMIN — TAMSULOSIN HYDROCHLORIDE 0.4 MG: 0.4 CAPSULE ORAL at 09:20

## 2024-05-30 RX ADMIN — INSULIN ASPART 1 UNITS: 100 INJECTION, SOLUTION INTRAVENOUS; SUBCUTANEOUS at 17:14

## 2024-05-30 RX ADMIN — POLYETHYLENE GLYCOL 3350 17 G: 17 POWDER, FOR SOLUTION ORAL at 09:20

## 2024-05-30 RX ADMIN — MICAFUNGIN SODIUM 150 MG: 50 INJECTION, POWDER, LYOPHILIZED, FOR SOLUTION INTRAVENOUS at 23:58

## 2024-05-30 RX ADMIN — INSULIN ASPART 6 UNITS: 100 INJECTION, SOLUTION INTRAVENOUS; SUBCUTANEOUS at 09:29

## 2024-05-30 RX ADMIN — ROSUVASTATIN 20 MG: 10 TABLET, FILM COATED ORAL at 22:48

## 2024-05-30 RX ADMIN — APIXABAN 2.5 MG: 2.5 TABLET, FILM COATED ORAL at 09:20

## 2024-05-30 RX ADMIN — DULOXETINE HYDROCHLORIDE 30 MG: 30 CAPSULE, DELAYED RELEASE ORAL at 20:39

## 2024-05-30 ASSESSMENT — ACTIVITIES OF DAILY LIVING (ADL)
ADLS_ACUITY_SCORE: 42
ADLS_ACUITY_SCORE: 43
ADLS_ACUITY_SCORE: 42
ADLS_ACUITY_SCORE: 43
ADLS_ACUITY_SCORE: 42
ADLS_ACUITY_SCORE: 43

## 2024-05-30 NOTE — PROGRESS NOTES
[facility] ([facility] ID Service: Follow Up Note      Patient:  Adam Talamantes   Date of birth 1940, Medical record number [id number]  Date of Visit:  05/30/2024  Date of Admission: 5/28/2024         Assessment and Recommendations:   ID Problem List:  Prostatitis - C.glabrata on OR cultures (5/16/24)  S/p TURP 5/16/24- initial concern for prostatic abscess- no abscess found but did have abnormal tissue  Possible UTI, ongoing dysuria  Urinary retention, hematuria, s/p Diego catheter placement  Chills/rigors  Syncope, suspected vasovagal    Recommendations:  Continue micafungin 150mg IV q24hrs, plan for 6 weeks of antifungal treatment for C.glabrata (5/29-7/3)  Continue ceftriaxone for now  - Plan for total of 4-6 weeks of bacterial coverage for treatment of prostatitis without abscess (abx start: 5/2)  Following blood cultures from time of admission  Hold off on PICC placement until Bcx from admission are no growth [id number]    Discussion:  Adam Talamantes is an 84 year old male with history of HTN, DM, CKD, multiple CVAs on apixaban, UTI/prostatitus s/p TURP (5/16) with C.glabrata on prostate tissue culture who presented to ED on 5/28/24 after syncope at home as he was straining to urinate, found to have ELLA. He was seen in ID clinic on 5/23/24 for C.gabrata isolated from prostate tissue. Was on Bactrim PTA with plan for 6 weeks of treatment for prostatitis and was scheduled to start micafungin as an outpatient but had not received first dose at time of admission.     Afebrile, VSS on arrival with WBC 11.3-->10.8 and Hgb 11.8. Imaging with improved bladder wall thickening, new bilateral hydroureteronephrosis, 8mm soft tissue density at right ureterovesical junction. With report of rigors, concern for possible blood stream infection; new UTI is also possible with the hydronephrosis, ongoing retention and recent procedure. Urine culture with <10K mixed valentina. Blood cultures pending.     As previously outlined by  outpatient ID (Dr. Perera)- unfortunately, there is not a good PO option for treatment due to resistance to fluconazole (DAVE too high to be overcome by high dose) and voriconazole. Amphotericin B would be preferred, however with baseline CKD and current ELLA - risk outweighs benefit. Micafungin is not preferred due to limited urinary concentration, however, it has the least amount of risk of the available options. Will start micafungin with dose increased to 150mg daily.         Recs were discussed with primary team today. Don't hesitate to call with questions.     Attestation:  I have reviewed today's vital signs, medications, labs and imaging.    June Stiles PA-C  Pronouns: she/her/hers  Infectious Diseases  Contact via [employer] or [employer] Paging/Directory            Interval History:       Afebrile, VSS. WBC 10.5. Cr 2.48-->1.84. UCX with mixed urogenital valentina. Bcx NGTD.    Son at bedside. Kayce  via phone.     Still having dysuria and blood in urine. Low abdominal/pelvic pain improving. No flank pain, fevers, chills. Not sleeping at night.     Has been on antimicrobials for prostatitis for 4 weeks today (5/2-5/30) TURP on 5/16 without evidence of abscess, though resected nodule/tissue that appeared abnormal.         Current Antimicrobials   Current:  - ceftriaxone  - micafungin     Prior:  - Bactrim 5/19-5/28  - ceftriaxone 5/13-5/19  - cefepime 5/12-5/13  - bactrim 5/2-5/12         Physical Exam:   Ranges for vital signs:  Temp:  [97.4  F (36.3  C)-98.6  F (37  C)] 98.6  F (37  C)  BP: (128-136)/(70-82) 132/79  SpO2:  [94 %-96 %] 94 %    Intake/Output Summary (Last 24 hours) at 5/30/2024 0856  Last data filed at 5/30/2024 0553  Gross per 24 hour   Intake --   Output 1350 ml   Net -1350 ml     Exam:  GENERAL:  awake, alert, in NAD, supine in bed.   ENT:  Head is normocephalic, atraumatic. Oropharynx is moist without exudates or ulcers.  EYES:  Eyes have anicteric sclerae.    LUNGS:  Normal  respiratory effort on RA.  ABDOMEN:  Soft, nondistended, nontender  : parrish in place with blood tinged output   SKIN:  No acute rashes.  PIV Line is in place without any surrounding erythema.         Laboratory Data:   Reviewed.  Pertinent for:    Culture data:  Culture   Date Value Ref Range Status   05/29/2024 No growth after 12 hours  Preliminary   05/29/2024 No growth after 12 hours  Preliminary   05/29/2024 No growth after 1 day  Preliminary   05/28/2024 <10,000 CFU/mL Mixture of Urogenital Valentina  Final   05/16/2024 No anaerobic organisms isolated  Final   05/16/2024 See corresponding culture for results  Final   05/16/2024 Candida glabrata complex (A)  Preliminary   05/16/2024 Isolated in broth only Candida glabrata complex (A)  Final     Comment:     On day 3 of incubation   05/14/2024 No Growth  Final   05/12/2024 No Growth  Final   05/12/2024 No Growth  Final   05/11/2024 <10,000 CFU/mL Mixture of Urogenital Valentina  Final   05/02/2024 No Growth  Final   02/03/2023 <10,000 CFU/mL Urogenital valentina  Final   01/11/2023 No Growth  Final       Inflammatory Markers    Recent Labs   Lab Test 05/28/24 2134   CRPI 13.40*       Hematology Studies    Recent Labs   Lab Test 05/30/24  0749 05/29/24  0659 05/28/24 2134 05/20/24  1210   WBC 10.5 10.8 11.3* 10.0   HGB 10.1* 11.8* 12.6* 11.5*   MCV 94 90 93 92    254 288 210     No lab results found.    Metabolic Studies     Recent Labs   Lab Test 05/30/24  0749 05/29/24  0659 05/29/24  0427 05/29/24  0055 05/28/24 2134 05/20/24  1210    133*  --   --  131* 135   POTASSIUM 4.6 4.8 4.7 4.6 5.9* 4.5   CHLORIDE 104 98  --   --  98 99   CO2 21* 21*  --   --  19* 23   BUN 31.8* 37.7*  --   --  41.2* 29.9*   CR 1.84* 2.48*  --   --  2.69* 1.95*   GFRESTIMATED 36* 25*  --   --  23* 34*       Hepatic Studies    Recent Labs   Lab Test 05/28/24  2134 05/19/24  0729 05/11/24 2108   BILITOTAL 0.2 0.3 0.3   ALKPHOS 110 124 122   ALBUMIN 4.2 3.3* 4.2   AST 23 25 21   ALT  20 18 17            Imaging:     CT abd/pelvis 5/29/2024   IMPRESSION:      1.  Mild wall thickening of the urinary bladder has improved from recent study on 5/15/2024.     2.  New mild bilateral hydroureteronephrosis to the level of the urinary bladder, possibly due to increased volume of the urinary bladder, which remains somewhat small, indicating decreased bladder compliance.     3.  8 mm soft tissue density on the bladder wall at the insertion site of the right ureter, new from prior study. Although this is unlikely to be a urothelial neoplasm given acute development, consider follow-up contrast enhanced CT of the abdomen and   pelvis for further evaluation.     4.  Punctate calcifications in the left renal pelvis, unchanged from 6/13/2023, nonspecific but compatible with benign calcifications.     5.  Stable mild prostate enlargement.

## 2024-05-30 NOTE — PLAN OF CARE
"Goal Outcome Evaluation:      Plan of Care Reviewed With: patient, family    Overall Patient Progress: no change  VS: Blood pressure 133/74, pulse 89, temperature 98.5  F (36.9  C), temperature source Oral, resp. rate 18, height 1.575 m (5' 2\"), weight 70.7 kg (155 lb 13.8 oz), SpO2 94%.   O2: Stable on room air, denies chest pain and SOB   Output: Continent of bowel, parrish in place   Last BM: Patient can't record    Activity: Assist 1 with walker and gait belt   Skin: Visible skin intact   Pain: Denies pain   Neuro: A&O, denies numbness and tingling.   Dressing: None   Diet: Regular diet, Carb count, denies N/V   LDA: Left PIV infusing NS @ 100 mL/hr   Equipment: Personal items,    Plan: Continue POC.   Additional Info: Family member at bedside, patient in bed resting, respirations even and unlabored, call light within reach. Expressed no needs /concerns at this time. Plan of care ongoing              "

## 2024-05-30 NOTE — PROGRESS NOTES
[facility]    Medicine Progress Note - Hospitalist Service, GOLD TEAM 17    Date of Admission:  5/28/2024    Assessment & Plan   Adam Talamantes is an 83 yo male admitted on 5/28/2024.  He has a h/o T2DM, HTN, HLD, multiple CVAs on apixaban and recent admission to [facility] 5/11 - 5/19 for AMS due to UTI/prostatitis s/p TURP with ongoing hematuria and urinary frequencies on bactrim who presented to ED after a syncopal event/fall that occurred as he was straining to urinate.  W/u in the ED revealed ELLA on CKD with ongoing UTI.  Admitted for further evaluation and treatment.     Micturition vs vasovagal syncope  ---   He endorsed increased urinary frequency and dysuria   ---   He had a syncopal episode in the bathroom on 5/28  ---   Wife heard a noise coming from the bathroom.  Shortly after that she went to use the bathroom and found him on the floor.   ---   Per his son, pt had to strain to urinate, felt dizzy and fainted. it took several minutes before he became fully alert again.  Sustained abrasions over R forearm and R lower leg.  Denied other pain.   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   ---   Urine culture 5/16 grew Candida glabrata resistent to fluconazole  ---   He was discharged on 6 week course bactrim and ID follow up.   ---    Per ID clinic note on 5/23, the plan is to place PICC line and start IV micafungin for fluconazole-resistant C Glabrata.   ---   ID consult requested  ---   Start IV micafungin 150 mg daily  ---   Switched bactrim to IV ceftriaxone due to ELLA  ---   Follow UCx and BCx x 2  ---   Would insert PICC line tomorrow if no growth from blood culture     Lactic acidosis  ---   LA 2.4 on admission in the setting of ongoing UTI.   ---   S/p 1L IVF  ---   LA back to normal range of 2.0     ELLA on CKD  Acute urinary retention  New mild hydroureteronephrosis   ---   Baseline Cr ~ 1.3-1.7.   ---   Admit Cr 2.69 ---> parrish cath + IVF ---> 2.48 ---> 1.84 today  ---   ELLA is likely due to some dehydration, possible effect from bactrim, but also given small amount of frequent urinations, concern for possible obstruction and urinary retention.   ---   CT a/p showed 'New mild bilateral hydroureteronephrosis to the level of the urinary bladder, possibly due to increased volume of the urinary bladder, which remains somewhat small, indicating decreased bladder compliance, and an 8 mm soft tissue density on the bladder wall at the insertion site of the right ureter, new from prior study.'  ---   Will closely monitor PVR  ---   He was seen by urology consult service on 5/29 who recommended   --  Parrish catheter placement.  Parrish catheter inserted on 5/29.    --  RBUS in 2 days to reassess hydro, will obtain one tomorrow   -- Continue IV micafungin and IV ceftriaxone     Hyponatremia  ---   Na 131 on admission  ---   Likely hypovolemic hyponatremia  ---   S/p 1L IVF  ---   Na level is 136 this am  ---   Continue gentle IVF hydration     T2DM  ---   Hgba1c was 8.8% 2/26/2024  ---   PTA Jardiance, januvia, glargine 30U daily and aspart 8U before breakfast, 6U with lunch and dinner.   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 Consider further evaluation with MRI on a non-emergent basis.   Suspected kidney cyst: 2.0 cm dense cystic lesion in the inferior pole of the left kidney, likely representing a hemorrhagic/proteinaceous cyst. This can be further characterized on future MRI.  Colonic wall thickening   Noted on CT Urogram 6/2023, wall thickening concerning for malignancy. Prior colonoscopy on file from 2017, showing tubular adenoma without high grade dysplasia. Ongoing discussions to pursue work up as OP with PCP, who is waiting to discuss with patient's daughter if should pursue colonoscopy. Previously noted that patient would not be interested in undergoing a surgery. No acute concerns.   - outpatient follow up        Diet:  regular  DVT Prophylaxis: DOAC  Diego Catheter: Not present  Lines: None     Cardiac Monitoring: None  Code Status:  full  Disposition Plan   Anticipate a minimum of 3 to 4 days of hospitalization for IV micafungin and treatment of ELLA and urinary retention            Alondra John MD  Hospitalist Service, GOLD TEAM [address] [place] [address]  Securely message with [employer] (more info)  Text page via [facility] Paging/Directory   See signed in provider for up to date coverage information  ______________________________________________________________________    Interval  History   No complaints  Uneventful night  Patient's sons at bedside  Patient states he feels about the same, not much improvement in his generalized weakness and infection    Physical Exam   Vital Signs: Temp: 98.6  F (37  C) Temp src: Oral BP: 132/79       SpO2: 94 % O2 Device: None (Room air)    Weight: 155 lbs 13.84 oz  General: aao x 3, NAD.  HEENT:  NC/AT, PERRL, EOMI, neck supple, no thyromegaly, op clear, mmm.  CVS:  NL s 1 and s2, no m/r/g.  Lungs:  CTA B/L.   Abd:  Soft, + bs, NT, no rebound or gaurding, no fluid shift.  Ext:  No c/c.  Lymph:  No edema.  Neuro:  Nonfocal.  Musculoskeletal: No calf tenderness to palpation.    Skin:  No rash.  Psychiatry:  Mood and affect appropriate.      Data     I have personally reviewed the following data over the past 24 hrs:    10.5  \   10.1 (L)   / 228     136 104 31.8 (H) /  138 (H)   4.6 21 (L) 1.84 (H) \       Imaging results reviewed over the past 24 hrs:   No results found for this or any previous visit (from the past 24 hour(s)).

## 2024-05-30 NOTE — CONSULTS
"Care Management Initial Consult    General Information  Assessment completed with: Patient (and patient's son Narciso Talamantes who was at bedside during visit.  Patient does not speak english.  His primary language is Kayce and during visit,  was waived.), Son, Narciso and patient at bedside. Patient asked that son Narciso interprete during this visit.  Type of CM/SW Visit: Initial Assessment    Primary Care Provider verified and updated as needed: Yes   Readmission within the last 30 days: other (see comments) (yes per codie \"on other campus.\")      Reason for Consult: discharge planning  Advance Care Planning: Advance Care Planning Reviewed:  (Refer to Chart PRN)          Communication Assessment  Patient's communication style: spoken language (non-English) (Kayce)    Hearing Difficulty or Deaf: yes   Wear Glasses or Blind: no    Cognitive  Cognitive/Neuro/Behavioral: .WDL except  Level of Consciousness: confused  Arousal Level: opens eyes spontaneously  Orientation: disoriented x 4  Mood/Behavior: calm, cooperative  Best Language: 0 - No aphasia  Speech: clear    Living Environment:   People in home: child(chanel), adult (Daughter is PCA)  Daughter Kapoh  Current living Arrangements: house      Able to return to prior arrangements: yes (PT/OT consults pending per rounds.)       Family/Social Support:  Care provided by: self, spouse/significant other, child(chanel)  Provides care for: no one  Marital Status:  (Spouse Lenny)  Wife, Children, PCA          Description of Support System: Supportive    Support Assessment:  (Family declines a TCU stay for patient. Wishes to bring patient home.)    Current Resources:   Patient receiving home care services: Yes (but, family states that \"[facility] never had [facility] chance to admit patient to home care.  My dad was suppose to get a picc line today and start antibiotics.)     Community Resources: PCA (Patients daughter.)  Equipment currently used at home:  (Walker, WC and " shower chair..)  Supplies currently used at home: Incontinence Supplies, Wipes, Chux    Employment/Financial:  Employment Status: disabled (Patient ith history of stroke.)        Financial Concerns:   No concerns per family     Does the patient's insurance plan have a 3 day qualifying hospital stay waiver?  No    Lifestyle & Psychosocial Needs:  (From Chart Flow sheet)  Social Determinants of Health     Food Insecurity: Low Risk  (1/15/2024)    Food Insecurity     Within the past 12 months, did you worry that your food would run out before you got money to buy more?: No     Within the past 12 months, did the food you bought just not last and you didn t have money to get more?: No   Depression: Not at risk (1/15/2024)    PHQ-2     PHQ-2 Score: 0   Housing Stability: Low Risk  (1/15/2024)    Housing Stability     Do you have housing? : Yes     Are you worried about losing your housing?: No   Tobacco Use: Medium Risk (5/20/2024)    Patient History     Smoking Tobacco Use: Former     Smokeless Tobacco Use: Former     Passive Exposure: Never   Financial Resource Strain: Low Risk  (1/15/2024)    Financial Resource Strain     Within the past 12 months, have you or your family members you live with been unable to get utilities (heat, electricity) when it was really needed?: No   Alcohol Use: Not At Risk (2/22/2022)    AUDIT-C     Frequency of Alcohol Consumption: Never     Average Number of Drinks: Not on file     Frequency of Binge Drinking: Never   Transportation Needs: Low Risk  (1/15/2024)    Transportation Needs     Within the past 12 months, has lack of transportation kept you from medical appointments, getting your medicines, non-medical meetings or appointments, work, or from getting things that you need?: No   Physical Activity: Inactive (2/22/2022)    Exercise Vital Sign     Days of Exercise per Week: 0 days     Minutes of Exercise per Session: 0 min   Interpersonal Safety: Not At Risk (2/22/2022)    Humiliation,  "Afraid, Rape, and Kick questionnaire     Fear of Current or Ex-Partner: No     Emotionally Abused: No     Physically Abused: No     Sexually Abused: No   Stress: No Stress Concern Present (2/22/2022)    [place] [employer] - Occupational Stress Questionnaire     Feeling of Stress : Not at all   Social Connections: Socially Integrated (2/22/2022)    Social Connection and Isolation Panel [NHANES]     Frequency of Communication with Friends and Family: Once a week     Frequency of Social Gatherings with Friends and Family: Twice a week     Attends Mosque Services: 1 to 4 times per year     Active Member of Clubs or Organizations: No     Attends Club or Organization Meetings: 1 to 4 times per year     Marital Status:    Health Literacy: Not on file       Functional Status:  Prior to admission patient needed assistance:   Dependent ADLs::  (Needs assist)     Mental Health Status:  Mental Health Status: No Current Concerns       Chemical Dependency Status:        Values/Beliefs:  Spiritual, Cultural Beliefs, Mosque Practices, Values that affect care: no             Additional Information:    Met with family member who was at bedside with patient and Kayce  was waived during visit.  Family is adamant about not wanting Mr. Talamantes to go to a TCU setting stating that \"since his stroke and with the language barrier, we want him to only be at home.  That is why one of us will be with my dad there at all times.\"    Inpatient Cares continue per MD team plans of care.  The Department of Care Management is available to assist with updated transition needs prn  [facility] will be sent a referral via outlook and TBD if home Pt and OT will be recommended.  _____________________    Charlotte Calabrese  B.S.N., R.N., P.H.N.  Administrative Nurse Supervisor [place] [employer]/[facility]  Care Coordinator Nurse Flovielka/[facility] today only via Vocera  [facility] " [facility]

## 2024-05-30 NOTE — PLAN OF CARE
Goal Outcome Evaluation:  Plan of Care Reviewed With: patient, child  Overall Patient Progress: no change    Outcome Evaluation: No acute changes this shift. Family remains at bedside and assists w/ translation. Diego catheter placed.    RN Shift: 0930 - 1930    Vital Signs: Temp: 97.4  F (36.3  C) Temp src: Oral BP: 128/70 Pulse: 89   Resp: 18 SpO2: 94 % O2 Device: None (Room air)     CMS/Neuro: A&O x4      Cardio/Resp: No SOB or chest pain     Output: Diego catheter placed by RN flyer - Hematuria present, Provider aware     Activity: Assist x 1 w/ walker and gait belt.      Skin: R forearm abrasion, R shin abrasion, Bruising to abd     Pain: Denies     Dressing: CDI     LDA: L. Forearm, 20 g PIV - Patent, Currently infusing NS @ 100mL/hr  Diego catheter, 16 fr straight-tip in place, draining well     Diet: Regular, Thin liquids, Good appetite, Medication whole  BG checks 4 times daily & NOC  0822 - 132  1108 - 150  1313 - 142  1709 - 217     Additional Info: Call light w/in reach  Family remains at bedside to assist w/ translation.      Plan: Continue POC - Discharge TBD       Helene Strong RN, BSN, PHN

## 2024-05-30 NOTE — PROGRESS NOTES
"Care Management Follow Up    Length of Stay (days): 2    Expected Discharge Date: 06/03/2024     Concerns to be Addressed:  (Patient has had skilled home care that is ongoing for home IV antibiotics per update from interdisciplinary team.  TBD ig home PT/OT ill be needed in home setting where his daughter is his PC.  Jose C's name is Ms. Dimitri Talamantes.)     Patient plan of care discussed at interdisciplinary rounds: Yes    Anticipated Discharge Disposition:  (Home with skilled home care services.)     Anticipated Discharge Services:  Home Care, Home Infusion  Anticipated Discharge DME:  NA    Patient/family educated on Medicare website which has current facility and service quality ratings:  NA  Education Provided on the Discharge Plan:  NA  Patient/Family in Agreement with the Plan:  NA    Referrals Placed by CM/SW:  KIMBERLY  Private pay costs discussed: Not applicable    Additional Information:  Writer received a message from [facility] liaison informing \"pt Adam Talamantes in 612 was already referred to [facility] for IV Abx prior to his admission. ID must have made this referral. He does have coverage. We are trying to find a home care agency to provide the PT\". Anticipate discharge early to mid next week pending blood cultures and new PICC line placement. Care management will continue to follow.     [facility]  Phone  [phone]  Fax  [phone]  Intake: [phone]       Concha Mosley RN   Nurse Coordinator    6 Med/Surg  Phone: [phone]    Social Work and Care Management Department     SEARCHABLE in [facility] - search CARE COORDINATOR     [place] ([phone]) Saturday & Sunday; ([phone]) [place] Recognized Holidays     Units: 5A Onc 5201 thru 5219 RNCC, 5A Onc 5220 thru 5240 RNCC, 5C OFFSERVICE [phone] RNCC & 5C OFF SERVICE [phone] RNCC Pager: [phone]    Units: 6B Vocera, 6C Card 6401 thru 6420 RNCC, 6C Card 6502 thru 6514 RNCC, & 6C Card 6515 thru 6519 RNCC  Pager: " [phone]    Units: 7A SOT RNCC Vocera, 7B Med Surg Vocera, 7C Med Surg 7401 thru 7418 RNCC & 7C Med Surg 7502 thru 7521 RNCC Pager: [phone]    Units: 6A Vocera & 4A CVICU Vocera, 4C MICU Vocera, and 4E SICU Vocera   Pager: [phone]    Units: 5 Ortho Vocera & 5 Med Surg Vocera  Pager: [phone]    Units: 6 Med Surg Vocera & 8 Med Surg Vocera Pager [phone]

## 2024-05-31 ENCOUNTER — APPOINTMENT (OUTPATIENT)
Dept: PHYSICAL THERAPY | Facility: CLINIC | Age: 84
DRG: 868 | End: 2024-05-31
Payer: COMMERCIAL

## 2024-05-31 LAB
ANION GAP SERPL CALCULATED.3IONS-SCNC: 9 MMOL/L (ref 7–15)
ANION GAP SERPL CALCULATED.3IONS-SCNC: 9 MMOL/L (ref 7–15)
BUN SERPL-MCNC: 18.9 MG/DL (ref 8–23)
BUN SERPL-MCNC: 19.2 MG/DL (ref 8–23)
CALCIUM SERPL-MCNC: 8.6 MG/DL (ref 8.8–10.2)
CALCIUM SERPL-MCNC: 8.9 MG/DL (ref 8.8–10.2)
CHLORIDE SERPL-SCNC: 103 MMOL/L (ref 98–107)
CHLORIDE SERPL-SCNC: 103 MMOL/L (ref 98–107)
CREAT SERPL-MCNC: 1.31 MG/DL (ref 0.67–1.17)
CREAT SERPL-MCNC: 1.31 MG/DL (ref 0.67–1.17)
DEPRECATED HCO3 PLAS-SCNC: 22 MMOL/L (ref 22–29)
DEPRECATED HCO3 PLAS-SCNC: 23 MMOL/L (ref 22–29)
EGFRCR SERPLBLD CKD-EPI 2021: 54 ML/MIN/1.73M2
EGFRCR SERPLBLD CKD-EPI 2021: 54 ML/MIN/1.73M2
ERYTHROCYTE [DISTWIDTH] IN BLOOD BY AUTOMATED COUNT: 15.6 % (ref 10–15)
GLUCOSE BLDC GLUCOMTR-MCNC: 120 MG/DL (ref 70–99)
GLUCOSE BLDC GLUCOMTR-MCNC: 156 MG/DL (ref 70–99)
GLUCOSE BLDC GLUCOMTR-MCNC: 182 MG/DL (ref 70–99)
GLUCOSE BLDC GLUCOMTR-MCNC: 201 MG/DL (ref 70–99)
GLUCOSE SERPL-MCNC: 117 MG/DL (ref 70–99)
GLUCOSE SERPL-MCNC: 120 MG/DL (ref 70–99)
HCT VFR BLD AUTO: 30.9 % (ref 40–53)
HGB BLD-MCNC: 10.2 G/DL (ref 13.3–17.7)
HOLD SPECIMEN: NORMAL
MAGNESIUM SERPL-MCNC: 1.6 MG/DL (ref 1.7–2.3)
MAGNESIUM SERPL-MCNC: 1.6 MG/DL (ref 1.7–2.3)
MCH RBC QN AUTO: 30.2 PG (ref 26.5–33)
MCHC RBC AUTO-ENTMCNC: 33 G/DL (ref 31.5–36.5)
MCV RBC AUTO: 91 FL (ref 78–100)
PLATELET # BLD AUTO: 227 10E3/UL (ref 150–450)
POTASSIUM SERPL-SCNC: 4 MMOL/L (ref 3.4–5.3)
POTASSIUM SERPL-SCNC: 4.1 MMOL/L (ref 3.4–5.3)
RBC # BLD AUTO: 3.38 10E6/UL (ref 4.4–5.9)
SODIUM SERPL-SCNC: 134 MMOL/L (ref 135–145)
SODIUM SERPL-SCNC: 135 MMOL/L (ref 135–145)
WBC # BLD AUTO: 8.8 10E3/UL (ref 4–11)

## 2024-05-31 PROCEDURE — 250N000011 HC RX IP 250 OP 636: Performed by: INTERNAL MEDICINE

## 2024-05-31 PROCEDURE — 83735 ASSAY OF MAGNESIUM: CPT | Performed by: INTERNAL MEDICINE

## 2024-05-31 PROCEDURE — 99233 SBSQ HOSP IP/OBS HIGH 50: CPT | Performed by: INTERNAL MEDICINE

## 2024-05-31 PROCEDURE — 258N000003 HC RX IP 258 OP 636: Performed by: INTERNAL MEDICINE

## 2024-05-31 PROCEDURE — 80048 BASIC METABOLIC PNL TOTAL CA: CPT | Performed by: INTERNAL MEDICINE

## 2024-05-31 PROCEDURE — 120N000002 HC R&B MED SURG/OB UMMC

## 2024-05-31 PROCEDURE — 82374 ASSAY BLOOD CARBON DIOXIDE: CPT | Performed by: INTERNAL MEDICINE

## 2024-05-31 PROCEDURE — 85027 COMPLETE CBC AUTOMATED: CPT | Performed by: INTERNAL MEDICINE

## 2024-05-31 PROCEDURE — 97530 THERAPEUTIC ACTIVITIES: CPT | Mod: GP

## 2024-05-31 PROCEDURE — 36415 COLL VENOUS BLD VENIPUNCTURE: CPT | Performed by: INTERNAL MEDICINE

## 2024-05-31 PROCEDURE — 250N000013 HC RX MED GY IP 250 OP 250 PS 637: Performed by: INTERNAL MEDICINE

## 2024-05-31 PROCEDURE — 99233 SBSQ HOSP IP/OBS HIGH 50: CPT | Mod: 24 | Performed by: PHYSICIAN ASSISTANT

## 2024-05-31 RX ORDER — LIDOCAINE 40 MG/G
CREAM TOPICAL
Status: DISCONTINUED | OUTPATIENT
Start: 2024-05-31 | End: 2024-06-02 | Stop reason: HOSPADM

## 2024-05-31 RX ADMIN — INSULIN GLARGINE 20 UNITS: 100 INJECTION, SOLUTION SUBCUTANEOUS at 09:23

## 2024-05-31 RX ADMIN — DULOXETINE HYDROCHLORIDE 30 MG: 30 CAPSULE, DELAYED RELEASE ORAL at 21:24

## 2024-05-31 RX ADMIN — INSULIN ASPART 6 UNITS: 100 INJECTION, SOLUTION INTRAVENOUS; SUBCUTANEOUS at 09:21

## 2024-05-31 RX ADMIN — CEFTRIAXONE SODIUM 2 G: 2 INJECTION, POWDER, FOR SOLUTION INTRAMUSCULAR; INTRAVENOUS at 21:25

## 2024-05-31 RX ADMIN — METOPROLOL SUCCINATE 50 MG: 25 TABLET, EXTENDED RELEASE ORAL at 09:11

## 2024-05-31 RX ADMIN — MICAFUNGIN SODIUM 150 MG: 50 INJECTION, POWDER, LYOPHILIZED, FOR SOLUTION INTRAVENOUS at 23:00

## 2024-05-31 RX ADMIN — TAMSULOSIN HYDROCHLORIDE 0.4 MG: 0.4 CAPSULE ORAL at 09:11

## 2024-05-31 RX ADMIN — INSULIN ASPART 6 UNITS: 100 INJECTION, SOLUTION INTRAVENOUS; SUBCUTANEOUS at 13:24

## 2024-05-31 RX ADMIN — ROSUVASTATIN 20 MG: 10 TABLET, FILM COATED ORAL at 21:24

## 2024-05-31 RX ADMIN — INSULIN ASPART 6 UNITS: 100 INJECTION, SOLUTION INTRAVENOUS; SUBCUTANEOUS at 17:38

## 2024-05-31 RX ADMIN — APIXABAN 5 MG: 5 TABLET, FILM COATED ORAL at 21:24

## 2024-05-31 RX ADMIN — ALLOPURINOL 200 MG: 100 TABLET ORAL at 09:11

## 2024-05-31 RX ADMIN — INSULIN ASPART 1 UNITS: 100 INJECTION, SOLUTION INTRAVENOUS; SUBCUTANEOUS at 17:37

## 2024-05-31 RX ADMIN — SODIUM CHLORIDE, PRESERVATIVE FREE: 5 INJECTION INTRAVENOUS at 12:20

## 2024-05-31 RX ADMIN — PANTOPRAZOLE SODIUM 40 MG: 40 TABLET, DELAYED RELEASE ORAL at 09:11

## 2024-05-31 RX ADMIN — APIXABAN 2.5 MG: 2.5 TABLET, FILM COATED ORAL at 09:11

## 2024-05-31 RX ADMIN — SODIUM CHLORIDE, PRESERVATIVE FREE: 5 INJECTION INTRAVENOUS at 02:14

## 2024-05-31 RX ADMIN — DULOXETINE HYDROCHLORIDE 30 MG: 30 CAPSULE, DELAYED RELEASE ORAL at 09:11

## 2024-05-31 RX ADMIN — TRAZODONE HYDROCHLORIDE 50 MG: 50 TABLET ORAL at 21:24

## 2024-05-31 RX ADMIN — INSULIN ASPART 1 UNITS: 100 INJECTION, SOLUTION INTRAVENOUS; SUBCUTANEOUS at 13:24

## 2024-05-31 ASSESSMENT — ACTIVITIES OF DAILY LIVING (ADL)
ADLS_ACUITY_SCORE: 42
ADLS_ACUITY_SCORE: 45
ADLS_ACUITY_SCORE: 42
ADLS_ACUITY_SCORE: 47
ADLS_ACUITY_SCORE: 42
ADLS_ACUITY_SCORE: 45
ADLS_ACUITY_SCORE: 47
ADLS_ACUITY_SCORE: 42
ADLS_ACUITY_SCORE: 42
ADLS_ACUITY_SCORE: 45
ADLS_ACUITY_SCORE: 47
ADLS_ACUITY_SCORE: 42
ADLS_ACUITY_SCORE: 42
ADLS_ACUITY_SCORE: 47
ADLS_ACUITY_SCORE: 42
ADLS_ACUITY_SCORE: 43
ADLS_ACUITY_SCORE: 45
ADLS_ACUITY_SCORE: 42
ADLS_ACUITY_SCORE: 42

## 2024-05-31 NOTE — CONSULTS
Nephrology Brief Note    Received consult request for Mr Talamantes, requesting clearance from our team for PICC placement. I have reviewed Mr Talamantes's chart. He has CKD with baseline Cr appears to be around 1.5-1.8 most recently. He was admitted on 5/11 with ELLA on CKD in setting of . His Cr on admission was 3.2, subsequently improved to 1.4. He was discharged on 5/19, then readmitted on 5/28 with worsening of kidney function with Cr up to 2.7. Cr has now improved to 1.3 and stable today. His GFR is estimated at 34 ml/min with this Cr, though this likely overestimates true kidney function given his age and likely reduced muscle mass due to hospitalization.     Generally we like to avoid PICC lines in patients with advanced CKD, however this is not always possible. This patient needs IV micafungin for prostatitis for 4-6 weeks. If midline is acceptable, that would be preferable for this patient. If midline is not possible, then internal jugular PICC would be next best option. If PICC team does not place internal jugular PICC lines, then more peripheral PICC (basilic, brachial, cephalic) placement is okay.     Please page if any other questions or if we can be of further assistance.     Carola Brown DO, MSCI   of Medicine, Division of Nephrology and Hypertension  [facility]  Pager [phone]    Patient not seen, no charge.

## 2024-05-31 NOTE — PROGRESS NOTES
[facility]    Medicine Progress Note - Hospitalist Service, GOLD TEAM 17    Date of Admission:  5/28/2024    Assessment & Plan   Adam Talamantes is an 85 yo male admitted on 5/28/2024.  He has a h/o T2DM, HTN, HLD, multiple CVAs on apixaban and recent admission to [facility] 5/11 - 5/19 for AMS due to UTI/prostatitis s/p TURP with ongoing hematuria and urinary frequencies on bactrim who presented to ED after a syncopal event/fall that occurred as he was straining to urinate.  W/u in the ED revealed ELLA on CKD with ongoing UTI.  Admitted for further evaluation and treatment.     Micturition vs vasovagal syncope  ---   He endorsed increased urinary frequency and dysuria   ---   He had a syncopal episode in the bathroom on 5/28  ---   Wife heard a noise coming from the bathroom.  Shortly after that she went to use the bathroom and found him on the floor.   ---   Per his son, pt had to strain to urinate, felt dizzy and fainted. it took several minutes before he became fully alert again.  Sustained abrasions over R forearm and R lower leg.  Denied other pain.   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   ---   Urine culture 5/16 grew Candida glabrata resistent to fluconazole  ---   He was discharged on 6 week course bactrim and ID follow up.   ---    Per ID clinic note on 5/23, the plan is to place PICC line and start IV micafungin for fluconazole-resistant C Glabrata.   ---   ID consult requested  ---   Continue IV micafungin 150 mg daily, started on 5/28  ---   Switched bactrim to IV ceftriaxone due to ELLA, started on 5/28  ---   NGTD from UCx collected on 5/28  ---   NGTD from BCx x 2 collected on 5/29  ---   Would insert PICC line may be later today if it is ok with ID consult service     Lactic acidosis  ---   LA 2.4 on admission in the setting of ongoing UTI.   ---   S/p 1L IVF  ---   LA back to normal range of 2.0     ELLA on CKD  Acute urinary retention  New mild hydroureteronephrosis   ---   Baseline Cr ~ 1.3-1.7.   ---   Admit Cr 2.69 ---> parrish cath + IVF ---> 2.48 ---> 1.84 ---> 1.31 today, back to baseline  ---   ELLA was due to some degree of dehydration + possible effect from bactrim +  obstructive uropathy  ---   CT a/p showed 'New mild bilateral hydroureteronephrosis to the level of the urinary bladder, possibly due to increased volume of the urinary bladder, which remains somewhat small, indicating decreased bladder compliance, and an 8 mm soft tissue density on the bladder wall at the insertion site of the right ureter, new from prior study.'  ---   Continue close PVR monitoring  ---   He was seen by urology consult service on 5/29 who recommended   --  Parrish catheter placement.  Parrish catheter inserted on 5/29.    --  RBUS in 2 days to reassess hydro, will obtain one tomorrow   -- Continue IV micafungin and IV ceftriaxone    Hyponatremia  ---   Na 131 on admission  ---   Likely hypovolemic hyponatremia  ---   S/p 1L IVF  ---   Na level is 135 this am  ---   Continue gentle IVF hydration     T2DM  ---   Hgba1c was 8.8% 2/26/2024  ---   PTA Jardiance, januvia, glargine 30U daily and aspart 8U before breakfast, 6U with lunch and dinner.   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 Consider further evaluation with MRI on a non-emergent basis.   Suspected kidney cyst: 2.0 cm dense cystic lesion in the inferior pole of the left kidney, likely representing a hemorrhagic/proteinaceous cyst. This can be further characterized on future MRI.  Colonic wall thickening   Noted on CT Urogram 6/2023, wall thickening concerning for malignancy. Prior colonoscopy on file from 2017, showing tubular adenoma without high grade dysplasia. Ongoing discussions to pursue work up as OP with PCP, who is waiting to discuss with patient's daughter if should pursue colonoscopy. Previously noted that patient would not be interested in undergoing a surgery. No acute concerns.   - outpatient follow up        Diet:  regular  DVT Prophylaxis: DOAC  Diego Catheter: Not present  Lines: None     Cardiac Monitoring: None  Code Status:  full  Disposition Plan   Anticipate a minimum of 3 to 4 days of hospitalization for IV micafungin and treatment of ELLA and urinary retention            Alondra John MD  Hospitalist Service, GOLD TEAM [address] [place] [address]  Securely message with [employer] (more info)  Text page via [facility] Paging/Directory   See signed in provider for up to date coverage  information  ______________________________________________________________________    Interval History   No complaints  Uneventful night  Pt was eating breakfast when seen this morning    Physical Exam   Vital Signs: Temp: 98.5  F (36.9  C) Temp src: Oral BP: (!) 143/88     Resp: 18 SpO2: 97 % O2 Device: None (Room air)    Weight: 155 lbs 13.84 oz  General: Appears comfortable sitting in a bed and eating breakfast, aao x 3, NAD.  HEENT:  NC/AT, PERRL, EOMI, neck supple, no thyromegaly, op clear, mmm.  CVS:  NL s 1 and s2, no m/r/g.  Lungs:  CTA B/L.   Abd:  Soft, + bs, NT, no rebound or gaurding, no fluid shift.  Ext:  No c/c.  Lymph:  No edema.  Neuro:  Nonfocal.  Musculoskeletal: No calf tenderness to palpation.    Skin:  No rash.  Psychiatry:  Mood and affect appropriate.      Data     I have personally reviewed the following data over the past 24 hrs:    8.8  \   10.2 (L)   / 227     135 103 18.9 /  120 (H)   4.1 23 1.31 (H) \       Imaging results reviewed over the past 24 hrs:   No results found for this or any previous visit (from the past 24 hour(s)).

## 2024-05-31 NOTE — PROGRESS NOTES
[facility] General ID Service: Follow Up Note      Patient:  Adam Talamantes   Date of birth 1940, Medical record number [id number]  Date of Visit:  05/31/2024  Date of Admission: 5/28/2024         Assessment and Recommendations:   ID Problem List:  Prostatitis - C.glabrata on OR cultures (5/16/24)  S/p TURP 5/16/24- initial concern for prostatic abscess- no abscess found but did have abnormal tissue  Possible UTI, ongoing dysuria  Urinary retention, hematuria, s/p Diego catheter placement  Chills/rigors  Syncope, suspected vasovagal    Recommendations:  Continue micafungin 150mg IV q24hrs, plan for 4-6 weeks of antifungal treatment for C.glabrata (5/29-until ID follow up or 7/3 at the latest  Can transition from ceftriaxone to levofloxacin 750mg PO q48hrs (renally adjusted) to end on 6/12  - Plan for total of 6 weeks of bacterial coverage for treatment of prostatitis without abscess (5/2-6/12) since patient had been on antibiotic coverage for ~2 weeks prior to surgery  OK for PICC line placement  Has ID follow up appointment scheduled for 6/26/24 with Dr. Perera.  ID will sign off at this time, please don't hesitate to contact the [facility] ID team should further questions arise.    Primary team:  Place order panel  [facility] ([facility]) Review and ID Care Transition   Place imaging order(s) requested above prior to discharge.  Contact ID teams about missed ID clinic appointments and/or ongoing therapy needs.    Prolonged Parenteral/Oral Antibiotic Recommendations and ID Follow up  This template provides final ID recommendations as of this date.     Infectious Diseases Indication: Candida glabrata prostatitis    Antibiotic Information  Name of Antibiotic Dose of Antibiotic1 Anticipated duration Effective start date2 End date   micafungin 150mg IV q24hrs 4-6 weeks 5/29/24 TBD on ID follow up                 1.Dose of antibiotic will need to be renally adjusted if creatinine clearance changes  2.Effective  "start date is the date of adequate therapy with appropriate spectrum    Method of antibiotic delivery:PICC line.Is the line being used for another indication besides antimicrobials? No At the end of therapy should the line used for antimicrobials be removed or de-accessed? Yes. Selecting \"yes\" will function as written order to remove PICC line or de-access the indwelling line at the end of therapy.    Weekly labs required: Creatinine, AST/ALT, and CBC with diff. Dr. Perera will follow labs at discharge until ID follow up. Fax labs to ID clinic.    Are there pending microbial tests: Yes Cultures     Imaging for ID follow up: ID Imaging: No.     Follow up: Scheduled with Dr. Perera for 6/26/24.    ID provider route note: OPAT RN Care Coordinator [facility] and [facility] ID Clinic Information:  Phone: [phone]  Fax: [phone] (Attention ID clinic nurses)      Discussion:  Adam Talamantes is an 84 year old male with history of HTN, DM, CKD, multiple CVAs on apixaban, UTI/prostatitus s/p TURP (5/16) with C.glabrata on prostate tissue culture who presented to ED on 5/28/24 after syncope at home as he was straining to urinate, found to have ELLA. He was seen in ID clinic on 5/23/24 for C.gabrata isolated from prostate tissue. Was on Bactrim PTA with plan for 6 weeks of treatment for prostatitis and was scheduled to start micafungin as an outpatient but had not received first dose at time of admission.     Afebrile, VSS on arrival with WBC 11.3-->10.8 and Hgb 11.8. Imaging with improved bladder wall thickening, new bilateral hydroureteronephrosis, 8mm soft tissue density at right ureterovesical junction. Soft tissue density thought to be clot as patient just had cystoscopy recently and now with heavy hematuria. Urine culture with <10K mixed valentina. Blood cultures NGTD.     Will continue bacterial coverage 6 weeks from start of antibiotic treatment, which was 5/2. Patient was on abx for ~2 weeks " at time of surgery so may have impacted culture yield, there was no abscess identified and abnormal tissue was removed (path with inflammation). Avoiding Bactrim due to recent retention-related ELLA.    As previously outlined by outpatient ID (Dr. Perera)- unfortunately, there is not a good PO option for treatment due to resistance to fluconazole (DAVE too high to be overcome by high dose) and voriconazole. Amphotericin B would be preferred, however with baseline CKD and retention-related ELLA - risk outweighs benefit. Micafungin is not preferred due to limited urinary concentration, however, it has the least amount of risk of the available options. Will start micafungin with dose increased to 150mg daily.         Recs were discussed with primary team today. Don't hesitate to call with questions.     Attestation:  I have reviewed today's vital signs, medications, labs and imaging.    June Stiles PA-C  Pronouns: she/her/hers  Infectious Diseases  Contact via Curbside or [employer] Paging/Directory            Interval History:       Afebrile, VSS. Cr improving to 1.31. Blood cultures NGTD.     Feeling better today, not having pain and urine is no longer frankly bloody. Son at bedside and daughter via speaker phone. Updated on plan for antimicrobials         Current Antimicrobials   Current:  - ceftriaxone  - micafungin     Prior:  - Bactrim 5/19-5/28  - ceftriaxone 5/13-5/19  - cefepime 5/12-5/13  - bactrim 5/2-5/12         Physical Exam:   Ranges for vital signs:  Temp:  [97.7  F (36.5  C)-98.5  F (36.9  C)] 98.5  F (36.9  C)  Resp:  [18] 18  BP: (121-143)/(74-89) 143/88  SpO2:  [97 %-98 %] 97 %    Intake/Output Summary (Last 24 hours) at 5/30/2024 0882  Last data filed at 5/30/2024 0553  Gross per 24 hour   Intake --   Output 1350 ml   Net -1350 ml     Exam:  GENERAL:  awake, alert, in NAD, supine in bed.   ENT:  Head is normocephalic, atraumatic. Oropharynx is moist without exudates or ulcers.  EYES:  Eyes have  anicteric sclerae.    LUNGS:  Normal respiratory effort on RA.  : parrish in place with yellow urine output   SKIN:  No acute rashes.  PIV Line is in place without any surrounding erythema.         Laboratory Data:   Reviewed.  Pertinent for:    Culture data:  Culture   Date Value Ref Range Status   05/29/2024 No growth after 1 day  Preliminary   05/29/2024 No growth after 1 day  Preliminary   05/29/2024 No growth after 2 days  Preliminary   05/28/2024 <10,000 CFU/mL Mixture of Urogenital Valentina  Final   05/16/2024 No anaerobic organisms isolated  Final   05/16/2024 See corresponding culture for results  Final   05/16/2024 Candida glabrata complex (A)  Preliminary   05/16/2024 Isolated in broth only Candida glabrata complex (A)  Final     Comment:     On day 3 of incubation   05/14/2024 No Growth  Final   05/12/2024 No Growth  Final   05/12/2024 No Growth  Final   05/11/2024 <10,000 CFU/mL Mixture of Urogenital Valentina  Final   05/02/2024 No Growth  Final   02/03/2023 <10,000 CFU/mL Urogenital valentina  Final   01/11/2023 No Growth  Final       Inflammatory Markers    Recent Labs   Lab Test 05/28/24 2134   CRPI 13.40*       Hematology Studies    Recent Labs   Lab Test 05/31/24  0726 05/30/24  0749 05/29/24  0659 05/28/24 2134   WBC 8.8 10.5 10.8 11.3*   HGB 10.2* 10.1* 11.8* 12.6*   MCV 91 94 90 93    228 254 288     No lab results found.    Metabolic Studies     Recent Labs   Lab Test 05/31/24  0809 05/31/24  0726 05/30/24  0749 05/29/24  0659    134* 136 133*   POTASSIUM 4.1 4.0 4.6 4.8   CHLORIDE 103 103 104 98   CO2 23 22 21* 21*   BUN 18.9 19.2 31.8* 37.7*   CR 1.31* 1.31* 1.84* 2.48*   GFRESTIMATED 54* 54* 36* 25*       Hepatic Studies    Recent Labs   Lab Test 05/28/24 2134 05/19/24  0729 05/11/24 2108   BILITOTAL 0.2 0.3 0.3   ALKPHOS 110 124 122   ALBUMIN 4.2 3.3* 4.2   AST 23 25 21   ALT 20 18 17            Imaging:     CT abd/pelvis 5/29/2024   IMPRESSION:      1.  Mild wall thickening of the  urinary bladder has improved from recent study on 5/15/2024.     2.  New mild bilateral hydroureteronephrosis to the level of the urinary bladder, possibly due to increased volume of the urinary bladder, which remains somewhat small, indicating decreased bladder compliance.     3.  8 mm soft tissue density on the bladder wall at the insertion site of the right ureter, new from prior study. Although this is unlikely to be a urothelial neoplasm given acute development, consider follow-up contrast enhanced CT of the abdomen and   pelvis for further evaluation.     4.  Punctate calcifications in the left renal pelvis, unchanged from 6/13/2023, nonspecific but compatible with benign calcifications.     5.  Stable mild prostate enlargement.

## 2024-05-31 NOTE — PROGRESS NOTES
End of shift Summary: See flowsheet for VS and detail assessments.     Changes this Shift:      Pulmonary: Pt denies SOB & chest pain. Pt is on RA.     Diet: Regular diet, medication whole with thin liquids.    Output: Pt is voiding adequately via parrish, LBM 05/29.     Activity: Pt is stand by assist.      Skin: Abrasion to RUE and RLE.     Pain: Pt rates pain 5/10, managed with scheduled medication.     Neuro/CMS: Pt is alert and oriented x 4. Denies numbness and tingling.     Dressings/Drains: None.     IV: L PIV infusing  ml/hr.     Additional info: Tele in place. 48 g of carb consumed on shift. No significant changes on shift.     Plan:  Plan of care ongoing.

## 2024-05-31 NOTE — PROVIDER NOTIFICATION
Nephrology consult as PICC team needs clearance from [facility] for PICC place due to CKD  Ordered as requested

## 2024-05-31 NOTE — PLAN OF CARE
Goal Outcome Evaluation:  Plan of Care Reviewed With: patient, family  Overall Patient Progress: no change    Outcome Evaluation: No acute changes this shift. Family remains at bedside and assists w/ translation. Diego draining well.    RN Shift: 0700 - 1930    Vital Signs: Temp: 97.8  F (36.6  C) Temp src: Oral BP: 121/74       SpO2: 98 % O2 Device: None (Room air)      CMS/Neuro: A&O x4      Cardio/Resp: No SOB or chest pain     Output: Continent of B/B - LBM: 5/29/24  Diego draining well - Hematuria present - Provider aware     Activity: Assist x 1 w/ walker and gait belt.      Skin: R forearm abrasion, R shin abrasion, Bruising to abd      Pain: Denies     Dressing: CDI     LDA: L. Forearm, 20 g PIV - Patent, Currently infusing NS @ 100mL/hr  Diego catheter, 16 fr straight-tip in place, draining well     Diet: Regular, Thin liquids, Good appetite, Medication whole  BG checks 4 times daily and NOC  0720 - 143  1132 - 134  1701 - 145     Additional Info: Call light w/in reach.  Family remains at bedside to assist w/ translation.     Plan: Continue POC - Discharge TBD       Helene Strong RN, BSN, PHN

## 2024-05-31 NOTE — PLAN OF CARE
"[id number]    Plan of Care Reviewed With: patient, family    Overall Patient Progress: no change    Outcome Evaluation: Pt is A & O x4 with intermittent confusion on RA. Denies pain, nausea, chest pain & SOB. Pt has L PIV continuously infusing NS @ 100 ml/hr. Pt has abrasion on R forearm - bandage in place which was changed during shift and is CDI. Pt has parrish - draining adequately, emptied during shift, and cares completed with catheter wipes. Pt is assist x1 with walker and gait belt and uses call light appropriately. Family at bedside throughout shift.    Shift Updates  BG monitoring completed throughout shift.  Pt had shower, linen change, and CHG wipes during shift.  Pt on tele - strip analyzed during shift.    Vitals: BP (!) 143/88 (BP Location: Right arm)   Pulse 89   Temp 98.5  F (36.9  C) (Oral)   Resp 18   Ht 1.575 m (5' 2\")   Wt 70.7 kg (155 lb 13.8 oz)   SpO2 97%   BMI 28.51 kg/m      Plan: TBD, waiting for PICC line to be placed. Continue with plan of care.      "

## 2024-06-01 ENCOUNTER — APPOINTMENT (OUTPATIENT)
Dept: GENERAL RADIOLOGY | Facility: CLINIC | Age: 84
DRG: 868 | End: 2024-06-01
Attending: INTERNAL MEDICINE
Payer: COMMERCIAL

## 2024-06-01 ENCOUNTER — APPOINTMENT (OUTPATIENT)
Dept: PHYSICAL THERAPY | Facility: CLINIC | Age: 84
DRG: 868 | End: 2024-06-01
Payer: COMMERCIAL

## 2024-06-01 ENCOUNTER — APPOINTMENT (OUTPATIENT)
Dept: ULTRASOUND IMAGING | Facility: CLINIC | Age: 84
DRG: 868 | End: 2024-06-01
Attending: INTERNAL MEDICINE
Payer: COMMERCIAL

## 2024-06-01 LAB
ERYTHROCYTE [DISTWIDTH] IN BLOOD BY AUTOMATED COUNT: 15.7 % (ref 10–15)
GLUCOSE BLDC GLUCOMTR-MCNC: 150 MG/DL (ref 70–99)
GLUCOSE BLDC GLUCOMTR-MCNC: 171 MG/DL (ref 70–99)
GLUCOSE BLDC GLUCOMTR-MCNC: 179 MG/DL (ref 70–99)
GLUCOSE BLDC GLUCOMTR-MCNC: 181 MG/DL (ref 70–99)
GLUCOSE BLDC GLUCOMTR-MCNC: 201 MG/DL (ref 70–99)
HCT VFR BLD AUTO: 31.7 % (ref 40–53)
HGB BLD-MCNC: 10.2 G/DL (ref 13.3–17.7)
MCH RBC QN AUTO: 29.8 PG (ref 26.5–33)
MCHC RBC AUTO-ENTMCNC: 32.2 G/DL (ref 31.5–36.5)
MCV RBC AUTO: 93 FL (ref 78–100)
PLATELET # BLD AUTO: 224 10E3/UL (ref 150–450)
RBC # BLD AUTO: 3.42 10E6/UL (ref 4.4–5.9)
WBC # BLD AUTO: 10.7 10E3/UL (ref 4–11)

## 2024-06-01 PROCEDURE — 272N000454 HC KIT, 3FR SV SINGLE LUMEN POWERPICC

## 2024-06-01 PROCEDURE — 258N000003 HC RX IP 258 OP 636: Performed by: INTERNAL MEDICINE

## 2024-06-01 PROCEDURE — 250N000009 HC RX 250: Performed by: INTERNAL MEDICINE

## 2024-06-01 PROCEDURE — 120N000002 HC R&B MED SURG/OB UMMC

## 2024-06-01 PROCEDURE — 272N000276 HC DEVICE 3FR SECURACATH

## 2024-06-01 PROCEDURE — 999N000065 XR CHEST 1 VIEW

## 2024-06-01 PROCEDURE — 85027 COMPLETE CBC AUTOMATED: CPT | Performed by: INTERNAL MEDICINE

## 2024-06-01 PROCEDURE — 99232 SBSQ HOSP IP/OBS MODERATE 35: CPT | Performed by: INTERNAL MEDICINE

## 2024-06-01 PROCEDURE — 71045 X-RAY EXAM CHEST 1 VIEW: CPT | Mod: 26 | Performed by: RADIOLOGY

## 2024-06-01 PROCEDURE — 76770 US EXAM ABDO BACK WALL COMP: CPT | Mod: 26 | Performed by: RADIOLOGY

## 2024-06-01 PROCEDURE — 76770 US EXAM ABDO BACK WALL COMP: CPT

## 2024-06-01 PROCEDURE — 250N000013 HC RX MED GY IP 250 OP 250 PS 637: Performed by: INTERNAL MEDICINE

## 2024-06-01 PROCEDURE — 97116 GAIT TRAINING THERAPY: CPT | Mod: GP

## 2024-06-01 PROCEDURE — 36415 COLL VENOUS BLD VENIPUNCTURE: CPT | Performed by: INTERNAL MEDICINE

## 2024-06-01 PROCEDURE — 250N000011 HC RX IP 250 OP 636: Mod: JZ | Performed by: INTERNAL MEDICINE

## 2024-06-01 PROCEDURE — 36569 INSJ PICC 5 YR+ W/O IMAGING: CPT

## 2024-06-01 PROCEDURE — 999N000040 HC STATISTIC CONSULT NO CHARGE VASC ACCESS

## 2024-06-01 RX ORDER — HEPARIN SODIUM,PORCINE 10 UNIT/ML
5-20 VIAL (ML) INTRAVENOUS
Status: DISCONTINUED | OUTPATIENT
Start: 2024-06-01 | End: 2024-06-02 | Stop reason: HOSPADM

## 2024-06-01 RX ORDER — MAGNESIUM OXIDE 400 MG/1
800 TABLET ORAL 2 TIMES DAILY
Status: COMPLETED | OUTPATIENT
Start: 2024-06-01 | End: 2024-06-02

## 2024-06-01 RX ORDER — HEPARIN SODIUM,PORCINE 10 UNIT/ML
5-20 VIAL (ML) INTRAVENOUS EVERY 24 HOURS
Status: DISCONTINUED | OUTPATIENT
Start: 2024-06-01 | End: 2024-06-02 | Stop reason: HOSPADM

## 2024-06-01 RX ORDER — LEVOFLOXACIN 750 MG/1
750 TABLET, FILM COATED ORAL
Status: DISCONTINUED | OUTPATIENT
Start: 2024-06-02 | End: 2024-06-02 | Stop reason: HOSPADM

## 2024-06-01 RX ORDER — CEFTRIAXONE 2 G/1
2 INJECTION, POWDER, FOR SOLUTION INTRAMUSCULAR; INTRAVENOUS EVERY 24 HOURS
Status: COMPLETED | OUTPATIENT
Start: 2024-06-01 | End: 2024-06-01

## 2024-06-01 RX ORDER — METOPROLOL SUCCINATE 25 MG/1
50 TABLET, EXTENDED RELEASE ORAL DAILY
Status: DISCONTINUED | OUTPATIENT
Start: 2024-06-01 | End: 2024-06-01

## 2024-06-01 RX ADMIN — ALLOPURINOL 200 MG: 100 TABLET ORAL at 07:55

## 2024-06-01 RX ADMIN — INSULIN ASPART 1 UNITS: 100 INJECTION, SOLUTION INTRAVENOUS; SUBCUTANEOUS at 12:47

## 2024-06-01 RX ADMIN — METOPROLOL SUCCINATE 50 MG: 25 TABLET, EXTENDED RELEASE ORAL at 07:56

## 2024-06-01 RX ADMIN — INSULIN ASPART 1 UNITS: 100 INJECTION, SOLUTION INTRAVENOUS; SUBCUTANEOUS at 08:04

## 2024-06-01 RX ADMIN — DULOXETINE HYDROCHLORIDE 30 MG: 30 CAPSULE, DELAYED RELEASE ORAL at 07:55

## 2024-06-01 RX ADMIN — LIDOCAINE HYDROCHLORIDE ANHYDROUS 2 ML: 10 INJECTION, SOLUTION INFILTRATION at 09:03

## 2024-06-01 RX ADMIN — APIXABAN 5 MG: 5 TABLET, FILM COATED ORAL at 21:26

## 2024-06-01 RX ADMIN — PANTOPRAZOLE SODIUM 40 MG: 40 TABLET, DELAYED RELEASE ORAL at 07:55

## 2024-06-01 RX ADMIN — INSULIN ASPART 6 UNITS: 100 INJECTION, SOLUTION INTRAVENOUS; SUBCUTANEOUS at 17:00

## 2024-06-01 RX ADMIN — INSULIN GLARGINE 20 UNITS: 100 INJECTION, SOLUTION SUBCUTANEOUS at 08:05

## 2024-06-01 RX ADMIN — CEFTRIAXONE SODIUM 2 G: 2 INJECTION, POWDER, FOR SOLUTION INTRAMUSCULAR; INTRAVENOUS at 21:27

## 2024-06-01 RX ADMIN — TAMSULOSIN HYDROCHLORIDE 0.4 MG: 0.4 CAPSULE ORAL at 07:55

## 2024-06-01 RX ADMIN — APIXABAN 5 MG: 5 TABLET, FILM COATED ORAL at 07:56

## 2024-06-01 RX ADMIN — ROSUVASTATIN 20 MG: 10 TABLET, FILM COATED ORAL at 21:27

## 2024-06-01 RX ADMIN — MICAFUNGIN SODIUM 150 MG: 50 INJECTION, POWDER, LYOPHILIZED, FOR SOLUTION INTRAVENOUS at 16:58

## 2024-06-01 RX ADMIN — INSULIN ASPART 6 UNITS: 100 INJECTION, SOLUTION INTRAVENOUS; SUBCUTANEOUS at 08:05

## 2024-06-01 RX ADMIN — INSULIN ASPART 1 UNITS: 100 INJECTION, SOLUTION INTRAVENOUS; SUBCUTANEOUS at 16:58

## 2024-06-01 RX ADMIN — INSULIN ASPART 6 UNITS: 100 INJECTION, SOLUTION INTRAVENOUS; SUBCUTANEOUS at 12:47

## 2024-06-01 RX ADMIN — MAGNESIUM OXIDE TAB 400 MG (241.3 MG ELEMENTAL MG) 800 MG: 400 (241.3 MG) TAB at 21:27

## 2024-06-01 RX ADMIN — SODIUM CHLORIDE, PRESERVATIVE FREE: 5 INJECTION INTRAVENOUS at 11:26

## 2024-06-01 RX ADMIN — DULOXETINE HYDROCHLORIDE 30 MG: 30 CAPSULE, DELAYED RELEASE ORAL at 21:26

## 2024-06-01 ASSESSMENT — ACTIVITIES OF DAILY LIVING (ADL)
ADLS_ACUITY_SCORE: 47
ADLS_ACUITY_SCORE: 46
ADLS_ACUITY_SCORE: 46
ADLS_ACUITY_SCORE: 47
ADLS_ACUITY_SCORE: 46
ADLS_ACUITY_SCORE: 47
ADLS_ACUITY_SCORE: 46
ADLS_ACUITY_SCORE: 47
ADLS_ACUITY_SCORE: 46
ADLS_ACUITY_SCORE: 47
ADLS_ACUITY_SCORE: 47
ADLS_ACUITY_SCORE: 46
ADLS_ACUITY_SCORE: 47
ADLS_ACUITY_SCORE: 46
ADLS_ACUITY_SCORE: 46
ADLS_ACUITY_SCORE: 47
ADLS_ACUITY_SCORE: 48

## 2024-06-01 NOTE — CONSULTS
"PICC placed in right upper extremity for anticipated 4-6 weeks of antibiotic therapy. Patient tolerated well and denies pain. Tip location verified at the cavoatrial junction (CAJ) using ECG technology. PICC is ready to use. To contact the [facility] Vascular Access team enter a \"nursing to consult for vascular access\" [id number] order in EPIC.            "

## 2024-06-01 NOTE — PROGRESS NOTES
[facility]    Medicine Progress Note - Hospitalist Service, GOLD TEAM 17    Date of Admission:  5/28/2024    Assessment & Plan   Adam Talamantes is an 85 yo male admitted on 5/28/2024.  He has a h/o T2DM, HTN, HLD, multiple CVAs on apixaban and recent admission to [facility] 5/11 - 5/19 for AMS due to UTI/prostatitis s/p TURP with ongoing hematuria and urinary frequencies on bactrim who presented to ED after a syncopal event/fall that occurred as he was straining to urinate.  W/u in the ED revealed ELLA on CKD with ongoing UTI.  Admitted for further evaluation and treatment.     Micturition vs vasovagal syncope  ---   He endorsed increased urinary frequency and dysuria   ---   He had a syncopal episode in the bathroom on 5/28  ---   Wife heard a noise coming from the bathroom.  Shortly after that she went to use the bathroom and found him on the floor.   ---   Per his son, pt had to strain to urinate, felt dizzy and fainted. it took several minutes before he became fully alert again.  Sustained abrasions over R forearm and R lower leg.  Denied other pain.   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   ---   Urine culture 5/16 grew Candida glabrata resistent to fluconazole  ---   He was discharged on 6 week course bactrim and ID follow up.   ---    Per ID clinic note on 5/23, the plan is to place PICC line and start IV micafungin for fluconazole-resistant C Glabrata.   ---   ID consult requested  ---   Continue IV micafungin 150 mg daily, started on 5/28  ---   Switched bactrim to IV ceftriaxone due to ELLA, started on 5/28  ---   Discontinue ceftriaxone after today's dose  ---   Start Levaquin 750 mg po q48 hr tomorrow  ---   NGTD from UCx collected on 5/28  ---   NGTD from BCx x 2 collected on 5/29  ---   PICC line just inserted today  ---   Patient will discharge to home tomorrow with micafungin 150 mg IV daily x 4-6 wks, stop date will be determined at ID clinic  ---   He will also d/c to home with Levaquin 750 mg po q48 hr w/ stop date 6/12  ---   Follow-up adult ID clinic with Dr. Perera on  6/26/24     Lactic acidosis  ---   LA 2.4 on admission in the setting of ongoing UTI.   ---   S/p 1L IVF  ---   LA back to normal range of 2.0     ELLA on CKD  Acute urinary retention  New mild hydroureteronephrosis   ---   Baseline Cr ~ 1.3-1.7.   ---   Admit Cr 2.69 ---> parrish cath + IVF ---> 2.48 ---> 1.84 ---> 1.31 on 5/31, back to baseline  ---   ELLA was due to some degree of dehydration + possible effect from bactrim +  obstructive uropathy  ---   CT a/p showed 'New mild bilateral hydroureteronephrosis to the level of the urinary bladder, possibly due to increased volume of the urinary bladder, which remains somewhat small, indicating decreased bladder compliance, and an 8 mm soft tissue density on the bladder wall at the insertion site of the right ureter, new from prior study.'  ---   Continue close PVR monitoring  ---   He was seen by urology consult service on 5/29 who recommended   --  Parrish catheter placement.  Parrish catheter inserted on 5/29.    --  Will get RBUS today per urology rec to reassess hydro   -- Continue IV micafungin and IV ceftriaxone    Hyponatremia  ---   Na 131 on admission  ---   Likely hypovolemic hyponatremia  ---   S/p 1L IVF  ---    Na level was 135 on 5/31  ---   Continue gentle IVF hydration     T2DM  ---   Hgba1c was 8.8% 2/26/2024  ---   PTA Jardiance, januvia, glargine 30U daily and aspart 8U before breakfast, 6U with lunch and dinner.   ---   Hold januvia and jardiance  ---   Continue increased dose of PTA glargine at 30 units daily  ---   Continue Aspart 6 units TID with sliding scale  ---   Fasting glucose was 179 this morning    Hypomagnesemia  ---   Mg was 1.6 yesterday  ---   Will treat him w/ Mg Oxide 800 mg po bid x one day        Chronic medical conditions  Insomnia/depression: PTA duloxetine 30mg daily and Nuedexta 20-10mg q12h   BPH: Continued on PTA tamsulosin   Gout: Continued on PTA Allopurinol  HTN: Continued on PTA metoprolol succinate 50mg daily   HLD: Continued on PTA rosuvastatin 20mg qHS  GERD: Continued on PTA pantopraozle 40mg daily   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 Consider further evaluation with MRI on a non-emergent basis.   Suspected kidney cyst: 2.0 cm dense cystic lesion in the inferior pole of the left kidney, likely representing a hemorrhagic/proteinaceous cyst. This can be further characterized on future MRI.  Colonic wall thickening   Noted on CT Urogram 6/2023, wall thickening concerning for malignancy. Prior colonoscopy on file from 2017, showing tubular adenoma without high grade dysplasia. Ongoing discussions to pursue work up as OP with PCP, who is waiting to discuss with patient's daughter if should pursue colonoscopy. Previously noted that patient would not be interested in undergoing a surgery. No acute concerns.   - outpatient follow up        Diet:  regular  DVT Prophylaxis: DOAC  Diego Catheter: Not present  Lines: None     Cardiac Monitoring: None  Code Status:   full  Disposition Plan   Anticipate discharge to home tomorrow with IV micafungin x 4-6 weeks            Alondra John MD  Hospitalist Service, GOLD TEAM 17  [facility]  Securely message with [email or web address] (more info)  Text page via [employer] Paging/Directory   See signed in provider for up to date coverage information  ______________________________________________________________________    Interval History   No complaints  Uneventful night  PICC line placed this am  Renal US ordered for today    Physical Exam   Vital Signs: Temp: 98.9  F (37.2  C) Temp src: Oral BP: (!) 159/93 Pulse: 69   Resp: 17 SpO2: 97 % O2 Device: None (Room air)    Weight: 155 lbs 13.84 oz  General: Appears comfortable sitting in a bed and eating breakfast, aao x 3, NAD.  HEENT:  NC/AT, PERRL, EOMI, neck supple, no thyromegaly, op clear, mmm.  CVS:  NL s 1 and s2, no m/r/g.  Lungs:  CTA B/L.   Abd:  Soft, + bs, NT, no rebound or gaurding, no fluid shift.  Ext:  No c/c.  Lymph:  No edema.  Neuro:  Nonfocal.  Musculoskeletal: No calf tenderness to palpation.    Skin:  No rash.  Psychiatry:  Mood and affect appropriate.      Data     I have personally reviewed the following data over the past 24 hrs:    10.7  \   10.2 (L)   / 224     N/A N/A N/A /  201 (H)   N/A N/A N/A \       Imaging results reviewed over the past 24 hrs:   No results found for this or any previous visit (from the past 24 hour(s)).

## 2024-06-01 NOTE — PROGRESS NOTES
Care Management Follow Up    Length of Stay (days): 4    Expected Discharge Date: 06/03/2024     Concerns to be Addressed:  (Patient has had skilled home care that is ongoing for home IV antibiotics per update from interdisciplinary team.  TBD ig home PT/OT ill be needed in home setting where his daughter is his PC.  Jose C's name is Ms. Dimirti Talamantes.)     Patient plan of care discussed at interdisciplinary rounds: Yes    Anticipated Discharge Disposition:  (Home with skilled home care services.)     Anticipated Discharge Services:  IV Micafungin, PT/OT/SNV  Anticipated Discharge DME:  none    Patient/family educated on Medicare website which has current facility and service quality ratings:  yes  Education Provided on the Discharge Plan:  yes  Patient/Family in Agreement with the Plan:  yes    Referrals Placed by CM/SW:  external referrals  Private pay costs discussed: Not applicable    Additional Information:  Patient is scheduled to discharge home in AM Sunday 6/1/24. Will need all signed orders by 12 noon. Patient should get dose at hospital before he leaves/  Per [facility] will be doing the  skilled nursing, PT/OT.     Infusion and Home care orders placed    Name of Antibiotic Dose of Antibiotic1 Anticipated duration Effective start date2 End date   micafungin 150mg IV q24hrs 4-6 weeks 5/29/24 TBD on ID follow up       [facility] (ph:[phone])    Addendum;  Ray Charge RN on 6MS and hospitalist about need to change dose that Micafungin is given, it is currently at 11PM    Updated [facility] that patient will DC in AM , patient will get dose before he leaves, [facility] will have RN out to do teach on Monday before next dose.      Marlene CASTILLON RN [facility]  5MS [phone]  Nurse Coordinator  Securely message with Ray, 5 Med Surg RNCC

## 2024-06-01 NOTE — PLAN OF CARE
Physical Therapy Discharge Summary    Reason for therapy discharge:    All goals and outcomes met, no further needs identified.  Patient/family request discontinuation of services.    Progress towards therapy goal(s). See goals on Care Plan in [facility] electronic health record for goal details.  Goals met    Therapy recommendation(s):    Continued therapy is recommended.  Rationale/Recommendations:  pt and family feel comfortable w/ discharge home w/ home PT to continue progression of functional mobility and strength.

## 2024-06-01 NOTE — PLAN OF CARE
Goal Outcome Evaluation:      Plan of Care Reviewed With: patient, family    Overall Patient Progress: no changeOverall Patient Progress: no change     Patient has family at bedside at all times. Family is supportive and involved in care. Patient reports no chest pain or shortness of breath. Did say he had a small episode of chest pain yesterday but did not elaborate on what that pain was, how long it lasted. Patient up with a 1 assist and walker and gait belt. PICC line placed today. Infusing fluids per orders. Diego in place. Adequate amounts of urine output. Patient offers no complaints of pain.

## 2024-06-01 NOTE — PLAN OF CARE
"Goal Outcome Evaluation:      Plan of Care Reviewed With: patient, family    Overall Patient Progress: no change  Problem: Adult Inpatient Plan of Care  Goal: Plan of Care Review  Description: The Plan of Care Review/Shift note should be completed every shift.  The Outcome Evaluation is a brief statement about your assessment that the patient is improving, declining, or no change.  This information will be displayed automatically on your shift  note.  Outcome: Progressing  Flowsheets (Taken 6/1/2024 4216)  Outcome Evaluation: VSS on RA. Pt is alert and oriented x4, denies chest pain, nausea and vomiting. Pt tolerated all scheduled medications. No acute changes this shift. Family remains at bedside and assists w/ translation. Diego catheter place and draining well. Call light within reach, plan of care ongoing.  Plan of Care Reviewed With:   patient   family  Overall Patient Progress: no change  Goal: Patient-Specific Goal (Individualized)  Description: You can add care plan individualizations to a care plan. Examples of Individualization might be:  \"Parent requests to be called daily at 9am for status\", \"I have a hard time hearing out of my right ear\", or \"Do not touch me to wake me up as it startles  me\".  Outcome: Progressing  Goal: Absence of Hospital-Acquired Illness or Injury  Outcome: Progressing  Intervention: Identify and Manage Fall Risk  Recent Flowsheet Documentation  Taken 6/1/2024 0101 by Kristyn Mortensen RN  Safety Promotion/Fall Prevention:   activity supervised   clutter free environment maintained   increased rounding and observation   increase visualization of patient   mobility aid in reach   nonskid shoes/slippers when out of bed   patient and family education   room near nurse's station   room organization consistent   safety round/check completed  Intervention: Prevent Skin Injury  Recent Flowsheet Documentation  Taken 6/1/2024 0101 by Kristyn Mortensen, RN  Body Position: position changed " independently  Skin Protection: adhesive use limited  Device Skin Pressure Protection: absorbent pad utilized/changed  Intervention: Prevent and Manage VTE (Venous Thromboembolism) Risk  Recent Flowsheet Documentation  Taken 6/1/2024 0101 by Kristyn Mortensen RN  VTE Prevention/Management: SCDs (sequential compression devices) off  Intervention: Prevent Infection  Recent Flowsheet Documentation  Taken 6/1/2024 0101 by Kristyn Mortensen RN  Infection Prevention:   hand hygiene promoted   rest/sleep promoted  Goal: Optimal Comfort and Wellbeing  Outcome: Progressing  Goal: Readiness for Transition of Care  Outcome: Progressing     Problem: Risk for Delirium  Goal: Optimal Coping  Outcome: Progressing  Intervention: Optimize Psychosocial Adjustment to Delirium  Recent Flowsheet Documentation  Taken 6/1/2024 0101 by Kristyn Mortensen RN  Supportive Measures: active listening utilized  Goal: Improved Behavioral Control  Outcome: Progressing  Intervention: Prevent and Manage Agitation  Recent Flowsheet Documentation  Taken 6/1/2024 0101 by Kristyn Mortensen RN  Environment Familiarity/Consistency: daily routine followed  Intervention: Minimize Safety Risk  Recent Flowsheet Documentation  Taken 6/1/2024 0101 by Kristyn Mortensen RN  Communication Enhancement Strategies: family/caregiver assisted with communication  Enhanced Safety Measures: room near unit station  Goal: Improved Attention and Thought Clarity  Outcome: Progressing  Intervention: Maximize Cognitive Function  Recent Flowsheet Documentation  Taken 6/1/2024 0101 by Kristyn Mortensen RN  Sensory Stimulation Regulation:   care clustered   quiet environment promoted  Reorientation Measures: clock in view  Goal: Improved Sleep  Outcome: Progressing  Outcome Evaluation: VSS on RA. Pt is alert and oriented x4, denies chest pain, nausea and vomiting. Pt tolerated all scheduled medications. No acute changes this shift. Family remains at bedside and assists w/  translation. Diego catheter place and draining well. Call light within reach, plan of care ongoing.

## 2024-06-01 NOTE — PROCEDURES
[facility]    Single Lumen PICC Placement    Date/Time: 6/1/2024 8:46 AM    Performed by: Alice Jama RN  Authorized by: Lynette Baron MD  Indications: vascular access      UNIVERSAL PROTOCOL   Site Marked: Yes  Prior Images Obtained and Reviewed:  Yes  Required items: Required blood products, implants, devices and special equipment available    Patient identity confirmed:  Verbally with patient, arm band and hospital-assigned identification number  NA - No sedation, light sedation, or local anesthesia  Confirmation Checklist:  Patient's identity using two indicators, relevant allergies, procedure was appropriate and matched the consent or emergent situation and correct equipment/implants were available  Time out: Immediately prior to the procedure a time out was called    Universal Protocol: the Joint Commission Universal Protocol was followed    Preparation: Patient was prepped and draped in usual sterile fashion    ESBL (mL):  1     ANESTHESIA    Anesthesia:  Local infiltration  Local Anesthetic:  Lidocaine 1% without epinephrine  Anesthetic Total (mL):  2      SEDATION    Patient Sedated: No        Preparation: skin prepped with ChloraPrep  Skin prep agent: skin prep agent completely dried prior to procedure  Sterile barriers: maximum sterile barriers were used: cap, mask, sterile gown, sterile gloves, and large sterile sheet  Hand hygiene: hand hygiene performed prior to central venous catheter insertion  Type of line used: PICC  Catheter type: single lumen  Lumen type: power PICC and non-valved  Catheter size: 3 Fr  Brand: Bard  Lot number: [id number]  Placement method: venipuncture, MST, ultrasound and tip navigation system  Number of attempts: 1  Difficulty threading catheter: no  Successful placement: yes  Orientation: right  Catheter to Vein (%): 30  Location: basilic vein  Tip Location: SVC/RA Junction  Arm circumference: adults 10 cm (8  "cm)  Extremity circumference: 28  Visible catheter length: 3  Total catheter length: 44  Dressing and securement: alcohol impregnated caps, blood cleaned with CHG, blood removed, chlorhexidine disc applied, dressing applied, gloves changed prior to final dressing, line secured, occlusive dressing applied, securement device, site cleansed, sterile dressing applied, subcutaneous anchor securement system, transparent securement dressing and adhesive securement device  Post procedure assessment: blood return through all ports, free fluid flow and placement verified by 3CG technology  PROCEDURE   Patient Tolerance:  Patient tolerated the procedure well with no immediate complicationsDescribe Procedure: PICC placed in right upper extremity for anticipated 4-6 weeks of antibiotic therapy. Patient tolerated well and denies pain. Tip location verified at the cavoatrial junction (CAJ) using ECG technology. PICC is ready to use. To contact the [facility] Vascular Access team enter a \"nursing to consult for vascular access\" [id number] order in [facility].     Disposal: sharps and needle count correct at the end of procedure, needles and guidewire disposed in sharps container        "

## 2024-06-01 NOTE — PROVIDER NOTIFICATION
Sent message to Hero Gray MD at 1853:  Patient triggered sepsis; #1 factor is the temp, however, it was an axillary temp because patient was sleeping. Would you like us to follow policy or recheck oral temp and follow policy if temp is still low? Patient is at ultrasound right now    Response: obtain new temp when patient returns from ultrasound. If oral temp is low, follow protocol. If oral temp is normal do not order sepsis protocol.      Temp 97.4 axillary and patient refusing oral but temp now in normal parameters. Updated MD

## 2024-06-02 VITALS
WEIGHT: 155.87 LBS | HEART RATE: 100 BPM | DIASTOLIC BLOOD PRESSURE: 99 MMHG | BODY MASS INDEX: 28.68 KG/M2 | HEIGHT: 62 IN | RESPIRATION RATE: 18 BRPM | TEMPERATURE: 98.9 F | SYSTOLIC BLOOD PRESSURE: 152 MMHG | OXYGEN SATURATION: 98 %

## 2024-06-02 LAB
ANION GAP SERPL CALCULATED.3IONS-SCNC: 12 MMOL/L (ref 7–15)
BUN SERPL-MCNC: 16.2 MG/DL (ref 8–23)
CALCIUM SERPL-MCNC: 8.9 MG/DL (ref 8.8–10.2)
CHLORIDE SERPL-SCNC: 104 MMOL/L (ref 98–107)
CREAT SERPL-MCNC: 1.24 MG/DL (ref 0.67–1.17)
DEPRECATED HCO3 PLAS-SCNC: 23 MMOL/L (ref 22–29)
EGFRCR SERPLBLD CKD-EPI 2021: 58 ML/MIN/1.73M2
ERYTHROCYTE [DISTWIDTH] IN BLOOD BY AUTOMATED COUNT: 15.8 % (ref 10–15)
GLUCOSE BLDC GLUCOMTR-MCNC: 156 MG/DL (ref 70–99)
GLUCOSE BLDC GLUCOMTR-MCNC: 207 MG/DL (ref 70–99)
GLUCOSE SERPL-MCNC: 182 MG/DL (ref 70–99)
HCT VFR BLD AUTO: 32.8 % (ref 40–53)
HGB BLD-MCNC: 10.5 G/DL (ref 13.3–17.7)
MCH RBC QN AUTO: 29.6 PG (ref 26.5–33)
MCHC RBC AUTO-ENTMCNC: 32 G/DL (ref 31.5–36.5)
MCV RBC AUTO: 92 FL (ref 78–100)
PLATELET # BLD AUTO: 249 10E3/UL (ref 150–450)
POTASSIUM SERPL-SCNC: 3.9 MMOL/L (ref 3.4–5.3)
RBC # BLD AUTO: 3.55 10E6/UL (ref 4.4–5.9)
SODIUM SERPL-SCNC: 139 MMOL/L (ref 135–145)
WBC # BLD AUTO: 12.3 10E3/UL (ref 4–11)

## 2024-06-02 PROCEDURE — 250N000011 HC RX IP 250 OP 636: Mod: JZ | Performed by: INTERNAL MEDICINE

## 2024-06-02 PROCEDURE — 258N000003 HC RX IP 258 OP 636: Performed by: INTERNAL MEDICINE

## 2024-06-02 PROCEDURE — 36415 COLL VENOUS BLD VENIPUNCTURE: CPT | Performed by: INTERNAL MEDICINE

## 2024-06-02 PROCEDURE — 85027 COMPLETE CBC AUTOMATED: CPT | Performed by: INTERNAL MEDICINE

## 2024-06-02 PROCEDURE — 250N000013 HC RX MED GY IP 250 OP 250 PS 637: Performed by: INTERNAL MEDICINE

## 2024-06-02 PROCEDURE — 80048 BASIC METABOLIC PNL TOTAL CA: CPT | Performed by: INTERNAL MEDICINE

## 2024-06-02 PROCEDURE — 99239 HOSP IP/OBS DSCHRG MGMT >30: CPT | Performed by: INTERNAL MEDICINE

## 2024-06-02 RX ORDER — LEVOFLOXACIN 750 MG/1
750 TABLET, FILM COATED ORAL
Qty: 4 TABLET | Refills: 0 | Status: SHIPPED | OUTPATIENT
Start: 2024-06-04 | End: 2024-06-12

## 2024-06-02 RX ORDER — HYDROCODONE BITARTRATE AND ACETAMINOPHEN 5; 325 MG/1; MG/1
1 TABLET ORAL EVERY 6 HOURS PRN
Qty: 30 TABLET | Refills: 0 | Status: SHIPPED | OUTPATIENT
Start: 2024-06-02

## 2024-06-02 RX ADMIN — LEVOFLOXACIN 750 MG: 750 TABLET, FILM COATED ORAL at 08:02

## 2024-06-02 RX ADMIN — METOPROLOL SUCCINATE 50 MG: 25 TABLET, EXTENDED RELEASE ORAL at 08:02

## 2024-06-02 RX ADMIN — INSULIN ASPART 6 UNITS: 100 INJECTION, SOLUTION INTRAVENOUS; SUBCUTANEOUS at 12:37

## 2024-06-02 RX ADMIN — APIXABAN 5 MG: 5 TABLET, FILM COATED ORAL at 08:02

## 2024-06-02 RX ADMIN — MAGNESIUM OXIDE TAB 400 MG (241.3 MG ELEMENTAL MG) 800 MG: 400 (241.3 MG) TAB at 08:02

## 2024-06-02 RX ADMIN — DULOXETINE HYDROCHLORIDE 30 MG: 30 CAPSULE, DELAYED RELEASE ORAL at 08:02

## 2024-06-02 RX ADMIN — TAMSULOSIN HYDROCHLORIDE 0.4 MG: 0.4 CAPSULE ORAL at 08:03

## 2024-06-02 RX ADMIN — INSULIN ASPART 6 UNITS: 100 INJECTION, SOLUTION INTRAVENOUS; SUBCUTANEOUS at 08:06

## 2024-06-02 RX ADMIN — HEPARIN, PORCINE (PF) 10 UNIT/ML INTRAVENOUS SYRINGE 5 ML: at 12:38

## 2024-06-02 RX ADMIN — INSULIN GLARGINE 20 UNITS: 100 INJECTION, SOLUTION SUBCUTANEOUS at 08:06

## 2024-06-02 RX ADMIN — INSULIN ASPART 1 UNITS: 100 INJECTION, SOLUTION INTRAVENOUS; SUBCUTANEOUS at 12:37

## 2024-06-02 RX ADMIN — INSULIN ASPART 1 UNITS: 100 INJECTION, SOLUTION INTRAVENOUS; SUBCUTANEOUS at 08:05

## 2024-06-02 RX ADMIN — ALLOPURINOL 200 MG: 100 TABLET ORAL at 08:02

## 2024-06-02 RX ADMIN — PANTOPRAZOLE SODIUM 40 MG: 40 TABLET, DELAYED RELEASE ORAL at 08:03

## 2024-06-02 RX ADMIN — MICAFUNGIN SODIUM 150 MG: 50 INJECTION, POWDER, LYOPHILIZED, FOR SOLUTION INTRAVENOUS at 09:48

## 2024-06-02 ASSESSMENT — ACTIVITIES OF DAILY LIVING (ADL)
ADLS_ACUITY_SCORE: 46

## 2024-06-02 NOTE — DISCHARGE SUMMARY
[facility]  Hospitalist Discharge Summary      Date of Admission:  5/28/2024  Date of Discharge:  6/2/2024  Discharging Provider: Alondra John MD  Discharge Service: Hospitalist Service, GOLD TEAM 17    Discharge Diagnoses   1.  Micturition vs vasovagal syncope   2.  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  Acute urinary retention  4.  ELLA superimposed on CKD3  5.  Hyponatremia  6.  Hypomagnesemia  7.  T2DM      Follow-ups Needed After Discharge     1.   Dr. Perera of ID clinic on 6/26/24.  Check AST/ALT, CBC w/ diff weekly while on Micafungin ordered    2.   Urology clinic follow up in a week for void trial and removal of parrish cath          Unresulted Labs Ordered in the Past 30 Days of this Admission       Date and Time Order Name Status Description    5/29/2024  8:48 AM Blood Culture Hand, Left Preliminary     5/29/2024  8:48 AM Blood Culture Hand, Right Preliminary     5/29/2024 12:22 AM Blood Culture Peripheral Blood Preliminary     5/16/2024  3:44 PM Fungal or Yeast Culture Routine Preliminary         These results will be followed up by Hospitalist service    Discharge Disposition   Discharged to home  Condition at discharge: Stable    Hospital Course   Adam Talamantes is an 85 yo male admitted on 5/28/2024.  He has a h/o T2DM, HTN, HLD, multiple CVAs on apixaban and recent admission to [facility] 5/11 - 5/19 for AMS due to UTI/prostatitis s/p TURP with ongoing hematuria and urinary frequencies on bactrim. He presented to ED on 5/28 after a syncopal event/fall that occurred as he was straining to urinate.  W/u in the ED revealed ELLA on CKD with ongoing UTI.  Admitted for further evaluation and treatment.     Micturition vs vasovagal syncope  ---   He endorsed increased urinary frequency and dysuria   ---   He had a syncopal episode in the bathroom on 5/28  ---   Wife heard a noise coming from the bathroom.  Shortly after that she went to use the bathroom and found  him on the floor.   ---   Per his son, pt had to strain to urinate, felt dizzy and fainted. it took several minutes before he became fully alert again.  Sustained abrasions over R forearm and R lower leg.  Denied other pain.   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   ---   Urine culture 5/16 grew Candida glabrata resistent to fluconazole  ---   He was discharged on 6 week course bactrim and ID follow up.   ---   Per ID clinic note on 5/23, plan was to place PICC line and start IV micafungin for fluconazole-resistant C Glabrata.   ---   Started on IV micafungin 150 mg daily on admission, on 5/28  ---   PTA Bactrim d/c'd at admit 5/28  ---   S/p ceftriaxone 5/28 - 6/1  ---   ID recommended Levaquin 750 mg po q48 hr w/ stop date of 5/22  ---   NGTD from UCx collected on 5/28  ---   NGTD from BCx x 2 collected on 5/29  ---   PICC line just inserted on 6/1/24  ---   He will discharge to home with micafungin 150 mg IV daily x 4-6 wks, stop date will be determined at ID clinic  ---   He will also d/c to home with Levaquin 750 mg po q48 hr w/ stop date 6/12  ---   Follow-up adult ID clinic with Dr. Perera on  6/26/24  ---   Check AST/ALT and CBC w/ diff weekly while IV Micafugin     Lactic acidosis  ---   LA 2.4 on admission in the setting of ongoing UTI.   ---   S/p 1L IVF  ---   LA back to normal range of 2.0     ELLA on CKD3  ---   Baseline Cr ~ 1.3-1.7.   ---   Admit Cr 2.69 --->  parrish cath + IVF ---> 2.48 ---> 1.84 ---> 1.31 on 5/31, back to baseline  ---   ELLA was due to some degree of dehydration + possible effect from bactrim +  obstructive uropathy  ---   CT AP w/o contrast 5/29 negative for nephrolithiasis but revealed b/l hydronephrosis   ---   Renal US on 6/1 revealed mild bilateral hydronephrosis, decreased compared to 5/11/2024  ---   Mr. Talamantes will discharge to home today with parrish cath     Acute urinary retention  New mild hydroureteronephrosis   ---   Recent h/o TURP with ongoing hematuria and urinary frequencies   ---   CT AP w/o contrast 5/29 showed 'new mild bilateral hydroureteronephrosis to the level of the urinary bladder, possibly due to increased volume of the urinary bladder, which remains somewhat small, indicating decreased bladder compliance, and an 8 mm soft tissue density on the bladder wall at the insertion site of the right ureter, new from prior study.'  ---   He was seen by urology consult service on 5/29 who recommended parrish catheter placement.  Parrish catheter inserted on 5/29.    ---   F/u renal US on 6/1 per Urology consult recommendation was obtained.  The US revealed mild bilateral hydronephrosis, decreased compared to 5/11/2024, echogenic renal parenchyma, is nonspecific but can be seen in the setting of medical renal disease and 2.5 cm left inferior pole cyst.  ---   Hematuria has resolved   ---   Mr. Talamantes will discharge to home today with parrish cath and will follow up at Urology clinic in 1-2 weeks for voiding trial   ---   Continue PTA flomax    Hyponatremia  ---   Na 131 on admission  ---   Likely due to hypovolemic hyponatremia  ---   Na level was 135 on 5/31 following isotonic IVF hydration     T2DM  ---   Hgba1c was 8.8% 2/26/2024  ---   Fasting glucose was 156 this am  ---   Continue PTA Jardiance, januvia, glargine 30U daily and aspart 8U before breakfast, 6U with lunch and dinner.     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 Consider further evaluation with MRI on a non-emergent basis.   Suspected kidney cyst:   2.0 cm dense cystic lesion in the inferior pole of the left kidney, likely representing a hemorrhagic/proteinaceous cyst. This can be further characterized on future MRI.  Colonic wall thickening:   Noted on CT Urogram 6/2023, wall thickening concerning for malignancy. Prior colonoscopy on file from 2017, showing tubular adenoma without high grade dysplasia. Ongoing discussions to pursue work up as OP with PCP, who is waiting to discuss with patient's daughter if should pursue colonoscopy. Previously noted that patient would not be interested in undergoing a surgery. No acute concerns.   - outpatient follow up       Consultations This Hospital Stay   INFECTIOUS DISEASE [facility] ADULT IP CONSULT  PHYSICAL THERAPY ADULT IP CONSULT  OCCUPATIONAL THERAPY ADULT IP CONSULT  UROLOGY IP CONSULT  CARE MANAGEMENT / SOCIAL WORK IP CONSULT  VASCULAR ACCESS ADULT IP CONSULT  NEPHROLOGY GENERAL ADULT IP CONSULT  [facility] PHARMACY IP CONSULT    Code Status   Full Code    Time Spent on this Encounter   I, Alondra John MD, personally saw the patient today and spent greater than 30 minutes discharging this patient.       Alondra John MD  [facility] MED SURG  [address] [place] [id number]  Phone: [phone]  Fax:  [phone]  ______________________________________________________________________    Physical Exam   Vital Signs: Temp: 98.9  F (37.2  C) Temp src: Oral BP: (!) 152/99 Pulse: 100   Resp: 18 SpO2: 98 % O2 Device: None (Room air)    Weight: 155 lbs 13.84 oz  General: Appears comfortable sitting in a bed and eating breakfast, aao x 3, NAD.  HEENT:  NC/AT, PERRL, EOMI, neck supple, no thyromegaly, op clear, mmm.  CVS:  NL s 1 and s2, no m/r/g.  Lungs:  CTA B/L.          Abd:  Soft, + bs, NT, no rebound or gaurding, no fluid shift.  Ext:  No c/c.  Lymph:  No edema.  Neuro:  Nonfocal.  Musculoskeletal: No calf tenderness to palpation.    Skin:  No rash.  Psychiatry:  Mood and affect appropriate.       Primary Care Physician   Chucho Espino    Discharge Orders      INSERT BLADDER CATH, TEMP INDWELL SIMPLE (NAVA)    Nava cath inserted during this hospitalization for acute urinary retention.  Follow up at urology clinic for void trial and nava removal     AST     ALT     Home Care Referral      Home Infusion Referral      OPAT enrollment and ID Clinic Referral      Adult Urology  Referral      Reason for your hospital stay    Candida glabrata prostatitis  S/p TURB 5/16/24  Acute urinary retention     Activity    Your activity upon discharge: activity as tolerated     Follow Up and recommended labs and tests    Dr. Perera of ID clinic on 6/26/24     Diet    Follow this diet upon discharge:  Regular Diet Adult     **CBC with platelets differential FUTURE 2mo       Discharge Medications   Current Discharge Medication List        START taking these medications    Details   levofloxacin (LEVAQUIN) 750 MG tablet Take 1 tablet (750 mg) by mouth every 48 hours for 8 days  Qty: 4 tablet, Refills: 0    Associated Diagnoses: Acute prostatitis      micafungin 150 mg Inject 150 mg into the vein every 24 hours for 42 days    Associated Diagnoses: Acute prostatitis           CONTINUE these medications which have CHANGED     Details   HYDROcodone-acetaminophen (NORCO) 5-325 MG tablet Take 1 tablet by mouth every 6 hours as needed for severe pain  Qty: 30 tablet, Refills: 0    Associated Diagnoses: Acute post-operative pain           CONTINUE these medications which have NOT CHANGED    Details   acetaminophen (TYLENOL) 500 MG tablet Take 1 tablet (500 mg) by mouth every 4 hours as needed for mild pain  Qty: 100 tablet, Refills: 3    Associated Diagnoses: Pain      allopurinol (ZYLOPRIM) 100 MG tablet TAKE 2 TABLETS(200 MG) BY MOUTH DAILY  Qty: 180 tablet, Refills: 3    Associated Diagnoses: Chronic gout of multiple sites, unspecified cause      apixaban ANTICOAGULANT (ELIQUIS ANTICOAGULANT) 5 MG tablet Take 1 tablet (5 mg) by mouth 2 times daily  Qty: 60 tablet, Refills: 6    Associated Diagnoses: Cerebrovascular accident (CVA) due to stenosis of basilar artery (H)      calcium carbonate (TUMS) 500 MG chewable tablet Take 1 chew tab by mouth daily as needed for heartburn      Continuous Blood Gluc Sensor (FREESTYLE SUSANNAH 2 SENSOR) MISC 1 each every 14 days Use 1 sensor every 14 days. Use to read blood sugars per 's instructions.  Qty: 2 each, Refills: 11    Associated Diagnoses: Type 2 diabetes mellitus with hyperglycemia, without long-term current use of insulin (H)      Continuous Glucose  (FREESTYLE SUSANNAH 2 READER) TONIE USE TO READ BLOOD SUGARS PER MANUFACTRUERS INSTRUCTIONS  Qty: 1 each, Refills: 0    Associated Diagnoses: Type 2 diabetes mellitus with hyperglycemia, without long-term current use of insulin (H)      dextromethorphan-quiNIDine (NUEDEXTA) 20-10 MG capsule Take 1 capsule by mouth every 12 hours  Qty: 60 capsule, Refills: 11    Associated Diagnoses: Cerebrovascular accident (CVA) due to stenosis of basilar artery (H); Pseudobulbar affect      DULoxetine (CYMBALTA) 30 MG capsule TAKE 1 CAPSULE(30 MG) BY MOUTH TWICE DAILY  Qty: 60 capsule, Refills: 6    Associated Diagnoses: Current moderate episode of  major depressive disorder without prior episode (H)      hydrOXYzine (ATARAX) 25 MG tablet Take 1 tablet (25 mg) by mouth every 8 hours as needed for itching  Qty: 60 tablet, Refills: 1    Associated Diagnoses: Itching      insulin aspart (NOVOLOG FLEXPEN) 100 UNIT/ML pen Inject 8 units before breakfast, and 6 units under the skin before lunch and dinner  Qty: 15 mL, Refills: 3    Associated Diagnoses: Type 2 diabetes mellitus with hyperglycemia, without long-term current use of insulin (H)      insulin glargine (LANTUS PEN) 100 UNIT/ML pen Inject 30 Units Subcutaneous every 24 hours  Qty: 30 mL, Refills: 3    Comments: If Lantus is not covered by insurance, may substitute Basaglar or Semglee or other insulin glargine product per insurance preference at same dose and frequency.    Associated Diagnoses: Type 2 diabetes mellitus with hyperglycemia, without long-term current use of insulin (H)      insulin pen needle (BD PEN NEEDLE SALVATORE 2ND GEN) 32G X 4 MM miscellaneous USE 1 PEN NEEDLE FOUR TIMES DAILY OR AS DIRECTED  Qty: 400 each, Refills: 0    Associated Diagnoses: Type 2 diabetes mellitus with hyperglycemia, without long-term current use of insulin (H)      JANUVIA 50 MG tablet TAKE 1 TABLET(50 MG) BY MOUTH DAILY  Qty: 90 tablet, Refills: 3    Associated Diagnoses: Type 2 diabetes mellitus with stage 3 chronic kidney disease, without long-term current use of insulin, unspecified whether stage 3a or 3b CKD (H)      meclizine (ANTIVERT) 12.5 MG tablet Take 1 tablet (12.5 mg) by mouth 3 times daily as needed for dizziness  Qty: 60 tablet, Refills: 6    Associated Diagnoses: Dizziness      pantoprazole (PROTONIX) 40 MG EC tablet Take 1 tablet (40 mg) by mouth every morning (before breakfast)  Qty: 90 tablet, Refills: 3    Associated Diagnoses: Gastroesophageal reflux disease with esophagitis, unspecified whether hemorrhage      polyethylene glycol (MIRALAX) 17 GM/Dose powder MIX 17 GRAMS IN LIQUID AND TAKE BY MOUTH  ONCE DAILY AS NEEDED FOR CONSTIPATION  Qty: 510 g, Refills: 6    Associated Diagnoses: Constipation, unspecified constipation type      rosuvastatin (CRESTOR) 20 MG tablet TAKE 1 TABLET(20 MG) BY MOUTH AT BEDTIME  Qty: 90 tablet, Refills: 0    Comments: No more refills until follow up is scheduled.  Associated Diagnoses: Dyslipidemia      SENNA-docusate sodium (SENNA S) 8.6-50 MG tablet Take 1 tablet by mouth 2 times daily as needed (for constipation)  Qty: 60 tablet, Refills: 11    Associated Diagnoses: Constipation, unspecified constipation type      tamsulosin (FLOMAX) 0.4 MG capsule Take 1 capsule (0.4 mg) by mouth daily  Qty: 90 capsule, Refills: 3    Associated Diagnoses: Benign prostatic hyperplasia with incomplete bladder emptying      traZODone (DESYREL) 50 MG tablet Take 1-2 tablets ( mg) by mouth nightly as needed for sleep  Qty: 180 tablet, Refills: 3    Associated Diagnoses: Insomnia, unspecified type      triamcinolone (KENALOG) 0.1 % external cream Apply topically 2 times daily  Qty: 80 g, Refills: 3    Associated Diagnoses: Dermatitis      metoprolol succinate ER (TOPROL XL) 100 MG 24 hr tablet TAKE 1/2 TABLET BY MOUTH DAILY  Qty: 45 tablet, Refills: 3    Associated Diagnoses: Encounter for medication refill           STOP taking these medications       celecoxib (CELEBREX) 200 MG capsule Comments:   Reason for Stopping:         sulfamethoxazole-trimethoprim (BACTRIM DS) 800-160 MG tablet Comments:   Reason for Stopping:             Allergies   Allergies   Allergen Reactions    Lisinopril Cough

## 2024-06-02 NOTE — PROGRESS NOTES
Care Management Discharge Note    Discharge Date: 06/02/2024     Discharge Disposition:  Home    Discharge Services: skilled home care services RN/ PT/OT and [facility]    Discharge DME: none     Discharge Transportation: family or friend will provide    Patient/family educated on Medicare website which has current facility and service quality ratings:  yes    Education Provided on the Discharge Plan:  ye  Persons Notified of Discharge Plans: patient, family   Patient/Family in Agreement with the Plan:  yes    Handoff Referral Completed: Yes    Additional Information:  RNCC following pt for discharge today. Pt discharging home with IV micafungin x 4-6 weeks and [facility] for RN/PT/OT needs. Has parrish and PICC line in place.    Informed [facility] liaison, Daylin Sheridan about discharge plan. Orders faxed to [facility] by [facility].     Handoff completed. Family will drive him home. IMM given yesterday.    Donald Moon RN, MSN  Casual RN Care Coordinator  [facility]  Phone: [phone]

## 2024-06-02 NOTE — PLAN OF CARE
"Goal Outcome Evaluation:      Plan of Care Reviewed With: patient    Overall Patient Progress: no change  Problem: Adult Inpatient Plan of Care  Goal: Plan of Care Review  Description: The Plan of Care Review/Shift note should be completed every shift.  The Outcome Evaluation is a brief statement about your assessment that the patient is improving, declining, or no change.  This information will be displayed automatically on your shift  note.  Outcome: Progressing  Flowsheets (Taken 6/2/2024 0152)  Outcome Evaluation: VSS on RA. Pt is alert and oriented x4, denies chest pain, nausea and vomiting. Pt tolerated all scheduled medications. No acute changes this shift. Family remains at bedside and assists w/ translation. Diego catheter place and draining well. Call light within reach, plan of care ongoing.  Plan of Care Reviewed With: patient  Overall Patient Progress: no change  Goal: Patient-Specific Goal (Individualized)  Description: You can add care plan individualizations to a care plan. Examples of Individualization might be:  \"Parent requests to be called daily at 9am for status\", \"I have a hard time hearing out of my right ear\", or \"Do not touch me to wake me up as it startles  me\".  Outcome: Progressing  Goal: Absence of Hospital-Acquired Illness or Injury  Outcome: Progressing  Intervention: Identify and Manage Fall Risk  Recent Flowsheet Documentation  Taken 6/2/2024 0000 by Kristyn Mortensen, RN  Safety Promotion/Fall Prevention:   activity supervised   clutter free environment maintained   increased rounding and observation   increase visualization of patient   mobility aid in reach   nonskid shoes/slippers when out of bed   patient and family education   room near nurse's station   room organization consistent   safety round/check completed  Taken 6/1/2024 2201 by Kristyn Mortensen, RN  Safety Promotion/Fall Prevention:   activity supervised   clutter free environment maintained   increased rounding and " observation   increase visualization of patient   mobility aid in reach   nonskid shoes/slippers when out of bed   patient and family education   room near nurse's station   room organization consistent   safety round/check completed  Intervention: Prevent Skin Injury  Recent Flowsheet Documentation  Taken 6/2/2024 0000 by Kristyn Mortensen RN  Body Position: position changed independently  Skin Protection: adhesive use limited  Device Skin Pressure Protection: absorbent pad utilized/changed  Taken 6/1/2024 2201 by Kristyn Mortensen RN  Body Position: position changed independently  Skin Protection: adhesive use limited  Device Skin Pressure Protection: absorbent pad utilized/changed  Intervention: Prevent and Manage VTE (Venous Thromboembolism) Risk  Recent Flowsheet Documentation  Taken 6/2/2024 0000 by Kristyn Mortensen RN  VTE Prevention/Management: SCDs (sequential compression devices) off  Taken 6/1/2024 2201 by Kristyn Mortensen RN  VTE Prevention/Management: SCDs (sequential compression devices) off  Intervention: Prevent Infection  Recent Flowsheet Documentation  Taken 6/2/2024 0000 by Kristyn Mortensen RN  Infection Prevention:   hand hygiene promoted   rest/sleep promoted  Taken 6/1/2024 2201 by Kristyn Mortensen RN  Infection Prevention:   hand hygiene promoted   rest/sleep promoted  Goal: Optimal Comfort and Wellbeing  Outcome: Progressing  Goal: Readiness for Transition of Care  Outcome: Progressing     Problem: Risk for Delirium  Goal: Optimal Coping  Outcome: Progressing  Intervention: Optimize Psychosocial Adjustment to Delirium  Recent Flowsheet Documentation  Taken 6/2/2024 0000 by Kristyn Mortensen RN  Supportive Measures: active listening utilized  Taken 6/1/2024 2201 by Kristyn Mortensen RN  Supportive Measures: active listening utilized  Goal: Improved Behavioral Control  Outcome: Progressing  Intervention: Prevent and Manage Agitation  Recent Flowsheet Documentation  Taken 6/2/2024  0000 by Kristyn Mortensen RN  Environment Familiarity/Consistency: daily routine followed  Taken 6/1/2024 2201 by Kristyn Mortensen RN  Environment Familiarity/Consistency: daily routine followed  Intervention: Minimize Safety Risk  Recent Flowsheet Documentation  Taken 6/2/2024 0000 by Kristyn Mortensen RN  Communication Enhancement Strategies: family/caregiver assisted with communication  Enhanced Safety Measures: room near unit station  Taken 6/1/2024 2201 by Kristyn Mortensen RN  Communication Enhancement Strategies: family/caregiver assisted with communication  Enhanced Safety Measures: room near unit station  Goal: Improved Attention and Thought Clarity  Outcome: Progressing  Intervention: Maximize Cognitive Function  Recent Flowsheet Documentation  Taken 6/2/2024 0000 by Kristyn Mortensen RN  Sensory Stimulation Regulation:   care clustered   quiet environment promoted  Reorientation Measures: clock in view  Taken 6/1/2024 2201 by Kristyn Mortensen RN  Sensory Stimulation Regulation:   care clustered   quiet environment promoted  Reorientation Measures: clock in view  Goal: Improved Sleep  Outcome: Progressing  Outcome Evaluation: VSS on RA. Pt is alert and oriented x4, denies chest pain, nausea and vomiting. Pt tolerated all scheduled medications. No acute changes this shift. Family remains at bedside and assists w/ translation. Diego catheter place and draining well. Call light within reach, plan of care ongoing.

## 2024-06-02 NOTE — PLAN OF CARE
6MS DISCHARGE    D: Patient discharged to home at 1425. Patient accompanied by family member and staff transportation via wheelchair.    I: Discharge prescriptions given to patient/family member and one sent to outpatient pharmacy. All discharge medications and instructions reviewed with patient and family. Patient instructed to seek care if experiencing worsening symptoms. Other phone numbers to call with questions or concerns after discharge reviewed. R PICC kept in place, saline flushed and heparin locked prior to discharge, as patient will be receiving outpatient IV abx infusions by \[facility]\"". Education completed.    A: Patient and family member verbalized understanding of discharge medications and instructions. No prescribed home medications given to patient, no doses left. Belongings returned to patient.    P: Patient to follow-up with pharmacy on June 17th, infection disease June 26th, Urology July 22nd, and Kenzie KASPER July 23rd. Appointments were pointed out to patient and family member prior to discharge.    Extensive education with demonstrations (how to care for, etc) in regard to patient discharging with parrish catheter was given to patient and family member. All questions were answered prior to discharge.

## 2024-06-02 NOTE — PLAN OF CARE
"[id number]    Plan of Care Reviewed With: patient, family    Overall Patient Progress: improving    Outcome Evaluation: Pt is A & O x4 with intermittent confusion on RA. Denies pain, nausea, chest pain & SOB. Pt has R PICC continuously infusing NS @ 100 ml/hr. Pt has abrasion on R forearm which is open to air. Pt has parrish - draining adequately, emptied during shift, and cares completed with catheter wipes. Pt is assist x1 with walker and gait belt and uses call light appropriately. Family at bedside throughout shift.     Shift Updates  BG monitoring completed during shift.     Vitals: BP (!) 152/99 (BP Location: Left arm)   Pulse 100   Temp 98.9  F (37.2  C) (Oral)   Resp 18   Ht 1.575 m (5' 2\")   Wt 70.7 kg (155 lb 13.8 oz)   SpO2 98%   BMI 28.51 kg/m      Plan: Pt to discharge home today with [facility] infusion. Continue with plan of care.        "

## 2024-06-03 ENCOUNTER — TELEPHONE (OUTPATIENT)
Dept: UROLOGY | Facility: CLINIC | Age: 84
End: 2024-06-03
Payer: COMMERCIAL

## 2024-06-03 LAB
BACTERIA BLD CULT: NO GROWTH

## 2024-06-03 NOTE — PHARMACY
[facility]  Parenteral ANtibiotic Review at Departure from Acute Care Collaborative Note     Patient: Adam Talamantes  MRN: [id number]  Allergies: Lisinopril    Current Location: UR 6MS  OPAT to be provided by: [facility]       Line Type: PICC    Diagnosis/Indications: Candida glabrata prostatitis  Organism(s): Candida glabrata (resistant to fluconazole, elevated voriconazole DAVE)  MRDO? Yes - other  Pending Cultures/Microbiological Tests: no      Inpatient ID involved in developing OPAT plan: Yes - discharge OPAT plan has no changes from ID provider, Dr. Faiza Stiles PA-C, OPAT plan charted on 5/31/2024    Outpatient ID Follow-up: ID OPAT Clinic Referral Placed ([facility] Ph: [phone] and Fax: [phone]) - appointment scheduled  Designated Provider: Dr. Gilbert Perera    Antimicrobial Regimen / Route Anticipated  Duration Start Date Stop /  Reassess Date   Micafungin 150 mg every 24 hours/IV ~4-6 weeks 5/29/2024 Definitive end date to be determined by ID provider at outpatient ID follow-up   Levofloxacin 750 mg  (every 48 hours)/PO 6 weeks 5/2/2024 (includes prior therapy) 6/12/2024     Laboratory Tests and Monitoring Frequency: CBC with Diff, SCr, ALT, AST Once Weekly    Imaging/Miscellaneous Monitoring: None    ID Pharmacist Interventions: None                          Rhonda Luevano, PharmD, BCIDP  Pager: [phone]

## 2024-06-03 NOTE — PROGRESS NOTES
"OPAT to be provided by: [facility]       Line Type: PICC     Diagnosis/Indications: Candida glabrata prostatitis  Organism(s): Candida glabrata (resistant to fluconazole, elevated voriconazole DAVE)  MRDO? Yes - other  Pending Cultures/Microbiological Tests: no       Inpatient ID involved in developing OPAT plan: Yes - discharge OPAT plan has no changes from ID provider, Dr. Faiza Stiles PA-C, OPAT plan charted on 5/31/2024     Outpatient ID Follow-up: ID OPAT Clinic Referral Placed ([facility] Ph: [phone] and Fax: [phone]) - appointment scheduled  Designated Provider: Dr. Gilbert Perera     Antimicrobial Regimen / Route Anticipated  Duration Start Date Stop /  Reassess Date   Micafungin 150 mg every 24 hours/IV ~4-6 weeks 5/29/2024 Definitive end date to be determined by ID provider at outpatient ID follow-up   Levofloxacin 750 mg  (every 48 hours)/PO 6 weeks 5/2/2024 (includes prior therapy) 6/12/2024    Prolonged Parenteral/Oral Antibiotic Recommendations and ID Follow up  This template provides final ID recommendations as of this date.      Infectious Diseases Indication: Candida glabrata prostatitis     Antibiotic Information  Name of Antibiotic Dose of Antibiotic1 Anticipated duration Effective start date2 End date   micafungin 150mg IV q24hrs 4-6 weeks 5/29/24 TBD on ID follow up                           1.Dose of antibiotic will need to be renally adjusted if creatinine clearance changes  2.Effective start date is the date of adequate therapy with appropriate spectrum     Method of antibiotic delivery:PICC line.Is the line being used for another indication besides antimicrobials? No At the end of therapy should the line used for antimicrobials be removed or de-accessed? Yes. Selecting \"yes\" will function as written order to remove PICC line or de-access the indwelling line at the end of therapy.     Weekly labs required: Creatinine, AST/ALT, and CBC with diff. Dr. Perera " will follow labs at discharge until ID follow up. Fax labs to ID clinic.     Are there pending microbial tests: Yes Cultures      Imaging for ID follow up: ID Imaging: No.      Follow up: Scheduled with Dr. Perera for 6/26/24.     ID provider route note: OPAT RN Care Coordinator [facility] Information:  Phone: [phone]  Fax: [phone] (Attention ID clinic nurses)

## 2024-06-03 NOTE — TELEPHONE ENCOUNTER
Patients daughter confirmed scheduled appointment:  Date: June 10th   Time: 1pm   Visit type: Return   Provider: Sanya Yadav  [place] [facility] [place]  Testing/imaging: N/A  Additional notes: call patient and let him know he has an appointment with Sanya Yadav scheduled - per staff message

## 2024-06-04 ENCOUNTER — MEDICAL CORRESPONDENCE (OUTPATIENT)
Dept: HEALTH INFORMATION MANAGEMENT | Facility: CLINIC | Age: 84
End: 2024-06-04
Payer: COMMERCIAL

## 2024-06-05 ENCOUNTER — LAB REQUISITION (OUTPATIENT)
Dept: LAB | Facility: HOSPITAL | Age: 84
End: 2024-06-05
Payer: COMMERCIAL

## 2024-06-05 ENCOUNTER — MEDICAL CORRESPONDENCE (OUTPATIENT)
Dept: HEALTH INFORMATION MANAGEMENT | Facility: CLINIC | Age: 84
End: 2024-06-05

## 2024-06-05 DIAGNOSIS — B37.89 OTHER SITES OF CANDIDIASIS: ICD-10-CM

## 2024-06-05 LAB
ALT SERPL W P-5'-P-CCNC: 29 U/L (ref 0–70)
AST SERPL W P-5'-P-CCNC: 32 U/L (ref 0–45)
BASOPHILS # BLD AUTO: 0.1 10E3/UL (ref 0–0.2)
BASOPHILS NFR BLD AUTO: 1 %
CREAT SERPL-MCNC: 1.4 MG/DL (ref 0.67–1.17)
EGFRCR SERPLBLD CKD-EPI 2021: 50 ML/MIN/1.73M2
EOSINOPHIL # BLD AUTO: 0.8 10E3/UL (ref 0–0.7)
EOSINOPHIL NFR BLD AUTO: 8 %
ERYTHROCYTE [DISTWIDTH] IN BLOOD BY AUTOMATED COUNT: 15.9 % (ref 10–15)
HCT VFR BLD AUTO: 33.8 % (ref 40–53)
HGB BLD-MCNC: 10.8 G/DL (ref 13.3–17.7)
IMM GRANULOCYTES # BLD: 0.1 10E3/UL
IMM GRANULOCYTES NFR BLD: 1 %
LYMPHOCYTES # BLD AUTO: 2.1 10E3/UL (ref 0.8–5.3)
LYMPHOCYTES NFR BLD AUTO: 20 %
MCH RBC QN AUTO: 29.6 PG (ref 26.5–33)
MCHC RBC AUTO-ENTMCNC: 32 G/DL (ref 31.5–36.5)
MCV RBC AUTO: 93 FL (ref 78–100)
MONOCYTES # BLD AUTO: 1 10E3/UL (ref 0–1.3)
MONOCYTES NFR BLD AUTO: 10 %
NEUTROPHILS # BLD AUTO: 6.4 10E3/UL (ref 1.6–8.3)
NEUTROPHILS NFR BLD AUTO: 60 %
NRBC # BLD AUTO: 0 10E3/UL
NRBC BLD AUTO-RTO: 0 /100
PLATELET # BLD AUTO: 250 10E3/UL (ref 150–450)
RBC # BLD AUTO: 3.65 10E6/UL (ref 4.4–5.9)
WBC # BLD AUTO: 10.4 10E3/UL (ref 4–11)

## 2024-06-05 PROCEDURE — 84450 TRANSFERASE (AST) (SGOT): CPT | Mod: ORL | Performed by: INTERNAL MEDICINE

## 2024-06-05 PROCEDURE — 82565 ASSAY OF CREATININE: CPT | Mod: ORL | Performed by: INTERNAL MEDICINE

## 2024-06-05 PROCEDURE — 85025 COMPLETE CBC W/AUTO DIFF WBC: CPT | Mod: ORL | Performed by: INTERNAL MEDICINE

## 2024-06-05 PROCEDURE — 84460 ALANINE AMINO (ALT) (SGPT): CPT | Mod: ORL | Performed by: INTERNAL MEDICINE

## 2024-06-06 DIAGNOSIS — E11.65 TYPE 2 DIABETES MELLITUS WITH HYPERGLYCEMIA, WITHOUT LONG-TERM CURRENT USE OF INSULIN (H): ICD-10-CM

## 2024-06-06 RX ORDER — PEN NEEDLE, DIABETIC 32GX 5/32"
NEEDLE, DISPOSABLE MISCELLANEOUS
Qty: 400 EACH | Refills: 1 | Status: SHIPPED | OUTPATIENT
Start: 2024-06-06

## 2024-06-07 NOTE — PROGRESS NOTES
Subjective     REASON FOR VISIT  Hospital follow-up    HISTORY OF PRESENT ILLNESS  Mr. Talamantes is a pleasant 84 year old male who I am speaking with today in regards to his recent episode of fungal UTI/prostatitis as well as concern for prostate abscess status post TURP while inpatient on 5/16/2024.  I personally reviewed many of the admission documentation notes both from his 5/11 - 5/19 admission and his 5/28 - 6/2 admission.     Mr Talamantes has previously been seen by our department in 2023 for gross hematuria.  It was recommended he go through a gross hematuria workup at that time but this was never followed through with.  However, he presented to the emergency department on 5/11/2024 with polyuria, dysuria, and some mental status change.  A large workup was done and was found to have a complicated urinary tract infection that grew yeast instead of bacteria.  In addition to this, CT abdomen pelvis from 5/15/2024 showed evidence of a rim-enhancing lesion involved in the mid right base of the prostate with concern for abscess.  With this in mind, a TURP was completed by Dr. Fletcher on 5/16/2024, though no significant abscess was seen.  Pathology showed benign prostate tissue with acute on chronic inflammation.  A catheter was placed for short time during this admission but ultimately was discharged without a catheter as he was voiding spontaneously.    Unfortunately, this patient was once again seen in the emergency department for ongoing hematuria and urinary frequency even while on antibiotics.  After being placed on new IV antifungals, CT abdomen pelvis without IV contrast from 5/29 showed evidence of new mild bilateral hydronephrosis at the level of the urinary bladder despite no bladder distention.  There was concern for lack of bladder compliance, so a parrish catheter was placed and was charged with following up with urology for voiding trial and discussion of next steps.    Today:  Tolerating the catheter well  -well secured on the upper thigh with no pain, bladder spasms, or blood    Objective     PHYSICAL EXAMINATION  General: Alert, oriented, no acute distress    LABS  TURP pathology from 5/16/24    Final Diagnosis   Prostate, transurethral resection:  - Benign hyperplastic prostatic parenchyma with focal acute and chronic inflammation  - Negative for malignancy     Fungal or Yeast Culture Routine  Order: [id number]  Collected 5/16/2024  3:43 PM       Status: Preliminary result       Visible to patient: No (not released)    Specimen Information: Prostate; Tissue   2 Result Notes  Culture Candida glabrata complex         IMAGING  Most recent renal ultrasound thankfully shows an improvement in his hydronephrosis.    Narrative & Impression   EXAMINATION: US RENAL COMPLETE NON-VASCULAR, 6/1/2024 6:53 PM      COMPARISON: 5/11/2024     HISTORY: urinary rentention     TECHNIQUE: The kidneys and bladder were scanned in the standard  fashion with specialized ultrasound transducer(s) using both gray  scale and limited color/spectral Doppler techniques.     FINDINGS:     Right kidney: Measures 10.6 cm in length. Parenchyma is of normal  thickness. Mildly echogenic renal parenchyma. Mild hydronephrosis. No  focal mass.     Left kidney: Measures 12.7 cm in length. Parenchyma is of normal  thickness. Mildly echogenic renal parenchyma. 2.5 x 1.7 x 1.6 cm  anechoic avascular cyst within the inferior pole. No focal mass. Mild  hydronephrosis.     Bladder: Decompressed, Diego catheter present within the urinary  bladder.                                                                      IMPRESSION:  1.  Mild bilateral hydronephrosis, decreased compared to 5/11/2024.  2.  Echogenic renal parenchyma, is nonspecific but can be seen in the  setting of medical renal disease.  3.  2.5 cm left inferior pole cyst.     Narrative & Impression   EXAM: CT ABDOMEN PELVIS W/O CONTRAST  LOCATION: [facility]  DATE: 5/29/2024     INDICATION: Acute kidney injury, hematuria, lower abdominal pain, concern for obstructive uropathy  COMPARISON: 5/15/2024; 6/13/2023  TECHNIQUE: CT scan of the abdomen and pelvis was performed without IV contrast. Multiplanar reformats were obtained. Dose reduction techniques were used.  CONTRAST: None.     FINDINGS: Study is motion limited.     LOWER CHEST: Mild atelectasis in the lung bases without confluent consolidation or pleural effusion.     HEPATOBILIARY: Multiple stones again seen in the gallbladder without acute cholecystitis. Liver is normal in size without surface nodularity. A small cyst is again seen in the left hepatic lobe. Another hypoattenuating lesion in segment 8 measuring 1.7   cm is better demonstrated to represent a hemangioma on 6/13/2023.     PANCREAS: Normal.     SPLEEN: Normal.     ADRENAL GLANDS: Normal.     KIDNEYS/BLADDER: Diffuse wall thickening of the urinary bladder, improved from prior study. Bladder is increased in volume compared to prior study. Mild bilateral hydroureteronephrosis to the level of the urinary bladder. Insertion sites of bilateral   ureters appear somewhat ectopic, located on the bilateral bladder walls. A nodular soft tissue is seen in the right bladder wall at the insertion site of the right ureter, measuring 8 mm on series 3 image 62, not seen on prior study. Bilateral renal   atrophy is unchanged. Punctate calcification again seen in the left renal pelvis, unchanged dating back to urogram on 6/13/2023.     BOWEL: No bowel thickening or obstruction. Appendix is normal.     LYMPH NODES: Normal.     VASCULATURE: Mild aortoiliac atherosclerotic calcifications. No abdominal aortic aneurysm.     PELVIC ORGANS: Mild prostate enlargement is stable.     MUSCULOSKELETAL: Bilateral L5 pars indicators defects with grade 1 anterolisthesis of L5 on S1 by 8 mm. Multilevel mild and moderate degenerative disc space narrowing with vacuum disc in the  lumbar spine. No suspicious osseous lesions or acute fractures.                                                                         IMPRESSION:      1.  Mild wall thickening of the urinary bladder has improved from recent study on 5/15/2024.     2.  New mild bilateral hydroureteronephrosis to the level of the urinary bladder, possibly due to increased volume of the urinary bladder, which remains somewhat small, indicating decreased bladder compliance.     3.  8 mm soft tissue density on the bladder wall at the insertion site of the right ureter, new from prior study. Although this is unlikely to be a urothelial neoplasm given acute development, consider follow-up contrast enhanced CT of the abdomen and   pelvis for further evaluation.     4.  Punctate calcifications in the left renal pelvis, unchanged from 6/13/2023, nonspecific but compatible with benign calcifications.     5.  Stable mild prostate enlargement.     Narrative & Impression   Examination: CT ABDOMEN PELVIS W CONTRAST, 5/15/2024 10:26 AM     Technique:  Helical CT images from the lung bases through the  symphysis pubis were obtained with contrast.  Coronal reformatted  images were generated at a workstation for further assessment.     Contrast:  95 mL [id number]     Comparison: Ultrasound 5/11/2024, CT 6/13/2023, 2/8/2023     History: Recurrent UTIs, eval for abscess or other source     Findings:     Abdomen and pelvis:   Liver: Stable indeterminate 1.3 cm hypoattenuating lesion in the right  hepatic lobe, previously demonstrating peripheral enhancement on  delayed excretory phase on CT dated 6/13/2023. Stable hypodense lesion  in the left hepatic lobe, likely representing hepatic cysts.  Gallbladder: No gallstones. No evidence of acute cholecystitis.  Spleen: Normal size.  Pancreas: No suspicious pancreatic lesions. The pancreatic duct is not  dilated.  Adrenal glands: No adrenal nodules.  Urinary system: Cortical atrophy of the kidneys. 2.0 cm  indeterminant  dense cystic lesion in the inferior pole of the left kidney, likely  represents a hemorrhagic/proteinaceous cyst. Additional cortical  hypodensities are too small to fully characterize but likely represent  simple cysts. Prominent renal pelvis bilaterally. Unchanged  anti-dependent focal calcification along the anterior left renal  pelvic wall. Mild diffuse left urothelial thickening and enhancement.  No hydronephrosis, hydroureter, or obstructing renal stones.  Circumferential urinary bladder wall thickening with mild adjacent fat  stranding.  Reproductive organs: 3.1 x 3.0 x 3.7 cm rim-enhancing lesion involving  the base-mid right prostate gland. This abuts the inferior aspect of  the urinary bladder without definite communication. There appears to  be a hypoattenuating region in similar location in the right prostate  gland measuring 2.2 x 2.2 cm on CT dated 6/13/2023 without definite  rim enhancement demonstrated.  Bowel: No abnormally dilated loops of large or small bowel. Mild  colonic diverticulosis without evidence of acute diverticulitis. No  abnormal bowel wall thickening or enhancement. The appendix is  unremarkable.  Lymph nodes: No retroperitoneal, mesenteric, or pelvic  lymphadenopathy.  Fluid: No free fluid within the abdomen.  Vessels: No infrarenal aortic aneurysm. The portal, superior  mesenteric, and splenic veins are patent. Calcified and noncalcified  atherosclerotic plaque throughout the abdominal aorta and iliac  arteries.     Lung bases: Bibasilar atelectasis. Prominent mediastinal and  extrapleural fat. No pleural effusion or focal consolidation.     Bones and soft tissues: No suspicious osseous lesions. Multilevel  degenerative changes throughout the visualized spine. 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 Stable mild  anterior wedge deformity of L2.                                                                       Impression:   1. 3.1 x 3.0  x 3.7 cm rim-enhancing lesion involving the base-mid  right prostate gland with abutment of the inferior aspect of the  urinary bladder. Findings are concerning for a prostatic abscess.  2. Circumferential urinary bladder wall thickening with mild adjacent  fat stranding, suggestive of cystitis.  3. Stable indeterminate 1.3 cm hypoattenuating lesion in the right  hepatic lobe, which demonstrated peripheral enhancement on delayed  postcontrast imaging on CT dated 6/13/2023. Consider further  evaluation with MRI on a non-emergent basis.   4. 2.0 cm dense cystic lesion in the inferior pole of the left kidney,  likely representing a hemorrhagic/proteinaceous cyst. This can be  further characterized on future MRI.  5. Bilateral renal cortical atrophy.     Assessment & Plan    Hydronephrosis  Fungal prostatitis/UTI   Question of urinary retention     It was my pleasure to meet with Adam in follow-up for his recent concerns for neurogenic bladder, BPH with obstruction, and fungal UTI with concern for prostatitis.  After reviewing his clinical situation as well as many of his outside notes and imaging tests, I discussed with him that I do not necessarily feel confident with trying to take the catheter out today.  Given the fact that he needed one so quickly after having it removed previously and that there is concern for a noncompliant bladder, I believe the best thing to do would be to get a urodynamic study first to be sure that he will be safe if we try to remove the catheter in the future.  I think this is also reasonable given how well he is tolerating the catheter.    Mr. Talamantes has no concerns with this plan, and I was happy to provide him and his son with catheter care instructions as well as a leg bag.  We will have him schedule a first available urodynamic study with follow-up visit with me shortly afterwards potentially on the same day, as well as monthly catheter exchanges starting near the end of this month or  beginning of next month.    Mr. Talamantes expressed understanding and agreement to the above discussion and plan and all of his questions were answered to his satisfaction. A business card was provided as a point of contact.     PLAN  First available urodynamic study with visit with me afterwards to discuss results  Monthly catheter exchanges    SIGNED:    Mic Yadav PA-C

## 2024-06-08 DIAGNOSIS — F32.1 CURRENT MODERATE EPISODE OF MAJOR DEPRESSIVE DISORDER WITHOUT PRIOR EPISODE (H): ICD-10-CM

## 2024-06-10 ENCOUNTER — TELEPHONE (OUTPATIENT)
Dept: FAMILY MEDICINE | Facility: CLINIC | Age: 84
End: 2024-06-10

## 2024-06-10 ENCOUNTER — OFFICE VISIT (OUTPATIENT)
Dept: UROLOGY | Facility: CLINIC | Age: 84
End: 2024-06-10
Payer: COMMERCIAL

## 2024-06-10 VITALS — DIASTOLIC BLOOD PRESSURE: 81 MMHG | SYSTOLIC BLOOD PRESSURE: 123 MMHG | HEART RATE: 89 BPM

## 2024-06-10 DIAGNOSIS — N31.9 NEUROGENIC BLADDER: ICD-10-CM

## 2024-06-10 DIAGNOSIS — N13.30 HYDRONEPHROSIS, UNSPECIFIED HYDRONEPHROSIS TYPE: ICD-10-CM

## 2024-06-10 DIAGNOSIS — R33.9 URINARY RETENTION: Primary | ICD-10-CM

## 2024-06-10 DIAGNOSIS — N41.2 PROSTATE ABSCESS: ICD-10-CM

## 2024-06-10 PROCEDURE — 99024 POSTOP FOLLOW-UP VISIT: CPT | Performed by: STUDENT IN AN ORGANIZED HEALTH CARE EDUCATION/TRAINING PROGRAM

## 2024-06-10 RX ORDER — DULOXETIN HYDROCHLORIDE 30 MG/1
30 CAPSULE, DELAYED RELEASE ORAL 2 TIMES DAILY
Qty: 60 CAPSULE | Refills: 6 | OUTPATIENT
Start: 2024-06-10

## 2024-06-10 ASSESSMENT — PAIN SCALES - GENERAL: PAINLEVEL: NO PAIN (0)

## 2024-06-10 NOTE — TELEPHONE ENCOUNTER
Home Health Care    Reason for call:  Orders    Orders are needed for this patient.  PT: 1x a week for 1 week. 2x a week for 1 week. 1x a week for 4 weeks.   OT: 1z a week for 5 weeks  Skilled Nursinx a week for 5 weeks with 3 PRN    Pt Provider: Dr. Espino    Phone Number Homecare Nurse can be reached at: [phone]      Okay to leave a detailed message?: Yes at [phone]

## 2024-06-10 NOTE — NURSING NOTE
Chief Complaint   Patient presents with    RECHECK     Urinary retention       Blood pressure 123/81, pulse 89. There is no height or weight on file to calculate BMI.    Patient Active Problem List   Diagnosis    Esophageal reflux    Dyslipidemia    Nephrolithiasis    Pseudogout    Latent Tuberculosis    Generalized Osteoarthritis Of The Hand    Lumbar Disc Degeneration    Insomnia, unspecified type    Chronic Gout    CKD (chronic kidney disease) stage 3, GFR 30-59 ml/min (H)    Elevated PSA    Prostate nodule    Cataracts, bilateral    Constipation, unspecified constipation type    Bilateral chronic knee pain    Chronic right shoulder pain    Essential hypertension with goal blood pressure less than 140/90    BPH (benign prostatic hyperplasia)    Chronic gout    Coronary artery disease due to lipid rich plaque    Right lower quadrant abdominal pain    Colitis    Primary osteoarthritis involving multiple joints    Urinary incontinence    ACE-inhibitor cough    Vertigo    Essential hypertension    Vertebral artery occlusion, left    Cerebellar stroke (H)    Stroke (H)    Hypomagnesemia    Ataxic gait    Urinary retention    Basilar artery stenosis    Cerebral vascular disease    Colon wall thickening    Current moderate episode of major depressive disorder without prior episode (H)    Dysuria    Generalized weakness    Near syncope    Urinary tract infection without hematuria, site unspecified    Type 2 diabetes mellitus with chronic kidney disease, with long-term current use of insulin, unspecified CKD stage (H)    Candida glabrata infection    Encounter for long-term (current) use of antibiotics    Hyperkalemia    Acute on chronic renal insufficiency    Complicated UTI (urinary tract infection)       Allergies   Allergen Reactions    Lisinopril Cough       Current Outpatient Medications   Medication Sig Dispense Refill    acetaminophen (TYLENOL) 500 MG tablet Take 1 tablet (500 mg) by mouth every 4 hours as needed  for mild pain 100 tablet 3    allopurinol (ZYLOPRIM) 100 MG tablet TAKE 2 TABLETS(200 MG) BY MOUTH DAILY 180 tablet 3    apixaban ANTICOAGULANT (ELIQUIS ANTICOAGULANT) 5 MG tablet Take 1 tablet (5 mg) by mouth 2 times daily 60 tablet 6    calcium carbonate (TUMS) 500 MG chewable tablet Take 1 chew tab by mouth daily as needed for heartburn      Continuous Blood Gluc Sensor (FREESTYLE SUSANNAH 2 SENSOR) [facility] 1 each every 14 days Use 1 sensor every 14 days. Use to read blood sugars per 's instructions. 2 each 11    Continuous Glucose  (FREESTYLE SUSANNAH 2 READER) TONIE USE TO READ BLOOD SUGARS PER MANUFACTRUERS INSTRUCTIONS 1 each 0    dextromethorphan-quiNIDine (NUEDEXTA) 20-10 MG capsule Take 1 capsule by mouth every 12 hours 60 capsule 11    DULoxetine (CYMBALTA) 30 MG capsule TAKE 1 CAPSULE(30 MG) BY MOUTH TWICE DAILY 60 capsule 6    HYDROcodone-acetaminophen (NORCO) 5-325 MG tablet Take 1 tablet by mouth every 6 hours as needed for severe pain 30 tablet 0    hydrOXYzine (ATARAX) 25 MG tablet Take 1 tablet (25 mg) by mouth every 8 hours as needed for itching 60 tablet 1    insulin aspart (NOVOLOG FLEXPEN) 100 UNIT/ML pen Inject 8 units before breakfast, and 6 units under the skin before lunch and dinner 15 mL 3    insulin glargine (LANTUS PEN) 100 UNIT/ML pen Inject 30 Units Subcutaneous every 24 hours 30 mL 3    insulin pen needle (BD PEN NEEDLE SALVATORE 2ND GEN) 32G X 4 MM miscellaneous USE 1 PEN NEEDLE FOUR TIMES DAILY OR AS DIRECTED 400 each 1    JANUVIA 50 MG tablet TAKE 1 TABLET(50 MG) BY MOUTH DAILY 90 tablet 3    levofloxacin (LEVAQUIN) 750 MG tablet Take 1 tablet (750 mg) by mouth every 48 hours for 8 days 4 tablet 0    meclizine (ANTIVERT) 12.5 MG tablet Take 1 tablet (12.5 mg) by mouth 3 times daily as needed for dizziness 60 tablet 6    metoprolol succinate ER (TOPROL XL) 100 MG 24 hr tablet TAKE 1/2 TABLET BY MOUTH DAILY 45 tablet 3    micafungin 150 mg Inject 150 mg into the vein every 24  hours for 42 days      pantoprazole (PROTONIX) 40 MG EC tablet Take 1 tablet (40 mg) by mouth every morning (before breakfast) 90 tablet 3    polyethylene glycol (MIRALAX) 17 GM/Dose powder MIX 17 GRAMS IN LIQUID AND TAKE BY MOUTH ONCE DAILY AS NEEDED FOR CONSTIPATION 510 g 6    rosuvastatin (CRESTOR) 20 MG tablet TAKE 1 TABLET(20 MG) BY MOUTH AT BEDTIME 90 tablet 0    SENNA-docusate sodium (SENNA S) 8.6-50 MG tablet Take 1 tablet by mouth 2 times daily as needed (for constipation) 60 tablet 11    tamsulosin (FLOMAX) 0.4 MG capsule Take 1 capsule (0.4 mg) by mouth daily 90 capsule 3    traZODone (DESYREL) 50 MG tablet Take 1-2 tablets ( mg) by mouth nightly as needed for sleep 180 tablet 3    triamcinolone (KENALOG) 0.1 % external cream Apply topically 2 times daily 80 g 3       Social History     Tobacco Use    Smoking status: Former     Current packs/day: 0.00     Types: Cigarettes     Quit date: 3/10/2000     Years since quittin.2     Passive exposure: Never    Smokeless tobacco: Former    Tobacco comments:     no passive exposure   Vaping Use    Vaping status: Never Used   Substance Use Topics    Alcohol use: No    Drug use: No       Fidelina Kohli CMA  6/10/2024  12:55 PM

## 2024-06-10 NOTE — LETTER
6/10/2024       RE: Adam Talamantes  [address]  [facility] [address]     Dear Colleague,    Thank you for referring your patient, Adam Talamantes, to the [facility] [place] at [facility]. Please see a copy of my visit note below.    Subjective    REASON FOR VISIT  Hospital follow-up    HISTORY OF PRESENT ILLNESS  Mr. Talamantes is a pleasant 84 year old male who I am speaking with today in regards to his recent episode of fungal UTI/prostatitis as well as concern for prostate abscess status post TURP while inpatient on 5/16/2024.  I personally reviewed many of the admission documentation notes both from his 5/11 - 5/19 admission and his 5/28 - 6/2 admission.     Mr Talamantes has previously been seen by our department in 2023 for gross hematuria.  It was recommended he go through a gross hematuria workup at that time but this was never followed through with.  However, he presented to the emergency department on 5/11/2024 with polyuria, dysuria, and some mental status change.  A large workup was done and was found to have a complicated urinary tract infection that grew yeast instead of bacteria.  In addition to this, CT abdomen pelvis from 5/15/2024 showed evidence of a rim-enhancing lesion involved in the mid right base of the prostate with concern for abscess.  With this in mind, a TURP was completed by Dr. Fletcher on 5/16/2024, though no significant abscess was seen.  Pathology showed benign prostate tissue with acute on chronic inflammation.  A catheter was placed for short time during this admission but ultimately was discharged without a catheter as he was voiding spontaneously.    Unfortunately, this patient was once again seen in the emergency department for ongoing hematuria and urinary frequency even while on antibiotics.  After being placed on new IV antifungals, CT abdomen pelvis without IV contrast from 5/29 showed evidence of new mild bilateral  hydronephrosis at the level of the urinary bladder despite no bladder distention.  There was concern for lack of bladder compliance, so a parrish catheter was placed and was charged with following up with urology for voiding trial and discussion of next steps.    Today:  Tolerating the catheter well -well secured on the upper thigh with no pain, bladder spasms, or blood    Objective    PHYSICAL EXAMINATION  General: Alert, oriented, no acute distress    LABS  TURP pathology from 5/16/24    Final Diagnosis   Prostate, transurethral resection:  - Benign hyperplastic prostatic parenchyma with focal acute and chronic inflammation  - Negative for malignancy     Fungal or Yeast Culture Routine  Order: [id number]  Collected 5/16/2024  3:43 PM       Status: Preliminary result       Visible to patient: No (not released)    Specimen Information: Prostate; Tissue   2 Result Notes  Culture Candida glabrata complex         IMAGING  Most recent renal ultrasound thankfully shows an improvement in his hydronephrosis.    Narrative & Impression   EXAMINATION: US RENAL COMPLETE NON-VASCULAR, 6/1/2024 6:53 PM      COMPARISON: 5/11/2024     HISTORY: urinary rentention     TECHNIQUE: The kidneys and bladder were scanned in the standard  fashion with specialized ultrasound transducer(s) using both gray  scale and limited color/spectral Doppler techniques.     FINDINGS:     Right kidney: Measures 10.6 cm in length. Parenchyma is of normal  thickness. Mildly echogenic renal parenchyma. Mild hydronephrosis. No  focal mass.     Left kidney: Measures 12.7 cm in length. Parenchyma is of normal  thickness. Mildly echogenic renal parenchyma. 2.5 x 1.7 x 1.6 cm  anechoic avascular cyst within the inferior pole. No focal mass. Mild  hydronephrosis.     Bladder: Decompressed, Parrish catheter present within the urinary  bladder.                                                                      IMPRESSION:  1.  Mild bilateral hydronephrosis, decreased  compared to 5/11/2024.  2.  Echogenic renal parenchyma, is nonspecific but can be seen in the  setting of medical renal disease.  3.  2.5 cm left inferior pole cyst.     Narrative & Impression   EXAM: CT ABDOMEN PELVIS W/O CONTRAST  LOCATION: [facility]  DATE: 5/29/2024     INDICATION: Acute kidney injury, hematuria, lower abdominal pain, concern for obstructive uropathy  COMPARISON: 5/15/2024; 6/13/2023  TECHNIQUE: CT scan of the abdomen and pelvis was performed without IV contrast. Multiplanar reformats were obtained. Dose reduction techniques were used.  CONTRAST: None.     FINDINGS: Study is motion limited.     LOWER CHEST: Mild atelectasis in the lung bases without confluent consolidation or pleural effusion.     HEPATOBILIARY: Multiple stones again seen in the gallbladder without acute cholecystitis. Liver is normal in size without surface nodularity. A small cyst is again seen in the left hepatic lobe. Another hypoattenuating lesion in segment 8 measuring 1.7   cm is better demonstrated to represent a hemangioma on 6/13/2023.     PANCREAS: Normal.     SPLEEN: Normal.     ADRENAL GLANDS: Normal.     KIDNEYS/BLADDER: Diffuse wall thickening of the urinary bladder, improved from prior study. Bladder is increased in volume compared to prior study. Mild bilateral hydroureteronephrosis to the level of the urinary bladder. Insertion sites of bilateral   ureters appear somewhat ectopic, located on the bilateral bladder walls. A nodular soft tissue is seen in the right bladder wall at the insertion site of the right ureter, measuring 8 mm on series 3 image 62, not seen on prior study. Bilateral renal   atrophy is unchanged. Punctate calcification again seen in the left renal pelvis, unchanged dating back to urogram on 6/13/2023.     BOWEL: No bowel thickening or obstruction. Appendix is normal.     LYMPH NODES: Normal.     VASCULATURE: Mild aortoiliac atherosclerotic  calcifications. No abdominal aortic aneurysm.     PELVIC ORGANS: Mild prostate enlargement is stable.     MUSCULOSKELETAL: Bilateral L5 pars indicators defects with grade 1 anterolisthesis of L5 on S1 by 8 mm. Multilevel mild and moderate degenerative disc space narrowing with vacuum disc in the lumbar spine. No suspicious osseous lesions or acute fractures.                                                                         IMPRESSION:      1.  Mild wall thickening of the urinary bladder has improved from recent study on 5/15/2024.     2.  New mild bilateral hydroureteronephrosis to the level of the urinary bladder, possibly due to increased volume of the urinary bladder, which remains somewhat small, indicating decreased bladder compliance.     3.  8 mm soft tissue density on the bladder wall at the insertion site of the right ureter, new from prior study. Although this is unlikely to be a urothelial neoplasm given acute development, consider follow-up contrast enhanced CT of the abdomen and   pelvis for further evaluation.     4.  Punctate calcifications in the left renal pelvis, unchanged from 6/13/2023, nonspecific but compatible with benign calcifications.     5.  Stable mild prostate enlargement.     Narrative & Impression   Examination: CT ABDOMEN PELVIS W CONTRAST, 5/15/2024 10:26 AM     Technique:  Helical CT images from the lung bases through the  symphysis pubis were obtained with contrast.  Coronal reformatted  images were generated at a workstation for further assessment.     Contrast:  95 mL [id number]     Comparison: Ultrasound 5/11/2024, CT 6/13/2023, 2/8/2023     History: Recurrent UTIs, eval for abscess or other source     Findings:     Abdomen and pelvis:   Liver: Stable indeterminate 1.3 cm hypoattenuating lesion in the right  hepatic lobe, previously demonstrating peripheral enhancement on  delayed excretory phase on CT dated 6/13/2023. Stable hypodense lesion  in the left hepatic lobe,  likely representing hepatic cysts.  Gallbladder: No gallstones. No evidence of acute cholecystitis.  Spleen: Normal size.  Pancreas: No suspicious pancreatic lesions. The pancreatic duct is not  dilated.  Adrenal glands: No adrenal nodules.  Urinary system: Cortical atrophy of the kidneys. 2.0 cm indeterminant  dense cystic lesion in the inferior pole of the left kidney, likely  represents a hemorrhagic/proteinaceous cyst. Additional cortical  hypodensities are too small to fully characterize but likely represent  simple cysts. Prominent renal pelvis bilaterally. Unchanged  anti-dependent focal calcification along the anterior left renal  pelvic wall. Mild diffuse left urothelial thickening and enhancement.  No hydronephrosis, hydroureter, or obstructing renal stones.  Circumferential urinary bladder wall thickening with mild adjacent fat  stranding.  Reproductive organs: 3.1 x 3.0 x 3.7 cm rim-enhancing lesion involving  the base-mid right prostate gland. This abuts the inferior aspect of  the urinary bladder without definite communication. There appears to  be a hypoattenuating region in similar location in the right prostate  gland measuring 2.2 x 2.2 cm on CT dated 6/13/2023 without definite  rim enhancement demonstrated.  Bowel: No abnormally dilated loops of large or small bowel. Mild  colonic diverticulosis without evidence of acute diverticulitis. No  abnormal bowel wall thickening or enhancement. The appendix is  unremarkable.  Lymph nodes: No retroperitoneal, mesenteric, or pelvic  lymphadenopathy.  Fluid: No free fluid within the abdomen.  Vessels: No infrarenal aortic aneurysm. The portal, superior  mesenteric, and splenic veins are patent. Calcified and noncalcified  atherosclerotic plaque throughout the abdominal aorta and iliac  arteries.     Lung bases: Bibasilar atelectasis. Prominent mediastinal and  extrapleural fat. No pleural effusion or focal consolidation.     Bones and soft tissues: No  suspicious osseous lesions. Multilevel  degenerative changes throughout the visualized spine. 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 Stable mild  anterior wedge deformity of L2.                                                                       Impression:   1. 3.1 x 3.0 x 3.7 cm rim-enhancing lesion involving the base-mid  right prostate gland with abutment of the inferior aspect of the  urinary bladder. Findings are concerning for a prostatic abscess.  2. Circumferential urinary bladder wall thickening with mild adjacent  fat stranding, suggestive of cystitis.  3. Stable indeterminate 1.3 cm hypoattenuating lesion in the right  hepatic lobe, which demonstrated peripheral enhancement on delayed  postcontrast imaging on CT dated 6/13/2023. Consider further  evaluation with MRI on a non-emergent basis.   4. 2.0 cm dense cystic lesion in the inferior pole of the left kidney,  likely representing a hemorrhagic/proteinaceous cyst. This can be  further characterized on future MRI.  5. Bilateral renal cortical atrophy.     Assessment & Plan   Hydronephrosis  Fungal prostatitis/UTI   Question of urinary retention     It was my pleasure to meet with Adam in follow-up for his recent concerns for neurogenic bladder, BPH with obstruction, and fungal UTI with concern for prostatitis.  After reviewing his clinical situation as well as many of his outside notes and imaging tests, I discussed with him that I do not necessarily feel confident with trying to take the catheter out today.  Given the fact that he needed one so quickly after having it removed previously and that there is concern for a noncompliant bladder, I believe the best thing to do would be to get a urodynamic study first to be sure that he will be safe if we try to remove the catheter in the future.  I think this is also reasonable given how well he is tolerating the catheter.    Mr. Talamantes has no concerns with this plan, and I was  happy to provide him and his son with catheter care instructions as well as a leg bag.  We will have him schedule a first available urodynamic study with follow-up visit with me shortly afterwards potentially on the same day, as well as monthly catheter exchanges starting near the end of this month or beginning of next month.    Mr. Talamantes expressed understanding and agreement to the above discussion and plan and all of his questions were answered to his satisfaction. A business card was provided as a point of contact.     PLAN  First available urodynamic study with visit with me afterwards to discuss results  Monthly catheter exchanges    SIGNED:    Mic Yadav PA-C

## 2024-06-11 ENCOUNTER — LAB REQUISITION (OUTPATIENT)
Dept: LAB | Facility: HOSPITAL | Age: 84
End: 2024-06-11
Payer: COMMERCIAL

## 2024-06-11 DIAGNOSIS — B37.89 OTHER SITES OF CANDIDIASIS: ICD-10-CM

## 2024-06-11 LAB
ALT SERPL W P-5'-P-CCNC: 39 U/L (ref 0–70)
AST SERPL W P-5'-P-CCNC: 42 U/L (ref 0–45)
BASOPHILS # BLD AUTO: 0 10E3/UL (ref 0–0.2)
BASOPHILS NFR BLD AUTO: 1 %
CREAT SERPL-MCNC: 1.45 MG/DL (ref 0.67–1.17)
EGFRCR SERPLBLD CKD-EPI 2021: 48 ML/MIN/1.73M2
EOSINOPHIL # BLD AUTO: 0.8 10E3/UL (ref 0–0.7)
EOSINOPHIL NFR BLD AUTO: 10 %
ERYTHROCYTE [DISTWIDTH] IN BLOOD BY AUTOMATED COUNT: 15.9 % (ref 10–15)
HCT VFR BLD AUTO: 34.8 % (ref 40–53)
HGB BLD-MCNC: 11 G/DL (ref 13.3–17.7)
IMM GRANULOCYTES # BLD: 0.1 10E3/UL
IMM GRANULOCYTES NFR BLD: 1 %
LYMPHOCYTES # BLD AUTO: 1.7 10E3/UL (ref 0.8–5.3)
LYMPHOCYTES NFR BLD AUTO: 21 %
MCH RBC QN AUTO: 29.8 PG (ref 26.5–33)
MCHC RBC AUTO-ENTMCNC: 31.6 G/DL (ref 31.5–36.5)
MCV RBC AUTO: 94 FL (ref 78–100)
MONOCYTES # BLD AUTO: 1 10E3/UL (ref 0–1.3)
MONOCYTES NFR BLD AUTO: 12 %
NEUTROPHILS # BLD AUTO: 4.8 10E3/UL (ref 1.6–8.3)
NEUTROPHILS NFR BLD AUTO: 57 %
NRBC # BLD AUTO: 0 10E3/UL
NRBC BLD AUTO-RTO: 0 /100
PLATELET # BLD AUTO: 234 10E3/UL (ref 150–450)
RBC # BLD AUTO: 3.69 10E6/UL (ref 4.4–5.9)
WBC # BLD AUTO: 8.4 10E3/UL (ref 4–11)

## 2024-06-11 PROCEDURE — 82565 ASSAY OF CREATININE: CPT | Performed by: INTERNAL MEDICINE

## 2024-06-11 PROCEDURE — 84450 TRANSFERASE (AST) (SGOT): CPT | Performed by: INTERNAL MEDICINE

## 2024-06-11 PROCEDURE — 84460 ALANINE AMINO (ALT) (SGPT): CPT | Performed by: INTERNAL MEDICINE

## 2024-06-11 PROCEDURE — 85004 AUTOMATED DIFF WBC COUNT: CPT | Performed by: INTERNAL MEDICINE

## 2024-06-11 NOTE — TELEPHONE ENCOUNTER
Home Health Care    Reason for call:  Orders    Orders are needed for this patient.  PT: 1x a week for 1 week. 2x a week for 1 week. 1x a week for 4 weeks.   OT: 1z a week for 5 weeks  Skilled Nursinx a week for 5 weeks with 3 PRN    Pt Provider: Dr. Espino    Phone Number Homecare Nurse can be reached at: [phone]      Okay to leave a detailed message?: Yes at [phone]        Chucho Espino MD Physician SignedYesterday      ok        Spoke to Priya on the phone with provider verbal authorization for home care service as requested above.    Rey Goel RN  [facility]

## 2024-06-13 LAB — BACTERIA TISS BX CULT: ABNORMAL

## 2024-06-17 ENCOUNTER — OFFICE VISIT (OUTPATIENT)
Dept: PHARMACY | Facility: CLINIC | Age: 84
End: 2024-06-17
Payer: COMMERCIAL

## 2024-06-17 VITALS — HEART RATE: 88 BPM | SYSTOLIC BLOOD PRESSURE: 136 MMHG | DIASTOLIC BLOOD PRESSURE: 84 MMHG

## 2024-06-17 DIAGNOSIS — K59.00 CONSTIPATION, UNSPECIFIED CONSTIPATION TYPE: ICD-10-CM

## 2024-06-17 DIAGNOSIS — K21.9 GASTROESOPHAGEAL REFLUX DISEASE, UNSPECIFIED WHETHER ESOPHAGITIS PRESENT: ICD-10-CM

## 2024-06-17 DIAGNOSIS — G47.00 INSOMNIA, UNSPECIFIED TYPE: ICD-10-CM

## 2024-06-17 DIAGNOSIS — F32.A DEPRESSION, UNSPECIFIED DEPRESSION TYPE: ICD-10-CM

## 2024-06-17 DIAGNOSIS — E11.65 TYPE 2 DIABETES MELLITUS WITH HYPERGLYCEMIA, WITHOUT LONG-TERM CURRENT USE OF INSULIN (H): ICD-10-CM

## 2024-06-17 DIAGNOSIS — R52 PAIN: ICD-10-CM

## 2024-06-17 DIAGNOSIS — F32.1 CURRENT MODERATE EPISODE OF MAJOR DEPRESSIVE DISORDER WITHOUT PRIOR EPISODE (H): ICD-10-CM

## 2024-06-17 DIAGNOSIS — I10 ESSENTIAL HYPERTENSION WITH GOAL BLOOD PRESSURE LESS THAN 140/90: Primary | ICD-10-CM

## 2024-06-17 DIAGNOSIS — L29.9 ITCHING: ICD-10-CM

## 2024-06-17 DIAGNOSIS — M1A.00X0 CHRONIC GOUTY ARTHROPATHY: ICD-10-CM

## 2024-06-17 DIAGNOSIS — I63.22 CEREBROVASCULAR ACCIDENT (CVA) DUE TO STENOSIS OF BASILAR ARTERY (H): ICD-10-CM

## 2024-06-17 PROCEDURE — 99606 MTMS BY PHARM EST 15 MIN: CPT | Performed by: PHARMACIST

## 2024-06-17 PROCEDURE — 99607 MTMS BY PHARM ADDL 15 MIN: CPT | Performed by: PHARMACIST

## 2024-06-17 RX ORDER — INSULIN ASPART 100 [IU]/ML
INJECTION, SOLUTION INTRAVENOUS; SUBCUTANEOUS
Qty: 15 ML | Refills: 3 | Status: SHIPPED | OUTPATIENT
Start: 2024-06-17 | End: 2024-09-03

## 2024-06-17 RX ORDER — DULOXETIN HYDROCHLORIDE 30 MG/1
CAPSULE, DELAYED RELEASE ORAL
Qty: 135 CAPSULE | Refills: 6 | Status: SHIPPED | OUTPATIENT
Start: 2024-06-17

## 2024-06-17 NOTE — PATIENT INSTRUCTIONS
Recommendations from today's MTM visit:                                                      Increase dose: Novolog 10 units before breakfast. Keep taking 6 units before lunch and dinner  2. Increase duloxetine to 1 capsule every morning and 2 capsules every evening to see if this helps over the next month with mood swings.    Medication issues to be addressed at a future visit:   Annual urine albumin at next lab visit  Fasting lipid at next cardiology appt    Follow-up: Return in 9 weeks (on 8/19/2024) for Medication Therapy Management, in person, at 10am.    It was great to speak with you today.  I value your experience and would be very thankful for your time with providing feedback on our clinic survey. You may receive a survey via email or text message in the next few days.     To schedule another MTM appointment, please call the clinic directly or you may call the scheduling line at [phone].    My Clinical Pharmacist's contact information:                                                      Please feel free to contact me with any questions or concerns you have.      Kathy Salinas, Pharm.D., BCACP  Medication Therapy Management Pharmacist  [phone]

## 2024-06-18 ENCOUNTER — LAB REQUISITION (OUTPATIENT)
Dept: LAB | Facility: HOSPITAL | Age: 84
End: 2024-06-18
Payer: COMMERCIAL

## 2024-06-18 DIAGNOSIS — B37.89 OTHER SITES OF CANDIDIASIS: ICD-10-CM

## 2024-06-18 LAB
ALT SERPL W P-5'-P-CCNC: 35 U/L (ref 0–70)
AST SERPL W P-5'-P-CCNC: 33 U/L (ref 0–45)
BASOPHILS # BLD AUTO: 0.1 10E3/UL (ref 0–0.2)
BASOPHILS NFR BLD AUTO: 1 %
CREAT SERPL-MCNC: 1.43 MG/DL (ref 0.67–1.17)
EGFRCR SERPLBLD CKD-EPI 2021: 49 ML/MIN/1.73M2
EOSINOPHIL # BLD AUTO: 0.6 10E3/UL (ref 0–0.7)
EOSINOPHIL NFR BLD AUTO: 7 %
ERYTHROCYTE [DISTWIDTH] IN BLOOD BY AUTOMATED COUNT: 15.6 % (ref 10–15)
HCT VFR BLD AUTO: 36.3 % (ref 40–53)
HGB BLD-MCNC: 11.6 G/DL (ref 13.3–17.7)
IMM GRANULOCYTES # BLD: 0.1 10E3/UL
IMM GRANULOCYTES NFR BLD: 1 %
LYMPHOCYTES # BLD AUTO: 2.2 10E3/UL (ref 0.8–5.3)
LYMPHOCYTES NFR BLD AUTO: 25 %
MCH RBC QN AUTO: 29.9 PG (ref 26.5–33)
MCHC RBC AUTO-ENTMCNC: 32 G/DL (ref 31.5–36.5)
MCV RBC AUTO: 94 FL (ref 78–100)
MONOCYTES # BLD AUTO: 1 10E3/UL (ref 0–1.3)
MONOCYTES NFR BLD AUTO: 11 %
NEUTROPHILS # BLD AUTO: 4.9 10E3/UL (ref 1.6–8.3)
NEUTROPHILS NFR BLD AUTO: 56 %
NRBC # BLD AUTO: 0 10E3/UL
NRBC BLD AUTO-RTO: 0 /100
PLATELET # BLD AUTO: 249 10E3/UL (ref 150–450)
RBC # BLD AUTO: 3.88 10E6/UL (ref 4.4–5.9)
WBC # BLD AUTO: 8.8 10E3/UL (ref 4–11)

## 2024-06-18 PROCEDURE — 82565 ASSAY OF CREATININE: CPT | Performed by: INTERNAL MEDICINE

## 2024-06-18 PROCEDURE — 85025 COMPLETE CBC W/AUTO DIFF WBC: CPT | Performed by: INTERNAL MEDICINE

## 2024-06-18 PROCEDURE — 84450 TRANSFERASE (AST) (SGOT): CPT | Performed by: INTERNAL MEDICINE

## 2024-06-18 PROCEDURE — 84460 ALANINE AMINO (ALT) (SGPT): CPT | Performed by: INTERNAL MEDICINE

## 2024-06-25 ENCOUNTER — LAB REQUISITION (OUTPATIENT)
Dept: LAB | Facility: HOSPITAL | Age: 84
End: 2024-06-25
Payer: COMMERCIAL

## 2024-06-25 ENCOUNTER — OFFICE VISIT (OUTPATIENT)
Dept: UROLOGY | Facility: CLINIC | Age: 84
End: 2024-06-25
Payer: COMMERCIAL

## 2024-06-25 DIAGNOSIS — B37.89 OTHER SITES OF CANDIDIASIS: ICD-10-CM

## 2024-06-25 DIAGNOSIS — R33.9 URINARY RETENTION: Primary | ICD-10-CM

## 2024-06-25 LAB
ALT SERPL W P-5'-P-CCNC: 35 U/L (ref 0–70)
AST SERPL W P-5'-P-CCNC: 29 U/L (ref 0–45)
BASOPHILS # BLD AUTO: 0.1 10E3/UL (ref 0–0.2)
BASOPHILS NFR BLD AUTO: 1 %
CREAT SERPL-MCNC: 1.44 MG/DL (ref 0.67–1.17)
EGFRCR SERPLBLD CKD-EPI 2021: 49 ML/MIN/1.73M2
EOSINOPHIL # BLD AUTO: 0.6 10E3/UL (ref 0–0.7)
EOSINOPHIL NFR BLD AUTO: 7 %
ERYTHROCYTE [DISTWIDTH] IN BLOOD BY AUTOMATED COUNT: 15.3 % (ref 10–15)
HCT VFR BLD AUTO: 36.4 % (ref 40–53)
HGB BLD-MCNC: 11.5 G/DL (ref 13.3–17.7)
IMM GRANULOCYTES # BLD: 0.1 10E3/UL
IMM GRANULOCYTES NFR BLD: 1 %
LYMPHOCYTES # BLD AUTO: 2.1 10E3/UL (ref 0.8–5.3)
LYMPHOCYTES NFR BLD AUTO: 23 %
MCH RBC QN AUTO: 29.3 PG (ref 26.5–33)
MCHC RBC AUTO-ENTMCNC: 31.6 G/DL (ref 31.5–36.5)
MCV RBC AUTO: 93 FL (ref 78–100)
MONOCYTES # BLD AUTO: 0.9 10E3/UL (ref 0–1.3)
MONOCYTES NFR BLD AUTO: 10 %
NEUTROPHILS # BLD AUTO: 5.6 10E3/UL (ref 1.6–8.3)
NEUTROPHILS NFR BLD AUTO: 60 %
NRBC # BLD AUTO: 0 10E3/UL
NRBC BLD AUTO-RTO: 0 /100
PLATELET # BLD AUTO: 236 10E3/UL (ref 150–450)
RBC # BLD AUTO: 3.92 10E6/UL (ref 4.4–5.9)
WBC # BLD AUTO: 9.3 10E3/UL (ref 4–11)

## 2024-06-25 PROCEDURE — 82565 ASSAY OF CREATININE: CPT | Performed by: INTERNAL MEDICINE

## 2024-06-25 PROCEDURE — 84450 TRANSFERASE (AST) (SGOT): CPT | Performed by: INTERNAL MEDICINE

## 2024-06-25 PROCEDURE — 84460 ALANINE AMINO (ALT) (SGPT): CPT | Performed by: INTERNAL MEDICINE

## 2024-06-25 PROCEDURE — 51702 INSERT TEMP BLADDER CATH: CPT

## 2024-06-25 PROCEDURE — 85041 AUTOMATED RBC COUNT: CPT | Performed by: INTERNAL MEDICINE

## 2024-06-25 RX ORDER — SULFAMETHOXAZOLE/TRIMETHOPRIM 800-160 MG
1 TABLET ORAL ONCE
Status: COMPLETED | OUTPATIENT
Start: 2024-06-25 | End: 2024-06-25

## 2024-06-25 RX ADMIN — SULFAMETHOXAZOLE AND TRIMETHOPRIM 1 TABLET: 800; 160 TABLET ORAL at 15:58

## 2024-06-25 NOTE — PROGRESS NOTES
Adam Talamantes comes into clinic today at the request of Mic Yadav PA-C with the diagnosis of urinary retention for a catheter exchange.    Order has been verified: Yes.    Allergies   Allergen Reactions    Lisinopril Cough       The following medication was given:     MEDICATION: Bactrim (Trimethoprim / Sulfamethoxazole)  ROUTE: PO  SITE: Medication was given orally  DOSE: 800mg/160mg  LOT #: [id number]  : [employer]  EXPIRATION DATE: 01/2025  NDC#: 5782-4691-14  Was there drug waste? No    Prior to medication administration, verified patient identity using patient's name and date of birth.  Due to medication administration, patient instructed to remain in clinic for 15 minutes  afterwards, and to report any adverse reaction to me immediately.    Removal:  16 Fr straight tipped latex parrish catheter removed from urethral meatus without difficulty after removing 5 mL of fluid from the balloon.    Insertion:  16 Fr straight tipped latex parrish catheter inserted into urethral meatus in the usual sterile fashion without difficulty.  Received > 30 ml pink positive urine return.   Balloon filled with 10 mL sterile H2O after positive urine return.  Catheter secured in place with leg strap: Yes.     Patient did tolerate procedure well.     Patient instructed as to where to call or go for pain, fever, leakage, or decreased urine flow.     This service provided today was under the direct supervision of Dr. Remi Reyes, who was available if needed.    Fidelina Kohli CMA   6/25/2024  3:55 PM

## 2024-06-26 ENCOUNTER — OFFICE VISIT (OUTPATIENT)
Dept: INFECTIOUS DISEASES | Facility: CLINIC | Age: 84
End: 2024-06-26
Attending: INTERNAL MEDICINE
Payer: COMMERCIAL

## 2024-06-26 ENCOUNTER — TELEPHONE (OUTPATIENT)
Dept: INFECTIOUS DISEASES | Facility: CLINIC | Age: 84
End: 2024-06-26
Payer: COMMERCIAL

## 2024-06-26 ENCOUNTER — TELEPHONE (OUTPATIENT)
Dept: INFECTIOUS DISEASES | Facility: CLINIC | Age: 84
End: 2024-06-26

## 2024-06-26 VITALS — DIASTOLIC BLOOD PRESSURE: 81 MMHG | HEART RATE: 92 BPM | OXYGEN SATURATION: 91 % | SYSTOLIC BLOOD PRESSURE: 121 MMHG

## 2024-06-26 DIAGNOSIS — R39.14 BENIGN PROSTATIC HYPERPLASIA WITH INCOMPLETE BLADDER EMPTYING: ICD-10-CM

## 2024-06-26 DIAGNOSIS — B37.9 CANDIDA GLABRATA INFECTION: Primary | ICD-10-CM

## 2024-06-26 DIAGNOSIS — N40.1 BENIGN PROSTATIC HYPERPLASIA WITH INCOMPLETE BLADDER EMPTYING: ICD-10-CM

## 2024-06-26 PROCEDURE — G0463 HOSPITAL OUTPT CLINIC VISIT: HCPCS | Performed by: INTERNAL MEDICINE

## 2024-06-26 PROCEDURE — 99215 OFFICE O/P EST HI 40 MIN: CPT | Performed by: INTERNAL MEDICINE

## 2024-06-26 RX ORDER — TAMSULOSIN HYDROCHLORIDE 0.4 MG/1
0.4 CAPSULE ORAL DAILY
Qty: 90 CAPSULE | Refills: 2 | Status: SHIPPED | OUTPATIENT
Start: 2024-06-26

## 2024-06-26 ASSESSMENT — PAIN SCALES - GENERAL: PAINLEVEL: NO PAIN (0)

## 2024-06-26 NOTE — NURSING NOTE
"Chief Complaint   Patient presents with    RECHECK     4-6 week follow-up      Vital signs:      BP: 121/81 Pulse: 92     SpO2: 91 %       Weight:  (pt unable to stand)  Estimated body mass index is 28.51 kg/m  as calculated from the following:    Height as of 5/29/24: 1.575 m (5' 2\").    Weight as of 5/29/24: 70.7 kg (155 lb 13.8 oz).      Gia Mccarthy CMA   6/26/2024 3:27 PM    "

## 2024-06-26 NOTE — PROGRESS NOTES
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  - CT abd/pelvis 5/15 with 3.1 x 3 x 3.7 cm rim-enhancing lesion of right prostate gland concerning for prostatic abscess, s/p TURP on 5/16/24 with urology with abnormal tissue but no overt abscess encountered, and with benign histopathology report but  prostatic tissue cultures +Candida glabrata (resistant to fluconazole); was discharged 5/19 on PO Bactrim, and then was subsequently referred to ID clinic due to finding of fluconazole-resistant C.glabrata  - PICC placed and was started on micafungin (5/29-) 4-6 week course during his last hospitalization  - also s/p Bactrim followed by ceftriaxone followed by levofloxacin empiric course (5/2-6/12)  ELLA on CKD  BPH  T2DM  Urinary retention and hematuria, s/p parrish placement    Discussion:   Patient with improved symptoms after starting micafungin (5/29-), parrish placement for decompression, and s/p ceftriaxone/levofloxacin empiric course (-6/12). Repeat CT abd pelvis performed 5/29 noted prostate enlargement without signs abscess after the TURP; it did show hydronephrosis for which parrish was placed per urology and also had a density seen on CT which was thought to be clot, and now is s/p repeat renal ultrasound done on 6/1 did show interval decrease in bilateral hydronephrosis. Given he is clinically improving, would plan to continue on 4-6 week course of micafungin as previously suggested; will plan for the longer 6-week course given that micafungin is not the preferred therapy for fungal urinary infections given its lower urinary concentrations,  though patient is clinically improving and no signs ongoing abscess on imaging is reassuring so will not make any antifungal regimen changes at this time.       Recs:  - Continue IV micafungin 150mg q24hr for planned 6-week course (through 7/9/24) followed by PICC line removal on completion  - continue to follow with urology and primary care provider for parrish cares and remaining management   - follow up in ID clinic PRN        I communicated with the patient, his son, and ID clinic nurse at this visit.      Patient was seen on 6/26/24 in ID clinic.      Medical Decision Making    40 minutes spent by me on the date of the encounter doing chart review, history and exam, documentation and further activities per the note    Dk Perera MD   Infectious Diseases              Subjective       HPI:  Adam is a 84 year old male with PMH including CVA, T2DM, CKD, BPH, prostatitis who presents to ID clinic for follow up of Candida glabrata prostatitis.     6/26 update:  Since last ID clinic visit, patient was admitted to hospital 5/28-6/2 for syncope and ELLA on CKD and saw inpatient ID team at that time. He did have PICC placed and was started on IV micafungin (5/29-) during that admission. He is also s/p Bactrim followed by IV ceftriaxone empiric course followed by levofloxacin (ended 6/12). He was noted to have hydronephrosis and a parrish was placed by urology at that tiem.  He is home now and is getting IV infusions with the help of his daughter who is a nurse.     This visit, patient was offered and declined [employer] phone  -- preferred for son to help interpret. Per report, patient with no suprapubic or CVA pain currently, no issues with the parrish, no fevers/chills at home, no issues with PICC, no issues with the IV micafungin infusions since discharge - patient's daughter is giving the infusion daily without issues. Saw urology yesterday for parrish catheter exchange without issues -- next sees them  .      PAST MEDICAL HISTORY  Past Medical History:   Diagnosis Date    Acute blood loss anemia     Acute renal failure (H24)     Anemia     Bacteremia     Cataract     Chronic gout     CKD (chronic kidney disease)     Stage 3    DM2 (diabetes mellitus, type 2) (H)     GERD (gastroesophageal reflux disease)     Glucose intolerance (impaired glucose tolerance)     Gout     History of nephrolithiasis     History of prostatitis 2017    Hyperlipidemia     Hypertension     Insomnia     Intestinal disaccharidase deficiencies and disaccharide malabsorption     Created by Conversion     Latent tuberculosis     Nephrolithiasis     Neuropathy     Nonspecific abnormal findings on radiological and other examination of skull and head     Created by [facility] Annotation: 2013 11:23PM - Giulia Meridai/10/2011:  severe stenosis both posterior cerebral arteries seen MRI/MRA done for  vertigo. No acute changes.e*    Osteoarthritis     wrist, knee, shoulder    Prostatitis     Rectal bleed     Trigger thumb      Otherwise as per HPI    PAST SURGICAL HISTORY  Past Surgical History:   Procedure Laterality Date    CYSTOSCOPY, TRANSURETHRAL RESECTION (TUR) PROSTATE, COMBINED N/A 2024    Procedure: Transurethral Resection of Prostate Abscess;  Surgeon: Bernabe Fletcher MD;  Location: [facility] OR    IR MISCELLANEOUS PROCEDURE  2009    IR MISCELLANEOUS PROCEDURE  2009    IR MISCELLANEOUS PROCEDURE  2009    IR MISCELLANEOUS PROCEDURE  2009    IR NEPHROURETERAL TUBE PLACEMENT BILATERAL  2009    IR URETER DILATION BILATERAL  2009    IR URETER DILATION BILATERAL  2009    KIDNEY STONE SURGERY      PICC  3/6/2016          Otherwise as per HPI    ALLERGIES AND DRUG REACTIONS  Allergies   Allergen Reactions    Lisinopril Cough       FAMILY HISTORY  No known immunocompromising conditions or infections unless listed below  family history includes Cerebrovascular Disease in his daughter and  father.    SOCIAL AND FUNCTIONAL HISTORY  Social History     Tobacco Use    Smoking status: Former     Current packs/day: 0.00     Types: Cigarettes     Quit date: 3/10/2000     Years since quittin.3     Passive exposure: Never    Smokeless tobacco: Former    Tobacco comments:     no passive exposure   Vaping Use    Vaping status: Never Used   Substance Use Topics    Alcohol use: No    Drug use: No   Moved from [facility] in 2006  Lives with wife and daughter  Otherwise as per HPI    CURRENT ANTIMICROBIALS  Other medications reviewed in EPIC  Micafungin 150 qday    REVIEW OF SYSTEMS  ROS as above.      Objective   PHYSICAL EXAMINATION     blood pressure is 121/81 and his pulse is 92. His oxygen saturation is 91%.     Constitutional:  elderly male sitting in wheelchair, not in acute distress  Pulmonary: unlabored breathing on room air  Gastrointestinal: abdomen non-distended, non-tender  Genitourinary: no suprapubic tenderness, no CVA tenderness, +parrish catheter with clear/yellow urine in bag  Skin: no acute rashes visible on exposed skin, +PICC in place  Neurologic: awake, alert and interactive      Other Significant Information (Labs, cultures, radiology, etc)      Recent micro reviewed:   24 prostate tissue cultures:   Susceptibility     Candida glabrata complex     DAVE     Amphotericin B 1 ug/mL No interpretation available     Fluconazole >128 ug/mL Resistant     Micafungin 0.06 ug/mL Susceptible     Voriconazole 8 ug/mL No interpretation available                       24 urine cultures: <10k mixed urogenital valentina  24 blood cultures: no growth    Recent radiology reviewed:      24 ultrasound report:   IMPRESSION:  1.  Moderate left and mild right hydronephrosis, similar to findings  on prior CT 2023.  2.  Increased echogenicity is nonspecific but consistent with medical  renal disease.    5/15/24 CT abd/pelvis report:  Impression:   1. 3.1 x 3.0 x 3.7 cm rim-enhancing lesion involving  the base-mid  right prostate gland with abutment of the inferior aspect of the  urinary bladder. Findings are concerning for a prostatic abscess.  2. Circumferential urinary bladder wall thickening with mild adjacent  fat stranding, suggestive of cystitis.  3. Stable indeterminate 1.3 cm hypoattenuating lesion in the right  hepatic lobe, which demonstrated peripheral enhancement on delayed  postcontrast imaging on CT dated 6/13/2023. Consider further  evaluation with MRI on a non-emergent basis.   4. 2.0 cm dense cystic lesion in the inferior pole of the left kidney,  likely representing a hemorrhagic/proteinaceous cyst. This can be  further characterized on future MRI.  5. Bilateral renal cortical atrophy.    5/29/24 CT abd/pelvis report:  IMPRESSION:   1.  Mild wall thickening of the urinary bladder has improved from recent study on 5/15/2024.  2.  New mild bilateral hydroureteronephrosis to the level of the urinary bladder, possibly due to increased volume of the urinary bladder, which remains somewhat small, indicating decreased bladder compliance.  3.  8 mm soft tissue density on the bladder wall at the insertion site of the right ureter, new from prior study. Although this is unlikely to be a urothelial neoplasm given acute development, consider follow-up contrast enhanced CT of the abdomen and   pelvis for further evaluation.  4.  Punctate calcifications in the left renal pelvis, unchanged from 6/13/2023, nonspecific but compatible with benign calcifications.  5.  Stable mild prostate enlargement.    6/1/24 renal ultrasound report:  IMPRESSION:  1.  Mild bilateral hydronephrosis, decreased compared to 5/11/2024.  2.  Echogenic renal parenchyma, is nonspecific but can be seen in the  setting of medical renal disease.  3.  2.5 cm left inferior pole cyst.

## 2024-06-26 NOTE — LETTER
6/26/2024       RE: Adam Talamantes  [address]  [facility] [place] [address]     Dear Colleague,    Thank you for referring your patient, Adam Talamantes, to the [facility] INFECTIOUS DISEASE CLINIC [place] [facility]. Please see a copy of my visit note below.      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  - CT abd/pelvis 5/15 with 3.1 x 3 x 3.7 cm rim-enhancing lesion of right prostate gland concerning for prostatic abscess, s/p TURP on 5/16/24 with urology with abnormal tissue but no overt abscess encountered, and with benign histopathology report but  prostatic tissue cultures +Candida glabrata (resistant to fluconazole); was discharged 5/19 on PO Bactrim, and then was subsequently referred to ID clinic due to finding of fluconazole-resistant C.glabrata  - PICC placed and was started on micafungin (5/29-) 4-6 week course during his last hospitalization  - also s/p Bactrim followed by ceftriaxone followed by levofloxacin empiric course (5/2-6/12)  ELLA on CKD  BPH  T2DM  Urinary retention and hematuria, s/p parrish placement    Discussion:   Patient with improved symptoms after starting micafungin (5/29-), parrish placement for decompression, and s/p ceftriaxone/levofloxacin empiric course (-6/12). Repeat CT abd pelvis performed 5/29 noted prostate enlargement without signs abscess after the TURP; it did show hydronephrosis for which parrish was placed per urology and also had a density seen on CT which was thought to be clot, and now is s/p repeat renal ultrasound done on 6/1 did show interval  decrease in bilateral hydronephrosis. Given he is clinically improving, would plan to continue on 4-6 week course of micafungin as previously suggested; will plan for the longer 6-week course given that micafungin is not the preferred therapy for fungal urinary infections given its lower urinary concentrations, though patient is clinically improving and no signs ongoing abscess on imaging is reassuring so will not make any antifungal regimen changes at this time.       Recs:  - Continue IV micafungin 150mg q24hr for planned 6-week course (through 7/9/24) followed by PICC line removal on completion  - continue to follow with urology and primary care provider for parrish cares and remaining management   - follow up in ID clinic PRN        I communicated with the patient, his son, and ID clinic nurse at this visit.      Patient was seen on 6/26/24 in ID clinic.      Medical Decision Making   40 minutes spent by me on the date of the encounter doing chart review, history and exam, documentation and further activities per the note    Dk Perera MD   Infectious Diseases              Subjective       HPI:  Adam is a 84 year old male with PMH including CVA, T2DM, CKD, BPH, prostatitis who presents to ID clinic for follow up of Candida glabrata prostatitis.     6/26 update:  Since last ID clinic visit, patient was admitted to hospital 5/28-6/2 for syncope and ELLA on CKD and saw inpatient ID team at that time. He did have PICC placed and was started on IV micafungin (5/29-) during that admission. He is also s/p Bactrim followed by IV ceftriaxone empiric course followed by levofloxacin (ended 6/12). He was noted to have hydronephrosis and a parrish was placed by urology at that tiem.  He is home now and is getting IV infusions with the help of his daughter who is a nurse.     This visit, patient was offered and declined [employer]  -- preferred for son to help interpret. Per report, patient with no suprapubic or  CVA pain currently, no issues with the parrish, no fevers/chills at home, no issues with PICC, no issues with the IV micafungin infusions since discharge - patient's daughter is giving the infusion daily without issues. Saw urology yesterday for parrish catheter exchange without issues -- next sees them .      PAST MEDICAL HISTORY  Past Medical History:   Diagnosis Date    Acute blood loss anemia     Acute renal failure (H24)     Anemia     Bacteremia     Cataract     Chronic gout     CKD (chronic kidney disease)     Stage 3    DM2 (diabetes mellitus, type 2) (H)     GERD (gastroesophageal reflux disease)     Glucose intolerance (impaired glucose tolerance)     Gout     History of nephrolithiasis     History of prostatitis 2017    Hyperlipidemia     Hypertension     Insomnia     Intestinal disaccharidase deficiencies and disaccharide malabsorption     Created by Conversion     Latent tuberculosis     Nephrolithiasis     Neuropathy     Nonspecific abnormal findings on radiological and other examination of skull and head     Created by [facility] Annotation: 2013 11:23PM - Giulia Meridai/10/2011:  severe stenosis both posterior cerebral arteries seen MRI/MRA done for  vertigo. No acute changes.e*    Osteoarthritis     wrist, knee, shoulder    Prostatitis     Rectal bleed     Trigger thumb      Otherwise as per HPI    PAST SURGICAL HISTORY  Past Surgical History:   Procedure Laterality Date    CYSTOSCOPY, TRANSURETHRAL RESECTION (TUR) PROSTATE, COMBINED N/A 2024    Procedure: Transurethral Resection of Prostate Abscess;  Surgeon: Bernabe Fletcher MD;  Location: [facility] OR    IR MISCELLANEOUS PROCEDURE  2009    IR MISCELLANEOUS PROCEDURE  2009    IR MISCELLANEOUS PROCEDURE  2009    IR MISCELLANEOUS PROCEDURE  2009    IR NEPHROURETERAL TUBE PLACEMENT BILATERAL  2009    IR URETER DILATION BILATERAL  2009    IR URETER DILATION BILATERAL  2009    KIDNEY STONE  SURGERY      PICC  3/6/2016          Otherwise as per HPI    ALLERGIES AND DRUG REACTIONS  Allergies   Allergen Reactions    Lisinopril Cough       FAMILY HISTORY  No known immunocompromising conditions or infections unless listed below  family history includes Cerebrovascular Disease in his daughter and father.    SOCIAL AND FUNCTIONAL HISTORY  Social History     Tobacco Use    Smoking status: Former     Current packs/day: 0.00     Types: Cigarettes     Quit date: 3/10/2000     Years since quittin.3     Passive exposure: Never    Smokeless tobacco: Former    Tobacco comments:     no passive exposure   Vaping Use    Vaping status: Never Used   Substance Use Topics    Alcohol use: No    Drug use: No   Moved from [facility] in 2006  Lives with wife and daughter  Otherwise as per HPI    CURRENT ANTIMICROBIALS  Other medications reviewed in EPIC  Micafungin 150 qday    REVIEW OF SYSTEMS  ROS as above.      Objective   PHYSICAL EXAMINATION     blood pressure is 121/81 and his pulse is 92. His oxygen saturation is 91%.     Constitutional:  elderly male sitting in wheelchair, not in acute distress  Pulmonary: unlabored breathing on room air  Gastrointestinal: abdomen non-distended, non-tender  Genitourinary: no suprapubic tenderness, no CVA tenderness, +parrish catheter with clear/yellow urine in bag  Skin: no acute rashes visible on exposed skin, +PICC in place  Neurologic: awake, alert and interactive      Other Significant Information (Labs, cultures, radiology, etc)      Recent micro reviewed:   24 prostate tissue cultures:   Susceptibility     Candida glabrata complex     DAVE     Amphotericin B 1 ug/mL No interpretation available     Fluconazole >128 ug/mL Resistant     Micafungin 0.06 ug/mL Susceptible     Voriconazole 8 ug/mL No interpretation available                       24 urine cultures: <10k mixed urogenital valentina  24 blood cultures: no growth    Recent radiology reviewed:      24 ultrasound  report:   IMPRESSION:  1.  Moderate left and mild right hydronephrosis, similar to findings  on prior CT 2/8/2023.  2.  Increased echogenicity is nonspecific but consistent with medical  renal disease.    5/15/24 CT abd/pelvis report:  Impression:   1. 3.1 x 3.0 x 3.7 cm rim-enhancing lesion involving the base-mid  right prostate gland with abutment of the inferior aspect of the  urinary bladder. Findings are concerning for a prostatic abscess.  2. Circumferential urinary bladder wall thickening with mild adjacent  fat stranding, suggestive of cystitis.  3. Stable indeterminate 1.3 cm hypoattenuating lesion in the right  hepatic lobe, which demonstrated peripheral enhancement on delayed  postcontrast imaging on CT dated 6/13/2023. Consider further  evaluation with MRI on a non-emergent basis.   4. 2.0 cm dense cystic lesion in the inferior pole of the left kidney,  likely representing a hemorrhagic/proteinaceous cyst. This can be  further characterized on future MRI.  5. Bilateral renal cortical atrophy.    5/29/24 CT abd/pelvis report:  IMPRESSION:   1.  Mild wall thickening of the urinary bladder has improved from recent study on 5/15/2024.  2.  New mild bilateral hydroureteronephrosis to the level of the urinary bladder, possibly due to increased volume of the urinary bladder, which remains somewhat small, indicating decreased bladder compliance.  3.  8 mm soft tissue density on the bladder wall at the insertion site of the right ureter, new from prior study. Although this is unlikely to be a urothelial neoplasm given acute development, consider follow-up contrast enhanced CT of the abdomen and   pelvis for further evaluation.  4.  Punctate calcifications in the left renal pelvis, unchanged from 6/13/2023, nonspecific but compatible with benign calcifications.  5.  Stable mild prostate enlargement.    6/1/24 renal ultrasound report:  IMPRESSION:  1.  Mild bilateral hydronephrosis, decreased compared to  5/11/2024.  2.  Echogenic renal parenchyma, is nonspecific but can be seen in the  setting of medical renal disease.  3.  2.5 cm left inferior pole cyst.    Dk Perera MD

## 2024-06-26 NOTE — TELEPHONE ENCOUNTER
[facility] OPAT PICC  LOT 4-6 weeks ~Micafungin 7/9 assess at appt with Dr. Perera PO levo till 6/12   Creatinine, AST/ALT, and CBC with diff. Dr. Perera will follow labs at discharge until ID follow up  PICC  [place]   Weekly CBC with diff, SCr and LFTs

## 2024-06-26 NOTE — TELEPHONE ENCOUNTER
Sent information to [facility] and called [facility] home [facility].       Parker Reed RN  Infectious Disease on 6/26/2024 at 9:40 AM

## 2024-06-26 NOTE — TELEPHONE ENCOUNTER
Dk Perera MD Pozzani, Kaleia, RN43 minutes ago (8:54 AM)     ME  Yes this patient will need to remain on IV antibiotics 4-6 week course, including through his appointment this afternoon and likely afterward as well as we will further discuss at his appointment

## 2024-06-26 NOTE — TELEPHONE ENCOUNTER
[facility] Call Center    Phone Message    May a detailed message be left on voicemail: yes     Reason for Call: Other: Pt's home nurse requesting a call back. Per pt's daughter, would like to know if able to get picc line removed today and skip dosage of antibiotics that is suppose to be given @ 6PM. Otherwise if they could give anitbiotics @ 12PM prior to pt's apt today. Please follow up with home nurse.      Action Taken: Other: ID    Travel Screening: Not Applicable     Date of Service:

## 2024-07-01 ENCOUNTER — PATIENT OUTREACH (OUTPATIENT)
Dept: GERIATRIC MEDICINE | Facility: CLINIC | Age: 84
End: 2024-07-01
Payer: COMMERCIAL

## 2024-07-01 NOTE — PROGRESS NOTES
[facility] Care Coordination Contact      [facility] Six-Month Telephone Assessment    6 month telephone assessment completed on 07/01/2024.    ER visits: Yes -  [facility]  Hospitalizations: Yes -  [facility]  TCU stays: No  Significant health status changes: No  Falls/Injuries: No  ADL/IADL changes: No  Changes in services: No    Caregiver Assessment follow up: Still getting antibiotics and following urology for h/o complicated UTI.     Goals: See POC in chart for goal progress documentation.      Will see member in 6 months for an annual health risk assessment.   Encouraged member to call CC with any questions or concerns in the meantime.     Kristi Calzada RN  [facility]  Cell Phone [phone]           Detail Level: Detailed Hide Additional Notes?: No

## 2024-07-01 NOTE — TELEPHONE ENCOUNTER
Labs from 6/25 stable- no need for follow up or provider review at this time.        Recs:  - Continue IV micafungin 150mg q24hr for planned 6-week course (through 7/9/24) followed by PICC line removal on completion  - continue to follow with urology and primary care provider for parrish cares and remaining management   - follow up in ID clinic PRN           I communicated with the patient, his son, and ID clinic nurse at this visit.       Parker Reed RN  Infectious Disease on 7/1/2024 at 8:22 AM

## 2024-07-02 ENCOUNTER — TELEPHONE (OUTPATIENT)
Dept: INFECTIOUS DISEASES | Facility: CLINIC | Age: 84
End: 2024-07-02
Payer: COMMERCIAL

## 2024-07-02 ENCOUNTER — LAB REQUISITION (OUTPATIENT)
Dept: LAB | Facility: HOSPITAL | Age: 84
End: 2024-07-02
Payer: COMMERCIAL

## 2024-07-02 DIAGNOSIS — B37.89 OTHER SITES OF CANDIDIASIS: ICD-10-CM

## 2024-07-02 LAB
ALT SERPL W P-5'-P-CCNC: 17 U/L (ref 0–70)
AST SERPL W P-5'-P-CCNC: 17 U/L (ref 0–45)
BASOPHILS # BLD AUTO: 0.1 10E3/UL (ref 0–0.2)
BASOPHILS NFR BLD AUTO: 1 %
CREAT SERPL-MCNC: 1.05 MG/DL (ref 0.67–1.17)
EGFRCR SERPLBLD CKD-EPI 2021: 71 ML/MIN/1.73M2
EOSINOPHIL # BLD AUTO: 0.5 10E3/UL (ref 0–0.7)
EOSINOPHIL NFR BLD AUTO: 6 %
ERYTHROCYTE [DISTWIDTH] IN BLOOD BY AUTOMATED COUNT: 15.1 % (ref 10–15)
HCT VFR BLD AUTO: 37.6 % (ref 40–53)
HGB BLD-MCNC: 12.1 G/DL (ref 13.3–17.7)
IMM GRANULOCYTES # BLD: 0.1 10E3/UL
IMM GRANULOCYTES NFR BLD: 1 %
LYMPHOCYTES # BLD AUTO: 2.1 10E3/UL (ref 0.8–5.3)
LYMPHOCYTES NFR BLD AUTO: 23 %
MCH RBC QN AUTO: 29.6 PG (ref 26.5–33)
MCHC RBC AUTO-ENTMCNC: 32.2 G/DL (ref 31.5–36.5)
MCV RBC AUTO: 92 FL (ref 78–100)
MONOCYTES # BLD AUTO: 0.7 10E3/UL (ref 0–1.3)
MONOCYTES NFR BLD AUTO: 8 %
NEUTROPHILS # BLD AUTO: 5.5 10E3/UL (ref 1.6–8.3)
NEUTROPHILS NFR BLD AUTO: 62 %
NRBC # BLD AUTO: 0 10E3/UL
NRBC BLD AUTO-RTO: 0 /100
PLATELET # BLD AUTO: 228 10E3/UL (ref 150–450)
RBC # BLD AUTO: 4.09 10E6/UL (ref 4.4–5.9)
WBC # BLD AUTO: 8.9 10E3/UL (ref 4–11)

## 2024-07-02 PROCEDURE — 84450 TRANSFERASE (AST) (SGOT): CPT | Performed by: INTERNAL MEDICINE

## 2024-07-02 PROCEDURE — 84460 ALANINE AMINO (ALT) (SGPT): CPT | Performed by: INTERNAL MEDICINE

## 2024-07-02 PROCEDURE — 85004 AUTOMATED DIFF WBC COUNT: CPT | Performed by: INTERNAL MEDICINE

## 2024-07-02 PROCEDURE — 82565 ASSAY OF CREATININE: CPT | Performed by: INTERNAL MEDICINE

## 2024-07-02 NOTE — TELEPHONE ENCOUNTER
[facility] Call Center    Phone Message    May a detailed message be left on voicemail: yes     Reason for Call: Other: Caller wants to know when the patients last antibiotic treatment will be, and they can remove PIC LINE .They need this info to share with home care RN       Action Taken: Other: ID     Travel Screening: Not Applicable

## 2024-07-02 NOTE — TELEPHONE ENCOUNTER
"Called [facility] and relayed orders to pull PICC after 7/09 infusion per Dr. Perera 6/26 office visit note stating: \"Continue IV micafungin 150mg q24hr for planned 6-week course (through 7/9/24) followed by PICC line removal on completion\". Will return call to daughter to relay information (consent on file).  "

## 2024-07-03 ENCOUNTER — MEDICAL CORRESPONDENCE (OUTPATIENT)
Dept: HEALTH INFORMATION MANAGEMENT | Facility: CLINIC | Age: 84
End: 2024-07-03
Payer: COMMERCIAL

## 2024-07-03 DIAGNOSIS — Z53.9 DIAGNOSIS NOT YET DEFINED: Primary | ICD-10-CM

## 2024-07-03 PROCEDURE — G0180 MD CERTIFICATION HHA PATIENT: HCPCS | Performed by: FAMILY MEDICINE

## 2024-07-13 ENCOUNTER — NURSE TRIAGE (OUTPATIENT)
Dept: NURSING | Facility: CLINIC | Age: 84
End: 2024-07-13
Payer: COMMERCIAL

## 2024-07-13 DIAGNOSIS — E11.65 TYPE 2 DIABETES MELLITUS WITH HYPERGLYCEMIA, WITHOUT LONG-TERM CURRENT USE OF INSULIN (H): ICD-10-CM

## 2024-07-13 NOTE — TELEPHONE ENCOUNTER
Nurse Triage SBAR    Consent: Consent to communicate on file    Situation: Daughter calling needing medication refill for father.    Background: Reports they used the last Lantus dose yesterday and need a dose for today and going forward. Reports that blood glucose levels have been 165, 185, and 201 currently today.  No symptoms of high glucose noted.     Assessment:  currently, essential medication refill request    Protocol Recommended Disposition: No disposition on file.    Recommendation: Advised patient to  prescription at preferred pharmacy.     2LT or FYI: Paged to Provider on-call     Provider consult indicated.     Reason for page: medication refill    Page sent to Dr. Pittman by RN at 5:30p.      Provider, Dr. Pittman, returning page to [facility] at 5:31p    Provider recommended plan of care:   Fill prescription for Lantus at patients preferred pharmacy.    Follow-Up: Reached patient/caregiver. Informed of providers recommendations. Patient verbalized understanding and agrees with the plan.     ADS: No     Faiza Bowles RN [facility] 7/13/2024 5:26 PM      Reason for Disposition   [1] Caller has URGENT medication or insulin pump question AND [2] triager unable to answer question    Additional Information   Negative: Unconscious or difficult to awaken   Negative: Acting confused (e.g., disoriented, slurred speech)   Negative: Very weak (e.g., can't stand)   Negative: Sounds like a life-threatening emergency to the triager   Negative: [1] Vomiting AND [2] signs of dehydration (e.g., very dry mouth, lightheaded, dark urine)   Negative: Blood glucose > 500 mg/dL (27.8 mmol/L)   Negative: [1] Blood glucose > 240 mg/dL (13.3 mmol/L) AND [2] urine ketones moderate-large (or more than 1+)   Negative: [1] Blood glucose > 240 mg/dL (13.3 mmol/L) AND [2] blood ketones > 1.4 mmol/L   Negative: [1] Blood glucose > 240 mg/dL (13.3 mmol/L) AND [2] vomiting AND [3] unable to check for  ketones (in blood or urine)   Negative: [1] New-onset diabetes suspected (e.g., frequent urination, weak, weight loss) AND [2] vomiting or rapid breathing   Negative: Vomiting lasts > 4 hours   Negative: Patient sounds very sick or weak to the triager   Negative: Fever > 100.4 F (38.0 C)   Negative: Blood glucose > 400 mg/dL (22.2 mmol/L)   Negative: [1] Blood glucose > 300 mg/dL (16.7 mmol/L) AND [2] two or more times in a row   Negative: Urine ketones moderate - large (or blood ketones > 1.4 mmol/L)   Negative: [1] Symptoms of high blood sugar (e.g., abnormally thirsty, frequent urination, weight loss) AND [2] not able to test blood glucose AND [3] pregnant    Protocols used: Diabetes - High Blood Sugar-A-

## 2024-07-15 ENCOUNTER — DOCUMENTATION ONLY (OUTPATIENT)
Dept: INFECTIOUS DISEASES | Facility: CLINIC | Age: 84
End: 2024-07-15
Payer: COMMERCIAL

## 2024-07-15 NOTE — PROGRESS NOTES
Documentation of OPAT Completion    OPAT completed on: 07/09/2024    Date Infusion company notified on: 07/9/2024    PICC Pulled on: 7/10    Episode closed? Yes

## 2024-07-17 ENCOUNTER — MEDICAL CORRESPONDENCE (OUTPATIENT)
Dept: HEALTH INFORMATION MANAGEMENT | Facility: CLINIC | Age: 84
End: 2024-07-17
Payer: COMMERCIAL

## 2024-07-18 ENCOUNTER — PRE VISIT (OUTPATIENT)
Dept: UROLOGY | Facility: CLINIC | Age: 84
End: 2024-07-18
Payer: COMMERCIAL

## 2024-07-18 NOTE — TELEPHONE ENCOUNTER
Reason for visit: UDS follow up     Relevant information: **NEEDS JOIE **, urinary retention, prostate abscess, neurogenic, hydronephoresis    Records/imaging/labs/orders: all records available    Pt called: no need for a call    At Rooming:  Standard rooming      Wily Combs  7/18/2024  4:20 PM

## 2024-07-19 ENCOUNTER — PRE VISIT (OUTPATIENT)
Dept: UROLOGY | Facility: CLINIC | Age: 84
End: 2024-07-19
Payer: COMMERCIAL

## 2024-07-19 NOTE — TELEPHONE ENCOUNTER
Reason for visit: UDS    Study scheduled because: urinary retention/neurogenic bladder    Relevant information: *NEEDS JOIE *, pt in hospice/end of life care home - alternate contact    Records/imaging/labs/orders: all labs scheduled/available    UA/UC ordered: yes - recent UTI hx    Leah Parkinson  7/19/2024  1:29 PM

## 2024-07-22 ENCOUNTER — OFFICE VISIT (OUTPATIENT)
Dept: UROLOGY | Facility: CLINIC | Age: 84
End: 2024-07-22
Payer: COMMERCIAL

## 2024-07-22 ENCOUNTER — MEDICAL CORRESPONDENCE (OUTPATIENT)
Dept: HEALTH INFORMATION MANAGEMENT | Facility: CLINIC | Age: 84
End: 2024-07-22

## 2024-07-22 DIAGNOSIS — R33.9 URINARY RETENTION: Primary | ICD-10-CM

## 2024-07-22 PROCEDURE — 51702 INSERT TEMP BLADDER CATH: CPT

## 2024-07-22 RX ORDER — SULFAMETHOXAZOLE/TRIMETHOPRIM 800-160 MG
1 TABLET ORAL ONCE
Status: COMPLETED | OUTPATIENT
Start: 2024-07-22 | End: 2024-07-22

## 2024-07-22 RX ADMIN — SULFAMETHOXAZOLE AND TRIMETHOPRIM 1 TABLET: 800; 160 TABLET ORAL at 11:18

## 2024-07-22 NOTE — PROGRESS NOTES
Adam Talamantes comes into clinic today at the request of Mic Yadav PA-C with the diagnosis of urinary retention for a catheter exchange.     Order has been verified: Yes.     Allergies        Allergies   Allergen Reactions    Lisinopril Cough            The following medication was given:     MEDICATION: Bactrim (Trimethoprim / Sulfamethoxazole)  ROUTE: PO  SITE: Medication was given orally  DOSE: 800mg/160mg  LOT #: [id number]  : [facility]  EXPIRATION DATE: 01/2025  NDC#: 0028-6045-19  Was there drug waste? No    Prior to medication administration, verified patient identity using patient's name and date of birth.  Due to medication administration, patient instructed to remain in clinic for 15 minutes  afterwards, and to report any adverse reaction to me immediately.     Removal:  16 Fr straight tipped latex parrish catheter removed from urethral meatus without difficulty after removing 5 mL of fluid from the balloon.     Insertion:  16 Fr straight tipped latex parrish catheter inserted into urethral meatus in the usual sterile fashion without difficulty.  Received > 10 ml yellow positive urine return.   Balloon filled with 10 mL sterile H2O after positive urine return.  Catheter secured in place with leg strap: Yes.      Patient did tolerate procedure well.      Patient instructed as to where to call or go for pain, fever, leakage, or decreased urine flow.      This service provided today was under the direct supervision of Dr. Bonilla, who was available if needed.    Yany Slater RN  11:17 07/22/2024

## 2024-07-23 ENCOUNTER — OFFICE VISIT (OUTPATIENT)
Dept: FAMILY MEDICINE | Facility: CLINIC | Age: 84
End: 2024-07-23
Payer: COMMERCIAL

## 2024-07-23 VITALS
HEART RATE: 100 BPM | SYSTOLIC BLOOD PRESSURE: 122 MMHG | RESPIRATION RATE: 16 BRPM | TEMPERATURE: 98.5 F | BODY MASS INDEX: 28.51 KG/M2 | OXYGEN SATURATION: 97 % | DIASTOLIC BLOOD PRESSURE: 89 MMHG | HEIGHT: 62 IN

## 2024-07-23 DIAGNOSIS — G47.00 INSOMNIA, UNSPECIFIED TYPE: ICD-10-CM

## 2024-07-23 DIAGNOSIS — Z79.4 TYPE 2 DIABETES MELLITUS WITH CHRONIC KIDNEY DISEASE, WITH LONG-TERM CURRENT USE OF INSULIN, UNSPECIFIED CKD STAGE (H): Primary | ICD-10-CM

## 2024-07-23 DIAGNOSIS — N41.0 ACUTE PROSTATITIS: ICD-10-CM

## 2024-07-23 DIAGNOSIS — R53.1 GENERALIZED WEAKNESS: ICD-10-CM

## 2024-07-23 DIAGNOSIS — Z79.2 ENCOUNTER FOR LONG-TERM (CURRENT) USE OF ANTIBIOTICS: ICD-10-CM

## 2024-07-23 DIAGNOSIS — B37.9 CANDIDA GLABRATA INFECTION: ICD-10-CM

## 2024-07-23 DIAGNOSIS — E11.22 TYPE 2 DIABETES MELLITUS WITH CHRONIC KIDNEY DISEASE, WITH LONG-TERM CURRENT USE OF INSULIN, UNSPECIFIED CKD STAGE (H): Primary | ICD-10-CM

## 2024-07-23 DIAGNOSIS — F32.1 CURRENT MODERATE EPISODE OF MAJOR DEPRESSIVE DISORDER WITHOUT PRIOR EPISODE (H): ICD-10-CM

## 2024-07-23 LAB
ALT SERPL W P-5'-P-CCNC: 35 U/L (ref 0–70)
AST SERPL W P-5'-P-CCNC: 33 U/L (ref 0–45)
BASOPHILS # BLD AUTO: 0 10E3/UL (ref 0–0.2)
BASOPHILS NFR BLD AUTO: 0 %
CREAT SERPL-MCNC: 1.54 MG/DL (ref 0.67–1.17)
EGFRCR SERPLBLD CKD-EPI 2021: 45 ML/MIN/1.73M2
EOSINOPHIL # BLD AUTO: 0.8 10E3/UL (ref 0–0.7)
EOSINOPHIL NFR BLD AUTO: 7 %
ERYTHROCYTE [DISTWIDTH] IN BLOOD BY AUTOMATED COUNT: 14.5 % (ref 10–15)
HBA1C MFR BLD: 7.1 % (ref 0–5.6)
HCT VFR BLD AUTO: 38.7 % (ref 40–53)
HGB BLD-MCNC: 13 G/DL (ref 13.3–17.7)
IMM GRANULOCYTES # BLD: 0 10E3/UL
IMM GRANULOCYTES NFR BLD: 0 %
LYMPHOCYTES # BLD AUTO: 2.2 10E3/UL (ref 0.8–5.3)
LYMPHOCYTES NFR BLD AUTO: 21 %
MCH RBC QN AUTO: 30 PG (ref 26.5–33)
MCHC RBC AUTO-ENTMCNC: 33.6 G/DL (ref 31.5–36.5)
MCV RBC AUTO: 89 FL (ref 78–100)
MONOCYTES # BLD AUTO: 1.3 10E3/UL (ref 0–1.3)
MONOCYTES NFR BLD AUTO: 12 %
NEUTROPHILS # BLD AUTO: 6 10E3/UL (ref 1.6–8.3)
NEUTROPHILS NFR BLD AUTO: 58 %
PLATELET # BLD AUTO: 233 10E3/UL (ref 150–450)
RBC # BLD AUTO: 4.33 10E6/UL (ref 4.4–5.9)
WBC # BLD AUTO: 10.3 10E3/UL (ref 4–11)

## 2024-07-23 PROCEDURE — 85025 COMPLETE CBC W/AUTO DIFF WBC: CPT | Performed by: FAMILY MEDICINE

## 2024-07-23 PROCEDURE — G2211 COMPLEX E/M VISIT ADD ON: HCPCS | Performed by: FAMILY MEDICINE

## 2024-07-23 PROCEDURE — 36415 COLL VENOUS BLD VENIPUNCTURE: CPT | Performed by: FAMILY MEDICINE

## 2024-07-23 PROCEDURE — 84450 TRANSFERASE (AST) (SGOT): CPT | Performed by: FAMILY MEDICINE

## 2024-07-23 PROCEDURE — 96127 BRIEF EMOTIONAL/BEHAV ASSMT: CPT | Performed by: FAMILY MEDICINE

## 2024-07-23 PROCEDURE — 82565 ASSAY OF CREATININE: CPT | Performed by: FAMILY MEDICINE

## 2024-07-23 PROCEDURE — 99214 OFFICE O/P EST MOD 30 MIN: CPT | Performed by: FAMILY MEDICINE

## 2024-07-23 PROCEDURE — 84460 ALANINE AMINO (ALT) (SGPT): CPT | Performed by: FAMILY MEDICINE

## 2024-07-23 PROCEDURE — 83036 HEMOGLOBIN GLYCOSYLATED A1C: CPT | Performed by: FAMILY MEDICINE

## 2024-07-23 ASSESSMENT — PATIENT HEALTH QUESTIONNAIRE - PHQ9
SUM OF ALL RESPONSES TO PHQ QUESTIONS 1-9: 6
SUM OF ALL RESPONSES TO PHQ QUESTIONS 1-9: 6
10. IF YOU CHECKED OFF ANY PROBLEMS, HOW DIFFICULT HAVE THESE PROBLEMS MADE IT FOR YOU TO DO YOUR WORK, TAKE CARE OF THINGS AT HOME, OR GET ALONG WITH OTHER PEOPLE: VERY DIFFICULT

## 2024-07-23 NOTE — PROGRESS NOTES
ASSESMENT AND PLAN:  Diagnoses and all orders for this visit:  Type 2 diabetes mellitus with chronic kidney disease, with long-term current use of insulin, unspecified CKD stage (H)  -     Hemoglobin A1c; Future  -     insulin glargine (LANTUS PEN) 100 UNIT/ML pen; Inject 30 Units subcutaneously every 24 hours  Insomnia, unspecified type  Use trazodone when needed.  Given his cognitive decline and generalized weakness I recommended that it not be used every night but only on an as-needed basis.  Generalized weakness  Patient and family are going to try to discontinue the hydrocodone and use the trazodone less often.  Current moderate episode of major depressive disorder without prior episode (H)  Stable.  Continue duloxetine.  Counseling done with the patient, continue follow-up with me in the clinic.  Fungal prostatitis  He has completed his course of IV antifungal therapy as directed by infectious disease.  Follow-up with urology.    Reviewed the risks and benefits of the treatment plan with the patient and/or caregiver and we discussed indications for routine and emergent follow-up.  The longitudinal plan of care for the diagnosis(es)/condition(s) as documented were addressed during this visit. Due to the added complexity in care, I will continue to support Adam in the subsequent management and with ongoing continuity of care.        SUBJECTIVE: 84-year-old male has been doing better, he just recently completed his course of treatment for fungal prostatitis.  He currently has a urinary catheter in place but has follow-up with urology coming up soon.  The urine has cleared and now has been consistently clear yellow in color.  No fevers.  His daughter that is with him today has noticed that he has had steady decline in his overall strength and in his cognitive functioning over the last several months.  Blood sugars have been under much better control since he has been back on his insulin.  Lowest blood sugar reading  since being back on insulin is 109.    Past Medical History:   Diagnosis Date    Acute blood loss anemia     Acute renal failure (H24)     Anemia     Bacteremia     Cataract     Chronic gout     CKD (chronic kidney disease)     Stage 3    DM2 (diabetes mellitus, type 2) (H)     GERD (gastroesophageal reflux disease)     Glucose intolerance (impaired glucose tolerance)     Gout     History of nephrolithiasis     History of prostatitis 2017    Hyperlipidemia     Hypertension     Insomnia     Intestinal disaccharidase deficiencies and disaccharide malabsorption     Created by Conversion     Latent tuberculosis     Nephrolithiasis     Neuropathy     Nonspecific abnormal findings on radiological and other examination of skull and head     Created by [facility] Annotation: 2013 11:23PM - Giulia Meridai/10/2011:  severe stenosis both posterior cerebral arteries seen MRI/MRA done for  vertigo. No acute changes.e*    Osteoarthritis     wrist, knee, shoulder    Prostatitis     Rectal bleed     Trigger thumb      Patient Active Problem List   Diagnosis    Esophageal reflux    Dyslipidemia    Nephrolithiasis    Pseudogout    Latent Tuberculosis    Generalized Osteoarthritis Of The Hand    Lumbar Disc Degeneration    Insomnia, unspecified type    Chronic Gout    CKD (chronic kidney disease) stage 3, GFR 30-59 ml/min (H)    Elevated PSA    Prostate nodule    Cataracts, bilateral    Constipation, unspecified constipation type    Bilateral chronic knee pain    Chronic right shoulder pain    Essential hypertension with goal blood pressure less than 140/90    BPH (benign prostatic hyperplasia)    Chronic gout    Coronary artery disease due to lipid rich plaque    Right lower quadrant abdominal pain    Colitis    Primary osteoarthritis involving multiple joints    Urinary incontinence    ACE-inhibitor cough    Vertigo    Essential hypertension    Vertebral artery occlusion, left    Cerebellar stroke (H)     Stroke (H)    Hypomagnesemia    Ataxic gait    Urinary retention    Basilar artery stenosis    Cerebral vascular disease    Colon wall thickening    Current moderate episode of major depressive disorder without prior episode (H)    Dysuria    Generalized weakness    Near syncope    Urinary tract infection without hematuria, site unspecified    Type 2 diabetes mellitus with chronic kidney disease, with long-term current use of insulin, unspecified CKD stage (H)    Candida glabrata infection    Encounter for long-term (current) use of antibiotics    Hyperkalemia    Acute on chronic renal insufficiency    Complicated UTI (urinary tract infection)     Current Outpatient Medications   Medication Sig Dispense Refill    acetaminophen (TYLENOL) 500 MG tablet Take 1 tablet (500 mg) by mouth every 4 hours as needed for mild pain 100 tablet 3    allopurinol (ZYLOPRIM) 100 MG tablet TAKE 2 TABLETS(200 MG) BY MOUTH DAILY 180 tablet 3    apixaban ANTICOAGULANT (ELIQUIS ANTICOAGULANT) 5 MG tablet Take 1 tablet (5 mg) by mouth 2 times daily 180 tablet 3    calcium carbonate (TUMS) 500 MG chewable tablet Take 1 chew tab by mouth daily as needed for heartburn      Continuous Blood Gluc Sensor (FREESTYLE SUSANNAH 2 SENSOR) [facility] [place] [facility] 1 each every 14 days Use 1 sensor every 14 days. Use to read blood sugars per 's instructions. 2 each 11    Continuous Glucose  (FREESTYLE SUSANNAH 2 READER) TONIE USE TO READ BLOOD SUGARS PER MANUFACTRUERS INSTRUCTIONS 1 each 0    dextromethorphan-quiNIDine (NUEDEXTA) 20-10 MG capsule Take 1 capsule by mouth every 12 hours 60 capsule 11    DULoxetine (CYMBALTA) 30 MG capsule Take 1 capsule by mouth every morning and 2 capsules every evening 135 capsule 6    HYDROcodone-acetaminophen (NORCO) 5-325 MG tablet Take 1 tablet by mouth every 6 hours as needed for severe pain 30 tablet 0    insulin aspart (NOVOLOG FLEXPEN) 100 UNIT/ML pen Inject 10 units before breakfast, and 6 units under the skin before  "lunch and dinner 15 mL 3    insulin glargine (LANTUS PEN) 100 UNIT/ML pen Inject 30 Units subcutaneously every 24 hours 30 mL 3    insulin pen needle (BD PEN NEEDLE SALVATORE 2ND GEN) 32G X 4 MM miscellaneous USE 1 PEN NEEDLE FOUR TIMES DAILY OR AS DIRECTED 400 each 1    JANUVIA 50 MG tablet TAKE 1 TABLET(50 MG) BY MOUTH DAILY 90 tablet 3    meclizine (ANTIVERT) 12.5 MG tablet Take 1 tablet (12.5 mg) by mouth 3 times daily as needed for dizziness 60 tablet 6    metoprolol succinate ER (TOPROL XL) 100 MG 24 hr tablet TAKE 1/2 TABLET BY MOUTH DAILY 45 tablet 3    pantoprazole (PROTONIX) 40 MG EC tablet Take 1 tablet (40 mg) by mouth every morning (before breakfast) 90 tablet 3    polyethylene glycol (MIRALAX) 17 GM/Dose powder MIX 17 GRAMS IN LIQUID AND TAKE BY MOUTH ONCE DAILY AS NEEDED FOR CONSTIPATION 510 g 6    rosuvastatin (CRESTOR) 20 MG tablet TAKE 1 TABLET(20 MG) BY MOUTH AT BEDTIME 90 tablet 0    SENNA-docusate sodium (SENNA S) 8.6-50 MG tablet Take 1 tablet by mouth 2 times daily as needed (for constipation) 60 tablet 11    tamsulosin (FLOMAX) 0.4 MG capsule TAKE 1 CAPSULE BY MOUTH EVERY DAY 90 capsule 2    traZODone (DESYREL) 50 MG tablet Take 1-2 tablets ( mg) by mouth nightly as needed for sleep 180 tablet 3    triamcinolone (KENALOG) 0.1 % external cream Apply topically 2 times daily 80 g 3    hydrOXYzine (ATARAX) 25 MG tablet Take 1 tablet (25 mg) by mouth every 8 hours as needed for itching 60 tablet 1     History   Smoking Status    Former    Types: Cigarettes   Smokeless Tobacco    Former       OBJECTICE: /89   Pulse 100   Temp 98.5  F (36.9  C) (Oral)   Resp 16   Ht 1.575 m (5' 2\")   SpO2 97%   BMI 28.51 kg/m       Recent Results (from the past 24 hour(s))   CBC with platelets and differential    Collection Time: 07/23/24 11:30 AM   Result Value Ref Range    WBC Count 10.3 4.0 - 11.0 10e3/uL    RBC Count 4.33 (L) 4.40 - 5.90 10e6/uL    Hemoglobin 13.0 (L) 13.3 - 17.7 g/dL    Hematocrit " 38.7 (L) 40.0 - 53.0 %    MCV 89 78 - 100 fL    MCH 30.0 26.5 - 33.0 pg    MCHC 33.6 31.5 - 36.5 g/dL    RDW 14.5 10.0 - 15.0 %    Platelet Count 233 150 - 450 10e3/uL    % Neutrophils 58 %    % Lymphocytes 21 %    % Monocytes 12 %    % Eosinophils 7 %    % Basophils 0 %    % Immature Granulocytes 0 %    Absolute Neutrophils 6.0 1.6 - 8.3 10e3/uL    Absolute Lymphocytes 2.2 0.8 - 5.3 10e3/uL    Absolute Monocytes 1.3 0.0 - 1.3 10e3/uL    Absolute Eosinophils 0.8 (H) 0.0 - 0.7 10e3/uL    Absolute Basophils 0.0 0.0 - 0.2 10e3/uL    Absolute Immature Granulocytes 0.0 <=0.4 10e3/uL   Hemoglobin A1c    Collection Time: 07/23/24 11:30 AM   Result Value Ref Range    Hemoglobin A1C 7.1 (H) 0.0 - 5.6 %        GEN-alert, in no acute distress   CV-regular rate and rhythm   RESP-lungs clear to auscultation   ABDOMINAL-soft, nontender         Signed Electronically by: Chucho Espino MD

## 2024-07-29 ENCOUNTER — ALLIED HEALTH/NURSE VISIT (OUTPATIENT)
Dept: UROLOGY | Facility: CLINIC | Age: 84
End: 2024-07-29
Payer: COMMERCIAL

## 2024-07-29 ENCOUNTER — TELEPHONE (OUTPATIENT)
Dept: UROLOGY | Facility: CLINIC | Age: 84
End: 2024-07-29

## 2024-07-29 VITALS — DIASTOLIC BLOOD PRESSURE: 84 MMHG | HEART RATE: 87 BPM | SYSTOLIC BLOOD PRESSURE: 129 MMHG

## 2024-07-29 DIAGNOSIS — R33.9 URINARY RETENTION: Primary | ICD-10-CM

## 2024-07-29 LAB
ALBUMIN UR-MCNC: 20 MG/DL
ALBUMIN UR-MCNC: 30 MG/DL
APPEARANCE UR: ABNORMAL
APPEARANCE UR: CLEAR
BACTERIA #/AREA URNS HPF: ABNORMAL /HPF
BILIRUB UR QL STRIP: NEGATIVE
BILIRUB UR QL STRIP: NEGATIVE
COLOR UR AUTO: YELLOW
COLOR UR AUTO: YELLOW
GLUCOSE UR STRIP-MCNC: NEGATIVE MG/DL
GLUCOSE UR STRIP-MCNC: NEGATIVE MG/DL
HGB UR QL STRIP: ABNORMAL
HGB UR QL STRIP: ABNORMAL
KETONES UR STRIP-MCNC: NEGATIVE MG/DL
KETONES UR STRIP-MCNC: NEGATIVE MG/DL
LEUKOCYTE ESTERASE UR QL STRIP: ABNORMAL
LEUKOCYTE ESTERASE UR QL STRIP: ABNORMAL
MUCOUS THREADS #/AREA URNS LPF: PRESENT /LPF
NITRATE UR QL: NEGATIVE
NITRATE UR QL: NEGATIVE
PH UR STRIP: 5.5 [PH] (ref 5–7)
PH UR STRIP: 6 [PH] (ref 5–8)
RBC URINE: 15 /HPF
SP GR UR STRIP: 1.01 (ref 1–1.03)
SP GR UR STRIP: 1.02 (ref 1–1.03)
UROBILINOGEN UR STRIP-ACNC: 0.2 E.U./DL
UROBILINOGEN UR STRIP-MCNC: NORMAL MG/DL
WBC CLUMPS #/AREA URNS HPF: PRESENT /HPF
WBC URINE: >182 /HPF

## 2024-07-29 PROCEDURE — 87086 URINE CULTURE/COLONY COUNT: CPT | Performed by: NURSE PRACTITIONER

## 2024-07-29 PROCEDURE — 87181 SC STD AGAR DILUTION PER AGT: CPT | Performed by: NURSE PRACTITIONER

## 2024-07-29 PROCEDURE — 81001 URINALYSIS AUTO W/SCOPE: CPT | Performed by: PATHOLOGY

## 2024-07-29 PROCEDURE — 99000 SPECIMEN HANDLING OFFICE-LAB: CPT | Performed by: PATHOLOGY

## 2024-07-29 PROCEDURE — 99212 OFFICE O/P EST SF 10 MIN: CPT | Mod: 24 | Performed by: NURSE PRACTITIONER

## 2024-07-29 ASSESSMENT — PAIN SCALES - GENERAL: PAINLEVEL: MILD PAIN (3)

## 2024-07-29 NOTE — NURSING NOTE
.  Chief Complaint   Patient presents with    Urodynamics Study       There were no vitals taken for this visit. There is no height or weight on file to calculate BMI.    Patient Active Problem List   Diagnosis    Esophageal reflux    Dyslipidemia    Nephrolithiasis    Pseudogout    Latent Tuberculosis    Generalized Osteoarthritis Of The Hand    Lumbar Disc Degeneration    Insomnia, unspecified type    Chronic Gout    CKD (chronic kidney disease) stage 3, GFR 30-59 ml/min (H)    Elevated PSA    Prostate nodule    Cataracts, bilateral    Constipation, unspecified constipation type    Bilateral chronic knee pain    Chronic right shoulder pain    Essential hypertension with goal blood pressure less than 140/90    BPH (benign prostatic hyperplasia)    Chronic gout    Coronary artery disease due to lipid rich plaque    Right lower quadrant abdominal pain    Colitis    Primary osteoarthritis involving multiple joints    Urinary incontinence    ACE-inhibitor cough    Vertigo    Essential hypertension    Vertebral artery occlusion, left    Cerebellar stroke (H)    Stroke (H)    Hypomagnesemia    Ataxic gait    Urinary retention    Basilar artery stenosis    Cerebral vascular disease    Colon wall thickening    Current moderate episode of major depressive disorder without prior episode (H)    Dysuria    Generalized weakness    Near syncope    Urinary tract infection without hematuria, site unspecified    Type 2 diabetes mellitus with chronic kidney disease, with long-term current use of insulin, unspecified CKD stage (H)    Candida glabrata infection    Encounter for long-term (current) use of antibiotics    Hyperkalemia    Acute on chronic renal insufficiency    Complicated UTI (urinary tract infection)       Allergies   Allergen Reactions    Jardiance [Empagliflozin]      Had complicated uti on jardiance    Lisinopril Cough       Current Outpatient Medications   Medication Sig Dispense Refill    acetaminophen (TYLENOL)  500 MG tablet Take 1 tablet (500 mg) by mouth every 4 hours as needed for mild pain 100 tablet 3    allopurinol (ZYLOPRIM) 100 MG tablet TAKE 2 TABLETS(200 MG) BY MOUTH DAILY 180 tablet 3    apixaban ANTICOAGULANT (ELIQUIS ANTICOAGULANT) 5 MG tablet Take 1 tablet (5 mg) by mouth 2 times daily 180 tablet 3    calcium carbonate (TUMS) 500 MG chewable tablet Take 1 chew tab by mouth daily as needed for heartburn      Continuous Blood Gluc Sensor (FREESTYLE SUSANNAH 2 SENSOR) [facility] 1 each every 14 days Use 1 sensor every 14 days. Use to read blood sugars per 's instructions. 2 each 11    Continuous Glucose  (FREESTYLE SUSANNAH 2 READER) TONIE USE TO READ BLOOD SUGARS PER MANUFACTRUERS INSTRUCTIONS 1 each 0    dextromethorphan-quiNIDine (NUEDEXTA) 20-10 MG capsule Take 1 capsule by mouth every 12 hours 60 capsule 11    DULoxetine (CYMBALTA) 30 MG capsule Take 1 capsule by mouth every morning and 2 capsules every evening 135 capsule 6    HYDROcodone-acetaminophen (NORCO) 5-325 MG tablet Take 1 tablet by mouth every 6 hours as needed for severe pain 30 tablet 0    hydrOXYzine (ATARAX) 25 MG tablet Take 1 tablet (25 mg) by mouth every 8 hours as needed for itching 60 tablet 1    insulin aspart (NOVOLOG FLEXPEN) 100 UNIT/ML pen Inject 10 units before breakfast, and 6 units under the skin before lunch and dinner 15 mL 3    insulin glargine (LANTUS PEN) 100 UNIT/ML pen Inject 30 Units subcutaneously every 24 hours 30 mL 3    insulin pen needle (BD PEN NEEDLE SALVATORE 2ND GEN) 32G X 4 MM miscellaneous USE 1 PEN NEEDLE FOUR TIMES DAILY OR AS DIRECTED 400 each 1    JANUVIA 50 MG tablet TAKE 1 TABLET(50 MG) BY MOUTH DAILY 90 tablet 3    meclizine (ANTIVERT) 12.5 MG tablet Take 1 tablet (12.5 mg) by mouth 3 times daily as needed for dizziness 60 tablet 6    metoprolol succinate ER (TOPROL XL) 100 MG 24 hr tablet TAKE 1/2 TABLET BY MOUTH DAILY 45 tablet 3    pantoprazole (PROTONIX) 40 MG EC tablet Take 1 tablet (40 mg) by  mouth every morning (before breakfast) 90 tablet 3    polyethylene glycol (MIRALAX) 17 GM/Dose powder MIX 17 GRAMS IN LIQUID AND TAKE BY MOUTH ONCE DAILY AS NEEDED FOR CONSTIPATION 510 g 6    rosuvastatin (CRESTOR) 20 MG tablet TAKE 1 TABLET(20 MG) BY MOUTH AT BEDTIME 90 tablet 0    SENNA-docusate sodium (SENNA S) 8.6-50 MG tablet Take 1 tablet by mouth 2 times daily as needed (for constipation) 60 tablet 11    tamsulosin (FLOMAX) 0.4 MG capsule TAKE 1 CAPSULE BY MOUTH EVERY DAY 90 capsule 2    traZODone (DESYREL) 50 MG tablet Take 1-2 tablets ( mg) by mouth nightly as needed for sleep 180 tablet 3    triamcinolone (KENALOG) 0.1 % external cream Apply topically 2 times daily 80 g 3       Social History     Tobacco Use    Smoking status: Former     Current packs/day: 0.00     Types: Cigarettes     Quit date: 3/10/2000     Years since quittin.4     Passive exposure: Never    Smokeless tobacco: Former    Tobacco comments:     no passive exposure   Vaping Use    Vaping status: Never Used   Substance Use Topics    Alcohol use: No    Drug use: No       Invasive Procedure Safety Checklist:    Procedure: Urodynamics    Action: Complete sections and checkboxes as appropriate.  Pre-procedure:  1. Patient ID Verified with 2 identifiers ([place] and  or MRN) : YES    2. Procedure and site verified with patient/designee (when able) : YES    3. Accurate consent documentation in medical record : YES    4. H&P (or appropriate assessment) documented in medical record : N/A  H&P must be up to 30 days prior to procedure an updated within 24 hours of Procedure as applicable.     5. Relevant diagnostic and radiology test results appropriately labeled and displayed as applicable : YES    6. Blood products, implants, devices, and/or special equipment available for the procedure as applicable : YES    7. Procedure site(s) marked with provider initials [Exclusions: none] : NO    8. Marking not required. Reason : Yes  Procedure  does not require site marking    Time Out:     Time-Out performed immediately prior to starting procedure, including verbal and active participation of all team members addressing: YES    1. Correct patient identity.  2. Confirmed that the correct side and site are marked.  3. An accurate procedure to be done.  4. Agreement on the procedure to be done.  5. Correct patient position.  6. Relevant images and results are properly labeled and appropriately displayed.  7. The need to administer antibiotics or fluids for irrigation purposes during the procedure as applicable.  8. Safety precautions based on patient history or medication use.    During Procedure: Verification of correct person, site, and procedure occurs any time the responsibility for care of the patient is transferred to another member of the care team.    Prior to injection, verified patient identity using patient's name and date of birth.  Due to injection administration, patient instructed to remain in clinic for 15 minutes  afterwards, and to report any adverse reaction to me immediately.    Drug Amount Wasted:  None.  Vial/Syringe: Single dose vial      The following medication was given: See Above    Removal:  16 Fr straight tipped latex parrish catheter removed from urethral meatus without difficulty after removing 10mL of fluid from the balloon.    Insertion:  16 Fr straight tipped latex straight catheter inserted into urethral meatus in the usual sterile fashion without difficulty.  Received 10 ml yellow urine output.     Patient did tolerate procedure well.    Patient instructed as to where to call or go for pain, fever, leakage, or decreased urine flow.     This service provided today was under the direct supervision of Angelica Simpson PA-C, who was available if needed.    Leah Parkinson  7/29/2024  8:52 AM

## 2024-07-29 NOTE — PROGRESS NOTES
PREPROCEDURE DIAGNOSES:    1. Urinary retention with hydronephrosis    POSTPROCEDURE DIAGNOSES:  Suspected UTI    PROCEDURE:    -Sterile urethral catheterization for measurement of postvoid residual urine volume.    INDICATIONS FOR PROCEDURE:  Mr. Adam Talamantes is a pleasant 84 year old male who presents for video urodynamic assessment. VUDS is requested today by Sanya Yadav PA-C to better characterize Mr. Adam Talamantes's voiding dysfunction.      DESCRIPTION OF PROCEDURE:  Risks, benefits, and alternatives to urodynamics were discussed with the patient and he wished to proceed.  Urodynamics are planned to better assess the primary etiology for Mr. Talamantes's urologic dysfunction. After informed consent was obtained, the patient was taken to the procedure room where the study was initiated. Findings below.     Pretest urine dipstick was positive for large leukocytes. The patient denies any UTI symptoms today; specifically, denies dysuria, frequency, urgency, hematuria, fevers, chills, N/V. We discussed the risks of proceeding with the study today, including worsening infection and possible unreliable results and shared decision made to pursuing a urine culture and reschedule urodynamics.     ASSESSMENT/PLAN:  Mr. Adam Talamantes is a pleasant 84 year old male who presented to UDS today with a Diego in place. Urine dip concerning for UTI and we therefore discussed the potential risks of proceeding today. Shared decision made with patient and son today to reschedule. Will send his urine for culture today and treat based on results. Will also reschedule his follow up visit with Sanya to review results once this is complete.     - A single Bactrim DS was provided for UTI prophylaxis following completion of today's study per department protocol.  The risk of UTI with VUDS is low at ~2.5-3%.      Thank you for allowing me to participate in the care of Mr. Adam Talamantes and please don't hesitate to contact me with any questions or  concerns.      This procedure was performed under a collaborative agreement with Dr. Rigoberto Bonilla, Professor and  of Urology, [facility].    AQUILES Patel, CNP  Department of Urology

## 2024-07-29 NOTE — TELEPHONE ENCOUNTER
Patient confirmed scheduled appointment:  Date: September 18th   Time: 9am   Visit type: Urodynamic   Provider: Angelica Simpson   Location: [facility] [place]  Additional notes: Reschedule sooner per staff message      Date: October 16th   Time: 3:30pm   Visit type: Return   Provider: Sanya Yadav   Location: [facility] [place]  Additional notes: Reschedule sooner per staff message      Has my direct phone number in case scheduled same day UDS/follow up in October works best. For now scheduled on separate days, pt's daughter understood

## 2024-07-29 NOTE — PATIENT INSTRUCTIONS
UROLOGY CLINIC VISIT PATIENT INSTRUCTIONS    -Reschedule urodynamics (will also add you to the wait list for a sooner appointment.   -Will also reschedule your follow up visit with Sanya to discuss these results.     If you have any issues, questions or concerns in the meantime, do not hesitate to contact us at [phone] or via [employer].     Angelica Simpson, CNP  Department of Urology

## 2024-08-02 DIAGNOSIS — N39.0 URINARY TRACT INFECTION: Primary | ICD-10-CM

## 2024-08-02 RX ORDER — CIPROFLOXACIN 500 MG/1
500 TABLET, FILM COATED ORAL 2 TIMES DAILY
Qty: 14 TABLET | Refills: 0 | Status: SHIPPED | OUTPATIENT
Start: 2024-08-02 | End: 2024-08-02

## 2024-08-02 RX ORDER — CIPROFLOXACIN 500 MG/1
500 TABLET, FILM COATED ORAL 2 TIMES DAILY
Qty: 14 TABLET | Refills: 0 | Status: SHIPPED | OUTPATIENT
Start: 2024-08-02 | End: 2024-09-03

## 2024-08-02 NOTE — PROGRESS NOTES
Call placed and spoke with patient's daughter to let her know about the antibiotic prescription. Instructed her to have her father pick this up and start today.    Thank you,  Nicole Huitron RN, BSN Urology Triage

## 2024-08-03 LAB
BACTERIA UR CULT: ABNORMAL
BACTERIA UR CULT: ABNORMAL

## 2024-08-13 DIAGNOSIS — L29.9 ITCHING: ICD-10-CM

## 2024-08-13 RX ORDER — HYDROXYZINE HYDROCHLORIDE 25 MG/1
25 TABLET, FILM COATED ORAL EVERY 8 HOURS PRN
Qty: 60 TABLET | Refills: 1 | Status: SHIPPED | OUTPATIENT
Start: 2024-08-13

## 2024-08-16 ENCOUNTER — PRE VISIT (OUTPATIENT)
Dept: UROLOGY | Facility: CLINIC | Age: 84
End: 2024-08-16
Payer: COMMERCIAL

## 2024-08-16 DIAGNOSIS — R33.9 URINARY RETENTION: Primary | ICD-10-CM

## 2024-08-16 RX ORDER — SULFAMETHOXAZOLE/TRIMETHOPRIM 800-160 MG
1 TABLET ORAL ONCE
Status: COMPLETED | OUTPATIENT
Start: 2024-08-16 | End: 2024-08-19

## 2024-08-16 NOTE — TELEPHONE ENCOUNTER
Authorizing provider: Mic Yadav     -16fr straight tipped latex    -bactrim     - urethral     - urinary retention

## 2024-08-19 ENCOUNTER — OFFICE VISIT (OUTPATIENT)
Dept: UROLOGY | Facility: CLINIC | Age: 84
End: 2024-08-19
Payer: COMMERCIAL

## 2024-08-19 DIAGNOSIS — R33.9 URINARY RETENTION: Primary | ICD-10-CM

## 2024-08-19 PROCEDURE — 51702 INSERT TEMP BLADDER CATH: CPT

## 2024-08-19 RX ADMIN — SULFAMETHOXAZOLE AND TRIMETHOPRIM 1 TABLET: 800; 160 TABLET ORAL at 13:30

## 2024-08-19 NOTE — PROGRESS NOTES
Adam Talamantes comes into clinic today at the request of Mic ESPINOSA with the diagnosis of urinary retention for a catheter exchange .    Order has been verified: Yes.    Allergies   Allergen Reactions    Jardiance [Empagliflozin]      Had complicated uti on jardiance    Lisinopril Cough       The following medication was given:     MEDICATION: Bactrim (Trimethoprim / Sulfamethoxazole)  ROUTE: PO  SITE: Medication was given orally  DOSE: 800mg/160mg  LOT #: [id number]  : [employer]  EXPIRATION DATE: 2026/04  NDC#: 2337-7435-72   Was there drug waste? No    Prior to medication administration, verified patient identity using patient's name and date of birth.  Due to medication administration, patient instructed to remain in clinic for 15 minutes  afterwards, and to report any adverse reaction to me immediately.    Removal:  16 Fr straight tipped latex parrish catheter removed from urethral meatus without difficulty after removing 9.5 mL of fluid from the balloon.    Insertion:  16 Fr straight tipped latex parrish catheter inserted into urethral meatus in the usual sterile fashion without difficulty.  Received > 20 ml yellow positive urine return.   Balloon filled with 10 mL sterile H2O after positive urine return.  Catheter secured in place with leg strap: Yes.     Patient did tolerate procedure well.     Patient instructed as to where to call or go for pain, fever, leakage, or decreased urine flow.     This service provided today was under the direct supervision of Mic ESPINOSA, who was available if needed.    Patient has follow up cath exchange on 9/20/24.  Patient also has UDS scheduled for 9/18/24    Savannah Santiago   8/19/2024  10:42 AM

## 2024-08-23 ENCOUNTER — PRE VISIT (OUTPATIENT)
Dept: UROLOGY | Facility: CLINIC | Age: 84
End: 2024-08-23
Payer: COMMERCIAL

## 2024-08-23 NOTE — TELEPHONE ENCOUNTER
Reason for visit: UDS     Study scheduled because: urinary retention/neurogenic bladder     Relevant information: *NEEDS JOIE *, pt in hospice/end of life care home - alternate contact     Records/imaging/labs/orders: all labs scheduled/available     UA/UC ordered: yes - recent UTI hx

## 2024-08-25 DIAGNOSIS — E78.5 DYSLIPIDEMIA: ICD-10-CM

## 2024-08-25 RX ORDER — ROSUVASTATIN CALCIUM 20 MG/1
20 TABLET, COATED ORAL AT BEDTIME
Qty: 90 TABLET | Refills: 2 | Status: SHIPPED | OUTPATIENT
Start: 2024-08-25

## 2024-08-25 NOTE — TELEPHONE ENCOUNTER
Same medication/dose has been ordered by primary provider and based on order history patient taking as prescribed.

## 2024-09-03 ENCOUNTER — OFFICE VISIT (OUTPATIENT)
Dept: FAMILY MEDICINE | Facility: CLINIC | Age: 84
End: 2024-09-03
Payer: COMMERCIAL

## 2024-09-03 ENCOUNTER — OFFICE VISIT (OUTPATIENT)
Dept: PHARMACY | Facility: CLINIC | Age: 84
End: 2024-09-03
Payer: COMMERCIAL

## 2024-09-03 VITALS
BODY MASS INDEX: 28.58 KG/M2 | DIASTOLIC BLOOD PRESSURE: 84 MMHG | WEIGHT: 155.3 LBS | SYSTOLIC BLOOD PRESSURE: 130 MMHG | HEART RATE: 80 BPM | HEIGHT: 62 IN | TEMPERATURE: 98.5 F | RESPIRATION RATE: 16 BRPM

## 2024-09-03 VITALS — WEIGHT: 155.2 LBS | BODY MASS INDEX: 28.39 KG/M2

## 2024-09-03 DIAGNOSIS — R52 PAIN: ICD-10-CM

## 2024-09-03 DIAGNOSIS — E11.65 TYPE 2 DIABETES MELLITUS WITH HYPERGLYCEMIA, WITHOUT LONG-TERM CURRENT USE OF INSULIN (H): Primary | ICD-10-CM

## 2024-09-03 DIAGNOSIS — R10.9 ABDOMINAL PAIN, UNSPECIFIED ABDOMINAL LOCATION: ICD-10-CM

## 2024-09-03 DIAGNOSIS — R53.1 GENERALIZED WEAKNESS: ICD-10-CM

## 2024-09-03 DIAGNOSIS — R10.9 ABDOMINAL PAIN, UNSPECIFIED ABDOMINAL LOCATION: Primary | ICD-10-CM

## 2024-09-03 DIAGNOSIS — N40.1 BENIGN PROSTATIC HYPERPLASIA WITH URINARY RETENTION: ICD-10-CM

## 2024-09-03 DIAGNOSIS — I63.22 CEREBROVASCULAR ACCIDENT (CVA) DUE TO STENOSIS OF BASILAR ARTERY (H): ICD-10-CM

## 2024-09-03 DIAGNOSIS — Z79.4 TYPE 2 DIABETES MELLITUS WITH CHRONIC KIDNEY DISEASE, WITH LONG-TERM CURRENT USE OF INSULIN, UNSPECIFIED CKD STAGE (H): ICD-10-CM

## 2024-09-03 DIAGNOSIS — L29.9 ITCHING: ICD-10-CM

## 2024-09-03 DIAGNOSIS — E11.22 TYPE 2 DIABETES MELLITUS WITH CHRONIC KIDNEY DISEASE, WITH LONG-TERM CURRENT USE OF INSULIN, UNSPECIFIED CKD STAGE (H): ICD-10-CM

## 2024-09-03 DIAGNOSIS — G47.00 INSOMNIA, UNSPECIFIED TYPE: ICD-10-CM

## 2024-09-03 DIAGNOSIS — R33.8 BENIGN PROSTATIC HYPERPLASIA WITH URINARY RETENTION: ICD-10-CM

## 2024-09-03 DIAGNOSIS — I10 ESSENTIAL HYPERTENSION WITH GOAL BLOOD PRESSURE LESS THAN 140/90: ICD-10-CM

## 2024-09-03 DIAGNOSIS — M1A.00X0 CHRONIC GOUTY ARTHROPATHY: ICD-10-CM

## 2024-09-03 DIAGNOSIS — K21.9 GASTROESOPHAGEAL REFLUX DISEASE, UNSPECIFIED WHETHER ESOPHAGITIS PRESENT: ICD-10-CM

## 2024-09-03 LAB
ALBUMIN UR-MCNC: 30 MG/DL
AMORPH CRY #/AREA URNS HPF: ABNORMAL /HPF
APPEARANCE UR: CLEAR
BACTERIA #/AREA URNS HPF: ABNORMAL /HPF
BILIRUB UR QL STRIP: NEGATIVE
COLOR UR AUTO: YELLOW
GLUCOSE UR STRIP-MCNC: NEGATIVE MG/DL
HGB UR QL STRIP: ABNORMAL
KETONES UR STRIP-MCNC: NEGATIVE MG/DL
LEUKOCYTE ESTERASE UR QL STRIP: ABNORMAL
MUCOUS THREADS #/AREA URNS LPF: PRESENT /LPF
NITRATE UR QL: NEGATIVE
PH UR STRIP: 6 [PH] (ref 5–7)
RBC #/AREA URNS AUTO: ABNORMAL /HPF
SP GR UR STRIP: 1.02 (ref 1–1.03)
SQUAMOUS #/AREA URNS AUTO: ABNORMAL /LPF
UROBILINOGEN UR STRIP-ACNC: 0.2 E.U./DL
WBC #/AREA URNS AUTO: ABNORMAL /HPF
WBC CLUMPS #/AREA URNS HPF: PRESENT /HPF

## 2024-09-03 PROCEDURE — G2211 COMPLEX E/M VISIT ADD ON: HCPCS | Performed by: FAMILY MEDICINE

## 2024-09-03 PROCEDURE — 99213 OFFICE O/P EST LOW 20 MIN: CPT | Performed by: FAMILY MEDICINE

## 2024-09-03 PROCEDURE — 99607 MTMS BY PHARM ADDL 15 MIN: CPT | Performed by: PHARMACIST

## 2024-09-03 PROCEDURE — 99606 MTMS BY PHARM EST 15 MIN: CPT | Performed by: PHARMACIST

## 2024-09-03 RX ORDER — INSULIN ASPART 100 [IU]/ML
INJECTION, SOLUTION INTRAVENOUS; SUBCUTANEOUS
Qty: 15 ML | Refills: 3 | Status: SHIPPED | OUTPATIENT
Start: 2024-09-03

## 2024-09-03 ASSESSMENT — PATIENT HEALTH QUESTIONNAIRE - PHQ9
SUM OF ALL RESPONSES TO PHQ QUESTIONS 1-9: 1
SUM OF ALL RESPONSES TO PHQ QUESTIONS 1-9: 1
10. IF YOU CHECKED OFF ANY PROBLEMS, HOW DIFFICULT HAVE THESE PROBLEMS MADE IT FOR YOU TO DO YOUR WORK, TAKE CARE OF THINGS AT HOME, OR GET ALONG WITH OTHER PEOPLE: NOT DIFFICULT AT ALL

## 2024-09-03 NOTE — PROGRESS NOTES
Medication Therapy Management (MTM) Encounter    ASSESSMENT:                            Medication Adherence/Access: No issues identified    1. Type 2 diabetes mellitus with hyperglycemia, without long-term current use of insulin (H)  A1c at goal < 8%. Patient is meeting goal of > 50% time in target with continuous glucose monitoring; highest readings usually after breakfast, therefore reasonable to slightly increase dose of morning novolog today. Reasonable to maintain lower dose januvia given fluctuating renal function.     2. Essential hypertension with goal blood pressure less than 140/90  BP has been at goal <140/90 mmHg when recently monitored, recommend continuing current medication without change. If dizziness persists or worsens could consider further decreasing dose of metoprolol.     3. 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     4. Insomnia, unspecified type  Stable.     5. Gastroesophageal reflux disease, unspecified whether esophagitis present  Stable, though could consider reducing to as needed H2 blocker in the future, rather than continuing as needed PPI.     7. Chronic gouty arthropathy  Stable.     8. Pain  Stable.     9. Itching  Stable.      PLAN:                            Increase dose: Novolog 12 units before breakfast. Keep taking 6 units before lunch and dinner    Follow-up: Return in about 6 weeks (around 10/14/2024) for With PharmD.    SUBJECTIVE/OBJECTIVE:                          Adam Albin is a 84 year old male seen for a follow-up visit. Patient was accompanied by daughters.  (ID# [id number]) was used during today's visit.       Reason for visit: MTM follow up.    Allergies/ADRs: Reviewed in chart  Past Medical History: Reviewed in chart  Tobacco: He reports that he quit smoking about 24 years ago. His smoking use included cigarettes. He has never been exposed to tobacco smoke. He has quit using smokeless tobacco.  Alcohol: None    Medication  Adherence/Access: His daughter, Dimitri Talamantes, helps him with medications, set up in twice daily pillbox.  Reports no missed medication doses.   Brings with medication vials today and meter.     Diabetes   Lantus 30 units daily at noon   Novolog 10 units with breakfast, 6 units with lunch and dinner (8 am, noon, 5 pm)  Januvia 50 mg daily in AM     Patient is not experiencing side effects.  Daughter helps with insulin injections, patient unable to do this on his own, reports no missed doses.   Blood sugar monitoring: Continuous Glucose Monitoring with Ray 2; see below.   Current diabetes symptoms: No symptoms reported. Polyuria, but following with urology, now with catheter.  Diet/Exercise: Eating 3 very meals per day,  never skipping meals.  Declining CDE at this time. Portion of food varies. Ensure in the morning, diet has not changed.   Med Hx: Jardiance (UTI, stopped inpatient)     Eye exam is due.   Foot exam: due      Hypertension   Metoprolol succinate 100 mg - 1/2 tablet (50 mg) daily  Meclizine 12.5 mg 3 times daily as needed - using about 1 times weekly, does work for him  Patient reports no current medication side effects  Patient does not self-monitor blood pressure sometimes, 118/67 when last checked.     Hyperlipidemia /CVA  Rosuvastatin 20 mg daily  Eliquis 5 mg twice daily  Patient reports no significant myalgias or other side effects.  The ASCVD Risk score (Cat DK, et al., 2019) failed to calculate for the following reasons:    The 2019 ASCVD risk score is only valid for ages 40 to 79    The patient has a prior MI or stroke diagnosis     Constipation:   Senna/Docusate sodium 8.6/100 mg once daily as needed   Miralax 17 g daily as needed   Reports constipation has been well controlled with current therapy.      Insomnia/Depression:  Duloxetine 30 mg in the morning, 60 mg in the evening   Trazodone 50 mg 1-2 tab daily at bedtime   Nuedexta (dextromethorphan/quinidine) 20/10 mg twice daily (per  neurology)  Sleep has been a bit better with trazodone, he will not sleep at all without the trazodone.   Thinks mood is more stable now since increasing duloxetine.   Daughter reports an increase in mood swings, possible memory issues -- he gets upset if he forgot he ate a meal and family told him he already ate and won't give him more food, for example.    GERD    Pantoprazole 40 mg mg once daily, as needed  Tums 500 mg mg once daily, as needed  Patient reports no current symptoms. No complaints of symptoms for a couple months.      Gout:   Allopurinol 200 mg daily   Reports no concerns today.     Pain/mood:   Acetaminophen 500 mg as needed   Celebrex 200 mg daily as needed  Unclear how often patient taking tylenol, reports pain has been the same.     Itching:    Hydroxyzine  25 mg every 8 hours as needed at night time - only taking as needed  Finds effective, no concerns. Also uses triamcinolone cream    Today's Vitals: Wt 155 lb 3.2 oz (70.4 kg)   BMI 28.39 kg/m    ----------------      I spent 30 minutes with this patient today. All changes were made via collaborative practice agreement with Chucho Espino MD. A copy of the visit note was provided to the patient's provider(s).    A summary of these recommendations was given to the patient (see AVS from today's appointment with Chucho Espino).    Pushpa Philip, PharmD, BCACP  Medication Therapy Management Pharmacist     Medication Therapy Recommendations  Type 2 diabetes mellitus with chronic kidney disease, with long-term current use of insulin, unspecified CKD stage (H)    Current Medication: insulin aspart (NOVOLOG FLEXPEN) 100 UNIT/ML pen   Rationale: Dose too low - Dosage too low - Effectiveness   Recommendation: Increase Dose   Status: Accepted per CPA

## 2024-09-03 NOTE — PROGRESS NOTES
ASSESMENT AND PLAN:  Diagnoses and all orders for this visit:  Abdominal pain, unspecified abdominal location  Based on history and exam the pain is probably related to his constipation.  However, given his recent urinary tract infection think it is proximal posterior manage urine specimens.  The UA is nitrate negative and the cells are significantly improved compared to previous counseling done with the patient and daughters, will await results on the urine culture.  -     UA Macroscopic with reflex to Microscopic and Culture - Lab Collect; Future  Generalized weakness  Stable.  There was not a big improvement with reduction in his trazodone use which he really requires for good sleep at night so he is going to continue trazodone at night.  His generalized weakness is very likely multifactorial including advanced age and multiple medical comorbidities.  Type 2 diabetes mellitus with chronic kidney disease, with long-term current use of insulin, unspecified CKD stage (H)  Stable, see MTM note for plan.  Benign prostatic hyperplasia with urinary retention  Chronic urinary catheter is causing some irritation at penis tip, I recommended applying as needed lubricating jelly and following up with urology as directed.    Reviewed the risks and benefits of the treatment plan with the patient and/or caregiver and we discussed indications for routine and emergent follow-up.        SUBJECTIVE: About a week ago the patient's family noticed that he started to have some increased sediment in his Diego catheter bag.  Yesterday he complained of some mild abdominal pain.  Today he is not having abdominal pain.  No fevers or chills, no vomiting.  He does have a recent history of urinary tract infection for which she completed antibiotics.  Family is concerned about possible recurrence of urinary tract infection.  See last visit note for further details, he has been having some decline in some increased weakness.  Since making the  change to less frequent trazodone dosing there has not been any improvement in his weakness but his sleep has become quite disrupted.  This is difficult for the patient and caregivers.  Caregivers also report that he has been getting some irritation from the catheter tube at tip of penis intermittently.    Past Medical History:   Diagnosis Date    Acute blood loss anemia     Acute renal failure (H24)     Anemia     Bacteremia     Cataract     Chronic gout     CKD (chronic kidney disease)     Stage 3    DM2 (diabetes mellitus, type 2) (H)     GERD (gastroesophageal reflux disease)     Glucose intolerance (impaired glucose tolerance)     Gout     History of nephrolithiasis     History of prostatitis 2017    Hyperlipidemia     Hypertension     Insomnia     Intestinal disaccharidase deficiencies and disaccharide malabsorption     Created by Conversion     Latent tuberculosis     Nephrolithiasis     Neuropathy     Nonspecific abnormal findings on radiological and other examination of skull and head     Created by [employer] [facility] Annotation: 2013 11:23PM - Giulia Meridai/10/2011:  severe stenosis both posterior cerebral arteries seen MRI/MRA done for  vertigo. No acute changes.e*    Osteoarthritis     wrist, knee, shoulder    Prostatitis     Rectal bleed     Trigger thumb      Patient Active Problem List   Diagnosis    Esophageal reflux    Dyslipidemia    Nephrolithiasis    Pseudogout    Latent Tuberculosis    Generalized Osteoarthritis Of The Hand    Lumbar Disc Degeneration    Insomnia, unspecified type    Chronic Gout    CKD (chronic kidney disease) stage 3, GFR 30-59 ml/min (H)    Elevated PSA    Prostate nodule    Cataracts, bilateral    Constipation, unspecified constipation type    Bilateral chronic knee pain    Chronic right shoulder pain    Essential hypertension with goal blood pressure less than 140/90    BPH (benign prostatic hyperplasia)    Chronic gout    Coronary artery disease due to  lipid rich plaque    Right lower quadrant abdominal pain    Colitis    Primary osteoarthritis involving multiple joints    Urinary incontinence    ACE-inhibitor cough    Vertigo    Essential hypertension    Vertebral artery occlusion, left    Cerebellar stroke (H)    Stroke (H)    Hypomagnesemia    Ataxic gait    Urinary retention    Basilar artery stenosis    Cerebral vascular disease    Colon wall thickening    Current moderate episode of major depressive disorder without prior episode (H)    Dysuria    Generalized weakness    Near syncope    Urinary tract infection without hematuria, site unspecified    Type 2 diabetes mellitus with chronic kidney disease, with long-term current use of insulin, unspecified CKD stage (H)    Candida glabrata infection    Encounter for long-term (current) use of antibiotics    Hyperkalemia    Acute on chronic renal insufficiency    Complicated UTI (urinary tract infection)     Current Outpatient Medications   Medication Sig Dispense Refill    acetaminophen (TYLENOL) 500 MG tablet Take 1 tablet (500 mg) by mouth every 4 hours as needed for mild pain 100 tablet 3    allopurinol (ZYLOPRIM) 100 MG tablet TAKE 2 TABLETS(200 MG) BY MOUTH DAILY 180 tablet 3    apixaban ANTICOAGULANT (ELIQUIS ANTICOAGULANT) 5 MG tablet Take 1 tablet (5 mg) by mouth 2 times daily 180 tablet 3    calcium carbonate (TUMS) 500 MG chewable tablet Take 1 chew tab by mouth daily as needed for heartburn      Continuous Blood Gluc Sensor (FREESTYLE SUSANNAH 2 SENSOR) [facility] 1 each every 14 days Use 1 sensor every 14 days. Use to read blood sugars per 's instructions. 2 each 11    Continuous Glucose  (FREESTYLE SUSANNAH 2 READER) TONIE USE TO READ BLOOD SUGARS PER MANUFACTRUERS INSTRUCTIONS 1 each 0    dextromethorphan-quiNIDine (NUEDEXTA) 20-10 MG capsule Take 1 capsule by mouth every 12 hours 60 capsule 11    DULoxetine (CYMBALTA) 30 MG capsule Take 1 capsule by mouth every morning and 2 capsules every  "evening 135 capsule 6    HYDROcodone-acetaminophen (NORCO) 5-325 MG tablet Take 1 tablet by mouth every 6 hours as needed for severe pain 30 tablet 0    hydrOXYzine HCl (ATARAX) 25 MG tablet TAKE 1 TABLET(25 MG) BY MOUTH EVERY 8 HOURS AS NEEDED FOR ITCHING 60 tablet 1    insulin aspart (NOVOLOG FLEXPEN) 100 UNIT/ML pen Inject 12 units before breakfast, and 6 units under the skin before lunch and dinner 15 mL 3    insulin glargine (LANTUS PEN) 100 UNIT/ML pen Inject 30 Units subcutaneously every 24 hours 30 mL 3    insulin pen needle (BD PEN NEEDLE SALVATORE 2ND GEN) 32G X 4 MM miscellaneous USE 1 PEN NEEDLE FOUR TIMES DAILY OR AS DIRECTED 400 each 1    JANUVIA 50 MG tablet TAKE 1 TABLET(50 MG) BY MOUTH DAILY 90 tablet 3    meclizine (ANTIVERT) 12.5 MG tablet Take 1 tablet (12.5 mg) by mouth 3 times daily as needed for dizziness 60 tablet 6    metoprolol succinate ER (TOPROL XL) 100 MG 24 hr tablet TAKE 1/2 TABLET BY MOUTH DAILY 45 tablet 3    pantoprazole (PROTONIX) 40 MG EC tablet Take 1 tablet (40 mg) by mouth every morning (before breakfast) 90 tablet 3    polyethylene glycol (MIRALAX) 17 GM/Dose powder MIX 17 GRAMS IN LIQUID AND TAKE BY MOUTH ONCE DAILY AS NEEDED FOR CONSTIPATION 510 g 6    rosuvastatin (CRESTOR) 20 MG tablet TAKE 1 TABLET(20 MG) BY MOUTH AT BEDTIME 90 tablet 2    SENNA-docusate sodium (SENNA S) 8.6-50 MG tablet Take 1 tablet by mouth 2 times daily as needed (for constipation) 60 tablet 11    tamsulosin (FLOMAX) 0.4 MG capsule TAKE 1 CAPSULE BY MOUTH EVERY DAY 90 capsule 2    traZODone (DESYREL) 50 MG tablet Take 1-2 tablets ( mg) by mouth nightly as needed for sleep 180 tablet 3    triamcinolone (KENALOG) 0.1 % external cream Apply topically 2 times daily 80 g 3     History   Smoking Status    Former    Types: Cigarettes   Smokeless Tobacco    Former       OBJECTICE: /84   Pulse 80   Temp 98.5  F (36.9  C) (Oral)   Resp 16   Ht 1.575 m (5' 2.01\")   Wt 70.4 kg (155 lb 4.8 oz)   BMI " 28.40 kg/m       Recent Results (from the past 24 hour(s))   UA Macroscopic with reflex to Microscopic and Culture - Lab Collect    Collection Time: 09/03/24  1:39 PM    Specimen: Urine, Catheter   Result Value Ref Range    Color Urine Yellow Colorless, Straw, Light Yellow, Yellow    Appearance Urine Clear Clear    Glucose Urine Negative Negative mg/dL    Bilirubin Urine Negative Negative    Ketones Urine Negative Negative mg/dL    Specific Gravity Urine 1.020 1.005 - 1.030    Blood Urine Moderate (A) Negative    pH Urine 6.0 5.0 - 7.0    Protein Albumin Urine 30 (A) Negative mg/dL    Urobilinogen Urine 0.2 0.2, 1.0 E.U./dL    Nitrite Urine Negative Negative    Leukocyte Esterase Urine Moderate (A) Negative   Urine Microscopic Exam    Collection Time: 09/03/24  1:39 PM   Result Value Ref Range    Bacteria Urine Many (A) None Seen /HPF    RBC Urine 5-10 (A) 0-2 /HPF /HPF    WBC Urine 25-50 (A) 0-5 /HPF /HPF    Squamous Epithelials Urine Few (A) None Seen /LPF    WBC Clumps Urine Present (A) None Seen /HPF    Mucus Urine Present (A) None Seen /LPF    Amorphous Crystals Urine Moderate (A) None Seen /HPF        GEN-alert, appropriate, in no apparent distress   HEENT-mucous membranes appear normal, mucous members are moist   CV-regular rate and rhythm with no murmur   RESP-lungs clear to auscultation   ABDOMINAL-mild distention, nontender, no palpable masses or organomegaly          Signed Electronically by: Chucho Espino MD

## 2024-09-06 LAB
BACTERIA UR CULT: ABNORMAL
BACTERIA UR CULT: ABNORMAL

## 2024-09-09 DIAGNOSIS — N39.0 URINARY TRACT INFECTION WITHOUT HEMATURIA, SITE UNSPECIFIED: Primary | ICD-10-CM

## 2024-09-09 RX ORDER — NITROFURANTOIN 25; 75 MG/1; MG/1
100 CAPSULE ORAL 2 TIMES DAILY
Qty: 20 CAPSULE | Refills: 0 | Status: SHIPPED | OUTPATIENT
Start: 2024-09-09 | End: 2024-09-19

## 2024-09-11 ENCOUNTER — TELEPHONE (OUTPATIENT)
Dept: UROLOGY | Facility: CLINIC | Age: 84
End: 2024-09-11
Payer: COMMERCIAL

## 2024-09-11 NOTE — TELEPHONE ENCOUNTER
.Writer called pt to discuss scheduling a urinalysis prior to their upcoming Urodynamics appointment.    Pt did  the phone, writer didn't leave a voicemail for the pt.     Pt has already had a urinalysis done in the last few days and is currently on a 10 day prescription of antibiotics that will either have just ended or still be ongoing the day of the procedure.    Leah Parkinson, EMT   09/11/2024

## 2024-09-16 NOTE — PROGRESS NOTES
PREPROCEDURE DIAGNOSES:    1. Urinary retention with hydronephrosis    POSTPROCEDURE DIAGNOSES:  -Small bladder capacity (114 mL) with relatively normal filling sensations.  -Fair bladder compliance without DO/DOI.  -No appreciable detrusor contraction during attempt to void.   -No volume voided, resulting in complete urinary retention.   -Diego replaced at the completion of the study.     PROCEDURE:    -Sterile urethral catheterization for measurement of postvoid residual urine volume.  -Complex filling cystometrogram with measurement of bladder and rectal pressures.  -Complex voiding cystometrogram with measurement of bladder and rectal pressures.  -Electromyography of the pelvic floor during urodynamics.  -Interpretation of urodynamics.      INDICATIONS FOR PROCEDURE:  Mr. Adam Talamantes is a pleasant 84 year old male who presents for video urodynamic assessment. UDS is requested today by Sanya Yadav PA-C to better characterize Mr. Adam Talamantes's voiding dysfunction.      DESCRIPTION OF PROCEDURE:  Risks, benefits, and alternatives to urodynamics were discussed with the patient and he wished to proceed.  Urodynamics are planned to better assess the primary etiology for Mr. Talamantes's urologic dysfunction.  After informed consent was obtained, the patient was taken to the procedure room where the study was initiated. Findings below.     Next a 7F double-lumen urodynamics catheter was inserted into the bladder under sterile technique via the urethra.  A 7F abdominal manometry catheter was placed in the rectum.  EMG pads were placed on both sides of the anal verge.  The bladder was filled with 100 mL of normal saline at 30 mL/minute and serial pressures were recorded.  With coughing there was an appropriate rise in vesical and abdominal pressures with no change in detrusor pressure, confirming good study catheter placement.    DURING THE FILLING PHASE:  First sensation: 61 mL.  First Desire: 64 mL.  Strong Desire: 99  mL.  Maximum Capacity: 114 mL.    Uninhibited detrusor contractions: None.  Compliance: Fair. PDet=~10 cmH20 at capacity. Compliance ratio of 11.4.  Continence: No DOI or JULIET.  EMG: Mild activity throughout filling.    DURING THE VOIDING PHASE:  Patient makes attempt to void but is unable to void any urine. Please note that the patient shifts in his seat shortly before permission given to void and his Pabd pressure abruptly changes, though his Pves pressure remains the same. There is no appreciable rise from the Pves catheter to suggest a detrusor contraction.     ASSESSMENT/PLAN:  Mr. Adam Talamantes is a pleasant 84 year old male who demonstrated the following findings today on urodynamic evaluation:    -Small bladder capacity (114 mL) with relatively normal filling sensations.  -Fair bladder compliance without DO/DOI.  -No appreciable detrusor contraction during attempt to void.   -No volume voided, resulting in complete urinary retention.   -Diego replaced at the completion of the study.     The patient will follow up as scheduled with Sanya to further discuss today's study results and make plans for how best to proceed.      - A single Bactrim was provided for UTI prophylaxis following completion of today's study per department protocol.  The risk of UTI with VUDS is low at ~2.5-3%.      Thank you for allowing me to participate in the care of Mr. Adam Talamantes and please don't hesitate to contact me with any questions or concerns.      This procedure was performed under a collaborative agreement with Dr. Rigoberto Bonilla, Professor and  of Urology, [facility].    AQUILES Patel, CNP  Department of Urology

## 2024-09-18 ENCOUNTER — ALLIED HEALTH/NURSE VISIT (OUTPATIENT)
Dept: UROLOGY | Facility: CLINIC | Age: 84
End: 2024-09-18
Payer: COMMERCIAL

## 2024-09-18 VITALS — HEART RATE: 94 BPM | SYSTOLIC BLOOD PRESSURE: 123 MMHG | OXYGEN SATURATION: 93 % | DIASTOLIC BLOOD PRESSURE: 73 MMHG

## 2024-09-18 DIAGNOSIS — R33.9 URINARY RETENTION: Primary | ICD-10-CM

## 2024-09-18 LAB
ALBUMIN UR-MCNC: >=300 MG/DL
APPEARANCE UR: CLEAR
BILIRUB UR QL STRIP: NEGATIVE
COLOR UR AUTO: YELLOW
GLUCOSE UR STRIP-MCNC: 100 MG/DL
HGB UR QL STRIP: ABNORMAL
KETONES UR STRIP-MCNC: NEGATIVE MG/DL
LEUKOCYTE ESTERASE UR QL STRIP: ABNORMAL
NITRATE UR QL: NEGATIVE
PH UR STRIP: 6.5 [PH] (ref 5–8)
SP GR UR STRIP: 1.02 (ref 1–1.03)
UROBILINOGEN UR STRIP-ACNC: 0.2 E.U./DL

## 2024-09-18 PROCEDURE — 81003 URINALYSIS AUTO W/O SCOPE: CPT | Performed by: PATHOLOGY

## 2024-09-18 PROCEDURE — 51784 ANAL/URINARY MUSCLE STUDY: CPT | Performed by: NURSE PRACTITIONER

## 2024-09-18 PROCEDURE — 51726 COMPLEX CYSTOMETROGRAM: CPT | Performed by: NURSE PRACTITIONER

## 2024-09-18 RX ORDER — SULFAMETHOXAZOLE/TRIMETHOPRIM 800-160 MG
1 TABLET ORAL ONCE
Status: COMPLETED | OUTPATIENT
Start: 2024-09-18 | End: 2024-09-18

## 2024-09-18 RX ADMIN — SULFAMETHOXAZOLE AND TRIMETHOPRIM 1 TABLET: 800; 160 TABLET ORAL at 09:21

## 2024-09-18 NOTE — PROGRESS NOTES
Urodynamic study      There were no vitals taken for this visit. There is no height or weight on file to calculate BMI.    Patient Active Problem List   Diagnosis    Esophageal reflux    Dyslipidemia    Nephrolithiasis    Pseudogout    Latent Tuberculosis    Generalized Osteoarthritis Of The Hand    Lumbar Disc Degeneration    Insomnia, unspecified type    Chronic Gout    CKD (chronic kidney disease) stage 3, GFR 30-59 ml/min (H)    Elevated PSA    Prostate nodule    Cataracts, bilateral    Constipation, unspecified constipation type    Bilateral chronic knee pain    Chronic right shoulder pain    Essential hypertension with goal blood pressure less than 140/90    BPH (benign prostatic hyperplasia)    Chronic gout    Coronary artery disease due to lipid rich plaque    Right lower quadrant abdominal pain    Colitis    Primary osteoarthritis involving multiple joints    Urinary incontinence    ACE-inhibitor cough    Vertigo    Essential hypertension    Vertebral artery occlusion, left    Cerebellar stroke (H)    Stroke (H)    Hypomagnesemia    Ataxic gait    Urinary retention    Basilar artery stenosis    Cerebral vascular disease    Colon wall thickening    Current moderate episode of major depressive disorder without prior episode (H)    Dysuria    Generalized weakness    Near syncope    Urinary tract infection without hematuria, site unspecified    Type 2 diabetes mellitus with chronic kidney disease, with long-term current use of insulin, unspecified CKD stage (H)    Candida glabrata infection    Encounter for long-term (current) use of antibiotics    Hyperkalemia    Acute on chronic renal insufficiency    Complicated UTI (urinary tract infection)       Allergies   Allergen Reactions    Jardiance [Empagliflozin]      Had complicated uti on jardiance    Lisinopril Cough       Current Outpatient Medications   Medication Sig Dispense Refill    acetaminophen (TYLENOL) 500 MG tablet Take 1 tablet (500 mg) by mouth  every 4 hours as needed for mild pain 100 tablet 3    allopurinol (ZYLOPRIM) 100 MG tablet TAKE 2 TABLETS(200 MG) BY MOUTH DAILY 180 tablet 3    apixaban ANTICOAGULANT (ELIQUIS ANTICOAGULANT) 5 MG tablet Take 1 tablet (5 mg) by mouth 2 times daily 180 tablet 3    calcium carbonate (TUMS) 500 MG chewable tablet Take 1 chew tab by mouth daily as needed for heartburn      Continuous Blood Gluc Sensor (FREESTYLE SUSANNAH 2 SENSOR) MISC 1 each every 14 days Use 1 sensor every 14 days. Use to read blood sugars per 's instructions. 2 each 11    Continuous Glucose  (FREESTYLE SUSANNAH 2 READER) TONIE USE TO READ BLOOD SUGARS PER MANUFACTRUERS INSTRUCTIONS 1 each 0    dextromethorphan-quiNIDine (NUEDEXTA) 20-10 MG capsule Take 1 capsule by mouth every 12 hours 60 capsule 11    DULoxetine (CYMBALTA) 30 MG capsule Take 1 capsule by mouth every morning and 2 capsules every evening 135 capsule 6    HYDROcodone-acetaminophen (NORCO) 5-325 MG tablet Take 1 tablet by mouth every 6 hours as needed for severe pain 30 tablet 0    hydrOXYzine HCl (ATARAX) 25 MG tablet TAKE 1 TABLET(25 MG) BY MOUTH EVERY 8 HOURS AS NEEDED FOR ITCHING 60 tablet 1    insulin aspart (NOVOLOG FLEXPEN) 100 UNIT/ML pen Inject 12 units before breakfast, and 6 units under the skin before lunch and dinner 15 mL 3    insulin glargine (LANTUS PEN) 100 UNIT/ML pen Inject 30 Units subcutaneously every 24 hours 30 mL 3    insulin pen needle (BD PEN NEEDLE SALVATORE 2ND GEN) 32G X 4 MM miscellaneous USE 1 PEN NEEDLE FOUR TIMES DAILY OR AS DIRECTED 400 each 1    JANUVIA 50 MG tablet TAKE 1 TABLET(50 MG) BY MOUTH DAILY 90 tablet 3    meclizine (ANTIVERT) 12.5 MG tablet Take 1 tablet (12.5 mg) by mouth 3 times daily as needed for dizziness 60 tablet 6    metoprolol succinate ER (TOPROL XL) 100 MG 24 hr tablet TAKE 1/2 TABLET BY MOUTH DAILY 45 tablet 3    nitroFURantoin macrocrystal-monohydrate (MACROBID) 100 MG capsule Take 1 capsule (100 mg) by mouth 2 times  daily for 10 days. 20 capsule 0    pantoprazole (PROTONIX) 40 MG EC tablet Take 1 tablet (40 mg) by mouth every morning (before breakfast) 90 tablet 3    polyethylene glycol (MIRALAX) 17 GM/Dose powder MIX 17 GRAMS IN LIQUID AND TAKE BY MOUTH ONCE DAILY AS NEEDED FOR CONSTIPATION 510 g 6    rosuvastatin (CRESTOR) 20 MG tablet TAKE 1 TABLET(20 MG) BY MOUTH AT BEDTIME 90 tablet 2    SENNA-docusate sodium (SENNA S) 8.6-50 MG tablet Take 1 tablet by mouth 2 times daily as needed (for constipation) 60 tablet 11    tamsulosin (FLOMAX) 0.4 MG capsule TAKE 1 CAPSULE BY MOUTH EVERY DAY 90 capsule 2    traZODone (DESYREL) 50 MG tablet Take 1-2 tablets ( mg) by mouth nightly as needed for sleep 180 tablet 3    triamcinolone (KENALOG) 0.1 % external cream Apply topically 2 times daily 80 g 3       Social History     Tobacco Use    Smoking status: Former     Current packs/day: 0.00     Types: Cigarettes     Quit date: 3/10/2000     Years since quittin.5     Passive exposure: Never    Smokeless tobacco: Former    Tobacco comments:     no passive exposure   Vaping Use    Vaping status: Never Used   Substance Use Topics    Alcohol use: No    Drug use: No       Invasive Procedure Safety Checklist:    Procedure: Urodynamics    Action: Complete sections and checkboxes as appropriate.  Pre-procedure:  1. Patient ID Verified with 2 identifiers ([place] and  or MRN) : YES    2. Procedure and site verified with patient/designee (when able) : YES    3. Accurate consent documentation in medical record : YES    4. H&P (or appropriate assessment) documented in medical record : N/A  H&P must be up to 30 days prior to procedure an updated within 24 hours of Procedure as applicable.     5. Relevant diagnostic and radiology test results appropriately labeled and displayed as applicable : YES    6. Blood products, implants, devices, and/or special equipment available for the procedure as applicable : YES    7. Procedure site(s) marked  with provider initials [Exclusions: none] : NO    8. Marking not required. Reason : Yes  Procedure does not require site marking    Time Out:     Time-Out performed immediately prior to starting procedure, including verbal and active participation of all team members addressing: YES    1. Correct patient identity.  2. Confirmed that the correct side and site are marked.  3. An accurate procedure to be done.  4. Agreement on the procedure to be done.  5. Correct patient position.  6. Relevant images and results are properly labeled and appropriately displayed.  7. The need to administer antibiotics or fluids for irrigation purposes during the procedure as applicable.  8. Safety precautions based on patient history or medication use.    During Procedure: Verification of correct person, site, and procedure occurs any time the responsibility for care of the patient is transferred to another member of the care team.      The following medication was given:     MEDICATION: Bactrim (Trimethoprim / Sulfamethoxazole)  ROUTE: PO  SITE: Medication was given orally  DOSE: 800mg/160mg  LOT #: [id number]  : in2nite   EXPIRATION DATE: 01/31/2026  NDC#: [id number]   Was there drug waste? No    Prior to medication administration, verified patient identity using patient's name and date of birth.  Due to medication administration, patient instructed to remain in clinic for 15 minutes  afterwards, and to report any adverse reaction to me immediately.   Prior to administration, verified patient identity using patient's name and date of birth.  Due to administration, patient instructed to remain in clinic for 15 minutes  afterwards, and to report any adverse reaction to me immediately.    Drug Amount Wasted:  None.  Vial/Syringe: Single dose vial    The following medication was given: See Above    Removal:  16 Fr straight tipped latex parrish catheter removed from urethral meatus without difficulty after  removing 8cc of fluid from the balloon.    Insertion: Per Angelica Simpson CNP  16 Fr straight tipped latex straight catheter inserted into urethral meatus in the usual sterile fashion without difficulty.  Received 10 ml yellow urine output.     Patient did tolerate procedure well.    Patient instructed as to where to call or go for pain, fever, leakage, or decreased urine flow.     This service provided today was under the direct supervision of Angelica Simpson, who was available if needed.    Nicole Huitron RN  9/18/2024  9:18 AM

## 2024-09-24 ENCOUNTER — PRE VISIT (OUTPATIENT)
Dept: UROLOGY | Facility: CLINIC | Age: 84
End: 2024-09-24
Payer: COMMERCIAL

## 2024-09-25 ENCOUNTER — PRE VISIT (OUTPATIENT)
Dept: UROLOGY | Facility: CLINIC | Age: 84
End: 2024-09-25
Payer: COMMERCIAL

## 2024-09-25 NOTE — TELEPHONE ENCOUNTER
Reason for visit: UDS follow up     Relevant information: no appreciable detrusor contraction dring attempt to void, small bladder capacity (114mL)    Records/imaging/labs/orders: all records available    At Rooming:  Standard rooming      Wily Combs  9/25/2024  4:30 PM

## 2024-09-26 ENCOUNTER — OFFICE VISIT (OUTPATIENT)
Dept: UROLOGY | Facility: CLINIC | Age: 84
End: 2024-09-26
Payer: COMMERCIAL

## 2024-09-26 DIAGNOSIS — R33.9 URINARY RETENTION: Primary | ICD-10-CM

## 2024-09-26 RX ORDER — SULFAMETHOXAZOLE/TRIMETHOPRIM 800-160 MG
1 TABLET ORAL ONCE
Status: CANCELLED | OUTPATIENT
Start: 2024-09-26 | End: 2024-09-26

## 2024-09-26 RX ORDER — SULFAMETHOXAZOLE/TRIMETHOPRIM 800-160 MG
1 TABLET ORAL ONCE
Status: COMPLETED | OUTPATIENT
Start: 2024-09-26 | End: 2024-09-26

## 2024-09-26 RX ADMIN — SULFAMETHOXAZOLE AND TRIMETHOPRIM 1 TABLET: 800; 160 TABLET ORAL at 15:46

## 2024-09-26 NOTE — PROGRESS NOTES
Adam Talamantes comes into clinic today at the request of Mic Yadav PA-C with the diagnosis of urinary retention for a catheter exchange.    Order has been verified: Yes.    Allergies   Allergen Reactions    Jardiance [Empagliflozin]      Had complicated uti on jardiance    Lisinopril Cough       The following medication was given:     MEDICATION: Bactrim (Trimethoprim / Sulfamethoxazole)  ROUTE: PO  SITE: Medication was given orally  DOSE: 800mg/160mg  LOT #: [id number]  : [employer]   EXPIRATION DATE: 1/31/26  NDC#: [id number]   Was there drug waste? No    Prior to medication administration, verified patient identity using patient's name and date of birth.  Due to medication administration, patient instructed to remain in clinic for 15 minutes  afterwards, and to report any adverse reaction to me immediately.    Removal:  16 Fr straight tipped silicone parrish catheter removed from urethral meatus without difficulty after removing 9.5 mL of fluid from the balloon.    Insertion:  16 Fr straight tipped latex parrish catheter inserted into urethral meatus in the usual sterile fashion without difficulty.  Received > 10 ml yellow positive urine return.   Balloon filled with 10 mL sterile H2O after positive urine return.  Catheter secured in place with leg strap: Yes.     Patient did tolerate procedure well.     Patient instructed as to where to call or go for pain, fever, leakage, or decreased urine flow.     This service provided today was under the direct supervision of Dr. Mcgrath, who was available if needed.    Birdie Romeo RN   9/26/2024  3:11 PM

## 2024-09-28 NOTE — PLAN OF CARE
Status: Pt admitted to outside hospital for stroke work up, transferred to [facility] for possible basilar artery stenting  Vitals: VSS on RA, HTN w/n parameters   Neuros: Kayce speaking. Disoriented to time, need choices. Garbled speech.   IV: PIV SL   Labs/Electrolytes: All redraws scheduled for AM. BG ACHS.   Resp/trach: LS clear.   Diet: Level 7 w/ thins, good intake, per family. Eating 90% of baseline.   Bowel status: BM 2/10.  : Bladder scan Q4, and after voiding. No straight caths this shift. Blood clots w/ each void.   Skin: Intact  Pain: PRN Tylenol given this shift.   Activity: A1 w/ GB and walker. Up to chair x 1.   Social: Son at bedside, helpful for  needs.   Updates this shift: Hep gtt stopped this shift at 1415.   Plan: Continue to monitor and follow POC.      Yes

## 2024-10-07 DIAGNOSIS — K59.00 CONSTIPATION, UNSPECIFIED CONSTIPATION TYPE: ICD-10-CM

## 2024-10-07 RX ORDER — DOCUSATE SODIUM 50 MG AND SENNOSIDES 8.6 MG 8.6; 5 MG/1; MG/1
1 TABLET, FILM COATED ORAL 2 TIMES DAILY PRN
Qty: 60 TABLET | Refills: 9 | Status: SHIPPED | OUTPATIENT
Start: 2024-10-07 | End: 2024-10-14

## 2024-10-09 DIAGNOSIS — M1A.09X0 CHRONIC GOUT OF MULTIPLE SITES, UNSPECIFIED CAUSE: ICD-10-CM

## 2024-10-09 RX ORDER — ALLOPURINOL 100 MG/1
TABLET ORAL
Qty: 180 TABLET | Refills: 3 | Status: SHIPPED | OUTPATIENT
Start: 2024-10-09

## 2024-10-14 ENCOUNTER — OFFICE VISIT (OUTPATIENT)
Dept: PHARMACY | Facility: CLINIC | Age: 84
End: 2024-10-14
Payer: COMMERCIAL

## 2024-10-14 VITALS — OXYGEN SATURATION: 98 % | DIASTOLIC BLOOD PRESSURE: 80 MMHG | SYSTOLIC BLOOD PRESSURE: 120 MMHG | HEART RATE: 86 BPM

## 2024-10-14 DIAGNOSIS — I10 ESSENTIAL HYPERTENSION WITH GOAL BLOOD PRESSURE LESS THAN 140/90: ICD-10-CM

## 2024-10-14 DIAGNOSIS — K59.00 CONSTIPATION, UNSPECIFIED CONSTIPATION TYPE: ICD-10-CM

## 2024-10-14 DIAGNOSIS — R52 PAIN: ICD-10-CM

## 2024-10-14 DIAGNOSIS — E11.65 TYPE 2 DIABETES MELLITUS WITH HYPERGLYCEMIA, WITHOUT LONG-TERM CURRENT USE OF INSULIN (H): Primary | ICD-10-CM

## 2024-10-14 DIAGNOSIS — K21.9 GASTROESOPHAGEAL REFLUX DISEASE, UNSPECIFIED WHETHER ESOPHAGITIS PRESENT: ICD-10-CM

## 2024-10-14 DIAGNOSIS — L30.9 DERMATITIS: ICD-10-CM

## 2024-10-14 DIAGNOSIS — M1A.00X0 CHRONIC GOUTY ARTHROPATHY: ICD-10-CM

## 2024-10-14 DIAGNOSIS — G47.00 INSOMNIA, UNSPECIFIED TYPE: ICD-10-CM

## 2024-10-14 DIAGNOSIS — I63.22 CEREBROVASCULAR ACCIDENT (CVA) DUE TO STENOSIS OF BASILAR ARTERY (H): ICD-10-CM

## 2024-10-14 PROCEDURE — 99606 MTMS BY PHARM EST 15 MIN: CPT | Performed by: PHARMACIST

## 2024-10-14 PROCEDURE — 99607 MTMS BY PHARM ADDL 15 MIN: CPT | Performed by: PHARMACIST

## 2024-10-14 RX ORDER — AMOXICILLIN 250 MG
2 CAPSULE ORAL 2 TIMES DAILY PRN
Qty: 120 TABLET | Refills: 9 | Status: SHIPPED | OUTPATIENT
Start: 2024-10-14

## 2024-10-14 RX ORDER — TRIAMCINOLONE ACETONIDE 1 MG/G
CREAM TOPICAL 2 TIMES DAILY
Qty: 80 G | Refills: 0 | Status: SHIPPED | OUTPATIENT
Start: 2024-10-14

## 2024-10-14 NOTE — Clinical Note
I did slightly increase patients dose of senna/docusate today, otherwise patient doing quite well.   I told patient to monitor closely for dizziness and let you know if not improving. Could consider decreasing/stopping metoprolol, and potentially opt for low dose ARB for elevated microalbuminuria in the future if appropriate.   Thanks Charly

## 2024-10-14 NOTE — PROGRESS NOTES
Medication Therapy Management (MTM) Encounter    ASSESSMENT:                            Medication Adherence/Access: See below for considerations.    1. Type 2 diabetes mellitus with hyperglycemia, without long-term current use of insulin (H)  A1c at goal < 8%. Patient is meeting goal of > 50% time in target with continuous glucose monitoring. Reasonable to maintain lower dose januvia given fluctuating renal function.  Do not recommend SGLT2 inhibitor given complicated UTI with Jardiance in the past.  Could consider switching Januvia to GLP-1 agonist, though with patient's low appetite, I do not recommend change at this time.    2. Essential hypertension with goal blood pressure less than 140/90  BP at goal <130/80 mmHg, reasonable to continue current medications today.  If dizziness persists or worsens could consider further decreasing dose of metoprolol.  Patient not currently taking ACE or ARB, could consider switching beta-blocker to low-dose ARB in the future for kidney protection given microalbuminuria.    3. 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    4. Constipation, unspecified constipation type  Patient would benefit from dose increase of senna/docusate, dose increase sent to pharmacy today.    5. Insomnia, unspecified type  Stable.    6. Gastroesophageal reflux disease, unspecified whether esophagitis present  Stable, though could consider reducing to as needed H2 blocker in the future, rather than continuing as needed PPI.     7. Chronic gouty arthropathy  Stable.    8. Pain  Stable.    9. Dermatitis  Recommend patient use Benadryl cream in addition to current medications as needed for itching, encouraged patient to follow-up with PCP at next appointment.     PLAN:                            Increase dose: Senna/docusate 2 tablets twice daily as needed  Use Benadryl cream as needed for itching    Follow-up: Return in about 13 weeks (around  1/13/2025).    SUBJECTIVE/OBJECTIVE:                          Adam Talamantes is a 84 year old male seen for a follow-up visit. Patient was accompanied by Daughters .  (ID# [id number]) was used during today's visit.       Reason for visit: MTM follow up.    Allergies/ADRs: Reviewed in chart  Past Medical History: Reviewed in chart  Tobacco: He reports that he quit smoking about 24 years ago. His smoking use included cigarettes. He has never been exposed to tobacco smoke. He has quit using smokeless tobacco.  Alcohol: none    Medication Adherence/Access: His daughter, Dimitri Talamantes, helps him with medications, set up in twice daily pillbox.  Reports no missed medication doses.   Brings with medication vials today and meter, no pillbox.    Diabetes   Lantus 30 units daily at noon   Novolog 12 units with breakfast, 6 units with lunch and dinner (8 am, noon, 5 pm)  Januvia 50 mg daily in AM   Patient is not experiencing side effects.  Daughter helps with insulin injections, patient unable to do this on his own, reports no missed doses.   Current diabetes symptoms: No symptoms reported. Polyuria, but following with urology, now with catheter.  Diet/Exercise: Eating 3 very meals per day,  never skipping meals.  Declining CDE in the past. Portion of food varies, usually small. Diet has not changed. Reports patient already has low appetite.   Med Hx: Jardiance (UTI, stopped inpatient)     Eye exam is due.   Foot exam: due  Blood sugar monitoring: Continuous Glucose Monitoring with Ray 2; see below. Checking four times daily.       Hypertension   Metoprolol succinate 100 mg - 1/2 tablet (50 mg) daily  Meclizine 12.5 mg 3 times daily as needed - does not bring today but still using as needed  Patient reports the following medication side effects: dizziness every day, thinks its been getting worse for him lately too. Been like this for about 2 weeks. No falls due to dizziness.    Patient does not self-monitor blood pressure  sometimes, unable to address today.      Hyperlipidemia /CVA  Rosuvastatin 20 mg daily  Eliquis 5 mg twice daily  Patient reports no significant myalgias or other side effects. No signs of bleeding or bruising.   The ASCVD Risk score (Cat SONG, et al., 2019) failed to calculate for the following reasons:    The 2019 ASCVD risk score is only valid for ages 40 to 79    The patient has a prior MI or stroke diagnosis     Constipation:   Senna/Docusate sodium 8.6/100 mg 2 tablets in the morning daily  Miralax 17 g daily as needed   Reports sometimes using prune juice because he still struggles with constipation. Sometimes having BM every 2 days, though would prefer to have daily BM.     Insomnia/Depression:  Duloxetine 30 mg in the morning, 60 mg in the evening   Trazodone 50 mg 1-2 tab daily at bedtime   Nuedexta (dextromethorphan/quinidine) 20/10 mg twice daily (per neurology)  Sleep varies, been taking 2 tablets of trazodone at night.   Thinks mood is more stable now with current medication. Finds nuedexta to be helpful, has not cried since starting this medication.     GERD    Pantoprazole 40 mg mg once daily, as needed  Tums as needed - still has this at home  Patient reports no current symptoms. No complaints of symptoms for a couple months. Patient has ambrose      Gout:   Allopurinol 200 mg daily   Reports no concerns today, no recent flares of gout.     Pain/mood:   Acetaminophen 500 mg 2 tablets daily as needed for headaches or back pain  Hydrocodone/acetaminophen - has had since June, only using for severe pain, about once monthly with catheter change  Reports pain has been stable with current therapy, no concerns.     Dermatitis/Itching:    Hydroxyzine  25 mg every 8 hours as needed at night time - only taking as needed  Triamcinolone cream as needed  Finds effective, can also help with sleep at night if he is scratching.  Has a rash on his thigh that he has been scratching.     Today's Vitals: /80    Pulse 86   SpO2 98%   ----------------      I spent 30 minutes with this patient today. All changes were made via collaborative practice agreement with Chucho Espino MD. A copy of the visit note was provided to the patient's provider(s).    A summary of these recommendations was given to the patient.    Pushpa Philip, PharmD, [facility]  Medication Therapy Management Pharmacist     Medication Therapy Recommendations  Constipation, unspecified constipation type    Current Medication: senna-docusate (STIMULANT LAXATIVE) 8.6-50 MG tablet   Rationale: Dose too low - Dosage too low - Effectiveness   Recommendation: Increase Dose   Status: Accepted per CPA         Dermatitis    Rationale: Synergistic therapy - Needs additional medication therapy - Indication   Recommendation: Start Medication - Benadryl Extra Strength 2-0.1 % Crea   Status: Accepted - no CPA Needed

## 2024-10-14 NOTE — PATIENT INSTRUCTIONS
"Recommendations from today's MTM visit:                                                         Increase dose: Senna/docusate 2 tablets twice daily as needed  Use Benadryl cream as needed for itching    Follow-up: Return in about 13 weeks (around 1/13/2025).    It was great speaking with you today.  I value your experience and would be very thankful for your time in providing feedback in our clinic survey. In the next few days, you may receive an email or text message from [employer] with a link to a survey related to your  clinical pharmacist.\"     To schedule another MTM appointment, please call the clinic directly or you may call the MTM scheduling line at [phone].    My Clinical Pharmacist's contact information:                                                      Please feel free to contact me with any questions or concerns you have.      Pushpa Philip, PharmD, [facility]  Medication Therapy Management Pharmacist    "

## 2024-10-15 NOTE — PROGRESS NOTES
Subjective     REASON FOR VISIT  Urodynamic study follow-up     HISTORY OF PRESENT ILLNESS  Mr. Talamantes is a pleasant 84 year old male who I am speaking with today in regards to his recently completed urodynamic study in the setting of recurrent urinary retention.  I had last met with Mr. Talamantes on 6/10/2024 at which time the plan was to complete a urodynamic study to determine what was going on with his bladder and if the problem was with an incomplete TURP or if his bladder was not quite awake yet.  The urodynamic study was ultimately completed on 9/18/2024.    Today:  Mr. Talamantes states that overall he is doing well with the catheter and it is not bothersome  Not currently experiencing any spasms or bypass    Objective     PHYSICAL EXAMINATION  General: Alert, oriented, no acute distress    TESTING  Urodynamic study from 9/18/2024 by Angelica Simpson:    ASSESSMENT/PLAN:  Mr. Adam Talamantes is a pleasant 84 year old male who demonstrated the following findings today on urodynamic evaluation:     -Small bladder capacity (114 mL) with relatively normal filling sensations.  -Fair bladder compliance without DO/DOI.  -No appreciable detrusor contraction during attempt to void.   -No volume voided, resulting in complete urinary retention.   -Diego replaced at the completion of the study.     Assessment & Plan    Urinary retention  Atonic bladder    It was my pleasure to meet with Mr. Talamantes and his son in the clinic in follow-up for his urinary retention workup.  After reviewing his clinical history, we discussed that the results of his urodynamic study show that he does not have any bladder squeeze.  The etiology behind this lack of bladder squeeze is not 100% clear, but given that he has been struggling with this for many months I am concerned it may be permanent.  We reviewed that this means he would need to manage his atonic bladder with catheterization moving forward.  We discussed the 3 different types of catheter options  including clean intermittent catheterization, indwelling Diego catheterization changed monthly, or suprapubic tube exchanged monthly.  For now Mr. Talamantes feels stable while utilizing the Diego catheter and we will plan to continue this way.  We will have him schedule monthly exchanges.    In regards to follow-up, I we will plan to see him back once a year with a renal ultrasound just beforehand to be sure we are not seeing any evidence of hydronephrosis.    Mr. Talamantes expressed understanding and agreement to the above discussion and plan and all of his questions were answered to his satisfaction.     PLAN  Monthly catheter exchanges on the nursing calendar  Renal ultrasound in 1 year with follow-up visit with me shortly afterwards    SIGNED:    Mic Yadav PA-C

## 2024-10-16 ENCOUNTER — OFFICE VISIT (OUTPATIENT)
Dept: UROLOGY | Facility: CLINIC | Age: 84
End: 2024-10-16
Payer: COMMERCIAL

## 2024-10-16 VITALS
OXYGEN SATURATION: 95 % | SYSTOLIC BLOOD PRESSURE: 133 MMHG | BODY MASS INDEX: 29.27 KG/M2 | WEIGHT: 155 LBS | HEIGHT: 61 IN | HEART RATE: 95 BPM | DIASTOLIC BLOOD PRESSURE: 86 MMHG

## 2024-10-16 DIAGNOSIS — N31.2 ATONIC BLADDER: ICD-10-CM

## 2024-10-16 DIAGNOSIS — R33.9 URINARY RETENTION: Primary | ICD-10-CM

## 2024-10-16 PROCEDURE — 99213 OFFICE O/P EST LOW 20 MIN: CPT | Performed by: STUDENT IN AN ORGANIZED HEALTH CARE EDUCATION/TRAINING PROGRAM

## 2024-10-16 ASSESSMENT — PAIN SCALES - GENERAL: PAINLEVEL: NO PAIN (0)

## 2024-10-16 NOTE — LETTER
10/16/2024       RE: Adam Talamantes  [address]  [facility] [address]     Dear Colleague,    Thank you for referring your patient, Adam Talamantes, to the [facility]. Please see a copy of my visit note below.    Subjective    REASON FOR VISIT  Urodynamic study follow-up     HISTORY OF PRESENT ILLNESS  Mr. Talamantes is a pleasant 84 year old male who I am speaking with today in regards to his recently completed urodynamic study in the setting of recurrent urinary retention.  I had last met with Mr. Talamantes on 6/10/2024 at which time the plan was to complete a urodynamic study to determine what was going on with his bladder and if the problem was with an incomplete TURP or if his bladder was not quite awake yet.  The urodynamic study was ultimately completed on 9/18/2024.    Today:  Mr. Talamantes states that overall he is doing well with the catheter and it is not bothersome  Not currently experiencing any spasms or bypass    Objective    PHYSICAL EXAMINATION  General: Alert, oriented, no acute distress    TESTING  Urodynamic study from 9/18/2024 by Angelica Simpson:    ASSESSMENT/PLAN:  Mr. Adam Talamantes is a pleasant 84 year old male who demonstrated the following findings today on urodynamic evaluation:     -Small bladder capacity (114 mL) with relatively normal filling sensations.  -Fair bladder compliance without DO/DOI.  -No appreciable detrusor contraction during attempt to void.   -No volume voided, resulting in complete urinary retention.   -Diego replaced at the completion of the study.     Assessment & Plan   Urinary retention  Atonic bladder    It was my pleasure to meet with Mr. Talamantes and his son in the clinic in follow-up for his urinary retention workup.  After reviewing his clinical history, we discussed that the results of his urodynamic study show that he does not have any bladder squeeze.  The etiology behind this lack of bladder  squeeze is not 100% clear, but given that he has been struggling with this for many months I am concerned it may be permanent.  We reviewed that this means he would need to manage his atonic bladder with catheterization moving forward.  We discussed the 3 different types of catheter options including clean intermittent catheterization, indwelling Diego catheterization changed monthly, or suprapubic tube exchanged monthly.  For now Mr. Talamantes feels stable while utilizing the Diego catheter and we will plan to continue this way.  We will have him schedule monthly exchanges.    In regards to follow-up, I we will plan to see him back once a year with a renal ultrasound just beforehand to be sure we are not seeing any evidence of hydronephrosis.    Mr. Talamantes expressed understanding and agreement to the above discussion and plan and all of his questions were answered to his satisfaction.     PLAN  Monthly catheter exchanges on the nursing calendar  Renal ultrasound in 1 year with follow-up visit with me shortly afterwards    SIGNED:    Mic Yadav PA-C      Again, thank you for allowing me to participate in the care of your patient.      Sincerely,    Mic Yadav PA-C

## 2024-10-16 NOTE — NURSING NOTE
"Chief Complaint   Patient presents with    Consult     Pt states is here for UDS follow up       Blood pressure 133/86, pulse 95, height 1.549 m (5' 1\"), weight 70.3 kg (155 lb), SpO2 95%. Body mass index is 29.29 kg/m .    Patient Active Problem List   Diagnosis    Esophageal reflux    Dyslipidemia    Nephrolithiasis    Pseudogout    Latent Tuberculosis    Generalized Osteoarthritis Of The Hand    Lumbar Disc Degeneration    Insomnia, unspecified type    Chronic Gout    CKD (chronic kidney disease) stage 3, GFR 30-59 ml/min (H)    Elevated PSA    Prostate nodule    Cataracts, bilateral    Constipation, unspecified constipation type    Bilateral chronic knee pain    Chronic right shoulder pain    Essential hypertension with goal blood pressure less than 140/90    BPH (benign prostatic hyperplasia)    Chronic gout    Coronary artery disease due to lipid rich plaque    Right lower quadrant abdominal pain    Colitis    Primary osteoarthritis involving multiple joints    Urinary incontinence    ACE-inhibitor cough    Vertigo    Essential hypertension    Vertebral artery occlusion, left    Cerebellar stroke (H)    Stroke (H)    Hypomagnesemia    Ataxic gait    Urinary retention    Basilar artery stenosis    Cerebral vascular disease    Colon wall thickening    Current moderate episode of major depressive disorder without prior episode (H)    Dysuria    Generalized weakness    Near syncope    Urinary tract infection without hematuria, site unspecified    Type 2 diabetes mellitus with chronic kidney disease, with long-term current use of insulin, unspecified CKD stage (H)    Candida glabrata infection    Encounter for long-term (current) use of antibiotics    Hyperkalemia    Acute on chronic renal insufficiency    Complicated UTI (urinary tract infection)       Allergies   Allergen Reactions    Jardiance [Empagliflozin]      Had complicated uti on jardiance    Lisinopril Cough       Current Outpatient Medications "   Medication Sig Dispense Refill    acetaminophen (TYLENOL) 500 MG tablet Take 1 tablet (500 mg) by mouth every 4 hours as needed for mild pain 100 tablet 3    allopurinol (ZYLOPRIM) 100 MG tablet TAKE 2 TABLETS(200 MG) BY MOUTH DAILY 180 tablet 3    apixaban ANTICOAGULANT (ELIQUIS ANTICOAGULANT) 5 MG tablet Take 1 tablet (5 mg) by mouth 2 times daily 180 tablet 3    calcium carbonate (TUMS) 500 MG chewable tablet Take 1 chew tab by mouth daily as needed for heartburn      Continuous Blood Gluc Sensor (FREESTYLE SUSANNAH 2 SENSOR) [facility] 1 each every 14 days Use 1 sensor every 14 days. Use to read blood sugars per 's instructions. 2 each 11    Continuous Glucose  (FREESTYLE SUSANNAH 2 READER) TONIE USE TO READ BLOOD SUGARS PER MANUFACTRUERS INSTRUCTIONS 1 each 0    dextromethorphan-quiNIDine (NUEDEXTA) 20-10 MG capsule Take 1 capsule by mouth every 12 hours 60 capsule 11    DULoxetine (CYMBALTA) 30 MG capsule Take 1 capsule by mouth every morning and 2 capsules every evening 135 capsule 6    HYDROcodone-acetaminophen (NORCO) 5-325 MG tablet Take 1 tablet by mouth every 6 hours as needed for severe pain 30 tablet 0    hydrOXYzine HCl (ATARAX) 25 MG tablet TAKE 1 TABLET(25 MG) BY MOUTH EVERY 8 HOURS AS NEEDED FOR ITCHING 60 tablet 1    insulin aspart (NOVOLOG FLEXPEN) 100 UNIT/ML pen Inject 12 units before breakfast, and 6 units under the skin before lunch and dinner 15 mL 3    insulin glargine (LANTUS PEN) 100 UNIT/ML pen Inject 30 Units subcutaneously every 24 hours 30 mL 3    insulin pen needle (BD PEN NEEDLE SALVATORE 2ND GEN) 32G X 4 MM miscellaneous USE 1 PEN NEEDLE FOUR TIMES DAILY OR AS DIRECTED 400 each 1    JANUVIA 50 MG tablet TAKE 1 TABLET(50 MG) BY MOUTH DAILY 90 tablet 3    meclizine (ANTIVERT) 12.5 MG tablet Take 1 tablet (12.5 mg) by mouth 3 times daily as needed for dizziness 60 tablet 6    metoprolol succinate ER (TOPROL XL) 100 MG 24 hr tablet TAKE 1/2 TABLET BY MOUTH DAILY 45 tablet 3     pantoprazole (PROTONIX) 40 MG EC tablet Take 1 tablet (40 mg) by mouth every morning (before breakfast) 90 tablet 3    polyethylene glycol (MIRALAX) 17 GM/Dose powder MIX 17 GRAMS IN LIQUID AND TAKE BY MOUTH ONCE DAILY AS NEEDED FOR CONSTIPATION 510 g 6    rosuvastatin (CRESTOR) 20 MG tablet TAKE 1 TABLET(20 MG) BY MOUTH AT BEDTIME 90 tablet 2    senna-docusate (STIMULANT LAXATIVE) 8.6-50 MG tablet Take 2 tablets by mouth 2 times daily as needed for constipation. 120 tablet 9    tamsulosin (FLOMAX) 0.4 MG capsule TAKE 1 CAPSULE BY MOUTH EVERY DAY 90 capsule 2    traZODone (DESYREL) 50 MG tablet Take 1-2 tablets ( mg) by mouth nightly as needed for sleep 180 tablet 3    triamcinolone (KENALOG) 0.1 % external cream Apply topically 2 times daily. 80 g 0       Social History     Tobacco Use    Smoking status: Former     Current packs/day: 0.00     Types: Cigarettes     Quit date: 3/10/2000     Years since quittin.6     Passive exposure: Never    Smokeless tobacco: Former    Tobacco comments:     no passive exposure   Vaping Use    Vaping status: Never Used   Substance Use Topics    Alcohol use: No    Drug use: No       Wily Combs  10/16/2024  3:15 PM

## 2024-10-21 ENCOUNTER — PRE VISIT (OUTPATIENT)
Dept: UROLOGY | Facility: CLINIC | Age: 84
End: 2024-10-21
Payer: COMMERCIAL

## 2024-10-21 DIAGNOSIS — Z46.6 URINARY CATHETER (FOLEY) CHANGE REQUIRED: ICD-10-CM

## 2024-10-21 RX ORDER — SULFAMETHOXAZOLE AND TRIMETHOPRIM 800; 160 MG/1; MG/1
1 TABLET ORAL ONCE
Status: COMPLETED | OUTPATIENT
Start: 2024-10-22 | End: 2024-10-22

## 2024-10-21 NOTE — TELEPHONE ENCOUNTER
Reason for visit: Cath change     Relevant information: **NEEDS JOIE **, 16 Fr straight tipped silicone parrish catheter inserted, 10mL was put into balloon, ask about leg bag and new strap    Records/imaging/labs/orders: Monthly cath exchange placed on 10/16/24 by Sanya Yadav PA-C    At Rooming: Give Bactrim prior to cath exchange      Wily Combs  10/21/2024  3:36 PM

## 2024-10-22 ENCOUNTER — OFFICE VISIT (OUTPATIENT)
Dept: UROLOGY | Facility: CLINIC | Age: 84
End: 2024-10-22
Payer: COMMERCIAL

## 2024-10-22 DIAGNOSIS — Z46.6 URINARY CATHETER (FOLEY) CHANGE REQUIRED: Primary | ICD-10-CM

## 2024-10-22 PROCEDURE — 51702 INSERT TEMP BLADDER CATH: CPT

## 2024-10-22 RX ADMIN — SULFAMETHOXAZOLE AND TRIMETHOPRIM 1 TABLET: 800; 160 TABLET ORAL at 14:17

## 2024-10-22 NOTE — PROGRESS NOTES
Adam Talamantes comes into clinic today at the request of Sanya Yadav with the diagnosis of urinary retention for a catheter exchange.    Order has been verified: Yes.    Allergies   Allergen Reactions    Jardiance [Empagliflozin]      Had complicated uti on jardiance    Lisinopril Cough       The following medication was given:     MEDICATION: Bactrim (Trimethoprim / Sulfamethoxazole)  ROUTE: PO  SITE: Medication was given orally  DOSE: 800mg/160mg  LOT #: [id number]  : [employer]   EXPIRATION DATE: 2026-01-31  NDC#: [id number]   Was there drug waste? No    Prior to medication administration, verified patient identity using patient's name and date of birth.  Due to medication administration, patient instructed to remain in clinic for 15 minutes  afterwards, and to report any adverse reaction to me immediately.    Removal:  16 Fr straight tipped latex parrish catheter removed from urethral meatus without difficulty after removing 8 mL of fluid from the balloon.    Insertion:  16 Fr straight tipped latex parrish catheter inserted into urethral meatus in the usual sterile fashion without difficulty.  Received > 20 ml yellow positive urine return.   Balloon filled with 10 mL sterile H2O after positive urine return.  Catheter secured in place with leg strap: Yes.     Patient did tolerate procedure well.     Patient instructed as to where to call or go for pain, fever, leakage, or decreased urine flow.     This service provided today was under the direct supervision of Bernabe San, who was available if needed.    Wily Combs   10/22/2024  2:16 PM

## 2024-11-12 ENCOUNTER — PATIENT OUTREACH (OUTPATIENT)
Dept: GERIATRIC MEDICINE | Facility: CLINIC | Age: 84
End: 2024-11-12
Payer: COMMERCIAL

## 2024-11-12 ENCOUNTER — TELEPHONE (OUTPATIENT)
Dept: NEUROSURGERY | Facility: CLINIC | Age: 84
End: 2024-11-12
Payer: COMMERCIAL

## 2024-11-12 NOTE — PROGRESS NOTES
[facility] Care Coordination Contact    Called adult daughter Dimitri Talamantes  to schedule annual HRA home visit. HRA has been scheduled for 12/02/2024 @ 10 am.    Kristi Calzada RN  [facility]  Cell Phone [phone]

## 2024-11-14 ENCOUNTER — PRE VISIT (OUTPATIENT)
Dept: UROLOGY | Facility: CLINIC | Age: 84
End: 2024-11-14
Payer: COMMERCIAL

## 2024-11-14 DIAGNOSIS — Z46.6 URINARY CATHETER (FOLEY) CHANGE REQUIRED: Primary | ICD-10-CM

## 2024-11-14 RX ORDER — SULFAMETHOXAZOLE AND TRIMETHOPRIM 800; 160 MG/1; MG/1
1 TABLET ORAL ONCE
Status: ACTIVE | OUTPATIENT
Start: 2024-11-22

## 2024-11-14 NOTE — TELEPHONE ENCOUNTER
Reason for nurse visit:   Diego catheter exchange    Transfer Assistance Needed:   Patient uses a wheelchair for mobility - may be able to perform exchange in chair.  Remove chairs from room prior to rooming patient.    Diagnosis:   Urinary retention      Ordering provider:   Mic Yadav PA-C    Last seen by provider:   10/16/2024       Allergies   Allergen Reactions    Jardiance [Empagliflozin]      Had complicated uti on jardiance    Lisinopril Cough      16 fr straight tipped latex    Lisa Champagne LPN

## 2024-12-02 ENCOUNTER — PATIENT OUTREACH (OUTPATIENT)
Dept: GERIATRIC MEDICINE | Facility: CLINIC | Age: 84
End: 2024-12-02
Payer: COMMERCIAL

## 2024-12-02 ASSESSMENT — ACTIVITIES OF DAILY LIVING (ADL)
DEPENDENT_IADLS:: CLEANING;COOKING;LAUNDRY;SHOPPING;MEAL PREPARATION;TRANSPORTATION;MEDICATION MANAGEMENT;MONEY MANAGEMENT;INCONTINENCE

## 2024-12-02 NOTE — PROGRESS NOTES
[facility] Care Coordination Contact    [facility] Home Visit Assessment     Home visit for Health Risk Assessment with Adam Talamantes completed on December 2, 2024.    Type of residence: Private home - stairs  Current living arrangement: I live in a private home with family     Assessment completed with: Patient, Children, Family    Current Care Plan  Member currently receiving the following home care services:   NA  Member currently receiving the following community resources: PCA      Medication Review  Medication reconciliation completed in Epic: Yes  Medication set-up & administration: Family/informal caregiver sets up weekly.  Family caregiver administers medications.  Medication Risk Assessment Medication (1 or more, place referral to MTM): N/A: No risk factors identified  MTM Referral Placed: No: No risk factors idenified    Mental/Behavioral Health   Depression Screening: Cognitively unable to complete.   Mental health DX: Yes      Falls Assessment:   Fallen 2 or more times in the past year? No   Any fall with injury in the past year? Yes    ADL/IADL Dependencies:   Dependent ADLs: Ambulation-walker, Bathing, Dressing, Eating, Grooming, Incontinence, Positioning, Transfers, Wheelchair-with assist, Toileting  Dependent IADLs: Cleaning, Cooking, Laundry, Shopping, Meal Preparation, Transportation, Medication Management, Money Management, Incontinence    Health Plan sponsored benefits: [facility]: Shared information regarding [employer]. Reviewed preventative health screening and health plan supplemental benefits/incentives. Reviewed medication disposal form.    PCA Assessment completed at visit: Yes Annual PCA assessment indicated 10.5 hours per day of PCA. This is the same as the previous assessment.     Elderly Waiver Eligibility: No-does not meet criteria    Care Plan & Recommendations: PCA hours will stay the same and continue to receive incontinent supplies and monthly ensure.      [place].    Follow-Up Plan: Member informed of future contact, plan to f/u with member with a 6 month telephone assessment.  Contact information shared with member and family, encouraged member to call with any questions or concerns at any time.    [place] care continuum providers: Please see Snapshot and Care Management Flowsheets for Specific details of care plan.    This CC note routed to PCP, Chucho Espino RN  [facility]  Cell Phone [phone]

## 2024-12-02 NOTE — Clinical Note
Adam was seen 12/02/2024 for in home PCA assessment. Hours will stay the same at 10.5 hours per day. Sounds like is having behaviors 1-2 nights per week with hallucinations and yelling out. Family does state that the medication regimen is helping.   Thanks, Kristi Calzada RN [facility] Cell Phone [phone]

## 2024-12-04 ENCOUNTER — OFFICE VISIT (OUTPATIENT)
Dept: FAMILY MEDICINE | Facility: CLINIC | Age: 84
End: 2024-12-04
Payer: COMMERCIAL

## 2024-12-04 VITALS
DIASTOLIC BLOOD PRESSURE: 76 MMHG | HEIGHT: 61 IN | WEIGHT: 158 LBS | BODY MASS INDEX: 29.83 KG/M2 | RESPIRATION RATE: 16 BRPM | SYSTOLIC BLOOD PRESSURE: 128 MMHG | HEART RATE: 89 BPM | TEMPERATURE: 97.8 F | OXYGEN SATURATION: 97 %

## 2024-12-04 DIAGNOSIS — E11.22 TYPE 2 DIABETES MELLITUS WITH CHRONIC KIDNEY DISEASE, WITH LONG-TERM CURRENT USE OF INSULIN, UNSPECIFIED CKD STAGE (H): ICD-10-CM

## 2024-12-04 DIAGNOSIS — G47.00 INSOMNIA, UNSPECIFIED TYPE: Primary | ICD-10-CM

## 2024-12-04 DIAGNOSIS — K21.00 GASTROESOPHAGEAL REFLUX DISEASE WITH ESOPHAGITIS, UNSPECIFIED WHETHER HEMORRHAGE: ICD-10-CM

## 2024-12-04 DIAGNOSIS — Z79.4 TYPE 2 DIABETES MELLITUS WITH CHRONIC KIDNEY DISEASE, WITH LONG-TERM CURRENT USE OF INSULIN, UNSPECIFIED CKD STAGE (H): ICD-10-CM

## 2024-12-04 DIAGNOSIS — Z23 NEED FOR VACCINATION: ICD-10-CM

## 2024-12-04 DIAGNOSIS — L30.9 DERMATITIS: ICD-10-CM

## 2024-12-04 LAB
EST. AVERAGE GLUCOSE BLD GHB EST-MCNC: 171 MG/DL
HBA1C MFR BLD: 7.6 % (ref 0–5.6)

## 2024-12-04 PROCEDURE — 83036 HEMOGLOBIN GLYCOSYLATED A1C: CPT | Performed by: FAMILY MEDICINE

## 2024-12-04 PROCEDURE — 36415 COLL VENOUS BLD VENIPUNCTURE: CPT | Performed by: FAMILY MEDICINE

## 2024-12-04 PROCEDURE — G0008 ADMIN INFLUENZA VIRUS VAC: HCPCS | Performed by: FAMILY MEDICINE

## 2024-12-04 PROCEDURE — 90662 IIV NO PRSV INCREASED AG IM: CPT | Performed by: FAMILY MEDICINE

## 2024-12-04 PROCEDURE — 99214 OFFICE O/P EST MOD 30 MIN: CPT | Performed by: FAMILY MEDICINE

## 2024-12-04 RX ORDER — CLOBETASOL PROPIONATE 0.5 MG/G
CREAM TOPICAL 2 TIMES DAILY PRN
Qty: 60 G | Refills: 3 | Status: SHIPPED | OUTPATIENT
Start: 2024-12-04

## 2024-12-04 RX ORDER — PANTOPRAZOLE SODIUM 40 MG/1
40 TABLET, DELAYED RELEASE ORAL
Qty: 90 TABLET | Refills: 3 | Status: SHIPPED | OUTPATIENT
Start: 2024-12-04

## 2024-12-04 RX ORDER — HYDROXYZINE HYDROCHLORIDE 25 MG/1
25 TABLET, FILM COATED ORAL EVERY 8 HOURS PRN
Qty: 60 TABLET | Refills: 4 | Status: SHIPPED | OUTPATIENT
Start: 2024-12-04

## 2024-12-09 ENCOUNTER — HOSPITAL ENCOUNTER (OUTPATIENT)
Dept: MRI IMAGING | Facility: HOSPITAL | Age: 84
Discharge: HOME | End: 2024-12-09
Attending: RADIOLOGY | Admitting: RADIOLOGY
Payer: COMMERCIAL

## 2024-12-09 DIAGNOSIS — I63.22 CEREBROVASCULAR ACCIDENT (CVA) DUE TO STENOSIS OF BASILAR ARTERY (H): ICD-10-CM

## 2024-12-09 PROCEDURE — 255N000002 HC RX 255 OP 636: Performed by: RADIOLOGY

## 2024-12-09 PROCEDURE — A9585 GADOBUTROL INJECTION: HCPCS | Performed by: RADIOLOGY

## 2024-12-09 PROCEDURE — 70549 MR ANGIOGRAPH NECK W/O&W/DYE: CPT

## 2024-12-09 PROCEDURE — T1013 SIGN LANG/ORAL INTERPRETER: HCPCS

## 2024-12-09 RX ORDER — GADOBUTROL 604.72 MG/ML
0.1 INJECTION INTRAVENOUS ONCE
Status: COMPLETED | OUTPATIENT
Start: 2024-12-09 | End: 2024-12-09

## 2024-12-09 RX ADMIN — GADOBUTROL 7 ML: 604.72 INJECTION INTRAVENOUS at 12:30

## 2024-12-11 ENCOUNTER — PATIENT OUTREACH (OUTPATIENT)
Dept: GERIATRIC MEDICINE | Facility: CLINIC | Age: 84
End: 2024-12-11
Payer: COMMERCIAL

## 2024-12-11 NOTE — LETTER
December 11, 2024       ASIF LUCAS WINIFRED  [address]  [facility] [address]      Dear Asif,    At [facility], we re dedicated to improving your health and wellness. Enclosed is the Support Plan developed with you on 12/2/2024. Please review the Support Plan carefully.    As a reminder, during your visit we talked about:   Ways to manage your physical and mental health   Using health care to maintain and improve your health    Your preventive care needs      Remember to contact your care coordinator if you:   Are hospitalized or plan to be hospitalized    Have a fall     Have a change in your physical or mental health   Need help finding support or services    If you have questions or don t agree with your Support Plan, call me at [phone]. You can also call me if your needs change. TTY users call the [place] [employer] at 385 or 1-118.767.6533 (pdrbnl-kj-mxuxtm relay service).    Sincerely,       Kristi Calzada RN, BSN, PHN  [phone]  [email or web address]                [id number] accepted     (06/2024)                [address] [place] [address]  [phone]  fax [phone]  [facility]

## 2024-12-11 NOTE — PROGRESS NOTES
[facility] Care Coordination Contact    Received after visit chart from care coordinator.  Completed following tasks: Mailed copy of support plan to member, Mailed [place] Cho[place] signature sheet pages 3-4, Mailed Safe Medication Disposal , and Updated services in Database    [facility]:  Emailed required PCA documents to [facility].  Faxed copy of PCA assessment to PCA Agency and mailed copy to member.  Faxed MD Communication to PCP.     Aracely Araiza  Care Management Specialist  [facility]  [phone]

## 2024-12-16 ENCOUNTER — PATIENT OUTREACH (OUTPATIENT)
Dept: CARE COORDINATION | Facility: CLINIC | Age: 84
End: 2024-12-16
Payer: COMMERCIAL

## 2024-12-17 DIAGNOSIS — E11.65 TYPE 2 DIABETES MELLITUS WITH HYPERGLYCEMIA, WITHOUT LONG-TERM CURRENT USE OF INSULIN (H): ICD-10-CM

## 2024-12-23 ENCOUNTER — OFFICE VISIT (OUTPATIENT)
Dept: UROLOGY | Facility: CLINIC | Age: 84
End: 2024-12-23
Payer: COMMERCIAL

## 2024-12-23 ENCOUNTER — PRE VISIT (OUTPATIENT)
Dept: UROLOGY | Facility: CLINIC | Age: 84
End: 2024-12-23

## 2024-12-23 DIAGNOSIS — E11.65 TYPE 2 DIABETES MELLITUS WITH HYPERGLYCEMIA, WITHOUT LONG-TERM CURRENT USE OF INSULIN (H): ICD-10-CM

## 2024-12-23 DIAGNOSIS — R33.9 URINARY RETENTION: Primary | ICD-10-CM

## 2024-12-23 RX ORDER — SULFAMETHOXAZOLE AND TRIMETHOPRIM 800; 160 MG/1; MG/1
1 TABLET ORAL ONCE
Status: COMPLETED | OUTPATIENT
Start: 2024-12-23 | End: 2024-12-23

## 2024-12-23 RX ORDER — PEN NEEDLE, DIABETIC 32GX 5/32"
NEEDLE, DISPOSABLE MISCELLANEOUS
Qty: 400 EACH | Refills: 2 | Status: SHIPPED | OUTPATIENT
Start: 2024-12-23

## 2024-12-23 RX ADMIN — SULFAMETHOXAZOLE AND TRIMETHOPRIM 1 TABLET: 800; 160 TABLET ORAL at 14:44

## 2024-12-23 NOTE — PROGRESS NOTES
Adam Talamantes comes into clinic today at the request of Mic ESPINOSA with the diagnosis of urinary retention  for a catheter exchange .    Order has been verified: Yes.    Allergies   Allergen Reactions    Jardiance [Empagliflozin]      Had complicated uti on jardiance    Lisinopril Cough       The following medication was given:     MEDICATION: Bactrim (Trimethoprim / Sulfamethoxazole)  ROUTE: PO  SITE: Medication was given orally  DOSE: 800mg/160mg  LOT #: [id number]  : [employer]   EXPIRATION DATE: 2026-05-31  NDC#: [id number]   Was there drug waste? No    Prior to medication administration, verified patient identity using patient's name and date of birth.  Due to medication administration, patient instructed to remain in clinic for 15 minutes  afterwards, and to report any adverse reaction to me immediately.    Removal:  16 Fr straight tipped latex parrish catheter removed from urethral meatus without difficulty after removing 8 mL of fluid from the balloon.    Insertion:  16 Fr straight tipped latex parrish catheter inserted into urethral meatus in the usual sterile fashion without difficulty.  Received > 75 ml clear positive urine return.   Balloon filled with 10 mL sterile H2O after positive urine return.  Catheter secured in place with leg strap: Yes.     Patient did tolerate procedure well.     Patient instructed as to where to call or go for pain, fever, leakage, or decreased urine flow.     This service provided today was under the direct supervision of Rigoberto Boston, who was available if needed.    Savannah Santiago   12/23/2024  2:21 PM

## 2024-12-23 NOTE — TELEPHONE ENCOUNTER
Reason for visit: Cath change                 Relevant information: **NEEDS JOIE **, 16 Fr straight tipped silicone parrish catheter inserted, 10mL was put into balloon, ask about leg bag and new strap     Records/imaging/labs/orders: Monthly cath exchange placed on 10/16/24 by Sanya Yadav PA-C     At Rooming: Give Bactrim prior to cath exchange

## 2024-12-24 ENCOUNTER — OFFICE VISIT (OUTPATIENT)
Dept: NEUROSURGERY | Facility: CLINIC | Age: 84
End: 2024-12-24
Payer: COMMERCIAL

## 2024-12-24 VITALS
SYSTOLIC BLOOD PRESSURE: 128 MMHG | HEART RATE: 88 BPM | OXYGEN SATURATION: 94 % | RESPIRATION RATE: 16 BRPM | DIASTOLIC BLOOD PRESSURE: 84 MMHG

## 2024-12-24 DIAGNOSIS — I63.22 CEREBROVASCULAR ACCIDENT (CVA) DUE TO STENOSIS OF BASILAR ARTERY (H): Primary | ICD-10-CM

## 2024-12-24 PROCEDURE — 99213 OFFICE O/P EST LOW 20 MIN: CPT | Mod: GC | Performed by: RADIOLOGY

## 2024-12-24 NOTE — PATIENT INSTRUCTIONS
Follow-up with Dr. Jackson in 1 year with an MRA head and neck.    Stroke & Endovascular RN Care Coordinators:    Hiral Gomez, RN, BSN  Almaz Burton, RN, CNRN, SCRN    If you have any questions please contact the RN Care Coordinators at [phone], option 1.    After business hours call the  at [phone] and have the Neuro-Interventional Fellow paged.    Thank you for choosing [facility] for your health care needs.

## 2024-12-24 NOTE — PROGRESS NOTES
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 He was placed on aspirin and Eliquis, Crestor. Endovascular therapy was deferred at the time due to risk of angioplasty/stenting and no evidence of recurrent or refractory strokes.        He is still having symptoms of generalized weakness with trouble walking and communicating these episodes occur after he has been asleep. The symptoms will last for 1-2 minutes. There are no other associated symptoms. The symptoms are noted to be similar to prior episodes.     Interval history:    He denies any new symptoms or complaints, he does still have persistent dizziness symptoms upon wakening up but they are not particularly bothersome and last for short period of time.  They are not associated with any other symptoms such as unilateral weakness, numbness, vision loss or dysphagia.  He does have generalized fatigue but otherwise is asymptomatic.    Past medical history:   Past Medical History:   Diagnosis Date    Acute blood loss anemia     Acute renal failure (H)     Anemia     Bacteremia     Cataract     Chronic gout     CKD (chronic kidney disease)     Stage 3    DM2 (diabetes mellitus, type 2) (H)     GERD (gastroesophageal reflux disease)     Glucose intolerance (impaired glucose tolerance)     Gout     History of nephrolithiasis     History of prostatitis 2017    Hyperlipidemia     Hypertension     Insomnia     Intestinal disaccharidase deficiencies and disaccharide malabsorption     Created by Conversion     Latent tuberculosis     Nephrolithiasis     Neuropathy     Nonspecific abnormal findings on radiological and other examination of skull and head     Created by [facility] Annotation: 2013 11:23PM - Giulia Meridai/10/2011:  severe  stenosis both posterior cerebral arteries seen MRI/MRA done for  vertigo. No acute changes.e*    Osteoarthritis     wrist, knee, shoulder    Prostatitis     Rectal bleed     Trigger thumb         Current outpatient Medication list:   Current Outpatient Medications:     acetaminophen (TYLENOL) 500 MG tablet, Take 1 tablet (500 mg) by mouth every 4 hours as needed for mild pain, Disp: 100 tablet, Rfl: 3    allopurinol (ZYLOPRIM) 100 MG tablet, TAKE 2 TABLETS(200 MG) BY MOUTH DAILY, Disp: 180 tablet, Rfl: 3    apixaban ANTICOAGULANT (ELIQUIS ANTICOAGULANT) 5 MG tablet, Take 1 tablet (5 mg) by mouth 2 times daily, Disp: 180 tablet, Rfl: 3    calcium carbonate (TUMS) 500 MG chewable tablet, Take 1 chew tab by mouth daily as needed for heartburn, Disp: , Rfl:     clobetasol propionate (TEMOVATE) 0.05 % external cream, Apply topically 2 times daily as needed., Disp: 60 g, Rfl: 3    Continuous Glucose  (FREESTYLE SUSANNAH 2 READER) [facility], USE TO READ BLOOD SUGARS PER MANUFACTRUERS INSTRUCTIONS, Disp: 1 each, Rfl: 0    Continuous Glucose Sensor (FREESTYLE SUSANNAH 2 SENSOR) MISC, CHANGE EVERY 14 DAYS, Disp: 2 each, Rfl: 11    dextromethorphan-quiNIDine (NUEDEXTA) 20-10 MG capsule, Take 1 capsule by mouth every 12 hours, Disp: 60 capsule, Rfl: 11    DULoxetine (CYMBALTA) 30 MG capsule, Take 1 capsule by mouth every morning and 2 capsules every evening, Disp: 135 capsule, Rfl: 6    hydrOXYzine HCl (ATARAX) 25 MG tablet, Take 1 tablet (25 mg) by mouth every 8 hours as needed for itching., Disp: 60 tablet, Rfl: 4    insulin aspart (NOVOLOG FLEXPEN) 100 UNIT/ML pen, Inject 12 units before breakfast, and 6 units under the skin before lunch and dinner, Disp: 15 mL, Rfl: 3    insulin glargine (LANTUS PEN) 100 UNIT/ML pen, Inject 30 Units subcutaneously every 24 hours, Disp: 30 mL, Rfl: 3    insulin pen needle (BD PEN NEEDLE SALVATORE 2ND GEN) 32G X 4 MM miscellaneous, USE 1 PEN NEEDLE FOUR TIMES DAILY OR AS DIRECTED, Disp: 400 each,  Rfl: 2    JANUVIA 50 MG tablet, TAKE 1 TABLET(50 MG) BY MOUTH DAILY, Disp: 90 tablet, Rfl: 3    meclizine (ANTIVERT) 12.5 MG tablet, Take 1 tablet (12.5 mg) by mouth 3 times daily as needed for dizziness, Disp: 60 tablet, Rfl: 6    metoprolol succinate ER (TOPROL XL) 100 MG 24 hr tablet, TAKE 1/2 TABLET BY MOUTH DAILY, Disp: 45 tablet, Rfl: 3    pantoprazole (PROTONIX) 40 MG EC tablet, Take 1 tablet (40 mg) by mouth every morning (before breakfast)., Disp: 90 tablet, Rfl: 3    polyethylene glycol (MIRALAX) 17 GM/Dose powder, MIX 17 GRAMS IN LIQUID AND TAKE BY MOUTH ONCE DAILY AS NEEDED FOR CONSTIPATION, Disp: 510 g, Rfl: 6    rosuvastatin (CRESTOR) 20 MG tablet, TAKE 1 TABLET(20 MG) BY MOUTH AT BEDTIME, Disp: 90 tablet, Rfl: 2    senna-docusate (STIMULANT LAXATIVE) 8.6-50 MG tablet, Take 2 tablets by mouth 2 times daily as needed for constipation., Disp: 120 tablet, Rfl: 9    tamsulosin (FLOMAX) 0.4 MG capsule, TAKE 1 CAPSULE BY MOUTH EVERY DAY, Disp: 90 capsule, Rfl: 2    traZODone (DESYREL) 50 MG tablet, Take 1-2 tablets ( mg) by mouth nightly as needed for sleep, Disp: 180 tablet, Rfl: 3  No current facility-administered medications for this visit.      Family history:   Family History   Problem Relation Age of Onset    Cerebrovascular Disease Father     Cerebrovascular Disease Daughter        Social history:   Social History     Tobacco Use    Smoking status: Former     Current packs/day: 0.00     Types: Cigarettes     Quit date: 3/10/2000     Years since quittin.8     Passive exposure: Never    Smokeless tobacco: Former    Tobacco comments:     no passive exposure   Vaping Use    Vaping status: Never Used   Substance Use Topics    Alcohol use: No    Drug use: No       Allergies:    Allergies   Allergen Reactions    Jardiance [Empagliflozin]      Had complicated uti on jardiance    Lisinopril Cough       Review of Systems: 5 point ROS performed and negative except for detailed  above    Objective:    Physical exam:  There were no vitals taken for this visit.  Constitutional: Awake, no acute distress, sitting in a wheelchair  HENT: normcephalic,   Respiratory: normal rate, no acute distress  Neuro:  Mental status: awake,   CN: EOMI, face movements symmetric, no dysarthria, equal shoulder shrug, tongue midline  Motor: 5/5 strength in upper and lower extremities bilaterally  Sensory: equal to light touch bilaterally in upper and lower extremities  Coordination: Normal finger to nose in upper extremities bilaterally,       Imaging Reviewed:  MRI/10/2023:  No acute infarct, mass, hemorrhage, or herniation.  2.  Chronic infarcts in the lizeth and cerebellum.  3.  Mild diffuse parenchymal volume loss and white matter changes most likely due to chronic microvascular ischemic disease.     HEAD MRA:   IMPRESSION:    1.  Diminutive left vertebral artery which is diffusely irregular with occlusion of the V3 and V4 segments with segmental occlusion of the basilar artery, unchanged.  2.  No evidence of hemodynamically significant stenosis within either internal carotid artery within the neck.      Assessment:  Adam Talamantes  is a 83 year old male who presents for posterior circulation strokes secondary to atherosclerotic disease, he was referred for possible endovascular treatment. He has not had any recurrent strokes and is being maintained on Eliquis therapy.  Discussed with the patient and his son and is unclear whether or not he has been taking aspirin therapy as well.  Would recommend continued optimization of medical management with blood pressure and blood glucose control .  His symptoms have remained stable and he does not have any progression, he does not have any unilateral symptoms or symptoms that localized specifically to the posterior circulation given lack of other symptoms such as dysphagia, dysarthria, vision loss.  His dizziness symptoms upon wakening could be positional vertigo.    Thank  you for this referral, for any questions or concerns please don't hesitate to contact us.     Plan:  - Eliquis and aspirin therapy  - MRA head and neck WWO in 1 year      Kayce  ANTONIO Jordan MD  Endovascular Surgical Neuroradiology Fellow  [facility]  [phone]    Endovascular Surgical Neuroradiology staff is Dr. Jackson

## 2024-12-24 NOTE — LETTER
12/24/2024       RE: Adam Talamantes  [address]  [facility] [address]     Dear Colleague,    Thank you for referring your patient, Adam Talamantes, to the [facility]UROSURG[facility] [place] [facility]. Please see a copy of my visit note below.    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 He was placed on aspirin and Eliquis, Crestor. Endovascular therapy was deferred at the time due to risk of angioplasty/stenting and no evidence of recurrent or refractory strokes.        He is still having symptoms of generalized weakness with trouble walking and communicating these episodes occur after he has been asleep. The symptoms will last for 1-2 minutes. There are no other associated symptoms. The symptoms are noted to be similar to prior episodes.     Interval history:    He denies any new symptoms or complaints, he does still have persistent dizziness symptoms upon wakening up but they are not particularly bothersome and last for short period of time.  They are not associated with any other symptoms such as unilateral weakness, numbness, vision loss or dysphagia.  He does have generalized fatigue but otherwise is asymptomatic.    Past medical history:   Past Medical History:   Diagnosis Date     Acute blood loss anemia      Acute renal failure (H)      Anemia      Bacteremia      Cataract      Chronic gout      CKD (chronic kidney disease)     Stage 3     DM2 (diabetes mellitus, type 2) (H)      GERD (gastroesophageal reflux disease)      Glucose intolerance (impaired glucose tolerance)      Gout      History of nephrolithiasis      History of prostatitis 7/19/2017     Hyperlipidemia      Hypertension      Insomnia      Intestinal disaccharidase  deficiencies and disaccharide malabsorption     Created by Conversion      Latent tuberculosis      Nephrolithiasis      Neuropathy      Nonspecific abnormal findings on radiological and other examination of skull and head     Created by Conversion [facility] Annotation: 2013 11:23PM - Giulia Meridai/10/2011:  severe stenosis both posterior cerebral arteries seen MRI/MRA done for  vertigo. No acute changes.e*     Osteoarthritis     wrist, knee, shoulder     Prostatitis      Rectal bleed      Trigger thumb         Current outpatient Medication list:   Current Outpatient Medications:      acetaminophen (TYLENOL) 500 MG tablet, Take 1 tablet (500 mg) by mouth every 4 hours as needed for mild pain, Disp: 100 tablet, Rfl: 3     allopurinol (ZYLOPRIM) 100 MG tablet, TAKE 2 TABLETS(200 MG) BY MOUTH DAILY, Disp: 180 tablet, Rfl: 3     apixaban ANTICOAGULANT (ELIQUIS ANTICOAGULANT) 5 MG tablet, Take 1 tablet (5 mg) by mouth 2 times daily, Disp: 180 tablet, Rfl: 3     calcium carbonate (TUMS) 500 MG chewable tablet, Take 1 chew tab by mouth daily as needed for heartburn, Disp: , Rfl:      clobetasol propionate (TEMOVATE) 0.05 % external cream, Apply topically 2 times daily as needed., Disp: 60 g, Rfl: 3     Continuous Glucose  (FREESTYLE SUSANNAH 2 READER) TONIE, USE TO READ BLOOD SUGARS PER MANUFACTRUERS INSTRUCTIONS, Disp: 1 each, Rfl: 0     Continuous Glucose Sensor (FREESTYLE SUSANNAH 2 SENSOR) MISC, CHANGE EVERY 14 DAYS, Disp: 2 each, Rfl: 11     dextromethorphan-quiNIDine (NUEDEXTA) 20-10 MG capsule, Take 1 capsule by mouth every 12 hours, Disp: 60 capsule, Rfl: 11     DULoxetine (CYMBALTA) 30 MG capsule, Take 1 capsule by mouth every morning and 2 capsules every evening, Disp: 135 capsule, Rfl: 6     hydrOXYzine HCl (ATARAX) 25 MG tablet, Take 1 tablet (25 mg) by mouth every 8 hours as needed for itching., Disp: 60 tablet, Rfl: 4     insulin aspart (NOVOLOG FLEXPEN) 100 UNIT/ML pen, Inject 12 units before  breakfast, and 6 units under the skin before lunch and dinner, Disp: 15 mL, Rfl: 3     insulin glargine (LANTUS PEN) 100 UNIT/ML pen, Inject 30 Units subcutaneously every 24 hours, Disp: 30 mL, Rfl: 3     insulin pen needle (BD PEN NEEDLE SALVATORE 2ND GEN) 32G X 4 MM miscellaneous, USE 1 PEN NEEDLE FOUR TIMES DAILY OR AS DIRECTED, Disp: 400 each, Rfl: 2     JANUVIA 50 MG tablet, TAKE 1 TABLET(50 MG) BY MOUTH DAILY, Disp: 90 tablet, Rfl: 3     meclizine (ANTIVERT) 12.5 MG tablet, Take 1 tablet (12.5 mg) by mouth 3 times daily as needed for dizziness, Disp: 60 tablet, Rfl: 6     metoprolol succinate ER (TOPROL XL) 100 MG 24 hr tablet, TAKE 1/2 TABLET BY MOUTH DAILY, Disp: 45 tablet, Rfl: 3     pantoprazole (PROTONIX) 40 MG EC tablet, Take 1 tablet (40 mg) by mouth every morning (before breakfast)., Disp: 90 tablet, Rfl: 3     polyethylene glycol (MIRALAX) 17 GM/Dose powder, MIX 17 GRAMS IN LIQUID AND TAKE BY MOUTH ONCE DAILY AS NEEDED FOR CONSTIPATION, Disp: 510 g, Rfl: 6     rosuvastatin (CRESTOR) 20 MG tablet, TAKE 1 TABLET(20 MG) BY MOUTH AT BEDTIME, Disp: 90 tablet, Rfl: 2     senna-docusate (STIMULANT LAXATIVE) 8.6-50 MG tablet, Take 2 tablets by mouth 2 times daily as needed for constipation., Disp: 120 tablet, Rfl: 9     tamsulosin (FLOMAX) 0.4 MG capsule, TAKE 1 CAPSULE BY MOUTH EVERY DAY, Disp: 90 capsule, Rfl: 2     traZODone (DESYREL) 50 MG tablet, Take 1-2 tablets ( mg) by mouth nightly as needed for sleep, Disp: 180 tablet, Rfl: 3  No current facility-administered medications for this visit.      Family history:   Family History   Problem Relation Age of Onset     Cerebrovascular Disease Father      Cerebrovascular Disease Daughter        Social history:   Social History     Tobacco Use     Smoking status: Former     Current packs/day: 0.00     Types: Cigarettes     Quit date: 3/10/2000     Years since quittin.8     Passive exposure: Never     Smokeless tobacco: Former     Tobacco comments:     no  passive exposure   Vaping Use     Vaping status: Never Used   Substance Use Topics     Alcohol use: No     Drug use: No       Allergies:    Allergies   Allergen Reactions     Jardiance [Empagliflozin]      Had complicated uti on jardiance     Lisinopril Cough       Review of Systems: 5 point ROS performed and negative except for detailed above    Objective:    Physical exam:  There were no vitals taken for this visit.  Constitutional: Awake, no acute distress, sitting in a wheelchair  HENT: normcephalic,   Respiratory: normal rate, no acute distress  Neuro:  Mental status: awake,   CN: EOMI, face movements symmetric, no dysarthria, equal shoulder shrug, tongue midline  Motor: 5/5 strength in upper and lower extremities bilaterally  Sensory: equal to light touch bilaterally in upper and lower extremities  Coordination: Normal finger to nose in upper extremities bilaterally,       Imaging Reviewed:  MRI/10/2023:  No acute infarct, mass, hemorrhage, or herniation.  2.  Chronic infarcts in the lizeth and cerebellum.  3.  Mild diffuse parenchymal volume loss and white matter changes most likely due to chronic microvascular ischemic disease.     HEAD MRA:   IMPRESSION:    1.  Diminutive left vertebral artery which is diffusely irregular with occlusion of the V3 and V4 segments with segmental occlusion of the basilar artery, unchanged.  2.  No evidence of hemodynamically significant stenosis within either internal carotid artery within the neck.      Assessment:  Adam Talamantes  is a 83 year old male who presents for posterior circulation strokes secondary to atherosclerotic disease, he was referred for possible endovascular treatment. He has not had any recurrent strokes and is being maintained on Eliquis therapy.  Discussed with the patient and his son and is unclear whether or not he has been taking aspirin therapy as well.  Would recommend continued optimization of medical management with blood pressure and blood glucose  control .  His symptoms have remained stable and he does not have any progression, he does not have any unilateral symptoms or symptoms that localized specifically to the posterior circulation given lack of other symptoms such as dysphagia, dysarthria, vision loss.  His dizziness symptoms upon wakening could be positional vertigo.    Thank you for this referral, for any questions or concerns please don't hesitate to contact us.     Plan:  - Eliquis and aspirin therapy  - MRA head and neck WWO in 1 year      Kayce  ID       Mary Jordan MD  Endovascular Surgical Neuroradiology Fellow  [facility]  [phone]    Endovascular Surgical Neuroradiology staff is Dr. Jackson                Attestation signed by Joey Jackson MD at 12/25/2024  1:30 PM:  I have personally seen and evaluated the patient on December 24, 2024  and I agree with the note by Dr. Jordan                On December 25, 2024      Again, thank you for allowing me to participate in the care of your patient.      Sincerely,    Joey Jackson MD

## 2024-12-24 NOTE — NURSING NOTE
Chief Complaint   Patient presents with    RECHECK     Here for a follow up, confirmed with patient     June Ott

## 2024-12-30 ENCOUNTER — PATIENT OUTREACH (OUTPATIENT)
Dept: CARE COORDINATION | Facility: CLINIC | Age: 84
End: 2024-12-30
Payer: COMMERCIAL

## 2025-01-13 ENCOUNTER — OFFICE VISIT (OUTPATIENT)
Dept: PHARMACY | Facility: CLINIC | Age: 85
End: 2025-01-13
Payer: COMMERCIAL

## 2025-01-13 VITALS — OXYGEN SATURATION: 94 % | HEART RATE: 95 BPM | SYSTOLIC BLOOD PRESSURE: 124 MMHG | DIASTOLIC BLOOD PRESSURE: 88 MMHG

## 2025-01-13 DIAGNOSIS — I10 ESSENTIAL HYPERTENSION WITH GOAL BLOOD PRESSURE LESS THAN 140/90: ICD-10-CM

## 2025-01-13 DIAGNOSIS — G47.00 INSOMNIA, UNSPECIFIED TYPE: ICD-10-CM

## 2025-01-13 DIAGNOSIS — R52 PAIN: ICD-10-CM

## 2025-01-13 DIAGNOSIS — L30.9 DERMATITIS: ICD-10-CM

## 2025-01-13 DIAGNOSIS — I63.22 CEREBROVASCULAR ACCIDENT (CVA) DUE TO STENOSIS OF BASILAR ARTERY (H): ICD-10-CM

## 2025-01-13 DIAGNOSIS — K21.9 GASTROESOPHAGEAL REFLUX DISEASE, UNSPECIFIED WHETHER ESOPHAGITIS PRESENT: ICD-10-CM

## 2025-01-13 DIAGNOSIS — R33.8 BENIGN PROSTATIC HYPERPLASIA WITH URINARY RETENTION: ICD-10-CM

## 2025-01-13 DIAGNOSIS — E11.65 TYPE 2 DIABETES MELLITUS WITH HYPERGLYCEMIA, WITHOUT LONG-TERM CURRENT USE OF INSULIN (H): Primary | ICD-10-CM

## 2025-01-13 DIAGNOSIS — N40.1 BENIGN PROSTATIC HYPERPLASIA WITH URINARY RETENTION: ICD-10-CM

## 2025-01-13 DIAGNOSIS — K59.00 CONSTIPATION, UNSPECIFIED CONSTIPATION TYPE: ICD-10-CM

## 2025-01-13 DIAGNOSIS — M1A.00X0 CHRONIC GOUTY ARTHROPATHY: ICD-10-CM

## 2025-01-13 PROCEDURE — 99607 MTMS BY PHARM ADDL 15 MIN: CPT | Performed by: PHARMACIST

## 2025-01-13 PROCEDURE — 99605 MTMS BY PHARM NP 15 MIN: CPT | Performed by: PHARMACIST

## 2025-01-13 NOTE — LETTER
"Recommended To-Do List      Prepared on: Jan 13, 2025       You can get the best results from your medications by completing the items on this \"To-Do List.\"      Bring your To-Do List when you go to your doctor. And, share it with your family or caregivers.    My To-Do List:  What we talked about: What I should do:   An issue with your medication    Your pharmacist is checking with your specialist regarding use of aspirin and will notify you if this medication needs to be started again, for now continue without taking it          What we talked about: What I should do:   Your medication dosage being too low    Increase your dosage of NovoLOG FLEXPEN          What we talked about: What I should do:                     "

## 2025-01-13 NOTE — LETTER
_  Medication List        Prepared on: Jan 13, 2025     Bring your Medication List when you go to the doctor, hospital, or   emergency room. And, share it with your family or caregivers.     Note any changes to how you take your medications.  Cross out medications when you no longer use them.    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 Insomnia, unspecified type; Dermatitis Cuhcho Espino MD   insulin aspart (NOVOLOG FLEXPEN) 100 UNIT/ML pen Inject 12 units before breakfast, and 6 units under the skin before lunch and dinner Type 2 diabetes mellitus with hyperglycemia, without long-term current use of insulin (H) Chucho Espino MD   insulin glargine (LANTUS PEN) 100 UNIT/ML pen Inject 30 Units subcutaneously every 24 hours Type 2 diabetes mellitus with chronic kidney disease, with long-term current use of insulin, unspecified CKD stage (H) Chucho Espino MD   insulin pen needle (BD PEN NEEDLE SALVATORE 2ND GEN) 32G X 4 MM miscellaneous USE 1 PEN NEEDLE FOUR TIMES DAILY OR AS DIRECTED Type 2 diabetes mellitus with hyperglycemia, without long-term current use of insulin (H) Chucho Espino MD   JANUVIA 50 MG tablet TAKE 1 TABLET(50 MG) BY MOUTH DAILY Type 2 diabetes mellitus with stage 3 chronic kidney disease, without long-term current use of insulin, unspecified whether stage 3a or 3b CKD (H) Chucho Espino MD   meclizine (ANTIVERT) 12.5 MG tablet Take 1 tablet (12.5 mg) by mouth 3 times daily as needed for dizziness Dizziness Chucho Espino MD   metoprolol succinate ER (TOPROL XL) 100 MG 24 hr tablet TAKE 1/2 TABLET BY MOUTH DAILY Blood pressure Chucho Espino MD   pantoprazole (PROTONIX) 40 MG EC tablet Take 1 tablet (40 mg) by mouth every morning (before breakfast). Gastroesophageal reflux disease with esophagitis, unspecified whether hemorrhage Chucho Espino MD   polyethylene glycol (MIRALAX) 17 GM/Dose powder MIX 17 GRAMS IN LIQUID AND TAKE BY MOUTH ONCE DAILY AS NEEDED FOR CONSTIPATION Constipation, unspecified constipation type Chucho Espino MD   rosuvastatin (CRESTOR) 20 MG tablet TAKE 1 TABLET(20 MG) BY MOUTH AT BEDTIME Dyslipidemia Chucho Espino MD   senna-docusate (STIMULANT LAXATIVE) 8.6-50 MG tablet Take 2 tablets by mouth 2 times daily as needed for constipation. Constipation,  unspecified constipation type Chucho Espino MD   tamsulosin (FLOMAX) 0.4 MG capsule TAKE 1 CAPSULE BY MOUTH EVERY DAY Benign prostatic hyperplasia with incomplete bladder emptying Chucho Espino MD   traZODone (DESYREL) 50 MG tablet Take 1-2 tablets ( mg) by mouth nightly as needed for sleep Insomnia, unspecified type Chucho Espino MD         Add new medications, over-the-counter drugs, herbals, vitamins, or  minerals in the blank rows below.    Medication How I take it Why I use it Prescriber                                      Allergies:      - Jardiance [empagliflozin]  - Lisinopril - Cough        Side effects I have had:      Not on File        Other Information:              My notes and questions:

## 2025-01-13 NOTE — LETTER
January 13, 2025  Adam Talamantes  [address]  [facility] [id number]    Dear Mr. [facility]antesSHAYY [facility] AM[facility]SSM [facility]ALIA     Thank you for talking with me on Jan 13, 2025 about your health and medications. As a follow-up to our conversation, I have included two documents:      Your Recommended To-Do List has steps you should take to get the best results from your medications.  Your Medication List will help you keep track of your medications and how to take them.    If you want to talk about these documents, please call Pushpa Philip PharmD at phone: [phone], Monday-Friday 8-4:30pm.    I look forward to working with you and your doctors to make sure your medications work well for you.    Sincerely,  Pushpa Philip, PharmD  [facility] Pharmacist, [facility]

## 2025-01-13 NOTE — PATIENT INSTRUCTIONS
"Recommendations from today's MTM visit:                                                           Novolog 12 units with breakfast, 6 units with lunch and dinner   Check to see if he has had a Diabetes eye exam in the last year    Follow-up: Return in about 10 weeks (around 3/24/2025) for With PharmD.    It was great speaking with you today.  I value your experience and would be very thankful for your time in providing feedback in our clinic survey. In the next few days, you may receive an email or text message from [employer] with a link to a survey related to your  clinical pharmacist.\"     To schedule another MTM appointment, please call the clinic directly or you may call the MTM scheduling line at [phone].    My Clinical Pharmacist's contact information:                                                      Please feel free to contact me with any questions or concerns you have.      Pushpa Philip, PharmD, BCACP  Medication Therapy Management Pharmacist    "

## 2025-01-13 NOTE — Clinical Note
Neurology and neurosurgery providers, I met with patient today and found in a recent note that patient should be using both eliquis and aspirin. Though when looking through the chart I see a history of nosebleeds and hematuria with aspirin use. I wanted to get clarification regarding whether patient should retrial daily aspirin use or continue without (given current and historical diagnoses) - appears has not been taking since 7/26/23. I would be happy to communicate any needed medication changes to patient and his family.   Thanks in advance, Pushpa Philip, PharmD, BCACP Medication Therapy Management Pharmacist

## 2025-01-13 NOTE — Clinical Note
Dr. Espino,  Overall patient is doing well, I only slightly adjusted his insulin today and will see him in a few months and will recheck A1c at that time.  One thing that I wanted to bring to your attention is the continued use of tamsulosin - he has a catheter which seems as though it is being replaced consistently by urology. I am wondering if because of this, is it still beneficial at all to continue the tamsulosin or is that no longer needed with parrish cath? Let me know your thoughts.   Thanks Charly

## 2025-01-13 NOTE — Clinical Note
Nurses,  I just got a response from the neurosurgery center, they want him to retrial use of aspirin 81 mg daily in addition to current eliquis. I want him to watch closely for signs and symptoms of bleeding and report to clinic if  having any issues or concerns. The prescription for aspirin was sent to his pharmacy today, please call to inform him.   Charly

## 2025-01-13 NOTE — PROGRESS NOTES
Medication Therapy Management (MTM) Encounter    ASSESSMENT:                            Medication Adherence/Access: No issues identified.    1. Type 2 diabetes mellitus with hyperglycemia, without long-term current use of insulin (H) (Primary)  A1c at goal < 8%. Patient is not meeting goal of > 50% time in target with continuous glucose monitoring. Reasonable to maintain lower dose januvia given fluctuating renal function.  Do not recommend SGLT2 inhibitor given complicated UTI with Jardiance in the past.  Could consider switching Januvia to GLP-1 agonist, though with patient's low appetite, I do not recommend change at this time.  Reviewed with patient today that he is currently taking a lower than prescribed dose of NovoLog, would encourage patient to use as prescribed, provided instructions via AVS today.    2. Essential hypertension with goal blood pressure less than 140/90  BP at goal <140/90 mmHg, given patient age, reasonable to continue current medications today.  If dizziness persists or worsens could consider further decreasing dose of metoprolol.  Patient not currently taking ACE or ARB, could consider switching beta-blocker to low-dose ARB in the future for kidney protection given microalbuminuria.    3. 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  Will route to neurosurgery to clarify recommendation on aspirin.    4. Constipation, unspecified constipation type  Stable.    5. Insomnia, unspecified type  Stable.    6. Gastroesophageal reflux disease, unspecified whether esophagitis present  Stable, though could consider reducing to as needed H2 blocker in the future, rather than continuing as needed PPI.     7. Chronic gouty arthropathy  Stable.    8. Pain  Stable.    9. Dermatitis  Stable.    10. Benign prostatic hyperplasia with urinary retention  Patient continues to take tamsulosin for BPH.  Per discussion with PCP,  tamsulosin to be continued at this time to aid with catheter changes.      PLAN:                            Novolog 12 units before breakfast, 6 units before lunch and dinner   Check to see if he has had a Diabetes eye exam in the last year  Checking with neurosurgery regarding retrial of daily aspirin  Mary Jordan MD - Endovascular Surgical Neuroradiology Fellow: He can just be on Eliquis therapy if he hasn't been on aspirin since 7/2023.   Joey Jackson MD - [place] should continue  both for now   Will contact patient to have him resume aspirin 81 mg daily and monitor closely for side effects    Follow-up: Return in about 10 weeks (around 3/24/2025) for With PharmD.    SUBJECTIVE/OBJECTIVE:                          Adam Talamantes is a 84 year old male seen for an initial visit. He was referred to me from Chucho Espino. Patient was accompanied by daughter. [facility] phone  used today.     Reason for visit:  MTM follow up.    Allergies/ADRs: Reviewed in chart  Past Medical History: Reviewed in chart  Tobacco: He reports that he quit smoking about 24 years ago. His smoking use included cigarettes. He has never been exposed to tobacco smoke. He has quit using smokeless tobacco.  Alcohol: none    Medication Adherence/Access: His daughter, Dimitri Talamantes, helps him with medications, set up in twice daily pillbox. Though other daughter comes with to visit today. Reports no missed medication doses.   Brings with medication vials today, pillbox (set up correctly), and meter.    Diabetes   Lantus 30 units daily at noon   Novolog 12 units with breakfast, 6 units with lunch and dinner (8 am, noon, 5 pm) - ONLY using 6 units three times daily before meals  Januvia 50 mg daily in AM   Patient is not experiencing side effects.  Daughter helps with insulin injections, patient unable to do this on his own, reports no missed doses.   Current diabetes symptoms: No symptoms reported.   Diet/Exercise: Eating 3 very meals  per day,  reports consistent diet.  Reports patient already has low appetite sometimes. Doesn't drink juice or sweet beverages.  Breakfast 8 am: Rice, soup veggies, milk  Lunch noon: Rice, soup veggies, milk  Dinner 5 pm: Rice, soup, veggies, fruit  Med Hx: Jardiance (UTI, stopped inpatient)     Blood sugar monitoring: Continuous Glucose Monitoring with Ary 2; see below. Checking four times daily.     Eye exam is due.   Foot exam: due    Hypertension   Metoprolol succinate 100 mg - 1/2 tablet (50 mg) daily  Meclizine 12.5 mg 3 times daily as needed - does not bring today, unsure if he still has this  Patient reports the following medication side effects: dizziness, especially when he doesn't get good sleep.     Patient does not self-monitor blood pressure sometimes, unable to say what recent reading have been.     Hyperlipidemia /CVA  Rosuvastatin 20 mg daily  Eliquis 5 mg twice daily  Aspirin - stopped due to nosebleeds and hematuria per neurology on 7/26/23, though most recent note from neurosurgery recommended daily aspirin (12/24/24)  Patient reports no significant myalgias or other side effects. No signs of bleeding or bruising.   Daughter in clinic today unable to confirm if the reported side effects from aspirin use in the past are accurate.   The ASCVD Risk score ([place] DK, et al., 2019) failed to calculate for the following reasons:    The 2019 ASCVD risk score is only valid for ages 40 to 79    Risk score cannot be calculated because patient has a medical history suggesting prior/existing ASCVD     Constipation:   Senna/Docusate sodium 8.6/100 mg 2 tablets twice daily as needed  Miralax 17 g daily as needed - using every other day  Overall thinks this has been better controlled lately, senna/docusate dose recently increased.     Insomnia/Depression:  Duloxetine 30 mg in the morning, 60 mg in the evening   Trazodone 50 mg 1-2 tab daily at bedtime - taking as needed, not set up in pillbox   Hydroxyzine 25  mg every 8 hours as needed for itching/sleep   Nuedexta (dextromethorphan/quinidine) 20/10 mg twice daily (per neurology)  Reports sleep has been good with current medications.   Thinks mood is more stable now with current medication.     GERD    Pantoprazole 40 mg mg once daily, as needed (not set up in pillbox)  Tums as needed - still has this at home  Patient reports no current symptoms. No complaints of symptoms for a couple months. Patient has been taking PPI as needed, though not sure how often this is taken.     Gout  Allopurinol 200 mg daily   Reports no concerns today, no recent flares of gout.     Pain/mood  Acetaminophen 500 mg 2 tablets daily as needed for headaches or back pain  Reports pain has been stable with current therapy, no concerns.     Dermatitis/Itching  Clobetasol 0.05% twice daily as needed - notes this has been helpful   Finds effective, can also help with sleep at night if he is scratching.  Has a rash on his thigh that he has been scratching.     BPH  Tamsulosin 0.4 mg daily  Of note, patient seeing urology regularly and having parrish catheter replaced regularly.     Today's Vitals: /88   Pulse 95   SpO2 94%   ----------------      I spent 45 minutes with this patient today. All changes were made via collaborative practice agreement with Chucho Espino MD.     A summary of these recommendations was given to the patient.    Pushpa Philip, PharmD, BCACP  Medication Therapy Management Pharmacist     Medication Therapy Recommendations  Cerebral vascular disease   1 Rationale: Undesirable effect - Adverse medication event - Safety   Recommendation: Discontinue Medication - aspirin 81 MG EC tablet   Status: Declined per Provider   Identified Date: 1/13/2025 Completed Date: 1/13/2025   Note: patient to continue WITHOUT aspirin - per neurosurgery provider response         Type 2 diabetes mellitus with chronic kidney disease, with long-term current use of insulin, unspecified CKD  stage (H)   1 Current Medication: insulin aspart (NOVOLOG FLEXPEN) 100 UNIT/ML pen   Current Medication Sig: Inject 12 units before breakfast, and 6 units under the skin before lunch and dinner   Rationale: Dose too low - Dosage too low - Effectiveness   Recommendation: Increase Dose   Status: Patient Agreed - Adherence/Education   Identified Date: 1/13/2025 Completed Date: 1/13/2025

## 2025-01-16 ENCOUNTER — TELEPHONE (OUTPATIENT)
Dept: FAMILY MEDICINE | Facility: CLINIC | Age: 85
End: 2025-01-16
Payer: COMMERCIAL

## 2025-01-16 RX ORDER — ASPIRIN 81 MG/1
81 TABLET ORAL DAILY
Qty: 90 TABLET | Refills: 3 | Status: SHIPPED | OUTPATIENT
Start: 2025-01-16

## 2025-01-16 NOTE — TELEPHONE ENCOUNTER
Pushpa Philip, PharmD  [facility]    Nurses,  I just got a response from the neurosurgery center, they want him to retrial use of aspirin 81 mg daily in addition to current eliquis. I want him to watch closely for signs and symptoms of bleeding and report to clinic if  having any issues or concerns. The prescription for aspirin was sent to his pharmacy today, please call to inform him.    Charly    Called with  ID: [id number] to the Navin Talam[facility] on file.  No answer. Message left on  with clinic number provided.   Spoke to daughter, Albin Mosley (CTC on file) with test result relay.  Pharmacy medication sent to provided.    Rey Goel RN  [facility]

## 2025-01-22 ENCOUNTER — DOCUMENTATION ONLY (OUTPATIENT)
Dept: UROLOGY | Facility: CLINIC | Age: 85
End: 2025-01-22
Payer: COMMERCIAL

## 2025-01-22 ENCOUNTER — ALLIED HEALTH/NURSE VISIT (OUTPATIENT)
Dept: UROLOGY | Facility: CLINIC | Age: 85
End: 2025-01-22
Payer: COMMERCIAL

## 2025-01-22 DIAGNOSIS — R33.9 URINARY RETENTION: ICD-10-CM

## 2025-01-22 DIAGNOSIS — Z46.6 URINARY CATHETER (FOLEY) CHANGE REQUIRED: Primary | ICD-10-CM

## 2025-01-22 RX ORDER — SULFAMETHOXAZOLE AND TRIMETHOPRIM 800; 160 MG/1; MG/1
1 TABLET ORAL ONCE
Status: COMPLETED | OUTPATIENT
Start: 2025-01-22 | End: 2025-01-22

## 2025-01-22 RX ADMIN — SULFAMETHOXAZOLE AND TRIMETHOPRIM 1 TABLET: 800; 160 TABLET ORAL at 14:31

## 2025-01-22 NOTE — PROGRESS NOTES
Adam Talamantes comes into clinic today at the request of Sanya CHAU with the diagnosis of urinary retention for a catheter exchange.    Order has been verified: Yes.    Allergies   Allergen Reactions    Jardiance [Empagliflozin]      Had complicated uti on jardiance    Lisinopril Cough       The following medication was given:     MEDICATION: Bactrim (Trimethoprim / Sulfamethoxazole)  ROUTE: PO  SITE: Medication was given orally  DOSE: 800mg/160mg  LOT #: [id number]  : [employer]   EXPIRATION DATE: 2026/05/31  NDC#: [phone] [id number]   Was there drug waste? No    Prior to medication administration, verified patient identity using patient's name and date of birth.  Due to medication administration, patient instructed to remain in clinic for 15 minutes  afterwards, and to report any adverse reaction to me immediately.    Removal:  16 Fr Coude tipped latex parrish catheter removed from urethral meatus without difficulty after removing 8 mL of fluid from the balloon.    Insertion:  16 Fr Coude tipped latex parrish catheter inserted into urethral meatus in the usual sterile fashion with difficulty.  Received > 5 ml yellow positive urine return.   Balloon filled with 10 mL sterile H2O after positive urine return.  Catheter secured in place with leg strap: Yes.     Patient did tolerate procedure well.     Patient instructed as to where to call or go for pain, fever, leakage, or decreased urine flow.     This service provided today was under the direct supervision of Nilo Walters MD, who was available if needed.    Gera Slater RN   1/22/2025  1:59 PM

## 2025-01-22 NOTE — PROGRESS NOTES
Call placed to patient's son. He reports that his father started having leaking of urine from around his parrish at the exit from his penis and not much urine is collecting in the bag. No c/o pain or hematuria.. He last had his catheter changed in clinic on 12/23/25. Let him know that I would reach out to Birdie SORTO about the possibility of her seeing patient for a catheter change in clinic today.      Thank you,  Nicole Huitron RN, BSN   Urology Triage Nurse     none

## 2025-01-26 DIAGNOSIS — E11.65 TYPE 2 DIABETES MELLITUS WITH HYPERGLYCEMIA, WITHOUT LONG-TERM CURRENT USE OF INSULIN (H): ICD-10-CM

## 2025-01-29 RX ORDER — INSULIN ASPART 100 [IU]/ML
INJECTION, SOLUTION INTRAVENOUS; SUBCUTANEOUS
Qty: 15 ML | Refills: 3 | OUTPATIENT
Start: 2025-01-29

## 2025-01-29 NOTE — TELEPHONE ENCOUNTER
Eliza Santos, RN to [facility] Regarding result: Insulin Aspart FlexPen 100 UNIT/ML SOPN [Pharmacy Med Name: INSULIN ASPART FLEXPEN INJ, 3ML]   BW    1/27/25  8:10 AM  Patient should not need another refill order yet. Because this is insulin, refill RN cannot bounce it back to pharmacy with this message. Instructions are to route to triage to contact pharmacy or patient to make sure supply is correct.

## 2025-01-29 NOTE — TELEPHONE ENCOUNTER
"Writer attempt #1 to call patient with the help of an [facility] \"Kayce\"   regarding Refill RN's message below. Refill RN's message reviewed with patient's daughter, Dimitri Talamantes (CTC on file).    Per daughter, they are NOT out of the insulin medication yet. This may have been a mistake. Writer will cancel automated refill request.     JONATHAN GarrettN, RN, PHN   [facility]    "

## 2025-02-17 DIAGNOSIS — E11.22 TYPE 2 DIABETES MELLITUS WITH STAGE 3 CHRONIC KIDNEY DISEASE, WITHOUT LONG-TERM CURRENT USE OF INSULIN, UNSPECIFIED WHETHER STAGE 3A OR 3B CKD (H): ICD-10-CM

## 2025-02-17 DIAGNOSIS — N18.30 TYPE 2 DIABETES MELLITUS WITH STAGE 3 CHRONIC KIDNEY DISEASE, WITHOUT LONG-TERM CURRENT USE OF INSULIN, UNSPECIFIED WHETHER STAGE 3A OR 3B CKD (H): ICD-10-CM

## 2025-02-17 RX ORDER — SITAGLIPTIN 50 MG/1
50 TABLET, FILM COATED ORAL DAILY
Qty: 90 TABLET | Refills: 0 | Status: SHIPPED | OUTPATIENT
Start: 2025-02-17

## 2025-02-18 DIAGNOSIS — F48.2 PSEUDOBULBAR AFFECT: ICD-10-CM

## 2025-02-18 DIAGNOSIS — I63.22 CEREBROVASCULAR ACCIDENT (CVA) DUE TO STENOSIS OF BASILAR ARTERY (H): ICD-10-CM

## 2025-02-18 NOTE — TELEPHONE ENCOUNTER
Second refill request from [facility]    Nuedexta 20-10mg capusles   Take 1 capsule by mouth every 12 hours    Last Written Prescription Date:  2/23/2024  Last Fill Quantity: 60,  # refills: 11   Last office visit: 3/21/2023 ; last virtual visit: 1/24/2024 with prescribing provider:  Emily Mcmanus NP   Future Office Visit: Follow up PRN

## 2025-02-25 ENCOUNTER — TELEPHONE (OUTPATIENT)
Dept: NEUROLOGY | Facility: CLINIC | Age: 85
End: 2025-02-25
Payer: COMMERCIAL

## 2025-02-25 NOTE — TELEPHONE ENCOUNTER
Rec'd call from Lynette at [facility] - pa is needed she will be faxing cmm code  Note: Due to record-high volumes, our turn-around time is taking longer than usual . We are currently 4  business days behind in the pools.   We are working diligently to submit all requests in a timely manner and in the order they are received. Please only flag TRUE URGENT requests as high priority to the pool at this time.   If you have questions on status of PA's,  please send a note/message in the active PA encounter and send back to the [facility] PA pool [[id number]].    If you have questions about the turn-around time or about our process, please reach out to our supervisor Melissa Haley.   Thank you!   RPPA (Retail Pharmacy Prior Authorization) team

## 2025-02-25 NOTE — TELEPHONE ENCOUNTER
Retail Pharmacy Prior Authorization Team   Phone: [phone]    [facility] VARGAS: [id number]

## 2025-02-26 NOTE — TELEPHONE ENCOUNTER
Received fax stating the following:  Plan preferred alternative(s); amitriptyline and nortriptyline or call plan at [phone] to initiate Prior Authorization. Patients ID number is [id number]. To provide a new prescription call store at [phone] or fax [phone].

## 2025-02-27 NOTE — TELEPHONE ENCOUNTER
Central Prior Authorization Team  Phone: [phone]    PA Initiation    Medication: NUEDEXTA 20-10 MG PO CAPS  Insurance Company: [employer] - Phone [phone] Fax [phone]  Pharmacy Filling the Rx: [id number] [place], [place] - [facility] AT [facility] [address]  Filling Pharmacy Phone: [phone]  Filling Pharmacy Fax: [phone]  Start Date: 2/27/2025

## 2025-02-27 NOTE — TELEPHONE ENCOUNTER
Prior Authorization Approval    Medication: NUEDEXTA 20-10 MG PO CAPS  Authorization Effective Date: 2/27/2025  Authorization Expiration Date: 2/27/2026  Approved Dose/Quantity:   Reference #:     Insurance Company: [employer] - Phone [phone] Fax [phone]  Expected CoPay: $    CoPay Card Available:      Financial Assistance Needed:   Which [facility] filling the prescription[id number] [place], [place] - [address] AT [facility] OF [address]  Pharmacy Notified: Yes    Patient Notified: **Instructed pharmacy to notify patient when script is ready to /ship.**

## 2025-02-27 NOTE — TELEPHONE ENCOUNTER
PA approved. Pharmacy was instructed to notify patient when script is ready to . Will close encounter.     Yolette MAYER RN, BSN  [facility]

## 2025-03-03 ENCOUNTER — OFFICE VISIT (OUTPATIENT)
Dept: UROLOGY | Facility: CLINIC | Age: 85
End: 2025-03-03
Payer: COMMERCIAL

## 2025-03-03 DIAGNOSIS — Z46.6 URINARY CATHETER (FOLEY) CHANGE REQUIRED: Primary | ICD-10-CM

## 2025-03-03 PROCEDURE — 51702 INSERT TEMP BLADDER CATH: CPT

## 2025-03-03 RX ORDER — SULFAMETHOXAZOLE AND TRIMETHOPRIM 800; 160 MG/1; MG/1
1 TABLET ORAL ONCE
Status: COMPLETED | OUTPATIENT
Start: 2025-03-03 | End: 2025-03-03

## 2025-03-03 RX ADMIN — SULFAMETHOXAZOLE AND TRIMETHOPRIM 1 TABLET: 800; 160 TABLET ORAL at 15:22

## 2025-03-03 NOTE — PROGRESS NOTES
Adam Talamantes comes into clinic today at the request of KANDI Amador with the diagnosis of Urinary retention for a catheter exchange.    Order has been verified: Yes.    Allergies   Allergen Reactions    Jardiance [Empagliflozin]      Had complicated uti on jardiance    Lisinopril Cough       The following medication was given:     MEDICATION: Bactrim (Trimethoprim / Sulfamethoxazole)  ROUTE: PO  SITE: Medication was given orally  DOSE: 800mg/160mg  LOT #: [id number]  : [employer]   EXPIRATION DATE: 7.31.2026  NDC#: 01 [id number]   Was there drug waste? No    Prior to medication administration, verified patient identity using patient's name and date of birth.  Due to medication administration, patient instructed to remain in clinic for 15 minutes  afterwards, and to report any adverse reaction to me immediately.    Removal:  16 Fr straight tipped latex parrish catheter removed from urethral meatus without difficulty after removing 9 mL of fluid from the balloon.    Insertion:  16 Fr straight tipped latex parrish catheter inserted into urethral meatus in the usual sterile fashion without difficulty.  Received > 25 ml yellow positive urine return.   Balloon filled with 10 mL sterile H2O after positive urine return.  Catheter secured in place with leg strap: Yes.     Patient did tolerate procedure well.     Patient instructed as to where to call or go for pain, fever, leakage, or decreased urine flow.     This service provided today was under the direct supervision of KANDI Amador, who was available if needed.    DARCI RADER RN   3/3/2025  3:35 PM

## 2025-03-14 ENCOUNTER — TELEPHONE (OUTPATIENT)
Dept: FAMILY MEDICINE | Facility: CLINIC | Age: 85
End: 2025-03-14
Payer: COMMERCIAL

## 2025-03-14 DIAGNOSIS — G47.00 INSOMNIA, UNSPECIFIED TYPE: ICD-10-CM

## 2025-03-14 NOTE — TELEPHONE ENCOUNTER
Medication Question or Refill        What medication are you calling about (include dose and sig)?: traZODone (DESYREL) 50 MG tablet     Preferred Pharmacy:   [id number] [address] & [address]  [address] [place] [id number]  Phone: [phone] Fax: [phone]      Controlled Substance Agreement on file:   CSA -- Patient Level:     [Media Unavailable] Controlled Substance Agreement - Opioid - Scan on 4/12/2022  8:47 AM   [Media Unavailable] Controlled Substance Agreement - Opioid - Scan on 8/4/2021  7:24 AM       Who prescribed the medication?:     Do you need a refill? Yes    When did you use the medication last?     Patient offered an appointment? No    Do you have any questions or concerns?  No      Okay to leave a detailed message?: Yes at Home number on file [phone] (home)

## 2025-03-17 RX ORDER — TRAZODONE HYDROCHLORIDE 50 MG/1
50-100 TABLET ORAL
Qty: 180 TABLET | Refills: 3 | Status: SHIPPED | OUTPATIENT
Start: 2025-03-17

## 2025-03-17 NOTE — TELEPHONE ENCOUNTER
Dimitri, daughter (CTC on file) calling regarding refill request. Writer informed her the request has been sent to PCP and we are waiting for PCP to review and sign order.     She would also like to update PCP:  patient started back on aspirin 81 mg. Back in February, when he took Rx everyday, there was blood in urine so patient's daughter and son gave him aspirin only 3 times a week (Monday, Wednesday, Saturday) and now blood in urine is gone.         Agapito Shook, BSN RN  [facility]

## 2025-03-23 DIAGNOSIS — N40.1 BENIGN PROSTATIC HYPERPLASIA WITH INCOMPLETE BLADDER EMPTYING: ICD-10-CM

## 2025-03-23 DIAGNOSIS — R39.14 BENIGN PROSTATIC HYPERPLASIA WITH INCOMPLETE BLADDER EMPTYING: ICD-10-CM

## 2025-03-23 RX ORDER — TAMSULOSIN HYDROCHLORIDE 0.4 MG/1
0.4 CAPSULE ORAL DAILY
Qty: 90 CAPSULE | Refills: 1 | Status: SHIPPED | OUTPATIENT
Start: 2025-03-23

## 2025-03-24 ENCOUNTER — OFFICE VISIT (OUTPATIENT)
Dept: PHARMACY | Facility: CLINIC | Age: 85
End: 2025-03-24
Payer: COMMERCIAL

## 2025-03-24 ENCOUNTER — LAB (OUTPATIENT)
Dept: LAB | Facility: CLINIC | Age: 85
End: 2025-03-24
Payer: COMMERCIAL

## 2025-03-24 VITALS
HEART RATE: 94 BPM | WEIGHT: 168.25 LBS | DIASTOLIC BLOOD PRESSURE: 86 MMHG | SYSTOLIC BLOOD PRESSURE: 120 MMHG | OXYGEN SATURATION: 93 % | BODY MASS INDEX: 31.79 KG/M2

## 2025-03-24 DIAGNOSIS — I63.22 CEREBROVASCULAR ACCIDENT (CVA) DUE TO STENOSIS OF BASILAR ARTERY (H): ICD-10-CM

## 2025-03-24 DIAGNOSIS — E11.65 TYPE 2 DIABETES MELLITUS WITH HYPERGLYCEMIA, WITHOUT LONG-TERM CURRENT USE OF INSULIN (H): ICD-10-CM

## 2025-03-24 DIAGNOSIS — I67.9 INTRACRANIAL VASCULAR STENOSIS: Primary | ICD-10-CM

## 2025-03-24 DIAGNOSIS — G47.00 INSOMNIA, UNSPECIFIED TYPE: ICD-10-CM

## 2025-03-24 DIAGNOSIS — I10 ESSENTIAL HYPERTENSION WITH GOAL BLOOD PRESSURE LESS THAN 140/90: ICD-10-CM

## 2025-03-24 DIAGNOSIS — K21.9 GASTROESOPHAGEAL REFLUX DISEASE, UNSPECIFIED WHETHER ESOPHAGITIS PRESENT: ICD-10-CM

## 2025-03-24 DIAGNOSIS — K59.00 CONSTIPATION, UNSPECIFIED CONSTIPATION TYPE: ICD-10-CM

## 2025-03-24 DIAGNOSIS — E11.65 TYPE 2 DIABETES MELLITUS WITH HYPERGLYCEMIA, WITHOUT LONG-TERM CURRENT USE OF INSULIN (H): Primary | ICD-10-CM

## 2025-03-24 LAB
EST. AVERAGE GLUCOSE BLD GHB EST-MCNC: 209 MG/DL
HBA1C MFR BLD: 8.9 % (ref 0–5.6)

## 2025-03-24 PROCEDURE — 3079F DIAST BP 80-89 MM HG: CPT | Performed by: PHARMACIST

## 2025-03-24 PROCEDURE — 83036 HEMOGLOBIN GLYCOSYLATED A1C: CPT

## 2025-03-24 PROCEDURE — 99606 MTMS BY PHARM EST 15 MIN: CPT | Performed by: PHARMACIST

## 2025-03-24 PROCEDURE — 3074F SYST BP LT 130 MM HG: CPT | Performed by: PHARMACIST

## 2025-03-24 PROCEDURE — 99607 MTMS BY PHARM ADDL 15 MIN: CPT | Performed by: PHARMACIST

## 2025-03-24 PROCEDURE — 36415 COLL VENOUS BLD VENIPUNCTURE: CPT

## 2025-03-24 RX ORDER — METOPROLOL SUCCINATE 50 MG/1
50 TABLET, EXTENDED RELEASE ORAL DAILY
Qty: 90 TABLET | Refills: 3 | Status: SHIPPED | OUTPATIENT
Start: 2025-03-24

## 2025-03-24 RX ORDER — CILOSTAZOL 50 MG/1
100 TABLET ORAL 2 TIMES DAILY
Qty: 60 TABLET | Refills: 2 | Status: SHIPPED | OUTPATIENT
Start: 2025-03-24

## 2025-03-24 RX ORDER — INSULIN ASPART 100 [IU]/ML
INJECTION, SOLUTION INTRAVENOUS; SUBCUTANEOUS
Qty: 30 ML | Refills: 3 | Status: SHIPPED | OUTPATIENT
Start: 2025-03-24

## 2025-03-24 NOTE — PATIENT INSTRUCTIONS
"Recommendations from today's MTM visit:                                                    MTM (medication therapy management) is a service provided by a clinical pharmacist designed to help you get the most of out of your medicines.      Increase dose: Novolog 16 before breakfast, 6 units before lunch and dinner   STOP aspirin   Updated Metoprolol succinate to 50 mg tablets - take 1 tablet once daily    Follow-up: Return in about 16 weeks (around 7/14/2025).    It was great speaking with you today.  I value your experience and would be very thankful for your time in providing feedback in our clinic survey. In the next few days, you may receive an email or text message from [employer] with a link to a survey related to your  clinical pharmacist.\"     To schedule another MTM appointment, please call the clinic directly or you may call the MTM scheduling line at [phone].    My Clinical Pharmacist's contact information:                                                      Please feel free to contact me with any questions or concerns you have.      Pushpa Philip, PharmD, BCACP  Medication Therapy Management Pharmacist    "

## 2025-03-24 NOTE — Clinical Note
I am slightly increasing his novolog morning dose today. I am also having him stop the aspirin again (also sent message to neuro to let them know of this, since he noticed blood in urine).   Please let me know if any questions Charly

## 2025-03-24 NOTE — Clinical Note
Neuro team -  I wanted to inform you all that patient had signs of bleeding when taking aspirin in the past (2023). Per discussion with your team in January he re-tried aspirin use (in addition to his eliquis) and noticed blood in the urine again. Since that he has decreased to taking it 3 times weekly and not having any issues. With this side effect I told him to stop aspirin at this time. Though let me know if you have other ideas or recommendations and I can communicate that with patient if needed.   Thanks Charly  Pushpa Philip, PharmD, BCACP Medication Therapy Management Pharmacist

## 2025-03-24 NOTE — PROGRESS NOTES
Medication Therapy Management (MTM) Encounter    ASSESSMENT:                            Medication Adherence/Access: No issues identified.    1. Type 2 diabetes mellitus with hyperglycemia, without long-term current use of insulin (H) (Primary)  A1c not at goal <8%. Patient is not meeting goal of > 50% time in target with continuous glucose monitoring. Reasonable to maintain lower dose januvia given fluctuating renal function.  Do not recommend SGLT2 inhibitor given complicated UTI with Jardiance in the past.  Offered to patient the option to switch Januvia to GLP-1 agonist, though patient declines.  Therefore, patient would benefit from increased dose of morning NovoLog today to avoid postprandial elevations.    2. Essential hypertension with goal blood pressure less than 140/90  BP at goal <140/90 mmHg, given patient age, reasonable to continue current medications today. Patient not currently taking ACE or ARB, could consider switching beta-blocker to low-dose ARB in the future for kidney protection given microalbuminuria.    3. 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  Given signs of bleeding since resuming daily aspirin, recommended patient discontinue therapy at this time, will notify neuroradiology today.    4. Constipation, unspecified constipation type  Stable on current therapy.    5. Insomnia, unspecified type  Stable.    6. Gastroesophageal reflux disease, unspecified whether esophagitis present  Unstageable, encouraged patient to use Tums as needed for symptoms.    7. Chronic gouty arthropathy  Recommend further addressing with PCP at next visit to verify source of pain.  Additionally, would recommend rechecking renal function prior to adjusting medication dose.     PLAN:                            Increase dose: Novolog 16 before breakfast, 6 units before lunch and dinner   STOP aspirin - MTM to notify PCP and neuroradiology  team today   Update (3/25/25): Per Dr.s Jordan, MD Mary & Joey Jackson MD transitioning him to Cilastozol 100mg BID. Rx sent. MTM called pt with  #[id number] to provide education today  Updated Metoprolol succinate to 50 mg tablets - take 1 tablet once daily  Lab today: A1c    Follow-up: Return in about 16 weeks (around 7/14/2025).    SUBJECTIVE/OBJECTIVE:                          Adam Talamantes is a 85 year old male seen for a follow-up visit. Patient was accompanied by daughter and daughter in law. Patient seen with  .      Reason for visit: MTM follow up.    Allergies/ADRs: Reviewed in chart  Past Medical History: Reviewed in chart  Tobacco: He reports that he quit smoking about 25 years ago. His smoking use included cigarettes. He has never been exposed to tobacco smoke. He has quit using smokeless tobacco.  Alcohol: none    Medication Adherence/Access: His daughter, Dimitri Talamantes, helps him with medications, set up in twice daily pillbox. Though other daughter comes with to visit today. Reports no missed medication doses.   Brings with medication vials today, no pillbox, and meter.    Diabetes   Lantus 30 units daily at noon   Novolog 12 units before breakfast, 6 units before lunch and dinner (8 am, noon, 5 pm)   Januvia 50 mg daily in AM   Patient is not experiencing side effects.  Daughter helps with insulin injections, patient unable to do this on his own, reports no missed doses.   Patient prefers NOT to try GLP-1 at this time.   Current diabetes symptoms: No symptoms reported.   Diet: Eating 3 very meals per day,  reports consistent diet. Patient usually finished all of his food. Now drinking Ensure once daily. Drinking ensure drinks for about a year.   Breakfast 8 am: Ensure (glucerna), Rice, soup, veggies, milk  Lunch noon: Rice, soup veggies, milk  Dinner 5 pm: Rice, soup, veggies, fruit  Exercise: Trying to have him walking around more to help keep him active. Walks some  laps around the house with his walker.  Med Hx: Jardiance (UTI, stopped inpatient)     Blood sugar monitoring: Continuous Glucose Monitoring with Ray 2; see below.     Eye exam is due.   Foot exam: due    Hypertension   Metoprolol succinate 100 mg - 1/2 tablet (50 mg) daily    Meclizine 12.5 mg 3 times daily as needed - rarely using this but has in case needed  Patient reports no current medication side effects  Patient does not self-monitor blood pressure sometimes, unable to say what recent reading have been.     Hyperlipidemia /CVA  Rosuvastatin 20 mg daily  Eliquis 5 mg twice daily  Aspirin 81 mg daily - blood in urine in 2023 when he took this, recently re-prescribed and noticed blood in urine after 1 week of use, now they are only giving him therapy on M, W, Sat, no longer seeing blood in the urine   Patient reports no significant myalgias or other side effects.   Of note, aspirin 81 mg daily resumed in January per recommendation from neurosurgery center.   The ASCVD Risk score (Cat DK, et al., 2019) failed to calculate for the following reasons:    The 2019 ASCVD risk score is only valid for ages 40 to 79    Risk score cannot be calculated because patient has a medical history suggesting prior/existing ASCVD     Constipation  Senna/Docusate sodium 8.6/100 mg 2 tablets twice daily   Miralax 17 g daily as needed - rarely using  Having BM daily, much improved since last time.  No concerns at this time.     Insomnia/Depression/Itching  Duloxetine 30 mg in the morning, 60 mg in the evening   Trazodone 50 mg 1-2 tab daily at bedtime - taking as needed  Hydroxyzine 25 mg every 8 hours as needed for itching/sleep - taking as needed  Nuedexta (dextromethorphan/quinidine) 20/10 mg twice daily (per neurology)  Reports sleep has been good with current medications.   Thinks mood is more stable now with current medication. No concerns with current medications.     GERD    Pantoprazole 40 mg mg once daily  Tums as  needed - still has this at home to use as needed  Patient reports no current symptoms. No complaints of symptoms recently.      Gout  Allopurinol 200 mg daily   Reports no concerns today, though notes that he does have pain in his knee about once weekly.     Today's Vitals: /86   Pulse 94   Wt 168 lb 4 oz (76.3 kg)   SpO2 93%   BMI 31.79 kg/m    ----------------      I spent 60 minutes with this patient today. All changes were made via collaborative practice agreement with Chucho Espino MD.     A summary of these recommendations was given to the patient.    Pushpa Philip, PharmD, BCACP  Medication Therapy Management Pharmacist     Medication Therapy Recommendations  Coronary artery disease due to lipid rich plaque   1 Current Medication: aspirin 81 MG EC tablet (Discontinued)   Current Medication Sig: Take 1 tablet (81 mg) by mouth daily.   Rationale: Undesirable effect - Adverse medication event - Safety   Recommendation: Change Medication - CILOSTAZOL   Status: Accepted per Provider   Identified Date: 3/24/2025 Completed Date: 3/25/2025   Note: change per neuro         Type 2 diabetes mellitus with chronic kidney disease, with long-term current use of insulin, unspecified CKD stage (H)   1 Current Medication: insulin aspart (NOVOLOG FLEXPEN) 100 UNIT/ML pen   Current Medication Sig: Inject 16 units before breakfast, and 6 units under the skin before lunch and dinner   Rationale: Dose too low - Dosage too low - Effectiveness   Recommendation: Increase Dose   Status: Accepted per CPA   Identified Date: 3/24/2025 Completed Date: 3/24/2025

## 2025-03-27 ENCOUNTER — PRE VISIT (OUTPATIENT)
Dept: UROLOGY | Facility: CLINIC | Age: 85
End: 2025-03-27
Payer: COMMERCIAL

## 2025-03-27 DIAGNOSIS — N31.9 NEUROGENIC BLADDER: ICD-10-CM

## 2025-03-27 DIAGNOSIS — R33.9 URINARY RETENTION: Primary | ICD-10-CM

## 2025-03-27 RX ORDER — SULFAMETHOXAZOLE AND TRIMETHOPRIM 800; 160 MG/1; MG/1
1 TABLET ORAL ONCE
Status: ACTIVE | OUTPATIENT
Start: 2025-03-27

## 2025-03-31 ENCOUNTER — OFFICE VISIT (OUTPATIENT)
Dept: UROLOGY | Facility: CLINIC | Age: 85
End: 2025-03-31
Payer: COMMERCIAL

## 2025-03-31 DIAGNOSIS — R33.9 URINARY RETENTION: Primary | ICD-10-CM

## 2025-03-31 PROCEDURE — 51702 INSERT TEMP BLADDER CATH: CPT

## 2025-03-31 RX ADMIN — SULFAMETHOXAZOLE AND TRIMETHOPRIM 1 TABLET: 800; 160 TABLET ORAL at 12:00

## 2025-03-31 NOTE — PROGRESS NOTES
Adam Talamantes comes into clinic today at the request of Hallie Rivera with the diagnosis of urinary retention  for a catheter exchange.    Order has been verified: Yes.    Allergies   Allergen Reactions    Jardiance [Empagliflozin]      Had complicated uti on jardiance    Lisinopril Cough       The following medication was given:     MEDICATION: Bactrim (Trimethoprim / Sulfamethoxazole)  ROUTE: PO  SITE: Medication was given orally  DOSE: 800mg/160mg  LOT #: [id number]  : Fur and Mask   EXPIRATION DATE: 2026-07-31  NDC#: [id number]   Was there drug waste? No    Prior to medication administration, verified patient identity using patient's name and date of birth.  Due to medication administration, patient instructed to remain in clinic for 15 minutes  afterwards, and to report any adverse reaction to me immediately.    Removal:  16 Fr straight tipped latex parrish catheter removed from urethral meatus without difficulty after removing 8 mL of fluid from the balloon.    Insertion:  16 Fr straight tipped latex parrish catheter inserted into urethral meatus in the usual sterile fashion without difficulty.  Received > 50 ml yellow positive urine return.   Balloon filled with 10 mL sterile H2O after positive urine return.  Catheter secured in place with leg strap: Yes.     Patient did tolerate procedure well.     Patient instructed as to where to call or go for pain, fever, leakage, or decreased urine flow.     This service provided today was under the direct supervision of Hallie RIVERA, who was available if needed.    Savannah Santiago   3/31/2025  2:51 PM

## 2025-04-21 ENCOUNTER — APPOINTMENT (OUTPATIENT)
Dept: NUCLEAR MEDICINE | Facility: CLINIC | Age: 85
DRG: 872 | End: 2025-04-21
Attending: STUDENT IN AN ORGANIZED HEALTH CARE EDUCATION/TRAINING PROGRAM
Payer: COMMERCIAL

## 2025-04-21 ENCOUNTER — APPOINTMENT (OUTPATIENT)
Dept: GENERAL RADIOLOGY | Facility: CLINIC | Age: 85
DRG: 872 | End: 2025-04-21
Attending: STUDENT IN AN ORGANIZED HEALTH CARE EDUCATION/TRAINING PROGRAM
Payer: COMMERCIAL

## 2025-04-21 ENCOUNTER — HOSPITAL ENCOUNTER (INPATIENT)
Facility: CLINIC | Age: 85
DRG: 872 | End: 2025-04-21
Attending: STUDENT IN AN ORGANIZED HEALTH CARE EDUCATION/TRAINING PROGRAM | Admitting: STUDENT IN AN ORGANIZED HEALTH CARE EDUCATION/TRAINING PROGRAM
Payer: COMMERCIAL

## 2025-04-21 ENCOUNTER — OFFICE VISIT (OUTPATIENT)
Dept: FAMILY MEDICINE | Facility: CLINIC | Age: 85
End: 2025-04-21
Payer: COMMERCIAL

## 2025-04-21 ENCOUNTER — APPOINTMENT (OUTPATIENT)
Dept: INTERPRETER SERVICES | Facility: CLINIC | Age: 85
DRG: 872 | End: 2025-04-21
Payer: COMMERCIAL

## 2025-04-21 ENCOUNTER — APPOINTMENT (OUTPATIENT)
Dept: CT IMAGING | Facility: CLINIC | Age: 85
DRG: 872 | End: 2025-04-21
Attending: STUDENT IN AN ORGANIZED HEALTH CARE EDUCATION/TRAINING PROGRAM
Payer: COMMERCIAL

## 2025-04-21 VITALS
SYSTOLIC BLOOD PRESSURE: 98 MMHG | WEIGHT: 170.6 LBS | RESPIRATION RATE: 20 BRPM | TEMPERATURE: 99.8 F | OXYGEN SATURATION: 95 % | HEART RATE: 119 BPM | HEIGHT: 61 IN | DIASTOLIC BLOOD PRESSURE: 63 MMHG | BODY MASS INDEX: 32.21 KG/M2

## 2025-04-21 DIAGNOSIS — N18.9 ACUTE ON CHRONIC RENAL INSUFFICIENCY: Primary | ICD-10-CM

## 2025-04-21 DIAGNOSIS — R65.20 SEPSIS WITH ACUTE RENAL FAILURE WITHOUT SEPTIC SHOCK, DUE TO UNSPECIFIED ORGANISM, UNSPECIFIED ACUTE RENAL FAILURE TYPE (H): ICD-10-CM

## 2025-04-21 DIAGNOSIS — A41.9 SEPSIS WITH ACUTE RENAL FAILURE WITHOUT SEPTIC SHOCK, DUE TO UNSPECIFIED ORGANISM, UNSPECIFIED ACUTE RENAL FAILURE TYPE (H): ICD-10-CM

## 2025-04-21 DIAGNOSIS — R07.9 CHEST PAIN, UNSPECIFIED TYPE: ICD-10-CM

## 2025-04-21 DIAGNOSIS — R33.9 URINARY RETENTION: ICD-10-CM

## 2025-04-21 DIAGNOSIS — N28.9 ACUTE ON CHRONIC RENAL INSUFFICIENCY: Primary | ICD-10-CM

## 2025-04-21 DIAGNOSIS — N39.0 COMPLICATED UTI (URINARY TRACT INFECTION): ICD-10-CM

## 2025-04-21 DIAGNOSIS — R50.9 FEVER, UNSPECIFIED FEVER CAUSE: ICD-10-CM

## 2025-04-21 DIAGNOSIS — R53.1 WEAKNESS: ICD-10-CM

## 2025-04-21 DIAGNOSIS — N17.9 SEPSIS WITH ACUTE RENAL FAILURE WITHOUT SEPTIC SHOCK, DUE TO UNSPECIFIED ORGANISM, UNSPECIFIED ACUTE RENAL FAILURE TYPE (H): ICD-10-CM

## 2025-04-21 DIAGNOSIS — R06.02 SHORTNESS OF BREATH: ICD-10-CM

## 2025-04-21 DIAGNOSIS — N18.30 DIABETES MELLITUS WITH STAGE 3 CHRONIC KIDNEY DISEASE (H): ICD-10-CM

## 2025-04-21 DIAGNOSIS — R00.0 TACHYCARDIA: ICD-10-CM

## 2025-04-21 DIAGNOSIS — E11.22 DIABETES MELLITUS WITH STAGE 3 CHRONIC KIDNEY DISEASE (H): ICD-10-CM

## 2025-04-21 DIAGNOSIS — N39.0 ACUTE UTI: ICD-10-CM

## 2025-04-21 DIAGNOSIS — I13.10 HYPERTENSIVE HEART AND RENAL DISEASE WITH RENAL FAILURE, STAGE 1 THROUGH STAGE 4 OR UNSPECIFIED CHRONIC KIDNEY DISEASE, WITHOUT HEART FAILURE: Primary | ICD-10-CM

## 2025-04-21 LAB
ALBUMIN SERPL BCG-MCNC: 3.5 G/DL (ref 3.5–5.2)
ALBUMIN UR-MCNC: 30 MG/DL
ALBUMIN UR-MCNC: 50 MG/DL
ALP SERPL-CCNC: 93 U/L (ref 40–150)
ALT SERPL W P-5'-P-CCNC: 19 U/L (ref 0–70)
ANION GAP SERPL CALCULATED.3IONS-SCNC: 13 MMOL/L (ref 7–15)
APPEARANCE UR: ABNORMAL
APPEARANCE UR: ABNORMAL
AST SERPL W P-5'-P-CCNC: 21 U/L (ref 0–45)
ATRIAL RATE - MUSE: 115 BPM
BACTERIA #/AREA URNS HPF: ABNORMAL /HPF
BACTERIA #/AREA URNS HPF: ABNORMAL /HPF
BASE EXCESS BLDV CALC-SCNC: -2.4 MMOL/L (ref -3–3)
BASOPHILS # BLD AUTO: 0.1 10E3/UL (ref 0–0.2)
BASOPHILS NFR BLD AUTO: 0 %
BILIRUB SERPL-MCNC: 0.8 MG/DL
BILIRUB UR QL STRIP: NEGATIVE
BILIRUB UR QL STRIP: NEGATIVE
BUN SERPL-MCNC: 31.3 MG/DL (ref 8–23)
C PNEUM DNA SPEC QL NAA+PROBE: NOT DETECTED
CALCIUM SERPL-MCNC: 9.4 MG/DL (ref 8.8–10.4)
CHLORIDE SERPL-SCNC: 96 MMOL/L (ref 98–107)
COLOR UR AUTO: ABNORMAL
COLOR UR AUTO: YELLOW
CREAT SERPL-MCNC: 2.31 MG/DL (ref 0.67–1.17)
D DIMER PPP FEU-MCNC: 1.09 UG/ML FEU (ref 0–0.5)
DIASTOLIC BLOOD PRESSURE - MUSE: NORMAL MMHG
EGFRCR SERPLBLD CKD-EPI 2021: 27 ML/MIN/1.73M2
EOSINOPHIL # BLD AUTO: 0.1 10E3/UL (ref 0–0.7)
EOSINOPHIL NFR BLD AUTO: 0 %
ERYTHROCYTE [DISTWIDTH] IN BLOOD BY AUTOMATED COUNT: 16 % (ref 10–15)
FLUAV H1 2009 PAND RNA SPEC QL NAA+PROBE: NOT DETECTED
FLUAV H1 RNA SPEC QL NAA+PROBE: NOT DETECTED
FLUAV H3 RNA SPEC QL NAA+PROBE: NOT DETECTED
FLUAV RNA SPEC QL NAA+PROBE: NEGATIVE
FLUAV RNA SPEC QL NAA+PROBE: NOT DETECTED
FLUBV RNA RESP QL NAA+PROBE: NEGATIVE
FLUBV RNA SPEC QL NAA+PROBE: NOT DETECTED
GLUCOSE SERPL-MCNC: 333 MG/DL (ref 70–99)
GLUCOSE UR STRIP-MCNC: 100 MG/DL
GLUCOSE UR STRIP-MCNC: NEGATIVE MG/DL
HADV DNA SPEC QL NAA+PROBE: NOT DETECTED
HCO3 BLDV-SCNC: 23 MMOL/L (ref 21–28)
HCO3 SERPL-SCNC: 19 MMOL/L (ref 22–29)
HCOV PNL SPEC NAA+PROBE: NOT DETECTED
HCT VFR BLD AUTO: 34.5 % (ref 40–53)
HGB BLD-MCNC: 11.2 G/DL (ref 13.3–17.7)
HGB UR QL STRIP: ABNORMAL
HGB UR QL STRIP: ABNORMAL
HMPV RNA SPEC QL NAA+PROBE: NOT DETECTED
HPIV1 RNA SPEC QL NAA+PROBE: NOT DETECTED
HPIV2 RNA SPEC QL NAA+PROBE: NOT DETECTED
HPIV3 RNA SPEC QL NAA+PROBE: NOT DETECTED
HPIV4 RNA SPEC QL NAA+PROBE: NOT DETECTED
IMM GRANULOCYTES # BLD: 0.5 10E3/UL
IMM GRANULOCYTES NFR BLD: 2 %
INTERPRETATION ECG - MUSE: NORMAL
KETONES UR STRIP-MCNC: NEGATIVE MG/DL
KETONES UR STRIP-MCNC: NEGATIVE MG/DL
LACTATE SERPL-SCNC: 1.4 MMOL/L (ref 0.7–2)
LACTATE SERPL-SCNC: 2.5 MMOL/L (ref 0.7–2)
LEUKOCYTE ESTERASE UR QL STRIP: ABNORMAL
LEUKOCYTE ESTERASE UR QL STRIP: ABNORMAL
LIPASE SERPL-CCNC: 23 U/L (ref 13–60)
LYMPHOCYTES # BLD AUTO: 2 10E3/UL (ref 0.8–5.3)
LYMPHOCYTES NFR BLD AUTO: 7 %
M PNEUMO DNA SPEC QL NAA+PROBE: NOT DETECTED
MAGNESIUM SERPL-MCNC: 1.5 MG/DL (ref 1.7–2.3)
MCH RBC QN AUTO: 29.4 PG (ref 26.5–33)
MCHC RBC AUTO-ENTMCNC: 32.5 G/DL (ref 31.5–36.5)
MCV RBC AUTO: 91 FL (ref 78–100)
MONOCYTES # BLD AUTO: 2 10E3/UL (ref 0–1.3)
MONOCYTES NFR BLD AUTO: 7 %
MRSA DNA SPEC QL NAA+PROBE: NEGATIVE
MUCOUS THREADS #/AREA URNS LPF: PRESENT /LPF
NEUTROPHILS # BLD AUTO: 23.9 10E3/UL (ref 1.6–8.3)
NEUTROPHILS NFR BLD AUTO: 84 %
NITRATE UR QL: NEGATIVE
NITRATE UR QL: POSITIVE
NRBC # BLD AUTO: 0 10E3/UL
NRBC BLD AUTO-RTO: 0 /100
NT-PROBNP SERPL-MCNC: 741 PG/ML (ref 0–1800)
O2/TOTAL GAS SETTING VFR VENT: 21 %
OSMOLALITY SERPL: 294 MMOL/KG (ref 280–301)
OSMOLALITY UR: 359 MMOL/KG (ref 100–1200)
OXYHGB MFR BLDV: 65 % (ref 70–75)
P AXIS - MUSE: 22 DEGREES
PCO2 BLDV: 41 MM HG (ref 40–50)
PH BLDV: 7.36 [PH] (ref 7.32–7.43)
PH UR STRIP: 5.5 [PH] (ref 5–7)
PH UR STRIP: 6 [PH] (ref 5–7)
PHOSPHATE SERPL-MCNC: 1.9 MG/DL (ref 2.5–4.5)
PLAT MORPH BLD: ABNORMAL
PLATELET # BLD AUTO: 192 10E3/UL (ref 150–450)
PO2 BLDV: 37 MM HG (ref 25–47)
POLYCHROMASIA BLD QL SMEAR: SLIGHT
POTASSIUM SERPL-SCNC: 4.6 MMOL/L (ref 3.4–5.3)
PR INTERVAL - MUSE: 166 MS
PROCALCITONIN SERPL IA-MCNC: 2.24 NG/ML
PROT SERPL-MCNC: 7.6 G/DL (ref 6.4–8.3)
QRS DURATION - MUSE: 78 MS
QT - MUSE: 332 MS
QTC - MUSE: 459 MS
R AXIS - MUSE: -5 DEGREES
RBC # BLD AUTO: 3.81 10E6/UL (ref 4.4–5.9)
RBC MORPH BLD: ABNORMAL
RBC URINE: 17 /HPF
RBC URINE: 22 /HPF
RSV RNA SPEC NAA+PROBE: NEGATIVE
RSV RNA SPEC QL NAA+PROBE: NOT DETECTED
RSV RNA SPEC QL NAA+PROBE: NOT DETECTED
RV+EV RNA SPEC QL NAA+PROBE: NOT DETECTED
SA TARGET DNA: POSITIVE
SAO2 % BLDV: 66.6 % (ref 70–75)
SARS-COV-2 RNA RESP QL NAA+PROBE: NEGATIVE
SODIUM SERPL-SCNC: 128 MMOL/L (ref 135–145)
SODIUM UR-SCNC: 23 MMOL/L
SP GR UR STRIP: 1.02 (ref 1–1.03)
SP GR UR STRIP: 1.02 (ref 1–1.03)
SYSTOLIC BLOOD PRESSURE - MUSE: NORMAL MMHG
T AXIS - MUSE: 62 DEGREES
TROPONIN T SERPL HS-MCNC: 18 NG/L
TROPONIN T SERPL HS-MCNC: 20 NG/L
UROBILINOGEN UR STRIP-MCNC: NORMAL MG/DL
UROBILINOGEN UR STRIP-MCNC: NORMAL MG/DL
VENTRICULAR RATE- MUSE: 115 BPM
WBC # BLD AUTO: 28.5 10E3/UL (ref 4–11)
WBC CLUMPS #/AREA URNS HPF: PRESENT /HPF
WBC CLUMPS #/AREA URNS HPF: PRESENT /HPF
WBC URINE: >182 /HPF
WBC URINE: >182 /HPF

## 2025-04-21 PROCEDURE — 250N000011 HC RX IP 250 OP 636: Performed by: STUDENT IN AN ORGANIZED HEALTH CARE EDUCATION/TRAINING PROGRAM

## 2025-04-21 PROCEDURE — 84300 ASSAY OF URINE SODIUM: CPT | Performed by: STUDENT IN AN ORGANIZED HEALTH CARE EDUCATION/TRAINING PROGRAM

## 2025-04-21 PROCEDURE — 99215 OFFICE O/P EST HI 40 MIN: CPT

## 2025-04-21 PROCEDURE — 250N000013 HC RX MED GY IP 250 OP 250 PS 637: Performed by: STUDENT IN AN ORGANIZED HEALTH CARE EDUCATION/TRAINING PROGRAM

## 2025-04-21 PROCEDURE — 99285 EMERGENCY DEPT VISIT HI MDM: CPT | Mod: 25 | Performed by: STUDENT IN AN ORGANIZED HEALTH CARE EDUCATION/TRAINING PROGRAM

## 2025-04-21 PROCEDURE — 85379 FIBRIN DEGRADATION QUANT: CPT | Performed by: STUDENT IN AN ORGANIZED HEALTH CARE EDUCATION/TRAINING PROGRAM

## 2025-04-21 PROCEDURE — 82805 BLOOD GASES W/O2 SATURATION: CPT | Performed by: STUDENT IN AN ORGANIZED HEALTH CARE EDUCATION/TRAINING PROGRAM

## 2025-04-21 PROCEDURE — 87637 SARSCOV2&INF A&B&RSV AMP PRB: CPT | Performed by: STUDENT IN AN ORGANIZED HEALTH CARE EDUCATION/TRAINING PROGRAM

## 2025-04-21 PROCEDURE — 78582 LUNG VENTILAT&PERFUS IMAGING: CPT | Mod: 26 | Performed by: STUDENT IN AN ORGANIZED HEALTH CARE EDUCATION/TRAINING PROGRAM

## 2025-04-21 PROCEDURE — 83605 ASSAY OF LACTIC ACID: CPT | Performed by: STUDENT IN AN ORGANIZED HEALTH CARE EDUCATION/TRAINING PROGRAM

## 2025-04-21 PROCEDURE — 83935 ASSAY OF URINE OSMOLALITY: CPT | Performed by: STUDENT IN AN ORGANIZED HEALTH CARE EDUCATION/TRAINING PROGRAM

## 2025-04-21 PROCEDURE — 96375 TX/PRO/DX INJ NEW DRUG ADDON: CPT | Performed by: STUDENT IN AN ORGANIZED HEALTH CARE EDUCATION/TRAINING PROGRAM

## 2025-04-21 PROCEDURE — 87186 SC STD MICRODIL/AGAR DIL: CPT | Performed by: STUDENT IN AN ORGANIZED HEALTH CARE EDUCATION/TRAINING PROGRAM

## 2025-04-21 PROCEDURE — 96365 THER/PROPH/DIAG IV INF INIT: CPT | Performed by: STUDENT IN AN ORGANIZED HEALTH CARE EDUCATION/TRAINING PROGRAM

## 2025-04-21 PROCEDURE — 87641 MR-STAPH DNA AMP PROBE: CPT | Performed by: STUDENT IN AN ORGANIZED HEALTH CARE EDUCATION/TRAINING PROGRAM

## 2025-04-21 PROCEDURE — 84484 ASSAY OF TROPONIN QUANT: CPT | Performed by: STUDENT IN AN ORGANIZED HEALTH CARE EDUCATION/TRAINING PROGRAM

## 2025-04-21 PROCEDURE — 84145 PROCALCITONIN (PCT): CPT | Performed by: STUDENT IN AN ORGANIZED HEALTH CARE EDUCATION/TRAINING PROGRAM

## 2025-04-21 PROCEDURE — 3078F DIAST BP <80 MM HG: CPT

## 2025-04-21 PROCEDURE — 83690 ASSAY OF LIPASE: CPT | Performed by: STUDENT IN AN ORGANIZED HEALTH CARE EDUCATION/TRAINING PROGRAM

## 2025-04-21 PROCEDURE — 83930 ASSAY OF BLOOD OSMOLALITY: CPT | Performed by: STUDENT IN AN ORGANIZED HEALTH CARE EDUCATION/TRAINING PROGRAM

## 2025-04-21 PROCEDURE — 120N000002 HC R&B MED SURG/OB UMMC

## 2025-04-21 PROCEDURE — 3074F SYST BP LT 130 MM HG: CPT

## 2025-04-21 PROCEDURE — 83880 ASSAY OF NATRIURETIC PEPTIDE: CPT | Performed by: STUDENT IN AN ORGANIZED HEALTH CARE EDUCATION/TRAINING PROGRAM

## 2025-04-21 PROCEDURE — 85004 AUTOMATED DIFF WBC COUNT: CPT | Performed by: STUDENT IN AN ORGANIZED HEALTH CARE EDUCATION/TRAINING PROGRAM

## 2025-04-21 PROCEDURE — 99222 1ST HOSP IP/OBS MODERATE 55: CPT | Mod: GC | Performed by: STUDENT IN AN ORGANIZED HEALTH CARE EDUCATION/TRAINING PROGRAM

## 2025-04-21 PROCEDURE — 36415 COLL VENOUS BLD VENIPUNCTURE: CPT | Performed by: STUDENT IN AN ORGANIZED HEALTH CARE EDUCATION/TRAINING PROGRAM

## 2025-04-21 PROCEDURE — 84100 ASSAY OF PHOSPHORUS: CPT | Performed by: STUDENT IN AN ORGANIZED HEALTH CARE EDUCATION/TRAINING PROGRAM

## 2025-04-21 PROCEDURE — 71045 X-RAY EXAM CHEST 1 VIEW: CPT

## 2025-04-21 PROCEDURE — 87899 AGENT NOS ASSAY W/OPTIC: CPT | Performed by: STUDENT IN AN ORGANIZED HEALTH CARE EDUCATION/TRAINING PROGRAM

## 2025-04-21 PROCEDURE — 999N000128 HC STATISTIC PERIPHERAL IV START W/O US GUIDANCE

## 2025-04-21 PROCEDURE — 84155 ASSAY OF PROTEIN SERUM: CPT | Performed by: STUDENT IN AN ORGANIZED HEALTH CARE EDUCATION/TRAINING PROGRAM

## 2025-04-21 PROCEDURE — 74176 CT ABD & PELVIS W/O CONTRAST: CPT | Mod: 26 | Performed by: STUDENT IN AN ORGANIZED HEALTH CARE EDUCATION/TRAINING PROGRAM

## 2025-04-21 PROCEDURE — 87040 BLOOD CULTURE FOR BACTERIA: CPT | Performed by: STUDENT IN AN ORGANIZED HEALTH CARE EDUCATION/TRAINING PROGRAM

## 2025-04-21 PROCEDURE — A9540 TC99M MAA: HCPCS | Performed by: STUDENT IN AN ORGANIZED HEALTH CARE EDUCATION/TRAINING PROGRAM

## 2025-04-21 PROCEDURE — 93010 ELECTROCARDIOGRAM REPORT: CPT | Performed by: STUDENT IN AN ORGANIZED HEALTH CARE EDUCATION/TRAINING PROGRAM

## 2025-04-21 PROCEDURE — 81003 URINALYSIS AUTO W/O SCOPE: CPT | Performed by: STUDENT IN AN ORGANIZED HEALTH CARE EDUCATION/TRAINING PROGRAM

## 2025-04-21 PROCEDURE — 343N000001 HC RX 343 MED OP 636: Performed by: STUDENT IN AN ORGANIZED HEALTH CARE EDUCATION/TRAINING PROGRAM

## 2025-04-21 PROCEDURE — 1125F AMNT PAIN NOTED PAIN PRSNT: CPT

## 2025-04-21 PROCEDURE — 71250 CT THORAX DX C-: CPT

## 2025-04-21 PROCEDURE — 83735 ASSAY OF MAGNESIUM: CPT | Performed by: STUDENT IN AN ORGANIZED HEALTH CARE EDUCATION/TRAINING PROGRAM

## 2025-04-21 PROCEDURE — 99285 EMERGENCY DEPT VISIT HI MDM: CPT | Performed by: STUDENT IN AN ORGANIZED HEALTH CARE EDUCATION/TRAINING PROGRAM

## 2025-04-21 PROCEDURE — A9567 TECHNETIUM TC-99M AEROSOL: HCPCS | Performed by: STUDENT IN AN ORGANIZED HEALTH CARE EDUCATION/TRAINING PROGRAM

## 2025-04-21 PROCEDURE — 258N000003 HC RX IP 258 OP 636: Performed by: STUDENT IN AN ORGANIZED HEALTH CARE EDUCATION/TRAINING PROGRAM

## 2025-04-21 PROCEDURE — 272N000035 NM LUNG SCAN VENTILATION AND PERFUSION

## 2025-04-21 PROCEDURE — 71045 X-RAY EXAM CHEST 1 VIEW: CPT | Mod: 26 | Performed by: RADIOLOGY

## 2025-04-21 PROCEDURE — 93005 ELECTROCARDIOGRAM TRACING: CPT | Performed by: STUDENT IN AN ORGANIZED HEALTH CARE EDUCATION/TRAINING PROGRAM

## 2025-04-21 PROCEDURE — 71250 CT THORAX DX C-: CPT | Mod: 26 | Performed by: STUDENT IN AN ORGANIZED HEALTH CARE EDUCATION/TRAINING PROGRAM

## 2025-04-21 PROCEDURE — 87581 M.PNEUMON DNA AMP PROBE: CPT | Performed by: STUDENT IN AN ORGANIZED HEALTH CARE EDUCATION/TRAINING PROGRAM

## 2025-04-21 RX ORDER — MAGNESIUM SULFATE HEPTAHYDRATE 40 MG/ML
2 INJECTION, SOLUTION INTRAVENOUS ONCE
Status: COMPLETED | OUTPATIENT
Start: 2025-04-21 | End: 2025-04-21

## 2025-04-21 RX ORDER — METOPROLOL SUCCINATE 25 MG/1
50 TABLET, EXTENDED RELEASE ORAL DAILY
Status: DISCONTINUED | OUTPATIENT
Start: 2025-04-21 | End: 2025-04-22

## 2025-04-21 RX ORDER — ONDANSETRON 2 MG/ML
4 INJECTION INTRAMUSCULAR; INTRAVENOUS EVERY 6 HOURS PRN
Status: DISCONTINUED | OUTPATIENT
Start: 2025-04-21 | End: 2025-04-25 | Stop reason: HOSPADM

## 2025-04-21 RX ORDER — AZITHROMYCIN 250 MG/1
250 TABLET, FILM COATED ORAL EVERY 24 HOURS
Status: DISCONTINUED | OUTPATIENT
Start: 2025-04-22 | End: 2025-04-23

## 2025-04-21 RX ORDER — ONDANSETRON 4 MG/1
4 TABLET, ORALLY DISINTEGRATING ORAL EVERY 6 HOURS PRN
Status: DISCONTINUED | OUTPATIENT
Start: 2025-04-21 | End: 2025-04-25 | Stop reason: HOSPADM

## 2025-04-21 RX ORDER — POLYETHYLENE GLYCOL 3350 17 G/17G
17 POWDER, FOR SOLUTION ORAL DAILY
Status: DISCONTINUED | OUTPATIENT
Start: 2025-04-21 | End: 2025-04-25 | Stop reason: HOSPADM

## 2025-04-21 RX ORDER — MECLIZINE HCL 12.5 MG 12.5 MG/1
12.5 TABLET ORAL 3 TIMES DAILY PRN
Status: DISCONTINUED | OUTPATIENT
Start: 2025-04-21 | End: 2025-04-25 | Stop reason: HOSPADM

## 2025-04-21 RX ORDER — LIDOCAINE 40 MG/G
CREAM TOPICAL
Status: DISCONTINUED | OUTPATIENT
Start: 2025-04-21 | End: 2025-04-25 | Stop reason: HOSPADM

## 2025-04-21 RX ORDER — TAMSULOSIN HYDROCHLORIDE 0.4 MG/1
0.4 CAPSULE ORAL DAILY
Status: DISCONTINUED | OUTPATIENT
Start: 2025-04-22 | End: 2025-04-25 | Stop reason: HOSPADM

## 2025-04-21 RX ORDER — ALLOPURINOL 100 MG/1
200 TABLET ORAL DAILY
Status: DISCONTINUED | OUTPATIENT
Start: 2025-04-22 | End: 2025-04-25 | Stop reason: HOSPADM

## 2025-04-21 RX ORDER — AMOXICILLIN 250 MG
1 CAPSULE ORAL 2 TIMES DAILY PRN
Status: DISCONTINUED | OUTPATIENT
Start: 2025-04-21 | End: 2025-04-21

## 2025-04-21 RX ORDER — DULOXETIN HYDROCHLORIDE 30 MG/1
30 CAPSULE, DELAYED RELEASE ORAL DAILY
Status: DISCONTINUED | OUTPATIENT
Start: 2025-04-21 | End: 2025-04-25 | Stop reason: HOSPADM

## 2025-04-21 RX ORDER — AMOXICILLIN 250 MG
2 CAPSULE ORAL 2 TIMES DAILY PRN
Status: DISCONTINUED | OUTPATIENT
Start: 2025-04-21 | End: 2025-04-21

## 2025-04-21 RX ORDER — AMOXICILLIN 250 MG
2 CAPSULE ORAL 2 TIMES DAILY PRN
Status: DISCONTINUED | OUTPATIENT
Start: 2025-04-21 | End: 2025-04-25 | Stop reason: HOSPADM

## 2025-04-21 RX ORDER — CEFAZOLIN SODIUM 1 G/50ML
1750 SOLUTION INTRAVENOUS ONCE
Status: COMPLETED | OUTPATIENT
Start: 2025-04-21 | End: 2025-04-21

## 2025-04-21 RX ORDER — CEFEPIME HYDROCHLORIDE 2 G/1
2 INJECTION, POWDER, FOR SOLUTION INTRAVENOUS EVERY 24 HOURS
Status: DISCONTINUED | OUTPATIENT
Start: 2025-04-22 | End: 2025-04-23

## 2025-04-21 RX ORDER — AZITHROMYCIN 250 MG/1
500 TABLET, FILM COATED ORAL ONCE
Status: COMPLETED | OUTPATIENT
Start: 2025-04-21 | End: 2025-04-21

## 2025-04-21 RX ORDER — TRAZODONE HYDROCHLORIDE 50 MG/1
50-100 TABLET ORAL
Status: DISCONTINUED | OUTPATIENT
Start: 2025-04-21 | End: 2025-04-25 | Stop reason: HOSPADM

## 2025-04-21 RX ORDER — PROCHLORPERAZINE MALEATE 5 MG/1
5 TABLET ORAL EVERY 6 HOURS PRN
Status: DISCONTINUED | OUTPATIENT
Start: 2025-04-21 | End: 2025-04-25 | Stop reason: HOSPADM

## 2025-04-21 RX ORDER — PANTOPRAZOLE SODIUM 40 MG/1
40 TABLET, DELAYED RELEASE ORAL
Status: DISCONTINUED | OUTPATIENT
Start: 2025-04-22 | End: 2025-04-25 | Stop reason: HOSPADM

## 2025-04-21 RX ORDER — CALCIUM CARBONATE 500 MG/1
1000 TABLET, CHEWABLE ORAL 4 TIMES DAILY PRN
Status: DISCONTINUED | OUTPATIENT
Start: 2025-04-21 | End: 2025-04-25 | Stop reason: HOSPADM

## 2025-04-21 RX ORDER — CILOSTAZOL 100 MG/1
100 TABLET ORAL 2 TIMES DAILY
Status: DISCONTINUED | OUTPATIENT
Start: 2025-04-21 | End: 2025-04-25 | Stop reason: HOSPADM

## 2025-04-21 RX ORDER — HYDROXYZINE HYDROCHLORIDE 25 MG/1
25 TABLET, FILM COATED ORAL EVERY 8 HOURS PRN
Status: DISCONTINUED | OUTPATIENT
Start: 2025-04-21 | End: 2025-04-25 | Stop reason: HOSPADM

## 2025-04-21 RX ORDER — ROSUVASTATIN CALCIUM 20 MG/1
20 TABLET, COATED ORAL AT BEDTIME
Status: DISCONTINUED | OUTPATIENT
Start: 2025-04-21 | End: 2025-04-25 | Stop reason: HOSPADM

## 2025-04-21 RX ORDER — CEFEPIME HYDROCHLORIDE 2 G/1
2 INJECTION, POWDER, FOR SOLUTION INTRAVENOUS ONCE
Status: COMPLETED | OUTPATIENT
Start: 2025-04-21 | End: 2025-04-21

## 2025-04-21 RX ORDER — ACETAMINOPHEN 500 MG
1000 TABLET ORAL ONCE
Status: COMPLETED | OUTPATIENT
Start: 2025-04-21 | End: 2025-04-21

## 2025-04-21 RX ADMIN — VANCOMYCIN HYDROCHLORIDE 1750 MG: 1 INJECTION, POWDER, LYOPHILIZED, FOR SOLUTION INTRAVENOUS at 14:52

## 2025-04-21 RX ADMIN — APIXABAN 5 MG: 5 TABLET, FILM COATED ORAL at 19:49

## 2025-04-21 RX ADMIN — SODIUM CHLORIDE 1000 ML: 0.9 INJECTION, SOLUTION INTRAVENOUS at 13:39

## 2025-04-21 RX ADMIN — KIT FOR THE PREPARATION OF TECHNETIUM TC 99M ALBUMIN AGGREGATED 7 MILLICURIE: 2.5 INJECTION, POWDER, FOR SOLUTION INTRAVENOUS at 18:12

## 2025-04-21 RX ADMIN — SODIUM CHLORIDE 1000 ML: 0.9 INJECTION, SOLUTION INTRAVENOUS at 16:57

## 2025-04-21 RX ADMIN — AZITHROMYCIN DIHYDRATE 500 MG: 250 TABLET, FILM COATED ORAL at 19:49

## 2025-04-21 RX ADMIN — CEFEPIME 2 G: 2 INJECTION, POWDER, FOR SOLUTION INTRAVENOUS at 13:40

## 2025-04-21 RX ADMIN — ROSUVASTATIN CALCIUM 20 MG: 20 TABLET, FILM COATED ORAL at 21:12

## 2025-04-21 RX ADMIN — POTASSIUM & SODIUM PHOSPHATES POWDER PACK 280-160-250 MG 1 PACKET: 280-160-250 PACK at 20:17

## 2025-04-21 RX ADMIN — KIT FOR THE PREPARATION OF TECHNETIUM TC 99M PENTETATE 2 MILLICURIE: 20 INJECTION, POWDER, LYOPHILIZED, FOR SOLUTION INTRAVENOUS; RESPIRATORY (INHALATION) at 18:12

## 2025-04-21 RX ADMIN — POTASSIUM & SODIUM PHOSPHATES POWDER PACK 280-160-250 MG 1 PACKET: 280-160-250 PACK at 23:55

## 2025-04-21 RX ADMIN — MAGNESIUM SULFATE HEPTAHYDRATE 2 G: 2 INJECTION, SOLUTION INTRAVENOUS at 20:17

## 2025-04-21 RX ADMIN — ACETAMINOPHEN 1000 MG: 500 TABLET, FILM COATED ORAL at 13:40

## 2025-04-21 ASSESSMENT — ACTIVITIES OF DAILY LIVING (ADL)
ADLS_ACUITY_SCORE: 60
ADLS_ACUITY_SCORE: 60
ADLS_ACUITY_SCORE: 61
ADLS_ACUITY_SCORE: 61
ADLS_ACUITY_SCORE: 60
ADLS_ACUITY_SCORE: 58
ADLS_ACUITY_SCORE: 60
ADLS_ACUITY_SCORE: 61
ADLS_ACUITY_SCORE: 58

## 2025-04-21 ASSESSMENT — COLUMBIA-SUICIDE SEVERITY RATING SCALE - C-SSRS
6. HAVE YOU EVER DONE ANYTHING, STARTED TO DO ANYTHING, OR PREPARED TO DO ANYTHING TO END YOUR LIFE?: NO
2. HAVE YOU ACTUALLY HAD ANY THOUGHTS OF KILLING YOURSELF IN THE PAST MONTH?: NO
1. IN THE PAST MONTH, HAVE YOU WISHED YOU WERE DEAD OR WISHED YOU COULD GO TO SLEEP AND NOT WAKE UP?: NO

## 2025-04-21 ASSESSMENT — PAIN SCALES - GENERAL: PAINLEVEL_OUTOF10: MODERATE PAIN (6)

## 2025-04-21 NOTE — ED NOTES
This RN and Vascular attempted for blood draw for blood cultures x3 - unsuccessful. Per Dr. Red melgoza to start antibiotics.

## 2025-04-21 NOTE — ED TRIAGE NOTES
PT brought in by family from clinic. PT was being evaluated for SOB, generalized weakness and fevers since last night. Chronic parrish in place.     Hx stroke 202, T2DM, CKD, CAD     Triage Assessment (Adult)       Row Name 04/21/25 1250          Triage Assessment    Airway WDL WDL        Respiratory WDL    Respiratory WDL WDL        Skin Circulation/Temperature WDL    Skin Circulation/Temperature WDL WDL        Cardiac WDL    Cardiac WDL WDL        Peripheral/Neurovascular WDL    Peripheral Neurovascular WDL WDL        Cognitive/Neuro/Behavioral WDL    Cognitive/Neuro/Behavioral WDL WDL

## 2025-04-21 NOTE — H&P
[facility]    History and Physical - Medicine Service, MAROON TEAM 4       Date of Admission:  4/21/2025    Assessment & Plan    Adam Talamantes is a 85 year old male with a history of CKD III, basilar artery stroke, BPH s/p TURP, urinary retention with continuous parrish catheter, nephrolithiasis, gout, latent TB, CAD, HTN, T2DM who is admitted for sepsis.     # s/p TURP (5/16/2024)  # Prostatomegaly  # Bilateral moderate hydronephrosis, acute on chronic  # c/f UTI vs pyelonphritis  # Leukocytosis  # Lactic acidosis, resolved  Patient presenting with tachycardia, low blood pressure and leukocytosis, meeting SIRS criteria for sepsis. Procalcitonin elevated. Possible sources of infection include continuous parrish catheter, last exchanged 3/31/25 vs pneumonia. CXR with patchy densities suggestive of edema vs atypical infection. SARS/covid/flu negative. UA prior to parrish exchange with elevated WBCs, increased LE. CT CAP with hydronephrosis and inflammatory changes suspicious for bladder obstruction with superimposed infection/inflammation.   - Repeat UA and urine culture  - Urine culture pending  - Antibiotics, consider narrowing pending cultures   - Cefepime  - vancomycin  - azithromycin  - legionella antigen and streptococcus pneumonia antigen   - SARS/covid/flu negative      # ELLA on CKD III  Moderate Left and Mild R Hydronephrosis  Baseline of 1.3-1.7 per chart review dating back to 2016, presented with creatinine of 2.31. CT AP shows bilateral hydronephrosis slightly larger comapred to prior on 5/29. Unclear etiology of hydronephrosis in setting of continuous parrish catheterization.   - consulted urology     # Hyponatremia  # Hypomagnesemia  # Hypophosphatemia   Presenting with hyponatremia to 128. Possible in the setting of poor po intake. Recevied 2.57 L of NS in the ED. Glucose is 333 therefore corrected sodium is 132.   - Urine osm  - serum osm    # T2DM  - PTA Jardiance  -  "januvia  - HOLD glargine 30U daily and aspart 8U before breakfast, 6U with lunch and dinner.    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  - Continued on PTA apixaban             Diet:  NPO  DVT Prophylaxis: Apixaban  Diego Catheter: Not present  Fluids: none  Lines: PRESENT             Cardiac Monitoring: None  Code Status: No CPR- Do NOT Intubate      Clinically Significant Risk Factors Present on Admission         # Hyponatremia: Lowest Na = 128 mmol/L in last 2 days, will monitor as appropriate  # Hypochloremia: Lowest Cl = 96 mmol/L in last 2 days, will monitor as appropriate       # Drug Induced Coagulation Defect: home medication list includes an anticoagulant medication  # Drug Induced Platelet Defect: home medication list includes an antiplatelet medication   # Hypertension: Noted on problem list          # DMII: A1C = 8.9 % (Ref range: 0.0 - 5.6 %) within past 6 months    # Obesity: Estimated body mass index is 32.23 kg/m  as calculated from the following:    Height as of an earlier encounter on 4/21/25: 1.549 m (5' 1\").    Weight as of an earlier encounter on 4/21/25: 77.4 kg (170 lb 9.6 oz).       # Financial/Environmental Concerns:           Disposition Plan      Expected Discharge Date: 04/23/2025                The patient's care was discussed with the Attending Physician, Dr. Ruby .      Bernabe Johnson MD PhD  Medicine Service, [address]  Securely message with [employer] (more info)  Text page via [employer] Paging/Directory   See signed in provider for up to date coverage " information  ______________________________________________________________________    Chief Complaint   Weakness    History is obtained from family.     History of Present Illness   Adam LUCAS Albin is a 85 year old male with a history of CKD III, basilar artery stroke, BPH s/p TURP, urinary retention with continuous parrish catheter, nephrolithiasis, gout, latent TB, CAD, HTN, T2DM who is admitted for concern for pyelonephritis and sepsis.     Patient has had one day of feeling weak and trouble breathing. Fevered to 101 at home so family brought him to the clinic, where he was tachycardic, had low blood pressure and increased work of breathing and concern for decreased urine output. He was directed to the emergency department. Per family, he has maintained a normal appetite and was drinking appropriately (needs to be reminded). He was maintaining normal urine output and color. No hematuria. They attribute the decreased urine output in ED to decrease intake today. Yesterday he had two loose stools. No sick contacts. No recent travel history. Patient does not endorse pain. Does not require oxygen at home.     In ED was given 2.57 L normal saline, vancomycin and cefepime. Blood cultures, UA and urine culture (prior to parrish exchange), CXR, CT CAP and NM ventilation/perfusion scan were obtained.       Past Medical History    Past Medical History:   Diagnosis Date    Acute blood loss anemia     Acute renal failure     Anemia     Bacteremia     Cataract     Chronic gout     CKD (chronic kidney disease)     Stage 3    DM2 (diabetes mellitus, type 2) (H)     GERD (gastroesophageal reflux disease)     Glucose intolerance (impaired glucose tolerance)     Gout     History of nephrolithiasis     History of prostatitis 7/19/2017    Hyperlipidemia     Hypertension     Insomnia     Intestinal disaccharidase deficiencies and disaccharide malabsorption     Created by Conversion     Latent tuberculosis     Nephrolithiasis     Neuropathy      Nonspecific abnormal findings on radiological and other examination of skull and head     Created by [facility] Annotation: 2013 11:23PM - Fuad Giuliai/10/2011:  severe stenosis both posterior cerebral arteries seen MRI/MRA done for  vertigo. No acute changes.e*    Osteoarthritis     wrist, knee, shoulder    Prostatitis     Rectal bleed     Trigger thumb        Past Surgical History   Past Surgical History:   Procedure Laterality Date    CYSTOSCOPY, TRANSURETHRAL RESECTION (TUR) PROSTATE, COMBINED N/A 2024    Procedure: Transurethral Resection of Prostate Abscess;  Surgeon: Bernabe Fletcher MD;  Location: [facility] OR    IR MISCELLANEOUS PROCEDURE  2009    IR MISCELLANEOUS PROCEDURE  2009    IR MISCELLANEOUS PROCEDURE  2009    IR MISCELLANEOUS PROCEDURE  2009    IR NEPHROURETERAL TUBE PLACEMENT BILATERAL  2009    IR URETER DILATION BILATERAL  2009    IR URETER DILATION BILATERAL  2009    KIDNEY STONE SURGERY      PICC  3/6/2016            Prior to Admission Medications   Prior to Admission Medications   Prescriptions Last Dose Informant Patient Reported? Taking?   Continuous Glucose  (FREESTYLE SUSANNAH 2 READER) TONIE  Son No No   Sig: USE TO READ BLOOD SUGARS PER MANUFACTRUERS INSTRUCTIONS   Continuous Glucose Sensor (FREESTYLE SUSANNAH 2 SENSOR) MISC   No No   Sig: CHANGE EVERY 14 DAYS   DULoxetine (CYMBALTA) 30 MG capsule   No No   Sig: Take 1 capsule by mouth every morning and 2 capsules every evening   acetaminophen (TYLENOL) 500 MG tablet  Son No No   Sig: Take 1 tablet (500 mg) by mouth every 4 hours as needed for mild pain   allopurinol (ZYLOPRIM) 100 MG tablet   No No   Sig: TAKE 2 TABLETS(200 MG) BY MOUTH DAILY   apixaban ANTICOAGULANT (ELIQUIS ANTICOAGULANT) 5 MG tablet   No No   Sig: Take 1 tablet (5 mg) by mouth 2 times daily   calcium carbonate (TUMS) 500 MG chewable tablet  Son Yes No   Sig: Take 1 chew tab by mouth daily as needed for  heartburn   cilostazol (PLETAL) 50 MG tablet   No No   Sig: Take 2 tablets (100 mg) by mouth 2 times daily.   clobetasol propionate (TEMOVATE) 0.05 % external cream   No No   Sig: Apply topically 2 times daily as needed.   dextromethorphan-quiNIDine (NUEDEXTA) 20-10 MG capsule   No No   Sig: Take 1 capsule by mouth every 12 hours.   hydrOXYzine HCl (ATARAX) 25 MG tablet   No No   Sig: Take 1 tablet (25 mg) by mouth every 8 hours as needed for itching.   insulin aspart (NOVOLOG FLEXPEN) 100 UNIT/ML pen   No No   Sig: Inject 16 units before breakfast, and 6 units under the skin before lunch and dinner   insulin glargine (LANTUS PEN) 100 UNIT/ML pen   No No   Sig: Inject 30 Units subcutaneously every 24 hours   insulin pen needle (BD PEN NEEDLE SALVATORE 2ND GEN) 32G X 4 MM miscellaneous   No No   Sig: USE 1 PEN NEEDLE FOUR TIMES DAILY OR AS DIRECTED   meclizine (ANTIVERT) 12.5 MG tablet  Son No No   Sig: Take 1 tablet (12.5 mg) by mouth 3 times daily as needed for dizziness   metoprolol succinate ER (TOPROL XL) 50 MG 24 hr tablet   No No   Sig: Take 1 tablet (50 mg) by mouth daily.   pantoprazole (PROTONIX) 40 MG EC tablet   No No   Sig: Take 1 tablet (40 mg) by mouth every morning (before breakfast).   polyethylene glycol (MIRALAX) 17 GM/Dose powder  Son No No   Sig: MIX 17 GRAMS IN LIQUID AND TAKE BY MOUTH ONCE DAILY AS NEEDED FOR CONSTIPATION   rosuvastatin (CRESTOR) 20 MG tablet   No No   Sig: TAKE 1 TABLET(20 MG) BY MOUTH AT BEDTIME   senna-docusate (STIMULANT LAXATIVE) 8.6-50 MG tablet   No No   Sig: Take 2 tablets by mouth 2 times daily as needed for constipation.   sitagliptin (JANUVIA) 50 MG tablet   No No   Sig: TAKE 1 TABLET(50 MG) BY MOUTH DAILY   tamsulosin (FLOMAX) 0.4 MG capsule   No No   Sig: TAKE 1 CAPSULE BY MOUTH EVERY DAY   traZODone (DESYREL) 50 MG tablet   No No   Sig: Take 1-2 tablets ( mg) by mouth nightly as needed for sleep.      Facility-Administered Medications: None       ------------------------------------------------------------------------     Physical Exam   Vital Signs: Temp: 97.7  F (36.5  C) Temp src: Oral BP: 113/66 Pulse: 109   Resp: 18 SpO2: 100 % O2 Device: Nasal cannula Oxygen Delivery: 4 LPM  Weight: 0 lbs 0 oz    General Appearance: Eyes closed, lying in bed  Respiratory: Increased work of breathing, decreased breath sounds bilateral lower lobes  Cardiovascular: soft heart sounds, tachycardic, regular rhythm  GI: moderately distended, non-tender to palpation   Skin: not jaundiced, bruising on abdomen from medication injections   Extremities: warm, 1+ bilateral lower extremity edema, bilateral pedal pulses 1+    Medical Decision Making       Please see A&P for additional details of medical decision making.      Data     I have personally reviewed the following data over the past 24 hrs:    28.5 (H)  \   11.2 (L)   / 192     128 (L) 96 (L) 31.3 (H) /  333 (H)   4.6 19 (L) 2.31 (H) \     ALT: 19 AST: 21 AP: 93 TBILI: 0.8   ALB: 3.5 TOT PROTEIN: 7.6 LIPASE: 23     Trop: 18 BNP: 741     Procal: 2.24 (H) CRP: N/A Lactic Acid: 1.4       INR:  N/A PTT:  N/A   D-dimer:  1.09 (H) Fibrinogen:  N/A       Imaging results reviewed over the past 24 hrs:   Recent Results (from the past 24 hours)   XR Chest Port 1 View    Narrative    Portable chest    INDICATION: Sepsis    COMPARISON: 6/1/2024    Subtle patchy densities on sedation indicate mild edema or less likely  infection. Heart size normal. Atherosclerotic calcification of the  aortic knob.      Impression    IMPRESSION: Patchy densities in the lungs may indicate edema.  Recommend follow-up to clearing to exclude atypical infection.    COMPA CHASE MD         SYSTEM ID:  [id number]   CT Chest Abdomen Pelvis w/o Contrast    Narrative    EXAM: CT CHEST ABDOMEN PELVIS W/O CONTRAST 4/21/2025 4:37 PM    HISTORY: 85 years Male Sepsis, ? PNA, ? UTI/Pyelo    COMPARISON: CT 5/29/2024, 5/15/2024    TECHNIQUE: Helical CT images from  the thoracic inlet through the  symphysis pubis were obtained without IV contrast.     FINDINGS:   image(s) are noncontributory.    LINES/TUBES: Diego catheter.    CHEST:  LOWER NECK: Unremarkable thyroid.     PULMONARY: Evaluation field somewhat limited by respiratory motion.  Central airways are clear. Few patchy groundglass and airspace  opacities within the lung bases. No pneumothorax or pleural effusion.    CARDIAC: Heart size is normal. Small pericardial effusion. Trace  coronary artery calcifications.    MEDIASTINUM: Thoracic aorta and main pulmonary artery are  unremarkable. Normal appearance and configuration of the great vessels  off of the aortic arch. No suspicious mediastinal, hilar, or axillary  lymph nodes.     ESOPHAGUS: Unremarkable.    ABDOMEN/PELVIS:  HEPATIC: Previously identified hypoattenuating foci within the hepatic  parenchyma are less conspicuous on this noncontrast examination. No  new suspicious hepatic lesion. Hepatic steatosis.    BILIARY: Gallbladder sludge. No intrahepatic or extrahepatic biliary  ductal dilation.    PANCREAS: Atrophic changes. No focal pancreatic lesion. The main  pancreatic duct is not dilated.    SPLEEN: No splenomegaly. No suspicious lesions or masses.    ADRENALS: Unremarkable.    RENAL: Increased bilateral moderate hydroureteronephrosis, right  greater than left, with surrounding periureteral inflammatory changes.  No evidence of obstructing stone or mass. Partially calcified renal  cysts, for example lower pole right kidney (series 3 image [id number]).    URINARY BLADDER: Diego catheter in the bladder. Perivesicular  inflammatory changes along the bladder dome    REPRODUCTIVE: Prostate is enlarged.    GASTROINTESTINAL: Appendectomy: Normal caliber large and small bowel.  No abnormal bowel wall thickening or enhancement. No pneumatosis or  portal venous gas.     MESENTERY/PERITONEUM: No ascites or pneumoperitoneum.    VASCULAR: Nonaneurysmal abdominal aorta.  Vascular patency cannot be  assessed as noncontrast examination. Aortoiliac vascular  calcifications.    LYMPH NODES: No lymphadenopathy.    MUSCULOSKELETAL: Multilevel degenerative changes of the spine with  similar grade 2 anterolisthesis of L5 on S1. Bilateral L5 pars defect.  Chronic anterior wedging of L2. No acute or suspicious osseous  abnormality.       Impression    IMPRESSION:   1.  Prostatomegaly with increased bilateral moderate  hydroureteronephrosis, right greater than left, and  periureteral/perivesicular inflammatory changes , suspicious for  bladder obstruction with superimposed infection/inflammation.  Recommend correlation with urinalysis.  2.  Small pericardial effusion.   3.  Limited assessment of the lung fields secondary to respiratory  motion. Few scattered groundglass and patchy airspace opacities in the  bases, nonspecific, can be seen with edema, atelectasis, and/or  infection.  4.  Gallbladder sludge without evidence of acute cholecystitis.    I have personally reviewed the examination and initial interpretation  and I agree with the findings.    DARLENE DELANEY DO         SYSTEM ID:  [id number]

## 2025-04-21 NOTE — ED PROVIDER NOTES
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     Per chart review, patient was seen in clinic today for possible UTI and was found to have temp of 99.8, tachycardia, BP of 98/63. Also, increased WOB was noted. Significantly decreased urinary output--unable to collect sample off Parrish bag due to significantly reduced urinary flow. Patient was advised to be seen in the ED due to concern for pyelonephritis and sepsis.     Patient is unable to give thorough history. Most history taken from his daughter. She notes that this is his baseline since having a stroke, she does not believe him to be more confused than normal. Patient has chest pain and SOB worse with exertion. Patient is unsure when this began. Last parrish replacement was 3/30/25. Endorses chills, fever, and abdominal pain. Denies diarrhea, vomiting.     Physical Exam   BP: 112/63  Pulse: 119  Temp: 97.7  F (36.5  C)  Resp: 18  SpO2: 93 %  Physical Exam  General: patient is alert, communicating with daughter, in intermittent pain  Head: atraumatic and normocephalic   Cardiovascular: regular rate and rhythm, extremities warm and well perfused, no lower extremity edema  Pulmonary: crackles in bilateral bases   Abdomen: soft, non-tender   Neurological: alert, moving all extremities symmetrically  Skin: warm, dry      ED Course, Procedures, & Data      Procedures            EKG Interpretation:      Interpreted by Maynor Bryson MD  Time reviewed: 1335  Symptoms at time of EKG: Tachycardia   Rhythm: sinus  tachycardia  Rate: Tachycardia and 110-120  Axis: Normal  Ectopy: none  Conduction: normal  ST Segments/ T Waves: No acute ischemic changes  Q Waves: none  Comparison to prior: Interval tachycardia    Clinical Impression: sinus tachycardia                 Results for orders placed or performed during the hospital encounter of 04/21/25   XR Chest Port 1 View     Status: None    Narrative    Portable chest    INDICATION: Sepsis    COMPARISON: 6/1/2024    Subtle patchy densities on sedation indicate mild edema or less likely  infection. Heart size normal. Atherosclerotic calcification of the  aortic knob.      Impression    IMPRESSION: Patchy densities in the lungs may indicate edema.  Recommend follow-up to clearing to exclude atypical infection.    COMPA CHASE MD         SYSTEM ID:  [id number]   CT Chest Abdomen Pelvis w/o Contrast     Status: None    Narrative    EXAM: CT CHEST ABDOMEN PELVIS W/O CONTRAST 4/21/2025 4:37 PM    HISTORY: 85 years Male Sepsis, ? PNA, ? UTI/Pyelo    COMPARISON: CT 5/29/2024, 5/15/2024    TECHNIQUE: Helical CT images from the thoracic inlet through the  symphysis pubis were obtained without IV contrast.     FINDINGS:   image(s) are noncontributory.    LINES/TUBES: Diego catheter.    CHEST:  LOWER NECK: Unremarkable thyroid.     PULMONARY: Evaluation field somewhat limited by respiratory motion.  Central airways are clear. Few patchy groundglass and airspace  opacities within the lung bases. No pneumothorax or pleural effusion.    CARDIAC: Heart size is normal. Small pericardial effusion. Trace  coronary artery calcifications.    MEDIASTINUM: Thoracic aorta and main pulmonary artery are  unremarkable. Normal appearance and configuration of the great vessels  off of the aortic arch. No suspicious mediastinal, hilar, or axillary  lymph nodes.     ESOPHAGUS: Unremarkable.    ABDOMEN/PELVIS:  HEPATIC: Previously identified hypoattenuating foci within the hepatic  parenchyma are less  conspicuous on this noncontrast examination. No  new suspicious hepatic lesion. Hepatic steatosis.    BILIARY: Gallbladder sludge. No intrahepatic or extrahepatic biliary  ductal dilation.    PANCREAS: Atrophic changes. No focal pancreatic lesion. The main  pancreatic duct is not dilated.    SPLEEN: No splenomegaly. No suspicious lesions or masses.    ADRENALS: Unremarkable.    RENAL: Increased bilateral moderate hydroureteronephrosis, right  greater than left, with surrounding periureteral inflammatory changes.  No evidence of obstructing stone or mass. Partially calcified renal  cysts, for example lower pole right kidney (series 3 image 352).    URINARY BLADDER: Diego catheter in the bladder. Perivesicular  inflammatory changes along the bladder dome    REPRODUCTIVE: Prostate is enlarged.    GASTROINTESTINAL: Appendectomy: Normal caliber large and small bowel.  No abnormal bowel wall thickening or enhancement. No pneumatosis or  portal venous gas.     MESENTERY/PERITONEUM: No ascites or pneumoperitoneum.    VASCULAR: Nonaneurysmal abdominal aorta. Vascular patency cannot be  assessed as noncontrast examination. Aortoiliac vascular  calcifications.    LYMPH NODES: No lymphadenopathy.    MUSCULOSKELETAL: Multilevel degenerative changes of the spine with  similar grade 2 anterolisthesis of L5 on S1. Bilateral L5 pars defect.  Chronic anterior wedging of L2. No acute or suspicious osseous  abnormality.       Impression    IMPRESSION:   1.  Prostatomegaly with increased bilateral moderate  hydroureteronephrosis, right greater than left, and  periureteral/perivesicular inflammatory changes , suspicious for  bladder obstruction with superimposed infection/inflammation.  Recommend correlation with urinalysis.  2.  Small pericardial effusion.   3.  Limited assessment of the lung fields secondary to respiratory  motion. Few scattered groundglass and patchy airspace opacities in the  bases, nonspecific, can be seen with  edema, atelectasis, and/or  infection.  4.  Gallbladder sludge without evidence of acute cholecystitis.    I have personally reviewed the examination and initial interpretation  and I agree with the findings.    DARLENE DELANEY DO         SYSTEM ID:  [id number]   NM Lung Scan Ventilation and Perfusion     Status: None    Narrative    Examination:NM LUNG SCAN VENTILATION AND PERFUSION, 4/21/2025 6:38 PM     Indication:  Dyspnea, + dimer     Additional Information: none    D-Dimer: 1.09    TECHNIQUE:    The patient received 2 mCi of Tc-99m DTPA aerosol for ventilation and  7 mCi of Tc-99m MAA for perfusion. Multiple images were obtained of  the lungs in Anterior, posterior, SMITH, RPO, LPO, and  Urdu projections.    COMPARISON: Same day chest radiograph and CT    FINDINGS:    The perfusion study demonstrates: No moderate or large segmental or  subsegmental filling defects.     The ventilation study demonstrates: Suboptimal ventilation of the  lungs, with aerosol deposition in central airways..      Impression    IMPRESSION:    1. Pulmonary emboli is not present (low probability scan).  2. Ventilation demonstrates central clumping of the radiotracer,  indicating turbulent airflow a nonspecific finding.     I have personally reviewed the examination and initial interpretation  and I agree with the findings.    DARLENE DELANEY DO         SYSTEM ID:  [id number]   Comprehensive metabolic panel     Status: Abnormal   Result Value Ref Range    Sodium 128 (L) 135 - 145 mmol/L    Potassium 4.6 3.4 - 5.3 mmol/L    Carbon Dioxide (CO2) 19 (L) 22 - 29 mmol/L    Anion Gap 13 7 - 15 mmol/L    Urea Nitrogen 31.3 (H) 8.0 - 23.0 mg/dL    Creatinine 2.31 (H) 0.67 - 1.17 mg/dL    GFR Estimate 27 (L) >60 mL/min/1.73m2    Calcium 9.4 8.8 - 10.4 mg/dL    Chloride 96 (L) 98 - 107 mmol/L    Glucose 333 (H) 70 - 99 mg/dL    Alkaline Phosphatase 93 40 - 150 U/L    AST 21 0 - 45 U/L    ALT 19 0 - 70 U/L    Protein Total 7.6 6.4 - 8.3 g/dL    Albumin 3.5 3.5  - 5.2 g/dL    Bilirubin Total 0.8 <=1.2 mg/dL   Lactic Acid Whole Blood with 1X Repeat in 2 HR when >2     Status: Abnormal   Result Value Ref Range    Lactic Acid, Initial 2.5 (H) 0.7 - 2.0 mmol/L   Blood gas venous     Status: Abnormal   Result Value Ref Range    pH Venous 7.36 7.32 - 7.43    pCO2 Venous 41 40 - 50 mm Hg    pO2 Venous 37 25 - 47 mm Hg    Bicarbonate Venous 23 21 - 28 mmol/L    Base Excess/Deficit Venous -2.4 -3.0 - 3.0 mmol/L    FIO2 21     Oxyhemoglobin Venous 65 (L) 70 - 75 %    O2 Sat, Venous 66.6 (L) 70.0 - 75.0 %    Narrative    In healthy individuals, oxyhemoglobin (O2Hb) and oxygen saturation (SO2) are approximately equal. In the presence of dyshemoglobins, oxyhemoglobin can be considerably lower than oxygen saturation.   UA with Microscopic reflex to Culture     Status: Abnormal    Specimen: Urine, Diego Catheter   Result Value Ref Range    Color Urine Orange (A) Colorless, Straw, Light Yellow, Yellow    Appearance Urine Cloudy (A) Clear    Glucose Urine Negative Negative mg/dL    Bilirubin Urine Negative Negative    Ketones Urine Negative Negative mg/dL    Specific Gravity Urine 1.018 1.003 - 1.035    Blood Urine Moderate (A) Negative    pH Urine 5.5 5.0 - 7.0    Protein Albumin Urine 50 (A) Negative mg/dL    Urobilinogen Urine Normal Normal mg/dL    Nitrite Urine Negative Negative    Leukocyte Esterase Urine Large (A) Negative    Bacteria Urine Many (A) None Seen /HPF    WBC Clumps Urine Present (A) None Seen /HPF    RBC Urine 22 (H) <=2 /HPF    WBC Urine >182 (H) <=5 /HPF    Narrative    Urine Culture ordered based on laboratory criteria   Procalcitonin     Status: Abnormal   Result Value Ref Range    Procalcitonin 2.24 (H) <0.50 ng/mL   Influenza A/B, RSV and SARS-CoV2 PCR (COVID-19) Nose     Status: Normal    Specimen: Nose; Swab   Result Value Ref Range    Influenza A PCR Negative Negative    Influenza B PCR Negative Negative    RSV PCR Negative Negative    SARS CoV2 PCR Negative  Negative    Narrative    Testing was performed using the Xpert Xpress CoV2/Flu/RSV Assay on the [employer] GeneXpert Instrument. This test should be ordered for the detection of SARS-CoV2, influenza, and RSV viruses in individuals with signs and symptoms of respiratory tract infection. This test is for in vitro diagnostic use under the [place] FDA for laboratories certified under CLIA to perform high or moderate complexity testing. This test has been [place] FDA cleared. A negative result does not rule out the presence of PCR inhibitors in the specimen or target RNA in concentration below the limit of detection for the assay. If only one viral target is positive but coinfection with multiple targets is suspected, the sample should be re-tested with another FDA cleared, approved, or authorized test, if coninfection would change clinical management. This test was validated by the [facility]. These laboratories are certified under the Clinical Laboratory Improvement Amendments of 1988 (CLIA-88) as qualified to perfom high complexity laboratory testing.   D dimer quantitative     Status: Abnormal   Result Value Ref Range    D-Dimer Quantitative 1.09 (H) 0.00 - 0.50 ug/mL FEU    Narrative    This D-dimer assay is intended for use in conjunction with a clinical pretest probability assessment model to exclude pulmonary embolism (PE) and deep venous thrombosis (DVT) in outpatients suspected of PE or DVT. The cut-off value is 0.50 ug/mL FEU.    For patients 50 years of age or older, the application of age-adjusted cut-off values for D-Dimer may increase the specificity without significant effect on sensitivity. The literature suggested calculation age adjusted cut-off in ug/L = age in years x 10 ug/L. The results in this laboratory are reported as ug/mL rather than ug/L. The calculation for age adjusted cut off in ug/mL= age in years x 0.01 ug/mL. For example, the cut off for a 76 year old male is 78 x 0.01 ug/mL =  0.76 ug/mL (760 ug/L).    M Asad et al. Age adjusted D-dimer cut-off levels to rule out pulmonary embolism: The ADJUST-PE Study. CANDIS 2014;311:9038-4913.; HJ Roly et al. Diagnostic accuracy of conventional or age adjusted D-dimer cutoff values in older patients with suspected venous thromboembolism. Systemic review and meta-analysis. BMJ 2013:346:f2492.   Lipase     Status: Normal   Result Value Ref Range    Lipase 23 13 - 60 U/L   Troponin T, High Sensitivity     Status: Normal   Result Value Ref Range    Troponin T, High Sensitivity 20 <=22 ng/L   CBC with platelets and differential     Status: Abnormal   Result Value Ref Range    WBC Count 28.5 (H) 4.0 - 11.0 10e3/uL    RBC Count 3.81 (L) 4.40 - 5.90 10e6/uL    Hemoglobin 11.2 (L) 13.3 - 17.7 g/dL    Hematocrit 34.5 (L) 40.0 - 53.0 %    MCV 91 78 - 100 fL    MCH 29.4 26.5 - 33.0 pg    MCHC 32.5 31.5 - 36.5 g/dL    RDW 16.0 (H) 10.0 - 15.0 %    Platelet Count 192 150 - 450 10e3/uL    % Neutrophils 84 %    % Lymphocytes 7 %    % Monocytes 7 %    % Eosinophils 0 %    % Basophils 0 %    % Immature Granulocytes 2 %    NRBCs per 100 WBC 0 <1 /100    Absolute Neutrophils 23.9 (H) 1.6 - 8.3 10e3/uL    Absolute Lymphocytes 2.0 0.8 - 5.3 10e3/uL    Absolute Monocytes 2.0 (H) 0.0 - 1.3 10e3/uL    Absolute Eosinophils 0.1 0.0 - 0.7 10e3/uL    Absolute Basophils 0.1 0.0 - 0.2 10e3/uL    Absolute Immature Granulocytes 0.5 (H) <=0.4 10e3/uL    Absolute NRBCs 0.0 10e3/uL   Nt probnp inpatient (BNP)     Status: Normal   Result Value Ref Range    N terminal Pro BNP Inpatient 741 0 - 1,800 pg/mL   RBC and Platelet Morphology     Status: Abnormal   Result Value Ref Range    RBC Morphology Confirmed RBC Indices     Platelet Assessment  Automated Count Confirmed. Platelet morphology is normal.     Automated Count Confirmed. Platelet morphology is normal.    Polychromasia Slight (A) None Seen   Lactic acid whole blood     Status: Normal   Result Value Ref Range    Lactic Acid  1.4 0.7 - 2.0 mmol/L   Troponin T, High Sensitivity     Status: Normal   Result Value Ref Range    Troponin T, High Sensitivity 18 <=22 ng/L   Magnesium     Status: Abnormal   Result Value Ref Range    Magnesium 1.5 (L) 1.7 - 2.3 mg/dL   Phosphorus     Status: Abnormal   Result Value Ref Range    Phosphorus 1.9 (L) 2.5 - 4.5 mg/dL   UA with Microscopic reflex to Culture     Status: Abnormal    Specimen: Urine, Diego Catheter   Result Value Ref Range    Color Urine Yellow Colorless, Straw, Light Yellow, Yellow    Appearance Urine Slightly Cloudy (A) Clear    Glucose Urine 100 (A) Negative mg/dL    Bilirubin Urine Negative Negative    Ketones Urine Negative Negative mg/dL    Specific Gravity Urine 1.016 1.003 - 1.035    Blood Urine Moderate (A) Negative    pH Urine 6.0 5.0 - 7.0    Protein Albumin Urine 30 (A) Negative mg/dL    Urobilinogen Urine Normal Normal mg/dL    Nitrite Urine Positive (A) Negative    Leukocyte Esterase Urine Large (A) Negative    Bacteria Urine Moderate (A) None Seen /HPF    WBC Clumps Urine Present (A) None Seen /HPF    Mucus Urine Present (A) None Seen /LPF    RBC Urine 17 (H) <=2 /HPF    WBC Urine >182 (H) <=5 /HPF    Narrative    Urine Culture ordered based on laboratory criteria   Osmolality     Status: Normal   Result Value Ref Range    Osmolality Blood 294 280 - 301 mmol/kg    Narrative    Greater than 385 mmol/kg relates to stupor in hyperglycemia   Greater than 400 mmol/kg can relate to seizures   Greater than 420 mmol/kg can be lethal    Serum Osmalar Gap:   Normal <10   Larger suggest unmeasured substances present in serum (ethanol, methanol, isopropanol, mannitol, ethylene glycol).   Osmolality urine     Status: Normal   Result Value Ref Range    Osmolality Urine 359 100 - 1,200 mmol/kg    Narrative    Reference Ranges depend on patient's hydration status and renal function.   Neonates:  mmol/kg   2 years and older, random specimens: 100-1200 mmol/kg; Greater than 850 mmol/kg  after 12 hour fluid restriction  Urine/serum osmolality ratio: 2 years and older: 1.0-3.0; 3.0-4.7 after 12 hour fluid restriction   EKG 12-lead, tracing only     Status: None (Preliminary result)   Result Value Ref Range    Systolic Blood Pressure  mmHg    Diastolic Blood Pressure  mmHg    Ventricular Rate 115 BPM    Atrial Rate 115 BPM    IN Interval 166 ms    QRS Duration 78 ms     ms    QTc 459 ms    P Axis 22 degrees    R AXIS -5 degrees    T Axis 62 degrees    Interpretation ECG Sinus tachycardia  Otherwise normal ECG      CBC with platelets differential     Status: Abnormal    Narrative    The following orders were created for panel order CBC with platelets differential.  Procedure                               Abnormality         Status                     ---------                               -----------         ------                     CBC with platelets and ...[[id number]]  Abnormal            Final result               RBC and Platelet Morpho...[[id number]]  Abnormal            Final result                 Please view results for these tests on the individual orders.     Medications   sodium chloride (PF) 0.9% PF flush 3 mL (has no administration in time range)   sodium chloride (PF) 0.9% PF flush 3 mL (3 mLs Intracatheter $Given 4/21/25 2005)   vancomycin place castellanos - receiving intermittent dosing (has no administration in time range)   lidocaine 1 % 0.1-1 mL (has no administration in time range)   lidocaine (LMX4) cream (has no administration in time range)   sodium chloride (PF) 0.9% PF flush 3 mL (3 mLs Intracatheter Not Given 4/21/25 2108)   sodium chloride (PF) 0.9% PF flush 3 mL (has no administration in time range)   calcium carbonate (TUMS) chewable tablet 1,000 mg (has no administration in time range)   ondansetron (ZOFRAN ODT) ODT tab 4 mg (has no administration in time range)     Or   ondansetron (ZOFRAN) injection 4 mg (has no administration in time range)   prochlorperazine  (COMPAZINE) injection 5 mg (has no administration in time range)     Or   prochlorperazine (COMPAZINE) tablet 5 mg (has no administration in time range)   tamsulosin (FLOMAX) capsule 0.4 mg (has no administration in time range)   traZODone (DESYREL) tablet  mg (has no administration in time range)   hydrOXYzine HCl (ATARAX) tablet 25 mg (has no administration in time range)   allopurinol (ZYLOPRIM) tablet 200 mg ( Oral Automatically Held 4/25/25 0800)   senna-docusate (SENOKOT-S/PERICOLACE) 8.6-50 MG per tablet 2 tablet (has no administration in time range)   apixaban ANTICOAGULANT (ELIQUIS) tablet 5 mg (5 mg Oral $Given 4/21/25 1949)   sitagliptin (JANUVIA) tablet 50 mg ( Oral Automatically Held 4/24/25 0800)   metoprolol succinate ER (TOPROL XL) 24 hr tablet 50 mg ( Oral Automatically Held 4/24/25 0800)   DULoxetine (CYMBALTA) DR capsule 30 mg ( Oral Automatically Held 4/24/25 0800)   rosuvastatin (CRESTOR) tablet 20 mg (20 mg Oral $Given 4/21/25 2112)   pantoprazole (PROTONIX) EC tablet 40 mg (has no administration in time range)   dextromethorphan-quiNIDine (NUEDEXTA) 20-10 MG per capsule 1 capsule ( Oral Automatically Held 4/24/25 1740)   insulin glargine (LANTUS PEN) injection 30 Units ( Subcutaneous Automatically Held 4/24/25 1740)   meclizine (ANTIVERT) tablet 12.5 mg ( Oral Held by provider 4/21/25 1739)   insulin aspart (NovoLOG) injection (RAPID ACTING) ( Subcutaneous Automatically Held 4/24/25 1800)   polyethylene glycol (MIRALAX) Packet 17 g (17 g Oral Not Given 4/21/25 1925)   cilostazol (PLETAL) tablet 100 mg ( Oral Automatically Held 4/24/25 2000)   azithromycin (ZITHROMAX) tablet 500 mg (500 mg Oral $Given 4/21/25 1949)     Followed by   azithromycin (ZITHROMAX) tablet 250 mg (has no administration in time range)   ceFEPIme (MAXIPIME) 2 g vial to attach to  mL bag for ADULTS or NS 50 mL bag for PEDS (has no administration in time range)   potassium & sodium phosphates (NEUTRA-PHOS)  Packet 1 packet (1 packet Oral or Feeding Tube $Given 4/21/25 2017)   sodium chloride 0.9% BOLUS 1,569 mL (0 mLs Intravenous Stopped 4/21/25 1514)   ceFEPIme (MAXIPIME) 2 g vial to attach to  mL bag for ADULTS or NS 50 mL bag for PEDS (0 g Intravenous Stopped 4/21/25 1418)   acetaminophen (TYLENOL) tablet 1,000 mg (1,000 mg Oral $Given 4/21/25 1340)   vancomycin (VANCOCIN) 1,750 mg in sodium chloride 0.9 % 567.5 mL intermittent infusion (1,750 mg Intravenous $Given 4/21/25 1452)   sodium chloride 0.9% BOLUS 1,000 mL (0 mLs Intravenous Stopped 4/21/25 1857)   technetium pentetate Tc99m (DTPA) inhaled radioisotope 2 millicurie (2 millicuries Inhalation $Given 4/21/25 1812)   technetium pertechnetate with albumin (Tc99m MAA) radioisotope injection 4.8-7.2 millicurie (7 millicuries Intravenous $Given 4/21/25 1812)   magnesium sulfate 2 g in 50 mL sterile water intermittent infusion (2 g Intravenous $New Bag 4/21/25 2017)     Labs Ordered and Resulted from Time of ED Arrival to Time of ED Departure   COMPREHENSIVE METABOLIC PANEL - Abnormal       Result Value    Sodium 128 (*)     Potassium 4.6      Carbon Dioxide (CO2) 19 (*)     Anion Gap 13      Urea Nitrogen 31.3 (*)     Creatinine 2.31 (*)     GFR Estimate 27 (*)     Calcium 9.4      Chloride 96 (*)     Glucose 333 (*)     Alkaline Phosphatase 93      AST 21      ALT 19      Protein Total 7.6      Albumin 3.5      Bilirubin Total 0.8     LACTIC ACID WHOLE BLOOD WITH 1X REPEAT IN 2 HR WHEN >2 - Abnormal    Lactic Acid, Initial 2.5 (*)    BLOOD GAS VENOUS - Abnormal    pH Venous 7.36      pCO2 Venous 41      pO2 Venous 37      Bicarbonate Venous 23      Base Excess/Deficit Venous -2.4      FIO2 21      Oxyhemoglobin Venous 65 (*)     O2 Sat, Venous 66.6 (*)    ROUTINE UA WITH MICROSCOPIC REFLEX TO CULTURE - Abnormal    Color Urine Orange (*)     Appearance Urine Cloudy (*)     Glucose Urine Negative      Bilirubin Urine Negative      Ketones Urine Negative       Specific Gravity Urine 1.018      Blood Urine Moderate (*)     pH Urine 5.5      Protein Albumin Urine 50 (*)     Urobilinogen Urine Normal      Nitrite Urine Negative      Leukocyte Esterase Urine Large (*)     Bacteria Urine Many (*)     WBC Clumps Urine Present (*)     RBC Urine 22 (*)     WBC Urine >182 (*)    PROCALCITONIN - Abnormal    Procalcitonin 2.24 (*)    D DIMER QUANTITATIVE - Abnormal    D-Dimer Quantitative 1.09 (*)    CBC WITH PLATELETS AND DIFFERENTIAL - Abnormal    WBC Count 28.5 (*)     RBC Count 3.81 (*)     Hemoglobin 11.2 (*)     Hematocrit 34.5 (*)     MCV 91      MCH 29.4      MCHC 32.5      RDW 16.0 (*)     Platelet Count 192      % Neutrophils 84      % Lymphocytes 7      % Monocytes 7      % Eosinophils 0      % Basophils 0      % Immature Granulocytes 2      NRBCs per 100 WBC 0      Absolute Neutrophils 23.9 (*)     Absolute Lymphocytes 2.0      Absolute Monocytes 2.0 (*)     Absolute Eosinophils 0.1      Absolute Basophils 0.1      Absolute Immature Granulocytes 0.5 (*)     Absolute NRBCs 0.0     RBC AND PLATELET MORPHOLOGY - Abnormal    RBC Morphology Confirmed RBC Indices      Platelet Assessment        Value: Automated Count Confirmed. Platelet morphology is normal.    Polychromasia Slight (*)    MAGNESIUM - Abnormal    Magnesium 1.5 (*)    PHOSPHORUS - Abnormal    Phosphorus 1.9 (*)    ROUTINE UA WITH MICROSCOPIC REFLEX TO CULTURE - Abnormal    Color Urine Yellow      Appearance Urine Slightly Cloudy (*)     Glucose Urine 100 (*)     Bilirubin Urine Negative      Ketones Urine Negative      Specific Gravity Urine 1.016      Blood Urine Moderate (*)     pH Urine 6.0      Protein Albumin Urine 30 (*)     Urobilinogen Urine Normal      Nitrite Urine Positive (*)     Leukocyte Esterase Urine Large (*)     Bacteria Urine Moderate (*)     WBC Clumps Urine Present (*)     Mucus Urine Present (*)     RBC Urine 17 (*)     WBC Urine >182 (*)    INFLUENZA A/B, RSV AND SARS-COV2 PCR - Normal     Influenza A PCR Negative      Influenza B PCR Negative      RSV PCR Negative      SARS CoV2 PCR Negative     LIPASE - Normal    Lipase 23     TROPONIN T, HIGH SENSITIVITY - Normal    Troponin T, High Sensitivity 20     NT PROBNP INPATIENT - Normal    N terminal Pro BNP Inpatient 741     LACTIC ACID WHOLE BLOOD - Normal    Lactic Acid 1.4     TROPONIN T, HIGH SENSITIVITY - Normal    Troponin T, High Sensitivity 18     OSMOLALITY - Normal    Osmolality Blood 294     OSMOLALITY, RANDOM URINE - Normal    Osmolality Urine 359     SODIUM RANDOM URINE   GLUCOSE BY METER POCT   BLOOD CULTURE   BLOOD CULTURE   URINE CULTURE   URINE CULTURE   RESPIRATORY PANEL PCR   MRSA MSSA PCR, NASAL SWAB   LEGIONELLA URINARY ANTIGEN AND STREPTOCOCCUS PNEUMONIAE ANTIGEN   URINE CULTURE     NM Lung Scan Ventilation and Perfusion   Final Result   IMPRESSION:      1. Pulmonary emboli is not present (low probability scan).   2. Ventilation demonstrates central clumping of the radiotracer,   indicating turbulent airflow a nonspecific finding.       I have personally reviewed the examination and initial interpretation   and I agree with the findings.      DARLENE DELANEY DO            SYSTEM ID:  [id number]      CT Chest Abdomen Pelvis w/o Contrast   Final Result   IMPRESSION:    1.  Prostatomegaly with increased bilateral moderate   hydroureteronephrosis, right greater than left, and   periureteral/perivesicular inflammatory changes , suspicious for   bladder obstruction with superimposed infection/inflammation.   Recommend correlation with urinalysis.   2.  Small pericardial effusion.    3.  Limited assessment of the lung fields secondary to respiratory   motion. Few scattered groundglass and patchy airspace opacities in the   bases, nonspecific, can be seen with edema, atelectasis, and/or   infection.   4.  Gallbladder sludge without evidence of acute cholecystitis.      I have personally reviewed the examination and initial interpretation   and I  agree with the findings.      DARLENE DELANEY DO            SYSTEM ID:  [id number]      XR Chest Port 1 View   Final Result   IMPRESSION: Patchy densities in the lungs may indicate edema.   Recommend follow-up to clearing to exclude atypical infection.      COMPA CHASE MD            SYSTEM ID:  [id number]             Critical care was not performed.     Medical Decision Making  The patient's presentation was of high complexity (an acute health issue posing potential threat to life or bodily function).    The patient's evaluation involved:  ordering and/or review of 3+ test(s) in this encounter (see separate area of note for details)  discussion of management or test interpretation with another health professional (Medicine)    The patient's management necessitated high risk (a decision regarding hospitalization).    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 On arrival, pt is tachycardic, normotensive, and afebrile. Had an episode of hypoxia to ~80%, improved to 95% on 4L NC. CXR revealed diffuse patchy densities c/f edema vs. atypical infection. WBC elevated to 28.5. Lactate is elevated (2.5). Procalcitonin elevated. UA positive for leukocyte esterase, bacteria, WBC, RBCs, and protein. Urine cultures ordered. Blood cultures ordered. . Troponin wnl. Lipase wnl. Flu, RSV, COVID negative.  D-dimer 1.09. Ventilation and perfusion scan ordered instead of CT PE in the setting of ELLA. Patient was started on IV cefepime and vancomycin for sepsis, likely source of infection is . Patient will be admitted for IV antibiotics, supplemental oxygen, and continued sepsis workup.     I have reviewed the nursing notes. I have  reviewed the findings, diagnosis, plan and need for follow up with the patient.    New Prescriptions    No medications on file       Final diagnoses:   Sepsis with acute renal failure without septic shock, due to unspecified organism, unspecified acute renal failure type (H)   Acute UTI       Marbella Okeefe, MS3      --    ED Attending Physician Attestation    I Maynor Bryson MD, cared for this patient with the Medical Student. I performed, or re-performed, the physical exam and medical decision-making. I have verified the accuracy of all the medical student findings and documentation above, and have edited as necessary.    Summary of HPI, PE, ED Course   Patient is a 85 year old male evaluated in the emergency department for weakness and dyspnea. Exam and ED course notable for vital signs and laboratory studies consistent with early sepsis.  Requested Diego change urinalysis complete set of labs including blood cultures and lactic acid.  Blood cultures are difficult to obtain.  Given the high concern for sepsis decision was made to administer IV fluids and antibiotics without waiting for cultures so as not to delay life-saving intervention.  Patient will be admitted to hospital medicine for further management of sepsis from suspected urinary source.  After the completion of care in the emergency department, the patient was admitted to inpatient.      Maynor Bryson MD  Emergency Medicine     [facility] EMERGENCY DEPARTMENT  4/21/2025     Maynor Bryson MD  04/21/25 2120

## 2025-04-21 NOTE — PROGRESS NOTES
Assessment & Plan     (R50.9) Fever, unspecified fever cause  (R33.9) Urinary retention  (N28.9,  N18.9) Acute on chronic renal insufficiency  (primary encounter diagnosis)  Comment: Chronic with acute episode.  Patient is moderately toxic appearing with fever, elevated pulse, low blood pressure, and increased respiratory exertion.  Significantly decreased urinary output with inability to collect a sample off of the Diego bag today due to significantly reduced urinary flow.  Daughter is unsure how much urine he is outputting at this point, but considering his general picture today, I do have concerns for progression towards sepsis or more profound pyelonephritis that would warrant more emergent workup and monitoring in a hospital setting.  Discussed this in depth with patient and family, and made very specific recommendation for them to report to the emergency department immediately for further evaluation.  They verbalized understanding and agreement to plan, and reported that they would report to the emergency department right now.  Plan: Report to the emergency department immediately for further evaluation    This progress note has been dictated, with use of voice recognition software. Any grammatical, typographical, or context errors are unintentional and inherent to use of voice recognition software.     I spent a total of 47 minutes on the day of the visit.   Time spent by me today doing chart review, history and exam, documentation and further activities per the note    Follow-up   Report to the emergency department immediately for further evaluation and management        Perlita Medina is a 85 year old, presenting for the following health issues:  UTI and Recheck Medication (Pt is with his daughter who reports that the pt started having symptoms of possible UTI and has experienced feeling fatigued and tired. Pt does use a Catheter.)        4/21/2025    11:21 AM   Additional Questions   Roomed by samanta    Accompanied by tejal         4/21/2025    11:21 AM   Patient Reported Additional Medications   Patient reports taking the following new medications none     History of Present Illness       Reason for visit:  [id number]    He eats 2-3 servings of fruits and vegetables daily.He consumes 1 sweetened beverage(s) daily.He exercises with enough effort to increase his heart rate 9 or less minutes per day.    He is taking medications regularly.      This visit was facilitated with the help of a Kayce  via telephone.   assisted nurse practitioner in gathering a thorough history, facilitating physical examination, communicating the plan of care, and assisting patient in verbalizing questions to ensure that seamless communication was achieved throughout the visit and prior to patient leaving the clinic.    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  Daughter reports that approximately 3 days ago she began to notice her father requesting to use the bathroom a lot, and then she noted a fever up to 101 degrees that has been waxing and waning ever since.  There has not been any blood in the urine, vomiting, or complaints of flank pain.  Her father's cognitive function is fairly reduced after his stroke, so she has a difficult time determining whether or not there is acute confusion happening.  They have been utilizing Tylenol to manage the fever.  The last time the catheter was changed was March 31, there has been a bit of a longer cath in catheter change outs with urology, as they missed an appointment on April  "14, and had to reschedule to May 2.  There is a prophylactic single dose of Bactrim given with every catheter change, but outside of this the patient has not been on a more extended course of antibiotics for urinary tract infection in quite some time.  He is having increased work of breathing, and daughter reports him to be a bit more fatigued lethargic.  He is not getting good fluid intake, as he does not seem interested in drinking or eating.  She declines any complaints of chest pain, persistent vomiting or diarrhea, runny nose, new onset cough.  She does seem to be sleeping more than usual per her report, and she is now noticing that there is a significant decrease in his urinary output.  She has a very hard time describing how much urine he is outputting in a 24-hour.    Review of Systems  Constitutional, HEENT, cardiovascular, pulmonary, gi and gu systems are negative, except as otherwise noted.      Objective    BP 98/63 (BP Location: Left arm, Patient Position: Sitting, Cuff Size: Adult Regular)   Pulse 119   Temp 99.8  F (37.7  C) (Tympanic)   Resp 20   Ht 1.549 m (5' 1\")   Wt 77.4 kg (170 lb 9.6 oz)   SpO2 95%   BMI 32.23 kg/m    Body mass index is 32.23 kg/m .  Physical Exam   GENERAL: pale, frail, and fatigued  EYES: Eyes grossly normal to inspection, PERRL and conjunctivae and sclerae normal  HENT: ear canals and TM's normal, nose and mouth without ulcers or lesions  NECK: no adenopathy, no asymmetry, masses, or scars  RESP: Increased rate, depth, and work of breathing.  No rales, wheezing, or rhonchi  CV: regular rhythm, tachy, normal S1 S2, no S3 or S4, no murmur, click or rub, no peripheral edema  ABDOMEN: soft, nontender, no hepatosplenomegaly, no masses and bowel sounds normal  MS: no gross musculoskeletal defects noted, no edema    Olga Alvarez DNP FNP-C  Family Nurse Practitioner - Same Day Provider  [facility]        Signed Electronically by: AQUILES Cox " CNP

## 2025-04-21 NOTE — LETTER
April 21, 2025      To Whom It May Concern:      Adam Talamantes was seen in our Emergency Department today, 04/21/25. His daughter Dimitri was here in the department with him. I expect his condition to improve over the next 3-4 days. She was in the ED on 4/21/2025 with him.    Sincerely,        Maynor Bryson MD  Electronically signed

## 2025-04-21 NOTE — LETTER
[facility] EMERGENCY DEPARTMENT  [address] [address]  Phone: [phone]    April 21, 2025          To whom it may concern:    Patient's daughter had to assist her father in the hospital.     Please contact me for questions or concerns.      Sincerely,      Bernabe Johnson M.D., Ph.D.  Internal Medicine/Gastroenterology [facility]

## 2025-04-21 NOTE — PHARMACY-VANCOMYCIN DOSING SERVICE
Pharmacy Vancomycin Initial Note  Date of Service 2025  Patient's  1940  85 year old, male    Indication: Sepsis and Urinary Tract Infection    Current estimated CrCl = Estimated Creatinine Clearance: 21.4 mL/min (A) (based on SCr of 2.31 mg/dL (H)).    Creatinine for last 3 days  2025:  1:28 PM Creatinine 2.31 mg/dL    Recent Vancomycin Level(s) for last 3 days  No results found for requested labs within last 3 days.      Vancomycin IV Administrations (past 72 hours)        No vancomycin orders with administrations in past 72 hours.                    Nephrotoxins and other renal medications (From now, onward)      Start     Dose/Rate Route Frequency Ordered Stop    25 1330  vancomycin (VANCOCIN) 1,750 mg in sodium chloride 0.9 % 567.5 mL intermittent infusion         1,750 mg  over 2 Hours Intravenous ONCE 25 1304      25 1304  vancomycin place castellanos - receiving intermittent dosing         1 each Intravenous SEE ADMIN INSTRUCTIONS 25 1304              Contrast Orders - past 72 hours (72h ago, onward)      None             Plan:  Give one time dose vancomycin  1750 mg IV given current diminished renal function. Will follow renal function for future doses.  Vancomycin monitoring method: Trough (Method 2 = manual dose calculation)  Vancomycin therapeutic monitoring goal: 15-20 mg/L  Pharmacy will check vancomycin levels as appropriate in 1-3 Days.    Serum creatinine levels will be ordered daily for the first week of therapy and at least twice weekly for subsequent weeks.      Helene Gonzalez, [facility]

## 2025-04-22 ENCOUNTER — VIRTUAL VISIT (OUTPATIENT)
Dept: INTERPRETER SERVICES | Facility: CLINIC | Age: 85
End: 2025-04-22
Payer: COMMERCIAL

## 2025-04-22 ENCOUNTER — APPOINTMENT (OUTPATIENT)
Dept: SPEECH THERAPY | Facility: CLINIC | Age: 85
DRG: 872 | End: 2025-04-22
Attending: STUDENT IN AN ORGANIZED HEALTH CARE EDUCATION/TRAINING PROGRAM
Payer: COMMERCIAL

## 2025-04-22 ENCOUNTER — PATIENT OUTREACH (OUTPATIENT)
Dept: GERIATRIC MEDICINE | Facility: CLINIC | Age: 85
End: 2025-04-22

## 2025-04-22 ENCOUNTER — ENROLLMENT (OUTPATIENT)
Dept: HOME HEALTH SERVICES | Facility: HOME HEALTH | Age: 85
End: 2025-04-22
Payer: COMMERCIAL

## 2025-04-22 DIAGNOSIS — R32 URINARY INCONTINENCE, UNSPECIFIED TYPE: Primary | ICD-10-CM

## 2025-04-22 LAB
ALBUMIN SERPL BCG-MCNC: 3 G/DL (ref 3.5–5.2)
ALP SERPL-CCNC: 90 U/L (ref 40–150)
ALT SERPL W P-5'-P-CCNC: 14 U/L (ref 0–70)
ANION GAP SERPL CALCULATED.3IONS-SCNC: 11 MMOL/L (ref 7–15)
AST SERPL W P-5'-P-CCNC: 20 U/L (ref 0–45)
BASOPHILS # BLD AUTO: 0 10E3/UL (ref 0–0.2)
BASOPHILS NFR BLD AUTO: 0 %
BILIRUB SERPL-MCNC: 0.6 MG/DL
BUN SERPL-MCNC: 22.6 MG/DL (ref 8–23)
CALCIUM SERPL-MCNC: 8.8 MG/DL (ref 8.8–10.4)
CHLORIDE SERPL-SCNC: 104 MMOL/L (ref 98–107)
CREAT SERPL-MCNC: 1.81 MG/DL (ref 0.67–1.17)
EGFRCR SERPLBLD CKD-EPI 2021: 37 ML/MIN/1.73M2
EOSINOPHIL # BLD AUTO: 0.2 10E3/UL (ref 0–0.7)
EOSINOPHIL NFR BLD AUTO: 1 %
ERYTHROCYTE [DISTWIDTH] IN BLOOD BY AUTOMATED COUNT: 15.9 % (ref 10–15)
GLUCOSE BLDC GLUCOMTR-MCNC: 220 MG/DL (ref 70–99)
GLUCOSE BLDC GLUCOMTR-MCNC: 238 MG/DL (ref 70–99)
GLUCOSE BLDC GLUCOMTR-MCNC: 243 MG/DL (ref 70–99)
GLUCOSE BLDC GLUCOMTR-MCNC: 286 MG/DL (ref 70–99)
GLUCOSE SERPL-MCNC: 248 MG/DL (ref 70–99)
HCO3 SERPL-SCNC: 17 MMOL/L (ref 22–29)
HCT VFR BLD AUTO: 29.5 % (ref 40–53)
HGB BLD-MCNC: 9.7 G/DL (ref 13.3–17.7)
IMM GRANULOCYTES # BLD: 0.2 10E3/UL
IMM GRANULOCYTES NFR BLD: 1 %
INR PPP: 2.26 (ref 0.85–1.15)
L PNEUMO1 AG UR QL IA: NEGATIVE
LYMPHOCYTES # BLD AUTO: 1.6 10E3/UL (ref 0.8–5.3)
LYMPHOCYTES NFR BLD AUTO: 7 %
MAGNESIUM SERPL-MCNC: 2 MG/DL (ref 1.7–2.3)
MAGNESIUM SERPL-MCNC: 2.3 MG/DL (ref 1.7–2.3)
MCH RBC QN AUTO: 29.7 PG (ref 26.5–33)
MCHC RBC AUTO-ENTMCNC: 32.9 G/DL (ref 31.5–36.5)
MCV RBC AUTO: 90 FL (ref 78–100)
MONOCYTES # BLD AUTO: 1.4 10E3/UL (ref 0–1.3)
MONOCYTES NFR BLD AUTO: 6 %
NEUTROPHILS # BLD AUTO: 20 10E3/UL (ref 1.6–8.3)
NEUTROPHILS NFR BLD AUTO: 85 %
NRBC # BLD AUTO: 0 10E3/UL
NRBC BLD AUTO-RTO: 0 /100
PHOSPHATE SERPL-MCNC: 2.4 MG/DL (ref 2.5–4.5)
PLATELET # BLD AUTO: 165 10E3/UL (ref 150–450)
POTASSIUM SERPL-SCNC: 4.5 MMOL/L (ref 3.4–5.3)
PROT SERPL-MCNC: 6.9 G/DL (ref 6.4–8.3)
RBC # BLD AUTO: 3.27 10E6/UL (ref 4.4–5.9)
S PNEUM AG SPEC QL: NEGATIVE
SODIUM SERPL-SCNC: 132 MMOL/L (ref 135–145)
SPECIMEN TYPE: NORMAL
VANCOMYCIN SERPL-MCNC: 11.1 UG/ML
WBC # BLD AUTO: 23.4 10E3/UL (ref 4–11)

## 2025-04-22 PROCEDURE — 250N000013 HC RX MED GY IP 250 OP 250 PS 637: Performed by: STUDENT IN AN ORGANIZED HEALTH CARE EDUCATION/TRAINING PROGRAM

## 2025-04-22 PROCEDURE — 258N000003 HC RX IP 258 OP 636: Performed by: STUDENT IN AN ORGANIZED HEALTH CARE EDUCATION/TRAINING PROGRAM

## 2025-04-22 PROCEDURE — 120N000002 HC R&B MED SURG/OB UMMC

## 2025-04-22 PROCEDURE — 250N000011 HC RX IP 250 OP 636

## 2025-04-22 PROCEDURE — 36415 COLL VENOUS BLD VENIPUNCTURE: CPT | Performed by: STUDENT IN AN ORGANIZED HEALTH CARE EDUCATION/TRAINING PROGRAM

## 2025-04-22 PROCEDURE — 92526 ORAL FUNCTION THERAPY: CPT | Mod: GN | Performed by: SPEECH-LANGUAGE PATHOLOGIST

## 2025-04-22 PROCEDURE — 83735 ASSAY OF MAGNESIUM: CPT | Performed by: STUDENT IN AN ORGANIZED HEALTH CARE EDUCATION/TRAINING PROGRAM

## 2025-04-22 PROCEDURE — 250N000011 HC RX IP 250 OP 636: Mod: JZ | Performed by: STUDENT IN AN ORGANIZED HEALTH CARE EDUCATION/TRAINING PROGRAM

## 2025-04-22 PROCEDURE — 80053 COMPREHEN METABOLIC PANEL: CPT | Performed by: STUDENT IN AN ORGANIZED HEALTH CARE EDUCATION/TRAINING PROGRAM

## 2025-04-22 PROCEDURE — 92610 EVALUATE SWALLOWING FUNCTION: CPT | Mod: GN | Performed by: SPEECH-LANGUAGE PATHOLOGIST

## 2025-04-22 PROCEDURE — 250N000011 HC RX IP 250 OP 636: Performed by: STUDENT IN AN ORGANIZED HEALTH CARE EDUCATION/TRAINING PROGRAM

## 2025-04-22 PROCEDURE — 85025 COMPLETE CBC W/AUTO DIFF WBC: CPT | Performed by: STUDENT IN AN ORGANIZED HEALTH CARE EDUCATION/TRAINING PROGRAM

## 2025-04-22 PROCEDURE — 80202 ASSAY OF VANCOMYCIN: CPT

## 2025-04-22 PROCEDURE — 250N000012 HC RX MED GY IP 250 OP 636 PS 637: Performed by: STUDENT IN AN ORGANIZED HEALTH CARE EDUCATION/TRAINING PROGRAM

## 2025-04-22 PROCEDURE — 82962 GLUCOSE BLOOD TEST: CPT

## 2025-04-22 PROCEDURE — 99222 1ST HOSP IP/OBS MODERATE 55: CPT | Performed by: PHYSICIAN ASSISTANT

## 2025-04-22 PROCEDURE — 99233 SBSQ HOSP IP/OBS HIGH 50: CPT | Mod: GC | Performed by: STUDENT IN AN ORGANIZED HEALTH CARE EDUCATION/TRAINING PROGRAM

## 2025-04-22 PROCEDURE — 85610 PROTHROMBIN TIME: CPT | Performed by: STUDENT IN AN ORGANIZED HEALTH CARE EDUCATION/TRAINING PROGRAM

## 2025-04-22 PROCEDURE — 84100 ASSAY OF PHOSPHORUS: CPT | Performed by: STUDENT IN AN ORGANIZED HEALTH CARE EDUCATION/TRAINING PROGRAM

## 2025-04-22 PROCEDURE — T1013 SIGN LANG/ORAL INTERPRETER: HCPCS | Mod: U4,TEL,95

## 2025-04-22 PROCEDURE — 36415 COLL VENOUS BLD VENIPUNCTURE: CPT

## 2025-04-22 RX ORDER — METOPROLOL TARTRATE 25 MG/1
25 TABLET, FILM COATED ORAL ONCE
Status: COMPLETED | OUTPATIENT
Start: 2025-04-22 | End: 2025-04-22

## 2025-04-22 RX ORDER — VANCOMYCIN HYDROCHLORIDE 1 G/200ML
1000 INJECTION, SOLUTION INTRAVENOUS EVERY 24 HOURS
Status: DISCONTINUED | OUTPATIENT
Start: 2025-04-22 | End: 2025-04-23

## 2025-04-22 RX ORDER — DEXTROSE MONOHYDRATE 25 G/50ML
25-50 INJECTION, SOLUTION INTRAVENOUS
Status: DISCONTINUED | OUTPATIENT
Start: 2025-04-22 | End: 2025-04-25 | Stop reason: HOSPADM

## 2025-04-22 RX ORDER — NICOTINE POLACRILEX 4 MG
15-30 LOZENGE BUCCAL
Status: DISCONTINUED | OUTPATIENT
Start: 2025-04-22 | End: 2025-04-25 | Stop reason: HOSPADM

## 2025-04-22 RX ORDER — SODIUM CHLORIDE 9 MG/ML
INJECTION, SOLUTION INTRAVENOUS CONTINUOUS
Status: DISCONTINUED | OUTPATIENT
Start: 2025-04-22 | End: 2025-04-23

## 2025-04-22 RX ORDER — METOPROLOL SUCCINATE 50 MG/1
50 TABLET, EXTENDED RELEASE ORAL DAILY
Status: DISCONTINUED | OUTPATIENT
Start: 2025-04-23 | End: 2025-04-25 | Stop reason: HOSPADM

## 2025-04-22 RX ORDER — MAGNESIUM SULFATE HEPTAHYDRATE 40 MG/ML
2 INJECTION, SOLUTION INTRAVENOUS ONCE
Status: COMPLETED | OUTPATIENT
Start: 2025-04-22 | End: 2025-04-22

## 2025-04-22 RX ADMIN — POLYETHYLENE GLYCOL 3350 17 G: 17 POWDER, FOR SOLUTION ORAL at 08:21

## 2025-04-22 RX ADMIN — APIXABAN 5 MG: 5 TABLET, FILM COATED ORAL at 08:21

## 2025-04-22 RX ADMIN — POTASSIUM & SODIUM PHOSPHATES POWDER PACK 280-160-250 MG 1 PACKET: 280-160-250 PACK at 04:04

## 2025-04-22 RX ADMIN — POTASSIUM & SODIUM PHOSPHATES POWDER PACK 280-160-250 MG 1 PACKET: 280-160-250 PACK at 11:53

## 2025-04-22 RX ADMIN — ROSUVASTATIN CALCIUM 20 MG: 20 TABLET, FILM COATED ORAL at 21:38

## 2025-04-22 RX ADMIN — AZITHROMYCIN DIHYDRATE 250 MG: 250 TABLET, FILM COATED ORAL at 18:41

## 2025-04-22 RX ADMIN — SODIUM CHLORIDE: 0.9 INJECTION, SOLUTION INTRAVENOUS at 09:37

## 2025-04-22 RX ADMIN — APIXABAN 5 MG: 5 TABLET, FILM COATED ORAL at 20:23

## 2025-04-22 RX ADMIN — CEFEPIME 2 G: 2 INJECTION, POWDER, FOR SOLUTION INTRAVENOUS at 12:59

## 2025-04-22 RX ADMIN — PANTOPRAZOLE SODIUM 40 MG: 40 TABLET, DELAYED RELEASE ORAL at 08:21

## 2025-04-22 RX ADMIN — VANCOMYCIN HYDROCHLORIDE 1000 MG: 1 INJECTION, SOLUTION INTRAVENOUS at 11:53

## 2025-04-22 RX ADMIN — INSULIN GLARGINE 10 UNITS: 100 INJECTION, SOLUTION SUBCUTANEOUS at 12:54

## 2025-04-22 RX ADMIN — METOPROLOL TARTRATE 25 MG: 25 TABLET, FILM COATED ORAL at 13:26

## 2025-04-22 RX ADMIN — SODIUM CHLORIDE: 0.9 INJECTION, SOLUTION INTRAVENOUS at 19:56

## 2025-04-22 RX ADMIN — DEXTROMETHORPHAN HYDROBROMIDE AND QUINIDINE SULFATE 1 CAPSULE: 20; 10 CAPSULE, GELATIN COATED ORAL at 17:56

## 2025-04-22 RX ADMIN — INSULIN ASPART 2 UNITS: 100 INJECTION, SOLUTION INTRAVENOUS; SUBCUTANEOUS at 17:00

## 2025-04-22 RX ADMIN — TAMSULOSIN HYDROCHLORIDE 0.4 MG: 0.4 CAPSULE ORAL at 08:21

## 2025-04-22 RX ADMIN — POTASSIUM & SODIUM PHOSPHATES POWDER PACK 280-160-250 MG 1 PACKET: 280-160-250 PACK at 08:21

## 2025-04-22 RX ADMIN — MAGNESIUM SULFATE HEPTAHYDRATE 2 G: 2 INJECTION, SOLUTION INTRAVENOUS at 02:05

## 2025-04-22 ASSESSMENT — ACTIVITIES OF DAILY LIVING (ADL)
ADLS_ACUITY_SCORE: 61
DEPENDENT_IADLS:: CLEANING;COOKING;LAUNDRY;SHOPPING;MEAL PREPARATION;TRANSPORTATION;MEDICATION MANAGEMENT;MONEY MANAGEMENT;INCONTINENCE
ADLS_ACUITY_SCORE: 61
ADLS_ACUITY_SCORE: 63
ADLS_ACUITY_SCORE: 61
ADLS_ACUITY_SCORE: 63
ADLS_ACUITY_SCORE: 63
ADLS_ACUITY_SCORE: 61

## 2025-04-22 NOTE — PLAN OF CARE
RN [id number]    Vitals: Afebrile. Hypertensive w/in parameters. Tachycardic 120's. OVSS on 2L NC  Neuro: A&Ox3; intermittent confusion.    Mobility: Pt NOOB this shift. Bed-alarm in place for safety, call light in reach   Pain/Nausea: Denies pain. Denies nausea.   Diet: Easy chew diet w/ thin liquids-NPO at midnight.  Labs: Reviewed. Electrolyte protocols run. Phos replaced via PO  LDAs: R PIV infusing LR @ 100 mL/hr. L PIV SL  Skin/incisions: WNL; intact.   Respiratory: No acute changes this shift.   Cardiac: No acute changes this shift.  /GI: Voiding appropriately and spontaneously via external catheter w/ GUOP. LBM date 4/21; passing flatus     NEW CHANGES: BG ACHS. No acute changes this shift. Family at bedside.    Continue to monitor patient and intervene as needed. Continue to implement Plan of Care. Notify MD with any concerns or changes in patient status.     Birdie Brito RN

## 2025-04-22 NOTE — PROGRESS NOTES
I: Monitored vitals and assessed pt status.   Changed: Assumed care 0040-0520. Pt Kayce speaking only, daughter in room to translate. 's on 2L NC. Diego in place draining clear yellow urine. Denies pain or nausea. Magnesium replaced. Pt not OOB. Pt slept most of night. No acute events.  Running: none  PRN: none    Neuro: A&O*4  Tele: -120's  LunL NC  GI: No BM this shift  : Chronic Diego, good output  Skin: Intact  Pain: Denies  2A FWW/GB    I/O this shift:  In: -   Out: 1420 [Urine:1420]    Temp:  [97.7  F (36.5  C)-98.7  F (37.1  C)] 98.7  F (37.1  C)  Pulse:  [103-119] 105  Resp:  [18] 18  BP: (101-137)/() 126/70  SpO2:  [90 %-100 %] 100 %      P: Continue to monitor Pt status and report changes to treatment team.

## 2025-04-22 NOTE — CONSULTS
Urology Consult History and Physical    Name: Adam Talamantes    MRN: [id number]   YOB: 1940       We were asked to see Adam Talamantes at the request of Dr. Johnson for evaluation and treatment of the following chief complaint:          Chief Complaint:   Bilateral hydronephrosis despite Diego catheter  History is obtained from patient and review of records.  = Kayce, professional on telephone  His daughter, who speaks english, was also present and provided much of the HPI.  Pt didn't talk unless specifically prompted.            History of Present Illness:   Adam Talamantes is a 85 year old male with pmh significant for HTN, CAD, basilar artery stroke, DM II, CKD III, latent TB, urinary retention with chronic Diego, nephrolithiasis who was admitted yesterday with sepsis (fevers, tachycardia, WBC 28.5, lactate 2.5) and ELLA (SCr 2.31mg/dL (baseline 1-1.5), DDX pyelonephritis v pneumonia.  Urology was consulted for a CT CAP without contrast from yesterday showing: BL hydronephrosis (increased since 5/2024, R>L) despite bladder being decompressed by Diego.  Also perivesical stranding.   Today he is NPO.  INR is 2.26. He denies flank, abdominal and penile pain.  His daughter says there is frequently fluffy debris in his urine.  They clean his penis daily.  They clean the drainage bags regularly with vinegar solution.  Overall the patient loves the Diego.  Prior to Diego he was voiding every 30 minutes with severe urgency.  Now he has better QOL and is able to sleep at night.      Urologically:  - 2023 - saw Urology for gross hematuria.  Cysto recommended but never completed  - 5/11/2024: Admission for complicated UTI (growing only candida).  CT showed possible prostate abscess & pt underwent TURP (5/16/25 - [facility]) that showed no abscess, and path was benign.  Pt discharged voiding spontaneously  - 5/29/24: Ongoing gross hematuria despite proper antimicrobials.  CT AP without showed new BL hydro to the level  of the bladder, and a Diego was placed.    - 6/10/24: [facility] appt KANDI Fowler - continue Diego.  Plan for UDS  - 9/3/24 - UCx growing 2 strains pansensitive E faecalis.   - 24 - UDS shows small capacity (114cc) with fair compliance (pDet = 50ctV6B at capacity).  No DO/DOI and no detrusor contraction during attempt to void.  Complete retention.    - 10/16/24 - JULIEN Yadav appt - plan for chronic Diego.            Past Medical History:     Past Medical History:   Diagnosis Date    Acute blood loss anemia     Acute renal failure     Anemia     Bacteremia     Cataract     Chronic gout     CKD (chronic kidney disease)     Stage 3    DM2 (diabetes mellitus, type 2) (H)     GERD (gastroesophageal reflux disease)     Glucose intolerance (impaired glucose tolerance)     Gout     History of nephrolithiasis     History of prostatitis 2017    Hyperlipidemia     Hypertension     Insomnia     Intestinal disaccharidase deficiencies and disaccharide malabsorption     Created by Conversion     Latent tuberculosis     Nephrolithiasis     Neuropathy     Nonspecific abnormal findings on radiological and other examination of skull and head     Created by [facility] Annotation: 2013 11:23PM - Giulia Meridai/10/2011:  severe stenosis both posterior cerebral arteries seen MRI/MRA done for  vertigo. No acute changes.e*    Osteoarthritis     wrist, knee, shoulder    Prostatitis     Rectal bleed     Trigger thumb             Past Surgical History:     Past Surgical History:   Procedure Laterality Date    CYSTOSCOPY, TRANSURETHRAL RESECTION (TUR) PROSTATE, COMBINED N/A 2024    Procedure: Transurethral Resection of Prostate Abscess;  Surgeon: Bernabe Fletcher MD;  Location: [facility] OR    IR MISCELLANEOUS PROCEDURE  2009    IR MISCELLANEOUS PROCEDURE  2009    IR MISCELLANEOUS PROCEDURE  2009    IR MISCELLANEOUS PROCEDURE  2009    IR NEPHROURETERAL TUBE PLACEMENT BILATERAL  2009    IR  URETER DILATION BILATERAL  2009    IR URETER DILATION BILATERAL  2009    KIDNEY STONE SURGERY      PICC  3/6/2016                 Social History:     Social History     Tobacco Use    Smoking status: Former     Current packs/day: 0.00     Types: Cigarettes     Quit date: 3/10/2000     Years since quittin.1     Passive exposure: Never    Smokeless tobacco: Former    Tobacco comments:     no passive exposure   Substance Use Topics    Alcohol use: No            Family History:     Family History   Problem Relation Age of Onset    Cerebrovascular Disease Father     Cerebrovascular Disease Daughter             Allergies:     Allergies   Allergen Reactions    Jardiance [Empagliflozin]      Had complicated uti on jardiance    Lisinopril Cough            Medications:     Current Facility-Administered Medications   Medication Dose Route Frequency Provider Last Rate Last Admin    [Held by provider] allopurinol (ZYLOPRIM) tablet 200 mg  200 mg Oral Daily Bernabe Johnson MD PhD        apixaban ANTICOAGULANT (ELIQUIS) tablet 5 mg  5 mg Oral BID Bernabe Johnson MD PhD   5 mg at 25    azithromycin (ZITHROMAX) tablet 250 mg  250 mg Oral Q24H Bernabe Johnson MD PhD        calcium carbonate (TUMS) chewable tablet 1,000 mg  1,000 mg Oral 4x Daily PRN Bernabe Johnson MD PhD        ceFEPIme (MAXIPIME) 2 g vial to attach to  mL bag for ADULTS or NS 50 mL bag for PEDS  2 g Intravenous Q24H Bernabe Johnson MD PhD        [Held by provider] cilostazol (PLETAL) tablet 100 mg  100 mg Oral BID Bernabe Johnson MD PhD        [Held by provider] dextromethorphan-quiNIDine (NUEDEXTA) 20-10 MG per capsule 1 capsule  1 capsule Oral Q12H Bernabe Johnson MD PhD        [Held by provider] DULoxetine (CYMBALTA) DR capsule 30 mg  30 mg Oral Daily Bernabe Johnson MD PhD        hydrOXYzine HCl (ATARAX) tablet 25 mg  25 mg Oral Q8H PRN Bernabe Johnson  MD Chinedu PhD        [Held by provider] insulin aspart (NovoLOG) injection (RAPID ACTING)  16 Units Subcutaneous TID w/meals Bernabe Johnson MD PhD        [Held by provider] insulin glargine (LANTUS PEN) injection 30 Units  30 Units Subcutaneous Q24H Bernabe Johnson MD PhD        lidocaine (LMX4) cream   Topical Q1H PRN Bernabe Johnson MD PhD        lidocaine 1 % 0.1-1 mL  0.1-1 mL Other Q1H PRN Bernabe Jhonson MD PhD        [Held by provider] meclizine (ANTIVERT) tablet 12.5 mg  12.5 mg Oral TID PRN Bernabe Johnson MD PhD        [Held by provider] metoprolol succinate ER (TOPROL XL) 24 hr tablet 50 mg  50 mg Oral Daily Bernabe Johnson MD PhD        ondansetron (ZOFRAN ODT) ODT tab 4 mg  4 mg Oral Q6H PRN Bernabe Johnson MD PhD        Or    ondansetron (ZOFRAN) injection 4 mg  4 mg Intravenous Q6H PRN Bernabe Johnson MD PhD        pantoprazole (PROTONIX) EC tablet 40 mg  40 mg Oral QAM AC Bernabe Johnson MD PhD        polyethylene glycol (MIRALAX) Packet 17 g  17 g Oral Daily Bernabe Johnson MD PhD        prochlorperazine (COMPAZINE) injection 5 mg  5 mg Intravenous Q6H PRN Bernabe Johnson MD PhD        Or    prochlorperazine (COMPAZINE) tablet 5 mg  5 mg Oral Q6H PRN Bernabe Johnson MD PhD        rosuvastatin (CRESTOR) tablet 20 mg  20 mg Oral At Bedtime Bernabe Johnson MD PhD   20 mg at 04/21/25 2112    senna-docusate (SENOKOT-S/PERICOLACE) 8.6-50 MG per tablet 2 tablet  2 tablet Oral BID PRN Bernabe Johnson MD PhD        [Held by provider] sitagliptin (JANUVIA) tablet 50 mg  50 mg Oral Daily Bernabe Johnson MD PhD        sodium chloride (PF) 0.9% PF flush 3 mL  3 mL Intracatheter q1 min prn Maynor Bryson MD        sodium chloride (PF) 0.9% PF flush 3 mL  3 mL Intracatheter Q8H Maynor Bryson MD   3 mL at 04/22/25 0404    sodium chloride (PF) 0.9% PF flush 3 mL  3 mL  Intracatheter Q8H [facility] Bernabe Johnson MD PhD   3 mL at 04/22/25 0626    sodium chloride (PF) 0.9% PF flush 3 mL  3 mL Intracatheter q1 min prn Bernabe Johnson MD PhD        tamsulosin (FLOMAX) capsule 0.4 mg  0.4 mg Oral Daily Bernabe Johnson MD PhD        traZODone (DESYREL) tablet  mg   mg Oral At Bedtime PRN Bernabe Johnson MD PhD        vancomycin place castellanos - receiving intermittent dosing  1 each Intravenous See Admin Instructions Maynor Bryson MD         Current Outpatient Medications   Medication Sig Dispense Refill    acetaminophen (TYLENOL) 500 MG tablet Take 1 tablet (500 mg) by mouth every 4 hours as needed for mild pain 100 tablet 3    allopurinol (ZYLOPRIM) 100 MG tablet TAKE 2 TABLETS(200 MG) BY MOUTH DAILY 180 tablet 3    apixaban ANTICOAGULANT (ELIQUIS ANTICOAGULANT) 5 MG tablet Take 1 tablet (5 mg) by mouth 2 times daily 180 tablet 3    calcium carbonate (TUMS) 500 MG chewable tablet Take 1 chew tab by mouth daily as needed for heartburn      cilostazol (PLETAL) 50 MG tablet Take 2 tablets (100 mg) by mouth 2 times daily. 60 tablet 2    clobetasol propionate (TEMOVATE) 0.05 % external cream Apply topically 2 times daily as needed. 60 g 3    Continuous Glucose  (FREESTYLE SUSANNAH 2 READER) TONIE USE TO READ BLOOD SUGARS PER MANUFACTRUERS INSTRUCTIONS 1 each 0    Continuous Glucose Sensor (FREESTYLE SUSANNAH 2 SENSOR) MISC CHANGE EVERY 14 DAYS 2 each 11    dextromethorphan-quiNIDine (NUEDEXTA) 20-10 MG capsule Take 1 capsule by mouth every 12 hours. 60 capsule 11    DULoxetine (CYMBALTA) 30 MG capsule Take 1 capsule by mouth every morning and 2 capsules every evening 135 capsule 6    hydrOXYzine HCl (ATARAX) 25 MG tablet Take 1 tablet (25 mg) by mouth every 8 hours as needed for itching. 60 tablet 4    insulin aspart (NOVOLOG FLEXPEN) 100 UNIT/ML pen Inject 16 units before breakfast, and 6 units under the skin before lunch and dinner 30 mL 3     insulin glargine (LANTUS PEN) 100 UNIT/ML pen Inject 30 Units subcutaneously every 24 hours 30 mL 3    insulin pen needle (BD PEN NEEDLE SALVATORE 2ND GEN) 32G X 4 MM miscellaneous USE 1 PEN NEEDLE FOUR TIMES DAILY OR AS DIRECTED 400 each 2    meclizine (ANTIVERT) 12.5 MG tablet Take 1 tablet (12.5 mg) by mouth 3 times daily as needed for dizziness 60 tablet 6    metoprolol succinate ER (TOPROL XL) 50 MG 24 hr tablet Take 1 tablet (50 mg) by mouth daily. 90 tablet 3    pantoprazole (PROTONIX) 40 MG EC tablet Take 1 tablet (40 mg) by mouth every morning (before breakfast). 90 tablet 3    polyethylene glycol (MIRALAX) 17 GM/Dose powder MIX 17 GRAMS IN LIQUID AND TAKE BY MOUTH ONCE DAILY AS NEEDED FOR CONSTIPATION 510 g 6    rosuvastatin (CRESTOR) 20 MG tablet TAKE 1 TABLET(20 MG) BY MOUTH AT BEDTIME 90 tablet 2    senna-docusate (STIMULANT LAXATIVE) 8.6-50 MG tablet Take 2 tablets by mouth 2 times daily as needed for constipation. 120 tablet 9    sitagliptin (JANUVIA) 50 MG tablet TAKE 1 TABLET(50 MG) BY MOUTH DAILY 90 tablet 0    tamsulosin (FLOMAX) 0.4 MG capsule TAKE 1 CAPSULE BY MOUTH EVERY DAY 90 capsule 1    traZODone (DESYREL) 50 MG tablet Take 1-2 tablets ( mg) by mouth nightly as needed for sleep. 180 tablet 3             Review of Systems:    ROS: See HPI for pertinent details.  Remainder of 10-point ROS negative.         Physical Exam:   VS:  T: 98.7    HR: 105    BP: 126/70    RR: 18   GEN:  NAD.  Pleasant.  HEENT:  Sclerae anicteric.  Conjunctivae pink.  Moist mucous membranes  LUNGS: Labored breathing.  BACK:  No costoverterbral tenderness.  ABD:  Soft.  NT.  Mildly distended  No rebound or guarding.     :  Phallus uncircumcised.  Meatus patent. Diego in penis draining clear yellow urine. Normal penile shaft without plaques.  Testicles descended bilaterally, no tenderness.    JESSICA:  Deferred  EXT:  Warm, well perfused.    SKIN:  Warm.  Dry.  No rashes.            Data:   All laboratory data  reviewed:    Lab Results   Component Value Date    PSA 10.3 12/16/2015     Recent Labs   Lab 04/22/25  0620 04/21/25  1322   WBC 23.4* 28.5*   HGB 9.7* 11.2*    192       Recent Labs   Lab 04/22/25  0106 04/22/25  0104 04/21/25  1642 04/21/25  1328   NA  --   --   --  128*   POTASSIUM  --   --   --  4.6   CHLORIDE  --   --   --  96*   CO2  --   --   --  19*   BUN  --   --   --  31.3*   CR  --   --   --  2.31*   *  --   --  333*   GERALDINE  --   --   --  9.4   MAG  --  2.0 1.5*  --    PHOS  --   --  1.9*  --        Recent Labs   Lab 04/21/25 2002   COLOR Yellow   APPEARANCE Slightly Cloudy*   URINEGLC 100*   URINEBILI Negative   URINEKETONE Negative   SG 1.016   URINEPH 6.0   PROTEIN 30*   NITRITE Positive*   LEUKEST Large*   RBCU 17*   WBCU >182*     Results for orders placed or performed in visit on 09/03/24   Urine Culture    Collection Time: 09/03/24  1:39 PM    Specimen: Urine, Catheter   Result Value Ref Range    Culture >100,000 CFU/mL Enterococcus faecalis (A)     Culture >100,000 CFU/mL Enterococcus faecalis (A)        Susceptibility    Enterococcus faecalis - DAVE     Ampicillin <=2 Susceptible ug/mL     Vancomycin 1 Susceptible ug/mL     Nitrofurantoin <=16 Susceptible ug/mL    Enterococcus faecalis - DAVE     Ampicillin <=2 Susceptible ug/mL     Vancomycin 1 Susceptible ug/mL     Nitrofurantoin <=16 Susceptible ug/mL   Results for orders placed or performed in visit on 07/29/24   Urine Culture    Collection Time: 07/29/24 10:10 AM    Specimen: Urine, Clean Catch   Result Value Ref Range    Culture 50,000-100,000 CFU/mL Enterococcus faecalis (A)     Culture 50,000-100,000 CFU/mL Enterococcus faecalis (A)        Susceptibility    Enterococcus faecalis - DAVE*     Ampicillin <=2 Susceptible ug/mL     Ciprofloxacin* <=0.5 Susceptible ug/mL      * This is an appended report.  These results have been appended to a previously final verified report.     Vancomycin 1 Susceptible ug/mL     Tetracycline* >16  Resistant ug/mL      * This is an appended report.  These results have been appended to a previously final verified report.     Nitrofurantoin <=16 Susceptible ug/mL     Trimethoprim/Sulfamethoxazole*  See Note       * This is an appended report.  These results have been appended to a previously final verified report.     Penicillin 4 Susceptible ug/mL    Enterococcus faecalis - DAVE*     Ampicillin <=2 Susceptible ug/mL     Ciprofloxacin* <=0.5 Susceptible ug/mL      * This is an appended report.  These results have been appended to a previously final verified report.     Vancomycin 1 Susceptible ug/mL     Tetracycline* >16 Resistant ug/mL      * This is an appended report.  These results have been appended to a previously final verified report.     Nitrofurantoin <=16 Susceptible ug/mL     Trimethoprim/Sulfamethoxazole*  See Note       * This is an appended report.  These results have been appended to a previously final verified report.     Penicillin 4 Susceptible ug/mL     * Trimethoprim/sulfamethoxazole is Intrinsically Resistant     Trimethoprim/sulfamethoxazole is Intrinsically Resistant       All pertinent imaging reviewed:  EXAM: CT CHEST ABDOMEN PELVIS W/O CONTRAST 4/21/2025 4:37 PM     HISTORY: 85 years Male Sepsis, ? PNA, ? UTI/Pyelo     COMPARISON: CT 5/29/2024, 5/15/2024     TECHNIQUE: Helical CT images from the thoracic inlet through the  symphysis pubis were obtained without IV contrast.      FINDINGS:   image(s) are noncontributory.     LINES/TUBES: Diego catheter.     CHEST:  LOWER NECK: Unremarkable thyroid.      PULMONARY: Evaluation field somewhat limited by respiratory motion.  Central airways are clear. Few patchy groundglass and airspace  opacities within the lung bases. No pneumothorax or pleural effusion.     CARDIAC: Heart size is normal. Small pericardial effusion. Trace  coronary artery calcifications.     MEDIASTINUM: Thoracic aorta and main pulmonary artery are  unremarkable.  Normal appearance and configuration of the great vessels  off of the aortic arch. No suspicious mediastinal, hilar, or axillary  lymph nodes.      ESOPHAGUS: Unremarkable.     ABDOMEN/PELVIS:  HEPATIC: Previously identified hypoattenuating foci within the hepatic  parenchyma are less conspicuous on this noncontrast examination. No  new suspicious hepatic lesion. Hepatic steatosis.     BILIARY: Gallbladder sludge. No intrahepatic or extrahepatic biliary  ductal dilation.     PANCREAS: Atrophic changes. No focal pancreatic lesion. The main  pancreatic duct is not dilated.     SPLEEN: No splenomegaly. No suspicious lesions or masses.     ADRENALS: Unremarkable.     RENAL: Increased bilateral moderate hydroureteronephrosis, right  greater than left, with surrounding periureteral inflammatory changes.  No evidence of obstructing stone or mass. Partially calcified renal  cysts, for example lower pole right kidney (series 3 image [id number]).     URINARY BLADDER: Diego catheter in the bladder. Perivesicular  inflammatory changes along the bladder dome     REPRODUCTIVE: Prostate is enlarged.     GASTROINTESTINAL: Appendectomy: Normal caliber large and small bowel.  No abnormal bowel wall thickening or enhancement. No pneumatosis or  portal venous gas.      MESENTERY/PERITONEUM: No ascites or pneumoperitoneum.     VASCULAR: Nonaneurysmal abdominal aorta. Vascular patency cannot be  assessed as noncontrast examination. Aortoiliac vascular  calcifications.     LYMPH NODES: No lymphadenopathy.     MUSCULOSKELETAL: Multilevel degenerative changes of the spine with  similar grade 2 anterolisthesis of L5 on S1. Bilateral L5 pars defect.  Chronic anterior wedging of L2. No acute or suspicious osseous  abnormality.                                                                       IMPRESSION:   1.  Prostatomegaly with increased bilateral moderate  hydroureteronephrosis, right greater than left, and  periureteral/perivesicular  inflammatory changes , suspicious for  bladder obstruction with superimposed infection/inflammation.  Recommend correlation with urinalysis.  2.  Small pericardial effusion.   3.  Limited assessment of the lung fields secondary to respiratory  motion. Few scattered groundglass and patchy airspace opacities in the  bases, nonspecific, can be seen with edema, atelectasis, and/or  infection.  4.  Gallbladder sludge without evidence of acute cholecystitis.         Impression and Plan:   Impression / Plan:   Adam Talamantes is a 85 year old male with pmh significant for HTN, CAD, basilar artery stroke, DM II, CKD III, latent TB, urinary retention with chronic Diego, nephrolithiasis who was admitted yesterday with sepsis (fevers, tachycardia, WBC 28.5, lactate 2.5) and ELLA (SCr 2.31mg/dL (baseline 1-1.5), DDX pyelonephritis v pneumonia.  Urology was consulted for increased BL hydronephrosis (R>L) despite bladder being decompressed by Diego. Per review of EMR, he has had some degree of BL hydro since at least 2018.      Discussion: Etiology for hydro is unclear as there is no clear source for obstruction.  Hydronephrosis only extended down to mid-ureter bilaterally. Bladder appeared appropriately decompressed this recent CT and bladder wall was not particularly thickened.   And per review of images, it appears the patient has had some degree of BL hydro since at least 5/1/2018 with worsening since then?      Fortunately, the patient has no flank pain and his SCr seems to be quickly trending toward baseline.          RECS:  - Continue Diego catheter as you are  - Trend SCr and inflammatory markers  - UCX/ BCx pending and patient remains on cefepime. He previously has grown E faecalis and candida glabrata, and it is important to note that cefepime would not cover E faecalis.   - Will defer intervention at this time given chronic nature of hydronephrosis, improving SCr and no flank pain.    - OK for diet today.  Please make NPO p  MN tomorrow  - Recommend renal US in 48 hours to reassess.    - Should the patient develop worsening sepsis, please notify urology right away to consider urgent renal drainage.  Currently, with INR >2, stents (with urology) would be favored over PNTs (with IR) .      Urology will follow  Discussed with Dr. Bryant    Thank you for the opportunity to participate in the care of Adam Talamantes.     Ava Batista PA-C  Urology Physician Assistant  Personal Pager: [phone]    Please call Job Code:   [phone] to reach the Urology resident or PA on call - Weekdays  [phone] to reach the Urology resident or PA on call - Weeknights and weekends

## 2025-04-22 NOTE — PROGRESS NOTES
"   04/22/25 1000   Appointment Info   Signing Clinician's Name / Credentials (SLP) Dk Sanchez MA [facility] SLP   General Information   Onset of Illness/Injury or Date of Surgery 04/21/25   Referring Physician Bernabe Johnson MD PhD   Pertinent History of Current Problem Per H&P: \"Adam Talamantes is a 85 year old male with a history of CKD III, basilar artery stroke, BPH s/p TURP, urinary retention with continuous parrish catheter, nephrolithiasis, gout, latent TB, CAD, HTN, T2DM who is admitted for sepsis. \" CSE completed this AM per provider orders.   General Observations Patient sleepy, but cooperative and able to participate fully. Son present for evaluation. Reports patient eats softer solids at home.       Present yes   Language Kayce via phone   Type of Evaluation   Type of Evaluation Swallow Evaluation   Oral Motor   Oral Musculature generally intact   Mucosal Quality dry   Dentition (Oral Motor)   Dentition (Oral Motor) significant number of missing teeth   Facial Symmetry (Oral Motor)   Facial Symmetry (Oral Motor) WNL   Lip Function (Oral Motor)   Lip Range of Motion (Oral Motor) WNL   Tongue Function (Oral Motor)   Tongue Coordination/Speed (Oral Motor) unable/difficult to assess   Tongue ROM (Oral Motor) WNL   Jaw Function (Oral Motor)   Jaw Function (Oral Motor) WNL   Cough/Swallow/Gag Reflex (Oral Motor)   Volitional Throat Clear/Cough (Oral Motor) reduced strength   Volitional Swallow (Oral Motor) WNL   Vocal Quality/Secretion Management (Oral Motor)   Vocal Quality (Oral Motor) breathy   Secretion Management (Oral Motor) WNL   Comment, Vocal Quality/Secretion Management (Oral Motor) Baseline VQ per family   General Swallowing Observations   Past History of Dysphagia Patient is familiar to SLP department. Most recently seen by SLP during IP stay 2/7-2/13/23, recommending Easy to Chew and thin liquids diet at d/c. VFSS previously completed 2/4/23 recommending thin liquids. No " aspiration noted during this evaluation, only flash penetration with thin liquids.   Respiratory Support nasal cannula;other (see comments)  (2L - note patient was not wearing nc at start of session. SLP replaced at start of eval. RN made aware.)   Current Diet/Method of Nutritional Intake (General Swallowing Observations, NIS) NPO;other (see comments)  (pending SLP eval)   Swallowing Evaluation Clinical swallow evaluation   Clinical Swallow Evaluation   Feeding Assistance frequent cues/help required   Clinical Swallow Evaluation Textures Trialed thin liquids;pureed;soft & bite-sized   Clinical Swallow Eval: Thin Liquid Texture Trial   Mode of Presentation, Thin Liquids cup;straw;self-fed;fed by clinician   Volume of Liquid or Food Presented >4 oz thin water   Oral Phase of Swallow WFL   Pharyngeal Phase of Swallow intact   Diagnostic Statement No overt s/s of aspiration.   Clinical Swallow Evaluation: Puree Solid Texture Trial   Mode of Presentation, Puree spoon;fed by clinician   Volume of Puree Presented x4 tsps   Oral Phase, Puree WFL   Pharyngeal Phase, Puree intact   Diagnostic Statement No overt s/s of aspiration.   Clinical Swallow Eval: Soft & Bite Sized   Mode of Presentation spoon;fed by clinician   Volume Presented x4 tsp bites   Oral Phase other (see comments)  (mildly extended, but functional)   Pharyngeal Phase intact   Diagnostic Statement no overt s/s of aspiration. Good oral clearance.   Esophageal Phase of Swallow   Patient reports or presents with symptoms of esophageal dysphagia Other (Comments)   Esophageal comments hx of GERD, no noted symptoms during evaluation today   Swallowing Recommendations   Diet Consistency Recommendations thin liquids (level 0);easy to chew (level 7)   Supervision Level for Intake close supervision needed   Mode of Delivery Recommendations bolus size, small;slow rate of intake   Swallowing Maneuver Recommendations alternate food and liquid intake    Monitoring/Assistance Required (Eating/Swallowing) cue for finger/lingual sweep if oral pocketing present;stop eating activities when fatigue is present;monitor for cough or change in vocal quality with intake   Recommended Feeding/Eating Techniques (Swallow Eval) maintain upright posture during/after eating for 30 minutes;minimize distractions during oral intake;set-up and prepare tray;provide assist with feeding   Medication Administration Recommendations, Swallowing (SLP) As tolerated   Instrumental Assessment Recommendations instrumental evaluation not recommended at this time   General Therapy Interventions   Planned Therapy Interventions Dysphagia Treatment   Dysphagia treatment Modified diet education;Instruction of safe swallow strategies   Clinical Impression   Criteria for Skilled Therapeutic Interventions Met (SLP Eval) Yes, treatment indicated   SLP Diagnosis Mild oral dysphagia   Risks & Benefits of therapy have been explained evaluation/treatment results reviewed;care plan/treatment goals reviewed;risks/benefits reviewed;current/potential barriers reviewed;participants voiced agreement with care plan;participants included;patient;son   Clinical Impression Comments   CSE completed per provider orders. Patient presents with acute on chronic mild oral dysphagia in setting of new sepsis with AMS and baseline significantly reduced dentition. Patient fatigued, but cooperative during evaluation. Followed directions from auditory instruction via  or with visual model appropriately. Patient on 2L O2 via nc - of note he was not wearing nc at start of evaluation, but clinician replaced. Oral motor revealed baseline reduced dentition and reduced cough strength. Vocal quality mildly breathy, but overall adequate and close to baseline per family. Trials of thin liquids (cup, straw), puree textures, and soft solids completed at the bedside. Patient oral phase c/b adequate bolus acceptance and closure,  mildly extended mastication with soft solids, but overall good oral clearance. Patient IND requesting liquid wash. No overt s/s of aspiration observed during the session.     Recommend advance to Easy to chew diet (IDDSI 7) and thin liquids with set-up, close supervision, and feeding assistance as needed. Patient must be fully upright and alert, taking small bites/sips, and using a slow rate of intake. Likely this is close to patient's baseline diet - family reports he eats soft solids at home and they will bring him some food from home that he typically enjoys. SLP will follow for brief course of dysphagia management.     SLP Total Evaluation Time   Eval: oral/pharyngeal swallow function, clinical swallow Minutes (13542) 12   SLP Discharge Planning   SLP Discharge Recommendation home with assist   SLP Rationale for DC Rec Anticipate all swallow goals will be met at d/c.

## 2025-04-22 NOTE — MEDICATION SCRIBE - ADMISSION MEDICATION HISTORY
Medication Scribe Admission Medication History    Admission medication history is complete. The information provided in this note is only as accurate as the sources available at the time of the update.    Information Source(s): Patient via in-person and     Pertinent Information: Navin (son) reports that he does not know how many units of insulin he is currently injecting. Navin noted that his sister knows more about his medications than he does. He attempted to contact his sister (Dimitri) with no success. Per recent visit with PharmD on 03/24/25, the Novolog was increased to 16 units before breakfast, 6 units before lunch and dinner and he is still injecting 30 units of the Lantus. Per son, he not sure if he is still taking or using the following medications: cilostazol (PLETAL) 50 MG tablet, clobetasol propionate (TEMOVATE) 0.05 % external cream, meclizine (ANTIVERT) 12.5 MG tablet, and polyethylene glycol (MIRALAX) 17 GM/Dose powder. Unable to verify if he is taking any other medications. Medications were confirmed using prescription pill bottles. Dispense report and outside medication reconciliation list have been reviewed.     Changes made to PTA medication list:  Added: None  Deleted: None  Changed: None    Allergies reviewed with patient and updates made in EHR: yes    Medication History Completed By: Velvet Combs 4/22/2025 4:25 PM    PTA Med List   Medication Sig Last Dose/Taking    acetaminophen (TYLENOL) 500 MG tablet Take 1 tablet (500 mg) by mouth every 4 hours as needed for mild pain Unknown    allopurinol (ZYLOPRIM) 100 MG tablet TAKE 2 TABLETS(200 MG) BY MOUTH DAILY Unknown    apixaban ANTICOAGULANT (ELIQUIS ANTICOAGULANT) 5 MG tablet Take 1 tablet (5 mg) by mouth 2 times daily Unknown    dextromethorphan-quiNIDine (NUEDEXTA) 20-10 MG capsule Take 1 capsule by mouth every 12 hours. Unknown    DULoxetine (CYMBALTA) 30 MG capsule Take 1 capsule by mouth every morning and 2 capsules every  evening Unknown    hydrOXYzine HCl (ATARAX) 25 MG tablet Take 1 tablet (25 mg) by mouth every 8 hours as needed for itching. Unknown    metoprolol succinate ER (TOPROL XL) 50 MG 24 hr tablet Take 1 tablet (50 mg) by mouth daily. Unknown    pantoprazole (PROTONIX) 40 MG EC tablet Take 1 tablet (40 mg) by mouth every morning (before breakfast). Unknown    rosuvastatin (CRESTOR) 20 MG tablet TAKE 1 TABLET(20 MG) BY MOUTH AT BEDTIME Unknown    senna-docusate (STIMULANT LAXATIVE) 8.6-50 MG tablet Take 2 tablets by mouth 2 times daily as needed for constipation. Unknown    sitagliptin (JANUVIA) 50 MG tablet TAKE 1 TABLET(50 MG) BY MOUTH DAILY Unknown    tamsulosin (FLOMAX) 0.4 MG capsule TAKE 1 CAPSULE BY MOUTH EVERY DAY Unknown    traZODone (DESYREL) 50 MG tablet Take 1-2 tablets ( mg) by mouth nightly as needed for sleep. Unknown    Continuous Glucose  (FREESTYLE SUSANNAH 2 READER) TONIE USE TO READ BLOOD SUGARS PER MANUFACTRUERS INSTRUCTIONS Taking    Continuous Glucose Sensor (FREESTYLE SUSANNAH 2 SENSOR) MISC CHANGE EVERY 14 DAYS Unknown    insulin aspart (NOVOLOG FLEXPEN) 100 UNIT/ML pen Inject 16 units before breakfast, and 6 units under the skin before lunch and dinner Unknown    insulin glargine (LANTUS PEN) 100 UNIT/ML pen Inject 30 Units subcutaneously every 24 hours Unknown    insulin pen needle (BD PEN NEEDLE SALVATORE 2ND GEN) 32G X 4 MM miscellaneous USE 1 PEN NEEDLE FOUR TIMES DAILY OR AS DIRECTED Unknown

## 2025-04-22 NOTE — PROGRESS NOTES
/70 (BP Location: Left arm)   Pulse 105   Temp 98.7  F (37.1  C) (Oral)   Resp 18   SpO2 100%     Neuro: A&Ox4.   Cardiac: Afebrile, VSS.   Respiratory: RA   GI/: Voiding spontaneously. No BM this shift.   Diet/appetite: Tolerating diet.( Clears)Denies nausea   Activity: Up A-2  Pain: Denies   Skin: No new deficits noted.  Lines:PIV x2   Drains:Chronic parrish  No new complaints.Will continue to monitor and follow plan of care.

## 2025-04-22 NOTE — PROGRESS NOTES
Resident/Fellow Attestation   I, Bernabe Johnson MD PhD, was present with the medical/JOCELYN student who participated in the service and in the documentation of the note.  I have verified the history and personally performed the physical exam and medical decision making.  I agree with the assessment and plan of care as documented in the note.        Bernabe Johnson MD PhD  Date of Service (when I saw the patient): 04/22/25    [facility]    Progress Note - Medicine Service, [facility] TEAM 4       Date of Admission:  4/21/2025    Assessment & Plan   Adam Talamantes is a 85 year old male with a history of CKD III, basilar artery stroke, BPH s/p TURP, urinary retention with continuous parrish catheter, nephrolithiasis, gout, latent TB, CAD, HTN, T2DM who is admitted for sepsis.       Progress of care summary:  - NPO midnight for possible cystoscopy   - Renal US 4/23  - Metoprolol tartrate 25 mg once today / restart metoprolol 50 4/23  - Insulin glargine 10 U today, start 20 U 4/23  - MDSSI   - SLP evaluated (soft chew with thin liquids)  - Continue Cefepime / Vanco and narrow tomorrow if improving      # s/p TURP (5/16/2024)  # Prostatomegaly  # Bilateral moderate hydronephrosis, acute on chronic  # c/f UTI vs pyelonephritis  # Leukocytosis  # Lactic acidosis, resolved  Patient presenting with tachycardia, low blood pressure and leukocytosis, meeting SIRS criteria for sepsis. Procalcitonin elevated. Possible sources of infection include continuous parrish catheter, last exchanged 3/31/25 vs pneumonia. CXR with patchy densities suggestive of edema vs atypical infection. SARS/covid/flu negative. UA prior to parrish exchange with elevated WBCs, increased LE. CT CAP with hydronephrosis and inflammatory changes suspicious for bladder obstruction with superimposed infection/inflammation.   - Repeat UA also with with LE and nitrites   - Urine culture NG12h  - Blood cultures NG12h   - Antibiotics,  narrow if improving   - Cefepime  - vancomycin  - legionella antigen and streptococcus pneumonia antigen, negative    - SARS/covid/flu negative      # ELLA on CKD III, improving    Moderate Left and Mild R Hydronephrosis  Baseline of 1.3-1.7 per chart review dating back to 2016, presented with creatinine of 2.31. CT AP shows bilateral hydronephrosis slightly larger compared to prior on 5/29. Unclear etiology of hydronephrosis in setting of continuous parrish catheterization. With elevated urine sodium and urine osm > 350, the etiology of ELLA is a mixed picture. Could be ATR in the setting of sepsis, pre-renal in the setting of dehydration or post-renal in the setting of possible obstruction. Improving with IV fluid resuscitation.   - consulted urology, appreciate recommendations  - renal US 4/23   - NPO 4/23 at midnight for possible cystoscopy   - if sepsis worsens, notify urology to consider urgent renal drainage    # Hyponatremia, improving    # Hypomagnesemia  # Hypophosphatemia   Presenting with hyponatremia to 128. Possible in the setting of poor po intake. Recevied 2.57 L of NS in the ED. Glucose is 333 therefore corrected sodium is 132. On magnesium and phosphate replacement protocols.   - Urine osm 359  - Urine sodium 23   - serum osm 294    # T2DM  - HOLD PTA januvia  - HOLD glargine 30U daily and aspart 8U before breakfast, 6U with lunch and dinner.  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  - Continued on PTA apixaban            Diet: Combination Diet Easy to Chew (level 7); Thin Liquids (level 0)  NPO for  "Procedure/Surgery per Anesthesia Guidelines Except for: Meds, Ice Chips; Clear liquids before procedure/surgery: ADULT (Age GREATER than or Equal to 18 years) - Clear liquids 2 hours before procedure/surgery    DVT Prophylaxis: DOAC  Diego Catheter: PRESENT, indication: Acute retention or obstruction  Fluids:  ml/hr  Lines: PRESENT             Cardiac Monitoring: None  Code Status: No CPR- Do NOT Intubate      Clinically Significant Risk Factors Present on Admission         # Hyponatremia: Lowest Na = 128 mmol/L in last 2 days, will monitor as appropriate  # Hypochloremia: Lowest Cl = 96 mmol/L in last 2 days, will monitor as appropriate    # Hypomagnesemia: Lowest Mg = 1.5 mg/dL in last 2 days, will replace as needed   # Hypoalbuminemia: Lowest albumin = 3 g/dL at 4/22/2025  6:20 AM, will monitor as appropriate  # Drug Induced Coagulation Defect: home medication list includes an anticoagulant medication  # Drug Induced Platelet Defect: home medication list includes an antiplatelet medication   # Hypertension: Noted on problem list      # Anemia: based on hgb <11      # DMII: A1C = 8.9 % (Ref range: 0.0 - 5.6 %) within past 6 months    # Obesity: Estimated body mass index is 32.23 kg/m  as calculated from the following:    Height as of an earlier encounter on 4/21/25: 1.549 m (5' 1\").    Weight as of an earlier encounter on 4/21/25: 77.4 kg (170 lb 9.6 oz).       # Financial/Environmental Concerns: none         Social Drivers of Health   Tobacco Use: Medium Risk (4/21/2025)    Patient History     Smoking Tobacco Use: Former     Smokeless Tobacco Use: Former     Passive Exposure: Never   Physical Activity: Inactive (2/22/2022)    Exercise Vital Sign     Days of Exercise per Week: 0 days     Minutes of Exercise per Session: 0 min         Disposition Plan     Medically Ready for Discharge: Anticipated in 2-4 Days         The patient's care was discussed with the Attending Physician, Dr. Vlilarreal .    Susana " Adam  Medical Student  Medicine Service, [facility] TEAM 4  [facility]  Securely message with [employer] (more info)  Text page via [employer] Paging/Directory   See signed in provider for up to date coverage information  ______________________________________________________________________    Interval History   No acute events overnight. Per patient's daughter, he did not sleep very well. No pain. Per daughter, patient is mentating at his baseline. States that breathing is better today than at home.     Physical Exam   Vital Signs: Temp: 98.1  F (36.7  C) Temp src: Oral BP: (!) 140/87 Pulse: (!) 121   Resp: 18 SpO2: 94 % O2 Device: Nasal cannula Oxygen Delivery: 4 LPM  Weight: 0 lbs 0 oz    General Appearance: Eyes closed but responds to prompts, lying in bed   Respiratory: normal to auscultation bilaterally, no crackles, wheezes or rales  Cardiovascular: tachycardic rate, regular rhythm, normal S1 and S2  GI: moderately distended, soft, non-tender to palpation, no CVA tenderness  Other: Trace bilateral pitting edema      Medical Decision Making       Please see A&P for additional details of medical decision making.      Data     I have personally reviewed the following data over the past 24 hrs:    23.4 (H)  \   9.7 (L)   / 165     132 (L) 104 22.6 /  220 (H)   4.5 17 (L) 1.81 (H) \     ALT: 14 AST: 20 AP: 90 TBILI: 0.6   ALB: 3.0 (L) TOT PROTEIN: 6.9 LIPASE: N/A     Trop: 18 BNP: N/A     Procal: N/A CRP: N/A Lactic Acid: 1.4       INR:  2.26 (H) PTT:  N/A   D-dimer:  N/A Fibrinogen:  N/A

## 2025-04-22 NOTE — PLAN OF CARE
Goal Outcome Evaluation:      Plan of Care Reviewed With: patient, family (son Navin Talamantes)    Overall Patient Progress: no changeOverall Patient Progress: no change    Outcome Evaluation: discharge home with familiy; abx plan TBD    VIKA Rushing, [facility]  ED/OBS   [facility]  Phone: [phone]  Pager: [phone]  Fax: [phone]     After hours [email or web address] and After Hours [employer] [place]     On-call pager, [phone], 4:00 pm to 8:30 pm

## 2025-04-22 NOTE — CONSULTS
Care Management Initial Consult    General Information  Assessment completed with: -chart review, Family, son Navin Talamantes  Type of CM/SW Visit: Initial Assessment    Primary Care Provider verified and updated as needed: Yes (Chucho Espino [phone] [address], [place] [address])   Readmission within the last 30 days: no previous admission in last 30 days      Reason for Consult: discharge planning, utilization management concerns (elevated readmission risk score)  Advance Care Planning: Advance Care Planning Reviewed: present on chart  HCA-Daughter, Dimitri Albin     Communication Assessment  Patient's communication style: spoken language (English or Bilingual)        Cognitive  Cognitive/Neuro/Behavioral: .WDL except  Level of Consciousness: intermittent confusion  Arousal Level: arouses to voice  Orientation: disoriented to, other (see comments) (Birth month, bith date, month)  Mood/Behavior: calm, cooperative     Speech: clear    Living Environment:   People in home: child(chanel), adult, spouse  Adam, wife Lenny, daughter Dimitri,  Current living Arrangements: house      Able to return to prior arrangements: yes     Family/Social Support:  Care provided by: spouse/significant other, child(chanel) (daughter is PCA- 10.5 hours of PCA daily. )  Provides care for: no one, unable/limited ability to care for self  Marital Status:   Support system: Wife, Children  Lenny       Description of Support System: Supportive, Involved    Support Assessment: Adequate family and caregiver support, Adequate social supports    Current Resources:   Patient receiving home care services: No      Community Resources: Financial/Insurance, PCA (10.5 hours of PCA daily)  Equipment currently used at home: shower chair, walker, standard, wheelchair, manual  Supplies currently used at home: Incontinence Supplies, Wipes, Chux     Employment/Financial:  Employment Status: disabled, retired        Financial Concerns: none   Referral  to Financial Worker: No     Does the patient's insurance plan have a 3 day qualifying hospital stay waiver?  No    Lifestyle & Psychosocial Needs:  Social Drivers of Health     Food Insecurity: Low Risk  (1/15/2024)    Food Insecurity     Within the past 12 months, did you worry that your food would run out before you got money to buy more?: No     Within the past 12 months, did the food you bought just not last and you didn t have money to get more?: No   Depression: Not at risk (3/31/2025)    PHQ-2     PHQ-2 Score: 0   Housing Stability: Low Risk  (1/15/2024)    Housing Stability     Do you have housing? : Yes     Are you worried about losing your housing?: No   Tobacco Use: Medium Risk (4/21/2025)    Patient History     Smoking Tobacco Use: Former     Smokeless Tobacco Use: Former     Passive Exposure: Never   Financial Resource Strain: Low Risk  (1/15/2024)    Financial Resource Strain     Within the past 12 months, have you or your family members you live with been unable to get utilities (heat, electricity) when it was really needed?: No   Alcohol Use: Not At Risk (2/22/2022)    AUDIT-C     Frequency of Alcohol Consumption: Never     Average Number of Drinks: Not on file     Frequency of Binge Drinking: Never   Transportation Needs: Low Risk  (1/15/2024)    Transportation Needs     Within the past 12 months, has lack of transportation kept you from medical appointments, getting your medicines, non-medical meetings or appointments, work, or from getting things that you need?: No   Physical Activity: Inactive (2/22/2022)    Exercise Vital Sign     Days of Exercise per Week: 0 days     Minutes of Exercise per Session: 0 min   Interpersonal Safety: Low Risk  (4/21/2025)    Interpersonal Safety     Do you feel physically and emotionally safe where you currently live?: Yes     Within the past 12 months, have you been hit, slapped, kicked or otherwise physically hurt by someone?: No     Within the past 12 months,  "have you been humiliated or emotionally abused in other ways by your partner or ex-partner?: No   Stress: No Stress Concern Present (2/22/2022)    [employer] - Occupational Stress Questionnaire     Feeling of Stress : Not at all   Social Connections: Socially Integrated (2/22/2022)    Social Connection and Isolation Panel [NHANES]     Frequency of Communication with Friends and Family: Once a week     Frequency of Social Gatherings with Friends and Family: Twice a week     Attends Hindu Services: 1 to 4 times per year     Active Member of Clubs or Organizations: No     Attends Club or Organization Meetings: 1 to 4 times per year     Marital Status:    Health Literacy: Not on file     Functional Status:  Prior to admission patient needed assistance:   Dependent ADLs:: Bathing, Dressing, Grooming, Positioning, Wheelchair-with assist, Ambulation-walker;Toileting   Dependent IADLs:: Cleaning, Cooking, Laundry, Shopping, Meal Preparation, Transportation, Medication Management, Money Management, Incontinence      Mental Health Status:  Mental Health Status: No Current Concerns       Chemical Dependency Status:  Chemical Dependency Status: No Current Concerns          Values/Beliefs:  Spiritual, Cultural Beliefs, Hindu Practices, Values that affect care: no          Values/Beliefs Comment: Jack    Discussed  Partnership in Safe Discharge Planning  document with patient/family: No    Additional Information:    Per chart review: \"Adam LUCAS Albin is a 85 year old male with a history of CKD III, basilar artery stroke, BPH s/p TURP, urinary retention with continuous parrish catheter, nephrolithiasis, gout, latent TB, CAD, HTN, T2DM who is admitted for sepsis.\" (Bernabe Johnson MD PhD; 4/21/2025)    Care Management/Social Work Consult placed due to patient's complex medical history and elevated unplanned readmission risk score and for discharge planning. SW performed chart " review to begin assessment.     1125 CANDELARIO met with Adam and his son Navin at bedside to complete assessment and confirm/update information in previous assessment (5/30/2024). SW introduced self and role, and explained the reason for the visit.Adam was sleeping but Navin agreed to speak with SW    Navin confirmed that Adam lives with his wife Lenny and their daughter Dimitri (Navin's sister). Lenny and Dimitri assist Navin with his I/ADLs. Family will transport at discharge    SW discussed the potential need for IV antibiotics at discharge, and that CANDELARIO would submit a referral to [facility] for insurance coverage. Navin voiced understanding.    No additional questions or concerns were reported. CANDELARIO provided availability and contact information and excused self from Adam's bedside.    1311 Home Infusion Investigation Referral for IV abx initiated.     Next Steps:    Follow for Abx recommendations  Follow up with home infusion referral  IMM after 4/23/2025

## 2025-04-22 NOTE — PHARMACY-VANCOMYCIN DOSING SERVICE
Pharmacy Vancomycin Note  Date of Service 2025  Patient's  1940   85 year old, male    Indication: Sepsis and Urinary Tract Infection  Day of Therapy: 2  Current vancomycin regimen:  s/p single 1750 mg IV loading dose on 25 @ 14:52 followed by intermittent dosing based on levels.   Current vancomycin monitoring method: Trough (Method 2 = manual dose calculation)  Current vancomycin therapeutic monitoring goal: 15-20 mg/L    Current estimated CrCl = Estimated Creatinine Clearance: 27.2 mL/min (A) (based on SCr of 1.81 mg/dL (H)).    Creatinine for last 3 days  2025:  1:28 PM Creatinine 2.31 mg/dL  2025:  6:20 AM Creatinine 1.81 mg/dL    Recent Vancomycin Levels (past 3 days)  2025:  9:48 AM Vancomycin 11.1 ug/mL    Vancomycin IV Administrations (past 72 hours)                     vancomycin (VANCOCIN) 1,750 mg in sodium chloride 0.9 % 567.5 mL intermittent infusion (mg) 1,750 mg Given 25 1452                    Nephrotoxins and other renal medications (From now, onward)      Start     Dose/Rate Route Frequency Ordered Stop    25 1200  vancomycin (VANCOCIN) 1,000 mg in 200 mL dextrose intermittent infusion         1,000 mg  200 mL/hr over 1 Hours Intravenous EVERY 24 HOURS 25 1134                 Contrast Orders - past 72 hours (72h ago, onward)      Start     Dose/Rate Route Frequency Stop    25 1740  technetium pentetate Tc99m (DTPA) inhaled radioisotope 2 millicurie         2 millicurie Inhalation ONCE 25 174  technetium pertechnetate with albumin (Tc99m MAA) radioisotope injection 4.8-7.2 millicurie         4.8-7.2 millicurie Intravenous ONCE 25 181            Interpretation of levels and current regimen:  Vancomycin level is reflective of subtherapeutic level after single loading dose    Has serum creatinine changed greater than 50% in last 72 hours: No, but has improved by 0.5 mg/dL in last 24 hours.     Urine output:   good urine output    Renal Function: Improving    InsightRX Prediction of Planned New Vancomycin Regimen  Regimen: 1000 mg IV every 24 hours.  Start time: 12:00 on 04/22/2025  Exposure target: AUC24 (range)400-600 mg/L.hr   AUC24,ss: 569 mg/L.hr  Probability of AUC24 > 400: 95 %  Ctrough,ss: 19.4 mg/L  Probability of Ctrough,ss > 20: 46 %  Probability of nephrotoxicity (Lodise BRENDEN 2009): 16 %    Plan:  Start scheduled vancomycin 1000 mg IV every 24 hours now with improving renal function.  Vancomycin monitoring method: AUC  Vancomycin therapeutic monitoring goal: 400-600 mg*h/L  Pharmacy will check vancomycin levels as appropriate in 1-3 Days.  Serum creatinine levels will be ordered daily for the first week of therapy and at least twice weekly for subsequent weeks.    Nikko Grissom RPH

## 2025-04-22 NOTE — PROGRESS NOTES
TRANSITIONS OF CARE (VIRGEN) LOG    VIRGEN tasks should be completed by the CC within one (1) business day of notification of each transition. Follow up contact with member is required after return to their usual care setting.  Note:  If CC finds out about the transitions fifteen (15) days or more after the member has returned to their usual care setting, no VIRGEN log is needed. However, the CC should check in with the member to discuss the transition process, any changes needed to the care plan and document it in a case note.     Member Name:  Adam Talamantes MCO [facility] Member ID[facility] [id number]   [place] [facility] [place] Coordinator Contact:  Kristi Calzada RN, BSN, PHN Agency/County/Care System: [facility]   Transition Communication Actions from Care Management Contact   Transition #1   Notification Date: 04/22/2025 Transition Date:   04/21 Transition From: Home     Is this the member s usual care setting?               yes Transition To: [facility]   Transition Type:  Unplanned    Documentation from conversation with the member/responsible party, provider, discharging and receiving facility:   Date: 04/22: Received notification of admission to hospital with dx of sepsis with renal failure.  CC contacted Hospital /discharge planner, via the [facility] Hand-In Process, with community care plan included.  CC reached out to member regarding transition and offered support as needed.  Reviewed and update support plan as needed.  Notified community service providers and placed services None on hold as needed.  Transition log initiated.   PCP, Chucho Espino, notified of hospitalization via EMR     Transition #2   Notification Date: 04/28 Transition Date:   04/25 Transition From: [facility]     Is this the member s usual care setting?               yes Transition To: Home   Transition Type:   Planned    Documentation from conversation with the member/responsible party, provider, discharging and receiving facility:   Date: 04/28 and 04/29: Received notification of transition to home.  CC contacted adult daughter Dimitri  and reviewed discharge summary.  Member has a follow-up appointment with PCP in 7 days: No 06/09  Member has had a change in condition: No  Home visit needed: No  Support Plan reviewed and updated.  The following home based services None were resumed.  New referrals placed: No  Transition log completed.   PCP, Chucho Espino, notified of transition back to home via EMR.    Kristi Calzada RN  [facility]  [phone]         *RETURN TO USUAL CARE SETTING: *Complete tasks below when the member is discharging TO their usual care setting within one (1) business day of notification..      For situations where the Care Coordinator is notified of the discharge prior to the date of discharge, the Care Coordinator must follow up with the member or designated representative to confirm that discharge actually occurred and discuss required VIRGEN tasks as outlined in the VIRGEN Instructions.  (This includes situations where it may be a  new  usual care setting for the member. (i.e., a community member who decides upon permanent nursing home placement following hospitalization and rehab).    Discuss with Member/Responsible Party:    Check  Yes  - if the member, family member and/or SNF/facility staff manages the following:    If  No  provide explanation in the comments section.          Date completed: 04/29 Communicated with member or their designated representative about the following:  care transition process; about changes to the member s health status; plan of care updates; education about transitions and how to prevent unplanned transitions/readmissions    Four Pillars for Optimal Transition:    Check  Yes  - if the member, family member and/or SNF/facility staff manages the following:    If   No  provide explanation in the comments section.          [x]  Yes     []  No Does the member have a follow-up appointment scheduled with primary care or specialist? (Mental health hospitalizations--the appt. should be w/in 7 days)              For mental health hospitalizations:  []  Yes     [x]  No     Does the member have a follow-up appointment scheduled with a mental health practitioner within 7 days of discharge?  [x]  Yes     []  No     Has a medication review been completed with member? If no, refer to PCP, home care nurse, MTM, pharmacist  [x]  Yes     []  No     Can the member manage their medications or is there a system in place to manage medications (e.g. home care set-up)?         [x]  Yes     []  No     Can the member verbalize warning signs and symptoms to watch for and how to respond?  [x]  Yes     []  No     Does the member have a copy of and understand their discharge instructions?  If no, assist to obtain copy of discharge instructions, review discharge instructions, and assist to contact PCP to discuss questions about their recent hospitalization.  [x]  Yes     []  No     Does the member have adequate food, housing and transportation?  If no, add goal and discuss additional supports available to the member                                                                                                                                                                                 [x]  Yes     []  No     Is the member safe in their home?  If no, document needs and support provided                                                                                                                                                                          [x]  Yes     []  No     Are there any concerns of vulnerability, abuse, or neglect?  If yes, document concerns and actions taken by Care Coordinator as a mandated                                                                                                                                                                               [x]  Yes     []  No     Does the member use a Personal Health Care Record?  Check  Yes  if visit summary, discharge summary, and/or healthcare summary are being used as a PHR.                                                                                                                                                                                  [x]  Yes     []  No     Have you reviewed the discharge summary with the member? If  No  provide explanation in comments.  [x]  Yes     []  No     Have you updated the member s care plan/support plan? Add new diagnosis, medications, treatments, goals & interventions, as applicable. If No, provide explanation in comments.    Comments:       Daughter (yan) speaks english well. Is aware of Adam's needs etc.     Notes from conversation with the member/responsible party, provider, discharging and receiving facility (as applicable):

## 2025-04-23 ENCOUNTER — APPOINTMENT (OUTPATIENT)
Dept: ULTRASOUND IMAGING | Facility: CLINIC | Age: 85
DRG: 872 | End: 2025-04-23
Attending: STUDENT IN AN ORGANIZED HEALTH CARE EDUCATION/TRAINING PROGRAM
Payer: COMMERCIAL

## 2025-04-23 LAB
ALBUMIN SERPL BCG-MCNC: 2.9 G/DL (ref 3.5–5.2)
ALP SERPL-CCNC: 115 U/L (ref 40–150)
ALT SERPL W P-5'-P-CCNC: 15 U/L (ref 0–70)
ANION GAP SERPL CALCULATED.3IONS-SCNC: 10 MMOL/L (ref 7–15)
AST SERPL W P-5'-P-CCNC: 20 U/L (ref 0–45)
BACTERIA UR CULT: ABNORMAL
BACTERIA UR CULT: ABNORMAL
BASOPHILS # BLD AUTO: 0 10E3/UL (ref 0–0.2)
BASOPHILS NFR BLD AUTO: 0 %
BILIRUB SERPL-MCNC: 0.6 MG/DL
BUN SERPL-MCNC: 18 MG/DL (ref 8–23)
CALCIUM SERPL-MCNC: 8.6 MG/DL (ref 8.8–10.4)
CHLORIDE SERPL-SCNC: 103 MMOL/L (ref 98–107)
CREAT SERPL-MCNC: 1.55 MG/DL (ref 0.67–1.17)
EGFRCR SERPLBLD CKD-EPI 2021: 44 ML/MIN/1.73M2
EOSINOPHIL # BLD AUTO: 0.6 10E3/UL (ref 0–0.7)
EOSINOPHIL NFR BLD AUTO: 5 %
ERYTHROCYTE [DISTWIDTH] IN BLOOD BY AUTOMATED COUNT: 16 % (ref 10–15)
GLUCOSE BLDC GLUCOMTR-MCNC: 189 MG/DL (ref 70–99)
GLUCOSE BLDC GLUCOMTR-MCNC: 230 MG/DL (ref 70–99)
GLUCOSE BLDC GLUCOMTR-MCNC: 249 MG/DL (ref 70–99)
GLUCOSE BLDC GLUCOMTR-MCNC: 268 MG/DL (ref 70–99)
GLUCOSE BLDC GLUCOMTR-MCNC: 290 MG/DL (ref 70–99)
GLUCOSE SERPL-MCNC: 209 MG/DL (ref 70–99)
HCO3 SERPL-SCNC: 21 MMOL/L (ref 22–29)
HCT VFR BLD AUTO: 28.8 % (ref 40–53)
HGB BLD-MCNC: 9.5 G/DL (ref 13.3–17.7)
IMM GRANULOCYTES # BLD: 0.1 10E3/UL
IMM GRANULOCYTES NFR BLD: 1 %
INR PPP: 1.91 (ref 0.85–1.15)
LYMPHOCYTES # BLD AUTO: 1.2 10E3/UL (ref 0.8–5.3)
LYMPHOCYTES NFR BLD AUTO: 9 %
MAGNESIUM SERPL-MCNC: 1.8 MG/DL (ref 1.7–2.3)
MCH RBC QN AUTO: 29.2 PG (ref 26.5–33)
MCHC RBC AUTO-ENTMCNC: 33 G/DL (ref 31.5–36.5)
MCV RBC AUTO: 89 FL (ref 78–100)
MONOCYTES # BLD AUTO: 1.1 10E3/UL (ref 0–1.3)
MONOCYTES NFR BLD AUTO: 8 %
NEUTROPHILS # BLD AUTO: 10.6 10E3/UL (ref 1.6–8.3)
NEUTROPHILS NFR BLD AUTO: 78 %
NRBC # BLD AUTO: 0 10E3/UL
NRBC BLD AUTO-RTO: 0 /100
PHOSPHATE SERPL-MCNC: 1.9 MG/DL (ref 2.5–4.5)
PHOSPHATE SERPL-MCNC: 2.2 MG/DL (ref 2.5–4.5)
PLATELET # BLD AUTO: 175 10E3/UL (ref 150–450)
POTASSIUM SERPL-SCNC: 3.7 MMOL/L (ref 3.4–5.3)
PROT SERPL-MCNC: 6.8 G/DL (ref 6.4–8.3)
RBC # BLD AUTO: 3.25 10E6/UL (ref 4.4–5.9)
SODIUM SERPL-SCNC: 134 MMOL/L (ref 135–145)
WBC # BLD AUTO: 13.7 10E3/UL (ref 4–11)

## 2025-04-23 PROCEDURE — 85610 PROTHROMBIN TIME: CPT | Performed by: STUDENT IN AN ORGANIZED HEALTH CARE EDUCATION/TRAINING PROGRAM

## 2025-04-23 PROCEDURE — 250N000009 HC RX 250: Performed by: STUDENT IN AN ORGANIZED HEALTH CARE EDUCATION/TRAINING PROGRAM

## 2025-04-23 PROCEDURE — 84100 ASSAY OF PHOSPHORUS: CPT

## 2025-04-23 PROCEDURE — 250N000013 HC RX MED GY IP 250 OP 250 PS 637: Performed by: STUDENT IN AN ORGANIZED HEALTH CARE EDUCATION/TRAINING PROGRAM

## 2025-04-23 PROCEDURE — 36415 COLL VENOUS BLD VENIPUNCTURE: CPT

## 2025-04-23 PROCEDURE — 250N000011 HC RX IP 250 OP 636: Mod: JZ

## 2025-04-23 PROCEDURE — 258N000003 HC RX IP 258 OP 636: Performed by: STUDENT IN AN ORGANIZED HEALTH CARE EDUCATION/TRAINING PROGRAM

## 2025-04-23 PROCEDURE — 85004 AUTOMATED DIFF WBC COUNT: CPT | Performed by: STUDENT IN AN ORGANIZED HEALTH CARE EDUCATION/TRAINING PROGRAM

## 2025-04-23 PROCEDURE — 83735 ASSAY OF MAGNESIUM: CPT | Performed by: STUDENT IN AN ORGANIZED HEALTH CARE EDUCATION/TRAINING PROGRAM

## 2025-04-23 PROCEDURE — 76770 US EXAM ABDO BACK WALL COMP: CPT | Mod: 26 | Performed by: STUDENT IN AN ORGANIZED HEALTH CARE EDUCATION/TRAINING PROGRAM

## 2025-04-23 PROCEDURE — 120N000002 HC R&B MED SURG/OB UMMC

## 2025-04-23 PROCEDURE — 76770 US EXAM ABDO BACK WALL COMP: CPT

## 2025-04-23 PROCEDURE — 36415 COLL VENOUS BLD VENIPUNCTURE: CPT | Performed by: STUDENT IN AN ORGANIZED HEALTH CARE EDUCATION/TRAINING PROGRAM

## 2025-04-23 PROCEDURE — 84100 ASSAY OF PHOSPHORUS: CPT | Performed by: STUDENT IN AN ORGANIZED HEALTH CARE EDUCATION/TRAINING PROGRAM

## 2025-04-23 PROCEDURE — 84155 ASSAY OF PROTEIN SERUM: CPT | Performed by: STUDENT IN AN ORGANIZED HEALTH CARE EDUCATION/TRAINING PROGRAM

## 2025-04-23 RX ORDER — CEFEPIME HYDROCHLORIDE 2 G/1
2 INJECTION, POWDER, FOR SOLUTION INTRAVENOUS EVERY 12 HOURS
Status: DISCONTINUED | OUTPATIENT
Start: 2025-04-23 | End: 2025-04-23

## 2025-04-23 RX ORDER — CEFTRIAXONE 1 G/1
1 INJECTION, POWDER, FOR SOLUTION INTRAMUSCULAR; INTRAVENOUS EVERY 24 HOURS
Status: DISCONTINUED | OUTPATIENT
Start: 2025-04-24 | End: 2025-04-24

## 2025-04-23 RX ADMIN — CEFEPIME 2 G: 2 INJECTION, POWDER, FOR SOLUTION INTRAVENOUS at 09:18

## 2025-04-23 RX ADMIN — HYDROXYZINE HYDROCHLORIDE 25 MG: 25 TABLET, FILM COATED ORAL at 22:05

## 2025-04-23 RX ADMIN — APIXABAN 5 MG: 5 TABLET, FILM COATED ORAL at 19:58

## 2025-04-23 RX ADMIN — CILOSTAZOL 100 MG: 100 TABLET ORAL at 19:58

## 2025-04-23 RX ADMIN — DEXTROMETHORPHAN HYDROBROMIDE AND QUINIDINE SULFATE 1 CAPSULE: 20; 10 CAPSULE, GELATIN COATED ORAL at 17:11

## 2025-04-23 RX ADMIN — POTASSIUM PHOSPHATE, MONOBASIC POTASSIUM PHOSPHATE, DIBASIC 9 MMOL: 224; 236 INJECTION, SOLUTION, CONCENTRATE INTRAVENOUS at 11:14

## 2025-04-23 RX ADMIN — POTASSIUM & SODIUM PHOSPHATES POWDER PACK 280-160-250 MG 1 PACKET: 280-160-250 PACK at 20:38

## 2025-04-23 RX ADMIN — APIXABAN 5 MG: 5 TABLET, FILM COATED ORAL at 09:17

## 2025-04-23 RX ADMIN — INSULIN ASPART 4 UNITS: 100 INJECTION, SOLUTION INTRAVENOUS; SUBCUTANEOUS at 17:13

## 2025-04-23 RX ADMIN — SODIUM CHLORIDE: 0.9 INJECTION, SOLUTION INTRAVENOUS at 05:12

## 2025-04-23 RX ADMIN — ROSUVASTATIN CALCIUM 20 MG: 20 TABLET, FILM COATED ORAL at 22:05

## 2025-04-23 RX ADMIN — TAMSULOSIN HYDROCHLORIDE 0.4 MG: 0.4 CAPSULE ORAL at 09:18

## 2025-04-23 RX ADMIN — DEXTROMETHORPHAN HYDROBROMIDE AND QUINIDINE SULFATE 1 CAPSULE: 20; 10 CAPSULE, GELATIN COATED ORAL at 05:03

## 2025-04-23 RX ADMIN — POLYETHYLENE GLYCOL 3350 17 G: 17 POWDER, FOR SOLUTION ORAL at 14:10

## 2025-04-23 RX ADMIN — METOPROLOL SUCCINATE 50 MG: 50 TABLET, EXTENDED RELEASE ORAL at 09:17

## 2025-04-23 RX ADMIN — PANTOPRAZOLE SODIUM 40 MG: 40 TABLET, DELAYED RELEASE ORAL at 09:17

## 2025-04-23 RX ADMIN — INSULIN ASPART 3 UNITS: 100 INJECTION, SOLUTION INTRAVENOUS; SUBCUTANEOUS at 11:15

## 2025-04-23 ASSESSMENT — ACTIVITIES OF DAILY LIVING (ADL)
ADLS_ACUITY_SCORE: 56

## 2025-04-23 NOTE — PROGRESS NOTES
Resident/Fellow Attestation   I, Bernabe Johnson MD PhD, was present with the medical/JOCELYN student who participated in the service and in the documentation of the note.  I have verified the history and personally performed the physical exam and medical decision making.  I agree with the assessment and plan of care as documented in the note.        Bernabe Johnson MD PhD  Date of Service (when I saw the patient): 04/23/25    [facility]    Progress Note - Medicine Service, MAROON TEAM 4       Date of Admission:  4/21/2025    Assessment & Plan   Adam Talamantes is a 85 year old male with a history of CKD III, basilar artery stroke, BPH s/p TURP, urinary retention with continuous parrish catheter, nephrolithiasis, gout, latent TB, CAD, HTN, T2DM who is admitted for sepsis.     Progress of care summary:  - Renal US with increased hydronephrosis compared to 06/2024 and mildly complex left renal cyst  - Insulin glargine 20 U & MDSSI   - Urine cultures with E. coli and klebsiella   - Discontinued cefepime, vancomycin and azithromycin   - started IV ceftriaxone   - restarted PTA allopurinol, duloxetine, and metoprolol succinate ER         # s/p TURP (5/16/2024)  # Prostatomegaly  # Bilateral moderate hydronephrosis, acute on chronic  # c/f UTI vs pyelonephritis  # Leukocytosis, improving   # Lactic acidosis, resolved  Patient presenting with tachycardia, low blood pressure and leukocytosis, meeting SIRS criteria for sepsis. Procalcitonin elevated. Most likely source of infection is continuous parrish catheter, last exchanged 3/31/25 vs pneumonia. CXR with patchy densities suggestive of edema vs atypical infection. Given SARS/covid/flu negative, legionella and streptococcus negative, respiratory virus panel negative and patient's breathing is much improved, pneumonia is unlikely. Azithromycin discontinued 4/23. UA prior to parrish exchange with elevated WBCs, increased LE. CT CAP with  hydronephrosis and inflammatory changes suspicious for bladder obstruction with superimposed infection/inflammation.   - Repeat UA also with with LE and nitrites   - Urine culture (pre-parrish removal) with E. coli and klebisiella   - Blood cultures NG1D    - Antibiotics  - started IV ceftriaxone 4/23   - discontinued cefepime, vancomycin, azithromycin 4/23  - legionella antigen and streptococcus pneumonia antigen, negative    - SARS/covid/flu negative   - respiratory panel negative      # ELLA on CKD III, resolved     Moderate Left and Mild R Hydronephrosis  Baseline of 1.3-1.7 per chart review dating back to 2016, presented with creatinine of 2.31. CT AP shows bilateral hydronephrosis slightly larger compared to prior on 5/29. Unclear etiology of hydronephrosis in setting of continuous parrish catheterization. With elevated urine sodium and urine osm > 350, the etiology of ELLA is a mixed picture. Could be ATR in the setting of sepsis, pre-renal in the setting of dehydration or post-renal in the setting of possible obstruction. Improving with IV fluid resuscitation.   - urology following, appreciate recommendations  - with creatinine back to baseline, no plan for intervention   - renal US 4/23 with increased hydronephrosis compared to 06/2024 and mildly complex left renal cyst  - if sepsis worsens, notify urology to consider urgent renal drainage    # Hyponatremia, improving    # Hypomagnesemia  # Hypophosphatemia   Presenting with hyponatremia to 128. Possible in the setting of poor po intake. Recevied 2.57 L of NS in the ED. Glucose is 333 therefore corrected sodium is 132. On magnesium and phosphate replacement protocols.   - Urine osm 359  - Urine sodium 23   - serum osm 294    # T2DM  - HOLD PTA januvia  - HOLD glargine 30U daily and aspart 8U before breakfast, 6U with lunch and dinner.  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  - Continued on PTA apixaban             Diet: NPO for Procedure/Surgery per Anesthesia Guidelines Except for: Meds, Ice Chips; Clear liquids before procedure/surgery: ADULT (Age GREATER than or Equal to 18 years) - Clear liquids 2 hours before procedure/surgery    DVT Prophylaxis: DOAC  Parrish Catheter: PRESENT, indication: Chronic parrish  Fluids: none  Lines: PRESENT             Cardiac Monitoring: None  Code Status: No CPR- Do NOT Intubate      Clinically Significant Risk Factors         # Hyponatremia: Lowest Na = 128 mmol/L in last 2 days, will monitor as appropriate  # Hypochloremia: Lowest Cl = 96 mmol/L in last 2 days, will monitor as appropriate    # Hypomagnesemia: Lowest Mg = 1.5 mg/dL in last 2 days, will replace as needed   # Hypoalbuminemia: Lowest albumin = 2.9 g/dL at 4/23/2025  5:28 AM, will monitor as appropriate  # Coagulation Defect: INR = 1.91 (Ref range: 0.85 - 1.15) and/or PTT = N/A, will monitor for bleeding    # Hypertension: Noted on problem list           # DMII: A1C = 8.9 % (Ref range: 0.0 - 5.6 %) within past 6 months, PRESENT ON ADMISSION  # Obesity: Estimated body mass index is 32.23 kg/m  as calculated from the following:    Height as of an earlier encounter on 4/21/25: 1.549 m (5' 1\").    Weight as of an earlier encounter on 4/21/25: 77.4 kg (170 lb 9.6 oz)., PRESENT ON ADMISSION     # Financial/Environmental Concerns: none         Social Drivers of Health   Tobacco Use: Medium Risk (4/21/2025)    Patient History     Smoking Tobacco Use: Former     Smokeless Tobacco Use: Former     Passive Exposure: Never   Physical Activity: Inactive (2/22/2022)    Exercise Vital Sign     Days of Exercise per Week: 0 " days     Minutes of Exercise per Session: 0 min         Disposition Plan     Medically Ready for Discharge: Anticipated in 2-4 Days         The patient's care was discussed with the Attending Physician, Dr. Villarreal .    Susana Medina  Medical Student  Medicine Service, [address]  Securely message with Vocera (more info)  Text page via [employer] Paging/Directory   See signed in provider for up to date coverage information  ______________________________________________________________________    Interval History   No acute events overnight. Nursing notes reviewed. Patient's nephew was in the room and served as an interpretor. Per nephew, patient slept well last night. Patient denies pain or discomfort.     Physical Exam   Vital Signs: Temp: 98  F (36.7  C) Temp src: Oral BP: 133/81 Pulse: 112   Resp: 18 SpO2: 100 % O2 Device: Nasal cannula Oxygen Delivery: 1 LPM  Weight: 0 lbs 0 oz    General Appearance: Alert, lying in bed, appears comfortable  Respiratory: no increased work of breathing, clear to auscultation bilaterally   Cardiovascular: tachycardic, normal S1 and S2  GI: mildly distended, soft, non-tender to palpation   Other: No lower extremity edema      Medical Decision Making       Please see A&P for additional details of medical decision making.      Data     I have personally reviewed the following data over the past 24 hrs:    13.7 (H)  \   9.5 (L)   / 175     134 (L) 103 18.0 /  230 (H)   3.7 21 (L) 1.55 (H) \     ALT: 15 AST: 20 AP: 115 TBILI: 0.6   ALB: 2.9 (L) TOT PROTEIN: 6.8 LIPASE: N/A     INR:  1.91 (H) PTT:  N/A   D-dimer:  N/A Fibrinogen:  N/A       Imaging results reviewed over the past 24 hrs:   No results found for this or any previous visit (from the past 24 hours).

## 2025-04-23 NOTE — PLAN OF CARE
Shift Hours: 2200 - 0700    Assessment:  Body systems assessments were at patient's baseline.        Activity     Fall Risk Score: 30   Bed alarm on? Yes     Activity Assistance Provided: assistance, 2 people      Assistive Device Utilized: gait belt, walker    Pain: Denies    Labs/RN Managed Protocols: Blood glucose check, K+, mag and phos    Lines/Drains: Left PIV    Nutrition: NPO from midnight    Goal Outcome Evaluation  Plan of Care Reviewed With: patient  Overall Patient Progress: no change  Outcome Evaluation: Pt transferred from ED to the unit around 2200 of 04/22/25 , accompamy by family with diagnosis of sepsis/UTI. Pt speaks Kayce and  was utilized for admission questions. Family at the bedside. Pt denies pain/discomfort. Has chronic parrish catheter in place that was replaced in ER on 4/21/25. Pt is vitally stable except tachycardia. Assist of 1-2 with gaitbelt and walker is require for pt transfer. Skin check and admission questions completed. NPO since midnight for upcoming procedure.    Barriers to Discharge:   Ongoing IV antibiotic treatment    (Change note type to summary of care)  Transferred from:  ER  Report received from:  PATRICK Domingo 2 RN skin assessment completed by:Gladis Loaiza RN  & Lori Gonzalez,        - Findings (add LDA if needed): Bruises in the abdominal area, bilateral fore arm, wrist/fingers, pink color behind the ear(uses NC), & bilateral LE edema  Care plan (primary problem) and education initiated if not already done: yes  MDRO education done if applicable:  Pt informed about policy regarding no IV pumps off unit: n/a  Suction set up in room? Yes  Flu shot ordered? (October-April only): no  Detailed Belongings: A brown top, grey jacket, grey pant, one blue and white boxer and a pair of black shoe

## 2025-04-23 NOTE — PLAN OF CARE
Goal Outcome Evaluation:  Pt has been sleeping most of the shift; awakes with verbal stimulation.Kayce speaker; son is in room and helps with communication. Tachycardic in the 110's.B/P runs high; within parameters. Pt is on metoprolol.Denied pain.Bs covered per sliding scale and daily lantus 20 units is given.Diego with adequate UOP, small mucus noticed in urine. Phos 1.9; replacement is infusing via IV form.US of renal is done( see result).  Continue with POC

## 2025-04-23 NOTE — PROGRESS NOTES
Brief urology note    Creatinine today 1.55, improved from 1.81 yesterday. No surgical intervention needed today. Okay to have a regular diet. See consult note from yesterday for other recommendations.    Discussed with Dr Manny Carrera NP  Department of Urology  April 23, 2025

## 2025-04-24 ENCOUNTER — APPOINTMENT (OUTPATIENT)
Dept: SPEECH THERAPY | Facility: CLINIC | Age: 85
DRG: 872 | End: 2025-04-24
Payer: COMMERCIAL

## 2025-04-24 VITALS
HEART RATE: 91 BPM | TEMPERATURE: 98.4 F | RESPIRATION RATE: 20 BRPM | WEIGHT: 170.6 LBS | OXYGEN SATURATION: 92 % | BODY MASS INDEX: 32.23 KG/M2 | SYSTOLIC BLOOD PRESSURE: 138 MMHG | DIASTOLIC BLOOD PRESSURE: 81 MMHG

## 2025-04-24 LAB
ALBUMIN SERPL BCG-MCNC: 3.2 G/DL (ref 3.5–5.2)
ALP SERPL-CCNC: 123 U/L (ref 40–150)
ALT SERPL W P-5'-P-CCNC: 26 U/L (ref 0–70)
ANION GAP SERPL CALCULATED.3IONS-SCNC: 11 MMOL/L (ref 7–15)
AST SERPL W P-5'-P-CCNC: 39 U/L (ref 0–45)
BACTERIA BLD CULT: NORMAL
BACTERIA BLD CULT: NORMAL
BACTERIA UR CULT: ABNORMAL
BACTERIA UR CULT: ABNORMAL
BASOPHILS # BLD AUTO: 0.1 10E3/UL (ref 0–0.2)
BASOPHILS NFR BLD AUTO: 1 %
BILIRUB SERPL-MCNC: 0.4 MG/DL
BUN SERPL-MCNC: 16.1 MG/DL (ref 8–23)
CALCIUM SERPL-MCNC: 9 MG/DL (ref 8.8–10.4)
CHLORIDE SERPL-SCNC: 101 MMOL/L (ref 98–107)
CREAT SERPL-MCNC: 1.44 MG/DL (ref 0.67–1.17)
EGFRCR SERPLBLD CKD-EPI 2021: 48 ML/MIN/1.73M2
EOSINOPHIL # BLD AUTO: 0.8 10E3/UL (ref 0–0.7)
EOSINOPHIL NFR BLD AUTO: 8 %
ERYTHROCYTE [DISTWIDTH] IN BLOOD BY AUTOMATED COUNT: 15.9 % (ref 10–15)
GLUCOSE BLDC GLUCOMTR-MCNC: 165 MG/DL (ref 70–99)
GLUCOSE BLDC GLUCOMTR-MCNC: 264 MG/DL (ref 70–99)
GLUCOSE BLDC GLUCOMTR-MCNC: 349 MG/DL (ref 70–99)
GLUCOSE BLDC GLUCOMTR-MCNC: 365 MG/DL (ref 70–99)
GLUCOSE SERPL-MCNC: 199 MG/DL (ref 70–99)
HCO3 SERPL-SCNC: 23 MMOL/L (ref 22–29)
HCT VFR BLD AUTO: 29.1 % (ref 40–53)
HGB BLD-MCNC: 9.7 G/DL (ref 13.3–17.7)
IMM GRANULOCYTES # BLD: 0.1 10E3/UL
IMM GRANULOCYTES NFR BLD: 1 %
INR PPP: 1.45 (ref 0.85–1.15)
LYMPHOCYTES # BLD AUTO: 1.6 10E3/UL (ref 0.8–5.3)
LYMPHOCYTES NFR BLD AUTO: 17 %
MAGNESIUM SERPL-MCNC: 1.5 MG/DL (ref 1.7–2.3)
MAGNESIUM SERPL-MCNC: 3.1 MG/DL (ref 1.7–2.3)
MCH RBC QN AUTO: 29.1 PG (ref 26.5–33)
MCHC RBC AUTO-ENTMCNC: 33.3 G/DL (ref 31.5–36.5)
MCV RBC AUTO: 87 FL (ref 78–100)
MONOCYTES # BLD AUTO: 1.1 10E3/UL (ref 0–1.3)
MONOCYTES NFR BLD AUTO: 12 %
NEUTROPHILS # BLD AUTO: 5.8 10E3/UL (ref 1.6–8.3)
NEUTROPHILS NFR BLD AUTO: 62 %
NRBC # BLD AUTO: 0 10E3/UL
NRBC BLD AUTO-RTO: 0 /100
PHOSPHATE SERPL-MCNC: 3.2 MG/DL (ref 2.5–4.5)
PLATELET # BLD AUTO: 200 10E3/UL (ref 150–450)
POTASSIUM SERPL-SCNC: 3.9 MMOL/L (ref 3.4–5.3)
PROT SERPL-MCNC: 6.9 G/DL (ref 6.4–8.3)
RBC # BLD AUTO: 3.33 10E6/UL (ref 4.4–5.9)
SODIUM SERPL-SCNC: 135 MMOL/L (ref 135–145)
WBC # BLD AUTO: 9.3 10E3/UL (ref 4–11)

## 2025-04-24 PROCEDURE — 250N000013 HC RX MED GY IP 250 OP 250 PS 637: Performed by: STUDENT IN AN ORGANIZED HEALTH CARE EDUCATION/TRAINING PROGRAM

## 2025-04-24 PROCEDURE — 250N000011 HC RX IP 250 OP 636: Performed by: STUDENT IN AN ORGANIZED HEALTH CARE EDUCATION/TRAINING PROGRAM

## 2025-04-24 PROCEDURE — 36415 COLL VENOUS BLD VENIPUNCTURE: CPT | Performed by: STUDENT IN AN ORGANIZED HEALTH CARE EDUCATION/TRAINING PROGRAM

## 2025-04-24 PROCEDURE — 85004 AUTOMATED DIFF WBC COUNT: CPT | Performed by: STUDENT IN AN ORGANIZED HEALTH CARE EDUCATION/TRAINING PROGRAM

## 2025-04-24 PROCEDURE — 83735 ASSAY OF MAGNESIUM: CPT | Performed by: STUDENT IN AN ORGANIZED HEALTH CARE EDUCATION/TRAINING PROGRAM

## 2025-04-24 PROCEDURE — 92526 ORAL FUNCTION THERAPY: CPT | Mod: GN

## 2025-04-24 PROCEDURE — 120N000002 HC R&B MED SURG/OB UMMC

## 2025-04-24 PROCEDURE — 99233 SBSQ HOSP IP/OBS HIGH 50: CPT | Performed by: STUDENT IN AN ORGANIZED HEALTH CARE EDUCATION/TRAINING PROGRAM

## 2025-04-24 PROCEDURE — 85610 PROTHROMBIN TIME: CPT | Performed by: STUDENT IN AN ORGANIZED HEALTH CARE EDUCATION/TRAINING PROGRAM

## 2025-04-24 PROCEDURE — 84100 ASSAY OF PHOSPHORUS: CPT | Performed by: STUDENT IN AN ORGANIZED HEALTH CARE EDUCATION/TRAINING PROGRAM

## 2025-04-24 PROCEDURE — 80053 COMPREHEN METABOLIC PANEL: CPT | Performed by: STUDENT IN AN ORGANIZED HEALTH CARE EDUCATION/TRAINING PROGRAM

## 2025-04-24 RX ORDER — QUETIAPINE FUMARATE 25 MG/1
25 TABLET, FILM COATED ORAL 2 TIMES DAILY PRN
Status: DISCONTINUED | OUTPATIENT
Start: 2025-04-24 | End: 2025-04-25 | Stop reason: HOSPADM

## 2025-04-24 RX ORDER — CEFPODOXIME PROXETIL 200 MG/1
200 TABLET, FILM COATED ORAL 2 TIMES DAILY
Status: DISCONTINUED | OUTPATIENT
Start: 2025-04-25 | End: 2025-04-25 | Stop reason: HOSPADM

## 2025-04-24 RX ORDER — MAGNESIUM SULFATE HEPTAHYDRATE 40 MG/ML
4 INJECTION, SOLUTION INTRAVENOUS ONCE
Status: COMPLETED | OUTPATIENT
Start: 2025-04-24 | End: 2025-04-24

## 2025-04-24 RX ADMIN — CILOSTAZOL 100 MG: 100 TABLET ORAL at 09:23

## 2025-04-24 RX ADMIN — ALLOPURINOL 200 MG: 100 TABLET ORAL at 09:23

## 2025-04-24 RX ADMIN — MAGNESIUM SULFATE HEPTAHYDRATE 4 G: 40 INJECTION, SOLUTION INTRAVENOUS at 10:42

## 2025-04-24 RX ADMIN — PANTOPRAZOLE SODIUM 40 MG: 40 TABLET, DELAYED RELEASE ORAL at 09:23

## 2025-04-24 RX ADMIN — INSULIN ASPART 3 UNITS: 100 INJECTION, SOLUTION INTRAVENOUS; SUBCUTANEOUS at 11:47

## 2025-04-24 RX ADMIN — TAMSULOSIN HYDROCHLORIDE 0.4 MG: 0.4 CAPSULE ORAL at 09:23

## 2025-04-24 RX ADMIN — INSULIN ASPART 2 UNITS: 100 INJECTION, SOLUTION INTRAVENOUS; SUBCUTANEOUS at 09:25

## 2025-04-24 RX ADMIN — INSULIN ASPART 5 UNITS: 100 INJECTION, SOLUTION INTRAVENOUS; SUBCUTANEOUS at 17:10

## 2025-04-24 RX ADMIN — DEXTROMETHORPHAN HYDROBROMIDE AND QUINIDINE SULFATE 1 CAPSULE: 20; 10 CAPSULE, GELATIN COATED ORAL at 05:01

## 2025-04-24 RX ADMIN — Medication 1 MG: at 20:26

## 2025-04-24 RX ADMIN — POTASSIUM & SODIUM PHOSPHATES POWDER PACK 280-160-250 MG 1 PACKET: 280-160-250 PACK at 05:01

## 2025-04-24 RX ADMIN — CILOSTAZOL 100 MG: 100 TABLET ORAL at 20:26

## 2025-04-24 RX ADMIN — DEXTROMETHORPHAN HYDROBROMIDE AND QUINIDINE SULFATE 1 CAPSULE: 20; 10 CAPSULE, GELATIN COATED ORAL at 17:10

## 2025-04-24 RX ADMIN — APIXABAN 5 MG: 5 TABLET, FILM COATED ORAL at 09:23

## 2025-04-24 RX ADMIN — POLYETHYLENE GLYCOL 3350 17 G: 17 POWDER, FOR SOLUTION ORAL at 09:24

## 2025-04-24 RX ADMIN — ROSUVASTATIN CALCIUM 20 MG: 20 TABLET, FILM COATED ORAL at 22:30

## 2025-04-24 RX ADMIN — CEFTRIAXONE SODIUM 1 G: 1 INJECTION, POWDER, FOR SOLUTION INTRAMUSCULAR; INTRAVENOUS at 09:21

## 2025-04-24 RX ADMIN — POTASSIUM & SODIUM PHOSPHATES POWDER PACK 280-160-250 MG 1 PACKET: 280-160-250 PACK at 00:16

## 2025-04-24 RX ADMIN — METOPROLOL SUCCINATE 50 MG: 50 TABLET, EXTENDED RELEASE ORAL at 09:24

## 2025-04-24 RX ADMIN — DULOXETINE HYDROCHLORIDE 30 MG: 30 CAPSULE, DELAYED RELEASE ORAL at 09:23

## 2025-04-24 RX ADMIN — APIXABAN 5 MG: 5 TABLET, FILM COATED ORAL at 20:26

## 2025-04-24 ASSESSMENT — ACTIVITIES OF DAILY LIVING (ADL)
ADLS_ACUITY_SCORE: 56

## 2025-04-24 NOTE — PROGRESS NOTES
Physician Attestation   I, Hiral Villarreal MD, was present with the medical/JOCELYN student who participated in the service and in the documentation of the note.  I have verified the history and personally performed the physical exam and medical decision making.  I agree with the assessment and plan of care as documented in the note.      Key findings: Continued improvement in WBC. Urine culture with growth of kleb and e. Coli which are both poly-susceptible. Transition from ceftriaxone to cefpodoxime. New onset hallucinations vs prior hypoactive state earlier in hospital stay, likely 2/2 delirium. Will start melatonin at bedtime and have seroquel available PRN for hallucinations, agitation, or sleep. Likely discharge on 4/25 with continued clinical improvement. Rest as per excellent medical student documentation below.    Please see A&P for additional details of medical decision making.    I have personally reviewed the following data over the past 24 hrs:    9.3  \   9.7 (L)   / 200     135 101 16.1 /  365 (H)   3.9 23 1.44 (H) \     ALT: 26 AST: 39 AP: 123 TBILI: 0.4   ALB: 3.2 (L) TOT PROTEIN: 6.9 LIPASE: N/A     INR:  1.45 (H) PTT:  N/A   D-dimer:  N/A Fibrinogen:  N/A         Hiral Villarreal MD  Date of Service (when I saw the patient): 04/24/25    [facility]    Progress Note - Medicine Service, [facility] TEAM 4       Date of Admission:  4/21/2025    Assessment & Plan   Adam Talamantes is a 85 year old male with a history of CKD III, basilar artery stroke, BPH s/p TURP, urinary retention with continuous parrish catheter, nephrolithiasis, gout, latent TB, CAD, HTN, T2DM who was admitted for sepsis secondary to pyelonephritis .     Progress of care summary:  - start cefpodoxime 200 mg BID tomorrow for 6 days (4/24 - 4/29)   - melatonin 1 mg at 1900    - quetiapine 25 mg for sleep and hallucinations twice daily PRN   - plan for discharge tomorrow      #  s/p TURP (5/16/2024)  # Prostatomegaly  # Bilateral moderate hydronephrosis, acute on chronic  # pyelonephritis  # Leukocytosis, resolved   # Lactic acidosis, resolved  Patient presenting with tachycardia, low blood pressure and leukocytosis, meeting SIRS criteria for sepsis. Procalcitonin elevated. Most likely source of infection is continuous parrish catheter, last exchanged 3/31/25 vs pneumonia. CXR with patchy densities suggestive of edema vs atypical infection. Given SARS/covid/flu negative, legionella and streptococcus negative, respiratory virus panel negative and patient's breathing is much improved, pneumonia is unlikely. Azithromycin discontinued 4/23. UA prior to parrish exchange with elevated WBCs, increased LE. CT CAP with hydronephrosis and inflammatory changes suspicious for bladder obstruction with superimposed infection/inflammation.   - Repeat UA also with with LE and nitrites   - Urine cultures with E. coli and klebisiella   - Blood cultures NG2D    - Antibiotics  - cefpodoxime 200 mg BID for 6 days (start 4/24 - 4/29)   - IV ceftriaxone 4/23-4/24   - discontinued cefepime, vancomycin, azithromycin 4/23  - legionella antigen and streptococcus pneumonia antigen, negative    - SARS/covid/flu negative   - respiratory panel negative     # Delirium  Per patient's family, he has slept poorly and is having visual hallucinations. Didn't recognize a family member overnight and thought he was [place] his [place]. Altered mental status was improved in the morning. Likely hospital induced delirium in an elderly patient, exacerbated by lack of sleep.   - melatonin 1 g at bedtime   - Seroquel 50 mg for agitation and hallucinations       # ELLA on CKD III, resolved     Moderate Left and Mild R Hydronephrosis  Baseline of 1.3-1.7 per chart review dating back to 2016, presented with creatinine of 2.31. CT AP shows bilateral hydronephrosis slightly larger compared to prior on 5/29. Unclear etiology of hydronephrosis in  setting of continuous parrish catheterization. With elevated urine sodium and urine osm > 350, the etiology of ELLA is a mixed picture. Could be ATR in the setting of sepsis, pre-renal in the setting of dehydration or post-renal in the setting of possible obstruction. Improving with IV fluid resuscitation.   - urology following, appreciate recommendations  - with creatinine back to baseline, no plan for intervention   - renal US 4/23 with increased hydronephrosis compared to 06/2024 and mildly complex left renal cyst  - if sepsis worsens, notify urology to consider urgent renal drainage    # Hyponatremia, improving    # Hypomagnesemia  # Hypophosphatemia   Presenting with hyponatremia to 128. Possible in the setting of poor po intake. Recevied 2.57 L of NS in the ED. Glucose is 333 therefore corrected sodium is 132. On magnesium and phosphate replacement protocols.   - Urine osm 359  - Urine sodium 23   - serum osm 294    # T2DM  - HOLD PTA januvia  - HOLD glargine 30U daily and aspart 8U before breakfast, 6U with lunch and dinner.  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  -  PTA apixaban              Diet: Regular Diet Adult    DVT Prophylaxis: DOAC  Parrish Catheter: PRESENT, indication: Chronic parrish  Fluids: none  Lines: None     Cardiac Monitoring: None  Code Status: No CPR- Do NOT Intubate      Clinically Significant Risk Factors         # Hyponatremia: Lowest Na = 134 mmol/L in last 2 days, will monitor as appropriate     # Hypomagnesemia: Lowest Mg = 1.5 mg/dL in last 2 days, will replace as needed   # Hypoalbuminemia: Lowest albumin = 2.9 g/dL at 4/23/2025  5:28 AM, will monitor as  "appropriate  # Coagulation Defect: INR = 1.45 (Ref range: 0.85 - 1.15) and/or PTT = N/A, will monitor for bleeding    # Hypertension: Noted on problem list           # DMII: A1C = 8.9 % (Ref range: 0.0 - 5.6 %) within past 6 months, PRESENT ON ADMISSION  # Obesity: Estimated body mass index is 32.23 kg/m  as calculated from the following:    Height as of an earlier encounter on 4/21/25: 1.549 m (5' 1\").    Weight as of this encounter: 77.4 kg (170 lb 9.6 oz)., PRESENT ON ADMISSION     # Financial/Environmental Concerns: none         Social Drivers of Health   Tobacco Use: Medium Risk (4/21/2025)    Patient History     Smoking Tobacco Use: Former     Smokeless Tobacco Use: Former     Passive Exposure: Never   Physical Activity: Inactive (2/22/2022)    Exercise Vital Sign     Days of Exercise per Week: 0 days     Minutes of Exercise per Session: 0 min         Disposition Plan     Medically Ready for Discharge: Anticipated Tomorrow         The patient's care was discussed with the Attending Physician, Dr. Villarreal  .    Susana Medina  Medical Student  Medicine Service, [address]  Securely message with [employer] (more info)  Text page via [facility] Paging/Directory   See signed in provider for up to date coverage information  ______________________________________________________________________    Interval History   No acute events overnight. Nursing notes reviewed. Per patient's son, patient did not sleep well last night. Was agitated and having visual hallucinations. This morning he was calmer and denied pain.     Physical Exam   Vital Signs: Temp: 99.6  F (37.6  C) Temp src: Oral BP: 139/82 Pulse: 98   Resp: 20 SpO2: 93 % O2 Device: None (Room air)    Weight: 170 lbs 9.61 oz    General Appearance: Awake, alert, lying in bed  Respiratory: clear to auscultation bilaterally  Cardiovascular: regular rate and rhythm, normal S1 and S2  GI: soft, moderately " distended, non tender to palpation   Other: Now lower extremity edema  Neuro: Oriented to place, not oriented to year. Follows commands. 5/5 strength in upper and lower extremities.      Medical Decision Making       Please see A&P for additional details of medical decision making.      Data     I have personally reviewed the following data over the past 24 hrs:    9.3  \   9.7 (L)   / 200     135 101 16.1 /  264 (H)   3.9 23 1.44 (H) \     ALT: 26 AST: 39 AP: 123 TBILI: 0.4   ALB: 3.2 (L) TOT PROTEIN: 6.9 LIPASE: N/A     INR:  1.45 (H) PTT:  N/A   D-dimer:  N/A Fibrinogen:  N/A

## 2025-04-24 NOTE — PLAN OF CARE
Shift Hours: 0700 - 1900    Assessment:  Body systems assessments were at patient's baseline.        Activity     Fall Risk Score: 45   Bed alarm on? Yes     Activity Assistance Provided: assistance, 2 people      Assistive Device Utilized: gait belt, walker    Pain: Denies    Labs/RN Managed Protocols: K, Phos, Mg protocols, Mg replaced today     Lines/Drains: L PIV TKO, parrish with good output    Nutrition: Easy to chew diet     Goal Outcome Evaluation  Plan of care reviewed with: patient, son    Overall patient progress: no change    Outcome evaluation: A&Ox2, disoriented to time & situation. Kayce speaking. VSS on RA. Son at bedside this shift to assist w/translation. Mg replaced this shift, recheck in AM. Possible discharge tomorrow. Continue with POC    Barriers to Discharge:   - Anticipating discharge tomorrow

## 2025-04-24 NOTE — PROGRESS NOTES
Brief urology note    Cr continues trending down today 1.44 from 1.55 yesterday.   Continue parrish catheter as you are.  Urology will sign off for now.    Hallie Carrera NP  Department of Urology

## 2025-04-24 NOTE — PLAN OF CARE
Goal Outcome Evaluation:    Plan of Care Reviewed With: patient, child    Overall Patient Progress: no change  Overall Patient Progress: no change    Outcome Evaluation: Assumed care from [id number]. Alert, oriented to self. Disoriented to time, situation, place. Vitally stable on room air. Blood glucose 165 mg/dL. L PIV intact, TKO. Diego with good output. Not out of bed overnight. Rocephin beginning 4/24. Daughter remains at bedside and assists in translating. Continue with plan of care.     Aspen Nunez RN

## 2025-04-24 NOTE — PLAN OF CARE
Goal Outcome Evaluation:       Oriented only to self. VSS but soft BP's. Denies pain. Kayce speaking with family members to help interpret. Chronic parrish in place with high output. L PIV infusing TKO. Phosphorus replacement recheck in AM.

## 2025-04-25 VITALS
WEIGHT: 170.6 LBS | BODY MASS INDEX: 32.23 KG/M2 | TEMPERATURE: 99.4 F | DIASTOLIC BLOOD PRESSURE: 87 MMHG | OXYGEN SATURATION: 92 % | SYSTOLIC BLOOD PRESSURE: 131 MMHG | HEART RATE: 91 BPM | RESPIRATION RATE: 20 BRPM

## 2025-04-25 LAB
ALBUMIN SERPL BCG-MCNC: 3.4 G/DL (ref 3.5–5.2)
ALP SERPL-CCNC: 133 U/L (ref 40–150)
ALT SERPL W P-5'-P-CCNC: 33 U/L (ref 0–70)
ANION GAP SERPL CALCULATED.3IONS-SCNC: 12 MMOL/L (ref 7–15)
AST SERPL W P-5'-P-CCNC: 41 U/L (ref 0–45)
BACTERIA UR CULT: ABNORMAL
BACTERIA UR CULT: ABNORMAL
BASOPHILS # BLD AUTO: 0 10E3/UL (ref 0–0.2)
BASOPHILS NFR BLD AUTO: 1 %
BILIRUB SERPL-MCNC: 0.4 MG/DL
BUN SERPL-MCNC: 14.3 MG/DL (ref 8–23)
CALCIUM SERPL-MCNC: 9.3 MG/DL (ref 8.8–10.4)
CHLORIDE SERPL-SCNC: 98 MMOL/L (ref 98–107)
CREAT SERPL-MCNC: 1.42 MG/DL (ref 0.67–1.17)
EGFRCR SERPLBLD CKD-EPI 2021: 49 ML/MIN/1.73M2
EOSINOPHIL # BLD AUTO: 0.7 10E3/UL (ref 0–0.7)
EOSINOPHIL NFR BLD AUTO: 9 %
ERYTHROCYTE [DISTWIDTH] IN BLOOD BY AUTOMATED COUNT: 15.9 % (ref 10–15)
GLUCOSE BLDC GLUCOMTR-MCNC: 252 MG/DL (ref 70–99)
GLUCOSE BLDC GLUCOMTR-MCNC: 281 MG/DL (ref 70–99)
GLUCOSE SERPL-MCNC: 240 MG/DL (ref 70–99)
HCO3 SERPL-SCNC: 23 MMOL/L (ref 22–29)
HCT VFR BLD AUTO: 31.2 % (ref 40–53)
HGB BLD-MCNC: 10.4 G/DL (ref 13.3–17.7)
IMM GRANULOCYTES # BLD: 0.1 10E3/UL
IMM GRANULOCYTES NFR BLD: 2 %
INR PPP: 1.53 (ref 0.85–1.15)
LYMPHOCYTES # BLD AUTO: 1.8 10E3/UL (ref 0.8–5.3)
LYMPHOCYTES NFR BLD AUTO: 21 %
MAGNESIUM SERPL-MCNC: 2 MG/DL (ref 1.7–2.3)
MCH RBC QN AUTO: 29 PG (ref 26.5–33)
MCHC RBC AUTO-ENTMCNC: 33.3 G/DL (ref 31.5–36.5)
MCV RBC AUTO: 87 FL (ref 78–100)
MONOCYTES # BLD AUTO: 1.1 10E3/UL (ref 0–1.3)
MONOCYTES NFR BLD AUTO: 13 %
NEUTROPHILS # BLD AUTO: 4.7 10E3/UL (ref 1.6–8.3)
NEUTROPHILS NFR BLD AUTO: 55 %
NRBC # BLD AUTO: 0 10E3/UL
NRBC BLD AUTO-RTO: 0 /100
PHOSPHATE SERPL-MCNC: 3.2 MG/DL (ref 2.5–4.5)
PLATELET # BLD AUTO: 220 10E3/UL (ref 150–450)
POTASSIUM SERPL-SCNC: 3.8 MMOL/L (ref 3.4–5.3)
PROT SERPL-MCNC: 7.6 G/DL (ref 6.4–8.3)
RBC # BLD AUTO: 3.59 10E6/UL (ref 4.4–5.9)
SODIUM SERPL-SCNC: 133 MMOL/L (ref 135–145)
WBC # BLD AUTO: 8.5 10E3/UL (ref 4–11)

## 2025-04-25 PROCEDURE — 250N000013 HC RX MED GY IP 250 OP 250 PS 637: Performed by: STUDENT IN AN ORGANIZED HEALTH CARE EDUCATION/TRAINING PROGRAM

## 2025-04-25 PROCEDURE — 82310 ASSAY OF CALCIUM: CPT | Performed by: STUDENT IN AN ORGANIZED HEALTH CARE EDUCATION/TRAINING PROGRAM

## 2025-04-25 PROCEDURE — 83735 ASSAY OF MAGNESIUM: CPT | Performed by: STUDENT IN AN ORGANIZED HEALTH CARE EDUCATION/TRAINING PROGRAM

## 2025-04-25 PROCEDURE — 85025 COMPLETE CBC W/AUTO DIFF WBC: CPT | Performed by: STUDENT IN AN ORGANIZED HEALTH CARE EDUCATION/TRAINING PROGRAM

## 2025-04-25 PROCEDURE — 85610 PROTHROMBIN TIME: CPT | Performed by: STUDENT IN AN ORGANIZED HEALTH CARE EDUCATION/TRAINING PROGRAM

## 2025-04-25 PROCEDURE — 84100 ASSAY OF PHOSPHORUS: CPT | Performed by: STUDENT IN AN ORGANIZED HEALTH CARE EDUCATION/TRAINING PROGRAM

## 2025-04-25 PROCEDURE — 99239 HOSP IP/OBS DSCHRG MGMT >30: CPT | Performed by: STUDENT IN AN ORGANIZED HEALTH CARE EDUCATION/TRAINING PROGRAM

## 2025-04-25 PROCEDURE — 36415 COLL VENOUS BLD VENIPUNCTURE: CPT | Performed by: STUDENT IN AN ORGANIZED HEALTH CARE EDUCATION/TRAINING PROGRAM

## 2025-04-25 RX ORDER — MAGNESIUM OXIDE 400 MG/1
400 TABLET ORAL EVERY 4 HOURS
Status: DISCONTINUED | OUTPATIENT
Start: 2025-04-25 | End: 2025-04-25 | Stop reason: HOSPADM

## 2025-04-25 RX ORDER — CEFPODOXIME PROXETIL 200 MG/1
200 TABLET, FILM COATED ORAL 2 TIMES DAILY
Qty: 11 TABLET | Refills: 0 | Status: SHIPPED | OUTPATIENT
Start: 2025-04-25 | End: 2025-05-01

## 2025-04-25 RX ORDER — POTASSIUM CHLORIDE 1.5 G/1.58G
20 POWDER, FOR SOLUTION ORAL ONCE
Status: COMPLETED | OUTPATIENT
Start: 2025-04-25 | End: 2025-04-25

## 2025-04-25 RX ADMIN — TAMSULOSIN HYDROCHLORIDE 0.4 MG: 0.4 CAPSULE ORAL at 08:02

## 2025-04-25 RX ADMIN — PANTOPRAZOLE SODIUM 40 MG: 40 TABLET, DELAYED RELEASE ORAL at 08:02

## 2025-04-25 RX ADMIN — POTASSIUM CHLORIDE 20 MEQ: 1.5 POWDER, FOR SOLUTION ORAL at 08:01

## 2025-04-25 RX ADMIN — CILOSTAZOL 100 MG: 100 TABLET ORAL at 08:01

## 2025-04-25 RX ADMIN — CEFPODOXIME PROXETIL 200 MG: 200 TABLET, FILM COATED ORAL at 08:01

## 2025-04-25 RX ADMIN — POLYETHYLENE GLYCOL 3350 17 G: 17 POWDER, FOR SOLUTION ORAL at 08:03

## 2025-04-25 RX ADMIN — DULOXETINE HYDROCHLORIDE 30 MG: 30 CAPSULE, DELAYED RELEASE ORAL at 08:02

## 2025-04-25 RX ADMIN — DEXTROMETHORPHAN HYDROBROMIDE AND QUINIDINE SULFATE 1 CAPSULE: 20; 10 CAPSULE, GELATIN COATED ORAL at 05:36

## 2025-04-25 RX ADMIN — INSULIN ASPART 3 UNITS: 100 INJECTION, SOLUTION INTRAVENOUS; SUBCUTANEOUS at 08:03

## 2025-04-25 RX ADMIN — METOPROLOL SUCCINATE 50 MG: 50 TABLET, EXTENDED RELEASE ORAL at 08:02

## 2025-04-25 RX ADMIN — MAGNESIUM OXIDE TAB 400 MG (241.3 MG ELEMENTAL MG) 400 MG: 400 (241.3 MG) TAB at 08:02

## 2025-04-25 RX ADMIN — ALLOPURINOL 200 MG: 100 TABLET ORAL at 08:01

## 2025-04-25 RX ADMIN — APIXABAN 5 MG: 5 TABLET, FILM COATED ORAL at 08:02

## 2025-04-25 ASSESSMENT — ACTIVITIES OF DAILY LIVING (ADL)
ADLS_ACUITY_SCORE: 56

## 2025-04-25 NOTE — DISCHARGE SUMMARY
Speech Language Therapy Discharge Summary    Reason for therapy discharge:    Discharged to home.    Progress towards therapy goal(s). See goals on Care Plan in [facility] electronic health record for goal details.  Goals met    Therapy recommendation(s):    No further therapy is recommended. Recommend easy to chew diet (IDDSI 7) and thin liquids with 1:1 feeding asisst & supervision. Patient must be fully upright and alert, taking single small bites/sips, and using a slow rate of intake. Please check for oral residue intermittently.

## 2025-04-25 NOTE — PROGRESS NOTES
Care Management Discharge Note    Discharge Date: 04/25/2025     Discharge Disposition: Home    Discharge Services: None    Discharge DME: None   Discharge Transportation: family or friend will provide    Private pay costs discussed: Not applicable    Does the patient's insurance plan have a 3 day qualifying hospital stay waiver?  No    PAS Confirmation Code: NA  Patient/family educated on Medicare website which has current facility and service quality ratings: no    Education Provided on the Discharge Plan:  yes  Persons Notified of Discharge Plans: pt, family  Patient/Family in Agreement with the Plan: yes    Handoff Referral Completed: Yes, [facility] PCP: Internal handoff referral completed    Additional Information:    Pt is medically ready for discharge. Home with oral ABX. Family to transport.     Chsaidy Yadav RN    7C RN Coordinator  Phone: [phone]  4/25/2025  Units: 7C Med Surg 7401 thru 7418 RNCC     Social Work and Care Management Department   SEARCHABLE in [facility] - search CARE COORDINATOR   [facility] [facility] ([phone]) Saturday & Sunday; ([phone]) [facility] Recognized Holidays   Units: 5A Onc 5201 - 5219 RNCC,  5A Onc 5220 thru 5240 RNCC, 5C OFFSERVICE [phone] RNCC & 5C OFF SERVICE [phone] RNCC   Units: 6B Vocera, 6C Card 6401 thru 6420 RNCC, 6C Card 6502 thru 6514 RNCC & 6C Card 6515 thru 6519 RNCC    Units: 7A SOT RNCC Vocera, 7B Med Surg Vocera, & 7C Med Surg 7502 thru 7521 RNCC   Units: 6A Vocera & 4A CVICU Vocera, 4C MICU Vocera, and 4E SICU Vocera     Units: 5 Ortho Vocera & 5 Med Surg Vocera    Units: 6 Med Surg Vocera & 8 Med Surg Vocera

## 2025-04-25 NOTE — SUMMARY OF CARE
Discharge to: home  Transportation: family  Time: 1010  Prescriptions: sent to [facility]  Belongings: w/ pt and son   -if applicable return home meds from inpatient pharmacy: n/a  Access: PIV removed, chronic parrish in place  Care plan and education discontinued:  done  Paperwork:  AVS provided and reviewed with pt's son

## 2025-04-26 LAB
BACTERIA BLD CULT: NO GROWTH
BACTERIA BLD CULT: NO GROWTH

## 2025-04-29 ENCOUNTER — TELEPHONE (OUTPATIENT)
Dept: UROLOGY | Facility: CLINIC | Age: 85
End: 2025-04-29
Payer: COMMERCIAL

## 2025-04-29 NOTE — TELEPHONE ENCOUNTER
Patient confirmed scheduled appointment:  Date: 10/14  Time: 11:15am  Visit type: return patient  Provider: Sanya Yadav  [place] [facility] [place]  Testing/imaging: Renal US; scheduled same day right before appt   Additional notes: rescheduled due to providers template change. Did not want to do a virtual visit

## 2025-05-06 ENCOUNTER — PATIENT OUTREACH (OUTPATIENT)
Dept: GERIATRIC MEDICINE | Facility: CLINIC | Age: 85
End: 2025-05-06

## 2025-05-06 ENCOUNTER — OFFICE VISIT (OUTPATIENT)
Dept: FAMILY MEDICINE | Facility: CLINIC | Age: 85
End: 2025-05-06
Payer: COMMERCIAL

## 2025-05-06 VITALS
SYSTOLIC BLOOD PRESSURE: 133 MMHG | OXYGEN SATURATION: 95 % | RESPIRATION RATE: 14 BRPM | HEART RATE: 105 BPM | TEMPERATURE: 98.4 F | DIASTOLIC BLOOD PRESSURE: 87 MMHG

## 2025-05-06 DIAGNOSIS — Z79.4 TYPE 2 DIABETES MELLITUS WITH CHRONIC KIDNEY DISEASE, WITH LONG-TERM CURRENT USE OF INSULIN, UNSPECIFIED CKD STAGE (H): ICD-10-CM

## 2025-05-06 DIAGNOSIS — F32.1 CURRENT MODERATE EPISODE OF MAJOR DEPRESSIVE DISORDER WITHOUT PRIOR EPISODE (H): ICD-10-CM

## 2025-05-06 DIAGNOSIS — E11.22 TYPE 2 DIABETES MELLITUS WITH CHRONIC KIDNEY DISEASE, WITH LONG-TERM CURRENT USE OF INSULIN, UNSPECIFIED CKD STAGE (H): ICD-10-CM

## 2025-05-06 DIAGNOSIS — L29.9 ITCHING: Primary | ICD-10-CM

## 2025-05-06 DIAGNOSIS — I25.83 CORONARY ARTERY DISEASE DUE TO LIPID RICH PLAQUE: ICD-10-CM

## 2025-05-06 DIAGNOSIS — N18.31 STAGE 3A CHRONIC KIDNEY DISEASE (H): ICD-10-CM

## 2025-05-06 DIAGNOSIS — L85.3 XEROSIS OF SKIN: ICD-10-CM

## 2025-05-06 DIAGNOSIS — I25.10 CORONARY ARTERY DISEASE DUE TO LIPID RICH PLAQUE: ICD-10-CM

## 2025-05-06 DIAGNOSIS — L30.9 DERMATITIS: ICD-10-CM

## 2025-05-06 PROCEDURE — G2211 COMPLEX E/M VISIT ADD ON: HCPCS

## 2025-05-06 PROCEDURE — 1111F DSCHRG MED/CURRENT MED MERGE: CPT

## 2025-05-06 PROCEDURE — 3079F DIAST BP 80-89 MM HG: CPT

## 2025-05-06 PROCEDURE — 3075F SYST BP GE 130 - 139MM HG: CPT

## 2025-05-06 PROCEDURE — 99213 OFFICE O/P EST LOW 20 MIN: CPT

## 2025-05-06 RX ORDER — DIPHENHYDRAMINE HCL 25 MG
25 TABLET ORAL
Qty: 30 TABLET | Refills: 0 | Status: SHIPPED | OUTPATIENT
Start: 2025-05-06

## 2025-05-06 RX ORDER — CERAMIDE 1,3,6-II/SALICYLIC/B3
1 CLEANSER (ML) TOPICAL 2 TIMES DAILY
Qty: 453 G | Refills: 11 | Status: SHIPPED | OUTPATIENT
Start: 2025-05-06

## 2025-05-06 NOTE — PATIENT INSTRUCTIONS
Thank you for choosing the [facility], it was a pleasure to see you today!    Please take a look at the information below for more specific details regarding the treatment plan and recommendations.    -In this after visit summary is a list of your medications and specific instructions.  Please review this carefully as there may be changes made to your medication list.  -If there are any particular questions or concerns, please feel free to reach out.  -If any labs have been completed, we will reach out to you about results.  If the results are normal or not concerning, a letter or [employer] message will be sent to you.  If any follow-up is needed, either RJ or the nurse will give you a call.  If you have not heard regarding results after 2 weeks, please reach out to the clinic.           Please seek immediate medical attention (go to the emergency room or urgent care) for the following reasons: worsening symptoms or any concerning changes.      --------------------------------------------------------------------------------------------------------------------    -We are always looking for ways to improve.  You may be selected to receive a survey regarding your visit today.  We encourage you to complete the survey and provide specific, constructive feedback to help us improve our processes. If you answer the survey/phone call this will also prompt them to STOP calling you, otherwise they will try again each day for several day! Thank you for your time!    -Please review the contact information listed on the after visit summary and in the electronic chart.  Below is the phone number that we have on file.  If there are any changes that are needed to be made, please reach out to the clinic.  [phone] (home)

## 2025-05-06 NOTE — PROGRESS NOTES
Assessment & Plan    Itching  Xerosis of skin  Dermatitis  - maculopapular rashes on anterior abdomen.  Has had previously.  In past hydroxyzine was effective although no longer helpful.  No noted change of laundry detergent, soap, body wash or other possible reagents that may cause contact dermatitis.  - Does remain in bed for duration of night with inability to ambulate on own, family will assist therefore will prescribe Benadryl for itching and advised family to monitor closely for daytime somnolence  - Have some degree of cirrhosis of skin.  Family has attempted to treat with Vaseline without effect.  Will prescribe CeraVe twice daily to assist with moisturization as this may be exacerbating itching.  - Dry skin may be contributing to itching.  - Prescribe CeraVe cream to add moisture to the skin.    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  Potential current exacerbation has related to situational stress.  He is currently on Cymbalta which has been effectively treating. Family concern for lack of sleep with itchiness currently making symptoms worse.      Stage 3a chronic kidney disease (H)  - chronic, stable. Labs due in may to assess statues. Does follow with urology. Last BMP check with Cr of 1.42, GFR of 49.   - related labs offered today but deferred with request to complete at follow-up with PCP next month.     Please seek immediate medical attention (go to the emergency room or urgent care) if symptoms worser or for concerning changes.    Follow-up with PCP for annual visit, if symptoms do not improve as anticipated, as needed for acute concern, and May schedule follow-up with me for itchiness, dry skin, or medications ordered today if unable to see PCP due to scheduling availability      ---------------------------------------------------------------------------------------   Perlita Medina is a 85 year old year old male, presenting for the following health issues:  Chief Complaint   Patient presents with    Derm Problem         5/6/2025     2:48 PM   Additional Questions   Roomed by Mikel JORDAN   Accompanied by Daughter in law     NATALIE Talamantes, an 85-year-old male, has been experiencing increased itchiness over the past 2 days. Previously, he had minor itching that was not bothersome, but it has recently intensified, particularly at night, causing significant irritation and disrupting his sleep. He has been using hydroxyzine for the itching, which previously helped, but now it only induces sleep without alleviating the itch. Upon waking, the itchiness resumes, leading to a cycle of scratching and sleeping. During the day, the itching is present but less severe compared to nighttime. He does not walk independently and uses a walker, with family members always by his side.        Patient submitted visit information  Answers submitted by the patient for this visit:  Provider Visit on 5/6/2025  3:15 PM with Lamont Perdomo  General Questionnaire (Submitted on 5/6/2025)  Chief Complaint: Chronic problems general questions HPI Form  What is the reason for your visit today? : body ichy  How many servings of fruits and vegetables do you eat daily?: 4 or more  On average, how many sweetened beverages do you drink each day (Examples: soda, juice, sweet tea, etc.  Do NOT count diet or artificially sweetened beverages)?: 0  How many minutes a day do you exercise enough to make your heart beat faster?: 9 or less  How many days a week do you exercise enough to make your heart beat faster?: 3 or less  How many days per week do you miss taking your medication?: 0    ---------------------------------------------------------------------------------------   Objective    Vital signs: /87 (BP Location:  Right arm)   Pulse 105   Temp 98.4  F (36.9  C) (Oral)   Resp 14   SpO2 95%     There is no height or weight on file to calculate BMI.    Physical Exam       General appearance: alert, well appearing, and in no distress  Mental status: alert, oriented to person, place, and time  Chest: clear to auscultation, no wheezes, rales or rhonchi, symmetric air entry'  Heart: normal rate, regular rhythm, normal S1, S2, no murmurs, rubs, clicks or gallops  Skin: normal coloration and turgor, no suspicious skin lesions noted. Maculopapular rash on anterior lower trunk, and upper thighs.     ---------------------------------------------------------------------------------------   Visit was completed along with virtual   Amount of time spent in chart review, direct patient contact, care coordination, and related activities to patient care on the day of appointment: 23 minutes.      Options for treatment and follow-up care were reviewed with the patient. Adam Talamantes and/or guardian was engaged and actively involved in the decision making process. Adam Anderseng and/or guardian verbalized understanding of the options discussed and was satisfied with the final plan.    The longitudinal plan of care for the diagnosis(es)/condition(s) as documented were addressed during this visit. Due to the added complexity in care, I can continue to support Adam in the subsequent management and with ongoing continuity of care related to these items if unable to see (or establish with) PCP.       Patient consented to use of ambient AI scribe prior to initiation of visit.    Signed Electronically by: AQUILES Kaufman CNP       Cyclophosphamide Pregnancy And Lactation Text: This medication is Pregnancy Category D and it isn't considered safe during pregnancy. This medication is excreted in breast milk.

## 2025-05-06 NOTE — PROGRESS NOTES
[facility] Care Coordination Contact    Completed following tasks: Sent copy of service delivery plan and addendum to member and [facility] (include [facility] agency, [facility] agency as applicable). Sent copy of revised support plan to member OR Revised support plan previously sent to member. Submitted auth to health plan. Updated database.      Emily Hernandez  Care Management Specialist  [facility]  [phone]

## 2025-05-08 ENCOUNTER — TELEPHONE (OUTPATIENT)
Dept: FAMILY MEDICINE | Facility: CLINIC | Age: 85
End: 2025-05-08
Payer: COMMERCIAL

## 2025-05-08 NOTE — TELEPHONE ENCOUNTER
Forms/Letter Request    Type of form/letter: DME (wheelchair, hospital bed)    Type of DME requested:       Do we have the form/letter: Yes: Dr. Grossman's blue folder    Who is the form from? [employer] (if other please explain)    Where did/will the form come from? form was faxed in    When is form/letter needed by: when complete    How would you like the form/letter returned: Fax : [phone]

## 2025-05-09 NOTE — TELEPHONE ENCOUNTER
(TC) collected forms from . Forms pre-filled for provider review, completion, and signature. Forms placed in provider s blue folder. Thanks.

## 2025-05-12 ENCOUNTER — TELEPHONE (OUTPATIENT)
Dept: RADIOLOGY | Facility: CLINIC | Age: 85
End: 2025-05-12
Payer: COMMERCIAL

## 2025-05-12 DIAGNOSIS — I67.9 INTRACRANIAL VASCULAR STENOSIS: ICD-10-CM

## 2025-05-12 NOTE — TELEPHONE ENCOUNTER
[facility] Call Center    Phone Message    May a detailed message be left on voicemail: yes     Reason for Call: Medication Refill Request    Has the patient contacted the pharmacy for the refill? Yes   Name of medication being requested: cilostazol (PLETAL) 50 MG tablet  Provider who prescribed the medication: Manuel  Pharmacy:   [id number] [place], [place] - [address] AT [facility] [address]    Date medication is needed: TODAY   Action Taken:     [facility]       Travel Screening: Not Applicable     Date of Service:

## 2025-05-12 NOTE — TELEPHONE ENCOUNTER
Per Dr. Jackson last note on 12/24/24,   Plan:  - Eliquis and aspirin therapy  - MRA head and neck WWO in 1 year      Patient should not be taking Cilostazol at this time, only aspirin and Eliquis per Dr. Jackson last note. Patient daughter is insistent that patient has been taking Cilastazol 50mg two times daily and the prescriber is Dr. Jackson. Patient cannot take Aspirin as it causes blood in his urine. All of patient care team is within [facility] so there is not another system that would be filling the prescription. RN let patient daughter know I will check with Dr. Jackson and send in appropriate medications. She has no further questions/concerns at this time.   Hiral Gomez RN 5/12/2025 2:19 PM    Per message from Dr. Jackson, ok to refill Cilastazol 100mg two times daily. Called patient's daughter to make her aware. She is aware, patient to continue on Cilastazol 100mg two times daily. No further questions at this time. She will call with further concerns.   Hiral Gomez RN 5/13/2025 9:04 AM

## 2025-05-13 RX ORDER — CILOSTAZOL 50 MG/1
100 TABLET ORAL 2 TIMES DAILY
Qty: 60 TABLET | Refills: 11 | Status: SHIPPED | OUTPATIENT
Start: 2025-05-13 | End: 2025-05-13 | Stop reason: DRUGHIGH

## 2025-05-13 RX ORDER — CILOSTAZOL 100 MG/1
100 TABLET ORAL 2 TIMES DAILY
Qty: 60 TABLET | Refills: 5 | Status: SHIPPED | OUTPATIENT
Start: 2025-05-13

## 2025-05-14 ENCOUNTER — TELEPHONE (OUTPATIENT)
Dept: FAMILY MEDICINE | Facility: CLINIC | Age: 85
End: 2025-05-14
Payer: COMMERCIAL

## 2025-05-14 DIAGNOSIS — F32.1 CURRENT MODERATE EPISODE OF MAJOR DEPRESSIVE DISORDER WITHOUT PRIOR EPISODE (H): ICD-10-CM

## 2025-05-14 RX ORDER — DULOXETIN HYDROCHLORIDE 30 MG/1
CAPSULE, DELAYED RELEASE ORAL
Qty: 135 CAPSULE | Refills: 6 | Status: SHIPPED | OUTPATIENT
Start: 2025-05-14

## 2025-05-14 NOTE — TELEPHONE ENCOUNTER
Forms/Letter Request    Type of form/letter: DME     Type of DME requested: 8 OZ [id number]    Do we have the form/letter: Yes: In Dr. Espino blue folder    Who is the form from? [facility]     Where did/will the form come from? form was faxed in    When is form/letter needed by: 5-7 BUSINESS DAYS    How would you like the form/letter returned: Fax : [phone]    Patient Notified form requests are processed in 5-7 business days:Yes    Okay to leave a detailed message?: Yes at Other phone number:  [phone]

## 2025-05-15 DIAGNOSIS — E78.5 DYSLIPIDEMIA: ICD-10-CM

## 2025-05-15 DIAGNOSIS — I63.22 CEREBROVASCULAR ACCIDENT (CVA) DUE TO STENOSIS OF BASILAR ARTERY (H): ICD-10-CM

## 2025-05-15 RX ORDER — APIXABAN 5 MG/1
5 TABLET, FILM COATED ORAL 2 TIMES DAILY
Qty: 180 TABLET | Refills: 3 | Status: SHIPPED | OUTPATIENT
Start: 2025-05-15

## 2025-05-15 RX ORDER — ROSUVASTATIN CALCIUM 20 MG/1
20 TABLET, COATED ORAL AT BEDTIME
Qty: 90 TABLET | Refills: 2 | OUTPATIENT
Start: 2025-05-15

## 2025-05-22 ENCOUNTER — MEDICAL CORRESPONDENCE (OUTPATIENT)
Dept: HEALTH INFORMATION MANAGEMENT | Facility: CLINIC | Age: 85
End: 2025-05-22

## 2025-05-22 DIAGNOSIS — E11.22 TYPE 2 DIABETES MELLITUS WITH STAGE 3 CHRONIC KIDNEY DISEASE, WITHOUT LONG-TERM CURRENT USE OF INSULIN, UNSPECIFIED WHETHER STAGE 3A OR 3B CKD (H): ICD-10-CM

## 2025-05-22 DIAGNOSIS — N18.30 TYPE 2 DIABETES MELLITUS WITH STAGE 3 CHRONIC KIDNEY DISEASE, WITHOUT LONG-TERM CURRENT USE OF INSULIN, UNSPECIFIED WHETHER STAGE 3A OR 3B CKD (H): ICD-10-CM

## 2025-05-22 RX ORDER — SITAGLIPTIN 50 MG/1
50 TABLET, FILM COATED ORAL DAILY
Qty: 90 TABLET | Refills: 0 | Status: SHIPPED | OUTPATIENT
Start: 2025-05-22

## 2025-05-24 DIAGNOSIS — N18.30 TYPE 2 DIABETES MELLITUS WITH STAGE 3 CHRONIC KIDNEY DISEASE, WITHOUT LONG-TERM CURRENT USE OF INSULIN, UNSPECIFIED WHETHER STAGE 3A OR 3B CKD (H): ICD-10-CM

## 2025-05-24 DIAGNOSIS — E11.22 TYPE 2 DIABETES MELLITUS WITH STAGE 3 CHRONIC KIDNEY DISEASE, WITHOUT LONG-TERM CURRENT USE OF INSULIN, UNSPECIFIED WHETHER STAGE 3A OR 3B CKD (H): ICD-10-CM

## 2025-05-24 RX ORDER — SITAGLIPTIN 50 MG/1
50 TABLET, FILM COATED ORAL DAILY
Qty: 90 TABLET | Refills: 0 | OUTPATIENT
Start: 2025-05-24

## 2025-05-28 ENCOUNTER — PATIENT OUTREACH (OUTPATIENT)
Dept: GERIATRIC MEDICINE | Facility: CLINIC | Age: 85
End: 2025-05-28
Payer: COMMERCIAL

## 2025-05-28 NOTE — PROGRESS NOTES
[facility] Care Coordination Contact      [facility] Six-Month Telephone Assessment    6 month telephone assessment completed on 05/28.    ER visits: Yes -  [facility]  Hospitalizations: Yes -  [facility]  TCU stays: No  Significant health status changes: Denies continues with recurrent UTIs.   Falls/Injuries: No  ADL/IADL changes: No  Changes in services: No    Caregiver Assessment follow up:  Family will conitnue with adl and iadl support at home.     Goals: See Support Plan for goal progress documentation.      Will see member in 6 months for an annual health risk assessment.     Encouraged member to call CC with any questions or concerns in the meantime.     Pending apa response on dme change.     Kristi Calzada RN  [facility]  [phone]

## 2025-06-04 ENCOUNTER — TELEPHONE (OUTPATIENT)
Dept: FAMILY MEDICINE | Facility: CLINIC | Age: 85
End: 2025-06-04

## 2025-06-04 NOTE — TELEPHONE ENCOUNTER
Forms/Letter Request    Type of form/letter: DME    Type of DME requested: GLOVES, VINYL SMALL    Do we have the form/letter: Yes: In Dr. Mason's blue folder    Who is the form from? [facility]    Where did/will the form come from? form was faxed in    When is form/letter needed by: 5-7 BUSINESS DAYS    How would you like the form/letter returned: Fax : [phone]    Patient Notified form requests are processed in 5-7 business days:Yes    Okay to leave a detailed message?: Yes at Other phone number:  [phone]

## 2025-06-09 ENCOUNTER — OFFICE VISIT (OUTPATIENT)
Dept: FAMILY MEDICINE | Facility: CLINIC | Age: 85
End: 2025-06-09
Payer: COMMERCIAL

## 2025-06-09 VITALS
OXYGEN SATURATION: 95 % | HEART RATE: 112 BPM | TEMPERATURE: 98.2 F | SYSTOLIC BLOOD PRESSURE: 122 MMHG | DIASTOLIC BLOOD PRESSURE: 86 MMHG | RESPIRATION RATE: 20 BRPM

## 2025-06-09 DIAGNOSIS — R33.9 URINARY RETENTION: ICD-10-CM

## 2025-06-09 DIAGNOSIS — F32.1 CURRENT MODERATE EPISODE OF MAJOR DEPRESSIVE DISORDER WITHOUT PRIOR EPISODE (H): ICD-10-CM

## 2025-06-09 DIAGNOSIS — Z79.4 TYPE 2 DIABETES MELLITUS WITH CHRONIC KIDNEY DISEASE, WITH LONG-TERM CURRENT USE OF INSULIN, UNSPECIFIED CKD STAGE (H): ICD-10-CM

## 2025-06-09 DIAGNOSIS — E11.22 TYPE 2 DIABETES MELLITUS WITH CHRONIC KIDNEY DISEASE, WITH LONG-TERM CURRENT USE OF INSULIN, UNSPECIFIED CKD STAGE (H): ICD-10-CM

## 2025-06-09 DIAGNOSIS — Z23 NEED FOR VACCINATION: ICD-10-CM

## 2025-06-09 DIAGNOSIS — R42 DIZZINESS: ICD-10-CM

## 2025-06-09 DIAGNOSIS — F03.B11 MODERATE DEMENTIA WITH AGITATION, UNSPECIFIED DEMENTIA TYPE (H): Primary | ICD-10-CM

## 2025-06-09 LAB
CREAT UR-MCNC: 88.6 MG/DL
MICROALBUMIN UR-MCNC: 72.3 MG/L
MICROALBUMIN/CREAT UR: 81.6 MG/G CR (ref 0–17)

## 2025-06-09 PROCEDURE — 99214 OFFICE O/P EST MOD 30 MIN: CPT | Performed by: FAMILY MEDICINE

## 2025-06-09 PROCEDURE — 3074F SYST BP LT 130 MM HG: CPT | Performed by: FAMILY MEDICINE

## 2025-06-09 PROCEDURE — 3079F DIAST BP 80-89 MM HG: CPT | Performed by: FAMILY MEDICINE

## 2025-06-09 PROCEDURE — 82570 ASSAY OF URINE CREATININE: CPT | Performed by: FAMILY MEDICINE

## 2025-06-09 PROCEDURE — 82043 UR ALBUMIN QUANTITATIVE: CPT | Performed by: FAMILY MEDICINE

## 2025-06-09 PROCEDURE — G2211 COMPLEX E/M VISIT ADD ON: HCPCS | Performed by: FAMILY MEDICINE

## 2025-06-09 RX ORDER — MECLIZINE HCL 12.5 MG 12.5 MG/1
12.5 TABLET ORAL 3 TIMES DAILY PRN
Qty: 60 TABLET | Refills: 6 | Status: SHIPPED | OUTPATIENT
Start: 2025-06-09

## 2025-06-09 RX ORDER — QUETIAPINE FUMARATE 25 MG/1
25 TABLET, FILM COATED ORAL EVERY 12 HOURS PRN
Qty: 60 TABLET | Refills: 1 | Status: SHIPPED | OUTPATIENT
Start: 2025-06-09

## 2025-06-09 ASSESSMENT — PATIENT HEALTH QUESTIONNAIRE - PHQ9
SUM OF ALL RESPONSES TO PHQ QUESTIONS 1-9: 5
10. IF YOU CHECKED OFF ANY PROBLEMS, HOW DIFFICULT HAVE THESE PROBLEMS MADE IT FOR YOU TO DO YOUR WORK, TAKE CARE OF THINGS AT HOME, OR GET ALONG WITH OTHER PEOPLE: SOMEWHAT DIFFICULT
SUM OF ALL RESPONSES TO PHQ QUESTIONS 1-9: 5

## 2025-06-09 NOTE — PROGRESS NOTES
ASSESMENT AND PLAN:  Diagnoses and all orders for this visit:  Moderate dementia with agitation, unspecified dementia type (H)  Family is requesting help with guardianship for this patient because he has required 24-hour supervision and assistance with decision making ever since his stroke.  I think it is very unlikely he is going to recover capacity and so I agree with the family pursuing guardianship, the daughters have for many years been involved in his care and done a good job with that.  Care coordination team has been asked to help with this.    For the agitation, on medication chart review it looks like during his hospitalization he was given quetiapine and his daughter thinks that this did work for him.  We decided to restart quetiapine on an as-needed basis for agitation/hallucination.  Close follow-up with me in the clinic and we can adjust the dose if needed.  -     QUEtiapine (SEROQUEL) 25 MG tablet; Take 1 tablet (25 mg) by mouth every 12 hours as needed (agitation).  -     Primary Care - Care Coordination Referral; Future  Type 2 diabetes mellitus with chronic kidney disease, with long-term current use of insulin, unspecified CKD stage (H)  -     Albumin Random Urine Quantitative with Creat Ratio; Future  Urinary retention  Stable.  Chronic Diego catheter.  Patient will continue to follow-up with urology clinic for regular catheter changes and follow-up.  Current moderate episode of major depressive disorder without prior episode (H)  Stable.  Plan as detailed above.  Dizziness  Refill for occasional use:-     meclizine (ANTIVERT) 12.5 MG tablet; Take 1 tablet (12.5 mg) by mouth 3 times daily as needed for dizziness.  Need for vaccination  Vaccine update declined today by the family and patient.      Reviewed the risks and benefits of the treatment plan with the patient and/or caregiver and we discussed indications for routine and emergent follow-up.  The longitudinal plan of care for the  diagnosis(es)/condition(s) as documented were addressed during this visit. Due to the added complexity in care, I will continue to support Adam in the subsequent management and with ongoing continuity of care.        SUBJECTIVE: The biggest concern is that the patient continues to slowly decline in his decision making capacity and memory.  He continues to have episodic agitation, about 3 times per week he is having significant agitation at night where he is up much of the night talking to himself and becomes upset and agitated.  This has been very unpleasant for the patient but is also been very difficult for the family members to manage.  The patient is requiring 24-hour supervision and there are multiple family members involved with his care.  The daughters with him today are hoping to go forward with some sort of guardianship procedure given the patient's slow decline in his cognitive functioning following the sudden significant change in his cognitive and executive functioning following his stroke.    Past Medical History:   Diagnosis Date    Acute blood loss anemia     Acute renal failure     Anemia     Bacteremia     Cataract     Chronic gout     CKD (chronic kidney disease)     Stage 3    DM2 (diabetes mellitus, type 2) (H)     GERD (gastroesophageal reflux disease)     Glucose intolerance (impaired glucose tolerance)     Gout     History of nephrolithiasis     History of prostatitis 2017    Hyperlipidemia     Hypertension     Insomnia     Intestinal disaccharidase deficiencies and disaccharide malabsorption     Created by Conversion     Latent tuberculosis     Nephrolithiasis     Neuropathy     Nonspecific abnormal findings on radiological and other examination of skull and head     Created by [facility] Annotation: 2013 11:23PM - Giulia Meridai/10/2011:  severe stenosis both posterior cerebral arteries seen MRI/MRA done for  vertigo. No acute changes.e*    Osteoarthritis      wrist, knee, shoulder    Prostatitis     Rectal bleed     Trigger thumb      Patient Active Problem List   Diagnosis    Esophageal reflux    Dyslipidemia    Nephrolithiasis    Pseudogout    Latent Tuberculosis    Generalized Osteoarthritis Of The Hand    Lumbar Disc Degeneration    Insomnia, unspecified type    Chronic Gout    CKD (chronic kidney disease) stage 3, GFR 30-59 ml/min (H)    Elevated PSA    Prostate nodule    Cataracts, bilateral    Constipation, unspecified constipation type    Bilateral chronic knee pain    Chronic right shoulder pain    Essential hypertension with goal blood pressure less than 140/90    BPH (benign prostatic hyperplasia)    Chronic gout    Coronary artery disease due to lipid rich plaque    Right lower quadrant abdominal pain    Colitis    Primary osteoarthritis involving multiple joints    Urinary incontinence    ACE-inhibitor cough    Vertigo    Essential hypertension    Vertebral artery occlusion, left    Cerebellar stroke (H)    Stroke (H)    Hypomagnesemia    Ataxic gait    Urinary retention    Basilar artery stenosis    Cerebral vascular disease    Colon wall thickening    Current moderate episode of major depressive disorder without prior episode (H)    Dysuria    Generalized weakness    Near syncope    Urinary tract infection without hematuria, site unspecified    Type 2 diabetes mellitus with chronic kidney disease, with long-term current use of insulin, unspecified CKD stage (H)    Candida glabrata infection    Encounter for long-term (current) use of antibiotics    Hyperkalemia    Acute on chronic renal insufficiency    Complicated UTI (urinary tract infection)    Acute UTI    Sepsis with acute renal failure without septic shock, due to unspecified organism, unspecified acute renal failure type (H)    Moderate dementia with agitation, unspecified dementia type (H)     Current Outpatient Medications   Medication Sig Dispense Refill    acetaminophen (TYLENOL) 500 MG tablet  Take 1 tablet (500 mg) by mouth every 4 hours as needed for mild pain 100 tablet 3    allopurinol (ZYLOPRIM) 100 MG tablet TAKE 2 TABLETS(200 MG) BY MOUTH DAILY 180 tablet 3    calcium carbonate (TUMS) 500 MG chewable tablet Take 1 chew tab by mouth daily as needed for heartburn      cilostazol (PLETAL) 100 MG tablet Take 1 tablet (100 mg) by mouth 2 times daily. 60 tablet 5    clobetasol propionate (TEMOVATE) 0.05 % external cream Apply topically 2 times daily as needed. 60 g 3    dextromethorphan-quiNIDine (NUEDEXTA) 20-10 MG capsule Take 1 capsule by mouth every 12 hours. 60 capsule 11    DULoxetine (CYMBALTA) 30 MG capsule TAKE 1 CAPSULE BY MOUTH EVERY MORNING AND 2 CAPSULES BY MOUTH EVERY EVENING 135 capsule 6    ELIQUIS ANTICOAGULANT 5 MG tablet TAKE 1 TABLET(5 MG) BY MOUTH TWICE DAILY 180 tablet 3    hydrOXYzine HCl (ATARAX) 25 MG tablet Take 1 tablet (25 mg) by mouth every 8 hours as needed for itching. 60 tablet 4    insulin aspart (NOVOLOG FLEXPEN) 100 UNIT/ML pen Inject 16 units before breakfast, and 6 units under the skin before lunch and dinner 30 mL 3    insulin glargine (LANTUS PEN) 100 UNIT/ML pen Inject 30 Units subcutaneously every 24 hours 30 mL 3    JANUVIA 50 MG tablet TAKE 1 TABLET(50 MG) BY MOUTH DAILY 90 tablet 0    meclizine (ANTIVERT) 12.5 MG tablet Take 1 tablet (12.5 mg) by mouth 3 times daily as needed for dizziness. 60 tablet 6    metoprolol succinate ER (TOPROL XL) 50 MG 24 hr tablet Take 1 tablet (50 mg) by mouth daily. 90 tablet 3    pantoprazole (PROTONIX) 40 MG EC tablet Take 1 tablet (40 mg) by mouth every morning (before breakfast). 90 tablet 3    polyethylene glycol (MIRALAX) 17 GM/Dose powder MIX 17 GRAMS IN LIQUID AND TAKE BY MOUTH ONCE DAILY AS NEEDED FOR CONSTIPATION 510 g 2    QUEtiapine (SEROQUEL) 25 MG tablet Take 1 tablet (25 mg) by mouth every 12 hours as needed (agitation). 60 tablet 1    rosuvastatin (CRESTOR) 20 MG tablet TAKE 1 TABLET(20 MG) BY MOUTH AT BEDTIME 90  tablet 2    senna-docusate (STIMULANT LAXATIVE) 8.6-50 MG tablet Take 2 tablets by mouth 2 times daily as needed for constipation. 120 tablet 9    tamsulosin (FLOMAX) 0.4 MG capsule TAKE 1 CAPSULE BY MOUTH EVERY DAY 90 capsule 1    traZODone (DESYREL) 50 MG tablet Take 1-2 tablets ( mg) by mouth nightly as needed for sleep. 180 tablet 3    Continuous Glucose  (FREESTYLE SUSANNAH 2 READER) TONIE USE TO READ BLOOD SUGARS PER MANUFACTRUERS INSTRUCTIONS 1 each 0    Continuous Glucose Sensor (FREESTYLE SUSANNAH 2 SENSOR) MISC CHANGE EVERY 14 DAYS 2 each 11    Emollient (CERAVE MOISTURIZING) CREA Externally apply 1 Application topically 2 times daily. Also apply within 3 minute of every bath. 453 g 11    insulin pen needle (BD PEN NEEDLE SALVATORE 2ND GEN) 32G X 4 MM miscellaneous USE 1 PEN NEEDLE FOUR TIMES DAILY OR AS DIRECTED 400 each 2     History   Smoking Status    Former    Types: Cigarettes   Smokeless Tobacco    Former       OBJECTICE: /86 (BP Location: Left arm)   Pulse 112   Temp 98.2  F (36.8  C) (Oral)   Resp 20   SpO2 95%      Recent Results (from the past 24 hours)   Albumin Random Urine Quantitative with Creat Ratio    Collection Time: 06/09/25 11:57 AM   Result Value Ref Range    Creatinine Urine mg/dL 88.6 mg/dL    Albumin Urine mg/L 72.3 mg/L    Albumin Urine mg/g Cr 81.60 (H) 0.00 - 17.00 mg/g Cr        GEN-patient is not currently in any acute distress   CV-regular rate and rhythm   RESP-lungs clear   ABDOMINAL-soft, no obvious tenderness     Signed Electronically by: Chucho Espino MD

## 2025-06-19 DIAGNOSIS — E78.5 DYSLIPIDEMIA: ICD-10-CM

## 2025-06-20 ENCOUNTER — HOSPITAL ENCOUNTER (EMERGENCY)
Facility: CLINIC | Age: 85
Discharge: HOME | End: 2025-06-20
Attending: STUDENT IN AN ORGANIZED HEALTH CARE EDUCATION/TRAINING PROGRAM | Admitting: STUDENT IN AN ORGANIZED HEALTH CARE EDUCATION/TRAINING PROGRAM
Payer: COMMERCIAL

## 2025-06-20 VITALS
TEMPERATURE: 97.4 F | HEART RATE: 108 BPM | DIASTOLIC BLOOD PRESSURE: 95 MMHG | OXYGEN SATURATION: 93 % | RESPIRATION RATE: 18 BRPM | SYSTOLIC BLOOD PRESSURE: 134 MMHG

## 2025-06-20 DIAGNOSIS — T83.011A MALFUNCTION OF FOLEY CATHETER, INITIAL ENCOUNTER: ICD-10-CM

## 2025-06-20 PROCEDURE — 99283 EMERGENCY DEPT VISIT LOW MDM: CPT | Mod: 25 | Performed by: STUDENT IN AN ORGANIZED HEALTH CARE EDUCATION/TRAINING PROGRAM

## 2025-06-20 PROCEDURE — 99283 EMERGENCY DEPT VISIT LOW MDM: CPT | Performed by: STUDENT IN AN ORGANIZED HEALTH CARE EDUCATION/TRAINING PROGRAM

## 2025-06-20 ASSESSMENT — COLUMBIA-SUICIDE SEVERITY RATING SCALE - C-SSRS
2. HAVE YOU ACTUALLY HAD ANY THOUGHTS OF KILLING YOURSELF IN THE PAST MONTH?: NO
1. IN THE PAST MONTH, HAVE YOU WISHED YOU WERE DEAD OR WISHED YOU COULD GO TO SLEEP AND NOT WAKE UP?: NO
6. HAVE YOU EVER DONE ANYTHING, STARTED TO DO ANYTHING, OR PREPARED TO DO ANYTHING TO END YOUR LIFE?: NO

## 2025-06-21 NOTE — ED NOTES
Pt moved to room for parrish catheter exchange. Pt tolerated well; urine with visible sediment returned.

## 2025-06-21 NOTE — ED TRIAGE NOTES
"Pt c/o malfunctioning parrish catheter. Parrish stopped draining at about 7PM tonight. Pt son attempted to flush parrish unsuccessfully. Pt has chronic parrish for urinary retention. Parrish last changed 2 weeks ago. Pt reporting \"moderate pain.\"        "

## 2025-06-21 NOTE — DISCHARGE INSTRUCTIONS
Come to the [facility].  You were seen the emergency department.  Your Diego catheter was changed, it started to drain and your symptoms resolved.  Your heart rate was slightly elevated which appears to be baseline based on your last clinic notes.    If you develop new or concerning symptoms such as continued failure of the Diego catheter to drain, fevers chills pain nausea vomiting or anything else you find concerning please return to the emergency department.  Otherwise please follow-up with your primary care doctor.

## 2025-06-21 NOTE — ED PROVIDER NOTES
[facility]ERGENCY DEPARTMENT ([facility])    25       ED PROVIDER NOTE   History     Chief Complaint   Patient presents with    Catheter Problem     HPI  Adam Anderseng is a 85 year old male with a history of T2DM, HTN, HLD, CKD3, BPH s/p TURP with continuous parrish catheter, and nephrolithiasis who presents to the ED with concern for malfunctioning parrish catheter. Son attempted parrish cares and reported some malfunction after 'flushing'. Mild suprapubic pain. Brought to ED for evaluation. No fever/chills. No pain prior to dysfunction. No nausea /vomiting.    Has hx of high heart rate. No palpitations. No chest pain. No dyspnea.    Past Medical History  Past Medical History:   Diagnosis Date    Acute blood loss anemia     Acute renal failure     Anemia     Bacteremia     Cataract     Chronic gout     CKD (chronic kidney disease)     Stage 3    DM2 (diabetes mellitus, type 2) (H)     GERD (gastroesophageal reflux disease)     Glucose intolerance (impaired glucose tolerance)     Gout     History of nephrolithiasis     History of prostatitis 2017    Hyperlipidemia     Hypertension     Insomnia     Intestinal disaccharidase deficiencies and disaccharide malabsorption     Created by Conversion     Latent tuberculosis     Nephrolithiasis     Neuropathy     Nonspecific abnormal findings on radiological and other examination of skull and head     Created by [facility] Annotation: 2013 11:23PM - Giulia Meridai/10/2011:  severe stenosis both posterior cerebral arteries seen MRI/MRA done for  vertigo. No acute changes.e*    Osteoarthritis     wrist, knee, shoulder    Prostatitis     Rectal bleed     Trigger thumb      Past Surgical History:   Procedure Laterality Date    CYSTOSCOPY, TRANSURETHRAL RESECTION (TUR) PROSTATE, COMBINED N/A 2024    Procedure: Transurethral Resection of Prostate Abscess;  Surgeon: Bernabe Fletcher MD;  Location: [facility] OR    IR MISCELLANEOUS PROCEDURE   2009    IR MISCELLANEOUS PROCEDURE  2009    IR MISCELLANEOUS PROCEDURE  2009    IR MISCELLANEOUS PROCEDURE  2009    IR NEPHROURETERAL TUBE PLACEMENT BILATERAL  2009    IR URETER DILATION BILATERAL  2009    IR URETER DILATION BILATERAL  2009    KIDNEY STONE SURGERY      [facility]  3/6/2016          acetaminophen (TYLENOL) 500 MG tablet  allopurinol (ZYLOPRIM) 100 MG tablet  calcium carbonate (TUMS) 500 MG chewable tablet  cilostazol (PLETAL) 100 MG tablet  clobetasol propionate (TEMOVATE) 0.05 % external cream  Continuous Glucose  (FREESTYLE SUSANNAH 2 READER) TONIE  Continuous Glucose Sensor (FREESTYLE SUSANNAH 2 SENSOR) MISC  dextromethorphan-quiNIDine (NUEDEXTA) 20-10 MG capsule  DULoxetine (CYMBALTA) 30 MG capsule  ELIQUIS ANTICOAGULANT 5 MG tablet  Emollient (CERAVE MOISTURIZING) CREA  hydrOXYzine HCl (ATARAX) 25 MG tablet  insulin aspart (NOVOLOG FLEXPEN) 100 UNIT/ML pen  insulin glargine (LANTUS PEN) 100 UNIT/ML pen  insulin pen needle (BD PEN NEEDLE SALVATORE 2ND GEN) 32G X 4 MM miscellaneous  JANUVIA 50 MG tablet  meclizine (ANTIVERT) 12.5 MG tablet  metoprolol succinate ER (TOPROL XL) 50 MG 24 hr tablet  pantoprazole (PROTONIX) 40 MG EC tablet  polyethylene glycol (MIRALAX) 17 GM/Dose powder  QUEtiapine (SEROQUEL) 25 MG tablet  rosuvastatin (CRESTOR) 20 MG tablet  senna-docusate (STIMULANT LAXATIVE) 8.6-50 MG tablet  tamsulosin (FLOMAX) 0.4 MG capsule  traZODone (DESYREL) 50 MG tablet      Allergies   Allergen Reactions    Jardiance [Empagliflozin]      Had complicated uti on jardiance    Lisinopril Cough     Family History  Family History   Problem Relation Age of Onset    Cerebrovascular Disease Father     Cerebrovascular Disease Daughter      Social History   Social History     Tobacco Use    Smoking status: Former     Current packs/day: 0.00     Types: Cigarettes     Quit date: 3/10/2000     Years since quittin.2     Passive exposure: Never    Smokeless tobacco: Former     Tobacco comments:     no passive exposure   Vaping Use    Vaping status: Never Used   Substance Use Topics    Alcohol use: No    Drug use: No      Past medical history, past surgical history, medications, allergies, family history, and social history were reviewed with the patient. No additional pertinent items.   A medically appropriate review of systems was performed with pertinent positives and negatives noted in the HPI, and all other systems negative.    Physical Exam   BP: (!) 134/95  Pulse: 108  Temp: 97.4  F (36.3  C)  Resp: 18  SpO2: 93 %  Physical Exam  Vital Signs Reviewed  Gen: Well nourished, well developed, resting comfortably, no acute distress  HEENT: NC/AT, PERRL, EOMI, MMM  Neck: Supple, FROM  CV: Tachyardic  Lungs/Chest: Normal Effort  Abd: Non-distended, non-tender  MSK/Back: FROM, no visible deformity  Neuro: A&Ox3, GCS 15, CN II-XII unremarkable. Strength and sensation globally intact.  Skin: Warm, Dry, Intact, no visible lesions     ED Course, Procedures, & Data      Procedures                   Medications - No data to display       Critical care was not performed.     Medical Decision Making  The patient's presentation was of low complexity (an acute and uncomplicated illness or injury).    The patient's evaluation involved:  review of external note(s) from 1 sources (Clinic)    The patient's management necessitated moderate risk (Parrish catheter exchange).    Assessment & Plan    Adam Talamantes is a 85 year old male with a history of T2DM, HTN, HLD, CKD3, BPH s/p TURP with continuous parrish catheter, and nephrolithiasis who presents to the ED with concern for malfunctioning parrish catheter. Mild tachycardia which appears to be baseline. Mild HTN. Afebrile. Breathing easily.    Patient brought back from triage and parrish changed. Good function and drainage of clear yellow urine. Mild suprapubic discomfort improved after this.    Patient and son reporting all symptoms resolved. Dysfunction of  catheter was only brief. Low concern for need for labs or imaging today.    Discussed RTED precautions with patient and son. They are agreeable to discharge and return if sx worsen and/or follow-up in clinic.    I have reviewed the nursing notes. I have reviewed the findings, diagnosis, plan and need for follow up with the patient.    Discharge Medication List as of 6/20/2025 10:40 PM          Final diagnoses:   Malfunction of Dieog catheter, initial encounter       Maynor Bryson Jr., MD   [facility] EMERGENCY DEPARTMENT  6/20/2025     Maynor Bryson MD  06/21/25 [id number]

## 2025-06-23 ENCOUNTER — TELEPHONE (OUTPATIENT)
Dept: UROLOGY | Facility: CLINIC | Age: 85
End: 2025-06-23
Payer: COMMERCIAL

## 2025-06-23 ENCOUNTER — PATIENT OUTREACH (OUTPATIENT)
Dept: GERIATRIC MEDICINE | Facility: CLINIC | Age: 85
End: 2025-06-23
Payer: COMMERCIAL

## 2025-06-23 DIAGNOSIS — R33.9 URINARY RETENTION: Primary | ICD-10-CM

## 2025-06-23 RX ORDER — ROSUVASTATIN CALCIUM 20 MG/1
20 TABLET, COATED ORAL AT BEDTIME
Qty: 90 TABLET | Refills: 2 | Status: SHIPPED | OUTPATIENT
Start: 2025-06-23

## 2025-06-23 NOTE — PROGRESS NOTES
[facility] Care Coordination Contact    CC received notification of Emergency Room visit.  ER visit occurred on 06/20 at [facility] with Dx of Malfunction of parrish catheter.    CC contacted adult daughter Dimitri and reviewed discharge summary.  Member has a follow-up appointment with PCP: No: Offered Assistance with setting up a follow up appointment. Some concerns with catheter leaking. Dimitri is going to call the urology clinic today (06/23).  Member has had a change in condition: No  New referrals placed: No  Home Visit Needed: No  Support Plan reviewed and updated.    Spoke with Dimitri about possibly wanting guardianship. Dimitri believes POA is adequate with finances and medical decision making. Her and her brother went through the process already and signed all necessary documents for both of them to be POA. She does believe this works for them seeing sometimes [place] out of [place] and vice versa. CC did email her information on how to take the steps for guardianship - that may be a hard process for them seeing they would have to go through the court etc.    PCP notified of ED visit via EMR.

## 2025-06-23 NOTE — TELEPHONE ENCOUNTER
[facility] Call Center    Phone Message    May a detailed message be left on voicemail: yes     Reason for Call: Other: Patient's daughter called to discuss patient's catheter currently leaking around the tip of his penis. Patient was brought to the ER on Friday, 6/20/2025, for this reason. Please call back patient's daughter to discuss.     Action Taken: Message routed to:  [facility] Urology    Travel Screening: Not Applicable

## 2025-06-24 DIAGNOSIS — R33.9 URINARY RETENTION: Primary | ICD-10-CM

## 2025-06-24 NOTE — PROGRESS NOTES
Message received form Sanya Yadav affirming every 2 weeks (bi-weekly) catheter exchange order. Order placed. Call placed to patient's daughter to let her know this and to also inform her the [facility] can't take her father at this time due to low staffing. Advised that she look up local home care agencies and let us know whom they would like to go with and we can send the referral and orders. She voices understanding.      Thank you,  Nicole Huitron RN, BSN   Urology Triage Nurse

## 2025-06-25 ENCOUNTER — PATIENT OUTREACH (OUTPATIENT)
Dept: GERIATRIC MEDICINE | Facility: CLINIC | Age: 85
End: 2025-06-25
Payer: COMMERCIAL

## 2025-06-25 NOTE — PROGRESS NOTES
[facility] Care Coordination Contact    Faxed SNV order to three different agencies. Pending response.    Kristi Calzada RN  [facility]  [phone]

## 2025-07-01 ENCOUNTER — PRE VISIT (OUTPATIENT)
Dept: UROLOGY | Facility: CLINIC | Age: 85
End: 2025-07-01
Payer: COMMERCIAL

## 2025-07-01 DIAGNOSIS — Z46.6 URINARY CATHETER (FOLEY) CHANGE REQUIRED: ICD-10-CM

## 2025-07-01 DIAGNOSIS — R33.9 URINARY RETENTION: Primary | ICD-10-CM

## 2025-07-01 RX ORDER — SULFAMETHOXAZOLE AND TRIMETHOPRIM 800; 160 MG/1; MG/1
1 TABLET ORAL ONCE
Status: ACTIVE | OUTPATIENT
Start: 2025-07-07

## 2025-07-01 NOTE — TELEPHONE ENCOUNTER
Reason for nurse visit: catheter exchange    Ordering provider: Sanya Yadav PA-C    Last seen by provider/date of last order: Last seen by provider: Last seen by Sanya Yadav PA-C 10/22/24 and last seen in hospital by Ava Batista PA-C 4/21/25.      Recurring order entered: yes     Relevant information/diagnosis: urinary retention    Supplies needed:   Catheter location: urethral  Catheter material: latex  Catheter size: 16 Fr  Catheter type: straight    Transfer assistance needed: patient is in a wheelchair but is able to transfer to the chair with minimal assistance       Medication to be given:   MEDICATION: BACTRIM  ROUTE: PO  DOSE: 800mg/160mg      --  Patrice Rodriguez, EMT on 7/1/2025 at 12:30 PM

## 2025-07-07 ENCOUNTER — OFFICE VISIT (OUTPATIENT)
Dept: UROLOGY | Facility: CLINIC | Age: 85
End: 2025-07-07
Payer: COMMERCIAL

## 2025-07-07 DIAGNOSIS — R33.9 URINARY RETENTION: Primary | ICD-10-CM

## 2025-07-07 PROCEDURE — 99207 PR NO CHARGE NURSE ONLY: CPT | Performed by: STUDENT IN AN ORGANIZED HEALTH CARE EDUCATION/TRAINING PROGRAM

## 2025-07-07 PROCEDURE — 51702 INSERT TEMP BLADDER CATH: CPT | Performed by: STUDENT IN AN ORGANIZED HEALTH CARE EDUCATION/TRAINING PROGRAM

## 2025-07-07 RX ORDER — SULFAMETHOXAZOLE AND TRIMETHOPRIM 800; 160 MG/1; MG/1
1 TABLET ORAL ONCE
Status: COMPLETED | OUTPATIENT
Start: 2025-07-07 | End: 2025-07-07

## 2025-07-07 RX ADMIN — SULFAMETHOXAZOLE AND TRIMETHOPRIM 1 TABLET: 800; 160 TABLET ORAL at 10:52

## 2025-07-07 NOTE — TELEPHONE ENCOUNTER
Another request came in today 07/07/25@ 3:48pm for incoming fax will place it in Dr.Letts ag folder

## 2025-07-07 NOTE — PROGRESS NOTES
Adam Talamantes comes into clinic today at the request of mic Yadav PA-C with the diagnosis of Urinary Retention for a catheter exchange.    Order has been verified: Yes -10/16/24    Allergies   Allergen Reactions    Jardiance [Empagliflozin]      Had complicated uti on jardiance    Lisinopril Cough       The following medication was given:     MEDICATION: Bactrim (Trimethoprim / Sulfamethoxazole)  ROUTE: PO  SITE: Medication was given orally  DOSE: 800mg/160mg  LOT #: [id number]  : [employer]  EXPIRATION DATE: 2027/03  NDC#: 7586-3768-85   Was there drug waste? No    Prior to medication administration, verified patient identity using patient's name and date of birth.  Due to medication administration, patient instructed to remain in clinic for 15 minutes  afterwards, and to report any adverse reaction to me immediately.    Removal:  16 Fr straight tipped latex parrish catheter removed from urethral meatus without difficulty after removing 8 mL of fluid from the balloon.    Insertion:  16 Fr straight tipped latex parrish catheter inserted into urethral meatus in the usual sterile fashion without difficulty.  Received > 10 ml yellow positive urine return.   Balloon filled with 10 mL sterile H2O after positive urine return.  Catheter secured in place with leg strap: Yes.     Patient did tolerate procedure well.     Patient instructed as to where to call or go for pain, fever, leakage, or decreased urine flow.     This service provided today was under the direct supervision of Mic Yadav PA-C, who was available if needed.    Nicolas Soto, EMT-P   7/7/2025  10:52 AM

## 2025-07-14 ENCOUNTER — LAB (OUTPATIENT)
Dept: LAB | Facility: CLINIC | Age: 85
End: 2025-07-14
Payer: COMMERCIAL

## 2025-07-14 ENCOUNTER — OFFICE VISIT (OUTPATIENT)
Dept: PHARMACY | Facility: CLINIC | Age: 85
End: 2025-07-14
Payer: COMMERCIAL

## 2025-07-14 ENCOUNTER — VIRTUAL VISIT (OUTPATIENT)
Dept: INTERPRETER SERVICES | Facility: CLINIC | Age: 85
End: 2025-07-14

## 2025-07-14 VITALS
WEIGHT: 165.25 LBS | DIASTOLIC BLOOD PRESSURE: 76 MMHG | BODY MASS INDEX: 31.22 KG/M2 | SYSTOLIC BLOOD PRESSURE: 110 MMHG | HEART RATE: 108 BPM | OXYGEN SATURATION: 91 %

## 2025-07-14 DIAGNOSIS — K59.00 CONSTIPATION, UNSPECIFIED CONSTIPATION TYPE: ICD-10-CM

## 2025-07-14 DIAGNOSIS — E11.65 TYPE 2 DIABETES MELLITUS WITH HYPERGLYCEMIA, WITHOUT LONG-TERM CURRENT USE OF INSULIN (H): Primary | ICD-10-CM

## 2025-07-14 DIAGNOSIS — I10 ESSENTIAL HYPERTENSION WITH GOAL BLOOD PRESSURE LESS THAN 140/90: ICD-10-CM

## 2025-07-14 DIAGNOSIS — E78.5 DYSLIPIDEMIA: ICD-10-CM

## 2025-07-14 DIAGNOSIS — E11.65 TYPE 2 DIABETES MELLITUS WITH HYPERGLYCEMIA, WITHOUT LONG-TERM CURRENT USE OF INSULIN (H): ICD-10-CM

## 2025-07-14 DIAGNOSIS — K21.9 GASTROESOPHAGEAL REFLUX DISEASE, UNSPECIFIED WHETHER ESOPHAGITIS PRESENT: ICD-10-CM

## 2025-07-14 DIAGNOSIS — M1A.9XX0 CHRONIC GOUTY ARTHROPATHY: ICD-10-CM

## 2025-07-14 DIAGNOSIS — G47.00 INSOMNIA, UNSPECIFIED TYPE: ICD-10-CM

## 2025-07-14 DIAGNOSIS — R09.89 CHEST CONGESTION: ICD-10-CM

## 2025-07-14 LAB
CHOLEST SERPL-MCNC: 139 MG/DL
EST. AVERAGE GLUCOSE BLD GHB EST-MCNC: 214 MG/DL
FASTING STATUS PATIENT QL REPORTED: NO
HBA1C MFR BLD: 9.1 % (ref 0–5.6)
HDLC SERPL-MCNC: 32 MG/DL
LDLC SERPL CALC-MCNC: 60 MG/DL
NONHDLC SERPL-MCNC: 107 MG/DL
TRIGL SERPL-MCNC: 236 MG/DL

## 2025-07-14 PROCEDURE — 36415 COLL VENOUS BLD VENIPUNCTURE: CPT

## 2025-07-14 PROCEDURE — 83036 HEMOGLOBIN GLYCOSYLATED A1C: CPT

## 2025-07-14 PROCEDURE — 99607 MTMS BY PHARM ADDL 15 MIN: CPT | Performed by: PHARMACIST

## 2025-07-14 PROCEDURE — 3078F DIAST BP <80 MM HG: CPT | Performed by: PHARMACIST

## 2025-07-14 PROCEDURE — 3074F SYST BP LT 130 MM HG: CPT | Performed by: PHARMACIST

## 2025-07-14 PROCEDURE — 3046F HEMOGLOBIN A1C LEVEL >9.0%: CPT | Performed by: PHARMACIST

## 2025-07-14 PROCEDURE — 99606 MTMS BY PHARM EST 15 MIN: CPT | Performed by: PHARMACIST

## 2025-07-14 PROCEDURE — T1013 SIGN LANG/ORAL INTERPRETER: HCPCS | Mod: U4 | Performed by: INTERPRETER

## 2025-07-14 PROCEDURE — 80061 LIPID PANEL: CPT

## 2025-07-14 RX ORDER — POLYETHYLENE GLYCOL 3350 17 G/17G
17 POWDER, FOR SOLUTION ORAL DAILY
Qty: 510 G | Refills: 2 | Status: SHIPPED | OUTPATIENT
Start: 2025-07-14

## 2025-07-14 RX ORDER — GUAIFENESIN 600 MG/1
1200 TABLET, EXTENDED RELEASE ORAL 2 TIMES DAILY PRN
Qty: 30 TABLET | Refills: 1 | Status: SHIPPED | OUTPATIENT
Start: 2025-07-14

## 2025-07-14 RX ORDER — INSULIN ASPART 100 [IU]/ML
INJECTION, SOLUTION INTRAVENOUS; SUBCUTANEOUS
Qty: 30 ML | Refills: 3 | Status: SHIPPED | OUTPATIENT
Start: 2025-07-14

## 2025-07-14 NOTE — Clinical Note
"Patient is still struggling with \"talking to himself a lot\" despite starting quetiapine 25 mg daily. Today I told daughter to give him 1 tab twice daily and report back in 1-2 weeks if symptoms not resolved  2. He is congested and has a lot of mucous, I told him to take guaifenesin for this and sent in prescription.   3. I also slightly increased his insulin since his morning sugars are still high.   Charly"

## 2025-07-14 NOTE — PATIENT INSTRUCTIONS
"Recommendations from today's MTM visit:                                                         Increase dose: Novolog 20 before breakfast, 6 units before lunch and dinner   Quetiapine 25 mg twice daily       Follow-up: Return in about 5 weeks (around 8/19/2025) for With PharmD.    It was great speaking with you today.  I value your experience and would be very thankful for your time in providing feedback in our clinic survey. In the next few days, you may receive an email or text message from [employer] with a link to a survey related to your  clinical pharmacist.\"     To schedule another MTM appointment, please call the clinic directly or you may call the MTM scheduling line at [phone].    My Clinical Pharmacist's contact information:                                                      Please feel free to contact me with any questions or concerns you have.      Pushpa Philip, PharmD, BCACP  Medication Therapy Management Pharmacist    "

## 2025-07-14 NOTE — PROGRESS NOTES
Medication Therapy Management (MTM) Encounter    ASSESSMENT:                            Medication Adherence/Access: No issues identified.    1. Type 2 diabetes mellitus with hyperglycemia, without long-term current use of insulin (H) (Primary)  A1c not at goal <8%. Patient is not meeting goal of > 50% time in target with continuous glucose monitoring. Reasonable to maintain lower dose januvia given fluctuating renal function.  Do not recommend SGLT2 inhibitor given complicated UTI with Jardiance in the past.  Offered to patient the option to switch Januvia to GLP-1 agonist, though patient declines.  Therefore, patient would benefit from increased dose of morning NovoLog today to avoid postprandial elevations.    2. Essential hypertension with goal blood pressure less than 140/90  BP at goal <140/90 mmHg.  Patient not currently taking ACE or ARB, could consider adding low-dose ARB in the future for kidney protection given microalbuminuria, though will avoid making this change today due to other changes.    3. Dyslipidemia  Patient due for annual lipid monitoring.    4. Constipation, unspecified constipation type  Continue as needed MiraLAX, refill sent.    5. Insomnia, unspecified type  For now, encouraged patient to take quetiapine twice daily as prescribed for the next 1 to 2 weeks, family to call clinic if symptoms unimproved.    6. Gastroesophageal reflux disease, unspecified whether esophagitis present  Stable.    7. Chronic gouty arthropathy  Stable.      8. Chest congestion  Patient would benefit from adding Mucinex twice daily as needed for symptoms.  Notified patient that this may need to be purchased over-the-counter.    PLAN:                            Increase dose: Novolog 20 before breakfast, 6 units before lunch and dinner   Quetiapine 25 mg twice daily for 2 weeks  Check in with clinic if symptoms persist  Start Guaifenesin 600 mg 1-2 tablets twice daily as needed for congestion  May need to purchase  OTC    Medication issues to be addressed at a future visit:   ACE/ARB?    Follow-up: Return in about 5 weeks (around 8/19/2025) for With PharmD.    SUBJECTIVE/OBJECTIVE:                          Adam Talamantes is a 85 year old male seen for a follow-up visit. Patient was accompanied by daughters. Patient seen with  .      Reason for visit: MTM follow up.    Allergies/ADRs: Reviewed in chart  Past Medical History: Reviewed in chart  Tobacco: He reports that he quit smoking about 25 years ago. His smoking use included cigarettes. He has never been exposed to tobacco smoke. He has quit using smokeless tobacco.  Alcohol: none    Medication Adherence/Access: His daughter, Dimitri Talamantes, helps him with medications, set up in twice daily pillbox. Reports no missed medication doses.   Brings with medication vials today, no pillbox, and meter.    Diabetes   Lantus 30 units daily at noon   Novolog 16 units before breakfast, 6 units before lunch and dinner (8 am, noon, 5 pm)   Januvia 50 mg daily in AM   Patient is not experiencing side effects.  Daughter helps with insulin injections, patient unable to do this on his own, reports no missed doses.   Patient prefers NOT to try GLP-1 at this time due to concern for low appetite.   Current diabetes symptoms: No symptoms reported.   Diet: Eating 3 meals per day,  reports eating smaller portions. Taking glucerna 1 bottle daily. Daughter already worried about his lack of appetite at this time (only about once a week does he refuse a meal), therefore doesn't want GLP-1. Glucerna is the only thing that makes him happy and want to eat not willing to cut back on this.   Breakfast 8 am: Ensure (glucerna) 1/2 bottle, rice, soup, veggies, milk  Lunch noon: Ensure (glucerna) 1/2 bottle, rice, soup veggies, milk  Dinner 5 pm: Rice, soup, veggies, fruit  Exercise: Patient not able to move as much as before  Med Hx: Jardiance (UTI, stopped inpatient)     Blood sugar monitoring: Continuous  Glucose Monitoring with Ray 2; see below.  Date/time incorrect, updated in reader today.       Eye exam is due.   Foot exam: due    Hypertension   Metoprolol succinate 100 mg - 1/2 tablet (50 mg) daily    Meclizine 12.5 mg 3 times daily as needed - about once weekly  Patient reports no current medication side effects  Patient does not self-monitor blood pressure.     Hyperlipidemia /CVA  Rosuvastatin 20 mg daily  Eliquis 5 mg twice daily  Cilostazol 100 mg twice daily   Patient reports no significant myalgias or other side effects. Signs of bleeding have stopped.   Med Hx: aspirin (hematuria, switched to cilostazol per Dr. Jackson)  The ASCVD Risk score (Cat DK, et al., 2019) failed to calculate for the following reasons:    The 2019 ASCVD risk score is only valid for ages 40 to 79    Risk score cannot be calculated because patient has a medical history suggesting prior/existing ASCVD     Constipation  Senna/Docusate sodium 8.6/100 mg 2 tablets twice daily as needed  Miralax 17 g daily as needed - wanting refill today  Having BM daily. No concerns at this time.     Insomnia/Depression/Itching  Duloxetine 30 mg in the morning, 60 mg in the evening   Trazodone 50 mg 1-2 tab daily at bedtime - taking as needed  Hydroxyzine 25 mg every 8 hours as needed for itching/sleep - taking as needed  Nuedexta (dextromethorphan/ quinidine) 20/10 mg twice daily (per neurology)  Quetiapine 25 mg daily at bedtime  States that when he started the quetiapine it worked for 3 days, then after that has not been working anymore. Less agitation now, but just talking to himself all the time.  Sometimes will talk to himself until 3-4 am. States that this has been bothering his sleep lately, otherwise feels that other medications are still helpful for his sleep most of the time.     GERD    Pantoprazole 40 mg mg once daily  Tums as needed - still has this at home to use as needed  Patient reports no current symptoms. About three times  weekly taking Tums.     Gout  Allopurinol 200 mg daily   Reports no concerns today.    Congestion  Patient has been coughing and had mucous concerns lately.   Not taking anything for this.     Today's Vitals: /76   Pulse 108   Wt 165 lb 4 oz (75 kg)   SpO2 (!) 91%   BMI 31.22 kg/m    ----------------  Post Discharge Medication Reconciliation Status: discharge medications reconciled and changed, per note/orders.    I spent 60 minutes with this patient today. All changes were made via collaborative practice agreement with Chucho Espino MD.     A summary of these recommendations was given to the patient.    Pushpa Philip, PharmD, BCACP  Medication Therapy Management Pharmacist     Medication Therapy Recommendations  Chest congestion   1 Current Medication: guaiFENesin (MUCINEX) 600 MG 12 hr tablet   Current Medication Sig: Take 2 tablets (1,200 mg) by mouth 2 times daily as needed for congestion.   Rationale: Untreated condition - Needs additional medication therapy - Indication   Recommendation: Start Medication   Status: Accepted per CPA   Identified Date: 7/14/2025 Completed Date: 7/14/2025         Insomnia, unspecified type   1 Current Medication: QUEtiapine (SEROQUEL) 25 MG tablet   Current Medication Sig: TAKE 1 TABLET(25 MG) BY MOUTH EVERY 12 HOURS AS NEEDED FOR AGITATION   Rationale: Does not understand instructions - Adherence - Adherence   Recommendation: Provide Adherence Intervention   Status: Patient Agreed - Adherence/Education   Identified Date: 7/14/2025 Completed Date: 7/14/2025         Type 2 diabetes mellitus with chronic kidney disease, with long-term current use of insulin, unspecified CKD stage (H)   1 Current Medication: insulin aspart (NOVOLOG FLEXPEN) 100 UNIT/ML pen (Discontinued)   Current Medication Sig: Inject 16 units before breakfast, and 6 units under the skin before lunch and dinner   Rationale: Dose too low - Dosage too low - Effectiveness   Recommendation: Increase  Dose   Status: Accepted per CPA   Identified Date: 7/14/2025 Completed Date: 7/14/2025

## 2025-07-16 ENCOUNTER — TELEPHONE (OUTPATIENT)
Dept: UROLOGY | Facility: CLINIC | Age: 85
End: 2025-07-16
Payer: COMMERCIAL

## 2025-07-16 NOTE — TELEPHONE ENCOUNTER
[facility] Call Center    Phone Message    May a detailed message be left on voicemail: yes     Reason for Call: Other: Pt's daughter called saying pt's cath is leaking again from his penis. Please call [place] back. Thanks.     Action Taken: Message routed to:  [facility] UROLOGY    Travel Screening: Not Applicable     Date of Service:

## 2025-07-16 NOTE — TELEPHONE ENCOUNTER
Called back to Dimitri and reviewed pt's bypassing urine since last night. Dimitri has not tried irrigation yet, has supplies at home. I encouraged her to try irrigating parrish catheter to remove mucus/sediment that may be blocking drainage. Dimitri states that she irrigates pt's parrish 2-3 times per week. Scheduled pt for [place] tomorrow for troubleshooting parrish, possible exchange.     Reviewing with Hallie Carrera's team the potential of increasing frequency of irrigation to daily and PRN to avoid blockages/bypassing urine. Dimitri also asks if bladder spasm medication would be helpful. Pt has NV for exchanges scheduled q2 weeks d/t bypassing in the past.       Gera VENTURA RN  Urology Triage

## 2025-07-16 NOTE — TELEPHONE ENCOUNTER
Will discuss irrigation daily with pt and daughter when in tomorrow for nurse visit.     Rhonda Spaulding  RN Urology  Phone: [phone]

## 2025-07-17 ENCOUNTER — ALLIED HEALTH/NURSE VISIT (OUTPATIENT)
Dept: UROLOGY | Facility: CLINIC | Age: 85
End: 2025-07-17
Payer: COMMERCIAL

## 2025-07-17 ENCOUNTER — TELEPHONE (OUTPATIENT)
Dept: FAMILY MEDICINE | Facility: CLINIC | Age: 85
End: 2025-07-17

## 2025-07-17 DIAGNOSIS — E11.22 TYPE 2 DIABETES MELLITUS WITH CHRONIC KIDNEY DISEASE, WITH LONG-TERM CURRENT USE OF INSULIN, UNSPECIFIED CKD STAGE (H): ICD-10-CM

## 2025-07-17 DIAGNOSIS — Z46.6 URINARY CATHETER (FOLEY) CHANGE REQUIRED: Primary | ICD-10-CM

## 2025-07-17 DIAGNOSIS — Z79.4 TYPE 2 DIABETES MELLITUS WITH CHRONIC KIDNEY DISEASE, WITH LONG-TERM CURRENT USE OF INSULIN, UNSPECIFIED CKD STAGE (H): ICD-10-CM

## 2025-07-17 RX ORDER — INSULIN GLARGINE 100 [IU]/ML
INJECTION, SOLUTION SUBCUTANEOUS
Qty: 30 ML | Refills: 0 | Status: SHIPPED | OUTPATIENT
Start: 2025-07-17

## 2025-07-17 RX ORDER — SULFAMETHOXAZOLE AND TRIMETHOPRIM 800; 160 MG/1; MG/1
1 TABLET ORAL ONCE
Status: COMPLETED | OUTPATIENT
Start: 2025-07-17 | End: 2025-07-17

## 2025-07-17 RX ADMIN — SULFAMETHOXAZOLE AND TRIMETHOPRIM 1 TABLET: 800; 160 TABLET ORAL at 15:35

## 2025-07-17 NOTE — PROGRESS NOTES
Adam Talamantes comes into clinic today at the request of Sanya Yadav PA-C with the diagnosis of urinary retention for a catheter change.    Order has been verified: Yes.    The following medication was given:     The following medication was given:     MEDICATION: Bactrim DS (Trimethoprim/Sulfamethoxazole)  ROUTE: PO  SITE: Medication was given orally   DOSE: 800mg/160mg  LOT #: [id number]  : [employer]  EXPIRATION DATE: 03/2027  NDC#: 8546-7998-81   Was there drug waste? No    Prior to medication administration, verified patient identity using patient's name and date of birth.  Due to medication administration, patient instructed to remain in clinic for 15 minutes  afterwards, and to report any adverse reaction to me immediately.      Allergies   Allergen Reactions    Jardiance [Empagliflozin]      Had complicated uti on jardiance    Lisinopril Cough       Removal:  16 Fr straight tipped latex parrish catheter removed from urethral meatus without difficulty after removing 9 mL of fluid from the balloon.    Insertion:  16 Fr Coude tipped latex parrish catheter inserted into urethral meatus in the usual sterile fashion with some difficulty and pt tensing.  Received > 0 ml , Irrigation with saline, return clear positive urine return.   Balloon filled with 10 mL sterile H2O after positive urine return.  Catheter secured in place with leg strap: Yes.     Patient did tolerate procedure well.     Patient instructed as to where to call or go for pain, fever, leakage, or decreased urine flow.     This service provided today was under the direct supervision of Dr. Shari Marx, who was available if needed.    Rhonda Spaulding RN   7/17/2025  3:40 PM

## 2025-07-17 NOTE — TELEPHONE ENCOUNTER
Patient Quality Outreach    Patient is due for the following:   Diabetes -  A1C, Eye Exam, Diabetic Follow-Up Visit, and Foot Exam  Physical Annual Wellness Visit    Action(s) Taken:   Patient has upcoming appointment, these items will be addressed at that time.    Type of outreach:    Chart review performed, no outreach needed.    Questions for provider review:    None         Marla Herrera MA  Chart routed to None.

## 2025-07-30 ENCOUNTER — ALLIED HEALTH/NURSE VISIT (OUTPATIENT)
Dept: UROLOGY | Facility: CLINIC | Age: 85
End: 2025-07-30
Payer: COMMERCIAL

## 2025-07-30 ENCOUNTER — TELEPHONE (OUTPATIENT)
Dept: UROLOGY | Facility: CLINIC | Age: 85
End: 2025-07-30

## 2025-07-30 DIAGNOSIS — R33.9 URINARY RETENTION: ICD-10-CM

## 2025-07-30 DIAGNOSIS — N32.89 BLADDER SPASMS: Primary | ICD-10-CM

## 2025-07-30 DIAGNOSIS — Z46.6 URINARY CATHETER (FOLEY) CHANGE REQUIRED: Primary | ICD-10-CM

## 2025-07-30 RX ORDER — MIRABEGRON 25 MG/1
25 TABLET, FILM COATED, EXTENDED RELEASE ORAL DAILY
Qty: 90 TABLET | Refills: 3 | Status: SHIPPED | OUTPATIENT
Start: 2025-07-30

## 2025-07-30 RX ADMIN — SULFAMETHOXAZOLE AND TRIMETHOPRIM 1 TABLET: 800; 160 TABLET ORAL at 11:50

## 2025-07-30 NOTE — TELEPHONE ENCOUNTER
Call placed to patient's daughter. She states that she is wondering if anyone spoke to a provider about a bladder spasm medication for her father. They have an appointment for a catheter change today and I advised that they speak with Francesca JEAN RNCC about this when they are at that appointment. Daughter voices understanding. Message sent to KANDI Amador, about this also.      Thank you,  Nicole Huitron RN, BSN   Urology Triage Nurse

## 2025-07-30 NOTE — PROGRESS NOTES
Call placed to patient's daughter after communicating with Sanya Yadav. Mirabegron 25 mg daily order sent. She will call if this is cost prohibitive and, if so, will send trospium  60 mg daily instead.      Thank you,  Nicole Huitron RN, BSN   Urology Triage Nurse

## 2025-07-30 NOTE — TELEPHONE ENCOUNTER
[facility] Call Center    Phone Message    May a detailed message be left on voicemail: yes     Reason for Call: Other: Pt's daughter said his cathter is leaking and she also knows pt has appointment today. Please call daughter back. Thanks.     Action Taken: Message routed to:  [facility] ([facility]): UROLOGY    Travel Screening: Not Applicable     Date of Service:

## 2025-07-31 RX ORDER — SULFAMETHOXAZOLE AND TRIMETHOPRIM 800; 160 MG/1; MG/1
1 TABLET ORAL ONCE
Status: ACTIVE | OUTPATIENT
Start: 2025-07-30

## 2025-07-31 NOTE — PROGRESS NOTES
Adam Talamantes comes into clinic today at the request of Mic Yadav PA-C  with the diagnosis of Retention for a catheter Change.    Order has been verified: Yes.    The following medication was given:     The following medication was given:     MEDICATION: Bactrim DS (Trimethoprim/Sulfamethoxazole)  ROUTE: PO  SITE: Medication was given orally   DOSE: 800mg/160mg  LOT #: [id number]  : [employer]  EXPIRATION DATE: 3/2027  NDC#: 3832-0478-83   Was there drug waste? No    Prior to medication administration, verified patient identity using patient's name and date of birth.  Due to medication administration, patient instructed to remain in clinic for 15 minutes  afterwards, and to report any adverse reaction to me immediately.      Allergies   Allergen Reactions    Jardiance [Empagliflozin]      Had complicated uti on jardiance    Lisinopril Cough       Removal:  16 Fr Coude tipped latex parrish catheter removed from urethral meatus without difficulty after removing 8.5 mL of fluid from the balloon.    Insertion:  16 Fr Coude tipped latex parrish catheter inserted into urethral meatus in the usual sterile fashion without difficulty.  Received > 20 ml yellow positive urine return.   Balloon filled with 10 mL sterile H2O after positive urine return.  Catheter secured in place with leg strap: Yes.     Patient did tolerate procedure well.     Patient instructed as to where to call or go for pain, fever, leakage, or decreased urine flow.     This service provided today was under the direct supervision of Dr. Bernabe Fletcher, who was available if needed.    Rhonda Spaulding RN   7/31/2025  10:36 AM

## 2025-08-11 ENCOUNTER — OFFICE VISIT (OUTPATIENT)
Dept: UROLOGY | Facility: CLINIC | Age: 85
End: 2025-08-11
Payer: COMMERCIAL

## 2025-08-11 DIAGNOSIS — R33.9 URINARY RETENTION: ICD-10-CM

## 2025-08-11 PROCEDURE — 51702 INSERT TEMP BLADDER CATH: CPT | Performed by: STUDENT IN AN ORGANIZED HEALTH CARE EDUCATION/TRAINING PROGRAM

## 2025-08-11 RX ADMIN — SULFAMETHOXAZOLE AND TRIMETHOPRIM 1 TABLET: 800; 160 TABLET ORAL at 11:36

## 2025-08-19 ENCOUNTER — OFFICE VISIT (OUTPATIENT)
Dept: PHARMACY | Facility: CLINIC | Age: 85
End: 2025-08-19
Payer: COMMERCIAL

## 2025-08-19 VITALS — HEART RATE: 100 BPM | OXYGEN SATURATION: 94 % | DIASTOLIC BLOOD PRESSURE: 72 MMHG | SYSTOLIC BLOOD PRESSURE: 118 MMHG

## 2025-08-19 DIAGNOSIS — E11.22 TYPE 2 DIABETES MELLITUS WITH CHRONIC KIDNEY DISEASE, WITH LONG-TERM CURRENT USE OF INSULIN, UNSPECIFIED CKD STAGE (H): ICD-10-CM

## 2025-08-19 DIAGNOSIS — Z79.4 TYPE 2 DIABETES MELLITUS WITH CHRONIC KIDNEY DISEASE, WITH LONG-TERM CURRENT USE OF INSULIN, UNSPECIFIED CKD STAGE (H): ICD-10-CM

## 2025-08-19 PROCEDURE — 99606 MTMS BY PHARM EST 15 MIN: CPT | Performed by: PHARMACIST

## 2025-08-19 PROCEDURE — 99607 MTMS BY PHARM ADDL 15 MIN: CPT | Performed by: PHARMACIST

## 2025-08-19 RX ORDER — INSULIN GLARGINE 100 [IU]/ML
34 INJECTION, SOLUTION SUBCUTANEOUS DAILY
Qty: 45 ML | Refills: 3 | Status: SHIPPED | OUTPATIENT
Start: 2025-08-19

## 2025-08-19 RX ORDER — KETOROLAC TROMETHAMINE 30 MG/ML
1 INJECTION, SOLUTION INTRAMUSCULAR; INTRAVENOUS ONCE
Qty: 1 EACH | Refills: 0 | Status: SHIPPED | OUTPATIENT
Start: 2025-08-19 | End: 2025-08-19

## 2025-08-19 RX ORDER — HYDROCHLOROTHIAZIDE 12.5 MG/1
CAPSULE ORAL
Qty: 6 EACH | Refills: 3 | Status: SHIPPED | OUTPATIENT
Start: 2025-08-19

## 2025-08-25 ENCOUNTER — OFFICE VISIT (OUTPATIENT)
Dept: UROLOGY | Facility: CLINIC | Age: 85
End: 2025-08-25
Payer: COMMERCIAL

## 2025-08-25 DIAGNOSIS — R33.9 URINARY RETENTION: ICD-10-CM

## 2025-08-25 PROCEDURE — 51702 INSERT TEMP BLADDER CATH: CPT | Performed by: STUDENT IN AN ORGANIZED HEALTH CARE EDUCATION/TRAINING PROGRAM

## 2025-08-25 RX ADMIN — SULFAMETHOXAZOLE AND TRIMETHOPRIM 1 TABLET: 800; 160 TABLET ORAL at 10:35

## (undated) DEVICE — SYR 10ML FINGER CONTROL W/O NDL 309695

## (undated) DEVICE — TEST TUBE W/SCREW CAP 17361

## (undated) DEVICE — JELLY LUBRICATING SURGILUBE 2OZ TUBE

## (undated) DEVICE — DEVICE CATH STABILIZATION STATLOCK FOLEY 3-WAY FOL0105

## (undated) DEVICE — CATH FOLEY 3WAY 22FR 30ML LATEX 0167SI22

## (undated) DEVICE — PACK CYSTO UMMC CUSTOM

## (undated) DEVICE — LINEN TOWEL PACK X5 5464

## (undated) DEVICE — ESU GROUND PAD ADULT W/CORD E7507

## (undated) DEVICE — SOL NACL 0.9% IRRIG 3000ML BAG 2B7477

## (undated) DEVICE — EVACUATOR BLADDER UROVAC LATEX M0067301250

## (undated) DEVICE — SYR PISTON URETHRAL 60ML 68000

## (undated) DEVICE — ESU ELEC CUTTING LOOP 24/26FR .35MM BIPOLAR 27040GP1-S

## (undated) DEVICE — SUCTION MANIFOLD NEPTUNE 2 SYS 4 PORT 0702-020-000

## (undated) DEVICE — PAD CHUX UNDERPAD 23X24" 7136

## (undated) RX ORDER — PROPOFOL 10 MG/ML
INJECTION, EMULSION INTRAVENOUS
Status: DISPENSED
Start: 2024-05-16

## (undated) RX ORDER — SULFAMETHOXAZOLE AND TRIMETHOPRIM 800; 160 MG/1; MG/1
TABLET ORAL
Status: DISPENSED
Start: 2025-03-31

## (undated) RX ORDER — SULFAMETHOXAZOLE AND TRIMETHOPRIM 800; 160 MG/1; MG/1
TABLET ORAL
Status: DISPENSED
Start: 2025-03-03

## (undated) RX ORDER — SULFAMETHOXAZOLE AND TRIMETHOPRIM 800; 160 MG/1; MG/1
TABLET ORAL
Status: DISPENSED
Start: 2025-07-17

## (undated) RX ORDER — SULFAMETHOXAZOLE AND TRIMETHOPRIM 800; 160 MG/1; MG/1
TABLET ORAL
Status: DISPENSED
Start: 2025-07-07

## (undated) RX ORDER — SULFAMETHOXAZOLE AND TRIMETHOPRIM 800; 160 MG/1; MG/1
TABLET ORAL
Status: DISPENSED
Start: 2025-08-11

## (undated) RX ORDER — DEXAMETHASONE SODIUM PHOSPHATE 4 MG/ML
INJECTION, SOLUTION INTRA-ARTICULAR; INTRALESIONAL; INTRAMUSCULAR; INTRAVENOUS; SOFT TISSUE
Status: DISPENSED
Start: 2024-05-16

## (undated) RX ORDER — SULFAMETHOXAZOLE AND TRIMETHOPRIM 800; 160 MG/1; MG/1
TABLET ORAL
Status: DISPENSED
Start: 2025-07-30

## (undated) RX ORDER — SULFAMETHOXAZOLE/TRIMETHOPRIM 800-160 MG
TABLET ORAL
Status: DISPENSED
Start: 2024-09-18

## (undated) RX ORDER — ONDANSETRON 2 MG/ML
INJECTION INTRAMUSCULAR; INTRAVENOUS
Status: DISPENSED
Start: 2024-05-16

## (undated) RX ORDER — FENTANYL CITRATE 50 UG/ML
INJECTION, SOLUTION INTRAMUSCULAR; INTRAVENOUS
Status: DISPENSED
Start: 2024-05-16

## (undated) RX ORDER — SULFAMETHOXAZOLE/TRIMETHOPRIM 800-160 MG
TABLET ORAL
Status: DISPENSED
Start: 2024-09-26

## (undated) RX ORDER — SULFAMETHOXAZOLE/TRIMETHOPRIM 800-160 MG
TABLET ORAL
Status: DISPENSED
Start: 2024-06-25

## (undated) RX ORDER — SULFAMETHOXAZOLE AND TRIMETHOPRIM 800; 160 MG/1; MG/1
TABLET ORAL
Status: DISPENSED
Start: 2024-10-22

## (undated) RX ORDER — SULFAMETHOXAZOLE/TRIMETHOPRIM 800-160 MG
TABLET ORAL
Status: DISPENSED
Start: 2024-07-22

## (undated) RX ORDER — SULFAMETHOXAZOLE AND TRIMETHOPRIM 800; 160 MG/1; MG/1
TABLET ORAL
Status: DISPENSED
Start: 2025-01-22

## (undated) RX ORDER — SULFAMETHOXAZOLE AND TRIMETHOPRIM 800; 160 MG/1; MG/1
TABLET ORAL
Status: DISPENSED
Start: 2025-08-25

## (undated) RX ORDER — SULFAMETHOXAZOLE AND TRIMETHOPRIM 800; 160 MG/1; MG/1
TABLET ORAL
Status: DISPENSED
Start: 2024-12-23

## (undated) RX ORDER — FENTANYL CITRATE-0.9 % NACL/PF 10 MCG/ML
PLASTIC BAG, INJECTION (ML) INTRAVENOUS
Status: DISPENSED
Start: 2024-05-16

## (undated) RX ORDER — SULFAMETHOXAZOLE/TRIMETHOPRIM 800-160 MG
TABLET ORAL
Status: DISPENSED
Start: 2024-08-19